# Patient Record
Sex: FEMALE | Race: BLACK OR AFRICAN AMERICAN | NOT HISPANIC OR LATINO | Employment: OTHER | URBAN - METROPOLITAN AREA
[De-identification: names, ages, dates, MRNs, and addresses within clinical notes are randomized per-mention and may not be internally consistent; named-entity substitution may affect disease eponyms.]

---

## 2017-01-03 DIAGNOSIS — C90.00 MULTIPLE MYELOMA, REMISSION STATUS UNSPECIFIED (HCC): ICD-10-CM

## 2017-01-04 ENCOUNTER — LAB (OUTPATIENT)
Dept: LAB | Facility: HOSPITAL | Age: 67
End: 2017-01-04

## 2017-01-04 ENCOUNTER — OFFICE VISIT (OUTPATIENT)
Dept: ONCOLOGY | Facility: CLINIC | Age: 67
End: 2017-01-04

## 2017-01-04 ENCOUNTER — INFUSION (OUTPATIENT)
Dept: ONCOLOGY | Facility: HOSPITAL | Age: 67
End: 2017-01-04

## 2017-01-04 VITALS
DIASTOLIC BLOOD PRESSURE: 70 MMHG | TEMPERATURE: 98.1 F | BODY MASS INDEX: 35.22 KG/M2 | RESPIRATION RATE: 16 BRPM | WEIGHT: 191.4 LBS | HEIGHT: 62 IN | HEART RATE: 82 BPM | SYSTOLIC BLOOD PRESSURE: 112 MMHG

## 2017-01-04 VITALS — DIASTOLIC BLOOD PRESSURE: 75 MMHG | HEART RATE: 94 BPM | SYSTOLIC BLOOD PRESSURE: 122 MMHG

## 2017-01-04 DIAGNOSIS — D64.9 ANEMIA, UNSPECIFIED TYPE: ICD-10-CM

## 2017-01-04 DIAGNOSIS — T45.1X5A ANEMIA ASSOCIATED WITH CHEMOTHERAPY: ICD-10-CM

## 2017-01-04 DIAGNOSIS — T45.1X5A CHEMOTHERAPY-INDUCED THROMBOCYTOPENIA: ICD-10-CM

## 2017-01-04 DIAGNOSIS — C90.00 MULTIPLE MYELOMA, REMISSION STATUS UNSPECIFIED (HCC): ICD-10-CM

## 2017-01-04 DIAGNOSIS — R05.9 COUGH: Primary | ICD-10-CM

## 2017-01-04 DIAGNOSIS — D69.59 CHEMOTHERAPY-INDUCED THROMBOCYTOPENIA: ICD-10-CM

## 2017-01-04 DIAGNOSIS — D64.81 ANEMIA ASSOCIATED WITH CHEMOTHERAPY: ICD-10-CM

## 2017-01-04 DIAGNOSIS — C90.00 MULTIPLE MYELOMA NOT HAVING ACHIEVED REMISSION (HCC): ICD-10-CM

## 2017-01-04 DIAGNOSIS — C90.00 MULTIPLE MYELOMA, REMISSION STATUS UNSPECIFIED (HCC): Primary | ICD-10-CM

## 2017-01-04 DIAGNOSIS — R49.0 HOARSENESS OF VOICE: ICD-10-CM

## 2017-01-04 DIAGNOSIS — C90.00 MULTIPLE MYELOMA NOT HAVING ACHIEVED REMISSION (HCC): Primary | ICD-10-CM

## 2017-01-04 LAB
ALBUMIN SERPL-MCNC: 3 G/DL (ref 3.5–5.2)
ALBUMIN/GLOB SERPL: 0.5 G/DL (ref 1.1–2.4)
ALP SERPL-CCNC: 67 U/L (ref 38–116)
ALT SERPL W P-5'-P-CCNC: 20 U/L (ref 0–33)
ANION GAP SERPL CALCULATED.3IONS-SCNC: 9 MMOL/L
AST SERPL-CCNC: 23 U/L (ref 0–32)
BASOPHILS # BLD AUTO: 0.03 10*3/MM3 (ref 0–0.1)
BASOPHILS NFR BLD AUTO: 0.4 % (ref 0–1.1)
BILIRUB SERPL-MCNC: 0.3 MG/DL (ref 0.1–1.2)
BUN BLD-MCNC: 22 MG/DL (ref 6–20)
BUN/CREAT SERPL: 11.6 (ref 7.3–30)
CALCIUM SPEC-SCNC: 8.6 MG/DL (ref 8.5–10.2)
CHLORIDE SERPL-SCNC: 108 MMOL/L (ref 98–107)
CO2 SERPL-SCNC: 23 MMOL/L (ref 22–29)
CREAT BLD-MCNC: 1.89 MG/DL (ref 0.6–1.1)
DEPRECATED RDW RBC AUTO: 56.4 FL (ref 37–49)
EOSINOPHIL # BLD AUTO: 0.19 10*3/MM3 (ref 0–0.36)
EOSINOPHIL NFR BLD AUTO: 2.8 % (ref 1–5)
ERYTHROCYTE [DISTWIDTH] IN BLOOD BY AUTOMATED COUNT: 14.6 % (ref 11.7–14.5)
GFR SERPL CREATININE-BSD FRML MDRD: 32 ML/MIN/1.73
GLOBULIN UR ELPH-MCNC: 5.5 GM/DL (ref 1.8–3.5)
GLUCOSE BLD-MCNC: 91 MG/DL (ref 74–124)
HCT VFR BLD AUTO: 36 % (ref 34–45)
HGB BLD-MCNC: 11.1 G/DL (ref 11.5–14.9)
IMM GRANULOCYTES # BLD: 0.39 10*3/MM3 (ref 0–0.03)
IMM GRANULOCYTES NFR BLD: 5.7 % (ref 0–0.5)
LYMPHOCYTES # BLD AUTO: 1.71 10*3/MM3 (ref 1–3.5)
LYMPHOCYTES NFR BLD AUTO: 25.1 % (ref 20–49)
MCH RBC QN AUTO: 33 PG (ref 27–33)
MCHC RBC AUTO-ENTMCNC: 30.8 G/DL (ref 32–35)
MCV RBC AUTO: 107.1 FL (ref 83–97)
MONOCYTES # BLD AUTO: 0.83 10*3/MM3 (ref 0.25–0.8)
MONOCYTES NFR BLD AUTO: 12.2 % (ref 4–12)
NEUTROPHILS # BLD AUTO: 3.66 10*3/MM3 (ref 1.5–7)
NEUTROPHILS NFR BLD AUTO: 53.8 % (ref 39–75)
NRBC BLD MANUAL-RTO: 0.4 /100 WBC (ref 0–0)
PLATELET # BLD AUTO: 72 10*3/MM3 (ref 150–375)
PMV BLD AUTO: 12.8 FL (ref 8.9–12.1)
POTASSIUM BLD-SCNC: 3.7 MMOL/L (ref 3.5–4.7)
PROT SERPL-MCNC: 8.5 G/DL (ref 6.3–8)
RBC # BLD AUTO: 3.36 10*6/MM3 (ref 3.9–5)
SODIUM BLD-SCNC: 140 MMOL/L (ref 134–145)
VIT B12 BLD-MCNC: 1289 PG/ML (ref 250–999)
WBC NRBC COR # BLD: 6.81 10*3/MM3 (ref 4–10)

## 2017-01-04 PROCEDURE — 25010000002 DARATUMUMAB 100 MG/5ML SOLUTION 5 ML VIAL: Performed by: INTERNAL MEDICINE

## 2017-01-04 PROCEDURE — 25010000002 METHYLPREDNISOLONE PER 125 MG: Performed by: INTERNAL MEDICINE

## 2017-01-04 PROCEDURE — 36416 COLLJ CAPILLARY BLOOD SPEC: CPT | Performed by: INTERNAL MEDICINE

## 2017-01-04 PROCEDURE — 99215 OFFICE O/P EST HI 40 MIN: CPT | Performed by: INTERNAL MEDICINE

## 2017-01-04 PROCEDURE — 96413 CHEMO IV INFUSION 1 HR: CPT | Performed by: INTERNAL MEDICINE

## 2017-01-04 PROCEDURE — 25010000002 DIPHENHYDRAMINE PER 50 MG: Performed by: INTERNAL MEDICINE

## 2017-01-04 PROCEDURE — 96415 CHEMO IV INFUSION ADDL HR: CPT | Performed by: INTERNAL MEDICINE

## 2017-01-04 PROCEDURE — 36415 COLL VENOUS BLD VENIPUNCTURE: CPT | Performed by: INTERNAL MEDICINE

## 2017-01-04 PROCEDURE — 85025 COMPLETE CBC W/AUTO DIFF WBC: CPT | Performed by: INTERNAL MEDICINE

## 2017-01-04 PROCEDURE — 80053 COMPREHEN METABOLIC PANEL: CPT | Performed by: INTERNAL MEDICINE

## 2017-01-04 PROCEDURE — 96375 TX/PRO/DX INJ NEW DRUG ADDON: CPT | Performed by: INTERNAL MEDICINE

## 2017-01-04 PROCEDURE — 82607 VITAMIN B-12: CPT | Performed by: INTERNAL MEDICINE

## 2017-01-04 PROCEDURE — 25010000002 DARATUMUMAB 100 MG/5ML SOLUTION 20 ML VIAL: Performed by: INTERNAL MEDICINE

## 2017-01-04 PROCEDURE — 82746 ASSAY OF FOLIC ACID SERUM: CPT | Performed by: INTERNAL MEDICINE

## 2017-01-04 RX ORDER — ACETAMINOPHEN 500 MG
1000 TABLET ORAL ONCE
Status: CANCELLED | OUTPATIENT
Start: 2017-01-04

## 2017-01-04 RX ORDER — DIPHENHYDRAMINE HYDROCHLORIDE 50 MG/ML
50 INJECTION INTRAMUSCULAR; INTRAVENOUS AS NEEDED
Status: CANCELLED | OUTPATIENT
Start: 2017-01-04

## 2017-01-04 RX ORDER — SODIUM CHLORIDE 9 MG/ML
250 INJECTION, SOLUTION INTRAVENOUS ONCE
Status: COMPLETED | OUTPATIENT
Start: 2017-01-04 | End: 2017-01-04

## 2017-01-04 RX ORDER — METHYLPREDNISOLONE SODIUM SUCCINATE 125 MG/2ML
60 INJECTION, POWDER, LYOPHILIZED, FOR SOLUTION INTRAMUSCULAR; INTRAVENOUS ONCE
Status: CANCELLED | OUTPATIENT
Start: 2017-01-04

## 2017-01-04 RX ORDER — MEPERIDINE HYDROCHLORIDE 50 MG/ML
25 INJECTION INTRAMUSCULAR; INTRAVENOUS; SUBCUTANEOUS
Status: CANCELLED | OUTPATIENT
Start: 2017-01-04

## 2017-01-04 RX ORDER — FAMOTIDINE 10 MG/ML
20 INJECTION, SOLUTION INTRAVENOUS AS NEEDED
Status: CANCELLED | OUTPATIENT
Start: 2017-01-04

## 2017-01-04 RX ORDER — ACETAMINOPHEN 500 MG
1000 TABLET ORAL ONCE
Status: COMPLETED | OUTPATIENT
Start: 2017-01-04 | End: 2017-01-04

## 2017-01-04 RX ORDER — SODIUM CHLORIDE 9 MG/ML
250 INJECTION, SOLUTION INTRAVENOUS ONCE
Status: CANCELLED | OUTPATIENT
Start: 2017-01-04 | End: 2017-01-04

## 2017-01-04 RX ORDER — METHYLPREDNISOLONE SODIUM SUCCINATE 125 MG/2ML
60 INJECTION, POWDER, LYOPHILIZED, FOR SOLUTION INTRAMUSCULAR; INTRAVENOUS ONCE
Status: COMPLETED | OUTPATIENT
Start: 2017-01-04 | End: 2017-01-04

## 2017-01-04 RX ADMIN — METHYLPREDNISOLONE SODIUM SUCCINATE 60 MG: 125 INJECTION, POWDER, FOR SOLUTION INTRAMUSCULAR; INTRAVENOUS at 09:56

## 2017-01-04 RX ADMIN — DIPHENHYDRAMINE HYDROCHLORIDE 25 MG: 50 INJECTION, SOLUTION INTRAMUSCULAR; INTRAVENOUS at 09:57

## 2017-01-04 RX ADMIN — ACETAMINOPHEN 1000 MG: 500 TABLET ORAL at 09:56

## 2017-01-04 RX ADMIN — SODIUM CHLORIDE 250 ML: 900 INJECTION, SOLUTION INTRAVENOUS at 09:30

## 2017-01-04 RX ADMIN — DARATUMUMAB 1370 MG: 100 INJECTION, SOLUTION, CONCENTRATE INTRAVENOUS at 11:07

## 2017-01-04 NOTE — PROGRESS NOTES
Reasons for follow up:   1. IgG kappa multiple myeloma, currently on Darzalex, started on May 26, 2016. Patient was switched to Darzalex from Pomalyst, as she continued to be neutropenic with recurrent urinary tract infection while on Pomalyst.    2. Zometa is on hold due to renal insufficiency.    3.  After 16 doses of Darzalex, laboratory study showed stable disease in November 2016.  Insurance company denied adding Velcade and dexamethasone. Patient is to be continued on monthly Darzalex from 12/06/16.        History of Present Illness     The patient is a pleasant 66-year-old female here today for reevaluation.  The patient continues to have significant cough, mostly dry with only very small quantity of sputum. Patient was given Levaquin back in late November 2016. She did have some improvement; however her cough has never resolved. On top of that, she also has hoarseness of her voice which has no signs of recovering. She denies fever, sweating or chills. She otherwise feels reasonable at baseline condition. She denies any weight loss. She has been using guaifenesin AC which contains codeine I prescribed 4 weeks ago and she reports this does not help. It causes some sleepiness. She usually tried not to use it during the daytime if she goes out.     Patient is otherwise at a baseline condition with reasonable appetite. Performance status ECOG 1-0. No bleeding. No fainting or syncope.       Past Medical History, Past Surgical History, Social History, Family History have been reviewed and are without significant changes except as mentioned.                 Oncology/Hematology History       1. Mult iple myeloma, IgG lambda, stage III, diagnosed April 2012, previously treated with Velcade/Decadron followed by Velcade/Decadron/Revlimid; patient developed abdominal pain and rectal bleeding on Revlimid, which was then discontinued.    2. Patient evaluated at [facility], but was not felt  to be a candidate for stem cell transplant.    3. Disease progression with therapy switched to melphalan/Velcade/prednisone per the VISTA trial on 10/22/2013; melphalan dose has been persistently decreased because of thrombocytopenia.    4. Anemia secondary to chronic renal insufficiency and myeloma, on periodic Procrit therapy p.r.n.    5. Patient had 8 cycles of VMP chemotherapy, repeat laboratory studies on 10/03/2013 showed stable disease. Her melphalan do se was significantly decreased due to marrow suppression.    6. Patient had disease progression after cycle #10 VMP treatment, evidenced by laboratory studies on 01/06/2014. We increased the melphalan dose for one cycle. Continued disease progress after cycl e #11 VMP treatment, despite increased dose of melphalan, as documented by laboratory study on 02/17/2014.    7. The patient was evaluated on 02/24/2014 and we recommend switching chemotherapy to Kyprolis on 02/27/2014.    8. Echocardiogram on 03/05/2014 reporting L VEF 43%. This is on par with her previous twice echocardiogram study, in June 2012 and November 2012, reported LVEF at 45% to 50% and 40% to 45% respectively.    9. The patient had 2 episodes of chest pain after Kyprolis during cycle 1, leading to omitted dose on days 9 and 16. From cycle 2, we will give Kyprolis only once per week for 3 weeks out of every 4 weeks, as well as dose reduction of Kyprolis that was started on 04/04/2014.    10. After cycle 2 of dose-reduced Kyprolis, the patient's M spike increased fro m 2.6 to 3.2 g/dL, with her IgG level increasing from 2.7 g/dL to 4.7 g/dL.    11. Patient had a stress test and echocardiogram study at [facility] on 01/23/2015. The patient had LVEF 35% to 40%. Myocardial perfusion study reported a large, mild to modera te severity fixed inferior wall defect, consistent with known transmural infarct versus diaphragmatic attenuation artifact. Post stress resting ejection fraction estimated at  31%.    12. Repeated echocardiogram study on 04/03/2015 reported an LVEF 46%. Left v entricle is moderately dilated. There is mild global hypokinesis of the left ventricle. There was a grade 1 diastolic dysfunction.    13. The patient was seen on 04/09/2015 with plan for cycle 14, day 1 of Kyprolis. Treatment will be delayed 1 week secondary to patient' s extreme fatigue, hemoglobin of 8.1. We will proceed with 2 units of packed red blood cells. She was also found to have a urinary tract infection. Patient prescribed Cipro 500 mg twice a day x7.    14. Laboratory tests on 08/24/2015 reported slight disease progress after cycle #18 Kyprolis. We discussed with patient and we will switch chemotherapy to CyBorD regimen with dose reduction of Velcade to 1 mg/m2, cyclophosphamide at 240 mg/m2 (total dose 450 mg), and dexamethasone 20 mg. All therapy to b e repeated on a weekly basis.    15. The patient had significant fatigue after one dose of cyclophosphamide that lasted for 5-6 days. She also had severe anemia, hemoglobin 7.6, required 2 units PRBC transfusion. Cyclophosphamide will be decreased to 350 mg from 2nd week.    16. Patient continues to have significant fatigue after the reduced dose of cyclophosphamide at 350 mg; for 2 days after chemo, she could only lie on the bed with performance status ECOG 3. From 10/13/2015, oral cyclophosphamide will be further decr eased to 200 mg once weekly. We will continue Velcade and dexamethasone at same dose.    17. Laboratory study on 12/01/2015 showed evidence of further disease progress.    18. The patient was re-evaluated on 12/22/2015. Treatment will be started with Velcade, dexamethasone, and panobinostat.    19. Skeletal survey on 01/12/2016 reported stable disease.    20. Brain MRI examination on 02/16/2016 reported recurrent meningioma.    21. Evidence of disease progress, when she was on panobinostat treatment. The patient also has worsening ane ming and  thrombocytopenia secondary to chemotherapy. The patient has symptomatic anemia with hemoglobin 7.8 on 02/23/2016 requiring 2 units of packed RBC transfusion. Chemotherapy with panobinostat will be discontinued.    22. She was admitted 3/20/2016-3/28/2016 with urinary tract infection, cultures growing Pseudomonas aeruginosa. She did undergo right stent replacement 3/25/2016 by Dr. Everett. Due to pancytopenia and infection, Pomalidomide has been on hold since hospitalization. The patient does continue to receive Cefrazidime 1mg every 12 hours from home health.      Patient had a good recovery and that she was evaluated on 04/11/2016. At that time she had improved a neutrophils counts at a 1640 out of total WBC 3620. Platelets was 80,000 which is a baseline. Her hemoglobin was 9.6. Because of intolerance to permanent a mild, those was further decreased at the 2 mg daily for 21 days.      However she required a hospitalization again from May 5 through 05/09/2016 She was found having sepsis, fever, and neutropenia. she presented to the ER for evaluation because of worsening condition. Urine culture was positive for Escherichia coli infection. She was also found having acute renal injury on top of her chronic renal insufficiency. Her creatinine was 2.9 and a peak. She was also found having neutropenia, with the  on May 5. She was admitted to hospital for further management. Patient was given IV antibiotics and the urologist consultation obtained. She did have replacement of the the right ureter stent by Dr. Everett. Patient was given Neupogen during hospitalization. She was also anemic symptomatic with a hemoglobin 7.1 and was given packed RBC transfusion. Her micaela platelet counts was 46,000 on 05/07/2016. No transfusion of platelets.       Patient was reevaluated on 05/17/2016. She recovered to baseline condition. Laboratory study showed mild neutropenia with ANC 1480, down from 2950 on 05/09/2016 when she was  discharged. Platelets was 57,000 and hemoglobin 9.0. Because of intolerance to Pomalidomide, with twice sepsis despite a dose reduction, we recommended to discontinue Pomalidomide. We recommended starting Darzalex treatment. Patient will be given acyclovir for prophylaxis of viral infection. She'll also be given Singulair for prophylaxis of possible allergic reaction from infusion of darzalex.       Darzalex was started on 05/17/16 per standard protocol. Her WBC was 3150, ANC 1480, Hb 9.0 and Platelet 57,000.       On 06/01/2016, and 24 hour urine sample showed free lambda chain 322 MG/L, total lambda chain 435 MG, free kappa chain 30.3 MG/L and a total kappa chain 52. Free kappa/lambda ratio 0.12, total 24 hour urine protein was 318 MG. Monoclonal IgG lambda was 7.5%, and monoclonal lambda free light chain was 42%.       On 06/23/2016, serum ferritin was 763, iron 91, TIBC 132 iron saturation 69%.       Laboratory study on 07/20/2016 reported 24-hour urine sample free lambda chain 153 MG/L and total lambda chain 275 mg, free kappa chain 22.3 MG/L and the total kappa chain 40 MG, free kappa/lambda ratio 0.15 and a positive for monoclonal IgG lambda and Bence-May protein lambda type. UPEP reported monoclonal free lambda chain 29.2%, and monoclonal IgG lambda 9%. Total 24 hour urine protein was 430 MG. Serum protein quantitation reporting IgG 4211, IgM 15 and IgA 8, free serum lambda chain 1442, free kappa chain 8.1, free kappa/lambda ratio of 0.01. SPEP reporting M spike 3.2 g, out of a total globulin 4.9, and a total protein 7.7 g. Albumin was 2.8 g/DL. On the same day CBC showed a hemoglobin 9.8, platelets 78,000, WBC 6130 including neutrophils 3360 in the lymphocytes 1780 monocytes 630.       Laboratory study on 11/08/2016 reported minimal global 10.8, platelets 65,000 and WBC 7200 including neutrophils 3950 lymphocytes 2000. serum IgG 4276 MG/DL, IgA 9 and IgM 28, total serum protein 8.4 g/DL. No serum protein  "electrophoresis, immunofixation and free light chains were done by the laboratory, despite orders. On 11/17/2016, the 24 hour urine sample reported free lambda chain 116 mg/L with total free lambda chain 220 mg. Free kappa chain was 32 mg total. Immunofixation of the urine protein did report both Bence May protein lambda subtype, and monoclonal IgG lambda protein. Monoclonal lambda free light chain was 33%, and monoclonal IgG lambda was 11.2% from the UPEP, out of total urine protein 404.7 MG in 24 hours.      Rosy study on 12/06/2016 reported a ferritin 1015, iron saturation 72%, serum iron 99 TIBC 137.  Hemoglobin was 10.2, platelets 83,000 and WBC 8200 including neutrophils 4300 lymphocytes 2200                Multiple myeloma       4/1/2012  Initial Diagnosis       Multiple myeloma          Chemotherapy                  Radiation                  Adverse Reaction                  Surgery                    Review of Systems    Constitutional: Negative for chills and fever. Does have mild fatigue.    HENT: Negative for mouth sores and sore throat.    Eyes: Negative for visual disturbance.    Respiratory: See HPI.    Gastrointestinal: Negative for abdominal pain, diarrhea, nausea and vomiting.    Genitourinary: Negative for decreased urine volume, dysuria, enuresis, frequency, hematuria, pelvic pain and urgency.    Musculoskeletal: Negative for back pain and joint swelling.    Neurological: Negative for dizziness and weakness.    Hematological: Does not bruise/bleed easily.    Psychiatric/Behavioral: The patient is not nervous/anxious.    All other systems reviewed and are negative.      Medications: The current medication list was reviewed in the EMR.     ALLERGIES: Zosyn       Vitals:    01/04/17 0813   BP: 112/70   Pulse: 82   Resp: 16   Temp: 98.1 °F (36.7 °C)   Weight: 191 lb 6.4 oz (86.8 kg)   Height: 62.2\" (158 cm)   PainSc: 0-No pain   PS: ECOG 1      Physical Exam   Constitutional: oriented to " person, place, and time. well-developed and well-nourished. No distress.    HENT:    Head: Normocephalic.    Eyes: EOMs are normal. Pupils are equal, round, and reactive to light.    Neck: Normal range of motion.    Cardiovascular: Normal rate, regular rhythm, normal heart sounds and normal pulses.    Pulmonary/Chest: Effort normal and breath sounds normal. no wheezes, no rales.  Mediport benign.  Abdominal: Soft. Bowel sounds are normal. no distension and no mass. There is no hepatosplenomegaly. There is no tenderness.   Musculoskeletal: Normal range of motion. no edema or deformity.   Lymphadenopathy: She has no cervical, supraclavicular adenopathy.   Neurological: alert and oriented to person, place, and time. No cranial nerve deficit.    Skin: Skin is warm and dry. No rash noted. No cyanosis. No pallor.   Psychiatric: She has a normal mood and affect. Her behavior is normal.        RECENT LABS:        Lab Results   Component Value Date    WBC 6.81 01/04/2017    HGB 11.1 (L) 01/04/2017    HCT 36.0 01/04/2017    .1 (H) 01/04/2017    PLT 72 (L) 01/04/2017     Lab Results   Component Value Date    NEUTROABS 3.66 01/04/2017       Assessment/Plan   1. Multiple myeloma, IgG lambda, stage III. Failed multiple lines of therapy.  Her treatment was switched to Darzalex on 5/26/2016, and finished 8 weekly doses then 8 Q2week doses with very good tolerance. However protein studies showed stable disease, no apparent improvement. Her insurance company deniey darzalex plus Velcade because it was only approved for patient who had no previous darzalex treatment. So we continued single agent darzalex, and she is tolerating very well, we'll continue monthly treatment. Will check labs after 4 monthly dosing.      2.  Persistent cough and hoarseness of voice.  No signs of improvement.  She started with upper respiratory infection/bronchitis in mid November 2016, was treated using Levaquin, together with Mucinex.  Initially had  a some improvement, however she has no further improvement, with fairly significant persistent cough despite her taking guaifenesin AC which contains codeine, together with plain Mucinex.  This is concerning especially her hoarseness of voice, for the past 6 weeks.  Previously we did obtain chest x-ray examination in late November, with no apparent abnormalities.  However her symptoms are very concerning.  I recommend patient to have a CT scan for chest for further evaluation.  Because of her chronic renal insufficiency, we will obtain scan without IV contrast.       3. Stage III Chronic Kidney disease. Will recheck labs.  Will continue to hold Zometa and monitor kidney function for further improvement.       4.   Anemia secondary to chemotherapy for multiple myeloma and chronic renal insufficiency. Her hemoglobin improved. She responded to low dose Procrit very nicely.  Rechecked her iron panel reported ferritin >1000.  B12 and folic acid levels are pending.     5. Moderate thrombocytopenia secondary to chemotherapy. She is asymptomatic.      6. History of stage II left breast cancer, post mastectomy in 2013, negative for ER/NE and the positive HER-2. No neoadjuvant chemotherapy because of concurrent discovery of multiple myeloma and chemotherapy. She had a normal mammogram study in July 2016.          Plan:  1. Proceed with Darzalex #18, repeated monthly.   2. Lab study for B12 or folic acid.   3.  CT scan for chest without IV contrast, due to chronic renal insufficiency.   4. Hold Zometa.    5. Patient will return in 4weeks for M.D. Reevaluation. Continue Darzalex monthly.  If her CT scan shows suspicion for malignancy, I will see her earlier.           LI OBANDO M.D., Ph.D.         Dragon disclaimer:  Much of this encounter note is an electronic transcription/translation of spoken language to printed text. The electronic translation of spoken language may permit erroneous, or at times, nonsensical words or  phrases to be inadvertently transcribed; Although I have reviewed the note for such errors, some may still exist.

## 2017-01-04 NOTE — PROGRESS NOTES
Pt creat reviewed with Dr. Araujo.  NO new changes other than to continue to hold zometa.  Currently pt does not have orders for Zometa.

## 2017-01-05 LAB
ALBUMIN SERPL-MCNC: 2.9 G/DL (ref 2.9–4.4)
ALBUMIN/GLOB SERPL: 0.6 {RATIO} (ref 0.7–1.7)
ALPHA1 GLOB FLD ELPH-MCNC: 0.2 G/DL (ref 0–0.4)
ALPHA2 GLOB SERPL ELPH-MCNC: 0.6 G/DL (ref 0.4–1)
B-GLOBULIN SERPL ELPH-MCNC: 0.8 G/DL (ref 0.7–1.3)
GAMMA GLOB SERPL ELPH-MCNC: 3.3 G/DL (ref 0.4–1.8)
GLOBULIN SER CALC-MCNC: 5 G/DL (ref 2.2–3.9)
IGA SERPL-MCNC: 9 MG/DL (ref 87–352)
IGG SERPL-MCNC: 4075 MG/DL (ref 700–1600)
IGM SERPL-MCNC: 15 MG/DL (ref 26–217)
INTERPRETATION SERPL IEP-IMP: ABNORMAL
KAPPA LC SERPL-MCNC: 10.34 MG/L (ref 3.3–19.4)
KAPPA LC/LAMBDA SER: 0.01 {RATIO} (ref 0.26–1.65)
LAMBDA LC FREE SERPL-MCNC: 1339.24 MG/L (ref 5.71–26.3)
Lab: ABNORMAL
M-SPIKE: ABNORMAL G/DL
PROT SERPL-MCNC: 7.9 G/DL (ref 6–8.5)

## 2017-01-06 ENCOUNTER — HOSPITAL ENCOUNTER (OUTPATIENT)
Dept: CT IMAGING | Facility: HOSPITAL | Age: 67
Discharge: HOME | End: 2017-01-06
Attending: INTERNAL MEDICINE | Admitting: INTERNAL MEDICINE

## 2017-01-06 DIAGNOSIS — R05.9 COUGH: ICD-10-CM

## 2017-01-06 DIAGNOSIS — R49.0 HOARSENESS OF VOICE: ICD-10-CM

## 2017-01-06 PROCEDURE — 71250 CT THORAX DX C-: CPT

## 2017-01-19 ENCOUNTER — TELEPHONE (OUTPATIENT)
Dept: ONCOLOGY | Facility: HOSPITAL | Age: 67
End: 2017-01-19

## 2017-01-19 ENCOUNTER — INFUSION (OUTPATIENT)
Dept: ONCOLOGY | Facility: HOSPITAL | Age: 67
End: 2017-01-19

## 2017-01-19 ENCOUNTER — HOSPITAL ENCOUNTER (INPATIENT)
Facility: HOSPITAL | Age: 67
LOS: 5 days | Discharge: HOME | End: 2017-01-24
Attending: INTERNAL MEDICINE | Admitting: INTERNAL MEDICINE

## 2017-01-19 ENCOUNTER — OFFICE VISIT (OUTPATIENT)
Dept: ONCOLOGY | Facility: CLINIC | Age: 67
End: 2017-01-19

## 2017-01-19 VITALS
SYSTOLIC BLOOD PRESSURE: 100 MMHG | HEART RATE: 100 BPM | WEIGHT: 186.8 LBS | OXYGEN SATURATION: 97 % | DIASTOLIC BLOOD PRESSURE: 58 MMHG | TEMPERATURE: 99.7 F | RESPIRATION RATE: 16 BRPM | BODY MASS INDEX: 34.37 KG/M2 | HEIGHT: 62 IN

## 2017-01-19 DIAGNOSIS — C90.00 MULTIPLE MYELOMA NOT HAVING ACHIEVED REMISSION (HCC): ICD-10-CM

## 2017-01-19 DIAGNOSIS — C64.2 MALIGNANT NEOPLASM OF KIDNEY, LEFT (HCC): ICD-10-CM

## 2017-01-19 DIAGNOSIS — N13.30 HYDRONEPHROSIS OF RIGHT KIDNEY: ICD-10-CM

## 2017-01-19 DIAGNOSIS — C90.00 MULTIPLE MYELOMA NOT HAVING ACHIEVED REMISSION (HCC): Primary | ICD-10-CM

## 2017-01-19 DIAGNOSIS — N18.30 CHRONIC KIDNEY DISEASE, STAGE III (MODERATE) (HCC): Primary | ICD-10-CM

## 2017-01-19 DIAGNOSIS — Z45.2 FITTING AND ADJUSTMENT OF VASCULAR CATHETER: ICD-10-CM

## 2017-01-19 DIAGNOSIS — R39.9 UTI SYMPTOMS: ICD-10-CM

## 2017-01-19 DIAGNOSIS — R50.9 FEVER, UNSPECIFIED FEVER CAUSE: ICD-10-CM

## 2017-01-19 PROBLEM — N39.0 FEBRILE URINARY TRACT INFECTION: Status: ACTIVE | Noted: 2017-01-19

## 2017-01-19 LAB
ALBUMIN SERPL-MCNC: 3.4 G/DL (ref 3.5–5.2)
ALBUMIN/GLOB SERPL: 0.6 G/DL (ref 1.1–2.4)
ALP SERPL-CCNC: 65 U/L (ref 38–116)
ALT SERPL W P-5'-P-CCNC: 21 U/L (ref 0–33)
ANION GAP SERPL CALCULATED.3IONS-SCNC: 12 MMOL/L
AST SERPL-CCNC: 27 U/L (ref 0–32)
BACTERIA UR QL AUTO: ABNORMAL /HPF
BASOPHILS # BLD AUTO: 0.01 10*3/MM3 (ref 0–0.1)
BASOPHILS NFR BLD AUTO: 0.1 % (ref 0–1.1)
BILIRUB SERPL-MCNC: 0.3 MG/DL (ref 0.1–1.2)
BILIRUB UR QL STRIP: NEGATIVE
BUN BLD-MCNC: 21 MG/DL (ref 6–20)
BUN/CREAT SERPL: 10.1 (ref 7.3–30)
CALCIUM SPEC-SCNC: 8 MG/DL (ref 8.5–10.2)
CHLORIDE SERPL-SCNC: 104 MMOL/L (ref 98–107)
CLARITY UR: ABNORMAL
CO2 SERPL-SCNC: 20 MMOL/L (ref 22–29)
COLOR UR: YELLOW
CREAT BLD-MCNC: 2.07 MG/DL (ref 0.6–1.1)
DEPRECATED RDW RBC AUTO: 55.9 FL (ref 37–49)
EOSINOPHIL # BLD AUTO: 0.02 10*3/MM3 (ref 0–0.36)
EOSINOPHIL NFR BLD AUTO: 0.3 % (ref 1–5)
ERYTHROCYTE [DISTWIDTH] IN BLOOD BY AUTOMATED COUNT: 14.3 % (ref 11.7–14.5)
GFR SERPL CREATININE-BSD FRML MDRD: 29 ML/MIN/1.73
GLOBULIN UR ELPH-MCNC: 5.3 GM/DL (ref 1.8–3.5)
GLUCOSE BLD-MCNC: 100 MG/DL (ref 74–124)
GLUCOSE UR STRIP-MCNC: NEGATIVE MG/DL
HCT VFR BLD AUTO: 34.3 % (ref 34–45)
HGB BLD-MCNC: 10.8 G/DL (ref 11.5–14.9)
HGB UR QL STRIP.AUTO: ABNORMAL
IMM GRANULOCYTES # BLD: 0.09 10*3/MM3 (ref 0–0.03)
IMM GRANULOCYTES NFR BLD: 1.3 % (ref 0–0.5)
KETONES UR QL STRIP: NEGATIVE
LEUKOCYTE ESTERASE UR QL STRIP.AUTO: ABNORMAL
LYMPHOCYTES # BLD AUTO: 1.57 10*3/MM3 (ref 1–3.5)
LYMPHOCYTES NFR BLD AUTO: 22.9 % (ref 20–49)
MCH RBC QN AUTO: 33.8 PG (ref 27–33)
MCHC RBC AUTO-ENTMCNC: 31.5 G/DL (ref 32–35)
MCV RBC AUTO: 107.2 FL (ref 83–97)
MONOCYTES # BLD AUTO: 0.97 10*3/MM3 (ref 0.25–0.8)
MONOCYTES NFR BLD AUTO: 14.1 % (ref 4–12)
NEUTROPHILS # BLD AUTO: 4.21 10*3/MM3 (ref 1.5–7)
NEUTROPHILS NFR BLD AUTO: 61.3 % (ref 39–75)
NITRITE UR QL STRIP: POSITIVE
NRBC BLD MANUAL-RTO: 0.4 /100 WBC (ref 0–0)
PH UR STRIP.AUTO: 7 [PH] (ref 4.5–8)
PLATELET # BLD AUTO: 64 10*3/MM3 (ref 150–375)
PMV BLD AUTO: 12.5 FL (ref 8.9–12.1)
POTASSIUM BLD-SCNC: 3.8 MMOL/L (ref 3.5–4.7)
PROT SERPL-MCNC: 8.7 G/DL (ref 6.3–8)
PROT UR QL STRIP: ABNORMAL
RBC # BLD AUTO: 3.2 10*6/MM3 (ref 3.9–5)
RBC # UR: ABNORMAL /HPF
REF LAB TEST METHOD: ABNORMAL
SODIUM BLD-SCNC: 136 MMOL/L (ref 134–145)
SP GR UR STRIP: 1.01 (ref 1–1.03)
SQUAMOUS #/AREA URNS HPF: ABNORMAL /HPF
UROBILINOGEN UR QL STRIP: ABNORMAL
WBC NRBC COR # BLD: 6.87 10*3/MM3 (ref 4–10)
WBC UR QL AUTO: ABNORMAL /HPF

## 2017-01-19 PROCEDURE — 81001 URINALYSIS AUTO W/SCOPE: CPT | Performed by: NURSE PRACTITIONER

## 2017-01-19 PROCEDURE — 87040 BLOOD CULTURE FOR BACTERIA: CPT | Performed by: NURSE PRACTITIONER

## 2017-01-19 PROCEDURE — 87040 BLOOD CULTURE FOR BACTERIA: CPT | Performed by: INTERNAL MEDICINE

## 2017-01-19 PROCEDURE — 25010000002 HEPARIN FLUSH (PORCINE) 100 UNIT/ML SOLUTION: Performed by: INTERNAL MEDICINE

## 2017-01-19 PROCEDURE — 80053 COMPREHEN METABOLIC PANEL: CPT

## 2017-01-19 PROCEDURE — 99223 1ST HOSP IP/OBS HIGH 75: CPT | Performed by: NURSE PRACTITIONER

## 2017-01-19 PROCEDURE — 96523 IRRIG DRUG DELIVERY DEVICE: CPT

## 2017-01-19 PROCEDURE — 87086 URINE CULTURE/COLONY COUNT: CPT | Performed by: NURSE PRACTITIONER

## 2017-01-19 PROCEDURE — 87186 SC STD MICRODIL/AGAR DIL: CPT | Performed by: NURSE PRACTITIONER

## 2017-01-19 PROCEDURE — 25010000002 LEVOFLOXACIN PER 250 MG: Performed by: INTERNAL MEDICINE

## 2017-01-19 PROCEDURE — 85025 COMPLETE CBC W/AUTO DIFF WBC: CPT

## 2017-01-19 RX ORDER — LEVOFLOXACIN 5 MG/ML
500 INJECTION, SOLUTION INTRAVENOUS EVERY 24 HOURS
Status: DISCONTINUED | OUTPATIENT
Start: 2017-01-19 | End: 2017-01-20

## 2017-01-19 RX ORDER — LOSARTAN POTASSIUM 25 MG/1
25 TABLET ORAL EVERY EVENING
Status: DISCONTINUED | OUTPATIENT
Start: 2017-01-19 | End: 2017-01-24 | Stop reason: HOSPADM

## 2017-01-19 RX ORDER — SODIUM CHLORIDE 0.9 % (FLUSH) 0.9 %
10 SYRINGE (ML) INJECTION AS NEEDED
Status: CANCELLED | OUTPATIENT
Start: 2017-01-19

## 2017-01-19 RX ORDER — SODIUM CHLORIDE 9 MG/ML
100 INJECTION, SOLUTION INTRAVENOUS CONTINUOUS
Status: CANCELLED | OUTPATIENT
Start: 2017-01-19

## 2017-01-19 RX ORDER — HYDROCODONE BITARTRATE AND ACETAMINOPHEN 5; 325 MG/1; MG/1
2 TABLET ORAL EVERY 6 HOURS PRN
Status: DISCONTINUED | OUTPATIENT
Start: 2017-01-19 | End: 2017-01-24 | Stop reason: HOSPADM

## 2017-01-19 RX ORDER — HYDRALAZINE HYDROCHLORIDE 25 MG/1
25 TABLET, FILM COATED ORAL 2 TIMES DAILY
Status: DISCONTINUED | OUTPATIENT
Start: 2017-01-19 | End: 2017-01-24 | Stop reason: HOSPADM

## 2017-01-19 RX ORDER — LEVOFLOXACIN 5 MG/ML
500 INJECTION, SOLUTION INTRAVENOUS EVERY 24 HOURS
Status: CANCELLED | OUTPATIENT
Start: 2017-01-19

## 2017-01-19 RX ORDER — GUAIFENESIN 600 MG/1
600 TABLET, EXTENDED RELEASE ORAL 2 TIMES DAILY
Status: DISCONTINUED | OUTPATIENT
Start: 2017-01-19 | End: 2017-01-24 | Stop reason: HOSPADM

## 2017-01-19 RX ORDER — SODIUM CHLORIDE 9 MG/ML
50 INJECTION, SOLUTION INTRAVENOUS CONTINUOUS
Status: DISCONTINUED | OUTPATIENT
Start: 2017-01-19 | End: 2017-01-24 | Stop reason: HOSPADM

## 2017-01-19 RX ORDER — ACETAMINOPHEN 325 MG/1
650 TABLET ORAL EVERY 6 HOURS PRN
Status: DISCONTINUED | OUTPATIENT
Start: 2017-01-19 | End: 2017-01-24 | Stop reason: HOSPADM

## 2017-01-19 RX ORDER — ONDANSETRON 4 MG/1
4 TABLET, ORALLY DISINTEGRATING ORAL EVERY 6 HOURS PRN
Status: DISCONTINUED | OUTPATIENT
Start: 2017-01-19 | End: 2017-01-24 | Stop reason: HOSPADM

## 2017-01-19 RX ORDER — BISOPROLOL FUMARATE 5 MG/1
10 TABLET, FILM COATED ORAL EVERY MORNING
Status: DISCONTINUED | OUTPATIENT
Start: 2017-01-20 | End: 2017-01-24 | Stop reason: HOSPADM

## 2017-01-19 RX ORDER — SODIUM CHLORIDE 0.9 % (FLUSH) 0.9 %
10 SYRINGE (ML) INJECTION AS NEEDED
Status: DISCONTINUED | OUTPATIENT
Start: 2017-01-19 | End: 2017-01-19 | Stop reason: HOSPADM

## 2017-01-19 RX ORDER — BISOPROLOL FUMARATE 5 MG/1
5 TABLET, FILM COATED ORAL EVERY EVENING
Status: DISCONTINUED | OUTPATIENT
Start: 2017-01-19 | End: 2017-01-24 | Stop reason: HOSPADM

## 2017-01-19 RX ORDER — ONDANSETRON 4 MG/1
4 TABLET, ORALLY DISINTEGRATING ORAL EVERY 6 HOURS PRN
Status: CANCELLED | OUTPATIENT
Start: 2017-01-19

## 2017-01-19 RX ORDER — ASPIRIN 81 MG/1
81 TABLET ORAL DAILY
Status: DISCONTINUED | OUTPATIENT
Start: 2017-01-19 | End: 2017-01-24 | Stop reason: HOSPADM

## 2017-01-19 RX ORDER — ISOSORBIDE MONONITRATE 60 MG/1
60 TABLET, EXTENDED RELEASE ORAL
Status: DISCONTINUED | OUTPATIENT
Start: 2017-01-19 | End: 2017-01-24 | Stop reason: HOSPADM

## 2017-01-19 RX ORDER — CARBAMAZEPINE 100 MG/1
200 TABLET, EXTENDED RELEASE ORAL NIGHTLY
Status: DISCONTINUED | OUTPATIENT
Start: 2017-01-19 | End: 2017-01-24 | Stop reason: HOSPADM

## 2017-01-19 RX ORDER — ACYCLOVIR 400 MG/1
400 TABLET ORAL 2 TIMES DAILY
Status: DISCONTINUED | OUTPATIENT
Start: 2017-01-19 | End: 2017-01-24 | Stop reason: HOSPADM

## 2017-01-19 RX ORDER — PROCHLORPERAZINE MALEATE 10 MG
10 TABLET ORAL EVERY 6 HOURS PRN
Status: DISCONTINUED | OUTPATIENT
Start: 2017-01-19 | End: 2017-01-24 | Stop reason: HOSPADM

## 2017-01-19 RX ORDER — HYDROXYZINE HYDROCHLORIDE 25 MG/1
25 TABLET, FILM COATED ORAL 3 TIMES DAILY PRN
Status: DISCONTINUED | OUTPATIENT
Start: 2017-01-19 | End: 2017-01-24 | Stop reason: HOSPADM

## 2017-01-19 RX ORDER — MONTELUKAST SODIUM 10 MG/1
10 TABLET ORAL NIGHTLY
Status: DISCONTINUED | OUTPATIENT
Start: 2017-01-19 | End: 2017-01-24 | Stop reason: HOSPADM

## 2017-01-19 RX ADMIN — LEVOFLOXACIN 500 MG: 500 INJECTION, SOLUTION INTRAVENOUS at 20:51

## 2017-01-19 RX ADMIN — Medication 10 ML: at 11:29

## 2017-01-19 RX ADMIN — ISOSORBIDE MONONITRATE 60 MG: 60 TABLET, EXTENDED RELEASE ORAL at 20:48

## 2017-01-19 RX ADMIN — GUAIFENESIN 600 MG: 600 TABLET, EXTENDED RELEASE ORAL at 20:49

## 2017-01-19 RX ADMIN — ACETAMINOPHEN 650 MG: 325 TABLET ORAL at 18:36

## 2017-01-19 RX ADMIN — ACYCLOVIR 400 MG: 400 TABLET ORAL at 20:48

## 2017-01-19 RX ADMIN — MONTELUKAST 10 MG: 10 TABLET, FILM COATED ORAL at 20:49

## 2017-01-19 RX ADMIN — BISOPROLOL FUMARATE 5 MG: 5 TABLET, COATED ORAL at 20:50

## 2017-01-19 RX ADMIN — SODIUM CHLORIDE, PRESERVATIVE FREE 500 UNITS: 5 INJECTION INTRAVENOUS at 11:29

## 2017-01-19 RX ADMIN — SODIUM CHLORIDE 100 ML/HR: 9 INJECTION, SOLUTION INTRAVENOUS at 18:47

## 2017-01-19 RX ADMIN — ASPIRIN 81 MG: 81 TABLET ORAL at 20:48

## 2017-01-19 NOTE — H&P
Subjective .  Fever 102.2 and weak    REASON FOR ADMISSION: Febrile urinary tract infection    HISTORY OF PRESENT ILLNESS:  The patient is a 66 y.o. year old female who is here for follow-up with the above-mentioned history who is followed in our office for IgG kappa multiple myeloma, currently on Darzalex, last dose January 4, 2017.    History of Present Illness  she comes in to the office today after a 2 day temperature of 102.  Monday she noted a temperature of 100.  When she awoke on Tuesday she was further febrile as well as noted fatigue and weakness.  She denies nausea vomiting or diarrhea.  She has not been eating though reports drinking about 16 ounces of water daily.  She denies dizzinessshe does seem off balance at times.  She denies alcohol.  She has had a cough for quite some time though it is not changed of recent.  She denies shortness of breath.  She denies pain.      She was due to have ureteral stent changed today however could not undergo the procedure because of fever.  In the past, she is become febrile and weak close the time of ureteral stent change.    Past Medical History   Diagnosis Date   • Anemia 10/14/2008     Secondary to chronic renal insufficiency and myeloma   • Arthropathy of knee 01/07/2014     unspecified   • Breast CA, left 04/25/2012     Stage II   • Bursitis of left hip 10/18/2007   • Bursitis, knee 12/02/2013   • Calcaneal spur 08/12/2009   • Chronic kidney disease, stage III (moderate)    • Congestive heart failure    • Diarrhea of presumed infectious origin 04/01/2014     c diff    • Diverticulitis of colon 10/17/2007   • History of Clostridium difficile    • History of echocardiogram 04/2014     EF decreased to 46% - per cardiology   • History of transfusion    • Hypertension    • LLL pneumonia 04/23/2009   • Malignant neoplasm of kidney 12/2009     and other and unspecified urinary organs; urinary organ, site unspecified; S/P L nephrectomy   • Multiple myeloma 04/2012    • Neoplasm of uncertain behavior 10/12/2015     of other and unspecified sites and tissues; other specified sites   • Personal history of breast cancer 04/2012     S/P mastectomy left breast   • Pyelonephritis 03/2004   • Seizure disorder 10/17/2007   • Thrombocytopenia    • UTI (urinary tract infection) 10/30/2014     pseudomonas, multidrug resistant   • UTI (urinary tract infection) 03/28/2014     site not specified       ONCOLOGIC HISTORY:  (History from previous dates can be found in the separate document.)    Current Outpatient Prescriptions on File Prior to Visit   Medication Sig Dispense Refill   • acyclovir (ZOVIRAX) 400 MG tablet Take 1 tablet by mouth 2 (two) times a day. Take no more than 5 doses a day. 60 tablet 11   • aspirin 81 MG tablet Take 81 mg by mouth every evening. Take 1 tablet by oral route once daily      • bisoprolol (ZEBETA) 10 MG tablet Take 10 mg by mouth every morning. Take 1 tablet by oral route once daily      • bisoprolol (ZEBETA) 5 MG tablet 5 mg every evening.     • carBAMazepine XR (TEGretol  XR) 200 MG 12 hr tablet      • carBAMazepine XR (TEGretol  XR) 200 MG 12 hr tablet TAKE 1 TABLET BY MOUTH 2 (TWO) TIMES A DAY FOR 30 DAYS. 60 tablet 2   • dexamethasone (DECADRON) 4 MG tablet Take 1 tablet in the morning starting the day after chemo for 2 days.  Take with food. 16 tablet 5   • guaiFENesin (MUCINEX) 600 MG 12 hr tablet Take 1 tablet by mouth 2 (Two) Times a Day. 30 tablet 2   • guaifenesin-codeine (GUAIFENESIN AC) 100-10 MG/5ML liquid Take 5 mL by mouth 3 (Three) Times a Day As Needed for cough. 150 mL 1   • hydrALAZINE (APRESOLINE) 25 MG tablet Take 25 mg by mouth 2 (two) times a day.  3   • HYDROcodone-acetaminophen (NORCO) 5-325 MG per tablet TAKE 1 TO 2 TABLETS BY MOUTH EVERY 6 HOURS AS NEEDED  0   • hydrOXYzine (ATARAX) 25 MG tablet Take 1 tablet by mouth 3 (three) times a day as needed for itching. 90 tablet 6   • isosorbide mononitrate (IMDUR) 60 MG 24 hr tablet TAKE 1  TABLET BY MOUTH EVERY MORNING AND ONE-HALF TABLET EVERY EVENING  3   • losartan (COZAAR) 25 MG tablet Take 25 mg by mouth every evening.     • Misc. Devices (VIRAGE CUSTOM BREAST PROSTHES) misc As directed     • Misc. Devices misc Mastectomy bras, use as directed 4 each 0   • montelukast (SINGULAIR) 10 MG tablet Take 1 tablet by mouth every night. 30 tablet 11   • prochlorperazine (COMPAZINE) 10 MG tablet Take 1 tablet by mouth every 6 (six) hours as needed for nausea or vomiting for up to 60 doses. 60 tablet 5     No current facility-administered medications on file prior to visit.        ALLERGIES:     Allergies   Allergen Reactions   • Ace Inhibitors Swelling     FACE   • Zosyn [Piperacillin Sod-Tazobactam So] Swelling       Social History     Social History   • Marital status:      Spouse name: N/A   • Number of children: 3   • Years of education: High school     Occupational History   •  Retired     [employer] [[id number]]     Social History Main Topics   • Smoking status: Never Smoker   • Smokeless tobacco: Never Used   • Alcohol use No   • Drug use: No   • Sexual activity: Not Currently     Other Topics Concern   • Not on file     Social History Narrative    Son lives with her and helps         Cancer-related family history includes Cancer in her sister.     Review of Systems   Constitutional: Positive for activity change, appetite change, chills, fatigue and fever.   HENT: Negative for mouth sores and postnasal drip.    Eyes: Negative for visual disturbance.   Respiratory: Positive for cough. Negative for chest tightness, shortness of breath and wheezing.    Cardiovascular: Negative for chest pain and leg swelling.   Gastrointestinal: Negative for abdominal pain, blood in stool, constipation, diarrhea, nausea and vomiting.   Genitourinary: Negative for dysuria, frequency and pelvic pain.   Musculoskeletal: Positive for myalgias. Negative for gait problem.   Skin: Negative for  rash.   Neurological: Positive for weakness and light-headedness. Negative for dizziness and speech difficulty.   Hematological: Negative for adenopathy. Does not bruise/bleed easily.   Psychiatric/Behavioral: Negative for confusion.         Objective      Vitals:    01/19/17 1050   BP: 100/58   Pulse: 100   Resp: 16   Temp: 99.7 °F (37.6 °C)   SpO2: 97%      Current Status 1/19/2017   ECOG score 0       Physical Exam   Constitutional: She is oriented to person, place, and time. She appears well-developed and well-nourished. She appears ill. No distress.   HENT:   Head: Normocephalic.   Eyes: EOM are normal.   Neck: Normal range of motion.   Cardiovascular: Normal rate and regular rhythm.    Pulmonary/Chest: Effort normal and breath sounds normal. She has no wheezes.   Abdominal: Soft. Bowel sounds are normal. There is tenderness (bilateral lower quadrants).   Musculoskeletal: Normal range of motion.   Lymphadenopathy:     She has no cervical adenopathy.   Neurological: She is alert and oriented to person, place, and time.   Skin: Skin is warm and dry. No rash noted.   Psychiatric: She has a normal mood and affect.   Vitals reviewed.      RECENT LABS:  Hematology WBC   Date Value Ref Range Status   01/19/2017 6.87 4.00 - 10.00 10*3/mm3 Final   04/29/2015 7.47 4.50 - 10.70 K/Cumm Final     Comment:     WBC corrected for presence of NRBC  s       RBC   Date Value Ref Range Status   01/19/2017 3.20 (L) 3.90 - 5.00 10*6/mm3 Final   04/29/2015 3.02 (L) 3.90 - 5.20 Million Final     HEMOGLOBIN   Date Value Ref Range Status   01/19/2017 10.8 (L) 11.5 - 14.9 g/dL Final   04/29/2015 10.1 (L) 11.9 - 15.5 g/dL Final     Comment:      by [id number] @ 04/29/2015 07:13  Post Transfusion Specimen       HEMATOCRIT   Date Value Ref Range Status   01/19/2017 34.3 34.0 - 45.0 % Final   04/29/2015 31.2 (L) 35.6 - 45.5 % Final     PLATELETS   Date Value Ref Range Status   01/19/2017 64 (L) 150 - 375 10*3/mm3 Final   04/29/2015 42 (L) 140  - 500 K/Cumm Final        Assessment/Plan     1.  Multiple myeloma, IgG lambda, stage III.  Failed multiple lines of therapy.  She is currently undergoing Darzalex, last dose January 4, 2016.    2.  Fever.  The patient presented today with fever, fatigue and weakness.  She denied nausea, vomiting, diarrhea, shortness of breath, or dysuria.  She was however due for ureteral stent change today the procedure was canceled due to her fever.  In the past, she has become febrile and weak around the time that her stents were due to be changed.  Urinalysis was performed and the patient will be admitted for IV antibiotics.  We will consult Dr. Everett to evaluate the patient and hopefully change her stents while in the hospital after IV antibiotics.  We will initiate Levaquin 500 mg daily, we will request pharmacy to dose after first dose due to her CK D.    3.  Stage 3 chronic kidney disease.  Creatinine was elevated today and we will initiate IV fluids the patient has not been eating or drinking to her normal standards.    4.  Moderate thrombocytopenia secondary to chemotherapy.  She is asymptomatic.              Cc:  Cristo Madera MD

## 2017-01-19 NOTE — TELEPHONE ENCOUNTER
----- Message from Cassidy Acuna sent at 1/19/2017  8:56 AM EST -----  Contact: pateint   Really sick has been running fever for 3 days wants to know what to do  Or can she be seen

## 2017-01-19 NOTE — IP AVS SNAPSHOT
AFTER VISIT SUMMARY             Marina Barroso           About your hospitalization     You were admitted on:  January 19, 2017 You last received care in the:  [address]       Procedures & Surgeries      Procedure(s) (LRB):  CYSTOSCOPY RIGHT RETROGRADE PYELOGRAM, URETERAL STENT CHANGE (N/A)     1/19/2017 - 1/20/2017     Surgeon(s):  Lakhwinder Russo MD  -------------------      Medications    If you or your caregiver advised us that you are currently taking a medication and that medication is marked below as “Resume”, this simply indicates that we have reviewed those medications to make sure our new therapy recommendations do not interfere.  If you have concerns about medications other than those new ones which we are prescribing today, please consult the physician who prescribed them (or your primary physician).  Our review of your home medications is not meant to indicate that we are directing their use.             Your Medications      START taking these medications     acetaminophen 325 MG tablet   Take 2 tablets by mouth Every 6 (Six) Hours As Needed for mild pain (1-3) or fever.   Last time this was given:  1/22/2017  9:41 PM   Commonly known as:  TYLENOL           cefdinir 300 MG capsule   Take 1 capsule by mouth 2 (Two) Times a Day.   Commonly known as:  OMNICEF             CHANGE how you take these medications     isosorbide mononitrate 60 MG 24 hr tablet   Take 1 tablet by mouth Daily. TAKE 1 TABLET BY MOUTH EVERY MORNING AND ONE-HALF TABLET EVERY EVENING   Last time this was given:  1/24/2017  9:34 AM   Commonly known as:  IMDUR   What changed:    - how much to take  - how to take this  - when to take this             CONTINUE taking these medications     acyclovir 400 MG tablet   Take 1 tablet by mouth 2 (two) times a day. Take no more than 5 doses a day.   Last time this was given:  1/24/2017  9:34 AM   Commonly known as:  ZOVIRAX           bisoprolol 10 MG tablet   Take 10  mg by mouth every morning. Take 1 tablet by oral route once daily   Last time this was given:  1/24/2017  6:23 AM   Commonly known as:  ZEBeta           bisoprolol 5 MG tablet   5 mg every evening.   Last time this was given:  1/24/2017  6:23 AM   Commonly known as:  ZEBeta           carBAMazepine  MG 12 hr tablet   Every Night.   Last time this was given:  1/23/2017 10:26 PM   Commonly known as:  TEGretol  XR           carBAMazepine  MG 12 hr tablet   TAKE 1 TABLET BY MOUTH 2 (TWO) TIMES A DAY FOR 30 DAYS.   Last time this was given:  1/23/2017 10:26 PM   Commonly known as:  TEGretol  XR           dexamethasone 4 MG tablet   Take 1 tablet in the morning starting the day after chemo for 2 days.  Take with food.   Commonly known as:  DECADRON           hydrALAZINE 25 MG tablet   Take 25 mg by mouth 2 (two) times a day.   Last time this was given:  1/24/2017  9:34 AM   Commonly known as:  APRESOLINE           HYDROcodone-acetaminophen 5-325 MG per tablet   TAKE 1 TO 2 TABLETS BY MOUTH EVERY 6 HOURS AS NEEDED   Commonly known as:  NORCO           hydrOXYzine 25 MG tablet   Take 1 tablet by mouth 3 (three) times a day as needed for itching.   Commonly known as:  ATARAX           losartan 25 MG tablet   Take 25 mg by mouth every evening.   Last time this was given:  1/23/2017  5:19 PM   Commonly known as:  COZAAR           montelukast 10 MG tablet   Take 1 tablet by mouth every night.   Last time this was given:  1/23/2017 10:26 PM   Commonly known as:  SINGULAIR           prochlorperazine 10 MG tablet   Take 1 tablet by mouth every 6 (six) hours as needed for nausea or vomiting for up to 60 doses.   Last time this was given:  1/20/2017  8:46 PM   Commonly known as:  COMPAZINE           VIRAGE CUSTOM BREAST PROSTHES misc   As directed           Misc. Devices misc   Mastectomy bras, use as directed             STOP taking these medications     aspirin 81 MG tablet           guaiFENesin 600 MG 12 hr tablet    Commonly known as:  MUCINEX           guaifenesin-codeine 100-10 MG/5ML liquid   Commonly known as:  GUAIFENESIN AC                Where to Get Your Medications      These medications were sent to [facility]/pharmacy #[id number] - [place], [place] - [address] - [phone]  - [phone] FX  [address] [facility] [address]     Phone:  [phone]     cefdinir 300 MG capsule         Information about where to get these medications is not yet available     ! Ask your nurse or doctor about these medications     acetaminophen 325 MG tablet    isosorbide mononitrate 60 MG 24 hr tablet                  Your Medications      Your Medication List           Morning Noon Evening Bedtime As Needed    acetaminophen 325 MG tablet   Take 2 tablets by mouth Every 6 (Six) Hours As Needed for mild pain (1-3) or fever.   Commonly known as:  TYLENOL                                   acyclovir 400 MG tablet   Take 1 tablet by mouth 2 (two) times a day. Take no more than 5 doses a day.   Commonly known as:  ZOVIRAX                                      * bisoprolol 10 MG tablet   Take 10 mg by mouth every morning. Take 1 tablet by oral route once daily   Commonly known as:  ZEBeta                                   * bisoprolol 5 MG tablet   5 mg every evening.   Commonly known as:  ZEBeta                                   * carBAMazepine  MG 12 hr tablet   Every Night.   Commonly known as:  TEGretol  XR                                   * carBAMazepine  MG 12 hr tablet   TAKE 1 TABLET BY MOUTH 2 (TWO) TIMES A DAY FOR 30 DAYS.   Commonly known as:  TEGretol  XR                                      cefdinir 300 MG capsule   Take 1 capsule by mouth 2 (Two) Times a Day.   Commonly known as:  OMNICEF                                      dexamethasone 4 MG tablet   Take 1 tablet in the morning starting the day after chemo for 2 days.  Take with food.   Commonly known as:  DECADRON                                    hydrALAZINE 25 MG tablet   Take 25 mg by mouth 2 (two) times a day.   Commonly known as:  APRESOLINE                                      HYDROcodone-acetaminophen 5-325 MG per tablet   TAKE 1 TO 2 TABLETS BY MOUTH EVERY 6 HOURS AS NEEDED   Commonly known as:  NORCO                                   hydrOXYzine 25 MG tablet   Take 1 tablet by mouth 3 (three) times a day as needed for itching.   Commonly known as:  ATARAX                                   isosorbide mononitrate 60 MG 24 hr tablet   Take 1 tablet by mouth Daily. TAKE 1 TABLET BY MOUTH EVERY MORNING AND ONE-HALF TABLET EVERY EVENING   Commonly known as:  IMDUR                                      losartan 25 MG tablet   Take 25 mg by mouth every evening.   Commonly known as:  COZAAR                                   montelukast 10 MG tablet   Take 1 tablet by mouth every night.   Commonly known as:  SINGULAIR                                   prochlorperazine 10 MG tablet   Take 1 tablet by mouth every 6 (six) hours as needed for nausea or vomiting for up to 60 doses.   Commonly known as:  COMPAZINE                                   * VIRAGE CUSTOM BREAST PROSTHES misc   As directed                                * Misc. Devices misc   Mastectomy bras, use as directed                                * Notice:  This list has 6 medication(s) that are the same as other medications prescribed for you. Read the directions carefully, and ask your doctor or other care provider to review them with you.             Instructions for After Discharge        Discharge References/Attachments     URINARY TRACT INFECTION (ENGLISH)       Follow-ups for After Discharge        Follow-up Information     Follow up with Cristo Madera MD .    Specialty:  Family Medicine    Contact information:    [address] [address] [address] [phone]        Scheduled Appointments     Feb 01, 2017  7:30 AM EST   PORT FLUSH with PATTI MALDONADO CHAIR   Denominational  [facility] ([place])    [address]  [facility] [address]   [phone]            Feb 01, 2017  8:00 AM EST   FOLLOW UP with Zane Araujo MD PhD   [facility] IN BLOOD DISORDERS AND CANCER ([facility] [place])    [address]  [facility] [address]   [phone]            Feb 01, 2017  8:30 AM EST   INFUSION with CHEMO INF 3 [facility] KRE   [facility] ([place])    [address]  [facility] [address]   [phone]              MyChart Signup     Our records indicate that you have declined [facility] MyChart signup. If you would like to sign up for [facility], please email [email or web address] or call [phone] to obtain an activation code.         Summary of Your Hospitalization        Reason for Hospitalization     Your primary diagnosis was:  Not on File    Your diagnoses also included:  Febrile Urinary Tract Infection, Decrease Of All Blood Cells, Chronic Kidney Disease, Stage Iii (Moderate), Multiple Myeloma, Anemia Associated With Chemotherapy, Chemotherapy-Induced Thrombocytopenia      Care Providers     Provider Service Role Specialty    Michael Everett Jr., MD Urology Consulting Physician  Urology    Leisa Wells MD -- Consulting Physician  Hematology and Oncology     Champ Saenz MD Urology Surgeon  Urology      Your Allergies  Date Reviewed: 1/23/2017    Allergen Reactions    Ace Inhibitors Swelling    FACE         Zosyn (Piperacillin Sod-Tazobactam So) Swelling      Patient Belongings Returned     Document Return of Belongings Flowsheet     Were the patient bedside belongings sent home?   Yes   Belongings Retrieved from Security & Sent Home   No (Comment)    Belongings Sent to Safe   --   Medications Retrieved from Pharmacy & Sent Home   No (Comment)              More Information      Urinary Tract Infection  Urinary tract infections (UTIs) can develop  anywhere along your urinary tract. Your urinary tract is your body's drainage system for removing wastes and extra water. Your urinary tract includes two kidneys, two ureters, a bladder, and a urethra. Your kidneys are a pair of bean-shaped organs. Each kidney is about the size of your fist. They are located below your ribs, one on each side of your spine.  CAUSES  Infections are caused by microbes, which are microscopic organisms, including fungi, viruses, and bacteria. These organisms are so small that they can only be seen through a microscope. Bacteria are the microbes that most commonly cause UTIs.  SYMPTOMS   Symptoms of UTIs may vary by age and gender of the patient and by the location of the infection. Symptoms in young women typically include a frequent and intense urge to urinate and a painful, burning feeling in the bladder or urethra during urination. Older women and men are more likely to be tired, shaky, and weak and have muscle aches and abdominal pain. A fever may mean the infection is in your kidneys. Other symptoms of a kidney infection include pain in your back or sides below the ribs, nausea, and vomiting.  DIAGNOSIS  To diagnose a UTI, your caregiver will ask you about your symptoms. Your caregiver will also ask you to provide a urine sample. The urine sample will be tested for bacteria and white blood cells. White blood cells are made by your body to help fight infection.  TREATMENT   Typically, UTIs can be treated with medication. Because most UTIs are caused by a bacterial infection, they usually can be treated with the use of antibiotics. The choice of antibiotic and length of treatment depend on your symptoms and the type of bacteria causing your infection.  HOME CARE INSTRUCTIONS  · If you were prescribed antibiotics, take them exactly as your caregiver instructs you. Finish the medication even if you feel better after you have only taken some of the medication.  · Drink enough water and  fluids to keep your urine clear or pale yellow.  · Avoid caffeine, tea, and carbonated beverages. They tend to irritate your bladder.  · Empty your bladder often. Avoid holding urine for long periods of time.  · Empty your bladder before and after sexual intercourse.  · After a bowel movement, women should cleanse from front to back. Use each tissue only once.  SEEK MEDICAL CARE IF:   · You have back pain.  · You develop a fever.  · Your symptoms do not begin to resolve within 3 days.  SEEK IMMEDIATE MEDICAL CARE IF:   · You have severe back pain or lower abdominal pain.  · You develop chills.  · You have nausea or vomiting.  · You have continued burning or discomfort with urination.  MAKE SURE YOU:   · Understand these instructions.  · Will watch your condition.  · Will get help right away if you are not doing well or get worse.     This information is not intended to replace advice given to you by your health care provider. Make sure you discuss any questions you have with your health care provider.     Document Released: 09/27/2006 Document Revised: 09/07/2016 Document Reviewed: 01/25/2013  [employer] Interactive Patient Education ©2016 [employer] Inc.         PREVENTING SURGICAL SITE INFECTIONS     Surgical Site Infections FAQs  What is a Surgical Site Infection (SSI)?  A surgical site infection is an infection that occurs after surgery in the part of the body where the surgery took place. Most patients who have surgery do not develop an infection. However, infections develop in about 1 to 3 out of every 100 patients who have surgery.  Some of the common symptoms of a surgical site infection are:  · Redness and pain around the area where you had surgery  · Drainage of cloudy fluid from your surgical wound  · Fever  Can SSIs be treated?  Yes. Most surgical site infections can be treated with antibiotics. The antibiotic given to you depends on the bacteria (germs) causing the infection. Sometimes patients with SSIs  also need another surgery to treat the infection.  What are some of the things that hospitals are doing to prevent SSIs?  To prevent SSIs, doctors, nurses, and other healthcare providers:  · Clean their hands and arms up to their elbows with an antiseptic agent just before the surgery.  · Clean their hands with soap and water or an alcohol-based hand rub before and after caring for each patient.  · May remove some of your hair immediately before your surgery using electric clippers if the hair is in the same area where the procedure will occur. They should not shave you with a razor.  · Wear special hair covers, masks, gowns, and gloves during surgery to keep the surgery area clean.  · Give you antibiotics before your surgery starts. In most cases, you should get antibiotics within 60 minutes before the surgery starts and the antibiotics should be stopped within 24 hours after surgery.  · Clean the skin at the site of your surgery with a special soap that kills germs.  What can I do to help prevent SSIs?  Before your surgery:  · Tell your doctor about other medical problems you may have. Health problems such as allergies, diabetes, and obesity could affect your surgery and your treatment.  · Quit smoking. Patients who smoke get more infections. Talk to your doctor about how you can quit before your surgery.  · Do not shave near where you will have surgery. Shaving with a razor can irritate your skin and make it easier to develop an infection.  At the time of your surgery:  · Speak up if someone tries to shave you with a razor before surgery. Ask why you need to be shaved and talk with your surgeon if you have any concerns.  · Ask if you will get antibiotics before surgery.  After your surgery:  · Make sure that your healthcare providers clean their hands before examining you, either with soap and water or an alcohol-based hand rub.    If you do not see your providers clean their hands, please ask them to do  so.  · Family and friends who visit you should not touch the surgical wound or dressings.  · Family and friends should clean their hands with soap and water or an alcohol-based hand rub before and after visiting you. If you do not see them clean their hands, ask them to clean their hands.  What do I need to do when I go home from the hospital?  · Before you go home, your doctor or nurse should explain everything you need to know about taking care of your wound. Make sure you understand how to care for your wound before you leave the hospital.  · Always clean your hands before and after caring for your wound.  · Before you go home, make sure you know who to contact if you have questions or problems after you get home.  · If you have any symptoms of an infection, such as redness and pain at the surgery site, drainage, or fever, call your doctor immediately.  If you have additional questions, please ask your doctor or nurse.  Developed and co-sponsored by The Society for Healthcare Epidemiology of [place] (SHEA); Infectious Diseases Society of [place] (IDSA); American Hospital Association; Association for Professionals in Infection Control and Epidemiology (APIC); Centers for Disease Control and Prevention (CDC); and The Joint Commission.     This information is not intended to replace advice given to you by your health care provider. Make sure you discuss any questions you have with your health care provider.     Document Released: 12/23/2014 Document Revised: 01/08/2016 Document Reviewed: 03/02/2016  [employer] Interactive Patient Education ©2016 [employer]             SYMPTOMS OF A STROKE    Call 911 or have someone take you to the Emergency Department if you have any of the following:    · Sudden numbness or weakness of your face, arm or leg especially on one side of the body  · Sudden confusion, diffiiculty speaking or trouble understanding   · Changes in your vision or loss of sight in one eye  · Sudden severe  headache with no known cause  · sudden dizziness, trouble walking, loss of balance or coordination    It is important to seek emergency care right away if you have further stroke symptoms. If you get emergency help quickly, the powerful clot-dissolving medicines can reduce the disabilities caused by a stroke.     For more information:    American Stroke Association  0-854-2-STROKE  www.strokeassociation.org           IF YOU SMOKE OR USE TOBACCO PLEASE READ THE FOLLOWING:    Why is smoking bad for me?  Smoking increases the risk of heart disease, lung disease, vascular disease, stroke, and cancer.     If you smoke, STOP!    If you would like more information on quitting smoking, please visit the [employer] website: [email or web address]   This link will provide additional resources including the QUIT line and the Beat the Pack support groups.     For more information:    American Cancer Society  (724) 426-2501    American Heart Association  1-495.343.6611               YOU ARE THE MOST IMPORTANT FACTOR IN YOUR RECOVERY.     Follow all instructions carefully.     I have reviewed my discharge instructions with my nurse, including the following information, if applicable:     Information about my illness and diagnosis   Follow up appointments (including lab draws)   Wound Care   Equipment Needs   Medications (new and continuing) along with side effects   Preventative information such as vaccines and smoking cessations   Diet   Pain   I know when to contact my Doctor's office or seek emergency care      I want my nurse to describe the side effects of my medications: YES NO   If the answer is no, I understand the side effects of my medications: YES NO   My nurse described the side effects of my medications in a way that I could understand: YES NO   I have taken my personal belongings and my own medications with me at discharge: YES NO            I have received this  information and my questions have been answered. I have discussed any concerns I see with this plan with the nurse or physician. I understand these instructions.    Signature of Patient or Responsible Person: _____________________________________    Date: _________________  Time: __________________    Signature of Healthcare Provider: _______________________________________  Date: _________________  Time: __________________

## 2017-01-19 NOTE — TELEPHONE ENCOUNTER
Pt calling because she has been running a fever since Monday. It started out around 100 but then went to 102 and has been 102 for a few days. She takes Tylenol which helps for a bit but her fever quickly comes back. She states she is having chills and just does not feel well. She has no appetite and has not eaten hardly anything over the last few days. Stressed the importance of pt calling us as soon as she develops a fever. She v/u. D/W Yenny Alexandre, ROSY. Gerardo Ramon, she will see pt today. Informed pt of apt time and she v/u.

## 2017-01-20 ENCOUNTER — APPOINTMENT (OUTPATIENT)
Dept: GENERAL RADIOLOGY | Facility: HOSPITAL | Age: 67
End: 2017-01-20

## 2017-01-20 ENCOUNTER — ANESTHESIA (OUTPATIENT)
Dept: PERIOP | Facility: HOSPITAL | Age: 67
End: 2017-01-20

## 2017-01-20 ENCOUNTER — ANESTHESIA EVENT (OUTPATIENT)
Dept: PERIOP | Facility: HOSPITAL | Age: 67
End: 2017-01-20

## 2017-01-20 LAB
BASOPHILS # BLD AUTO: 0 10*3/MM3 (ref 0–0.2)
BASOPHILS NFR BLD AUTO: 0 % (ref 0–1.5)
DEPRECATED RDW RBC AUTO: 59.3 FL (ref 37–54)
EOSINOPHIL # BLD AUTO: 0.01 10*3/MM3 (ref 0–0.7)
EOSINOPHIL NFR BLD AUTO: 0.2 % (ref 0.3–6.2)
ERYTHROCYTE [DISTWIDTH] IN BLOOD BY AUTOMATED COUNT: 15 % (ref 11.7–13)
HCT VFR BLD AUTO: 32.1 % (ref 35.6–45.5)
HGB BLD-MCNC: 9.9 G/DL (ref 11.9–15.5)
IMM GRANULOCYTES # BLD: 0.04 10*3/MM3 (ref 0–0.03)
IMM GRANULOCYTES NFR BLD: 0.8 % (ref 0–0.5)
LYMPHOCYTES # BLD AUTO: 1.65 10*3/MM3 (ref 0.9–4.8)
LYMPHOCYTES NFR BLD AUTO: 31.1 % (ref 19.6–45.3)
MCH RBC QN AUTO: 33.6 PG (ref 26.9–32)
MCHC RBC AUTO-ENTMCNC: 30.8 G/DL (ref 32.4–36.3)
MCV RBC AUTO: 108.8 FL (ref 80.5–98.2)
MONOCYTES # BLD AUTO: 0.62 10*3/MM3 (ref 0.2–1.2)
MONOCYTES NFR BLD AUTO: 11.7 % (ref 5–12)
NEUTROPHILS # BLD AUTO: 2.99 10*3/MM3 (ref 1.9–8.1)
NEUTROPHILS NFR BLD AUTO: 56.2 % (ref 42.7–76)
PLATELET # BLD AUTO: 63 10*3/MM3 (ref 140–500)
PMV BLD AUTO: 13.6 FL (ref 6–12)
RBC # BLD AUTO: 2.95 10*6/MM3 (ref 3.9–5.2)
WBC NRBC COR # BLD: 5.31 10*3/MM3 (ref 4.5–10.7)

## 2017-01-20 PROCEDURE — 87186 SC STD MICRODIL/AGAR DIL: CPT | Performed by: UROLOGY

## 2017-01-20 PROCEDURE — BT1D1ZZ FLUOROSCOPY OF RIGHT KIDNEY, URETER AND BLADDER USING LOW OSMOLAR CONTRAST: ICD-10-PCS | Performed by: UROLOGY

## 2017-01-20 PROCEDURE — 25010000002 PHENYLEPHRINE PER 1 ML: Performed by: ANESTHESIOLOGY

## 2017-01-20 PROCEDURE — 85025 COMPLETE CBC W/AUTO DIFF WBC: CPT | Performed by: INTERNAL MEDICINE

## 2017-01-20 PROCEDURE — 25010000002 FENTANYL CITRATE (PF) 100 MCG/2ML SOLUTION: Performed by: ANESTHESIOLOGY

## 2017-01-20 PROCEDURE — 93005 ELECTROCARDIOGRAM TRACING: CPT | Performed by: UROLOGY

## 2017-01-20 PROCEDURE — 99232 SBSQ HOSP IP/OBS MODERATE 35: CPT | Performed by: INTERNAL MEDICINE

## 2017-01-20 PROCEDURE — 93010 ELECTROCARDIOGRAM REPORT: CPT | Performed by: INTERNAL MEDICINE

## 2017-01-20 PROCEDURE — 0 IOTHALAMATE 60 % SOLUTION: Performed by: UROLOGY

## 2017-01-20 PROCEDURE — 25010000002 ONDANSETRON PER 1 MG: Performed by: ANESTHESIOLOGY

## 2017-01-20 PROCEDURE — 25010000002 PROPOFOL 10 MG/ML EMULSION: Performed by: ANESTHESIOLOGY

## 2017-01-20 PROCEDURE — C1769 GUIDE WIRE: HCPCS | Performed by: UROLOGY

## 2017-01-20 PROCEDURE — C2617 STENT, NON-COR, TEM W/O DEL: HCPCS | Performed by: UROLOGY

## 2017-01-20 PROCEDURE — 74420 UROGRAPHY RTRGR +-KUB: CPT

## 2017-01-20 PROCEDURE — 0TP98DZ REMOVAL OF INTRALUMINAL DEVICE FROM URETER, VIA NATURAL OR ARTIFICIAL OPENING ENDOSCOPIC: ICD-10-PCS | Performed by: UROLOGY

## 2017-01-20 PROCEDURE — 25010000002 MIDAZOLAM PER 1 MG: Performed by: ANESTHESIOLOGY

## 2017-01-20 PROCEDURE — C1758 CATHETER, URETERAL: HCPCS | Performed by: UROLOGY

## 2017-01-20 PROCEDURE — 63710000001 PROCHLORPERAZINE MALEATE PER 10 MG: Performed by: INTERNAL MEDICINE

## 2017-01-20 PROCEDURE — 87086 URINE CULTURE/COLONY COUNT: CPT | Performed by: UROLOGY

## 2017-01-20 PROCEDURE — 0T768DZ DILATION OF RIGHT URETER WITH INTRALUMINAL DEVICE, VIA NATURAL OR ARTIFICIAL OPENING ENDOSCOPIC: ICD-10-PCS | Performed by: UROLOGY

## 2017-01-20 DEVICE — URETERAL STENT
Type: IMPLANTABLE DEVICE | Site: URETER | Status: FUNCTIONAL
Brand: CONTOUR™

## 2017-01-20 RX ORDER — FAMOTIDINE 10 MG/ML
20 INJECTION, SOLUTION INTRAVENOUS ONCE
Status: COMPLETED | OUTPATIENT
Start: 2017-01-20 | End: 2017-01-20

## 2017-01-20 RX ORDER — MIDAZOLAM HYDROCHLORIDE 1 MG/ML
1 INJECTION INTRAMUSCULAR; INTRAVENOUS
Status: DISCONTINUED | OUTPATIENT
Start: 2017-01-20 | End: 2017-01-20 | Stop reason: HOSPADM

## 2017-01-20 RX ORDER — HYDROMORPHONE HYDROCHLORIDE 1 MG/ML
0.25 INJECTION, SOLUTION INTRAMUSCULAR; INTRAVENOUS; SUBCUTANEOUS
Status: DISCONTINUED | OUTPATIENT
Start: 2017-01-20 | End: 2017-01-20 | Stop reason: HOSPADM

## 2017-01-20 RX ORDER — SODIUM CHLORIDE 0.9 % (FLUSH) 0.9 %
1-10 SYRINGE (ML) INJECTION AS NEEDED
Status: DISCONTINUED | OUTPATIENT
Start: 2017-01-20 | End: 2017-01-20 | Stop reason: HOSPADM

## 2017-01-20 RX ORDER — PROMETHAZINE HYDROCHLORIDE 25 MG/ML
12.5 INJECTION, SOLUTION INTRAMUSCULAR; INTRAVENOUS ONCE AS NEEDED
Status: DISCONTINUED | OUTPATIENT
Start: 2017-01-20 | End: 2017-01-20 | Stop reason: HOSPADM

## 2017-01-20 RX ORDER — PROMETHAZINE HYDROCHLORIDE 25 MG/1
12.5 TABLET ORAL ONCE AS NEEDED
Status: DISCONTINUED | OUTPATIENT
Start: 2017-01-20 | End: 2017-01-20 | Stop reason: HOSPADM

## 2017-01-20 RX ORDER — PROMETHAZINE HYDROCHLORIDE 25 MG/1
25 TABLET ORAL ONCE AS NEEDED
Status: DISCONTINUED | OUTPATIENT
Start: 2017-01-20 | End: 2017-01-20 | Stop reason: HOSPADM

## 2017-01-20 RX ORDER — DIPHENHYDRAMINE HYDROCHLORIDE 50 MG/ML
12.5 INJECTION INTRAMUSCULAR; INTRAVENOUS
Status: DISCONTINUED | OUTPATIENT
Start: 2017-01-20 | End: 2017-01-20 | Stop reason: HOSPADM

## 2017-01-20 RX ORDER — OXYCODONE AND ACETAMINOPHEN 7.5; 325 MG/1; MG/1
1 TABLET ORAL ONCE AS NEEDED
Status: DISCONTINUED | OUTPATIENT
Start: 2017-01-20 | End: 2017-01-20 | Stop reason: HOSPADM

## 2017-01-20 RX ORDER — LEVOFLOXACIN 5 MG/ML
750 INJECTION, SOLUTION INTRAVENOUS
Status: DISCONTINUED | OUTPATIENT
Start: 2017-01-21 | End: 2017-01-21

## 2017-01-20 RX ORDER — FLUMAZENIL 0.1 MG/ML
0.2 INJECTION INTRAVENOUS AS NEEDED
Status: DISCONTINUED | OUTPATIENT
Start: 2017-01-20 | End: 2017-01-20 | Stop reason: HOSPADM

## 2017-01-20 RX ORDER — LIDOCAINE HYDROCHLORIDE 20 MG/ML
INJECTION, SOLUTION INFILTRATION; PERINEURAL AS NEEDED
Status: DISCONTINUED | OUTPATIENT
Start: 2017-01-20 | End: 2017-01-20 | Stop reason: SURG

## 2017-01-20 RX ORDER — LABETALOL HYDROCHLORIDE 5 MG/ML
5 INJECTION, SOLUTION INTRAVENOUS
Status: DISCONTINUED | OUTPATIENT
Start: 2017-01-20 | End: 2017-01-20 | Stop reason: HOSPADM

## 2017-01-20 RX ORDER — HYDROCODONE BITARTRATE AND ACETAMINOPHEN 7.5; 325 MG/1; MG/1
1 TABLET ORAL ONCE AS NEEDED
Status: DISCONTINUED | OUTPATIENT
Start: 2017-01-20 | End: 2017-01-20 | Stop reason: HOSPADM

## 2017-01-20 RX ORDER — MIDAZOLAM HYDROCHLORIDE 1 MG/ML
2 INJECTION INTRAMUSCULAR; INTRAVENOUS
Status: DISCONTINUED | OUTPATIENT
Start: 2017-01-20 | End: 2017-01-20 | Stop reason: HOSPADM

## 2017-01-20 RX ORDER — FENTANYL CITRATE 50 UG/ML
50 INJECTION, SOLUTION INTRAMUSCULAR; INTRAVENOUS
Status: DISCONTINUED | OUTPATIENT
Start: 2017-01-20 | End: 2017-01-20 | Stop reason: HOSPADM

## 2017-01-20 RX ORDER — MAGNESIUM HYDROXIDE 1200 MG/15ML
LIQUID ORAL AS NEEDED
Status: DISCONTINUED | OUTPATIENT
Start: 2017-01-20 | End: 2017-01-20 | Stop reason: HOSPADM

## 2017-01-20 RX ORDER — ONDANSETRON 2 MG/ML
INJECTION INTRAMUSCULAR; INTRAVENOUS AS NEEDED
Status: DISCONTINUED | OUTPATIENT
Start: 2017-01-20 | End: 2017-01-20 | Stop reason: SURG

## 2017-01-20 RX ORDER — NALOXONE HCL 0.4 MG/ML
0.2 VIAL (ML) INJECTION AS NEEDED
Status: DISCONTINUED | OUTPATIENT
Start: 2017-01-20 | End: 2017-01-20 | Stop reason: HOSPADM

## 2017-01-20 RX ORDER — SODIUM CHLORIDE 9 MG/ML
9 INJECTION, SOLUTION INTRAVENOUS CONTINUOUS PRN
Status: DISCONTINUED | OUTPATIENT
Start: 2017-01-20 | End: 2017-01-20 | Stop reason: HOSPADM

## 2017-01-20 RX ORDER — HYDROMORPHONE HYDROCHLORIDE 1 MG/ML
0.5 INJECTION, SOLUTION INTRAMUSCULAR; INTRAVENOUS; SUBCUTANEOUS
Status: DISCONTINUED | OUTPATIENT
Start: 2017-01-20 | End: 2017-01-20 | Stop reason: HOSPADM

## 2017-01-20 RX ORDER — PROMETHAZINE HYDROCHLORIDE 25 MG/1
25 SUPPOSITORY RECTAL ONCE AS NEEDED
Status: DISCONTINUED | OUTPATIENT
Start: 2017-01-20 | End: 2017-01-20 | Stop reason: HOSPADM

## 2017-01-20 RX ORDER — ONDANSETRON 2 MG/ML
4 INJECTION INTRAMUSCULAR; INTRAVENOUS ONCE AS NEEDED
Status: DISCONTINUED | OUTPATIENT
Start: 2017-01-20 | End: 2017-01-20 | Stop reason: HOSPADM

## 2017-01-20 RX ORDER — FENTANYL CITRATE 50 UG/ML
INJECTION, SOLUTION INTRAMUSCULAR; INTRAVENOUS AS NEEDED
Status: DISCONTINUED | OUTPATIENT
Start: 2017-01-20 | End: 2017-01-20 | Stop reason: SURG

## 2017-01-20 RX ORDER — PROPOFOL 10 MG/ML
VIAL (ML) INTRAVENOUS AS NEEDED
Status: DISCONTINUED | OUTPATIENT
Start: 2017-01-20 | End: 2017-01-20 | Stop reason: SURG

## 2017-01-20 RX ORDER — HYDRALAZINE HYDROCHLORIDE 20 MG/ML
5 INJECTION INTRAMUSCULAR; INTRAVENOUS
Status: DISCONTINUED | OUTPATIENT
Start: 2017-01-20 | End: 2017-01-20 | Stop reason: HOSPADM

## 2017-01-20 RX ADMIN — FENTANYL CITRATE 25 MCG: 50 INJECTION INTRAMUSCULAR; INTRAVENOUS at 17:25

## 2017-01-20 RX ADMIN — PHENYLEPHRINE HYDROCHLORIDE 100 MCG: 10 INJECTION INTRAVENOUS at 17:22

## 2017-01-20 RX ADMIN — GUAIFENESIN 600 MG: 600 TABLET, EXTENDED RELEASE ORAL at 09:28

## 2017-01-20 RX ADMIN — PROCHLORPERAZINE MALEATE 10 MG: 10 TABLET, FILM COATED ORAL at 20:46

## 2017-01-20 RX ADMIN — CARBAMAZEPINE 200 MG: 200 TABLET, EXTENDED RELEASE ORAL at 02:26

## 2017-01-20 RX ADMIN — SODIUM CHLORIDE 100 ML/HR: 9 INJECTION, SOLUTION INTRAVENOUS at 15:42

## 2017-01-20 RX ADMIN — SODIUM CHLORIDE: 9 INJECTION, SOLUTION INTRAVENOUS at 17:07

## 2017-01-20 RX ADMIN — FAMOTIDINE 20 MG: 10 INJECTION, SOLUTION INTRAVENOUS at 16:40

## 2017-01-20 RX ADMIN — CARBAMAZEPINE 200 MG: 200 TABLET, EXTENDED RELEASE ORAL at 21:57

## 2017-01-20 RX ADMIN — ACYCLOVIR 400 MG: 400 TABLET ORAL at 09:29

## 2017-01-20 RX ADMIN — FENTANYL CITRATE 25 MCG: 50 INJECTION INTRAMUSCULAR; INTRAVENOUS at 17:10

## 2017-01-20 RX ADMIN — SODIUM CHLORIDE 9 ML: 9 INJECTION, SOLUTION INTRAVENOUS at 16:15

## 2017-01-20 RX ADMIN — ASPIRIN 81 MG: 81 TABLET ORAL at 09:29

## 2017-01-20 RX ADMIN — FENTANYL CITRATE 50 MCG: 50 INJECTION INTRAMUSCULAR; INTRAVENOUS at 17:36

## 2017-01-20 RX ADMIN — ACETAMINOPHEN 650 MG: 325 TABLET ORAL at 20:45

## 2017-01-20 RX ADMIN — LIDOCAINE HYDROCHLORIDE 60 MG: 20 INJECTION, SOLUTION INFILTRATION; PERINEURAL at 17:10

## 2017-01-20 RX ADMIN — MONTELUKAST 10 MG: 10 TABLET, FILM COATED ORAL at 21:57

## 2017-01-20 RX ADMIN — SODIUM CHLORIDE 100 ML/HR: 9 INJECTION, SOLUTION INTRAVENOUS at 05:58

## 2017-01-20 RX ADMIN — ONDANSETRON 4 MG: 2 INJECTION INTRAMUSCULAR; INTRAVENOUS at 17:27

## 2017-01-20 RX ADMIN — PROPOFOL 160 MG: 10 INJECTION, EMULSION INTRAVENOUS at 17:10

## 2017-01-20 RX ADMIN — MIDAZOLAM HYDROCHLORIDE 1 MG: 1 INJECTION, SOLUTION INTRAMUSCULAR; INTRAVENOUS at 16:40

## 2017-01-20 NOTE — H&P
See H&P by MADAI Torres.  I discussed with nurse practitioner MADAI Torres, for the management of this case, and I agree with the documentation she dictated.    Basically this is a patient who is immunosuppressed, with active multiple myeloma, undergoing active immunotherapy with Darzalex.  She has recurrent urinary tract infection, related to the ureter catheter.  She had multiple similar episodes previously, required hospitalization and replacement of the ureter stent.  We'll consult her urologist Dr. Everett.  In the meantime will start patient on IV Levaquin 500 mg daily.  Her thrombocytopenia is stable and should not be the reason she not undergoing the procedure.       LI OBANDO M.D., Ph.D.

## 2017-01-20 NOTE — PROGRESS NOTES
Subjective .     REASONS FOR FOLLOWUP:  Multiple myeloma now admitted with a fever of 102 and likely urinary tract infection.    HISTORY OF PRESENT ILLNESS:  The patient is a 66 y.o. year old female who is here for follow-up with the above-mentioned history.    History of Present Illness     The patient is a 66 y.o. year old female who is here for follow-up with the above-mentioned history who is followed in our office for IgG kappa multiple myeloma, currently on Darzalex, last dose January 4, 2017    He got admitted yesterday with fatigue, weakness and a temperature of 102.  She has not been eating enough.  She did not have any dizziness.  She has had a slight cough for some time.  She denies shortness of breath.  She was due to have ureteral stent changed but in the meantime came with fever.  It was concerning that she has urinary tract infection.  As a result she was admitted.  She is currently on Levaquin 500 mg IV once daily.    Past Medical History   Diagnosis Date   • Anemia 10/14/2008     Secondary to chronic renal insufficiency and myeloma   • Arthropathy of knee 01/07/2014     unspecified   • Breast CA, left 04/25/2012     Stage II   • Bursitis of left hip 10/18/2007   • Bursitis, knee 12/02/2013   • Calcaneal spur 08/12/2009   • Chronic kidney disease, stage III (moderate)    • Congestive heart failure    • Diarrhea of presumed infectious origin 04/01/2014     c diff    • Diverticulitis of colon 10/17/2007   • History of Clostridium difficile    • History of echocardiogram 04/2014     EF decreased to 46% - per cardiology   • History of transfusion    • Hypertension    • LLL pneumonia 04/23/2009   • Malignant neoplasm of kidney 12/2009     and other and unspecified urinary organs; urinary organ, site unspecified; S/P L nephrectomy   • Multiple myeloma 04/2012   • Neoplasm of uncertain behavior 10/12/2015     of other and unspecified sites and tissues; other specified sites   • Personal history of breast  cancer 04/2012     S/P mastectomy left breast   • Pyelonephritis 03/2004   • Seizure disorder 10/17/2007   • Thrombocytopenia    • UTI (urinary tract infection) 10/30/2014     pseudomonas, multidrug resistant   • UTI (urinary tract infection) 03/28/2014     site not specified       ONCOLOGIC HISTORY:  (History from previous dates can be found in the separate document.)    No current facility-administered medications on file prior to encounter.      Current Outpatient Prescriptions on File Prior to Encounter   Medication Sig Dispense Refill   • aspirin 81 MG tablet Take 81 mg by mouth every evening. Take 1 tablet by oral route once daily      • bisoprolol (ZEBETA) 10 MG tablet Take 10 mg by mouth every morning. Take 1 tablet by oral route once daily      • bisoprolol (ZEBETA) 5 MG tablet 5 mg every evening.     • carBAMazepine XR (TEGretol  XR) 200 MG 12 hr tablet Every Night.     • guaiFENesin (MUCINEX) 600 MG 12 hr tablet Take 1 tablet by mouth 2 (Two) Times a Day. 30 tablet 2   • guaifenesin-codeine (GUAIFENESIN AC) 100-10 MG/5ML liquid Take 5 mL by mouth 3 (Three) Times a Day As Needed for cough. 150 mL 1   • hydrALAZINE (APRESOLINE) 25 MG tablet Take 25 mg by mouth 2 (two) times a day.  3   • isosorbide mononitrate (IMDUR) 60 MG 24 hr tablet TAKE 1 TABLET BY MOUTH EVERY MORNING AND ONE-HALF TABLET EVERY EVENING  3   • losartan (COZAAR) 25 MG tablet Take 25 mg by mouth every evening.     • montelukast (SINGULAIR) 10 MG tablet Take 1 tablet by mouth every night. 30 tablet 11   • acyclovir (ZOVIRAX) 400 MG tablet Take 1 tablet by mouth 2 (two) times a day. Take no more than 5 doses a day. 60 tablet 11   • carBAMazepine XR (TEGretol  XR) 200 MG 12 hr tablet TAKE 1 TABLET BY MOUTH 2 (TWO) TIMES A DAY FOR 30 DAYS. 60 tablet 2   • dexamethasone (DECADRON) 4 MG tablet Take 1 tablet in the morning starting the day after chemo for 2 days.  Take with food. 16 tablet 5   • HYDROcodone-acetaminophen (NORCO) 5-325 MG per  tablet TAKE 1 TO 2 TABLETS BY MOUTH EVERY 6 HOURS AS NEEDED  0   • hydrOXYzine (ATARAX) 25 MG tablet Take 1 tablet by mouth 3 (three) times a day as needed for itching. 90 tablet 6   • Misc. Devices (VIRAGE CUSTOM BREAST PROSTHES) misc As directed     • Misc. Devices misc Mastectomy bras, use as directed 4 each 0   • prochlorperazine (COMPAZINE) 10 MG tablet Take 1 tablet by mouth every 6 (six) hours as needed for nausea or vomiting for up to 60 doses. 60 tablet 5       ALLERGIES:     Allergies   Allergen Reactions   • Ace Inhibitors Swelling     FACE   • Zosyn [Piperacillin Sod-Tazobactam So] Swelling       Social History     Social History   • Marital status:      Spouse name: N/A   • Number of children: 3   • Years of education: High school     Occupational History   •  Retired     [employer] [[id number]]     Social History Main Topics   • Smoking status: Never Smoker   • Smokeless tobacco: Never Used   • Alcohol use No   • Drug use: No   • Sexual activity: Not Currently     Other Topics Concern   • Not on file     Social History Narrative    Son lives with her and helps         Cancer-related family history includes Cancer in her sister.     Review of Systems  A comprehensive 14 point review of systems was performed and was negative except as mentioned.    Objective      Vitals:    01/20/17 0924 01/20/17 1448 01/20/17 1610 01/20/17 1641   BP: 100/60 110/58 96/55    BP Location: Right arm Right arm Right arm    Patient Position: Lying Lying Lying    Pulse:  76 76 78   Resp:   15 15   Temp:  99.8 °F (37.7 °C) 100 °F (37.8 °C)    TempSrc:  Oral Oral    SpO2:   97% 94%   Weight:       Height:         Current Status 1/19/2017   ECOG score 0       Physical Exam    NECK:  Supple with good range of motion; no thyromegaly or masses, no JVD.  LYMPHATICS:  No cervical, supraclavicular, axillary or inguinal adenopathy.  CHEST:  Lungs clear to auscultation. Good airflow.  CARDIAC:  Regular  rate and rhythm without murmurs, rubs or gallops. Normal S1,S2.  ABDOMEN:  Soft, nontender with no hepatosplenomegaly or masses.  EXTREMITIES:  No clubbing, cyanosis or edema.  NEUROLOGICAL:  Cranial Nerves II-XII grossly intact.  No focal neurological deficits.  PSYCHIATRIC:  Normal affect and mood.      RECENT LABS:  Hematology WBC   Date Value Ref Range Status   01/20/2017 5.31 4.50 - 10.70 10*3/mm3 Final     RBC   Date Value Ref Range Status   01/20/2017 2.95 (L) 3.90 - 5.20 10*6/mm3 Final     HEMOGLOBIN   Date Value Ref Range Status   01/20/2017 9.9 (L) 11.9 - 15.5 g/dL Final     HEMATOCRIT   Date Value Ref Range Status   01/20/2017 32.1 (L) 35.6 - 45.5 % Final     PLATELETS   Date Value Ref Range Status   01/20/2017 63 (L) 140 - 500 10*3/mm3 Final        Assessment/Plan   1. Multiple myeloma, IgG lambda, stage III. Failed multiple lines of therapy. She is currently undergoing Darzalex, last dose January 4, 2016.     2. Fever. The patient presented today with fever, fatigue and weakness. She denied nausea, vomiting, diarrhea, shortness of breath, or dysuria. She was however due for ureteral stent change today the procedure was canceled due to her fever. In the past, she has become febrile and weak around the time that her stents were due to be changed. Urinalysis was performed and the patient will be admitted for IV antibiotics.  Patient was started on Levaquin.  Also Dr. Quinn  which was consulted. He thinks her urine is chronically colonized.  His condition is to continue IV antibiotics and IV fluids.  He will do stent change.  3. Stage 3 chronic kidney disease. Creatinine was elevated today and we will initiate IV fluids the patient has not been eating or drinking to her normal standards.  With creatinine being 2.0 we will ask pharmacy to adjust the IV Levaquin.     4. Moderate thrombocytopenia secondary to chemotherapy. She is asymptomatic.                     Cc:  Cristo Madera MD

## 2017-01-20 NOTE — ANESTHESIA PROCEDURE NOTES
Airway  Urgency: elective    Date/Time: 1/20/2017 5:10 PM  End Time:1/20/2017 5:12 PM  Airway not difficult    General Information and Staff    Patient location during procedure: OR  Anesthesiologist: DYLON GALARZA    Indications and Patient Condition  Indications for airway management: airway protection    Preoxygenated: yes  MILS not maintained throughout  Mask difficulty assessment: 0 - not attempted    Final Airway Details  Final airway type: supraglottic airway      Successful airway: classic  Size 4  Airway Seal Pressure (cm H2O): 20    Number of attempts at approach: 1    Additional Comments  EZ LMA placement, no leak < 20cm H20.  ETCO2 > 20x6

## 2017-01-20 NOTE — ANESTHESIA POSTPROCEDURE EVALUATION
Patient: Marina Barroso    Procedure Summary     Date Anesthesia Start Anesthesia Stop Room / Location    01/20/17 [id number]  [facility] / [facility]       Procedure Diagnosis Surgeon Provider    CYSTOSCOPY RIGHT RETROGRADE PYELOGRAM, URETERAL STENT CHANGE (N/A ) No diagnosis on file. MD Dallas Clements MD          Anesthesia Type: general  Last vitals  /55 (01/20/17 1750)    Temp      Pulse 79 (01/20/17 1750)   Resp 16 (01/20/17 1750)    SpO2 100 % (01/20/17 1750)      Post Anesthesia Care and Evaluation    Patient location during evaluation: PACU  Patient participation: complete - patient participated  Level of consciousness: awake and alert  Pain score: 0  Pain management: adequate  Airway patency: patent  Anesthetic complications: No anesthetic complications    Cardiovascular status: acceptable  Respiratory status: acceptable  Hydration status: acceptable

## 2017-01-20 NOTE — PLAN OF CARE
Problem: Perioperative Period (Adult)  Goal: Signs and Symptoms of Listed Potential Problems Will be Absent or Manageable (Perioperative Period)  Outcome: Ongoing (interventions implemented as appropriate)    01/20/17 5846   Perioperative Period   Problems Assessed (Perioperative Period) all

## 2017-01-20 NOTE — CONSULTS
Patient is well known to me  Dependent on a permanent indwelling ureteral stent for chronic ureteral stricture    The urinary tract is c hronically colonized with bacteria.  Recommend IVF and antibiotics  Will work on scheduling stent change

## 2017-01-20 NOTE — PLAN OF CARE
Problem: Patient Care Overview (Adult)  Goal: Plan of Care Review  Outcome: Ongoing (interventions implemented as appropriate)    01/20/17 0459   Coping/Psychosocial Response Interventions   Plan Of Care Reviewed With patient   Patient Care Overview   Progress no change   Outcome Evaluation   Outcome Summary/Follow up Plan pt had a restful night. no complaints of pain. continued iv antibiotics.        Goal: Adult Individualization and Mutuality  Outcome: Ongoing (interventions implemented as appropriate)  Goal: Discharge Needs Assessment  Outcome: Ongoing (interventions implemented as appropriate)    Problem: Oncology Care (Adult)  Goal: Signs and Symptoms of Listed Potential Problems Will be Absent or Manageable (Oncology Care)  Outcome: Ongoing (interventions implemented as appropriate)

## 2017-01-20 NOTE — ANESTHESIA PREPROCEDURE EVALUATION
Anesthesia Evaluation      No history of anesthetic complications   Airway   Mallampati: II  no difficulty expected  Dental - normal exam     Pulmonary - normal exam   (+) pneumonia ,   (-) COPD, asthma, sleep apnea, not a smoker    PE comment: nonlabored  Cardiovascular - normal exam  (+) hypertension well controlled, CHF (EF 46%, well controlled since 2000 with euvolemia),   (-) valvular problems/murmurs, past MI, CAD, dysrhythmias, angina    Rhythm: regular  Rate: normal    Neuro/Psych  (+) seizures (on meds, no seizures since the 1990s) well controlled,    (-) TIA, CVA  GI/Hepatic/Renal/Endo    (+) obesity,  chronic renal disease (stage 3 CKD, Renal CA, Pyelonephritis, Stones) CRI,   (-) GERD, liver disease, diabetes, hypothyroidism, hyperthyroidism    ROS Comment: H/o C.Diff    Musculoskeletal     (+) arthralgias,   Abdominal    Substance History      OB/GYN          Other   (+) blood dyscrasia (anemia, Multiple Myeloma, thrombocytopenia), arthritis      Other Comment: Breast CA                      Anesthesia Plan    ASA 3     general     intravenous induction   Anesthetic plan and risks discussed with patient.

## 2017-01-20 NOTE — PROGRESS NOTES
"Pharmacy to dose Levofloxacin    Marina Barroso is a 66 y.o. female who is on day 1 pharmacy to dose levofloxacin for complicated UTI.  Pharmacy dosing vancomycin per Dr. Araujo's request.     HPI: fever yesterday, with fatigue and weakness. Currently on Darzalex for multiple myeloma. Does have urinary stents in place now. Also has h/o CKD and elevated SCr today.    Relevant clinical data and objective history reviewed:  62\" (157.5 cm)  186 lb (84.4 kg)  Body mass index is 34.02 kg/(m^2).     She  has a past medical history of Anemia (10/14/2008); Arthropathy of knee (01/07/2014); Breast CA, left (04/25/2012); Bursitis of left hip (10/18/2007); Bursitis, knee (12/02/2013); Calcaneal spur (08/12/2009); Chronic kidney disease, stage III (moderate); Congestive heart failure; Diarrhea of presumed infectious origin (04/01/2014); Diverticulitis of colon (10/17/2007); History of Clostridium difficile; History of echocardiogram (04/2014); History of transfusion; Hypertension; LLL pneumonia (04/23/2009); Malignant neoplasm of kidney (12/2009); Multiple myeloma (04/2012); Neoplasm of uncertain behavior (10/12/2015); Personal history of breast cancer (04/2012); Pyelonephritis (03/2004); Seizure disorder (10/17/2007); Thrombocytopenia; UTI (urinary tract infection) (10/30/2014); and UTI (urinary tract infection) (03/28/2014).    Allergies as of 01/19/2017 - Jono as Reviewed 01/19/2017   Allergen Reaction Noted   • Ace inhibitors Swelling 09/27/2016   • Zosyn [piperacillin sod-tazobactam so] Swelling 10/28/2015     Vital Signs (last 24 hours)       01/19 0700  -  01/20 0659 01/20 0700  -  01/20 0901   Most Recent    Temp (°F) 98 -  (!)101.6       98 (36.7)    Heart Rate 72 -  100       77    Resp   16       16    BP 97/56 -  105/67       97/56    SpO2 (%) 95 -  98       98          Estimated Creatinine Clearance: 27.3 mL/min (by C-G formula based on Cr of 2.07).    Results from last 7 days  Lab Units 01/19/17  1116   CREATININE " mg/dL 2.07*     Lab Results   Component Value Date    WBC 5.31 01/20/2017       Baseline culture/source/susceptibility:   1/19 Ucx: GNB  1/19 Bcx: NG <24hrs    Assessment/Plan  66 y.o.,female, 186 lb (84.4 kg), treating complicated UTI with levofloxacin. Pt rec'd 500mg yesterday evening. Pharmacy was ask to dose due to CKD history. Will plan to adjust dose per renal dosing policy to 750mg q48h. CrCl 27ml/min. Will start this dose tomorrow morning in steady of tomorrow evening since first dose was only 500mg.     1. Levofloxacin 750mg q48h starting tomorrow am   Thank you for consult, will continue to follow until discontinued.    Cristo Serna, Pharm.D  1/20/2017 9:01 AM

## 2017-01-21 LAB
ALBUMIN SERPL-MCNC: 2.6 G/DL (ref 3.5–5.2)
ALBUMIN/GLOB SERPL: 0.6 G/DL
ALP SERPL-CCNC: 54 U/L (ref 39–117)
ALT SERPL W P-5'-P-CCNC: 15 U/L (ref 1–33)
ANION GAP SERPL CALCULATED.3IONS-SCNC: 12.5 MMOL/L
ANISOCYTOSIS BLD QL: NORMAL
AST SERPL-CCNC: 23 U/L (ref 1–32)
BACTERIA SPEC AEROBE CULT: ABNORMAL
BASOPHILS # BLD AUTO: 0 10*3/MM3 (ref 0–0.2)
BASOPHILS NFR BLD AUTO: 0 % (ref 0–1.5)
BILIRUB SERPL-MCNC: 0.3 MG/DL (ref 0.1–1.2)
BUN BLD-MCNC: 19 MG/DL (ref 8–23)
BUN/CREAT SERPL: 10.8 (ref 7–25)
CALCIUM SPEC-SCNC: 6.8 MG/DL (ref 8.6–10.5)
CHLORIDE SERPL-SCNC: 116 MMOL/L (ref 98–107)
CO2 SERPL-SCNC: 16.5 MMOL/L (ref 22–29)
CREAT BLD-MCNC: 1.76 MG/DL (ref 0.57–1)
DACRYOCYTES BLD QL SMEAR: NORMAL
DEPRECATED RDW RBC AUTO: 61.3 FL (ref 37–54)
EOSINOPHIL # BLD AUTO: 0.01 10*3/MM3 (ref 0–0.7)
EOSINOPHIL NFR BLD AUTO: 0.2 % (ref 0.3–6.2)
ERYTHROCYTE [DISTWIDTH] IN BLOOD BY AUTOMATED COUNT: 15 % (ref 11.7–13)
GFR SERPL CREATININE-BSD FRML MDRD: 35 ML/MIN/1.73
GLOBULIN UR ELPH-MCNC: 4.7 GM/DL
GLUCOSE BLD-MCNC: 81 MG/DL (ref 65–99)
HCT VFR BLD AUTO: 29.8 % (ref 35.6–45.5)
HGB BLD-MCNC: 8.7 G/DL (ref 11.9–15.5)
HYPOCHROMIA BLD QL: NORMAL
IMM GRANULOCYTES # BLD: 0.04 10*3/MM3 (ref 0–0.03)
IMM GRANULOCYTES NFR BLD: 0.6 % (ref 0–0.5)
LYMPHOCYTES # BLD AUTO: 1.44 10*3/MM3 (ref 0.9–4.8)
LYMPHOCYTES NFR BLD AUTO: 22 % (ref 19.6–45.3)
MACROCYTES BLD QL SMEAR: NORMAL
MCH RBC QN AUTO: 32.6 PG (ref 26.9–32)
MCHC RBC AUTO-ENTMCNC: 29.2 G/DL (ref 32.4–36.3)
MCV RBC AUTO: 111.6 FL (ref 80.5–98.2)
MONOCYTES # BLD AUTO: 0.52 10*3/MM3 (ref 0.2–1.2)
MONOCYTES NFR BLD AUTO: 7.9 % (ref 5–12)
NEUTROPHILS # BLD AUTO: 4.54 10*3/MM3 (ref 1.9–8.1)
NEUTROPHILS NFR BLD AUTO: 69.3 % (ref 42.7–76)
NRBC BLD MANUAL-RTO: 0 /100 WBC (ref 0–0)
PLAT MORPH BLD: NORMAL
PLATELET # BLD AUTO: 41 10*3/MM3 (ref 140–500)
POTASSIUM BLD-SCNC: 3.8 MMOL/L (ref 3.5–5.2)
PROT SERPL-MCNC: 7.3 G/DL (ref 6–8.5)
RBC # BLD AUTO: 2.67 10*6/MM3 (ref 3.9–5.2)
SODIUM BLD-SCNC: 145 MMOL/L (ref 136–145)
WBC MORPH BLD: NORMAL
WBC NRBC COR # BLD: 6.55 10*3/MM3 (ref 4.5–10.7)

## 2017-01-21 PROCEDURE — 85007 BL SMEAR W/DIFF WBC COUNT: CPT | Performed by: INTERNAL MEDICINE

## 2017-01-21 PROCEDURE — 25010000002 LEVOFLOXACIN PER 250 MG: Performed by: INTERNAL MEDICINE

## 2017-01-21 PROCEDURE — 25010000003 CEFTRIAXONE PER 250 MG: Performed by: INTERNAL MEDICINE

## 2017-01-21 PROCEDURE — 85025 COMPLETE CBC W/AUTO DIFF WBC: CPT | Performed by: INTERNAL MEDICINE

## 2017-01-21 PROCEDURE — 99232 SBSQ HOSP IP/OBS MODERATE 35: CPT | Performed by: INTERNAL MEDICINE

## 2017-01-21 PROCEDURE — 99223 1ST HOSP IP/OBS HIGH 75: CPT | Performed by: INTERNAL MEDICINE

## 2017-01-21 PROCEDURE — 80053 COMPREHEN METABOLIC PANEL: CPT | Performed by: INTERNAL MEDICINE

## 2017-01-21 RX ORDER — CEFTRIAXONE SODIUM 2 G/50ML
2 INJECTION, SOLUTION INTRAVENOUS EVERY 24 HOURS
Status: DISCONTINUED | OUTPATIENT
Start: 2017-01-21 | End: 2017-01-24 | Stop reason: HOSPADM

## 2017-01-21 RX ADMIN — SODIUM CHLORIDE 50 ML/HR: 9 INJECTION, SOLUTION INTRAVENOUS at 15:56

## 2017-01-21 RX ADMIN — BISOPROLOL FUMARATE 5 MG: 5 TABLET, COATED ORAL at 18:24

## 2017-01-21 RX ADMIN — LOSARTAN POTASSIUM 25 MG: 25 TABLET, FILM COATED ORAL at 18:22

## 2017-01-21 RX ADMIN — MONTELUKAST 10 MG: 10 TABLET, FILM COATED ORAL at 21:59

## 2017-01-21 RX ADMIN — HYDRALAZINE HYDROCHLORIDE 25 MG: 25 TABLET ORAL at 18:23

## 2017-01-21 RX ADMIN — CEFTRIAXONE SODIUM 2 G: 2 INJECTION, SOLUTION INTRAVENOUS at 15:55

## 2017-01-21 RX ADMIN — GUAIFENESIN 600 MG: 600 TABLET, EXTENDED RELEASE ORAL at 10:45

## 2017-01-21 RX ADMIN — SODIUM CHLORIDE 100 ML/HR: 9 INJECTION, SOLUTION INTRAVENOUS at 04:19

## 2017-01-21 RX ADMIN — ACETAMINOPHEN 650 MG: 325 TABLET ORAL at 05:32

## 2017-01-21 RX ADMIN — ASPIRIN 81 MG: 81 TABLET ORAL at 10:45

## 2017-01-21 RX ADMIN — HYDRALAZINE HYDROCHLORIDE 25 MG: 25 TABLET ORAL at 10:45

## 2017-01-21 RX ADMIN — LEVOFLOXACIN 750 MG: 750 INJECTION, SOLUTION INTRAVENOUS at 10:53

## 2017-01-21 RX ADMIN — GUAIFENESIN 600 MG: 600 TABLET, EXTENDED RELEASE ORAL at 18:24

## 2017-01-21 RX ADMIN — ACYCLOVIR 400 MG: 400 TABLET ORAL at 10:45

## 2017-01-21 RX ADMIN — ISOSORBIDE MONONITRATE 60 MG: 60 TABLET, EXTENDED RELEASE ORAL at 10:46

## 2017-01-21 RX ADMIN — CARBAMAZEPINE 200 MG: 200 TABLET, EXTENDED RELEASE ORAL at 21:59

## 2017-01-21 RX ADMIN — ACYCLOVIR 400 MG: 400 TABLET ORAL at 18:25

## 2017-01-21 NOTE — PLAN OF CARE
Problem: Patient Care Overview (Adult)  Goal: Plan of Care Review  Outcome: Ongoing (interventions implemented as appropriate)    01/21/17 7832   Coping/Psychosocial Response Interventions   Plan Of Care Reviewed With patient   Patient Care Overview   Progress improving   Outcome Evaluation   Outcome Summary/Follow up Plan Pt. has been afebrile today. Urine cx are indicating e.coli, changed antibiotics to rocephin and stopped lvq. Consulted ID. No c/o pain.        Goal: Adult Individualization and Mutuality  Outcome: Ongoing (interventions implemented as appropriate)  Goal: Discharge Needs Assessment  Outcome: Ongoing (interventions implemented as appropriate)    Problem: Oncology Care (Adult)  Goal: Signs and Symptoms of Listed Potential Problems Will be Absent or Manageable (Oncology Care)  Outcome: Ongoing (interventions implemented as appropriate)    Problem: Perioperative Period (Adult)  Goal: Signs and Symptoms of Listed Potential Problems Will be Absent or Manageable (Perioperative Period)  Outcome: Ongoing (interventions implemented as appropriate)

## 2017-01-21 NOTE — CONSULTS
Referring Provider: OSBALDO Wells MD    Reason for Consultation:  UTI    History of present illness:  Marina is a 66 YOF with IgG MM stage 3 on Darzalex who I am asked to evaluate and give opinion for UTI. History from the patient, ENRIQUETA Shaw, and review of the medical records which I summarize/synthesize as follows: She presented to [facility] on 1/19/17 with fever x 1 day with associated fatigue and weakness but no urinary symptoms. However, she was due for urinary stent exchange that day which was cancelled due to the fever. Therefore on 1/20 she underwent stent exchange. Her admission urine culture is growing E coli. She was begun on empiric LVQ but E coli was resistant so ID consulted given patient's PCN allergy. My partner has previously seen her for a Pseudomonal UTI and she tolerated ceftazidime without any problems. Her fevers are resolved. She feels back to baseline.    Past Medical History   Diagnosis Date   • Anemia 10/14/2008     Secondary to chronic renal insufficiency and myeloma   • Arthropathy of knee 01/07/2014     unspecified   • Breast CA, left 04/25/2012     Stage II   • Bursitis of left hip 10/18/2007   • Bursitis, knee 12/02/2013   • Calcaneal spur 08/12/2009   • Chronic kidney disease, stage III (moderate)    • Congestive heart failure    • Diarrhea of presumed infectious origin 04/01/2014     c diff    • Diverticulitis of colon 10/17/2007   • History of Clostridium difficile    • History of echocardiogram 04/2014     EF decreased to 46% - per cardiology   • History of transfusion    • Hypertension    • LLL pneumonia 04/23/2009   • Malignant neoplasm of kidney 12/2009     and other and unspecified urinary organs; urinary organ, site unspecified; S/P L nephrectomy   • Multiple myeloma 04/2012   • Neoplasm of uncertain behavior 10/12/2015     of other and unspecified sites and tissues; other specified sites   • Personal history of breast cancer 04/2012     S/P mastectomy left breast   •  "Pyelonephritis 03/2004   • Seizure disorder 10/17/2007   • Thrombocytopenia    • UTI (urinary tract infection) 10/30/2014     pseudomonas, multidrug resistant   • UTI (urinary tract infection) 03/28/2014     site not specified       Past Surgical History   Procedure Laterality Date   • Kidney surgery  2015 2/25/12; removal and replacement of UPJ stent: Dr Everett   • Mastectomy Left 04/25/2012     breast   • Nephrectomy Left 12/2009     renal carcinoma   • Colonoscopy  unknown   • Cystoscopy N/A 3/25/2016     Procedure: CYSTOSCOPY  WITH RIGHT STENT CHANGE;  Surgeon: Lakhwinder Russo MD;  Location: [facility];  Service:    • Cystoscopy w/ ureteral stent placement Right 5/7/2016     Procedure: CYSTOSCOPY, URETERAL CATHETER INSERTION AND RIGHT STENT EXCHANGE ;  Surgeon: Michael Everett Jr., MD;  Location: [facility] OR;  Service:    • Brain surgery  2005     Meningioma   • Total abdominal hysterectomy  2007       Social History:  Retired   Lives w/ her son in [facility]    Family History:  No family history of recurrent kidney infections    Allergies:  Zosyn (\"swelling\"); tolerates cephalosporins    Medications:    Current Facility-Administered Medications:   •  acetaminophen (TYLENOL) tablet 650 mg, 650 mg, Oral, Q6H PRN, Leisa Wells MD, 650 mg at 01/21/17 0532  •  acyclovir (ZOVIRAX) tablet 400 mg, 400 mg, Oral, BID, Leisa Wells MD, 400 mg at 01/21/17 1045  •  aspirin EC tablet 81 mg, 81 mg, Oral, Daily, Leisa Wells MD, 81 mg at 01/21/17 1045  •  bisoprolol (ZEBeta) tablet 10 mg, 10 mg, Oral, QAM, Leisa Wells MD, Stopped at 01/20/17 0647  •  bisoprolol (ZEBeta) tablet 5 mg, 5 mg, Oral, Q PM, Leisa Wells MD, 5 mg at 01/19/17 2050  •  carBAMazepine XR (TEGretol  XR) 12 hr tablet 200 mg, 200 mg, Oral, Nightly, Leisa Wells MD, 200 mg at 01/20/17 2157  •  cefTRIAXone (ROCEPHIN) IVPB 2 g, 2 g, Intravenous, Q24H, Lenny Diop MD  •  guaiFENesin (MUCINEX) 12 hr " tablet 600 mg, 600 mg, Oral, BID, Leisa Wells MD, 600 mg at 01/21/17 1045  •  hydrALAZINE (APRESOLINE) tablet 25 mg, 25 mg, Oral, BID, Leisa Wells MD, 25 mg at 01/21/17 1045  •  HYDROcodone-acetaminophen (NORCO) 5-325 MG per tablet 2 tablet, 2 tablet, Oral, Q6H PRN, Leisa Wells MD  •  hydrOXYzine (ATARAX) tablet 25 mg, 25 mg, Oral, TID PRN, Leisa Wells MD  •  isosorbide mononitrate (IMDUR) 24 hr tablet 60 mg, 60 mg, Oral, Q24H, Leisa Wells MD, 60 mg at 01/21/17 1046  •  losartan (COZAAR) tablet 25 mg, 25 mg, Oral, Q PM, Leisa Wells MD, 25 mg at 01/19/17 2048  •  montelukast (SINGULAIR) tablet 10 mg, 10 mg, Oral, Nightly, Leisa Wells MD, 10 mg at 01/20/17 2157  •  ondansetron ODT (ZOFRAN-ODT) disintegrating tablet 4 mg, 4 mg, Oral, Q6H PRN, Zane Araujo MD PhD  •  Pharmacy to Dose LevoFLOXacin (LEVAQUIN), , Does not apply, Continuous PRN, Zane Araujo MD PhD  •  prochlorperazine (COMPAZINE) tablet 10 mg, 10 mg, Oral, Q6H PRN, Leisa Wells MD, 10 mg at 01/20/17 2046  •  sodium chloride 0.9 % infusion, 100 mL/hr, Intravenous, Continuous, Zaen Araujo MD PhD, Last Rate: 100 mL/hr at 01/21/17 0419, 100 mL/hr at 01/21/17 0419      Review of Systems  All systems were reviewed and are negative unless otherwise stated above in the HPI    Objective   Vital Signs   Temp:  [97.1 °F (36.2 °C)-100 °F (37.8 °C)] 97.8 °F (36.6 °C)  Heart Rate:  [75-91] 76  Resp:  [15-16] 16  BP: ()/(46-80) 98/54    Physical Exam:   General: awake, alert, NAD   Head: Normocephalic, atraumatic  Eyes: PERRL, EOMI, no scleral icterus, no conjunctival pallor, no conjunctival hemorrhages.   ENT: MMM, OP clear, no thrush. Good dentition.   Neck: Supple  Cardiovascular: NR, RR, no murmurs, rubs, or gallops; no LE edema  Respiratory: Lungs are clear to ascultation bilaterally, no rales or wheezing; normal work of breathing on ambient air   GI: Abdomen is soft, non-tender, non-distended, normal bowel sounds in  all four quadrants; no hepatosplenomegaly, no masses palpated  : no Jones catheter present  Musculoskeletal: no joint abnormalities, normal musculature  Skin: No rashes, lesions, or embolic phenomenon  Neurological: Alert and oriented x 3, motor strength 5/5 in all four extremities  Psychiatric: Normal mood and affect   Lymph: no pre-auricular, post-auricular, submandibular, cervical, supraclavicular  LAD  Vasc: no cyanosis; R chest port w/o erythema    Labs:     Lab Results   Component Value Date    WBC 6.55 01/21/2017    HGB 8.7 (L) 01/21/2017    HCT 29.8 (L) 01/21/2017    .6 (H) 01/21/2017    PLT 41 (L) 01/21/2017       Lab Results   Component Value Date    GLUCOSE 81 01/21/2017    BUN 19 01/21/2017    CREATININE 1.76 (H) 01/21/2017    EGFRIFNONA  08/30/2016      Comment:      <15 Indicative of kidney failure.    EGFRIFAFRI 35 (L) 01/21/2017    BCR 10.8 01/21/2017    CO2 16.5 (L) 01/21/2017    CALCIUM 6.8 (L) 01/21/2017    PROTENTOTREF 7.9 01/04/2017    ALBUMIN 2.60 (L) 01/21/2017    LABIL2 0.6 01/21/2017    AST 23 01/21/2017    ALT 15 01/21/2017     UA TNTC WBCs, mod LE, + nitrite, 7-12 sq    Microbiology:  1/19 BCx: negative  1/19 UCx: >100k E coli sensitive to cephalosporins and ertapenem    Radiology (personally reviewed):  Double-J stent in place with moderate L hydro    Assessment/Plan   1. Right obstructive pyelonephritis and hydronephrosis with E coli + culture  -s/p double-J stent exchange on 1/20/17  -stop LVQ  -start ceftriaxone 2 g IV q24h;; she previously tolerated ceftriaxone and ceftazidime so feel comfortable using this agent  -the stop date for her antibiotics is 2/3/17; if she is ready to discharge from the hospital prior to this date then she can be placed on cefdinir 300 mg PO BID through the same stop date    2. IgG multiple myeloma    Thank you for this consult. Please call me at [phone] if any further ID questions.

## 2017-01-21 NOTE — PLAN OF CARE
Problem: Patient Care Overview (Adult)  Goal: Plan of Care Review  Outcome: Ongoing (interventions implemented as appropriate)    01/21/17 0446   Coping/Psychosocial Response Interventions   Plan Of Care Reviewed With patient   Patient Care Overview   Progress no change   Outcome Evaluation   Outcome Summary/Follow up Plan Pt given Tylenol once for pain. No c/o afterwards. Compazine given once for nausea and no c/o nausea afterwards. Lastly, pt slept through night.          Problem: Oncology Care (Adult)  Goal: Signs and Symptoms of Listed Potential Problems Will be Absent or Manageable (Oncology Care)  Outcome: Ongoing (interventions implemented as appropriate)    Problem: Perioperative Period (Adult)  Goal: Signs and Symptoms of Listed Potential Problems Will be Absent or Manageable (Perioperative Period)  Outcome: Ongoing (interventions implemented as appropriate)

## 2017-01-21 NOTE — OP NOTE
DATE OF PROCEDURE:  01/20/2017     PREOPERATIVE DIAGNOSIS:  Right obstructive pyelonephritis with hydronephrosis.    POSTOPERATIVE DIAGNOSIS:   Right obstructive pyelonephritis with hydronephrosis.    PROCEDURES PERFORMED:  1.  Cystoscopy.  2.  Right stent extraction.   3.  Right retrograde pyelogram.   4.  Right double-J stent placement.     SURGEON: Lakhwinder Russo MD     ANESTHESIA: General.     DRAINS: A 7-Iranian x 24 cm double-J stent.     COMPLICATIONS: None.    INDICATIONS FOR PROCEDURE:  This is a 66-year-old female. She has a history of an indwelling stent for ureteral stricture disease. She has come in with what appears to be obstructing pyelonephritis, and she now needs stent change.     DESCRIPTION OF THE PROCEDURE: The patient was taken to the operative suite and given general anesthesia. She was placed in a comfortable supine position and then lithotomy. She was prepped and draped in sterile fashion. A rigid cystoscope was inserted into the bladder; the 30° lens was utilized. The bladder was visualized. The stone was seen. It had the appearance of an 8-Iranian stent, maybe a 7-Iranian stent. It was extracted and a retrograde pyelogram was performed which showed mild to moderate hydronephrosis, mildly dilated ureter. Midsegment of ureter was a little stenotic. A guidewire was passed up and a 7-Iranian x 24 cm double-J stent was then placed without a tether. She was awakened and taken to recovery in stable condition.       Lakhwinder Russo M.D.  MARIEL:Darlene  D:   01/21/2017 00:00:12  T:   01/21/2017 07:56:39  Job ID:   [id number]  Document ID:   [id number]  cc:

## 2017-01-21 NOTE — PROGRESS NOTES
Subjective .     REASONS FOR FOLLOWUP:  Multiple myeloma now admitted with a fever of 102 and likely urinary tract infection.    HISTORY OF PRESENT ILLNESS:  The patient is a 66 y.o. year old female who is here for follow-up with the above-mentioned history.    History of Present Illness   The patient is a 66 y.o. year old female who is here for follow-up with the above-mentioned history who is followed in our office for IgG kappa multiple myeloma, currently on Darzalex, last dose January 4, 2017     He got admitted yesterday with fatigue, weakness and a temperature of 102. She has not been eating enough. She did not have any dizziness. She has had a slight cough for some time. She denies shortness of breath. She was due to have ureteral stent changed but in the meantime came with fever. It was concerning that she has urinary tract infection. As a result she was admitted. She is currently on Levaquin 500 mg IV once daily.     Patient has been afebrile since admission.  Urine culture is positive for Escherichia coli greater than 100,000 . blood culture ×2 are negative.  Escherichia coli is resistant to Levaquin.  But it is sensitive to cefazolin ,  ceftriaxone.      We will need to switch her antibiotic .  Concern is that she has anaphylaxis with penicillin and it may be cross-reactivity with cephalosporins.  We will consult infectious disease to see patient today.  She has had stent change yesterday.    Past Medical History   Diagnosis Date   • Anemia 10/14/2008     Secondary to chronic renal insufficiency and myeloma   • Arthropathy of knee 01/07/2014     unspecified   • Breast CA, left 04/25/2012     Stage II   • Bursitis of left hip 10/18/2007   • Bursitis, knee 12/02/2013   • Calcaneal spur 08/12/2009   • Chronic kidney disease, stage III (moderate)    • Congestive heart failure    • Diarrhea of presumed infectious origin 04/01/2014     c diff    • Diverticulitis of colon 10/17/2007   • History of Clostridium  difficile    • History of echocardiogram 04/2014     EF decreased to 46% - per cardiology   • History of transfusion    • Hypertension    • LLL pneumonia 04/23/2009   • Malignant neoplasm of kidney 12/2009     and other and unspecified urinary organs; urinary organ, site unspecified; S/P L nephrectomy   • Multiple myeloma 04/2012   • Neoplasm of uncertain behavior 10/12/2015     of other and unspecified sites and tissues; other specified sites   • Personal history of breast cancer 04/2012     S/P mastectomy left breast   • Pyelonephritis 03/2004   • Seizure disorder 10/17/2007   • Thrombocytopenia    • UTI (urinary tract infection) 10/30/2014     pseudomonas, multidrug resistant   • UTI (urinary tract infection) 03/28/2014     site not specified       ONCOLOGIC HISTORY:  (History from previous dates can be found in the separate document.)    No current facility-administered medications on file prior to encounter.      Current Outpatient Prescriptions on File Prior to Encounter   Medication Sig Dispense Refill   • aspirin 81 MG tablet Take 81 mg by mouth every evening. Take 1 tablet by oral route once daily      • bisoprolol (ZEBETA) 10 MG tablet Take 10 mg by mouth every morning. Take 1 tablet by oral route once daily      • bisoprolol (ZEBETA) 5 MG tablet 5 mg every evening.     • carBAMazepine XR (TEGretol  XR) 200 MG 12 hr tablet Every Night.     • guaiFENesin (MUCINEX) 600 MG 12 hr tablet Take 1 tablet by mouth 2 (Two) Times a Day. 30 tablet 2   • guaifenesin-codeine (GUAIFENESIN AC) 100-10 MG/5ML liquid Take 5 mL by mouth 3 (Three) Times a Day As Needed for cough. 150 mL 1   • hydrALAZINE (APRESOLINE) 25 MG tablet Take 25 mg by mouth 2 (two) times a day.  3   • isosorbide mononitrate (IMDUR) 60 MG 24 hr tablet TAKE 1 TABLET BY MOUTH EVERY MORNING AND ONE-HALF TABLET EVERY EVENING  3   • losartan (COZAAR) 25 MG tablet Take 25 mg by mouth every evening.     • montelukast (SINGULAIR) 10 MG tablet Take 1 tablet by  mouth every night. 30 tablet 11   • acyclovir (ZOVIRAX) 400 MG tablet Take 1 tablet by mouth 2 (two) times a day. Take no more than 5 doses a day. 60 tablet 11   • carBAMazepine XR (TEGretol  XR) 200 MG 12 hr tablet TAKE 1 TABLET BY MOUTH 2 (TWO) TIMES A DAY FOR 30 DAYS. 60 tablet 2   • dexamethasone (DECADRON) 4 MG tablet Take 1 tablet in the morning starting the day after chemo for 2 days.  Take with food. 16 tablet 5   • HYDROcodone-acetaminophen (NORCO) 5-325 MG per tablet TAKE 1 TO 2 TABLETS BY MOUTH EVERY 6 HOURS AS NEEDED  0   • hydrOXYzine (ATARAX) 25 MG tablet Take 1 tablet by mouth 3 (three) times a day as needed for itching. 90 tablet 6   • Misc. Devices (VIRAGE CUSTOM BREAST PROSTHES) misc As directed     • Misc. Devices misc Mastectomy bras, use as directed 4 each 0   • prochlorperazine (COMPAZINE) 10 MG tablet Take 1 tablet by mouth every 6 (six) hours as needed for nausea or vomiting for up to 60 doses. 60 tablet 5       ALLERGIES:     Allergies   Allergen Reactions   • Ace Inhibitors Swelling     FACE   • Zosyn [Piperacillin Sod-Tazobactam So] Swelling       Social History     Social History   • Marital status:      Spouse name: N/A   • Number of children: 3   • Years of education: High school     Occupational History   •  Retired     [employer] [[id number]]     Social History Main Topics   • Smoking status: Never Smoker   • Smokeless tobacco: Never Used   • Alcohol use No   • Drug use: No   • Sexual activity: Not Currently     Other Topics Concern   • Not on file     Social History Narrative    Son lives with her and helps         Cancer-related family history includes Cancer in her sister.     Review of Systems  A comprehensive 14 point review of systems was performed and was negative except as mentioned.    Objective      Vitals:    01/20/17 1825 01/20/17 1927 01/20/17 2030 01/21/17 0523   BP: 109/61 100/80  112/56   BP Location: Right arm Right arm  Right arm    Patient Position: Lying Lying  Lying   Pulse: 75 88 91 82   Resp: 16 16 16 16   Temp: 98.5 °F (36.9 °C) 97.1 °F (36.2 °C) 99.3 °F (37.4 °C) 98.4 °F (36.9 °C)   TempSrc: Oral Oral Oral Oral   SpO2: 100% 98% 93% 99%   Weight:       Height:         Current Status 1/19/2017   ECOG score 0       Physical Exam    GENERAL:  Well-developed, well-nourished in no acute distress.   SKIN:  Warm, dry without rashes, purpura or petechiae.  EYES:  Pupils equal, round and reactive to light.  EOMs intact.  Conjunctivae normal.  EARS:  Hearing intact.  NOSE:  Septum midline.  No excoriations or nasal discharge.  MOUTH:  Tongue is well-papillated; no stomatitis or ulcers.  Lips normal.  THROAT:  Oropharynx without lesions or exudates.  NECK:  Supple with good range of motion; no thyromegaly or masses, no JVD.  LYMPHATICS:  No cervical, supraclavicular, axillary or inguinal adenopathy.  CHEST:  Lungs clear to auscultation. Good airflow.  CARDIAC:  Regular rate and rhythm without murmurs, rubs or gallops. Normal S1,S2.  ABDOMEN:  Soft, nontender with no hepatosplenomegaly or masses.  EXTREMITIES:  No clubbing, cyanosis or edema.  NEUROLOGICAL:  Cranial Nerves II-XII grossly intact.  No focal neurological deficits.  PSYCHIATRIC:  Normal affect and mood.      RECENT LABS:  Hematology WBC   Date Value Ref Range Status   01/21/2017 6.55 4.50 - 10.70 10*3/mm3 Final     RBC   Date Value Ref Range Status   01/21/2017 2.67 (L) 3.90 - 5.20 10*6/mm3 Final     HEMOGLOBIN   Date Value Ref Range Status   01/21/2017 8.7 (L) 11.9 - 15.5 g/dL Final     HEMATOCRIT   Date Value Ref Range Status   01/21/2017 29.8 (L) 35.6 - 45.5 % Final     PLATELETS   Date Value Ref Range Status   01/21/2017 41 (L) 140 - 500 10*3/mm3 Final        Assessment/Plan     1. Multiple myeloma, IgG lambda, stage III. Failed multiple lines of therapy. She is currently undergoing Darzalex, last dose January 4, 2016.      2. Fever. The patient presented today with fever, fatigue  and weakness. She denied nausea, vomiting, diarrhea, shortness of breath, or dysuria. She was however due for ureteral stent change yesterday .    3. Stage 3 chronic kidney disease. Creatinine was elevated today and we will initiate IV fluids the patient has not been eating or drinking to her normal standards.  With creatinine being 2.0 we will ask pharmacy to adjust the IV Levaquin.      4. Moderate pancytopenia secondary to chemotherapy. She is asymptomatic    5.  Escherichia coli UTI, it is not sensitive to Levaquin.  She is allergic to penicillin and has almost and anaphylaxis with death.  I'm concerned about cross reaction with cephalosporins.  We will consult infectious disease.  6.  Neutropenic fever likely start her on Neupogen.    Leisa Wells MD                    Cc:  Cristo Maedra MD

## 2017-01-22 LAB
ALBUMIN SERPL-MCNC: 2.5 G/DL (ref 3.5–5.2)
ALBUMIN/GLOB SERPL: 0.5 G/DL
ALP SERPL-CCNC: 52 U/L (ref 39–117)
ALT SERPL W P-5'-P-CCNC: 17 U/L (ref 1–33)
ANION GAP SERPL CALCULATED.3IONS-SCNC: 9.4 MMOL/L
AST SERPL-CCNC: 25 U/L (ref 1–32)
BACTERIA SPEC AEROBE CULT: ABNORMAL
BASOPHILS # BLD AUTO: 0.01 10*3/MM3 (ref 0–0.2)
BASOPHILS NFR BLD AUTO: 0.2 % (ref 0–1.5)
BILIRUB SERPL-MCNC: 0.2 MG/DL (ref 0.1–1.2)
BUN BLD-MCNC: 14 MG/DL (ref 8–23)
BUN/CREAT SERPL: 8.4 (ref 7–25)
CALCIUM SPEC-SCNC: 7 MG/DL (ref 8.6–10.5)
CHLORIDE SERPL-SCNC: 113 MMOL/L (ref 98–107)
CO2 SERPL-SCNC: 17.6 MMOL/L (ref 22–29)
CREAT BLD-MCNC: 1.66 MG/DL (ref 0.57–1)
DEPRECATED RDW RBC AUTO: 59 FL (ref 37–54)
EOSINOPHIL # BLD AUTO: 0.07 10*3/MM3 (ref 0–0.7)
EOSINOPHIL NFR BLD AUTO: 1.5 % (ref 0.3–6.2)
ERYTHROCYTE [DISTWIDTH] IN BLOOD BY AUTOMATED COUNT: 14.9 % (ref 11.7–13)
GFR SERPL CREATININE-BSD FRML MDRD: 38 ML/MIN/1.73
GLOBULIN UR ELPH-MCNC: 4.7 GM/DL
GLUCOSE BLD-MCNC: 79 MG/DL (ref 65–99)
HCT VFR BLD AUTO: 29 % (ref 35.6–45.5)
HGB BLD-MCNC: 8.7 G/DL (ref 11.9–15.5)
IMM GRANULOCYTES # BLD: 0.04 10*3/MM3 (ref 0–0.03)
IMM GRANULOCYTES NFR BLD: 0.9 % (ref 0–0.5)
LYMPHOCYTES # BLD AUTO: 1.72 10*3/MM3 (ref 0.9–4.8)
LYMPHOCYTES NFR BLD AUTO: 37.2 % (ref 19.6–45.3)
MCH RBC QN AUTO: 32.7 PG (ref 26.9–32)
MCHC RBC AUTO-ENTMCNC: 30 G/DL (ref 32.4–36.3)
MCV RBC AUTO: 109 FL (ref 80.5–98.2)
MONOCYTES # BLD AUTO: 0.51 10*3/MM3 (ref 0.2–1.2)
MONOCYTES NFR BLD AUTO: 11 % (ref 5–12)
NEUTROPHILS # BLD AUTO: 2.27 10*3/MM3 (ref 1.9–8.1)
NEUTROPHILS NFR BLD AUTO: 49.2 % (ref 42.7–76)
NRBC BLD MANUAL-RTO: 0 /100 WBC (ref 0–0)
PLATELET # BLD AUTO: 38 10*3/MM3 (ref 140–500)
POTASSIUM BLD-SCNC: 3.7 MMOL/L (ref 3.5–5.2)
PROT SERPL-MCNC: 7.2 G/DL (ref 6–8.5)
RBC # BLD AUTO: 2.66 10*6/MM3 (ref 3.9–5.2)
SODIUM BLD-SCNC: 140 MMOL/L (ref 136–145)
WBC NRBC COR # BLD: 4.62 10*3/MM3 (ref 4.5–10.7)

## 2017-01-22 PROCEDURE — 99231 SBSQ HOSP IP/OBS SF/LOW 25: CPT | Performed by: INTERNAL MEDICINE

## 2017-01-22 PROCEDURE — 25010000003 CEFTRIAXONE PER 250 MG: Performed by: INTERNAL MEDICINE

## 2017-01-22 PROCEDURE — 85025 COMPLETE CBC W/AUTO DIFF WBC: CPT | Performed by: INTERNAL MEDICINE

## 2017-01-22 PROCEDURE — 80053 COMPREHEN METABOLIC PANEL: CPT | Performed by: INTERNAL MEDICINE

## 2017-01-22 RX ADMIN — ASPIRIN 81 MG: 81 TABLET ORAL at 09:00

## 2017-01-22 RX ADMIN — ACYCLOVIR 400 MG: 400 TABLET ORAL at 09:00

## 2017-01-22 RX ADMIN — BISOPROLOL FUMARATE 5 MG: 5 TABLET, COATED ORAL at 18:06

## 2017-01-22 RX ADMIN — ISOSORBIDE MONONITRATE 60 MG: 60 TABLET, EXTENDED RELEASE ORAL at 09:00

## 2017-01-22 RX ADMIN — ACETAMINOPHEN 650 MG: 325 TABLET ORAL at 09:00

## 2017-01-22 RX ADMIN — GUAIFENESIN 600 MG: 600 TABLET, EXTENDED RELEASE ORAL at 18:07

## 2017-01-22 RX ADMIN — CARBAMAZEPINE 200 MG: 200 TABLET, EXTENDED RELEASE ORAL at 21:41

## 2017-01-22 RX ADMIN — LOSARTAN POTASSIUM 25 MG: 25 TABLET, FILM COATED ORAL at 18:07

## 2017-01-22 RX ADMIN — GUAIFENESIN 600 MG: 600 TABLET, EXTENDED RELEASE ORAL at 09:00

## 2017-01-22 RX ADMIN — HYDRALAZINE HYDROCHLORIDE 25 MG: 25 TABLET ORAL at 09:00

## 2017-01-22 RX ADMIN — HYDRALAZINE HYDROCHLORIDE 25 MG: 25 TABLET ORAL at 18:07

## 2017-01-22 RX ADMIN — ACETAMINOPHEN 650 MG: 325 TABLET ORAL at 21:41

## 2017-01-22 RX ADMIN — MONTELUKAST 10 MG: 10 TABLET, FILM COATED ORAL at 21:41

## 2017-01-22 RX ADMIN — SODIUM CHLORIDE 50 ML/HR: 9 INJECTION, SOLUTION INTRAVENOUS at 12:35

## 2017-01-22 RX ADMIN — ACYCLOVIR 400 MG: 400 TABLET ORAL at 18:07

## 2017-01-22 RX ADMIN — CEFTRIAXONE SODIUM 2 G: 2 INJECTION, SOLUTION INTRAVENOUS at 14:12

## 2017-01-22 NOTE — PROGRESS NOTES
Subjective .     REASONS FOR FOLLOWUP:  Multiple myeloma now admitted with a fever of 102 and likely urinary tract infection.    HISTORY OF PRESENT ILLNESS:  The patient is a 66 y.o. year old female who is here for follow-up with the above-mentioned history.    History of Present Illness   The patient is a 66 y.o. year old female who is here for follow-up with the above-mentioned history who is followed in our office for IgG kappa multiple myeloma, currently on Darzalex, last dose January 4, 2017     He got admitted yesterday with fatigue, weakness and a temperature of 102. She has not been eating enough. She did not have any dizziness. She has had a slight cough for some time. She denies shortness of breath. She was due to have ureteral stent changed but in the meantime came with fever. It was concerning that she has urinary tract infection. As a result she was admitted. She is currently on Levaquin 500 mg IV once daily.     Patient has been afebrile since admission.  Urine culture is positive for Escherichia coli greater than 100,000 . blood culture ×2 are negative.  Escherichia coli is resistant to Levaquin.  But it is sensitive to cefazolin ,  ceftriaxone.      We will need to switch her antibiotic .  Concern is that she has anaphylaxis with penicillin and it may be cross-reactivity with cephalosporins.     ID consult.  And patient now on ceftriaxone with plans to continue it IV tilted discharge followed by by mouth antibiotics.  She feels much better.  Past Medical History   Diagnosis Date   • Anemia 10/14/2008     Secondary to chronic renal insufficiency and myeloma   • Arthropathy of knee 01/07/2014     unspecified   • Breast CA, left 04/25/2012     Stage II   • Bursitis of left hip 10/18/2007   • Bursitis, knee 12/02/2013   • Calcaneal spur 08/12/2009   • Chronic kidney disease, stage III (moderate)    • Congestive heart failure    • Diarrhea of presumed infectious origin 04/01/2014     c diff    •  Diverticulitis of colon 10/17/2007   • History of Clostridium difficile    • History of echocardiogram 04/2014     EF decreased to 46% - per cardiology   • History of transfusion    • Hypertension    • LLL pneumonia 04/23/2009   • Malignant neoplasm of kidney 12/2009     and other and unspecified urinary organs; urinary organ, site unspecified; S/P L nephrectomy   • Multiple myeloma 04/2012   • Neoplasm of uncertain behavior 10/12/2015     of other and unspecified sites and tissues; other specified sites   • Personal history of breast cancer 04/2012     S/P mastectomy left breast   • Pyelonephritis 03/2004   • Seizure disorder 10/17/2007   • Thrombocytopenia    • UTI (urinary tract infection) 10/30/2014     pseudomonas, multidrug resistant   • UTI (urinary tract infection) 03/28/2014     site not specified       ONCOLOGIC HISTORY:  (History from previous dates can be found in the separate document.)    No current facility-administered medications on file prior to encounter.      Current Outpatient Prescriptions on File Prior to Encounter   Medication Sig Dispense Refill   • aspirin 81 MG tablet Take 81 mg by mouth every evening. Take 1 tablet by oral route once daily      • bisoprolol (ZEBETA) 10 MG tablet Take 10 mg by mouth every morning. Take 1 tablet by oral route once daily      • bisoprolol (ZEBETA) 5 MG tablet 5 mg every evening.     • carBAMazepine XR (TEGretol  XR) 200 MG 12 hr tablet Every Night.     • guaiFENesin (MUCINEX) 600 MG 12 hr tablet Take 1 tablet by mouth 2 (Two) Times a Day. 30 tablet 2   • guaifenesin-codeine (GUAIFENESIN AC) 100-10 MG/5ML liquid Take 5 mL by mouth 3 (Three) Times a Day As Needed for cough. 150 mL 1   • hydrALAZINE (APRESOLINE) 25 MG tablet Take 25 mg by mouth 2 (two) times a day.  3   • isosorbide mononitrate (IMDUR) 60 MG 24 hr tablet TAKE 1 TABLET BY MOUTH EVERY MORNING AND ONE-HALF TABLET EVERY EVENING  3   • losartan (COZAAR) 25 MG tablet Take 25 mg by mouth every  evening.     • montelukast (SINGULAIR) 10 MG tablet Take 1 tablet by mouth every night. 30 tablet 11   • acyclovir (ZOVIRAX) 400 MG tablet Take 1 tablet by mouth 2 (two) times a day. Take no more than 5 doses a day. 60 tablet 11   • carBAMazepine XR (TEGretol  XR) 200 MG 12 hr tablet TAKE 1 TABLET BY MOUTH 2 (TWO) TIMES A DAY FOR 30 DAYS. 60 tablet 2   • dexamethasone (DECADRON) 4 MG tablet Take 1 tablet in the morning starting the day after chemo for 2 days.  Take with food. 16 tablet 5   • HYDROcodone-acetaminophen (NORCO) 5-325 MG per tablet TAKE 1 TO 2 TABLETS BY MOUTH EVERY 6 HOURS AS NEEDED  0   • hydrOXYzine (ATARAX) 25 MG tablet Take 1 tablet by mouth 3 (three) times a day as needed for itching. 90 tablet 6   • Misc. Devices (VIRAGE CUSTOM BREAST PROSTHES) misc As directed     • Misc. Devices misc Mastectomy bras, use as directed 4 each 0   • prochlorperazine (COMPAZINE) 10 MG tablet Take 1 tablet by mouth every 6 (six) hours as needed for nausea or vomiting for up to 60 doses. 60 tablet 5       ALLERGIES:     Allergies   Allergen Reactions   • Ace Inhibitors Swelling     FACE   • Zosyn [Piperacillin Sod-Tazobactam So] Swelling       Social History     Social History   • Marital status:      Spouse name: N/A   • Number of children: 3   • Years of education: High school     Occupational History   •  Retired     [employer] [[id number]]     Social History Main Topics   • Smoking status: Never Smoker   • Smokeless tobacco: Never Used   • Alcohol use No   • Drug use: No   • Sexual activity: Not Currently     Other Topics Concern   • Not on file     Social History Narrative    Son lives with her and helps         Cancer-related family history includes Cancer in her sister.     Review of Systems  A comprehensive 14 point review of systems was performed and was negative except as mentioned.    Objective      Vitals:    01/21/17 2203 01/22/17 0500 01/22/17 0708 01/22/17 0904   BP:  105/64 119/63 104/60 118/66   BP Location: Right arm Right arm Right arm Right arm   Patient Position: Lying Lying Lying Sitting   Pulse:  77     Resp:  16     Temp:  98.7 °F (37.1 °C)     TempSrc:  Oral     SpO2:  98%     Weight:       Height:         Current Status 1/19/2017   ECOG score 0       Physical Exam    GENERAL:  Well-developed, well-nourished in no acute distress.   SKIN:  Warm, dry without rashes, purpura or petechiae.  EYES:  Pupils equal, round and reactive to light.  EOMs intact.  Conjunctivae normal.  EARS:  Hearing intact.  NOSE:  Septum midline.  No excoriations or nasal discharge.  MOUTH:  Tongue is well-papillated; no stomatitis or ulcers.  Lips normal.  THROAT:  Oropharynx without lesions or exudates.  NECK:  Supple with good range of motion; no thyromegaly or masses, no JVD.  LYMPHATICS:  No cervical, supraclavicular, axillary or inguinal adenopathy.  CHEST:  Lungs clear to auscultation. Good airflow.  CARDIAC:  Regular rate and rhythm without murmurs, rubs or gallops. Normal S1,S2.  ABDOMEN:  Soft, nontender with no hepatosplenomegaly or masses.  EXTREMITIES:  No clubbing, cyanosis or edema.  NEUROLOGICAL:  Cranial Nerves II-XII grossly intact.  No focal neurological deficits.  PSYCHIATRIC:  Normal affect and mood.      RECENT LABS:  Hematology WBC   Date Value Ref Range Status   01/22/2017 4.62 4.50 - 10.70 10*3/mm3 Final     RBC   Date Value Ref Range Status   01/22/2017 2.66 (L) 3.90 - 5.20 10*6/mm3 Final     HEMOGLOBIN   Date Value Ref Range Status   01/22/2017 8.7 (L) 11.9 - 15.5 g/dL Final     HEMATOCRIT   Date Value Ref Range Status   01/22/2017 29.0 (L) 35.6 - 45.5 % Final     PLATELETS   Date Value Ref Range Status   01/22/2017 38 (C) 140 - 500 10*3/mm3 Final        Assessment/Plan     1. Multiple myeloma, IgG lambda, stage III. Failed multiple lines of therapy. She is currently undergoing Darzalex, last dose January 4, 2016.      2. Fever. The patient presented today with fever,  fatigue and weakness. She denied nausea, vomiting, diarrhea, shortness of breath, or dysuria. She was however due for ureteral stent change yesterday .    3. Stage 3 chronic kidney disease. Creatinine was elevated today and we will initiate IV fluids the patient has not been eating or drinking to her normal standards.  With creatinine being 2.0 we will ask pharmacy to adjust the IV Levaquin.      4. Moderate pancytopenia secondary to chemotherapy. She is asymptomatic    5.  Escherichia coli UTI, it is not sensitive to Levaquin.  She is allergic to penicillin and has almost and anaphylaxis with death.  I'm concerned about cross reaction with cephalosporins.  We will consult infectious disease.      6.  Patient is not neutropenic her ANC is 2.24.  We will continue ceftriaxone for another day or 2 and start by mouth antibiotics  Cefdinir 300 mg by mouth twice a day and to stop it on 2/3/2017.      Leisa Wells MD                    Cc:  Cristo Madera MD

## 2017-01-22 NOTE — H&P
UROLOGY CONSULTATION:     DATE: 01/20/2017    REASON FOR CONSULTATION: Right hydronephrosis and urinary tract infection.     ATTENDING PHYSICIAN: Dr. 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 This procedure was curative. She later developed chronic hydronephrosis on the basis of a long ureteral stricture and has required intermittent stenting. She has other medical problems and was admitted at this time with urinary tract infection and diminished overall status.     PAST MEDICAL HISTORY: Noncontributory except as referred above.     FAMILY AND SOCIAL HISTORY: Noncontributory.     PHYSICAL EXAMINATION:  GENERAL: The patient is a pleasant woman who appears in no acute distress.   HEENT: Intact.   CHEST: Clear.   HEART: Regular rate and rhythm.   ABDOMEN: Soft and nontender.     DIAGNOSTIC DATA: Creatinine elevated over baseline to about 2.0. Cultures pending.     IMPRESSION: Right hydronephrosis with indwelling stent due to be changed.     PLAN: I will make arrangements to get her stent changed during the course of the day today. Due to scheduling one of my partners we will probably need to come back over to perform this procedure.     We will follow the patient with you. Thank you for the consultation.     MAC Klein:Dawson  D:   01/22/2017 16:28:11  T:   01/22/2017 16:58:31  Job ID:   [id number]  Document ID:   [id number]  cc:   Zane Araujo M.D.

## 2017-01-22 NOTE — PLAN OF CARE
Problem: Patient Care Overview (Adult)  Goal: Plan of Care Review  Outcome: Ongoing (interventions implemented as appropriate)    01/22/17 4476   Coping/Psychosocial Response Interventions   Plan Of Care Reviewed With patient   Patient Care Overview   Progress improving   Outcome Evaluation   Outcome Summary/Follow up Plan Pt. has been afebrile. No c/o n/v. Had some pain today and requested tylenol and that helped. Still on IV rocephine for UTI.        Goal: Adult Individualization and Mutuality  Outcome: Ongoing (interventions implemented as appropriate)  Goal: Discharge Needs Assessment  Outcome: Ongoing (interventions implemented as appropriate)    Problem: Oncology Care (Adult)  Goal: Signs and Symptoms of Listed Potential Problems Will be Absent or Manageable (Oncology Care)  Outcome: Ongoing (interventions implemented as appropriate)    Problem: Perioperative Period (Adult)  Goal: Signs and Symptoms of Listed Potential Problems Will be Absent or Manageable (Perioperative Period)  Outcome: Ongoing (interventions implemented as appropriate)

## 2017-01-22 NOTE — PLAN OF CARE
Problem: Patient Care Overview (Adult)  Goal: Plan of Care Review  Outcome: Ongoing (interventions implemented as appropriate)    01/22/17 0214   Coping/Psychosocial Response Interventions   Plan Of Care Reviewed With patient   Patient Care Overview   Progress improving   Outcome Evaluation   Outcome Summary/Follow up Plan Pt has been afebrile. No c/o nausea or pain. Lastly, pt slept through night.          Problem: Oncology Care (Adult)  Goal: Signs and Symptoms of Listed Potential Problems Will be Absent or Manageable (Oncology Care)  Outcome: Ongoing (interventions implemented as appropriate)    Problem: Perioperative Period (Adult)  Goal: Signs and Symptoms of Listed Potential Problems Will be Absent or Manageable (Perioperative Period)  Outcome: Ongoing (interventions implemented as appropriate)

## 2017-01-23 ENCOUNTER — APPOINTMENT (OUTPATIENT)
Dept: CARDIOLOGY | Facility: HOSPITAL | Age: 67
End: 2017-01-23
Attending: INTERNAL MEDICINE

## 2017-01-23 LAB
ALBUMIN SERPL-MCNC: 2.4 G/DL (ref 3.5–5.2)
ALBUMIN/GLOB SERPL: 0.5 G/DL
ALP SERPL-CCNC: 52 U/L (ref 39–117)
ALT SERPL W P-5'-P-CCNC: 19 U/L (ref 1–33)
ANION GAP SERPL CALCULATED.3IONS-SCNC: 10 MMOL/L
AST SERPL-CCNC: 23 U/L (ref 1–32)
BASOPHILS # BLD AUTO: 0.01 10*3/MM3 (ref 0–0.2)
BASOPHILS NFR BLD AUTO: 0.2 % (ref 0–1.5)
BH CV LOWER VASCULAR LEFT COMMON FEMORAL AUGMENT: NORMAL
BH CV LOWER VASCULAR LEFT COMMON FEMORAL COMPETENT: NORMAL
BH CV LOWER VASCULAR LEFT COMMON FEMORAL COMPRESS: NORMAL
BH CV LOWER VASCULAR LEFT COMMON FEMORAL PHASIC: NORMAL
BH CV LOWER VASCULAR LEFT COMMON FEMORAL SPONT: NORMAL
BH CV LOWER VASCULAR LEFT DISTAL FEMORAL COMPRESS: NORMAL
BH CV LOWER VASCULAR LEFT GASTRONEMIUS COMPRESS: NORMAL
BH CV LOWER VASCULAR LEFT GREATER SAPH AK COMPRESS: NORMAL
BH CV LOWER VASCULAR LEFT GREATER SAPH BK COMPRESS: NORMAL
BH CV LOWER VASCULAR LEFT LESSER SAPH COMPRESS: NORMAL
BH CV LOWER VASCULAR LEFT MID FEMORAL AUGMENT: NORMAL
BH CV LOWER VASCULAR LEFT MID FEMORAL COMPETENT: NORMAL
BH CV LOWER VASCULAR LEFT MID FEMORAL COMPRESS: NORMAL
BH CV LOWER VASCULAR LEFT MID FEMORAL PHASIC: NORMAL
BH CV LOWER VASCULAR LEFT MID FEMORAL SPONT: NORMAL
BH CV LOWER VASCULAR LEFT PERONEAL COMPRESS: NORMAL
BH CV LOWER VASCULAR LEFT POPLITEAL AUGMENT: NORMAL
BH CV LOWER VASCULAR LEFT POPLITEAL COMPETENT: NORMAL
BH CV LOWER VASCULAR LEFT POPLITEAL COMPRESS: NORMAL
BH CV LOWER VASCULAR LEFT POPLITEAL PHASIC: NORMAL
BH CV LOWER VASCULAR LEFT POPLITEAL SPONT: NORMAL
BH CV LOWER VASCULAR LEFT POSTERIOR TIBIAL COMPRESS: NORMAL
BH CV LOWER VASCULAR LEFT PROXIMAL FEMORAL COMPRESS: NORMAL
BH CV LOWER VASCULAR LEFT SAPHENOFEMORAL JUNCTION AUGMENT: NORMAL
BH CV LOWER VASCULAR LEFT SAPHENOFEMORAL JUNCTION COMPETENT: NORMAL
BH CV LOWER VASCULAR LEFT SAPHENOFEMORAL JUNCTION COMPRESS: NORMAL
BH CV LOWER VASCULAR LEFT SAPHENOFEMORAL JUNCTION PHASIC: NORMAL
BH CV LOWER VASCULAR LEFT SAPHENOFEMORAL JUNCTION SPONT: NORMAL
BH CV LOWER VASCULAR RIGHT COMMON FEMORAL AUGMENT: NORMAL
BH CV LOWER VASCULAR RIGHT COMMON FEMORAL COMPETENT: NORMAL
BH CV LOWER VASCULAR RIGHT COMMON FEMORAL COMPRESS: NORMAL
BH CV LOWER VASCULAR RIGHT COMMON FEMORAL PHASIC: NORMAL
BH CV LOWER VASCULAR RIGHT COMMON FEMORAL SPONT: NORMAL
BILIRUB SERPL-MCNC: 0.2 MG/DL (ref 0.1–1.2)
BUN BLD-MCNC: 13 MG/DL (ref 8–23)
BUN/CREAT SERPL: 8.8 (ref 7–25)
CALCIUM SPEC-SCNC: 7.3 MG/DL (ref 8.6–10.5)
CHLORIDE SERPL-SCNC: 116 MMOL/L (ref 98–107)
CO2 SERPL-SCNC: 18 MMOL/L (ref 22–29)
CREAT BLD-MCNC: 1.47 MG/DL (ref 0.57–1)
DEPRECATED RDW RBC AUTO: 59.2 FL (ref 37–54)
EOSINOPHIL # BLD AUTO: 0.12 10*3/MM3 (ref 0–0.7)
EOSINOPHIL NFR BLD AUTO: 2.7 % (ref 0.3–6.2)
ERYTHROCYTE [DISTWIDTH] IN BLOOD BY AUTOMATED COUNT: 14.9 % (ref 11.7–13)
GFR SERPL CREATININE-BSD FRML MDRD: 43 ML/MIN/1.73
GLOBULIN UR ELPH-MCNC: 5 GM/DL
GLUCOSE BLD-MCNC: 88 MG/DL (ref 65–99)
HCT VFR BLD AUTO: 29.8 % (ref 35.6–45.5)
HGB BLD-MCNC: 9 G/DL (ref 11.9–15.5)
IMM GRANULOCYTES # BLD: 0.09 10*3/MM3 (ref 0–0.03)
IMM GRANULOCYTES NFR BLD: 2 % (ref 0–0.5)
LYMPHOCYTES # BLD AUTO: 1.57 10*3/MM3 (ref 0.9–4.8)
LYMPHOCYTES NFR BLD AUTO: 34.9 % (ref 19.6–45.3)
MCH RBC QN AUTO: 32.8 PG (ref 26.9–32)
MCHC RBC AUTO-ENTMCNC: 30.2 G/DL (ref 32.4–36.3)
MCV RBC AUTO: 108.8 FL (ref 80.5–98.2)
MONOCYTES # BLD AUTO: 0.5 10*3/MM3 (ref 0.2–1.2)
MONOCYTES NFR BLD AUTO: 11.1 % (ref 5–12)
NEUTROPHILS # BLD AUTO: 2.21 10*3/MM3 (ref 1.9–8.1)
NEUTROPHILS NFR BLD AUTO: 49.1 % (ref 42.7–76)
NRBC BLD MANUAL-RTO: 0.7 /100 WBC (ref 0–0)
PLATELET # BLD AUTO: 60 10*3/MM3 (ref 140–500)
PMV BLD AUTO: 12.8 FL (ref 6–12)
POTASSIUM BLD-SCNC: 3.7 MMOL/L (ref 3.5–5.2)
PROT SERPL-MCNC: 7.4 G/DL (ref 6–8.5)
RBC # BLD AUTO: 2.74 10*6/MM3 (ref 3.9–5.2)
SODIUM BLD-SCNC: 144 MMOL/L (ref 136–145)
WBC NRBC COR # BLD: 4.5 10*3/MM3 (ref 4.5–10.7)

## 2017-01-23 PROCEDURE — 25010000003 CEFTRIAXONE PER 250 MG: Performed by: INTERNAL MEDICINE

## 2017-01-23 PROCEDURE — 85025 COMPLETE CBC W/AUTO DIFF WBC: CPT | Performed by: INTERNAL MEDICINE

## 2017-01-23 PROCEDURE — 99233 SBSQ HOSP IP/OBS HIGH 50: CPT | Performed by: INTERNAL MEDICINE

## 2017-01-23 PROCEDURE — 80053 COMPREHEN METABOLIC PANEL: CPT | Performed by: INTERNAL MEDICINE

## 2017-01-23 PROCEDURE — 93971 EXTREMITY STUDY: CPT

## 2017-01-23 RX ADMIN — GUAIFENESIN 600 MG: 600 TABLET, EXTENDED RELEASE ORAL at 08:54

## 2017-01-23 RX ADMIN — CARBAMAZEPINE 200 MG: 200 TABLET, EXTENDED RELEASE ORAL at 22:26

## 2017-01-23 RX ADMIN — ACYCLOVIR 400 MG: 400 TABLET ORAL at 08:54

## 2017-01-23 RX ADMIN — BISOPROLOL FUMARATE 5 MG: 5 TABLET, COATED ORAL at 17:19

## 2017-01-23 RX ADMIN — GUAIFENESIN 600 MG: 600 TABLET, EXTENDED RELEASE ORAL at 17:19

## 2017-01-23 RX ADMIN — MONTELUKAST 10 MG: 10 TABLET, FILM COATED ORAL at 22:26

## 2017-01-23 RX ADMIN — HYDRALAZINE HYDROCHLORIDE 25 MG: 25 TABLET ORAL at 08:54

## 2017-01-23 RX ADMIN — HYDRALAZINE HYDROCHLORIDE 25 MG: 25 TABLET ORAL at 17:19

## 2017-01-23 RX ADMIN — BISOPROLOL FUMARATE 10 MG: 5 TABLET, COATED ORAL at 06:40

## 2017-01-23 RX ADMIN — ACYCLOVIR 400 MG: 400 TABLET ORAL at 17:19

## 2017-01-23 RX ADMIN — SODIUM CHLORIDE 50 ML/HR: 9 INJECTION, SOLUTION INTRAVENOUS at 08:54

## 2017-01-23 RX ADMIN — ISOSORBIDE MONONITRATE 60 MG: 60 TABLET, EXTENDED RELEASE ORAL at 08:54

## 2017-01-23 RX ADMIN — ASPIRIN 81 MG: 81 TABLET ORAL at 08:54

## 2017-01-23 RX ADMIN — SODIUM CHLORIDE 50 ML/HR: 9 INJECTION, SOLUTION INTRAVENOUS at 04:58

## 2017-01-23 RX ADMIN — LOSARTAN POTASSIUM 25 MG: 25 TABLET, FILM COATED ORAL at 17:19

## 2017-01-23 RX ADMIN — CEFTRIAXONE SODIUM 2 G: 2 INJECTION, SOLUTION INTRAVENOUS at 17:27

## 2017-01-23 NOTE — PROGRESS NOTES
Discharge Planning Assessment  [facility]     Patient Name: Marina Barroso  MRN: [id number]  Today's Date: 1/23/2017    Admit Date: 1/19/2017          Discharge Needs Assessment       01/23/17 1827    Living Environment    Lives With child(elma), adult    Living Arrangements apartment    Quality Of Family Relationships supportive;helpful;involved    Able to Return to Prior Living Arrangements yes    Living Arrangement Comments Lives with son in an apartment. Daughter lives 2 doors down.    Discharge Needs Assessment    Concerns To Be Addressed no discharge needs identified;denies needs/concerns at this time    Community Agency Name(S) Has used [facility] in the past. Denies using subacute rehab.    Equipment Currently Used at Home none    Equipment Needed After Discharge none    Transportation Available car;family or friend will provide    Discharge Disposition home or self-care    Discharge Contact Information if Applicable Patricia Grewal/daughter [phone]    Discharge Planning Comments Spoke with patient & daughter at bedside. Confirmed information on facesheet. IMM given 1/19/17.            Discharge Plan       01/23/17 1826    Case Management/Social Work Plan    Plan Plans dc home with assist of family. No needs identified at this time. Continue to follow.    Patient/Family In Agreement With Plan yes        Discharge Placement     No information found        Expected Discharge Date and Time     Expected Discharge Date Expected Discharge Time    Jan 24, 2017               Demographic Summary       01/23/17 1826    Referral Information    Admission Type inpatient    Referral Source admission list    Reason For Consult discharge planning            Functional Status       01/23/17 1827    Functional Status Current    Ambulation 0-->independent    Transferring 0-->independent    Toileting 0-->independent    Bathing 0-->independent    Dressing 0-->independent    Eating 0-->independent    Communication  0-->understands/communicates without difficulty    Swallowing (if score 2 or more for any item, consult Rehab Services) 0-->swallows foods/liquids without difficulty    Change in Functional Status Since Onset of Current Illness/Injury no    Functional Status Prior    Ambulation 0-->independent    Transferring 0-->independent    Toileting 0-->independent    Bathing 0-->independent    Dressing 0-->independent    Eating 0-->independent    Communication 0-->understands/communicates without difficulty    Swallowing 0-->swallows foods/liquids without difficulty    IADL    Medications independent    Meal Preparation independent    Housekeeping independent    Laundry independent    Shopping independent    Oral Care independent    Activity Tolerance    Current Activity Limitations none    Usual Activity Tolerance good    Current Activity Tolerance good            Psychosocial     None            Abuse/Neglect     None            Legal     None            Substance Abuse     None            Patient Forms     None          Cher Boogie RN

## 2017-01-23 NOTE — PROGRESS NOTES
Vascular Lab    LLE Venous doppler completed    Preliminary exam is Negative for DVT    Results to ENRIQUETA Hunt T

## 2017-01-23 NOTE — PROGRESS NOTES
Subjective     CHIEF COMPLAINT:     · Multiple myeloma  · Escherichia coli UTI in immunocompromised host  · Anemia and thrombocytopenia    HISTORY OF PRESENT ILLNESS:    Patient reports feeling better.  She is not longer having dysuria.  She reports increased urinary frequency that she attributes to the IV fluid hydration.    Patient is concerned about the MediPort location.  She states that she is from the lesion site close to the midline and it is now close to the right axillary area.      Past Medical History   Diagnosis Date   • Anemia 10/14/2008     Secondary to chronic renal insufficiency and myeloma   • Arthropathy of knee 01/07/2014     unspecified   • Breast CA, left 04/25/2012     Stage II   • Bursitis of left hip 10/18/2007   • Bursitis, knee 12/02/2013   • Calcaneal spur 08/12/2009   • Chronic kidney disease, stage III (moderate)    • Congestive heart failure    • Diarrhea of presumed infectious origin 04/01/2014     c diff    • Diverticulitis of colon 10/17/2007   • History of Clostridium difficile    • History of echocardiogram 04/2014     EF decreased to 46% - per cardiology   • History of transfusion    • Hypertension    • LLL pneumonia 04/23/2009   • Malignant neoplasm of kidney 12/2009     and other and unspecified urinary organs; urinary organ, site unspecified; S/P L nephrectomy   • Multiple myeloma 04/2012   • Neoplasm of uncertain behavior 10/12/2015     of other and unspecified sites and tissues; other specified sites   • Personal history of breast cancer 04/2012     S/P mastectomy left breast   • Pyelonephritis 03/2004   • Seizure disorder 10/17/2007   • Thrombocytopenia    • UTI (urinary tract infection) 10/30/2014     pseudomonas, multidrug resistant   • UTI (urinary tract infection) 03/28/2014     site not specified       Past Surgical History   Procedure Laterality Date   • Kidney surgery  2015 2/25/12; removal and replacement of UPJ stent: Dr Everett   • Mastectomy Left 04/25/2012      breast   • Nephrectomy Left 12/2009     renal carcinoma   • Colonoscopy  unknown   • Cystoscopy N/A 3/25/2016     Procedure: CYSTOSCOPY  WITH RIGHT STENT CHANGE;  Surgeon: Lakhwinder Russo MD;  Location: [facility] OR;  Service:    • Cystoscopy w/ ureteral stent placement Right 5/7/2016     Procedure: CYSTOSCOPY, URETERAL CATHETER INSERTION AND RIGHT STENT EXCHANGE ;  Surgeon: Michael Everett Jr., MD;  Location: [facility] OR;  Service:    • Brain surgery  2005     Meningioma   • Total abdominal hysterectomy  2007       SCHEDULED MEDS:    acyclovir 400 mg Oral BID   aspirin 81 mg Oral Daily   bisoprolol 10 mg Oral QAM   bisoprolol 5 mg Oral Q PM   carBAMazepine  mg Oral Nightly   ceftriaxone 2 g Intravenous Q24H   guaiFENesin 600 mg Oral BID   hydrALAZINE 25 mg Oral BID   isosorbide mononitrate 60 mg Oral Q24H   losartan 25 mg Oral Q PM   montelukast 10 mg Oral Nightly     INFUSIONS:    sodium chloride 50 mL/hr Last Rate: 50 mL/hr (01/23/17 0854)     PRN MEDS:  •  acetaminophen  •  HYDROcodone-acetaminophen  •  hydrOXYzine  •  ondansetron ODT  •  prochlorperazine    REVIEW OF SYSTEMS:  GENERAL:  No fever or chills. No weight change.    SKIN:  No rashes or lesions.  HEME/LYMPH: Anemia and thrombocytopenia.  RESPIRATORY:  No cough, shortness of breath, hemoptysis or wheezing.  CVS:  No chest pain, palpitations or lower extremity swelling.  Patient is concerned about the location of the port in the right chest.  GI:  No abdominal pain, nausea, vomiting, constipation, diarrhea, melena or hematochezia.  :  See history of present illness.   MUSCULOSKELETAL:  Pain in the left leg.  NEUROLOGICAL:  No dizziness, global weakness, loss of consciousness or seizures.  PSYCHIATRIC:  No anxiety, mood changes or depression.    Objective   VITAL SIGNS:  Temp:  [97 °F (36.1 °C)-97.2 °F (36.2 °C)] 97 °F (36.1 °C)  Heart Rate:  [69-87] 77  Resp:  [18] 18  BP: ()/(60-62) 118/62    Wt Readings from Last 3 Encounters:    01/19/17 186 lb (84.4 kg)   01/19/17 186 lb 12.8 oz (84.7 kg)   01/04/17 191 lb 6.4 oz (86.8 kg)       PHYSICAL EXAMINATION  GENERAL:  The patient appears in good general condition and not in acute distress   SKIN:  No skin rashes or lesions. No ecchymosis or petechiae.  HEAD:  Normocephalic.  EYES:  No jaundice. No pallor.  NECK:  Supple with good ROM. No thyromegaly or masses.  LYMPHATICS:  No lymphadenopathy.  CHEST:  Lungs clear to auscultation. No added sounds. port in the right upper chest appears benign and it is close to the right axillary area .  CARDIAC:  Normal S1 & S2. No murmurs.  ABDOMEN:  Soft. Non-tender. No palpable hepato- or splenomegaly or masses.  EXTREMITIES:  No cyanosis or edema. left calf tenderness with no erythema or warmth.  NEUROLOGICAL:  No focal neurological deficits.    RESULT REVIEW:     Results from last 7 days  Lab Units 01/23/17  0640 01/22/17  0702 01/21/17  0526 01/20/17  0644 01/19/17  1057   WBC 10*3/mm3 4.50 4.62 6.55 5.31 6.87   NEUTROS ABS 10*3/mm3 2.21 2.27 4.54 2.99 4.21   HEMOGLOBIN g/dL 9.0* 8.7* 8.7* 9.9* 10.8*   HEMATOCRIT % 29.8* 29.0* 29.8* 32.1* 34.3   PLATELETS 10*3/mm3 60* 38* 41* 63* 64*       Results from last 7 days  Lab Units 01/23/17  0640 01/22/17  0701 01/21/17  0526   SODIUM mmol/L 144 140 145   POTASSIUM mmol/L 3.7 3.7 3.8   CHLORIDE mmol/L 116* 113* 116*   TOTAL CO2 mmol/L 18.0* 17.6* 16.5*   BUN mg/dL 13 14 19   CREATININE mg/dL 1.47* 1.66* 1.76*   CALCIUM mg/dL 7.3* 7.0* 6.8*   ALBUMIN g/dL 2.40* 2.50* 2.60*   BILIRUBIN mg/dL 0.2 0.2 0.3   ALK PHOS U/L 52 52 54   ALT (SGPT) U/L 19 17 15   AST (SGOT) U/L 23 25 23       Assessment/Plan   1. Multiple myeloma, IgG lambda, stage III. Failed multiple lines of therapy. She is currently undergoing Darzalex, last dose was on January 4, 2016.      2. Fever. The patient presented with fever, fatigue and weakness. She was found to have Escherichia coli UTI, it is not sensitive to Levaquin. She is allergic to  penicillin and has almost an anaphylactic reaction.  We consulted infectious disease.  We will continue ceftriaxone for another day or 2 and start PO Cefdinir 300 mg by mouth twice a day and to stop it on 2/3/2017.  The patient's stent was replaced on 1/20/17.      3. Stage 3 chronic kidney disease. Creatinine was elevated yesterday and she was started on IV fluids.  She was not eating or drinking enough. Creatinine improved on today's labs.  we will continue IV fluids at 50 mL/hour and we will repeat tomorrow.      4. Pancytopenia secondary to chemotherapy. Hemoglobin and platelets have improved.        5.  Left calf tenderness.  This is concerning for deep vein thrombosis.  We will obtain a venous Doppler to further evaluate this.    6.  Patient concerned about the location of the MediPort.  He reports that he is normal on examination.  I'll review patient's x-rays to make sure there is no kinking of the tubing.       Mark Garza MD  01/23/17

## 2017-01-23 NOTE — PLAN OF CARE
Problem: Patient Care Overview (Adult)  Goal: Plan of Care Review  Outcome: Ongoing (interventions implemented as appropriate)    01/23/17 0621   Coping/Psychosocial Response Interventions   Plan Of Care Reviewed With patient   Patient Care Overview   Progress improving   Outcome Evaluation   Outcome Summary/Follow up Plan pt remains afebrile. tylenol for pain with stents with urinating.        Goal: Adult Individualization and Mutuality  Outcome: Ongoing (interventions implemented as appropriate)  Goal: Discharge Needs Assessment  Outcome: Ongoing (interventions implemented as appropriate)    Problem: Oncology Care (Adult)  Goal: Signs and Symptoms of Listed Potential Problems Will be Absent or Manageable (Oncology Care)  Outcome: Ongoing (interventions implemented as appropriate)    Problem: Perioperative Period (Adult)  Goal: Signs and Symptoms of Listed Potential Problems Will be Absent or Manageable (Perioperative Period)  Outcome: Ongoing (interventions implemented as appropriate)

## 2017-01-24 VITALS
TEMPERATURE: 97.4 F | BODY MASS INDEX: 34.23 KG/M2 | HEIGHT: 62 IN | SYSTOLIC BLOOD PRESSURE: 108 MMHG | DIASTOLIC BLOOD PRESSURE: 69 MMHG | WEIGHT: 186 LBS | HEART RATE: 69 BPM | RESPIRATION RATE: 16 BRPM | OXYGEN SATURATION: 100 %

## 2017-01-24 LAB
BACTERIA SPEC AEROBE CULT: NORMAL
BACTERIA SPEC AEROBE CULT: NORMAL
BASOPHILS # BLD AUTO: 0 10*3/MM3 (ref 0–0.2)
BASOPHILS NFR BLD AUTO: 0 % (ref 0–1.5)
DEPRECATED RDW RBC AUTO: 58.2 FL (ref 37–54)
EOSINOPHIL # BLD AUTO: 0.14 10*3/MM3 (ref 0–0.7)
EOSINOPHIL NFR BLD AUTO: 2.9 % (ref 0.3–6.2)
ERYTHROCYTE [DISTWIDTH] IN BLOOD BY AUTOMATED COUNT: 14.9 % (ref 11.7–13)
HCT VFR BLD AUTO: 29.5 % (ref 35.6–45.5)
HGB BLD-MCNC: 8.9 G/DL (ref 11.9–15.5)
IMM GRANULOCYTES # BLD: 0.11 10*3/MM3 (ref 0–0.03)
IMM GRANULOCYTES NFR BLD: 2.3 % (ref 0–0.5)
LYMPHOCYTES # BLD AUTO: 1.83 10*3/MM3 (ref 0.9–4.8)
LYMPHOCYTES NFR BLD AUTO: 37.6 % (ref 19.6–45.3)
MCH RBC QN AUTO: 32.5 PG (ref 26.9–32)
MCHC RBC AUTO-ENTMCNC: 30.2 G/DL (ref 32.4–36.3)
MCV RBC AUTO: 107.7 FL (ref 80.5–98.2)
MONOCYTES # BLD AUTO: 0.44 10*3/MM3 (ref 0.2–1.2)
MONOCYTES NFR BLD AUTO: 9 % (ref 5–12)
NEUTROPHILS # BLD AUTO: 2.35 10*3/MM3 (ref 1.9–8.1)
NEUTROPHILS NFR BLD AUTO: 48.2 % (ref 42.7–76)
NRBC BLD MANUAL-RTO: 0.9 /100 WBC (ref 0–0)
PLATELET # BLD AUTO: 46 10*3/MM3 (ref 140–500)
RBC # BLD AUTO: 2.74 10*6/MM3 (ref 3.9–5.2)
WBC NRBC COR # BLD: 4.87 10*3/MM3 (ref 4.5–10.7)

## 2017-01-24 PROCEDURE — 25010000002 HEPARIN FLUSH (PORCINE) 100 UNIT/ML SOLUTION

## 2017-01-24 PROCEDURE — 85025 COMPLETE CBC W/AUTO DIFF WBC: CPT | Performed by: INTERNAL MEDICINE

## 2017-01-24 PROCEDURE — 99239 HOSP IP/OBS DSCHRG MGMT >30: CPT | Performed by: INTERNAL MEDICINE

## 2017-01-24 PROCEDURE — 25010000003 CEFTRIAXONE PER 250 MG: Performed by: INTERNAL MEDICINE

## 2017-01-24 RX ORDER — CEFDINIR 300 MG/1
300 CAPSULE ORAL 2 TIMES DAILY
Qty: 22 CAPSULE | Refills: 0 | Status: SHIPPED | OUTPATIENT
Start: 2017-01-24 | End: 2017-02-09

## 2017-01-24 RX ORDER — ACETAMINOPHEN 325 MG/1
650 TABLET ORAL EVERY 6 HOURS PRN
Refills: 0
Start: 2017-01-24 | End: 2019-01-01

## 2017-01-24 RX ORDER — ISOSORBIDE MONONITRATE 60 MG/1
60 TABLET, EXTENDED RELEASE ORAL DAILY
Start: 2017-01-24 | End: 2020-01-01

## 2017-01-24 RX ADMIN — GUAIFENESIN 600 MG: 600 TABLET, EXTENDED RELEASE ORAL at 09:34

## 2017-01-24 RX ADMIN — CEFTRIAXONE SODIUM 2 G: 2 INJECTION, SOLUTION INTRAVENOUS at 14:38

## 2017-01-24 RX ADMIN — ACYCLOVIR 400 MG: 400 TABLET ORAL at 09:34

## 2017-01-24 RX ADMIN — ASPIRIN 81 MG: 81 TABLET ORAL at 09:34

## 2017-01-24 RX ADMIN — SODIUM CHLORIDE 50 ML/HR: 9 INJECTION, SOLUTION INTRAVENOUS at 12:23

## 2017-01-24 RX ADMIN — ISOSORBIDE MONONITRATE 60 MG: 60 TABLET, EXTENDED RELEASE ORAL at 09:34

## 2017-01-24 RX ADMIN — BISOPROLOL FUMARATE 10 MG: 5 TABLET, COATED ORAL at 06:23

## 2017-01-24 RX ADMIN — HYDRALAZINE HYDROCHLORIDE 25 MG: 25 TABLET ORAL at 09:34

## 2017-01-24 RX ADMIN — Medication 500 UNITS: at 16:45

## 2017-01-24 NOTE — DISCHARGE SUMMARY
Date of Discharge:  1/24/2017    Discharge Diagnosis:     Right obstructive pyelonephritis and hydronephrosis with E coli    Presenting Problem/History of Present Illness  Febrile urinary tract infection [N39.0]     Hospital Course  Patient is a 66 y.o. female presented with fever, fatigue and weakness.  Her hospital course was as follows:      1. Multiple myeloma, IgG lambda, stage III. Failed multiple lines of therapy. She is currently undergoing Darzalex, last dose was on January 4, 2016. She is scheduled to see Dr. Araujo on 2/1/17 and is scheduled to receive Darzalex that day.       2. Right obstructive pyelonephritis and hydronephrosis with E coli. The patient presented with fever, fatigue and weakness. She was found to have Escherichia coli UTI, it was not sensitive to Levaquin. She is allergic to penicillin and had facial and tongue swelling in the past. We consulted infectious disease and she was treated with ceftriaxone. The patient will be discharged on PO Cefdinir 300 mg by mouth twice a day and to stop it on 2/3/2017. The patient's stent was replaced on 1/20/17.       3. Stage 3 chronic kidney disease. Creatinine was elevated on 1/22/17 and she was started on IV fluids and creatinine improved.       4. Pancytopenia secondary to chemotherapy. Hemoglobin and platelets have somewhat improved.       5. Left calf tenderness. This was concerning for deep vein thrombosis. We obtained a venous Doppler that showed no evidence of DVT.     6. Patient concerned about the location of the MediPort. He reports that he is normal on examination. On my review of the CT scan  image, there was no kinking of the port catheter.     Procedures Performed  Procedure(s):  CYSTOSCOPY RIGHT RETROGRADE PYELOGRAM, URETERAL STENT CHANGE       Consults:   Consults     Date and Time Order Name Status Description    1/21/2017 7252 Inpatient Consult to Infectious Diseases Completed     1/19/2017 5220 Inpatient Consult to Urology  Completed           Pertinent Test Results:      Urine Culture     Specimen Information: Kidney, Right; Urine        Culture   >100,000 CFU/mL Escherichia coli (A)      Resulting Agency: [facility]   Susceptibility    Escherichia coli     AARON     Amikacin 4 ug/ml Susceptible     Ampicillin >=32 ug/ml Resistant     Ampicillin + Sulbactam 16 ug/ml Intermediate     Cefazolin <=4 ug/ml Susceptible     Cefepime <=1 ug/ml Susceptible     Ceftriaxone <=1 ug/ml Susceptible     Ciprofloxacin >=4 ug/ml Resistant     Ertapenem <=0.5 ug/ml Susceptible     Gentamicin >=16 ug/ml Resistant     Levofloxacin >=8 ug/ml Resistant     Nitrofurantoin 128 ug/ml Resistant     Piperacillin + Tazobactam <=4 ug/ml Susceptible     Tetracycline >=16 ug/ml Resistant     Trimethoprim + Sulfamethoxazole >=320 ug/ml Resistant                        Condition on Discharge:    Stable    Vital Signs  Temp:  [97 °F (36.1 °C)-98 °F (36.7 °C)] 97.4 °F (36.3 °C)  Heart Rate:  [63-77] 69  Resp:  [16-18] 16  BP: (108-138)/(57-82) 108/69    Physical Exam:  GENERAL: The patient appears in good general condition and not in acute distress   SKIN: No skin rashes or lesions. No ecchymosis or petechiae.  HEAD: Normocephalic.  EYES: No jaundice. No pallor.  NECK: Supple with good ROM. No thyromegaly or masses.  LYMPHATICS: No lymphadenopathy.  CHEST: Lungs clear to auscultation. No added sounds. Port in the right upper chest appears benign.  CARDIAC: Normal S1 & S2. No murmurs.  ABDOMEN: Soft. Non-tender. No palpable hepato- or splenomegaly or masses.  EXTREMITIES: No cyanosis or edema. Left calf tenderness with no erythema or warmth.   NEUROLOGICAL: No focal neurological deficits.    Discharge Disposition  Home or Self CareHome    Discharge Medications   Marina Barroso   Home Medication Instructions GENA:[id number]    Printed on:01/24/17 2508   Medication Information                      acetaminophen (TYLENOL) 325 MG tablet  Take 2 tablets by mouth Every 6 (Six)  Hours As Needed for mild pain (1-3) or fever.             acyclovir (ZOVIRAX) 400 MG tablet  Take 1 tablet by mouth 2 (two) times a day. Take no more than 5 doses a day.             bisoprolol (ZEBETA) 10 MG tablet  Take 10 mg by mouth every morning. Take 1 tablet by oral route once daily              bisoprolol (ZEBETA) 5 MG tablet  5 mg every evening.             carBAMazepine XR (TEGretol  XR) 200 MG 12 hr tablet  Every Night.             carBAMazepine XR (TEGretol  XR) 200 MG 12 hr tablet  TAKE 1 TABLET BY MOUTH 2 (TWO) TIMES A DAY FOR 30 DAYS.             cefdinir (OMNICEF) 300 MG capsule  Take 1 capsule by mouth 2 (Two) Times a Day.             dexamethasone (DECADRON) 4 MG tablet  Take 1 tablet in the morning starting the day after chemo for 2 days.  Take with food.             hydrALAZINE (APRESOLINE) 25 MG tablet  Take 25 mg by mouth 2 (two) times a day.             HYDROcodone-acetaminophen (NORCO) 5-325 MG per tablet  TAKE 1 TO 2 TABLETS BY MOUTH EVERY 6 HOURS AS NEEDED             hydrOXYzine (ATARAX) 25 MG tablet  Take 1 tablet by mouth 3 (three) times a day as needed for itching.             isosorbide mononitrate (IMDUR) 60 MG 24 hr tablet  Take 1 tablet by mouth Daily. TAKE 1 TABLET BY MOUTH EVERY MORNING AND ONE-HALF TABLET EVERY EVENING             losartan (COZAAR) 25 MG tablet  Take 25 mg by mouth every evening.             Misc. Devices (VIRAGE CUSTOM BREAST PROSTHES) misc  As directed             Misc. Devices misc  Mastectomy bras, use as directed             montelukast (SINGULAIR) 10 MG tablet  Take 1 tablet by mouth every night.             prochlorperazine (COMPAZINE) 10 MG tablet  Take 1 tablet by mouth every 6 (six) hours as needed for nausea or vomiting for up to 60 doses.                 Discharge Diet:   Regular    Activity at Discharge:   As tolerated    Follow-up Appointments  Future Appointments  Date Time Provider Department Center   2/1/2017 7:30 AM INFU [facility]   [facility]   2/1/2017 8:00 AM Zane Araujo MD PhD [facility]   2/1/2017 8:30 AM CHEMO INF 3 [facility]       Test Results Pending at Discharge   Order Current Status    Blood Culture Preliminary result    Blood Culture Preliminary result           Mark Garza MD  01/24/17  4:09 PM    Time: Discharge 45 min

## 2017-01-24 NOTE — PROGRESS NOTES
Subjective     CHIEF COMPLAINT:     · Multiple myeloma  · Escherichia coli UTI in immunocompromised host  · Anemia and thrombocytopenia    HISTORY OF PRESENT ILLNESS:    Patient reports feeling much better.  She is not longer having dysuria.  She denies having abdominal or back pain.      Past Medical History   Diagnosis Date   • Anemia 10/14/2008     Secondary to chronic renal insufficiency and myeloma   • Arthropathy of knee 01/07/2014     unspecified   • Breast CA, left 04/25/2012     Stage II   • Bursitis of left hip 10/18/2007   • Bursitis, knee 12/02/2013   • Calcaneal spur 08/12/2009   • Chronic kidney disease, stage III (moderate)    • Congestive heart failure    • Diarrhea of presumed infectious origin 04/01/2014     c diff    • Diverticulitis of colon 10/17/2007   • History of Clostridium difficile    • History of echocardiogram 04/2014     EF decreased to 46% - per cardiology   • History of transfusion    • Hypertension    • LLL pneumonia 04/23/2009   • Malignant neoplasm of kidney 12/2009     and other and unspecified urinary organs; urinary organ, site unspecified; S/P L nephrectomy   • Multiple myeloma 04/2012   • Neoplasm of uncertain behavior 10/12/2015     of other and unspecified sites and tissues; other specified sites   • Personal history of breast cancer 04/2012     S/P mastectomy left breast   • Pyelonephritis 03/2004   • Seizure disorder 10/17/2007   • Thrombocytopenia    • UTI (urinary tract infection) 10/30/2014     pseudomonas, multidrug resistant   • UTI (urinary tract infection) 03/28/2014     site not specified       Past Surgical History   Procedure Laterality Date   • Kidney surgery  2015 2/25/12; removal and replacement of UPJ stent: Dr Everett   • Mastectomy Left 04/25/2012     breast   • Nephrectomy Left 12/2009     renal carcinoma   • Colonoscopy  unknown   • Cystoscopy N/A 3/25/2016     Procedure: CYSTOSCOPY  WITH RIGHT STENT CHANGE;  Surgeon: Lakhwinder Russo MD;   Location: [facility] MAIN OR;  Service:    • Cystoscopy w/ ureteral stent placement Right 5/7/2016     Procedure: CYSTOSCOPY, URETERAL CATHETER INSERTION AND RIGHT STENT EXCHANGE ;  Surgeon: Michael Everett Jr., MD;  Location: [facility] OR;  Service:    • Brain surgery  2005     Meningioma   • Total abdominal hysterectomy  2007   • Cystoscopy w/ ureteral stent placement N/A 1/20/2017     Procedure: CYSTOSCOPY RIGHT RETROGRADE PYELOGRAM, URETERAL STENT CHANGE;  Surgeon: Lakhwinder Russo MD;  Location: [facility] OR;  Service:        SCHEDULED MEDS:    acyclovir 400 mg Oral BID   aspirin 81 mg Oral Daily   bisoprolol 10 mg Oral QAM   bisoprolol 5 mg Oral Q PM   carBAMazepine  mg Oral Nightly   ceftriaxone 2 g Intravenous Q24H   guaiFENesin 600 mg Oral BID   hydrALAZINE 25 mg Oral BID   isosorbide mononitrate 60 mg Oral Q24H   losartan 25 mg Oral Q PM   montelukast 10 mg Oral Nightly     INFUSIONS:    sodium chloride 50 mL/hr Last Rate: 50 mL/hr (01/24/17 1223)     PRN MEDS:  •  acetaminophen  •  HYDROcodone-acetaminophen  •  hydrOXYzine  •  ondansetron ODT  •  prochlorperazine    REVIEW OF SYSTEMS:  GENERAL:  No fever or chills. No weight change.    SKIN:  No rashes or lesions.  HEME/LYMPH: Anemia and thrombocytopenia.  RESPIRATORY:  No cough, shortness of breath, hemoptysis or wheezing.  CVS:  No chest pain, palpitations or lower extremity swelling.  Patient is concerned about the location of the port in the right chest.  GI:  No abdominal pain, nausea, vomiting, constipation, diarrhea, melena or hematochezia.  :  No abdominal or back pain. No dysuria.   MUSCULOSKELETAL:  Pain in the left leg.  NEUROLOGICAL:  No dizziness, global weakness, loss of consciousness or seizures.  PSYCHIATRIC:  No anxiety, mood changes or depression.    Objective   VITAL SIGNS:  Temp:  [97 °F (36.1 °C)-98 °F (36.7 °C)] 97 °F (36.1 °C)  Heart Rate:  [63-77] 63  Resp:  [16-18] 16  BP: (110-138)/(57-82) 138/82    Wt Readings from  Last 3 Encounters:   01/19/17 186 lb (84.4 kg)   01/19/17 186 lb 12.8 oz (84.7 kg)   01/04/17 191 lb 6.4 oz (86.8 kg)       PHYSICAL EXAMINATION  GENERAL:  The patient appears in good general condition and not in acute distress   SKIN:  No skin rashes or lesions. No ecchymosis or petechiae.  HEAD:  Normocephalic.  EYES:  No jaundice. No pallor.  NECK:  Supple with good ROM. No thyromegaly or masses.  LYMPHATICS:  No lymphadenopathy.  CHEST:  Lungs clear to auscultation. No added sounds. Port in the right upper chest appears benign.  CARDIAC:  Normal S1 & S2. No murmurs.  ABDOMEN:  Soft. Non-tender. No palpable hepato- or splenomegaly or masses.  EXTREMITIES:  No cyanosis or edema. Left calf tenderness with no erythema or warmth.  NEUROLOGICAL:  No focal neurological deficits.    RESULT REVIEW:     Results from last 7 days  Lab Units 01/24/17  0622 01/23/17  0640 01/22/17  0702 01/21/17  0526 01/20/17  0644   WBC 10*3/mm3 4.87 4.50 4.62 6.55 5.31   NEUTROS ABS 10*3/mm3 2.35 2.21 2.27 4.54 2.99   HEMOGLOBIN g/dL 8.9* 9.0* 8.7* 8.7* 9.9*   HEMATOCRIT % 29.5* 29.8* 29.0* 29.8* 32.1*   PLATELETS 10*3/mm3 46* 60* 38* 41* 63*       Results from last 7 days  Lab Units 01/23/17  0640 01/22/17  0701 01/21/17  0526   SODIUM mmol/L 144 140 145   POTASSIUM mmol/L 3.7 3.7 3.8   CHLORIDE mmol/L 116* 113* 116*   TOTAL CO2 mmol/L 18.0* 17.6* 16.5*   BUN mg/dL 13 14 19   CREATININE mg/dL 1.47* 1.66* 1.76*   CALCIUM mg/dL 7.3* 7.0* 6.8*   ALBUMIN g/dL 2.40* 2.50* 2.60*   BILIRUBIN mg/dL 0.2 0.2 0.3   ALK PHOS U/L 52 52 54   ALT (SGPT) U/L 19 17 15   AST (SGOT) U/L 23 25 23       Assessment/Plan   1. Multiple myeloma, IgG lambda, stage III. Failed multiple lines of therapy. She is currently undergoing Darzalex, last dose was on January 4, 2016.  She is scheduled to see Dr. Araujo on 2/1/17 and is scheduled to receive Darzalex that day.       2.  Right obstructive pyelonephritis and hydronephrosis with E coli.  The patient presented with  fever, fatigue and weakness. She was found to have Escherichia coli UTI, it is not sensitive to Levaquin. She is allergic to penicillin and has almost an anaphylactic reaction.  We consulted infectious disease and she was treated with ceftriaxone. The patient will be discharged on PO Cefdinir 300 mg by mouth twice a day and to stop it on 2/3/2017.  The patient's stent was replaced on 1/20/17.      3. Stage 3 chronic kidney disease. Creatinine was elevated on 1/22/17 and she was started on IV fluids and creatinine improved.       4. Pancytopenia secondary to chemotherapy. Hemoglobin and platelets have somewhat improved.        5.  Left calf tenderness.  This was concerning for deep vein thrombosis.  We obtained a venous Doppler that showed no evidence of DVT.    6.  Patient concerned about the location of the MediPort.  He reports that he is normal on examination.  On my review of the CT scan  image, there was no kinking of the port catheter.     The patient will be discharged home today.       Mark Garza MD  01/24/17

## 2017-01-24 NOTE — PLAN OF CARE
Problem: Patient Care Overview (Adult)  Goal: Plan of Care Review  Outcome: Ongoing (interventions implemented as appropriate)    01/23/17 1800   Coping/Psychosocial Response Interventions   Plan Of Care Reviewed With patient   Patient Care Overview   Progress improving   Outcome Evaluation   Outcome Summary/Follow up Plan Vast improvement. Continue gentle hydration and IV abx with hopes of home tomorrow. No pain with urination.

## 2017-01-24 NOTE — PLAN OF CARE
Problem: Patient Care Overview (Adult)  Goal: Plan of Care Review  Outcome: Ongoing (interventions implemented as appropriate)    01/24/17 0546   Coping/Psychosocial Response Interventions   Plan Of Care Reviewed With patient   Patient Care Overview   Progress improving   Outcome Evaluation   Outcome Summary/Follow up Plan pt had a good night; denied pain; slept well; anticipated d/c today         Problem: Perioperative Period (Adult)  Goal: Signs and Symptoms of Listed Potential Problems Will be Absent or Manageable (Perioperative Period)  Outcome: Ongoing (interventions implemented as appropriate)

## 2017-01-24 NOTE — PLAN OF CARE
Problem: Patient Care Overview (Adult)  Goal: Plan of Care Review  Outcome: Outcome(s) achieved Date Met:  01/24/17  Goal: Adult Individualization and Mutuality  Outcome: Outcome(s) achieved Date Met:  01/24/17  Goal: Discharge Needs Assessment  Outcome: Outcome(s) achieved Date Met:  01/24/17    Problem: Oncology Care (Adult)  Goal: Signs and Symptoms of Listed Potential Problems Will be Absent or Manageable (Oncology Care)  Outcome: Outcome(s) achieved Date Met:  01/24/17    Problem: Perioperative Period (Adult)  Goal: Signs and Symptoms of Listed Potential Problems Will be Absent or Manageable (Perioperative Period)  Outcome: Outcome(s) achieved Date Met:  01/24/17

## 2017-01-25 NOTE — PROGRESS NOTES
Continued Stay Note  [facility]     Patient Name: Marina Barroso  MRN: [id number]  Today's Date: 1/25/2017    Admit Date: 1/19/2017          Discharge Plan       01/25/17 0824    Final Note    Final Note Orders received to dc home. No needs noted. Family to transport per private auto.              Discharge Codes       01/25/17 0824    Discharge Codes    Discharge Codes 01  Discharge to home        Expected Discharge Date and Time     Expected Discharge Date Expected Discharge Time    Jan 24, 2017             Cher Boogie RN

## 2017-01-31 DIAGNOSIS — C90.00 MULTIPLE MYELOMA, REMISSION STATUS UNSPECIFIED (HCC): ICD-10-CM

## 2017-02-01 ENCOUNTER — APPOINTMENT (OUTPATIENT)
Dept: ONCOLOGY | Facility: HOSPITAL | Age: 67
End: 2017-02-01

## 2017-02-09 ENCOUNTER — OFFICE VISIT (OUTPATIENT)
Dept: ONCOLOGY | Facility: CLINIC | Age: 67
End: 2017-02-09

## 2017-02-09 ENCOUNTER — INFUSION (OUTPATIENT)
Dept: ONCOLOGY | Facility: HOSPITAL | Age: 67
End: 2017-02-09

## 2017-02-09 ENCOUNTER — LAB (OUTPATIENT)
Dept: ONCOLOGY | Facility: HOSPITAL | Age: 67
End: 2017-02-09

## 2017-02-09 VITALS
HEART RATE: 66 BPM | BODY MASS INDEX: 34.6 KG/M2 | OXYGEN SATURATION: 98 % | DIASTOLIC BLOOD PRESSURE: 80 MMHG | SYSTOLIC BLOOD PRESSURE: 124 MMHG | HEIGHT: 62 IN | WEIGHT: 188 LBS | RESPIRATION RATE: 16 BRPM | TEMPERATURE: 97.9 F

## 2017-02-09 DIAGNOSIS — N39.0 URINARY TRACT INFECTION, SITE UNSPECIFIED: ICD-10-CM

## 2017-02-09 DIAGNOSIS — T45.1X5A ANEMIA ASSOCIATED WITH CHEMOTHERAPY: ICD-10-CM

## 2017-02-09 DIAGNOSIS — R91.1 PULMONARY NODULE, RIGHT: ICD-10-CM

## 2017-02-09 DIAGNOSIS — N18.30 CHRONIC KIDNEY DISEASE, STAGE III (MODERATE) (HCC): ICD-10-CM

## 2017-02-09 DIAGNOSIS — T45.1X5A CHEMOTHERAPY-INDUCED THROMBOCYTOPENIA: ICD-10-CM

## 2017-02-09 DIAGNOSIS — C90.00 MULTIPLE MYELOMA, REMISSION STATUS UNSPECIFIED (HCC): Primary | ICD-10-CM

## 2017-02-09 DIAGNOSIS — D69.59 CHEMOTHERAPY-INDUCED THROMBOCYTOPENIA: ICD-10-CM

## 2017-02-09 DIAGNOSIS — D64.81 ANEMIA ASSOCIATED WITH CHEMOTHERAPY: ICD-10-CM

## 2017-02-09 DIAGNOSIS — C90.00 MULTIPLE MYELOMA, REMISSION STATUS UNSPECIFIED (HCC): ICD-10-CM

## 2017-02-09 DIAGNOSIS — C90.00 MULTIPLE MYELOMA NOT HAVING ACHIEVED REMISSION (HCC): Primary | ICD-10-CM

## 2017-02-09 LAB
ALBUMIN SERPL-MCNC: 3.1 G/DL (ref 3.5–5.2)
ALBUMIN/GLOB SERPL: 0.6 G/DL
ALP SERPL-CCNC: 68 U/L (ref 39–117)
ALT SERPL W P-5'-P-CCNC: 22 U/L (ref 1–33)
ANION GAP SERPL CALCULATED.3IONS-SCNC: 8.2 MMOL/L
AST SERPL-CCNC: 21 U/L (ref 1–32)
BACTERIA UR QL AUTO: ABNORMAL /HPF
BASOPHILS # BLD AUTO: 0.02 10*3/MM3 (ref 0–0.2)
BASOPHILS NFR BLD AUTO: 0.3 % (ref 0–1.5)
BILIRUB SERPL-MCNC: 0.2 MG/DL (ref 0.1–1.2)
BILIRUB UR QL STRIP: NEGATIVE
BUN BLD-MCNC: 21 MG/DL (ref 8–23)
BUN/CREAT SERPL: 13.6 (ref 7–25)
CALCIUM SPEC-SCNC: 8.1 MG/DL (ref 8.6–10.5)
CHLORIDE SERPL-SCNC: 112 MMOL/L (ref 98–107)
CLARITY UR: CLEAR
CO2 SERPL-SCNC: 22.8 MMOL/L (ref 22–29)
COLOR UR: YELLOW
CREAT BLD-MCNC: 1.54 MG/DL (ref 0.57–1)
DACRYOCYTES BLD QL SMEAR: NORMAL
DEPRECATED RDW RBC AUTO: 55.5 FL (ref 37–54)
EOSINOPHIL # BLD AUTO: 0.17 10*3/MM3 (ref 0–0.7)
EOSINOPHIL NFR BLD AUTO: 2.3 % (ref 0.3–6.2)
ERYTHROCYTE [DISTWIDTH] IN BLOOD BY AUTOMATED COUNT: 14.2 % (ref 11.7–13)
GFR SERPL CREATININE-BSD FRML MDRD: 41 ML/MIN/1.73
GLOBULIN UR ELPH-MCNC: 5.4 GM/DL
GLUCOSE BLD-MCNC: 97 MG/DL (ref 65–99)
GLUCOSE UR STRIP-MCNC: NEGATIVE MG/DL
HCT VFR BLD AUTO: 34.4 % (ref 35.6–45.5)
HGB BLD-MCNC: 11.2 G/DL (ref 11.9–15.5)
HGB UR QL STRIP.AUTO: NEGATIVE
HYALINE CASTS UR QL AUTO: ABNORMAL /LPF
IMM GRANULOCYTES # BLD: 0.17 10*3/MM3 (ref 0–0.03)
IMM GRANULOCYTES NFR BLD: 2.3 % (ref 0–0.5)
KETONES UR QL STRIP: NEGATIVE
LEUKOCYTE ESTERASE UR QL STRIP.AUTO: ABNORMAL
LYMPHOCYTES # BLD AUTO: 2.08 10*3/MM3 (ref 0.9–4.8)
LYMPHOCYTES NFR BLD AUTO: 28.2 % (ref 19.6–45.3)
MACROCYTES BLD QL SMEAR: NORMAL
MCH RBC QN AUTO: 34.5 PG (ref 26.9–32)
MCHC RBC AUTO-ENTMCNC: 32.6 G/DL (ref 32.4–36.3)
MCV RBC AUTO: 105.8 FL (ref 80.5–98.2)
MONOCYTES # BLD AUTO: 0.88 10*3/MM3 (ref 0.2–1.2)
MONOCYTES NFR BLD AUTO: 11.9 % (ref 5–12)
NEUTROPHILS # BLD AUTO: 4.06 10*3/MM3 (ref 1.9–8.1)
NEUTROPHILS NFR BLD AUTO: 55 % (ref 42.7–76)
NITRITE UR QL STRIP: NEGATIVE
NRBC BLD MANUAL-RTO: 0.4 /100 WBC (ref 0–0)
PH UR STRIP.AUTO: 6 [PH] (ref 5–8)
PLAT MORPH BLD: NORMAL
PLATELET # BLD AUTO: 79 10*3/MM3 (ref 140–500)
PMV BLD AUTO: 12.9 FL (ref 6–12)
POTASSIUM BLD-SCNC: 3.9 MMOL/L (ref 3.5–5.2)
PROT SERPL-MCNC: 8.5 G/DL (ref 6–8.5)
PROT UR QL STRIP: ABNORMAL
RBC # BLD AUTO: 3.25 10*6/MM3 (ref 3.9–5.2)
RBC # UR: ABNORMAL /HPF
REF LAB TEST METHOD: ABNORMAL
SODIUM BLD-SCNC: 143 MMOL/L (ref 136–145)
SP GR UR STRIP: 1.01 (ref 1–1.03)
SQUAMOUS #/AREA URNS HPF: ABNORMAL /HPF
UROBILINOGEN UR QL STRIP: ABNORMAL
WBC MORPH BLD: NORMAL
WBC NRBC COR # BLD: 7.38 10*3/MM3 (ref 4.5–10.7)
WBC UR QL AUTO: ABNORMAL /HPF

## 2017-02-09 PROCEDURE — 25010000002 DARATUMUMAB 400 MG/20ML SOLUTION 5 ML VIAL: Performed by: INTERNAL MEDICINE

## 2017-02-09 PROCEDURE — 81001 URINALYSIS AUTO W/SCOPE: CPT | Performed by: INTERNAL MEDICINE

## 2017-02-09 PROCEDURE — 96415 CHEMO IV INFUSION ADDL HR: CPT | Performed by: INTERNAL MEDICINE

## 2017-02-09 PROCEDURE — 96413 CHEMO IV INFUSION 1 HR: CPT | Performed by: INTERNAL MEDICINE

## 2017-02-09 PROCEDURE — 99215 OFFICE O/P EST HI 40 MIN: CPT | Performed by: INTERNAL MEDICINE

## 2017-02-09 PROCEDURE — 25010000002 DIPHENHYDRAMINE PER 50 MG: Performed by: INTERNAL MEDICINE

## 2017-02-09 PROCEDURE — 85025 COMPLETE CBC W/AUTO DIFF WBC: CPT | Performed by: INTERNAL MEDICINE

## 2017-02-09 PROCEDURE — 25010000002 METHYLPREDNISOLONE PER 125 MG: Performed by: INTERNAL MEDICINE

## 2017-02-09 PROCEDURE — 36415 COLL VENOUS BLD VENIPUNCTURE: CPT

## 2017-02-09 PROCEDURE — 96375 TX/PRO/DX INJ NEW DRUG ADDON: CPT | Performed by: INTERNAL MEDICINE

## 2017-02-09 PROCEDURE — 85007 BL SMEAR W/DIFF WBC COUNT: CPT | Performed by: INTERNAL MEDICINE

## 2017-02-09 PROCEDURE — 80053 COMPREHEN METABOLIC PANEL: CPT | Performed by: INTERNAL MEDICINE

## 2017-02-09 PROCEDURE — 25010000002 DARATUMUMAB 400 MG/20ML SOLUTION 20 ML VIAL: Performed by: INTERNAL MEDICINE

## 2017-02-09 RX ORDER — ACETAMINOPHEN 500 MG
1000 TABLET ORAL ONCE
Status: COMPLETED | OUTPATIENT
Start: 2017-02-09 | End: 2017-02-09

## 2017-02-09 RX ORDER — DIPHENHYDRAMINE HYDROCHLORIDE 50 MG/ML
50 INJECTION INTRAMUSCULAR; INTRAVENOUS AS NEEDED
Status: CANCELLED | OUTPATIENT
Start: 2017-02-09

## 2017-02-09 RX ORDER — ACETAMINOPHEN 500 MG
1000 TABLET ORAL ONCE
Status: CANCELLED | OUTPATIENT
Start: 2017-02-09

## 2017-02-09 RX ORDER — CEFDINIR 300 MG/1
300 CAPSULE ORAL 2 TIMES DAILY
Qty: 14 CAPSULE | Refills: 0 | Status: SHIPPED | OUTPATIENT
Start: 2017-02-09 | End: 2017-05-12 | Stop reason: HOSPADM

## 2017-02-09 RX ORDER — SODIUM CHLORIDE 9 MG/ML
250 INJECTION, SOLUTION INTRAVENOUS ONCE
Status: COMPLETED | OUTPATIENT
Start: 2017-02-09 | End: 2017-02-09

## 2017-02-09 RX ORDER — FAMOTIDINE 10 MG/ML
20 INJECTION, SOLUTION INTRAVENOUS AS NEEDED
Status: CANCELLED | OUTPATIENT
Start: 2017-02-09

## 2017-02-09 RX ORDER — MEPERIDINE HYDROCHLORIDE 50 MG/ML
25 INJECTION INTRAMUSCULAR; INTRAVENOUS; SUBCUTANEOUS
Status: CANCELLED | OUTPATIENT
Start: 2017-02-09

## 2017-02-09 RX ORDER — METHYLPREDNISOLONE SODIUM SUCCINATE 125 MG/2ML
60 INJECTION, POWDER, LYOPHILIZED, FOR SOLUTION INTRAMUSCULAR; INTRAVENOUS ONCE
Status: CANCELLED | OUTPATIENT
Start: 2017-02-09

## 2017-02-09 RX ORDER — SODIUM CHLORIDE 9 MG/ML
250 INJECTION, SOLUTION INTRAVENOUS ONCE
Status: CANCELLED | OUTPATIENT
Start: 2017-02-09 | End: 2017-02-09

## 2017-02-09 RX ORDER — METHYLPREDNISOLONE SODIUM SUCCINATE 125 MG/2ML
60 INJECTION, POWDER, LYOPHILIZED, FOR SOLUTION INTRAMUSCULAR; INTRAVENOUS ONCE
Status: COMPLETED | OUTPATIENT
Start: 2017-02-09 | End: 2017-02-09

## 2017-02-09 RX ADMIN — DARATUMUMAB 1372.8 MG: 100 INJECTION, SOLUTION, CONCENTRATE INTRAVENOUS at 10:03

## 2017-02-09 RX ADMIN — METHYLPREDNISOLONE SODIUM SUCCINATE 60 MG: 125 INJECTION, POWDER, FOR SOLUTION INTRAMUSCULAR; INTRAVENOUS at 08:59

## 2017-02-09 RX ADMIN — SODIUM CHLORIDE 250 ML: 900 INJECTION, SOLUTION INTRAVENOUS at 08:59

## 2017-02-09 RX ADMIN — DIPHENHYDRAMINE HYDROCHLORIDE 25 MG: 50 INJECTION, SOLUTION INTRAMUSCULAR; INTRAVENOUS at 09:01

## 2017-02-09 RX ADMIN — ACETAMINOPHEN 1000 MG: 500 TABLET ORAL at 08:59

## 2017-02-09 NOTE — PROGRESS NOTES
Reasons for follow up:   1. IgG kappa multiple myeloma, currently on Darzalex, started on May 26, 2016. Patient was switched to Darzalex from Pomalyst, as she continued to be neutropenic with recurrent urinary tract infection while on Pomalyst.    2. Zometa is on hold due to renal insufficiency.    3. After 16 doses of Darzalex, laboratory study showed stable disease in November 2016. Insurance company denied adding Velcade and dexamethasone. Patient is to be continued on monthly Darzalex from 12/06/16.    4.  Obstructive pyelonephritis on the right side, required hospitalization from January 19 through 01/24/2017 with Escherichia coli with iv antibiotics and stent exchange on 01/20/2017.         History of Present Illness     The patient is a pleasant 66-year-old female here today for reevaluation.  Was recently hospitalized for 6 days at the [facility] because of urinary tract infection, with objective pyelonephritis on the right side associated with hydronephrosis, and exchange of J stent on 01/20/2017.  She was given IV Rocephin, based on urine culture positive for multi-drug resistant Escherichia coli.  Subsequently was discharged to home with oral Cefdinir 02/03/2017.   She is here for reevaluation, on her scheduled day Darzalex.      Patient reports she is not feeling well, tied, however denies dysuria, no fever sweating chills area.  Patient reported a fever last time right before hospitalization, she did not have any fever at that time however to not to have recurrent a retracting fashion.  She voiced her concern that whether her scheduled stent exchange should be 2 months instead of 3 months because it is seems several times right before the scheduled exchange of her stent, she had a significant infection and required hospitalization.  I certainly share with her concern and I think this is reasonable.  Patient will see her urologist Dr. Everett to further discuss this issue.      Every 3  study today showed hemoglobin 11.2, more than 2 g better then 2 weeks ago when she had urinary tract infection and the state in the hospital.  She has normal WBC at a 7380, including neutrophils 4000 and the lymphocytes 2000.  Her platelets also improved at a 79,000, up from 46,000 two weeks ago.      Performance status ECOG 1. No bleeding. No fainting or syncope.       Past Medical History, Past Surgical History, Social History, Family History have been reviewed and are without significant changes except as mentioned.                 Oncology/Hematology History       1. Elbat iple myeloma, IgG lambda, stage III, diagnosed April 2012, previously treated with Velcade/Decadron followed by Velcade/Decadron/Revlimid; patient developed abdominal pain and rectal bleeding on Revlimid, which was then discontinued.    2. Patient evaluated at [facility], but was not felt to be a candidate for stem cell transplant.    3. Disease progression with therapy switched to melphalan/Velcade/prednisone per the [facility] trial on 10/22/2013; melphalan dose has been persistently decreased because of thrombocytopenia.    4. Anemia secondary to chronic renal insufficiency and myeloma, on periodic Procrit therapy p.r.n.    5. Patient had 8 cycles of VMP chemotherapy, repeat laboratory studies on 10/03/2013 showed stable disease. Her melphalan do se was significantly decreased due to marrow suppression.    6. Patient had disease progression after cycle #10 VMP treatment, evidenced by laboratory studies on 01/06/2014. We increased the melphalan dose for one cycle. Continued disease progress after cycl e #11 VMP treatment, despite increased dose of melphalan, as documented by laboratory study on 02/17/2014.    7. The patient was evaluated on 02/24/2014 and we recommend switching chemotherapy to Kyprolis on 02/27/2014.    8. Echocardiogram on 03/05/2014 reporting L VEF 43%. This is on par with her previous twice  echocardiogram study, in June 2012 and November 2012, reported LVEF at 45% to 50% and 40% to 45% respectively.    9. The patient had 2 episodes of chest pain after Kyprolis during cycle 1, leading to omitted dose on days 9 and 16. From cycle 2, we will give Kyprolis only once per week for 3 weeks out of every 4 weeks, as well as dose reduction of Kyprolis that was started on 04/04/2014.    10. After cycle 2 of dose-reduced Kyprolis, the patient's M spike increased fro m 2.6 to 3.2 g/dL, with her IgG level increasing from 2.7 g/dL to 4.7 g/dL.    11. Patient had a stress test and echocardiogram study at [facility] on 01/23/2015. The patient had LVEF 35% to 40%. Myocardial perfusion study reported a large, mild to modera te severity fixed inferior wall defect, consistent with known transmural infarct versus diaphragmatic attenuation artifact. Post stress resting ejection fraction estimated at 31%.    12. Repeated echocardiogram study on 04/03/2015 reported an LVEF 46%. Left v entricle is moderately dilated. There is mild global hypokinesis of the left ventricle. There was a grade 1 diastolic dysfunction.    13. The patient was seen on 04/09/2015 with plan for cycle 14, day 1 of Kyprolis. Treatment will be delayed 1 week secondary to patient' s extreme fatigue, hemoglobin of 8.1. We will proceed with 2 units of packed red blood cells. She was also found to have a urinary tract infection. Patient prescribed Cipro 500 mg twice a day x7.    14. Laboratory tests on 08/24/2015 reported slight disease progress after cycle #18 Kyprolis. We discussed with patient and we will switch chemotherapy to CyBorD regimen with dose reduction of Velcade to 1 mg/m2, cyclophosphamide at 240 mg/m2 (total dose 450 mg), and dexamethasone 20 mg. All therapy to b e repeated on a weekly basis.    15. The patient had significant fatigue after one dose of cyclophosphamide that lasted for 5-6 days. She also had severe anemia, hemoglobin 7.6,  required 2 units PRBC transfusion. Cyclophosphamide will be decreased to 350 mg from 2nd week.    16. Patient continues to have significant fatigue after the reduced dose of cyclophosphamide at 350 mg; for 2 days after chemo, she could only lie on the bed with performance status ECOG 3. From 10/13/2015, oral cyclophosphamide will be further decr eased to 200 mg once weekly. We will continue Velcade and dexamethasone at same dose.    17. Laboratory study on 12/01/2015 showed evidence of further disease progress.    18. The patient was re-evaluated on 12/22/2015. Treatment will be started with Velcade, dexamethasone, and panobinostat.    19. Skeletal survey on 01/12/2016 reported stable disease.    20. Brain MRI examination on 02/16/2016 reported recurrent meningioma.    21. Evidence of disease progress, when she was on panobinostat treatment. The patient also has worsening ane ming and thrombocytopenia secondary to chemotherapy. The patient has symptomatic anemia with hemoglobin 7.8 on 02/23/2016 requiring 2 units of packed RBC transfusion. Chemotherapy with panobinostat will be discontinued.    22. She was admitted 3/20/2016-3/28/2016 with urinary tract infection, cultures growing Pseudomonas aeruginosa. She did undergo right stent replacement 3/25/2016 by Dr. Everett. Due to pancytopenia and infection, Pomalidomide has been on hold since hospitalization. The patient does continue to receive Cefrazidime 1mg every 12 hours from home health.      Patient had a good recovery and that she was evaluated on 04/11/2016. At that time she had improved a neutrophils counts at a 1640 out of total WBC 3620. Platelets was 80,000 which is a baseline. Her hemoglobin was 9.6. Because of intolerance to permanent a mild, those was further decreased at the 2 mg daily for 21 days.      However she required a hospitalization again from May 5 through 05/09/2016 She was found having sepsis, fever, and neutropenia. she presented to the ER  for evaluation because of worsening condition. Urine culture was positive for Escherichia coli infection. She was also found having acute renal injury on top of her chronic renal insufficiency. Her creatinine was 2.9 and a peak. She was also found having neutropenia, with the  on May 5. She was admitted to hospital for further management. Patient was given IV antibiotics and the urologist consultation obtained. She did have replacement of the the right ureter stent by Dr. Everett. Patient was given Neupogen during hospitalization. She was also anemic symptomatic with a hemoglobin 7.1 and was given packed RBC transfusion. Her micaela platelet counts was 46,000 on 05/07/2016. No transfusion of platelets.       Patient was reevaluated on 05/17/2016. She recovered to baseline condition. Laboratory study showed mild neutropenia with ANC 1480, down from 2950 on 05/09/2016 when she was discharged. Platelets was 57,000 and hemoglobin 9.0. Because of intolerance to Pomalidomide, with twice sepsis despite a dose reduction, we recommended to discontinue Pomalidomide. We recommended starting Darzalex treatment. Patient will be given acyclovir for prophylaxis of viral infection. She'll also be given Singulair for prophylaxis of possible allergic reaction from infusion of darzalex.       Darzalex was started on 05/17/16 per standard protocol. Her WBC was 3150, ANC 1480, Hb 9.0 and Platelet 57,000.       On 06/01/2016, and 24 hour urine sample showed free lambda chain 322 MG/L, total lambda chain 435 MG, free kappa chain 30.3 MG/L and a total kappa chain 52. Free kappa/lambda ratio 0.12, total 24 hour urine protein was 318 MG. Monoclonal IgG lambda was 7.5%, and monoclonal lambda free light chain was 42%.       On 06/23/2016, serum ferritin was 763, iron 91, TIBC 132 iron saturation 69%.       Laboratory study on 07/20/2016 reported 24-hour urine sample free lambda chain 153 MG/L and total lambda chain 275 mg, free kappa chain  22.3 MG/L and the total kappa chain 40 MG, free kappa/lambda ratio 0.15 and a positive for monoclonal IgG lambda and Bence-May protein lambda type. UPEP reported monoclonal free lambda chain 29.2%, and monoclonal IgG lambda 9%. Total 24 hour urine protein was 430 MG. Serum protein quantitation reporting IgG 4211, IgM 15 and IgA 8, free serum lambda chain 1442, free kappa chain 8.1, free kappa/lambda ratio of 0.01. SPEP reporting M spike 3.2 g, out of a total globulin 4.9, and a total protein 7.7 g. Albumin was 2.8 g/DL. On the same day CBC showed a hemoglobin 9.8, platelets 78,000, WBC 6130 including neutrophils 3360 in the lymphocytes 1780 monocytes 630.       Laboratory study on 11/08/2016 reported minimal global 10.8, platelets 65,000 and WBC 7200 including neutrophils 3950 lymphocytes 2000. serum IgG 4276 MG/DL, IgA 9 and IgM 28, total serum protein 8.4 g/DL. No serum protein electrophoresis, immunofixation and free light chains were done by the laboratory, despite orders. On 11/17/2016, the 24 hour urine sample reported free lambda chain 116 mg/L with total free lambda chain 220 mg. Free kappa chain was 32 mg total. Immunofixation of the urine protein did report both Bence May protein lambda subtype, and monoclonal IgG lambda protein. Monoclonal lambda free light chain was 33%, and monoclonal IgG lambda was 11.2% from the UPEP, out of total urine protein 404.7 MG in 24 hours.      Rosy study on 12/06/2016 reported a ferritin 1015, iron saturation 72%, serum iron 99 TIBC 137. Hemoglobin was 10.2, platelets 83,000 and WBC 8200 including neutrophils 4300 lymphocytes 2200                Multiple myeloma       4/1/2012  Initial Diagnosis       Multiple myeloma          Chemotherapy                  Radiation                  Adverse Reaction                  Surgery                    Review of Systems    Constitutional: Negative for chills and fever.  See history of present illness  HENT: Negative for mouth  "sores and sore throat.    Eyes: Negative for visual disturbance.    Respiratory: No cough or hemoptysis no short of breath.    Gastrointestinal: Negative for abdominal pain, diarrhea, nausea and vomiting.    Genitourinary: Negative for decreased urine volume, dysuria, enuresis, frequency, hematuria, pelvic pain and urgency.    Musculoskeletal: Negative for back pain and joint swelling.    Neurological: Negative for dizziness and weakness.    Hematological: Does not bruise/bleed easily.    Psychiatric/Behavioral: The patient is not nervous/anxious.          Medications: The current medication list was reviewed in the EMR.      ALLERGIES: Zosyn       Vitals:    02/09/17 0759   BP: 124/80   Pulse: 66   Resp: 16   Temp: 97.9 °F (36.6 °C)   SpO2: 98%   Weight: 188 lb (85.3 kg)   Height: 62.2\" (158 cm)   PainSc: 0-No pain   PS: ECOG 1      Physical Exam   Constitutional: oriented to person, place, and time. well-developed and well-nourished. No distress.    HENT:    Head: Normocephalic.    Eyes: EOMs are normal. Pupils are equal, round, and reactive to light.    Neck: Normal range of motion.    Cardiovascular: Normal rate, regular rhythm, normal heart sounds and normal pulses.    Pulmonary/Chest: Effort normal and breath sounds normal. no wheezes, no rales.  Mediport benign.  Abdominal: Soft. Bowel sounds are normal. no distension and no mass. There is no hepatosplenomegaly. There is no tenderness.   Musculoskeletal: Normal range of motion. no edema or deformity.   Lymphadenopathy: She has no cervical, supraclavicular adenopathy.   Neurological: alert and oriented to person, place, and time. No cranial nerve deficit.    Skin: Skin is warm and dry. No rash noted. No cyanosis. No pallor.   Psychiatric: She has a normal mood and affect. Her behavior is normal.        RECENT LABS:        Lab Results   Component Value Date    NEUTROABS 4.06 02/09/2017     Lab Results   Component Value Date    WBC 7.38 02/09/2017    HGB 11.2 " (L) 02/09/2017    HCT 34.4 (L) 02/09/2017    .8 (H) 02/09/2017    PLT 79 (L) 02/09/2017     Lab Results   Component Value Date    GLUCOSE 97 02/09/2017    BUN 21 02/09/2017    CREATININE 1.54 (H) 02/09/2017    EGFRIFNONA  08/30/2016      Comment:      <15 Indicative of kidney failure.    EGFRIFAFRI 41 (L) 02/09/2017    BCR 13.6 02/09/2017    CO2 22.8 02/09/2017    CALCIUM 8.1 (L) 02/09/2017    PROTENTOTREF 7.9 01/04/2017    ALBUMIN 3.10 (L) 02/09/2017    LABIL2 0.6 02/09/2017    AST 21 02/09/2017    ALT 22 02/09/2017     SODIUM   Date Value Ref Range Status   02/09/2017 143 136 - 145 mmol/L Final   04/29/2015 139 136 - 145 mmol/L Final     POTASSIUM   Date Value Ref Range Status   02/09/2017 3.9 3.5 - 5.2 mmol/L Final   04/29/2015 4.7 3.5 - 5.2 mmol/L Final     Comment:     US by IF @ 04/29/2015 18:19  Specimen hemolyzed, results may be affected       TOTAL BILIRUBIN   Date Value Ref Range Status   02/09/2017 0.2 0.1 - 1.2 mg/dL Final   04/26/2015 0.4 0.1 - 1.2 mg/dL Final     ALKALINE PHOSPHATASE   Date Value Ref Range Status   02/09/2017 68 39 - 117 U/L Final   04/26/2015 50 39 - 117 U/L Final   ]    IMAGE STUDY: CT SCAN OF THE CHEST WITHOUT CONTRAST 01/06/2017    COMPARISON: CT scan of the chest dated 10/22/2009.      FINDINGS: The patient is status post left mastectomy. The chest wall is  otherwise unremarkable. There are a few borderline enlarged lymph nodes  in the prevascular space that are unchanged. There is no axillary or  hilar lymphadenopathy. Lung window images demonstrate a couple tiny  noncalcified pulmonary nodules in the superior aspect of the right upper  lobe that are unchanged. There is a 4 mm diameter noncalcified pulmonary  nodule in the anterolateral aspect of the right middle lobe that is new  since the preceding CT. Statistically, this is most likely benign  noncalcified granuloma. No other noncalcified pulmonary nodules are  identified. There are no parenchymal infiltrates or pleural  effusions.  Images through the upper abdomen demonstrate a large mass in the right  lobe of the liver that is unchanged. The mass shows enhancement  consistent with a hemangioma on the previous CT chest performed with IV  contrast.      IMPRESSION:  2 tiny noncalcified pulmonary nodules in the right upper  lobe that are unchanged since a CT dated 10/22/2009. There is a small,  approximately 4 mm diameter new pulmonary nodule in the right middle  lobe. A large hemangioma in the right lobe of the liver is unchanged.  Recommend a follow up CT scan of the chest in approximately 6 months.      Assessment/Plan   1. Multiple myeloma, IgG lambda, stage III. Failed multiple lines of therapy. Her treatment was switched to Darzalex on 5/26/2016, and finished 8 weekly doses then 8 Q2week doses with very good tolerance. However protein studies November 2016 showed stable disease, no apparent improvement. Her insurance company deniey darzalex plus Velcade because it was only approved for patient who had no previous darzalex treatment. So we continued single agent darzalex, and she is tolerating very well, we'll continue monthly treatment. Will check labs after 4 monthly dosing would be after March 2017 treatment.     2.  urinary tract infection with right-sided obstructive pyelonephritis with multi-drug resistant Escherichia coli, but hospitalization in January 2017.  Finished antibiotic therapy on February 3, 2017.  Today she reports feeling sluggish, no fever sweating chills.  I will request urinalysis again.      3.  Right middle lobe pulmonary nodule, intermedius documented on CT scan of chest on 01/06/2017.  This is only 4 mm.  We Plan to repeat CT scan of chest in 5-6 months.           4. Zometa treatment.  She has stage III chronic kidney disease.  Seems having improved lab.  Will continue to hold Zometa and monitor kidney function for further improvement.       5. Anemia secondary to chemotherapy for multiple myeloma and  chronic renal insufficiency.  she had wosening hemoglobin level when she was having active urinary tract infection.  Now she has improved hemoglobin, about at her baseline level.   She did respond to low dose Procrit very nicely. Rechecked her iron panel reported ferritin >1000.  No evidence of B12 and folic acid deficiency.       6. Moderate thrombocytopenia secondary to chemotherapy.  It is better. She is asymptomatic.       6. History of stage II left breast cancer, post mastectomy in 2013, negative for ER/MI and the positive HER-2. No neoadjuvant chemotherapy because of concurrent discovery of multiple myeloma and chemotherapy. She had a normal mammogram study in July 2016.           Plan:  1. Proceed with Darzalex #20, repeated monthly.   2.  Urinalysis today.   3.  Hold Zometa.    4. Patient will return in 4weeks for M.D. Reevaluation. Continue Darzalex monthly.           Addendum:   Urinalysis report WBC 13-20/HPF, bacteria 2+.  RBC 0.  Negative nitrite.  I reviewed her previous urine culture study in January 2017 which reported Escherichia coli resistant to Cipro, Levaquin, nitrofurantoin Bactrim and doxycycline.  It was sensitive to cephalosporins.  I also reviewed consultation note from infectious disease specialist Dr. Stinson.  I contacted the patient, advised her to start Cefdinir 300 mg twice a day.  I e- scribed to her pharmacy.        LI OBANDO M.D., Ph.D.     02/09/2017          CC:  Michael Everett M.D.     Lenny Diop M.D.        Dragon disclaimer:  Much of this encounter note is an electronic transcription/translation of spoken language to printed text. The electronic translation of spoken language may permit erroneous, or at times, nonsensical words or phrases to be inadvertently transcribed; Although I have reviewed the note for such errors, some may still exist.

## 2017-03-01 ENCOUNTER — OFFICE VISIT (OUTPATIENT)
Dept: FAMILY MEDICINE CLINIC | Facility: CLINIC | Age: 67
End: 2017-03-01

## 2017-03-01 VITALS
SYSTOLIC BLOOD PRESSURE: 120 MMHG | TEMPERATURE: 98.4 F | BODY MASS INDEX: 34.78 KG/M2 | HEIGHT: 62 IN | OXYGEN SATURATION: 98 % | WEIGHT: 189 LBS | DIASTOLIC BLOOD PRESSURE: 72 MMHG | RESPIRATION RATE: 16 BRPM | HEART RATE: 63 BPM

## 2017-03-01 DIAGNOSIS — H65.92 MIDDLE EAR EFFUSION, LEFT: Primary | ICD-10-CM

## 2017-03-01 PROCEDURE — 99213 OFFICE O/P EST LOW 20 MIN: CPT | Performed by: NURSE PRACTITIONER

## 2017-03-01 RX ORDER — METHYLPREDNISOLONE 4 MG/1
TABLET ORAL
Qty: 21 TABLET | Refills: 0 | Status: SHIPPED | OUTPATIENT
Start: 2017-03-01 | End: 2017-05-12 | Stop reason: HOSPADM

## 2017-03-01 NOTE — PROGRESS NOTES
Subjective   Marina Barroso is a 66 y.o. female.     History of Present Illness   Marina Barroso 66 y.o. female who presents for evaluation of ear pressure. Symptoms include ear pressure and congestion.  Onset of symptoms was 3 weeks ago, gradually improving since that time. Patient denies shortness of breath, wheezing, fever.   Evaluation to date: was seen in this office. Treatment to date:  cefdinir.  Patient reports URI symptoms have improved but she is still having full sensation and pressure in left ear. Denies ear pain or drainage.      The following portions of the patient's history were reviewed and updated as appropriate: allergies, current medications, past family history, past medical history, past social history, past surgical history and problem list.    Review of Systems   Constitutional: Negative for chills and fever.   HENT: Positive for congestion. Negative for ear discharge, ear pain, postnasal drip, sinus pressure and sore throat.    Respiratory: Positive for cough. Negative for chest tightness, shortness of breath and wheezing.        Objective   Physical Exam   Constitutional: She is oriented to person, place, and time. She appears well-developed and well-nourished.   HENT:   Head: Normocephalic and atraumatic.   Right Ear: Tympanic membrane, external ear and ear canal normal.   Left Ear: Tympanic membrane, external ear and ear canal normal.   Nose: Right sinus exhibits no maxillary sinus tenderness and no frontal sinus tenderness. Left sinus exhibits no maxillary sinus tenderness and no frontal sinus tenderness.   Mouth/Throat: Uvula is midline and oropharynx is clear and moist.   Cardiovascular: Normal rate and regular rhythm.    Pulmonary/Chest: Effort normal and breath sounds normal.   Neurological: She is alert and oriented to person, place, and time.   Skin: Skin is warm.   Psychiatric: She has a normal mood and affect. Judgment normal.   Nursing note and vitals reviewed.      Assessment/Plan    Marina was seen today for otitis media.    Diagnoses and all orders for this visit:    Middle ear effusion, left  -     MethylPREDNISolone (MEDROL, CHALINO,) 4 MG tablet; Take as directed on package instructions.

## 2017-03-08 DIAGNOSIS — C90.00 MULTIPLE MYELOMA NOT HAVING ACHIEVED REMISSION (HCC): Primary | ICD-10-CM

## 2017-03-08 DIAGNOSIS — C90.00 MULTIPLE MYELOMA, REMISSION STATUS UNSPECIFIED (HCC): ICD-10-CM

## 2017-03-08 RX ORDER — ACETAMINOPHEN 500 MG
1000 TABLET ORAL ONCE
Status: CANCELLED | OUTPATIENT
Start: 2017-03-10

## 2017-03-08 RX ORDER — MEPERIDINE HYDROCHLORIDE 50 MG/ML
25 INJECTION INTRAMUSCULAR; INTRAVENOUS; SUBCUTANEOUS
Status: CANCELLED | OUTPATIENT
Start: 2017-03-10

## 2017-03-08 RX ORDER — METHYLPREDNISOLONE SODIUM SUCCINATE 125 MG/2ML
60 INJECTION, POWDER, LYOPHILIZED, FOR SOLUTION INTRAMUSCULAR; INTRAVENOUS ONCE
Status: CANCELLED | OUTPATIENT
Start: 2017-03-10

## 2017-03-08 RX ORDER — SODIUM CHLORIDE 9 MG/ML
250 INJECTION, SOLUTION INTRAVENOUS ONCE
Status: CANCELLED | OUTPATIENT
Start: 2017-03-10 | End: 2017-03-08

## 2017-03-08 RX ORDER — DIPHENHYDRAMINE HYDROCHLORIDE 50 MG/ML
50 INJECTION INTRAMUSCULAR; INTRAVENOUS AS NEEDED
Status: CANCELLED | OUTPATIENT
Start: 2017-03-10

## 2017-03-08 RX ORDER — FAMOTIDINE 10 MG/ML
20 INJECTION, SOLUTION INTRAVENOUS AS NEEDED
Status: CANCELLED | OUTPATIENT
Start: 2017-03-10

## 2017-03-10 ENCOUNTER — OFFICE VISIT (OUTPATIENT)
Dept: ONCOLOGY | Facility: CLINIC | Age: 67
End: 2017-03-10

## 2017-03-10 ENCOUNTER — LAB (OUTPATIENT)
Dept: OTHER | Facility: HOSPITAL | Age: 67
End: 2017-03-10

## 2017-03-10 ENCOUNTER — INFUSION (OUTPATIENT)
Dept: ONCOLOGY | Facility: HOSPITAL | Age: 67
End: 2017-03-10

## 2017-03-10 VITALS
HEART RATE: 74 BPM | DIASTOLIC BLOOD PRESSURE: 69 MMHG | BODY MASS INDEX: 34.69 KG/M2 | WEIGHT: 188.5 LBS | TEMPERATURE: 97.8 F | OXYGEN SATURATION: 100 % | HEIGHT: 62 IN | RESPIRATION RATE: 16 BRPM | SYSTOLIC BLOOD PRESSURE: 105 MMHG

## 2017-03-10 DIAGNOSIS — D69.59 CHEMOTHERAPY-INDUCED THROMBOCYTOPENIA: ICD-10-CM

## 2017-03-10 DIAGNOSIS — D61.818 PANCYTOPENIA (HCC): Primary | ICD-10-CM

## 2017-03-10 DIAGNOSIS — C90.00 MULTIPLE MYELOMA NOT HAVING ACHIEVED REMISSION (HCC): Primary | ICD-10-CM

## 2017-03-10 DIAGNOSIS — N18.30 CHRONIC KIDNEY DISEASE, STAGE III (MODERATE) (HCC): ICD-10-CM

## 2017-03-10 DIAGNOSIS — T45.1X5A ANEMIA ASSOCIATED WITH CHEMOTHERAPY: ICD-10-CM

## 2017-03-10 DIAGNOSIS — C90.00 MULTIPLE MYELOMA, REMISSION STATUS UNSPECIFIED (HCC): ICD-10-CM

## 2017-03-10 DIAGNOSIS — T45.1X5A CHEMOTHERAPY-INDUCED THROMBOCYTOPENIA: ICD-10-CM

## 2017-03-10 DIAGNOSIS — C90.00 MULTIPLE MYELOMA, REMISSION STATUS UNSPECIFIED (HCC): Primary | ICD-10-CM

## 2017-03-10 DIAGNOSIS — D64.81 ANEMIA ASSOCIATED WITH CHEMOTHERAPY: ICD-10-CM

## 2017-03-10 LAB
ALBUMIN SERPL-MCNC: 3.1 G/DL (ref 3.5–5.2)
ALBUMIN/GLOB SERPL: 0.6 G/DL
ALP SERPL-CCNC: 71 U/L (ref 39–117)
ALT SERPL W P-5'-P-CCNC: 17 U/L (ref 1–33)
ANION GAP SERPL CALCULATED.3IONS-SCNC: 9 MMOL/L
AST SERPL-CCNC: 18 U/L (ref 1–32)
BASOPHILS # BLD AUTO: 0.01 10*3/MM3 (ref 0–0.2)
BASOPHILS NFR BLD AUTO: 0.1 % (ref 0–1.5)
BILIRUB SERPL-MCNC: 0.2 MG/DL (ref 0.1–1.2)
BUN BLD-MCNC: 22 MG/DL (ref 8–23)
BUN/CREAT SERPL: 12.9 (ref 7–25)
CALCIUM SPEC-SCNC: 8 MG/DL (ref 8.6–10.5)
CHLORIDE SERPL-SCNC: 111 MMOL/L (ref 98–107)
CO2 SERPL-SCNC: 21 MMOL/L (ref 22–29)
CREAT BLD-MCNC: 1.71 MG/DL (ref 0.57–1)
DACRYOCYTES BLD QL SMEAR: NORMAL
DEPRECATED RDW RBC AUTO: 56.7 FL (ref 37–54)
EOSINOPHIL # BLD AUTO: 0.22 10*3/MM3 (ref 0–0.7)
EOSINOPHIL NFR BLD AUTO: 3.2 % (ref 0.3–6.2)
ERYTHROCYTE [DISTWIDTH] IN BLOOD BY AUTOMATED COUNT: 14.2 % (ref 11.7–13)
GFR SERPL CREATININE-BSD FRML MDRD: 36 ML/MIN/1.73
GLOBULIN UR ELPH-MCNC: 5.2 GM/DL
GLUCOSE BLD-MCNC: 112 MG/DL (ref 65–99)
HCT VFR BLD AUTO: 34 % (ref 35.6–45.5)
HGB BLD-MCNC: 10.7 G/DL (ref 11.9–15.5)
HOLD SPECIMEN: NORMAL
IMM GRANULOCYTES # BLD: 0.15 10*3/MM3 (ref 0–0.03)
IMM GRANULOCYTES NFR BLD: 2.1 % (ref 0–0.5)
LYMPHOCYTES # BLD AUTO: 2.14 10*3/MM3 (ref 0.9–4.8)
LYMPHOCYTES NFR BLD AUTO: 30.7 % (ref 19.6–45.3)
MACROCYTES BLD QL SMEAR: NORMAL
MCH RBC QN AUTO: 34.3 PG (ref 26.9–32)
MCHC RBC AUTO-ENTMCNC: 31.5 G/DL (ref 32.4–36.3)
MCV RBC AUTO: 109 FL (ref 80.5–98.2)
MONOCYTES # BLD AUTO: 0.76 10*3/MM3 (ref 0.2–1.2)
MONOCYTES NFR BLD AUTO: 10.9 % (ref 5–12)
NEUTROPHILS # BLD AUTO: 3.7 10*3/MM3 (ref 1.9–8.1)
NEUTROPHILS NFR BLD AUTO: 53 % (ref 42.7–76)
NRBC BLD MANUAL-RTO: 0.4 /100 WBC (ref 0–0)
OVALOCYTES BLD QL SMEAR: NORMAL
PLAT MORPH BLD: NORMAL
PLATELET # BLD AUTO: 103 10*3/MM3 (ref 140–500)
PMV BLD AUTO: 11.4 FL (ref 6–12)
POTASSIUM BLD-SCNC: 3.8 MMOL/L (ref 3.5–5.2)
PROT SERPL-MCNC: 8.3 G/DL (ref 6–8.5)
RBC # BLD AUTO: 3.12 10*6/MM3 (ref 3.9–5.2)
SODIUM BLD-SCNC: 141 MMOL/L (ref 136–145)
WBC MORPH BLD: NORMAL
WBC NRBC COR # BLD: 6.98 10*3/MM3 (ref 4.5–10.7)

## 2017-03-10 PROCEDURE — 25010000002 DARATUMUMAB 100 MG/5ML SOLUTION 5 ML VIAL: Performed by: INTERNAL MEDICINE

## 2017-03-10 PROCEDURE — 85025 COMPLETE CBC W/AUTO DIFF WBC: CPT | Performed by: INTERNAL MEDICINE

## 2017-03-10 PROCEDURE — 99213 OFFICE O/P EST LOW 20 MIN: CPT | Performed by: INTERNAL MEDICINE

## 2017-03-10 PROCEDURE — 96415 CHEMO IV INFUSION ADDL HR: CPT | Performed by: INTERNAL MEDICINE

## 2017-03-10 PROCEDURE — 80053 COMPREHEN METABOLIC PANEL: CPT | Performed by: INTERNAL MEDICINE

## 2017-03-10 PROCEDURE — 25010000002 DARATUMUMAB 100 MG/5ML SOLUTION 20 ML VIAL: Performed by: INTERNAL MEDICINE

## 2017-03-10 PROCEDURE — 85007 BL SMEAR W/DIFF WBC COUNT: CPT | Performed by: INTERNAL MEDICINE

## 2017-03-10 PROCEDURE — 96413 CHEMO IV INFUSION 1 HR: CPT | Performed by: INTERNAL MEDICINE

## 2017-03-10 PROCEDURE — 96375 TX/PRO/DX INJ NEW DRUG ADDON: CPT | Performed by: INTERNAL MEDICINE

## 2017-03-10 PROCEDURE — 25010000002 DIPHENHYDRAMINE PER 50 MG: Performed by: INTERNAL MEDICINE

## 2017-03-10 PROCEDURE — 25010000002 METHYLPREDNISOLONE PER 125 MG: Performed by: INTERNAL MEDICINE

## 2017-03-10 PROCEDURE — 36415 COLL VENOUS BLD VENIPUNCTURE: CPT

## 2017-03-10 RX ORDER — METHYLPREDNISOLONE SODIUM SUCCINATE 125 MG/2ML
60 INJECTION, POWDER, LYOPHILIZED, FOR SOLUTION INTRAMUSCULAR; INTRAVENOUS ONCE
Status: COMPLETED | OUTPATIENT
Start: 2017-03-10 | End: 2017-03-10

## 2017-03-10 RX ORDER — ACETAMINOPHEN 500 MG
1000 TABLET ORAL ONCE
Status: COMPLETED | OUTPATIENT
Start: 2017-03-10 | End: 2017-03-10

## 2017-03-10 RX ORDER — SODIUM CHLORIDE 9 MG/ML
250 INJECTION, SOLUTION INTRAVENOUS ONCE
Status: COMPLETED | OUTPATIENT
Start: 2017-03-10 | End: 2017-03-10

## 2017-03-10 RX ADMIN — SODIUM CHLORIDE 250 ML: 900 INJECTION, SOLUTION INTRAVENOUS at 09:22

## 2017-03-10 RX ADMIN — DARATUMUMAB 1370 MG: 100 INJECTION, SOLUTION, CONCENTRATE INTRAVENOUS at 10:27

## 2017-03-10 RX ADMIN — METHYLPREDNISOLONE SODIUM SUCCINATE 60 MG: 125 INJECTION, POWDER, FOR SOLUTION INTRAMUSCULAR; INTRAVENOUS at 09:22

## 2017-03-10 RX ADMIN — ACETAMINOPHEN 1000 MG: 500 TABLET ORAL at 09:22

## 2017-03-10 RX ADMIN — DIPHENHYDRAMINE HYDROCHLORIDE 25 MG: 50 INJECTION, SOLUTION INTRAMUSCULAR; INTRAVENOUS at 09:25

## 2017-03-10 NOTE — PROGRESS NOTES
Reasons for follow up:   1. IgG kappa multiple myeloma, currently on Darzalex, started on May 26, 2016. Patient was switched to Darzalex from Pomalyst, as she continued to be neutropenic with recurrent urinary tract infection while on Pomalyst.    2. Zometa is on hold due to renal insufficiency.    3. After 16 doses of Darzalex, laboratory study showed stable disease in November 2016. Insurance company denied adding Velcade and dexamethasone. Patient is to be continued on monthly Darzalex from 12/06/16.   4.  Obstructive pyelonephritis on the right side, required hospitalization from January 19 through 01/24/2017 with Escherichia coli with iv antibiotics and stent exchange on 01/20/2017.          History of Present Illness     The patient is a pleasant 66-year-old female here today for reevaluation.  Was recently hospitalized for 6 days at the [facility] because of urinary tract infection, with objective pyelonephritis on the right side associated with hydronephrosis, and exchange of J stent on 01/20/2017. She was given IV Rocephin, based on urine culture positive for multi-drug resistant Escherichia coli. Subsequently was discharged to home with oral Cefdinir 02/03/2017. She is here for reevaluation, on her scheduled day Darzalex.      Patient reports she is not feeling well, tied, however denies dysuria, no fever sweating chills area. Patient reported a fever last time right before hospitalization, she did not have any fever at that time however to not to have recurrent a retracting fashion. She voiced her concern that whether her scheduled stent exchange should be 2 months instead of 3 months because it is seems several times right before the scheduled exchange of her stent, she had a significant infection and required hospitalization. I certainly share with her concern and I think this is reasonable. Patient will see her urologist Dr. Everett to further discuss this issue.      Every 3 study today  showed hemoglobin 11.2, more than 2 g better then 2 weeks ago when she had urinary tract infection and the state in the hospital. She has normal WBC at a 7380, including neutrophils 4000 and the lymphocytes 2000. Her platelets also improved at a 79,000, up from 46,000 two weeks ago.      Performance status ECOG 1. No bleeding. No fainting or syncope.       Past Medical History, Past Surgical History, Social History, Family History have been reviewed and are without significant changes except as mentioned.                 Oncology/Hematology History       1. Mult iple myeloma, IgG lambda, stage III, diagnosed April 2012, previously treated with Velcade/Decadron followed by Velcade/Decadron/Revlimid; patient developed abdominal pain and rectal bleeding on Revlimid, which was then discontinued.    2. Patient evaluated at [facility], but was not felt to be a candidate for stem cell transplant.    3. Disease progression with therapy switched to melphalan/Velcade/prednisone per the [facility] trial on 10/22/2013; melphalan dose has been persistently decreased because of thrombocytopenia.    4. Anemia secondary to chronic renal insufficiency and myeloma, on periodic Procrit therapy p.r.n.    5. Patient had 8 cycles of VMP chemotherapy, repeat laboratory studies on 10/03/2013 showed stable disease. Her melphalan do se was significantly decreased due to marrow suppression.    6. Patient had disease progression after cycle #10 VMP treatment, evidenced by laboratory studies on 01/06/2014. We increased the melphalan dose for one cycle. Continued disease progress after cycl e #11 VMP treatment, despite increased dose of melphalan, as documented by laboratory study on 02/17/2014.    7. The patient was evaluated on 02/24/2014 and we recommend switching chemotherapy to Kyprolis on 02/27/2014.    8. Echocardiogram on 03/05/2014 reporting L VEF 43%. This is on par with her previous twice echocardiogram  study, in June 2012 and November 2012, reported LVEF at 45% to 50% and 40% to 45% respectively.    9. The patient had 2 episodes of chest pain after Kyprolis during cycle 1, leading to omitted dose on days 9 and 16. From cycle 2, we will give Kyprolis only once per week for 3 weeks out of every 4 weeks, as well as dose reduction of Kyprolis that was started on 04/04/2014.    10. After cycle 2 of dose-reduced Kyprolis, the patient's M spike increased fro m 2.6 to 3.2 g/dL, with her IgG level increasing from 2.7 g/dL to 4.7 g/dL.    11. Patient had a stress test and echocardiogram study at [facility] on 01/23/2015. The patient had LVEF 35% to 40%. Myocardial perfusion study reported a large, mild to modera te severity fixed inferior wall defect, consistent with known transmural infarct versus diaphragmatic attenuation artifact. Post stress resting ejection fraction estimated at 31%.    12. Repeated echocardiogram study on 04/03/2015 reported an LVEF 46%. Left v entricle is moderately dilated. There is mild global hypokinesis of the left ventricle. There was a grade 1 diastolic dysfunction.    13. The patient was seen on 04/09/2015 with plan for cycle 14, day 1 of Kyprolis. Treatment will be delayed 1 week secondary to patient' s extreme fatigue, hemoglobin of 8.1. We will proceed with 2 units of packed red blood cells. She was also found to have a urinary tract infection. Patient prescribed Cipro 500 mg twice a day x7.    14. Laboratory tests on 08/24/2015 reported slight disease progress after cycle #18 Kyprolis. We discussed with patient and we will switch chemotherapy to CyBorD regimen with dose reduction of Velcade to 1 mg/m2, cyclophosphamide at 240 mg/m2 (total dose 450 mg), and dexamethasone 20 mg. All therapy to b e repeated on a weekly basis.    15. The patient had significant fatigue after one dose of cyclophosphamide that lasted for 5-6 days. She also had severe anemia, hemoglobin 7.6, required 2 units  PRBC transfusion. Cyclophosphamide will be decreased to 350 mg from 2nd week.    16. Patient continues to have significant fatigue after the reduced dose of cyclophosphamide at 350 mg; for 2 days after chemo, she could only lie on the bed with performance status ECOG 3. From 10/13/2015, oral cyclophosphamide will be further decr eased to 200 mg once weekly. We will continue Velcade and dexamethasone at same dose.    17. Laboratory study on 12/01/2015 showed evidence of further disease progress.    18. The patient was re-evaluated on 12/22/2015. Treatment will be started with Velcade, dexamethasone, and panobinostat.    19. Skeletal survey on 01/12/2016 reported stable disease.    20. Brain MRI examination on 02/16/2016 reported recurrent meningioma.    21. Evidence of disease progress, when she was on panobinostat treatment. The patient also has worsening ane ming and thrombocytopenia secondary to chemotherapy. The patient has symptomatic anemia with hemoglobin 7.8 on 02/23/2016 requiring 2 units of packed RBC transfusion. Chemotherapy with panobinostat will be discontinued.    22. She was admitted 3/20/2016-3/28/2016 with urinary tract infection, cultures growing Pseudomonas aeruginosa. She did undergo right stent replacement 3/25/2016 by Dr. Everett. Due to pancytopenia and infection, Pomalidomide has been on hold since hospitalization. The patient does continue to receive Cefrazidime 1mg every 12 hours from home health.      Patient had a good recovery and that she was evaluated on 04/11/2016. At that time she had improved a neutrophils counts at a 1640 out of total WBC 3620. Platelets was 80,000 which is a baseline. Her hemoglobin was 9.6. Because of intolerance to permanent a mild, those was further decreased at the 2 mg daily for 21 days.      However she required a hospitalization again from May 5 through 05/09/2016 She was found having sepsis, fever, and neutropenia. she presented to the ER for evaluation  because of worsening condition. Urine culture was positive for Escherichia coli infection. She was also found having acute renal injury on top of her chronic renal insufficiency. Her creatinine was 2.9 and a peak. She was also found having neutropenia, with the  on May 5. She was admitted to hospital for further management. Patient was given IV antibiotics and the urologist consultation obtained. She did have replacement of the the right ureter stent by Dr. Everett. Patient was given Neupogen during hospitalization. She was also anemic symptomatic with a hemoglobin 7.1 and was given packed RBC transfusion. Her micaela platelet counts was 46,000 on 05/07/2016. No transfusion of platelets.       Patient was reevaluated on 05/17/2016. She recovered to baseline condition. Laboratory study showed mild neutropenia with ANC 1480, down from 2950 on 05/09/2016 when she was discharged. Platelets was 57,000 and hemoglobin 9.0. Because of intolerance to Pomalidomide, with twice sepsis despite a dose reduction, we recommended to discontinue Pomalidomide. We recommended starting Darzalex treatment. Patient will be given acyclovir for prophylaxis of viral infection. She'll also be given Singulair for prophylaxis of possible allergic reaction from infusion of darzalex.       Darzalex was started on 05/17/16 per standard protocol. Her WBC was 3150, ANC 1480, Hb 9.0 and Platelet 57,000.       On 06/01/2016, and 24 hour urine sample showed free lambda chain 322 MG/L, total lambda chain 435 MG, free kappa chain 30.3 MG/L and a total kappa chain 52. Free kappa/lambda ratio 0.12, total 24 hour urine protein was 318 MG. Monoclonal IgG lambda was 7.5%, and monoclonal lambda free light chain was 42%.       On 06/23/2016, serum ferritin was 763, iron 91, TIBC 132 iron saturation 69%.       Laboratory study on 07/20/2016 reported 24-hour urine sample free lambda chain 153 MG/L and total lambda chain 275 mg, free kappa chain 22.3 MG/L and  the total kappa chain 40 MG, free kappa/lambda ratio 0.15 and a positive for monoclonal IgG lambda and Bence-May protein lambda type. UPEP reported monoclonal free lambda chain 29.2%, and monoclonal IgG lambda 9%. Total 24 hour urine protein was 430 MG. Serum protein quantitation reporting IgG 4211, IgM 15 and IgA 8, free serum lambda chain 1442, free kappa chain 8.1, free kappa/lambda ratio of 0.01. SPEP reporting M spike 3.2 g, out of a total globulin 4.9, and a total protein 7.7 g. Albumin was 2.8 g/DL. On the same day CBC showed a hemoglobin 9.8, platelets 78,000, WBC 6130 including neutrophils 3360 in the lymphocytes 1780 monocytes 630.       Laboratory study on 11/08/2016 reported minimal global 10.8, platelets 65,000 and WBC 7200 including neutrophils 3950 lymphocytes 2000. serum IgG 4276 MG/DL, IgA 9 and IgM 28, total serum protein 8.4 g/DL. No serum protein electrophoresis, immunofixation and free light chains were done by the laboratory, despite orders. On 11/17/2016, the 24 hour urine sample reported free lambda chain 116 mg/L with total free lambda chain 220 mg. Free kappa chain was 32 mg total. Immunofixation of the urine protein did report both Bence May protein lambda subtype, and monoclonal IgG lambda protein. Monoclonal lambda free light chain was 33%, and monoclonal IgG lambda was 11.2% from the UPEP, out of total urine protein 404.7 MG in 24 hours.      Rosy study on 12/06/2016 reported a ferritin 1015, iron saturation 72%, serum iron 99 TIBC 137. Hemoglobin was 10.2, platelets 83,000 and WBC 8200 including neutrophils 4300 lymphocytes 2200                Multiple myeloma       4/1/2012  Initial Diagnosis       Multiple myeloma          Chemotherapy                  Radiation                  Adverse Reaction                  Surgery                    Review of Systems    Constitutional: Negative for chills and fever.  See history of present illness  HENT: Negative for mouth sores and  "sore throat.    Eyes: Negative for visual disturbance.    Respiratory: No cough or hemoptysis no short of breath.   Gastrointestinal: Negative for abdominal pain, diarrhea, nausea and vomiting.    Genitourinary: Negative for decreased urine volume, dysuria, enuresis, frequency, hematuria, pelvic pain and urgency.    Musculoskeletal: Negative for back pain and joint swelling.    Neurological: Negative for dizziness and weakness.    Hematological: Does not bruise/bleed easily.    Psychiatric/Behavioral: The patient is not nervous/anxious.           Medications: The current medication list was reviewed in the EMR.       ALLERGIES: Zosyn       Vitals:    03/10/17 0843   BP: 105/69   Pulse: 74   Resp: 16   Temp: 97.8 °F (36.6 °C)   TempSrc: Oral   SpO2: 100%   Weight: 188 lb 8 oz (85.5 kg)   Height: 62.2\" (158 cm)   PainSc: 0-No pain   PS: ECOG 1      Physical Exam   Constitutional: oriented to person, place, and time. well-developed and well-nourished. No distress.    HENT:    Head: Normocephalic.    Eyes: EOMs are normal. Pupils are equal, round, and reactive to light.    Neck: Normal range of motion.    Cardiovascular: Normal rate, regular rhythm, normal heart sounds and normal pulses.    Pulmonary/Chest: Effort normal and breath sounds normal. no wheezes, no rales.  Mediport benign.  Abdominal: Soft. Bowel sounds are normal. no distension and no mass. There is no hepatosplenomegaly. There is no tenderness.   Musculoskeletal: Normal range of motion. no edema or deformity.   Lymphadenopathy: She has no cervical, supraclavicular adenopathy.   Neurological: alert and oriented to person, place, and time. No cranial nerve deficit.    Skin: Skin is warm and dry. No rash noted. No cyanosis. No pallor.   Psychiatric: She has a normal mood and affect. Her behavior is normal.        RECENT LABS:         Lab Results   Component Value Date    NEUTROABS 3.70 03/10/2017     Lab Results   Component Value Date    WBC 6.98 03/10/2017 "    HGB 10.7 (L) 03/10/2017    HCT 34.0 (L) 03/10/2017    .0 (H) 03/10/2017     (L) 03/10/2017     Lab Results   Component Value Date    GLUCOSE 112 (H) 03/10/2017    BUN 22 03/10/2017    CREATININE 1.71 (H) 03/10/2017    EGFRIFNONA  08/30/2016      Comment:      <15 Indicative of kidney failure.    EGFRIFAFRI 36 (L) 03/10/2017    BCR 12.9 03/10/2017    K 3.8 03/10/2017    CO2 21.0 (L) 03/10/2017    CALCIUM 8.0 (L) 03/10/2017    PROTENTOTREF 7.9 01/04/2017    ALBUMIN 3.10 (L) 03/10/2017    LABIL2 0.6 03/10/2017    AST 18 03/10/2017    ALT 17 03/10/2017     SODIUM   Date Value Ref Range Status   03/10/2017 141 136 - 145 mmol/L Final   04/29/2015 139 136 - 145 mmol/L Final     POTASSIUM   Date Value Ref Range Status   03/10/2017 3.8 3.5 - 5.2 mmol/L Final   04/29/2015 4.7 3.5 - 5.2 mmol/L Final     Comment:     US by IF @ 04/29/2015 18:19  Specimen hemolyzed, results may be affected       TOTAL BILIRUBIN   Date Value Ref Range Status   03/10/2017 0.2 0.1 - 1.2 mg/dL Final   04/26/2015 0.4 0.1 - 1.2 mg/dL Final     ALKALINE PHOSPHATASE   Date Value Ref Range Status   03/10/2017 71 39 - 117 U/L Final   04/26/2015 50 39 - 117 U/L Final   ]         Assessment/Plan   1. Multiple myeloma, IgG lambda, stage III. Failed multiple lines of therapy. Her treatment was switched to Darzalex on 5/26/2016, and finished 8 weekly doses then 8 Q2week doses with very good tolerance. However protein studies November 2016 showed stable disease, no apparent improvement. Her insurance company deniey darzalex plus Velcade because it was only approved for patient who had no previous darzalex treatment. So we continued single agent Darzalex monthly, and she is tolerating very well.  We'll continue monthly treatment. Will check labs including 24-hour urine sample study and a serum protein test prior to next treatment.      2.  Recurrent urinary tract infection, associated with right ureter stenting.  Patient reported she has no  symptoms currently.  She already has scheduled appointment with her urologist Dr. Everett's office, for replacement of her stent.         3. Right middle lobe pulmonary nodule, intermedius documented on CT scan of chest on 01/06/2017. This is only 4 mm.  We Plan to repeat CT scan of chest in the summer of 2017.        4. Zometa treatment. She has stage III chronic kidney disease. Seems having worsened lab today. Will continue to hold Zometa and monitor kidney function for further improvement.       5. Anemia secondary to chemotherapy for multiple myeloma and chronic renal insufficiency.  she had wosening hemoglobin level when she was having active urinary tract infection. Now she has improved hemoglobin, about at her baseline level. She did respond to low dose Procrit very nicely. Rechecked her iron panel reported ferritin >1000. No evidence of B12 and folic acid deficiency.       6. Moderate thrombocytopenia secondary to chemotherapy. It is further better today. She is asymptomatic.       7. History of stage II left breast cancer, post mastectomy in 2013, negative for ER/IL and the positive HER-2. No neoadjuvant chemotherapy because of concurrent discovery of multiple myeloma and chemotherapy. She had a normal mammogram study in July 2016.           Plan:  1. Proceed with Darzalex #20, repeate monthly.   2.  Hold Zometa.   3.  Repeated 24-hour urine protein test and serum protein test in 2 weeks    4. Patient will return in 4weeks for M.D. Reevaluation. Continue Darzalex monthly if stable disease.          LI OBANDO M.D., Ph.D.     03/10/2017          CC: Michael Everett M.D.   Lenny Diop M.D.         Dragon disclaimer:  Much of this encounter note is an electronic transcription/translation of spoken language to printed text. The electronic translation of spoken language may permit erroneous, or at times, nonsensical words or phrases to be inadvertently transcribed; Although I have reviewed the note  for such errors, some may still exist.

## 2017-03-21 DIAGNOSIS — C90.00 MULTIPLE MYELOMA, REMISSION STATUS UNSPECIFIED (HCC): ICD-10-CM

## 2017-03-24 ENCOUNTER — APPOINTMENT (OUTPATIENT)
Dept: ONCOLOGY | Facility: HOSPITAL | Age: 67
End: 2017-03-24

## 2017-03-27 ENCOUNTER — INFUSION (OUTPATIENT)
Dept: ONCOLOGY | Facility: HOSPITAL | Age: 67
End: 2017-03-27

## 2017-03-27 DIAGNOSIS — Z45.2 FITTING AND ADJUSTMENT OF VASCULAR CATHETER: ICD-10-CM

## 2017-03-27 DIAGNOSIS — D61.818 PANCYTOPENIA (HCC): Primary | ICD-10-CM

## 2017-03-27 DIAGNOSIS — C90.00 MULTIPLE MYELOMA, REMISSION STATUS UNSPECIFIED (HCC): ICD-10-CM

## 2017-03-27 DIAGNOSIS — C90.00 MULTIPLE MYELOMA NOT HAVING ACHIEVED REMISSION (HCC): Primary | ICD-10-CM

## 2017-03-27 DIAGNOSIS — N18.30 CHRONIC KIDNEY DISEASE, STAGE III (MODERATE) (HCC): ICD-10-CM

## 2017-03-27 LAB
ALBUMIN SERPL-MCNC: 3.1 G/DL (ref 3.5–5.2)
ALBUMIN/GLOB SERPL: 0.7 G/DL (ref 1.1–2.4)
ALP SERPL-CCNC: 65 U/L (ref 38–116)
ALT SERPL W P-5'-P-CCNC: 13 U/L (ref 0–33)
ANION GAP SERPL CALCULATED.3IONS-SCNC: 8 MMOL/L
AST SERPL-CCNC: 15 U/L (ref 0–32)
B2 MICROGLOB SERPL-MCNC: 7.3 MG/L (ref 0.8–2.2)
BILIRUB SERPL-MCNC: 0.2 MG/DL (ref 0.1–1.2)
BUN BLD-MCNC: 24 MG/DL (ref 6–20)
BUN/CREAT SERPL: 14.5 (ref 7.3–30)
CALCIUM SPEC-SCNC: 7.7 MG/DL (ref 8.5–10.2)
CHLORIDE SERPL-SCNC: 112 MMOL/L (ref 98–107)
CO2 SERPL-SCNC: 21 MMOL/L (ref 22–29)
CREAT BLD-MCNC: 1.65 MG/DL (ref 0.6–1.1)
GFR SERPL CREATININE-BSD FRML MDRD: 38 ML/MIN/1.73
GLOBULIN UR ELPH-MCNC: 4.5 GM/DL (ref 1.8–3.5)
GLUCOSE BLD-MCNC: 96 MG/DL (ref 74–124)
POTASSIUM BLD-SCNC: 4.2 MMOL/L (ref 3.5–4.7)
PROT SERPL-MCNC: 7.6 G/DL (ref 6.3–8)
SODIUM BLD-SCNC: 141 MMOL/L (ref 134–145)

## 2017-03-27 PROCEDURE — 80053 COMPREHEN METABOLIC PANEL: CPT | Performed by: INTERNAL MEDICINE

## 2017-03-27 PROCEDURE — 96523 IRRIG DRUG DELIVERY DEVICE: CPT | Performed by: INTERNAL MEDICINE

## 2017-03-27 PROCEDURE — 82232 ASSAY OF BETA-2 PROTEIN: CPT | Performed by: INTERNAL MEDICINE

## 2017-03-27 PROCEDURE — 25010000002 HEPARIN FLUSH (PORCINE) 100 UNIT/ML SOLUTION: Performed by: INTERNAL MEDICINE

## 2017-03-27 RX ORDER — SODIUM CHLORIDE 0.9 % (FLUSH) 0.9 %
10 SYRINGE (ML) INJECTION AS NEEDED
Status: DISCONTINUED | OUTPATIENT
Start: 2017-03-27 | End: 2017-03-27 | Stop reason: HOSPADM

## 2017-03-27 RX ORDER — SODIUM CHLORIDE 0.9 % (FLUSH) 0.9 %
10 SYRINGE (ML) INJECTION AS NEEDED
Status: CANCELLED | OUTPATIENT
Start: 2017-03-27

## 2017-03-27 RX ADMIN — Medication 10 ML: at 09:46

## 2017-03-27 RX ADMIN — SODIUM CHLORIDE, PRESERVATIVE FREE 500 UNITS: 5 INJECTION INTRAVENOUS at 09:46

## 2017-03-28 LAB
ALBUMIN 24H MFR UR ELPH: 13.4 %
ALBUMIN SERPL-MCNC: 3 G/DL (ref 2.9–4.4)
ALBUMIN/GLOB SERPL: 0.7 {RATIO} (ref 0.7–1.7)
ALPHA1 GLOB 24H MFR UR ELPH: 3.2 %
ALPHA1 GLOB FLD ELPH-MCNC: 0.2 G/DL (ref 0–0.4)
ALPHA2 GLOB 24H MFR UR ELPH: 16.1 %
ALPHA2 GLOB SERPL ELPH-MCNC: 0.6 G/DL (ref 0.4–1)
B-GLOBULIN MFR UR ELPH: 51.4 %
B-GLOBULIN SERPL ELPH-MCNC: 0.7 G/DL (ref 0.7–1.3)
GAMMA GLOB 24H MFR UR ELPH: 15.8 %
GAMMA GLOB SERPL ELPH-MCNC: 2.8 G/DL (ref 0.4–1.8)
GLOBULIN SER CALC-MCNC: 4.3 G/DL (ref 2.2–3.9)
HIV 1 & 2 AB SER-IMP: ABNORMAL
IGA SERPL-MCNC: 9 MG/DL (ref 87–352)
IGG SERPL-MCNC: 3420 MG/DL (ref 700–1600)
IGM SERPL-MCNC: 11 MG/DL (ref 26–217)
INTERPRETATION SERPL IEP-IMP: ABNORMAL
INTERPRETATION UR IFE-IMP: ABNORMAL
KAPPA LC SERPL-MCNC: 8.02 MG/L (ref 3.3–19.4)
KAPPA LC/LAMBDA SER: 0.01 {RATIO} (ref 0.26–1.65)
LAMBDA LC FREE SERPL-MCNC: 1217.99 MG/L (ref 5.71–26.3)
Lab: ABNORMAL
M PROTEIN 24H MFR UR ELPH: ABNORMAL %
M-SPIKE: ABNORMAL G/DL
PROT 24H UR-MRATE: 497.8 MG/24 HR (ref 30–150)
PROT SERPL-MCNC: 7.3 G/DL (ref 6–8.5)
PROT UR-MCNC: 26.2 MG/DL

## 2017-03-29 LAB
FREE KAPPA LT CHAINS, 24HR: 75 MG/24 HR
FREE LAMBDA LT CHAINS, 24 HR: 142 MG/24 HR
KAPPA LC UR-MCNC: 39.5 MG/L (ref 1.35–24.19)
KAPPA LC/LAMBDA UR: 0.53 {RATIO} (ref 2.04–10.37)
LAMBDA LC UR-MCNC: 74.8 MG/L (ref 0.24–6.66)

## 2017-04-03 RX ORDER — CARBAMAZEPINE 200 MG/1
TABLET, EXTENDED RELEASE ORAL
Qty: 60 TABLET | Refills: 2 | Status: ON HOLD | OUTPATIENT
Start: 2017-04-03 | End: 2017-05-12 | Stop reason: ALTCHOICE

## 2017-04-10 ENCOUNTER — OFFICE VISIT (OUTPATIENT)
Dept: ONCOLOGY | Facility: CLINIC | Age: 67
End: 2017-04-10

## 2017-04-10 ENCOUNTER — INFUSION (OUTPATIENT)
Dept: ONCOLOGY | Facility: HOSPITAL | Age: 67
End: 2017-04-10

## 2017-04-10 VITALS
BODY MASS INDEX: 35.59 KG/M2 | HEIGHT: 62 IN | SYSTOLIC BLOOD PRESSURE: 110 MMHG | HEART RATE: 72 BPM | WEIGHT: 193.4 LBS | TEMPERATURE: 97.9 F | DIASTOLIC BLOOD PRESSURE: 70 MMHG | RESPIRATION RATE: 16 BRPM

## 2017-04-10 DIAGNOSIS — D69.59 CHEMOTHERAPY-INDUCED THROMBOCYTOPENIA: ICD-10-CM

## 2017-04-10 DIAGNOSIS — D61.818 PANCYTOPENIA (HCC): Primary | ICD-10-CM

## 2017-04-10 DIAGNOSIS — T45.1X5A ANEMIA ASSOCIATED WITH CHEMOTHERAPY: Primary | ICD-10-CM

## 2017-04-10 DIAGNOSIS — C90.00 MULTIPLE MYELOMA, REMISSION STATUS UNSPECIFIED (HCC): ICD-10-CM

## 2017-04-10 DIAGNOSIS — C90.00 MULTIPLE MYELOMA, REMISSION STATUS UNSPECIFIED (HCC): Primary | ICD-10-CM

## 2017-04-10 DIAGNOSIS — T45.1X5A CHEMOTHERAPY-INDUCED THROMBOCYTOPENIA: ICD-10-CM

## 2017-04-10 DIAGNOSIS — D64.81 ANEMIA ASSOCIATED WITH CHEMOTHERAPY: Primary | ICD-10-CM

## 2017-04-10 LAB
ALBUMIN SERPL-MCNC: 3.2 G/DL (ref 3.5–5.2)
ALBUMIN/GLOB SERPL: 0.7 G/DL (ref 1.1–2.4)
ALP SERPL-CCNC: 77 U/L (ref 38–116)
ALT SERPL W P-5'-P-CCNC: 15 U/L (ref 0–33)
ANION GAP SERPL CALCULATED.3IONS-SCNC: 10.7 MMOL/L
AST SERPL-CCNC: 14 U/L (ref 0–32)
BASOPHILS # BLD AUTO: 0.02 10*3/MM3 (ref 0–0.1)
BASOPHILS NFR BLD AUTO: 0.2 % (ref 0–1.1)
BILIRUB SERPL-MCNC: 0.2 MG/DL (ref 0.1–1.2)
BUN BLD-MCNC: 27 MG/DL (ref 6–20)
BUN/CREAT SERPL: 16.4 (ref 7.3–30)
CALCIUM SPEC-SCNC: 7.8 MG/DL (ref 8.5–10.2)
CHLORIDE SERPL-SCNC: 111 MMOL/L (ref 98–107)
CO2 SERPL-SCNC: 19.3 MMOL/L (ref 22–29)
CREAT BLD-MCNC: 1.65 MG/DL (ref 0.6–1.1)
DEPRECATED RDW RBC AUTO: 56.9 FL (ref 37–49)
EOSINOPHIL # BLD AUTO: 0.28 10*3/MM3 (ref 0–0.36)
EOSINOPHIL NFR BLD AUTO: 3.4 % (ref 1–5)
ERYTHROCYTE [DISTWIDTH] IN BLOOD BY AUTOMATED COUNT: 14.5 % (ref 11.7–14.5)
GFR SERPL CREATININE-BSD FRML MDRD: 38 ML/MIN/1.73
GLOBULIN UR ELPH-MCNC: 4.5 GM/DL (ref 1.8–3.5)
GLUCOSE BLD-MCNC: 89 MG/DL (ref 74–124)
HCT VFR BLD AUTO: 34.8 % (ref 34–45)
HGB BLD-MCNC: 10.8 G/DL (ref 11.5–14.9)
HOLD SPECIMEN: NORMAL
IMM GRANULOCYTES # BLD: 0.14 10*3/MM3 (ref 0–0.03)
IMM GRANULOCYTES NFR BLD: 1.7 % (ref 0–0.5)
LYMPHOCYTES # BLD AUTO: 2.17 10*3/MM3 (ref 1–3.5)
LYMPHOCYTES NFR BLD AUTO: 26.1 % (ref 20–49)
MCH RBC QN AUTO: 33.5 PG (ref 27–33)
MCHC RBC AUTO-ENTMCNC: 31 G/DL (ref 32–35)
MCV RBC AUTO: 108.1 FL (ref 83–97)
MONOCYTES # BLD AUTO: 0.85 10*3/MM3 (ref 0.25–0.8)
MONOCYTES NFR BLD AUTO: 10.2 % (ref 4–12)
NEUTROPHILS # BLD AUTO: 4.85 10*3/MM3 (ref 1.5–7)
NEUTROPHILS NFR BLD AUTO: 58.4 % (ref 39–75)
NRBC BLD MANUAL-RTO: 0.2 /100 WBC (ref 0–0)
PLATELET # BLD AUTO: 99 10*3/MM3 (ref 150–375)
PMV BLD AUTO: 11.2 FL (ref 8.9–12.1)
POTASSIUM BLD-SCNC: 4 MMOL/L (ref 3.5–4.7)
PROT SERPL-MCNC: 7.7 G/DL (ref 6.3–8)
RBC # BLD AUTO: 3.22 10*6/MM3 (ref 3.9–5)
SODIUM BLD-SCNC: 141 MMOL/L (ref 134–145)
WBC NRBC COR # BLD: 8.31 10*3/MM3 (ref 4–10)

## 2017-04-10 PROCEDURE — 85025 COMPLETE CBC W/AUTO DIFF WBC: CPT

## 2017-04-10 PROCEDURE — 99215 OFFICE O/P EST HI 40 MIN: CPT | Performed by: INTERNAL MEDICINE

## 2017-04-10 PROCEDURE — 25010000002 DARATUMUMAB 100 MG/5ML SOLUTION 5 ML VIAL: Performed by: INTERNAL MEDICINE

## 2017-04-10 PROCEDURE — 25010000002 DIPHENHYDRAMINE PER 50 MG: Performed by: INTERNAL MEDICINE

## 2017-04-10 PROCEDURE — 25010000002 DARATUMUMAB 100 MG/5ML SOLUTION 20 ML VIAL: Performed by: INTERNAL MEDICINE

## 2017-04-10 PROCEDURE — 96415 CHEMO IV INFUSION ADDL HR: CPT | Performed by: INTERNAL MEDICINE

## 2017-04-10 PROCEDURE — 80053 COMPREHEN METABOLIC PANEL: CPT

## 2017-04-10 PROCEDURE — 96375 TX/PRO/DX INJ NEW DRUG ADDON: CPT | Performed by: INTERNAL MEDICINE

## 2017-04-10 PROCEDURE — 96413 CHEMO IV INFUSION 1 HR: CPT | Performed by: INTERNAL MEDICINE

## 2017-04-10 PROCEDURE — 25010000002 METHYLPREDNISOLONE PER 125 MG: Performed by: INTERNAL MEDICINE

## 2017-04-10 RX ORDER — FAMOTIDINE 10 MG/ML
20 INJECTION, SOLUTION INTRAVENOUS AS NEEDED
Status: CANCELLED | OUTPATIENT
Start: 2017-04-10

## 2017-04-10 RX ORDER — SODIUM CHLORIDE 9 MG/ML
250 INJECTION, SOLUTION INTRAVENOUS ONCE
Status: COMPLETED | OUTPATIENT
Start: 2017-04-10 | End: 2017-04-10

## 2017-04-10 RX ORDER — ACETAMINOPHEN 500 MG
1000 TABLET ORAL ONCE
Status: CANCELLED | OUTPATIENT
Start: 2017-04-10

## 2017-04-10 RX ORDER — DIPHENHYDRAMINE HYDROCHLORIDE 50 MG/ML
50 INJECTION INTRAMUSCULAR; INTRAVENOUS AS NEEDED
Status: CANCELLED | OUTPATIENT
Start: 2017-04-10

## 2017-04-10 RX ORDER — MEPERIDINE HYDROCHLORIDE 50 MG/ML
25 INJECTION INTRAMUSCULAR; INTRAVENOUS; SUBCUTANEOUS
Status: CANCELLED | OUTPATIENT
Start: 2017-04-10

## 2017-04-10 RX ORDER — SODIUM CHLORIDE 9 MG/ML
250 INJECTION, SOLUTION INTRAVENOUS ONCE
Status: CANCELLED | OUTPATIENT
Start: 2017-04-10 | End: 2017-04-10

## 2017-04-10 RX ORDER — METHYLPREDNISOLONE SODIUM SUCCINATE 125 MG/2ML
60 INJECTION, POWDER, LYOPHILIZED, FOR SOLUTION INTRAMUSCULAR; INTRAVENOUS ONCE
Status: CANCELLED | OUTPATIENT
Start: 2017-04-10

## 2017-04-10 RX ORDER — ACETAMINOPHEN 500 MG
1000 TABLET ORAL ONCE
Status: COMPLETED | OUTPATIENT
Start: 2017-04-10 | End: 2017-04-10

## 2017-04-10 RX ORDER — METHYLPREDNISOLONE SODIUM SUCCINATE 125 MG/2ML
60 INJECTION, POWDER, LYOPHILIZED, FOR SOLUTION INTRAMUSCULAR; INTRAVENOUS ONCE
Status: COMPLETED | OUTPATIENT
Start: 2017-04-10 | End: 2017-04-10

## 2017-04-10 RX ADMIN — DARATUMUMAB 1370 MG: 100 INJECTION, SOLUTION, CONCENTRATE INTRAVENOUS at 10:57

## 2017-04-10 RX ADMIN — DIPHENHYDRAMINE HYDROCHLORIDE 25 MG: 50 INJECTION, SOLUTION INTRAMUSCULAR; INTRAVENOUS at 10:02

## 2017-04-10 RX ADMIN — SODIUM CHLORIDE 250 ML: 900 INJECTION, SOLUTION INTRAVENOUS at 09:59

## 2017-04-10 RX ADMIN — ACETAMINOPHEN 1000 MG: 500 TABLET ORAL at 09:59

## 2017-04-10 RX ADMIN — METHYLPREDNISOLONE SODIUM SUCCINATE 60 MG: 125 INJECTION, POWDER, FOR SOLUTION INTRAMUSCULAR; INTRAVENOUS at 10:01

## 2017-04-10 NOTE — PROGRESS NOTES
Reasons for follow up:   1. IgG kappa multiple myeloma, currently on Darzalex, started on May 26, 2016. Patient was switched to Darzalex from Pomalyst, as she continued to be neutropenic with recurrent urinary tract infection while on Pomalyst.    2. Zometa is on hold due to renal insufficiency.    3. After 16 doses of Darzalex, laboratory study showed stable disease in November 2016. Insurance company denied adding Velcade and dexamethasone. Patient is to be continued on monthly Darzalex from 12/06/16.   4. Obstructive pyelonephritis on the right side, required hospitalization from January 19 through 01/24/2017 with Escherichia coli with iv antibiotics and stent exchange on 01/20/2017.   5.  Paraprotein studies on March 27, 2017 showed further slight improvement of multiple myeloma.           History of Present Illness     The patient is a pleasant 66-year-old female presented today for reevaluation after recent laboratory study.  She has continued monthly Darzalex treatment with good tolerance.  She has no specific complaints.  She is at baseline condition.  She denies fever sweating chills, she had ureteral  stent replaced about 3 weeks ago.     Her performance status ECOG 1. No bleeding. No fainting or syncope.       Laboratory study March 27, 2017 showed slight improvement of paraprotein, SPEP reporting M spike 2.8 g/DL, out of total globulin 4.3,   and the serum IgG 3420, IgA 9, IgM 11, free lambda chain 1218 MG/L and free kappa chain 8.0 MG/L.  Total 24 hour urine sample reported at free lambda chain 142 mg, at a concentration 74.8 MG/L, free kappa chain 75 mg at a concentration 39.5 MG/L.,  Urine protein immunofixation reporting biclonal IgG lambda and monoclonal free lambda light chain, M spike #1 at 41.5% and monoclonal IgG lambda spike #2 at 10.5%.  Serum beta-2 microglobulin is 7.3 MG/L.     Today her hemoglobin is 10.8,  She has normal WBC at a 8310, including neutrophils 4850 and the lymphocytes  "2170. Her platelets at 99,000.             Past Medical History, Past Surgical History, Social History, Family History have been reviewed and are without significant changes except as mentioned.      Hematology/oncology history:  See separate document.      On December 6, 2016, serum free lambda chain 1090 MG/L, free kappa chain 8.5 to MG/L.  Ferritin 1015, iron 99, TIBC 137 iron saturation 72%.    On January 4, 2017, vitamin B12 level [id number].  SPEP reporting gamma globulin 3.3, out of total globulin 5.0, with immunofixation reporting small quantity of monoclonal free lambda chain, plus monoclonal IgG lambda at 3.2 g/DL.  Serum IgG 4075, IgA 9 and IgM 15.  [place] free lambda chain 1339 MG/L and the free kappa chain 10.3 MG/L.      Review of Systems    Constitutional: Negative for chills and fever. See history of present illness  HENT: Negative for mouth sores and sore throat.    Eyes: Negative for visual disturbance.    Respiratory: No cough or hemoptysis no short of breath.   Gastrointestinal: Negative for abdominal pain, diarrhea, nausea and vomiting.    Genitourinary: Negative for decreased urine volume, dysuria, enuresis, frequency, hematuria, pelvic pain and urgency.    Musculoskeletal: Negative for back pain and joint swelling.    Neurological: Negative for dizziness and weakness.    Hematological: Does not bruise/bleed easily.    Psychiatric/Behavioral: The patient is not nervous/anxious.            Medications: The current medication list was reviewed in the EMR.       ALLERGIES: Zosyn       Vitals:    04/10/17 0810   BP: 110/70   Pulse: 72   Resp: 16   Temp: 97.9 °F (36.6 °C)   Weight: 193 lb 6.4 oz (87.7 kg)   Height: 62.2\" (158 cm)   PainSc: 0-No pain   PS: ECOG 1      Physical Exam   Constitutional:  well-developed and well-nourished. No distress.    HENT:    Head: Normocephalic.    Eyes: EOMs are normal.   Neck: Normal range of motion.    Cardiovascular: Normal rate, regular rhythm, normal heart sounds " and normal pulses.    Pulmonary/Chest: normal breath sounds, no wheezes, no rales.  Mediport benign.  Abdominal: Soft. Bowel sounds are normal. no distension and no mass. There is no hepatosplenomegaly. There is no tenderness.   Musculoskeletal: Normal range of motion. no edema or deformity.   Lymphadenopathy: She has no cervical, supraclavicular adenopathy.   Neurological: alert and oriented to person, place, and time. No cranial nerve deficit.    Skin: Skin is warm and dry. No rash noted. No cyanosis. No pallor.   Psychiatric: She has normal mood and affect. Her behavior is normal.        Recent lab results:   Lab Results   Component Value Date    NEUTROABS 4.85 04/10/2017     Lab Results   Component Value Date    WBC 8.31 04/10/2017    HGB 10.8 (L) 04/10/2017    HCT 34.8 04/10/2017    .1 (H) 04/10/2017    PLT 99 (L) 04/10/2017     Lab Results   Component Value Date    GLUCOSE 89 04/10/2017    BUN 27 (H) 04/10/2017    CREATININE 1.65 (H) 04/10/2017    EGFRIFNONA  08/30/2016      Comment:      <15 Indicative of kidney failure.    EGFRIFAFRI 38 (L) 04/10/2017    BCR 16.4 04/10/2017    K 4.0 04/10/2017    CO2 19.3 (L) 04/10/2017    CALCIUM 7.8 (L) 04/10/2017    PROTENTOTREF 7.3 03/27/2017    ALBUMIN 3.20 (L) 04/10/2017    LABIL2 0.7 (L) 04/10/2017    AST 14 04/10/2017    ALT 15 04/10/2017     Sodium   Date Value Ref Range Status   04/10/2017 141 134 - 145 mmol/L Final     Potassium   Date Value Ref Range Status   04/10/2017 4.0 3.5 - 4.7 mmol/L Final     Total Bilirubin   Date Value Ref Range Status   04/10/2017 0.2 0.1 - 1.2 mg/dL Final     Alkaline Phosphatase   Date Value Ref Range Status   04/10/2017 77 38 - 116 U/L Final   ]    See current history section for paraproteins.      Assessment/Plan   1. Multiple myeloma, IgG lambda, stage III. Failed multiple lines of therapy. Her treatment was switched to Darzalex on 5/26/2016.  She tolerated therapy very well.  Her laboratory study showed slightly  improvement of her proteins in both serum and 24-hour urine sample.  She also has being able to maintain relatively good plate counts around 100,000, and only mild anemia without Procrit injection.  Discussed that with patient, I recommend continue Darzalex treatment once a month for now.  Plan to repeat paraprotein studies in both serum and 24-hour urine sample in 4 months for reassessment.    2. Right middle lobe pulmonary nodule, intermedius documented on CT scan of chest on 01/06/2017. This is only 4 mm. We Plan to repeat CT scan of chest in the summer of 2017.        3. Zometa treatment. She has stage III chronic kidney disease.  Will continue to hold Zometa and monitor kidney function for further improvement.       4. Anemia secondary to chemotherapy for multiple myeloma and chronic renal insufficiency.  She did respond to low dose Procrit very nicely. Rechecked her iron panel reported ferritin >1000. No evidence of B12 and folic acid deficiency.       5. Moderate thrombocytopenia secondary to chemotherapy. It is further better today. She is asymptomatic.       6. History of stage II left breast cancer, post mastectomy in 2013, negative for ER/ND and the positive HER-2. No neoadjuvant chemotherapy because of concurrent discovery of multiple myeloma and chemotherapy. She had a normal mammogram study in July 2016.           Plan:  1. Proceed with Darzalex, repeate monthly.   2. Hold Zometa.   3. Patient will return in 4weeks for M.D. Reevaluation. Continue Darzalex monthly.           LI OBANDO M.D., Ph.D.     04/10/2017           CC: Michael Everett M.D.

## 2017-05-06 ENCOUNTER — APPOINTMENT (OUTPATIENT)
Dept: ULTRASOUND IMAGING | Facility: HOSPITAL | Age: 67
End: 2017-05-06

## 2017-05-06 ENCOUNTER — HOSPITAL ENCOUNTER (INPATIENT)
Facility: HOSPITAL | Age: 67
LOS: 6 days | Discharge: HOME | End: 2017-05-12
Attending: EMERGENCY MEDICINE | Admitting: INTERNAL MEDICINE

## 2017-05-06 ENCOUNTER — APPOINTMENT (OUTPATIENT)
Dept: GENERAL RADIOLOGY | Facility: HOSPITAL | Age: 67
End: 2017-05-06

## 2017-05-06 DIAGNOSIS — E86.0 DEHYDRATION: ICD-10-CM

## 2017-05-06 DIAGNOSIS — N39.0 UTI (URINARY TRACT INFECTION), BACTERIAL: ICD-10-CM

## 2017-05-06 DIAGNOSIS — R53.1 GENERALIZED WEAKNESS: Primary | ICD-10-CM

## 2017-05-06 DIAGNOSIS — A49.9 UTI (URINARY TRACT INFECTION), BACTERIAL: ICD-10-CM

## 2017-05-06 DIAGNOSIS — R11.0 NAUSEA: ICD-10-CM

## 2017-05-06 LAB
ALBUMIN SERPL-MCNC: 3.2 G/DL (ref 3.5–5.2)
ALBUMIN/GLOB SERPL: 0.6 G/DL
ALP SERPL-CCNC: 81 U/L (ref 39–117)
ALT SERPL W P-5'-P-CCNC: 18 U/L (ref 1–33)
ANION GAP SERPL CALCULATED.3IONS-SCNC: 12.5 MMOL/L
ANISOCYTOSIS BLD QL: NORMAL
APTT PPP: 25.3 SECONDS (ref 22.7–35.4)
AST SERPL-CCNC: 25 U/L (ref 1–32)
BACTERIA UR QL AUTO: ABNORMAL /HPF
BASOPHILS # BLD AUTO: 0.01 10*3/MM3 (ref 0–0.2)
BASOPHILS # BLD AUTO: 0.03 10*3/MM3 (ref 0–0.2)
BASOPHILS NFR BLD AUTO: 0.2 % (ref 0–1.5)
BASOPHILS NFR BLD AUTO: 0.5 % (ref 0–1.5)
BILIRUB SERPL-MCNC: 0.3 MG/DL (ref 0.1–1.2)
BILIRUB UR QL STRIP: NEGATIVE
BUN BLD-MCNC: 28 MG/DL (ref 8–23)
BUN/CREAT SERPL: 12.4 (ref 7–25)
BURR CELLS BLD QL SMEAR: NORMAL
CALCIUM SPEC-SCNC: 8 MG/DL (ref 8.6–10.5)
CARBAMAZEPINE SERPL-MCNC: 6.5 MCG/ML (ref 8–12)
CHLORIDE SERPL-SCNC: 104 MMOL/L (ref 98–107)
CLARITY UR: ABNORMAL
CO2 SERPL-SCNC: 18.5 MMOL/L (ref 22–29)
COLOR UR: YELLOW
CREAT BLD-MCNC: 2.25 MG/DL (ref 0.57–1)
D-LACTATE SERPL-SCNC: 0.7 MMOL/L (ref 0.5–2)
DACRYOCYTES BLD QL SMEAR: NORMAL
DEPRECATED RDW RBC AUTO: 57.7 FL (ref 37–54)
DEPRECATED RDW RBC AUTO: 57.9 FL (ref 37–54)
EOSINOPHIL # BLD AUTO: 0.02 10*3/MM3 (ref 0–0.7)
EOSINOPHIL # BLD AUTO: 0.05 10*3/MM3 (ref 0–0.7)
EOSINOPHIL NFR BLD AUTO: 0.5 % (ref 0.3–6.2)
EOSINOPHIL NFR BLD AUTO: 0.8 % (ref 0.3–6.2)
ERYTHROCYTE [DISTWIDTH] IN BLOOD BY AUTOMATED COUNT: 15 % (ref 11.7–13)
ERYTHROCYTE [DISTWIDTH] IN BLOOD BY AUTOMATED COUNT: 15.2 % (ref 11.7–13)
GFR SERPL CREATININE-BSD FRML MDRD: 26 ML/MIN/1.73
GLOBULIN UR ELPH-MCNC: 5.6 GM/DL
GLUCOSE BLD-MCNC: 101 MG/DL (ref 65–99)
GLUCOSE UR STRIP-MCNC: NEGATIVE MG/DL
HCT VFR BLD AUTO: 32.1 % (ref 35.6–45.5)
HCT VFR BLD AUTO: 35.7 % (ref 35.6–45.5)
HGB BLD-MCNC: 10.2 G/DL (ref 11.9–15.5)
HGB BLD-MCNC: 11.2 G/DL (ref 11.9–15.5)
HGB UR QL STRIP.AUTO: NEGATIVE
HYALINE CASTS UR QL AUTO: ABNORMAL /LPF
IMM GRANULOCYTES # BLD: 0.05 10*3/MM3 (ref 0–0.03)
IMM GRANULOCYTES # BLD: 0.14 10*3/MM3 (ref 0–0.03)
IMM GRANULOCYTES NFR BLD: 1.1 % (ref 0–0.5)
IMM GRANULOCYTES NFR BLD: 2.3 % (ref 0–0.5)
INR PPP: 1.11 (ref 0.9–1.1)
KETONES UR QL STRIP: NEGATIVE
LEUKOCYTE ESTERASE UR QL STRIP.AUTO: ABNORMAL
LYMPHOCYTES # BLD AUTO: 1.28 10*3/MM3 (ref 0.9–4.8)
LYMPHOCYTES # BLD AUTO: 1.45 10*3/MM3 (ref 0.9–4.8)
LYMPHOCYTES NFR BLD AUTO: 23.8 % (ref 19.6–45.3)
LYMPHOCYTES NFR BLD AUTO: 29.1 % (ref 19.6–45.3)
MACROCYTES BLD QL SMEAR: NORMAL
MCH RBC QN AUTO: 33.2 PG (ref 26.9–32)
MCH RBC QN AUTO: 33.4 PG (ref 26.9–32)
MCHC RBC AUTO-ENTMCNC: 31.4 G/DL (ref 32.4–36.3)
MCHC RBC AUTO-ENTMCNC: 31.8 G/DL (ref 32.4–36.3)
MCV RBC AUTO: 105.2 FL (ref 80.5–98.2)
MCV RBC AUTO: 105.9 FL (ref 80.5–98.2)
MONOCYTES # BLD AUTO: 0.68 10*3/MM3 (ref 0.2–1.2)
MONOCYTES # BLD AUTO: 0.84 10*3/MM3 (ref 0.2–1.2)
MONOCYTES NFR BLD AUTO: 13.8 % (ref 5–12)
MONOCYTES NFR BLD AUTO: 15.5 % (ref 5–12)
NEUTROPHILS # BLD AUTO: 2.36 10*3/MM3 (ref 1.9–8.1)
NEUTROPHILS # BLD AUTO: 3.57 10*3/MM3 (ref 1.9–8.1)
NEUTROPHILS NFR BLD AUTO: 53.6 % (ref 42.7–76)
NEUTROPHILS NFR BLD AUTO: 58.8 % (ref 42.7–76)
NEUTS VAC BLD QL SMEAR: NORMAL
NITRITE UR QL STRIP: NEGATIVE
NRBC BLD MANUAL-RTO: 0 /100 WBC (ref 0–0)
PH UR STRIP.AUTO: 7 [PH] (ref 5–8)
PLAT MORPH BLD: NORMAL
PLATELET # BLD AUTO: 69 10*3/MM3 (ref 140–500)
PLATELET # BLD AUTO: 76 10*3/MM3 (ref 140–500)
PMV BLD AUTO: 12.3 FL (ref 6–12)
PMV BLD AUTO: ABNORMAL FL (ref 6–12)
POTASSIUM BLD-SCNC: 3.8 MMOL/L (ref 3.5–5.2)
PROCALCITONIN SERPL-MCNC: 0.44 NG/ML (ref 0.1–0.25)
PROT SERPL-MCNC: 8.8 G/DL (ref 6–8.5)
PROT UR QL STRIP: ABNORMAL
PROTHROMBIN TIME: 13.9 SECONDS (ref 11.7–14.2)
RBC # BLD AUTO: 3.05 10*6/MM3 (ref 3.9–5.2)
RBC # BLD AUTO: 3.37 10*6/MM3 (ref 3.9–5.2)
RBC # UR: ABNORMAL /HPF
REF LAB TEST METHOD: ABNORMAL
SCHISTOCYTES BLD QL SMEAR: NORMAL
SODIUM BLD-SCNC: 135 MMOL/L (ref 136–145)
SP GR UR STRIP: 1.01 (ref 1–1.03)
SQUAMOUS #/AREA URNS HPF: ABNORMAL /HPF
TROPONIN T SERPL-MCNC: <0.01 NG/ML (ref 0–0.03)
UROBILINOGEN UR QL STRIP: ABNORMAL
WBC NRBC COR # BLD: 4.4 10*3/MM3 (ref 4.5–10.7)
WBC NRBC COR # BLD: 6.08 10*3/MM3 (ref 4.5–10.7)
WBC UR QL AUTO: ABNORMAL /HPF

## 2017-05-06 PROCEDURE — 84484 ASSAY OF TROPONIN QUANT: CPT | Performed by: EMERGENCY MEDICINE

## 2017-05-06 PROCEDURE — 93005 ELECTROCARDIOGRAM TRACING: CPT | Performed by: EMERGENCY MEDICINE

## 2017-05-06 PROCEDURE — 25010000002 ONDANSETRON PER 1 MG: Performed by: EMERGENCY MEDICINE

## 2017-05-06 PROCEDURE — 85730 THROMBOPLASTIN TIME PARTIAL: CPT | Performed by: EMERGENCY MEDICINE

## 2017-05-06 PROCEDURE — 25010000003 CEFTRIAXONE PER 250 MG: Performed by: EMERGENCY MEDICINE

## 2017-05-06 PROCEDURE — 85025 COMPLETE CBC W/AUTO DIFF WBC: CPT | Performed by: INTERNAL MEDICINE

## 2017-05-06 PROCEDURE — 93010 ELECTROCARDIOGRAM REPORT: CPT | Performed by: INTERNAL MEDICINE

## 2017-05-06 PROCEDURE — 87086 URINE CULTURE/COLONY COUNT: CPT | Performed by: EMERGENCY MEDICINE

## 2017-05-06 PROCEDURE — 99283 EMERGENCY DEPT VISIT LOW MDM: CPT

## 2017-05-06 PROCEDURE — 87186 SC STD MICRODIL/AGAR DIL: CPT | Performed by: EMERGENCY MEDICINE

## 2017-05-06 PROCEDURE — 85610 PROTHROMBIN TIME: CPT | Performed by: EMERGENCY MEDICINE

## 2017-05-06 PROCEDURE — 25010000002 ONDANSETRON PER 1 MG: Performed by: INTERNAL MEDICINE

## 2017-05-06 PROCEDURE — 71020 HC CHEST PA AND LATERAL: CPT

## 2017-05-06 PROCEDURE — 83605 ASSAY OF LACTIC ACID: CPT | Performed by: EMERGENCY MEDICINE

## 2017-05-06 PROCEDURE — 76775 US EXAM ABDO BACK WALL LIM: CPT

## 2017-05-06 PROCEDURE — 87077 CULTURE AEROBIC IDENTIFY: CPT | Performed by: EMERGENCY MEDICINE

## 2017-05-06 PROCEDURE — 25010000002 HEPARIN (PORCINE) PER 1000 UNITS: Performed by: INTERNAL MEDICINE

## 2017-05-06 PROCEDURE — 84145 PROCALCITONIN (PCT): CPT | Performed by: EMERGENCY MEDICINE

## 2017-05-06 PROCEDURE — 99223 1ST HOSP IP/OBS HIGH 75: CPT | Performed by: INTERNAL MEDICINE

## 2017-05-06 PROCEDURE — 80156 ASSAY CARBAMAZEPINE TOTAL: CPT | Performed by: EMERGENCY MEDICINE

## 2017-05-06 PROCEDURE — 80053 COMPREHEN METABOLIC PANEL: CPT | Performed by: EMERGENCY MEDICINE

## 2017-05-06 PROCEDURE — 85025 COMPLETE CBC W/AUTO DIFF WBC: CPT | Performed by: EMERGENCY MEDICINE

## 2017-05-06 PROCEDURE — 85007 BL SMEAR W/DIFF WBC COUNT: CPT | Performed by: EMERGENCY MEDICINE

## 2017-05-06 PROCEDURE — 81001 URINALYSIS AUTO W/SCOPE: CPT | Performed by: EMERGENCY MEDICINE

## 2017-05-06 PROCEDURE — 87040 BLOOD CULTURE FOR BACTERIA: CPT | Performed by: INTERNAL MEDICINE

## 2017-05-06 RX ORDER — ONDANSETRON 4 MG/1
4 TABLET, FILM COATED ORAL EVERY 6 HOURS PRN
Status: DISCONTINUED | OUTPATIENT
Start: 2017-05-06 | End: 2017-05-12 | Stop reason: HOSPADM

## 2017-05-06 RX ORDER — HYDROXYZINE HYDROCHLORIDE 25 MG/1
25 TABLET, FILM COATED ORAL 3 TIMES DAILY PRN
Status: DISCONTINUED | OUTPATIENT
Start: 2017-05-06 | End: 2017-05-12 | Stop reason: HOSPADM

## 2017-05-06 RX ORDER — ACETAMINOPHEN 325 MG/1
650 TABLET ORAL EVERY 6 HOURS PRN
Status: DISCONTINUED | OUTPATIENT
Start: 2017-05-06 | End: 2017-05-12 | Stop reason: HOSPADM

## 2017-05-06 RX ORDER — HEPARIN SODIUM 5000 [USP'U]/ML
5000 INJECTION, SOLUTION INTRAVENOUS; SUBCUTANEOUS EVERY 12 HOURS SCHEDULED
Status: DISCONTINUED | OUTPATIENT
Start: 2017-05-06 | End: 2017-05-10

## 2017-05-06 RX ORDER — ACYCLOVIR 400 MG/1
400 TABLET ORAL 2 TIMES DAILY
Status: DISCONTINUED | OUTPATIENT
Start: 2017-05-06 | End: 2017-05-12 | Stop reason: HOSPADM

## 2017-05-06 RX ORDER — ONDANSETRON 2 MG/ML
4 INJECTION INTRAMUSCULAR; INTRAVENOUS ONCE
Status: COMPLETED | OUTPATIENT
Start: 2017-05-06 | End: 2017-05-06

## 2017-05-06 RX ORDER — SODIUM CHLORIDE 0.9 % (FLUSH) 0.9 %
10 SYRINGE (ML) INJECTION AS NEEDED
Status: DISCONTINUED | OUTPATIENT
Start: 2017-05-06 | End: 2017-05-12 | Stop reason: HOSPADM

## 2017-05-06 RX ORDER — HYDROCODONE BITARTRATE AND ACETAMINOPHEN 5; 325 MG/1; MG/1
1 TABLET ORAL EVERY 4 HOURS PRN
Status: DISCONTINUED | OUTPATIENT
Start: 2017-05-06 | End: 2017-05-11

## 2017-05-06 RX ORDER — SODIUM CHLORIDE 9 MG/ML
75 INJECTION, SOLUTION INTRAVENOUS CONTINUOUS
Status: DISCONTINUED | OUTPATIENT
Start: 2017-05-06 | End: 2017-05-12 | Stop reason: HOSPADM

## 2017-05-06 RX ORDER — OXYCODONE HYDROCHLORIDE AND ACETAMINOPHEN 5; 325 MG/1; MG/1
1 TABLET ORAL EVERY 4 HOURS PRN
Status: DISCONTINUED | OUTPATIENT
Start: 2017-05-06 | End: 2017-05-11

## 2017-05-06 RX ORDER — PROCHLORPERAZINE MALEATE 10 MG
10 TABLET ORAL EVERY 6 HOURS PRN
Status: DISCONTINUED | OUTPATIENT
Start: 2017-05-06 | End: 2017-05-12 | Stop reason: HOSPADM

## 2017-05-06 RX ORDER — CEFTRIAXONE SODIUM 1 G/50ML
1 INJECTION, SOLUTION INTRAVENOUS ONCE
Status: COMPLETED | OUTPATIENT
Start: 2017-05-06 | End: 2017-05-06

## 2017-05-06 RX ORDER — ONDANSETRON 4 MG/1
4 TABLET, ORALLY DISINTEGRATING ORAL EVERY 6 HOURS PRN
Status: DISCONTINUED | OUTPATIENT
Start: 2017-05-06 | End: 2017-05-12 | Stop reason: HOSPADM

## 2017-05-06 RX ORDER — CEFTRIAXONE SODIUM 1 G/50ML
1 INJECTION, SOLUTION INTRAVENOUS EVERY 24 HOURS
Status: DISCONTINUED | OUTPATIENT
Start: 2017-05-06 | End: 2017-05-12

## 2017-05-06 RX ORDER — MONTELUKAST SODIUM 10 MG/1
10 TABLET ORAL NIGHTLY
Status: DISCONTINUED | OUTPATIENT
Start: 2017-05-06 | End: 2017-05-12 | Stop reason: HOSPADM

## 2017-05-06 RX ORDER — CARBAMAZEPINE 200 MG/1
200 TABLET, EXTENDED RELEASE ORAL EVERY 12 HOURS SCHEDULED
Status: DISCONTINUED | OUTPATIENT
Start: 2017-05-06 | End: 2017-05-11

## 2017-05-06 RX ORDER — ONDANSETRON 2 MG/ML
4 INJECTION INTRAMUSCULAR; INTRAVENOUS EVERY 6 HOURS PRN
Status: DISCONTINUED | OUTPATIENT
Start: 2017-05-06 | End: 2017-05-12 | Stop reason: HOSPADM

## 2017-05-06 RX ADMIN — CARBAMAZEPINE 200 MG: 200 TABLET, EXTENDED RELEASE ORAL at 22:28

## 2017-05-06 RX ADMIN — SODIUM CHLORIDE 1000 ML: 9 INJECTION, SOLUTION INTRAVENOUS at 06:08

## 2017-05-06 RX ADMIN — HEPARIN SODIUM 5000 UNITS: 5000 INJECTION, SOLUTION INTRAVENOUS; SUBCUTANEOUS at 10:42

## 2017-05-06 RX ADMIN — HEPARIN SODIUM 5000 UNITS: 5000 INJECTION, SOLUTION INTRAVENOUS; SUBCUTANEOUS at 22:29

## 2017-05-06 RX ADMIN — MONTELUKAST 10 MG: 10 TABLET, FILM COATED ORAL at 22:28

## 2017-05-06 RX ADMIN — SODIUM CHLORIDE 100 ML/HR: 9 INJECTION, SOLUTION INTRAVENOUS at 17:36

## 2017-05-06 RX ADMIN — ONDANSETRON 4 MG: 2 INJECTION INTRAMUSCULAR; INTRAVENOUS at 17:36

## 2017-05-06 RX ADMIN — SODIUM CHLORIDE 1000 ML: 9 INJECTION, SOLUTION INTRAVENOUS at 03:40

## 2017-05-06 RX ADMIN — ONDANSETRON 4 MG: 2 INJECTION INTRAMUSCULAR; INTRAVENOUS at 03:34

## 2017-05-06 RX ADMIN — CEFTRIAXONE SODIUM 1 G: 1 INJECTION, SOLUTION INTRAVENOUS at 06:20

## 2017-05-06 RX ADMIN — ACYCLOVIR 400 MG: 400 TABLET ORAL at 10:41

## 2017-05-06 RX ADMIN — ACYCLOVIR 400 MG: 400 TABLET ORAL at 17:32

## 2017-05-06 RX ADMIN — SODIUM CHLORIDE 100 ML/HR: 9 INJECTION, SOLUTION INTRAVENOUS at 08:48

## 2017-05-07 LAB
ANION GAP SERPL CALCULATED.3IONS-SCNC: 10.8 MMOL/L
BASOPHILS # BLD AUTO: 0 10*3/MM3 (ref 0–0.2)
BASOPHILS NFR BLD AUTO: 0 % (ref 0–1.5)
BUN BLD-MCNC: 21 MG/DL (ref 8–23)
BUN/CREAT SERPL: 11.6 (ref 7–25)
CALCIUM SPEC-SCNC: 7.1 MG/DL (ref 8.6–10.5)
CHLORIDE SERPL-SCNC: 111 MMOL/L (ref 98–107)
CO2 SERPL-SCNC: 17.2 MMOL/L (ref 22–29)
CREAT BLD-MCNC: 1.81 MG/DL (ref 0.57–1)
DEPRECATED RDW RBC AUTO: 56.6 FL (ref 37–54)
EOSINOPHIL # BLD AUTO: 0.06 10*3/MM3 (ref 0–0.7)
EOSINOPHIL NFR BLD AUTO: 1.7 % (ref 0.3–6.2)
ERYTHROCYTE [DISTWIDTH] IN BLOOD BY AUTOMATED COUNT: 14.5 % (ref 11.7–13)
GFR SERPL CREATININE-BSD FRML MDRD: 34 ML/MIN/1.73
GLUCOSE BLD-MCNC: 80 MG/DL (ref 65–99)
HCT VFR BLD AUTO: 33.6 % (ref 35.6–45.5)
HGB BLD-MCNC: 10.6 G/DL (ref 11.9–15.5)
IMM GRANULOCYTES # BLD: 0.02 10*3/MM3 (ref 0–0.03)
IMM GRANULOCYTES NFR BLD: 0.6 % (ref 0–0.5)
LYMPHOCYTES # BLD AUTO: 1.31 10*3/MM3 (ref 0.9–4.8)
LYMPHOCYTES NFR BLD AUTO: 37 % (ref 19.6–45.3)
MCH RBC QN AUTO: 33.8 PG (ref 26.9–32)
MCHC RBC AUTO-ENTMCNC: 31.5 G/DL (ref 32.4–36.3)
MCV RBC AUTO: 107 FL (ref 80.5–98.2)
MONOCYTES # BLD AUTO: 0.36 10*3/MM3 (ref 0.2–1.2)
MONOCYTES NFR BLD AUTO: 10.2 % (ref 5–12)
NEUTROPHILS # BLD AUTO: 1.79 10*3/MM3 (ref 1.9–8.1)
NEUTROPHILS NFR BLD AUTO: 50.5 % (ref 42.7–76)
NRBC BLD MANUAL-RTO: 0.9 /100 WBC (ref 0–0)
PLATELET # BLD AUTO: 57 10*3/MM3 (ref 140–500)
PMV BLD AUTO: 13.5 FL (ref 6–12)
POTASSIUM BLD-SCNC: 3.9 MMOL/L (ref 3.5–5.2)
RBC # BLD AUTO: 3.14 10*6/MM3 (ref 3.9–5.2)
SODIUM BLD-SCNC: 139 MMOL/L (ref 136–145)
WBC NRBC COR # BLD: 3.54 10*3/MM3 (ref 4.5–10.7)

## 2017-05-07 PROCEDURE — 36591 DRAW BLOOD OFF VENOUS DEVICE: CPT

## 2017-05-07 PROCEDURE — 99232 SBSQ HOSP IP/OBS MODERATE 35: CPT | Performed by: INTERNAL MEDICINE

## 2017-05-07 PROCEDURE — 25010000003 CEFTRIAXONE PER 250 MG: Performed by: INTERNAL MEDICINE

## 2017-05-07 PROCEDURE — 80048 BASIC METABOLIC PNL TOTAL CA: CPT | Performed by: INTERNAL MEDICINE

## 2017-05-07 PROCEDURE — 85025 COMPLETE CBC W/AUTO DIFF WBC: CPT | Performed by: INTERNAL MEDICINE

## 2017-05-07 PROCEDURE — 25010000002 HEPARIN (PORCINE) PER 1000 UNITS: Performed by: INTERNAL MEDICINE

## 2017-05-07 RX ADMIN — SODIUM CHLORIDE 100 ML/HR: 9 INJECTION, SOLUTION INTRAVENOUS at 02:29

## 2017-05-07 RX ADMIN — HEPARIN SODIUM 5000 UNITS: 5000 INJECTION, SOLUTION INTRAVENOUS; SUBCUTANEOUS at 20:23

## 2017-05-07 RX ADMIN — CARBAMAZEPINE 200 MG: 200 TABLET, EXTENDED RELEASE ORAL at 10:08

## 2017-05-07 RX ADMIN — HEPARIN SODIUM 5000 UNITS: 5000 INJECTION, SOLUTION INTRAVENOUS; SUBCUTANEOUS at 10:08

## 2017-05-07 RX ADMIN — CARBAMAZEPINE 200 MG: 200 TABLET, EXTENDED RELEASE ORAL at 20:23

## 2017-05-07 RX ADMIN — HYDROCODONE BITARTRATE AND ACETAMINOPHEN 1 TABLET: 5; 325 TABLET ORAL at 05:49

## 2017-05-07 RX ADMIN — ACYCLOVIR 400 MG: 400 TABLET ORAL at 17:59

## 2017-05-07 RX ADMIN — CEFTRIAXONE SODIUM 1 G: 1 INJECTION, SOLUTION INTRAVENOUS at 11:22

## 2017-05-07 RX ADMIN — ACYCLOVIR 400 MG: 400 TABLET ORAL at 10:08

## 2017-05-07 RX ADMIN — MONTELUKAST 10 MG: 10 TABLET, FILM COATED ORAL at 20:23

## 2017-05-07 RX ADMIN — SODIUM CHLORIDE 75 ML/HR: 9 INJECTION, SOLUTION INTRAVENOUS at 14:10

## 2017-05-08 PROBLEM — D70.3 NEUTROPENIA ASSOCIATED WITH INFECTION (HCC): Status: ACTIVE | Noted: 2017-05-08

## 2017-05-08 LAB
ANION GAP SERPL CALCULATED.3IONS-SCNC: 13.1 MMOL/L
BASOPHILS # BLD AUTO: 0 10*3/MM3 (ref 0–0.2)
BASOPHILS NFR BLD AUTO: 0 % (ref 0–1.5)
BUN BLD-MCNC: 14 MG/DL (ref 8–23)
BUN/CREAT SERPL: 9.5 (ref 7–25)
CALCIUM SPEC-SCNC: 7.1 MG/DL (ref 8.6–10.5)
CHLORIDE SERPL-SCNC: 109 MMOL/L (ref 98–107)
CO2 SERPL-SCNC: 16.9 MMOL/L (ref 22–29)
CREAT BLD-MCNC: 1.47 MG/DL (ref 0.57–1)
DEPRECATED RDW RBC AUTO: 55.7 FL (ref 37–54)
EOSINOPHIL # BLD AUTO: 0.11 10*3/MM3 (ref 0–0.7)
EOSINOPHIL NFR BLD AUTO: 3.5 % (ref 0.3–6.2)
ERYTHROCYTE [DISTWIDTH] IN BLOOD BY AUTOMATED COUNT: 14.3 % (ref 11.7–13)
GFR SERPL CREATININE-BSD FRML MDRD: 43 ML/MIN/1.73
GLUCOSE BLD-MCNC: 83 MG/DL (ref 65–99)
HCT VFR BLD AUTO: 32.4 % (ref 35.6–45.5)
HGB BLD-MCNC: 10.3 G/DL (ref 11.9–15.5)
IMM GRANULOCYTES # BLD: 0.02 10*3/MM3 (ref 0–0.03)
IMM GRANULOCYTES NFR BLD: 0.6 % (ref 0–0.5)
LYMPHOCYTES # BLD AUTO: 1.38 10*3/MM3 (ref 0.9–4.8)
LYMPHOCYTES NFR BLD AUTO: 44.5 % (ref 19.6–45.3)
MCH RBC QN AUTO: 34.1 PG (ref 26.9–32)
MCHC RBC AUTO-ENTMCNC: 31.8 G/DL (ref 32.4–36.3)
MCV RBC AUTO: 107.3 FL (ref 80.5–98.2)
MONOCYTES # BLD AUTO: 0.38 10*3/MM3 (ref 0.2–1.2)
MONOCYTES NFR BLD AUTO: 12.3 % (ref 5–12)
NEUTROPHILS # BLD AUTO: 1.21 10*3/MM3 (ref 1.9–8.1)
NEUTROPHILS NFR BLD AUTO: 39.1 % (ref 42.7–76)
PLATELET # BLD AUTO: 67 10*3/MM3 (ref 140–500)
PMV BLD AUTO: 13.2 FL (ref 6–12)
POTASSIUM BLD-SCNC: 3.8 MMOL/L (ref 3.5–5.2)
RBC # BLD AUTO: 3.02 10*6/MM3 (ref 3.9–5.2)
SODIUM BLD-SCNC: 139 MMOL/L (ref 136–145)
WBC NRBC COR # BLD: 3.1 10*3/MM3 (ref 4.5–10.7)

## 2017-05-08 PROCEDURE — 25010000002 HEPARIN (PORCINE) PER 1000 UNITS: Performed by: INTERNAL MEDICINE

## 2017-05-08 PROCEDURE — 99232 SBSQ HOSP IP/OBS MODERATE 35: CPT | Performed by: INTERNAL MEDICINE

## 2017-05-08 PROCEDURE — 25010000003 CEFTRIAXONE PER 250 MG: Performed by: INTERNAL MEDICINE

## 2017-05-08 PROCEDURE — 85025 COMPLETE CBC W/AUTO DIFF WBC: CPT | Performed by: INTERNAL MEDICINE

## 2017-05-08 PROCEDURE — 25010000002 TBO-FILGRASTIM 480 MCG/0.8ML SOLUTION PREFILLED SYRINGE: Performed by: INTERNAL MEDICINE

## 2017-05-08 PROCEDURE — 80048 BASIC METABOLIC PNL TOTAL CA: CPT | Performed by: INTERNAL MEDICINE

## 2017-05-08 RX ORDER — PANTOPRAZOLE SODIUM 40 MG/1
40 TABLET, DELAYED RELEASE ORAL
Status: DISCONTINUED | OUTPATIENT
Start: 2017-05-08 | End: 2017-05-11

## 2017-05-08 RX ADMIN — ACYCLOVIR 400 MG: 400 TABLET ORAL at 09:49

## 2017-05-08 RX ADMIN — TBO-FILGRASTIM 480 MCG: 480 INJECTION, SOLUTION SUBCUTANEOUS at 09:49

## 2017-05-08 RX ADMIN — CEFTRIAXONE SODIUM 1 G: 1 INJECTION, SOLUTION INTRAVENOUS at 09:44

## 2017-05-08 RX ADMIN — HEPARIN SODIUM 5000 UNITS: 5000 INJECTION, SOLUTION INTRAVENOUS; SUBCUTANEOUS at 09:45

## 2017-05-08 RX ADMIN — SODIUM CHLORIDE 75 ML/HR: 9 INJECTION, SOLUTION INTRAVENOUS at 17:59

## 2017-05-08 RX ADMIN — SODIUM CHLORIDE 75 ML/HR: 9 INJECTION, SOLUTION INTRAVENOUS at 03:13

## 2017-05-08 RX ADMIN — ACYCLOVIR 400 MG: 400 TABLET ORAL at 17:59

## 2017-05-08 RX ADMIN — MONTELUKAST 10 MG: 10 TABLET, FILM COATED ORAL at 21:58

## 2017-05-08 RX ADMIN — CARBAMAZEPINE 200 MG: 200 TABLET, EXTENDED RELEASE ORAL at 21:58

## 2017-05-08 RX ADMIN — PANTOPRAZOLE SODIUM 40 MG: 40 TABLET, DELAYED RELEASE ORAL at 17:59

## 2017-05-08 RX ADMIN — HEPARIN SODIUM 5000 UNITS: 5000 INJECTION, SOLUTION INTRAVENOUS; SUBCUTANEOUS at 21:58

## 2017-05-09 LAB
ANION GAP SERPL CALCULATED.3IONS-SCNC: 11 MMOL/L
BASOPHILS # BLD AUTO: 0.01 10*3/MM3 (ref 0–0.2)
BASOPHILS NFR BLD AUTO: 0.1 % (ref 0–1.5)
BUN BLD-MCNC: 9 MG/DL (ref 8–23)
BUN/CREAT SERPL: 6.3 (ref 7–25)
CALCIUM SPEC-SCNC: 7.3 MG/DL (ref 8.6–10.5)
CHLORIDE SERPL-SCNC: 113 MMOL/L (ref 98–107)
CO2 SERPL-SCNC: 18 MMOL/L (ref 22–29)
CREAT BLD-MCNC: 1.44 MG/DL (ref 0.57–1)
DEPRECATED RDW RBC AUTO: 54.8 FL (ref 37–54)
EOSINOPHIL # BLD AUTO: 0.29 10*3/MM3 (ref 0–0.7)
EOSINOPHIL NFR BLD AUTO: 1.9 % (ref 0.3–6.2)
ERYTHROCYTE [DISTWIDTH] IN BLOOD BY AUTOMATED COUNT: 14.2 % (ref 11.7–13)
GFR SERPL CREATININE-BSD FRML MDRD: 44 ML/MIN/1.73
GLUCOSE BLD-MCNC: 84 MG/DL (ref 65–99)
HCT VFR BLD AUTO: 31.7 % (ref 35.6–45.5)
HGB BLD-MCNC: 10.2 G/DL (ref 11.9–15.5)
IMM GRANULOCYTES # BLD: 0.15 10*3/MM3 (ref 0–0.03)
IMM GRANULOCYTES NFR BLD: 1 % (ref 0–0.5)
LYMPHOCYTES # BLD AUTO: 2.03 10*3/MM3 (ref 0.9–4.8)
LYMPHOCYTES NFR BLD AUTO: 13.2 % (ref 19.6–45.3)
MCH RBC QN AUTO: 34.3 PG (ref 26.9–32)
MCHC RBC AUTO-ENTMCNC: 32.2 G/DL (ref 32.4–36.3)
MCV RBC AUTO: 106.7 FL (ref 80.5–98.2)
MONOCYTES # BLD AUTO: 0.84 10*3/MM3 (ref 0.2–1.2)
MONOCYTES NFR BLD AUTO: 5.5 % (ref 5–12)
NEUTROPHILS # BLD AUTO: 12.01 10*3/MM3 (ref 1.9–8.1)
NEUTROPHILS NFR BLD AUTO: 78.3 % (ref 42.7–76)
PLATELET # BLD AUTO: 59 10*3/MM3 (ref 140–500)
PMV BLD AUTO: 13.5 FL (ref 6–12)
POTASSIUM BLD-SCNC: 3.7 MMOL/L (ref 3.5–5.2)
RBC # BLD AUTO: 2.97 10*6/MM3 (ref 3.9–5.2)
SODIUM BLD-SCNC: 142 MMOL/L (ref 136–145)
WBC NRBC COR # BLD: 15.33 10*3/MM3 (ref 4.5–10.7)

## 2017-05-09 PROCEDURE — 25010000003 CEFTRIAXONE PER 250 MG: Performed by: INTERNAL MEDICINE

## 2017-05-09 PROCEDURE — 85025 COMPLETE CBC W/AUTO DIFF WBC: CPT | Performed by: INTERNAL MEDICINE

## 2017-05-09 PROCEDURE — 80048 BASIC METABOLIC PNL TOTAL CA: CPT | Performed by: INTERNAL MEDICINE

## 2017-05-09 PROCEDURE — 99232 SBSQ HOSP IP/OBS MODERATE 35: CPT | Performed by: INTERNAL MEDICINE

## 2017-05-09 RX ADMIN — ACYCLOVIR 400 MG: 400 TABLET ORAL at 09:48

## 2017-05-09 RX ADMIN — CARBAMAZEPINE 200 MG: 200 TABLET, EXTENDED RELEASE ORAL at 20:24

## 2017-05-09 RX ADMIN — CEFTRIAXONE SODIUM 1 G: 1 INJECTION, SOLUTION INTRAVENOUS at 09:48

## 2017-05-09 RX ADMIN — ACETAMINOPHEN 650 MG: 325 TABLET ORAL at 05:12

## 2017-05-09 RX ADMIN — MONTELUKAST 10 MG: 10 TABLET, FILM COATED ORAL at 20:24

## 2017-05-09 RX ADMIN — SODIUM CHLORIDE 75 ML/HR: 9 INJECTION, SOLUTION INTRAVENOUS at 19:53

## 2017-05-09 RX ADMIN — ACYCLOVIR 400 MG: 400 TABLET ORAL at 17:33

## 2017-05-09 RX ADMIN — OXYCODONE HYDROCHLORIDE AND ACETAMINOPHEN 1 TABLET: 5; 325 TABLET ORAL at 02:50

## 2017-05-09 RX ADMIN — SODIUM CHLORIDE 75 ML/HR: 9 INJECTION, SOLUTION INTRAVENOUS at 06:14

## 2017-05-09 RX ADMIN — PANTOPRAZOLE SODIUM 40 MG: 40 TABLET, DELAYED RELEASE ORAL at 05:18

## 2017-05-10 LAB
ANION GAP SERPL CALCULATED.3IONS-SCNC: 9.3 MMOL/L
B PERT DNA SPEC QL NAA+PROBE: NOT DETECTED
BACTERIA UR QL AUTO: ABNORMAL /HPF
BASOPHILS # BLD AUTO: 0.02 10*3/MM3 (ref 0–0.2)
BASOPHILS NFR BLD AUTO: 0.1 % (ref 0–1.5)
BILIRUB UR QL STRIP: NEGATIVE
BUN BLD-MCNC: 7 MG/DL (ref 8–23)
BUN/CREAT SERPL: 5 (ref 7–25)
C DIFF TOX GENS STL QL NAA+PROBE: NEGATIVE
C PNEUM DNA NPH QL NAA+NON-PROBE: NOT DETECTED
CALCIUM SPEC-SCNC: 7.3 MG/DL (ref 8.6–10.5)
CHLORIDE SERPL-SCNC: 113 MMOL/L (ref 98–107)
CLARITY UR: CLEAR
CO2 SERPL-SCNC: 19.7 MMOL/L (ref 22–29)
COLOR UR: YELLOW
CREAT BLD-MCNC: 1.39 MG/DL (ref 0.57–1)
DEPRECATED RDW RBC AUTO: 56.1 FL (ref 37–54)
EOSINOPHIL # BLD AUTO: 0.25 10*3/MM3 (ref 0–0.7)
EOSINOPHIL NFR BLD AUTO: 1.5 % (ref 0.3–6.2)
ERYTHROCYTE [DISTWIDTH] IN BLOOD BY AUTOMATED COUNT: 14.7 % (ref 11.7–13)
FLUAV H1 2009 PAND RNA NPH QL NAA+PROBE: NOT DETECTED
FLUAV H1 HA GENE NPH QL NAA+PROBE: NOT DETECTED
FLUAV H3 RNA NPH QL NAA+PROBE: NOT DETECTED
FLUAV SUBTYP SPEC NAA+PROBE: NOT DETECTED
FLUBV RNA ISLT QL NAA+PROBE: DETECTED
GFR SERPL CREATININE-BSD FRML MDRD: 46 ML/MIN/1.73
GLUCOSE BLD-MCNC: 91 MG/DL (ref 65–99)
GLUCOSE UR STRIP-MCNC: NEGATIVE MG/DL
HADV DNA SPEC NAA+PROBE: NOT DETECTED
HCOV 229E RNA SPEC QL NAA+PROBE: NOT DETECTED
HCOV HKU1 RNA SPEC QL NAA+PROBE: NOT DETECTED
HCOV NL63 RNA SPEC QL NAA+PROBE: NOT DETECTED
HCOV OC43 RNA SPEC QL NAA+PROBE: NOT DETECTED
HCT VFR BLD AUTO: 32 % (ref 35.6–45.5)
HGB BLD-MCNC: 9.9 G/DL (ref 11.9–15.5)
HGB UR QL STRIP.AUTO: ABNORMAL
HMPV RNA NPH QL NAA+NON-PROBE: NOT DETECTED
HPIV1 RNA SPEC QL NAA+PROBE: NOT DETECTED
HPIV2 RNA SPEC QL NAA+PROBE: NOT DETECTED
HPIV3 RNA NPH QL NAA+PROBE: NOT DETECTED
HPIV4 P GENE NPH QL NAA+PROBE: NOT DETECTED
HYALINE CASTS UR QL AUTO: ABNORMAL /LPF
IMM GRANULOCYTES # BLD: 0.21 10*3/MM3 (ref 0–0.03)
IMM GRANULOCYTES NFR BLD: 1.3 % (ref 0–0.5)
KETONES UR QL STRIP: NEGATIVE
LEUKOCYTE ESTERASE UR QL STRIP.AUTO: ABNORMAL
LYMPHOCYTES # BLD AUTO: 2.5 10*3/MM3 (ref 0.9–4.8)
LYMPHOCYTES NFR BLD AUTO: 15 % (ref 19.6–45.3)
M PNEUMO IGG SER IA-ACNC: NOT DETECTED
MCH RBC QN AUTO: 32.5 PG (ref 26.9–32)
MCHC RBC AUTO-ENTMCNC: 30.9 G/DL (ref 32.4–36.3)
MCV RBC AUTO: 104.9 FL (ref 80.5–98.2)
MONOCYTES # BLD AUTO: 0.77 10*3/MM3 (ref 0.2–1.2)
MONOCYTES NFR BLD AUTO: 4.6 % (ref 5–12)
NEUTROPHILS # BLD AUTO: 12.87 10*3/MM3 (ref 1.9–8.1)
NEUTROPHILS NFR BLD AUTO: 77.5 % (ref 42.7–76)
NITRITE UR QL STRIP: NEGATIVE
NRBC BLD MANUAL-RTO: 0.3 /100 WBC (ref 0–0)
PH UR STRIP.AUTO: 7 [PH] (ref 5–8)
PLATELET # BLD AUTO: 60 10*3/MM3 (ref 140–500)
PMV BLD AUTO: 11.6 FL (ref 6–12)
POTASSIUM BLD-SCNC: 3.9 MMOL/L (ref 3.5–5.2)
PROT UR QL STRIP: ABNORMAL
RBC # BLD AUTO: 3.05 10*6/MM3 (ref 3.9–5.2)
RBC # UR: ABNORMAL /HPF
REF LAB TEST METHOD: ABNORMAL
RHINOVIRUS RNA SPEC NAA+PROBE: NOT DETECTED
RSV RNA NPH QL NAA+NON-PROBE: NOT DETECTED
SODIUM BLD-SCNC: 142 MMOL/L (ref 136–145)
SP GR UR STRIP: 1.01 (ref 1–1.03)
SQUAMOUS #/AREA URNS HPF: ABNORMAL /HPF
UROBILINOGEN UR QL STRIP: ABNORMAL
WBC NRBC COR # BLD: 16.62 10*3/MM3 (ref 4.5–10.7)
WBC UR QL AUTO: ABNORMAL /HPF

## 2017-05-10 PROCEDURE — 87493 C DIFF AMPLIFIED PROBE: CPT | Performed by: INTERNAL MEDICINE

## 2017-05-10 PROCEDURE — 81001 URINALYSIS AUTO W/SCOPE: CPT | Performed by: INTERNAL MEDICINE

## 2017-05-10 PROCEDURE — 87581 M.PNEUMON DNA AMP PROBE: CPT | Performed by: INTERNAL MEDICINE

## 2017-05-10 PROCEDURE — 87798 DETECT AGENT NOS DNA AMP: CPT | Performed by: INTERNAL MEDICINE

## 2017-05-10 PROCEDURE — 85025 COMPLETE CBC W/AUTO DIFF WBC: CPT | Performed by: INTERNAL MEDICINE

## 2017-05-10 PROCEDURE — 87086 URINE CULTURE/COLONY COUNT: CPT | Performed by: INTERNAL MEDICINE

## 2017-05-10 PROCEDURE — 80048 BASIC METABOLIC PNL TOTAL CA: CPT | Performed by: INTERNAL MEDICINE

## 2017-05-10 PROCEDURE — 99223 1ST HOSP IP/OBS HIGH 75: CPT | Performed by: INTERNAL MEDICINE

## 2017-05-10 PROCEDURE — 87633 RESP VIRUS 12-25 TARGETS: CPT | Performed by: INTERNAL MEDICINE

## 2017-05-10 PROCEDURE — 87486 CHLMYD PNEUM DNA AMP PROBE: CPT | Performed by: INTERNAL MEDICINE

## 2017-05-10 PROCEDURE — 25010000003 CEFTRIAXONE PER 250 MG: Performed by: INTERNAL MEDICINE

## 2017-05-10 PROCEDURE — 99232 SBSQ HOSP IP/OBS MODERATE 35: CPT | Performed by: INTERNAL MEDICINE

## 2017-05-10 RX ORDER — OSELTAMIVIR PHOSPHATE 30 MG/1
30 CAPSULE ORAL EVERY 12 HOURS SCHEDULED
Status: DISCONTINUED | OUTPATIENT
Start: 2017-05-10 | End: 2017-05-12 | Stop reason: HOSPADM

## 2017-05-10 RX ORDER — HYDROCODONE BITARTRATE AND ACETAMINOPHEN 5; 325 MG/1; MG/1
1 TABLET ORAL EVERY 4 HOURS PRN
Status: DISCONTINUED | OUTPATIENT
Start: 2017-05-10 | End: 2017-05-12 | Stop reason: HOSPADM

## 2017-05-10 RX ORDER — MORPHINE SULFATE 2 MG/ML
2 INJECTION, SOLUTION INTRAMUSCULAR; INTRAVENOUS
Status: DISCONTINUED | OUTPATIENT
Start: 2017-05-10 | End: 2017-05-12 | Stop reason: HOSPADM

## 2017-05-10 RX ORDER — OSELTAMIVIR PHOSPHATE 75 MG/1
75 CAPSULE ORAL EVERY 12 HOURS SCHEDULED
Status: DISCONTINUED | OUTPATIENT
Start: 2017-05-10 | End: 2017-05-10

## 2017-05-10 RX ADMIN — ACYCLOVIR 400 MG: 400 TABLET ORAL at 18:00

## 2017-05-10 RX ADMIN — PANTOPRAZOLE SODIUM 40 MG: 40 TABLET, DELAYED RELEASE ORAL at 06:53

## 2017-05-10 RX ADMIN — CEFTRIAXONE SODIUM 1 G: 1 INJECTION, SOLUTION INTRAVENOUS at 09:15

## 2017-05-10 RX ADMIN — MONTELUKAST 10 MG: 10 TABLET, FILM COATED ORAL at 21:48

## 2017-05-10 RX ADMIN — SODIUM CHLORIDE 75 ML/HR: 9 INJECTION, SOLUTION INTRAVENOUS at 21:58

## 2017-05-10 RX ADMIN — CARBAMAZEPINE 200 MG: 200 TABLET, EXTENDED RELEASE ORAL at 21:48

## 2017-05-10 RX ADMIN — ACETAMINOPHEN 650 MG: 325 TABLET ORAL at 00:49

## 2017-05-10 RX ADMIN — SODIUM CHLORIDE 75 ML/HR: 9 INJECTION, SOLUTION INTRAVENOUS at 09:15

## 2017-05-10 RX ADMIN — ACETAMINOPHEN 650 MG: 325 TABLET ORAL at 11:52

## 2017-05-10 RX ADMIN — ACYCLOVIR 400 MG: 400 TABLET ORAL at 09:03

## 2017-05-10 RX ADMIN — HYDROCODONE BITARTRATE AND ACETAMINOPHEN 1 TABLET: 5; 325 TABLET ORAL at 21:58

## 2017-05-11 ENCOUNTER — TELEPHONE (OUTPATIENT)
Dept: NEUROLOGY | Facility: CLINIC | Age: 67
End: 2017-05-11

## 2017-05-11 LAB
ALBUMIN SERPL-MCNC: 2.6 G/DL (ref 3.5–5.2)
ALP SERPL-CCNC: 63 U/L (ref 39–117)
ALT SERPL W P-5'-P-CCNC: 14 U/L (ref 1–33)
ANION GAP SERPL CALCULATED.3IONS-SCNC: 7.9 MMOL/L
ANISOCYTOSIS BLD QL: NORMAL
AST SERPL-CCNC: 19 U/L (ref 1–32)
BACTERIA SPEC AEROBE CULT: ABNORMAL
BACTERIA SPEC AEROBE CULT: ABNORMAL
BACTERIA SPEC AEROBE CULT: NORMAL
BACTERIA SPEC AEROBE CULT: NORMAL
BASOPHILS # BLD AUTO: 0.02 10*3/MM3 (ref 0–0.2)
BASOPHILS NFR BLD AUTO: 0.2 % (ref 0–1.5)
BILIRUB CONJ SERPL-MCNC: <0.2 MG/DL (ref 0–0.3)
BILIRUB INDIRECT SERPL-MCNC: ABNORMAL MG/DL
BILIRUB SERPL-MCNC: 0.2 MG/DL (ref 0.1–1.2)
BUN BLD-MCNC: 6 MG/DL (ref 8–23)
BUN/CREAT SERPL: 4.4 (ref 7–25)
CALCIUM SPEC-SCNC: 7.3 MG/DL (ref 8.6–10.5)
CHLORIDE SERPL-SCNC: 115 MMOL/L (ref 98–107)
CO2 SERPL-SCNC: 21.1 MMOL/L (ref 22–29)
CREAT BLD-MCNC: 1.36 MG/DL (ref 0.57–1)
DACRYOCYTES BLD QL SMEAR: NORMAL
DEPRECATED RDW RBC AUTO: 55.7 FL (ref 37–54)
EOSINOPHIL # BLD AUTO: 0.31 10*3/MM3 (ref 0–0.7)
EOSINOPHIL NFR BLD AUTO: 2.5 % (ref 0.3–6.2)
ERYTHROCYTE [DISTWIDTH] IN BLOOD BY AUTOMATED COUNT: 14.6 % (ref 11.7–13)
GFR SERPL CREATININE-BSD FRML MDRD: 47 ML/MIN/1.73
GLUCOSE BLD-MCNC: 93 MG/DL (ref 65–99)
HCT VFR BLD AUTO: 31.4 % (ref 35.6–45.5)
HGB BLD-MCNC: 9.7 G/DL (ref 11.9–15.5)
IMM GRANULOCYTES # BLD: 0.21 10*3/MM3 (ref 0–0.03)
IMM GRANULOCYTES NFR BLD: 1.7 % (ref 0–0.5)
LYMPHOCYTES # BLD AUTO: 2.3 10*3/MM3 (ref 0.9–4.8)
LYMPHOCYTES NFR BLD AUTO: 18.4 % (ref 19.6–45.3)
MCH RBC QN AUTO: 32.4 PG (ref 26.9–32)
MCHC RBC AUTO-ENTMCNC: 30.9 G/DL (ref 32.4–36.3)
MCV RBC AUTO: 105 FL (ref 80.5–98.2)
MONOCYTES # BLD AUTO: 0.92 10*3/MM3 (ref 0.2–1.2)
MONOCYTES NFR BLD AUTO: 7.4 % (ref 5–12)
NEUTROPHILS # BLD AUTO: 8.75 10*3/MM3 (ref 1.9–8.1)
NEUTROPHILS NFR BLD AUTO: 69.8 % (ref 42.7–76)
NRBC BLD MANUAL-RTO: 0.8 /100 WBC (ref 0–0)
PLAT MORPH BLD: NORMAL
PLATELET # BLD AUTO: 57 10*3/MM3 (ref 140–500)
PMV BLD AUTO: ABNORMAL FL (ref 6–12)
POTASSIUM BLD-SCNC: 3.6 MMOL/L (ref 3.5–5.2)
PROT SERPL-MCNC: 7.6 G/DL (ref 6–8.5)
RBC # BLD AUTO: 2.99 10*6/MM3 (ref 3.9–5.2)
SODIUM BLD-SCNC: 144 MMOL/L (ref 136–145)
WBC MORPH BLD: NORMAL
WBC NRBC COR # BLD: 12.51 10*3/MM3 (ref 4.5–10.7)

## 2017-05-11 PROCEDURE — 99232 SBSQ HOSP IP/OBS MODERATE 35: CPT | Performed by: INTERNAL MEDICINE

## 2017-05-11 PROCEDURE — 85025 COMPLETE CBC W/AUTO DIFF WBC: CPT | Performed by: INTERNAL MEDICINE

## 2017-05-11 PROCEDURE — 80048 BASIC METABOLIC PNL TOTAL CA: CPT | Performed by: INTERNAL MEDICINE

## 2017-05-11 PROCEDURE — 85007 BL SMEAR W/DIFF WBC COUNT: CPT | Performed by: INTERNAL MEDICINE

## 2017-05-11 PROCEDURE — 25010000003 CEFTRIAXONE PER 250 MG: Performed by: INTERNAL MEDICINE

## 2017-05-11 PROCEDURE — 80076 HEPATIC FUNCTION PANEL: CPT | Performed by: INTERNAL MEDICINE

## 2017-05-11 RX ORDER — FAMOTIDINE 20 MG/1
20 TABLET, FILM COATED ORAL DAILY
Status: DISCONTINUED | OUTPATIENT
Start: 2017-05-11 | End: 2017-05-12 | Stop reason: HOSPADM

## 2017-05-11 RX ORDER — GUAIFENESIN AND DEXTROMETHORPHAN HYDROBROMIDE 600; 30 MG/1; MG/1
1 TABLET, EXTENDED RELEASE ORAL EVERY 12 HOURS SCHEDULED
Status: DISCONTINUED | OUTPATIENT
Start: 2017-05-11 | End: 2017-05-12 | Stop reason: HOSPADM

## 2017-05-11 RX ORDER — CARBAMAZEPINE 200 MG/1
400 TABLET, EXTENDED RELEASE ORAL EVERY 24 HOURS
Status: DISCONTINUED | OUTPATIENT
Start: 2017-05-11 | End: 2017-05-12 | Stop reason: HOSPADM

## 2017-05-11 RX ORDER — LOPERAMIDE HYDROCHLORIDE 2 MG/1
4 CAPSULE ORAL 4 TIMES DAILY PRN
Status: DISCONTINUED | OUTPATIENT
Start: 2017-05-11 | End: 2017-05-12 | Stop reason: HOSPADM

## 2017-05-11 RX ADMIN — PANTOPRAZOLE SODIUM 40 MG: 40 TABLET, DELAYED RELEASE ORAL at 09:13

## 2017-05-11 RX ADMIN — GUAIFENESIN AND DEXTROMETHORPHAN HYDROBROMIDE 1 TABLET: 600; 30 TABLET, EXTENDED RELEASE ORAL at 20:51

## 2017-05-11 RX ADMIN — MONTELUKAST 10 MG: 10 TABLET, FILM COATED ORAL at 20:51

## 2017-05-11 RX ADMIN — GUAIFENESIN AND DEXTROMETHORPHAN HYDROBROMIDE 1 TABLET: 600; 30 TABLET, EXTENDED RELEASE ORAL at 13:01

## 2017-05-11 RX ADMIN — ACYCLOVIR 400 MG: 400 TABLET ORAL at 09:14

## 2017-05-11 RX ADMIN — SODIUM CHLORIDE 75 ML/HR: 9 INJECTION, SOLUTION INTRAVENOUS at 11:37

## 2017-05-11 RX ADMIN — OSELTAMIVIR PHOSPHATE 30 MG: 30 CAPSULE ORAL at 00:24

## 2017-05-11 RX ADMIN — CEFTRIAXONE SODIUM 1 G: 1 INJECTION, SOLUTION INTRAVENOUS at 09:14

## 2017-05-11 RX ADMIN — ACYCLOVIR 400 MG: 400 TABLET ORAL at 18:05

## 2017-05-11 RX ADMIN — OSELTAMIVIR PHOSPHATE 30 MG: 30 CAPSULE ORAL at 09:14

## 2017-05-11 RX ADMIN — CARBAMAZEPINE 200 MG: 200 TABLET, EXTENDED RELEASE ORAL at 09:14

## 2017-05-11 RX ADMIN — CARBAMAZEPINE 400 MG: 200 TABLET, EXTENDED RELEASE ORAL at 20:51

## 2017-05-11 RX ADMIN — OSELTAMIVIR PHOSPHATE 30 MG: 30 CAPSULE ORAL at 20:51

## 2017-05-11 RX ADMIN — FAMOTIDINE 20 MG: 20 TABLET, FILM COATED ORAL at 18:05

## 2017-05-11 RX ADMIN — HYDROCODONE BITARTRATE AND ACETAMINOPHEN 1 TABLET: 5; 325 TABLET ORAL at 10:18

## 2017-05-12 VITALS
TEMPERATURE: 97 F | DIASTOLIC BLOOD PRESSURE: 78 MMHG | SYSTOLIC BLOOD PRESSURE: 148 MMHG | RESPIRATION RATE: 16 BRPM | HEIGHT: 62 IN | OXYGEN SATURATION: 100 % | WEIGHT: 184.56 LBS | BODY MASS INDEX: 33.96 KG/M2 | HEART RATE: 65 BPM

## 2017-05-12 PROBLEM — D69.59: Status: ACTIVE | Noted: 2017-05-12

## 2017-05-12 PROBLEM — J11.1 FLU: Status: ACTIVE | Noted: 2017-05-12

## 2017-05-12 LAB
ANION GAP SERPL CALCULATED.3IONS-SCNC: 8.4 MMOL/L
BACTERIA SPEC AEROBE CULT: NORMAL
BASOPHILS # BLD AUTO: 0.02 10*3/MM3 (ref 0–0.2)
BASOPHILS NFR BLD AUTO: 0.2 % (ref 0–1.5)
BUN BLD-MCNC: 8 MG/DL (ref 8–23)
BUN/CREAT SERPL: 5.7 (ref 7–25)
CALCIUM SPEC-SCNC: 7.3 MG/DL (ref 8.6–10.5)
CHLORIDE SERPL-SCNC: 113 MMOL/L (ref 98–107)
CO2 SERPL-SCNC: 21.6 MMOL/L (ref 22–29)
CREAT BLD-MCNC: 1.4 MG/DL (ref 0.57–1)
DEPRECATED RDW RBC AUTO: 55 FL (ref 37–54)
EOSINOPHIL # BLD AUTO: 0.26 10*3/MM3 (ref 0–0.7)
EOSINOPHIL NFR BLD AUTO: 2.7 % (ref 0.3–6.2)
ERYTHROCYTE [DISTWIDTH] IN BLOOD BY AUTOMATED COUNT: 14.5 % (ref 11.7–13)
GFR SERPL CREATININE-BSD FRML MDRD: 46 ML/MIN/1.73
GLUCOSE BLD-MCNC: 93 MG/DL (ref 65–99)
HCT VFR BLD AUTO: 31.3 % (ref 35.6–45.5)
HGB BLD-MCNC: 10 G/DL (ref 11.9–15.5)
IMM GRANULOCYTES # BLD: 0.67 10*3/MM3 (ref 0–0.03)
IMM GRANULOCYTES NFR BLD: 7 % (ref 0–0.5)
LYMPHOCYTES # BLD AUTO: 2.38 10*3/MM3 (ref 0.9–4.8)
LYMPHOCYTES NFR BLD AUTO: 24.8 % (ref 19.6–45.3)
MCH RBC QN AUTO: 33.9 PG (ref 26.9–32)
MCHC RBC AUTO-ENTMCNC: 31.9 G/DL (ref 32.4–36.3)
MCV RBC AUTO: 106.1 FL (ref 80.5–98.2)
MONOCYTES # BLD AUTO: 0.9 10*3/MM3 (ref 0.2–1.2)
MONOCYTES NFR BLD AUTO: 9.4 % (ref 5–12)
NEUTROPHILS # BLD AUTO: 5.37 10*3/MM3 (ref 1.9–8.1)
NEUTROPHILS NFR BLD AUTO: 55.9 % (ref 42.7–76)
NRBC BLD MANUAL-RTO: 1.1 /100 WBC (ref 0–0)
PLATELET # BLD AUTO: 55 10*3/MM3 (ref 140–500)
POTASSIUM BLD-SCNC: 3.6 MMOL/L (ref 3.5–5.2)
RBC # BLD AUTO: 2.95 10*6/MM3 (ref 3.9–5.2)
SODIUM BLD-SCNC: 143 MMOL/L (ref 136–145)
WBC NRBC COR # BLD: 9.6 10*3/MM3 (ref 4.5–10.7)

## 2017-05-12 PROCEDURE — 99239 HOSP IP/OBS DSCHRG MGMT >30: CPT | Performed by: INTERNAL MEDICINE

## 2017-05-12 PROCEDURE — 80048 BASIC METABOLIC PNL TOTAL CA: CPT | Performed by: INTERNAL MEDICINE

## 2017-05-12 PROCEDURE — 99232 SBSQ HOSP IP/OBS MODERATE 35: CPT | Performed by: INTERNAL MEDICINE

## 2017-05-12 PROCEDURE — 85025 COMPLETE CBC W/AUTO DIFF WBC: CPT | Performed by: INTERNAL MEDICINE

## 2017-05-12 RX ORDER — OSELTAMIVIR PHOSPHATE 30 MG/1
30 CAPSULE ORAL EVERY 12 HOURS SCHEDULED
Qty: 6 CAPSULE | Refills: 0 | Status: SHIPPED | OUTPATIENT
Start: 2017-05-12 | End: 2017-05-16

## 2017-05-12 RX ORDER — CARBAMAZEPINE 200 MG/1
400 TABLET, EXTENDED RELEASE ORAL
COMMUNITY
End: 2017-07-19 | Stop reason: SDUPTHER

## 2017-05-12 RX ADMIN — ACYCLOVIR 400 MG: 400 TABLET ORAL at 09:08

## 2017-05-12 RX ADMIN — LOPERAMIDE HYDROCHLORIDE 4 MG: 2 CAPSULE ORAL at 11:04

## 2017-05-12 RX ADMIN — FAMOTIDINE 20 MG: 20 TABLET, FILM COATED ORAL at 09:08

## 2017-05-12 RX ADMIN — OSELTAMIVIR PHOSPHATE 30 MG: 30 CAPSULE ORAL at 09:08

## 2017-05-12 RX ADMIN — GUAIFENESIN AND DEXTROMETHORPHAN HYDROBROMIDE 1 TABLET: 600; 30 TABLET, EXTENDED RELEASE ORAL at 09:08

## 2017-05-12 RX ADMIN — SODIUM CHLORIDE 75 ML/HR: 9 INJECTION, SOLUTION INTRAVENOUS at 01:30

## 2017-05-16 DIAGNOSIS — C90.00 MULTIPLE MYELOMA, REMISSION STATUS UNSPECIFIED (HCC): ICD-10-CM

## 2017-05-17 ENCOUNTER — APPOINTMENT (OUTPATIENT)
Dept: ONCOLOGY | Facility: HOSPITAL | Age: 67
End: 2017-05-17

## 2017-05-17 ENCOUNTER — APPOINTMENT (OUTPATIENT)
Dept: ONCOLOGY | Facility: CLINIC | Age: 67
End: 2017-05-17

## 2017-05-26 ENCOUNTER — OFFICE VISIT (OUTPATIENT)
Dept: ONCOLOGY | Facility: CLINIC | Age: 67
End: 2017-05-26

## 2017-05-26 ENCOUNTER — INFUSION (OUTPATIENT)
Dept: ONCOLOGY | Facility: HOSPITAL | Age: 67
End: 2017-05-26

## 2017-05-26 VITALS
SYSTOLIC BLOOD PRESSURE: 124 MMHG | BODY MASS INDEX: 34.96 KG/M2 | HEIGHT: 62 IN | DIASTOLIC BLOOD PRESSURE: 75 MMHG | HEART RATE: 67 BPM | OXYGEN SATURATION: 99 % | WEIGHT: 190 LBS | TEMPERATURE: 97.6 F | RESPIRATION RATE: 18 BRPM

## 2017-05-26 VITALS — SYSTOLIC BLOOD PRESSURE: 130 MMHG | DIASTOLIC BLOOD PRESSURE: 84 MMHG | HEART RATE: 69 BPM

## 2017-05-26 DIAGNOSIS — C90.00 MULTIPLE MYELOMA, REMISSION STATUS UNSPECIFIED (HCC): ICD-10-CM

## 2017-05-26 DIAGNOSIS — T45.1X5A ANEMIA ASSOCIATED WITH CHEMOTHERAPY: Primary | ICD-10-CM

## 2017-05-26 DIAGNOSIS — Z85.3 PERSONAL HISTORY OF BREAST CANCER: ICD-10-CM

## 2017-05-26 DIAGNOSIS — R91.1 PULMONARY NODULE, RIGHT: ICD-10-CM

## 2017-05-26 DIAGNOSIS — C90.00 MULTIPLE MYELOMA, REMISSION STATUS UNSPECIFIED (HCC): Primary | ICD-10-CM

## 2017-05-26 DIAGNOSIS — T45.1X5A CHEMOTHERAPY-INDUCED THROMBOCYTOPENIA: ICD-10-CM

## 2017-05-26 DIAGNOSIS — D64.81 ANEMIA ASSOCIATED WITH CHEMOTHERAPY: Primary | ICD-10-CM

## 2017-05-26 DIAGNOSIS — D69.59 CHEMOTHERAPY-INDUCED THROMBOCYTOPENIA: ICD-10-CM

## 2017-05-26 LAB
ALBUMIN SERPL-MCNC: 3.3 G/DL (ref 3.5–5.2)
ALBUMIN/GLOB SERPL: 0.7 G/DL
ALP SERPL-CCNC: 73 U/L (ref 39–117)
ALT SERPL W P-5'-P-CCNC: 23 U/L (ref 1–33)
ANION GAP SERPL CALCULATED.3IONS-SCNC: 13 MMOL/L
AST SERPL-CCNC: 23 U/L (ref 1–32)
BASOPHILS # BLD AUTO: 0.03 10*3/MM3 (ref 0–0.2)
BASOPHILS NFR BLD AUTO: 0.6 % (ref 0–1.5)
BILIRUB SERPL-MCNC: 0.2 MG/DL (ref 0.1–1.2)
BUN BLD-MCNC: 24 MG/DL (ref 8–23)
BUN/CREAT SERPL: 15.3 (ref 7–25)
CALCIUM SPEC-SCNC: 8.1 MG/DL (ref 8.6–10.5)
CHLORIDE SERPL-SCNC: 107 MMOL/L (ref 98–107)
CO2 SERPL-SCNC: 22 MMOL/L (ref 22–29)
CREAT BLD-MCNC: 1.57 MG/DL (ref 0.57–1)
DACRYOCYTES BLD QL SMEAR: NORMAL
DEPRECATED RDW RBC AUTO: 56.5 FL (ref 37–54)
EOSINOPHIL # BLD AUTO: 0.17 10*3/MM3 (ref 0–0.7)
EOSINOPHIL NFR BLD AUTO: 3.6 % (ref 0.3–6.2)
ERYTHROCYTE [DISTWIDTH] IN BLOOD BY AUTOMATED COUNT: 14.3 % (ref 11.7–13)
GFR SERPL CREATININE-BSD FRML MDRD: 40 ML/MIN/1.73
GLOBULIN UR ELPH-MCNC: 5 GM/DL
GLUCOSE BLD-MCNC: 131 MG/DL (ref 65–99)
HCT VFR BLD AUTO: 35.1 % (ref 35.6–45.5)
HGB BLD-MCNC: 11 G/DL (ref 11.9–15.5)
HOLD SPECIMEN: NORMAL
IMM GRANULOCYTES # BLD: 0.04 10*3/MM3 (ref 0–0.03)
IMM GRANULOCYTES NFR BLD: 0.8 % (ref 0–0.5)
LYMPHOCYTES # BLD AUTO: 1.67 10*3/MM3 (ref 0.9–4.8)
LYMPHOCYTES NFR BLD AUTO: 35.4 % (ref 19.6–45.3)
MACROCYTES BLD QL SMEAR: NORMAL
MCH RBC QN AUTO: 33.7 PG (ref 26.9–32)
MCHC RBC AUTO-ENTMCNC: 31.3 G/DL (ref 32.4–36.3)
MCV RBC AUTO: 107.7 FL (ref 80.5–98.2)
MONOCYTES # BLD AUTO: 0.57 10*3/MM3 (ref 0.2–1.2)
MONOCYTES NFR BLD AUTO: 12.1 % (ref 5–12)
NEUTROPHILS # BLD AUTO: 2.24 10*3/MM3 (ref 1.9–8.1)
NEUTROPHILS NFR BLD AUTO: 47.5 % (ref 42.7–76)
NRBC BLD MANUAL-RTO: 0 /100 WBC (ref 0–0)
PLAT MORPH BLD: NORMAL
PLATELET # BLD AUTO: 94 10*3/MM3 (ref 140–500)
PMV BLD AUTO: 11.2 FL (ref 6–12)
POTASSIUM BLD-SCNC: 3.6 MMOL/L (ref 3.5–5.2)
PROT SERPL-MCNC: 8.3 G/DL (ref 6–8.5)
RBC # BLD AUTO: 3.26 10*6/MM3 (ref 3.9–5.2)
SODIUM BLD-SCNC: 142 MMOL/L (ref 136–145)
WBC MORPH BLD: NORMAL
WBC NRBC COR # BLD: 4.72 10*3/MM3 (ref 4.5–10.7)

## 2017-05-26 PROCEDURE — 25010000002 DARATUMUMAB 100 MG/5ML SOLUTION 20 ML VIAL: Performed by: INTERNAL MEDICINE

## 2017-05-26 PROCEDURE — 85025 COMPLETE CBC W/AUTO DIFF WBC: CPT | Performed by: INTERNAL MEDICINE

## 2017-05-26 PROCEDURE — 96375 TX/PRO/DX INJ NEW DRUG ADDON: CPT | Performed by: INTERNAL MEDICINE

## 2017-05-26 PROCEDURE — 96415 CHEMO IV INFUSION ADDL HR: CPT | Performed by: INTERNAL MEDICINE

## 2017-05-26 PROCEDURE — 99215 OFFICE O/P EST HI 40 MIN: CPT | Performed by: INTERNAL MEDICINE

## 2017-05-26 PROCEDURE — 25010000002 DARATUMUMAB 100 MG/5ML SOLUTION 5 ML VIAL: Performed by: INTERNAL MEDICINE

## 2017-05-26 PROCEDURE — 80053 COMPREHEN METABOLIC PANEL: CPT | Performed by: INTERNAL MEDICINE

## 2017-05-26 PROCEDURE — 25010000002 DIPHENHYDRAMINE PER 50 MG: Performed by: INTERNAL MEDICINE

## 2017-05-26 PROCEDURE — 96413 CHEMO IV INFUSION 1 HR: CPT | Performed by: INTERNAL MEDICINE

## 2017-05-26 PROCEDURE — 85007 BL SMEAR W/DIFF WBC COUNT: CPT | Performed by: INTERNAL MEDICINE

## 2017-05-26 PROCEDURE — 25010000002 METHYLPREDNISOLONE PER 125 MG: Performed by: INTERNAL MEDICINE

## 2017-05-26 RX ORDER — MEPERIDINE HYDROCHLORIDE 50 MG/ML
25 INJECTION INTRAMUSCULAR; INTRAVENOUS; SUBCUTANEOUS
Status: CANCELLED | OUTPATIENT
Start: 2017-05-26

## 2017-05-26 RX ORDER — SODIUM CHLORIDE 9 MG/ML
250 INJECTION, SOLUTION INTRAVENOUS ONCE
Status: CANCELLED | OUTPATIENT
Start: 2017-05-26 | End: 2017-05-26

## 2017-05-26 RX ORDER — SODIUM CHLORIDE 9 MG/ML
250 INJECTION, SOLUTION INTRAVENOUS ONCE
Status: COMPLETED | OUTPATIENT
Start: 2017-05-26 | End: 2017-05-26

## 2017-05-26 RX ORDER — METHYLPREDNISOLONE SODIUM SUCCINATE 125 MG/2ML
60 INJECTION, POWDER, LYOPHILIZED, FOR SOLUTION INTRAMUSCULAR; INTRAVENOUS ONCE
Status: CANCELLED | OUTPATIENT
Start: 2017-05-26

## 2017-05-26 RX ORDER — ACETAMINOPHEN 500 MG
1000 TABLET ORAL ONCE
Status: COMPLETED | OUTPATIENT
Start: 2017-05-26 | End: 2017-05-26

## 2017-05-26 RX ORDER — ACETAMINOPHEN 500 MG
1000 TABLET ORAL ONCE
Status: CANCELLED | OUTPATIENT
Start: 2017-05-26

## 2017-05-26 RX ORDER — DIPHENHYDRAMINE HYDROCHLORIDE 50 MG/ML
50 INJECTION INTRAMUSCULAR; INTRAVENOUS AS NEEDED
Status: CANCELLED | OUTPATIENT
Start: 2017-05-26

## 2017-05-26 RX ORDER — METHYLPREDNISOLONE SODIUM SUCCINATE 125 MG/2ML
60 INJECTION, POWDER, LYOPHILIZED, FOR SOLUTION INTRAMUSCULAR; INTRAVENOUS ONCE
Status: COMPLETED | OUTPATIENT
Start: 2017-05-26 | End: 2017-05-26

## 2017-05-26 RX ORDER — FAMOTIDINE 10 MG/ML
20 INJECTION, SOLUTION INTRAVENOUS AS NEEDED
Status: CANCELLED | OUTPATIENT
Start: 2017-05-26

## 2017-05-26 RX ADMIN — SODIUM CHLORIDE 250 ML: 900 INJECTION, SOLUTION INTRAVENOUS at 11:06

## 2017-05-26 RX ADMIN — ACETAMINOPHEN 1000 MG: 500 TABLET ORAL at 11:11

## 2017-05-26 RX ADMIN — DARATUMUMAB 1370 MG: 100 INJECTION, SOLUTION, CONCENTRATE INTRAVENOUS at 12:12

## 2017-05-26 RX ADMIN — METHYLPREDNISOLONE SODIUM SUCCINATE 60 MG: 125 INJECTION, POWDER, FOR SOLUTION INTRAMUSCULAR; INTRAVENOUS at 11:11

## 2017-05-26 RX ADMIN — DIPHENHYDRAMINE HYDROCHLORIDE 25 MG: 50 INJECTION, SOLUTION INTRAMUSCULAR; INTRAVENOUS at 11:12

## 2017-05-30 ENCOUNTER — OFFICE VISIT (OUTPATIENT)
Dept: FAMILY MEDICINE CLINIC | Facility: CLINIC | Age: 67
End: 2017-05-30

## 2017-05-30 VITALS
SYSTOLIC BLOOD PRESSURE: 124 MMHG | RESPIRATION RATE: 16 BRPM | WEIGHT: 194 LBS | TEMPERATURE: 98.6 F | HEART RATE: 72 BPM | BODY MASS INDEX: 35.7 KG/M2 | HEIGHT: 62 IN | DIASTOLIC BLOOD PRESSURE: 68 MMHG | OXYGEN SATURATION: 98 %

## 2017-05-30 DIAGNOSIS — N39.0 ACUTE UTI: Primary | ICD-10-CM

## 2017-05-30 DIAGNOSIS — N39.0 URINARY TRACT INFECTION WITHOUT HEMATURIA, SITE UNSPECIFIED: ICD-10-CM

## 2017-05-30 LAB
BILIRUB BLD-MCNC: NEGATIVE MG/DL
CLARITY, POC: ABNORMAL
COLOR UR: YELLOW
GLUCOSE UR STRIP-MCNC: NEGATIVE MG/DL
KETONES UR QL: NEGATIVE
LEUKOCYTE EST, POC: ABNORMAL
NITRITE UR-MCNC: NEGATIVE MG/ML
PH UR: 6.5 [PH] (ref 5–8)
PROT UR STRIP-MCNC: ABNORMAL MG/DL
RBC # UR STRIP: NEGATIVE /UL
SP GR UR: 1.02 (ref 1–1.03)
UROBILINOGEN UR QL: NORMAL

## 2017-05-30 PROCEDURE — 99214 OFFICE O/P EST MOD 30 MIN: CPT | Performed by: NURSE PRACTITIONER

## 2017-05-30 PROCEDURE — 81003 URINALYSIS AUTO W/O SCOPE: CPT | Performed by: NURSE PRACTITIONER

## 2017-05-30 RX ORDER — MONTELUKAST SODIUM 10 MG/1
TABLET ORAL
Qty: 30 TABLET | Refills: 11 | Status: SHIPPED | OUTPATIENT
Start: 2017-05-30 | End: 2018-09-12

## 2017-05-30 RX ORDER — NITROFURANTOIN 25; 75 MG/1; MG/1
100 CAPSULE ORAL 2 TIMES DAILY
Qty: 14 CAPSULE | Refills: 0 | Status: SHIPPED | OUTPATIENT
Start: 2017-05-30 | End: 2017-06-06

## 2017-06-12 RX ORDER — ACYCLOVIR 400 MG/1
TABLET ORAL
Qty: 60 TABLET | Refills: 8 | Status: SHIPPED | OUTPATIENT
Start: 2017-06-12 | End: 2018-01-07 | Stop reason: SDUPTHER

## 2017-06-21 DIAGNOSIS — C90.00 MULTIPLE MYELOMA, REMISSION STATUS UNSPECIFIED (HCC): ICD-10-CM

## 2017-06-22 ENCOUNTER — TELEPHONE (OUTPATIENT)
Dept: ONCOLOGY | Facility: CLINIC | Age: 67
End: 2017-06-22

## 2017-06-22 DIAGNOSIS — C90.00 MULTIPLE MYELOMA (HCC): ICD-10-CM

## 2017-06-22 RX ORDER — DEXAMETHASONE 4 MG/1
TABLET ORAL
Qty: 16 TABLET | Refills: 1 | Status: SHIPPED | OUTPATIENT
Start: 2017-06-22 | End: 2018-09-12

## 2017-06-23 ENCOUNTER — INFUSION (OUTPATIENT)
Dept: ONCOLOGY | Facility: HOSPITAL | Age: 67
End: 2017-06-23

## 2017-06-23 VITALS
TEMPERATURE: 98.6 F | SYSTOLIC BLOOD PRESSURE: 118 MMHG | DIASTOLIC BLOOD PRESSURE: 75 MMHG | BODY MASS INDEX: 35.18 KG/M2 | WEIGHT: 193.6 LBS | HEART RATE: 63 BPM | OXYGEN SATURATION: 98 %

## 2017-06-23 DIAGNOSIS — C90.00 MULTIPLE MYELOMA, REMISSION STATUS UNSPECIFIED (HCC): ICD-10-CM

## 2017-06-23 DIAGNOSIS — C90.00 MULTIPLE MYELOMA, REMISSION STATUS UNSPECIFIED (HCC): Primary | ICD-10-CM

## 2017-06-23 DIAGNOSIS — C90.00 MULTIPLE MYELOMA NOT HAVING ACHIEVED REMISSION (HCC): Primary | ICD-10-CM

## 2017-06-23 LAB
ALBUMIN SERPL-MCNC: 3.2 G/DL (ref 3.5–5.2)
ALBUMIN/GLOB SERPL: 0.7 G/DL
ALP SERPL-CCNC: 77 U/L (ref 39–117)
ALT SERPL W P-5'-P-CCNC: 16 U/L (ref 1–33)
ANION GAP SERPL CALCULATED.3IONS-SCNC: 11.5 MMOL/L
AST SERPL-CCNC: 19 U/L (ref 1–32)
BASOPHILS # BLD AUTO: 0.03 10*3/MM3 (ref 0–0.2)
BASOPHILS NFR BLD AUTO: 0.4 % (ref 0–1.5)
BILIRUB SERPL-MCNC: 0.3 MG/DL (ref 0.1–1.2)
BUN BLD-MCNC: 23 MG/DL (ref 8–23)
BUN/CREAT SERPL: 13.5 (ref 7–25)
CALCIUM SPEC-SCNC: 8.3 MG/DL (ref 8.6–10.5)
CHLORIDE SERPL-SCNC: 109 MMOL/L (ref 98–107)
CO2 SERPL-SCNC: 21.5 MMOL/L (ref 22–29)
CREAT BLD-MCNC: 1.7 MG/DL (ref 0.57–1)
DACRYOCYTES BLD QL SMEAR: NORMAL
DEPRECATED RDW RBC AUTO: 54.5 FL (ref 37–54)
EOSINOPHIL # BLD AUTO: 0.21 10*3/MM3 (ref 0–0.7)
EOSINOPHIL NFR BLD AUTO: 2.7 % (ref 0.3–6.2)
ERYTHROCYTE [DISTWIDTH] IN BLOOD BY AUTOMATED COUNT: 13.9 % (ref 11.7–13)
GFR SERPL CREATININE-BSD FRML MDRD: 37 ML/MIN/1.73
GLOBULIN UR ELPH-MCNC: 4.8 GM/DL
GLUCOSE BLD-MCNC: 108 MG/DL (ref 65–99)
HCT VFR BLD AUTO: 35.2 % (ref 35.6–45.5)
HGB BLD-MCNC: 11.1 G/DL (ref 11.9–15.5)
IMM GRANULOCYTES # BLD: 0.23 10*3/MM3 (ref 0–0.03)
IMM GRANULOCYTES NFR BLD: 3 % (ref 0–0.5)
LYMPHOCYTES # BLD AUTO: 2.3 10*3/MM3 (ref 0.9–4.8)
LYMPHOCYTES NFR BLD AUTO: 29.7 % (ref 19.6–45.3)
MACROCYTES BLD QL SMEAR: NORMAL
MCH RBC QN AUTO: 33.7 PG (ref 26.9–32)
MCHC RBC AUTO-ENTMCNC: 31.5 G/DL (ref 32.4–36.3)
MCV RBC AUTO: 107 FL (ref 80.5–98.2)
MONOCYTES # BLD AUTO: 0.74 10*3/MM3 (ref 0.2–1.2)
MONOCYTES NFR BLD AUTO: 9.6 % (ref 5–12)
NEUTROPHILS # BLD AUTO: 4.23 10*3/MM3 (ref 1.9–8.1)
NEUTROPHILS NFR BLD AUTO: 54.6 % (ref 42.7–76)
NRBC BLD MANUAL-RTO: 0.3 /100 WBC (ref 0–0)
PLAT MORPH BLD: NORMAL
PLATELET # BLD AUTO: 137 10*3/MM3 (ref 140–500)
PMV BLD AUTO: 11.1 FL (ref 6–12)
POTASSIUM BLD-SCNC: 3.7 MMOL/L (ref 3.5–5.2)
PROT SERPL-MCNC: 8 G/DL (ref 6–8.5)
RBC # BLD AUTO: 3.29 10*6/MM3 (ref 3.9–5.2)
SODIUM BLD-SCNC: 142 MMOL/L (ref 136–145)
WBC MORPH BLD: NORMAL
WBC NRBC COR # BLD: 7.74 10*3/MM3 (ref 4.5–10.7)

## 2017-06-23 PROCEDURE — 25010000002 DARATUMUMAB 100 MG/5ML SOLUTION 5 ML VIAL: Performed by: INTERNAL MEDICINE

## 2017-06-23 PROCEDURE — 25010000002 DIPHENHYDRAMINE PER 50 MG: Performed by: INTERNAL MEDICINE

## 2017-06-23 PROCEDURE — 85025 COMPLETE CBC W/AUTO DIFF WBC: CPT | Performed by: INTERNAL MEDICINE

## 2017-06-23 PROCEDURE — 96415 CHEMO IV INFUSION ADDL HR: CPT

## 2017-06-23 PROCEDURE — 96375 TX/PRO/DX INJ NEW DRUG ADDON: CPT | Performed by: INTERNAL MEDICINE

## 2017-06-23 PROCEDURE — 80053 COMPREHEN METABOLIC PANEL: CPT | Performed by: INTERNAL MEDICINE

## 2017-06-23 PROCEDURE — 25010000002 DARATUMUMAB 100 MG/5ML SOLUTION 20 ML VIAL: Performed by: INTERNAL MEDICINE

## 2017-06-23 PROCEDURE — 85007 BL SMEAR W/DIFF WBC COUNT: CPT | Performed by: INTERNAL MEDICINE

## 2017-06-23 PROCEDURE — 96413 CHEMO IV INFUSION 1 HR: CPT | Performed by: INTERNAL MEDICINE

## 2017-06-23 PROCEDURE — 25010000002 METHYLPREDNISOLONE PER 125 MG: Performed by: INTERNAL MEDICINE

## 2017-06-23 PROCEDURE — 36415 COLL VENOUS BLD VENIPUNCTURE: CPT | Performed by: INTERNAL MEDICINE

## 2017-06-23 RX ORDER — METHYLPREDNISOLONE SODIUM SUCCINATE 125 MG/2ML
60 INJECTION, POWDER, LYOPHILIZED, FOR SOLUTION INTRAMUSCULAR; INTRAVENOUS ONCE
Status: CANCELLED | OUTPATIENT
Start: 2017-06-23

## 2017-06-23 RX ORDER — ACETAMINOPHEN 500 MG
1000 TABLET ORAL ONCE
Status: CANCELLED | OUTPATIENT
Start: 2017-06-23

## 2017-06-23 RX ORDER — ACETAMINOPHEN 500 MG
1000 TABLET ORAL ONCE
Status: COMPLETED | OUTPATIENT
Start: 2017-06-23 | End: 2017-06-23

## 2017-06-23 RX ORDER — DIPHENHYDRAMINE HYDROCHLORIDE 50 MG/ML
50 INJECTION INTRAMUSCULAR; INTRAVENOUS AS NEEDED
Status: CANCELLED | OUTPATIENT
Start: 2017-06-23

## 2017-06-23 RX ORDER — SODIUM CHLORIDE 9 MG/ML
250 INJECTION, SOLUTION INTRAVENOUS ONCE
Status: CANCELLED | OUTPATIENT
Start: 2017-06-23 | End: 2017-06-23

## 2017-06-23 RX ORDER — SODIUM CHLORIDE 9 MG/ML
250 INJECTION, SOLUTION INTRAVENOUS ONCE
Status: COMPLETED | OUTPATIENT
Start: 2017-06-23 | End: 2017-06-23

## 2017-06-23 RX ORDER — FAMOTIDINE 10 MG/ML
20 INJECTION, SOLUTION INTRAVENOUS AS NEEDED
Status: CANCELLED | OUTPATIENT
Start: 2017-06-23

## 2017-06-23 RX ORDER — METHYLPREDNISOLONE SODIUM SUCCINATE 125 MG/2ML
60 INJECTION, POWDER, LYOPHILIZED, FOR SOLUTION INTRAMUSCULAR; INTRAVENOUS ONCE
Status: COMPLETED | OUTPATIENT
Start: 2017-06-23 | End: 2017-06-23

## 2017-06-23 RX ORDER — MEPERIDINE HYDROCHLORIDE 50 MG/ML
25 INJECTION INTRAMUSCULAR; INTRAVENOUS; SUBCUTANEOUS
Status: CANCELLED | OUTPATIENT
Start: 2017-06-23

## 2017-06-23 RX ADMIN — DIPHENHYDRAMINE HYDROCHLORIDE 25 MG: 50 INJECTION, SOLUTION INTRAMUSCULAR; INTRAVENOUS at 11:03

## 2017-06-23 RX ADMIN — METHYLPREDNISOLONE SODIUM SUCCINATE 60 MG: 125 INJECTION, POWDER, FOR SOLUTION INTRAMUSCULAR; INTRAVENOUS at 10:59

## 2017-06-23 RX ADMIN — ACETAMINOPHEN 1000 MG: 500 TABLET ORAL at 10:59

## 2017-06-23 RX ADMIN — SODIUM CHLORIDE 250 ML: 900 INJECTION, SOLUTION INTRAVENOUS at 10:59

## 2017-06-23 RX ADMIN — DARATUMUMAB 1370 MG: 100 INJECTION, SOLUTION, CONCENTRATE INTRAVENOUS at 12:06

## 2017-07-10 RX ORDER — CARBAMAZEPINE 200 MG/1
TABLET, EXTENDED RELEASE ORAL
Qty: 60 TABLET | Refills: 0 | Status: SHIPPED | OUTPATIENT
Start: 2017-07-10 | End: 2017-08-16 | Stop reason: SDUPTHER

## 2017-07-14 RX ORDER — CARBAMAZEPINE 200 MG/1
TABLET, EXTENDED RELEASE ORAL
Qty: 60 TABLET | Refills: 2 | OUTPATIENT
Start: 2017-07-14

## 2017-07-17 ENCOUNTER — TRANSCRIBE ORDERS (OUTPATIENT)
Dept: ADMINISTRATIVE | Facility: HOSPITAL | Age: 67
End: 2017-07-17

## 2017-07-17 DIAGNOSIS — Z12.31 VISIT FOR SCREENING MAMMOGRAM: Primary | ICD-10-CM

## 2017-07-19 ENCOUNTER — TELEPHONE (OUTPATIENT)
Dept: NEUROLOGY | Facility: CLINIC | Age: 67
End: 2017-07-19

## 2017-07-19 DIAGNOSIS — C90.00 MULTIPLE MYELOMA, REMISSION STATUS UNSPECIFIED (HCC): ICD-10-CM

## 2017-07-19 DIAGNOSIS — C90.00 MULTIPLE MYELOMA NOT HAVING ACHIEVED REMISSION (HCC): Primary | ICD-10-CM

## 2017-07-19 RX ORDER — MEPERIDINE HYDROCHLORIDE 50 MG/ML
25 INJECTION INTRAMUSCULAR; INTRAVENOUS; SUBCUTANEOUS
Status: CANCELLED | OUTPATIENT
Start: 2017-07-21

## 2017-07-19 RX ORDER — SODIUM CHLORIDE 9 MG/ML
250 INJECTION, SOLUTION INTRAVENOUS ONCE
Status: CANCELLED | OUTPATIENT
Start: 2017-07-21 | End: 2017-07-21

## 2017-07-19 RX ORDER — ACETAMINOPHEN 500 MG
1000 TABLET ORAL ONCE
Status: CANCELLED | OUTPATIENT
Start: 2017-07-21

## 2017-07-19 RX ORDER — METHYLPREDNISOLONE SODIUM SUCCINATE 125 MG/2ML
60 INJECTION, POWDER, LYOPHILIZED, FOR SOLUTION INTRAMUSCULAR; INTRAVENOUS ONCE
Status: CANCELLED | OUTPATIENT
Start: 2017-07-21

## 2017-07-19 RX ORDER — CARBAMAZEPINE 200 MG/1
200 TABLET, EXTENDED RELEASE ORAL 2 TIMES DAILY
Qty: 60 TABLET | Refills: 0 | Status: SHIPPED | OUTPATIENT
Start: 2017-07-19 | End: 2017-09-06 | Stop reason: SDUPTHER

## 2017-07-19 RX ORDER — FAMOTIDINE 10 MG/ML
20 INJECTION, SOLUTION INTRAVENOUS AS NEEDED
Status: CANCELLED | OUTPATIENT
Start: 2017-07-21

## 2017-07-19 RX ORDER — DIPHENHYDRAMINE HYDROCHLORIDE 50 MG/ML
50 INJECTION INTRAMUSCULAR; INTRAVENOUS AS NEEDED
Status: CANCELLED | OUTPATIENT
Start: 2017-07-21

## 2017-07-19 NOTE — TELEPHONE ENCOUNTER
----- Message from Maryse Chen sent at 7/19/2017  8:46 AM EDT -----  Contact: [phone]  Pt needs a refill on Tegretol  XR. Pt is completely out.

## 2017-07-20 ENCOUNTER — HOSPITAL ENCOUNTER (OUTPATIENT)
Dept: MAMMOGRAPHY | Facility: HOSPITAL | Age: 67
Discharge: HOME | End: 2017-07-20
Attending: FAMILY MEDICINE | Admitting: FAMILY MEDICINE

## 2017-07-20 DIAGNOSIS — Z12.31 VISIT FOR SCREENING MAMMOGRAM: ICD-10-CM

## 2017-07-20 PROCEDURE — G0202 SCR MAMMO BI INCL CAD: HCPCS

## 2017-07-21 ENCOUNTER — INFUSION (OUTPATIENT)
Dept: ONCOLOGY | Facility: HOSPITAL | Age: 67
End: 2017-07-21

## 2017-07-21 VITALS
SYSTOLIC BLOOD PRESSURE: 140 MMHG | OXYGEN SATURATION: 96 % | HEART RATE: 73 BPM | BODY MASS INDEX: 35.51 KG/M2 | TEMPERATURE: 98 F | WEIGHT: 195.4 LBS | DIASTOLIC BLOOD PRESSURE: 83 MMHG

## 2017-07-21 DIAGNOSIS — C90.00 MULTIPLE MYELOMA, REMISSION STATUS UNSPECIFIED (HCC): Primary | ICD-10-CM

## 2017-07-21 DIAGNOSIS — C90.00 MULTIPLE MYELOMA NOT HAVING ACHIEVED REMISSION (HCC): ICD-10-CM

## 2017-07-21 DIAGNOSIS — C64.2 MALIGNANT NEOPLASM OF KIDNEY, LEFT (HCC): ICD-10-CM

## 2017-07-21 DIAGNOSIS — D61.818 PANCYTOPENIA (HCC): Primary | ICD-10-CM

## 2017-07-21 LAB
ALBUMIN SERPL-MCNC: 3.4 G/DL (ref 3.5–5.2)
ALBUMIN/GLOB SERPL: 0.7 G/DL
ALP SERPL-CCNC: 68 U/L (ref 39–117)
ALT SERPL W P-5'-P-CCNC: 21 U/L (ref 1–33)
ANION GAP SERPL CALCULATED.3IONS-SCNC: 12.2 MMOL/L
AST SERPL-CCNC: 20 U/L (ref 1–32)
BASOPHILS # BLD AUTO: 0.03 10*3/MM3 (ref 0–0.2)
BASOPHILS NFR BLD AUTO: 0.4 % (ref 0–1.5)
BILIRUB SERPL-MCNC: 0.3 MG/DL (ref 0.1–1.2)
BUN BLD-MCNC: 28 MG/DL (ref 8–23)
BUN/CREAT SERPL: 16.7 (ref 7–25)
CALCIUM SPEC-SCNC: 8.5 MG/DL (ref 8.6–10.5)
CHLORIDE SERPL-SCNC: 110 MMOL/L (ref 98–107)
CO2 SERPL-SCNC: 20.8 MMOL/L (ref 22–29)
CREAT BLD-MCNC: 1.68 MG/DL (ref 0.57–1)
DEPRECATED RDW RBC AUTO: 53 FL (ref 37–54)
EOSINOPHIL # BLD AUTO: 0.21 10*3/MM3 (ref 0–0.7)
EOSINOPHIL NFR BLD AUTO: 3.1 % (ref 0.3–6.2)
ERYTHROCYTE [DISTWIDTH] IN BLOOD BY AUTOMATED COUNT: 13.9 % (ref 11.7–13)
GFR SERPL CREATININE-BSD FRML MDRD: 37 ML/MIN/1.73
GLOBULIN UR ELPH-MCNC: 4.6 GM/DL
GLUCOSE BLD-MCNC: 82 MG/DL (ref 65–99)
HCT VFR BLD AUTO: 35.3 % (ref 35.6–45.5)
HGB BLD-MCNC: 11.4 G/DL (ref 11.9–15.5)
IMM GRANULOCYTES # BLD: 0.12 10*3/MM3 (ref 0–0.03)
IMM GRANULOCYTES NFR BLD: 1.8 % (ref 0–0.5)
LYMPHOCYTES # BLD AUTO: 2.08 10*3/MM3 (ref 0.9–4.8)
LYMPHOCYTES NFR BLD AUTO: 31.1 % (ref 19.6–45.3)
MCH RBC QN AUTO: 33.7 PG (ref 26.9–32)
MCHC RBC AUTO-ENTMCNC: 32.3 G/DL (ref 32.4–36.3)
MCV RBC AUTO: 104.4 FL (ref 80.5–98.2)
MONOCYTES # BLD AUTO: 0.65 10*3/MM3 (ref 0.2–1.2)
MONOCYTES NFR BLD AUTO: 9.7 % (ref 5–12)
NEUTROPHILS # BLD AUTO: 3.59 10*3/MM3 (ref 1.9–8.1)
NEUTROPHILS NFR BLD AUTO: 53.9 % (ref 42.7–76)
NRBC BLD MANUAL-RTO: 0.3 /100 WBC (ref 0–0)
PLATELET # BLD AUTO: 122 10*3/MM3 (ref 140–500)
PMV BLD AUTO: 10.8 FL (ref 6–12)
POTASSIUM BLD-SCNC: 4.1 MMOL/L (ref 3.5–5.2)
PROT SERPL-MCNC: 8 G/DL (ref 6–8.5)
RBC # BLD AUTO: 3.38 10*6/MM3 (ref 3.9–5.2)
SODIUM BLD-SCNC: 143 MMOL/L (ref 136–145)
WBC NRBC COR # BLD: 6.68 10*3/MM3 (ref 4.5–10.7)

## 2017-07-21 PROCEDURE — 83883 ASSAY NEPHELOMETRY NOT SPEC: CPT | Performed by: INTERNAL MEDICINE

## 2017-07-21 PROCEDURE — 82232 ASSAY OF BETA-2 PROTEIN: CPT | Performed by: INTERNAL MEDICINE

## 2017-07-21 PROCEDURE — 84156 ASSAY OF PROTEIN URINE: CPT | Performed by: INTERNAL MEDICINE

## 2017-07-21 PROCEDURE — 96367 TX/PROPH/DG ADDL SEQ IV INF: CPT | Performed by: INTERNAL MEDICINE

## 2017-07-21 PROCEDURE — 96375 TX/PRO/DX INJ NEW DRUG ADDON: CPT | Performed by: INTERNAL MEDICINE

## 2017-07-21 PROCEDURE — 25010000002 DARATUMUMAB 400 MG/20ML SOLUTION 20 ML VIAL: Performed by: INTERNAL MEDICINE

## 2017-07-21 PROCEDURE — 96415 CHEMO IV INFUSION ADDL HR: CPT | Performed by: INTERNAL MEDICINE

## 2017-07-21 PROCEDURE — 25010000002 DARATUMUMAB 400 MG/20ML SOLUTION 5 ML VIAL: Performed by: INTERNAL MEDICINE

## 2017-07-21 PROCEDURE — 84165 PROTEIN E-PHORESIS SERUM: CPT | Performed by: INTERNAL MEDICINE

## 2017-07-21 PROCEDURE — 81050 URINALYSIS VOLUME MEASURE: CPT | Performed by: INTERNAL MEDICINE

## 2017-07-21 PROCEDURE — 96413 CHEMO IV INFUSION 1 HR: CPT | Performed by: INTERNAL MEDICINE

## 2017-07-21 PROCEDURE — 86334 IMMUNOFIX E-PHORESIS SERUM: CPT | Performed by: INTERNAL MEDICINE

## 2017-07-21 PROCEDURE — 86335 IMMUNFIX E-PHORSIS/URINE/CSF: CPT | Performed by: INTERNAL MEDICINE

## 2017-07-21 PROCEDURE — 84155 ASSAY OF PROTEIN SERUM: CPT | Performed by: INTERNAL MEDICINE

## 2017-07-21 PROCEDURE — 84166 PROTEIN E-PHORESIS/URINE/CSF: CPT | Performed by: INTERNAL MEDICINE

## 2017-07-21 PROCEDURE — 25010000002 METHYLPREDNISOLONE PER 125 MG: Performed by: INTERNAL MEDICINE

## 2017-07-21 PROCEDURE — 85025 COMPLETE CBC W/AUTO DIFF WBC: CPT | Performed by: INTERNAL MEDICINE

## 2017-07-21 PROCEDURE — 80053 COMPREHEN METABOLIC PANEL: CPT | Performed by: INTERNAL MEDICINE

## 2017-07-21 PROCEDURE — 25010000002 DIPHENHYDRAMINE PER 50 MG: Performed by: INTERNAL MEDICINE

## 2017-07-21 RX ORDER — SODIUM CHLORIDE 9 MG/ML
250 INJECTION, SOLUTION INTRAVENOUS ONCE
Status: COMPLETED | OUTPATIENT
Start: 2017-07-21 | End: 2017-07-21

## 2017-07-21 RX ORDER — ACETAMINOPHEN 500 MG
1000 TABLET ORAL ONCE
Status: COMPLETED | OUTPATIENT
Start: 2017-07-21 | End: 2017-07-21

## 2017-07-21 RX ORDER — METHYLPREDNISOLONE SODIUM SUCCINATE 125 MG/2ML
60 INJECTION, POWDER, LYOPHILIZED, FOR SOLUTION INTRAMUSCULAR; INTRAVENOUS ONCE
Status: COMPLETED | OUTPATIENT
Start: 2017-07-21 | End: 2017-07-21

## 2017-07-21 RX ADMIN — ACETAMINOPHEN 1000 MG: 500 TABLET ORAL at 11:17

## 2017-07-21 RX ADMIN — METHYLPREDNISOLONE SODIUM SUCCINATE 60 MG: 125 INJECTION, POWDER, FOR SOLUTION INTRAMUSCULAR; INTRAVENOUS at 11:34

## 2017-07-21 RX ADMIN — SODIUM CHLORIDE 250 ML: 900 INJECTION, SOLUTION INTRAVENOUS at 11:17

## 2017-07-21 RX ADMIN — DIPHENHYDRAMINE HYDROCHLORIDE 25 MG: 50 INJECTION, SOLUTION INTRAMUSCULAR; INTRAVENOUS at 11:17

## 2017-07-21 RX ADMIN — DARATUMUMAB 1372.8 MG: 100 INJECTION, SOLUTION, CONCENTRATE INTRAVENOUS at 12:18

## 2017-07-24 LAB
ALBUMIN 24H MFR UR ELPH: 4.9 %
ALBUMIN SERPL-MCNC: 3.2 G/DL (ref 2.9–4.4)
ALBUMIN/GLOB SERPL: 0.7 {RATIO} (ref 0.7–1.7)
ALPHA1 GLOB 24H MFR UR ELPH: 5.8 %
ALPHA1 GLOB FLD ELPH-MCNC: 0.2 G/DL (ref 0–0.4)
ALPHA2 GLOB 24H MFR UR ELPH: 15.5 %
ALPHA2 GLOB SERPL ELPH-MCNC: 0.6 G/DL (ref 0.4–1)
B-GLOBULIN MFR UR ELPH: 60.7 %
B-GLOBULIN SERPL ELPH-MCNC: 0.8 G/DL (ref 0.7–1.3)
B2 MICROGLOB SERPL-MCNC: 6.2 MG/L (ref 0.6–2.4)
GAMMA GLOB 24H MFR UR ELPH: 13.1 %
GAMMA GLOB SERPL ELPH-MCNC: 2.9 G/DL (ref 0.4–1.8)
GLOBULIN SER CALC-MCNC: 4.6 G/DL (ref 2.2–3.9)
HIV 1 & 2 AB SER-IMP: ABNORMAL
IGA SERPL-MCNC: 5 MG/DL (ref 87–352)
IGG SERPL-MCNC: 3261 MG/DL (ref 700–1600)
IGM SERPL-MCNC: 13 MG/DL (ref 26–217)
INTERPRETATION SERPL IEP-IMP: ABNORMAL
INTERPRETATION UR IFE-IMP: ABNORMAL
KAPPA LC SERPL-MCNC: 6.7 MG/L (ref 3.3–19.4)
KAPPA LC/LAMBDA SER: 0.01 {RATIO} (ref 0.26–1.65)
LAMBDA LC FREE SERPL-MCNC: 701.3 MG/L (ref 5.7–26.3)
Lab: ABNORMAL
M PROTEIN 24H MFR UR ELPH: ABNORMAL %
M-SPIKE: ABNORMAL G/DL
PROT 24H UR-MRATE: 241 MG/24 HR (ref 30–150)
PROT SERPL-MCNC: 7.8 G/DL (ref 6–8.5)
PROT UR-MCNC: 18.5 MG/DL

## 2017-08-03 ENCOUNTER — OFFICE VISIT (OUTPATIENT)
Dept: FAMILY MEDICINE CLINIC | Facility: CLINIC | Age: 67
End: 2017-08-03

## 2017-08-03 VITALS
SYSTOLIC BLOOD PRESSURE: 115 MMHG | DIASTOLIC BLOOD PRESSURE: 68 MMHG | BODY MASS INDEX: 36.62 KG/M2 | HEART RATE: 71 BPM | HEIGHT: 62 IN | WEIGHT: 199 LBS | TEMPERATURE: 97.9 F | RESPIRATION RATE: 16 BRPM

## 2017-08-03 DIAGNOSIS — N63.0 BREAST NODULE: Primary | ICD-10-CM

## 2017-08-03 DIAGNOSIS — Z85.3 PERSONAL HISTORY OF BREAST CANCER: ICD-10-CM

## 2017-08-03 DIAGNOSIS — R19.5 POSITIVE FECAL IMMUNOCHEMICAL TEST: ICD-10-CM

## 2017-08-03 PROBLEM — D70.3 NEUTROPENIA ASSOCIATED WITH INFECTION (HCC): Status: RESOLVED | Noted: 2017-05-08 | Resolved: 2017-08-03

## 2017-08-03 PROBLEM — N39.0 FEBRILE URINARY TRACT INFECTION: Status: RESOLVED | Noted: 2017-01-19 | Resolved: 2017-08-03

## 2017-08-03 PROBLEM — R50.9 FEVER: Status: RESOLVED | Noted: 2017-01-19 | Resolved: 2017-08-03

## 2017-08-03 PROBLEM — R53.1 GENERALIZED WEAKNESS: Status: RESOLVED | Noted: 2017-05-06 | Resolved: 2017-08-03

## 2017-08-03 PROBLEM — D69.59: Status: RESOLVED | Noted: 2017-05-12 | Resolved: 2017-08-03

## 2017-08-03 PROBLEM — J11.1 FLU: Status: RESOLVED | Noted: 2017-05-12 | Resolved: 2017-08-03

## 2017-08-03 PROBLEM — I27.20 PULMONARY HYPERTENSION (HCC): Status: ACTIVE | Noted: 2017-08-03

## 2017-08-03 PROCEDURE — 99214 OFFICE O/P EST MOD 30 MIN: CPT | Performed by: FAMILY MEDICINE

## 2017-08-03 NOTE — PROGRESS NOTES
"Chief Complaint   Patient presents with   • Results     mammogram       Subjective   This patient presents the office to follow-up on recent mammogram findings.  They found a nodule in the right medial breast and suggested spot compression studies for follow-up.  She has personal history of breast cancer diagnosed in about 2011 and is status post left mastectomy.  Her previous surgeon is now retired.    She has an additional issue with a fecal ML chemical test was positive.  Referral to gastroenterology is indicated.  I have reviewed and updated her medications, medical history and problem list during today's office visit.        Social History   Substance Use Topics   • Smoking status: Never Smoker   • Smokeless tobacco: Never Used   • Alcohol use No       Review of Systems   Gastrointestinal: Negative for blood in stool.   Genitourinary:        See hpi       Objective   /68  Pulse 71  Temp 97.9 °F (36.6 °C) (Oral)   Resp 16  Ht 62\" (157.5 cm)  Wt 199 lb (90.3 kg)  LMP  (LMP Unknown)  BMI 36.4 kg/m2  Body mass index is 36.4 kg/(m^2).  Physical Exam   Constitutional: She appears well-developed. No distress.   Pulmonary/Chest: Right breast exhibits mass (small nodular density medial breast at about 2 o'clock, tender) and tenderness. Right breast exhibits no inverted nipple.       Left mastectomy scar        Data Reviewed:  DIGITAL RIGHT BREAST MAMMOGRAM WITH R2 DETECTION 07/20/2017      HISTORY: 66-year-old with history of previous left mastectomy.      Digital right breast mammogram was obtained in the CC and MLO  projections and compared to the previous studies dated 06/25/2015 and  07/05/2016.      A small nodular-appearing density is seen medial to the nipple line on  the right CC view.      Breasts are largely fatty replaced. No other masses,  microcalcifications, skin changes or architectural distortion is seen.      IMPRESSION:  Small nodular density in the medial aspect of the right  breast on CC " view only. We will ask this patient to return for right CC  spot compression view.       BI-RADS Category 0:  Incomplete--needs additional imaging evaluation.        This report was finalized on 7/24/2017 6:48 AM by Dr. Rob Ann MD.  FIT positive, collected from Home access on 7/1/17, report to be scanned into chart    Assessment/Plan     Problem List Items Addressed This Visit        Other    Personal history of breast cancer    Relevant Orders    Ambulatory Referral to General Surgery    Breast nodule, right medial breast - Primary    Relevant Orders    Mammo diagnostic digital tomosynthesis right w CAD    Ambulatory Referral to General Surgery      Other Visit Diagnoses     Positive fecal immunochemical test        Relevant Orders    Ambulatory Referral to Gastroenterology          Outpatient Encounter Prescriptions as of 8/3/2017   Medication Sig Dispense Refill   • acetaminophen (TYLENOL) 325 MG tablet Take 2 tablets by mouth Every 6 (Six) Hours As Needed for mild pain (1-3) or fever.  0   • acyclovir (ZOVIRAX) 400 MG tablet TAKE 1 TABLET BY MOUTH 2 (TWO) TIMES A DAY. TAKE NO MORE THAN 5 DOSES A DAY. 60 tablet 8   • Bisoprolol Fumarate (ZEBETA PO) Take  by mouth. TAKES 1 AND 1/2 IN THE AM.     • carBAMazepine XR (TEGretol  XR) 200 MG 12 hr tablet TAKE 1 TABLET BY MOUTH 2 (TWO) TIMES A DAY FOR 30 DAYS. 60 tablet 0   • carBAMazepine XR (TEGretol  XR) 200 MG 12 hr tablet Take 1 tablet by mouth 2 (Two) Times a Day. 60 tablet 0   • dexamethasone (DECADRON) 4 MG tablet TAKE 1 TABLET IN THE MORNING STARTING THE DAY AFTER CHEMO FOR 2 DAYS. TAKE WITH FOOD. 16 tablet 1   • guaifenesin-codeine (GUAIFENESIN AC) 100-10 MG/5ML liquid 1-2 tsp po  q6 hours prn 180 mL 0   • hydrALAZINE (APRESOLINE) 25 MG tablet Take 25 mg by mouth 2 (two) times a day.  3   • HYDROcodone-acetaminophen (NORCO) 5-325 MG per tablet TAKE 1 TO 2 TABLETS BY MOUTH EVERY 6 HOURS AS NEEDED  0   • hydrOXYzine (ATARAX) 25 MG tablet Take 1 tablet  "by mouth 3 (three) times a day as needed for itching. 90 tablet 6   • isosorbide mononitrate (IMDUR) 60 MG 24 hr tablet Take 1 tablet by mouth Daily. TAKE 1 TABLET BY MOUTH EVERY MORNING AND ONE-HALF TABLET EVERY EVENING     • losartan (COZAAR) 25 MG tablet Take 25 mg by mouth every evening.     • Misc. Devices (VIRAGE CUSTOM BREAST PROSTHES) misc As directed     • Misc. Devices misc Mastectomy bras, use as directed 4 each 0   • montelukast (SINGULAIR) 10 MG tablet TAKE 1 TABLET BY MOUTH EVERY NIGHT. 30 tablet 11   • nitroglycerin (NITROSTAT) 0.4 MG SL tablet PLACE 1 TABLET UNDER TONGUE EVERY 5 MINS AS NEEDED FOR CHEST PAIN     • prochlorperazine (COMPAZINE) 10 MG tablet Take 1 tablet by mouth every 6 (six) hours as needed for nausea or vomiting for up to 60 doses. 60 tablet 5     No facility-administered encounter medications on file as of 8/3/2017.        Orders Placed This Encounter   Procedures   • Mammo diagnostic digital tomosynthesis right w CAD     Suggested spot compression on 7/24/2017 CC view only     Standing Status:   Future     Standing Expiration Date:   8/3/2018     Order Specific Question:   Is an Ultrasound Breast required?  If 'Yes\", Please enter an order for the US Breast as well.     Answer:   Prn     Order Specific Question:   Reason for Exam:     Answer:   followup of abnormal mammogram   • Ambulatory Referral to Gastroenterology     Referral Priority:   Routine     Referral Type:   Consultation     Referral Reason:   Specialty Services Required     Referred to Provider:   Caleb Leigh MD     Requested Specialty:   Gastroenterology     Number of Visits Requested:   1   • Ambulatory Referral to General Surgery     Referral Priority:   Routine     Referral Type:   Consultation     Referral Reason:   Specialty Services Required     Referred to Provider:   Trudy Rivera MD     Requested Specialty:   General Surgery     Number of Visits Requested:   1       Continue with current treatment plan.     "     F/U in one month to discuss seizure medications

## 2017-08-07 ENCOUNTER — APPOINTMENT (OUTPATIENT)
Dept: MAMMOGRAPHY | Facility: HOSPITAL | Age: 67
End: 2017-08-07
Attending: FAMILY MEDICINE

## 2017-08-09 ENCOUNTER — HOSPITAL ENCOUNTER (OUTPATIENT)
Dept: MAMMOGRAPHY | Facility: HOSPITAL | Age: 67
Discharge: HOME | End: 2017-08-09
Attending: FAMILY MEDICINE | Admitting: FAMILY MEDICINE

## 2017-08-09 DIAGNOSIS — N63.0 BREAST NODULE: ICD-10-CM

## 2017-08-09 PROCEDURE — G0206 DX MAMMO INCL CAD UNI: HCPCS

## 2017-08-09 PROCEDURE — G0279 TOMOSYNTHESIS, MAMMO: HCPCS

## 2017-08-10 NOTE — PROGRESS NOTES
I have reviewed your recent test results and have found them to be normal. Please keep your appointment with the general surgeon as set up.                                                                                                                     Dr. Madera

## 2017-08-16 ENCOUNTER — OFFICE VISIT (OUTPATIENT)
Dept: SURGERY | Facility: CLINIC | Age: 67
End: 2017-08-16

## 2017-08-16 VITALS — BODY MASS INDEX: 36.07 KG/M2 | OXYGEN SATURATION: 98 % | HEART RATE: 76 BPM | WEIGHT: 196 LBS | HEIGHT: 62 IN

## 2017-08-16 DIAGNOSIS — D64.81 ANEMIA ASSOCIATED WITH CHEMOTHERAPY: ICD-10-CM

## 2017-08-16 DIAGNOSIS — T45.1X5A ANEMIA ASSOCIATED WITH CHEMOTHERAPY: ICD-10-CM

## 2017-08-16 DIAGNOSIS — N63.0 BREAST NODULE: Primary | ICD-10-CM

## 2017-08-16 DIAGNOSIS — D70.2 NEUTROPENIA, DRUG-INDUCED (HCC): ICD-10-CM

## 2017-08-16 DIAGNOSIS — N18.30 CHRONIC KIDNEY DISEASE, STAGE III (MODERATE) (HCC): ICD-10-CM

## 2017-08-16 DIAGNOSIS — D61.818 PANCYTOPENIA (HCC): ICD-10-CM

## 2017-08-16 DIAGNOSIS — Z85.3 PERSONAL HISTORY OF BREAST CANCER: ICD-10-CM

## 2017-08-16 DIAGNOSIS — C64.2 MALIGNANT NEOPLASM OF KIDNEY, LEFT (HCC): ICD-10-CM

## 2017-08-16 DIAGNOSIS — R19.5 POSITIVE COLORECTAL CANCER SCREENING USING DNA-BASED STOOL TEST: ICD-10-CM

## 2017-08-16 DIAGNOSIS — G40.909 SEIZURE DISORDER (HCC): ICD-10-CM

## 2017-08-16 DIAGNOSIS — T45.1X5A CHEMOTHERAPY-INDUCED THROMBOCYTOPENIA: ICD-10-CM

## 2017-08-16 DIAGNOSIS — D69.59 CHEMOTHERAPY-INDUCED THROMBOCYTOPENIA: ICD-10-CM

## 2017-08-16 PROCEDURE — 99202 OFFICE O/P NEW SF 15 MIN: CPT | Performed by: SURGERY

## 2017-08-16 RX ORDER — SODIUM CHLORIDE 0.9 % (FLUSH) 0.9 %
1-10 SYRINGE (ML) INJECTION AS NEEDED
Status: CANCELLED | OUTPATIENT
Start: 2017-08-30

## 2017-08-16 RX ORDER — FOLIC ACID 1 MG/1
1 TABLET ORAL EVERY MORNING
COMMUNITY
End: 2019-01-01

## 2017-08-16 NOTE — PROGRESS NOTES
SURGERY  Marina CHINO Barroso   1950 08/16/17    Chief Complaint:  Tenderness right breast    HPI    Patient is a 67 y.o. female who has a history of left breast cancer, cared for by Dr. Gregg May with a left mastectomy previously, 2011, and now comes in with what turns out to be a density seen on craniocaudal view only on her screening mammogram on the right, with diagnostic films showing resolution of the entity, with a benign diagnosis.  I reviewed the films with her in the office today, showing her the actual finding and the fact that it's only on one view, and then the diagnostic mammogram, not showing an abnormality, and described to her how this can happen, and how the compression which resulted in resolution indicates a benign finding.    She does give a history of that area having been tender for short time, maybe 6 weeks, that is managed without the need for any intervention.  She would not desire surgery just for the management of the tenderness alone.    She has a strong history of cancer, having had the breast cancer, a kidney cancer on the left with nephrectomy [chronic kidney disease stage III now], with multiple myeloma, but has not had a malignant brain cancer, that being in the record.    She does have a history of colon polyps, and has not had a colonoscopy for 5 years.  She has been referred to the gastroenterologist [place] [facility], but hasn't filled out the paperwork and upon discussion, asked that she could come here.  Upon further questioning, she tells me that she's had a positive colo-guard.  She denies any upper GI symptoms whatsoever    Past Medical History:   Diagnosis Date   • Anemia 10/14/2008    Secondary to chronic renal insufficiency and myeloma   • Arthropathy of knee 01/07/2014    unspecified   •      brain tumor   • Breast cancer 04/2012    Left breast invasive ductal carcinoma, stage II   • Bursitis of left hip 10/18/2007   • Bursitis, knee 12/02/2013   • Calcaneal spur  08/12/2009   • Chronic kidney disease, stage III (moderate)    • Congestive heart failure    • Diarrhea of presumed infectious origin 04/01/2014    c diff    • Diverticulitis of colon 10/17/2007   • DVT (deep venous thrombosis)     right lower extremity    • History of Clostridium difficile    • History of echocardiogram 04/2014    EF decreased to 46% - per cardiology   • History of MRSA infection    • History of transfusion    • Hydronephrosis     chronic, right   • Hypertension    • LLL pneumonia 04/23/2009   • Malignant neoplasm of kidney 12/2009    and other and unspecified urinary organs; urinary organ, site unspecified; s/p left nephrectomy   • Multiple myeloma 04/2012   • Neoplasm of uncertain behavior 10/12/2015    of other and unspecified sites and tissues; other specified sites   • Pyelonephritis 03/2004   • Seizures 10/17/2007   • Thrombocytopenia    • UTI (urinary tract infection) 10/30/2014    pseudomonas, multidrug resistant   • UTI (urinary tract infection) 03/28/2014    site not specified     Past Surgical History:   Procedure Laterality Date   • BONE MARROW BIOPSY N/A 04/11/2012   • BRAIN SURGERY  2005    Meningioma   • BREAST BIOPSY Left 04/09/2012    Dr. Gregg May   • CARDIAC CATHETERIZATION Left 06/11/2012    Dr. Sudeep Haddad   • CARDIAC CATHETERIZATION N/A 08/15/2006    Dr. Brian Bhatt   • COLONOSCOPY N/A unknown   • CYSTOSCOPY N/A 3/25/2016    Procedure: CYSTOSCOPY  WITH RIGHT STENT CHANGE;  Surgeon: Lakhwinder Russo MD;  Location: [facility];  Service:    • CYSTOSCOPY N/A 03/10/2012    Cystoscopy, right retrograde, right double-J stent-Dr. Sloan May   • CYSTOSCOPY N/A 02/25/2012    Cystoscopy, stent removal, right retrograde pyelogram, replacement of right double-J ureteral stent-Dr. Michael Everett   • CYSTOSCOPY W/ URETERAL STENT PLACEMENT Right 5/7/2016    Procedure: CYSTOSCOPY, URETERAL CATHETER INSERTION AND RIGHT STENT EXCHANGE ;  Surgeon: Michael Everett Jr., MD;   Location: [facility] OR;  Service:    • CYSTOSCOPY W/ URETERAL STENT PLACEMENT N/A 1/20/2017    Procedure: CYSTOSCOPY RIGHT RETROGRADE PYELOGRAM, URETERAL STENT CHANGE;  Surgeon: Lakhwinder Russo MD;  Location: [facility] OR;  Service:    • CYSTOSCOPY W/ URETERAL STENT PLACEMENT N/A 04/02/2015    Cystoscopy, removal of right ureteral stent, right retrograde pyelogram, placement of right double-J ureteral stent-Dr. Michael Everett   • CYSTOSCOPY W/ URETERAL STENT PLACEMENT N/A 04/26/2015    Cystoscopy, removal of right ureteral stent, right retrograde pyelogram, replacement of right ureteral stent 24 cm x 7-Zimbabwean without retrieval line-Dr. Jose Gomez   • CYSTOSCOPY W/ URETERAL STENT PLACEMENT N/A 12/22/2014    Cystoscopy, removal of right double-J ureteral stent, right retrograde pyelogram, placement of right double-J ureteral stent-Dr. Michael Everett   • CYSTOSCOPY W/ URETERAL STENT PLACEMENT N/A 09/22/2014    Cystoscopy, removal of right ureteral stent, removal of right retrograde pyelogram, replacement of right double-J ureteral stent-Dr. Michael Everett   • CYSTOSCOPY W/ URETERAL STENT PLACEMENT N/A 06/18/2014    Cystoscopy, removal of right double-J ureteral stent, right retrograde pyelogram, placement of right double-J ureteral stent-Dr. Michael Everett   • CYSTOSCOPY W/ URETERAL STENT PLACEMENT N/A 01/23/2014    Cystoscopy, removal of right double-J ureteral stent, right retrograde pyelogram, placement of right double-J ureteral stent-Dr. Michael Everett   • CYSTOSCOPY W/ URETERAL STENT PLACEMENT N/A 03/27/2012    Cystoscopy, removal of ureteral stent, right retrograde pyelogram, replacement of indewlling right ureteral stent-Dr. Michael Everett   • CYSTOSCOPY W/ URETERAL STENT PLACEMENT N/A 03/27/2013    Cystoscopy, right retrograde pyelogram, removal and replacement of right double-J ureteral stent-Dr. Michael Everett   • CYSTOSCOPY W/ URETERAL STENT PLACEMENT N/A 11/12/2012    Cystoscopy, stent removal, right retrograde  pyelogram, replacement of right double-J ureteral stent-Dr. Michael Everett   • CYSTOSCOPY W/ URETERAL STENT PLACEMENT N/A 09/24/2011    Cystoscopy, removal of ureteral stent, right retrograde pyelogram and placement of right double-J ureteral stent-Dr. Michael Everett   • CYSTOSCOPY W/ URETERAL STENT PLACEMENT N/A 05/14/2011    Cystoscopy, removal of right ureteral stent, right retrograde pyelogram and placement of new right double-J ureteral stent-Dr. Michael Everett   • CYSTOSCOPY W/ URETERAL STENT PLACEMENT N/A 12/31/2010    Cystoscopy, stent removal, right retrograde pyelogram, replacement of 7 Chinese x 26 cm double-J stent-Dr. Michael Everett   • CYSTOSCOPY W/ URETERAL STENT PLACEMENT N/A 08/28/2010    Cystoscopy, stent removal, right retrograde pyelogram with interpretation, placement of right double-J ureteral stent-Dr. Michael Everett   • CYSTOSCOPY W/ URETERAL STENT PLACEMENT N/A 05/24/2010    Cystoscopy, right retrograde pyelogram, replacement of right double-J ureteral stent-Dr. Michael Everett   • CYSTOSCOPY W/ URETERAL STENT PLACEMENT N/A 02/12/2010    Cystoscopy, right retrograde pyelogram and placement of right double-J ureteral stent-Dr. Michael Everett   • CYSTOSCOPY W/ URETERAL STENT PLACEMENT N/A 02/23/2009    Cystoscopy, right retrograde pyelogram, placement of right double-J ureteral stent-Dr. Michael Everett   • CYSTOSCOPY W/ URETERAL STENT PLACEMENT N/A 09/17/2007    Dr. Michael Everett   • DIAGNOSTIC LAPAROSCOPY EXPLORATORY LAPAROTOMY N/A 05/02/2006    Diagnostic laparoscopy, exploratory laparotomy with bilateral salpingo oopherectomy, primary reanastomosis of right ureter-Dr. Cristo Pittman   • MASTECTOMY Left 04/25/2012    Left total mastectomy with sentinel lymph node biopsies and left axillary lymphatic mapping-Dr. Gregg May   • NEPHRECTOMY Left 12/30/2009    Dr. 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 Cristo Pittman   • TOTAL ABDOMINAL  HYSTERECTOMY Bilateral 2007    Dr. Todd   • URETEROURETEROSTOMY Right 05/01/2006    Dr. Michael Everett   • VENA CAVA FILTER INSERTION N/A 05/08/2006    Dr. Jordon Barber   • VENOUS ACCESS DEVICE (PORT) INSERTION Right 02/25/2013    Right subclavian vein PowerPort insertion-Dr. Gregg May   • VENOUS ACCESS DEVICE (PORT) INSERTION AND REMOVAL N/A 10/06/2014    Revision of right internal jugular PowerPort with fluoroscopy, removal of peripherally inserted central catheter line-Dr. Aurora Tomlinson   • VENOUS ACCESS DEVICE (PORT) INSERTION AND REMOVAL N/A 08/14/2014    Ultrasound-guided access right internal jugular vein, PowerPort placement, removal of right subclavian port-Dr. Jordon Barber     Family History   Problem Relation Age of Onset   • Hypertension Other    • Miscarriages / Stillbirths Father    • Heart disease Father    • Hypertension Father    • Heart attack Father    • Diabetes Sister    • Skin cancer Sister    • Throat cancer Brother      Social History     Social History   • Marital status:      Spouse name: N/A   • Number of children: 3   • Years of education: High school     Occupational History   •  Retired     [employer] [[id number]]     Social History Main Topics   • Smoking status: Never Smoker   • Smokeless tobacco: Never Used   • Alcohol use No   • Drug use: No   • Sexual activity: Not Currently     Other Topics Concern   • Not on file     Social History Narrative    Son lives with her and helps     Occupation/Additional Social Hx: Accompanied by her daughter today      Current Outpatient Prescriptions:   •  acetaminophen (TYLENOL) 325 MG tablet, Take 2 tablets by mouth Every 6 (Six) Hours As Needed for mild pain (1-3) or fever., Disp: , Rfl: 0  •  acyclovir (ZOVIRAX) 400 MG tablet, TAKE 1 TABLET BY MOUTH 2 (TWO) TIMES A DAY. TAKE NO MORE THAN 5 DOSES A DAY., Disp: 60 tablet, Rfl: 8  •  aspirin 81 MG tablet, Take 81 mg by mouth Every Night., Disp: , Rfl:   •   "Bisoprolol Fumarate (ZEBETA PO), Take 15 mg by mouth Daily., Disp: , Rfl:   •  carBAMazepine XR (TEGretol  XR) 200 MG 12 hr tablet, Take 1 tablet by mouth 2 (Two) Times a Day. (Patient taking differently: Take 200 mg by mouth Every Night.), Disp: 60 tablet, Rfl: 0  •  dexamethasone (DECADRON) 4 MG tablet, TAKE 1 TABLET IN THE MORNING STARTING THE DAY AFTER CHEMO FOR 2 DAYS. TAKE WITH FOOD. (Patient taking differently: TAKE 1 TABLET IN THE MORNING STARTING THE DAY AFTER CHEMO FOR 2 DAYS. TAKE WITH FOOD BID), Disp: 16 tablet, Rfl: 1  •  folic acid (FOLVITE) 1 MG tablet, Take 1 mg by mouth Every Morning., Disp: , Rfl:   •  hydrALAZINE (APRESOLINE) 25 MG tablet, Take 25 mg by mouth 2 (two) times a day., Disp: , Rfl: 3  •  isosorbide mononitrate (IMDUR) 60 MG 24 hr tablet, Take 1 tablet by mouth Daily. TAKE 1 TABLET BY MOUTH EVERY MORNING AND ONE-HALF TABLET EVERY EVENING (Patient taking differently: Take 60 mg by mouth Daily. TAKE 1.5 TABLET BY MOUTH EVERY MORNING BEFORE EXERSIZE), Disp: , Rfl:   •  losartan (COZAAR) 25 MG tablet, Take 25 mg by mouth every evening., Disp: , Rfl:   •  Misc. Devices (VIRAGE CUSTOM BREAST PROSTHES) misc, As directed, Disp: , Rfl:   •  Misc. Devices misc, Mastectomy bras, use as directed, Disp: 4 each, Rfl: 0  •  montelukast (SINGULAIR) 10 MG tablet, TAKE 1 TABLET BY MOUTH EVERY NIGHT., Disp: 30 tablet, Rfl: 11    Allergies   Allergen Reactions   • Zosyn [Piperacillin Sod-Tazobactam So] Swelling     Preventative Medicine  Colonoscopy: over 5 years with history of polyps both prior c scopes    Review of Systems    Vitals:    08/16/17 1559   Pulse: 76   SpO2: 98%   Weight: 196 lb (88.9 kg)   Height: 62\" (157.5 cm)       PHYSICAL EXAM:    Pulse 76  Ht 62\" (157.5 cm)  Wt 196 lb (88.9 kg)  LMP  (LMP Unknown)  SpO2 98%  BMI 35.85 kg/m2  Body mass index is 35.85 kg/(m^2).    Constitutional: well developed, well nourished, Appears stated age  Eyes: sclera nonicteric, conjunctiva not injected " or edematous  ENMT: Hearing intact, trachea midline, thyroid without masses  CVS: RRR, no murmur, peripheral edema not present  Respiratory: CTA, normal respiratory effort   Gastrointestinal: no hepatosplenomegaly, abdomen soft, nontender, no rebound or guarding  Genitourinary: inguinal hernia not detected  Musculoskeletal: gait normal, muscle mass normal  Skin: warm and dry, no rashes visible  Neurological: awake and alert, seems to have reasonable capacity for understanding for medical decision making  Psychiatric: appears to have reasonable judgement, pleasant  Lymphatics: no cervical adenopathy or axillary adenopathy, with no left arm lymphedema  Breast: Right breast without any palpable masses although she is diffusely tender.  There is no erythema or anything to suggest infection.  Left breast surgically absent with no masses along the incisional scar, with tenderness there as well    Radiographic Findings: Reviewed as above    Lab Findings: Hemoglobin 11.4, with white count of 6.68, creatinine of 1.68    Pamphlet reviewed: None    IMPRESSION:  · Mammographic abnormality, with further diagnostic imaging showing a benign finding  · History of left breast cancer, stage II, no evidence of recurrence  · Right breast tenderness, manageable  · Positive colo-guard  · History of polyps  · Anemia  · Multiple myeloma  · History of kidney cancer, status post left nephrectomy, with chronic kidney disease stage III, creatinine 1.68    PLAN:  · She can go back to routine screening for her right breast.  She'll not need any follow-up for that  · Case request entered for colonoscopy, based on her positive colo-guard.  We discussed the potential addition of an EGD in the context of her anemia, but again with her multiple myeloma I don't think that's imperative, and she has declined.  · Suprep with her chronic kidney disease stage III, hoping to avoid electrolyte shifts.    Trudy Rivera MD

## 2017-08-18 ENCOUNTER — HOSPITAL ENCOUNTER (OUTPATIENT)
Dept: PET IMAGING | Facility: HOSPITAL | Age: 67
End: 2017-08-18
Attending: INTERNAL MEDICINE

## 2017-08-21 ENCOUNTER — HOSPITAL ENCOUNTER (OUTPATIENT)
Dept: CT IMAGING | Facility: HOSPITAL | Age: 67
Discharge: HOME | End: 2017-08-21
Attending: INTERNAL MEDICINE | Admitting: INTERNAL MEDICINE

## 2017-08-21 DIAGNOSIS — R91.1 PULMONARY NODULE, RIGHT: ICD-10-CM

## 2017-08-21 PROCEDURE — 71250 CT THORAX DX C-: CPT

## 2017-08-22 DIAGNOSIS — C90.00 MULTIPLE MYELOMA NOT HAVING ACHIEVED REMISSION (HCC): Primary | ICD-10-CM

## 2017-08-25 ENCOUNTER — INFUSION (OUTPATIENT)
Dept: ONCOLOGY | Facility: HOSPITAL | Age: 67
End: 2017-08-25

## 2017-08-25 ENCOUNTER — APPOINTMENT (OUTPATIENT)
Dept: ONCOLOGY | Facility: HOSPITAL | Age: 67
End: 2017-08-25

## 2017-08-25 ENCOUNTER — OFFICE VISIT (OUTPATIENT)
Dept: ONCOLOGY | Facility: CLINIC | Age: 67
End: 2017-08-25

## 2017-08-25 VITALS
DIASTOLIC BLOOD PRESSURE: 70 MMHG | OXYGEN SATURATION: 97 % | TEMPERATURE: 98.4 F | BODY MASS INDEX: 35.51 KG/M2 | HEIGHT: 62 IN | RESPIRATION RATE: 16 BRPM | WEIGHT: 193 LBS | SYSTOLIC BLOOD PRESSURE: 114 MMHG | HEART RATE: 66 BPM

## 2017-08-25 VITALS — HEART RATE: 82 BPM | SYSTOLIC BLOOD PRESSURE: 135 MMHG | DIASTOLIC BLOOD PRESSURE: 71 MMHG

## 2017-08-25 DIAGNOSIS — T45.1X5A ANEMIA ASSOCIATED WITH CHEMOTHERAPY: ICD-10-CM

## 2017-08-25 DIAGNOSIS — D64.81 ANEMIA ASSOCIATED WITH CHEMOTHERAPY: ICD-10-CM

## 2017-08-25 DIAGNOSIS — C90.00 MULTIPLE MYELOMA NOT HAVING ACHIEVED REMISSION (HCC): Primary | ICD-10-CM

## 2017-08-25 DIAGNOSIS — C90.00 MULTIPLE MYELOMA, REMISSION STATUS UNSPECIFIED (HCC): ICD-10-CM

## 2017-08-25 DIAGNOSIS — D69.59 CHEMOTHERAPY-INDUCED THROMBOCYTOPENIA: Primary | ICD-10-CM

## 2017-08-25 DIAGNOSIS — T45.1X5A CHEMOTHERAPY-INDUCED THROMBOCYTOPENIA: Primary | ICD-10-CM

## 2017-08-25 DIAGNOSIS — Z85.3 PERSONAL HISTORY OF BREAST CANCER: ICD-10-CM

## 2017-08-25 LAB
ALBUMIN SERPL-MCNC: 3.3 G/DL (ref 3.5–5.2)
ALBUMIN/GLOB SERPL: 0.7 G/DL
ALP SERPL-CCNC: 66 U/L (ref 39–117)
ALT SERPL W P-5'-P-CCNC: 26 U/L (ref 1–33)
ANION GAP SERPL CALCULATED.3IONS-SCNC: 10.9 MMOL/L
AST SERPL-CCNC: 24 U/L (ref 1–32)
BASOPHILS # BLD MANUAL: 0.07 10*3/MM3 (ref 0–0.2)
BASOPHILS NFR BLD AUTO: 1 % (ref 0–2)
BILIRUB SERPL-MCNC: 0.3 MG/DL (ref 0.1–1.2)
BUN BLD-MCNC: 25 MG/DL (ref 8–23)
BUN/CREAT SERPL: 14.5 (ref 7–25)
CALCIUM SPEC-SCNC: 8.4 MG/DL (ref 8.6–10.5)
CHLORIDE SERPL-SCNC: 110 MMOL/L (ref 98–107)
CO2 SERPL-SCNC: 20.1 MMOL/L (ref 22–29)
CREAT BLD-MCNC: 1.73 MG/DL (ref 0.57–1)
DEPRECATED RDW RBC AUTO: 54.2 FL (ref 37–54)
EOSINOPHIL # BLD MANUAL: 0.67 10*3/MM3 (ref 0–0.7)
EOSINOPHIL NFR BLD MANUAL: 10 % (ref 0–7)
ERYTHROCYTE [DISTWIDTH] IN BLOOD BY AUTOMATED COUNT: 14 % (ref 11.7–13)
GFR SERPL CREATININE-BSD FRML MDRD: 36 ML/MIN/1.73
GLOBULIN UR ELPH-MCNC: 5 GM/DL
GLUCOSE BLD-MCNC: 109 MG/DL (ref 65–99)
HCT VFR BLD AUTO: 36.7 % (ref 35.6–45.5)
HGB BLD-MCNC: 11.7 G/DL (ref 11.9–15.5)
LYMPHOCYTES # BLD MANUAL: 1.86 10*3/MM3 (ref 0.8–7)
LYMPHOCYTES NFR BLD MANUAL: 12 % (ref 0–12)
LYMPHOCYTES NFR BLD MANUAL: 28 % (ref 24–44)
MCH RBC QN AUTO: 33.8 PG (ref 26.9–32)
MCHC RBC AUTO-ENTMCNC: 31.9 G/DL (ref 32.4–36.3)
MCV RBC AUTO: 106.1 FL (ref 80.5–98.2)
MONOCYTES # BLD AUTO: 0.8 10*3/MM3 (ref 0–1)
NEUTROPHILS # BLD AUTO: 3.13 10*3/MM3 (ref 1.9–8.1)
NEUTROPHILS NFR BLD MANUAL: 47 % (ref 42.7–76)
NRBC SPEC MANUAL: 1 /100 WBC (ref 0–0)
PLAT MORPH BLD: NORMAL
PLATELET # BLD AUTO: 144 10*3/MM3 (ref 140–500)
PMV BLD AUTO: 11.9 FL (ref 6–12)
POTASSIUM BLD-SCNC: 3.8 MMOL/L (ref 3.5–5.2)
PROT SERPL-MCNC: 8.3 G/DL (ref 6–8.5)
RBC # BLD AUTO: 3.46 10*6/MM3 (ref 3.9–5.2)
RBC MORPH BLD: NORMAL
SCAN SLIDE: NORMAL
SODIUM BLD-SCNC: 141 MMOL/L (ref 136–145)
VARIANT LYMPHS NFR BLD MANUAL: 2 % (ref 0–5)
WBC MORPH BLD: NORMAL
WBC NRBC COR # BLD: 6.66 10*3/MM3 (ref 4.5–10.7)

## 2017-08-25 PROCEDURE — 25010000002 DIPHENHYDRAMINE PER 50 MG: Performed by: INTERNAL MEDICINE

## 2017-08-25 PROCEDURE — 25010000002 DARATUMUMAB 400 MG/20ML SOLUTION 5 ML VIAL: Performed by: INTERNAL MEDICINE

## 2017-08-25 PROCEDURE — 85007 BL SMEAR W/DIFF WBC COUNT: CPT | Performed by: INTERNAL MEDICINE

## 2017-08-25 PROCEDURE — 96367 TX/PROPH/DG ADDL SEQ IV INF: CPT | Performed by: INTERNAL MEDICINE

## 2017-08-25 PROCEDURE — 99215 OFFICE O/P EST HI 40 MIN: CPT | Performed by: INTERNAL MEDICINE

## 2017-08-25 PROCEDURE — 96375 TX/PRO/DX INJ NEW DRUG ADDON: CPT | Performed by: INTERNAL MEDICINE

## 2017-08-25 PROCEDURE — 80053 COMPREHEN METABOLIC PANEL: CPT | Performed by: INTERNAL MEDICINE

## 2017-08-25 PROCEDURE — 25010000002 DARATUMUMAB 400 MG/20ML SOLUTION 20 ML VIAL: Performed by: INTERNAL MEDICINE

## 2017-08-25 PROCEDURE — 25010000002 ZOLEDRONIC ACID PER 1 MG: Performed by: INTERNAL MEDICINE

## 2017-08-25 PROCEDURE — 25010000002 METHYLPREDNISOLONE PER 125 MG: Performed by: INTERNAL MEDICINE

## 2017-08-25 PROCEDURE — 85025 COMPLETE CBC W/AUTO DIFF WBC: CPT | Performed by: INTERNAL MEDICINE

## 2017-08-25 PROCEDURE — 96413 CHEMO IV INFUSION 1 HR: CPT | Performed by: INTERNAL MEDICINE

## 2017-08-25 PROCEDURE — 96415 CHEMO IV INFUSION ADDL HR: CPT | Performed by: INTERNAL MEDICINE

## 2017-08-25 RX ORDER — SODIUM CHLORIDE 9 MG/ML
250 INJECTION, SOLUTION INTRAVENOUS ONCE
Status: COMPLETED | OUTPATIENT
Start: 2017-08-25 | End: 2017-08-25

## 2017-08-25 RX ORDER — METHYLPREDNISOLONE SODIUM SUCCINATE 125 MG/2ML
60 INJECTION, POWDER, LYOPHILIZED, FOR SOLUTION INTRAMUSCULAR; INTRAVENOUS ONCE
Status: COMPLETED | OUTPATIENT
Start: 2017-08-25 | End: 2017-08-25

## 2017-08-25 RX ORDER — FAMOTIDINE 10 MG/ML
20 INJECTION, SOLUTION INTRAVENOUS AS NEEDED
Status: CANCELLED | OUTPATIENT
Start: 2017-08-25

## 2017-08-25 RX ORDER — ACETAMINOPHEN 500 MG
1000 TABLET ORAL ONCE
Status: COMPLETED | OUTPATIENT
Start: 2017-08-25 | End: 2017-08-25

## 2017-08-25 RX ORDER — DIPHENHYDRAMINE HYDROCHLORIDE 50 MG/ML
50 INJECTION INTRAMUSCULAR; INTRAVENOUS AS NEEDED
Status: CANCELLED | OUTPATIENT
Start: 2017-08-25

## 2017-08-25 RX ORDER — MEPERIDINE HYDROCHLORIDE 50 MG/ML
25 INJECTION INTRAMUSCULAR; INTRAVENOUS; SUBCUTANEOUS
Status: CANCELLED | OUTPATIENT
Start: 2017-08-25

## 2017-08-25 RX ADMIN — DARATUMUMAB 1370 MG: 100 INJECTION, SOLUTION, CONCENTRATE INTRAVENOUS at 12:54

## 2017-08-25 RX ADMIN — METHYLPREDNISOLONE SODIUM SUCCINATE 60 MG: 125 INJECTION, POWDER, FOR SOLUTION INTRAMUSCULAR; INTRAVENOUS at 12:27

## 2017-08-25 RX ADMIN — ACETAMINOPHEN 1000 MG: 500 TABLET ORAL at 12:04

## 2017-08-25 RX ADMIN — ZOLEDRONIC ACID 3 MG: 0.8 INJECTION, SOLUTION, CONCENTRATE INTRAVENOUS at 12:23

## 2017-08-25 RX ADMIN — SODIUM CHLORIDE 250 ML: 900 INJECTION, SOLUTION INTRAVENOUS at 12:04

## 2017-08-25 RX ADMIN — DIPHENHYDRAMINE HYDROCHLORIDE 25 MG: 50 INJECTION, SOLUTION INTRAMUSCULAR; INTRAVENOUS at 12:05

## 2017-08-25 NOTE — PROGRESS NOTES
Reasons for follow up:   1. IgG kappa multiple myeloma, currently on Darzalex, started on May 26, 2016. Patient was switched to Darzalex from Pomalyst, as she continued to be neutropenic with recurrent urinary tract infection while on Pomalyst.    2. Zometa is on hold due to renal insufficiency.    3. After 16 doses of Darzalex, laboratory study showed stable disease in November 2016. Insurance company denied adding Velcade and dexamethasone. Patient is to be continued on monthly Darzalex from 12/06/16.   4. Obstructive right pyelonephritis with positive urine culture for E. coli, required hospitalization from 1/19/2017 through 01/24/2017 with iv antibiotics and stent exchange on 01/20/2017.   5.  Paraprotein studies on March 27, 2017 showed further slight improvement of multiple myeloma.   6.  Febrile illness, with urinary tract infection and influenza B infection in early May 2017, required hospitalization for 6 days.   7.   Paraprotein studies for both serum and 24-hour urine sample on 7/21/2017 showed further improvement of paraproteins.          History of Present Illness     The patient is a pleasant 67-year-old female presented today for 3-month reevaluation.  Patient reports she is doing well, more energetic, denies urinary tract infection no fever sweating chills.  She had ureter stent replaced by Dr. Everett on 7/14/2017.  She has reasonably good performance status ECOG 1.   She drove herself today to clinic, from [facility].     Laboratory study showed improved the platelets at 144,000, normal WBC 6660 and slightly improved hemoglobin at 11.7.     Her laboratory study on 7/21/2017 reported further improvement of paraprotein is both serum and a 24-hour urine sample.  SPEP reporting monoclonal IgG lambda 2.9 g/dL and free lambda chain 0.1 g/dL.  Serum IgG was 3261 mg/dL and IgA 5, IgM 13, free kappa chain 6.7, free lambda chain 701.3 with kappa/lambda ratio 0.01.  24-hour urine sample reported     positive for Bence May protein lambda type, immunofixation positive for IgG lambda.  Total urine protein 241 mg, gamma globulin 13.1%.  Hemoglobin was 11.4 .4, platelets 122,000, WBC 6680 including neutrophils 3600, lymphocytes 2100 and monocytes 650.  Her creatinine was 1.68, at a baseline level.  Liver function panel unremarkable.     Past Medical History, Past Surgical History, Social History, Family History have been reviewed and are without significant changes except as mentioned.      Hematology/oncology history: See separate document.       On December 6, 2016, serum free lambda chain 1090 MG/L, free kappa chain 8.5 to MG/L.  Ferritin 1015, iron 99, TIBC 137 iron saturation 72%.     On January 4, 2017, vitamin B12 level [id number]. SPEP reporting gamma globulin 3.3, out of total globulin 5.0, with immunofixation reporting small quantity of monoclonal free lambda chain, plus monoclonal IgG lambda at 3.2 g/DL.  Serum IgG 4075, IgA 9 and IgM 15.  [place] free lambda chain 1339 MG/L and the free kappa chain 10.3 MG/L.      Laboratory study March 27, 2017 showed slight improvement of paraprotein, SPEP reporting M spike 2.8 g/DL, out of total globulin 4.3, and the serum IgG 3420, IgA 9, IgM 11, free lambda chain 1218 MG/L and free kappa chain 8.0 MG/L.  Total 24 hour urine sample reported at free lambda chain 142 mg, at a concentration 74.8 MG/L, free kappa chain 75 mg at a concentration 39.5 MG/L., Urine protein immunofixation reporting biclonal IgG lambda and monoclonal free lambda light chain, M spike #1 at 41.5% and monoclonal IgG lambda spike #2 at 10.5%. Serum beta-2 microglobulin is 7.3 MG/L.       Review of Systems    Constitutional: Negative for chills and fever. See history of present illness  HENT: Negative for mouth sores and sore throat.    Eyes: Negative for visual disturbance.    Respiratory: No cough or hemoptysis no short of breath.   Gastrointestinal: Negative for abdominal pain, diarrhea,  "nausea and vomiting.    Genitourinary: Negative for decreased urine volume, dysuria, enuresis, frequency, hematuria, pelvic pain and urgency.    Musculoskeletal: Negative for back pain and joint swelling.    Neurological: Negative for dizziness and weakness.    Hematological: Does not bruise/bleed easily.    Psychiatric/Behavioral: The patient is not nervous/anxious.            Medications: The current medication list was reviewed in the EMR.       ALLERGIES: Zosyn       Vitals:    08/25/17 1044   BP: 114/70   Pulse: 66   Resp: 16   Temp: 98.4 °F (36.9 °C)   TempSrc: Oral   SpO2: 97%   Weight: 193 lb (87.5 kg)   Height: 62.2\" (158 cm)   PainSc: 0-No pain   PS: ECOG 1      Physical Exam   Constitutional: well-developed and well-nourished. No distress.    HENT:    Head: Normocephalic.    Eyes: EOMs are normal.   Neck: Normal range of motion.    Cardiovascular: Normal rate, regular rhythm, normal heart sounds and normal pulses.    Pulmonary/Chest: normal breath sounds, no wheezes, no rales.  Mediport benign.  Abdominal: Soft. Bowel sounds are normal. no distension and no mass. There is no hepatosplenomegaly. There is no tenderness.   Musculoskeletal: Normal range of motion. no edema or deformity.   Lymphadenopathy: She has no cervical, supraclavicular adenopathy.   Neurological: alert and oriented to person, place, and time. No cranial nerve deficit.    Skin: Skin is warm and dry. No rash noted. No cyanosis. No pallor.   Psychiatric: She has normal mood and affect. Her behavior is normal.          Recent lab results:     Lab Results   Component Value Date    WBC 6.66 08/25/2017    HGB 11.7 (L) 08/25/2017    HCT 36.7 08/25/2017    .1 (H) 08/25/2017     08/25/2017     Lab Results   Component Value Date    NEUTROABS 3.13 08/25/2017     Lab Results   Component Value Date    GLUCOSE 109 (H) 08/25/2017    BUN 25 (H) 08/25/2017    CREATININE 1.73 (H) 08/25/2017    EGFRIFNONA  08/30/2016      Comment:      <15 " Indicative of kidney failure.    EGFRIFAFRI 36 (L) 08/25/2017    BCR 14.5 08/25/2017    K 3.8 08/25/2017    CO2 20.1 (L) 08/25/2017    CALCIUM 8.4 (L) 08/25/2017    PROTENTOTREF 7.8 07/21/2017    ALBUMIN 3.30 (L) 08/25/2017    LABIL2 0.7 08/25/2017    AST 24 08/25/2017    ALT 26 08/25/2017     Sodium   Date Value Ref Range Status   08/25/2017 141 136 - 145 mmol/L Final     Potassium   Date Value Ref Range Status   08/25/2017 3.8 3.5 - 5.2 mmol/L Final     Total Bilirubin   Date Value Ref Range Status   08/25/2017 0.3 0.1 - 1.2 mg/dL Final     Alkaline Phosphatase   Date Value Ref Range Status   08/25/2017 66 39 - 117 U/L Final   ]    IMAGE STUDIES:   CT CHEST WITHOUT CONTRAST 8/21/2017      HISTORY: 67-year-old female with history of left nephrectomy for renal  cell carcinoma. Also history of breast cancer and multiple myeloma.  Follow-up pulmonary nodules.      TECHNIQUE: 3 mm images were obtained through the chest without the  administration of IV contrast. Compared with previous chest CT from  01/06/2017 and 10/22/2009.      FINDINGS: There is no interval change in the 4-5 mm pulmonary nodule at  the right middle lobe. The 2 tiny right upper lobe nodular opacities  which have been stable since 2009 remain stable. No new pulmonary  opacities are seen and there are no pleural or pericardial effusions.  There are stable shotty mediastinal nodes. Left mastectomy noted. Right  MediPort catheter tip is within the SVC. Large right hepatic lobe liver  lesion previously characterized as a hemangioma is not seen in its  entirety, but the visualized portion appears largely unchanged.      IMPRESSION:  Stable 4-5 mm right middle lobe pulmonary nodule. The  stability since 01/06/2017 is reassuring, but additional follow-up is  recommended with a chest CT in 6 months.         DIAGNOSTIC RIGHT-SIDED MAMMOGRAM WITH DIGITAL TOMOSYNTHESIS  8/9/2017      HISTORY: Focal asymmetric density in inner right breast noted on  recent  screening mammogram.      The patient returned for additional diagnostic imaging with coned  compression and digital Tomosynthesis and the area of concern completely  resolves. No underlying abnormality is seen.      CONCLUSION: Negative right-sided mammogram.       BI-RADS CATEGORY 1:  Negative.      Assessment/Plan   1. Multiple myeloma, IgG lambda, stage III. Failed multiple lines of therapy. Her treatment was switched to Darzalex on 5/26/2016. She tolerated therapy very well. Her laboratory study showed slightly improvement of her proteins in both serum and 24-hour urine sample. She also has being able to maintain relatively good platelet counts, now improved to 144,000, and only mild anemia without Procrit injection. She has been on monthly Darzalex treatment, with a much improved energy level and not feeling ill..      Laboratory study showed a much improved serum free lambda chain, down from above 1230 about 3 months ago down to 700 now.    .  2. Right middle lobe pulmonary nodule, documented on CT scan of chest on 01/06/2017. Repeated CT scan of chest on 8/21/2017 reported a small and stable primary nodule.      3. Zometa treatment. She has stage III chronic kidney disease, and I do not see any signs of improvement.  She is already being off Zometa for about 18 months.  I discussed with the patient for the benefits of Zometa, and recommended to resume Zometa today and repeat every 3 months.        4. Anemia secondary to chemotherapy for multiple myeloma and chronic renal insufficiency. Rechecked her iron panel reported ferritin >1000. No evidence of B12 and folic acid deficiency.       5. Moderate thrombocytopenia secondary to her multiple myeloma and chemotherapy.  Nevertheless her platelet count has been gradually improving since we started Darzalex treatment.        6. History of stage II left breast cancer, post mastectomy in 2013, negative for ER/DE and the positive HER-2. No neoadjuvant  chemotherapy because of concurrent discovery of multiple myeloma and chemotherapy. She had a normal mammogram study in July 2017.           Plan:  1. Continue Darzalex today and repeat monthly.   2. Resume Zometa at decreased dose of 3 mg today, repeat every 3 months.   3.  Repeat labs in 2 months, cbc, CMP, SPEP, ADOLFO, Igs, zhmc2QI, free light chains.  In 5 months repeat 24 hour urine test for UPEP, ADOLFO and free light chains.   4.  Patient will return in 3 months for M.D. Reevaluation.             LI OBANDO M.D., Ph.D.     08/25/2017           CC: Michael Everett M.D.

## 2017-08-30 ENCOUNTER — ANESTHESIA EVENT (OUTPATIENT)
Dept: GASTROENTEROLOGY | Facility: HOSPITAL | Age: 67
End: 2017-08-30

## 2017-08-30 ENCOUNTER — ANESTHESIA (OUTPATIENT)
Dept: GASTROENTEROLOGY | Facility: HOSPITAL | Age: 67
End: 2017-08-30

## 2017-08-30 ENCOUNTER — HOSPITAL ENCOUNTER (OUTPATIENT)
Facility: HOSPITAL | Age: 67
Setting detail: HOSPITAL OUTPATIENT SURGERY
Discharge: HOME | End: 2017-08-30
Attending: SURGERY | Admitting: SURGERY

## 2017-08-30 VITALS
OXYGEN SATURATION: 99 % | WEIGHT: 191.44 LBS | SYSTOLIC BLOOD PRESSURE: 90 MMHG | HEIGHT: 62 IN | TEMPERATURE: 98 F | HEART RATE: 67 BPM | BODY MASS INDEX: 35.23 KG/M2 | RESPIRATION RATE: 18 BRPM | DIASTOLIC BLOOD PRESSURE: 56 MMHG

## 2017-08-30 DIAGNOSIS — D64.9 ANEMIA, UNSPECIFIED TYPE: Primary | ICD-10-CM

## 2017-08-30 DIAGNOSIS — R19.5 POSITIVE COLORECTAL CANCER SCREENING USING DNA-BASED STOOL TEST: ICD-10-CM

## 2017-08-30 PROCEDURE — 45380 COLONOSCOPY AND BIOPSY: CPT | Performed by: SURGERY

## 2017-08-30 PROCEDURE — 25010000002 GLUCAGON (RDNA) PER 1 MG: Performed by: SURGERY

## 2017-08-30 PROCEDURE — 88305 TISSUE EXAM BY PATHOLOGIST: CPT | Performed by: SURGERY

## 2017-08-30 PROCEDURE — 25010000002 PROPOFOL 10 MG/ML EMULSION: Performed by: ANESTHESIOLOGY

## 2017-08-30 RX ORDER — SODIUM CHLORIDE 0.9 % (FLUSH) 0.9 %
3 SYRINGE (ML) INJECTION AS NEEDED
Status: DISCONTINUED | OUTPATIENT
Start: 2017-08-30 | End: 2017-08-30 | Stop reason: HOSPADM

## 2017-08-30 RX ORDER — LIDOCAINE HYDROCHLORIDE 20 MG/ML
INJECTION, SOLUTION INFILTRATION; PERINEURAL AS NEEDED
Status: DISCONTINUED | OUTPATIENT
Start: 2017-08-30 | End: 2017-08-30 | Stop reason: SURG

## 2017-08-30 RX ORDER — LIDOCAINE HYDROCHLORIDE 10 MG/ML
0.5 INJECTION, SOLUTION INFILTRATION; PERINEURAL ONCE AS NEEDED
Status: DISCONTINUED | OUTPATIENT
Start: 2017-08-30 | End: 2017-08-30 | Stop reason: HOSPADM

## 2017-08-30 RX ORDER — SODIUM CHLORIDE 9 MG/ML
1000 INJECTION, SOLUTION INTRAVENOUS CONTINUOUS
Status: DISCONTINUED | OUTPATIENT
Start: 2017-08-30 | End: 2017-08-30 | Stop reason: HOSPADM

## 2017-08-30 RX ORDER — SODIUM CHLORIDE 0.9 % (FLUSH) 0.9 %
1-10 SYRINGE (ML) INJECTION AS NEEDED
Status: DISCONTINUED | OUTPATIENT
Start: 2017-08-30 | End: 2017-08-30 | Stop reason: HOSPADM

## 2017-08-30 RX ORDER — SODIUM CHLORIDE, SODIUM LACTATE, POTASSIUM CHLORIDE, CALCIUM CHLORIDE 600; 310; 30; 20 MG/100ML; MG/100ML; MG/100ML; MG/100ML
INJECTION, SOLUTION INTRAVENOUS CONTINUOUS PRN
Status: DISCONTINUED | OUTPATIENT
Start: 2017-08-30 | End: 2017-08-30 | Stop reason: SURG

## 2017-08-30 RX ORDER — PROPOFOL 10 MG/ML
VIAL (ML) INTRAVENOUS AS NEEDED
Status: DISCONTINUED | OUTPATIENT
Start: 2017-08-30 | End: 2017-08-30 | Stop reason: SURG

## 2017-08-30 RX ADMIN — LIDOCAINE HYDROCHLORIDE 100 MG: 20 INJECTION, SOLUTION INFILTRATION; PERINEURAL at 08:00

## 2017-08-30 RX ADMIN — SODIUM CHLORIDE, POTASSIUM CHLORIDE, SODIUM LACTATE AND CALCIUM CHLORIDE: 600; 310; 30; 20 INJECTION, SOLUTION INTRAVENOUS at 08:00

## 2017-08-30 RX ADMIN — PROPOFOL 270 MG: 10 INJECTION, EMULSION INTRAVENOUS at 08:00

## 2017-08-30 RX ADMIN — SODIUM CHLORIDE 1000 ML: 9 INJECTION, SOLUTION INTRAVENOUS at 07:57

## 2017-08-30 NOTE — ANESTHESIA PREPROCEDURE EVALUATION
Anesthesia Evaluation     Patient summary reviewed and Nursing notes reviewed   no history of anesthetic complications:  NPO Solid Status: > 8 hours  NPO Liquid Status: > 8 hours     Airway   Mallampati: II  TM distance: >3 FB  Neck ROM: full  no difficulty expected  Dental - normal exam     Pulmonary - normal exam   (+) pneumonia ,   Cardiovascular - normal exam    (+) hypertension, CHF, DVT,       Neuro/Psych  (+) seizures,    GI/Hepatic/Renal/Endo    (+) obesity,      Musculoskeletal     Abdominal  - normal exam   Substance History      OB/GYN          Other   (+) arthritis   history of cancer                                    Anesthesia Plan    ASA 3     MAC     Anesthetic plan and risks discussed with patient.

## 2017-08-30 NOTE — ANESTHESIA POSTPROCEDURE EVALUATION
"Patient: Marina MAGANA Barroso    Procedure Summary     Date Anesthesia Start Anesthesia Stop Room / Location    08/30/17 0803 0839  [facility] /  [facility]       Procedure Diagnosis Surgeon Provider    COLONOSCOPY into cecum and TI with cold polypectomies (N/A ) Positive colorectal cancer screening using DNA-based stool test  (Positive colorectal cancer screening using DNA-based stool test [R19.5]) MD Michael Quesada MD          Anesthesia Type: MAC  Last vitals  BP   90/56 (08/30/17 0856)    Temp   36.7 °C (98 °F) (08/30/17 0836)    Pulse   67 (08/30/17 0856)   Resp   18 (08/30/17 0856)    SpO2   99 % (08/30/17 0856)      Post Anesthesia Care and Evaluation    Patient location during evaluation: PACU  Patient participation: complete - patient participated  Level of consciousness: awake and alert  Pain management: adequate  Airway patency: patent  Anesthetic complications: No anesthetic complications    Cardiovascular status: acceptable  Respiratory status: acceptable  Hydration status: acceptable    Comments: BP 90/56 (BP Location: Right arm, Patient Position: Sitting)  Pulse 67  Temp 36.7 °C (98 °F) (Oral)   Resp 18  Ht 62\" (157.5 cm)  Wt 191 lb 7 oz (86.8 kg)  LMP  (LMP Unknown)  SpO2 99%  BMI 35.01 kg/m2        "

## 2017-08-31 LAB
CYTO UR: NORMAL
LAB AP CASE REPORT: NORMAL
Lab: NORMAL
PATH REPORT.FINAL DX SPEC: NORMAL
PATH REPORT.GROSS SPEC: NORMAL

## 2017-09-01 ENCOUNTER — TELEPHONE (OUTPATIENT)
Dept: SURGERY | Facility: CLINIC | Age: 67
End: 2017-09-01

## 2017-09-06 ENCOUNTER — OFFICE VISIT (OUTPATIENT)
Dept: FAMILY MEDICINE CLINIC | Facility: CLINIC | Age: 67
End: 2017-09-06

## 2017-09-06 VITALS
RESPIRATION RATE: 16 BRPM | DIASTOLIC BLOOD PRESSURE: 66 MMHG | BODY MASS INDEX: 35.7 KG/M2 | SYSTOLIC BLOOD PRESSURE: 109 MMHG | WEIGHT: 194 LBS | TEMPERATURE: 96.9 F | HEART RATE: 67 BPM | HEIGHT: 62 IN

## 2017-09-06 DIAGNOSIS — Z23 IMMUNIZATION DUE: ICD-10-CM

## 2017-09-06 DIAGNOSIS — N63.0 BREAST NODULE: ICD-10-CM

## 2017-09-06 DIAGNOSIS — G40.909 SEIZURE DISORDER (HCC): Primary | ICD-10-CM

## 2017-09-06 PROBLEM — R19.5 POSITIVE COLORECTAL CANCER SCREENING USING DNA-BASED STOOL TEST: Status: RESOLVED | Noted: 2017-08-16 | Resolved: 2017-09-06

## 2017-09-06 PROCEDURE — G0009 ADMIN PNEUMOCOCCAL VACCINE: HCPCS | Performed by: FAMILY MEDICINE

## 2017-09-06 PROCEDURE — 90670 PCV13 VACCINE IM: CPT | Performed by: FAMILY MEDICINE

## 2017-09-06 PROCEDURE — 99213 OFFICE O/P EST LOW 20 MIN: CPT | Performed by: FAMILY MEDICINE

## 2017-09-06 RX ORDER — CARBAMAZEPINE 200 MG/1
400 TABLET, EXTENDED RELEASE ORAL NIGHTLY
Qty: 180 TABLET | Refills: 1 | Status: SHIPPED | OUTPATIENT
Start: 2017-09-06 | End: 2018-01-03 | Stop reason: SDUPTHER

## 2017-09-06 NOTE — PROGRESS NOTES
"Chief Complaint   Patient presents with   • Follow-up       Subjective   This patient presents to the office to get prescription for seizure disorder. That condition is stable. No seizure since 1999. No medication side effects noted. She is also here to  follow-up on recent mammogram diagnostic testing.  Good news, her breast nodule resolved with additional tomosynthesis of the right with computer-assisted detection.  Since last visit she has also had colonoscopy which was normal except for diverticulosis and 2 small polyps.  She has no need for further evaluation of this until 2022 for this condition with Dr. Rivera.    Patient is due for her second pneumonia vaccine.    I have reviewed and updated her medications, medical history and problem list during today's office visit.        Social History   Substance Use Topics   • Smoking status: Never Smoker   • Smokeless tobacco: Never Used   • Alcohol use No       Review of Systems   Constitutional: Negative for fatigue.   Cardiovascular: Negative for chest pain.       Objective   /66  Pulse 67  Temp 96.9 °F (36.1 °C) (Oral)   Resp 16  Ht 62\" (157.5 cm)  Wt 194 lb (88 kg)  LMP  (LMP Unknown)  BMI 35.48 kg/m2  Body mass index is 35.48 kg/(m^2).  Physical Exam   Constitutional: She is cooperative. No distress.   Eyes: Conjunctivae and lids are normal.   Neck: Carotid bruit is not present. No tracheal deviation present.   Cardiovascular: Normal rate, regular rhythm and normal heart sounds.    No murmur heard.  Pulmonary/Chest: Effort normal and breath sounds normal.   Neurological: She is alert. She is not disoriented.   Skin: Skin is warm and dry.   Psychiatric: She has a normal mood and affect. Her speech is normal and behavior is normal.   Vitals reviewed.      Data Reviewed:  Mammo diagnostic digital tomosynthesis right w CAD [[id number]] Collected: 08/09/17 1515   Order Status: Completed Updated: 08/10/17 1527   Narrative:     DIAGNOSTIC RIGHT-SIDED " MAMMOGRAM WITH DIGITAL TOMOSYNTHESIS     HISTORY: Focal asymmetric density in inner right breast noted on recent  screening mammogram.     The patient returned for additional diagnostic imaging with coned  compression and digital Tomosynthesis and the area of concern completely  resolves. No underlying abnormality is seen.     CONCLUSION: Negative right-sided mammogram.      BI-RADS CATEGORY 1:  Negative.     This report was finalized on 8/10/2017 11:12 AM by Dr. Contreras Peter,           Assessment/Plan     Problem List Items Addressed This Visit        Nervous and Auditory    Seizure disorder - Primary    Relevant Medications    carBAMazepine XR (TEGretol  XR) 200 MG 12 hr tablet       Other    Breast nodule, right medial breast          Outpatient Encounter Prescriptions as of 9/6/2017   Medication Sig Dispense Refill   • acetaminophen (TYLENOL) 325 MG tablet Take 2 tablets by mouth Every 6 (Six) Hours As Needed for mild pain (1-3) or fever.  0   • acyclovir (ZOVIRAX) 400 MG tablet TAKE 1 TABLET BY MOUTH 2 (TWO) TIMES A DAY. TAKE NO MORE THAN 5 DOSES A DAY. 60 tablet 8   • aspirin 81 MG tablet Take 81 mg by mouth Every Night.     • Bisoprolol Fumarate (ZEBETA PO) Take 15 mg by mouth Daily.     • carBAMazepine XR (TEGretol  XR) 200 MG 12 hr tablet Take 2 tablets by mouth Every Night for 180 days. 180 tablet 1   • dexamethasone (DECADRON) 4 MG tablet TAKE 1 TABLET IN THE MORNING STARTING THE DAY AFTER CHEMO FOR 2 DAYS. TAKE WITH FOOD. (Patient taking differently: TAKE 1 TABLET IN THE MORNING STARTING THE DAY AFTER CHEMO FOR 2 DAYS. TAKE WITH FOOD BID) 16 tablet 1   • folic acid (FOLVITE) 1 MG tablet Take 1 mg by mouth Every Morning.     • hydrALAZINE (APRESOLINE) 25 MG tablet Take 25 mg by mouth 2 (two) times a day.  3   • isosorbide mononitrate (IMDUR) 60 MG 24 hr tablet Take 1 tablet by mouth Daily. TAKE 1 TABLET BY MOUTH EVERY MORNING AND ONE-HALF TABLET EVERY EVENING (Patient taking differently: Take 60 mg by  mouth Daily. TAKE 1.5 TABLET BY MOUTH EVERY MORNING BEFORE EXERSIZE)     • losartan (COZAAR) 25 MG tablet Take 25 mg by mouth every evening.     • Misc. Devices (VIRAGE CUSTOM BREAST PROSTHES) misc As directed     • montelukast (SINGULAIR) 10 MG tablet TAKE 1 TABLET BY MOUTH EVERY NIGHT. 30 tablet 11   • [DISCONTINUED] carBAMazepine XR (TEGretol  XR) 200 MG 12 hr tablet Take 1 tablet by mouth 2 (Two) Times a Day. (Patient taking differently: Take 200 mg by mouth Every Night.) 60 tablet 0   • [DISCONTINUED] Misc. Devices misc Mastectomy bras, use as directed 4 each 0     No facility-administered encounter medications on file as of 9/6/2017.        No orders of the defined types were placed in this encounter.      Continue with current treatment plan.         F/U in 6 months

## 2017-09-12 ENCOUNTER — HOSPITAL ENCOUNTER (OUTPATIENT)
Dept: GENERAL RADIOLOGY | Facility: HOSPITAL | Age: 67
Discharge: HOME | End: 2017-09-12
Attending: SURGERY | Admitting: SURGERY

## 2017-09-12 DIAGNOSIS — D64.9 ANEMIA, UNSPECIFIED TYPE: ICD-10-CM

## 2017-09-12 PROCEDURE — 74249: CPT

## 2017-09-19 DIAGNOSIS — C90.00 MULTIPLE MYELOMA, REMISSION STATUS UNSPECIFIED (HCC): ICD-10-CM

## 2017-09-22 ENCOUNTER — INFUSION (OUTPATIENT)
Dept: ONCOLOGY | Facility: HOSPITAL | Age: 67
End: 2017-09-22

## 2017-09-22 VITALS
DIASTOLIC BLOOD PRESSURE: 70 MMHG | OXYGEN SATURATION: 100 % | HEART RATE: 71 BPM | TEMPERATURE: 97.4 F | WEIGHT: 196.6 LBS | BODY MASS INDEX: 35.96 KG/M2 | SYSTOLIC BLOOD PRESSURE: 111 MMHG

## 2017-09-22 DIAGNOSIS — C90.00 MULTIPLE MYELOMA, REMISSION STATUS UNSPECIFIED (HCC): Primary | ICD-10-CM

## 2017-09-22 LAB
ALBUMIN SERPL-MCNC: 3.2 G/DL (ref 3.5–5.2)
ALBUMIN/GLOB SERPL: 0.7 G/DL
ALP SERPL-CCNC: 69 U/L (ref 39–117)
ALT SERPL W P-5'-P-CCNC: 21 U/L (ref 1–33)
ANION GAP SERPL CALCULATED.3IONS-SCNC: 11.9 MMOL/L
AST SERPL-CCNC: 20 U/L (ref 1–32)
BASOPHILS # BLD AUTO: 0.02 10*3/MM3 (ref 0–0.2)
BASOPHILS NFR BLD AUTO: 0.3 % (ref 0–1.5)
BILIRUB SERPL-MCNC: 0.2 MG/DL (ref 0.1–1.2)
BUN BLD-MCNC: 34 MG/DL (ref 8–23)
BUN/CREAT SERPL: 17.8 (ref 7–25)
CALCIUM SPEC-SCNC: 8.3 MG/DL (ref 8.6–10.5)
CHLORIDE SERPL-SCNC: 108 MMOL/L (ref 98–107)
CO2 SERPL-SCNC: 21.1 MMOL/L (ref 22–29)
CREAT BLD-MCNC: 1.91 MG/DL (ref 0.57–1)
DACRYOCYTES BLD QL SMEAR: NORMAL
DEPRECATED RDW RBC AUTO: 52.8 FL (ref 37–54)
EOSINOPHIL # BLD AUTO: 0.26 10*3/MM3 (ref 0–0.7)
EOSINOPHIL NFR BLD AUTO: 3.9 % (ref 0.3–6.2)
ERYTHROCYTE [DISTWIDTH] IN BLOOD BY AUTOMATED COUNT: 13.6 % (ref 11.7–13)
GFR SERPL CREATININE-BSD FRML MDRD: 32 ML/MIN/1.73
GLOBULIN UR ELPH-MCNC: 4.9 GM/DL
GLUCOSE BLD-MCNC: 94 MG/DL (ref 65–99)
HCT VFR BLD AUTO: 37 % (ref 35.6–45.5)
HGB BLD-MCNC: 11.8 G/DL (ref 11.9–15.5)
IMM GRANULOCYTES # BLD: 0.12 10*3/MM3 (ref 0–0.03)
IMM GRANULOCYTES NFR BLD: 1.8 % (ref 0–0.5)
LYMPHOCYTES # BLD AUTO: 2.13 10*3/MM3 (ref 0.9–4.8)
LYMPHOCYTES NFR BLD AUTO: 31.7 % (ref 19.6–45.3)
MCH RBC QN AUTO: 33.8 PG (ref 26.9–32)
MCHC RBC AUTO-ENTMCNC: 31.9 G/DL (ref 32.4–36.3)
MCV RBC AUTO: 106 FL (ref 80.5–98.2)
MONOCYTES # BLD AUTO: 0.64 10*3/MM3 (ref 0.2–1.2)
MONOCYTES NFR BLD AUTO: 9.5 % (ref 5–12)
NEUTROPHILS # BLD AUTO: 3.55 10*3/MM3 (ref 1.9–8.1)
NEUTROPHILS NFR BLD AUTO: 52.8 % (ref 42.7–76)
NRBC BLD MANUAL-RTO: 0 /100 WBC (ref 0–0)
PLAT MORPH BLD: NORMAL
PLATELET # BLD AUTO: 137 10*3/MM3 (ref 140–500)
PMV BLD AUTO: 10.9 FL (ref 6–12)
POTASSIUM BLD-SCNC: 3.7 MMOL/L (ref 3.5–5.2)
PROT SERPL-MCNC: 8.1 G/DL (ref 6–8.5)
RBC # BLD AUTO: 3.49 10*6/MM3 (ref 3.9–5.2)
SODIUM BLD-SCNC: 141 MMOL/L (ref 136–145)
SPHEROCYTES BLD QL SMEAR: NORMAL
WBC MORPH BLD: NORMAL
WBC NRBC COR # BLD: 6.72 10*3/MM3 (ref 4.5–10.7)

## 2017-09-22 PROCEDURE — 96375 TX/PRO/DX INJ NEW DRUG ADDON: CPT | Performed by: NURSE PRACTITIONER

## 2017-09-22 PROCEDURE — 25010000002 DARATUMUMAB 400 MG/20ML SOLUTION 20 ML VIAL: Performed by: NURSE PRACTITIONER

## 2017-09-22 PROCEDURE — 85025 COMPLETE CBC W/AUTO DIFF WBC: CPT | Performed by: INTERNAL MEDICINE

## 2017-09-22 PROCEDURE — 85007 BL SMEAR W/DIFF WBC COUNT: CPT | Performed by: INTERNAL MEDICINE

## 2017-09-22 PROCEDURE — 25010000002 DIPHENHYDRAMINE PER 50 MG: Performed by: NURSE PRACTITIONER

## 2017-09-22 PROCEDURE — 25010000002 DARATUMUMAB 400 MG/20ML SOLUTION 5 ML VIAL: Performed by: NURSE PRACTITIONER

## 2017-09-22 PROCEDURE — 25010000002 METHYLPREDNISOLONE PER 125 MG: Performed by: NURSE PRACTITIONER

## 2017-09-22 PROCEDURE — 96415 CHEMO IV INFUSION ADDL HR: CPT | Performed by: NURSE PRACTITIONER

## 2017-09-22 PROCEDURE — 80053 COMPREHEN METABOLIC PANEL: CPT | Performed by: INTERNAL MEDICINE

## 2017-09-22 PROCEDURE — 96413 CHEMO IV INFUSION 1 HR: CPT | Performed by: NURSE PRACTITIONER

## 2017-09-22 RX ORDER — ACETAMINOPHEN 500 MG
1000 TABLET ORAL ONCE
Status: COMPLETED | OUTPATIENT
Start: 2017-09-22 | End: 2017-09-22

## 2017-09-22 RX ORDER — METHYLPREDNISOLONE SODIUM SUCCINATE 125 MG/2ML
60 INJECTION, POWDER, LYOPHILIZED, FOR SOLUTION INTRAMUSCULAR; INTRAVENOUS ONCE
Status: COMPLETED | OUTPATIENT
Start: 2017-09-22 | End: 2017-09-22

## 2017-09-22 RX ORDER — SODIUM CHLORIDE 9 MG/ML
250 INJECTION, SOLUTION INTRAVENOUS ONCE
Status: COMPLETED | OUTPATIENT
Start: 2017-09-22 | End: 2017-09-22

## 2017-09-22 RX ORDER — FAMOTIDINE 10 MG/ML
20 INJECTION, SOLUTION INTRAVENOUS AS NEEDED
Status: CANCELLED | OUTPATIENT
Start: 2017-09-22

## 2017-09-22 RX ORDER — DIPHENHYDRAMINE HYDROCHLORIDE 50 MG/ML
50 INJECTION INTRAMUSCULAR; INTRAVENOUS AS NEEDED
Status: CANCELLED | OUTPATIENT
Start: 2017-09-22

## 2017-09-22 RX ADMIN — DARATUMUMAB 1370 MG: 100 INJECTION, SOLUTION, CONCENTRATE INTRAVENOUS at 11:36

## 2017-09-22 RX ADMIN — ACETAMINOPHEN 1000 MG: 500 TABLET ORAL at 10:20

## 2017-09-22 RX ADMIN — METHYLPREDNISOLONE SODIUM SUCCINATE 60 MG: 125 INJECTION, POWDER, FOR SOLUTION INTRAMUSCULAR; INTRAVENOUS at 10:22

## 2017-09-22 RX ADMIN — SODIUM CHLORIDE 250 ML: 900 INJECTION, SOLUTION INTRAVENOUS at 10:00

## 2017-09-22 RX ADMIN — DIPHENHYDRAMINE HYDROCHLORIDE 25 MG: 50 INJECTION, SOLUTION INTRAMUSCULAR; INTRAVENOUS at 10:24

## 2017-09-27 ENCOUNTER — OFFICE VISIT (OUTPATIENT)
Dept: FAMILY MEDICINE CLINIC | Facility: CLINIC | Age: 67
End: 2017-09-27

## 2017-09-27 VITALS
HEART RATE: 81 BPM | BODY MASS INDEX: 36.25 KG/M2 | WEIGHT: 197 LBS | HEIGHT: 62 IN | TEMPERATURE: 97 F | RESPIRATION RATE: 16 BRPM | DIASTOLIC BLOOD PRESSURE: 59 MMHG | SYSTOLIC BLOOD PRESSURE: 94 MMHG

## 2017-09-27 DIAGNOSIS — L81.9 PIGMENTED SKIN LESION: ICD-10-CM

## 2017-09-27 DIAGNOSIS — M17.10 ARTHROPATHY OF KNEE: Primary | ICD-10-CM

## 2017-09-27 PROCEDURE — 99213 OFFICE O/P EST LOW 20 MIN: CPT | Performed by: FAMILY MEDICINE

## 2017-10-18 DIAGNOSIS — C90.00 MULTIPLE MYELOMA, REMISSION STATUS UNSPECIFIED (HCC): ICD-10-CM

## 2017-10-20 ENCOUNTER — INFUSION (OUTPATIENT)
Dept: ONCOLOGY | Facility: HOSPITAL | Age: 67
End: 2017-10-20

## 2017-10-20 ENCOUNTER — DOCUMENTATION (OUTPATIENT)
Dept: ONCOLOGY | Facility: CLINIC | Age: 67
End: 2017-10-20

## 2017-10-20 VITALS
WEIGHT: 200 LBS | HEART RATE: 67 BPM | SYSTOLIC BLOOD PRESSURE: 110 MMHG | DIASTOLIC BLOOD PRESSURE: 73 MMHG | TEMPERATURE: 97.8 F | BODY MASS INDEX: 36.58 KG/M2 | OXYGEN SATURATION: 99 %

## 2017-10-20 DIAGNOSIS — C90.00 MULTIPLE MYELOMA, REMISSION STATUS UNSPECIFIED (HCC): Primary | ICD-10-CM

## 2017-10-20 DIAGNOSIS — C90.00 MULTIPLE MYELOMA NOT HAVING ACHIEVED REMISSION (HCC): Primary | ICD-10-CM

## 2017-10-20 LAB
ALBUMIN SERPL-MCNC: 3.2 G/DL (ref 3.5–5.2)
ALBUMIN/GLOB SERPL: 0.7 G/DL
ALP SERPL-CCNC: 66 U/L (ref 39–117)
ALT SERPL W P-5'-P-CCNC: 17 U/L (ref 1–33)
ANION GAP SERPL CALCULATED.3IONS-SCNC: 10.4 MMOL/L
AST SERPL-CCNC: 20 U/L (ref 1–32)
B2 MICROGLOB SERPL-MCNC: 6.5 MG/L (ref 0.8–2.2)
BASOPHILS # BLD AUTO: 0.02 10*3/MM3 (ref 0–0.2)
BASOPHILS NFR BLD AUTO: 0.3 % (ref 0–1.5)
BILIRUB SERPL-MCNC: 0.2 MG/DL (ref 0.1–1.2)
BUN BLD-MCNC: 21 MG/DL (ref 8–23)
BUN/CREAT SERPL: 13.2 (ref 7–25)
CALCIUM SPEC-SCNC: 8.1 MG/DL (ref 8.6–10.5)
CHLORIDE SERPL-SCNC: 111 MMOL/L (ref 98–107)
CO2 SERPL-SCNC: 21.6 MMOL/L (ref 22–29)
CREAT BLD-MCNC: 1.59 MG/DL (ref 0.57–1)
DACRYOCYTES BLD QL SMEAR: NORMAL
DEPRECATED RDW RBC AUTO: 54.4 FL (ref 37–54)
EOSINOPHIL # BLD AUTO: 0.19 10*3/MM3 (ref 0–0.7)
EOSINOPHIL NFR BLD AUTO: 2.7 % (ref 0.3–6.2)
ERYTHROCYTE [DISTWIDTH] IN BLOOD BY AUTOMATED COUNT: 13.9 % (ref 11.7–13)
GFR SERPL CREATININE-BSD FRML MDRD: 39 ML/MIN/1.73
GLOBULIN UR ELPH-MCNC: 4.7 GM/DL
GLUCOSE BLD-MCNC: 113 MG/DL (ref 65–99)
HCT VFR BLD AUTO: 35.5 % (ref 35.6–45.5)
HGB BLD-MCNC: 11.3 G/DL (ref 11.9–15.5)
IMM GRANULOCYTES # BLD: 0.09 10*3/MM3 (ref 0–0.03)
IMM GRANULOCYTES NFR BLD: 1.3 % (ref 0–0.5)
LYMPHOCYTES # BLD AUTO: 2.01 10*3/MM3 (ref 0.9–4.8)
LYMPHOCYTES NFR BLD AUTO: 28.4 % (ref 19.6–45.3)
MCH RBC QN AUTO: 33.8 PG (ref 26.9–32)
MCHC RBC AUTO-ENTMCNC: 31.8 G/DL (ref 32.4–36.3)
MCV RBC AUTO: 106.3 FL (ref 80.5–98.2)
MONOCYTES # BLD AUTO: 0.65 10*3/MM3 (ref 0.2–1.2)
MONOCYTES NFR BLD AUTO: 9.2 % (ref 5–12)
NEUTROPHILS # BLD AUTO: 4.11 10*3/MM3 (ref 1.9–8.1)
NEUTROPHILS NFR BLD AUTO: 58.1 % (ref 42.7–76)
NRBC BLD MANUAL-RTO: 0.3 /100 WBC (ref 0–0)
PLAT MORPH BLD: NORMAL
PLATELET # BLD AUTO: 114 10*3/MM3 (ref 140–500)
PMV BLD AUTO: 10.3 FL (ref 6–12)
POTASSIUM BLD-SCNC: 3.9 MMOL/L (ref 3.5–5.2)
PROT SERPL-MCNC: 7.9 G/DL (ref 6–8.5)
RBC # BLD AUTO: 3.34 10*6/MM3 (ref 3.9–5.2)
SODIUM BLD-SCNC: 143 MMOL/L (ref 136–145)
SPHEROCYTES BLD QL SMEAR: NORMAL
WBC MORPH BLD: NORMAL
WBC NRBC COR # BLD: 7.07 10*3/MM3 (ref 4.5–10.7)

## 2017-10-20 PROCEDURE — 82232 ASSAY OF BETA-2 PROTEIN: CPT | Performed by: INTERNAL MEDICINE

## 2017-10-20 PROCEDURE — 85025 COMPLETE CBC W/AUTO DIFF WBC: CPT | Performed by: INTERNAL MEDICINE

## 2017-10-20 PROCEDURE — 84155 ASSAY OF PROTEIN SERUM: CPT | Performed by: INTERNAL MEDICINE

## 2017-10-20 PROCEDURE — 36415 COLL VENOUS BLD VENIPUNCTURE: CPT | Performed by: INTERNAL MEDICINE

## 2017-10-20 PROCEDURE — 25010000002 DARATUMUMAB 400 MG/20ML SOLUTION 20 ML VIAL: Performed by: INTERNAL MEDICINE

## 2017-10-20 PROCEDURE — 85007 BL SMEAR W/DIFF WBC COUNT: CPT | Performed by: INTERNAL MEDICINE

## 2017-10-20 PROCEDURE — 80053 COMPREHEN METABOLIC PANEL: CPT | Performed by: INTERNAL MEDICINE

## 2017-10-20 PROCEDURE — 25010000002 METHYLPREDNISOLONE PER 125 MG: Performed by: INTERNAL MEDICINE

## 2017-10-20 PROCEDURE — 96413 CHEMO IV INFUSION 1 HR: CPT | Performed by: INTERNAL MEDICINE

## 2017-10-20 PROCEDURE — 25010000002 DIPHENHYDRAMINE PER 50 MG: Performed by: INTERNAL MEDICINE

## 2017-10-20 PROCEDURE — 25010000002 DARATUMUMAB 400 MG/20ML SOLUTION 5 ML VIAL: Performed by: INTERNAL MEDICINE

## 2017-10-20 PROCEDURE — 86334 IMMUNOFIX E-PHORESIS SERUM: CPT | Performed by: INTERNAL MEDICINE

## 2017-10-20 PROCEDURE — 96415 CHEMO IV INFUSION ADDL HR: CPT | Performed by: INTERNAL MEDICINE

## 2017-10-20 PROCEDURE — 83883 ASSAY NEPHELOMETRY NOT SPEC: CPT | Performed by: INTERNAL MEDICINE

## 2017-10-20 PROCEDURE — 84165 PROTEIN E-PHORESIS SERUM: CPT | Performed by: INTERNAL MEDICINE

## 2017-10-20 PROCEDURE — 96375 TX/PRO/DX INJ NEW DRUG ADDON: CPT | Performed by: INTERNAL MEDICINE

## 2017-10-20 RX ORDER — MEPERIDINE HYDROCHLORIDE 50 MG/ML
25 INJECTION INTRAMUSCULAR; INTRAVENOUS; SUBCUTANEOUS
Status: CANCELLED | OUTPATIENT
Start: 2017-10-20

## 2017-10-20 RX ORDER — SODIUM CHLORIDE 9 MG/ML
250 INJECTION, SOLUTION INTRAVENOUS ONCE
Status: COMPLETED | OUTPATIENT
Start: 2017-10-20 | End: 2017-10-20

## 2017-10-20 RX ORDER — ACETAMINOPHEN 500 MG
1000 TABLET ORAL ONCE
Status: COMPLETED | OUTPATIENT
Start: 2017-10-20 | End: 2017-10-20

## 2017-10-20 RX ORDER — METHYLPREDNISOLONE SODIUM SUCCINATE 125 MG/2ML
60 INJECTION, POWDER, LYOPHILIZED, FOR SOLUTION INTRAMUSCULAR; INTRAVENOUS ONCE
Status: COMPLETED | OUTPATIENT
Start: 2017-10-20 | End: 2017-10-20

## 2017-10-20 RX ORDER — METHYLPREDNISOLONE SODIUM SUCCINATE 125 MG/2ML
60 INJECTION, POWDER, LYOPHILIZED, FOR SOLUTION INTRAMUSCULAR; INTRAVENOUS ONCE
Status: CANCELLED | OUTPATIENT
Start: 2017-10-20

## 2017-10-20 RX ORDER — SODIUM CHLORIDE 9 MG/ML
250 INJECTION, SOLUTION INTRAVENOUS ONCE
Status: CANCELLED | OUTPATIENT
Start: 2017-10-20 | End: 2017-10-20

## 2017-10-20 RX ORDER — DIPHENHYDRAMINE HYDROCHLORIDE 50 MG/ML
50 INJECTION INTRAMUSCULAR; INTRAVENOUS AS NEEDED
Status: CANCELLED | OUTPATIENT
Start: 2017-10-20

## 2017-10-20 RX ORDER — FAMOTIDINE 10 MG/ML
20 INJECTION, SOLUTION INTRAVENOUS AS NEEDED
Status: CANCELLED | OUTPATIENT
Start: 2017-10-20

## 2017-10-20 RX ORDER — ACETAMINOPHEN 500 MG
1000 TABLET ORAL ONCE
Status: CANCELLED | OUTPATIENT
Start: 2017-10-20

## 2017-10-20 RX ADMIN — DARATUMUMAB 1370 MG: 100 INJECTION, SOLUTION, CONCENTRATE INTRAVENOUS at 11:34

## 2017-10-20 RX ADMIN — ACETAMINOPHEN 1000 MG: 500 TABLET ORAL at 10:26

## 2017-10-20 RX ADMIN — SODIUM CHLORIDE 250 ML: 900 INJECTION, SOLUTION INTRAVENOUS at 10:20

## 2017-10-20 RX ADMIN — METHYLPREDNISOLONE SODIUM SUCCINATE 60 MG: 125 INJECTION, POWDER, FOR SOLUTION INTRAMUSCULAR; INTRAVENOUS at 10:28

## 2017-10-20 RX ADMIN — DIPHENHYDRAMINE HYDROCHLORIDE 25 MG: 50 INJECTION, SOLUTION INTRAMUSCULAR; INTRAVENOUS at 10:29

## 2017-10-23 LAB
ALBUMIN SERPL-MCNC: 3.1 G/DL (ref 2.9–4.4)
ALBUMIN/GLOB SERPL: 0.7 {RATIO} (ref 0.7–1.7)
ALPHA1 GLOB FLD ELPH-MCNC: 0.3 G/DL (ref 0–0.4)
ALPHA2 GLOB SERPL ELPH-MCNC: 0.6 G/DL (ref 0.4–1)
B-GLOBULIN SERPL ELPH-MCNC: 0.9 G/DL (ref 0.7–1.3)
GAMMA GLOB SERPL ELPH-MCNC: 2.8 G/DL (ref 0.4–1.8)
GLOBULIN SER CALC-MCNC: 4.6 G/DL (ref 2.2–3.9)
IGA SERPL-MCNC: <5 MG/DL (ref 87–352)
IGG SERPL-MCNC: 3536 MG/DL (ref 700–1600)
IGM SERPL-MCNC: 11 MG/DL (ref 26–217)
INTERPRETATION SERPL IEP-IMP: ABNORMAL
KAPPA LC SERPL-MCNC: 5.3 MG/L (ref 3.3–19.4)
KAPPA LC/LAMBDA SER: 0.01 {RATIO} (ref 0.26–1.65)
LAMBDA LC FREE SERPL-MCNC: 720.7 MG/L (ref 5.7–26.3)
Lab: ABNORMAL
M-SPIKE: 2.7 G/DL
PROT SERPL-MCNC: 7.7 G/DL (ref 6–8.5)

## 2017-11-15 DIAGNOSIS — C90.00 MULTIPLE MYELOMA NOT HAVING ACHIEVED REMISSION (HCC): ICD-10-CM

## 2017-11-15 DIAGNOSIS — C90.00 MULTIPLE MYELOMA, REMISSION STATUS UNSPECIFIED (HCC): ICD-10-CM

## 2017-11-17 ENCOUNTER — INFUSION (OUTPATIENT)
Dept: ONCOLOGY | Facility: HOSPITAL | Age: 67
End: 2017-11-17

## 2017-11-17 ENCOUNTER — OFFICE VISIT (OUTPATIENT)
Dept: ONCOLOGY | Facility: CLINIC | Age: 67
End: 2017-11-17

## 2017-11-17 VITALS
DIASTOLIC BLOOD PRESSURE: 80 MMHG | OXYGEN SATURATION: 98 % | HEIGHT: 62 IN | WEIGHT: 199 LBS | TEMPERATURE: 97.6 F | BODY MASS INDEX: 36.62 KG/M2 | HEART RATE: 70 BPM | SYSTOLIC BLOOD PRESSURE: 130 MMHG | RESPIRATION RATE: 16 BRPM

## 2017-11-17 VITALS — DIASTOLIC BLOOD PRESSURE: 81 MMHG | HEART RATE: 64 BPM | SYSTOLIC BLOOD PRESSURE: 134 MMHG

## 2017-11-17 DIAGNOSIS — C90.00 MULTIPLE MYELOMA, REMISSION STATUS UNSPECIFIED (HCC): ICD-10-CM

## 2017-11-17 DIAGNOSIS — C90.00 MULTIPLE MYELOMA, REMISSION STATUS UNSPECIFIED (HCC): Primary | ICD-10-CM

## 2017-11-17 DIAGNOSIS — T45.1X5A ANEMIA ASSOCIATED WITH CHEMOTHERAPY: ICD-10-CM

## 2017-11-17 DIAGNOSIS — D64.81 ANEMIA ASSOCIATED WITH CHEMOTHERAPY: ICD-10-CM

## 2017-11-17 DIAGNOSIS — Z85.3 PERSONAL HISTORY OF BREAST CANCER: ICD-10-CM

## 2017-11-17 DIAGNOSIS — R91.1 PULMONARY NODULE, RIGHT: ICD-10-CM

## 2017-11-17 DIAGNOSIS — D69.59 CHEMOTHERAPY-INDUCED THROMBOCYTOPENIA: ICD-10-CM

## 2017-11-17 DIAGNOSIS — T45.1X5A CHEMOTHERAPY-INDUCED THROMBOCYTOPENIA: ICD-10-CM

## 2017-11-17 DIAGNOSIS — C90.00 MULTIPLE MYELOMA NOT HAVING ACHIEVED REMISSION (HCC): ICD-10-CM

## 2017-11-17 DIAGNOSIS — C90.00 MULTIPLE MYELOMA NOT HAVING ACHIEVED REMISSION (HCC): Primary | ICD-10-CM

## 2017-11-17 LAB
ALBUMIN SERPL-MCNC: 3 G/DL (ref 3.5–5.2)
ALBUMIN/GLOB SERPL: 0.6 G/DL (ref 1.1–2.4)
ALP SERPL-CCNC: 57 U/L (ref 38–116)
ALT SERPL W P-5'-P-CCNC: 12 U/L (ref 0–33)
ANION GAP SERPL CALCULATED.3IONS-SCNC: 8.2 MMOL/L
AST SERPL-CCNC: 14 U/L (ref 0–32)
BASOPHILS # BLD AUTO: 0.03 10*3/MM3 (ref 0–0.1)
BASOPHILS NFR BLD AUTO: 0.4 % (ref 0–1.1)
BILIRUB SERPL-MCNC: 0.2 MG/DL (ref 0.1–1.2)
BUN BLD-MCNC: 29 MG/DL (ref 6–20)
BUN/CREAT SERPL: 17.7 (ref 7.3–30)
CALCIUM SPEC-SCNC: 8.1 MG/DL (ref 8.5–10.2)
CHLORIDE SERPL-SCNC: 112 MMOL/L (ref 98–107)
CO2 SERPL-SCNC: 21.8 MMOL/L (ref 22–29)
CREAT BLD-MCNC: 1.64 MG/DL (ref 0.6–1.1)
DEPRECATED RDW RBC AUTO: 54 FL (ref 37–49)
EOSINOPHIL # BLD AUTO: 0.2 10*3/MM3 (ref 0–0.36)
EOSINOPHIL NFR BLD AUTO: 2.8 % (ref 1–5)
ERYTHROCYTE [DISTWIDTH] IN BLOOD BY AUTOMATED COUNT: 13.6 % (ref 11.7–14.5)
GFR SERPL CREATININE-BSD FRML MDRD: 38 ML/MIN/1.73
GLOBULIN UR ELPH-MCNC: 5 GM/DL (ref 1.8–3.5)
GLUCOSE BLD-MCNC: 86 MG/DL (ref 74–124)
HCT VFR BLD AUTO: 35.3 % (ref 34–45)
HGB BLD-MCNC: 11.1 G/DL (ref 11.5–14.9)
IMM GRANULOCYTES # BLD: 0.08 10*3/MM3 (ref 0–0.03)
IMM GRANULOCYTES NFR BLD: 1.1 % (ref 0–0.5)
LYMPHOCYTES # BLD AUTO: 2.21 10*3/MM3 (ref 1–3.5)
LYMPHOCYTES NFR BLD AUTO: 30.8 % (ref 20–49)
MAGNESIUM SERPL-MCNC: 2 MG/DL (ref 1.8–2.5)
MCH RBC QN AUTO: 34.2 PG (ref 27–33)
MCHC RBC AUTO-ENTMCNC: 31.4 G/DL (ref 32–35)
MCV RBC AUTO: 108.6 FL (ref 83–97)
MONOCYTES # BLD AUTO: 0.72 10*3/MM3 (ref 0.25–0.8)
MONOCYTES NFR BLD AUTO: 10 % (ref 4–12)
NEUTROPHILS # BLD AUTO: 3.93 10*3/MM3 (ref 1.5–7)
NEUTROPHILS NFR BLD AUTO: 54.9 % (ref 39–75)
NRBC BLD MANUAL-RTO: 0.3 /100 WBC (ref 0–0)
PHOSPHATE SERPL-MCNC: 3.6 MG/DL (ref 2.5–4.5)
PLATELET # BLD AUTO: 125 10*3/MM3 (ref 150–375)
PMV BLD AUTO: 11 FL (ref 8.9–12.1)
POTASSIUM BLD-SCNC: 4.2 MMOL/L (ref 3.5–4.7)
PROT SERPL-MCNC: 8 G/DL (ref 6.3–8)
RBC # BLD AUTO: 3.25 10*6/MM3 (ref 3.9–5)
SODIUM BLD-SCNC: 142 MMOL/L (ref 134–145)
WBC NRBC COR # BLD: 7.17 10*3/MM3 (ref 4–10)

## 2017-11-17 PROCEDURE — 25010000002 ZOLEDRONIC ACID PER 1 MG: Performed by: INTERNAL MEDICINE

## 2017-11-17 PROCEDURE — 25010000002 DARATUMUMAB 100 MG/5ML SOLUTION 20 ML VIAL: Performed by: INTERNAL MEDICINE

## 2017-11-17 PROCEDURE — 96415 CHEMO IV INFUSION ADDL HR: CPT | Performed by: INTERNAL MEDICINE

## 2017-11-17 PROCEDURE — 85025 COMPLETE CBC W/AUTO DIFF WBC: CPT

## 2017-11-17 PROCEDURE — 25010000002 METHYLPREDNISOLONE PER 125 MG: Performed by: INTERNAL MEDICINE

## 2017-11-17 PROCEDURE — 96375 TX/PRO/DX INJ NEW DRUG ADDON: CPT | Performed by: INTERNAL MEDICINE

## 2017-11-17 PROCEDURE — 80053 COMPREHEN METABOLIC PANEL: CPT

## 2017-11-17 PROCEDURE — 25010000002 DIPHENHYDRAMINE PER 50 MG: Performed by: INTERNAL MEDICINE

## 2017-11-17 PROCEDURE — 96413 CHEMO IV INFUSION 1 HR: CPT | Performed by: INTERNAL MEDICINE

## 2017-11-17 PROCEDURE — 99215 OFFICE O/P EST HI 40 MIN: CPT | Performed by: INTERNAL MEDICINE

## 2017-11-17 PROCEDURE — 83735 ASSAY OF MAGNESIUM: CPT

## 2017-11-17 PROCEDURE — 25010000002 DARATUMUMAB 100 MG/5ML SOLUTION 5 ML VIAL: Performed by: INTERNAL MEDICINE

## 2017-11-17 PROCEDURE — 96367 TX/PROPH/DG ADDL SEQ IV INF: CPT | Performed by: INTERNAL MEDICINE

## 2017-11-17 PROCEDURE — 84100 ASSAY OF PHOSPHORUS: CPT

## 2017-11-17 RX ORDER — ACETAMINOPHEN 500 MG
1000 TABLET ORAL ONCE
Status: CANCELLED | OUTPATIENT
Start: 2017-11-17

## 2017-11-17 RX ORDER — SODIUM CHLORIDE 9 MG/ML
250 INJECTION, SOLUTION INTRAVENOUS ONCE
Status: CANCELLED | OUTPATIENT
Start: 2017-11-17

## 2017-11-17 RX ORDER — FAMOTIDINE 10 MG/ML
20 INJECTION, SOLUTION INTRAVENOUS AS NEEDED
Status: CANCELLED | OUTPATIENT
Start: 2017-11-17

## 2017-11-17 RX ORDER — MEPERIDINE HYDROCHLORIDE 50 MG/ML
25 INJECTION INTRAMUSCULAR; INTRAVENOUS; SUBCUTANEOUS
Status: CANCELLED | OUTPATIENT
Start: 2017-11-17

## 2017-11-17 RX ORDER — SODIUM CHLORIDE 9 MG/ML
250 INJECTION, SOLUTION INTRAVENOUS ONCE
Status: COMPLETED | OUTPATIENT
Start: 2017-11-17 | End: 2017-11-17

## 2017-11-17 RX ORDER — DIPHENHYDRAMINE HYDROCHLORIDE 50 MG/ML
50 INJECTION INTRAMUSCULAR; INTRAVENOUS AS NEEDED
Status: CANCELLED | OUTPATIENT
Start: 2017-11-17

## 2017-11-17 RX ORDER — ACETAMINOPHEN 500 MG
1000 TABLET ORAL ONCE
Status: COMPLETED | OUTPATIENT
Start: 2017-11-17 | End: 2017-11-17

## 2017-11-17 RX ORDER — METHYLPREDNISOLONE SODIUM SUCCINATE 125 MG/2ML
60 INJECTION, POWDER, LYOPHILIZED, FOR SOLUTION INTRAMUSCULAR; INTRAVENOUS ONCE
Status: CANCELLED | OUTPATIENT
Start: 2017-11-17

## 2017-11-17 RX ORDER — SODIUM CHLORIDE 9 MG/ML
250 INJECTION, SOLUTION INTRAVENOUS ONCE
Status: CANCELLED | OUTPATIENT
Start: 2017-11-17 | End: 2017-11-17

## 2017-11-17 RX ORDER — METHYLPREDNISOLONE SODIUM SUCCINATE 125 MG/2ML
60 INJECTION, POWDER, LYOPHILIZED, FOR SOLUTION INTRAMUSCULAR; INTRAVENOUS ONCE
Status: COMPLETED | OUTPATIENT
Start: 2017-11-17 | End: 2017-11-17

## 2017-11-17 RX ADMIN — DIPHENHYDRAMINE HYDROCHLORIDE 25 MG: 50 INJECTION, SOLUTION INTRAMUSCULAR; INTRAVENOUS at 10:35

## 2017-11-17 RX ADMIN — DARATUMUMAB 1370 MG: 100 INJECTION, SOLUTION, CONCENTRATE INTRAVENOUS at 11:34

## 2017-11-17 RX ADMIN — METHYLPREDNISOLONE SODIUM SUCCINATE 60 MG: 125 INJECTION, POWDER, FOR SOLUTION INTRAMUSCULAR; INTRAVENOUS at 10:34

## 2017-11-17 RX ADMIN — ACETAMINOPHEN 1000 MG: 500 TABLET ORAL at 10:34

## 2017-11-17 RX ADMIN — SODIUM CHLORIDE 250 ML: 900 INJECTION, SOLUTION INTRAVENOUS at 10:34

## 2017-11-17 RX ADMIN — ZOLEDRONIC ACID 3 MG: 0.8 INJECTION, SOLUTION, CONCENTRATE INTRAVENOUS at 10:57

## 2017-11-17 NOTE — PROGRESS NOTES
Reasons for follow up:   1. IgG kappa multiple myeloma, currently on Darzalex, started on May 26, 2016. Patient was switched to Darzalex from Pomalyst, as she continued to be neutropenic with recurrent urinary tract infection while on Pomalyst.    2. Zometa is on hold due to renal insufficiency.    3. After 16 doses of Darzalex, laboratory study showed stable disease in November 2016. Insurance company denied adding Velcade and dexamethasone. Patient is to be continued on monthly Darzalex from 12/06/16.   4. Obstructive right pyelonephritis with positive urine culture for E. coli, required hospitalization from 1/19/2017 through 01/24/2017 with iv antibiotics and stent exchange on 01/20/2017.   5.  Paraprotein studies on March 27, 2017 showed further slight improvement of multiple myeloma.   6.  Febrile illness, with urinary tract infection and influenza B infection in early May 2017, required hospitalization for 6 days.   7.   Paraprotein studies for both serum and 24-hour urine sample on 7/21/2017 showed further improvement of paraproteins.   Darzalex was continued.   8.  Serum protein study reported stable disease on 10/20/2017.  Darzalex will be continued.           History of Present Illness:     The patient is a pleasant 67-year-old female presented today for 3-month reevaluation.  Patient reports she is doing well, denies urinary tract infection or back pain, no fever no sweating and no chills.  She has reasonably good performance status ECOG 1.   She drove herself today to clinic, from [facility].     I noted the patient had a colonoscopy examination on 8/30/2017 by Dr. Rivera.  It reported diverticulosis in multiple areas more severe in the sigmoid colon.  There was 2 polyps 4 mm pneumonia from transverse colon and the sigmoid colon.  Pathology evaluation reported tubular adenoma from the sigmoid colon polyp, and benign hyperplastic polyp from transverse colon.    Laboratory study on 10/20/2017  reported slightly improved monoclonal spike 2.7 g/dL by SPEP, immunofixation reported positive monoclonal IgG lambda, serum IgG 3536 mg/dL, IgA less than 5 and IgM 11, free kappa chain 5.3 mg/L, and free lambda chain 720 mg/L with kappa/lambda ratio 0.01.  Serum beta-2 microglobulin was 6.5 mg/L.   Her CBC showed a stable mild anemia with hemoglobin 11.3, macrocytosis .3, and the platelets 114,000, normal WBC 7070 including neutrophils 4100 lymphocytes 2000 monocytes 650, normal liver function panel, total protein 7.9 and albumin 3.2,  and normal electrolytes including calcium 8.1, and improved creatinine 1.59.     Past Medical History, Past Surgical History, Social History, Family History have been reviewed and are without significant changes except as mentioned.      Hematology/oncology history: See separate document.       On December 6, 2016, serum free lambda chain 1090 MG/L, free kappa chain 8.5 to MG/L.  Ferritin 1015, iron 99, TIBC 137 iron saturation 72%.     On January 4, 2017, vitamin B12 level 1289, folate 7.7 ng/mL. SPEP reporting gamma globulin 3.3, out of total globulin 5.0, with immunofixation reporting small quantity of monoclonal free lambda chain, plus monoclonal IgG lambda at 3.2 g/DL.  Serum IgG 4075, IgA 9 and IgM 15.  free lambda chain 1339 MG/L and free kappa chain 10.3 MG/L.      Laboratory study March 27, 2017 showed slight improvement of paraprotein, SPEP reporting M spike 2.8 g/DL, out of total globulin 4.3, and the serum IgG 3420, IgA 9, IgM 11, free lambda chain 1218 MG/L and free kappa chain 8.0 MG/L.  Total 24 hour urine sample reported at free lambda chain 142 mg, at a concentration 74.8 MG/L, free kappa chain 75 mg at a concentration 39.5 MG/L., Urine protein immunofixation reporting biclonal IgG lambda and monoclonal free lambda light chain, M spike #1 at 41.5% and monoclonal IgG lambda spike #2 at 10.5%. Serum beta-2 microglobulin is 7.3 MG/L.     Her laboratory study on  "7/21/2017 reported further improvement of paraprotein is both serum and a 24-hour urine sample.  SPEP reporting monoclonal IgG lambda 2.9 g/dL and free lambda chain 0.1 g/dL.  Serum IgG was 3261 mg/dL and IgA 5, IgM 13, free kappa chain 6.7, free lambda chain 701.3 with kappa/lambda ratio 0.01.  24-hour urine sample reported positive for Bence May protein lambda type, immunofixation positive for IgG lambda.  Total urine protein 241 mg, gamma globulin 13.1%.  Hemoglobin was 11.4 .4, platelets 122,000, WBC 6680 including neutrophils 3600, lymphocytes 2100 and monocytes 650.  Her creatinine was 1.68, at baseline level.  Liver function panel unremarkable.  Darzalex was continued.    Laboratory study on 8/25/2017 showed improved the platelets at 144,000, normal WBC 6660 and slightly improved hemoglobin at 11.7.       Review of Systems    Constitutional: Negative for chills and fever. See history of present illness  HENT: Negative for mouth sores and sore throat.    Eyes: Negative for visual disturbance.    Respiratory: No cough or hemoptysis no short of breath.   Gastrointestinal: Negative for abdominal pain, diarrhea, nausea and vomiting.    Genitourinary: Negative for decreased urine volume, dysuria, enuresis, frequency, hematuria, pelvic pain and urgency.    Musculoskeletal: Negative for back pain and joint swelling.    Neurological: Negative for dizziness and weakness.    Hematological: Does not bruise/bleed easily.    Psychiatric/Behavioral: The patient is not nervous/anxious.        Medications: The current medication list was reviewed in the EMR.       ALLERGIES: Zosyn       Vitals:    11/17/17 0949   BP: 130/80   Pulse: 70   Resp: 16   Temp: 97.6 °F (36.4 °C)   SpO2: 98%   Weight: 199 lb (90.3 kg)   Height: 62\" (157.5 cm)   PainSc: 0-No pain   PS: ECOG 1      Physical Exam   Constitutional: well-developed and well-nourished female. No distress.    HENT:    Head: Normocephalic.    Eyes: EOMs are normal. "   Neck: Normal range of motion.    Cardiovascular: Normal rate, regular rhythm, normal heart sounds and normal pulses.    Pulmonary/Chest: normal breath sounds, no wheezes, no rales.  Mediport benign.  Abdominal: Soft. Bowel sounds are normal. no distension and no mass. There is no hepatosplenomegaly. There is no tenderness.   Musculoskeletal: Normal range of motion. no edema or deformity.   Lymphadenopathy: She has no cervical, supraclavicular adenopathy.   Neurological: alert and oriented to person, place, and time. No cranial nerve deficit.    Skin: Skin is warm and dry. No rash noted. No cyanosis. No pallor.   Psychiatric: She has normal mood and affect.         Recent lab results:     Lab Results   Component Value Date    WBC 7.17 11/17/2017    HGB 11.1 (L) 11/17/2017    HCT 35.3 11/17/2017    .6 (H) 11/17/2017     (L) 11/17/2017     Lab Results   Component Value Date    NEUTROABS 3.93 11/17/2017     Lab Results   Component Value Date    GLUCOSE 86 11/17/2017    BUN 29 (H) 11/17/2017    CREATININE 1.64 (H) 11/17/2017    EGFRIFNONA  08/30/2016      Comment:      <15 Indicative of kidney failure.    EGFRIFAFRI 38 (L) 11/17/2017    BCR 17.7 11/17/2017    K 4.2 11/17/2017    CO2 21.8 (L) 11/17/2017    CALCIUM 8.1 (L) 11/17/2017    PROTENTOTREF 7.7 10/20/2017    ALBUMIN 3.00 (L) 11/17/2017    LABIL2 0.6 (L) 11/17/2017    AST 14 11/17/2017    ALT 12 11/17/2017     Sodium   Date Value Ref Range Status   11/17/2017 142 134 - 145 mmol/L Final     Potassium   Date Value Ref Range Status   11/17/2017 4.2 3.5 - 4.7 mmol/L Final     Total Bilirubin   Date Value Ref Range Status   11/17/2017 0.2 0.1 - 1.2 mg/dL Final     Alkaline Phosphatase   Date Value Ref Range Status   11/17/2017 57 38 - 116 U/L Final       Assessment/Plan   1. Multiple myeloma, IgG lambda, stage III. Failed multiple lines of therapy. Her treatment was switched to Darzalex on 5/26/2016. She tolerated therapy very well. Her laboratory study  showed stable serum paraprotein if not slightly better with decreased M spike but similar quantity of serum IgG level and free lambda chain level.  She tolerated therapy very well, we'll continue monthly Darzalex treatment.  Plan to repeat both 24 hour urine and also serum paraprotein studies in 3 months for reassessment.  Her last 24-hour urine study was 3 months ago in July 2017.      2. Zometa treatment. She has stage III chronic kidney disease, and her creatinine has slight improvement.  She was off Zometa for about 18 months and then resumed at a decreased dose of 3 mg in August 2017.  We'll continue every 3 months Zometa treatment.         3.  Macrocytic anemia secondary to chemotherapy for multiple myeloma and chronic renal insufficiency.  She has relatively stable hemoglobin, however with worsening macrocytosis.  Will recheck iron, ferritin, B12 and folic acid in 2 months for reassessment.  Previous study in December 2016 showed no evidence of deficiency.      4. Mild to moderate thrombocytopenia secondary to her multiple myeloma and chemotherapy.  No easy bleeding or bruising.  Nevertheless her platelet count has been gradually improving since we started Darzalex treatment.        5. History of stage II left breast cancer, post mastectomy in 2013, negative for ER/DC and the positive HER-2. No neoadjuvant chemotherapy because of concurrent discovery of multiple myeloma and chemotherapy. She had a normal mammogram study in July 2017.       6.  Right middle lobe pulmonary nodule, documented on CT scan of chest on 01/06/2017. Repeated CT scan of chest on 8/21/2017 reported a small 5 mm and stable primary nodule.      7.  Vaccination.  Discussed with the patient today for flu vaccination.  Patient reports she is afraid because she knows other people got very sick after receiving flu vaccination.  However she received pneumonia vaccine last year.  I discussed with patient, strongly encouraged patient to obtain  from vaccination as soon as possible, since she is immune suppressed with her leukemia and chemotherapy.  Patient says she will consider about this.     Plan:  1. Continue Darzalex today and repeat monthly.  Monitor CBC and CMP monthly with each Darzalex treatment.   2. Proceed Zometa today 3 mg, and repeat every 3 months.   3.  Receiving flu vaccination as soon as possible.  4.  Repeat labs in 2 months, cbc, CMP, SPEP, ADOLFO, Igs, [id number], free light chains and 24 hour urine test for UPEP, ADOLFO and free light chains.  We'll also check iron ferritin B12 and folic acid level in 2 months.  5.  Patient will return in 3 months for M.D. Reevaluation.             LI OBANDO M.D., Ph.D.   11/17/2017.            CC: Michael Everett M.D.

## 2017-12-08 ENCOUNTER — OFFICE VISIT (OUTPATIENT)
Dept: FAMILY MEDICINE CLINIC | Facility: CLINIC | Age: 67
End: 2017-12-08

## 2017-12-08 VITALS
SYSTOLIC BLOOD PRESSURE: 106 MMHG | DIASTOLIC BLOOD PRESSURE: 68 MMHG | BODY MASS INDEX: 36.25 KG/M2 | HEART RATE: 75 BPM | OXYGEN SATURATION: 97 % | TEMPERATURE: 97.9 F | HEIGHT: 62 IN | RESPIRATION RATE: 18 BRPM | WEIGHT: 197 LBS

## 2017-12-08 DIAGNOSIS — R14.0 ABDOMINAL DISTENSION: ICD-10-CM

## 2017-12-08 DIAGNOSIS — Z86.79 HISTORY OF CHF (CONGESTIVE HEART FAILURE): ICD-10-CM

## 2017-12-08 DIAGNOSIS — R10.84 GENERALIZED ABDOMINAL PAIN: Primary | ICD-10-CM

## 2017-12-08 PROCEDURE — 71020 XR CHEST PA AND LATERAL: CPT | Performed by: NURSE PRACTITIONER

## 2017-12-08 PROCEDURE — 99214 OFFICE O/P EST MOD 30 MIN: CPT | Performed by: NURSE PRACTITIONER

## 2017-12-08 PROCEDURE — 74000 XR ABDOMEN KUB: CPT | Performed by: NURSE PRACTITIONER

## 2017-12-08 NOTE — PROGRESS NOTES
Subjective   Marina Barroso is a 67 y.o. female.     History of Present Illness   Patient presents to day with CC of bloating and swelling to her abdomen.  Denies pain but reports it feels uncomfortable.   She was instructed by nurse from insurance company to come in to be checked. Patient does have history of CHF.  She also has one kidney and is CKD stage 3.  She does not see nephrologist until February. She denies swelling to lower extremities.  She also denies dyspnea or orthopnea.  She reports she has bowel movement each day but ranges in amount.  She denies stool changes.    The following portions of the patient's history were reviewed and updated as appropriate: allergies, current medications, past family history, past medical history, past social history, past surgical history and problem list.    Review of Systems   Constitutional: Negative for chills, fatigue and fever.   Respiratory: Negative for cough and shortness of breath.    Cardiovascular: Negative for chest pain, palpitations and leg swelling.   Gastrointestinal: Positive for abdominal distention and abdominal pain. Negative for anal bleeding, blood in stool, diarrhea, nausea, rectal pain and vomiting.   Genitourinary: Negative for dysuria.       Objective   Physical Exam   Constitutional: She is oriented to person, place, and time. She appears well-developed and well-nourished.   Cardiovascular: Normal rate and regular rhythm.    Pulmonary/Chest: Effort normal and breath sounds normal.   Abdominal: Soft. Bowel sounds are normal. She exhibits distension. She exhibits no fluid wave, no ascites, no pulsatile midline mass and no mass. There is tenderness in the right lower quadrant, left upper quadrant and left lower quadrant. There is no rigidity and no guarding.   Neurological: She is alert and oriented to person, place, and time.   Psychiatric: She has a normal mood and affect. Judgment normal.   Nursing note and vitals reviewed.      Assessment/Plan    Marina was seen today for weight gain.    Diagnoses and all orders for this visit:    Generalized abdominal pain  -     XR Abdomen KUB (In Office)    Abdominal distension  -     XR Abdomen KUB (In Office)  -     XR Chest PA & Lateral (In Office)    History of CHF (congestive heart failure)  -     XR Chest PA & Lateral (In Office)          No infiltrate or opacity on chest xray.  Normal stool and gas pattern noted on xray of abdomen.      Discussed with patient that her symptoms did not appear to be CHF.  No crackles heard in lungs, chest xray clear, no lower extremity edema, also no ascites.     Patient will try taking daily miralax.  She will f/u if no improvement by Monday.

## 2017-12-13 DIAGNOSIS — C90.00 MULTIPLE MYELOMA, REMISSION STATUS UNSPECIFIED (HCC): ICD-10-CM

## 2017-12-15 ENCOUNTER — INFUSION (OUTPATIENT)
Dept: ONCOLOGY | Facility: HOSPITAL | Age: 67
End: 2017-12-15

## 2017-12-15 VITALS
OXYGEN SATURATION: 97 % | DIASTOLIC BLOOD PRESSURE: 57 MMHG | TEMPERATURE: 97.6 F | WEIGHT: 197 LBS | SYSTOLIC BLOOD PRESSURE: 92 MMHG | BODY MASS INDEX: 36.03 KG/M2 | HEART RATE: 72 BPM

## 2017-12-15 DIAGNOSIS — C90.00 MULTIPLE MYELOMA, REMISSION STATUS UNSPECIFIED (HCC): Primary | ICD-10-CM

## 2017-12-15 LAB
ALBUMIN SERPL-MCNC: 3.1 G/DL (ref 3.5–5.2)
ALBUMIN/GLOB SERPL: 0.6 G/DL
ALP SERPL-CCNC: 53 U/L (ref 39–117)
ALT SERPL W P-5'-P-CCNC: 14 U/L (ref 1–33)
ANION GAP SERPL CALCULATED.3IONS-SCNC: 8.7 MMOL/L
AST SERPL-CCNC: 17 U/L (ref 1–32)
BASOPHILS # BLD AUTO: 0.01 10*3/MM3 (ref 0–0.2)
BASOPHILS NFR BLD AUTO: 0.2 % (ref 0–1.5)
BILIRUB SERPL-MCNC: 0.3 MG/DL (ref 0.1–1.2)
BUN BLD-MCNC: 22 MG/DL (ref 8–23)
BUN/CREAT SERPL: 13.3 (ref 7–25)
CALCIUM SPEC-SCNC: 8.1 MG/DL (ref 8.6–10.5)
CHLORIDE SERPL-SCNC: 111 MMOL/L (ref 98–107)
CO2 SERPL-SCNC: 21.3 MMOL/L (ref 22–29)
CREAT BLD-MCNC: 1.66 MG/DL (ref 0.57–1)
DACRYOCYTES BLD QL SMEAR: NORMAL
DEPRECATED RDW RBC AUTO: 53.9 FL (ref 37–54)
EOSINOPHIL # BLD AUTO: 0.16 10*3/MM3 (ref 0–0.7)
EOSINOPHIL NFR BLD AUTO: 2.4 % (ref 0.3–6.2)
ERYTHROCYTE [DISTWIDTH] IN BLOOD BY AUTOMATED COUNT: 13.8 % (ref 11.7–13)
GFR SERPL CREATININE-BSD FRML MDRD: 37 ML/MIN/1.73
GLOBULIN UR ELPH-MCNC: 4.8 GM/DL
GLUCOSE BLD-MCNC: 100 MG/DL (ref 65–99)
HCT VFR BLD AUTO: 35.1 % (ref 35.6–45.5)
HGB BLD-MCNC: 11.1 G/DL (ref 11.9–15.5)
IMM GRANULOCYTES # BLD: 0.07 10*3/MM3 (ref 0–0.03)
IMM GRANULOCYTES NFR BLD: 1.1 % (ref 0–0.5)
LYMPHOCYTES # BLD AUTO: 1.81 10*3/MM3 (ref 0.9–4.8)
LYMPHOCYTES NFR BLD AUTO: 27.7 % (ref 19.6–45.3)
MCH RBC QN AUTO: 33.7 PG (ref 26.9–32)
MCHC RBC AUTO-ENTMCNC: 31.6 G/DL (ref 32.4–36.3)
MCV RBC AUTO: 106.7 FL (ref 80.5–98.2)
MONOCYTES # BLD AUTO: 0.57 10*3/MM3 (ref 0.2–1.2)
MONOCYTES NFR BLD AUTO: 8.7 % (ref 5–12)
NEUTROPHILS # BLD AUTO: 3.92 10*3/MM3 (ref 1.9–8.1)
NEUTROPHILS NFR BLD AUTO: 59.9 % (ref 42.7–76)
NRBC BLD MANUAL-RTO: 0 /100 WBC (ref 0–0)
PLAT MORPH BLD: NORMAL
PLATELET # BLD AUTO: 115 10*3/MM3 (ref 140–500)
PMV BLD AUTO: 11.3 FL (ref 6–12)
POLYCHROMASIA BLD QL SMEAR: NORMAL
POTASSIUM BLD-SCNC: 4.1 MMOL/L (ref 3.5–5.2)
PROT SERPL-MCNC: 7.9 G/DL (ref 6–8.5)
RBC # BLD AUTO: 3.29 10*6/MM3 (ref 3.9–5.2)
SODIUM BLD-SCNC: 141 MMOL/L (ref 136–145)
SPHEROCYTES BLD QL SMEAR: NORMAL
WBC MORPH BLD: NORMAL
WBC NRBC COR # BLD: 6.54 10*3/MM3 (ref 4.5–10.7)

## 2017-12-15 PROCEDURE — 85025 COMPLETE CBC W/AUTO DIFF WBC: CPT | Performed by: INTERNAL MEDICINE

## 2017-12-15 PROCEDURE — 25010000002 DIPHENHYDRAMINE PER 50 MG: Performed by: INTERNAL MEDICINE

## 2017-12-15 PROCEDURE — 25010000002 METHYLPREDNISOLONE PER 125 MG: Performed by: INTERNAL MEDICINE

## 2017-12-15 PROCEDURE — 25010000002 DARATUMUMAB 400 MG/20ML SOLUTION 20 ML VIAL: Performed by: INTERNAL MEDICINE

## 2017-12-15 PROCEDURE — 85007 BL SMEAR W/DIFF WBC COUNT: CPT | Performed by: INTERNAL MEDICINE

## 2017-12-15 PROCEDURE — 80053 COMPREHEN METABOLIC PANEL: CPT | Performed by: INTERNAL MEDICINE

## 2017-12-15 PROCEDURE — 25010000002 DARATUMUMAB 400 MG/20ML SOLUTION 5 ML VIAL: Performed by: INTERNAL MEDICINE

## 2017-12-15 PROCEDURE — 36415 COLL VENOUS BLD VENIPUNCTURE: CPT | Performed by: INTERNAL MEDICINE

## 2017-12-15 PROCEDURE — 96415 CHEMO IV INFUSION ADDL HR: CPT | Performed by: INTERNAL MEDICINE

## 2017-12-15 PROCEDURE — 96375 TX/PRO/DX INJ NEW DRUG ADDON: CPT | Performed by: INTERNAL MEDICINE

## 2017-12-15 PROCEDURE — 96413 CHEMO IV INFUSION 1 HR: CPT | Performed by: INTERNAL MEDICINE

## 2017-12-15 RX ORDER — DIPHENHYDRAMINE HYDROCHLORIDE 50 MG/ML
50 INJECTION INTRAMUSCULAR; INTRAVENOUS AS NEEDED
Status: CANCELLED | OUTPATIENT
Start: 2017-12-15

## 2017-12-15 RX ORDER — ACETAMINOPHEN 500 MG
1000 TABLET ORAL ONCE
Status: COMPLETED | OUTPATIENT
Start: 2017-12-15 | End: 2017-12-15

## 2017-12-15 RX ORDER — SODIUM CHLORIDE 9 MG/ML
250 INJECTION, SOLUTION INTRAVENOUS ONCE
Status: COMPLETED | OUTPATIENT
Start: 2017-12-15 | End: 2017-12-15

## 2017-12-15 RX ORDER — MEPERIDINE HYDROCHLORIDE 50 MG/ML
25 INJECTION INTRAMUSCULAR; INTRAVENOUS; SUBCUTANEOUS
Status: CANCELLED | OUTPATIENT
Start: 2017-12-15

## 2017-12-15 RX ORDER — FAMOTIDINE 10 MG/ML
20 INJECTION, SOLUTION INTRAVENOUS AS NEEDED
Status: CANCELLED | OUTPATIENT
Start: 2017-12-15

## 2017-12-15 RX ORDER — METHYLPREDNISOLONE SODIUM SUCCINATE 125 MG/2ML
60 INJECTION, POWDER, LYOPHILIZED, FOR SOLUTION INTRAMUSCULAR; INTRAVENOUS ONCE
Status: COMPLETED | OUTPATIENT
Start: 2017-12-15 | End: 2017-12-15

## 2017-12-15 RX ADMIN — DARATUMUMAB 1370 MG: 100 INJECTION, SOLUTION, CONCENTRATE INTRAVENOUS at 11:17

## 2017-12-15 RX ADMIN — METHYLPREDNISOLONE SODIUM SUCCINATE 60 MG: 125 INJECTION, POWDER, FOR SOLUTION INTRAMUSCULAR; INTRAVENOUS at 10:11

## 2017-12-15 RX ADMIN — ACETAMINOPHEN 1000 MG: 500 TABLET ORAL at 10:11

## 2017-12-15 RX ADMIN — SODIUM CHLORIDE 250 ML: 900 INJECTION, SOLUTION INTRAVENOUS at 10:00

## 2017-12-15 RX ADMIN — DIPHENHYDRAMINE HYDROCHLORIDE 25 MG: 50 INJECTION, SOLUTION INTRAMUSCULAR; INTRAVENOUS at 10:12

## 2018-01-03 ENCOUNTER — OFFICE VISIT (OUTPATIENT)
Dept: FAMILY MEDICINE CLINIC | Facility: CLINIC | Age: 68
End: 2018-01-03

## 2018-01-03 VITALS
WEIGHT: 197 LBS | HEART RATE: 72 BPM | DIASTOLIC BLOOD PRESSURE: 69 MMHG | TEMPERATURE: 97.2 F | BODY MASS INDEX: 36.25 KG/M2 | RESPIRATION RATE: 16 BRPM | SYSTOLIC BLOOD PRESSURE: 113 MMHG | HEIGHT: 62 IN

## 2018-01-03 DIAGNOSIS — G40.909 SEIZURE DISORDER (HCC): ICD-10-CM

## 2018-01-03 DIAGNOSIS — B35.4 TINEA CORPORIS: Primary | ICD-10-CM

## 2018-01-03 PROCEDURE — 99214 OFFICE O/P EST MOD 30 MIN: CPT | Performed by: FAMILY MEDICINE

## 2018-01-03 RX ORDER — CARBAMAZEPINE 200 MG/1
400 TABLET, EXTENDED RELEASE ORAL NIGHTLY
Qty: 180 TABLET | Refills: 1 | Status: SHIPPED | OUTPATIENT
Start: 2018-01-03 | End: 2018-09-22 | Stop reason: SDUPTHER

## 2018-01-03 RX ORDER — CLOTRIMAZOLE AND BETAMETHASONE DIPROPIONATE 10; .64 MG/G; MG/G
CREAM TOPICAL EVERY 12 HOURS SCHEDULED
Qty: 45 G | Refills: 0 | Status: SHIPPED | OUTPATIENT
Start: 2018-01-03 | End: 2018-01-17

## 2018-01-03 NOTE — PROGRESS NOTES
"Chief Complaint   Patient presents with   • Rash       Subjective   This patient presents the office with a chief complaint of itchy rash to the left abdomen.  It is in the skin folds under the stomach on the left side.  She has been battling this condition with over-the-counter Vaseline over the past 3 months.  She also needs refill of her antiseizure medication. No seizure activity since 1999. No medication side effects reported.   I have reviewed and updated her medications, medical history and problem list during today's office visit.        Social History   Substance Use Topics   • Smoking status: Never Smoker   • Smokeless tobacco: Never Used   • Alcohol use No       Review of Systems   Skin:        See hpi   Neurological: Negative for seizures.       Objective   /69  Pulse 72  Temp 97.2 °F (36.2 °C) (Oral)   Resp 16  Ht 157.5 cm (62\")  Wt 89.4 kg (197 lb)  LMP  (LMP Unknown)  BMI 36.03 kg/m2  Body mass index is 36.03 kg/(m^2).  Physical Exam   Constitutional: She appears well-developed. No distress.   Abdominal:   Tinea under panniculus left side   Psychiatric: She has a normal mood and affect. Her speech is normal. She is attentive.       Data Reviewed:        Assessment/Plan     Problem List Items Addressed This Visit        Nervous and Auditory    Seizure disorder    Relevant Medications    carBAMazepine XR (TEGretol  XR) 200 MG 12 hr tablet       Musculoskeletal and Integument    Tinea corporis - Primary    Relevant Medications    clotrimazole-betamethasone (LOTRISONE) 1-0.05 % cream          Outpatient Encounter Prescriptions as of 1/3/2018   Medication Sig Dispense Refill   • acetaminophen (TYLENOL) 325 MG tablet Take 2 tablets by mouth Every 6 (Six) Hours As Needed for mild pain (1-3) or fever.  0   • acyclovir (ZOVIRAX) 400 MG tablet TAKE 1 TABLET BY MOUTH 2 (TWO) TIMES A DAY. TAKE NO MORE THAN 5 DOSES A DAY. 60 tablet 8   • aspirin 81 MG tablet Take 81 mg by mouth Every Night.     • " Bisoprolol Fumarate (ZEBETA PO) Take 15 mg by mouth Daily.     • carBAMazepine XR (TEGretol  XR) 200 MG 12 hr tablet Take 2 tablets by mouth Every Night for 180 days. 180 tablet 1   • dexamethasone (DECADRON) 4 MG tablet TAKE 1 TABLET IN THE MORNING STARTING THE DAY AFTER CHEMO FOR 2 DAYS. TAKE WITH FOOD. (Patient taking differently: TAKE 1 TABLET IN THE MORNING STARTING THE DAY AFTER CHEMO FOR 2 DAYS. TAKE WITH FOOD BID) 16 tablet 1   • folic acid (FOLVITE) 1 MG tablet Take 1 mg by mouth Every Morning.     • hydrALAZINE (APRESOLINE) 25 MG tablet Take 25 mg by mouth 2 (two) times a day.  3   • isosorbide mononitrate (IMDUR) 60 MG 24 hr tablet Take 1 tablet by mouth Daily. TAKE 1 TABLET BY MOUTH EVERY MORNING AND ONE-HALF TABLET EVERY EVENING (Patient taking differently: Take 60 mg by mouth Daily. TAKE 1.5 TABLET BY MOUTH EVERY MORNING BEFORE EXERSIZE)     • losartan (COZAAR) 25 MG tablet Take 25 mg by mouth every evening.     • Misc. Devices (VIRAGE CUSTOM BREAST PROSTHES) misc As directed     • montelukast (SINGULAIR) 10 MG tablet TAKE 1 TABLET BY MOUTH EVERY NIGHT. 30 tablet 11   • [DISCONTINUED] carBAMazepine XR (TEGretol  XR) 200 MG 12 hr tablet Take 2 tablets by mouth Every Night for 180 days. 180 tablet 1   • clotrimazole-betamethasone (LOTRISONE) 1-0.05 % cream Apply  topically Every 12 (Twelve) Hours for 14 days. 45 g 0     No facility-administered encounter medications on file as of 1/3/2018.        No orders of the defined types were placed in this encounter.             F/U in 6 months for regular visit and Medicare Wellness Visit

## 2018-01-08 RX ORDER — ACYCLOVIR 400 MG/1
TABLET ORAL
Qty: 60 TABLET | Refills: 4 | Status: SHIPPED | OUTPATIENT
Start: 2018-01-08 | End: 2018-02-19 | Stop reason: SDUPTHER

## 2018-01-09 DIAGNOSIS — C90.00 MULTIPLE MYELOMA, REMISSION STATUS UNSPECIFIED (HCC): ICD-10-CM

## 2018-01-12 ENCOUNTER — INFUSION (OUTPATIENT)
Dept: ONCOLOGY | Facility: HOSPITAL | Age: 68
End: 2018-01-12

## 2018-01-12 VITALS
HEART RATE: 78 BPM | BODY MASS INDEX: 36.03 KG/M2 | WEIGHT: 197 LBS | DIASTOLIC BLOOD PRESSURE: 74 MMHG | SYSTOLIC BLOOD PRESSURE: 123 MMHG | TEMPERATURE: 97.7 F | OXYGEN SATURATION: 98 %

## 2018-01-12 DIAGNOSIS — T45.1X5A ANEMIA ASSOCIATED WITH CHEMOTHERAPY: ICD-10-CM

## 2018-01-12 DIAGNOSIS — C90.00 MULTIPLE MYELOMA, REMISSION STATUS UNSPECIFIED (HCC): Primary | ICD-10-CM

## 2018-01-12 DIAGNOSIS — D64.81 ANEMIA ASSOCIATED WITH CHEMOTHERAPY: ICD-10-CM

## 2018-01-12 DIAGNOSIS — C90.00 MULTIPLE MYELOMA NOT HAVING ACHIEVED REMISSION (HCC): ICD-10-CM

## 2018-01-12 LAB
ALBUMIN SERPL-MCNC: 3.3 G/DL (ref 3.5–5.2)
ALBUMIN/GLOB SERPL: 0.6 G/DL
ALP SERPL-CCNC: 58 U/L (ref 39–117)
ALT SERPL W P-5'-P-CCNC: 14 U/L (ref 1–33)
ANION GAP SERPL CALCULATED.3IONS-SCNC: 10.6 MMOL/L
AST SERPL-CCNC: 16 U/L (ref 1–32)
B2 MICROGLOB SERPL-MCNC: 7.4 MG/L (ref 0.8–2.2)
BASOPHILS # BLD AUTO: 0.02 10*3/MM3 (ref 0–0.2)
BASOPHILS NFR BLD AUTO: 0.3 % (ref 0–1.5)
BILIRUB SERPL-MCNC: 0.3 MG/DL (ref 0.1–1.2)
BUN BLD-MCNC: 24 MG/DL (ref 8–23)
BUN/CREAT SERPL: 13.4 (ref 7–25)
CALCIUM SPEC-SCNC: 8.2 MG/DL (ref 8.6–10.5)
CHLORIDE SERPL-SCNC: 108 MMOL/L (ref 98–107)
CO2 SERPL-SCNC: 22.4 MMOL/L (ref 22–29)
CREAT BLD-MCNC: 1.79 MG/DL (ref 0.57–1)
DEPRECATED RDW RBC AUTO: 53.4 FL (ref 37–54)
EOSINOPHIL # BLD AUTO: 0.2 10*3/MM3 (ref 0–0.7)
EOSINOPHIL NFR BLD AUTO: 2.9 % (ref 0.3–6.2)
ERYTHROCYTE [DISTWIDTH] IN BLOOD BY AUTOMATED COUNT: 13.6 % (ref 11.7–13)
FERRITIN SERPL-MCNC: 653.7 NG/ML (ref 13–150)
FOLATE SERPL-MCNC: 12.77 NG/ML (ref 4.78–24.2)
GFR SERPL CREATININE-BSD FRML MDRD: 34 ML/MIN/1.73
GLOBULIN UR ELPH-MCNC: 5.1 GM/DL
GLUCOSE BLD-MCNC: 99 MG/DL (ref 65–99)
HCT VFR BLD AUTO: 35.8 % (ref 35.6–45.5)
HGB BLD-MCNC: 11.3 G/DL (ref 11.9–15.5)
IMM GRANULOCYTES # BLD: 0.09 10*3/MM3 (ref 0–0.03)
IMM GRANULOCYTES NFR BLD: 1.3 % (ref 0–0.5)
IRON 24H UR-MRATE: 123 MCG/DL (ref 37–145)
IRON SATN MFR SERPL: 71 % (ref 20–50)
LYMPHOCYTES # BLD AUTO: 2.29 10*3/MM3 (ref 0.9–4.8)
LYMPHOCYTES NFR BLD AUTO: 33.8 % (ref 19.6–45.3)
MCH RBC QN AUTO: 33.9 PG (ref 26.9–32)
MCHC RBC AUTO-ENTMCNC: 31.6 G/DL (ref 32.4–36.3)
MCV RBC AUTO: 107.5 FL (ref 80.5–98.2)
MONOCYTES # BLD AUTO: 0.67 10*3/MM3 (ref 0.2–1.2)
MONOCYTES NFR BLD AUTO: 9.9 % (ref 5–12)
NEUTROPHILS # BLD AUTO: 3.51 10*3/MM3 (ref 1.9–8.1)
NEUTROPHILS NFR BLD AUTO: 51.8 % (ref 42.7–76)
NRBC BLD MANUAL-RTO: 0 /100 WBC (ref 0–0)
PLATELET # BLD AUTO: 129 10*3/MM3 (ref 140–500)
PMV BLD AUTO: 10.7 FL (ref 6–12)
POTASSIUM BLD-SCNC: 3.9 MMOL/L (ref 3.5–5.2)
PROT SERPL-MCNC: 8.4 G/DL (ref 6–8.5)
RBC # BLD AUTO: 3.33 10*6/MM3 (ref 3.9–5.2)
SODIUM BLD-SCNC: 141 MMOL/L (ref 136–145)
TIBC SERPL-MCNC: 174 MCG/DL (ref 298–536)
TRANSFERRIN SERPL-MCNC: 117 MG/DL (ref 200–360)
VIT B12 BLD-MCNC: 873 PG/ML (ref 211–946)
WBC NRBC COR # BLD: 6.78 10*3/MM3 (ref 4.5–10.7)

## 2018-01-12 PROCEDURE — 25010000002 DIPHENHYDRAMINE PER 50 MG: Performed by: NURSE PRACTITIONER

## 2018-01-12 PROCEDURE — 96413 CHEMO IV INFUSION 1 HR: CPT | Performed by: NURSE PRACTITIONER

## 2018-01-12 PROCEDURE — 82746 ASSAY OF FOLIC ACID SERUM: CPT | Performed by: INTERNAL MEDICINE

## 2018-01-12 PROCEDURE — 83883 ASSAY NEPHELOMETRY NOT SPEC: CPT | Performed by: INTERNAL MEDICINE

## 2018-01-12 PROCEDURE — 25010000002 METHYLPREDNISOLONE PER 125 MG: Performed by: NURSE PRACTITIONER

## 2018-01-12 PROCEDURE — 84466 ASSAY OF TRANSFERRIN: CPT | Performed by: INTERNAL MEDICINE

## 2018-01-12 PROCEDURE — 96415 CHEMO IV INFUSION ADDL HR: CPT | Performed by: NURSE PRACTITIONER

## 2018-01-12 PROCEDURE — 82232 ASSAY OF BETA-2 PROTEIN: CPT | Performed by: INTERNAL MEDICINE

## 2018-01-12 PROCEDURE — 96375 TX/PRO/DX INJ NEW DRUG ADDON: CPT | Performed by: NURSE PRACTITIONER

## 2018-01-12 PROCEDURE — 82728 ASSAY OF FERRITIN: CPT | Performed by: INTERNAL MEDICINE

## 2018-01-12 PROCEDURE — 80053 COMPREHEN METABOLIC PANEL: CPT | Performed by: INTERNAL MEDICINE

## 2018-01-12 PROCEDURE — 82607 VITAMIN B-12: CPT | Performed by: INTERNAL MEDICINE

## 2018-01-12 PROCEDURE — 86334 IMMUNOFIX E-PHORESIS SERUM: CPT | Performed by: INTERNAL MEDICINE

## 2018-01-12 PROCEDURE — 96361 HYDRATE IV INFUSION ADD-ON: CPT | Performed by: NURSE PRACTITIONER

## 2018-01-12 PROCEDURE — 82784 ASSAY IGA/IGD/IGG/IGM EACH: CPT | Performed by: INTERNAL MEDICINE

## 2018-01-12 PROCEDURE — 83540 ASSAY OF IRON: CPT | Performed by: INTERNAL MEDICINE

## 2018-01-12 PROCEDURE — 84165 PROTEIN E-PHORESIS SERUM: CPT | Performed by: INTERNAL MEDICINE

## 2018-01-12 PROCEDURE — 85025 COMPLETE CBC W/AUTO DIFF WBC: CPT | Performed by: INTERNAL MEDICINE

## 2018-01-12 PROCEDURE — 25010000002 DARATUMUMAB 400 MG/20ML SOLUTION 5 ML VIAL: Performed by: NURSE PRACTITIONER

## 2018-01-12 PROCEDURE — 25010000002 DARATUMUMAB 400 MG/20ML SOLUTION 20 ML VIAL: Performed by: NURSE PRACTITIONER

## 2018-01-12 RX ORDER — DIPHENHYDRAMINE HYDROCHLORIDE 50 MG/ML
50 INJECTION INTRAMUSCULAR; INTRAVENOUS AS NEEDED
Status: CANCELLED | OUTPATIENT
Start: 2018-01-12

## 2018-01-12 RX ORDER — ACETAMINOPHEN 500 MG
1000 TABLET ORAL ONCE
Status: COMPLETED | OUTPATIENT
Start: 2018-01-12 | End: 2018-01-12

## 2018-01-12 RX ORDER — METHYLPREDNISOLONE SODIUM SUCCINATE 125 MG/2ML
60 INJECTION, POWDER, LYOPHILIZED, FOR SOLUTION INTRAMUSCULAR; INTRAVENOUS ONCE
Status: COMPLETED | OUTPATIENT
Start: 2018-01-12 | End: 2018-01-12

## 2018-01-12 RX ORDER — SODIUM CHLORIDE 9 MG/ML
250 INJECTION, SOLUTION INTRAVENOUS ONCE
Status: COMPLETED | OUTPATIENT
Start: 2018-01-12 | End: 2018-01-12

## 2018-01-12 RX ORDER — FAMOTIDINE 10 MG/ML
20 INJECTION, SOLUTION INTRAVENOUS AS NEEDED
Status: CANCELLED | OUTPATIENT
Start: 2018-01-12

## 2018-01-12 RX ADMIN — SODIUM CHLORIDE 250 ML: 900 INJECTION, SOLUTION INTRAVENOUS at 08:36

## 2018-01-12 RX ADMIN — DARATUMUMAB 1370 MG: 100 INJECTION, SOLUTION, CONCENTRATE INTRAVENOUS at 09:28

## 2018-01-12 RX ADMIN — ACETAMINOPHEN 1000 MG: 500 TABLET ORAL at 08:40

## 2018-01-12 RX ADMIN — DIPHENHYDRAMINE HYDROCHLORIDE 25 MG: 50 INJECTION, SOLUTION INTRAMUSCULAR; INTRAVENOUS at 08:41

## 2018-01-12 RX ADMIN — METHYLPREDNISOLONE SODIUM SUCCINATE 60 MG: 125 INJECTION, POWDER, FOR SOLUTION INTRAMUSCULAR; INTRAVENOUS at 08:40

## 2018-01-15 LAB
ALBUMIN SERPL-MCNC: 3.3 G/DL (ref 2.9–4.4)
ALBUMIN/GLOB SERPL: 0.8 {RATIO} (ref 0.7–1.7)
ALPHA1 GLOB FLD ELPH-MCNC: 0.2 G/DL (ref 0–0.4)
ALPHA2 GLOB SERPL ELPH-MCNC: 0.6 G/DL (ref 0.4–1)
B-GLOBULIN SERPL ELPH-MCNC: 0.8 G/DL (ref 0.7–1.3)
GAMMA GLOB SERPL ELPH-MCNC: 3.1 G/DL (ref 0.4–1.8)
GLOBULIN SER CALC-MCNC: 4.7 G/DL (ref 2.2–3.9)
IGA SERPL-MCNC: 10 MG/DL (ref 87–352)
IGG SERPL-MCNC: 3083 MG/DL (ref 700–1600)
IGM SERPL-MCNC: 10 MG/DL (ref 26–217)
INTERPRETATION SERPL IEP-IMP: ABNORMAL
KAPPA LC SERPL-MCNC: 8.5 MG/L (ref 3.3–19.4)
KAPPA LC/LAMBDA SER: 0.01 {RATIO} (ref 0.26–1.65)
LAMBDA LC FREE SERPL-MCNC: 589.1 MG/L (ref 5.7–26.3)
Lab: ABNORMAL
M-SPIKE: 3.1 G/DL
PROT SERPL-MCNC: 8 G/DL (ref 6–8.5)

## 2018-01-18 PROCEDURE — 86335 IMMUNFIX E-PHORSIS/URINE/CSF: CPT | Performed by: INTERNAL MEDICINE

## 2018-01-18 PROCEDURE — 83883 ASSAY NEPHELOMETRY NOT SPEC: CPT | Performed by: INTERNAL MEDICINE

## 2018-01-18 PROCEDURE — 81050 URINALYSIS VOLUME MEASURE: CPT | Performed by: INTERNAL MEDICINE

## 2018-01-18 PROCEDURE — 84166 PROTEIN E-PHORESIS/URINE/CSF: CPT | Performed by: INTERNAL MEDICINE

## 2018-01-18 PROCEDURE — 84156 ASSAY OF PROTEIN URINE: CPT | Performed by: INTERNAL MEDICINE

## 2018-01-22 LAB
ALBUMIN 24H MFR UR ELPH: 10.6 %
ALPHA1 GLOB 24H MFR UR ELPH: 6.5 %
ALPHA2 GLOB 24H MFR UR ELPH: 19.9 %
B-GLOBULIN MFR UR ELPH: 49.6 %
FREE KAPPA LT CHAINS, 24HR: 51 MG/24 HR
FREE LAMBDA LT CHAINS, 24 HR: 61 MG/24 HR
GAMMA GLOB 24H MFR UR ELPH: 13.3 %
HIV 1 & 2 AB SER-IMP: ABNORMAL
INTERPRETATION UR IFE-IMP: ABNORMAL
KAPPA LC UR-MCNC: 27.4 MG/L (ref 1.35–24.19)
KAPPA LC/LAMBDA UR: 0.84 {RATIO} (ref 2.04–10.37)
LAMBDA LC UR-MCNC: 32.8 MG/L (ref 0.24–6.66)
M PROTEIN 24H MFR UR ELPH: ABNORMAL %
PROT 24H UR-MRATE: 283 MG/24 HR (ref 30–150)
PROT UR-MCNC: 15.3 MG/DL

## 2018-02-07 DIAGNOSIS — C90.00 MULTIPLE MYELOMA NOT HAVING ACHIEVED REMISSION (HCC): ICD-10-CM

## 2018-02-07 RX ORDER — SODIUM CHLORIDE 0.9 % (FLUSH) 0.9 %
10 SYRINGE (ML) INJECTION AS NEEDED
Status: CANCELLED | OUTPATIENT
Start: 2018-02-09

## 2018-02-09 ENCOUNTER — OFFICE VISIT (OUTPATIENT)
Dept: ONCOLOGY | Facility: CLINIC | Age: 68
End: 2018-02-09

## 2018-02-09 ENCOUNTER — INFUSION (OUTPATIENT)
Dept: ONCOLOGY | Facility: HOSPITAL | Age: 68
End: 2018-02-09

## 2018-02-09 VITALS
DIASTOLIC BLOOD PRESSURE: 68 MMHG | RESPIRATION RATE: 16 BRPM | SYSTOLIC BLOOD PRESSURE: 102 MMHG | TEMPERATURE: 97.9 F | HEART RATE: 68 BPM | BODY MASS INDEX: 36.66 KG/M2 | OXYGEN SATURATION: 98 % | HEIGHT: 62 IN | WEIGHT: 199.2 LBS

## 2018-02-09 DIAGNOSIS — C90.00 MULTIPLE MYELOMA NOT HAVING ACHIEVED REMISSION (HCC): Primary | ICD-10-CM

## 2018-02-09 DIAGNOSIS — D69.59 CHEMOTHERAPY-INDUCED THROMBOCYTOPENIA: ICD-10-CM

## 2018-02-09 DIAGNOSIS — N10 ACUTE PYELONEPHRITIS: ICD-10-CM

## 2018-02-09 DIAGNOSIS — N18.30 CHRONIC KIDNEY DISEASE, STAGE III (MODERATE) (HCC): ICD-10-CM

## 2018-02-09 DIAGNOSIS — T45.1X5A CHEMOTHERAPY-INDUCED THROMBOCYTOPENIA: ICD-10-CM

## 2018-02-09 DIAGNOSIS — C90.00 MULTIPLE MYELOMA, REMISSION STATUS UNSPECIFIED (HCC): ICD-10-CM

## 2018-02-09 DIAGNOSIS — R30.0 DYSURIA: ICD-10-CM

## 2018-02-09 DIAGNOSIS — D64.81 ANEMIA ASSOCIATED WITH CHEMOTHERAPY: ICD-10-CM

## 2018-02-09 DIAGNOSIS — T45.1X5A ANEMIA ASSOCIATED WITH CHEMOTHERAPY: ICD-10-CM

## 2018-02-09 LAB
ALBUMIN SERPL-MCNC: 3.2 G/DL (ref 3.5–5.2)
ALBUMIN/GLOB SERPL: 0.7 G/DL
ALP SERPL-CCNC: 55 U/L (ref 39–117)
ALT SERPL W P-5'-P-CCNC: 13 U/L (ref 1–33)
ANION GAP SERPL CALCULATED.3IONS-SCNC: 10.2 MMOL/L
AST SERPL-CCNC: 15 U/L (ref 1–32)
BASOPHILS # BLD AUTO: 0.02 10*3/MM3 (ref 0–0.2)
BASOPHILS NFR BLD AUTO: 0.3 % (ref 0–1.5)
BILIRUB SERPL-MCNC: 0.3 MG/DL (ref 0.1–1.2)
BUN BLD-MCNC: 22 MG/DL (ref 8–23)
BUN/CREAT SERPL: 13.1 (ref 7–25)
CALCIUM SPEC-SCNC: 8.3 MG/DL (ref 8.6–10.5)
CHLORIDE SERPL-SCNC: 108 MMOL/L (ref 98–107)
CO2 SERPL-SCNC: 21.8 MMOL/L (ref 22–29)
CREAT BLD-MCNC: 1.68 MG/DL (ref 0.57–1)
DEPRECATED RDW RBC AUTO: 52 FL (ref 37–54)
EOSINOPHIL # BLD AUTO: 0.28 10*3/MM3 (ref 0–0.7)
EOSINOPHIL NFR BLD AUTO: 4.3 % (ref 0.3–6.2)
ERYTHROCYTE [DISTWIDTH] IN BLOOD BY AUTOMATED COUNT: 13.3 % (ref 11.7–13)
GFR SERPL CREATININE-BSD FRML MDRD: 37 ML/MIN/1.73
GLOBULIN UR ELPH-MCNC: 4.9 GM/DL
GLUCOSE BLD-MCNC: 120 MG/DL (ref 65–99)
HCT VFR BLD AUTO: 34.9 % (ref 35.6–45.5)
HGB BLD-MCNC: 11.1 G/DL (ref 11.9–15.5)
IMM GRANULOCYTES # BLD: 0.06 10*3/MM3 (ref 0–0.03)
IMM GRANULOCYTES NFR BLD: 0.9 % (ref 0–0.5)
LYMPHOCYTES # BLD AUTO: 2.19 10*3/MM3 (ref 0.9–4.8)
LYMPHOCYTES NFR BLD AUTO: 33.7 % (ref 19.6–45.3)
MAGNESIUM SERPL-MCNC: 1.9 MG/DL (ref 1.6–2.4)
MCH RBC QN AUTO: 34 PG (ref 26.9–32)
MCHC RBC AUTO-ENTMCNC: 31.8 G/DL (ref 32.4–36.3)
MCV RBC AUTO: 107.1 FL (ref 80.5–98.2)
MONOCYTES # BLD AUTO: 0.58 10*3/MM3 (ref 0.2–1.2)
MONOCYTES NFR BLD AUTO: 8.9 % (ref 5–12)
NEUTROPHILS # BLD AUTO: 3.36 10*3/MM3 (ref 1.9–8.1)
NEUTROPHILS NFR BLD AUTO: 51.9 % (ref 42.7–76)
NRBC BLD MANUAL-RTO: 0 /100 WBC (ref 0–0)
PHOSPHATE SERPL-MCNC: 2.9 MG/DL (ref 2.5–4.5)
PLATELET # BLD AUTO: 114 10*3/MM3 (ref 140–500)
PMV BLD AUTO: 11 FL (ref 6–12)
POTASSIUM BLD-SCNC: 3.7 MMOL/L (ref 3.5–5.2)
PROT SERPL-MCNC: 8.1 G/DL (ref 6–8.5)
RBC # BLD AUTO: 3.26 10*6/MM3 (ref 3.9–5.2)
SODIUM BLD-SCNC: 140 MMOL/L (ref 136–145)
WBC NRBC COR # BLD: 6.49 10*3/MM3 (ref 4.5–10.7)

## 2018-02-09 PROCEDURE — 25010000002 CEFTRIAXONE PER 250 MG: Performed by: INTERNAL MEDICINE

## 2018-02-09 PROCEDURE — 36415 COLL VENOUS BLD VENIPUNCTURE: CPT | Performed by: INTERNAL MEDICINE

## 2018-02-09 PROCEDURE — 96415 CHEMO IV INFUSION ADDL HR: CPT | Performed by: INTERNAL MEDICINE

## 2018-02-09 PROCEDURE — 25010000002 DIPHENHYDRAMINE PER 50 MG: Performed by: INTERNAL MEDICINE

## 2018-02-09 PROCEDURE — 25010000002 ZOLEDRONIC ACID PER 1 MG: Performed by: INTERNAL MEDICINE

## 2018-02-09 PROCEDURE — 25010000002 METHYLPREDNISOLONE PER 125 MG: Performed by: INTERNAL MEDICINE

## 2018-02-09 PROCEDURE — 85025 COMPLETE CBC W/AUTO DIFF WBC: CPT | Performed by: INTERNAL MEDICINE

## 2018-02-09 PROCEDURE — 99215 OFFICE O/P EST HI 40 MIN: CPT | Performed by: INTERNAL MEDICINE

## 2018-02-09 PROCEDURE — 80053 COMPREHEN METABOLIC PANEL: CPT | Performed by: INTERNAL MEDICINE

## 2018-02-09 PROCEDURE — 96367 TX/PROPH/DG ADDL SEQ IV INF: CPT | Performed by: INTERNAL MEDICINE

## 2018-02-09 PROCEDURE — 25010000002 DARATUMUMAB 400 MG/20ML SOLUTION 5 ML VIAL: Performed by: INTERNAL MEDICINE

## 2018-02-09 PROCEDURE — 84100 ASSAY OF PHOSPHORUS: CPT | Performed by: INTERNAL MEDICINE

## 2018-02-09 PROCEDURE — 87086 URINE CULTURE/COLONY COUNT: CPT | Performed by: INTERNAL MEDICINE

## 2018-02-09 PROCEDURE — 83735 ASSAY OF MAGNESIUM: CPT | Performed by: INTERNAL MEDICINE

## 2018-02-09 PROCEDURE — 96375 TX/PRO/DX INJ NEW DRUG ADDON: CPT | Performed by: INTERNAL MEDICINE

## 2018-02-09 PROCEDURE — 96413 CHEMO IV INFUSION 1 HR: CPT | Performed by: INTERNAL MEDICINE

## 2018-02-09 PROCEDURE — 87186 SC STD MICRODIL/AGAR DIL: CPT | Performed by: INTERNAL MEDICINE

## 2018-02-09 PROCEDURE — 25010000002 DARATUMUMAB 400 MG/20ML SOLUTION 20 ML VIAL: Performed by: INTERNAL MEDICINE

## 2018-02-09 RX ORDER — DIPHENHYDRAMINE HYDROCHLORIDE 50 MG/ML
50 INJECTION INTRAMUSCULAR; INTRAVENOUS AS NEEDED
Status: CANCELLED | OUTPATIENT
Start: 2018-02-09

## 2018-02-09 RX ORDER — FAMOTIDINE 10 MG/ML
20 INJECTION, SOLUTION INTRAVENOUS AS NEEDED
Status: CANCELLED | OUTPATIENT
Start: 2018-02-09

## 2018-02-09 RX ORDER — BISOPROLOL FUMARATE 5 MG/1
TABLET, FILM COATED ORAL
Refills: 1 | COMMUNITY
Start: 2018-01-23 | End: 2018-09-12

## 2018-02-09 RX ORDER — METHYLPREDNISOLONE SODIUM SUCCINATE 125 MG/2ML
60 INJECTION, POWDER, LYOPHILIZED, FOR SOLUTION INTRAMUSCULAR; INTRAVENOUS ONCE
Status: COMPLETED | OUTPATIENT
Start: 2018-02-09 | End: 2018-02-09

## 2018-02-09 RX ORDER — ACETAMINOPHEN 500 MG
1000 TABLET ORAL ONCE
Status: COMPLETED | OUTPATIENT
Start: 2018-02-09 | End: 2018-02-09

## 2018-02-09 RX ORDER — ACETAMINOPHEN 500 MG
1000 TABLET ORAL ONCE
Status: CANCELLED | OUTPATIENT
Start: 2018-02-09

## 2018-02-09 RX ORDER — SODIUM CHLORIDE 9 MG/ML
250 INJECTION, SOLUTION INTRAVENOUS ONCE
Status: COMPLETED | OUTPATIENT
Start: 2018-02-09 | End: 2018-02-09

## 2018-02-09 RX ORDER — METHYLPREDNISOLONE SODIUM SUCCINATE 125 MG/2ML
60 INJECTION, POWDER, LYOPHILIZED, FOR SOLUTION INTRAMUSCULAR; INTRAVENOUS ONCE
Status: CANCELLED | OUTPATIENT
Start: 2018-02-09

## 2018-02-09 RX ORDER — SODIUM CHLORIDE 9 MG/ML
250 INJECTION, SOLUTION INTRAVENOUS ONCE
Status: CANCELLED | OUTPATIENT
Start: 2018-02-09

## 2018-02-09 RX ORDER — MEPERIDINE HYDROCHLORIDE 50 MG/ML
25 INJECTION INTRAMUSCULAR; INTRAVENOUS; SUBCUTANEOUS
Status: CANCELLED | OUTPATIENT
Start: 2018-02-09

## 2018-02-09 RX ORDER — CEFDINIR 300 MG/1
300 CAPSULE ORAL EVERY 12 HOURS SCHEDULED
Qty: 20 CAPSULE | Refills: 0 | Status: SHIPPED | OUTPATIENT
Start: 2018-02-09 | End: 2018-03-07

## 2018-02-09 RX ORDER — LEVOFLOXACIN 500 MG/1
TABLET, FILM COATED ORAL
Refills: 0 | COMMUNITY
Start: 2018-01-30 | End: 2018-02-27 | Stop reason: SDUPTHER

## 2018-02-09 RX ORDER — SODIUM CHLORIDE 9 MG/ML
250 INJECTION, SOLUTION INTRAVENOUS ONCE
Status: CANCELLED | OUTPATIENT
Start: 2018-02-09 | End: 2018-02-09

## 2018-02-09 RX ADMIN — METHYLPREDNISOLONE SODIUM SUCCINATE 60 MG: 125 INJECTION, POWDER, FOR SOLUTION INTRAMUSCULAR; INTRAVENOUS at 08:58

## 2018-02-09 RX ADMIN — ZOLEDRONIC ACID 3 MG: 0.8 INJECTION, SOLUTION, CONCENTRATE INTRAVENOUS at 13:28

## 2018-02-09 RX ADMIN — DIPHENHYDRAMINE HYDROCHLORIDE 25 MG: 50 INJECTION, SOLUTION INTRAMUSCULAR; INTRAVENOUS at 08:59

## 2018-02-09 RX ADMIN — DARATUMUMAB 1370 MG: 100 INJECTION, SOLUTION, CONCENTRATE INTRAVENOUS at 09:58

## 2018-02-09 RX ADMIN — SODIUM CHLORIDE 2 G: 900 INJECTION, SOLUTION INTRAVENOUS at 09:15

## 2018-02-09 RX ADMIN — SODIUM CHLORIDE 250 ML: 900 INJECTION, SOLUTION INTRAVENOUS at 08:48

## 2018-02-09 RX ADMIN — ACETAMINOPHEN 1000 MG: 500 TABLET ORAL at 08:57

## 2018-02-09 NOTE — PROGRESS NOTES
Reasons for follow up:   1. IgG kappa multiple myeloma, currently on Darzalex, started on May 26, 2016. Patient was switched to Darzalex from Pomalyst, as she continued to be neutropenic with recurrent urinary tract infection while on Pomalyst.    2. Zometa is on hold due to renal insufficiency.    3. After 16 doses of Darzalex, laboratory study showed stable disease in November 2016. Insurance company denied adding Velcade and dexamethasone. Patient is to be continued on monthly Darzalex from 12/06/16.   4. Obstructive right pyelonephritis with positive urine culture for E. coli, required hospitalization from 1/19/2017 through 01/24/2017 with iv antibiotics and stent exchange on 01/20/2017.   5.  Paraprotein studies on March 27, 2017 showed further slight improvement of multiple myeloma.   6.  Febrile illness, with urinary tract infection and influenza B infection in early May 2017, required hospitalization for 6 days.   7.   Paraprotein studies for both serum and 24-hour urine sample on 7/21/2017 showed further improvement of paraproteins.   Darzalex was continued.   8.  Serum protein study reported stable disease on 10/20/2017.  Darzalex will be continued.   9.  Laboratory study on 1/18/2018 showed a further improvement of paraproteins.  Monthly Darzalex will be continued.           History of Present Illness:     The patient is a pleasant 67-year-old female presented today for 3-month reevaluation, to discuss recent paraprotein studies for both serum and 24-hour urine sample.  Patient reports she had right ureter stent replacement last week by Dr. Everett, and was given oral Levaquin.  However for past couple days she does not feel well, complains dysuria however no fever or sweating.  She is symptomatic today.  She denies nausea vomiting, however reports not feeling well.      She has reasonably good performance status ECOG 1.   She drove herself today to clinic, from [facility].     Laboratory study  on 1/12/2018 reported a serum IgG 3083 mg/dL, IgA 10, IgM 10, free kappa chain 8.5 and free lambda chain 589.1 mg/L, kappa/lambda ratio 0.01, serum protein admitted fixation reported IgG lambda monoclonal protein and SPEP reporting M spike 3.1 g/dL, beta-2 microgram and 7.4 mg/L. serum creatinine 1.79, calcium 8.2, other electrolytes normal, hemoglobin 11.3, .5 and MCHC 31.6, platelets 129,000, WBC 6780 including neutrophils 3500 lymphocytes 2300.  Serum ferritin 653.7 ng/mL, iron 123 TIBC 174 iron saturation 71%, folic acid 12.8 ng/mL and a vitamin B12 at 873 pg/mL.  Her 24-hour urine study on 1/18/2018 reported lambda chain 32.8 mg/L, total 61 mg in 24 hours, and the free kappa chain 27.4 mg/L and 51 mg in 24 hours, and the total 24-hour urine protein 283 mg, and urine protein immunofixation positive for 5.3% monoclonal IgG lambda and positive for Bence May protein at 36.2% monoclonal lambda chain.    Past Medical History, Past Surgical History, Social History, Family History have been reviewed and are without significant changes except as mentioned.      Hematology/oncology history: See separate document.       On December 6, 2016, serum free lambda chain 1090 MG/L, free kappa chain 8.5 to MG/L.  Ferritin 1015, iron 99, TIBC 137 iron saturation 72%.     On January 4, 2017, vitamin B12 level 1289, folate 7.7 ng/mL. SPEP reporting gamma globulin 3.3, out of total globulin 5.0, with immunofixation reporting small quantity of monoclonal free lambda chain, plus monoclonal IgG lambda at 3.2 g/DL.  Serum IgG 4075, IgA 9 and IgM 15.  free lambda chain 1339 MG/L and free kappa chain 10.3 MG/L.      Laboratory study March 27, 2017 showed slight improvement of paraprotein, SPEP reporting M spike 2.8 g/DL, out of total globulin 4.3, and the serum IgG 3420, IgA 9, IgM 11, free lambda chain 1218 MG/L and free kappa chain 8.0 MG/L.  Total 24 hour urine sample reported at free lambda chain 142 mg, at a concentration  74.8 MG/L, free kappa chain 75 mg at a concentration 39.5 MG/L., Urine protein immunofixation reporting biclonal IgG lambda and monoclonal free lambda light chain, M spike #1 at 41.5% and monoclonal IgG lambda spike #2 at 10.5%. Serum beta-2 microglobulin is 7.3 MG/L.     Her laboratory study on 7/21/2017 reported further improvement of paraprotein is both serum and a 24-hour urine sample.  SPEP reporting monoclonal IgG lambda 2.9 g/dL and free lambda chain 0.1 g/dL.  Serum IgG was 3261 mg/dL and IgA 5, IgM 13, free kappa chain 6.7, free lambda chain 701.3 with kappa/lambda ratio 0.01.  24-hour urine sample reported positive for Bence May protein lambda type, immunofixation positive for IgG lambda.  Total urine protein 241 mg, gamma globulin 13.1%.  Hemoglobin was 11.4 .4, platelets 122,000, WBC 6680 including neutrophils 3600, lymphocytes 2100 and monocytes 650.  Her creatinine was 1.68, at baseline level.  Liver function panel unremarkable.  Darzalex was continued.    Laboratory study on 8/25/2017 showed improved the platelets at 144,000, normal WBC 6660 and slightly improved hemoglobin at 11.7.     Patient had a colonoscopy examination on 8/30/2017 by Dr. Rivera.  It reported diverticulosis in multiple areas more severe in the sigmoid colon.  There was 2 polyps 4 mm pneumonia from transverse colon and the sigmoid colon.  Pathology evaluation reported tubular adenoma from the sigmoid colon polyp, and benign hyperplastic polyp from transverse colon.    Laboratory study on 10/20/2017 reported slightly improved monoclonal spike 2.7 g/dL by SPEP, immunofixation reported positive monoclonal IgG lambda, serum IgG 3536 mg/dL, IgA less than 5 and IgM 11, free kappa chain 5.3 mg/L, and free lambda chain 720 mg/L with kappa/lambda ratio 0.01.  Serum beta-2 microglobulin was 6.5 mg/L.   Her CBC showed a stable mild anemia with hemoglobin 11.3, macrocytosis .3, and the platelets 114,000, normal WBC 7070  "including neutrophils 4100 lymphocytes 2000 monocytes 650, normal liver function panel, total protein 7.9 and albumin 3.2,  and normal electrolytes including calcium 8.1, and improved creatinine 1.59.       Review of Systems    Constitutional: Negative for chills and fever. See history of present illness  HENT: Negative for mouth sores and sore throat.    Eyes: Negative for visual disturbance.    Respiratory: No cough or hemoptysis no short of breath.   Gastrointestinal: Negative for abdominal pain, diarrhea, nausea and vomiting.    Genitourinary: See history of present illness.     Musculoskeletal: Negative for back pain and joint swelling.    Neurological: Negative for dizziness and weakness.    Hematological: Does not bruise/bleed easily.    Psychiatric/Behavioral: The patient is not nervous/anxious.        Medications: The current medication list was reviewed in the EMR.       ALLERGIES: Zosyn       Vitals:    02/09/18 0806   BP: 102/68   Pulse: 68   Resp: 16   Temp: 97.9 °F (36.6 °C)   TempSrc: Oral   SpO2: 98%   Weight: 90.4 kg (199 lb 3.2 oz)   Height: 157.5 cm (62.01\")   PainSc:   2   PS: ECOG 1      Physical Exam   Constitutional: well-developed and well-nourished female. No distress.    HENT:    Head: Normocephalic.    Eyes: EOMs are normal. No jaundice.    Neck: Normal range of motion.    Cardiovascular: Normal rate, regular rhythm, normal heart sounds and normal pulses.    Pulmonary/Chest: normal breath sounds, no wheezes, no rales.  Mediport benign.  Abdominal: Soft. Bowel sounds are normal. no distension and no mass. There is no hepatosplenomegaly. There is no tenderness.   Musculoskeletal: Normal range of motion. no edema or deformity.   Lymphadenopathy: She has no cervical, supraclavicular adenopathy.   Neurological: alert and oriented to person, place, and time. No cranial nerve deficit.    Skin: Skin is warm and dry. No rash noted. No cyanosis. No pallor.   Psychiatric: She has normal mood and " affect.         Recent lab results:   Lab Results   Component Value Date    WBC 6.49 02/09/2018    HGB 11.1 (L) 02/09/2018    HCT 34.9 (L) 02/09/2018    .1 (H) 02/09/2018     (L) 02/09/2018     Lab Results   Component Value Date    NEUTROABS 3.36 02/09/2018     Lab Results   Component Value Date    GLUCOSE 120 (H) 02/09/2018    BUN 22 02/09/2018    CREATININE 1.68 (H) 02/09/2018    EGFRIFNONA  08/30/2016      Comment:      <15 Indicative of kidney failure.    EGFRIFAFRI 37 (L) 02/09/2018    BCR 13.1 02/09/2018    K 3.7 02/09/2018    CO2 21.8 (L) 02/09/2018    CALCIUM 8.3 (L) 02/09/2018    PROTENTOTREF 8.0 01/12/2018    ALBUMIN 3.20 (L) 02/09/2018    LABIL2 0.7 02/09/2018    AST 15 02/09/2018    ALT 13 02/09/2018     Sodium   Date Value Ref Range Status   02/09/2018 140 136 - 145 mmol/L Final     Potassium   Date Value Ref Range Status   02/09/2018 3.7 3.5 - 5.2 mmol/L Final     Total Bilirubin   Date Value Ref Range Status   02/09/2018 0.3 0.1 - 1.2 mg/dL Final     Alkaline Phosphatase   Date Value Ref Range Status   02/09/2018 55 39 - 117 U/L Final   ]    Assessment/Plan   1. Multiple myeloma, IgG lambda, stage III. Failed multiple lines of therapy. Her treatment was switched to Darzalex on 5/26/2016. She tolerated therapy very well. Her laboratory serum study on 1/12/2018 and 24-hour urine study on 1/18/2018 showed further improvement of serum IgG and the free lambda chain, together with 24-hour urine free light chain quantitation as I carefully reviewed her multiple laboratory studies in the past 18 months.  I recommend continuing Darzalex treatment monthly.  Plan to repeat both 24 hour urine and also serum paraprotein studies in 3 months for reassessment.      2. Zometa treatment. She has stage III chronic kidney disease, and her creatinine has slight improvement.  She was off Zometa for about 18 months and then resumed at a decreased dose of 3 mg in August 2017.  We'll continue every 3 months  Zometa treatment.         3.  Suspected urinary tract infection/pyelonephritis.  This patient has dysuria for the past several days, with history of replacement right ureteral stent on 1/29/2018 per medical records which I reviewed.  I recommend obtaining urinalysis and culture/sensitivity study today, at the meantime I will start her on intravenous Rocephin 2 g today and followed by 10 days of Cefdinir 300 mg oral twice a day.  This patient is immunosuppressed and she previously had multiple urinary tract infection/pyelonephritis which is likely in the developing phase.  I asked her to call us or her urologist if she develops chills sweating, fever or worsening symptomology. she voiced understanding.      4.  Macrocytic anemia secondary to chemotherapy for multiple myeloma and chronic renal insufficiency.  She has relatively stable hemoglobin, however with worsening macrocytosis.  Laboratory study on 1/12/2018 showed in no evidence of deficiency of folic acid or vitamin B12 level.  She also had iron panel fits with inflammatory condition.        5. Mild to moderate thrombocytopenia secondary to her multiple myeloma and chemotherapy.  No easy bleeding or bruising.  Nevertheless her platelet count has been gradually improving since we started Darzalex treatment.        6. History of stage II left breast cancer, post mastectomy in 2013, negative for ER/FL and positive HER-2. No neoadjuvant chemotherapy because of concurrent discovery of multiple myeloma and ongoing chemotherapy. She had a normal mammogram study in July 2017.       7.  Right middle lobe pulmonary nodule, documented on CT scan of chest on 01/06/2017. Repeated CT scan of chest on 8/21/2017 reported a small 5 mm and stable primary nodule.      8.  Chronic renal insufficiency stage III.  Her creatinine today is slightly better than that of 4 weeks ago.  Overall stable.  Continue to monitor.    Plan:  1. Continue Darzalex today and repeat monthly.  Monitor  CBC and CMP monthly with each Darzalex treatment.   2.  Urinalysis and a urine culture today.   3.  Intravenous Rocephin 2 g today.   4.  Cefdinir 300 mg oral every 12 hours for 10 days starting on /10/2018  5.  Proceed Zometa today 3 mg, and repeat every 3 months.   6.  Patient will return in 1 month for M.D. Reevaluation, labs per protocol and Darzalex treatment.             More than 45 min were used for patient care, over half of that time were for counseling.      LI OBANDO M.D., Ph.D.     2/09/2017          Addendum:    on 2/10/2018, urine culture comes out gram-negative bacilli more than 100,000 CFU/mL.  Waiting for sensitivity results.      LI OBANDO M.D., Ph.D.     2/10/2017        CC: Michael Everett M.D.

## 2018-02-11 LAB
BACTERIA SPEC AEROBE CULT: ABNORMAL
BACTERIA SPEC AEROBE CULT: ABNORMAL

## 2018-02-12 ENCOUNTER — TELEPHONE (OUTPATIENT)
Dept: ONCOLOGY | Facility: HOSPITAL | Age: 68
End: 2018-02-12

## 2018-02-12 NOTE — TELEPHONE ENCOUNTER
----- Message from Zane Araujo MD PhD sent at 2/12/2018 10:44 AM EST -----  Sow the results on weekend, she should be sensitive to the med I prescribed.       ----- Message -----     From: Asmita Limon RN     Sent: 2/12/2018   9:35 AM       To: Zane Araujo MD PhD    Urine culture shows E. Coli in urine. Are you ok to keep her on current antibiotic therapy, or do you want to adjust?  Thanks Asmita

## 2018-02-19 RX ORDER — ACYCLOVIR 400 MG/1
TABLET ORAL
Qty: 60 TABLET | Refills: 0
Start: 2018-02-19 | End: 2018-09-12

## 2018-02-21 ENCOUNTER — DOCUMENTATION (OUTPATIENT)
Dept: ONCOLOGY | Facility: HOSPITAL | Age: 68
End: 2018-02-21

## 2018-02-21 RX ORDER — LEVOFLOXACIN 500 MG/1
500 TABLET, FILM COATED ORAL DAILY
Qty: 7 TABLET | Refills: 0 | Status: SHIPPED | OUTPATIENT
Start: 2018-02-21 | End: 2018-02-28

## 2018-02-21 NOTE — PROGRESS NOTES
Patient called reporting temp of 101.2-101.7.  C/o of nonproductive cough.  Denies any symptoms r/t previous UTI.  Discussed with Dr. Araujo and order received for po levaquin 500 mg daily x 7 days.  Order sent to pt pharmacy.  Discussed same with patient.  Recommended OTC cough syrup such as Robitussin as per Dr. Araujo.  Pt instructed to call if symptoms not improving, SOA, or persistent temp over 100.5.  Pt v/u.

## 2018-02-27 ENCOUNTER — LAB (OUTPATIENT)
Dept: ONCOLOGY | Facility: HOSPITAL | Age: 68
End: 2018-02-27

## 2018-02-27 ENCOUNTER — OFFICE VISIT (OUTPATIENT)
Dept: ONCOLOGY | Facility: CLINIC | Age: 68
End: 2018-02-27

## 2018-02-27 VITALS
HEART RATE: 76 BPM | OXYGEN SATURATION: 99 % | HEIGHT: 62 IN | RESPIRATION RATE: 12 BRPM | TEMPERATURE: 97.6 F | WEIGHT: 193.4 LBS | DIASTOLIC BLOOD PRESSURE: 82 MMHG | SYSTOLIC BLOOD PRESSURE: 112 MMHG | BODY MASS INDEX: 35.59 KG/M2

## 2018-02-27 DIAGNOSIS — N18.30 CHRONIC KIDNEY DISEASE, STAGE III (MODERATE) (HCC): Primary | ICD-10-CM

## 2018-02-27 DIAGNOSIS — R53.83 FATIGUE, UNSPECIFIED TYPE: ICD-10-CM

## 2018-02-27 DIAGNOSIS — Z45.2 FITTING AND ADJUSTMENT OF VASCULAR CATHETER: ICD-10-CM

## 2018-02-27 DIAGNOSIS — C90.00 MULTIPLE MYELOMA NOT HAVING ACHIEVED REMISSION (HCC): ICD-10-CM

## 2018-02-27 DIAGNOSIS — R50.9 FEVER AND CHILLS: Primary | ICD-10-CM

## 2018-02-27 LAB
AMORPH URATE CRY URNS QL MICRO: ABNORMAL /HPF
ANION GAP SERPL CALCULATED.3IONS-SCNC: 8.1 MMOL/L
BACTERIA UR QL AUTO: ABNORMAL /HPF
BASOPHILS # BLD AUTO: 0.01 10*3/MM3 (ref 0–0.1)
BASOPHILS NFR BLD AUTO: 0.2 % (ref 0–1.1)
BILIRUB UR QL STRIP: NEGATIVE
BUN BLD-MCNC: 21 MG/DL (ref 6–20)
BUN/CREAT SERPL: 13.2 (ref 7.3–30)
CALCIUM SPEC-SCNC: 7.9 MG/DL (ref 8.5–10.2)
CHLORIDE SERPL-SCNC: 110 MMOL/L (ref 98–107)
CLARITY UR: ABNORMAL
CO2 SERPL-SCNC: 21.9 MMOL/L (ref 22–29)
COLOR UR: YELLOW
CREAT BLD-MCNC: 1.59 MG/DL (ref 0.6–1.1)
DEPRECATED RDW RBC AUTO: 54.6 FL (ref 37–49)
EOSINOPHIL # BLD AUTO: 0.07 10*3/MM3 (ref 0–0.36)
EOSINOPHIL NFR BLD AUTO: 1.5 % (ref 1–5)
ERYTHROCYTE [DISTWIDTH] IN BLOOD BY AUTOMATED COUNT: 14 % (ref 11.7–14.5)
FLUAV AG NPH QL: NEGATIVE
FLUBV AG NPH QL IA: NEGATIVE
GFR SERPL CREATININE-BSD FRML MDRD: 39 ML/MIN/1.73
GLUCOSE BLD-MCNC: 98 MG/DL (ref 74–124)
GLUCOSE UR STRIP-MCNC: NEGATIVE MG/DL
HCT VFR BLD AUTO: 37.2 % (ref 34–45)
HGB BLD-MCNC: 11.7 G/DL (ref 11.5–14.9)
HGB UR QL STRIP.AUTO: ABNORMAL
IMM GRANULOCYTES # BLD: 0.06 10*3/MM3 (ref 0–0.03)
IMM GRANULOCYTES NFR BLD: 1.3 % (ref 0–0.5)
KETONES UR QL STRIP: ABNORMAL
LEUKOCYTE ESTERASE UR QL STRIP.AUTO: ABNORMAL
LYMPHOCYTES # BLD AUTO: 2.04 10*3/MM3 (ref 1–3.5)
LYMPHOCYTES NFR BLD AUTO: 45.1 % (ref 20–49)
MCH RBC QN AUTO: 33.7 PG (ref 27–33)
MCHC RBC AUTO-ENTMCNC: 31.5 G/DL (ref 32–35)
MCV RBC AUTO: 107.2 FL (ref 83–97)
MONOCYTES # BLD AUTO: 0.37 10*3/MM3 (ref 0.25–0.8)
MONOCYTES NFR BLD AUTO: 8.2 % (ref 4–12)
NEUTROPHILS # BLD AUTO: 1.97 10*3/MM3 (ref 1.5–7)
NEUTROPHILS NFR BLD AUTO: 43.7 % (ref 39–75)
NITRITE UR QL STRIP: NEGATIVE
NRBC BLD MANUAL-RTO: 0 /100 WBC (ref 0–0)
PH UR STRIP.AUTO: 7 [PH] (ref 4.5–8)
PLATELET # BLD AUTO: 101 10*3/MM3 (ref 150–375)
PMV BLD AUTO: 11.7 FL (ref 8.9–12.1)
POTASSIUM BLD-SCNC: 4.6 MMOL/L (ref 3.5–4.7)
PROT UR QL STRIP: ABNORMAL
RBC # BLD AUTO: 3.47 10*6/MM3 (ref 3.9–5)
RBC # UR: ABNORMAL /HPF
REF LAB TEST METHOD: ABNORMAL
SODIUM BLD-SCNC: 140 MMOL/L (ref 134–145)
SP GR UR STRIP: 1.02 (ref 1–1.03)
SQUAMOUS #/AREA URNS HPF: ABNORMAL /HPF
UROBILINOGEN UR QL STRIP: ABNORMAL
WBC NRBC COR # BLD: 4.52 10*3/MM3 (ref 4–10)
WBC UR QL AUTO: ABNORMAL /HPF

## 2018-02-27 PROCEDURE — 99214 OFFICE O/P EST MOD 30 MIN: CPT | Performed by: NURSE PRACTITIONER

## 2018-02-27 PROCEDURE — 87086 URINE CULTURE/COLONY COUNT: CPT | Performed by: NURSE PRACTITIONER

## 2018-02-27 PROCEDURE — 36415 COLL VENOUS BLD VENIPUNCTURE: CPT | Performed by: INTERNAL MEDICINE

## 2018-02-27 PROCEDURE — 25010000002 HEPARIN FLUSH (PORCINE) 100 UNIT/ML SOLUTION: Performed by: INTERNAL MEDICINE

## 2018-02-27 PROCEDURE — 85025 COMPLETE CBC W/AUTO DIFF WBC: CPT | Performed by: INTERNAL MEDICINE

## 2018-02-27 PROCEDURE — 96523 IRRIG DRUG DELIVERY DEVICE: CPT | Performed by: NURSE PRACTITIONER

## 2018-02-27 PROCEDURE — 81001 URINALYSIS AUTO W/SCOPE: CPT | Performed by: NURSE PRACTITIONER

## 2018-02-27 PROCEDURE — 87804 INFLUENZA ASSAY W/OPTIC: CPT | Performed by: NURSE PRACTITIONER

## 2018-02-27 PROCEDURE — 80048 BASIC METABOLIC PNL TOTAL CA: CPT | Performed by: INTERNAL MEDICINE

## 2018-02-27 RX ORDER — SODIUM CHLORIDE 0.9 % (FLUSH) 0.9 %
10 SYRINGE (ML) INJECTION AS NEEDED
Status: CANCELLED | OUTPATIENT
Start: 2018-02-27

## 2018-02-27 RX ORDER — SODIUM CHLORIDE 0.9 % (FLUSH) 0.9 %
10 SYRINGE (ML) INJECTION AS NEEDED
Status: DISCONTINUED | OUTPATIENT
Start: 2018-02-27 | End: 2018-02-27 | Stop reason: HOSPADM

## 2018-02-27 RX ADMIN — Medication 10 ML: at 15:44

## 2018-02-27 RX ADMIN — SODIUM CHLORIDE, PRESERVATIVE FREE 500 UNITS: 5 INJECTION INTRAVENOUS at 15:44

## 2018-02-28 ENCOUNTER — INFUSION (OUTPATIENT)
Dept: ONCOLOGY | Facility: HOSPITAL | Age: 68
End: 2018-02-28

## 2018-02-28 VITALS
BODY MASS INDEX: 35.29 KG/M2 | WEIGHT: 193 LBS | TEMPERATURE: 97.6 F | HEART RATE: 80 BPM | SYSTOLIC BLOOD PRESSURE: 103 MMHG | DIASTOLIC BLOOD PRESSURE: 66 MMHG

## 2018-02-28 DIAGNOSIS — N63.0 BREAST NODULE: ICD-10-CM

## 2018-02-28 PROCEDURE — 96360 HYDRATION IV INFUSION INIT: CPT | Performed by: INTERNAL MEDICINE

## 2018-02-28 RX ORDER — SODIUM CHLORIDE 9 MG/ML
500 INJECTION, SOLUTION INTRAVENOUS ONCE
Status: COMPLETED | OUTPATIENT
Start: 2018-02-28 | End: 2018-02-28

## 2018-02-28 RX ADMIN — SODIUM CHLORIDE 500 ML: 900 INJECTION, SOLUTION INTRAVENOUS at 14:03

## 2018-02-28 NOTE — PROGRESS NOTES
Patient inquiring about urine specimen done yesterday.Yenny Alexandre stated in noted will review today. Urine culture preliminary results reviewed per  Sherry Tian and she stated will follow up with  patient when culture finalized . Patient aware of these plans.

## 2018-03-01 LAB
BACTERIA SPEC AEROBE CULT: NORMAL
BACTERIA SPEC AEROBE CULT: NORMAL

## 2018-03-06 DIAGNOSIS — C90.00 MULTIPLE MYELOMA, REMISSION STATUS UNSPECIFIED (HCC): ICD-10-CM

## 2018-03-07 ENCOUNTER — OFFICE VISIT (OUTPATIENT)
Dept: FAMILY MEDICINE CLINIC | Facility: CLINIC | Age: 68
End: 2018-03-07

## 2018-03-07 VITALS
WEIGHT: 195 LBS | TEMPERATURE: 97.5 F | HEART RATE: 67 BPM | SYSTOLIC BLOOD PRESSURE: 110 MMHG | BODY MASS INDEX: 35.88 KG/M2 | HEIGHT: 62 IN | DIASTOLIC BLOOD PRESSURE: 66 MMHG | RESPIRATION RATE: 16 BRPM

## 2018-03-07 DIAGNOSIS — C64.2 MALIGNANT NEOPLASM OF LEFT KIDNEY (HCC): ICD-10-CM

## 2018-03-07 DIAGNOSIS — N10 ACUTE PYELONEPHRITIS: Primary | ICD-10-CM

## 2018-03-07 DIAGNOSIS — C90.00 MULTIPLE MYELOMA, REMISSION STATUS UNSPECIFIED (HCC): ICD-10-CM

## 2018-03-07 PROCEDURE — 99213 OFFICE O/P EST LOW 20 MIN: CPT | Performed by: FAMILY MEDICINE

## 2018-03-07 NOTE — PROGRESS NOTES
"Chief Complaint   Patient presents with   • Hypertension       Subjective   This patient returns the office to follow-up from urology visit.  She had a kidney stent placed on January 29, 2018.  The operative report has been reviewed and is seen below.  She has also had visit with her hematologist since our last visit.  That report has been reviewed as well.  She continues to get chemotherapy treatments.  Overall, she feels well.  She does not have any urinary symptoms today.  Most recent urinary culture has been reviewed and showed mixed colony growth.  Our next appointment will be in July for medication refill.  She has not had any seizures since our last visit.(none since 1990's)  I have reviewed and updated her medications, medical history and problem list during today's office visit.       Assessment/Plan   Problem List Items Addressed This Visit        Genitourinary    Malignant neoplasm of kidney    RESOLVED: UTI (urinary tract infection) - Primary       Hematopoietic and Hemostatic    Multiple myeloma        F/U in 4 months       Review of Systems   Constitutional: Negative for fatigue and fever.   Cardiovascular: Negative for chest pain.   Genitourinary: Negative for difficulty urinating and dysuria.     Objective   /66  Pulse 67  Temp 97.5 °F (36.4 °C) (Oral)   Resp 16  Ht 157.5 cm (62.01\")  Wt 88.5 kg (195 lb)  LMP  (LMP Unknown)  BMI 35.65 kg/m2  Body mass index is 35.65 kg/(m^2).  Physical Exam   Constitutional: No distress.   Cardiovascular: Normal rate, regular rhythm and normal heart sounds.    Pulmonary/Chest: Effort normal and breath sounds normal.   Neurological: She is not disoriented.   Psychiatric: She is attentive.   Vitals reviewed.       Social History   Substance Use Topics   • Smoking status: Never Smoker   • Smokeless tobacco: Never Used   • Alcohol use No       Data Reviewed:  Operative Report - Michael Everett MD - 01/29/2018 11:39 AM EST    OPERATIVE/PROCEDURE " REPORT    FACILITY: [facility]  UNIT/AGE/GENDER: AMANDA SD 67Y F  PATIENT NAME/: GIANNI CATALAN 1950  UNIT NUMBER: [id number]  ACCOUNT NUMBER: [id number]    DATE OF OPERATION(S)/PROCEDURE(S): 2018     PREOPERATIVE DIAGNOSES: Chronic right ureteral stricture with hydronephrosis, solitary kidney.     POSTOPERATIVE DIAGNOSES: Same.     PROCEDURE(S) PERFORMED: Cystoscopy, removal of right ureteral stent, replacement of right ureteral stent.     SURGEON(S): Melissa Everett Jr., M.D.     ANESTHESIA: General.     INDICATION(S) FOR PROCEDURE(S): This very nice woman has a long history of right ureteral stricture due to previous surgeries. 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     DESCRIPTION OF PROCEDURE(S): The patient was brought to the cysto room. General anesthetic was administered. She was then carefully placed in the dorsal lithotomy position. Perineum prepped and draped in the usual sterile fashion. A 21 Afghan panendoscope sheath with obturator was introduced into the bladder. Existing stent grasped with foreign body forceps. Moderate encrustation noted. Guidewire passed into the right collecting system without difficulty and then a brand new 8 Afghan x 24 centimeter double-J stent was passed and was well-positioned. Wire removed, stent left indwelling. Topical Xylocaine applied for postoperative analgesia. She tolerated the procedure well and was transferred to the Recovery Room in stable condition.     MELISSA EVERETT JR., M.D.    D: 2018 07:16:42    JWAMBULATORYLABS: UA:    Lab Results   Component Value Date    SQUAMEPIUA 4-6 (A) 2018    SPECGRAVUR 1.025 2018    KETONESU Trace (A) 2018    BLOODU Moderate (2+) (A) 2018    LEUKOCYTESUR Moderate (2+) (A) 2018    NITRITEU Negative 2018    RBCUA 6-12 (A) 2018    WBCUA 13-20 (A) 2018    BACTERIA Trace (A) 2018      Urine Culture:   Lab Results   Component Value Date    URINECX >100,000 CFU/mL Mixed Keila Isolated 02/27/2018       No orders of the defined types were placed in this encounter.      Outpatient Encounter Prescriptions as of 3/7/2018   Medication Sig Dispense Refill   • acetaminophen (TYLENOL) 325 MG tablet Take 2 tablets by mouth Every 6 (Six) Hours As Needed for mild pain (1-3) or fever.  0   • acyclovir (ZOVIRAX) 400 MG tablet TAKE 1 TABLET BY MOUTH 2 (TWO) TIMES A DAY. TAKE NO MORE THAN 5 DOSES A DAY. 60 tablet 0   • aspirin 81 MG tablet Take 81 mg by mouth Every Night.     • bisoprolol (ZEBeta) 5 MG tablet TAKE 1 TABLET BY MOUTH 2 (TWO) TIMES DAILY  1   • carBAMazepine XR (TEGretol  XR) 200 MG 12 hr tablet Take 2 tablets by mouth Every Night for 180 days. 180 tablet 1   • dexamethasone (DECADRON) 4 MG tablet TAKE 1 TABLET IN THE MORNING STARTING THE DAY AFTER CHEMO FOR 2 DAYS. TAKE WITH FOOD. (Patient taking differently: TAKE 1 TABLET IN THE MORNING STARTING THE DAY AFTER CHEMO FOR 2 DAYS. TAKE WITH FOOD BID) 16 tablet 1   • folic acid (FOLVITE) 1 MG tablet Take 1 mg by mouth Every Morning.     • hydrALAZINE (APRESOLINE) 25 MG tablet Take 25 mg by mouth 2 (two) times a day.  3   • isosorbide mononitrate (IMDUR) 60 MG 24 hr tablet Take 1 tablet by mouth Daily. TAKE 1 TABLET BY MOUTH EVERY MORNING AND ONE-HALF TABLET EVERY EVENING (Patient taking differently: Take 60 mg by mouth Daily. TAKE 1.5 TABLET BY MOUTH EVERY MORNING BEFORE EXERSIZE)     • losartan (COZAAR) 25 MG tablet Take 25 mg by mouth every evening.     • Misc. Devices (VIRAGE CUSTOM BREAST PROSTHES) misc As directed     • montelukast (SINGULAIR) 10 MG tablet TAKE 1 TABLET BY MOUTH EVERY NIGHT. 30 tablet 11   • [DISCONTINUED] Bisoprolol Fumarate (ZEBETA PO) Take 15 mg by mouth Daily.     • [DISCONTINUED] cefdinir (OMNICEF) 300 MG capsule Take 1 capsule by mouth Every 12 (Twelve) Hours. 20 capsule 0     No facility-administered encounter  medications on file as of 3/7/2018.

## 2018-03-09 ENCOUNTER — INFUSION (OUTPATIENT)
Dept: ONCOLOGY | Facility: HOSPITAL | Age: 68
End: 2018-03-09

## 2018-03-09 ENCOUNTER — OFFICE VISIT (OUTPATIENT)
Dept: ONCOLOGY | Facility: CLINIC | Age: 68
End: 2018-03-09

## 2018-03-09 VITALS
HEIGHT: 62 IN | RESPIRATION RATE: 16 BRPM | BODY MASS INDEX: 35.88 KG/M2 | TEMPERATURE: 97.6 F | DIASTOLIC BLOOD PRESSURE: 58 MMHG | SYSTOLIC BLOOD PRESSURE: 91 MMHG | HEART RATE: 77 BPM | OXYGEN SATURATION: 96 % | WEIGHT: 195 LBS

## 2018-03-09 VITALS — SYSTOLIC BLOOD PRESSURE: 97 MMHG | DIASTOLIC BLOOD PRESSURE: 62 MMHG | HEART RATE: 59 BPM

## 2018-03-09 DIAGNOSIS — C90.00 MULTIPLE MYELOMA NOT HAVING ACHIEVED REMISSION (HCC): Primary | ICD-10-CM

## 2018-03-09 DIAGNOSIS — T45.1X5A ANEMIA ASSOCIATED WITH CHEMOTHERAPY: ICD-10-CM

## 2018-03-09 DIAGNOSIS — C90.00 MULTIPLE MYELOMA NOT HAVING ACHIEVED REMISSION (HCC): ICD-10-CM

## 2018-03-09 DIAGNOSIS — T45.1X5A CHEMOTHERAPY-INDUCED THROMBOCYTOPENIA: ICD-10-CM

## 2018-03-09 DIAGNOSIS — D64.81 ANEMIA ASSOCIATED WITH CHEMOTHERAPY: ICD-10-CM

## 2018-03-09 DIAGNOSIS — D75.89 MACROCYTOSIS: ICD-10-CM

## 2018-03-09 DIAGNOSIS — D69.59 CHEMOTHERAPY-INDUCED THROMBOCYTOPENIA: ICD-10-CM

## 2018-03-09 DIAGNOSIS — C90.00 MULTIPLE MYELOMA, REMISSION STATUS UNSPECIFIED (HCC): Primary | ICD-10-CM

## 2018-03-09 LAB
ALBUMIN SERPL-MCNC: 3 G/DL (ref 3.5–5.2)
ALBUMIN/GLOB SERPL: 0.6 G/DL
ALP SERPL-CCNC: 51 U/L (ref 39–117)
ALT SERPL W P-5'-P-CCNC: 20 U/L (ref 1–33)
ANION GAP SERPL CALCULATED.3IONS-SCNC: 10.3 MMOL/L
AST SERPL-CCNC: 18 U/L (ref 1–32)
BASOPHILS # BLD AUTO: 0.02 10*3/MM3 (ref 0–0.2)
BASOPHILS NFR BLD AUTO: 0.3 % (ref 0–1.5)
BILIRUB SERPL-MCNC: 0.3 MG/DL (ref 0.1–1.2)
BUN BLD-MCNC: 25 MG/DL (ref 8–23)
BUN/CREAT SERPL: 14.5 (ref 7–25)
CALCIUM SPEC-SCNC: 8.3 MG/DL (ref 8.6–10.5)
CHLORIDE SERPL-SCNC: 113 MMOL/L (ref 98–107)
CO2 SERPL-SCNC: 20.7 MMOL/L (ref 22–29)
CREAT BLD-MCNC: 1.72 MG/DL (ref 0.57–1)
DEPRECATED RDW RBC AUTO: 55.2 FL (ref 37–54)
EOSINOPHIL # BLD AUTO: 0.13 10*3/MM3 (ref 0–0.7)
EOSINOPHIL NFR BLD AUTO: 2 % (ref 0.3–6.2)
ERYTHROCYTE [DISTWIDTH] IN BLOOD BY AUTOMATED COUNT: 14.1 % (ref 11.7–13)
GFR SERPL CREATININE-BSD FRML MDRD: 36 ML/MIN/1.73
GLOBULIN UR ELPH-MCNC: 5.3 GM/DL
GLUCOSE BLD-MCNC: 129 MG/DL (ref 65–99)
HCT VFR BLD AUTO: 35.6 % (ref 35.6–45.5)
HGB BLD-MCNC: 11.1 G/DL (ref 11.9–15.5)
IMM GRANULOCYTES # BLD: 0.08 10*3/MM3 (ref 0–0.03)
IMM GRANULOCYTES NFR BLD: 1.2 % (ref 0–0.5)
LYMPHOCYTES # BLD AUTO: 2.07 10*3/MM3 (ref 0.9–4.8)
LYMPHOCYTES NFR BLD AUTO: 31.8 % (ref 19.6–45.3)
MCH RBC QN AUTO: 33.4 PG (ref 26.9–32)
MCHC RBC AUTO-ENTMCNC: 31.2 G/DL (ref 32.4–36.3)
MCV RBC AUTO: 107.2 FL (ref 80.5–98.2)
MONOCYTES # BLD AUTO: 0.62 10*3/MM3 (ref 0.2–1.2)
MONOCYTES NFR BLD AUTO: 9.5 % (ref 5–12)
NEUTROPHILS # BLD AUTO: 3.58 10*3/MM3 (ref 1.9–8.1)
NEUTROPHILS NFR BLD AUTO: 55.2 % (ref 42.7–76)
NRBC BLD MANUAL-RTO: 0 /100 WBC (ref 0–0)
PLATELET # BLD AUTO: 123 10*3/MM3 (ref 140–500)
PMV BLD AUTO: 11.2 FL (ref 6–12)
POTASSIUM BLD-SCNC: 3.6 MMOL/L (ref 3.5–5.2)
PROT SERPL-MCNC: 8.3 G/DL (ref 6–8.5)
RBC # BLD AUTO: 3.32 10*6/MM3 (ref 3.9–5.2)
SODIUM BLD-SCNC: 144 MMOL/L (ref 136–145)
WBC NRBC COR # BLD: 6.5 10*3/MM3 (ref 4.5–10.7)

## 2018-03-09 PROCEDURE — 85025 COMPLETE CBC W/AUTO DIFF WBC: CPT | Performed by: INTERNAL MEDICINE

## 2018-03-09 PROCEDURE — 96375 TX/PRO/DX INJ NEW DRUG ADDON: CPT | Performed by: INTERNAL MEDICINE

## 2018-03-09 PROCEDURE — 99214 OFFICE O/P EST MOD 30 MIN: CPT | Performed by: INTERNAL MEDICINE

## 2018-03-09 PROCEDURE — 25010000002 METHYLPREDNISOLONE PER 125 MG: Performed by: INTERNAL MEDICINE

## 2018-03-09 PROCEDURE — 25010000002 DARATUMUMAB 400 MG/20ML SOLUTION 5 ML VIAL: Performed by: INTERNAL MEDICINE

## 2018-03-09 PROCEDURE — 25010000002 DARATUMUMAB 400 MG/20ML SOLUTION 20 ML VIAL: Performed by: INTERNAL MEDICINE

## 2018-03-09 PROCEDURE — 96415 CHEMO IV INFUSION ADDL HR: CPT | Performed by: INTERNAL MEDICINE

## 2018-03-09 PROCEDURE — 80053 COMPREHEN METABOLIC PANEL: CPT | Performed by: INTERNAL MEDICINE

## 2018-03-09 PROCEDURE — 96413 CHEMO IV INFUSION 1 HR: CPT | Performed by: INTERNAL MEDICINE

## 2018-03-09 PROCEDURE — 25010000002 DIPHENHYDRAMINE PER 50 MG: Performed by: INTERNAL MEDICINE

## 2018-03-09 RX ORDER — SODIUM CHLORIDE 9 MG/ML
250 INJECTION, SOLUTION INTRAVENOUS ONCE
Status: COMPLETED | OUTPATIENT
Start: 2018-03-09 | End: 2018-03-09

## 2018-03-09 RX ORDER — FAMOTIDINE 10 MG/ML
20 INJECTION, SOLUTION INTRAVENOUS AS NEEDED
Status: CANCELLED | OUTPATIENT
Start: 2018-03-09

## 2018-03-09 RX ORDER — METHYLPREDNISOLONE SODIUM SUCCINATE 125 MG/2ML
60 INJECTION, POWDER, LYOPHILIZED, FOR SOLUTION INTRAMUSCULAR; INTRAVENOUS ONCE
Status: COMPLETED | OUTPATIENT
Start: 2018-03-09 | End: 2018-03-09

## 2018-03-09 RX ORDER — SODIUM CHLORIDE 9 MG/ML
250 INJECTION, SOLUTION INTRAVENOUS ONCE
Status: CANCELLED | OUTPATIENT
Start: 2018-03-09 | End: 2018-03-09

## 2018-03-09 RX ORDER — ACETAMINOPHEN 500 MG
1000 TABLET ORAL ONCE
Status: CANCELLED | OUTPATIENT
Start: 2018-03-09

## 2018-03-09 RX ORDER — DIPHENHYDRAMINE HYDROCHLORIDE 50 MG/ML
50 INJECTION INTRAMUSCULAR; INTRAVENOUS AS NEEDED
Status: CANCELLED | OUTPATIENT
Start: 2018-03-09

## 2018-03-09 RX ORDER — ACETAMINOPHEN 500 MG
1000 TABLET ORAL ONCE
Status: COMPLETED | OUTPATIENT
Start: 2018-03-09 | End: 2018-03-09

## 2018-03-09 RX ORDER — METHYLPREDNISOLONE SODIUM SUCCINATE 125 MG/2ML
60 INJECTION, POWDER, LYOPHILIZED, FOR SOLUTION INTRAMUSCULAR; INTRAVENOUS ONCE
Status: CANCELLED | OUTPATIENT
Start: 2018-03-09

## 2018-03-09 RX ORDER — MEPERIDINE HYDROCHLORIDE 50 MG/ML
25 INJECTION INTRAMUSCULAR; INTRAVENOUS; SUBCUTANEOUS
Status: CANCELLED | OUTPATIENT
Start: 2018-03-09

## 2018-03-09 RX ADMIN — SODIUM CHLORIDE 250 ML: 9 INJECTION, SOLUTION INTRAVENOUS at 08:49

## 2018-03-09 RX ADMIN — DARATUMUMAB 1370 MG: 100 INJECTION, SOLUTION, CONCENTRATE INTRAVENOUS at 10:10

## 2018-03-09 RX ADMIN — DIPHENHYDRAMINE HYDROCHLORIDE 25 MG: 50 INJECTION, SOLUTION INTRAMUSCULAR; INTRAVENOUS at 09:05

## 2018-03-09 RX ADMIN — METHYLPREDNISOLONE SODIUM SUCCINATE 60 MG: 125 INJECTION, POWDER, FOR SOLUTION INTRAMUSCULAR; INTRAVENOUS at 08:50

## 2018-03-09 RX ADMIN — ACETAMINOPHEN 1000 MG: 500 TABLET ORAL at 08:50

## 2018-03-09 NOTE — PROGRESS NOTES
Reasons for follow up:   1. IgG kappa multiple myeloma, currently on Darzalex, started on May 26, 2016. Patient was switched to Darzalex from Pomalyst, as she continued to be neutropenic with recurrent urinary tract infection while on Pomalyst.    2. Zometa is on hold due to renal insufficiency.    3. After 16 doses of Darzalex, laboratory study showed stable disease in November 2016. Insurance company denied adding Velcade and dexamethasone. Patient is to be continued on monthly Darzalex from 12/06/16.   4. Obstructive right pyelonephritis with positive urine culture for E. coli, required hospitalization from 1/19/2017 through 01/24/2017 with iv antibiotics and stent exchange on 01/20/2017.   5.  Paraprotein studies on March 27, 2017 showed further slight improvement of multiple myeloma.   6.  Febrile illness, with urinary tract infection and influenza B infection in early May 2017, required hospitalization for 6 days.   7.   Paraprotein studies for both serum and 24-hour urine sample on 7/21/2017 showed further improvement of paraproteins.   Darzalex was continued.   8.  Serum protein study reported stable disease on 10/20/2017.  Darzalex will be continued.   9.  Laboratory studies of both serum paraprotein and a 24-hour urine protein in January 2018 showed a further improvement of paraproteins.  Monthly Darzalex will be continued.           History of Present Illness:     Patient is a 67 he has a female presented today for reevaluation after her recent illness.  When she was seen on 2/9/2018, she had a dysuria and urine culture was positive for Escherichia coli.  Patient was given Ceftin anemia for 10 days.  And then on 2/21/2018 she called and complaining fever 101.2 Fahrenheit at home.  She had a nonproductive cough at that time.  Because patient was immune suppressed, we prescribed a Levaquin for 1 week.  And then she presented to the clinic on 2/27/2018 for triage evaluation, and she was found having  hypotension and dehydration.  Patient was given IV fluids in the clinic.  She was tested negative for influenza A or influenza B infection. Her urinalysis showed increased WBC and the urine culture done out to be mixed silas.  It is seems she had better episodes of common cold.      Today patient reports she is better, she is getting over with this episode of cold.  Still has been a bit cough but no sputum production.  Denies fever sweating or chills.  She denies burning with urination, blood in her urine, or trouble with her bowels.  Her performance status is ECOG 1.  She drove herself to the clinic from [place].        Past Medical History, Past Surgical History, Social History, Family History have been reviewed and are without significant changes except as mentioned.      Hematology/oncology history: See separate document for extra information.       On12/6/2016, serum free lambda chain 1090 MG/L, free kappa chain 8.5 to MG/L.  Ferritin 1015, iron 99, TIBC 137 iron saturation 72%.     On January 4, 2017, vitamin B12 level 1289, folate 7.7 ng/mL. SPEP reporting gamma globulin 3.3, out of total globulin 5.0, with immunofixation reporting small quantity of monoclonal free lambda chain, plus monoclonal IgG lambda at 3.2 g/DL.  Serum IgG 4075, IgA 9 and IgM 15.  free lambda chain 1339 MG/L and free kappa chain 10.3 MG/L.      Laboratory study March 27, 2017 showed slight improvement of paraprotein, SPEP reporting M spike 2.8 g/DL, out of total globulin 4.3, and the serum IgG 3420, IgA 9, IgM 11, free lambda chain 1218 MG/L and free kappa chain 8.0 MG/L.  Total 24 hour urine sample reported at free lambda chain 142 mg, at a concentration 74.8 MG/L, free kappa chain 75 mg at a concentration 39.5 MG/L., Urine protein immunofixation reporting biclonal IgG lambda and monoclonal free lambda light chain, M spike #1 at 41.5% and monoclonal IgG lambda spike #2 at 10.5%. Serum beta-2 microglobulin is 7.3 MG/L.     Her  laboratory study on 7/21/2017 reported further improvement of paraprotein is both serum and a 24-hour urine sample.  SPEP reporting monoclonal IgG lambda 2.9 g/dL and free lambda chain 0.1 g/dL.  Serum IgG was 3261 mg/dL and IgA 5, IgM 13, free kappa chain 6.7, free lambda chain 701.3 with kappa/lambda ratio 0.01.  24-hour urine sample reported positive for Bence May protein lambda type, immunofixation positive for IgG lambda.  Total urine protein 241 mg, gamma globulin 13.1%.  Hemoglobin was 11.4 .4, platelets 122,000, WBC 6680 including neutrophils 3600, lymphocytes 2100 and monocytes 650.  Her creatinine was 1.68, at baseline level.  Liver function panel unremarkable.  Darzalex was continued.    Laboratory study on 8/25/2017 showed improved the platelets at 144,000, normal WBC 6660 and slightly improved hemoglobin at 11.7.     Patient had a colonoscopy examination on 8/30/2017 by Dr. Rivera.  It reported diverticulosis in multiple areas more severe in the sigmoid colon.  There was 2 polyps 4 mm pneumonia from transverse colon and the sigmoid colon.  Pathology evaluation reported tubular adenoma from the sigmoid colon polyp, and benign hyperplastic polyp from transverse colon.    Laboratory study on 10/20/2017 reported slightly improved monoclonal spike 2.7 g/dL by SPEP, immunofixation reported positive monoclonal IgG lambda, serum IgG 3536 mg/dL, IgA less than 5 and IgM 11, free kappa chain 5.3 mg/L, and free lambda chain 720 mg/L with kappa/lambda ratio 0.01.  Serum beta-2 microglobulin was 6.5 mg/L.   Her CBC showed a stable mild anemia with hemoglobin 11.3, macrocytosis .3, and the platelets 114,000, normal WBC 7070 including neutrophils 4100 lymphocytes 2000 monocytes 650, normal liver function panel, total protein 7.9 and albumin 3.2,  and normal electrolytes including calcium 8.1, and improved creatinine 1.59.     Laboratory study on 1/12/2018 reported a serum IgG 3083 mg/dL, IgA 10, IgM  "10, free kappa chain 8.5 and free lambda chain 589.1 mg/L, kappa/lambda ratio 0.01, serum protein admitted fixation reported IgG lambda monoclonal protein and SPEP reporting M spike 3.1 g/dL, beta-2 microgram and 7.4 mg/L. serum creatinine 1.79, calcium 8.2, other electrolytes normal, hemoglobin 11.3, .5 and MCHC 31.6, platelets 129,000, WBC 6780 including neutrophils 3500 lymphocytes 2300.  Serum ferritin 653.7 ng/mL, iron 123 TIBC 174 iron saturation 71%, folic acid 12.8 ng/mL and a vitamin B12 at 873 pg/mL.  Her 24-hour urine study on 1/18/2018 reported lambda chain 32.8 mg/L, total 61 mg in 24 hours, and the free kappa chain 27.4 mg/L and 51 mg in 24 hours, and the total 24-hour urine protein 283 mg, and urine protein immunofixation positive for 5.3% monoclonal IgG lambda and positive for Bence May protein at 36.2% monoclonal lambda chain.  Monthly Darzalex was continued.         Review of Systems    Constitutional: Negative for chills and fever currently. See history of present illness.  Denies weight loss.    HENT: Negative for mouth sores and sore throat.    Eyes: Negative for visual disturbance.    Cardiac: Denies chest pain no palpitation.  No lower extremity edema.    Respiratory: No hemoptysis no short of breath.  Resolving cough.  Gastrointestinal: Negative for abdominal pain, diarrhea, nausea and vomiting.    Genitourinary: No dysuria or hematuria     Musculoskeletal: Negative for back pain and joint swelling.    Neurological: Negative for dizziness.   Hematological: Does not bruise/bleed easily.    Psychiatric/Behavioral: The patient is not nervous/anxious.        Medications: The current medication list was reviewed in the EMR.       ALLERGIES: Zosyn       Vitals:    03/09/18 0756   BP: 91/58   Pulse: 77   Resp: 16   Temp: 97.6 °F (36.4 °C)   TempSrc: Oral   SpO2: 96%   Weight: 88.5 kg (195 lb)   Height: 157.5 cm (62.01\")   PainSc: 0-No pain   PS: ECOG 1      Physical Exam   Constitutional: " well-developed and well-nourished -American female. No distress.    HENT:    Head: Normocephalic.    Eyes: No jaundice.    Neck: Normal range of motion.   no masses.    Cardiovascular: Normal rate, regular rhythm, normal heart sounds and normal pulses.    Pulmonary/Chest: Lungs clear to auscultation bilaterally, no wheezes, no rales.  Mediport benign right upper chest.  Abdominal: Soft. Bowel sounds are normal. no distension and no mass. No hepatosplenomegaly. There is no tenderness.   Musculoskeletal: Normal range of motion. no edema or deformity.   Lymphadenopathy: She has no cervical, supraclavicular adenopathy.   Neurological: alert and oriented to person, place, and time. No cranial nerve deficit.    Skin: Skin is warm and dry. No rash noted. No cyanosis. No pallor.   Psychiatric: She has normal mood and affect.         Recent lab results:   Lab Results   Component Value Date    WBC 6.50 03/09/2018    HGB 11.1 (L) 03/09/2018    HCT 35.6 03/09/2018    .2 (H) 03/09/2018     (L) 03/09/2018     Lab Results   Component Value Date    NEUTROABS 3.58 03/09/2018     Lab Results   Component Value Date    GLUCOSE 129 (H) 03/09/2018    BUN 25 (H) 03/09/2018    CREATININE 1.72 (H) 03/09/2018    EGFRIFNONA  08/30/2016      Comment:      <15 Indicative of kidney failure.    EGFRIFAFRI 36 (L) 03/09/2018    BCR 14.5 03/09/2018    K 3.6 03/09/2018    CO2 20.7 (L) 03/09/2018    CALCIUM 8.3 (L) 03/09/2018    PROTENTOTREF 8.0 01/12/2018    ALBUMIN 3.00 (L) 03/09/2018    LABIL2 0.6 03/09/2018    AST 18 03/09/2018    ALT 20 03/09/2018     Sodium   Date Value Ref Range Status   03/09/2018 144 136 - 145 mmol/L Final     Potassium   Date Value Ref Range Status   03/09/2018 3.6 3.5 - 5.2 mmol/L Final     Total Bilirubin   Date Value Ref Range Status   03/09/2018 0.3 0.1 - 1.2 mg/dL Final     Alkaline Phosphatase   Date Value Ref Range Status   03/09/2018 51 39 - 117 U/L Final   ]    Assessment/Plan   1. Multiple  myeloma, IgG lambda, stage III. Failed multiple lines of therapy. Her treatment was switched to Darzalex on 5/26/2016. She tolerated therapy very well. Her laboratory serum study on 1/12/2018 and 24-hour urine study on 1/18/2018 both showed further improvement of serum IgG and the free lambda chain, together with 24-hour urine free light chain quantitation.   We reommended continuing Darzalex treatment monthly.        2. Zometa treatment.  lost oh Zometa was on 2/9/2018.  Because of her fluctuating creatinine level associate with stage III chronic renal insufficiency, we only giving her Zometa periodically depending on her renal function.  Her creatinine is over 1.7.  She was off Zometa for about 18 months and then resumed at a decreased dose of 3 mg in August 2017.  We'll continue every 3 months Zometa treatment.         Recentnary tract infection/pyelonephritis with a positive Escherichia coli on 2/9/2018.  Patient was treated with antibiotics.  She is asymptomatic.    4.  Macrocytic anemia secondary to chemotherapy for multiple myeloma and chronic renal insufficiency.  She has relatively stable hemoglobin, however with worsening macrocytosis.  Laboratory study on 1/12/2018 showed in no evidence of deficiency of folic acid or vitamin B12 level.  She also had iron panel, fits with inflammatory condition/and up from previous chemotherapy.        5. Mild to moderate thrombocytopenia secondary to her multiple myeloma and chemotherapy. overall her platelet counts has been better in the past 6 months compared to previous levels.  She is asymptomatic, no easy bleeding or bruising.      6. History of stage II left breast cancer, post mastectomy in 2013, negative for ER/NM and positive HER-2. No neoadjuvant chemotherapy because of concurrent discovery of multiple myeloma and ongoing chemotherapy. She had a normal mammogram study in July 2017.       7.  Right middle lobe pulmonary nodule, documented on CT scan of chest on  01/06/2017. Repeated CT scan of chest on 8/21/2017 reported a small 5 mm and stable primary nodule.      8.  Chronic renal insufficiency stage III.  Her creatinine is slightly worse at a 1.72 today.  Continue to observe..      Plan:  1.  Darzalex treatment today, no Zometa.    2.  See NP in 4 weeks for Darzalex and, labs per protocol.   3.  In 6-7 weeks, obtain 24-hour urine for paraprotein study, together with blood for paraprotein studies see orders.  4.  4.  Come back to see me 8 weeks on May 4, 2018 and also book for Darzalex  and Zometa treatment.          More than 25 min, over half of that time were for counseling.    LI OBANDO M.D., Ph.D.    3/9/2018      Dictated using Dragon Dictation.

## 2018-03-19 ENCOUNTER — OFFICE VISIT (OUTPATIENT)
Dept: FAMILY MEDICINE CLINIC | Facility: CLINIC | Age: 68
End: 2018-03-19

## 2018-03-19 VITALS
DIASTOLIC BLOOD PRESSURE: 59 MMHG | HEIGHT: 62 IN | BODY MASS INDEX: 36.44 KG/M2 | RESPIRATION RATE: 16 BRPM | HEART RATE: 71 BPM | WEIGHT: 198 LBS | SYSTOLIC BLOOD PRESSURE: 96 MMHG | TEMPERATURE: 97.3 F

## 2018-03-19 DIAGNOSIS — S20.211A CONTUSION OF RIGHT CHEST WALL, INITIAL ENCOUNTER: Primary | ICD-10-CM

## 2018-03-19 PROCEDURE — 99213 OFFICE O/P EST LOW 20 MIN: CPT | Performed by: FAMILY MEDICINE

## 2018-03-19 PROCEDURE — 71100 X-RAY EXAM RIBS UNI 2 VIEWS: CPT | Performed by: FAMILY MEDICINE

## 2018-03-19 NOTE — PROGRESS NOTES
"Chief Complaint   Patient presents with   • Fall     right breast/cheat area       Subjective   She was going into the family members house and tripped and fell this happened on March 11, 2018.  Since then she has had continued pain.  Pain is worse with certain movements.  Over-the-counter analgesics are not that effective.  She is here to rule out rib fractures.  I have reviewed and updated her medications, medical history and problem list during today's office visit.       Assessment/Plan     Problem List Items Addressed This Visit     None      Visit Diagnoses     Contusion of right chest wall, initial encounter    -  Primary    Relevant Orders    XR Ribs 2 View Right  Continue acetaminophen prn pain, rtc in one week if not improving.           Orders Placed This Encounter   Procedures   • XR Ribs 2 View Right     Order Specific Question:   Reason for Exam:     Answer:   fell 3/11/2018       RTC as needed or sooner if symptoms worsen or change         Review of Systems   Respiratory: Negative for shortness of breath.    Cardiovascular:        See hpi     Objective   BP 96/59   Pulse 71   Temp 97.3 °F (36.3 °C) (Oral)   Resp 16   Ht 157.5 cm (62.01\")   Wt 89.8 kg (198 lb)   LMP  (LMP Unknown)   BMI 36.20 kg/m²   Body mass index is 36.2 kg/m².  Physical Exam   Constitutional: She appears well-developed. She appears distressed (with chest movement).   Pulmonary/Chest: Effort normal. No respiratory distress. She exhibits bony tenderness.        Social History   Substance Use Topics   • Smoking status: Never Smoker   • Smokeless tobacco: Never Used   • Alcohol use No       Data Reviewed:  Views: lateral and posterior-anterior-oblique  Relevant Clinical Issues/Diagnoses/Indications for XRay: see HPI  Clinical Findings: Ribs: normal    Compared with previous XRay? no    Date of Previous Xray:No previous xray available for comparison    Changes on current Xray? not applicable                  "

## 2018-04-04 DIAGNOSIS — C90.00 MULTIPLE MYELOMA NOT HAVING ACHIEVED REMISSION (HCC): ICD-10-CM

## 2018-04-06 ENCOUNTER — INFUSION (OUTPATIENT)
Dept: ONCOLOGY | Facility: HOSPITAL | Age: 68
End: 2018-04-06

## 2018-04-06 ENCOUNTER — OFFICE VISIT (OUTPATIENT)
Dept: ONCOLOGY | Facility: CLINIC | Age: 68
End: 2018-04-06

## 2018-04-06 VITALS
BODY MASS INDEX: 36.44 KG/M2 | WEIGHT: 198 LBS | DIASTOLIC BLOOD PRESSURE: 70 MMHG | OXYGEN SATURATION: 98 % | RESPIRATION RATE: 16 BRPM | HEART RATE: 68 BPM | TEMPERATURE: 97.8 F | SYSTOLIC BLOOD PRESSURE: 111 MMHG | HEIGHT: 62 IN

## 2018-04-06 DIAGNOSIS — C90.00 MULTIPLE MYELOMA NOT HAVING ACHIEVED REMISSION (HCC): Primary | ICD-10-CM

## 2018-04-06 DIAGNOSIS — T45.1X5A ANEMIA ASSOCIATED WITH CHEMOTHERAPY: ICD-10-CM

## 2018-04-06 DIAGNOSIS — T45.1X5A CHEMOTHERAPY-INDUCED THROMBOCYTOPENIA: ICD-10-CM

## 2018-04-06 DIAGNOSIS — D64.81 ANEMIA ASSOCIATED WITH CHEMOTHERAPY: ICD-10-CM

## 2018-04-06 DIAGNOSIS — N18.30 CHRONIC KIDNEY DISEASE, STAGE III (MODERATE) (HCC): ICD-10-CM

## 2018-04-06 DIAGNOSIS — D69.59 CHEMOTHERAPY-INDUCED THROMBOCYTOPENIA: ICD-10-CM

## 2018-04-06 LAB
ALBUMIN SERPL-MCNC: 3.2 G/DL (ref 3.5–5.2)
ALBUMIN/GLOB SERPL: 0.6 G/DL
ALP SERPL-CCNC: 56 U/L (ref 39–117)
ALT SERPL W P-5'-P-CCNC: 14 U/L (ref 1–33)
ANION GAP SERPL CALCULATED.3IONS-SCNC: 7.7 MMOL/L
AST SERPL-CCNC: 21 U/L (ref 1–32)
B2 MICROGLOB SERPL-MCNC: 7.3 MG/L (ref 0.8–2.2)
BASOPHILS # BLD AUTO: 0.04 10*3/MM3 (ref 0–0.2)
BASOPHILS NFR BLD AUTO: 0.6 % (ref 0–1.5)
BILIRUB SERPL-MCNC: 0.2 MG/DL (ref 0.1–1.2)
BUN BLD-MCNC: 26 MG/DL (ref 8–23)
BUN/CREAT SERPL: 14.8 (ref 7–25)
CALCIUM SPEC-SCNC: 8.2 MG/DL (ref 8.6–10.5)
CHLORIDE SERPL-SCNC: 110 MMOL/L (ref 98–107)
CO2 SERPL-SCNC: 23.3 MMOL/L (ref 22–29)
CREAT BLD-MCNC: 1.76 MG/DL (ref 0.57–1)
DACRYOCYTES BLD QL SMEAR: NORMAL
DEPRECATED RDW RBC AUTO: 54.8 FL (ref 37–54)
EOSINOPHIL # BLD AUTO: 0.2 10*3/MM3 (ref 0–0.7)
EOSINOPHIL NFR BLD AUTO: 3.2 % (ref 0.3–6.2)
ERYTHROCYTE [DISTWIDTH] IN BLOOD BY AUTOMATED COUNT: 14 % (ref 11.7–13)
GFR SERPL CREATININE-BSD FRML MDRD: 35 ML/MIN/1.73
GLOBULIN UR ELPH-MCNC: 5.1 GM/DL
GLUCOSE BLD-MCNC: 90 MG/DL (ref 65–99)
HCT VFR BLD AUTO: 35.7 % (ref 35.6–45.5)
HGB BLD-MCNC: 11.2 G/DL (ref 11.9–15.5)
IMM GRANULOCYTES # BLD: 0.18 10*3/MM3 (ref 0–0.03)
IMM GRANULOCYTES NFR BLD: 2.8 % (ref 0–0.5)
LYMPHOCYTES # BLD AUTO: 2.07 10*3/MM3 (ref 0.9–4.8)
LYMPHOCYTES NFR BLD AUTO: 32.8 % (ref 19.6–45.3)
MCH RBC QN AUTO: 33.4 PG (ref 26.9–32)
MCHC RBC AUTO-ENTMCNC: 31.4 G/DL (ref 32.4–36.3)
MCV RBC AUTO: 106.6 FL (ref 80.5–98.2)
MONOCYTES # BLD AUTO: 0.6 10*3/MM3 (ref 0.2–1.2)
MONOCYTES NFR BLD AUTO: 9.5 % (ref 5–12)
NEUTROPHILS # BLD AUTO: 3.23 10*3/MM3 (ref 1.9–8.1)
NEUTROPHILS NFR BLD AUTO: 51.1 % (ref 42.7–76)
NRBC BLD MANUAL-RTO: 0 /100 WBC (ref 0–0)
PLAT MORPH BLD: NORMAL
PLATELET # BLD AUTO: 134 10*3/MM3 (ref 140–500)
PMV BLD AUTO: 10.9 FL (ref 6–12)
POTASSIUM BLD-SCNC: 4.1 MMOL/L (ref 3.5–5.2)
PROT SERPL-MCNC: 8.3 G/DL (ref 6–8.5)
RBC # BLD AUTO: 3.35 10*6/MM3 (ref 3.9–5.2)
SODIUM BLD-SCNC: 141 MMOL/L (ref 136–145)
SPHEROCYTES BLD QL SMEAR: NORMAL
WBC MORPH BLD: NORMAL
WBC NRBC COR # BLD: 6.32 10*3/MM3 (ref 4.5–10.7)

## 2018-04-06 PROCEDURE — 80053 COMPREHEN METABOLIC PANEL: CPT | Performed by: INTERNAL MEDICINE

## 2018-04-06 PROCEDURE — 25010000002 DARATUMUMAB 400 MG/20ML SOLUTION 5 ML VIAL: Performed by: NURSE PRACTITIONER

## 2018-04-06 PROCEDURE — 96415 CHEMO IV INFUSION ADDL HR: CPT | Performed by: NURSE PRACTITIONER

## 2018-04-06 PROCEDURE — 83883 ASSAY NEPHELOMETRY NOT SPEC: CPT | Performed by: INTERNAL MEDICINE

## 2018-04-06 PROCEDURE — 96375 TX/PRO/DX INJ NEW DRUG ADDON: CPT | Performed by: NURSE PRACTITIONER

## 2018-04-06 PROCEDURE — 82784 ASSAY IGA/IGD/IGG/IGM EACH: CPT | Performed by: INTERNAL MEDICINE

## 2018-04-06 PROCEDURE — 25010000002 DARATUMUMAB 400 MG/20ML SOLUTION 20 ML VIAL: Performed by: NURSE PRACTITIONER

## 2018-04-06 PROCEDURE — 85025 COMPLETE CBC W/AUTO DIFF WBC: CPT | Performed by: INTERNAL MEDICINE

## 2018-04-06 PROCEDURE — 84165 PROTEIN E-PHORESIS SERUM: CPT | Performed by: INTERNAL MEDICINE

## 2018-04-06 PROCEDURE — 86334 IMMUNOFIX E-PHORESIS SERUM: CPT | Performed by: INTERNAL MEDICINE

## 2018-04-06 PROCEDURE — 85007 BL SMEAR W/DIFF WBC COUNT: CPT | Performed by: INTERNAL MEDICINE

## 2018-04-06 PROCEDURE — 25010000002 METHYLPREDNISOLONE PER 125 MG: Performed by: NURSE PRACTITIONER

## 2018-04-06 PROCEDURE — 96413 CHEMO IV INFUSION 1 HR: CPT | Performed by: NURSE PRACTITIONER

## 2018-04-06 PROCEDURE — 99212-NC PR NO CHARGE CBC OFFICE OUTPATIENT VISIT 10 MINUTES: Performed by: NURSE PRACTITIONER

## 2018-04-06 PROCEDURE — 82232 ASSAY OF BETA-2 PROTEIN: CPT | Performed by: INTERNAL MEDICINE

## 2018-04-06 PROCEDURE — 25010000002 DIPHENHYDRAMINE PER 50 MG: Performed by: NURSE PRACTITIONER

## 2018-04-06 RX ORDER — METHYLPREDNISOLONE SODIUM SUCCINATE 125 MG/2ML
60 INJECTION, POWDER, LYOPHILIZED, FOR SOLUTION INTRAMUSCULAR; INTRAVENOUS ONCE
Status: COMPLETED | OUTPATIENT
Start: 2018-04-06 | End: 2018-04-06

## 2018-04-06 RX ORDER — ACETAMINOPHEN 500 MG
1000 TABLET ORAL ONCE
Status: CANCELLED | OUTPATIENT
Start: 2018-04-06

## 2018-04-06 RX ORDER — SODIUM CHLORIDE 9 MG/ML
250 INJECTION, SOLUTION INTRAVENOUS ONCE
Status: COMPLETED | OUTPATIENT
Start: 2018-04-06 | End: 2018-04-06

## 2018-04-06 RX ORDER — DIPHENHYDRAMINE HYDROCHLORIDE 50 MG/ML
50 INJECTION INTRAMUSCULAR; INTRAVENOUS AS NEEDED
Status: CANCELLED | OUTPATIENT
Start: 2018-04-06

## 2018-04-06 RX ORDER — ACETAMINOPHEN 500 MG
1000 TABLET ORAL ONCE
Status: COMPLETED | OUTPATIENT
Start: 2018-04-06 | End: 2018-04-06

## 2018-04-06 RX ORDER — SODIUM CHLORIDE 9 MG/ML
250 INJECTION, SOLUTION INTRAVENOUS ONCE
Status: CANCELLED | OUTPATIENT
Start: 2018-04-06 | End: 2018-04-06

## 2018-04-06 RX ORDER — FAMOTIDINE 10 MG/ML
20 INJECTION, SOLUTION INTRAVENOUS AS NEEDED
Status: CANCELLED | OUTPATIENT
Start: 2018-04-06

## 2018-04-06 RX ORDER — ACYCLOVIR 400 MG/1
400 TABLET ORAL
COMMUNITY
Start: 2018-01-08 | End: 2018-04-06

## 2018-04-06 RX ORDER — METHYLPREDNISOLONE SODIUM SUCCINATE 125 MG/2ML
60 INJECTION, POWDER, LYOPHILIZED, FOR SOLUTION INTRAMUSCULAR; INTRAVENOUS ONCE
Status: CANCELLED | OUTPATIENT
Start: 2018-04-06

## 2018-04-06 RX ADMIN — DARATUMUMAB 1370 MG: 100 INJECTION, SOLUTION, CONCENTRATE INTRAVENOUS at 09:44

## 2018-04-06 RX ADMIN — SODIUM CHLORIDE 250 ML: 9 INJECTION, SOLUTION INTRAVENOUS at 08:35

## 2018-04-06 RX ADMIN — DIPHENHYDRAMINE HYDROCHLORIDE 25 MG: 50 INJECTION, SOLUTION INTRAMUSCULAR; INTRAVENOUS at 08:36

## 2018-04-06 RX ADMIN — ACETAMINOPHEN 1000 MG: 500 TABLET ORAL at 08:35

## 2018-04-06 RX ADMIN — METHYLPREDNISOLONE SODIUM SUCCINATE 60 MG: 125 INJECTION, POWDER, FOR SOLUTION INTRAMUSCULAR; INTRAVENOUS at 08:35

## 2018-04-06 NOTE — PROGRESS NOTES
Reasons for follow up:   1. IgG kappa multiple myeloma, currently on Darzalex, started on May 26, 2016. Patient was switched to Darzalex from Pomalyst, as she continued to be neutropenic with recurrent urinary tract infection while on Pomalyst.    2. Zometa is on hold due to renal insufficiency.    3. After 16 doses of Darzalex, laboratory study showed stable disease in November 2016. Insurance company denied adding Velcade and dexamethasone. Patient is to be continued on monthly Darzalex from 12/06/16.   4. Obstructive right pyelonephritis with positive urine culture for E. coli, required hospitalization from 1/19/2017 through 01/24/2017 with iv antibiotics and stent exchange on 01/20/2017.   5.  Paraprotein studies on March 27, 2017 showed further slight improvement of multiple myeloma.   6.  Febrile illness, with urinary tract infection and influenza B infection in early May 2017, required hospitalization for 6 days.   7.   Paraprotein studies for both serum and 24-hour urine sample on 7/21/2017 showed further improvement of paraproteins.   Darzalex was continued.   8.  Serum protein study reported stable disease on 10/20/2017.  Darzalex will be continued.   9.  Laboratory studies of both serum paraprotein and a 24-hour urine protein in January 2018 showed a further improvement of paraproteins.  Monthly Darzalex will be continued.           History of Present Illness:      The patient is a 67 y.o. female with the above-mentioned history, who returns today for her monthly Darzalex infusion.  She continues to tolerate treatment remarkably well.  She denies nausea vomiting.  She denies any changes in her bowel or bladder habits.  She denies any new pain.  She denies any signs or symptoms of bleeding.  She continues to struggle with fatigue which has been a prolonged issue for the patient.  She states it is not worsening.  She does hope to become more active as the weather changes.  She denies any pain or burning  with urination, fevers or chills or signs of urinary tract infection. She denies cough or chest pain.    Past Medical History, Past Surgical History, Social History, Family History have been reviewed and are without significant changes except as mentioned.      Hematology/oncology history: See separate document for extra information.       On12/6/2016, serum free lambda chain 1090 MG/L, free kappa chain 8.5 to MG/L.  Ferritin 1015, iron 99, TIBC 137 iron saturation 72%.     On January 4, 2017, vitamin B12 level 1289, folate 7.7 ng/mL. SPEP reporting gamma globulin 3.3, out of total globulin 5.0, with immunofixation reporting small quantity of monoclonal free lambda chain, plus monoclonal IgG lambda at 3.2 g/DL.  Serum IgG 4075, IgA 9 and IgM 15.  free lambda chain 1339 MG/L and free kappa chain 10.3 MG/L.      Laboratory study March 27, 2017 showed slight improvement of paraprotein, SPEP reporting M spike 2.8 g/DL, out of total globulin 4.3, and the serum IgG 3420, IgA 9, IgM 11, free lambda chain 1218 MG/L and free kappa chain 8.0 MG/L.  Total 24 hour urine sample reported at free lambda chain 142 mg, at a concentration 74.8 MG/L, free kappa chain 75 mg at a concentration 39.5 MG/L., Urine protein immunofixation reporting biclonal IgG lambda and monoclonal free lambda light chain, M spike #1 at 41.5% and monoclonal IgG lambda spike #2 at 10.5%. Serum beta-2 microglobulin is 7.3 MG/L.     Her laboratory study on 7/21/2017 reported further improvement of paraprotein is both serum and a 24-hour urine sample.  SPEP reporting monoclonal IgG lambda 2.9 g/dL and free lambda chain 0.1 g/dL.  Serum IgG was 3261 mg/dL and IgA 5, IgM 13, free kappa chain 6.7, free lambda chain 701.3 with kappa/lambda ratio 0.01.  24-hour urine sample reported positive for Bence May protein lambda type, immunofixation positive for IgG lambda.  Total urine protein 241 mg, gamma globulin 13.1%.  Hemoglobin was 11.4 .4, platelets  122,000, WBC 6680 including neutrophils 3600, lymphocytes 2100 and monocytes 650.  Her creatinine was 1.68, at baseline level.  Liver function panel unremarkable.  Darzalex was continued.    Laboratory study on 8/25/2017 showed improved the platelets at 144,000, normal WBC 6660 and slightly improved hemoglobin at 11.7.     Patient had a colonoscopy examination on 8/30/2017 by Dr. Rivera.  It reported diverticulosis in multiple areas more severe in the sigmoid colon.  There was 2 polyps 4 mm pneumonia from transverse colon and the sigmoid colon.  Pathology evaluation reported tubular adenoma from the sigmoid colon polyp, and benign hyperplastic polyp from transverse colon.    Laboratory study on 10/20/2017 reported slightly improved monoclonal spike 2.7 g/dL by SPEP, immunofixation reported positive monoclonal IgG lambda, serum IgG 3536 mg/dL, IgA less than 5 and IgM 11, free kappa chain 5.3 mg/L, and free lambda chain 720 mg/L with kappa/lambda ratio 0.01.  Serum beta-2 microglobulin was 6.5 mg/L.   Her CBC showed a stable mild anemia with hemoglobin 11.3, macrocytosis .3, and the platelets 114,000, normal WBC 7070 including neutrophils 4100 lymphocytes 2000 monocytes 650, normal liver function panel, total protein 7.9 and albumin 3.2,  and normal electrolytes including calcium 8.1, and improved creatinine 1.59.     Laboratory study on 1/12/2018 reported a serum IgG 3083 mg/dL, IgA 10, IgM 10, free kappa chain 8.5 and free lambda chain 589.1 mg/L, kappa/lambda ratio 0.01, serum protein admitted fixation reported IgG lambda monoclonal protein and SPEP reporting M spike 3.1 g/dL, beta-2 microgram and 7.4 mg/L. serum creatinine 1.79, calcium 8.2, other electrolytes normal, hemoglobin 11.3, .5 and MCHC 31.6, platelets 129,000, WBC 6780 including neutrophils 3500 lymphocytes 2300.  Serum ferritin 653.7 ng/mL, iron 123 TIBC 174 iron saturation 71%, folic acid 12.8 ng/mL and a vitamin B12 at 873 pg/mL.  Her  "24-hour urine study on 1/18/2018 reported lambda chain 32.8 mg/L, total 61 mg in 24 hours, and the free kappa chain 27.4 mg/L and 51 mg in 24 hours, and the total 24-hour urine protein 283 mg, and urine protein immunofixation positive for 5.3% monoclonal IgG lambda and positive for Bence May protein at 36.2% monoclonal lambda chain.  Monthly Darzalex was continued.       I have reviewed the patient's medical history in detail and updated the computerized patient record.    Review of Systems   Constitutional: Positive for fatigue. Negative for appetite change, chills and fever.   HENT:   Negative for trouble swallowing.    Respiratory: Negative for cough and shortness of breath.    Cardiovascular: Negative for chest pain and leg swelling.   Gastrointestinal: Negative for abdominal distention, blood in stool, constipation, diarrhea and nausea.   Musculoskeletal: Negative for arthralgias and myalgias.   Skin: Negative for rash.   Neurological: Negative for dizziness and extremity weakness.   Hematological: Does not bruise/bleed easily.       Medications: The current medication list was reviewed in the EMR.       ALLERGIES: Zosyn       Vitals:    04/06/18 0804   BP: 111/70   Pulse: 68   Resp: 16   Temp: 97.8 °F (36.6 °C)   TempSrc: Oral   SpO2: 98%   Weight: 89.8 kg (198 lb)   Height: 157.5 cm (62.01\")   PainSc: 0-No pain   PS: ECOG 1      Physical Exam   Constitutional: well-developed and well-nourished -American female. No distress.    HENT:    Head: Normocephalic.    Eyes: No jaundice.    Neck: Normal range of motion.   no masses.    Cardiovascular: Normal rate, regular rhythm, normal heart sounds and normal pulses.    Pulmonary/Chest: Lungs clear to auscultation bilaterally, no wheezes, no rales.  Mediport benign right upper chest.  Abdominal: Soft. Bowel sounds are normal. Non-tender.  Musculoskeletal: Normal range of motion. no edema or deformity.   Lymphadenopathy: She has no cervical adenopathy. "   Neurological: alert and oriented to person, place, and time. No cranial nerve deficit.    Skin: Skin is warm and dry. No rash noted. No cyanosis. No pallor.   Psychiatric: She has normal mood and affect.   Physical exam unchanged from previous visit.    Recent lab results:     Results from last 7 days  Lab Units 04/06/18  0748   WBC 10*3/mm3 6.32   NEUTROS ABS 10*3/mm3 3.23   HEMOGLOBIN g/dL 11.2*   HEMATOCRIT % 35.7   PLATELETS 10*3/mm3 134*       Results from last 7 days  Lab Units 04/06/18  0747   SODIUM mmol/L 141   POTASSIUM mmol/L 4.1   CHLORIDE mmol/L 110*   CO2 mmol/L 23.3   BUN mg/dL 26*   CREATININE mg/dL 1.76*   CALCIUM mg/dL 8.2*   ALBUMIN g/dL 3.20*   BILIRUBIN mg/dL 0.2   ALK PHOS U/L 56   ALT (SGPT) U/L 14   AST (SGOT) U/L 21   GLUCOSE mg/dL 90         Assessment/Plan   1. Multiple myeloma, IgG lambda, stage III. Failed multiple lines of therapy. Her treatment was switched to Darzalex on 5/26/2016. She tolerated therapy very well. Her laboratory serum study on 1/12/2018 and 24-hour urine study on 1/18/2018 both showed further improvement of serum IgG and the free lambda chain, together with 24-hour urine free light chain quantitation.   We reommended continuing Darzalex treatment monthly.      2. Zometa treatment. Last Zometa was on 2/9/2018.  Because of her fluctuating creatinine level associate with stage III chronic renal insufficiency, we only giving her Zometa periodically depending on her renal function.  Her creatinine is over 1.7.  She was off Zometa for about 18 months and then resumed at a decreased dose of 3 mg in August 2017.  We'll continue every 3 months Zometa treatment.         3. Recentnary tract infection/pyelonephritis with a positive Escherichia coli on 2/9/2018.  Patient was treated with antibiotics.  She is asymptomatic.    4.  Macrocytic anemia secondary to chemotherapy for multiple myeloma and chronic renal insufficiency.  She has relatively stable hemoglobin, however with  worsening macrocytosis.  Laboratory study on 1/12/2018 showed in no evidence of deficiency of folic acid or vitamin B12 level.  She also had iron panel, fits with inflammatory condition/and up from previous chemotherapy.        5. Mild to moderate thrombocytopenia secondary to her multiple myeloma and chemotherapy. Overall her platelet counts has been better in the past 6 months compared to previous levels.  She is asymptomatic, no easy bleeding or bruising.      6. History of stage II left breast cancer, post mastectomy in 2013, negative for ER/VA and positive HER-2. No neoadjuvant chemotherapy because of concurrent discovery of multiple myeloma and ongoing chemotherapy. She had a normal mammogram study in July 2017.       7.  Right middle lobe pulmonary nodule, documented on CT scan of chest on 01/06/2017. Repeated CT scan of chest on 8/21/2017 reported a small 5 mm and stable primary nodule.      8.  Chronic renal insufficiency stage III.  Her creatinine is slightly worse at a 1.76 today.  Continue to observe.      Plan:  1. Proceed with monthly Darzalex today.  2. Return in 2 weeks for 24-hour urine paraprotein studies, as well as serum paraprotein.  3. M.D. follow-up in 4 weeks for next monthly Darzalex  4. Continue Zometa every 3 months, she will be due again in May.    Lavern Arriola, APRN  04/06/2018

## 2018-04-09 LAB
ALBUMIN SERPL-MCNC: 2.9 G/DL (ref 2.9–4.4)
ALBUMIN/GLOB SERPL: 0.6 {RATIO} (ref 0.7–1.7)
ALPHA1 GLOB FLD ELPH-MCNC: 0.2 G/DL (ref 0–0.4)
ALPHA2 GLOB SERPL ELPH-MCNC: 0.6 G/DL (ref 0.4–1)
B-GLOBULIN SERPL ELPH-MCNC: 0.8 G/DL (ref 0.7–1.3)
GAMMA GLOB SERPL ELPH-MCNC: 3.2 G/DL (ref 0.4–1.8)
GLOBULIN SER CALC-MCNC: 4.9 G/DL (ref 2.2–3.9)
IGA SERPL-MCNC: 9 MG/DL (ref 87–352)
IGG SERPL-MCNC: 3650 MG/DL (ref 700–1600)
IGM SERPL-MCNC: 11 MG/DL (ref 26–217)
INTERPRETATION SERPL IEP-IMP: ABNORMAL
KAPPA LC SERPL-MCNC: 7 MG/L (ref 3.3–19.4)
KAPPA LC/LAMBDA SER: 0.01 {RATIO} (ref 0.26–1.65)
LAMBDA LC FREE SERPL-MCNC: 703.8 MG/L (ref 5.7–26.3)
Lab: ABNORMAL
M-SPIKE: ABNORMAL G/DL
PROT SERPL-MCNC: 7.8 G/DL (ref 6–8.5)

## 2018-04-20 ENCOUNTER — LAB (OUTPATIENT)
Dept: OTHER | Facility: HOSPITAL | Age: 68
End: 2018-04-20

## 2018-04-20 DIAGNOSIS — C90.00 MULTIPLE MYELOMA NOT HAVING ACHIEVED REMISSION (HCC): ICD-10-CM

## 2018-04-20 PROCEDURE — 86335 IMMUNFIX E-PHORSIS/URINE/CSF: CPT | Performed by: INTERNAL MEDICINE

## 2018-04-20 PROCEDURE — 84166 PROTEIN E-PHORESIS/URINE/CSF: CPT | Performed by: INTERNAL MEDICINE

## 2018-04-20 PROCEDURE — 83883 ASSAY NEPHELOMETRY NOT SPEC: CPT | Performed by: INTERNAL MEDICINE

## 2018-04-20 PROCEDURE — 81050 URINALYSIS VOLUME MEASURE: CPT | Performed by: INTERNAL MEDICINE

## 2018-04-20 PROCEDURE — 84156 ASSAY OF PROTEIN URINE: CPT | Performed by: INTERNAL MEDICINE

## 2018-04-24 LAB
ALBUMIN 24H MFR UR ELPH: 9.9 %
ALPHA1 GLOB 24H MFR UR ELPH: 6 %
ALPHA2 GLOB 24H MFR UR ELPH: 16 %
B-GLOBULIN MFR UR ELPH: 52.9 %
FREE KAPPA LT CHAINS, 24HR: 45 MG/24 HR
FREE LAMBDA LT CHAINS, 24 HR: 60 MG/24 HR
GAMMA GLOB 24H MFR UR ELPH: 15.1 %
HIV 1 & 2 AB SER-IMP: ABNORMAL
INTERPRETATION UR IFE-IMP: ABNORMAL
KAPPA LC UR-MCNC: 24.8 MG/L (ref 1.35–24.19)
KAPPA LC/LAMBDA UR: 0.75 {RATIO} (ref 2.04–10.37)
LAMBDA LC UR-MCNC: 33.1 MG/L (ref 0.24–6.66)
M PROTEIN 24H MFR UR ELPH: ABNORMAL %
PROT 24H UR-MRATE: 279 MG/24 HR (ref 30–150)
PROT UR-MCNC: 15.5 MG/DL

## 2018-04-25 ENCOUNTER — OFFICE VISIT (OUTPATIENT)
Dept: FAMILY MEDICINE CLINIC | Facility: CLINIC | Age: 68
End: 2018-04-25

## 2018-04-25 VITALS
RESPIRATION RATE: 16 BRPM | BODY MASS INDEX: 36.44 KG/M2 | WEIGHT: 198 LBS | HEART RATE: 71 BPM | DIASTOLIC BLOOD PRESSURE: 72 MMHG | HEIGHT: 62 IN | SYSTOLIC BLOOD PRESSURE: 125 MMHG | TEMPERATURE: 97.1 F

## 2018-04-25 DIAGNOSIS — IMO0001 CLASS 2 OBESITY DUE TO EXCESS CALORIES WITH SERIOUS COMORBIDITY AND BODY MASS INDEX (BMI) OF 36.0 TO 36.9 IN ADULT: Primary | ICD-10-CM

## 2018-04-25 DIAGNOSIS — Z85.3 PERSONAL HISTORY OF BREAST CANCER: ICD-10-CM

## 2018-04-25 PROCEDURE — 99213 OFFICE O/P EST LOW 20 MIN: CPT | Performed by: FAMILY MEDICINE

## 2018-04-25 RX ORDER — HYDROCODONE BITARTRATE AND ACETAMINOPHEN 5; 325 MG/1; MG/1
5 TABLET ORAL EVERY 6 HOURS PRN
Refills: 0 | COMMUNITY
Start: 2018-04-15 | End: 2018-04-25

## 2018-04-25 NOTE — PATIENT INSTRUCTIONS
Obesity, Adult  Obesity is the condition of having too much total body fat. Being overweight or obese means that your weight is greater than what is considered healthy for your body size. Obesity is determined by a measurement called BMI. BMI is an estimate of body fat and is calculated from height and weight. For adults, a BMI of 30 or higher is considered obese.  Obesity can eventually lead to other health concerns and major illnesses, including:  · Stroke.  · Coronary artery disease (CAD).  · Type 2 diabetes.  · Some types of cancer, including cancers of the colon, breast, uterus, and gallbladder.  · Osteoarthritis.  · High blood pressure (hypertension).  · High cholesterol.  · Sleep apnea.  · Gallbladder stones.  · Infertility problems.  What are the causes?  The main cause of obesity is taking in (consuming) more calories than your body uses for energy. Other factors that contribute to this condition may include:  · Being born with genes that make you more likely to become obese.  · Having a medical condition that causes obesity. These conditions include:  ¨ Hypothyroidism.  ¨ Polycystic ovarian syndrome (PCOS).  ¨ Binge-eating disorder.  ¨ Cushing syndrome.  · Taking certain medicines, such as steroids, antidepressants, and seizure medicines.  · Not being physically active (sedentary lifestyle).  · Living where there are limited places to exercise safely or buy healthy foods.  · Not getting enough sleep.  What increases the risk?  The following factors may increase your risk of this condition:  · Having a family history of obesity.  · Being a woman of -American descent.  · Being a man of  descent.  What are the signs or symptoms?  Having excessive body fat is the main symptom of this condition.  How is this diagnosed?  This condition may be diagnosed based on:  · Your symptoms.  · Your medical history.  · A physical exam. Your health care provider may measure:  ¨ Your BMI. If you are an  adult with a BMI between 25 and less than 30, you are considered overweight. If you are an adult with a BMI of 30 or higher, you are considered obese.  ¨ The distances around your hips and your waist (circumferences). These may be compared to each other to help diagnose your condition.  ¨ Your skinfold thickness. Your health care provider may gently pinch a fold of your skin and measure it.  How is this treated?  Treatment for this condition often includes changing your lifestyle. Treatment may include some or all of the following:  · Dietary changes. Work with your health care provider and a dietitian to set a weight-loss goal that is healthy and reasonable for you. Dietary changes may include eating:  ¨ Smaller portions. A portion size is the amount of a particular food that is healthy for you to eat at one time. This varies from person to person.  ¨ Low-calorie or low-fat options.  ¨ More whole grains, fruits, and vegetables.  · Regular physical activity. This may include aerobic activity (cardio) and strength training.  · Medicine to help you lose weight. Your health care provider may prescribe medicine if you are unable to lose 1 pound a week after 6 weeks of eating more healthily and doing more physical activity.  · Surgery. Surgical options may include gastric banding and gastric bypass. Surgery may be done if:  ¨ Other treatments have not helped to improve your condition.  ¨ You have a BMI of 40 or higher.  ¨ You have life-threatening health problems related to obesity.  Follow these instructions at home:     Eating and drinking     · Follow recommendations from your health care provider about what you eat and drink. Your health care provider may advise you to:  ¨ Limit fast foods, sweets, and processed snack foods.  ¨ Choose low-fat options, such as low-fat milk instead of whole milk.  ¨ Eat 5 or more servings of fruits or vegetables every day.  ¨ Eat at home more often. This gives you more control over  what you eat.  ¨ Choose healthy foods when you eat out.  ¨ Learn what a healthy portion size is.  ¨ Keep low-fat snacks on hand.  ¨ Avoid sugary drinks, such as soda, fruit juice, iced tea sweetened with sugar, and flavored milk.  ¨ Eat a healthy breakfast.  · Drink enough water to keep your urine clear or pale yellow.  · Do not go without eating for long periods of time (do not fast) or follow a fad diet. Fasting and fad diets can be unhealthy and even dangerous.  Physical Activity   · Exercise regularly, as told by your health care provider. Ask your health care provider what types of exercise are safe for you and how often you should exercise.  · Warm up and stretch before being active.  · Cool down and stretch after being active.  · Rest between periods of activity.  Lifestyle   · Limit the time that you spend in front of your TV, computer, or video game system.  · Find ways to reward yourself that do not involve food.  · Limit alcohol intake to no more than 1 drink a day for nonpregnant women and 2 drinks a day for men. One drink equals 12 oz of beer, 5 oz of wine, or 1½ oz of hard liquor.  General instructions   · Keep a weight loss journal to keep track of the food you eat and how much you exercise you get.  · Take over-the-counter and prescription medicines only as told by your health care provider.  · Take vitamins and supplements only as told by your health care provider.  · Consider joining a support group. Your health care provider may be able to recommend a support group.  · Keep all follow-up visits as told by your health care provider. This is important.  Contact a health care provider if:  · You are unable to meet your weight loss goal after 6 weeks of dietary and lifestyle changes.  This information is not intended to replace advice given to you by your health care provider. Make sure you discuss any questions you have with your health care provider.  Document Released: 01/25/2006 Document Revised:  05/22/2017 Document Reviewed: 10/05/2016  [employer] Interactive Patient Education © 2017 [employer]      Exercising to Lose Weight  Exercising can help you to lose weight. In order to lose weight through exercise, you need to do vigorous-intensity exercise. You can tell that you are exercising with vigorous intensity if you are breathing very hard and fast and cannot hold a conversation while exercising.  Moderate-intensity exercise helps to maintain your current weight. You can tell that you are exercising at a moderate level if you have a higher heart rate and faster breathing, but you are still able to hold a conversation.  How often should I exercise?  Choose an activity that you enjoy and set realistic goals. Your health care provider can help you to make an activity plan that works for you. Exercise regularly as directed by your health care provider. This may include:  · Doing resistance training twice each week, such as:  ¨ Push-ups.  ¨ Sit-ups.  ¨ Lifting weights.  ¨ Using resistance bands.  · Doing a given intensity of exercise for a given amount of time. Choose from these options:  ¨ 150 minutes of moderate-intensity exercise every week.  ¨ 75 minutes of vigorous-intensity exercise every week.  ¨ A mix of moderate-intensity and vigorous-intensity exercise every week.  Children, pregnant women, people who are out of shape, people who are overweight, and older adults may need to consult a health care provider for individual recommendations. If you have any sort of medical condition, be sure to consult your health care provider before starting a new exercise program.  What are some activities that can help me to lose weight?  · Walking at a rate of at least 4.5 miles an hour.  · Jogging or running at a rate of 5 miles per hour.  · Biking at a rate of at least 10 miles per hour.  · Lap swimming.  · Roller-skating or in-line skating.  · Cross-country skiing.  · Vigorous competitive sports, such as football,  basketball, and soccer.  · Jumping rope.  · Aerobic dancing.  How can I be more active in my day-to-day activities?  · Use the stairs instead of the elevator.  · Take a walk during your lunch break.  · If you drive, park your car farther away from work or school.  · If you take public transportation, get off one stop early and walk the rest of the way.  · Make all of your phone calls while standing up and walking around.  · Get up, stretch, and walk around every 30 minutes throughout the day.  What guidelines should I follow while exercising?  · Do not exercise so much that you hurt yourself, feel dizzy, or get very short of breath.  · Consult your health care provider prior to starting a new exercise program.  · Wear comfortable clothes and shoes with good support.  · Drink plenty of water while you exercise to prevent dehydration or heat stroke. Body water is lost during exercise and must be replaced.  · Work out until you breathe faster and your heart beats faster.  This information is not intended to replace advice given to you by your health care provider. Make sure you discuss any questions you have with your health care provider.  Document Released: 01/20/2012 Document Revised: 05/25/2017 Document Reviewed: 05/21/2015  [email or web address] Interactive Patient Education © 2017 [email or web address]      Calorie Counting for Weight Loss  Calories are units of energy. Your body needs a certain amount of calories from food to keep you going throughout the day. When you eat more calories than your body needs, your body stores the extra calories as fat. When you eat fewer calories than your body needs, your body burns fat to get the energy it needs.  Calorie counting means keeping track of how many calories you eat and drink each day. Calorie counting can be helpful if you need to lose weight. If you make sure to eat fewer calories than your body needs, you should lose weight. Ask your health care provider what a healthy weight is for  you.  For calorie counting to work, you will need to eat the right number of calories in a day in order to lose a healthy amount of weight per week. A dietitian can help you determine how many calories you need in a day and will give you suggestions on how to reach your calorie goal.  · A healthy amount of weight to lose per week is usually 1-2 lb (0.5-0.9 kg). This usually means that your daily calorie intake should be reduced by 500-750 calories.  · Eating 1,200 - 1,500 calories per day can help most women lose weight.  · Eating 1,500 - 1,800 calories per day can help most men lose weight.  What is my plan?  My goal is to have __________ calories per day.  If I have this many calories per day, I should lose around __________ pounds per week.  What do I need to know about calorie counting?  In order to meet your daily calorie goal, you will need to:  · Find out how many calories are in each food you would like to eat. Try to do this before you eat.  · Decide how much of the food you plan to eat.  · Write down what you ate and how many calories it had. Doing this is called keeping a food log.  To successfully lose weight, it is important to balance calorie counting with a healthy lifestyle that includes regular activity. Aim for 150 minutes of moderate exercise (such as walking) or 75 minutes of vigorous exercise (such as running) each week.  Where do I find calorie information?     The number of calories in a food can be found on a Nutrition Facts label. If a food does not have a Nutrition Facts label, try to look up the calories online or ask your dietitian for help.  Remember that calories are listed per serving. If you choose to have more than one serving of a food, you will have to multiply the calories per serving by the amount of servings you plan to eat. For example, the label on a package of bread might say that a serving size is 1 slice and that there are 90 calories in a serving. If you eat 1 slice, you  "will have eaten 90 calories. If you eat 2 slices, you will have eaten 180 calories.  How do I keep a food log?  Immediately after each meal, record the following information in your food log:  · What you ate. Don't forget to include toppings, sauces, and other extras on the food.  · How much you ate. This can be measured in cups, ounces, or number of items.  · How many calories each food and drink had.  · The total number of calories in the meal.  Keep your food log near you, such as in a small notebook in your pocket, or use a mobile ari or website. Some programs will calculate calories for you and show you how many calories you have left for the day to meet your goal.  What are some calorie counting tips?  · Use your calories on foods and drinks that will fill you up and not leave you hungry:  ¨ Some examples of foods that fill you up are nuts and nut butters, vegetables, lean proteins, and high-fiber foods like whole grains. High-fiber foods are foods with more than 5 g fiber per serving.  ¨ Drinks such as sodas, specialty coffee drinks, alcohol, and juices have a lot of calories, yet do not fill you up.  · Eat nutritious foods and avoid empty calories. Empty calories are calories you get from foods or beverages that do not have many vitamins or protein, such as candy, sweets, and soda. It is better to have a nutritious high-calorie food (such as an avocado) than a food with few nutrients (such as a bag of chips).  · Know how many calories are in the foods you eat most often. This will help you calculate calorie counts faster.  · Pay attention to calories in drinks. Low-calorie drinks include water and unsweetened drinks.  · Pay attention to nutrition labels for \"low fat\" or \"fat free\" foods. These foods sometimes have the same amount of calories or more calories than the full fat versions. They also often have added sugar, starch, or salt, to make up for flavor that was removed with the fat.  · Find a way of " tracking calories that works for you. Get creative. Try different apps or programs if writing down calories does not work for you.  What are some portion control tips?  · Know how many calories are in a serving. This will help you know how many servings of a certain food you can have.  · Use a measuring cup to measure serving sizes. You could also try weighing out portions on a kitchen scale. With time, you will be able to estimate serving sizes for some foods.  · Take some time to put servings of different foods on your favorite plates, bowls, and cups so you know what a serving looks like.  · Try not to eat straight from a bag or box. Doing this can lead to overeating. Put the amount you would like to eat in a cup or on a plate to make sure you are eating the right portion.  · Use smaller plates, glasses, and bowls to prevent overeating.  · Try not to multitask (for example, watch TV or use your computer) while eating. If it is time to eat, sit down at a table and enjoy your food. This will help you to know when you are full. It will also help you to be aware of what you are eating and how much you are eating.  What are tips for following this plan?  Reading food labels   · Check the calorie count compared to the serving size. The serving size may be smaller than what you are used to eating.  · Check the source of the calories. Make sure the food you are eating is high in vitamins and protein and low in saturated and trans fats.  Shopping   · Read nutrition labels while you shop. This will help you make healthy decisions before you decide to purchase your food.  · Make a grocery list and stick to it.  Cooking   · Try to cook your favorite foods in a healthier way. For example, try baking instead of frying.  · Use low-fat dairy products.  Meal planning   · Use more fruits and vegetables. Half of your plate should be fruits and vegetables.  · Include lean proteins like poultry and fish.  How do I count calories when  eating out?  · Ask for smaller portion sizes.  · Consider sharing an entree and sides instead of getting your own entree.  · If you get your own entree, eat only half. Ask for a box at the beginning of your meal and put the rest of your entree in it so you are not tempted to eat it.  · If calories are listed on the menu, choose the lower calorie options.  · Choose dishes that include vegetables, fruits, whole grains, low-fat dairy products, and lean protein.  · Choose items that are boiled, broiled, grilled, or steamed. Stay away from items that are buttered, battered, fried, or served with cream sauce. Items labeled “crispy” are usually fried, unless stated otherwise.  · Choose water, low-fat milk, unsweetened iced tea, or other drinks without added sugar. If you want an alcoholic beverage, choose a lower calorie option such as a glass of wine or light beer.  · Ask for dressings, sauces, and syrups on the side. These are usually high in calories, so you should limit the amount you eat.  · If you want a salad, choose a garden salad and ask for grilled meats. Avoid extra toppings like romo, cheese, or fried items. Ask for the dressing on the side, or ask for olive oil and vinegar or lemon to use as dressing.  · Estimate how many servings of a food you are given. For example, a serving of cooked rice is ½ cup or about the size of half a baseball. Knowing serving sizes will help you be aware of how much food you are eating at restaurants. The list below tells you how big or small some common portion sizes are based on everyday objects:  ¨ 1 oz--4 stacked dice.  ¨ 3 oz--1 deck of cards.  ¨ 1 tsp--1 die.  ¨ 1 Tbsp--½ a ping-pong ball.  ¨ 2 Tbsp--1 ping-pong ball.  ¨ ½ cup--½ baseball.  ¨ 1 cup--1 baseball.  Summary  · Calorie counting means keeping track of how many calories you eat and drink each day. If you eat fewer calories than your body needs, you should lose weight.  · A healthy amount of weight to lose per week  is usually 1-2 lb (0.5-0.9 kg). This usually means reducing your daily calorie intake by 500-750 calories.  · The number of calories in a food can be found on a Nutrition Facts label. If a food does not have a Nutrition Facts label, try to look up the calories online or ask your dietitian for help.  · Use your calories on foods and drinks that will fill you up, and not on foods and drinks that will leave you hungry.  · Use smaller plates, glasses, and bowls to prevent overeating.  This information is not intended to replace advice given to you by your health care provider. Make sure you discuss any questions you have with your health care provider.  Document Released: 12/18/2006 Document Revised: 11/17/2017 Document Reviewed: 11/17/2017  [employer] Interactive Patient Education © 2017 [employer]

## 2018-04-25 NOTE — PROGRESS NOTES
"Chief Complaint   Patient presents with   • Other     discuss weight loss       Subjective     I have reviewed and updated her medications, medical history and problem list during today's office visit.     Patient Care Team:  Cristo Madera MD as PCP - General (Family Medicine)  Franko Hernandes MD as Surgeon (Neurosurgery)  Zane Araujo MD PhD as Consulting Physician (Hematology and Oncology)  Michael Everett Jr., MD as Consulting Physician (Urology)  Cristo Madera MD as Referring Physician (Family Medicine)    Social History   Substance Use Topics   • Smoking status: Never Smoker   • Smokeless tobacco: Never Used   • Alcohol use No       Review of Systems   Constitutional: Positive for unexpected weight gain.   Respiratory: Positive for shortness of breath.        Objective     /72   Pulse 71   Temp 97.1 °F (36.2 °C) (Oral)   Resp 16   Ht 157.5 cm (62.01\")   Wt 89.8 kg (198 lb)   LMP  (LMP Unknown)   BMI 36.20 kg/m²     Body mass index is 36.2 kg/m².    Physical Exam   Constitutional: She is oriented to person, place, and time. No distress.   Cardiovascular: Normal rate and normal heart sounds.    Pulmonary/Chest: Effort normal and breath sounds normal.   Neurological: She is alert and oriented to person, place, and time.   Skin: She is not diaphoretic.   Psychiatric: She has a normal mood and affect. Her speech is normal. She is attentive.   Vitals reviewed.       Data Reviewed:             Assessment/Plan     Problem List Items Addressed This Visit        Other    Personal history of breast cancer    Relevant Medications    Misc. Devices (VIRAGE CUSTOM BREAST PROSTHES) misc      Other Visit Diagnoses     Class 2 obesity due to excess calories with serious comorbidity and body mass index (BMI) of 36.0 to 36.9 in adult    -  Primary    Relevant Medications    Liraglutide -Weight Management (SAXENDA) 18 MG/3ML solution pen-injector    Other Relevant Orders    Ambulatory referral to Nutrition Services "          Orders Placed This Encounter   Procedures   • Ambulatory referral to Nutrition Services     Referral Priority:   Routine     Referral Type:   Health Education     Referral Reason:   Specialty Services Required     Requested Specialty:   Nutrition     Number of Visits Requested:   1         Current Outpatient Prescriptions:   •  acetaminophen (TYLENOL) 325 MG tablet, Take 2 tablets by mouth Every 6 (Six) Hours As Needed for mild pain (1-3) or fever., Disp: , Rfl: 0  •  acyclovir (ZOVIRAX) 400 MG tablet, TAKE 1 TABLET BY MOUTH 2 (TWO) TIMES A DAY. TAKE NO MORE THAN 5 DOSES A DAY., Disp: 60 tablet, Rfl: 0  •  aspirin 81 MG tablet, Take 81 mg by mouth Every Night., Disp: , Rfl:   •  bisoprolol (ZEBeta) 5 MG tablet, TAKE 1 TABLET BY MOUTH 2 (TWO) TIMES DAILY, Disp: , Rfl: 1  •  carBAMazepine XR (TEGretol  XR) 200 MG 12 hr tablet, Take 2 tablets by mouth Every Night for 180 days., Disp: 180 tablet, Rfl: 1  •  dexamethasone (DECADRON) 4 MG tablet, TAKE 1 TABLET IN THE MORNING STARTING THE DAY AFTER CHEMO FOR 2 DAYS. TAKE WITH FOOD. (Patient taking differently: TAKE 1 TABLET IN THE MORNING STARTING THE DAY AFTER CHEMO FOR 2 DAYS. TAKE WITH FOOD BID), Disp: 16 tablet, Rfl: 1  •  folic acid (FOLVITE) 1 MG tablet, Take 1 mg by mouth Every Morning., Disp: , Rfl:   •  hydrALAZINE (APRESOLINE) 25 MG tablet, Take 25 mg by mouth 2 (two) times a day., Disp: , Rfl: 3  •  isosorbide mononitrate (IMDUR) 60 MG 24 hr tablet, Take 1 tablet by mouth Daily. TAKE 1 TABLET BY MOUTH EVERY MORNING AND ONE-HALF TABLET EVERY EVENING (Patient taking differently: Take 60 mg by mouth Daily. TAKE 1.5 TABLET BY MOUTH EVERY MORNING BEFORE EXERSIZE), Disp: , Rfl:   •  losartan (COZAAR) 25 MG tablet, Take 25 mg by mouth every evening., Disp: , Rfl:   •  Misc. Devices (VIRAGE CUSTOM BREAST PROSTHES) misc, 2 each As Needed (na)., Disp: 2 each, Rfl: 0  •  montelukast (SINGULAIR) 10 MG tablet, TAKE 1 TABLET BY MOUTH EVERY NIGHT., Disp: 30 tablet,  Rfl: 11  •  Liraglutide -Weight Management (SAXENDA) 18 MG/3ML solution pen-injector, Inject 0.6 mg under the skin Daily for 90 days., Disp: 1 pen, Rfl: 2    Return in about 6 weeks (around 6/6/2018) for Recheck.

## 2018-04-30 ENCOUNTER — TELEPHONE (OUTPATIENT)
Dept: FAMILY MEDICINE CLINIC | Facility: CLINIC | Age: 68
End: 2018-04-30

## 2018-04-30 DIAGNOSIS — IMO0001 CLASS 2 OBESITY DUE TO EXCESS CALORIES WITH SERIOUS COMORBIDITY AND BODY MASS INDEX (BMI) OF 36.0 TO 36.9 IN ADULT: ICD-10-CM

## 2018-05-02 DIAGNOSIS — C90.00 MULTIPLE MYELOMA NOT HAVING ACHIEVED REMISSION (HCC): ICD-10-CM

## 2018-05-04 ENCOUNTER — OFFICE VISIT (OUTPATIENT)
Dept: ONCOLOGY | Facility: CLINIC | Age: 68
End: 2018-05-04

## 2018-05-04 ENCOUNTER — INFUSION (OUTPATIENT)
Dept: ONCOLOGY | Facility: HOSPITAL | Age: 68
End: 2018-05-04

## 2018-05-04 VITALS
BODY MASS INDEX: 36.66 KG/M2 | DIASTOLIC BLOOD PRESSURE: 58 MMHG | RESPIRATION RATE: 16 BRPM | HEIGHT: 62 IN | OXYGEN SATURATION: 96 % | SYSTOLIC BLOOD PRESSURE: 89 MMHG | HEART RATE: 66 BPM | WEIGHT: 199.2 LBS | TEMPERATURE: 98 F

## 2018-05-04 VITALS — DIASTOLIC BLOOD PRESSURE: 69 MMHG | HEART RATE: 78 BPM | SYSTOLIC BLOOD PRESSURE: 105 MMHG

## 2018-05-04 DIAGNOSIS — T45.1X5A ANEMIA ASSOCIATED WITH CHEMOTHERAPY: ICD-10-CM

## 2018-05-04 DIAGNOSIS — C90.00 MULTIPLE MYELOMA NOT HAVING ACHIEVED REMISSION (HCC): Primary | ICD-10-CM

## 2018-05-04 DIAGNOSIS — N18.30 CHRONIC KIDNEY DISEASE, STAGE III (MODERATE) (HCC): ICD-10-CM

## 2018-05-04 DIAGNOSIS — D75.89 MACROCYTOSIS: ICD-10-CM

## 2018-05-04 DIAGNOSIS — D69.59 CHEMOTHERAPY-INDUCED THROMBOCYTOPENIA: ICD-10-CM

## 2018-05-04 DIAGNOSIS — T45.1X5A CHEMOTHERAPY-INDUCED THROMBOCYTOPENIA: ICD-10-CM

## 2018-05-04 DIAGNOSIS — D64.81 ANEMIA ASSOCIATED WITH CHEMOTHERAPY: ICD-10-CM

## 2018-05-04 LAB
ALBUMIN SERPL-MCNC: 3.2 G/DL (ref 3.5–5.2)
ALBUMIN/GLOB SERPL: 0.6 G/DL
ALP SERPL-CCNC: 58 U/L (ref 39–117)
ALT SERPL W P-5'-P-CCNC: 14 U/L (ref 1–33)
ANION GAP SERPL CALCULATED.3IONS-SCNC: 9.7 MMOL/L
AST SERPL-CCNC: 16 U/L (ref 1–32)
BASOPHILS # BLD AUTO: 0.02 10*3/MM3 (ref 0–0.2)
BASOPHILS NFR BLD AUTO: 0.3 % (ref 0–1.5)
BILIRUB SERPL-MCNC: 0.3 MG/DL (ref 0.1–1.2)
BUN BLD-MCNC: 33 MG/DL (ref 8–23)
BUN/CREAT SERPL: 18.1 (ref 7–25)
CALCIUM SPEC-SCNC: 8.8 MG/DL (ref 8.6–10.5)
CHLORIDE SERPL-SCNC: 110 MMOL/L (ref 98–107)
CO2 SERPL-SCNC: 21.3 MMOL/L (ref 22–29)
CREAT BLD-MCNC: 1.82 MG/DL (ref 0.57–1)
DEPRECATED RDW RBC AUTO: 53.1 FL (ref 37–54)
EOSINOPHIL # BLD AUTO: 0.21 10*3/MM3 (ref 0–0.7)
EOSINOPHIL NFR BLD AUTO: 2.8 % (ref 0.3–6.2)
ERYTHROCYTE [DISTWIDTH] IN BLOOD BY AUTOMATED COUNT: 13.8 % (ref 11.7–13)
GFR SERPL CREATININE-BSD FRML MDRD: 34 ML/MIN/1.73
GLOBULIN UR ELPH-MCNC: 5.6 GM/DL
GLUCOSE BLD-MCNC: 96 MG/DL (ref 65–99)
HCT VFR BLD AUTO: 34.5 % (ref 35.6–45.5)
HGB BLD-MCNC: 11.1 G/DL (ref 11.9–15.5)
IMM GRANULOCYTES # BLD: 0.11 10*3/MM3 (ref 0–0.03)
IMM GRANULOCYTES NFR BLD: 1.5 % (ref 0–0.5)
LYMPHOCYTES # BLD AUTO: 2.35 10*3/MM3 (ref 0.9–4.8)
LYMPHOCYTES NFR BLD AUTO: 31.6 % (ref 19.6–45.3)
MAGNESIUM SERPL-MCNC: 1.8 MG/DL (ref 1.6–2.4)
MCH RBC QN AUTO: 33.6 PG (ref 26.9–32)
MCHC RBC AUTO-ENTMCNC: 32.2 G/DL (ref 32.4–36.3)
MCV RBC AUTO: 104.5 FL (ref 80.5–98.2)
MONOCYTES # BLD AUTO: 0.65 10*3/MM3 (ref 0.2–1.2)
MONOCYTES NFR BLD AUTO: 8.7 % (ref 5–12)
NEUTROPHILS # BLD AUTO: 4.1 10*3/MM3 (ref 1.9–8.1)
NEUTROPHILS NFR BLD AUTO: 55.1 % (ref 42.7–76)
NRBC BLD MANUAL-RTO: 0.3 /100 WBC (ref 0–0)
PHOSPHATE SERPL-MCNC: 4.7 MG/DL (ref 2.5–4.5)
PLATELET # BLD AUTO: 130 10*3/MM3 (ref 140–500)
PMV BLD AUTO: 11.3 FL (ref 6–12)
POTASSIUM BLD-SCNC: 3.8 MMOL/L (ref 3.5–5.2)
PROT SERPL-MCNC: 8.8 G/DL (ref 6–8.5)
RBC # BLD AUTO: 3.3 10*6/MM3 (ref 3.9–5.2)
SODIUM BLD-SCNC: 141 MMOL/L (ref 136–145)
WBC NRBC COR # BLD: 7.44 10*3/MM3 (ref 4.5–10.7)

## 2018-05-04 PROCEDURE — 85025 COMPLETE CBC W/AUTO DIFF WBC: CPT | Performed by: INTERNAL MEDICINE

## 2018-05-04 PROCEDURE — 99215 OFFICE O/P EST HI 40 MIN: CPT | Performed by: INTERNAL MEDICINE

## 2018-05-04 PROCEDURE — 96375 TX/PRO/DX INJ NEW DRUG ADDON: CPT | Performed by: INTERNAL MEDICINE

## 2018-05-04 PROCEDURE — 25010000002 DARATUMUMAB 400 MG/20ML SOLUTION 20 ML VIAL: Performed by: INTERNAL MEDICINE

## 2018-05-04 PROCEDURE — 96415 CHEMO IV INFUSION ADDL HR: CPT | Performed by: INTERNAL MEDICINE

## 2018-05-04 PROCEDURE — 25010000002 DARATUMUMAB 400 MG/20ML SOLUTION 5 ML VIAL: Performed by: INTERNAL MEDICINE

## 2018-05-04 PROCEDURE — 84100 ASSAY OF PHOSPHORUS: CPT | Performed by: INTERNAL MEDICINE

## 2018-05-04 PROCEDURE — 25010000002 METHYLPREDNISOLONE PER 125 MG: Performed by: INTERNAL MEDICINE

## 2018-05-04 PROCEDURE — 83735 ASSAY OF MAGNESIUM: CPT | Performed by: INTERNAL MEDICINE

## 2018-05-04 PROCEDURE — 80053 COMPREHEN METABOLIC PANEL: CPT | Performed by: INTERNAL MEDICINE

## 2018-05-04 PROCEDURE — 25010000002 DIPHENHYDRAMINE PER 50 MG: Performed by: INTERNAL MEDICINE

## 2018-05-04 PROCEDURE — 96413 CHEMO IV INFUSION 1 HR: CPT | Performed by: INTERNAL MEDICINE

## 2018-05-04 RX ORDER — ACETAMINOPHEN 500 MG
1000 TABLET ORAL ONCE
Status: COMPLETED | OUTPATIENT
Start: 2018-05-04 | End: 2018-05-04

## 2018-05-04 RX ORDER — METHYLPREDNISOLONE SODIUM SUCCINATE 125 MG/2ML
60 INJECTION, POWDER, LYOPHILIZED, FOR SOLUTION INTRAMUSCULAR; INTRAVENOUS ONCE
Status: CANCELLED | OUTPATIENT
Start: 2018-05-04

## 2018-05-04 RX ORDER — DIPHENHYDRAMINE HYDROCHLORIDE 50 MG/ML
50 INJECTION INTRAMUSCULAR; INTRAVENOUS AS NEEDED
Status: CANCELLED | OUTPATIENT
Start: 2018-05-04

## 2018-05-04 RX ORDER — FAMOTIDINE 10 MG/ML
20 INJECTION, SOLUTION INTRAVENOUS AS NEEDED
Status: CANCELLED | OUTPATIENT
Start: 2018-05-04

## 2018-05-04 RX ORDER — METHYLPREDNISOLONE SODIUM SUCCINATE 125 MG/2ML
60 INJECTION, POWDER, LYOPHILIZED, FOR SOLUTION INTRAMUSCULAR; INTRAVENOUS ONCE
Status: COMPLETED | OUTPATIENT
Start: 2018-05-04 | End: 2018-05-04

## 2018-05-04 RX ORDER — SODIUM CHLORIDE 9 MG/ML
250 INJECTION, SOLUTION INTRAVENOUS ONCE
Status: CANCELLED | OUTPATIENT
Start: 2018-05-04 | End: 2018-05-04

## 2018-05-04 RX ORDER — SODIUM CHLORIDE 9 MG/ML
250 INJECTION, SOLUTION INTRAVENOUS ONCE
Status: COMPLETED | OUTPATIENT
Start: 2018-05-04 | End: 2018-05-04

## 2018-05-04 RX ORDER — ACETAMINOPHEN 500 MG
1000 TABLET ORAL ONCE
Status: CANCELLED | OUTPATIENT
Start: 2018-05-04

## 2018-05-04 RX ORDER — MEPERIDINE HYDROCHLORIDE 50 MG/ML
25 INJECTION INTRAMUSCULAR; INTRAVENOUS; SUBCUTANEOUS
Status: CANCELLED | OUTPATIENT
Start: 2018-05-04

## 2018-05-04 RX ADMIN — DARATUMUMAB 1370 MG: 100 INJECTION, SOLUTION, CONCENTRATE INTRAVENOUS at 09:52

## 2018-05-04 RX ADMIN — METHYLPREDNISOLONE SODIUM SUCCINATE 60 MG: 125 INJECTION, POWDER, FOR SOLUTION INTRAMUSCULAR; INTRAVENOUS at 08:48

## 2018-05-04 RX ADMIN — ACETAMINOPHEN 1000 MG: 500 TABLET ORAL at 08:49

## 2018-05-04 RX ADMIN — DIPHENHYDRAMINE HYDROCHLORIDE 25 MG: 50 INJECTION, SOLUTION INTRAMUSCULAR; INTRAVENOUS at 08:51

## 2018-05-04 RX ADMIN — SODIUM CHLORIDE 250 ML: 900 INJECTION, SOLUTION INTRAVENOUS at 08:48

## 2018-05-04 NOTE — PROGRESS NOTES
Reasons for follow up:   1. IgG kappa multiple myeloma, currently on Darzalex, started on May 26, 2016. Patient was switched to Darzalex from Pomalyst, as she continued to be neutropenic with recurrent urinary tract infection while on Pomalyst.    2. Zometa is on hold due to renal insufficiency.    3. After 16 doses of Darzalex, laboratory study showed stable disease in November 2016. Insurance company denied adding Velcade and dexamethasone. Patient is to be continued on monthly Darzalex from 12/06/16.   4. Obstructive right pyelonephritis with positive urine culture for E. coli, required hospitalization from 1/19/2017 through 01/24/2017 with iv antibiotics and stent exchange on 01/20/2017.   5.  Paraprotein studies on March 27, 2017 showed further slight improvement of multiple myeloma.   6.  Febrile illness, with urinary tract infection and influenza B infection in early May 2017, required hospitalization for 6 days.   7.   Paraprotein studies for both serum and 24-hour urine sample on 7/21/2017 showed further improvement of paraproteins.   Darzalex was continued.   8.  Serum protein study reported stable disease on 10/20/2017.  Darzalex will be continued.   9.  Laboratory studies of both serum paraprotein and a 24-hour urine protein in January 2018 showed a further improvement of paraproteins.  Monthly Darzalex will be continued.           History of Present Illness:     Patient is a 67 he has a female presented today for 4-week re-evaluation after her recent laboratory studies including both serum and 24-hour urine studies.     Patient reports she is at baseline condition. She denies fever, sweating or chills. No dysuria or hematuria. No low back pain. Her performance status is ECOG 1. She does have some fatigue but no worsening.     This patient had serum protein study on 04/06/2018 which reported free light chain at concentration 703.8 mg/L and free kappa chain 7.0, free kappa/lambda ratio 0.01, serum IgG  3650 mg/dL, IgM 11 and IgA 9 with serum protein electrophoresis reporting monoclonal IgG lambda 3.2 g/dL and also monoclonal free lambda light chain.  But it was unable to quantify monoclonal lambda light chain because of the small quantity of it.     A 24-hour urine study on 04/20/2018 reported total free lambda chain 60 mg in 24 hours at concentration 33.1 mg/L, free kappa chain 45 mg in 24 hours at concentration 24.8 mg/L. Total 24-hour urine protein was 279 mg. Urine protein immunofixation and UPEP reported lambda-type Bence-May protein + #1 monoclonal IgG lambda band at 34.8% and #2 monoclonal IgG lambda band at 6.9%.     Her CBC results today reported a stable hemoglobin at 11.1, platelets 130,000 and WBC 7440 including neutrophils 4100 and lymphocytes 2350, monocytes 650. Her CMP reported slightly worsened creatinine at 1.82 with BUN 33. Total protein at 8.8. Liver function panel was normal. Total serum protein 8.8 and albumin 3.2, globulin 5.6.    She does complain of weight gains. Per EMR records, she gained about 10 pounds in the previous 12 months.  She reports she has been watching her diet, but seems not helpful.     Her performance status is ECOG 1.  She drove herself to the clinic from [place].        Past Medical History, Past Surgical History, Social History, Family History have been reviewed and are without significant changes except as mentioned.      Hematology/oncology history: See separate document for extra information.       On12/6/2016, serum free lambda chain 1090 MG/L, free kappa chain 8.5 to MG/L.  Ferritin 1015, iron 99, TIBC 137 iron saturation 72%.     On January 4, 2017, vitamin B12 level 1289, folate 7.7 ng/mL. SPEP reporting gamma globulin 3.3, out of total globulin 5.0, with immunofixation reporting small quantity of monoclonal free lambda chain, plus monoclonal IgG lambda at 3.2 g/DL.  Serum IgG 4075, IgA 9 and IgM 15.  free lambda chain 1339 MG/L and free kappa chain 10.3  MG/L.      Laboratory study March 27, 2017 showed slight improvement of paraprotein, SPEP reporting M spike 2.8 g/DL, out of total globulin 4.3, and the serum IgG 3420, IgA 9, IgM 11, free lambda chain 1218 MG/L and free kappa chain 8.0 MG/L.  Total 24 hour urine sample reported at free lambda chain 142 mg, at a concentration 74.8 MG/L, free kappa chain 75 mg at a concentration 39.5 MG/L., Urine protein immunofixation reporting biclonal IgG lambda and monoclonal free lambda light chain, M spike #1 at 41.5% and monoclonal IgG lambda spike #2 at 10.5%. Serum beta-2 microglobulin is 7.3 MG/L.     Her laboratory study on 7/21/2017 reported further improvement of paraprotein is both serum and a 24-hour urine sample.  SPEP reporting monoclonal IgG lambda 2.9 g/dL and free lambda chain 0.1 g/dL.  Serum IgG was 3261 mg/dL and IgA 5, IgM 13, free kappa chain 6.7, free lambda chain 701.3 with kappa/lambda ratio 0.01.  24-hour urine sample reported positive for Bence May protein lambda type, immunofixation positive for IgG lambda.  Total urine protein 241 mg, gamma globulin 13.1%.  Hemoglobin was 11.4 .4, platelets 122,000, WBC 6680 including neutrophils 3600, lymphocytes 2100 and monocytes 650.  Her creatinine was 1.68, at baseline level.  Liver function panel unremarkable.  Darzalex was continued.    Laboratory study on 8/25/2017 showed improved the platelets at 144,000, normal WBC 6660 and slightly improved hemoglobin at 11.7.     Patient had a colonoscopy examination on 8/30/2017 by Dr. Rivera.  It reported diverticulosis in multiple areas more severe in the sigmoid colon.  There was 2 polyps 4 mm pneumonia from transverse colon and the sigmoid colon.  Pathology evaluation reported tubular adenoma from the sigmoid colon polyp, and benign hyperplastic polyp from transverse colon.    Laboratory study on 10/20/2017 reported slightly improved monoclonal spike 2.7 g/dL by SPEP, immunofixation reported positive  monoclonal IgG lambda, serum IgG 3536 mg/dL, IgA less than 5 and IgM 11, free kappa chain 5.3 mg/L, and free lambda chain 720 mg/L with kappa/lambda ratio 0.01.  Serum beta-2 microglobulin was 6.5 mg/L.   Her CBC showed a stable mild anemia with hemoglobin 11.3, macrocytosis .3, and the platelets 114,000, normal WBC 7070 including neutrophils 4100 lymphocytes 2000 monocytes 650, normal liver function panel, total protein 7.9 and albumin 3.2,  and normal electrolytes including calcium 8.1, and improved creatinine 1.59.     Laboratory study on 1/12/2018 reported serum IgG 3083 mg/dL, IgA 10, IgM 10, free kappa chain 8.5 and free lambda chain 589.1 mg/L, kappa/lambda ratio 0.01, serum protein admitted fixation reported IgG lambda monoclonal protein and SPEP reporting M spike 3.1 g/dL, beta-2 microgram and 7.4 mg/L. serum creatinine 1.79, calcium 8.2, other electrolytes normal, hemoglobin 11.3, .5 and MCHC 31.6, platelets 129,000, WBC 6780 including neutrophils 3500 lymphocytes 2300.  Serum ferritin 653.7 ng/mL, iron 123 TIBC 174 iron saturation 71%, folic acid 12.8 ng/mL and a vitamin B12 at 873 pg/mL.  Her 24-hour urine study on 1/18/2018 reported lambda chain 32.8 mg/L, total 61 mg in 24 hours, and the free kappa chain 27.4 mg/L and 51 mg in 24 hours, and the total 24-hour urine protein 283 mg, and urine protein immunofixation positive for 5.3% monoclonal IgG lambda and positive for Bence May protein at 36.2% monoclonal lambda chain.  Monthly Darzalex was continued.         Review of Systems    Constitutional: Negative for chills and fever currently.  Complains of weight gains in the past 12 months despite watching her diet.    HENT: Negative for mouth sores and sore throat.    Eyes: Negative for visual disturbance.    Cardiac: Denies chest pain no palpitation.  No lower extremity edema.    Respiratory: No cough, no hemoptysis no short of breath.    Gastrointestinal: Negative for abdominal pain,  "diarrhea, nausea and vomiting.    Genitourinary: No dysuria or hematuria.  No pain at the flank area/back pain.   Musculoskeletal: Negative for back pain and joint swelling.    Neurological: Negative for dizziness.   Hematological: Does not bruise/bleed easily.    Psychiatric/Behavioral: The patient is not nervous/anxious.        Medications: The current medication list was reviewed in the EMR.       ALLERGIES: Zosyn       Vitals:    05/04/18 0759   BP: (!) 89/58   Pulse: 66   Resp: 16   Temp: 98 °F (36.7 °C)   TempSrc: Oral   SpO2: 96%   Weight: 90.4 kg (199 lb 3.2 oz)   Height: 157.5 cm (62.01\")   PainSc: 0-No pain   PS: ECOG 1      Physical Exam   Constitutional: well-developed and well-nourished -American female. No distress.    HENT:     Head: Normocephalic.     Eyes: No jaundice.     Neck: Normal range of motion.   no masses.    Cardiovascular: Normal rate, regular rhythm, normal heart sounds and normal pulses.    Pulmonary/Chest: Lungs clear to auscultation bilaterally, no wheezes, no rales.  Mediport benign right upper chest.  Abdominal: Soft, no tenderness guarding or rebound.  Bowel sounds are normal. no distension and no mass. No hepatosplenomegaly.   Musculoskeletal: Normal range of motion. no edema or deformity.   Lymphadenopathy: She has no cervical, supraclavicular adenopathy.   Neurological: alert and oriented to person, place, and time. No cranial nerve deficit.    Skin: Skin is warm and dry. No rash noted. No cyanosis. No pallor.   Psychiatric: She has normal mood and affect.         Recent lab results:   Lab Results   Component Value Date    WBC 7.44 05/04/2018    HGB 11.1 (L) 05/04/2018    HCT 34.5 (L) 05/04/2018    .5 (H) 05/04/2018     (L) 05/04/2018     Lab Results   Component Value Date    NEUTROABS 4.10 05/04/2018     Lab Results   Component Value Date    GLUCOSE 96 05/04/2018    BUN 33 (H) 05/04/2018    CREATININE 1.82 (H) 05/04/2018    EGFRIFNONA  08/30/2016      " Comment:      <15 Indicative of kidney failure.    EGFRIFAFRI 34 (L) 05/04/2018    BCR 18.1 05/04/2018    K 3.8 05/04/2018    CO2 21.3 (L) 05/04/2018    CALCIUM 8.8 05/04/2018    PROTENTOTREF 7.8 04/06/2018    ALBUMIN 3.20 (L) 05/04/2018    LABIL2 0.6 05/04/2018    AST 16 05/04/2018    ALT 14 05/04/2018     Sodium   Date Value Ref Range Status   05/04/2018 141 136 - 145 mmol/L Final     Potassium   Date Value Ref Range Status   05/04/2018 3.8 3.5 - 5.2 mmol/L Final     Total Bilirubin   Date Value Ref Range Status   05/04/2018 0.3 0.1 - 1.2 mg/dL Final     Alkaline Phosphatase   Date Value Ref Range Status   05/04/2018 58 39 - 117 U/L Final   ]    Assessment/Plan   1. Multiple myeloma, IgG lambda, stage III. Failed multiple lines of therapy. Her treatment was switched to Darzalex on 5/26/2016. She has been on this treatment for 2 years now.  She has tolerated therapy very well.     Her 24-hour urine study on 4/20/2018 showed almost identical total urine protein, free lambda chain, however her serum protein study on 4/6/2018 showed worsening level of serum IgG and free lambda chain together with monoclonal spike.  I'm concerned the patient may have disease progression.  I discussed with the patient, we will continue Darzalex for now, we will repeat serum protein studies in 2 months for reassessment.  Since she has almost identical 24-hour urine study, and with minimal quantity of total protein and free lambda chain, I would not repeated urine study in 2 months.  I explained to the patient and she voiced understanding.      2. Zometa treatment.  Her last dose Zometa was on 2/9/2018.  Since she has worsening creatinine at 1.82 today, we will hold Zometa today.  Because of her fluctuating creatinine level associate with stage III chronic renal insufficiency, we only gave her Zometa periodically depending on her renal function.  She was off Zometa for about 18 months and then resumed at decreased dose of 3 mg in August  2017.  We'll try to continue every 3 months Zometa treatment.         3.  Macrocytic anemia secondary to chemotherapy for multiple myeloma and chronic renal insufficiency.  She has relatively stable hemoglobin, however with worsening macrocytosis.  Laboratory study on 1/12/2018 showed no evidence of deficiency of folic acid or vitamin B12 level.  She also had iron panel, fits with inflammatory condition.        4. Mild to moderate thrombocytopenia secondary to her multiple myeloma and chemotherapy.  Overall her platelet counts has been much better compared to those a year ago.  She is asymptomatic, no easy bleeding or bruising.      5. History of stage II left breast cancer, post mastectomy in 2013, negative for ER/[place] and positive HER-2. No neoadjuvant chemotherapy because of concurrent discovery of multiple myeloma and ongoing chemotherapy. She had a normal mammogram study in July 2017.       6.  Right middle lobe pulmonary nodule, documented on CT scan of chest on 01/06/2017. Repeated CT scan of chest on 8/21/2017 reported a small 5 mm and stable primary nodule.      7.  Chronic renal insufficiency stage III.  Her creatinine is slightly worse at 1.82 today.  I think she probably has some element of dehydration because BUN is a higher than previous level.  I encourage her to drink more water, she voiced understanding.  Continue to observe.  Patient follows with nephrologist.        Plan:  1.  Continue Darzalex treatment today.   2.  Hold Zometa today.  We'll check her renal function in one month, if improved, will give Zometa at that time.  Zometa will be repeated every 3 months.    3.  See NP in 4 weeks for Darzalex and Zometa, labs per protocol.   4.  In 7 weeks, obtain blood for paraprotein studies.     5.  Come back to see me 8 weeks for reevaluation to discuss the study results and further treatment plan.  If continues to have evidence of disease progression, we'll switch to a different regimen.        More  than 40 min, over half of that time were for counseling, and analyze data to her.    LI OBANDO M.D., Ph.D.    5/4/2018       CC:   Kyle George M.D.    Dictated using Dragon Dictation.

## 2018-05-13 ENCOUNTER — HOSPITAL ENCOUNTER (INPATIENT)
Facility: HOSPITAL | Age: 68
LOS: 4 days | Discharge: HOME | End: 2018-05-17
Attending: EMERGENCY MEDICINE | Admitting: INTERNAL MEDICINE

## 2018-05-13 ENCOUNTER — APPOINTMENT (OUTPATIENT)
Dept: CT IMAGING | Facility: HOSPITAL | Age: 68
End: 2018-05-13

## 2018-05-13 DIAGNOSIS — K57.32 SIGMOID DIVERTICULITIS: Primary | ICD-10-CM

## 2018-05-13 LAB
ALBUMIN SERPL-MCNC: 3 G/DL (ref 3.5–5.2)
ALBUMIN/GLOB SERPL: 0.6 G/DL
ALP SERPL-CCNC: 59 U/L (ref 39–117)
ALT SERPL W P-5'-P-CCNC: 15 U/L (ref 1–33)
ANION GAP SERPL CALCULATED.3IONS-SCNC: 8.3 MMOL/L
ANISOCYTOSIS BLD QL: NORMAL
AST SERPL-CCNC: 12 U/L (ref 1–32)
BACTERIA UR QL AUTO: ABNORMAL /HPF
BASOPHILS # BLD AUTO: 0.01 10*3/MM3 (ref 0–0.2)
BASOPHILS NFR BLD AUTO: 0.1 % (ref 0–1.5)
BILIRUB SERPL-MCNC: 0.3 MG/DL (ref 0.1–1.2)
BILIRUB UR QL STRIP: NEGATIVE
BUN BLD-MCNC: 18 MG/DL (ref 8–23)
BUN/CREAT SERPL: 11 (ref 7–25)
C3 FRG RBC-MCNC: NORMAL
CALCIUM SPEC-SCNC: 8 MG/DL (ref 8.6–10.5)
CHLORIDE SERPL-SCNC: 110 MMOL/L (ref 98–107)
CLARITY UR: ABNORMAL
CO2 SERPL-SCNC: 23.7 MMOL/L (ref 22–29)
COLOR UR: YELLOW
CREAT BLD-MCNC: 1.64 MG/DL (ref 0.57–1)
D-LACTATE SERPL-SCNC: 1 MMOL/L (ref 0.5–2)
DACRYOCYTES BLD QL SMEAR: NORMAL
DEPRECATED RDW RBC AUTO: 56.3 FL (ref 37–54)
EOSINOPHIL # BLD AUTO: 0.11 10*3/MM3 (ref 0–0.7)
EOSINOPHIL NFR BLD AUTO: 1.5 % (ref 0.3–6.2)
ERYTHROCYTE [DISTWIDTH] IN BLOOD BY AUTOMATED COUNT: 14.3 % (ref 11.7–13)
GFR SERPL CREATININE-BSD FRML MDRD: 38 ML/MIN/1.73
GLOBULIN UR ELPH-MCNC: 5 GM/DL
GLUCOSE BLD-MCNC: 115 MG/DL (ref 65–99)
GLUCOSE UR STRIP-MCNC: NEGATIVE MG/DL
GRAN CASTS URNS QL MICRO: ABNORMAL /LPF
HCT VFR BLD AUTO: 34.4 % (ref 35.6–45.5)
HGB BLD-MCNC: 10.6 G/DL (ref 11.9–15.5)
HGB UR QL STRIP.AUTO: NEGATIVE
HOLD SPECIMEN: NORMAL
HYALINE CASTS UR QL AUTO: ABNORMAL /LPF
IMM GRANULOCYTES # BLD: 0.04 10*3/MM3 (ref 0–0.03)
IMM GRANULOCYTES NFR BLD: 0.5 % (ref 0–0.5)
KETONES UR QL STRIP: NEGATIVE
LEUKOCYTE ESTERASE UR QL STRIP.AUTO: ABNORMAL
LYMPHOCYTES # BLD AUTO: 1.7 10*3/MM3 (ref 0.9–4.8)
LYMPHOCYTES NFR BLD AUTO: 23.1 % (ref 19.6–45.3)
MACROCYTES BLD QL SMEAR: NORMAL
MCH RBC QN AUTO: 33.3 PG (ref 26.9–32)
MCHC RBC AUTO-ENTMCNC: 30.8 G/DL (ref 32.4–36.3)
MCV RBC AUTO: 108.2 FL (ref 80.5–98.2)
MONOCYTES # BLD AUTO: 0.84 10*3/MM3 (ref 0.2–1.2)
MONOCYTES NFR BLD AUTO: 11.4 % (ref 5–12)
NEUTROPHILS # BLD AUTO: 4.67 10*3/MM3 (ref 1.9–8.1)
NEUTROPHILS NFR BLD AUTO: 63.4 % (ref 42.7–76)
NEUTS HYPERSEG # BLD: NORMAL 10*3/UL
NITRITE UR QL STRIP: POSITIVE
NRBC BLD MANUAL-RTO: 0.2 /100 WBC (ref 0–0)
PH UR STRIP.AUTO: 7.5 [PH] (ref 5–8)
PLAT MORPH BLD: NORMAL
PLATELET # BLD AUTO: 106 10*3/MM3 (ref 140–500)
PMV BLD AUTO: 11.6 FL (ref 6–12)
POTASSIUM BLD-SCNC: 3.7 MMOL/L (ref 3.5–5.2)
PROT SERPL-MCNC: 8 G/DL (ref 6–8.5)
PROT UR QL STRIP: ABNORMAL
RBC # BLD AUTO: 3.18 10*6/MM3 (ref 3.9–5.2)
RBC # UR: ABNORMAL /HPF
REF LAB TEST METHOD: ABNORMAL
SODIUM BLD-SCNC: 142 MMOL/L (ref 136–145)
SP GR UR STRIP: 1.01 (ref 1–1.03)
SQUAMOUS #/AREA URNS HPF: ABNORMAL /HPF
TRANS CELLS #/AREA URNS HPF: ABNORMAL /HPF
TRI-PHOS CRY URNS QL MICRO: ABNORMAL /HPF
UROBILINOGEN UR QL STRIP: ABNORMAL
WBC NRBC COR # BLD: 7.37 10*3/MM3 (ref 4.5–10.7)
WBC UR QL AUTO: ABNORMAL /HPF
WHOLE BLOOD HOLD SPECIMEN: NORMAL

## 2018-05-13 PROCEDURE — 87186 SC STD MICRODIL/AGAR DIL: CPT | Performed by: EMERGENCY MEDICINE

## 2018-05-13 PROCEDURE — 80053 COMPREHEN METABOLIC PANEL: CPT | Performed by: EMERGENCY MEDICINE

## 2018-05-13 PROCEDURE — 74176 CT ABD & PELVIS W/O CONTRAST: CPT

## 2018-05-13 PROCEDURE — 81001 URINALYSIS AUTO W/SCOPE: CPT | Performed by: EMERGENCY MEDICINE

## 2018-05-13 PROCEDURE — 25010000002 MORPHINE PER 10 MG: Performed by: EMERGENCY MEDICINE

## 2018-05-13 PROCEDURE — 87040 BLOOD CULTURE FOR BACTERIA: CPT | Performed by: EMERGENCY MEDICINE

## 2018-05-13 PROCEDURE — 25010000002 ONDANSETRON PER 1 MG: Performed by: EMERGENCY MEDICINE

## 2018-05-13 PROCEDURE — 25010000002 LEVOFLOXACIN PER 250 MG: Performed by: EMERGENCY MEDICINE

## 2018-05-13 PROCEDURE — 99284 EMERGENCY DEPT VISIT MOD MDM: CPT

## 2018-05-13 PROCEDURE — 85025 COMPLETE CBC W/AUTO DIFF WBC: CPT | Performed by: EMERGENCY MEDICINE

## 2018-05-13 PROCEDURE — 87086 URINE CULTURE/COLONY COUNT: CPT | Performed by: EMERGENCY MEDICINE

## 2018-05-13 PROCEDURE — 85007 BL SMEAR W/DIFF WBC COUNT: CPT | Performed by: EMERGENCY MEDICINE

## 2018-05-13 PROCEDURE — 83605 ASSAY OF LACTIC ACID: CPT | Performed by: EMERGENCY MEDICINE

## 2018-05-13 RX ORDER — ACETAMINOPHEN 500 MG
1000 TABLET ORAL ONCE
Status: COMPLETED | OUTPATIENT
Start: 2018-05-13 | End: 2018-05-13

## 2018-05-13 RX ORDER — LEVOFLOXACIN 5 MG/ML
750 INJECTION, SOLUTION INTRAVENOUS ONCE
Status: COMPLETED | OUTPATIENT
Start: 2018-05-13 | End: 2018-05-13

## 2018-05-13 RX ORDER — ONDANSETRON 2 MG/ML
4 INJECTION INTRAMUSCULAR; INTRAVENOUS ONCE
Status: COMPLETED | OUTPATIENT
Start: 2018-05-13 | End: 2018-05-13

## 2018-05-13 RX ADMIN — MORPHINE SULFATE 4 MG: 4 INJECTION, SOLUTION INTRAMUSCULAR; INTRAVENOUS at 19:29

## 2018-05-13 RX ADMIN — ONDANSETRON 4 MG: 2 INJECTION INTRAMUSCULAR; INTRAVENOUS at 19:29

## 2018-05-13 RX ADMIN — ACETAMINOPHEN 1000 MG: 500 TABLET, FILM COATED ORAL at 19:32

## 2018-05-13 RX ADMIN — LEVOFLOXACIN 750 MG: 5 INJECTION, SOLUTION INTRAVENOUS at 20:56

## 2018-05-13 RX ADMIN — SODIUM CHLORIDE 500 ML: 9 INJECTION, SOLUTION INTRAVENOUS at 20:26

## 2018-05-13 NOTE — ED PROVIDER NOTES
EMERGENCY DEPARTMENT ENCOUNTER    CHIEF COMPLAINT  Chief Complaint: abdominal pain  History given by: patient  History limited by: none  Room Number: 37/37  PMD: Cristo Madera MD      HPI:  Pt is a 67 y.o. female who presents complaining of intermittent LLQ abdominal pain X 2 days. She developed an associated fever w/ unknown onset. She denies n/v/d,  complaints, and constipation. The pain does not radiate, but is worsened with movement. Food consumption does not worsen the pain. She has hx of diverticulitis, diverticulosis, hysterectomy, and left kidney removal. Her last colonoscopy was a year ago. She take ASA daily, but denies any other anticoagulants.     Duration:  Two days  Onset: gradual  Timing: intermittent  Location: LLQ  Radiation: none  Quality: 'pain'  Intensity/Severity: moderate  Progression: unchanged  Associated Symptoms: fever  Aggravating Factors: movement  Alleviating Factors: none  Previous Episodes: pt has hx of   Treatment before arrival: none    PAST MEDICAL HISTORY  Active Ambulatory Problems     Diagnosis Date Noted   • Pancytopenia 10/14/2008   • Arthropathy of knee 01/07/2014   • Chronic kidney disease, stage III (moderate)    • Congestive heart failure    • Malignant neoplasm of kidney 12/01/2009   • Multiple myeloma not having achieved remission 04/01/2012   • Personal history of breast cancer 04/01/2012   • Seizure disorder 10/17/2007   • Complex partial epilepsy 10/28/2015   • Meningioma 03/11/2016   • Neutropenia, drug-induced 05/05/2016   • Anemia associated with chemotherapy 05/17/2016   • Chemotherapy-induced thrombocytopenia 09/27/2016   • Fitting and adjustment of vascular catheter 01/19/2017   • Pulmonary nodule, right 02/09/2017   • Pulmonary hypertension 08/03/2017   • Breast nodule, right medial breast 08/03/2017   • Tinea corporis 01/03/2018   • Dysuria 02/09/2018   • Macrocytosis 03/09/2018     Resolved Ambulatory Problems     Diagnosis Date Noted   • Bursitis, knee  12/02/2013   • Calcaneal spur 08/12/2009   • Diarrhea of presumed infectious origin 04/01/2014   • Diverticulitis of colon 10/17/2007   • Bursitis of left hip 10/18/2007   • LLL pneumonia 04/23/2009   • Neoplasm of uncertain behavior 10/12/2015   • Thrombocytopenia    • UTI (urinary tract infection) 10/30/2014   • UTI (urinary tract infection) 03/28/2014   • Asymptomatic bacteriuria 10/28/2015   • Acute cystitis with hematuria 03/21/2016   • Urinary tract infection without hematuria 05/06/2016   • History of Clostridium difficile    • Bursitis of right hip 08/31/2016   • Cough 11/22/2016   • Bronchitis, acute 11/22/2016   • Fever 01/19/2017   • Febrile urinary tract infection 01/19/2017   • Generalized weakness 05/06/2017   • Neutropenia associated with infection 05/08/2017   • Flu 05/12/2017   • Post-infectious thrombocytopenia 05/12/2017   • Positive colorectal cancer screening using DNA-based stool test 08/16/2017     Past Medical History:   Diagnosis Date   • Anemia 10/14/2008   • Arthropathy of knee 01/07/2014   • Brain cancer    • Breast cancer 04/2012   • Bursitis of left hip 10/18/2007   • Bursitis, knee 12/02/2013   • Calcaneal spur 08/12/2009   • Chronic kidney disease, stage III (moderate)    • Congestive heart failure    • Diarrhea of presumed infectious origin 04/01/2014   • Diverticulitis of colon 10/17/2007   • DVT (deep venous thrombosis)    • History of Clostridium difficile    • History of echocardiogram 04/2014   • History of MRSA infection    • History of transfusion    • Hydronephrosis    • Hypertension    • LLL pneumonia 04/23/2009   • Malignant neoplasm of kidney 12/2009   • Multiple myeloma 04/2012   • Neoplasm of uncertain behavior 10/12/2015   • Pyelonephritis 03/2004   • Seizures 10/17/2007   • Thrombocytopenia    • UTI (urinary tract infection) 10/30/2014   • UTI (urinary tract infection) 03/28/2014       PAST SURGICAL HISTORY  Past Surgical History:   Procedure Laterality Date   • BONE  MARROW BIOPSY N/A 04/11/2012   • BRAIN SURGERY  2005    Meningioma   • BREAST BIOPSY Left 04/09/2012    Dr. Gregg May   • CARDIAC CATHETERIZATION Left 06/11/2012    Dr. Sudeep Haddad   • CARDIAC CATHETERIZATION N/A 08/15/2006    Dr. Brian Bhatt   • COLONOSCOPY N/A unknown   • COLONOSCOPY N/A 8/30/2017    Procedure: COLONOSCOPY into cecum and TI with cold polypectomies;  Surgeon: Trudy Rivera MD;  Location: [facility] ENDOSCOPY;  Service:    • CYSTOSCOPY N/A 3/25/2016    Procedure: CYSTOSCOPY  WITH RIGHT STENT CHANGE;  Surgeon: Lakhwinder Russo MD;  Location: [facility] OR;  Service:    • CYSTOSCOPY N/A 03/10/2012    Cystoscopy, right retrograde, right double-J stent-Dr. Sloan May   • CYSTOSCOPY N/A 02/25/2012    Cystoscopy, stent removal, right retrograde pyelogram, replacement of right double-J ureteral stent-Dr. Michael Everett   • CYSTOSCOPY W/ URETERAL STENT PLACEMENT Right 5/7/2016    Procedure: CYSTOSCOPY, URETERAL CATHETER INSERTION AND RIGHT STENT EXCHANGE ;  Surgeon: Michael Everett Jr., MD;  Location: [facility] OR;  Service:    • CYSTOSCOPY W/ URETERAL STENT PLACEMENT N/A 1/20/2017    Procedure: CYSTOSCOPY RIGHT RETROGRADE PYELOGRAM, URETERAL STENT CHANGE;  Surgeon: Lakhwinder Russo MD;  Location: [facility] OR;  Service:    • CYSTOSCOPY W/ URETERAL STENT PLACEMENT N/A 04/02/2015    Cystoscopy, removal of right ureteral stent, right retrograde pyelogram, placement of right double-J ureteral stent-Dr. Michael Everett   • CYSTOSCOPY W/ URETERAL STENT PLACEMENT N/A 04/26/2015    Cystoscopy, removal of right ureteral stent, right retrograde pyelogram, replacement of right ureteral stent 24 cm x 7-Micronesian without retrieval line-Dr. Jose Gomez   • CYSTOSCOPY W/ URETERAL STENT PLACEMENT N/A 12/22/2014    Cystoscopy, removal of right double-J ureteral stent, right retrograde pyelogram, placement of right double-J ureteral stent-Dr. Michael Everett   • CYSTOSCOPY W/ URETERAL STENT PLACEMENT N/A  09/22/2014    Cystoscopy, removal of right ureteral stent, removal of right retrograde pyelogram, replacement of right double-J ureteral stent-Dr. Michael Everett   • CYSTOSCOPY W/ URETERAL STENT PLACEMENT N/A 06/18/2014    Cystoscopy, removal of right double-J ureteral stent, right retrograde pyelogram, placement of right double-J ureteral stent-Dr. Michael Everett   • CYSTOSCOPY W/ URETERAL STENT PLACEMENT N/A 01/23/2014    Cystoscopy, removal of right double-J ureteral stent, right retrograde pyelogram, placement of right double-J ureteral stent-Dr. Michael Everett   • CYSTOSCOPY W/ URETERAL STENT PLACEMENT N/A 03/27/2012    Cystoscopy, removal of ureteral stent, right retrograde pyelogram, replacement of indewlling right ureteral stent-Dr. Michael Everett   • CYSTOSCOPY W/ URETERAL STENT PLACEMENT N/A 03/27/2013    Cystoscopy, right retrograde pyelogram, removal and replacement of right double-J ureteral stent-Dr. Michael Everett   • CYSTOSCOPY W/ URETERAL STENT PLACEMENT N/A 11/12/2012    Cystoscopy, stent removal, right retrograde pyelogram, replacement of right double-J ureteral stent-Dr. Michael Everett   • CYSTOSCOPY W/ URETERAL STENT PLACEMENT N/A 09/24/2011    Cystoscopy, removal of ureteral stent, right retrograde pyelogram and placement of right double-J ureteral stent-Dr. Michael Everett   • CYSTOSCOPY W/ URETERAL STENT PLACEMENT N/A 05/14/2011    Cystoscopy, removal of right ureteral stent, right retrograde pyelogram and placement of new right double-J ureteral stent-Dr. Michael Everett   • CYSTOSCOPY W/ URETERAL STENT PLACEMENT N/A 12/31/2010    Cystoscopy, stent removal, right retrograde pyelogram, replacement of 7 Chinese x 26 cm double-J stent-Dr. Michael Everett   • CYSTOSCOPY W/ URETERAL STENT PLACEMENT N/A 08/28/2010    Cystoscopy, stent removal, right retrograde pyelogram with interpretation, placement of right double-J ureteral stent-Dr. Michael Everett   • CYSTOSCOPY W/ URETERAL STENT PLACEMENT N/A 05/24/2010    Cystoscopy, right  retrograde pyelogram, replacement of right double-J ureteral stent-Dr. Michael Everett   • CYSTOSCOPY W/ URETERAL STENT PLACEMENT N/A 02/12/2010    Cystoscopy, right retrograde pyelogram and placement of right double-J ureteral stent-Dr. Michael Everett   • CYSTOSCOPY W/ URETERAL STENT PLACEMENT N/A 02/23/2009    Cystoscopy, right retrograde pyelogram, placement of right double-J ureteral stent-Dr. Michael Everett   • CYSTOSCOPY W/ URETERAL STENT PLACEMENT N/A 09/17/2007    Dr. Michael Everett   • CYSTOSCOPY W/ URETERAL STENT PLACEMENT Right 01/29/2018    Dr. Everett   • DIAGNOSTIC LAPAROSCOPY EXPLORATORY LAPAROTOMY N/A 05/02/2006    Diagnostic laparoscopy, exploratory laparotomy with bilateral salpingo oopherectomy, primary reanastomosis of right ureter-Dr. Cristo Pittman   • MASTECTOMY Left 04/25/2012    Left total mastectomy with sentinel lymph node biopsies and left axillary lymphatic mapping-Dr. Gregg May   • NEPHRECTOMY Left 12/30/2009    Dr. 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 Cristo Pittman   • TOTAL ABDOMINAL HYSTERECTOMY Bilateral 2007    Dr. Todd   • URETEROURETEROSTOMY Right 05/01/2006    Dr. Michael Everett   • VENA CAVA FILTER INSERTION N/A 05/08/2006    Dr. Jordon Barber   • VENOUS ACCESS DEVICE (PORT) INSERTION Right 02/25/2013    Right subclavian vein PowerPort insertion-Dr. Gregg May   • VENOUS ACCESS DEVICE (PORT) INSERTION AND REMOVAL N/A 10/06/2014    Revision of right internal jugular PowerPort with fluoroscopy, removal of peripherally inserted central catheter line-Dr. Aurora Tomlinson   • VENOUS ACCESS DEVICE (PORT) INSERTION AND REMOVAL N/A 08/14/2014    Ultrasound-guided access right internal jugular vein, PowerPort placement, removal of right subclavian port-Dr. Jordon Barber       FAMILY HISTORY  Family History   Problem Relation Age of Onset   • Hypertension Other    • Miscarriages / Stillbirths Father    • Heart disease  Father    • Hypertension Father    • Heart attack Father    • Diabetes Sister    • Skin cancer Sister    • Throat cancer Brother        SOCIAL HISTORY  Social History     Social History   • Marital status:      Spouse name: N/A   • Number of children: 3   • Years of education: High school     Occupational History   •  Retired     [employer] [[id number]]     Social History Main Topics   • Smoking status: Never Smoker   • Smokeless tobacco: Never Used   • Alcohol use No   • Drug use: No   • Sexual activity: Defer     Other Topics Concern   • Not on file     Social History Narrative    Son lives with her and helps       ALLERGIES  Zosyn [piperacillin sod-tazobactam so]    REVIEW OF SYSTEMS  Review of Systems   Constitutional: Positive for fever.   HENT: Negative for sore throat.    Eyes: Negative.    Respiratory: Negative for cough and shortness of breath.    Cardiovascular: Negative for chest pain.   Gastrointestinal: Positive for abdominal pain. Negative for diarrhea and vomiting.   Genitourinary: Negative for dysuria.   Musculoskeletal: Negative for neck pain.   Skin: Negative for rash.   Allergic/Immunologic: Negative.    Neurological: Negative for weakness, numbness and headaches.   Hematological: Negative.    Psychiatric/Behavioral: Negative.    All other systems reviewed and are negative.      PHYSICAL EXAM  ED Triage Vitals   Temp Heart Rate Resp BP SpO2   05/13/18 1822 05/13/18 1822 05/13/18 1822 05/13/18 1839 05/13/18 1822   (!) 102.1 °F (38.9 °C) 93 18 140/70 97 %      Temp src Heart Rate Source Patient Position BP Location FiO2 (%)   05/13/18 1822 -- 05/13/18 1839 05/13/18 1839 --   Tympanic  Sitting Right arm        Physical Exam   Constitutional: She is oriented to person, place, and time. She appears distressed.   HENT:   Head: Normocephalic and atraumatic.   Eyes: EOM are normal. Pupils are equal, round, and reactive to light.   Neck: Normal range of motion. Neck supple.    Cardiovascular: Normal rate, regular rhythm and normal heart sounds.    Pulmonary/Chest: Effort normal and breath sounds normal. No respiratory distress.   Abdominal: Soft. There is tenderness (moderate) in the left lower quadrant. There is no rebound and no guarding.   Musculoskeletal: Normal range of motion. She exhibits no edema.   Neurological: She is alert and oriented to person, place, and time. She has normal sensation and normal strength.   Skin: Skin is warm and dry. No rash noted.   Psychiatric: Mood and affect normal.   Nursing note and vitals reviewed.      LAB RESULTS  Lab Results (last 24 hours)     Procedure Component Value Units Date/Time    CBC & Differential [[id number]] Collected:  05/13/18 1927    Specimen:  Blood Updated:  05/13/18 2027    Narrative:       The following orders were created for panel order CBC & Differential.  Procedure                               Abnormality         Status                     ---------                               -----------         ------                     Scan Slide[[id number]]                                       Final result               CBC Auto Differential[[id number]]        Abnormal            Final result                 Please view results for these tests on the individual orders.    Comprehensive Metabolic Panel [[id number]]  (Abnormal) Collected:  05/13/18 1927    Specimen:  Blood Updated:  05/13/18 2004     Glucose 115 (H) mg/dL      BUN 18 mg/dL      Creatinine 1.64 (H) mg/dL      Sodium 142 mmol/L      Potassium 3.7 mmol/L      Chloride 110 (H) mmol/L      CO2 23.7 mmol/L      Calcium 8.0 (L) mg/dL      Total Protein 8.0 g/dL      Albumin 3.00 (L) g/dL      ALT (SGPT) 15 U/L      AST (SGOT) 12 U/L      Alkaline Phosphatase 59 U/L      Total Bilirubin 0.3 mg/dL      eGFR   Amer 38 (L) mL/min/1.73      Globulin 5.0 gm/dL      A/G Ratio 0.6 g/dL      BUN/Creatinine Ratio 11.0     Anion Gap 8.3 mmol/L     Lactic Acid, Plasma [[id number]]   (Normal) Collected:  05/13/18 1927    Specimen:  Blood Updated:  05/13/18 1954     Lactate 1.0 mmol/L     CBC Auto Differential [[id number]]  (Abnormal) Collected:  05/13/18 1927    Specimen:  Blood Updated:  05/13/18 2027     WBC 7.37 10*3/mm3      RBC 3.18 (L) 10*6/mm3      Hemoglobin 10.6 (L) g/dL      Hematocrit 34.4 (L) %      .2 (H) fL      MCH 33.3 (H) pg      MCHC 30.8 (L) g/dL      RDW 14.3 (H) %      RDW-SD 56.3 (H) fl      MPV 11.6 fL      Platelets 106 (L) 10*3/mm3      Neutrophil % 63.4 %      Lymphocyte % 23.1 %      Monocyte % 11.4 %      Eosinophil % 1.5 %      Basophil % 0.1 %      Immature Grans % 0.5 %      Neutrophils, Absolute 4.67 10*3/mm3      Lymphocytes, Absolute 1.70 10*3/mm3      Monocytes, Absolute 0.84 10*3/mm3      Eosinophils, Absolute 0.11 10*3/mm3      Basophils, Absolute 0.01 10*3/mm3      Immature Grans, Absolute 0.04 (H) 10*3/mm3      nRBC 0.2 (H) /100 WBC     Scan Slide [[id number]] Collected:  05/13/18 1927    Specimen:  Blood Updated:  05/13/18 2027     Anisocytosis Mod/2+     Dacrocytes Mod/2+     Macrocytes Mod/2+     RBC Fragments Slight/1+     Hypersegmented Neutrophils Slight/1+     Platelet Morphology Normal    Urinalysis With / Culture If Indicated - Urine, Catheter [[id number]]  (Abnormal) Collected:  05/13/18 1928    Specimen:  Urine from Urine, Catheter Updated:  05/13/18 2039     Color, UA Yellow     Appearance, UA Cloudy (A)     pH, UA 7.5     Specific Gravity, UA 1.015     Glucose, UA Negative     Ketones, UA Negative     Bilirubin, UA Negative     Blood, UA Negative     Protein,  mg/dL (2+) (A)     Leuk Esterase, UA Large (3+) (A)     Nitrite, UA Positive (A)     Urobilinogen, UA 0.2 E.U./dL    Urinalysis, Microscopic Only - Urine, Clean Catch [[id number]]  (Abnormal) Collected:  05/13/18 1928    Specimen:  Urine from Urine, Catheter Updated:  05/13/18 2045     RBC, UA 3-5 (A) /HPF      WBC, UA Too Numerous to Count (A) /HPF      Bacteria, UA 4+ (A) /HPF       Squamous Epithelial Cells, UA 3-6 (A) /HPF      Transitional Epithelial Cells, UA 3-6 (A) /HPF      Hyaline Casts, UA 21-30 /LPF      Granular Casts, UA 0-2 /LPF      Triple Phosphate Crystals, UA Moderate/2+ /HPF      Methodology Manual Light Microscopy    Urine Culture - Urine, Urine, Clean Catch [[id number]] Collected:  05/13/18 1928    Specimen:  Urine from Urine, Catheter Updated:  05/13/18 1948          I ordered the above labs and reviewed the results    RADIOLOGY  CT Abdomen Pelvis Without Contrast   Preliminary Result   1.  Mild diverticulitis of the proximal sigmoid colon, no obstruction,   perforation or abscess.   2.  Large masslike lesion of the right hepatic lobe is favored to be a   hemangioma, not significantly changed.   3.  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   4.  Double-J stent in the right renal collecting system. Left   nephrectomy.   5.  Umbilical hernia and left lumbar hernia demonstrated no significant   change.                         I ordered the above noted radiological studies. Interpreted by radiologist. Reviewed by me in PACS.       PROCEDURES  Procedures      PROGRESS AND CONSULTS  ED Course   2034  BP- 133/70 HR- 94 Temp- (!) 100.9 °F (38.3 °C) (Oral) O2 sat- 96%  Rechecked the patient who is in NAD and is resting comfortably. Pt feels improved. Discussed imaging showing diverticulitis. Questioned pt about chemotherapy. She advised that she receives chemo (last 05/06/18) w/ decadron use. Her oncologist is Dr. Araujo, [facility]. Pt told the plan to admit for treatment due to concurrent treatment of multiple myeloma. Pt understands and agrees with the plan, all questions answered.     2106  Discussed the pt w/ Dr. Ferrell [facility]. He agreed to admit.    MEDICAL DECISION MAKING  Results were reviewed/discussed with the patient and they were also made aware of online access. Pt also made aware that some labs, such as  cultures, will not be resulted during ER visit and follow up with PMD is necessary.     MDM  Number of Diagnoses or Management Options  Sigmoid diverticulitis:      Amount and/or Complexity of Data Reviewed  Clinical lab tests: reviewed (WBC 7.37)  Tests in the radiology section of CPT®: reviewed (CT abd-mild diverticulitis of the proximal sigmoid colon w/o obstruction.)  Discuss the patient with other providers: yes (Dr. Ferrell, [facility])           DIAGNOSIS  Final diagnoses:   Sigmoid diverticulitis       DISPOSITION  ADMISSION    Discussed treatment plan and reason for admission with pt/family and admitting physician.  Pt/family voiced understanding of the plan for admission for further testing/treatment as needed.     Latest Documented Vital Signs:  As of 9:08 PM  BP- 133/70 HR- 94 Temp- (!) 100.9 °F (38.3 °C) (Oral) O2 sat- 96%    --  Documentation assistance provided by amando Michaels for Dr. Mccormack.  Information recorded by the scribe was done at my direction and has been verified and validated by me.     Mary Michaels  05/13/18 3684       Anthony Mccormack MD  05/14/18 3461

## 2018-05-14 PROBLEM — I10 HTN (HYPERTENSION): Status: ACTIVE | Noted: 2018-05-14

## 2018-05-14 PROBLEM — N18.9 CKD (CHRONIC KIDNEY DISEASE): Status: ACTIVE | Noted: 2018-05-14

## 2018-05-14 LAB
ANION GAP SERPL CALCULATED.3IONS-SCNC: 8.2 MMOL/L
BUN BLD-MCNC: 16 MG/DL (ref 8–23)
BUN/CREAT SERPL: 10.5 (ref 7–25)
CALCIUM SPEC-SCNC: 7.9 MG/DL (ref 8.6–10.5)
CHLORIDE SERPL-SCNC: 112 MMOL/L (ref 98–107)
CO2 SERPL-SCNC: 22.8 MMOL/L (ref 22–29)
CREAT BLD-MCNC: 1.52 MG/DL (ref 0.57–1)
DEPRECATED RDW RBC AUTO: 56.9 FL (ref 37–54)
ERYTHROCYTE [DISTWIDTH] IN BLOOD BY AUTOMATED COUNT: 14.3 % (ref 11.7–13)
GFR SERPL CREATININE-BSD FRML MDRD: 41 ML/MIN/1.73
GLUCOSE BLD-MCNC: 94 MG/DL (ref 65–99)
HCT VFR BLD AUTO: 33.1 % (ref 35.6–45.5)
HGB BLD-MCNC: 10 G/DL (ref 11.9–15.5)
MCH RBC QN AUTO: 33 PG (ref 26.9–32)
MCHC RBC AUTO-ENTMCNC: 30.2 G/DL (ref 32.4–36.3)
MCV RBC AUTO: 109.2 FL (ref 80.5–98.2)
PLATELET # BLD AUTO: 88 10*3/MM3 (ref 140–500)
PMV BLD AUTO: 11.8 FL (ref 6–12)
POTASSIUM BLD-SCNC: 3.7 MMOL/L (ref 3.5–5.2)
RBC # BLD AUTO: 3.03 10*6/MM3 (ref 3.9–5.2)
SODIUM BLD-SCNC: 143 MMOL/L (ref 136–145)
WBC NRBC COR # BLD: 6.61 10*3/MM3 (ref 4.5–10.7)

## 2018-05-14 PROCEDURE — 99222 1ST HOSP IP/OBS MODERATE 55: CPT | Performed by: SURGERY

## 2018-05-14 PROCEDURE — 25010000002 LEVOFLOXACIN PER 250 MG: Performed by: HOSPITALIST

## 2018-05-14 PROCEDURE — 25010000002 HEPARIN (PORCINE) PER 1000 UNITS: Performed by: HOSPITALIST

## 2018-05-14 PROCEDURE — 80048 BASIC METABOLIC PNL TOTAL CA: CPT | Performed by: HOSPITALIST

## 2018-05-14 PROCEDURE — 85027 COMPLETE CBC AUTOMATED: CPT | Performed by: HOSPITALIST

## 2018-05-14 PROCEDURE — 99223 1ST HOSP IP/OBS HIGH 75: CPT | Performed by: INTERNAL MEDICINE

## 2018-05-14 RX ORDER — ISOSORBIDE MONONITRATE 30 MG/1
30 TABLET, EXTENDED RELEASE ORAL EVERY 24 HOURS
Status: DISCONTINUED | OUTPATIENT
Start: 2018-05-14 | End: 2018-05-17 | Stop reason: HOSPADM

## 2018-05-14 RX ORDER — MORPHINE SULFATE 2 MG/ML
1 INJECTION, SOLUTION INTRAMUSCULAR; INTRAVENOUS
Status: DISCONTINUED | OUTPATIENT
Start: 2018-05-14 | End: 2018-05-17 | Stop reason: HOSPADM

## 2018-05-14 RX ORDER — BISOPROLOL FUMARATE 5 MG/1
5 TABLET, FILM COATED ORAL
Status: DISCONTINUED | OUTPATIENT
Start: 2018-05-14 | End: 2018-05-17 | Stop reason: HOSPADM

## 2018-05-14 RX ORDER — LEVOFLOXACIN 5 MG/ML
500 INJECTION, SOLUTION INTRAVENOUS EVERY 24 HOURS
Status: DISCONTINUED | OUTPATIENT
Start: 2018-05-14 | End: 2018-05-15

## 2018-05-14 RX ORDER — SODIUM CHLORIDE 0.9 % (FLUSH) 0.9 %
1-10 SYRINGE (ML) INJECTION AS NEEDED
Status: DISCONTINUED | OUTPATIENT
Start: 2018-05-14 | End: 2018-05-17 | Stop reason: HOSPADM

## 2018-05-14 RX ORDER — ACYCLOVIR 400 MG/1
400 TABLET ORAL 2 TIMES DAILY
Status: DISCONTINUED | OUTPATIENT
Start: 2018-05-14 | End: 2018-05-17 | Stop reason: HOSPADM

## 2018-05-14 RX ORDER — MONTELUKAST SODIUM 10 MG/1
10 TABLET ORAL NIGHTLY
Status: DISCONTINUED | OUTPATIENT
Start: 2018-05-14 | End: 2018-05-17 | Stop reason: HOSPADM

## 2018-05-14 RX ORDER — CARBAMAZEPINE 200 MG/1
400 TABLET, EXTENDED RELEASE ORAL NIGHTLY
Status: DISCONTINUED | OUTPATIENT
Start: 2018-05-14 | End: 2018-05-17 | Stop reason: HOSPADM

## 2018-05-14 RX ORDER — ISOSORBIDE MONONITRATE 60 MG/1
60 TABLET, EXTENDED RELEASE ORAL DAILY
Status: DISCONTINUED | OUTPATIENT
Start: 2018-05-14 | End: 2018-05-17 | Stop reason: HOSPADM

## 2018-05-14 RX ORDER — LOSARTAN POTASSIUM 25 MG/1
25 TABLET ORAL EVERY EVENING
Status: DISCONTINUED | OUTPATIENT
Start: 2018-05-14 | End: 2018-05-17 | Stop reason: HOSPADM

## 2018-05-14 RX ORDER — FOLIC ACID 1 MG/1
1 TABLET ORAL EVERY MORNING
Status: DISCONTINUED | OUTPATIENT
Start: 2018-05-14 | End: 2018-05-17 | Stop reason: HOSPADM

## 2018-05-14 RX ORDER — HYDROCODONE BITARTRATE AND ACETAMINOPHEN 5; 325 MG/1; MG/1
1 TABLET ORAL EVERY 6 HOURS PRN
Status: DISCONTINUED | OUTPATIENT
Start: 2018-05-14 | End: 2018-05-17 | Stop reason: HOSPADM

## 2018-05-14 RX ORDER — HEPARIN SODIUM 5000 [USP'U]/ML
5000 INJECTION, SOLUTION INTRAVENOUS; SUBCUTANEOUS EVERY 8 HOURS SCHEDULED
Status: DISCONTINUED | OUTPATIENT
Start: 2018-05-14 | End: 2018-05-17 | Stop reason: HOSPADM

## 2018-05-14 RX ORDER — ACETAMINOPHEN 500 MG
500 TABLET ORAL EVERY 4 HOURS PRN
Status: DISCONTINUED | OUTPATIENT
Start: 2018-05-14 | End: 2018-05-17 | Stop reason: HOSPADM

## 2018-05-14 RX ORDER — ONDANSETRON 2 MG/ML
4 INJECTION INTRAMUSCULAR; INTRAVENOUS EVERY 6 HOURS PRN
Status: DISCONTINUED | OUTPATIENT
Start: 2018-05-14 | End: 2018-05-17 | Stop reason: HOSPADM

## 2018-05-14 RX ORDER — ASPIRIN 81 MG/1
81 TABLET ORAL DAILY
Status: DISCONTINUED | OUTPATIENT
Start: 2018-05-14 | End: 2018-05-17 | Stop reason: HOSPADM

## 2018-05-14 RX ORDER — HYDRALAZINE HYDROCHLORIDE 25 MG/1
25 TABLET, FILM COATED ORAL EVERY 12 HOURS SCHEDULED
Status: DISCONTINUED | OUTPATIENT
Start: 2018-05-14 | End: 2018-05-17 | Stop reason: HOSPADM

## 2018-05-14 RX ADMIN — ACYCLOVIR 400 MG: 400 TABLET ORAL at 21:55

## 2018-05-14 RX ADMIN — ISOSORBIDE MONONITRATE 30 MG: 30 TABLET ORAL at 21:55

## 2018-05-14 RX ADMIN — HEPARIN SODIUM 5000 UNITS: 5000 INJECTION, SOLUTION INTRAVENOUS; SUBCUTANEOUS at 05:27

## 2018-05-14 RX ADMIN — HYDROCODONE BITARTRATE AND ACETAMINOPHEN 1 TABLET: 5; 325 TABLET ORAL at 14:33

## 2018-05-14 RX ADMIN — BISOPROLOL FUMARATE 5 MG: 5 TABLET ORAL at 09:31

## 2018-05-14 RX ADMIN — HEPARIN SODIUM 5000 UNITS: 5000 INJECTION, SOLUTION INTRAVENOUS; SUBCUTANEOUS at 21:54

## 2018-05-14 RX ADMIN — METRONIDAZOLE 500 MG: 500 INJECTION, SOLUTION INTRAVENOUS at 01:58

## 2018-05-14 RX ADMIN — ASPIRIN 81 MG: 81 TABLET ORAL at 09:31

## 2018-05-14 RX ADMIN — LEVOFLOXACIN 500 MG: 5 INJECTION, SOLUTION INTRAVENOUS at 21:54

## 2018-05-14 RX ADMIN — METRONIDAZOLE 500 MG: 500 INJECTION, SOLUTION INTRAVENOUS at 09:31

## 2018-05-14 RX ADMIN — METRONIDAZOLE 500 MG: 500 INJECTION, SOLUTION INTRAVENOUS at 18:16

## 2018-05-14 RX ADMIN — MONTELUKAST 10 MG: 10 TABLET, FILM COATED ORAL at 01:58

## 2018-05-14 RX ADMIN — HEPARIN SODIUM 5000 UNITS: 5000 INJECTION, SOLUTION INTRAVENOUS; SUBCUTANEOUS at 14:33

## 2018-05-14 RX ADMIN — ACYCLOVIR 400 MG: 400 TABLET ORAL at 09:31

## 2018-05-14 RX ADMIN — HYDRALAZINE HYDROCHLORIDE 25 MG: 25 TABLET, FILM COATED ORAL at 01:59

## 2018-05-14 RX ADMIN — HYDROCODONE BITARTRATE AND ACETAMINOPHEN 1 TABLET: 5; 325 TABLET ORAL at 05:27

## 2018-05-14 RX ADMIN — MONTELUKAST 10 MG: 10 TABLET, FILM COATED ORAL at 21:55

## 2018-05-14 RX ADMIN — HYDRALAZINE HYDROCHLORIDE 25 MG: 25 TABLET, FILM COATED ORAL at 21:55

## 2018-05-14 RX ADMIN — FOLIC ACID 1 MG: 1 TABLET ORAL at 14:33

## 2018-05-14 RX ADMIN — HYDRALAZINE HYDROCHLORIDE 25 MG: 25 TABLET, FILM COATED ORAL at 09:31

## 2018-05-14 RX ADMIN — LOSARTAN POTASSIUM 25 MG: 25 TABLET, FILM COATED ORAL at 18:16

## 2018-05-14 RX ADMIN — ISOSORBIDE MONONITRATE 60 MG: 30 TABLET ORAL at 09:31

## 2018-05-14 RX ADMIN — ACYCLOVIR 400 MG: 400 TABLET ORAL at 01:59

## 2018-05-14 RX ADMIN — CARBAMAZEPINE 400 MG: 200 TABLET, EXTENDED RELEASE ORAL at 01:59

## 2018-05-14 NOTE — PROGRESS NOTES
Discharge Planning Assessment   [facility]     Patient Name: Marina Barroso  MRN: [id number]  Today's Date: 5/14/2018    Admit Date: 5/13/2018          Discharge Needs Assessment     Row Name 05/14/18 1612       Living Environment    Lives With alone    Current Living Arrangements home/apartment/condo    Primary Care Provided by self    Provides Primary Care For no one    Family Caregiver if Needed child(elma), adult    Family Caregiver Names Daughter Patricia Grewal [phone]    Quality of Family Relationships supportive    Able to Return to Prior Arrangements yes       Resource/Environmental Concerns    Resource/Environmental Concerns none    Transportation Concerns car, none       Transition Planning    Patient/Family Anticipates Transition to home    Patient/Family Anticipated Services at Transition none    Transportation Anticipated family or friend will provide       Discharge Needs Assessment    Readmission Within the Last 30 Days no previous admission in last 30 days    Concerns to be Addressed no discharge needs identified    Equipment Currently Used at Home none    Anticipated Changes Related to Illness none    Equipment Needed After Discharge none            Discharge Plan     Row Name 05/14/18 1614       Plan    Plan Home    Patient/Family in Agreement with Plan yes    Plan Comments IMM 5/13/2018.  Met with patient at bedside, face sheet verified. Prior to admission patient was able to perform her own ADL's, including driving.  Patient does not use any adaptive equipment at home. Patient uses the [facility] pharmacy on [address], denies issues obtaining or affording medications. Patient does not have a POA or living will, patient states her daughter Patricia Grewal knows her wishes.  Discussed discharge plans, her plan is to return home, denies additional needs at home.  Will continue to follow for discharge needs.         Destination     Service Request Status Selected Specialties Address Phone Number Fax  Number    [facility] Pending - Request Sent N/A [address], [facility] [id number] [phone]        Edilia Waller, RN 5/14/2018 1056    Added in Error. Edilia Waller, RN                   Durable Medical Equipment     No service coordination in this encounter.      Dialysis/Infusion     No service coordination in this encounter.      Home Medical Care     No service coordination in this encounter.      Social Care     No service coordination in this encounter.                Demographic Summary     Row Name 05/14/18 1609       General Information    Admission Type inpatient    Arrived From emergency department    Required Notices Provided Important Message from Medicare    Referral Source admission list    Reason for Consult discharge planning    Preferred Language English     Used During This Interaction no       Contact Information    Permission Granted to Share Info With family/designee   Daughter Patricia Grewal [phone]            Functional Status     Row Name 05/14/18 1612       Functional Status    Usual Activity Tolerance excellent    Current Activity Tolerance good       Functional Status, IADL    Medications independent    Meal Preparation independent    Housekeeping independent    Laundry independent    Shopping independent       Mental Status    General Appearance WDL WDL       Mental Status Summary    Recent Changes in Mental Status/Cognitive Functioning no changes       Employment/    Employment Status retired            Psychosocial    No documentation.           Abuse/Neglect    No documentation.           Legal    No documentation.           Substance Abuse    No documentation.           Patient Forms    No documentation.         Alaina Vargas RN

## 2018-05-14 NOTE — CONSULTS
"SURGERY  Date of Visit: 05/14/18  Date of Admission:5/13/2018  6:24 PM     Reason for Consult/Admission:  Diverticulitis    Patient's Chief Complaint: Left lower quadrant pain    HPI    Patient is a very nice 67 y.o. female who presents with abdominal pain, left lower quadrant pain, 3 days ago, dull, hard pain, constant, worse with movement, better with recumbency.  It is associated with a fever, 102, but without nausea or vomiting.  No blood in her stool.    She was admitted yesterday with IV abx, flagyl and levaquin, with mild improvement, still bad when not getting IV pain meds.  This is not her worst episode, being \"kind of easy\" compared to prior episodes, her most recent being 5 years ago.    She is unaware of her lumbar hernia, but says she asked her doctor about her asymmetry, and was told that it was no problem.  I suspect this is from her left nephrectomy in 2005, done for leiomyosarcoma.        Review of the CT shows a lumbar hernia, best seen on cross sectional 50-65/154, umbilical hernia /154, and diverticulitis 96/154.  There is a portion of the descending colon contained in the lumbar hernia.      Evaluating the diverticulitis on other views, it is best seen at 86/182 coronal, and  117/196 sagittal fairly deep in the pelvis.  The lumbar hernia is dramatic on coronal, beginning at 109 with no clear completion at the more inferior edge.     Her last c scope was in 2017 with diverticulosis noted, moderate to marked in the sigmoid with mild disease elsewhere.  Because of a + cologuard, i ordered a SBFT which showed diverticuli in the small bowel (also seen on terminal ileoscopy) with top normla to mildly enlarged proximal jejunum that tapers distally, a nonspecific finding.     She has multiple myeloma and is undergoing chemotherapy, Darzelex, with no anticipated end date of treatment, but perhaps with medicine change.  She has repeat stent replacement to the right kidney every 2 months, for " ureteral stricture, with ureteral anastomosis 2006 during [facility].  She's had breast cancer    Past Medical History:   Diagnosis Date   • Anemia 10/14/2008    Secondary to chronic renal insufficiency and myeloma   • Arthropathy of knee 01/07/2014    unspecified   • Brain cancer     malignant brain tumor   • Breast cancer 04/2012    Left breast invasive ductal carcinoma, stage II   • Bursitis of left hip 10/18/2007   • Bursitis, knee 12/02/2013   • Calcaneal spur 08/12/2009   • Chronic kidney disease, stage III (moderate)    • Congestive heart failure    • Diarrhea of presumed infectious origin 04/01/2014    c diff    • Diverticulitis of colon 10/17/2007   • DVT (deep venous thrombosis)     right lower extremity    • History of Clostridium difficile    • History of echocardiogram 04/2014    EF decreased to 46% - per cardiology   • History of MRSA infection    • History of transfusion    • Hydronephrosis     chronic, right   • Hypertension    • LLL pneumonia 04/23/2009   • Malignant neoplasm of kidney 12/2009    and other and unspecified urinary organs; urinary organ, site unspecified; s/p left nephrectomy   • Multiple myeloma 04/2012   • Neoplasm of uncertain behavior 10/12/2015    of other and unspecified sites and tissues; other specified sites   • Pyelonephritis 03/2004   • Seizures 10/17/2007   • Thrombocytopenia    • UTI (urinary tract infection) 10/30/2014    pseudomonas, multidrug resistant   • UTI (urinary tract infection) 03/28/2014    site not specified     Past Surgical History:   Procedure Laterality Date   • BONE MARROW BIOPSY N/A 04/11/2012   • BRAIN SURGERY  2005    Meningioma   • BREAST BIOPSY Left 04/09/2012    Dr. Gregg May   • CARDIAC CATHETERIZATION Left 06/11/2012    Dr. Sudeep Haddad   • CARDIAC CATHETERIZATION N/A 08/15/2006    Dr. Brian Bhatt   • COLONOSCOPY N/A unknown   • COLONOSCOPY N/A 8/30/2017    Procedure: COLONOSCOPY into cecum and TI with cold polypectomies;  Surgeon: Trudy  MD Nicole;  Location: [facility] ENDOSCOPY;  Service:    • CYSTOSCOPY N/A 3/25/2016    Procedure: CYSTOSCOPY  WITH RIGHT STENT CHANGE;  Surgeon: Lakhwinder Russo MD;  Location: [facility] OR;  Service:    • CYSTOSCOPY N/A 03/10/2012    Cystoscopy, right retrograde, right double-J stent-Dr. Sloan May   • CYSTOSCOPY N/A 02/25/2012    Cystoscopy, stent removal, right retrograde pyelogram, replacement of right double-J ureteral stent-Dr. Michael Everett   • CYSTOSCOPY W/ URETERAL STENT PLACEMENT Right 5/7/2016    Procedure: CYSTOSCOPY, URETERAL CATHETER INSERTION AND RIGHT STENT EXCHANGE ;  Surgeon: Michael Everett Jr., MD;  Location: [facility] OR;  Service:    • CYSTOSCOPY W/ URETERAL STENT PLACEMENT N/A 1/20/2017    Procedure: CYSTOSCOPY RIGHT RETROGRADE PYELOGRAM, URETERAL STENT CHANGE;  Surgeon: Lakhwinder Russo MD;  Location: [facility] OR;  Service:    • CYSTOSCOPY W/ URETERAL STENT PLACEMENT N/A 04/02/2015    Cystoscopy, removal of right ureteral stent, right retrograde pyelogram, placement of right double-J ureteral stent-Dr. Michael Everett   • CYSTOSCOPY W/ URETERAL STENT PLACEMENT N/A 04/26/2015    Cystoscopy, removal of right ureteral stent, right retrograde pyelogram, replacement of right ureteral stent 24 cm x 7-Zambian without retrieval line-Dr. Jose Gomez   • CYSTOSCOPY W/ URETERAL STENT PLACEMENT N/A 12/22/2014    Cystoscopy, removal of right double-J ureteral stent, right retrograde pyelogram, placement of right double-J ureteral stent-Dr. Michael Everett   • CYSTOSCOPY W/ URETERAL STENT PLACEMENT N/A 09/22/2014    Cystoscopy, removal of right ureteral stent, removal of right retrograde pyelogram, replacement of right double-J ureteral stent-Dr. Michael Everett   • CYSTOSCOPY W/ URETERAL STENT PLACEMENT N/A 06/18/2014    Cystoscopy, removal of right double-J ureteral stent, right retrograde pyelogram, placement of right double-J ureteral stent-Dr. Michael Everett   • CYSTOSCOPY W/ URETERAL STENT  PLACEMENT N/A 01/23/2014    Cystoscopy, removal of right double-J ureteral stent, right retrograde pyelogram, placement of right double-J ureteral stent-Dr. Michael Everett   • CYSTOSCOPY W/ URETERAL STENT PLACEMENT N/A 03/27/2012    Cystoscopy, removal of ureteral stent, right retrograde pyelogram, replacement of indewlling right ureteral stent-Dr. Michael Everett   • CYSTOSCOPY W/ URETERAL STENT PLACEMENT N/A 03/27/2013    Cystoscopy, right retrograde pyelogram, removal and replacement of right double-J ureteral stent-Dr. Michael Everett   • CYSTOSCOPY W/ URETERAL STENT PLACEMENT N/A 11/12/2012    Cystoscopy, stent removal, right retrograde pyelogram, replacement of right double-J ureteral stent-Dr. Michael Everett   • CYSTOSCOPY W/ URETERAL STENT PLACEMENT N/A 09/24/2011    Cystoscopy, removal of ureteral stent, right retrograde pyelogram and placement of right double-J ureteral stent-Dr. Michael Everett   • CYSTOSCOPY W/ URETERAL STENT PLACEMENT N/A 05/14/2011    Cystoscopy, removal of right ureteral stent, right retrograde pyelogram and placement of new right double-J ureteral stent-Dr. Michael Everett   • CYSTOSCOPY W/ URETERAL STENT PLACEMENT N/A 12/31/2010    Cystoscopy, stent removal, right retrograde pyelogram, replacement of 7 Chinese x 26 cm double-J stent-Dr. Michael Everett   • CYSTOSCOPY W/ URETERAL STENT PLACEMENT N/A 08/28/2010    Cystoscopy, stent removal, right retrograde pyelogram with interpretation, placement of right double-J ureteral stent-Dr. Michael Everett   • CYSTOSCOPY W/ URETERAL STENT PLACEMENT N/A 05/24/2010    Cystoscopy, right retrograde pyelogram, replacement of right double-J ureteral stent-Dr. Michael Everett   • CYSTOSCOPY W/ URETERAL STENT PLACEMENT N/A 02/12/2010    Cystoscopy, right retrograde pyelogram and placement of right double-J ureteral stent-Dr. Michael Everett   • CYSTOSCOPY W/ URETERAL STENT PLACEMENT N/A 02/23/2009    Cystoscopy, right retrograde pyelogram, placement of right double-J ureteral stent-  Michael Everett   • CYSTOSCOPY W/ URETERAL STENT PLACEMENT N/A 09/17/2007    Dr. Michael Everett   • CYSTOSCOPY W/ URETERAL STENT PLACEMENT Right 01/29/2018    Dr. Everett   • DIAGNOSTIC LAPAROSCOPY EXPLORATORY LAPAROTOMY N/A 05/02/2006    Diagnostic laparoscopy, exploratory laparotomy with bilateral salpingo oopherectomy, primary reanastomosis of right ureter-Dr. Critso Pittman   • MASTECTOMY Left 04/25/2012    Left total mastectomy with sentinel lymph node biopsies and left axillary lymphatic mapping-Dr. Gregg May   • NEPHRECTOMY Left 12/30/2009    Dr. 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 Cristo Pittman   • TOTAL ABDOMINAL HYSTERECTOMY Bilateral 2007    Dr. Todd   • URETEROURETEROSTOMY Right 05/01/2006    Dr. Michael Everett   • VENA CAVA FILTER INSERTION N/A 05/08/2006    Dr. Jordon Barber   • VENOUS ACCESS DEVICE (PORT) INSERTION Right 02/25/2013    Right subclavian vein PowerPort insertion-Dr. Gregg May   • VENOUS ACCESS DEVICE (PORT) INSERTION AND REMOVAL N/A 10/06/2014    Revision of right internal jugular PowerPort with fluoroscopy, removal of peripherally inserted central catheter line-Dr. Aurora Tomlinson   • VENOUS ACCESS DEVICE (PORT) INSERTION AND REMOVAL N/A 08/14/2014    Ultrasound-guided access right internal jugular vein, PowerPort placement, removal of right subclavian port-Dr. Jordon Barber     Family History   Problem Relation Age of Onset   • Hypertension Other    • Miscarriages / Stillbirths Father    • Heart disease Father    • Hypertension Father    • Heart attack Father    • Diabetes Sister    • Skin cancer Sister    • Throat cancer Brother      Social History     Social History   • Marital status:      Spouse name: N/A   • Number of children: 3   • Years of education: High school     Occupational History   •  Retired     [employer] [[id number]]     Social History Main Topics   • Smoking  status: Never Smoker   • Smokeless tobacco: Never Used   • Alcohol use No   • Drug use: No   • Sexual activity: Defer     Other Topics Concern   • Not on file     Social History Narrative    Son lives with her and helps     Occupation/Additional Social Hx: worked as a  [employer], having quit greater than 5 years ago.      Current Facility-Administered Medications:   •  acetaminophen (TYLENOL) tablet 500 mg, 500 mg, Oral, Q4H PRN, Tristian Ferrell MD  •  acyclovir (ZOVIRAX) tablet 400 mg, 400 mg, Oral, BID, Tristian Ferrell MD, 400 mg at 05/14/18 0931  •  aspirin EC tablet 81 mg, 81 mg, Oral, Daily, Tristian Ferrell MD, 81 mg at 05/14/18 0931  •  bisoprolol (ZEBeta) tablet 5 mg, 5 mg, Oral, Q24H, Tristian Ferrell MD, 5 mg at 05/14/18 0931  •  carBAMazepine XR (TEGretol  XR) 12 hr tablet 400 mg, 400 mg, Oral, Nightly, Tristian Ferrell MD, 400 mg at 05/14/18 0159  •  folic acid (FOLVITE) tablet 1 mg, 1 mg, Oral, QAM, Tristian Ferrell MD  •  heparin (porcine) 5000 UNIT/ML injection 5,000 Units, 5,000 Units, Subcutaneous, Q8H, Tristian Ferrell MD, 5,000 Units at 05/14/18 0527  •  hydrALAZINE (APRESOLINE) tablet 25 mg, 25 mg, Oral, Q12H, Tristian Ferrell MD, 25 mg at 05/14/18 0931  •  HYDROcodone-acetaminophen (NORCO) 5-325 MG per tablet 1 tablet, 1 tablet, Oral, Q6H PRN, Tristian Ferrell MD, 1 tablet at 05/14/18 0527  •  isosorbide mononitrate (IMDUR) 24 hr tablet 30 mg, 30 mg, Oral, Q24H, Tristain Ferrell MD  •  isosorbide mononitrate (IMDUR) 24 hr tablet 60 mg, 60 mg, Oral, Daily, Tristian Ferrell MD, 60 mg at 05/14/18 0931  •  levoFLOXacin (LEVAQUIN) 500 mg/100 mL D5W (premix) (LEVAQUIN) 500 mg, 500 mg, Intravenous, Q24H, Tristian Ferrell MD  •  losartan (COZAAR) tablet 25 mg, 25 mg, Oral, Q PM, Tristian Ferrell MD  •  metroNIDAZOLE (FLAGYL) IVPB 500 mg, 500 mg, Intravenous, Q8H, Tristian Ferrell MD, 500 mg at 05/14/18 0931  •  montelukast (SINGULAIR) tablet 10 mg, 10 mg, Oral, Nightly, Tristian Ferrell MD, 10 mg  "at 05/14/18 0158  •  morphine injection 1 mg, 1 mg, Intravenous, Q2H PRN, Tristian Ferrell MD  •  ondansetron (ZOFRAN) injection 4 mg, 4 mg, Intravenous, Q6H PRN, Tristian Ferrell MD  •  Pharmacy to Dose LevoFLOXacin (LEVAQUIN), , Does not apply, Continuous PRN, Tristian Ferrell MD  •  sodium chloride 0.9 % flush 1-10 mL, 1-10 mL, Intravenous, PRN, Tristian Ferrell MD    Allergies   Allergen Reactions   • Zosyn [Piperacillin Sod-Tazobactam So] Swelling     Preventative Medicine  Colonoscopy: 2017    Review of Systems   Constitutional: Positive for appetite change, fatigue and fever.   Respiratory: Negative for shortness of breath.    Cardiovascular: Negative for chest pain.   Gastrointestinal: Positive for abdominal distention and abdominal pain. Negative for blood in stool, constipation, nausea and vomiting.   Genitourinary: Negative for dysuria.   All other systems reviewed and are negative.      Vitals:    05/13/18 2027 05/13/18 2258 05/14/18 0513 05/14/18 0800   BP:  111/70 109/63 102/62   BP Location:  Right arm Right arm Right arm   Patient Position:  Sitting Lying Lying   Pulse:  81 80 78   Resp:  18 18 16   Temp: (!) 100.9 °F (38.3 °C) 98.6 °F (37 °C) 97.4 °F (36.3 °C) 98.1 °F (36.7 °C)   TempSrc: Oral Oral Oral Oral   SpO2:  96% 97% 97%   Weight:       Height:  154.9 cm (61\")         PHYSICAL EXAM:    /62 (BP Location: Right arm, Patient Position: Lying)   Pulse 78   Temp 98.1 °F (36.7 °C) (Oral)   Resp 16   Ht 154.9 cm (61\")   Wt 90.3 kg (199 lb)   LMP  (LMP Unknown)   SpO2 97%   BMI 37.60 kg/m²   Body mass index is 37.6 kg/m².    Constitutional: well developed, well nourished, located on park 6, laying in bed  Eyes: sclera nonicteric, conjunctiva not injected or edematous  ENMT: Hearing intact, trachea midline  CVS: RRR, no murmur, peripheral edema not present  Respiratory: CTA, normal respiratory effort   Gastrointestinal: no hepatosplenomegaly, abdomen distended, abdominal hernia palpable " at umbilicus, small, very tender left suprapubic, with guarding.  Genitourinary: inguinal hernia not detected, but with flank hernia far left posterior, with guarding and rebound  Musculoskeletal: gait not witnessed, muscle mass noraml  Skin: warm and dry, no rashes visible  Neurological: awake and alert, seems to have reasonable capacity for understanding for medical decision making  Psychiatric: appears to have reasonable judgement, pleasant, very upbeat, considering the number of diagnoses she has  Lymphatics: no cervical adenopathy,     Radiographic Findings:   CT with findings as noted.    Lab Findings:   Urine TNTC WBC, 4+ bacteria.    IMPRESSION:  · Diverticulitis, proximal sigmoid, though fairly low in the pelvis based on her CT scan, and her physical exam.  While this looks mild on her CT scan, her exam is really remarkable, and I'm concerned about whether she'll be able to manage through this without requiring surgery.  · Lumbar hernia, with contained ascending colon, very tender as well.  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  · History of right ureter re-anastomosis concomitant with bilateral salpingo-oophorectomy, with ureteral stents every 2 months per patient now  · History of VRE and MRSA  · Intolerance to Zosyn with renal complication  · Multiple myeloma  · Congestive heart failure  · History of left breast cancer, status post mastectomy  · History of seizures secondary to meningioma  · Chronic kidney disease status post nephrectomy and right ureteral stricture disease  · Right-sided port    PLAN:  · Discussed with the patient that I really like to try to avoid surgery due to the complexity of her history, but I am very concerned about her symptoms.  Levaquin is not the ideal treatment option for her, with the anti biogram at [facility] showing only about a 59% effectiveness for Escherichia coli.  On the other hand, her chronic kidney disease,  and allergies make it difficult to switch to another option.  · Discussed with the patient that out also like to avoid having to intervene for the lumbar hernia and the umbilical hernia as these probably are long-standing and have not given her dramatic symptoms in the past  · Discussed with Dr. Araujo about her multiple myeloma, and he indicates that this diagnosis should not affect her perioperative risk dramatically.  · We'll follow along, with hopeful improvement over the next several days.    Trudy Rivera MD  05/14/18  7:18 PM

## 2018-05-14 NOTE — PLAN OF CARE
Problem: Patient Care Overview  Goal: Plan of Care Review   05/14/18 6703   Coping/Psychosocial   Plan of Care Reviewed With patient   Plan of Care Review   Progress improving   OTHER   Outcome Summary vss, c/o llq pain med x 1 , no nausea,      Goal: Individualization and Mutuality  Outcome: Ongoing (interventions implemented as appropriate)    Goal: Discharge Needs Assessment  Outcome: Ongoing (interventions implemented as appropriate)    Goal: Interprofessional Rounds/Family Conf  Outcome: Ongoing (interventions implemented as appropriate)      Problem: Bowel Disease, Inflammatory (Adult)  Goal: Signs and Symptoms of Listed Potential Problems Will be Absent, Minimized or Managed (Bowel Disease, Inflammatory)  Outcome: Ongoing (interventions implemented as appropriate)      Problem: Pain, Acute (Adult)  Goal: Acceptable Pain Control/Comfort Level  Outcome: Ongoing (interventions implemented as appropriate)

## 2018-05-14 NOTE — PLAN OF CARE
Problem: Patient Care Overview  Goal: Plan of Care Review  Outcome: Ongoing (interventions implemented as appropriate)   05/14/18 0353   Coping/Psychosocial   Plan of Care Reviewed With patient   Plan of Care Review   Progress improving   OTHER   Outcome Summary vss, antibiotics given, no c/o pain or discomfort, pt voids without difficulty,      Goal: Individualization and Mutuality  Outcome: Ongoing (interventions implemented as appropriate)    Goal: Discharge Needs Assessment  Outcome: Ongoing (interventions implemented as appropriate)    Goal: Interprofessional Rounds/Family Conf  Outcome: Ongoing (interventions implemented as appropriate)      Problem: Bowel Disease, Inflammatory (Adult)  Goal: Signs and Symptoms of Listed Potential Problems Will be Absent, Minimized or Managed (Bowel Disease, Inflammatory)  Outcome: Ongoing (interventions implemented as appropriate)      Problem: Pain, Acute (Adult)  Goal: Identify Related Risk Factors and Signs and Symptoms  Outcome: Outcome(s) achieved Date Met: 05/14/18    Goal: Acceptable Pain Control/Comfort Level  Outcome: Ongoing (interventions implemented as appropriate)

## 2018-05-14 NOTE — DISCHARGE PLACEMENT REQUEST
"Marina Catalan (67 y.o. Female)     Date of Birth Social Security Number Address Home Phone MRN    1950  [address] [id number] [phone] [id number]    Buddhism Marital Status          Unknown        Admission Date Admission Type Admitting Provider Attending Provider Department, Room/Bed    5/13/18 Emergency Tristian Ferrell MD Lykins, Matthew D, MD [address], P692/1    Discharge Date Discharge Disposition Discharge Destination                       Attending Provider:  Jeremias Coates MD    Allergies:  Zosyn [Piperacillin Sod-tazobactam So]    Isolation:  Contact   Infection:  MRSA/History Only (01/20/17), VRE (09/22/14)   Code Status:  FULL    Ht:  154.9 cm (61\")   Wt:  90.3 kg (199 lb)    Admission Cmt:  None   Principal Problem:  Sigmoid diverticulitis [K57.32]                 Active Insurance as of 5/13/2018     Primary Coverage     Payor Plan Insurance Group Employer/Plan Group    HUMANA MEDICARE REPLACEMENT HUMANA MEDICARE REPL [id number]     Payor Plan Address Payor Plan Phone Number Effective From Effective To    [address] [address] [phone] 1/1/2018     [place] [address]       Subscriber Name Subscriber Birth Date Member ID       MARINA CATALAN 1950 [id number]                 Emergency Contacts      (Rel.) Home Phone Work Phone Mobile Phone    Patricia Grewal (Daughter) [phone] -- --              "

## 2018-05-14 NOTE — H&P
[facility]    Patient Identification:  Name: Marina Barroso  Age: 67 y.o.  Sex: female  :  1950  MRN: [id number]         Primary Care Physician: Cristo Madera MD    Chief complaint:  LLQ abdominal pain    Subjective   Patient is a 67 y.o. female with history of breast cancer, CHF, diverticulitis and HTN that presents to [facility] ER today for intermittent, aching LLQ abdominal pain that started Friday after she ate a piece of strawberry pie. She denies nausea, vomiting, or diarrhea, but admits to fever and chills at home as well as abdominal tenderness and bloating. She denies chest pain or shortness of breath.    Past Medical History:   Diagnosis Date   • Anemia 10/14/2008    Secondary to chronic renal insufficiency and myeloma   • Arthropathy of knee 2014    unspecified   • Brain cancer     malignant brain tumor   • Breast cancer 2012    Left breast invasive ductal carcinoma, stage II   • Bursitis of left hip 10/18/2007   • Bursitis, knee 2013   • Calcaneal spur 2009   • Chronic kidney disease, stage III (moderate)    • Congestive heart failure    • Diarrhea of presumed infectious origin 2014    c diff    • Diverticulitis of colon 10/17/2007   • DVT (deep venous thrombosis)     right lower extremity    • History of Clostridium difficile    • History of echocardiogram 2014    EF decreased to 46% - per cardiology   • History of MRSA infection    • History of transfusion    • Hydronephrosis     chronic, right   • Hypertension    • LLL pneumonia 2009   • Malignant neoplasm of kidney 2009    and other and unspecified urinary organs; urinary organ, site unspecified; s/p left nephrectomy   • Multiple myeloma 2012   • Neoplasm of uncertain behavior 10/12/2015    of other and unspecified sites and tissues; other specified sites   • Pyelonephritis 2004   • Seizures 10/17/2007   • Thrombocytopenia    • UTI (urinary tract  infection) 10/30/2014    pseudomonas, multidrug resistant   • UTI (urinary tract infection) 03/28/2014    site not specified     Past Surgical History:   Procedure Laterality Date   • BONE MARROW BIOPSY N/A 04/11/2012   • BRAIN SURGERY  2005    Meningioma   • BREAST BIOPSY Left 04/09/2012    Dr. Gregg May   • CARDIAC CATHETERIZATION Left 06/11/2012    Dr. Sudeep Haddad   • CARDIAC CATHETERIZATION N/A 08/15/2006    Dr. Brian Bhatt   • COLONOSCOPY N/A unknown   • COLONOSCOPY N/A 8/30/2017    Procedure: COLONOSCOPY into cecum and TI with cold polypectomies;  Surgeon: Trudy Rivera MD;  Location: [facility] ENDOSCOPY;  Service:    • CYSTOSCOPY N/A 3/25/2016    Procedure: CYSTOSCOPY  WITH RIGHT STENT CHANGE;  Surgeon: Lakhwinder Russo MD;  Location: [facility] OR;  Service:    • CYSTOSCOPY N/A 03/10/2012    Cystoscopy, right retrograde, right double-J stent-Dr. Sloan May   • CYSTOSCOPY N/A 02/25/2012    Cystoscopy, stent removal, right retrograde pyelogram, replacement of right double-J ureteral stent-Dr. Michael Everett   • CYSTOSCOPY W/ URETERAL STENT PLACEMENT Right 5/7/2016    Procedure: CYSTOSCOPY, URETERAL CATHETER INSERTION AND RIGHT STENT EXCHANGE ;  Surgeon: Michael Everett Jr., MD;  Location: [facility] OR;  Service:    • CYSTOSCOPY W/ URETERAL STENT PLACEMENT N/A 1/20/2017    Procedure: CYSTOSCOPY RIGHT RETROGRADE PYELOGRAM, URETERAL STENT CHANGE;  Surgeon: Lakhwinder Russo MD;  Location: [facility] OR;  Service:    • CYSTOSCOPY W/ URETERAL STENT PLACEMENT N/A 04/02/2015    Cystoscopy, removal of right ureteral stent, right retrograde pyelogram, placement of right double-J ureteral stent-Dr. Michael Everett   • CYSTOSCOPY W/ URETERAL STENT PLACEMENT N/A 04/26/2015    Cystoscopy, removal of right ureteral stent, right retrograde pyelogram, replacement of right ureteral stent 24 cm x 7-Tajik without retrieval line-Dr. Jose Gomez   • CYSTOSCOPY W/ URETERAL STENT PLACEMENT N/A 12/22/2014     Cystoscopy, removal of right double-J ureteral stent, right retrograde pyelogram, placement of right double-J ureteral stent-Dr. Michael Everett   • CYSTOSCOPY W/ URETERAL STENT PLACEMENT N/A 09/22/2014    Cystoscopy, removal of right ureteral stent, removal of right retrograde pyelogram, replacement of right double-J ureteral stent-Dr. Michael Everett   • CYSTOSCOPY W/ URETERAL STENT PLACEMENT N/A 06/18/2014    Cystoscopy, removal of right double-J ureteral stent, right retrograde pyelogram, placement of right double-J ureteral stent-Dr. Michael Everett   • CYSTOSCOPY W/ URETERAL STENT PLACEMENT N/A 01/23/2014    Cystoscopy, removal of right double-J ureteral stent, right retrograde pyelogram, placement of right double-J ureteral stent-Dr. Michael Everett   • CYSTOSCOPY W/ URETERAL STENT PLACEMENT N/A 03/27/2012    Cystoscopy, removal of ureteral stent, right retrograde pyelogram, replacement of indewlling right ureteral stent-Dr. Michael Everett   • CYSTOSCOPY W/ URETERAL STENT PLACEMENT N/A 03/27/2013    Cystoscopy, right retrograde pyelogram, removal and replacement of right double-J ureteral stent-Dr. Michael Everett   • CYSTOSCOPY W/ URETERAL STENT PLACEMENT N/A 11/12/2012    Cystoscopy, stent removal, right retrograde pyelogram, replacement of right double-J ureteral stent-Dr. Michael Everett   • CYSTOSCOPY W/ URETERAL STENT PLACEMENT N/A 09/24/2011    Cystoscopy, removal of ureteral stent, right retrograde pyelogram and placement of right double-J ureteral stent-Dr. Michael Everett   • CYSTOSCOPY W/ URETERAL STENT PLACEMENT N/A 05/14/2011    Cystoscopy, removal of right ureteral stent, right retrograde pyelogram and placement of new right double-J ureteral stent-Dr. Michael Everett   • CYSTOSCOPY W/ URETERAL STENT PLACEMENT N/A 12/31/2010    Cystoscopy, stent removal, right retrograde pyelogram, replacement of 7 Japanese x 26 cm double-J stent-Dr. Michael Everett   • CYSTOSCOPY W/ URETERAL STENT PLACEMENT N/A 08/28/2010    Cystoscopy, stent removal,  right retrograde pyelogram with interpretation, placement of right double-J ureteral stent-Dr. Michael Everett   • CYSTOSCOPY W/ URETERAL STENT PLACEMENT N/A 05/24/2010    Cystoscopy, right retrograde pyelogram, replacement of right double-J ureteral stent-Dr. Michael Everett   • CYSTOSCOPY W/ URETERAL STENT PLACEMENT N/A 02/12/2010    Cystoscopy, right retrograde pyelogram and placement of right double-J ureteral stent-Dr. Michael Everett   • CYSTOSCOPY W/ URETERAL STENT PLACEMENT N/A 02/23/2009    Cystoscopy, right retrograde pyelogram, placement of right double-J ureteral stent-Dr. Michael Everett   • CYSTOSCOPY W/ URETERAL STENT PLACEMENT N/A 09/17/2007    Dr. Michael Everett   • CYSTOSCOPY W/ URETERAL STENT PLACEMENT Right 01/29/2018    Dr. Everett   • DIAGNOSTIC LAPAROSCOPY EXPLORATORY LAPAROTOMY N/A 05/02/2006    Diagnostic laparoscopy, exploratory laparotomy with bilateral salpingo oopherectomy, primary reanastomosis of right ureter-Dr. Cristo Pittman   • MASTECTOMY Left 04/25/2012    Left total mastectomy with sentinel lymph node biopsies and left axillary lymphatic mapping-Dr. Gregg May   • NEPHRECTOMY Left 12/30/2009    Dr. 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 Cristo Pittman   • TOTAL ABDOMINAL HYSTERECTOMY Bilateral 2007    Dr. Todd   • URETEROURETEROSTOMY Right 05/01/2006    Dr. Michael Everett   • VENA CAVA FILTER INSERTION N/A 05/08/2006    Dr. Jordon Barber   • VENOUS ACCESS DEVICE (PORT) INSERTION Right 02/25/2013    Right subclavian vein PowerPort insertion-Dr. Gregg May   • VENOUS ACCESS DEVICE (PORT) INSERTION AND REMOVAL N/A 10/06/2014    Revision of right internal jugular PowerPort with fluoroscopy, removal of peripherally inserted central catheter line-Dr. Aurora Tomlinson   • VENOUS ACCESS DEVICE (PORT) INSERTION AND REMOVAL N/A 08/14/2014    Ultrasound-guided access right internal jugular vein, PowerPort placement, removal of right  subclavian port-Dr. Jordon Barber     Current medications reviewed.    Family History   Problem Relation Age of Onset   • Hypertension Other    • Miscarriages / Stillbirths Father    • Heart disease Father    • Hypertension Father    • Heart attack Father    • Diabetes Sister    • Skin cancer Sister    • Throat cancer Brother      Social History   Substance Use Topics   • Smoking status: Never Smoker   • Smokeless tobacco: Never Used   • Alcohol use No     Review of Systems   Constitutional: Positive for appetite change, chills and fever. Negative for activity change, fatigue and unexpected weight change.   HENT: Negative.  Negative for congestion.    Eyes: Negative.  Negative for visual disturbance.   Respiratory: Negative.  Negative for cough, chest tightness and shortness of breath.    Cardiovascular: Negative.  Negative for chest pain, palpitations and leg swelling.   Gastrointestinal: Positive for abdominal distention and abdominal pain. Negative for blood in stool, constipation, diarrhea, nausea and vomiting.   Endocrine: Negative.  Negative for cold intolerance and heat intolerance.   Genitourinary: Negative.  Negative for difficulty urinating and dysuria.   Musculoskeletal: Negative.  Negative for arthralgias and myalgias.   Skin: Negative.  Negative for color change, pallor and rash.   Allergic/Immunologic: Negative.    Neurological: Negative.  Negative for dizziness, weakness and headaches.   Hematological: Negative.  Negative for adenopathy.   Psychiatric/Behavioral: Negative.  Negative for agitation, behavioral problems and confusion.      Objective      Vital Signs  Temp:  [98.6 °F (37 °C)-102.1 °F (38.9 °C)] 98.6 °F (37 °C)  Heart Rate:  [81-94] 81  Resp:  [18] 18  BP: (111-140)/(70) 111/70  Body mass index is 37.6 kg/m².    Physical Exam   Constitutional: She is oriented to person, place, and time. She appears well-developed and well-nourished. No distress.   HENT:   Head: Normocephalic and  atraumatic.   Eyes: EOM are normal. Pupils are equal, round, and reactive to light.   Neck: Normal range of motion. Neck supple.   Cardiovascular: Normal rate, regular rhythm, normal heart sounds and intact distal pulses.    Pulmonary/Chest: Effort normal.   Abdominal: Soft. Bowel sounds are normal. There is tenderness. There is no rebound and no guarding.   Musculoskeletal: Normal range of motion. She exhibits no edema.   Neurological: She is alert and oriented to person, place, and time.   Skin: Skin is warm.   Psychiatric: She has a normal mood and affect. Her behavior is normal. Thought content normal.   Nursing note and vitals reviewed.    Lab Results   Component Value Date    WBC 7.37 05/13/2018    HGB 10.6 (L) 05/13/2018    HCT 34.4 (L) 05/13/2018     (L) 05/13/2018     Lab Results   Component Value Date     05/13/2018    K 3.7 05/13/2018     (H) 05/13/2018    CO2 23.7 05/13/2018    BUN 18 05/13/2018    CREATININE 1.64 (H) 05/13/2018    GLUCOSE 115 (H) 05/13/2018     Lab Results   Component Value Date    CALCIUM 8.0 (L) 05/13/2018     Lab Results   Component Value Date    AST 12 05/13/2018    ALT 15 05/13/2018    ALKPHOS 59 05/13/2018     Lab Results   Component Value Date    COLORU Yellow 05/13/2018    CLARITYU Cloudy (A) 05/13/2018    PHUR 7.5 05/13/2018    GLUCOSEU Negative 05/13/2018    KETONESU Negative 05/13/2018    BLOODU Negative 05/13/2018    LEUKOCYTESUR Large (3+) (A) 05/13/2018    BILIRUBINUR Negative 05/13/2018    UROBILINOGEN 0.2 E.U./dL 05/13/2018    RBCUA 3-5 (A) 05/13/2018    WBCUA Too Numerous to Count (A) 05/13/2018    BACTERIA 4+ (A) 05/13/2018     Lab Results   Component Value Date    ANIONGAP 8.3 05/13/2018     Estimated Creatinine Clearance: 34.5 mL/min (by C-G formula based on SCr of 1.64 mg/dL (H)).    Assessment/Plan   Active Hospital Problems (** Indicates Principal Problem)    Diagnosis Date Noted   • **Sigmoid diverticulitis [K57.32] 05/13/2018   • HTN  (hypertension) [I10] 05/14/2018   • CKD (chronic kidney disease) [N18.9] 05/14/2018   • Congestive heart failure [I50.9]    • Multiple myeloma [C90.00] 04/01/2012      Resolved Hospital Problems    Diagnosis Date Noted Date Resolved   No resolved problems to display.     · 67 y.o. female with sigmoid diverticulitis on CT scan  · clears, levaquin and flagyl.  · analgesia PRN  · pyuria: antibiotics for diverticulitis would cover await culture  · consult Dr. Rivera, has seen in the past  · CKD stable  · consult her oncologist  · I discussed the patient's findings and my recommendations with patient and consulting provider (Dr. Mccormack).    Tristian Ferrell MD  05/14/18  1:25 AM

## 2018-05-14 NOTE — PROGRESS NOTES
"Pharmacy Consult - Levaquin    Marina Barroso is a 67 y.o. on whom pharmacy has been consulted dose Levaquin for Intra-Abdominal Infection  Pharmacy dosing per Dr Ferrell's request.         Relevant clinical data and objective history reviewed:  67 y.o. female 154.9 cm (61\")   90.3 kg (199 lb)   Ideal body weight: 47.8 kg (105 lb 6.1 oz)  Adjusted ideal body weight: 64.8 kg (142 lb 13.2 oz)    Past medical history: has a past medical history of Anemia (10/14/2008); Arthropathy of knee (01/07/2014); Brain cancer; Breast cancer (04/2012); Bursitis of left hip (10/18/2007); Bursitis, knee (12/02/2013); Calcaneal spur (08/12/2009); Chronic kidney disease, stage III (moderate); Congestive heart failure; Diarrhea of presumed infectious origin (04/01/2014); Diverticulitis of colon (10/17/2007); DVT (deep venous thrombosis); History of Clostridium difficile; History of echocardiogram (04/2014); History of MRSA infection; History of transfusion; Hydronephrosis; Hypertension; LLL pneumonia (04/23/2009); Malignant neoplasm of kidney (12/2009); Multiple myeloma (04/2012); Neoplasm of uncertain behavior (10/12/2015); Pyelonephritis (03/2004); Seizures (10/17/2007); Thrombocytopenia; UTI (urinary tract infection) (10/30/2014); and UTI (urinary tract infection) (03/28/2014).  Allergies   Allergen Reactions   • Zosyn [Piperacillin Sod-Tazobactam So] Swelling       Other Antibiotics/Anti-infectives on profile: Flagyl        Lab Results   Component Value Date    GLUCOSE 115 (H) 05/13/2018    CALCIUM 8.0 (L) 05/13/2018     05/13/2018    K 3.7 05/13/2018    CO2 23.7 05/13/2018     (H) 05/13/2018    BUN 18 05/13/2018    CREATININE 1.64 (H) 05/13/2018    EGFRIFAFRI 38 (L) 05/13/2018    EGFRIFNONA  08/30/2016      Comment:      <15 Indicative of kidney failure.    BCR 11.0 05/13/2018    ANIONGAP 8.3 05/13/2018     Lab Results   Component Value Date    WBC 7.37 05/13/2018    HGB 10.6 (L) 05/13/2018    HCT 34.4 (L) 05/13/2018    " ".2 (H) 05/13/2018     (L) 05/13/2018       Estimated Creatinine Clearance: 34.5 mL/min (by C-G formula based on SCr of 1.64 mg/dL (H)).  No intake/output data recorded.  Blood pressure 111/70, pulse 81, temperature 98.6 °F (37 °C), temperature source Oral, resp. rate 18, height 154.9 cm (61\"), weight 90.3 kg (199 lb), SpO2 96 %, not currently breastfeeding.        Assessment/Plan    Will start patient on 500 mg Q24H ( mg). Pharmacy will continue to dose and monitor.       Barron Pickett [facility]    "

## 2018-05-15 LAB
ANION GAP SERPL CALCULATED.3IONS-SCNC: 9.1 MMOL/L
BASOPHILS # BLD AUTO: 0.01 10*3/MM3 (ref 0–0.2)
BASOPHILS NFR BLD AUTO: 0.2 % (ref 0–1.5)
BUN BLD-MCNC: 11 MG/DL (ref 8–23)
BUN/CREAT SERPL: 7.4 (ref 7–25)
CALCIUM SPEC-SCNC: 8.1 MG/DL (ref 8.6–10.5)
CHLORIDE SERPL-SCNC: 111 MMOL/L (ref 98–107)
CO2 SERPL-SCNC: 21.9 MMOL/L (ref 22–29)
CREAT BLD-MCNC: 1.48 MG/DL (ref 0.57–1)
DEPRECATED RDW RBC AUTO: 53.8 FL (ref 37–54)
EOSINOPHIL # BLD AUTO: 0.2 10*3/MM3 (ref 0–0.7)
EOSINOPHIL NFR BLD AUTO: 4 % (ref 0.3–6.2)
ERYTHROCYTE [DISTWIDTH] IN BLOOD BY AUTOMATED COUNT: 13.8 % (ref 11.7–13)
GFR SERPL CREATININE-BSD FRML MDRD: 43 ML/MIN/1.73
GLUCOSE BLD-MCNC: 89 MG/DL (ref 65–99)
HCT VFR BLD AUTO: 32.7 % (ref 35.6–45.5)
HGB BLD-MCNC: 10.1 G/DL (ref 11.9–15.5)
IMM GRANULOCYTES # BLD: 0.02 10*3/MM3 (ref 0–0.03)
IMM GRANULOCYTES NFR BLD: 0.4 % (ref 0–0.5)
LYMPHOCYTES # BLD AUTO: 1.59 10*3/MM3 (ref 0.9–4.8)
LYMPHOCYTES NFR BLD AUTO: 31.9 % (ref 19.6–45.3)
MCH RBC QN AUTO: 33.2 PG (ref 26.9–32)
MCHC RBC AUTO-ENTMCNC: 30.9 G/DL (ref 32.4–36.3)
MCV RBC AUTO: 107.6 FL (ref 80.5–98.2)
MONOCYTES # BLD AUTO: 0.63 10*3/MM3 (ref 0.2–1.2)
MONOCYTES NFR BLD AUTO: 12.7 % (ref 5–12)
NEUTROPHILS # BLD AUTO: 2.53 10*3/MM3 (ref 1.9–8.1)
NEUTROPHILS NFR BLD AUTO: 50.8 % (ref 42.7–76)
PLATELET # BLD AUTO: 95 10*3/MM3 (ref 140–500)
PMV BLD AUTO: 11.8 FL (ref 6–12)
POTASSIUM BLD-SCNC: 3.9 MMOL/L (ref 3.5–5.2)
RBC # BLD AUTO: 3.04 10*6/MM3 (ref 3.9–5.2)
SODIUM BLD-SCNC: 142 MMOL/L (ref 136–145)
WBC NRBC COR # BLD: 4.98 10*3/MM3 (ref 4.5–10.7)

## 2018-05-15 PROCEDURE — 99231 SBSQ HOSP IP/OBS SF/LOW 25: CPT | Performed by: SURGERY

## 2018-05-15 PROCEDURE — 80048 BASIC METABOLIC PNL TOTAL CA: CPT | Performed by: INTERNAL MEDICINE

## 2018-05-15 PROCEDURE — 85025 COMPLETE CBC W/AUTO DIFF WBC: CPT | Performed by: INTERNAL MEDICINE

## 2018-05-15 PROCEDURE — 25010000003 CEFTRIAXONE PER 250 MG: Performed by: INTERNAL MEDICINE

## 2018-05-15 PROCEDURE — 25010000002 HEPARIN (PORCINE) PER 1000 UNITS: Performed by: HOSPITALIST

## 2018-05-15 PROCEDURE — 99231 SBSQ HOSP IP/OBS SF/LOW 25: CPT | Performed by: INTERNAL MEDICINE

## 2018-05-15 PROCEDURE — 85007 BL SMEAR W/DIFF WBC COUNT: CPT | Performed by: INTERNAL MEDICINE

## 2018-05-15 RX ORDER — CEFTRIAXONE SODIUM 2 G/50ML
2 INJECTION, SOLUTION INTRAVENOUS EVERY 24 HOURS
Status: DISCONTINUED | OUTPATIENT
Start: 2018-05-15 | End: 2018-05-17 | Stop reason: HOSPADM

## 2018-05-15 RX ADMIN — HYDRALAZINE HYDROCHLORIDE 25 MG: 25 TABLET, FILM COATED ORAL at 21:13

## 2018-05-15 RX ADMIN — HEPARIN SODIUM 5000 UNITS: 5000 INJECTION, SOLUTION INTRAVENOUS; SUBCUTANEOUS at 14:20

## 2018-05-15 RX ADMIN — ISOSORBIDE MONONITRATE 60 MG: 30 TABLET ORAL at 08:42

## 2018-05-15 RX ADMIN — METRONIDAZOLE 500 MG: 500 INJECTION, SOLUTION INTRAVENOUS at 00:45

## 2018-05-15 RX ADMIN — ASPIRIN 81 MG: 81 TABLET ORAL at 08:41

## 2018-05-15 RX ADMIN — HEPARIN SODIUM 5000 UNITS: 5000 INJECTION, SOLUTION INTRAVENOUS; SUBCUTANEOUS at 06:07

## 2018-05-15 RX ADMIN — CARBAMAZEPINE 400 MG: 200 TABLET, EXTENDED RELEASE ORAL at 00:45

## 2018-05-15 RX ADMIN — HYDRALAZINE HYDROCHLORIDE 25 MG: 25 TABLET, FILM COATED ORAL at 08:41

## 2018-05-15 RX ADMIN — HEPARIN SODIUM 5000 UNITS: 5000 INJECTION, SOLUTION INTRAVENOUS; SUBCUTANEOUS at 21:13

## 2018-05-15 RX ADMIN — MONTELUKAST 10 MG: 10 TABLET, FILM COATED ORAL at 21:13

## 2018-05-15 RX ADMIN — CEFTRIAXONE SODIUM 2 G: 2 INJECTION, SOLUTION INTRAVENOUS at 17:09

## 2018-05-15 RX ADMIN — FOLIC ACID 1 MG: 1 TABLET ORAL at 08:42

## 2018-05-15 RX ADMIN — METRONIDAZOLE 500 MG: 500 INJECTION, SOLUTION INTRAVENOUS at 11:28

## 2018-05-15 RX ADMIN — ISOSORBIDE MONONITRATE 30 MG: 30 TABLET ORAL at 21:13

## 2018-05-15 RX ADMIN — BISOPROLOL FUMARATE 5 MG: 5 TABLET ORAL at 08:41

## 2018-05-15 RX ADMIN — LOSARTAN POTASSIUM 25 MG: 25 TABLET, FILM COATED ORAL at 17:09

## 2018-05-15 RX ADMIN — HYDROCODONE BITARTRATE AND ACETAMINOPHEN 1 TABLET: 5; 325 TABLET ORAL at 00:49

## 2018-05-15 RX ADMIN — ACYCLOVIR 400 MG: 400 TABLET ORAL at 08:42

## 2018-05-15 RX ADMIN — ACYCLOVIR 400 MG: 400 TABLET ORAL at 21:13

## 2018-05-15 RX ADMIN — CARBAMAZEPINE 400 MG: 200 TABLET, EXTENDED RELEASE ORAL at 21:13

## 2018-05-15 NOTE — PROGRESS NOTES
" General Surgery  Marina Barroso  1950    CC: \"I'm better\"    HPI: no fever overnight, tolerating clear liquids, improved pain, but still with significant symptoms when she begins to pass gas.  Albeit, she feels she is getting better and still hopes to avoid surgery, as do i.  No blood in her stool    Temp:  [97.7 °F (36.5 °C)-98.9 °F (37.2 °C)] 97.7 °F (36.5 °C)  Heart Rate:  [64-74] 64  Resp:  [16] 16  BP: (112-125)/(67-72) 125/68    Intake & Output (last day)       05/14 0701 - 05/15 0700 05/15 0701 - 05/16 0700    P.O. 1250 200    IV Piggyback      Total Intake(mL/kg) 1250 (13.8) 200 (2.2)    Urine (mL/kg/hr) 3500 (1.6)     Total Output 3500      Net -2250 +200              Physical Exam   Constitutional: She is oriented to person, place, and time. She appears well-developed and well-nourished.   HENT:   Head: Normocephalic.   Cardiovascular: Normal rate.    Pulmonary/Chest: Effort normal.   Abdominal: Soft. She exhibits distension. Tenderness: left lateral and left suprapubic. There is guarding. There is no rebound. A hernia is present.   Neurological: She is alert and oriented to person, place, and time.   Skin: Skin is warm.   Psychiatric: She has a normal mood and affect. Her behavior is normal. Thought content normal.   Vitals reviewed.      Results from last 7 days  Lab Units 05/15/18  0613 05/14/18 0515 05/13/18 1927   WBC 10*3/mm3 4.98 6.61 7.37   HEMOGLOBIN g/dL 10.1* 10.0* 10.6*   HEMATOCRIT % 32.7* 33.1* 34.4*   PLATELETS 10*3/mm3 95* 88* 106*       Results from last 7 days  Lab Units 05/15/18  0613 05/14/18 0515 05/13/18 1927   SODIUM mmol/L 142 143 142   POTASSIUM mmol/L 3.9 3.7 3.7   CHLORIDE mmol/L 111* 112* 110*   CO2 mmol/L 21.9* 22.8 23.7   BUN mg/dL 11 16 18   CREATININE mg/dL 1.48* 1.52* 1.64*   CALCIUM mg/dL 8.1* 7.9* 8.0*   BILIRUBIN mg/dL  --   --  0.3   ALK PHOS U/L  --   --  59   ALT (SGPT) U/L  --   --  15   AST (SGOT) U/L  --   --  12   GLUCOSE mg/dL 89 94 115* "       Assessment:  · Diverticulitis, proximal sigmoid, though fairly low in the pelvis based on her CT scan, and her physical exam.  While this looks mild on her CT scan, her exam is really remarkable.  Albeit, she reports general improvement.    · Lumbar hernia, with contained ascending colon, very tender as well.  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  · History of right ureter re-anastomosis concomitant with bilateral salpingo-oophorectomy, with ureteral stents every 2 months per patient now  · History of VRE and MRSA  · Intolerance to Zosyn with renal complication  · Multiple myeloma  · Congestive heart failure  · History of left breast cancer, status post mastectomy  · History of seizures secondary to meningioma  · Chronic kidney disease status post nephrectomy and right ureteral stricture disease  · Right-sided port  · Lactose intolerance    Plan  · Discussed the option of advancement to full liquids but with her lactose intolerance, decided against.  · Asked that she determine overnight if laying on her right side improves her pain, which i think would point to her hernia as being a large component of her discomfort, and the involvement of her descending colon.  · Follow along.    Trudy Rivera MD'.  05/15/18

## 2018-05-15 NOTE — CONSULTS
Subjective .     REASONS FOR CURRENT CONSULTATION:  Patient admitted with diverticulitis, asked to evaluate him regards to underlying multiple myeloma and ongoing treatment with Darzalex.    REASONS FOR HEMATOLOGY/ONCOLOGY CARE:  1. IgG kappa multiple myeloma, currently on Darzalex, started on May 26, 2016. Patient was switched to Darzalex from Pomalyst, as she continued to be neutropenic with recurrent urinary tract infection while on Pomalyst.    2. Zometa is on hold due to renal insufficiency.    3. After 16 doses of Darzalex, laboratory study showed stable disease in November 2016. Insurance company denied adding Velcade and dexamethasone. Patient is to be continued on monthly Darzalex from 12/06/16.   4. Obstructive right pyelonephritis with positive urine culture for E. coli, required hospitalization from 1/19/2017 through 01/24/2017 with iv antibiotics and stent exchange on 01/20/2017.   5.  Paraprotein studies on March 27, 2017 showed further slight improvement of multiple myeloma.   6.  Febrile illness, with urinary tract infection and influenza B infection in early May 2017, required hospitalization for 6 days.   7.   Paraprotein studies for both serum and 24-hour urine sample on 7/21/2017 showed further improvement of paraproteins.   Darzalex was continued.   8.  Serum protein study reported stable disease on 10/20/2017.  Darzalex will be continued.   9.  Laboratory studies of both serum paraprotein and a 24-hour urine protein in January 2018 showed a further improvement of paraproteins.   10.  Most recent evaluation 4/6/18 with slight increase in IgG 3650, slight increase in free lambda light chain 2/7/03, stable M spike 3.2 g. Darzalex continued, last treatment 5/4/18. Also continuing on Zometa 3 mg IV every 3 months, held when due on 5/4/18 because of slight increase in creatinine at 1.82.    HISTORY OF PRESENT ILLNESS:  The patient is a 67 y.o. year old female who we are asked to see following  hospital admission with the above-mentioned history.    History of Present Illness   the patient was recently in to see Dr. Araujo in 5/4/18 with review of serum and urine paraprotein studies that showed relative stability.  It was decided to continue on monthly single agent Darzalex was last administered that day 5/4/18.  She has tolerated this well with no significant side effects.  The patient notes development of left-sided abdominal pain approximately 3 days ago.  Yesterday she developed fever at home up to 100.4 and presented to the emergency department with temp to 102.1.  She had significant left lower quadrant tenderness and CT scan was performed 5/13/18 showing mild diverticulitis of the proximal sigmoid colon with no evidence of obstruction, perforation, or abscess.  There were no other acute findings.  The patient has been receiving Levaquin and Flagyl.  She has been seen by Dr. Rivera in Gen. surgery with decision to manage conservatively for now with no intervention.  She has defervesced.  Pain is somewhat improved.  WBC has been normal, was 7.37 on admission with ANC 4.67.  Blood cultures negative to date.    Past Medical History:   Diagnosis Date   • Anemia 10/14/2008    Secondary to chronic renal insufficiency and myeloma   • Arthropathy of knee 01/07/2014    unspecified   • Brain cancer     malignant brain tumor   • Breast cancer 04/2012    Left breast invasive ductal carcinoma, stage II   • Bursitis of left hip 10/18/2007   • Bursitis, knee 12/02/2013   • Calcaneal spur 08/12/2009   • Chronic kidney disease, stage III (moderate)    • Congestive heart failure    • Diarrhea of presumed infectious origin 04/01/2014    c diff    • Diverticulitis of colon 10/17/2007   • DVT (deep venous thrombosis)     right lower extremity    • History of Clostridium difficile    • History of echocardiogram 04/2014    EF decreased to 46% - per cardiology   • History of MRSA infection    • History of transfusion    •  Hydronephrosis     chronic, right   • Hypertension    • LLL pneumonia 04/23/2009   • Malignant neoplasm of kidney 12/2009    and other and unspecified urinary organs; urinary organ, site unspecified; s/p left nephrectomy   • Multiple myeloma 04/2012   • Neoplasm of uncertain behavior 10/12/2015    of other and unspecified sites and tissues; other specified sites   • Pyelonephritis 03/2004   • Seizures 10/17/2007   • Thrombocytopenia    • UTI (urinary tract infection) 10/30/2014    pseudomonas, multidrug resistant   • UTI (urinary tract infection) 03/28/2014    site not specified     Past Surgical History:   Procedure Laterality Date   • BONE MARROW BIOPSY N/A 04/11/2012   • BRAIN SURGERY  2005    Meningioma   • BREAST BIOPSY Left 04/09/2012    Dr. Gregg Mya   • CARDIAC CATHETERIZATION Left 06/11/2012    Dr. Sudeep Haddad   • CARDIAC CATHETERIZATION N/A 08/15/2006    Dr. Brian Bhatt   • COLONOSCOPY N/A unknown   • COLONOSCOPY N/A 8/30/2017    Procedure: COLONOSCOPY into cecum and TI with cold polypectomies;  Surgeon: Trudy Rivera MD;  Location: [facility] ENDOSCOPY;  Service:    • CYSTOSCOPY N/A 3/25/2016    Procedure: CYSTOSCOPY  WITH RIGHT STENT CHANGE;  Surgeon: Lakhwinder Russo MD;  Location: [facility] OR;  Service:    • CYSTOSCOPY N/A 03/10/2012    Cystoscopy, right retrograde, right double-J stent-Dr. Sloan May   • CYSTOSCOPY N/A 02/25/2012    Cystoscopy, stent removal, right retrograde pyelogram, replacement of right double-J ureteral stent-Dr. Michael Everett   • CYSTOSCOPY W/ URETERAL STENT PLACEMENT Right 5/7/2016    Procedure: CYSTOSCOPY, URETERAL CATHETER INSERTION AND RIGHT STENT EXCHANGE ;  Surgeon: Michael Everett Jr., MD;  Location: [facility] OR;  Service:    • CYSTOSCOPY W/ URETERAL STENT PLACEMENT N/A 1/20/2017    Procedure: CYSTOSCOPY RIGHT RETROGRADE PYELOGRAM, URETERAL STENT CHANGE;  Surgeon: Lakhwinder Russo MD;  Location: [facility] OR;  Service:    • CYSTOSCOPY W/ URETERAL  STENT PLACEMENT N/A 04/02/2015    Cystoscopy, removal of right ureteral stent, right retrograde pyelogram, placement of right double-J ureteral stent-Dr. Michael Everett   • CYSTOSCOPY W/ URETERAL STENT PLACEMENT N/A 04/26/2015    Cystoscopy, removal of right ureteral stent, right retrograde pyelogram, replacement of right ureteral stent 24 cm x 7-Nigerian without retrieval line-Dr. Jose Gomez   • CYSTOSCOPY W/ URETERAL STENT PLACEMENT N/A 12/22/2014    Cystoscopy, removal of right double-J ureteral stent, right retrograde pyelogram, placement of right double-J ureteral stent-Dr. Michael Everett   • CYSTOSCOPY W/ URETERAL STENT PLACEMENT N/A 09/22/2014    Cystoscopy, removal of right ureteral stent, removal of right retrograde pyelogram, replacement of right double-J ureteral stent-Dr. Michael Everett   • CYSTOSCOPY W/ URETERAL STENT PLACEMENT N/A 06/18/2014    Cystoscopy, removal of right double-J ureteral stent, right retrograde pyelogram, placement of right double-J ureteral stent-Dr. Michael Everett   • CYSTOSCOPY W/ URETERAL STENT PLACEMENT N/A 01/23/2014    Cystoscopy, removal of right double-J ureteral stent, right retrograde pyelogram, placement of right double-J ureteral stent-Dr. Michael Everett   • CYSTOSCOPY W/ URETERAL STENT PLACEMENT N/A 03/27/2012    Cystoscopy, removal of ureteral stent, right retrograde pyelogram, replacement of indewlling right ureteral stent-Dr. Michael Everett   • CYSTOSCOPY W/ URETERAL STENT PLACEMENT N/A 03/27/2013    Cystoscopy, right retrograde pyelogram, removal and replacement of right double-J ureteral stent-Dr. Michael Everett   • CYSTOSCOPY W/ URETERAL STENT PLACEMENT N/A 11/12/2012    Cystoscopy, stent removal, right retrograde pyelogram, replacement of right double-J ureteral stent-Dr. Michael Everett   • CYSTOSCOPY W/ URETERAL STENT PLACEMENT N/A 09/24/2011    Cystoscopy, removal of ureteral stent, right retrograde pyelogram and placement of right double-J ureteral stent-Dr. Michael Everett   •  CYSTOSCOPY W/ URETERAL STENT PLACEMENT N/A 05/14/2011    Cystoscopy, removal of right ureteral stent, right retrograde pyelogram and placement of new right double-J ureteral stent-Dr. Michael Everett   • CYSTOSCOPY W/ URETERAL STENT PLACEMENT N/A 12/31/2010    Cystoscopy, stent removal, right retrograde pyelogram, replacement of 7 Yoruba x 26 cm double-J stent-Dr. Michael Everett   • CYSTOSCOPY W/ URETERAL STENT PLACEMENT N/A 08/28/2010    Cystoscopy, stent removal, right retrograde pyelogram with interpretation, placement of right double-J ureteral stent-Dr. Michael Everett   • CYSTOSCOPY W/ URETERAL STENT PLACEMENT N/A 05/24/2010    Cystoscopy, right retrograde pyelogram, replacement of right double-J ureteral stent-Dr. Michael Everett   • CYSTOSCOPY W/ URETERAL STENT PLACEMENT N/A 02/12/2010    Cystoscopy, right retrograde pyelogram and placement of right double-J ureteral stent-Dr. Michael Everett   • CYSTOSCOPY W/ URETERAL STENT PLACEMENT N/A 02/23/2009    Cystoscopy, right retrograde pyelogram, placement of right double-J ureteral stent-Dr. Michael Everett   • CYSTOSCOPY W/ URETERAL STENT PLACEMENT N/A 09/17/2007    Dr. Michael Everett   • CYSTOSCOPY W/ URETERAL STENT PLACEMENT Right 01/29/2018    Dr. Everett   • DIAGNOSTIC LAPAROSCOPY EXPLORATORY LAPAROTOMY N/A 05/02/2006    Diagnostic laparoscopy, exploratory laparotomy with bilateral salpingo oopherectomy, primary reanastomosis of right ureter-Dr. Cristo Pittman   • MASTECTOMY Left 04/25/2012    Left total mastectomy with sentinel lymph node biopsies and left axillary lymphatic mapping-Dr. Gregg May   • NEPHRECTOMY Left 12/30/2009    Dr. 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 Cristo Pittman   • TOTAL ABDOMINAL HYSTERECTOMY Bilateral 2007    Dr. Todd   • URETEROURETEROSTOMY Right 05/01/2006    Dr. Michael Everett   • VENA CAVA FILTER INSERTION N/A 05/08/2006    Dr. Jordon Barber   • VENOUS ACCESS DEVICE (PORT)  INSERTION Right 02/25/2013    Right subclavian vein PowerPort insertion-Dr. Gregg May   • VENOUS ACCESS DEVICE (PORT) INSERTION AND REMOVAL N/A 10/06/2014    Revision of right internal jugular PowerPort with fluoroscopy, removal of peripherally inserted central catheter line-Dr. Aurora Tomlinson   • VENOUS ACCESS DEVICE (PORT) INSERTION AND REMOVAL N/A 08/14/2014    Ultrasound-guided access right internal jugular vein, PowerPort placement, removal of right subclavian port-Dr. Jordon Barber         ONCOLOGIC HISTORY:  (History from previous dates can be found in the separate document.)    No current facility-administered medications on file prior to encounter.      Current Outpatient Prescriptions on File Prior to Encounter   Medication Sig Dispense Refill   • acetaminophen (TYLENOL) 325 MG tablet Take 2 tablets by mouth Every 6 (Six) Hours As Needed for mild pain (1-3) or fever.  0   • acyclovir (ZOVIRAX) 400 MG tablet TAKE 1 TABLET BY MOUTH 2 (TWO) TIMES A DAY. TAKE NO MORE THAN 5 DOSES A DAY. 60 tablet 0   • aspirin 81 MG tablet Take 81 mg by mouth Every Night.     • bisoprolol (ZEBeta) 5 MG tablet TAKE 1 TABLET BY MOUTH 2 (TWO) TIMES DAILY  1   • carBAMazepine XR (TEGretol  XR) 200 MG 12 hr tablet Take 2 tablets by mouth Every Night for 180 days. 180 tablet 1   • dexamethasone (DECADRON) 4 MG tablet TAKE 1 TABLET IN THE MORNING STARTING THE DAY AFTER CHEMO FOR 2 DAYS. TAKE WITH FOOD. (Patient taking differently: TAKE 1 TABLET IN THE MORNING STARTING THE DAY AFTER CHEMO FOR 2 DAYS. TAKE WITH FOOD BID) 16 tablet 1   • folic acid (FOLVITE) 1 MG tablet Take 1 mg by mouth Every Morning.     • hydrALAZINE (APRESOLINE) 25 MG tablet Take 25 mg by mouth 2 (two) times a day.  3   • isosorbide mononitrate (IMDUR) 60 MG 24 hr tablet Take 1 tablet by mouth Daily. TAKE 1 TABLET BY MOUTH EVERY MORNING AND ONE-HALF TABLET EVERY EVENING (Patient taking differently: Take 60 mg by mouth Daily. TAKE 1.5 TABLET BY MOUTH EVERY  MORNING BEFORE EXERSIZE)     • losartan (COZAAR) 25 MG tablet Take 25 mg by mouth every evening.     • montelukast (SINGULAIR) 10 MG tablet TAKE 1 TABLET BY MOUTH EVERY NIGHT. 30 tablet 11   • Misc. Devices (VIRAGE CUSTOM BREAST PROSTHES) misc 2 each As Needed (na). 2 each 0       ALLERGIES:     Allergies   Allergen Reactions   • Zosyn [Piperacillin Sod-Tazobactam So] Swelling       Social History     Social History   • Marital status:      Spouse name: N/A   • Number of children: 3   • Years of education: High school     Occupational History   •  Retired     [employer] [[id number]]     Social History Main Topics   • Smoking status: Never Smoker   • Smokeless tobacco: Never Used   • Alcohol use No   • Drug use: No   • Sexual activity: Defer     Other Topics Concern   • Not on file     Social History Narrative    Son lives with her and helps     Family History   Problem Relation Age of Onset   • Hypertension Other    • Miscarriages / Stillbirths Father    • Heart disease Father    • Hypertension Father    • Heart attack Father    • Diabetes Sister    • Skin cancer Sister    • Throat cancer Brother            A full review of systems was obtained with pertinent positive findings as noted in the history of present illness above.  All other systems negative.      Objective      Vitals:    05/14/18 2053   BP: 120/70   Pulse: 69   Resp: 16   Temp: 98.9 °F (37.2 °C)   SpO2: 98%      Current Status 5/4/2018   ECOG score 0       Physical Exam   Constitutional: She is oriented to person, place, and time. She appears well-developed and well-nourished.   HENT:   Mouth/Throat: Oropharynx is clear and moist.   Eyes: Conjunctivae are normal.   Neck: No thyromegaly present.   Cardiovascular: Normal rate and regular rhythm.  Exam reveals no gallop and no friction rub.    No murmur heard.  Pulmonary/Chest: Breath sounds normal. No respiratory distress.   Abdominal: Soft. Bowel sounds are normal. She  exhibits no distension. There is tenderness.   Mild tenderness in the left lower quadrant, no rebound or guarding   Musculoskeletal: She exhibits no edema.   Lymphadenopathy:     She has no cervical adenopathy.        Right: No supraclavicular adenopathy present.        Left: No supraclavicular adenopathy present.   Neurological: She is alert and oriented to person, place, and time. She displays normal reflexes. No cranial nerve deficit. She exhibits normal muscle tone.   Skin: Skin is warm and dry. No rash noted.   Psychiatric: She has a normal mood and affect. Her behavior is normal.       RECENT LABS:  Hematology WBC   Date Value Ref Range Status   05/14/2018 6.61 4.50 - 10.70 10*3/mm3 Final     RBC   Date Value Ref Range Status   05/14/2018 3.03 (L) 3.90 - 5.20 10*6/mm3 Final     Hemoglobin   Date Value Ref Range Status   05/14/2018 10.0 (L) 11.9 - 15.5 g/dL Final     Hematocrit   Date Value Ref Range Status   05/14/2018 33.1 (L) 35.6 - 45.5 % Final     Platelets   Date Value Ref Range Status   05/14/2018 88 (L) 140 - 500 10*3/mm3 Final        Assessment/Plan     1. IgG lambda multiple myeloma, stage III:  · Patient has received multiple previous lines of therapy which have failed  · Changed to current treatment of single agent Darzalex 5/26/16 with evidence of response over time.  · Most recent evaluation of serum 4/6/18 with slight increase in IgG and free lambda light chain however M spike remained fairly stable.  24 hour urine showed stable total urinary protein and free lambda light chain on 4/20/18.  It was elected to continue the current therapy which was last administered 5/4/18.  Also receiving Zometa 3 mg IV every 3 months, held when due on 5/4/18 because of slight increase in creatinine at 1.82.  · Darzalex should pose no significant issue in regards to her current diverticulitis or the potential need for surgical intervention.  2. Chronic thrombocytopenia:  · Secondary to underlying multiple myeloma  and prior chemotherapy with baseline platelet count in the low 100,000 range.  · Platelet count today slightly lower at 88,000, not surprising given current infectious issues with mild consumption.  We will follow platelet count daily.  No current bleeding issues.  3. Chronic anemia:  · The patient has a chronic hemoglobin in the 11-12 range, related to underlying multiple myeloma as well as chronic kidney disease stage III/IV.  · Last anemia evaluation 1/12/18 with iron 123, ferritin 653, iron saturation 71%, TIBC 174, folate 12.77, B12 873.  · Hemoglobin currently 10.0, it declines further consider repeat anemia evaluation.  4. Sigmoid diverticulitis:  · Patient presented with left lower quadrant pain and fever, CT with evidence of mild sigmoid diverticulitis, no evidence of obstruction, perforation, abscess.  · Being managed conservatively with Levaquin/Flagyl.  Surgery following with no plans for intervention currently.  5. Stage III/IV CKD:  · Creatinine was slightly worse at 1.82 on 5/4/18 and 3 month dose of Zometa was held.  · Creatinine currently improved at 1.52    Plan:  1. Management of diverticulitis per [facility] and surgery  2. Daily CBC/differential and BMP  3. Will follow

## 2018-05-15 NOTE — PLAN OF CARE
Problem: Patient Care Overview  Goal: Plan of Care Review  Outcome: Ongoing (interventions implemented as appropriate)   05/15/18 0556   Coping/Psychosocial   Plan of Care Reviewed With patient   Plan of Care Review   Progress improving   OTHER   Outcome Summary VSS, C/O PAIN MEDICATED, TOLERATING CLEAR LIQUID DIET, NO BP/STICK LT ARM, VOIDING W/O DIFFICULTY.     Goal: Individualization and Mutuality  Outcome: Ongoing (interventions implemented as appropriate)    Goal: Discharge Needs Assessment  Outcome: Ongoing (interventions implemented as appropriate)    Goal: Interprofessional Rounds/Family Conf  Outcome: Ongoing (interventions implemented as appropriate)      Problem: Bowel Disease, Inflammatory (Adult)  Goal: Signs and Symptoms of Listed Potential Problems Will be Absent, Minimized or Managed (Bowel Disease, Inflammatory)  Outcome: Ongoing (interventions implemented as appropriate)      Problem: Pain, Acute (Adult)  Goal: Acceptable Pain Control/Comfort Level  Outcome: Ongoing (interventions implemented as appropriate)

## 2018-05-15 NOTE — PLAN OF CARE
Problem: Patient Care Overview  Goal: Plan of Care Review  Outcome: Ongoing (interventions implemented as appropriate)   05/15/18 1039   Coping/Psychosocial   Plan of Care Reviewed With patient   OTHER   Outcome Summary vss, no c/o pain, up in chair most of the day,, tolerating clear liquid diet, voiding w/o difficulty, no n;/v, side rails padded, limited extremity armband on sign posted in room, icontact isolation maintained     Goal: Individualization and Mutuality  Outcome: Ongoing (interventions implemented as appropriate)    Goal: Discharge Needs Assessment  Outcome: Ongoing (interventions implemented as appropriate)    Goal: Interprofessional Rounds/Family Conf  Outcome: Ongoing (interventions implemented as appropriate)      Problem: Bowel Disease, Inflammatory (Adult)  Goal: Signs and Symptoms of Listed Potential Problems Will be Absent, Minimized or Managed (Bowel Disease, Inflammatory)  Outcome: Ongoing (interventions implemented as appropriate)      Problem: Pain, Acute (Adult)  Goal: Acceptable Pain Control/Comfort Level  Outcome: Ongoing (interventions implemented as appropriate)

## 2018-05-15 NOTE — PROGRESS NOTES
LOS: 2 days       Chief Complaint:  Sigmoid diverticulitis, IgG lambda multiple myeloma, chronic thrombocytopenia, chronic anemia    Interval History: The patient reports today that her abdominal pain is improved on the left side.  Her bowels have not moved.  She denies any nausea nor vomiting.  She is tolerating a clear liquid diet.        Review of Systems:    Review of systems was obtained with pertinent positive findings as noted in the interval history.  All other systems negative.    Objective     Vital Signs  Temp:  [97.7 °F (36.5 °C)-98.9 °F (37.2 °C)] 97.7 °F (36.5 °C)  Heart Rate:  [64-70] 64  Resp:  [16] 16  BP: (120-125)/(68-72) 125/68        Physical Exam:     GENERAL:  Well-developed, well-nourished in no acute distress.   SKIN:  Warm, dry without rashes, purpura or petechiae.  MOUTH:  Tongue is well-papillated; no stomatitis or ulcers.  Lips normal.  THROAT:  Oropharynx without lesions or exudates.  CHEST:  Lungs clear to percussion and auscultation. Good airflow.  CARDIAC:  Regular rate and rhythm without murmurs, rubs or gallops. Normal S1,S2.  ABDOMEN:  Soft, nondistended, bowel sounds present.  Minimal tenderness in the left lower quadrant, improved from exam yesterday.  EXTREMITIES:  No clubbing, cyanosis or edema.  NEUROLOGICAL:  Cranial Nerves II-XII grossly intact.  No focal neurological deficits.  PSYCHIATRIC:  Normal affect and mood.           Results Review:     I reviewed the patient's new clinical results.      Results from last 7 days  Lab Units 05/15/18  0613   WBC 10*3/mm3 4.98   HEMOGLOBIN g/dL 10.1*   HEMATOCRIT % 32.7*   PLATELETS 10*3/mm3 95*     Lab Results   Component Value Date    NEUTROABS 2.53 05/15/2018       Results from last 7 days  Lab Units 05/15/18  0613   SODIUM mmol/L 142   POTASSIUM mmol/L 3.9   CHLORIDE mmol/L 111*   CO2 mmol/L 21.9*   BUN mg/dL 11   CREATININE mg/dL 1.48*   GLUCOSE mg/dL 89   CALCIUM mg/dL 8.1*                 Medication Review: Yes      Assessment/Plan      1. IgG lambda multiple myeloma, stage III:  · Patient has received multiple previous lines of therapy which have failed  · Changed to current treatment of single agent Darzalex 5/26/16 with evidence of response over time.  · Most recent evaluation of serum 4/6/18 with slight increase in IgG and free lambda light chain however M spike remained fairly stable.  24 hour urine showed stable total urinary protein and free lambda light chain on 4/20/18.  It was elected to continue the current therapy which was last administered 5/4/18.  Also receiving Zometa 3 mg IV every 3 months, held when due on 5/4/18 because of slight increase in creatinine at 1.82.  · Darzalex should pose no significant issue in regards to her current diverticulitis or the potential need for surgical intervention.  2. Chronic thrombocytopenia:  · Secondary to underlying multiple myeloma and prior chemotherapy with baseline platelet count in the low 100,000 range.  · Platelet count 5/14/18 had declined from baseline to 88,000.  Today slightly improved at 95,000.  Related to consumption from infection.  Will monitor daily.  3. Chronic anemia:  · The patient has a chronic hemoglobin in the 11-12 range, related to underlying multiple myeloma as well as chronic kidney disease stage III/IV.  · Last anemia evaluation 1/12/18 with iron 123, ferritin 653, iron saturation 71%, TIBC 174, folate 12.77, B12 873.  · Hemoglobin table today at 10.1.  4. Sigmoid diverticulitis:  · Patient presented with left lower quadrant pain and fever, CT with evidence of mild sigmoid diverticulitis, no evidence of obstruction, perforation, abscess.  · Being managed conservatively with antibiotics, currently receiving Rocephin and Flagyl.  Surgery following with no plans for intervention.  5. Stage III/IV CKD:  · Creatinine was slightly worse at 1.82 on 5/4/18 and 3 month dose of Zometa was held.  · Creatinine currently improved at  1.48     Plan:  1. Management of diverticulitis per surgery and [facility]  2. We will continue to monitor CBC daily          Michael Garrison MD  05/15/18  3:34 PM

## 2018-05-15 NOTE — PROGRESS NOTES
Name: Marina Barroso ADMIT: 2018   : 1950  PCP: Cristo Madera MD    MRN: [id number] LOS: 2 days   AGE/SEX: 67 y.o. female  ROOM: [facility]   Subjective   CC: abdominal pain  No acute events.  Patient is feeling a bit better but still having pain.  No BM. Tolerating clears, no n/v.      Objective   Vital Signs  Temp:  [97.7 °F (36.5 °C)-98.9 °F (37.2 °C)] 97.7 °F (36.5 °C)  Heart Rate:  [64-70] 64  Resp:  [16] 16  BP: (120-125)/(68-72) 125/68  SpO2:  [97 %-98 %] 98 %  on   ;   Device (Oxygen Therapy): room air  Body mass index is 37.6 kg/m².    Physical Exam   Constitutional: She is oriented to person, place, and time. No distress.   HENT:   Head: Normocephalic and atraumatic.   Mouth/Throat: Oropharynx is clear and moist.   Eyes: Conjunctivae and EOM are normal. Pupils are equal, round, and reactive to light.   Neck: Normal range of motion. Neck supple.   Cardiovascular: Normal rate, regular rhythm and intact distal pulses.    Pulmonary/Chest: Effort normal and breath sounds normal.   Abdominal: Soft. Bowel sounds are normal. There is tenderness (LLQ). There is no rebound and no guarding. A hernia (umbilical, reducible) is present.   Musculoskeletal: She exhibits no edema or tenderness.   Neurological: She is alert and oriented to person, place, and time.   Skin: Skin is warm and dry. She is not diaphoretic.   Psychiatric: She has a normal mood and affect. Her behavior is normal.       Results Review:       I reviewed the patient's new clinical results.    Results from last 7 days  Lab Units 05/15/18  0613 18  0515 05/13/18  1927   WBC 10*3/mm3 4.98 6.61 7.37   HEMOGLOBIN g/dL 10.1* 10.0* 10.6*   PLATELETS 10*3/mm3 95* 88* 106*       Results from last 7 days  Lab Units 05/15/18  0613 18  0515 05/13/18  1927   SODIUM mmol/L 142 143 142   POTASSIUM mmol/L 3.9 3.7 3.7   CHLORIDE mmol/L 111* 112* 110*   CO2 mmol/L 21.9* 22.8 23.7   BUN mg/dL 11 16 18   CREATININE mg/dL 1.48* 1.52* 1.64*   GLUCOSE  mg/dL 89 94 115*   Estimated Creatinine Clearance: 38.3 mL/min (by C-G formula based on SCr of 1.48 mg/dL (H)).    Results from last 7 days  Lab Units 05/15/18  0613 05/14/18  0515 05/13/18  1927   CALCIUM mg/dL 8.1* 7.9* 8.0*   ALBUMIN g/dL  --   --  3.00*         acyclovir 400 mg Oral BID   aspirin 81 mg Oral Daily   bisoprolol 5 mg Oral Q24H   carBAMazepine  mg Oral Nightly   ceftriaxone 2 g Intravenous Q24H   folic acid 1 mg Oral QAM   heparin (porcine) 5,000 Units Subcutaneous Q8H   hydrALAZINE 25 mg Oral Q12H   isosorbide mononitrate 30 mg Oral Q24H   isosorbide mononitrate 60 mg Oral Daily   losartan 25 mg Oral Q PM   metroNIDAZOLE 500 mg Intravenous Q8H   montelukast 10 mg Oral Nightly      Diet Clear Liquid      Assessment/Plan      Active Hospital Problems (** Indicates Principal Problem)    Diagnosis Date Noted   • **Sigmoid diverticulitis [K57.32] 05/13/2018   • HTN (hypertension) [I10] 05/14/2018   • CKD (chronic kidney disease) [N18.9] 05/14/2018   • Chronic systolic congestive heart failure [I50.22]    • Thrombocytopenia [D69.6]    • Multiple myeloma [C90.00] 04/01/2012      Resolved Hospital Problems    Diagnosis Date Noted Date Resolved   No resolved problems to display.   Sigmoid Diverticulitis  - continue clears, pain control  - on abx, will continue flagyl and switch levofloxacin to ceftriaxone- patient denies knowledge of having had cephalosporins before and cross-reaction to PCNs is quite low  - Dr. Rivera following, appreciate recs    CKD stage 3-4  - appears close to baseline  - will monitor     CHF/NICM  - does not appear to be in exacerbation, most recent EF ~50%  - will follow volume status  - continue on home meds  - follows with Dr. Bazzi    Multiple Myeloma  - on Darzalex, last admin 5/4/18, and zometa every 3 months  - has chronic associated TCP  - heme/onc following, appreciate recs    DVT Prophylaxis  - heparin subQ    Jeremias Coates MD  [facility]  05/15/18  3:24 PM

## 2018-05-16 LAB
ANION GAP SERPL CALCULATED.3IONS-SCNC: 7.1 MMOL/L
BACTERIA SPEC AEROBE CULT: ABNORMAL
BACTERIA SPEC AEROBE CULT: ABNORMAL
BASOPHILS # BLD AUTO: 0.01 10*3/MM3 (ref 0–0.2)
BASOPHILS NFR BLD AUTO: 0.2 % (ref 0–1.5)
BUN BLD-MCNC: 11 MG/DL (ref 8–23)
BUN/CREAT SERPL: 7.6 (ref 7–25)
CALCIUM SPEC-SCNC: 8.2 MG/DL (ref 8.6–10.5)
CHLORIDE SERPL-SCNC: 112 MMOL/L (ref 98–107)
CO2 SERPL-SCNC: 24.9 MMOL/L (ref 22–29)
CREAT BLD-MCNC: 1.45 MG/DL (ref 0.57–1)
DEPRECATED RDW RBC AUTO: 53.6 FL (ref 37–54)
EOSINOPHIL # BLD AUTO: 0.15 10*3/MM3 (ref 0–0.7)
EOSINOPHIL NFR BLD AUTO: 3.1 % (ref 0.3–6.2)
ERYTHROCYTE [DISTWIDTH] IN BLOOD BY AUTOMATED COUNT: 13.8 % (ref 11.7–13)
GFR SERPL CREATININE-BSD FRML MDRD: 44 ML/MIN/1.73
GLUCOSE BLD-MCNC: 88 MG/DL (ref 65–99)
HCT VFR BLD AUTO: 32.9 % (ref 35.6–45.5)
HGB BLD-MCNC: 10.2 G/DL (ref 11.9–15.5)
IMM GRANULOCYTES # BLD: 0.03 10*3/MM3 (ref 0–0.03)
IMM GRANULOCYTES NFR BLD: 0.6 % (ref 0–0.5)
LYMPHOCYTES # BLD AUTO: 1.64 10*3/MM3 (ref 0.9–4.8)
LYMPHOCYTES NFR BLD AUTO: 33.4 % (ref 19.6–45.3)
MCH RBC QN AUTO: 33.4 PG (ref 26.9–32)
MCHC RBC AUTO-ENTMCNC: 31 G/DL (ref 32.4–36.3)
MCV RBC AUTO: 107.9 FL (ref 80.5–98.2)
MONOCYTES # BLD AUTO: 0.49 10*3/MM3 (ref 0.2–1.2)
MONOCYTES NFR BLD AUTO: 10 % (ref 5–12)
NEUTROPHILS # BLD AUTO: 2.59 10*3/MM3 (ref 1.9–8.1)
NEUTROPHILS NFR BLD AUTO: 52.7 % (ref 42.7–76)
PLATELET # BLD AUTO: 94 10*3/MM3 (ref 140–500)
PMV BLD AUTO: 11.8 FL (ref 6–12)
POTASSIUM BLD-SCNC: 4.1 MMOL/L (ref 3.5–5.2)
RBC # BLD AUTO: 3.05 10*6/MM3 (ref 3.9–5.2)
SODIUM BLD-SCNC: 144 MMOL/L (ref 136–145)
WBC NRBC COR # BLD: 4.91 10*3/MM3 (ref 4.5–10.7)

## 2018-05-16 PROCEDURE — 99231 SBSQ HOSP IP/OBS SF/LOW 25: CPT | Performed by: INTERNAL MEDICINE

## 2018-05-16 PROCEDURE — 85025 COMPLETE CBC W/AUTO DIFF WBC: CPT | Performed by: INTERNAL MEDICINE

## 2018-05-16 PROCEDURE — 99232 SBSQ HOSP IP/OBS MODERATE 35: CPT | Performed by: SURGERY

## 2018-05-16 PROCEDURE — 80048 BASIC METABOLIC PNL TOTAL CA: CPT | Performed by: INTERNAL MEDICINE

## 2018-05-16 PROCEDURE — 25010000003 CEFTRIAXONE PER 250 MG: Performed by: INTERNAL MEDICINE

## 2018-05-16 PROCEDURE — 25010000002 HEPARIN (PORCINE) PER 1000 UNITS: Performed by: HOSPITALIST

## 2018-05-16 RX ADMIN — ISOSORBIDE MONONITRATE 30 MG: 30 TABLET ORAL at 21:02

## 2018-05-16 RX ADMIN — ASPIRIN 81 MG: 81 TABLET ORAL at 08:40

## 2018-05-16 RX ADMIN — CEFTRIAXONE SODIUM 2 G: 2 INJECTION, SOLUTION INTRAVENOUS at 18:19

## 2018-05-16 RX ADMIN — METRONIDAZOLE 500 MG: 500 INJECTION, SOLUTION INTRAVENOUS at 01:30

## 2018-05-16 RX ADMIN — FOLIC ACID 1 MG: 1 TABLET ORAL at 08:40

## 2018-05-16 RX ADMIN — HYDROCODONE BITARTRATE AND ACETAMINOPHEN 1 TABLET: 5; 325 TABLET ORAL at 00:34

## 2018-05-16 RX ADMIN — HYDRALAZINE HYDROCHLORIDE 25 MG: 25 TABLET, FILM COATED ORAL at 08:40

## 2018-05-16 RX ADMIN — HEPARIN SODIUM 5000 UNITS: 5000 INJECTION, SOLUTION INTRAVENOUS; SUBCUTANEOUS at 21:03

## 2018-05-16 RX ADMIN — MONTELUKAST 10 MG: 10 TABLET, FILM COATED ORAL at 21:02

## 2018-05-16 RX ADMIN — BISOPROLOL FUMARATE 5 MG: 5 TABLET ORAL at 08:40

## 2018-05-16 RX ADMIN — ISOSORBIDE MONONITRATE 60 MG: 30 TABLET ORAL at 08:43

## 2018-05-16 RX ADMIN — METRONIDAZOLE 500 MG: 500 INJECTION, SOLUTION INTRAVENOUS at 08:41

## 2018-05-16 RX ADMIN — LOSARTAN POTASSIUM 25 MG: 25 TABLET, FILM COATED ORAL at 18:24

## 2018-05-16 RX ADMIN — HEPARIN SODIUM 5000 UNITS: 5000 INJECTION, SOLUTION INTRAVENOUS; SUBCUTANEOUS at 14:10

## 2018-05-16 RX ADMIN — HYDRALAZINE HYDROCHLORIDE 25 MG: 25 TABLET, FILM COATED ORAL at 21:05

## 2018-05-16 RX ADMIN — ACYCLOVIR 400 MG: 400 TABLET ORAL at 21:02

## 2018-05-16 RX ADMIN — CARBAMAZEPINE 400 MG: 200 TABLET, EXTENDED RELEASE ORAL at 23:20

## 2018-05-16 RX ADMIN — METRONIDAZOLE 500 MG: 500 INJECTION, SOLUTION INTRAVENOUS at 17:07

## 2018-05-16 RX ADMIN — HEPARIN SODIUM 5000 UNITS: 5000 INJECTION, SOLUTION INTRAVENOUS; SUBCUTANEOUS at 06:40

## 2018-05-16 RX ADMIN — ACYCLOVIR 400 MG: 400 TABLET ORAL at 08:40

## 2018-05-16 NOTE — PROGRESS NOTES
" General Surgery  Marina Barroso  1950    CC: \"I'm better, better, better\"    HPI: no fever overnight, tolerating clear liquids, improved pain, now only a 2-3/10 vs previous 20/10.  Normal bm yesterday and no gas.  Interested in going home.  Wants solid food. Walking better.  Temp:  [97.7 °F (36.5 °C)-98.7 °F (37.1 °C)] 97.7 °F (36.5 °C)  Heart Rate:  [64-72] 70  Resp:  [16] 16  BP: (110-125)/(60-70) 119/68    Intake & Output (last day)       05/15 0701 - 05/16 0700 05/16 0701 - 05/17 0700    P.O. 820     IV Piggyback 100     Total Intake(mL/kg) 920 (10.2)     Urine (mL/kg/hr) 1950 (0.9)     Total Output 1950      Net -1030                Physical Exam   Constitutional: She is oriented to person, place, and time. She appears well-developed and well-nourished.   HENT:   Head: Normocephalic.   Cardiovascular: Normal rate.    Pulmonary/Chest: Effort normal.   Abdominal: Soft. She exhibits distension. Tenderness: left lateral and left suprapubic. There is guarding. There is no rebound. A hernia is present.   Tenderness improved.   Neurological: She is alert and oriented to person, place, and time.   Skin: Skin is warm.   Psychiatric: She has a normal mood and affect. Her behavior is normal. Thought content normal.   Vitals reviewed.      Results from last 7 days  Lab Units 05/16/18  0652 05/15/18  0613 05/14/18  0515 05/13/18  1927   WBC 10*3/mm3 4.91 4.98 6.61 7.37   HEMOGLOBIN g/dL 10.2* 10.1* 10.0* 10.6*   HEMATOCRIT % 32.9* 32.7* 33.1* 34.4*   PLATELETS 10*3/mm3 94* 95* 88* 106*       Results from last 7 days  Lab Units 05/16/18  0652 05/15/18  0613 05/14/18 0515 05/13/18 1927   SODIUM mmol/L 144 142 143 142   POTASSIUM mmol/L 4.1 3.9 3.7 3.7   CHLORIDE mmol/L 112* 111* 112* 110*   CO2 mmol/L 24.9 21.9* 22.8 23.7   BUN mg/dL 11 11 16 18   CREATININE mg/dL 1.45* 1.48* 1.52* 1.64*   CALCIUM mg/dL 8.2* 8.1* 7.9* 8.0*   BILIRUBIN mg/dL  --   --   --  0.3   ALK PHOS U/L  --   --   --  59   ALT (SGPT) U/L  --   --   " --  15   AST (SGOT) U/L  --   --   --  12   GLUCOSE mg/dL 88 89 94 115*       Assessment:  · Diverticulitis, proximal sigmoid, though fairly low in the pelvis based on her CT scan, and her physical exam.  While this looks mild on her CT scan, her exam has been fairly remarkable, but happily improved.    · Lumbar hernia, with contained descending colon, less tender  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  · History of right ureter re-anastomosis concomitant with bilateral salpingo-oophorectomy, with ureteral stents every 2 months per patient now  · History of VRE and MRSA  · Intolerance to Zosyn with renal complication  · Multiple myeloma  · Congestive heart failure  · History of left breast cancer, status post mastectomy  · History of seizures secondary to meningioma  · Chronic kidney disease status post nephrectomy and right ureteral stricture disease  · Right-sided port  · Lactose intolerance    Plan  · For the first time, i think she is very likely not to require surgery for this.  Fairly dramatic improvement, possibly related to abx change, or just continued treatment.  · Discussed the option and will advance to low residue diet, which she should continue after discharge, then regular one week, then life long high fiber to reduce risk of recurrence.  Nurses to get patient info on low residue and high fiber diet. .  · Anticipate discharge next day or two, or oral abx (?augmentin)  · i'll see in office 2-3 weeks.    Trudy Rivera MD'.  05/16/18

## 2018-05-16 NOTE — PLAN OF CARE
Problem: Patient Care Overview  Goal: Plan of Care Review  Outcome: Ongoing (interventions implemented as appropriate)   05/16/18 3757   Coping/Psychosocial   Plan of Care Reviewed With spouse   OTHER   Outcome Summary vss, no acute distress noted, up to chair most of the day, diet increased and scottie, nutrition consult ordered with diet instructions ,      Goal: Individualization and Mutuality  Outcome: Ongoing (interventions implemented as appropriate)    Goal: Discharge Needs Assessment  Outcome: Ongoing (interventions implemented as appropriate)    Goal: Interprofessional Rounds/Family Conf  Outcome: Ongoing (interventions implemented as appropriate)      Problem: Pain, Acute (Adult)  Goal: Acceptable Pain Control/Comfort Level  Outcome: Ongoing (interventions implemented as appropriate)

## 2018-05-16 NOTE — PLAN OF CARE
Problem: Patient Care Overview  Goal: Plan of Care Review  Outcome: Ongoing (interventions implemented as appropriate)   05/16/18 0606   Coping/Psychosocial   Plan of Care Reviewed With patient   Plan of Care Review   Progress improving   OTHER   Outcome Summary VSS, VOIDING W/O DIFFICULTY, TPLERATING CLEAR LIQUID DIET, IV ANTIBIOTIC GIVEN.     Goal: Individualization and Mutuality  Outcome: Ongoing (interventions implemented as appropriate)    Goal: Discharge Needs Assessment  Outcome: Ongoing (interventions implemented as appropriate)    Goal: Interprofessional Rounds/Family Conf  Outcome: Ongoing (interventions implemented as appropriate)      Problem: Bowel Disease, Inflammatory (Adult)  Goal: Signs and Symptoms of Listed Potential Problems Will be Absent, Minimized or Managed (Bowel Disease, Inflammatory)  Outcome: Ongoing (interventions implemented as appropriate)      Problem: Pain, Acute (Adult)  Goal: Acceptable Pain Control/Comfort Level  Outcome: Ongoing (interventions implemented as appropriate)

## 2018-05-16 NOTE — CONSULTS
Adult Nutrition  Assessment/PES    Patient Name:  Marina Barroso  YOB: 1950  MRN: [id number]  Admit Date:  5/13/2018    Assessment Date:  5/16/2018    Comments:  Educated patient on a low fiber/low residue diet for after discharge x 1 week and thena high fiber diet life long to reduce risk of recurrence. Patient says she cannot tolerate fiber-rich foods because they cause diarrhea. Asked her to discuss with Dr. Rivera and also to review food lists to get other ideas of fiber-rich foods.          Adult Nutrition Assessment     Row Name 05/16/18 1104       Reason for Assessment    Reason For Assessment nurse/nurse practitioner consult    Diagnosis gastrointestinal disease    Identified At Risk by Screening Criteria need for education          Problem/Interventions:        Problem 1     Row Name 05/16/18 1104       Nutrition Diagnoses Problem 1    Problem 1 Knowledge Deficit    Etiology (related to) Medical Diagnosis    Gastrointestinal Diverticulitis    Signs/Symptoms (evidenced by) Reported  Information Deficit                    Intervention Goal     Row Name 05/16/18 1105       Intervention Goal    General Provide information regarding MNT for treatment/condition            Nutrition Intervention     Row Name 05/16/18 1105       Nutrition Intervention    RD/Tech Action Care plan reviewd              Education/Evaluation     Row Name 05/16/18 1105       Education    Education Provided education regarding;Education topics    Provided education regarding Key food habit change    Education Topics Low residue;Fiber       Monitor/Evaluation    Education Follow-up Reinforce PRN        Electronically signed by:  Mona Karimi RD  05/16/18 11:06 AM

## 2018-05-16 NOTE — PROGRESS NOTES
Name: Marina Barroso ADMIT: 2018   : 1950  PCP: Cristo Madera MD    MRN: [id number] LOS: 3 days   AGE/SEX: 67 y.o. female  [facility]OM: [facility]   Subjective   CC: abdominal pain  No acute events.  Overall patient is feeling much better.  + BM x2. Tolerating clears, no n/v. Diet advanced to soft today by surgery but she has not eaten yet.     Objective   Vital Signs  Temp:  [97.6 °F (36.4 °C)-98.7 °F (37.1 °C)] 97.6 °F (36.4 °C)  Heart Rate:  [65-84] 84  Resp:  [16] 16  BP: (110-120)/(59-70) 115/59  SpO2:  [97 %-98 %] 97 %  on   ;   Device (Oxygen Therapy): room air  Body mass index is 37.6 kg/m².    Physical Exam   Constitutional: She is oriented to person, place, and time. No distress.   HENT:   Head: Normocephalic and atraumatic.   Mouth/Throat: Oropharynx is clear and moist.   Eyes: Conjunctivae and EOM are normal. Pupils are equal, round, and reactive to light.   Neck: Normal range of motion. Neck supple.   Cardiovascular: Normal rate, regular rhythm and intact distal pulses.    Pulmonary/Chest: Effort normal and breath sounds normal.   Abdominal: Soft. Bowel sounds are normal. There is tenderness (LLQ, minimal). There is no rebound and no guarding. A hernia (umbilical, reducible) is present.   Musculoskeletal: She exhibits no edema or tenderness.   Neurological: She is alert and oriented to person, place, and time.   Skin: Skin is warm and dry. She is not diaphoretic.   Psychiatric: She has a normal mood and affect. Her behavior is normal.       Results Review:       I reviewed the patient's new clinical results.    Results from last 7 days  Lab Units 18  0652 05/15/18  0613 18  0515 18   WBC 10*3/mm3 4.91 4.98 6.61 7.37   HEMOGLOBIN g/dL 10.2* 10.1* 10.0* 10.6*   PLATELETS 10*3/mm3 94* 95* 88* 106*       Results from last 7 days  Lab Units 18  0652 05/15/18  0613 18  0515 18  1927   SODIUM mmol/L 144 142 143 142   POTASSIUM mmol/L 4.1 3.9 3.7 3.7   CHLORIDE  mmol/L 112* 111* 112* 110*   CO2 mmol/L 24.9 21.9* 22.8 23.7   BUN mg/dL 11 11 16 18   CREATININE mg/dL 1.45* 1.48* 1.52* 1.64*   GLUCOSE mg/dL 88 89 94 115*   Estimated Creatinine Clearance: 39 mL/min (A) (by C-G formula based on SCr of 1.45 mg/dL (H)).    Results from last 7 days  Lab Units 05/16/18  0652 05/15/18  0613 05/14/18  0515 05/13/18  1927   CALCIUM mg/dL 8.2* 8.1* 7.9* 8.0*   ALBUMIN g/dL  --   --   --  3.00*         acyclovir 400 mg Oral BID   aspirin 81 mg Oral Daily   bisoprolol 5 mg Oral Q24H   carBAMazepine  mg Oral Nightly   ceftriaxone 2 g Intravenous Q24H   folic acid 1 mg Oral QAM   heparin (porcine) 5,000 Units Subcutaneous Q8H   hydrALAZINE 25 mg Oral Q12H   isosorbide mononitrate 30 mg Oral Q24H   isosorbide mononitrate 60 mg Oral Daily   losartan 25 mg Oral Q PM   metroNIDAZOLE 500 mg Intravenous Q8H   montelukast 10 mg Oral Nightly      Diet Regular; GI Soft/[place], Low Fiber / Low Residue      Assessment/Plan      Active Hospital Problems (** Indicates Principal Problem)    Diagnosis Date Noted   • **Sigmoid diverticulitis [K57.32] 05/13/2018   • HTN (hypertension) [I10] 05/14/2018   • CKD (chronic kidney disease) [N18.9] 05/14/2018   • Chronic systolic congestive heart failure [I50.22]    • Thrombocytopenia [D69.6]    • Multiple myeloma [C90.00] 04/01/2012      Resolved Hospital Problems    Diagnosis Date Noted Date Resolved   No resolved problems to display.   Sigmoid Diverticulitis  - continue pain control as needed  - continue on ceftriaxone and metronidazole, tolerating these well- will switch to PO abx at discharge  - Dr. Rivera following, appreciate recs    CKD stage 3-4  - appears close to baseline  - will monitor     CHF/NICM  - does not appear to be in exacerbation, most recent EF ~50%  - will follow volume status  - continue on home meds  - follows with Dr. Bazzi    Multiple Myeloma  - on Darzalex, last admin 5/4/18, and zometa every 3 months  - has chronic associated TCP  which is stable  - heme/onc following, appreciate recs    DVT Prophylaxis  - heparin subQ    Disposition  - home tomorrow if she continues to improve and okay with all    Jeremias Coates MD  [facility]  05/16/18  1:04 PM

## 2018-05-16 NOTE — PROGRESS NOTES
LOS: 3 days       Chief Complaint:  Sigmoid diverticulitis, IgG lambda multiple myeloma, chronic thrombocytopenia, chronic anemia    Interval History: The patient today reports continued improvement in her left lower quadrant pain.  She denies any nausea nor vomiting.  Bowels did move.  She is tolerating a clear liquid diet, being advanced today.  Discussion by Dr. Rivera regarding change to oral antibiotics and possible discharge tomorrow.        Review of Systems:    Review of systems was obtained with pertinent positive findings as noted in the interval history.  All other systems negative.    Objective     Vital Signs  Temp:  [97.7 °F (36.5 °C)-98.7 °F (37.1 °C)] 97.7 °F (36.5 °C)  Heart Rate:  [64-72] 70  Resp:  [16] 16  BP: (110-125)/(60-70) 119/68        Physical Exam:     GENERAL:  Well-developed, well-nourished in no acute distress.   SKIN:  Warm, dry without rashes, purpura or petechiae.  MOUTH:  Tongue is well-papillated; no stomatitis or ulcers.  Lips normal.  THROAT:  Oropharynx without lesions or exudates.  CHEST:  Lungs clear to percussion and auscultation. Good airflow.  CARDIAC:  Regular rate and rhythm without murmurs, rubs or gallops. Normal S1,S2.  ABDOMEN:  Soft, nondistended, bowel sounds present.  Minimal tenderness in the left lower quadrant, improved from exam yesterday.  EXTREMITIES:  No clubbing, cyanosis or edema.  NEUROLOGICAL:  Cranial Nerves II-XII grossly intact.  No focal neurological deficits.  PSYCHIATRIC:  Normal affect and mood.    I did examine the patient today, exam unchanged as above.       Results Review:     I reviewed the patient's new clinical results.      Results from last 7 days  Lab Units 05/16/18  0652   WBC 10*3/mm3 4.91   HEMOGLOBIN g/dL 10.2*   HEMATOCRIT % 32.9*   PLATELETS 10*3/mm3 94*     Lab Results   Component Value Date    NEUTROABS 2.59 05/16/2018       Results from last 7 days  Lab Units 05/16/18  0652   SODIUM mmol/L 144   POTASSIUM mmol/L 4.1    CHLORIDE mmol/L 112*   CO2 mmol/L 24.9   BUN mg/dL 11   CREATININE mg/dL 1.45*   GLUCOSE mg/dL 88   CALCIUM mg/dL 8.2*                 Medication Review: Yes     Assessment/Plan      1. IgG lambda multiple myeloma, stage III:  · Patient has received multiple previous lines of therapy which have failed  · Changed to current treatment of single agent Darzalex 5/26/16 with evidence of response over time.  · Most recent evaluation of serum 4/6/18 with slight increase in IgG and free lambda light chain however M spike remained fairly stable.  24 hour urine showed stable total urinary protein and free lambda light chain on 4/20/18.  It was elected to continue the current therapy which was last administered 5/4/18.  Also receiving Zometa 3 mg IV every 3 months, held when due on 5/4/18 because of slight increase in creatinine at 1.82.  · Darzalex should pose no significant issue in regards to her current diverticulitis or the potential need for surgical intervention.  2. Chronic thrombocytopenia:  · Secondary to underlying multiple myeloma and prior chemotherapy with baseline platelet count in the low 100,000 range.  · Platelet count 5/14/18 had declined from baseline to 88,000.  Subsequently improved and stable at 94,000 today.  Related to consumption from infection.    3. Chronic anemia:  · The patient has a chronic hemoglobin in the 11-12 range, related to underlying multiple myeloma as well as chronic kidney disease stage III/IV.  · Last anemia evaluation 1/12/18 with iron 123, ferritin 653, iron saturation 71%, TIBC 174, folate 12.77, B12 873.  · Hemoglobin table today at 10.2.  4. Sigmoid diverticulitis:  · Patient presented with left lower quadrant pain and fever, CT with evidence of mild sigmoid diverticulitis, no evidence of obstruction, perforation, abscess.  · Being managed conservatively with antibiotics, currently receiving Rocephin and Flagyl.  Surgery following with no plans for intervention.  5. Stage  III/IV CKD:  · Creatinine was slightly worse at 1.82 on 5/4/18 and 3 month dose of Zometa was held.  · Creatinine currently improved at 1.45     Plan:  1. Management of diverticulitis per surgery and LHA  2. Note plans for probable change to oral antibiotics and discharge in next 1-2 days.  With stable counts, we will sign off.  Patient will follow-up in CBC office as previously scheduled on 6/1/18 with labs and NP visit with darzalex treatment due.  These call with any further questions.          Michael Garrison MD  05/16/18  9:42 AM

## 2018-05-17 VITALS
HEIGHT: 61 IN | SYSTOLIC BLOOD PRESSURE: 118 MMHG | OXYGEN SATURATION: 97 % | WEIGHT: 199 LBS | RESPIRATION RATE: 16 BRPM | DIASTOLIC BLOOD PRESSURE: 66 MMHG | BODY MASS INDEX: 37.57 KG/M2 | HEART RATE: 70 BPM | TEMPERATURE: 97.4 F

## 2018-05-17 PROBLEM — K42.9 UMBILICAL HERNIA: Status: ACTIVE | Noted: 2018-05-17

## 2018-05-17 PROBLEM — K45.8 LUMBAR HERNIA: Status: ACTIVE | Noted: 2018-05-17

## 2018-05-17 LAB
ANION GAP SERPL CALCULATED.3IONS-SCNC: 9.2 MMOL/L
BASOPHILS # BLD AUTO: 0.01 10*3/MM3 (ref 0–0.2)
BASOPHILS NFR BLD AUTO: 0.2 % (ref 0–1.5)
BUN BLD-MCNC: 15 MG/DL (ref 8–23)
BUN/CREAT SERPL: 9.3 (ref 7–25)
CALCIUM SPEC-SCNC: 8.2 MG/DL (ref 8.6–10.5)
CHLORIDE SERPL-SCNC: 111 MMOL/L (ref 98–107)
CO2 SERPL-SCNC: 22.8 MMOL/L (ref 22–29)
CREAT BLD-MCNC: 1.62 MG/DL (ref 0.57–1)
DEPRECATED RDW RBC AUTO: 53.8 FL (ref 37–54)
EOSINOPHIL # BLD AUTO: 0.15 10*3/MM3 (ref 0–0.7)
EOSINOPHIL NFR BLD AUTO: 3.3 % (ref 0.3–6.2)
ERYTHROCYTE [DISTWIDTH] IN BLOOD BY AUTOMATED COUNT: 13.8 % (ref 11.7–13)
GFR SERPL CREATININE-BSD FRML MDRD: 39 ML/MIN/1.73
GLUCOSE BLD-MCNC: 96 MG/DL (ref 65–99)
HCT VFR BLD AUTO: 32.7 % (ref 35.6–45.5)
HGB BLD-MCNC: 10.1 G/DL (ref 11.9–15.5)
IMM GRANULOCYTES # BLD: 0.04 10*3/MM3 (ref 0–0.03)
IMM GRANULOCYTES NFR BLD: 0.9 % (ref 0–0.5)
LYMPHOCYTES # BLD AUTO: 1.65 10*3/MM3 (ref 0.9–4.8)
LYMPHOCYTES NFR BLD AUTO: 36.7 % (ref 19.6–45.3)
MCH RBC QN AUTO: 33 PG (ref 26.9–32)
MCHC RBC AUTO-ENTMCNC: 30.9 G/DL (ref 32.4–36.3)
MCV RBC AUTO: 106.9 FL (ref 80.5–98.2)
MONOCYTES # BLD AUTO: 0.46 10*3/MM3 (ref 0.2–1.2)
MONOCYTES NFR BLD AUTO: 10.2 % (ref 5–12)
NEUTROPHILS # BLD AUTO: 2.19 10*3/MM3 (ref 1.9–8.1)
NEUTROPHILS NFR BLD AUTO: 48.7 % (ref 42.7–76)
PLATELET # BLD AUTO: 97 10*3/MM3 (ref 140–500)
PMV BLD AUTO: 11.9 FL (ref 6–12)
POTASSIUM BLD-SCNC: 3.9 MMOL/L (ref 3.5–5.2)
RBC # BLD AUTO: 3.06 10*6/MM3 (ref 3.9–5.2)
SODIUM BLD-SCNC: 143 MMOL/L (ref 136–145)
WBC NRBC COR # BLD: 4.5 10*3/MM3 (ref 4.5–10.7)

## 2018-05-17 PROCEDURE — 80048 BASIC METABOLIC PNL TOTAL CA: CPT | Performed by: INTERNAL MEDICINE

## 2018-05-17 PROCEDURE — 25010000002 HEPARIN FLUSH (PORCINE) 100 UNIT/ML SOLUTION

## 2018-05-17 PROCEDURE — 99231 SBSQ HOSP IP/OBS SF/LOW 25: CPT | Performed by: SURGERY

## 2018-05-17 PROCEDURE — 85025 COMPLETE CBC W/AUTO DIFF WBC: CPT | Performed by: INTERNAL MEDICINE

## 2018-05-17 PROCEDURE — 25010000002 HEPARIN (PORCINE) PER 1000 UNITS: Performed by: HOSPITALIST

## 2018-05-17 RX ORDER — CEFDINIR 300 MG/1
300 CAPSULE ORAL 2 TIMES DAILY
Qty: 14 CAPSULE | Refills: 0 | Status: SHIPPED | OUTPATIENT
Start: 2018-05-17 | End: 2018-05-24

## 2018-05-17 RX ORDER — POLYETHYLENE GLYCOL 3350 17 G/17G
17 POWDER, FOR SOLUTION ORAL DAILY
Qty: 30 EACH | Refills: 0 | Status: SHIPPED | OUTPATIENT
Start: 2018-05-17 | End: 2018-05-31

## 2018-05-17 RX ORDER — METRONIDAZOLE 500 MG/1
500 TABLET ORAL 3 TIMES DAILY
Qty: 21 TABLET | Refills: 0 | Status: SHIPPED | OUTPATIENT
Start: 2018-05-17 | End: 2018-05-24

## 2018-05-17 RX ADMIN — BISOPROLOL FUMARATE 5 MG: 5 TABLET ORAL at 08:39

## 2018-05-17 RX ADMIN — Medication 500 UNITS: at 15:18

## 2018-05-17 RX ADMIN — FOLIC ACID 1 MG: 1 TABLET ORAL at 08:39

## 2018-05-17 RX ADMIN — ACYCLOVIR 400 MG: 400 TABLET ORAL at 08:39

## 2018-05-17 RX ADMIN — METRONIDAZOLE 500 MG: 500 INJECTION, SOLUTION INTRAVENOUS at 08:40

## 2018-05-17 RX ADMIN — HYDRALAZINE HYDROCHLORIDE 25 MG: 25 TABLET, FILM COATED ORAL at 08:39

## 2018-05-17 RX ADMIN — ISOSORBIDE MONONITRATE 60 MG: 30 TABLET ORAL at 08:40

## 2018-05-17 RX ADMIN — ASPIRIN 81 MG: 81 TABLET ORAL at 08:39

## 2018-05-17 RX ADMIN — HEPARIN SODIUM 5000 UNITS: 5000 INJECTION, SOLUTION INTRAVENOUS; SUBCUTANEOUS at 06:03

## 2018-05-17 RX ADMIN — METRONIDAZOLE 500 MG: 500 INJECTION, SOLUTION INTRAVENOUS at 01:13

## 2018-05-17 NOTE — DISCHARGE SUMMARY
"Date of Admission: 5/13/2018  Date of Discharge:  5/17/2018  Primary Care Physician: Cristo Madera MD     Discharge Diagnosis:  Active Hospital Problems (** Indicates Principal Problem)    Diagnosis Date Noted   • **Sigmoid diverticulitis [K57.32] 05/13/2018   • HTN (hypertension) [I10] 05/14/2018   • CKD (chronic kidney disease) [N18.9] 05/14/2018   • Chronic systolic congestive heart failure [I50.22]    • Thrombocytopenia [D69.6]    • Multiple myeloma [C90.00] 04/01/2012      Resolved Hospital Problems    Diagnosis Date Noted Date Resolved   No resolved problems to display.       Presenting Problem/History of Present Illness:  Sigmoid diverticulitis [K57.32]     Hospital Course:  The patient is a 67 y.o. female with a complex past medical history including CKD stage 3-4, CHF, multiple myeloma on Darzalex, and recurrent diverticulitis who presented with lower abdominal pain and was found to have sigmoid diverticulitis.  She was started on antibiotics and clear liquids.  Dr. Rivear with general surgery followed the patient throughout her hospital stay.  Dr. Garrison with oncology also evaluated the patient, and confirmed that her current issues will not affect her Darzalex therapy.  The patient improved fairly rapidly and her diet was advanced to a low-residue, high-fiber diet which she is to continue at home.  She will finish her 10d course of antibiotics with cefdinir and metronidazole as she has had adverse reactions to PNCs in the past but does tolerate cephalosoprins.  She will additionally be started on mirilax. She will need to follow up with Dr. Rivera in about 2-3 weeks.  Per Dr. Garrison she can keep her scheduled appointment on June 1, 2018 with [facility] for her next Darzalex treatment.    Exam Today:  Blood pressure 118/66, pulse 70, temperature 97.4 °F (36.3 °C), temperature source Oral, resp. rate 16, height 154.9 cm (61\"), weight 90.3 kg (199 lb), SpO2 97 %, not currently " breastfeeding.  Constitutional: She is oriented to person, place, and time. No distress.   Head: Normocephalic and atraumatic.   Mouth/Throat: Oropharynx is clear and moist.   Eyes: Conjunctivae and EOM are normal. Pupils are equal, round, and reactive to light.   Neck: Normal range of motion. Neck supple.   Cardiovascular: Normal rate, regular rhythm and intact distal pulses.    Pulmonary/Chest: Effort normal and breath sounds normal.   Abdominal: Soft. Bowel sounds are normal. There is tenderness (LLQ, minimal). There is no rebound and no guarding. A hernia (umbilical, reducible) is present.   Musculoskeletal: She exhibits no edema or tenderness.   Neurological: She is alert and oriented to person, place, and time.   Skin: Skin is warm and dry. She is not diaphoretic.   Psychiatric: She has a normal mood and affect. Her behavior is normal.     Procedures Performed:    CT ABDOMEN AND PELVIS WITHOUT CONTRAST-5/13/18  - Mild diverticulitis of the proximal sigmoid colon, no obstruction, perforation or abscess.  - Large masslike lesion of the right hepatic lobe is favored to be a hemangioma, not significantly changed.  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  - Double-J stent in the right renal collecting system. Left nephrectomy.  - Umbilical hernia and left lumbar hernia demonstrated no significant change.    Consults:   Consults     Date and Time Order Name Status Description    5/14/2018 0127 Hematology & Oncology Inpatient Consult Completed     5/14/2018 0124 Inpatient General Surgery Consult Completed     5/13/2018 2037 [facility] (on-call MD unless specified) Completed            Discharge Disposition:  Home or Self Care    Discharge Medications:   Marina Barroso   Home Medication Instructions GENA:[id number]    Printed on:05/17/18 1225   Medication Information                      acetaminophen (TYLENOL) 325 MG tablet  Take 2 tablets by mouth  Every 6 (Six) Hours As Needed for mild pain (1-3) or fever.             acyclovir (ZOVIRAX) 400 MG tablet  TAKE 1 TABLET BY MOUTH 2 (TWO) TIMES A DAY. TAKE NO MORE THAN 5 DOSES A DAY.             aspirin 81 MG tablet  Take 81 mg by mouth Every Night.             bisoprolol (ZEBeta) 5 MG tablet  TAKE 1 TABLET BY MOUTH 2 (TWO) TIMES DAILY             carBAMazepine XR (TEGretol  XR) 200 MG 12 hr tablet  Take 2 tablets by mouth Every Night for 180 days.             cefdinir (OMNICEF) 300 MG capsule  Take 1 capsule by mouth 2 (Two) Times a Day for 7 days.             dexamethasone (DECADRON) 4 MG tablet  TAKE 1 TABLET IN THE MORNING STARTING THE DAY AFTER CHEMO FOR 2 DAYS. TAKE WITH FOOD.             folic acid (FOLVITE) 1 MG tablet  Take 1 mg by mouth Every Morning.             hydrALAZINE (APRESOLINE) 25 MG tablet  Take 25 mg by mouth 2 (two) times a day.             isosorbide mononitrate (IMDUR) 60 MG 24 hr tablet  Take 1 tablet by mouth Daily. TAKE 1 TABLET BY MOUTH EVERY MORNING AND ONE-HALF TABLET EVERY EVENING             losartan (COZAAR) 25 MG tablet  Take 25 mg by mouth every evening.             metroNIDAZOLE (FLAGYL) 500 MG tablet  Take 1 tablet by mouth 3 (Three) Times a Day for 7 days.             Misc. Devices (VIRAGE CUSTOM BREAST PROSTHES) misc  2 each As Needed (na).             montelukast (SINGULAIR) 10 MG tablet  TAKE 1 TABLET BY MOUTH EVERY NIGHT.             polyethylene glycol (MIRALAX) packet  Take 17 g by mouth Daily. Hold if having loose stools.                 Discharge Diet:   Diet Instructions     Diet: Specialty Diet; Thin Liquids, No Restrictions; Low Residue, High Fiber       Discharge Diet:  Specialty Diet    Fluid Consistency:  Thin Liquids, No Restrictions    Specialty Diets:   Low Residue  High Fiber             Activity at Discharge:   Activity Instructions     Activity as Tolerated             Follow-up Appointments:  Future Appointments  Date Time Provider Department Center    5/31/2018 11:30 AM Cristo Madera MD [id number] None   6/1/2018 8:00 AM CHAIR 03 [facility]   6/1/2018 8:40 AM MADAI Calderon [facility]   6/22/2018 9:00 AM LAB CHAIR [facility] None   6/28/2018 8:15 AM CHAIR 10 [facility]   6/28/2018 9:00 AM Zane Araujo MD PhD [facility]     Additional Instructions for the Follow-ups that You Need to Schedule     Discharge Follow-up with PCP    As directed      Follow Up Details:  2 weeks or as scheduled         Discharge Follow-up with Specialty: Oncology ([facility])    As directed      Specialty:  Oncology ([facility])    Follow Up Details:  6/1/18 as scheduled for Darzalex         Discharge Follow-up with Specified Provider: Dr. Rivera (General Surgery)    As directed      To:  Dr. Rivera (General Surgery)    Follow Up Details:  2-3 weeks               Test Results Pending at Discharge:   Order Current Status    Blood Culture - Blood, Blood, Venous Line Preliminary result    Blood Culture - Blood, Blood, Venous Line Preliminary result           Jeremias Coates MD  05/17/18  12:25 PM    Time Spent on Discharge Activities: Greater than 30 minutes.

## 2018-05-17 NOTE — PLAN OF CARE
Problem: Patient Care Overview  Goal: Plan of Care Review  Outcome: Ongoing (interventions implemented as appropriate)   05/17/18 0536   Coping/Psychosocial   Plan of Care Reviewed With patient   Plan of Care Review   Progress improving   OTHER   Outcome Summary VSS. No c/o pain. Up ad jorge. Voided freely. BM 2X last night. Slept well.      Goal: Individualization and Mutuality  Outcome: Ongoing (interventions implemented as appropriate)    Goal: Discharge Needs Assessment  Outcome: Ongoing (interventions implemented as appropriate)    Goal: Interprofessional Rounds/Family Conf  Outcome: Ongoing (interventions implemented as appropriate)      Problem: Bowel Disease, Inflammatory (Adult)  Goal: Signs and Symptoms of Listed Potential Problems Will be Absent, Minimized or Managed (Bowel Disease, Inflammatory)  Outcome: Ongoing (interventions implemented as appropriate)      Problem: Pain, Acute (Adult)  Goal: Acceptable Pain Control/Comfort Level  Outcome: Ongoing (interventions implemented as appropriate)

## 2018-05-17 NOTE — PROGRESS NOTES
Continued Stay Note   [facility]     Patient Name: Marina Barroso  MRN: [id number]  Today's Date: 5/17/2018    Admit Date: 5/13/2018          Discharge Plan     Row Name 05/17/18 0933       Plan    Plan Home w/o needs    Plan Comments I updated pt that discharge is anticipated today, she plans home w/o needs, she said she is agreeable to hospital follow-up visit with PCPDorene at Dr Fernandez office said dr only works 3 days / week and first available is May 31 @ 11:30 AM, pt agreeable and said she will cancel the June 6 appointment (Dorene advised and canceled 6/6 at 0945).  Antonia Nuñez, ENRIQUETA                Discharge Codes    No documentation.           Antonia Nuñez, RN

## 2018-05-17 NOTE — PROGRESS NOTES
"General Surgery  Marina Barroso  1950    CC: \"I'm doing fine\"    HPI:  Tolerated diet OK.  Managing with pain pills and none yesterday.  Bowel movement and appeared normal.  No fever.    Temp:  [97.4 °F (36.3 °C)-98.2 °F (36.8 °C)] 97.4 °F (36.3 °C)  Heart Rate:  [65-77] 70  Resp:  [16] 16  BP: (108-128)/(59-68) 118/66    Intake & Output (last day)       05/16 0701 - 05/17 0700 05/17 0701 - 05/18 0700    P.O. 1070     IV Piggyback 200     Total Intake(mL/kg) 1270 (14.1)     Urine (mL/kg/hr) 1920 (0.9)     Stool 0 (0)     Total Output 1920      Net -650            Unmeasured Urine Occurrence 1 x     Unmeasured Stool Occurrence 2 x         Physical Exam   Constitutional: She is oriented to person, place, and time. She appears well-developed and well-nourished.   HENT:   Head: Normocephalic.   Cardiovascular: Normal rate.    Pulmonary/Chest: Effort normal.   Abdominal: Soft. She exhibits no distension. Tenderness: left lateral and left suprapubic. There is no rebound. A hernia (much less tender) is present.   Tenderness improved.   Neurological: She is alert and oriented to person, place, and time.   Skin: Skin is warm.   Psychiatric: She has a normal mood and affect. Her behavior is normal. Thought content normal.   Vitals reviewed.      Results from last 7 days  Lab Units 05/17/18  0603 05/16/18  0652 05/15/18  0613 05/14/18 0515 05/13/18 1927   WBC 10*3/mm3 4.50 4.91 4.98 6.61 7.37   HEMOGLOBIN g/dL 10.1* 10.2* 10.1* 10.0* 10.6*   HEMATOCRIT % 32.7* 32.9* 32.7* 33.1* 34.4*   PLATELETS 10*3/mm3 97* 94* 95* 88* 106*       Results from last 7 days  Lab Units 05/17/18  0603 05/16/18  0652 05/15/18  0613 05/13/18 1927   SODIUM mmol/L 143 144 142  < > 142   POTASSIUM mmol/L 3.9 4.1 3.9  < > 3.7   CHLORIDE mmol/L 111* 112* 111*  < > 110*   CO2 mmol/L 22.8 24.9 21.9*  < > 23.7   BUN mg/dL 15 11 11  < > 18   CREATININE mg/dL 1.62* 1.45* 1.48*  < > 1.64*   CALCIUM mg/dL 8.2* 8.2* 8.1*  < > 8.0*   BILIRUBIN mg/dL  --   " --   --   --  0.3   ALK PHOS U/L  --   --   --   --  59   ALT (SGPT) U/L  --   --   --   --  15   AST (SGOT) U/L  --   --   --   --  12   GLUCOSE mg/dL 96 88 89  < > 115*   < > = values in this interval not displayed.    Assessment:  · Diverticulitis, proximal sigmoid, though fairly low in the pelvis based on her CT scan, and her physical exam.  Improved, tho dramatic at presentation    · Lumbar hernia, with contained descending colon, less tender  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  · History of right ureter re-anastomosis concomitant with bilateral salpingo-oophorectomy, with ureteral stents every 2 months per patient now  · History of VRE and MRSA  · Intolerance to Zosyn with renal complication  · Multiple myeloma, with thrombocytopenia  · Congestive heart failure  · History of left breast cancer, status post mastectomy  · History of seizures secondary to meningioma  · Chronic kidney disease status post nephrectomy and right ureteral stricture disease  · Right-sided port  · Lactose intolerance    Plan  · Discharge today.    · Discussed low residue diet, which she should continue after discharge, then regular one week, then life long high fiber to reduce risk of recurrence.  Nurses to get patient info on low residue and high fiber diet. .  · Anticipate discharge today, on omnicef, per [facility].  Discussed with patient.  · i'll see in office 2-3 weeks.    Trudy Rivera MD'.  05/17/18

## 2018-05-18 LAB
BACTERIA SPEC AEROBE CULT: NORMAL
BACTERIA SPEC AEROBE CULT: NORMAL

## 2018-05-31 ENCOUNTER — OFFICE VISIT (OUTPATIENT)
Dept: FAMILY MEDICINE CLINIC | Facility: CLINIC | Age: 68
End: 2018-05-31

## 2018-05-31 VITALS
WEIGHT: 198.5 LBS | TEMPERATURE: 97.3 F | SYSTOLIC BLOOD PRESSURE: 109 MMHG | HEIGHT: 61 IN | DIASTOLIC BLOOD PRESSURE: 67 MMHG | BODY MASS INDEX: 37.48 KG/M2 | HEART RATE: 65 BPM | RESPIRATION RATE: 16 BRPM

## 2018-05-31 DIAGNOSIS — Z09 HOSPITAL DISCHARGE FOLLOW-UP: Primary | ICD-10-CM

## 2018-05-31 DIAGNOSIS — K42.9 UMBILICAL HERNIA WITHOUT OBSTRUCTION AND WITHOUT GANGRENE: ICD-10-CM

## 2018-05-31 DIAGNOSIS — K57.32 SIGMOID DIVERTICULITIS: ICD-10-CM

## 2018-05-31 DIAGNOSIS — K45.8 LUMBAR HERNIA: ICD-10-CM

## 2018-05-31 PROCEDURE — 99214 OFFICE O/P EST MOD 30 MIN: CPT | Performed by: FAMILY MEDICINE

## 2018-05-31 NOTE — PROGRESS NOTES
Chief Complaint   Patient presents with   • Diverticulitis   • Knee Pain       Subjective   This patient presents the office for hospital recheck.  She was admitted on the 13th and out on the 17th for sigmoid diverticulitis.  She has finished her antibiotics.  She is to see surgeon in follow-up in the next 2 months or so.  She has not felt quite herself since discharge.  CT scan continues to show umbilical hernia and left lumbar hernia that may need attention or not.  The CT scan also showed stable lesion on the right liver and also on the adrenal glands.  Please see those details below.  Recent labs at been reviewed.  She continues under the care of oncology for her ongoing multiple myeloma.  Her other diagnoses have been updated.  I have reviewed and updated her medications, medical history and problem list during today's office visit. Current outpatient and discharge medications have been reconciled for the patient.  Reviewed by: Cristo Madera MD  Discharge summary note reviewed, see details below:  Date of Admission: 5/13/2018  Date of Discharge:  5/17/2018  Primary Care Physician: Cristo Madera MD      Discharge Diagnosis:        Active Hospital Problems (** Indicates Principal Problem)     Diagnosis Date Noted   • **Sigmoid diverticulitis [K57.32] 05/13/2018   • HTN (hypertension) [I10] 05/14/2018   • CKD (chronic kidney disease) [N18.9] 05/14/2018   • Chronic systolic congestive heart failure [I50.22]     • Thrombocytopenia [D69.6]     • Multiple myeloma [C90.00] 04/01/2012       Resolved Hospital Problems     Diagnosis Date Noted Date Resolved   No resolved problems to display.         Presenting Problem/History of Present Illness:  Sigmoid diverticulitis [K57.32]      Hospital Course:  The patient is a 67 y.o. female with a complex past medical history including CKD stage 3-4, CHF, multiple myeloma on Darzalex, and recurrent diverticulitis who presented with lower abdominal pain and was found to have  "sigmoid diverticulitis.  She was started on antibiotics and clear liquids.  Dr. Rivera with general surgery followed the patient throughout her hospital stay.  Dr. Garrison with oncology also evaluated the patient, and confirmed that her current issues will not affect her Darzalex therapy.  The patient improved fairly rapidly and her diet was advanced to a low-residue, high-fiber diet which she is to continue at home.  She will finish her 10d course of antibiotics with cefdinir and metronidazole as she has had adverse reactions to PNCs in the past but does tolerate cephalosoprins.  She will additionally be started on mirilax. She will need to follow up with Dr. Rivera in about 2-3 weeks.  Per Dr. Garrison she can keep her scheduled appointment on June 1, 2018 with the [facility] group for her next Darzalex treatment.    Patient Care Team:  Cristo Madera MD as PCP - General (Family Medicine)  Franko Hernandes MD as Surgeon (Neurosurgery)  Zane Araujo MD PhD as Consulting Physician (Hematology and Oncology)  Michael Everett Jr., MD as Consulting Physician (Urology)  Cristo Madera MD as Referring Physician (Family Medicine)    Social History   Substance Use Topics   • Smoking status: Never Smoker   • Smokeless tobacco: Never Used   • Alcohol use No       Review of Systems   Constitutional: Positive for fatigue. Negative for fever and unexpected weight loss.   Gastrointestinal: Negative for abdominal pain, blood in stool, constipation, diarrhea, nausea and vomiting.       Objective     /67   Pulse 65   Temp 97.3 °F (36.3 °C) (Oral)   Resp 16   Ht 154.9 cm (61\")   Wt 90 kg (198 lb 8 oz)   LMP  (LMP Unknown)   BMI 37.51 kg/m²     Body mass index is 37.51 kg/m².    Physical Exam   Constitutional: She is oriented to person, place, and time. She appears well-developed. No distress.   Eyes: Conjunctivae and lids are normal.   Neck: Carotid bruit is not present.   Cardiovascular: Normal rate, regular rhythm and normal " heart sounds.    Pulmonary/Chest: Effort normal and breath sounds normal.   Abdominal: Soft. Normal appearance and bowel sounds are normal. There is no rigidity, no rebound and no guarding. A hernia is present.       Neurological: She is alert and oriented to person, place, and time.   Skin: Skin is warm and dry.   Psychiatric: She has a normal mood and affect. Her behavior is normal.   Vitals reviewed.       Data Reviewed:    Ct Abdomen Pelvis Without Contrast    Result Date: 5/14/2018  Impression: 1.  Mild diverticulitis of the proximal sigmoid colon, no obstruction, perforation or abscess. 2.  Large masslike lesion of the right hepatic lobe is favored to be a hemangioma, not significantly changed. 3.  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 4.  Double-J stent in the right renal collecting system. Left nephrectomy. 5.  Umbilical hernia and left lumbar hernia demonstrated no significant change.     This report was finalized on 5/14/2018 9:29 PM by Dr. Miguel Rao M.D.        Lab Results   Component Value Date    BUN 15 05/17/2018    CREATININE 1.62 (H) 05/17/2018    EGFRIFNONA  08/30/2016      Comment:      <15 Indicative of kidney failure.    EGFRIFAFRI 39 (L) 05/17/2018     05/17/2018    K 3.9 05/17/2018     (H) 05/17/2018    CALCIUM 8.2 (L) 05/17/2018    PROTENTOTREF 7.8 04/06/2018    ALBUMIN 3.00 (L) 05/13/2018    LABGLOBREF 4.9 (H) 04/06/2018    BILITOT 0.3 05/13/2018    ALKPHOS 59 05/13/2018    AST 12 05/13/2018    ALT 15 05/13/2018     CBC w/ diff:   Lab Results   Component Value Date    WBC 4.50 05/17/2018    RBC 3.06 (L) 05/17/2018    HGB 10.1 (L) 05/17/2018    HCT 32.7 (L) 05/17/2018    .9 (H) 05/17/2018    MCH 33.0 (H) 05/17/2018    MCHC 30.9 (L) 05/17/2018    RDW 13.8 (H) 05/17/2018    PLT 97 (L) 05/17/2018    NEUTRORELPCT 48.7 05/17/2018    AUTOIGPER 0.9 (H) 05/17/2018    LYMPHORELPCT 36.7 05/17/2018     MONORELPCT 10.2 05/17/2018    EOSRELPCT 3.3 05/17/2018    BASORELPCT 0.2 05/17/2018     LIPID PANEL:No results found for: CHLPL, TRIG, HDL, VLDL, LDL, LDLHDL  TSH:No results found for: TSH    Assessment/Plan     Problem List Items Addressed This Visit     Umbilical hernia    Left Lumbar hernia    RESOLVED: Sigmoid diverticulitis   Keep follow up with general surgeon   Other Visit Diagnoses     Hospital discharge follow-up    -  Primary          No orders of the defined types were placed in this encounter.        Current Outpatient Prescriptions:   •  acetaminophen (TYLENOL) 325 MG tablet, Take 2 tablets by mouth Every 6 (Six) Hours As Needed for mild pain (1-3) or fever., Disp: , Rfl: 0  •  acyclovir (ZOVIRAX) 400 MG tablet, TAKE 1 TABLET BY MOUTH 2 (TWO) TIMES A DAY. TAKE NO MORE THAN 5 DOSES A DAY., Disp: 60 tablet, Rfl: 0  •  aspirin 81 MG tablet, Take 81 mg by mouth Every Night., Disp: , Rfl:   •  bisoprolol (ZEBeta) 5 MG tablet, TAKE 1 TABLET BY MOUTH 2 (TWO) TIMES DAILY, Disp: , Rfl: 1  •  carBAMazepine XR (TEGretol  XR) 200 MG 12 hr tablet, Take 2 tablets by mouth Every Night for 180 days., Disp: 180 tablet, Rfl: 1  •  dexamethasone (DECADRON) 4 MG tablet, TAKE 1 TABLET IN THE MORNING STARTING THE DAY AFTER CHEMO FOR 2 DAYS. TAKE WITH FOOD. (Patient taking differently: TAKE 1 TABLET IN THE MORNING STARTING THE DAY AFTER CHEMO FOR 2 DAYS. TAKE WITH FOOD BID), Disp: 16 tablet, Rfl: 1  •  folic acid (FOLVITE) 1 MG tablet, Take 1 mg by mouth Every Morning., Disp: , Rfl:   •  hydrALAZINE (APRESOLINE) 25 MG tablet, Take 25 mg by mouth 2 (two) times a day., Disp: , Rfl: 3  •  isosorbide mononitrate (IMDUR) 60 MG 24 hr tablet, Take 1 tablet by mouth Daily. TAKE 1 TABLET BY MOUTH EVERY MORNING AND ONE-HALF TABLET EVERY EVENING (Patient taking differently: Take 60 mg by mouth Daily. TAKE 1.5 TABLET BY MOUTH EVERY MORNING BEFORE EXERSIZE), Disp: , Rfl:   •  losartan (COZAAR) 25 MG tablet, Take 25 mg by mouth every  evening., Disp: , Rfl:   •  Misc. Devices (VIRAGE CUSTOM BREAST PROSTHES) misc, 2 each As Needed (na)., Disp: 2 each, Rfl: 0  •  montelukast (SINGULAIR) 10 MG tablet, TAKE 1 TABLET BY MOUTH EVERY NIGHT., Disp: 30 tablet, Rfl: 11    Return for Next scheduled follow up.

## 2018-06-01 ENCOUNTER — APPOINTMENT (OUTPATIENT)
Dept: ONCOLOGY | Facility: HOSPITAL | Age: 68
End: 2018-06-01

## 2018-06-04 PROBLEM — N18.9 CKD (CHRONIC KIDNEY DISEASE): Status: RESOLVED | Noted: 2018-05-14 | Resolved: 2018-06-04

## 2018-06-06 ENCOUNTER — OFFICE VISIT (OUTPATIENT)
Dept: FAMILY MEDICINE CLINIC | Facility: CLINIC | Age: 68
End: 2018-06-06

## 2018-06-06 VITALS
HEIGHT: 61 IN | DIASTOLIC BLOOD PRESSURE: 66 MMHG | TEMPERATURE: 97.3 F | SYSTOLIC BLOOD PRESSURE: 105 MMHG | BODY MASS INDEX: 37.19 KG/M2 | HEART RATE: 69 BPM | RESPIRATION RATE: 16 BRPM | WEIGHT: 197 LBS

## 2018-06-06 DIAGNOSIS — M17.12 ARTHRITIS OF LEFT KNEE: Primary | ICD-10-CM

## 2018-06-06 DIAGNOSIS — C90.00 MULTIPLE MYELOMA NOT HAVING ACHIEVED REMISSION (HCC): ICD-10-CM

## 2018-06-06 DIAGNOSIS — IMO0001 CLASS 2 OBESITY DUE TO EXCESS CALORIES WITH SERIOUS COMORBIDITY AND BODY MASS INDEX (BMI) OF 37.0 TO 37.9 IN ADULT: ICD-10-CM

## 2018-06-06 PROCEDURE — 73560 X-RAY EXAM OF KNEE 1 OR 2: CPT | Performed by: FAMILY MEDICINE

## 2018-06-06 PROCEDURE — 99213 OFFICE O/P EST LOW 20 MIN: CPT | Performed by: FAMILY MEDICINE

## 2018-06-06 NOTE — PROGRESS NOTES
"Chief Complaint   Patient presents with   • Knee Pain   • Discuss weight       Subjective   This patient presents the office with left knee pain.  This is been a problem for several years and seems to be getting worse.  She would like an x-ray today.  I have reviewed and updated her medications, medical history and problem list during today's office visit.     Patient Care Team:  Cristo Madera MD as PCP - General (Family Medicine)  Franko Hernandes MD as Surgeon (Neurosurgery)  Zane Araujo MD PhD as Consulting Physician (Hematology and Oncology)  Michael Everett Jr., MD as Consulting Physician (Urology)  Cristo Madera MD as Referring Physician (Family Medicine)    Social History   Substance Use Topics   • Smoking status: Never Smoker   • Smokeless tobacco: Never Used   • Alcohol use No       Review of Systems   Musculoskeletal: Positive for arthralgias.       Objective     /66   Pulse 69   Temp 97.3 °F (36.3 °C) (Oral)   Resp 16   Ht 154.9 cm (61\")   Wt 89.4 kg (197 lb)   LMP  (LMP Unknown)   BMI 37.22 kg/m²     Body mass index is 37.22 kg/m².    Physical Exam   Musculoskeletal:        Left knee: Tenderness found. Medial joint line tenderness noted.   Crepitus left knee, good ROM        Data Reviewed:  Views: lateral and posterior-anterior    Relevant Clinical Issues/Diagnoses/Indications for XRay: see HPI  Clinical Findings: Extremities: Knee: arthritis, medial femoral condyle and patella    Compared with previous XRay? no    Date of Previous Xray:No previous xray available for comparison    Changes on current Xray? not applicable        Ct Abdomen Pelvis Without Contrast    Result Date: 5/14/2018  Impression: 1.  Mild diverticulitis of the proximal sigmoid colon, no obstruction, perforation or abscess. 2.  Large masslike lesion of the right hepatic lobe is favored to be a hemangioma, not significantly changed. 3.  1.6 cm nodule of the right adrenal gland and 1.3 cm angiomyolipoma of the left " adrenal gland remain unchanged. 4.  Double-J stent in the right renal collecting system. Left nephrectomy. 5.  Umbilical hernia and left lumbar hernia demonstrated no significant change.     This report was finalized on 5/14/2018 9:29 PM by Dr. Miguel Rao M.D.            Assessment/Plan     Problem List Items Addressed This Visit     Arthritis of left knee - Primary    Relevant Orders    Ambulatory Referral to Orthopedic Surgery    Class 2 obesity due to excess calories with serious comorbidity and body mass index (BMI) of 37.0 to 37.9 in adult          Orders Placed This Encounter   Procedures   • Ambulatory Referral to Orthopedic Surgery     Referral Priority:   Routine     Referral Type:   Consultation     Referral Reason:   Specialty Services Required     Referred to Provider:   Pam Medina MD     Requested Specialty:   Orthopedic Surgery     Number of Visits Requested:   1         Current Outpatient Prescriptions:   •  acetaminophen (TYLENOL) 325 MG tablet, Take 2 tablets by mouth Every 6 (Six) Hours As Needed for mild pain (1-3) or fever., Disp: , Rfl: 0  •  acyclovir (ZOVIRAX) 400 MG tablet, TAKE 1 TABLET BY MOUTH 2 (TWO) TIMES A DAY. TAKE NO MORE THAN 5 DOSES A DAY., Disp: 60 tablet, Rfl: 0  •  aspirin 81 MG tablet, Take 81 mg by mouth Every Night., Disp: , Rfl:   •  bisoprolol (ZEBeta) 5 MG tablet, TAKE 1 TABLET BY MOUTH 2 (TWO) TIMES DAILY, Disp: , Rfl: 1  •  carBAMazepine XR (TEGretol  XR) 200 MG 12 hr tablet, Take 2 tablets by mouth Every Night for 180 days., Disp: 180 tablet, Rfl: 1  •  dexamethasone (DECADRON) 4 MG tablet, TAKE 1 TABLET IN THE MORNING STARTING THE DAY AFTER CHEMO FOR 2 DAYS. TAKE WITH FOOD. (Patient taking differently: TAKE 1 TABLET IN THE MORNING STARTING THE DAY AFTER CHEMO FOR 2 DAYS. TAKE WITH FOOD BID), Disp: 16 tablet, Rfl: 1  •  folic acid (FOLVITE) 1 MG tablet, Take 1 mg by mouth Every Morning., Disp: , Rfl:   •  hydrALAZINE (APRESOLINE) 25 MG tablet, Take 25 mg by  mouth 2 (two) times a day., Disp: , Rfl: 3  •  isosorbide mononitrate (IMDUR) 60 MG 24 hr tablet, Take 1 tablet by mouth Daily. TAKE 1 TABLET BY MOUTH EVERY MORNING AND ONE-HALF TABLET EVERY EVENING (Patient taking differently: Take 60 mg by mouth Daily. TAKE 1.5 TABLET BY MOUTH EVERY MORNING BEFORE EXERSIZE), Disp: , Rfl:   •  losartan (COZAAR) 25 MG tablet, Take 25 mg by mouth every evening., Disp: , Rfl:   •  Misc. Devices (VIRAGE CUSTOM BREAST PROSTHES) misc, 2 each As Needed (na)., Disp: 2 each, Rfl: 0  •  montelukast (SINGULAIR) 10 MG tablet, TAKE 1 TABLET BY MOUTH EVERY NIGHT., Disp: 30 tablet, Rfl: 11    Return if symptoms worsen or fail to improve.

## 2018-06-08 ENCOUNTER — OFFICE VISIT (OUTPATIENT)
Dept: ONCOLOGY | Facility: CLINIC | Age: 68
End: 2018-06-08

## 2018-06-08 ENCOUNTER — INFUSION (OUTPATIENT)
Dept: ONCOLOGY | Facility: HOSPITAL | Age: 68
End: 2018-06-08

## 2018-06-08 VITALS
RESPIRATION RATE: 16 BRPM | BODY MASS INDEX: 36.23 KG/M2 | HEART RATE: 74 BPM | WEIGHT: 196.9 LBS | HEIGHT: 62 IN | SYSTOLIC BLOOD PRESSURE: 101 MMHG | TEMPERATURE: 97.7 F | DIASTOLIC BLOOD PRESSURE: 65 MMHG | OXYGEN SATURATION: 100 %

## 2018-06-08 VITALS — SYSTOLIC BLOOD PRESSURE: 113 MMHG | DIASTOLIC BLOOD PRESSURE: 68 MMHG | HEART RATE: 76 BPM

## 2018-06-08 DIAGNOSIS — C90.00 MULTIPLE MYELOMA NOT HAVING ACHIEVED REMISSION (HCC): Primary | ICD-10-CM

## 2018-06-08 DIAGNOSIS — N18.30 CHRONIC KIDNEY DISEASE, STAGE III (MODERATE) (HCC): ICD-10-CM

## 2018-06-08 DIAGNOSIS — D69.59 CHEMOTHERAPY-INDUCED THROMBOCYTOPENIA: ICD-10-CM

## 2018-06-08 DIAGNOSIS — D64.81 ANEMIA ASSOCIATED WITH CHEMOTHERAPY: ICD-10-CM

## 2018-06-08 DIAGNOSIS — T45.1X5A CHEMOTHERAPY-INDUCED THROMBOCYTOPENIA: ICD-10-CM

## 2018-06-08 DIAGNOSIS — T45.1X5A ANEMIA ASSOCIATED WITH CHEMOTHERAPY: ICD-10-CM

## 2018-06-08 LAB
ALBUMIN SERPL-MCNC: 3.1 G/DL (ref 3.5–5.2)
ALBUMIN/GLOB SERPL: 0.6 G/DL
ALP SERPL-CCNC: 54 U/L (ref 39–117)
ALT SERPL W P-5'-P-CCNC: 13 U/L (ref 1–33)
ANION GAP SERPL CALCULATED.3IONS-SCNC: 8.9 MMOL/L
AST SERPL-CCNC: 15 U/L (ref 1–32)
BASOPHILS # BLD AUTO: 0.03 10*3/MM3 (ref 0–0.2)
BASOPHILS NFR BLD AUTO: 0.4 % (ref 0–1.5)
BILIRUB SERPL-MCNC: 0.2 MG/DL (ref 0.1–1.2)
BUN BLD-MCNC: 26 MG/DL (ref 8–23)
BUN/CREAT SERPL: 15.4 (ref 7–25)
CALCIUM SPEC-SCNC: 8.1 MG/DL (ref 8.6–10.5)
CHLORIDE SERPL-SCNC: 112 MMOL/L (ref 98–107)
CO2 SERPL-SCNC: 20.1 MMOL/L (ref 22–29)
CREAT BLD-MCNC: 1.69 MG/DL (ref 0.57–1)
DACRYOCYTES BLD QL SMEAR: NORMAL
DEPRECATED RDW RBC AUTO: 54.1 FL (ref 37–54)
EOSINOPHIL # BLD AUTO: 0.22 10*3/MM3 (ref 0–0.7)
EOSINOPHIL NFR BLD AUTO: 3.1 % (ref 0.3–6.2)
ERYTHROCYTE [DISTWIDTH] IN BLOOD BY AUTOMATED COUNT: 14.1 % (ref 11.7–13)
GFR SERPL CREATININE-BSD FRML MDRD: 37 ML/MIN/1.73
GLOBULIN UR ELPH-MCNC: 5.5 GM/DL
GLUCOSE BLD-MCNC: 91 MG/DL (ref 65–99)
HCT VFR BLD AUTO: 32.6 % (ref 35.6–45.5)
HGB BLD-MCNC: 10.4 G/DL (ref 11.9–15.5)
IMM GRANULOCYTES # BLD: 0.15 10*3/MM3 (ref 0–0.03)
IMM GRANULOCYTES NFR BLD: 2.1 % (ref 0–0.5)
LYMPHOCYTES # BLD AUTO: 2.15 10*3/MM3 (ref 0.9–4.8)
LYMPHOCYTES NFR BLD AUTO: 30.1 % (ref 19.6–45.3)
MAGNESIUM SERPL-MCNC: 1.9 MG/DL (ref 1.6–2.4)
MCH RBC QN AUTO: 33.9 PG (ref 26.9–32)
MCHC RBC AUTO-ENTMCNC: 31.9 G/DL (ref 32.4–36.3)
MCV RBC AUTO: 106.2 FL (ref 80.5–98.2)
MONOCYTES # BLD AUTO: 0.73 10*3/MM3 (ref 0.2–1.2)
MONOCYTES NFR BLD AUTO: 10.2 % (ref 5–12)
NEUTROPHILS # BLD AUTO: 3.86 10*3/MM3 (ref 1.9–8.1)
NEUTROPHILS NFR BLD AUTO: 54.1 % (ref 42.7–76)
NRBC BLD MANUAL-RTO: 0.4 /100 WBC (ref 0–0)
PHOSPHATE SERPL-MCNC: 3.9 MG/DL (ref 2.5–4.5)
PLAT MORPH BLD: NORMAL
PLATELET # BLD AUTO: 135 10*3/MM3 (ref 140–500)
PMV BLD AUTO: 11.4 FL (ref 6–12)
POTASSIUM BLD-SCNC: 3.7 MMOL/L (ref 3.5–5.2)
PROT SERPL-MCNC: 8.6 G/DL (ref 6–8.5)
RBC # BLD AUTO: 3.07 10*6/MM3 (ref 3.9–5.2)
SODIUM BLD-SCNC: 141 MMOL/L (ref 136–145)
SPHEROCYTES BLD QL SMEAR: NORMAL
WBC MORPH BLD: NORMAL
WBC NRBC COR # BLD: 7.14 10*3/MM3 (ref 4.5–10.7)

## 2018-06-08 PROCEDURE — 96415 CHEMO IV INFUSION ADDL HR: CPT | Performed by: NURSE PRACTITIONER

## 2018-06-08 PROCEDURE — 25010000002 DARATUMUMAB 400 MG/20ML SOLUTION 20 ML VIAL: Performed by: NURSE PRACTITIONER

## 2018-06-08 PROCEDURE — 99214 OFFICE O/P EST MOD 30 MIN: CPT | Performed by: NURSE PRACTITIONER

## 2018-06-08 PROCEDURE — 96413 CHEMO IV INFUSION 1 HR: CPT | Performed by: NURSE PRACTITIONER

## 2018-06-08 PROCEDURE — 25010000002 METHYLPREDNISOLONE PER 125 MG: Performed by: NURSE PRACTITIONER

## 2018-06-08 PROCEDURE — 85025 COMPLETE CBC W/AUTO DIFF WBC: CPT | Performed by: INTERNAL MEDICINE

## 2018-06-08 PROCEDURE — 84100 ASSAY OF PHOSPHORUS: CPT | Performed by: INTERNAL MEDICINE

## 2018-06-08 PROCEDURE — 85007 BL SMEAR W/DIFF WBC COUNT: CPT | Performed by: INTERNAL MEDICINE

## 2018-06-08 PROCEDURE — 25010000002 DARATUMUMAB 400 MG/20ML SOLUTION 5 ML VIAL: Performed by: NURSE PRACTITIONER

## 2018-06-08 PROCEDURE — 83735 ASSAY OF MAGNESIUM: CPT | Performed by: INTERNAL MEDICINE

## 2018-06-08 PROCEDURE — 36415 COLL VENOUS BLD VENIPUNCTURE: CPT | Performed by: NURSE PRACTITIONER

## 2018-06-08 PROCEDURE — 80053 COMPREHEN METABOLIC PANEL: CPT | Performed by: INTERNAL MEDICINE

## 2018-06-08 PROCEDURE — 25010000002 DIPHENHYDRAMINE PER 50 MG: Performed by: NURSE PRACTITIONER

## 2018-06-08 PROCEDURE — 96375 TX/PRO/DX INJ NEW DRUG ADDON: CPT | Performed by: NURSE PRACTITIONER

## 2018-06-08 RX ORDER — SODIUM CHLORIDE 9 MG/ML
250 INJECTION, SOLUTION INTRAVENOUS ONCE
Status: COMPLETED | OUTPATIENT
Start: 2018-06-08 | End: 2018-06-08

## 2018-06-08 RX ORDER — FAMOTIDINE 10 MG/ML
20 INJECTION, SOLUTION INTRAVENOUS AS NEEDED
Status: CANCELLED | OUTPATIENT
Start: 2018-06-08

## 2018-06-08 RX ORDER — DIPHENHYDRAMINE HYDROCHLORIDE 50 MG/ML
50 INJECTION INTRAMUSCULAR; INTRAVENOUS AS NEEDED
Status: CANCELLED | OUTPATIENT
Start: 2018-06-08

## 2018-06-08 RX ORDER — ACETAMINOPHEN 500 MG
1000 TABLET ORAL ONCE
Status: CANCELLED | OUTPATIENT
Start: 2018-06-08

## 2018-06-08 RX ORDER — SODIUM CHLORIDE 9 MG/ML
250 INJECTION, SOLUTION INTRAVENOUS ONCE
Status: CANCELLED | OUTPATIENT
Start: 2018-06-08 | End: 2018-06-08

## 2018-06-08 RX ORDER — ACETAMINOPHEN 500 MG
1000 TABLET ORAL ONCE
Status: COMPLETED | OUTPATIENT
Start: 2018-06-08 | End: 2018-06-08

## 2018-06-08 RX ORDER — METHYLPREDNISOLONE SODIUM SUCCINATE 125 MG/2ML
60 INJECTION, POWDER, LYOPHILIZED, FOR SOLUTION INTRAMUSCULAR; INTRAVENOUS ONCE
Status: COMPLETED | OUTPATIENT
Start: 2018-06-08 | End: 2018-06-08

## 2018-06-08 RX ORDER — METHYLPREDNISOLONE SODIUM SUCCINATE 125 MG/2ML
60 INJECTION, POWDER, LYOPHILIZED, FOR SOLUTION INTRAMUSCULAR; INTRAVENOUS ONCE
Status: CANCELLED | OUTPATIENT
Start: 2018-06-08

## 2018-06-08 RX ADMIN — SODIUM CHLORIDE 250 ML: 900 INJECTION, SOLUTION INTRAVENOUS at 09:21

## 2018-06-08 RX ADMIN — ACETAMINOPHEN 1000 MG: 500 TABLET, FILM COATED ORAL at 09:21

## 2018-06-08 RX ADMIN — DIPHENHYDRAMINE HYDROCHLORIDE 25 MG: 50 INJECTION, SOLUTION INTRAMUSCULAR; INTRAVENOUS at 09:23

## 2018-06-08 RX ADMIN — METHYLPREDNISOLONE SODIUM SUCCINATE 60 MG: 125 INJECTION, POWDER, FOR SOLUTION INTRAMUSCULAR; INTRAVENOUS at 09:22

## 2018-06-08 RX ADMIN — DARATUMUMAB 1370 MG: 100 INJECTION, SOLUTION, CONCENTRATE INTRAVENOUS at 10:24

## 2018-06-08 NOTE — PROGRESS NOTES
Reasons for follow up:   1. IgG kappa multiple myeloma, currently on Darzalex, started on May 26, 2016. Patient was switched to Darzalex from Pomalyst, as she continued to be neutropenic with recurrent urinary tract infection while on Pomalyst.    2. Zometa is on hold due to renal insufficiency.    3. After 16 doses of Darzalex, laboratory study showed stable disease in November 2016. Insurance company denied adding Velcade and dexamethasone. Patient is to be continued on monthly Darzalex from 12/06/16.   4. Obstructive right pyelonephritis with positive urine culture for E. coli, required hospitalization from 1/19/2017 through 01/24/2017 with iv antibiotics and stent exchange on 01/20/2017.   5.  Paraprotein studies on March 27, 2017 showed further slight improvement of multiple myeloma.   6.  Febrile illness, with urinary tract infection and influenza B infection in early May 2017, required hospitalization for 6 days.   7.   Paraprotein studies for both serum and 24-hour urine sample on 7/21/2017 showed further improvement of paraproteins.   Darzalex was continued.   8.  Serum protein study reported stable disease on 10/20/2017.  Darzalex will be continued.   9.  Laboratory studies of both serum paraprotein and a 24-hour urine protein in January 2018 showed a further improvement of paraproteins.  Monthly Darzalex will be continued.           History of Present Illness:      The patient is a 67 y.o. female with the above-mentioned history, who returns today in anticipation of her 25th cycle of Darzalex which she continues to receive monthly.  She continues to tolerate treatment remarkably well.  Since her most recent infusion, she was hospitalized 5/13/2018 through 5/17/2018, diagnosed with recurrent diverticulitis, treated with antibiotics and clear liquids.  The patient reports prior to her hospitalization, she was feeling quite poorly with increased back pain, and poor energy.  She did, at that time, question if  this was related to progression of her myeloma.  Thankfully, since discharge, she reports she is feeling much better.  She reports she is felt well this past week with overall improvement in her energy.  She has returned to her baseline.  She does continue to have intermittent back pain which is resolved with rest.  She denies nausea or vomiting, fevers or chills.   Her counts today are acceptable for treatment, with hemoglobin and 11.4, platelets 135,000, white blood cells normal at 7.14.    Past Medical History, Past Surgical History, Social History, Family History have been reviewed and are without significant changes except as mentioned.      Hematology/oncology history: See separate document for extra information.       On12/6/2016, serum free lambda chain 1090 MG/L, free kappa chain 8.5 to MG/L.  Ferritin 1015, iron 99, TIBC 137 iron saturation 72%.     On January 4, 2017, vitamin B12 level 1289, folate 7.7 ng/mL. SPEP reporting gamma globulin 3.3, out of total globulin 5.0, with immunofixation reporting small quantity of monoclonal free lambda chain, plus monoclonal IgG lambda at 3.2 g/DL.  Serum IgG 4075, IgA 9 and IgM 15.  free lambda chain 1339 MG/L and free kappa chain 10.3 MG/L.      Laboratory study March 27, 2017 showed slight improvement of paraprotein, SPEP reporting M spike 2.8 g/DL, out of total globulin 4.3, and the serum IgG 3420, IgA 9, IgM 11, free lambda chain 1218 MG/L and free kappa chain 8.0 MG/L.  Total 24 hour urine sample reported at free lambda chain 142 mg, at a concentration 74.8 MG/L, free kappa chain 75 mg at a concentration 39.5 MG/L., Urine protein immunofixation reporting biclonal IgG lambda and monoclonal free lambda light chain, M spike #1 at 41.5% and monoclonal IgG lambda spike #2 at 10.5%. Serum beta-2 microglobulin is 7.3 MG/L.     Her laboratory study on 7/21/2017 reported further improvement of paraprotein is both serum and a 24-hour urine sample.  SPEP reporting  monoclonal IgG lambda 2.9 g/dL and free lambda chain 0.1 g/dL.  Serum IgG was 3261 mg/dL and IgA 5, IgM 13, free kappa chain 6.7, free lambda chain 701.3 with kappa/lambda ratio 0.01.  24-hour urine sample reported positive for Bence May protein lambda type, immunofixation positive for IgG lambda.  Total urine protein 241 mg, gamma globulin 13.1%.  Hemoglobin was 11.4 .4, platelets 122,000, WBC 6680 including neutrophils 3600, lymphocytes 2100 and monocytes 650.  Her creatinine was 1.68, at baseline level.  Liver function panel unremarkable.  Darzalex was continued.    Laboratory study on 8/25/2017 showed improved the platelets at 144,000, normal WBC 6660 and slightly improved hemoglobin at 11.7.     Patient had a colonoscopy examination on 8/30/2017 by Dr. Rivera.  It reported diverticulosis in multiple areas more severe in the sigmoid colon.  There was 2 polyps 4 mm pneumonia from transverse colon and the sigmoid colon.  Pathology evaluation reported tubular adenoma from the sigmoid colon polyp, and benign hyperplastic polyp from transverse colon.    Laboratory study on 10/20/2017 reported slightly improved monoclonal spike 2.7 g/dL by SPEP, immunofixation reported positive monoclonal IgG lambda, serum IgG 3536 mg/dL, IgA less than 5 and IgM 11, free kappa chain 5.3 mg/L, and free lambda chain 720 mg/L with kappa/lambda ratio 0.01.  Serum beta-2 microglobulin was 6.5 mg/L.   Her CBC showed a stable mild anemia with hemoglobin 11.3, macrocytosis .3, and the platelets 114,000, normal WBC 7070 including neutrophils 4100 lymphocytes 2000 monocytes 650, normal liver function panel, total protein 7.9 and albumin 3.2,  and normal electrolytes including calcium 8.1, and improved creatinine 1.59.     Laboratory study on 1/12/2018 reported serum IgG 3083 mg/dL, IgA 10, IgM 10, free kappa chain 8.5 and free lambda chain 589.1 mg/L, kappa/lambda ratio 0.01, serum protein admitted fixation reported IgG lambda  "monoclonal protein and SPEP reporting M spike 3.1 g/dL, beta-2 microgram and 7.4 mg/L. serum creatinine 1.79, calcium 8.2, other electrolytes normal, hemoglobin 11.3, .5 and MCHC 31.6, platelets 129,000, WBC 6780 including neutrophils 3500 lymphocytes 2300.  Serum ferritin 653.7 ng/mL, iron 123 TIBC 174 iron saturation 71%, folic acid 12.8 ng/mL and a vitamin B12 at 873 pg/mL.  Her 24-hour urine study on 1/18/2018 reported lambda chain 32.8 mg/L, total 61 mg in 24 hours, and the free kappa chain 27.4 mg/L and 51 mg in 24 hours, and the total 24-hour urine protein 283 mg, and urine protein immunofixation positive for 5.3% monoclonal IgG lambda and positive for Bence May protein at 36.2% monoclonal lambda chain.  Monthly Darzalex was continued.       Review of Systems   Constitutional: Positive for fatigue. Negative for appetite change, chills and fever.   HENT:   Negative for trouble swallowing.    Respiratory: Negative for cough and shortness of breath.    Cardiovascular: Negative for chest pain and leg swelling.   Gastrointestinal: Negative for abdominal distention, blood in stool, constipation, diarrhea and nausea.   Musculoskeletal: Positive for back pain. Negative for arthralgias and myalgias.   Skin: Negative for rash.   Neurological: Negative for dizziness and extremity weakness.   Hematological: Does not bruise/bleed easily.     Medications: The current medication list was reviewed in the EMR.       Allergies   Allergen Reactions   • Zosyn [Piperacillin Sod-Tazobactam So] Swelling         Vitals:    06/08/18 0813   BP: 101/65   Pulse: 74   Resp: 16   Temp: 97.7 °F (36.5 °C)   TempSrc: Oral   SpO2: 100%   Weight: 89.3 kg (196 lb 14.4 oz)   Height: 157.5 cm (62.01\")   PainSc: 0-No pain   PS: ECOG 1      Physical Exam   GENERAL:  Well-developed, well-nourished in no acute distress.   SKIN:  Warm, dry without rashes, purpura or petechiae.  HEAD:  Normocephalic.  EYES:  Pupils equal, round.  EOMs intact.  " Conjunctivae normal.  EARS:  Hearing intact.  NOSE:  Septum midline.  No excoriations or nasal discharge.  LYMPHATICS:  No cervical  adenopathy.  CHEST:  Lungs clear to auscultation. Good airflow. Benign port in the right chest wall  CARDIAC:  Regular rate and rhythm without murmurs. Normal S1,S2.  ABDOMEN:  Soft, nontender. Bowel sounds present  EXTREMITIES:  No clubbing, cyanosis or edema.  NEUROLOGICAL: No focal neurological deficits.  PSYCHIATRIC:  Normal affect and mood.       Recent lab results:     Results from last 7 days  Lab Units 06/08/18  0758   WBC 10*3/mm3 7.14   NEUTROS ABS 10*3/mm3 3.86   HEMOGLOBIN g/dL 10.4*   HEMATOCRIT % 32.6*   PLATELETS 10*3/mm3 135*       Results from last 7 days  Lab Units 06/08/18  0758   SODIUM mmol/L 141   POTASSIUM mmol/L 3.7   CHLORIDE mmol/L 112*   CO2 mmol/L 20.1*   BUN mg/dL 26*   CREATININE mg/dL 1.69*   CALCIUM mg/dL 8.1*   ALBUMIN g/dL 3.10*   BILIRUBIN mg/dL 0.2   ALK PHOS U/L 54   ALT (SGPT) U/L 13   AST (SGOT) U/L 15   GLUCOSE mg/dL 91   MAGNESIUM mg/dL 1.9           Assessment/Plan   1. Multiple myeloma, IgG lambda, stage III. Failed multiple lines of therapy. Her treatment was switched to Darzalex on 5/26/2016. She has been on this treatment for 2 years now.  She has tolerated therapy very well.     Her 24-hour urine study on 4/20/2018 showed almost identical total urine protein, free lambda chain, however her serum protein study on 4/6/2018 showed worsening level of serum IgG and free lambda chain together with monoclonal spike. There is concern she may have disease progression.  We opted to continue Darzalex for 2 additional months, with repeat paraprotein studies.  The patient will proceed today, and undergo repeat lab work in 3 weeks with M.D. follow-up in 4 weeks.      2. Zometa treatment.  Her last dose Zometa was on 2/9/2018.  Plans to administer every 3 months.  This has been on hold because of her fluctuating creatinine level associate with stage III  chronic renal insufficiency.   Creatinine remains elevated today a 1.69, therefore we will continue to hold Zometa.      3.  Macrocytic anemia secondary to chemotherapy for multiple myeloma and chronic renal insufficiency.  She has relatively stable hemoglobin, however with worsening macrocytosis.  Laboratory study on 1/12/2018 showed no evidence of deficiency of folic acid or vitamin B12 level.  She also had iron panel, fits with inflammatory condition.        4. Mild to moderate thrombocytopenia secondary to her multiple myeloma and chemotherapy.  Overall her platelet counts has been much better compared to those a year ago.  She is asymptomatic, no easy bleeding or bruising.      5. History of stage II left breast cancer, post mastectomy in 2013, negative for ER/OR and positive HER-2. No neoadjuvant chemotherapy because of concurrent discovery of multiple myeloma and ongoing chemotherapy. She had a normal mammogram study in July 2017.       6.  Right middle lobe pulmonary nodule, documented on CT scan of chest on 01/06/2017. Repeated CT scan of chest on 8/21/2017 reported a small 5 mm and stable primary nodule.      7.  Chronic renal insufficiency stage III.  The patient does regularly follow up with a nephrologist.  Her creatinine remains elevated today at 1.69 with a slightly elevated BUN indicating some degree of dehydration.  We will hold Zometa.      Plan:  1. Proceed with Darzalex today  2. Hold Zometa today.  3. Return in 2 weeks for labs including serum and urine paraprotein studies.  4. M.D. follow-up in 4 weeks with CBC, CMP to discuss the results of paraprotein studies, and determine further treatment plan.     Lavern Arriola, APRN  06/08/2018      CC:   Kyle George M.D.

## 2018-06-22 ENCOUNTER — LAB (OUTPATIENT)
Dept: ONCOLOGY | Facility: HOSPITAL | Age: 68
End: 2018-06-22

## 2018-06-22 ENCOUNTER — APPOINTMENT (OUTPATIENT)
Dept: ONCOLOGY | Facility: HOSPITAL | Age: 68
End: 2018-06-22

## 2018-06-22 DIAGNOSIS — Z45.2 FITTING AND ADJUSTMENT OF VASCULAR CATHETER: ICD-10-CM

## 2018-06-22 DIAGNOSIS — C90.00 MULTIPLE MYELOMA NOT HAVING ACHIEVED REMISSION (HCC): Primary | ICD-10-CM

## 2018-06-22 LAB
ALBUMIN SERPL-MCNC: 3.3 G/DL (ref 3.5–5.2)
ALBUMIN/GLOB SERPL: 0.6 G/DL
ALP SERPL-CCNC: 59 U/L (ref 39–117)
ALT SERPL W P-5'-P-CCNC: 13 U/L (ref 1–33)
ANION GAP SERPL CALCULATED.3IONS-SCNC: 8.2 MMOL/L
AST SERPL-CCNC: 15 U/L (ref 1–32)
B2 MICROGLOB SERPL-MCNC: 7.6 MG/L (ref 0.8–2.2)
BASOPHILS # BLD AUTO: 0.01 10*3/MM3 (ref 0–0.2)
BASOPHILS NFR BLD AUTO: 0.2 % (ref 0–1.5)
BILIRUB SERPL-MCNC: 0.2 MG/DL (ref 0.1–1.2)
BUN BLD-MCNC: 23 MG/DL (ref 8–23)
BUN/CREAT SERPL: 13.4 (ref 7–25)
CALCIUM SPEC-SCNC: 8.4 MG/DL (ref 8.6–10.5)
CHLORIDE SERPL-SCNC: 112 MMOL/L (ref 98–107)
CO2 SERPL-SCNC: 21.8 MMOL/L (ref 22–29)
CREAT BLD-MCNC: 1.72 MG/DL (ref 0.57–1)
DEPRECATED RDW RBC AUTO: 55.7 FL (ref 37–54)
EOSINOPHIL # BLD AUTO: 0.18 10*3/MM3 (ref 0–0.7)
EOSINOPHIL NFR BLD AUTO: 2.8 % (ref 0.3–6.2)
ERYTHROCYTE [DISTWIDTH] IN BLOOD BY AUTOMATED COUNT: 14.3 % (ref 11.7–13)
GFR SERPL CREATININE-BSD FRML MDRD: 36 ML/MIN/1.73
GLOBULIN UR ELPH-MCNC: 5.3 GM/DL
GLUCOSE BLD-MCNC: 103 MG/DL (ref 65–99)
HCT VFR BLD AUTO: 35.2 % (ref 35.6–45.5)
HGB BLD-MCNC: 11.2 G/DL (ref 11.9–15.5)
IMM GRANULOCYTES # BLD: 0.1 10*3/MM3 (ref 0–0.03)
IMM GRANULOCYTES NFR BLD: 1.6 % (ref 0–0.5)
LYMPHOCYTES # BLD AUTO: 1.92 10*3/MM3 (ref 0.9–4.8)
LYMPHOCYTES NFR BLD AUTO: 29.8 % (ref 19.6–45.3)
MCH RBC QN AUTO: 33.7 PG (ref 26.9–32)
MCHC RBC AUTO-ENTMCNC: 31.8 G/DL (ref 32.4–36.3)
MCV RBC AUTO: 106 FL (ref 80.5–98.2)
MONOCYTES # BLD AUTO: 0.58 10*3/MM3 (ref 0.2–1.2)
MONOCYTES NFR BLD AUTO: 9 % (ref 5–12)
NEUTROPHILS # BLD AUTO: 3.66 10*3/MM3 (ref 1.9–8.1)
NEUTROPHILS NFR BLD AUTO: 56.6 % (ref 42.7–76)
NRBC BLD MANUAL-RTO: 0 /100 WBC (ref 0–0)
PLATELET # BLD AUTO: 123 10*3/MM3 (ref 140–500)
PMV BLD AUTO: 10.9 FL (ref 6–12)
POTASSIUM BLD-SCNC: 3.9 MMOL/L (ref 3.5–5.2)
PROT SERPL-MCNC: 8.6 G/DL (ref 6–8.5)
RBC # BLD AUTO: 3.32 10*6/MM3 (ref 3.9–5.2)
SODIUM BLD-SCNC: 142 MMOL/L (ref 136–145)
WBC NRBC COR # BLD: 6.45 10*3/MM3 (ref 4.5–10.7)

## 2018-06-22 PROCEDURE — 82232 ASSAY OF BETA-2 PROTEIN: CPT | Performed by: INTERNAL MEDICINE

## 2018-06-22 PROCEDURE — 84156 ASSAY OF PROTEIN URINE: CPT | Performed by: INTERNAL MEDICINE

## 2018-06-22 PROCEDURE — 80053 COMPREHEN METABOLIC PANEL: CPT | Performed by: INTERNAL MEDICINE

## 2018-06-22 PROCEDURE — 84165 PROTEIN E-PHORESIS SERUM: CPT | Performed by: INTERNAL MEDICINE

## 2018-06-22 PROCEDURE — 86335 IMMUNFIX E-PHORSIS/URINE/CSF: CPT | Performed by: INTERNAL MEDICINE

## 2018-06-22 PROCEDURE — 83883 ASSAY NEPHELOMETRY NOT SPEC: CPT | Performed by: INTERNAL MEDICINE

## 2018-06-22 PROCEDURE — 82784 ASSAY IGA/IGD/IGG/IGM EACH: CPT | Performed by: INTERNAL MEDICINE

## 2018-06-22 PROCEDURE — 81050 URINALYSIS VOLUME MEASURE: CPT | Performed by: INTERNAL MEDICINE

## 2018-06-22 PROCEDURE — 25010000002 HEPARIN FLUSH (PORCINE) 100 UNIT/ML SOLUTION: Performed by: INTERNAL MEDICINE

## 2018-06-22 PROCEDURE — 85025 COMPLETE CBC W/AUTO DIFF WBC: CPT | Performed by: INTERNAL MEDICINE

## 2018-06-22 PROCEDURE — 86334 IMMUNOFIX E-PHORESIS SERUM: CPT | Performed by: INTERNAL MEDICINE

## 2018-06-22 PROCEDURE — 84166 PROTEIN E-PHORESIS/URINE/CSF: CPT | Performed by: INTERNAL MEDICINE

## 2018-06-22 PROCEDURE — 96523 IRRIG DRUG DELIVERY DEVICE: CPT | Performed by: NURSE PRACTITIONER

## 2018-06-22 RX ORDER — SODIUM CHLORIDE 0.9 % (FLUSH) 0.9 %
10 SYRINGE (ML) INJECTION AS NEEDED
Status: DISCONTINUED | OUTPATIENT
Start: 2018-06-22 | End: 2018-06-22 | Stop reason: HOSPADM

## 2018-06-22 RX ORDER — SODIUM CHLORIDE 0.9 % (FLUSH) 0.9 %
10 SYRINGE (ML) INJECTION AS NEEDED
Status: CANCELLED | OUTPATIENT
Start: 2018-06-22

## 2018-06-22 RX ADMIN — Medication 10 ML: at 08:50

## 2018-06-22 RX ADMIN — SODIUM CHLORIDE, PRESERVATIVE FREE 500 UNITS: 5 INJECTION INTRAVENOUS at 08:50

## 2018-06-25 LAB
ALBUMIN SERPL-MCNC: 3.1 G/DL (ref 2.9–4.4)
ALBUMIN/GLOB SERPL: 0.7 {RATIO} (ref 0.7–1.7)
ALPHA1 GLOB FLD ELPH-MCNC: 0.2 G/DL (ref 0–0.4)
ALPHA2 GLOB SERPL ELPH-MCNC: 0.7 G/DL (ref 0.4–1)
B-GLOBULIN SERPL ELPH-MCNC: 0.9 G/DL (ref 0.7–1.3)
GAMMA GLOB SERPL ELPH-MCNC: 3.2 G/DL (ref 0.4–1.8)
GLOBULIN SER CALC-MCNC: 5 G/DL (ref 2.2–3.9)
IGA SERPL-MCNC: 5 MG/DL (ref 87–352)
IGG SERPL-MCNC: 3803 MG/DL (ref 700–1600)
IGM SERPL-MCNC: 10 MG/DL (ref 26–217)
INTERPRETATION SERPL IEP-IMP: ABNORMAL
KAPPA LC SERPL-MCNC: 3.8 MG/L (ref 3.3–19.4)
KAPPA LC/LAMBDA SER: 0.01 {RATIO} (ref 0.26–1.65)
LAMBDA LC FREE SERPL-MCNC: 590 MG/L (ref 5.7–26.3)
Lab: ABNORMAL
M-SPIKE: 3.2 G/DL
PROT SERPL-MCNC: 8.1 G/DL (ref 6–8.5)

## 2018-06-27 LAB
ALBUMIN 24H MFR UR ELPH: 11.2 %
ALPHA1 GLOB 24H MFR UR ELPH: 7 %
ALPHA2 GLOB 24H MFR UR ELPH: 19.8 %
B-GLOBULIN MFR UR ELPH: 47.6 %
GAMMA GLOB 24H MFR UR ELPH: 14.4 %
HIV 1 & 2 AB SER-IMP: ABNORMAL
INTERPRETATION UR IFE-IMP: ABNORMAL
M PROTEIN 24H MFR UR ELPH: ABNORMAL %
PROT 24H UR-MRATE: 278 MG/24 HR (ref 30–150)
PROT UR-MCNC: 16.1 MG/DL

## 2018-06-28 DIAGNOSIS — C90.00 MULTIPLE MYELOMA NOT HAVING ACHIEVED REMISSION (HCC): ICD-10-CM

## 2018-07-05 ENCOUNTER — OFFICE VISIT (OUTPATIENT)
Dept: ONCOLOGY | Facility: CLINIC | Age: 68
End: 2018-07-05

## 2018-07-05 ENCOUNTER — INFUSION (OUTPATIENT)
Dept: ONCOLOGY | Facility: HOSPITAL | Age: 68
End: 2018-07-05

## 2018-07-05 VITALS
BODY MASS INDEX: 36.12 KG/M2 | TEMPERATURE: 98.1 F | HEART RATE: 69 BPM | OXYGEN SATURATION: 96 % | HEIGHT: 62 IN | RESPIRATION RATE: 16 BRPM | DIASTOLIC BLOOD PRESSURE: 67 MMHG | WEIGHT: 196.3 LBS | SYSTOLIC BLOOD PRESSURE: 102 MMHG

## 2018-07-05 DIAGNOSIS — D64.81 ANEMIA ASSOCIATED WITH CHEMOTHERAPY: Primary | ICD-10-CM

## 2018-07-05 DIAGNOSIS — C90.00 MULTIPLE MYELOMA NOT HAVING ACHIEVED REMISSION (HCC): Primary | ICD-10-CM

## 2018-07-05 DIAGNOSIS — D63.1 ANEMIA, CHRONIC RENAL FAILURE, STAGE 3 (MODERATE) (HCC): ICD-10-CM

## 2018-07-05 DIAGNOSIS — N18.30 CHRONIC KIDNEY DISEASE, STAGE III (MODERATE) (HCC): ICD-10-CM

## 2018-07-05 DIAGNOSIS — T45.1X5A ANEMIA ASSOCIATED WITH CHEMOTHERAPY: ICD-10-CM

## 2018-07-05 DIAGNOSIS — C90.00 MULTIPLE MYELOMA NOT HAVING ACHIEVED REMISSION (HCC): ICD-10-CM

## 2018-07-05 DIAGNOSIS — T45.1X5A ANEMIA ASSOCIATED WITH CHEMOTHERAPY: Primary | ICD-10-CM

## 2018-07-05 DIAGNOSIS — D69.6 THROMBOCYTOPENIA (HCC): ICD-10-CM

## 2018-07-05 DIAGNOSIS — Z45.2 FITTING AND ADJUSTMENT OF VASCULAR CATHETER: ICD-10-CM

## 2018-07-05 DIAGNOSIS — N18.30 ANEMIA, CHRONIC RENAL FAILURE, STAGE 3 (MODERATE) (HCC): ICD-10-CM

## 2018-07-05 DIAGNOSIS — D64.81 ANEMIA ASSOCIATED WITH CHEMOTHERAPY: ICD-10-CM

## 2018-07-05 LAB
ALBUMIN SERPL-MCNC: 3.1 G/DL (ref 3.5–5.2)
ALBUMIN/GLOB SERPL: 0.6 G/DL
ALP SERPL-CCNC: 60 U/L (ref 39–117)
ALT SERPL W P-5'-P-CCNC: 13 U/L (ref 1–33)
ANION GAP SERPL CALCULATED.3IONS-SCNC: 9.4 MMOL/L
AST SERPL-CCNC: 15 U/L (ref 1–32)
B2 MICROGLOB SERPL-MCNC: 7.1 MG/L (ref 0.8–2.2)
BASOPHILS # BLD AUTO: 0.03 10*3/MM3 (ref 0–0.2)
BASOPHILS NFR BLD AUTO: 0.4 % (ref 0–1.5)
BILIRUB SERPL-MCNC: 0.3 MG/DL (ref 0.1–1.2)
BUN BLD-MCNC: 23 MG/DL (ref 8–23)
BUN/CREAT SERPL: 13.4 (ref 7–25)
CALCIUM SPEC-SCNC: 7.9 MG/DL (ref 8.6–10.5)
CHLORIDE SERPL-SCNC: 112 MMOL/L (ref 98–107)
CO2 SERPL-SCNC: 18.6 MMOL/L (ref 22–29)
CREAT BLD-MCNC: 1.72 MG/DL (ref 0.57–1)
DACRYOCYTES BLD QL SMEAR: NORMAL
DEPRECATED RDW RBC AUTO: 57.1 FL (ref 37–54)
EOSINOPHIL # BLD AUTO: 0.17 10*3/MM3 (ref 0–0.7)
EOSINOPHIL NFR BLD AUTO: 2.2 % (ref 0.3–6.2)
ERYTHROCYTE [DISTWIDTH] IN BLOOD BY AUTOMATED COUNT: 14.4 % (ref 11.7–13)
FERRITIN SERPL-MCNC: 539.1 NG/ML (ref 13–150)
GFR SERPL CREATININE-BSD FRML MDRD: 36 ML/MIN/1.73
GLOBULIN UR ELPH-MCNC: 5 GM/DL
GLUCOSE BLD-MCNC: 107 MG/DL (ref 65–99)
HCT VFR BLD AUTO: 33.9 % (ref 35.6–45.5)
HGB BLD-MCNC: 10.8 G/DL (ref 11.9–15.5)
IMM GRANULOCYTES # BLD: 0.13 10*3/MM3 (ref 0–0.03)
IMM GRANULOCYTES NFR BLD: 1.7 % (ref 0–0.5)
IRON 24H UR-MRATE: 73 MCG/DL (ref 37–145)
IRON SATN MFR SERPL: 42 % (ref 20–50)
LYMPHOCYTES # BLD AUTO: 2.38 10*3/MM3 (ref 0.9–4.8)
LYMPHOCYTES NFR BLD AUTO: 31.1 % (ref 19.6–45.3)
MCH RBC QN AUTO: 34.1 PG (ref 26.9–32)
MCHC RBC AUTO-ENTMCNC: 31.9 G/DL (ref 32.4–36.3)
MCV RBC AUTO: 106.9 FL (ref 80.5–98.2)
MONOCYTES # BLD AUTO: 0.65 10*3/MM3 (ref 0.2–1.2)
MONOCYTES NFR BLD AUTO: 8.5 % (ref 5–12)
NEUTROPHILS # BLD AUTO: 4.29 10*3/MM3 (ref 1.9–8.1)
NEUTROPHILS NFR BLD AUTO: 56.1 % (ref 42.7–76)
NRBC BLD MANUAL-RTO: 0 /100 WBC (ref 0–0)
PLAT MORPH BLD: NORMAL
PLATELET # BLD AUTO: 125 10*3/MM3 (ref 140–500)
PMV BLD AUTO: 10.7 FL (ref 6–12)
POLYCHROMASIA BLD QL SMEAR: NORMAL
POTASSIUM BLD-SCNC: 3.9 MMOL/L (ref 3.5–5.2)
PROT SERPL-MCNC: 8.1 G/DL (ref 6–8.5)
RBC # BLD AUTO: 3.17 10*6/MM3 (ref 3.9–5.2)
SODIUM BLD-SCNC: 140 MMOL/L (ref 136–145)
SPHEROCYTES BLD QL SMEAR: NORMAL
TIBC SERPL-MCNC: 176 MCG/DL (ref 298–536)
TRANSFERRIN SERPL-MCNC: 118 MG/DL (ref 200–360)
WBC MORPH BLD: NORMAL
WBC NRBC COR # BLD: 7.65 10*3/MM3 (ref 4.5–10.7)

## 2018-07-05 PROCEDURE — 83883 ASSAY NEPHELOMETRY NOT SPEC: CPT | Performed by: INTERNAL MEDICINE

## 2018-07-05 PROCEDURE — 99214 OFFICE O/P EST MOD 30 MIN: CPT | Performed by: INTERNAL MEDICINE

## 2018-07-05 PROCEDURE — 82728 ASSAY OF FERRITIN: CPT | Performed by: INTERNAL MEDICINE

## 2018-07-05 PROCEDURE — 25010000002 HEPARIN FLUSH (PORCINE) 100 UNIT/ML SOLUTION: Performed by: INTERNAL MEDICINE

## 2018-07-05 PROCEDURE — 84165 PROTEIN E-PHORESIS SERUM: CPT | Performed by: INTERNAL MEDICINE

## 2018-07-05 PROCEDURE — 82232 ASSAY OF BETA-2 PROTEIN: CPT | Performed by: INTERNAL MEDICINE

## 2018-07-05 PROCEDURE — 80053 COMPREHEN METABOLIC PANEL: CPT | Performed by: INTERNAL MEDICINE

## 2018-07-05 PROCEDURE — 85025 COMPLETE CBC W/AUTO DIFF WBC: CPT | Performed by: INTERNAL MEDICINE

## 2018-07-05 PROCEDURE — 96523 IRRIG DRUG DELIVERY DEVICE: CPT | Performed by: INTERNAL MEDICINE

## 2018-07-05 PROCEDURE — 83540 ASSAY OF IRON: CPT | Performed by: INTERNAL MEDICINE

## 2018-07-05 PROCEDURE — 82784 ASSAY IGA/IGD/IGG/IGM EACH: CPT | Performed by: INTERNAL MEDICINE

## 2018-07-05 PROCEDURE — 84466 ASSAY OF TRANSFERRIN: CPT | Performed by: INTERNAL MEDICINE

## 2018-07-05 PROCEDURE — 86334 IMMUNOFIX E-PHORESIS SERUM: CPT | Performed by: INTERNAL MEDICINE

## 2018-07-05 PROCEDURE — 85007 BL SMEAR W/DIFF WBC COUNT: CPT | Performed by: INTERNAL MEDICINE

## 2018-07-05 RX ORDER — SODIUM CHLORIDE 0.9 % (FLUSH) 0.9 %
10 SYRINGE (ML) INJECTION AS NEEDED
Status: CANCELLED | OUTPATIENT
Start: 2018-07-05

## 2018-07-05 RX ORDER — SODIUM CHLORIDE 0.9 % (FLUSH) 0.9 %
10 SYRINGE (ML) INJECTION AS NEEDED
Status: DISCONTINUED | OUTPATIENT
Start: 2018-07-05 | End: 2018-07-05 | Stop reason: HOSPADM

## 2018-07-05 RX ADMIN — Medication 10 ML: at 11:06

## 2018-07-05 RX ADMIN — SODIUM CHLORIDE, PRESERVATIVE FREE 500 UNITS: 5 INJECTION INTRAVENOUS at 11:07

## 2018-07-05 NOTE — PROGRESS NOTES
Reasons for follow up:   1. IgG kappa multiple myeloma, currently on Darzalex, started on May 26, 2016. Patient was switched to Darzalex from Pomalyst, as she continued to be neutropenic with recurrent urinary tract infection while on Pomalyst.    2. Zometa is on hold due to renal insufficiency.    3. After 16 doses of Darzalex, laboratory study showed stable disease in November 2016. Insurance company denied adding Velcade and dexamethasone. Patient is to be continued on monthly Darzalex from 12/06/16.   4. Obstructive right pyelonephritis with positive urine culture for E. coli, required hospitalization from 1/19/2017 through 01/24/2017 with iv antibiotics and stent exchange on 01/20/2017.   5.  Paraprotein studies on March 27, 2017 showed further slight improvement of multiple myeloma.   6.  Febrile illness, with urinary tract infection and influenza B infection in early May 2017, required hospitalization for 6 days.   7.   Paraprotein studies for both serum and 24-hour urine sample on 7/21/2017 showed further improvement of paraproteins.   Darzalex was continued.   8.  Serum protein study reported stable disease on 10/20/2017.  Darzalex will be continued.   9.  Laboratory studies of both serum paraprotein and a 24-hour urine protein in January 2018 showed a further improvement of paraproteins.  Monthly Darzalex will be continued.           History of Present Illness:     Patient is a 67 female presented today for 4-week re-evaluation after her recent laboratory studies on 6/22/2018 including both serum and 24-hour urine studies.     Patient reports she is at baseline condition.  She denies pain at right flank area.  She has ureteral stent in place.  She denies fever, sweating or chills. No dysuria or hematuria. No low back pain. Her performance status is ECOG 1.  She reports feeling fine.  She drove herself to the clinic from [place].    Paraprotein studies on 6/22/2018 reported serum IgG 3803 milligrams per  deciliter, IgA 5, IgM 10, free kappa chain 3.8 and a free lambda chain 590, kappa/lambda ratio 0.01, beta-2 microglobulin 7.6 mg/L, M spike 3.2 g/dL, and positive for IgM lambda monoclonal protein.  Her 24-hour urine study reported at total protein 270 mg in 24 hours, urine protein immunofixation was positive for Bence May protein lambda type, and a biclonal IgG lambda.  IgG lambda #131.7%, IgG lambda #27.7% and a free lambda chain 1.3%.   Hemoglobin was 11.2, platelets 123,000 WBC 6400.  Creatinine 1.72, calcium 8.4, total serum protein 8.6 and albumin 3.3.      Past Medical History, Past Surgical History, Social History, Family History have been reviewed and are without significant changes except as mentioned.      Hematology/oncology history: See separate document for extra information.       On12/6/2016, serum free lambda chain 1090 MG/L, free kappa chain 8.5 to MG/L.  Ferritin 1015, iron 99, TIBC 137 iron saturation 72%.     On January 4, 2017, vitamin B12 level 1289, folate 7.7 ng/mL. SPEP reporting gamma globulin 3.3, out of total globulin 5.0, with immunofixation reporting small quantity of monoclonal free lambda chain, plus monoclonal IgG lambda at 3.2 g/DL.  Serum IgG 4075, IgA 9 and IgM 15.  free lambda chain 1339 MG/L and free kappa chain 10.3 MG/L.      Laboratory study March 27, 2017 showed slight improvement of paraprotein, SPEP reporting M spike 2.8 g/DL, out of total globulin 4.3, and the serum IgG 3420, IgA 9, IgM 11, free lambda chain 1218 MG/L and free kappa chain 8.0 MG/L.  Total 24 hour urine sample reported at free lambda chain 142 mg, at a concentration 74.8 MG/L, free kappa chain 75 mg at a concentration 39.5 MG/L., Urine protein immunofixation reporting biclonal IgG lambda and monoclonal free lambda light chain, M spike #1 at 41.5% and monoclonal IgG lambda spike #2 at 10.5%. Serum beta-2 microglobulin is 7.3 MG/L.     Her laboratory study on 7/21/2017 reported further improvement of  paraprotein is both serum and a 24-hour urine sample.  SPEP reporting monoclonal IgG lambda 2.9 g/dL and free lambda chain 0.1 g/dL.  Serum IgG was 3261 mg/dL and IgA 5, IgM 13, free kappa chain 6.7, free lambda chain 701.3 with kappa/lambda ratio 0.01.  24-hour urine sample reported positive for Bence May protein lambda type, immunofixation positive for IgG lambda.  Total urine protein 241 mg, gamma globulin 13.1%.  Hemoglobin was 11.4 .4, platelets 122,000, WBC 6680 including neutrophils 3600, lymphocytes 2100 and monocytes 650.  Her creatinine was 1.68, at baseline level.  Liver function panel unremarkable.  Darzalex was continued.    Laboratory study on 8/25/2017 showed improved the platelets at 144,000, normal WBC 6660 and slightly improved hemoglobin at 11.7.     Patient had a colonoscopy examination on 8/30/2017 by Dr. Rivera.  It reported diverticulosis in multiple areas more severe in the sigmoid colon.  There was 2 polyps 4 mm pneumonia from transverse colon and the sigmoid colon.  Pathology evaluation reported tubular adenoma from the sigmoid colon polyp, and benign hyperplastic polyp from transverse colon.    Laboratory study on 10/20/2017 reported slightly improved monoclonal spike 2.7 g/dL by SPEP, immunofixation reported positive monoclonal IgG lambda, serum IgG 3536 mg/dL, IgA less than 5 and IgM 11, free kappa chain 5.3 mg/L, and free lambda chain 720 mg/L with kappa/lambda ratio 0.01.  Serum beta-2 microglobulin was 6.5 mg/L.   Her CBC showed a stable mild anemia with hemoglobin 11.3, macrocytosis .3, and the platelets 114,000, normal WBC 7070 including neutrophils 4100 lymphocytes 2000 monocytes 650, normal liver function panel, total protein 7.9 and albumin 3.2,  and normal electrolytes including calcium 8.1, and improved creatinine 1.59.     Laboratory study on 1/12/2018 reported serum IgG 3083 mg/dL, IgA 10, IgM 10, free kappa chain 8.5 and free lambda chain 589.1 mg/L,  kappa/lambda ratio 0.01, serum protein admitted fixation reported IgG lambda monoclonal protein and SPEP reporting M spike 3.1 g/dL, beta-2 microgram and 7.4 mg/L. serum creatinine 1.79, calcium 8.2, other electrolytes normal, hemoglobin 11.3, .5 and MCHC 31.6, platelets 129,000, WBC 6780 including neutrophils 3500 lymphocytes 2300.  Serum ferritin 653.7 ng/mL, iron 123 TIBC 174 iron saturation 71%, folic acid 12.8 ng/mL and a vitamin B12 at 873 pg/mL.  Her 24-hour urine study on 1/18/2018 reported lambda chain 32.8 mg/L, total 61 mg in 24 hours, and the free kappa chain 27.4 mg/L and 51 mg in 24 hours, and the total 24-hour urine protein 283 mg, and urine protein immunofixation positive for 5.3% monoclonal IgG lambda and positive for Bence May protein at 36.2% monoclonal lambda chain.  Monthly Darzalex was continued.       This patient had serum protein study on 04/06/2018 which reported free light chain at concentration 703.8 mg/L and free kappa chain 7.0, free kappa/lambda ratio 0.01, serum IgG 3650 mg/dL, IgM 11 and IgA 9 with serum protein electrophoresis reporting monoclonal IgG lambda 3.2 g/dL and also monoclonal free lambda light chain.  But it was unable to quantify monoclonal lambda light chain because of the small quantity of it.     A 24-hour urine study on 04/20/2018 reported total free lambda chain 60 mg in 24 hours at concentration 33.1 mg/L, free kappa chain 45 mg in 24 hours at concentration 24.8 mg/L. Total 24-hour urine protein was 279 mg. Urine protein immunofixation and UPEP reported lambda-type Bence-May protein + #1 monoclonal IgG lambda band at 34.8% and #2 monoclonal IgG lambda band at 6.9%.     Her CBC results today reported a stable hemoglobin at 11.1, platelets 130,000 and WBC 7440 including neutrophils 4100 and lymphocytes 2350, monocytes 650. Her CMP reported slightly worsened creatinine at 1.82 with BUN 33. Total protein at 8.8. Liver function panel was normal. Total serum  "protein 8.8 and albumin 3.2, globulin 5.6.    Review of Systems    Constitutional: Negative for chills and fever currently.  Complains of weight gains in the past 12 months despite watching her diet.    HENT: Negative for mouth sores and sore throat.    Eyes: Negative for visual disturbance.    Cardiac: Denies chest pain no palpitation.  No lower extremity edema.    Respiratory: No cough, no hemoptysis no short of breath.    Gastrointestinal: Negative for abdominal pain, diarrhea, nausea and vomiting.    Genitourinary: No dysuria or hematuria.  No pain at the flank area/back pain.   Musculoskeletal: Negative for back pain and joint swelling.    Neurological: Negative for dizziness.   Hematological: Does not bruise/bleed easily.    Psychiatric/Behavioral: The patient is not nervous/anxious.        Medications: The current medication list was reviewed in the EMR.       ALLERGIES: Zosyn       Vitals:    07/05/18 1023   BP: 102/67   Pulse: 69   Resp: 16   Temp: 98.1 °F (36.7 °C)   TempSrc: Oral   SpO2: 96%   Weight: 89 kg (196 lb 4.8 oz)   Height: 157.5 cm (62.01\")   PainSc: 0-No pain   PS: ECOG 1      Physical Exam   Constitutional: well-developed and well-nourished -American female. No distress.    HENT:     Head: Normocephalic.     Eyes: No jaundice.     Neck: Normal range of motion.   no masses.    Cardiovascular: Normal rate, regular rhythm, normal heart sounds and normal pulses.    Pulmonary/Chest: Lungs clear to auscultation bilaterally, no wheezes, no rales.  Mediport benign right upper chest.  Abdominal: Soft, no tenderness guarding or rebound.  Bowel sounds are normal. no distension and no mass. No hepatosplenomegaly.   Musculoskeletal: Normal range of motion. no edema or deformity.   Lymphadenopathy: She has no cervical, supraclavicular adenopathy.   Neurological: alert and oriented to person, place, and time. No cranial nerve deficit.    Skin: Skin is warm and dry. No rash noted. No cyanosis. No " pallor.   Psychiatric: She has normal mood and affect.         Recent lab results:   Lab Results   Component Value Date    WBC 7.65 07/05/2018    HGB 10.8 (L) 07/05/2018    HCT 33.9 (L) 07/05/2018    .9 (H) 07/05/2018     (L) 07/05/2018     Lab Results   Component Value Date    NEUTROABS 4.29 07/05/2018     Lab Results   Component Value Date    GLUCOSE 107 (H) 07/05/2018    BUN 23 07/05/2018    CREATININE 1.72 (H) 07/05/2018    EGFRIFNONA  08/30/2016      Comment:      <15 Indicative of kidney failure.    EGFRIFAFRI 36 (L) 07/05/2018    BCR 13.4 07/05/2018    K 3.9 07/05/2018    CO2 18.6 (L) 07/05/2018    CALCIUM 7.9 (L) 07/05/2018    PROTENTOTREF 8.1 06/22/2018    ALBUMIN 3.10 (L) 07/05/2018    LABIL2 0.6 07/05/2018    AST 15 07/05/2018    ALT 13 07/05/2018     Sodium   Date Value Ref Range Status   07/05/2018 140 136 - 145 mmol/L Final     Potassium   Date Value Ref Range Status   07/05/2018 3.9 3.5 - 5.2 mmol/L Final     Total Bilirubin   Date Value Ref Range Status   07/05/2018 0.3 0.1 - 1.2 mg/dL Final     Alkaline Phosphatase   Date Value Ref Range Status   07/05/2018 60 39 - 117 U/L Final     Lab Results   Component Value Date    IRON 73 07/05/2018    TIBC 176 (L) 07/05/2018    FERRITIN 539.10 (H) 07/05/2018   Iron saturation 42%.    IMAGE STUDY:   CT ABDOMEN AND PELVIS WITHOUT CONTRAST.  5/13/2018     TECHNIQUE: Radiation dose reduction techniques were utilized, including  automated exposure control and exposure modulation based on body size. A  routine CT scan of the abdomen and pelvis was performed with coronal and  sagittal reconstructed images.     HISTORY: Left lower quadrant pain, suspect diverticulitis.     COMPARISON: 5/6/2016 and 8/20/2014.     FINDINGS:  Lung bases demonstrate no consolidation or effusion.          Right hepatic lobe demonstrate low attenuation in masslike area which  was diagnosed as a large hemangioma on the previous examinations.  Unremarkable gallbladder. No intra  or extrahepatic biliary ductal  dilatation.      Spleen is unremarkable. Pancreas is unremarkable, no pancreatic ductal  dilatation. Right adrenal gland 1.6 cm nodule is unchanged since  8/20/2014. 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 Left  nephrectomy. A 4.6 cm left lumbar hernia contains a loop of the  descending colon, no obstruction.     Urinary bladder is unremarkable.          Diverticulitis of the proximal sigmoid colon, no surrounding abscess.  Diverticulosis of the colon. Appendix is normal. Small bowel loops are  within normal limits.         No significant retroperitoneal lymphadenopathy. IVC filter in position.  Fatty umbilical hernia remains unchanged.        IMPRESSION:  1.  Mild diverticulitis of the proximal sigmoid colon, no obstruction,  perforation or abscess.  2.  Large masslike lesion of the right hepatic lobe is favored to be a  hemangioma, not significantly changed.  3.  1.6 cm nodule of the right adrenal gland and 1.3 cm angiomyolipoma  of the left adrenal gland remain unchanged.  4.  Double-J stent in the right renal collecting system. Left  nephrectomy.  5.  Umbilical hernia and left lumbar hernia demonstrated no significant  change.           Assessment/Plan   1. Multiple myeloma, IgG lambda, stage III. Failed multiple lines of therapy. Her treatment was switched to Darzalex on 5/26/2016. She has been on this treatment for 2 years now.  She has tolerated therapy very well.     Patient had a stable paraprotein studies including both serum protein and a 24-hour urine study on 6/22/2018.  We'll continue the same treatment for now with monthly Darzalex.  Plan to repeat paraprotein studies every 3 months.     2. Zometa treatment.  Her last dose Zometa was on 2/9/2018.  Since she has creatinine at 1.72 today, we will hold Zometa today.  Because of her fluctuating creatinine level associate with  stage III chronic renal insufficiency, we only gave her Zometa periodically depending on her renal function.  She was off Zometa for about 18 months and then resumed at decreased dose of 3 mg in August 2017.  We'll try to continue every 3 months Zometa treatment.         3.  Macrocytic anemia secondary to chemotherapy for multiple myeloma and chronic renal insufficiency stage 3.  She has relatively stable hemoglobin, however with worsening macrocytosis.  Laboratory study on 1/12/2018 showed no evidence of deficiency of folic acid or vitamin B12 level.  She also had iron panel, fits with inflammatory condition.        4. Mild to moderate thrombocytopenia secondary to her multiple myeloma and chemotherapy.  Overall her platelet counts has been much better compared to those a year ago.  She is asymptomatic, no easy bleeding or bruising.      5. History of stage II left breast cancer, post mastectomy in 2013, negative for ER/MD and positive HER-2. No neoadjuvant chemotherapy because of concurrent discovery of multiple myeloma and ongoing chemotherapy. She had a normal mammogram study 8/10/2017.       6.  Right middle lobe pulmonary nodule, documented on CT scan of chest on 01/06/2017. Repeated CT scan of chest on 8/21/2017 reported a small 5 mm and stable primary nodule.      7.  Chronic renal insufficiency stage III.  Her creatinine is no improvement at 1.72 today.  Continue to observe.  Patient follows with nephrologist.        Plan:  1.  Continue Darzalex treatment tomorrow.   2.  Hold Zometa today.  We'll check her renal function in one month, if improved, will give Zometa at that time.  Zometa will be repeated every 3 months, however her lost dose was February 19, 2018, because of her renal insufficiency.    3.  See NP in 4 weeks for Darzalex and possible Zometa, labs per protocol.   4.  Come back to see me 8 weeks for reevaluation and treatment.        More than 25 min, over half of that time were for counseling,  and analyze data to her.    LI OBANDO M.D., Ph.D.    7/5/2018       CC:   Kyle George M.D.    Cristo Madera M.D.    Dictated using Dragon Dictation.

## 2018-07-06 ENCOUNTER — INFUSION (OUTPATIENT)
Dept: ONCOLOGY | Facility: HOSPITAL | Age: 68
End: 2018-07-06

## 2018-07-06 VITALS
HEART RATE: 71 BPM | TEMPERATURE: 97.7 F | WEIGHT: 195.6 LBS | OXYGEN SATURATION: 96 % | SYSTOLIC BLOOD PRESSURE: 120 MMHG | BODY MASS INDEX: 35.77 KG/M2 | DIASTOLIC BLOOD PRESSURE: 76 MMHG

## 2018-07-06 DIAGNOSIS — C90.00 MULTIPLE MYELOMA NOT HAVING ACHIEVED REMISSION (HCC): Primary | ICD-10-CM

## 2018-07-06 PROCEDURE — 96413 CHEMO IV INFUSION 1 HR: CPT | Performed by: NURSE PRACTITIONER

## 2018-07-06 PROCEDURE — 25010000002 METHYLPREDNISOLONE PER 125 MG: Performed by: INTERNAL MEDICINE

## 2018-07-06 PROCEDURE — 25010000002 DARATUMUMAB 400 MG/20ML SOLUTION 5 ML VIAL: Performed by: NURSE PRACTITIONER

## 2018-07-06 PROCEDURE — 96375 TX/PRO/DX INJ NEW DRUG ADDON: CPT | Performed by: NURSE PRACTITIONER

## 2018-07-06 PROCEDURE — 25010000002 DIPHENHYDRAMINE PER 50 MG: Performed by: INTERNAL MEDICINE

## 2018-07-06 PROCEDURE — 25010000002 DARATUMUMAB 400 MG/20ML SOLUTION 20 ML VIAL: Performed by: NURSE PRACTITIONER

## 2018-07-06 PROCEDURE — 96415 CHEMO IV INFUSION ADDL HR: CPT | Performed by: NURSE PRACTITIONER

## 2018-07-06 RX ORDER — METHYLPREDNISOLONE SODIUM SUCCINATE 125 MG/2ML
60 INJECTION, POWDER, LYOPHILIZED, FOR SOLUTION INTRAMUSCULAR; INTRAVENOUS ONCE
Status: COMPLETED | OUTPATIENT
Start: 2018-07-06 | End: 2018-07-06

## 2018-07-06 RX ORDER — ACETAMINOPHEN 500 MG
1000 TABLET ORAL ONCE
Status: COMPLETED | OUTPATIENT
Start: 2018-07-06 | End: 2018-07-06

## 2018-07-06 RX ORDER — DIPHENHYDRAMINE HYDROCHLORIDE 50 MG/ML
50 INJECTION INTRAMUSCULAR; INTRAVENOUS AS NEEDED
Status: CANCELLED | OUTPATIENT
Start: 2018-07-06

## 2018-07-06 RX ORDER — FAMOTIDINE 10 MG/ML
20 INJECTION, SOLUTION INTRAVENOUS AS NEEDED
Status: CANCELLED | OUTPATIENT
Start: 2018-07-06

## 2018-07-06 RX ORDER — SODIUM CHLORIDE 9 MG/ML
250 INJECTION, SOLUTION INTRAVENOUS ONCE
Status: COMPLETED | OUTPATIENT
Start: 2018-07-06 | End: 2018-07-06

## 2018-07-06 RX ADMIN — SODIUM CHLORIDE 250 ML: 900 INJECTION, SOLUTION INTRAVENOUS at 08:45

## 2018-07-06 RX ADMIN — DARATUMUMAB 1370 MG: 100 INJECTION, SOLUTION, CONCENTRATE INTRAVENOUS at 09:41

## 2018-07-06 RX ADMIN — METHYLPREDNISOLONE SODIUM SUCCINATE 60 MG: 125 INJECTION, POWDER, FOR SOLUTION INTRAMUSCULAR; INTRAVENOUS at 08:45

## 2018-07-06 RX ADMIN — DIPHENHYDRAMINE HYDROCHLORIDE 25 MG: 50 INJECTION, SOLUTION INTRAMUSCULAR; INTRAVENOUS at 08:51

## 2018-07-06 RX ADMIN — ACETAMINOPHEN 1000 MG: 500 TABLET, FILM COATED ORAL at 08:45

## 2018-07-09 PROBLEM — N18.30 ANEMIA, CHRONIC RENAL FAILURE, STAGE 3 (MODERATE) (HCC): Status: ACTIVE | Noted: 2018-07-09

## 2018-07-09 PROBLEM — D63.1 ANEMIA, CHRONIC RENAL FAILURE, STAGE 3 (MODERATE) (HCC): Status: ACTIVE | Noted: 2018-07-09

## 2018-07-09 LAB
ALBUMIN SERPL-MCNC: 3.1 G/DL (ref 2.9–4.4)
ALBUMIN/GLOB SERPL: 0.8 {RATIO} (ref 0.7–1.7)
ALPHA1 GLOB FLD ELPH-MCNC: 0.2 G/DL (ref 0–0.4)
ALPHA2 GLOB SERPL ELPH-MCNC: 0.6 G/DL (ref 0.4–1)
B-GLOBULIN SERPL ELPH-MCNC: 0.8 G/DL (ref 0.7–1.3)
GAMMA GLOB SERPL ELPH-MCNC: 2.8 G/DL (ref 0.4–1.8)
GLOBULIN SER CALC-MCNC: 4.4 G/DL (ref 2.2–3.9)
IGA SERPL-MCNC: 6 MG/DL (ref 87–352)
IGG SERPL-MCNC: 3015 MG/DL (ref 700–1600)
IGM SERPL-MCNC: 9 MG/DL (ref 26–217)
INTERPRETATION SERPL IEP-IMP: ABNORMAL
KAPPA LC SERPL-MCNC: 3.7 MG/L (ref 3.3–19.4)
KAPPA LC/LAMBDA SER: 0.01 {RATIO} (ref 0.26–1.65)
LAMBDA LC FREE SERPL-MCNC: 458.7 MG/L (ref 5.7–26.3)
Lab: ABNORMAL
M-SPIKE: ABNORMAL G/DL
PROT SERPL-MCNC: 7.5 G/DL (ref 6–8.5)

## 2018-07-12 NOTE — PROGRESS NOTES
SURGERY  Marina Barroso   1950 07/13/18    Chief Complaint:  Tenderness right breast    HPI    Patient is a 67 y.o. female who returns after having been seen last June for a breast tissue that was not felt to be worrisome, but also with a history of colon polyps without a colonoscopy for 5 years.  She thus underwent a colonoscopy 8/30/17.  She was found to have diverticulosis, moderate to marked in the sigmoid, with mild disease elsewhere, including the terminal ileum.  In addition, she had a 4 mm transverse colon polyp, proximal, hyperplastic, and a 4 mm sigmoid colon polyp, proximal, adenomatous.  She is to have another in 5 years.    She has a strong history of cancer, having had the left breast cancer, a kidney cancer on the left with nephrectomy [chronic kidney disease stage III now], with multiple myeloma, but has not had a malignant brain cancer, that being in the record.    We sent her for an UGI/SBFT for completeness and she had a small hiatal hernia with mild gastroesophageal reflux.  There were no other significant findings, there being top normal to mildly enlarged caliber proximal jejunum that tapers distally.      Since then, she came in to the hospital in May, where she stayed for 4 days.  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  She describes that she's had no problem whatsoever.  Again from her diverticulitis, which she knows to be in fact that, having this before.    On the other hand, the incisional hernia in the left flank has been very problematic.  She said it flares up pretty bad at times, is worse when she is driving forcing her to constantly rearrange and with escalating symptoms over the last month.  It's always there is an aching.    From the standpoint of her multiple myeloma, she tells me she is consistently improving.  She  can wash her car now, can walk better than she did maybe a year ago, her new treatment being by Dr. Araujo.  She really wishes to have this treated, saying she doesn't want to live with this anymore.  She also has an umbilical hernia, which she says doesn't give her much trouble.      Past Medical History:   Diagnosis Date   • Anemia 10/14/2008    Secondary to chronic renal insufficiency and myeloma   • Arthropathy of knee 01/07/2014    unspecified   •      brain tumor   • Breast cancer 04/2012    Left breast invasive ductal carcinoma, stage II   • Bursitis of left hip 10/18/2007   • Bursitis, knee 12/02/2013   • Calcaneal spur 08/12/2009   • Chronic kidney disease, stage III (moderate)    • Congestive heart failure    • Diarrhea of presumed infectious origin 04/01/2014    c diff    • Diverticulitis of colon 10/17/2007   • DVT (deep venous thrombosis)     right lower extremity    • History of Clostridium difficile    • History of echocardiogram 04/2014    EF decreased to 46% - per cardiology   • History of MRSA infection    • History of transfusion    • Hydronephrosis     chronic, right   • Hypertension    • LLL pneumonia 04/23/2009   • Malignant neoplasm of kidney 12/2009    and other and unspecified urinary organs; urinary organ, site unspecified; s/p left nephrectomy   • Multiple myeloma 04/2012   • Neoplasm of uncertain behavior 10/12/2015    of other and unspecified sites and tissues; other specified sites   • Pyelonephritis 03/2004   • Seizures 10/17/2007   • Thrombocytopenia    • UTI (urinary tract infection) 10/30/2014    pseudomonas, multidrug resistant   • UTI (urinary tract infection) 03/28/2014    site not specified     Past Surgical History:   Procedure Laterality Date   • BONE MARROW BIOPSY N/A 04/11/2012   • BRAIN SURGERY  2005    Meningioma   • BRAIN SURGERY  1990    Tumor benign per patient   • BREAST BIOPSY Left 04/09/2012    Dr. Gregg May   • CARDIAC CATHETERIZATION Left 06/11/2012    Dr. Sheets  [place]   • CARDIAC CATHETERIZATION N/A 08/15/2006    Dr. Brian Bhatt   • COLONOSCOPY N/A unknown    2 polyps, benign   • COLONOSCOPY N/A 8/30/2017    Procedure: COLONOSCOPY into cecum and TI with cold polypectomies;  Surgeon: Trudy Rivera MD;  Location: [facility];  Service:    • CYSTOSCOPY N/A 3/25/2016    Procedure: CYSTOSCOPY  WITH RIGHT STENT CHANGE;  Surgeon: Lakhwinder Russo MD;  Location: [facility] OR;  Service:    • CYSTOSCOPY N/A 03/10/2012    Cystoscopy, right retrograde, right double-J stent-Dr. Sloan May   • CYSTOSCOPY N/A 02/25/2012    Cystoscopy, stent removal, right retrograde pyelogram, replacement of right double-J ureteral stent-Dr. Michael Everett   • CYSTOSCOPY W/ URETERAL STENT PLACEMENT Right 5/7/2016    Procedure: CYSTOSCOPY, URETERAL CATHETER INSERTION AND RIGHT STENT EXCHANGE ;  Surgeon: Michael Everett Jr., MD;  Location: [facility] OR;  Service:    • CYSTOSCOPY W/ URETERAL STENT PLACEMENT N/A 1/20/2017    Procedure: CYSTOSCOPY RIGHT RETROGRADE PYELOGRAM, URETERAL STENT CHANGE;  Surgeon: Lakhwinder Russo MD;  Location: [facility] OR;  Service:    • CYSTOSCOPY W/ URETERAL STENT PLACEMENT N/A 04/02/2015    Cystoscopy, removal of right ureteral stent, right retrograde pyelogram, placement of right double-J ureteral stent-Dr. Michael Everett   • CYSTOSCOPY W/ URETERAL STENT PLACEMENT N/A 04/26/2015    Cystoscopy, removal of right ureteral stent, right retrograde pyelogram, replacement of right ureteral stent 24 cm x 7-Pashto without retrieval line-Dr. Jose Gomez   • CYSTOSCOPY W/ URETERAL STENT PLACEMENT N/A 12/22/2014    Cystoscopy, removal of right double-J ureteral stent, right retrograde pyelogram, placement of right double-J ureteral stent-Dr. Michael Everett   • CYSTOSCOPY W/ URETERAL STENT PLACEMENT N/A 09/22/2014    Cystoscopy, removal of right ureteral stent, removal of right retrograde pyelogram, replacement of right double-J ureteral stent-Dr. Michael Everett   •  CYSTOSCOPY W/ URETERAL STENT PLACEMENT N/A 06/18/2014    Cystoscopy, removal of right double-J ureteral stent, right retrograde pyelogram, placement of right double-J ureteral stent-Dr. Michael Everett   • CYSTOSCOPY W/ URETERAL STENT PLACEMENT N/A 01/23/2014    Cystoscopy, removal of right double-J ureteral stent, right retrograde pyelogram, placement of right double-J ureteral stent-Dr. Michael Everett   • CYSTOSCOPY W/ URETERAL STENT PLACEMENT N/A 03/27/2012    Cystoscopy, removal of ureteral stent, right retrograde pyelogram, replacement of indewlling right ureteral stent-Dr. Michael Everett   • CYSTOSCOPY W/ URETERAL STENT PLACEMENT N/A 03/27/2013    Cystoscopy, right retrograde pyelogram, removal and replacement of right double-J ureteral stent-Dr. Michael Everett   • CYSTOSCOPY W/ URETERAL STENT PLACEMENT N/A 11/12/2012    Cystoscopy, stent removal, right retrograde pyelogram, replacement of right double-J ureteral stent-Dr. Michael Everett   • CYSTOSCOPY W/ URETERAL STENT PLACEMENT N/A 09/24/2011    Cystoscopy, removal of ureteral stent, right retrograde pyelogram and placement of right double-J ureteral stent-Dr. Michael Everett   • CYSTOSCOPY W/ URETERAL STENT PLACEMENT N/A 05/14/2011    Cystoscopy, removal of right ureteral stent, right retrograde pyelogram and placement of new right double-J ureteral stent-Dr. Michael Everett   • CYSTOSCOPY W/ URETERAL STENT PLACEMENT N/A 12/31/2010    Cystoscopy, stent removal, right retrograde pyelogram, replacement of 7 Khmer x 26 cm double-J stent-Dr. Michael Everett   • CYSTOSCOPY W/ URETERAL STENT PLACEMENT N/A 08/28/2010    Cystoscopy, stent removal, right retrograde pyelogram with interpretation, placement of right double-J ureteral stent-Dr. Michael Everett   • CYSTOSCOPY W/ URETERAL STENT PLACEMENT N/A 05/24/2010    Cystoscopy, right retrograde pyelogram, replacement of right double-J ureteral stent-Dr. Michael Everett   • CYSTOSCOPY W/ URETERAL STENT PLACEMENT N/A 02/12/2010    Cystoscopy, right  retrograde pyelogram and placement of right double-J ureteral stent-Dr. Michael Everett   • CYSTOSCOPY W/ URETERAL STENT PLACEMENT N/A 02/23/2009    Cystoscopy, right retrograde pyelogram, placement of right double-J ureteral stent-Dr. Michael Everett   • CYSTOSCOPY W/ URETERAL STENT PLACEMENT N/A 09/17/2007    Dr. Michael Everett   • CYSTOSCOPY W/ URETERAL STENT PLACEMENT Right 01/29/2018    Dr. Everett   • CYSTOSCOPY W/ URETERAL STENT PLACEMENT N/A 06/04/2018    Cystoscopy, removal of right double-J ureteral stent and placement of right double-J ureteral stent. Dr. Michael Everett.   • CYSTOSCOPY W/ URETERAL STENT PLACEMENT N/A 03/06/2018    Cystoscopy, removal of right ureteral stent, replacement of right double-J ureteral stent. Dr. Michael Everett   • DIAGNOSTIC LAPAROSCOPY EXPLORATORY LAPAROTOMY N/A 05/02/2006    Diagnostic laparoscopy, exploratory laparotomy with bilateral salpingo oopherectomy, primary reanastomosis of right ureter-Dr. Cristo Pittman   • ELBOW PROCEDURE Bilateral 1990s   • EYE SURGERY Bilateral     Lasik   • MASTECTOMY Left 04/25/2012    Left total mastectomy with sentinel lymph node biopsies and left axillary lymphatic mapping-Dr. Gregg May   • NEPHRECTOMY Left 12/30/2009    Dr. 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 Cristo Pittman   • TOTAL ABDOMINAL HYSTERECTOMY Bilateral 2007    Dr. Todd   • URETEROURETEROSTOMY Right 05/01/2006    Dr. Michael Everett   • VENA CAVA FILTER INSERTION N/A 05/08/2006    Dr. Jordon Barber   • VENOUS ACCESS DEVICE (PORT) INSERTION Right 02/25/2013    Right subclavian vein PowerPort insertion-Dr. Gregg May   • VENOUS ACCESS DEVICE (PORT) INSERTION AND REMOVAL N/A 10/06/2014    Revision of right internal jugular PowerPort with fluoroscopy, removal of peripherally inserted central catheter line-Dr. Aurora Tomlinson   • VENOUS ACCESS DEVICE (PORT) INSERTION AND REMOVAL N/A 08/14/2014    Ultrasound-guided access  right internal jugular vein, PowerPort placement, removal of right subclavian port-Dr. Jordon Barber     Family History   Problem Relation Age of Onset   • Hypertension Other    • Miscarriages / Stillbirths Father    • Heart disease Father    • Hypertension Father    • Heart attack Father    • Diabetes Sister    • Skin cancer Sister    • Throat cancer Brother      Social History     Social History   • Marital status:      Spouse name: N/A   • Number of children: 3   • Years of education: High School     Occupational History   •  Retired     [employer] [[id number]]     Social History Main Topics   • Smoking status: Never Smoker   • Smokeless tobacco: Never Used   • Alcohol use No   • Drug use: No   • Sexual activity: Defer     Other Topics Concern   • Not on file     Social History Narrative    Son lives with her and helps     Occupation/Additional Social Hx: Not accompanied by anyone today      Current Outpatient Prescriptions:   •  acetaminophen (TYLENOL) 325 MG tablet, Take 2 tablets by mouth Every 6 (Six) Hours As Needed for mild pain (1-3) or fever., Disp: , Rfl: 0  •  acyclovir (ZOVIRAX) 400 MG tablet, TAKE 1 TABLET BY MOUTH 2 (TWO) TIMES A DAY. TAKE NO MORE THAN 5 DOSES A DAY., Disp: 60 tablet, Rfl: 0  •  aspirin 81 MG tablet, Take 81 mg by mouth Every Night., Disp: , Rfl:   •  bisoprolol (ZEBeta) 5 MG tablet, TAKE 1 TABLET BY MOUTH 2 (TWO) TIMES DAILY, Disp: , Rfl: 1  •  dexamethasone (DECADRON) 4 MG tablet, TAKE 1 TABLET IN THE MORNING STARTING THE DAY AFTER CHEMO FOR 2 DAYS. TAKE WITH FOOD. (Patient taking differently: TAKE 1 TABLET IN THE MORNING STARTING THE DAY AFTER CHEMO FOR 2 DAYS. TAKE WITH FOOD BID), Disp: 16 tablet, Rfl: 1  •  folic acid (FOLVITE) 1 MG tablet, Take 1 mg by mouth Every Morning., Disp: , Rfl:   •  hydrALAZINE (APRESOLINE) 25 MG tablet, Take 25 mg by mouth 2 (two) times a day., Disp: , Rfl: 3  •  isosorbide mononitrate (IMDUR) 60 MG 24 hr tablet,  "Take 1 tablet by mouth Daily. TAKE 1 TABLET BY MOUTH EVERY MORNING AND ONE-HALF TABLET EVERY EVENING (Patient taking differently: Take 60 mg by mouth Daily. TAKE 1.5 TABLET BY MOUTH EVERY MORNING BEFORE EXERSIZE), Disp: , Rfl:   •  losartan (COZAAR) 25 MG tablet, Take 25 mg by mouth every evening., Disp: , Rfl:   •  Misc. Devices (VIRAGE CUSTOM BREAST PROSTHES) misc, 2 each As Needed (na)., Disp: 2 each, Rfl: 0  •  montelukast (SINGULAIR) 10 MG tablet, TAKE 1 TABLET BY MOUTH EVERY NIGHT., Disp: 30 tablet, Rfl: 11  •  carBAMazepine XR (TEGretol  XR) 200 MG 12 hr tablet, Take 2 tablets by mouth Every Night for 180 days., Disp: 180 tablet, Rfl: 1    Allergies   Allergen Reactions   • Zosyn [Piperacillin Sod-Tazobactam So] Swelling     Preventative Medicine  Colonoscopy: 2018    Review of Systems  Specifically, negative for chest pain or shortness of breath, with all other systems reviewed and negative  Vitals:    07/13/18 0928   Pulse: 70   SpO2: 98%   Weight: 87.5 kg (193 lb)   Height: 157.5 cm (62\")       PHYSICAL EXAM:    Pulse 70   Ht 157.5 cm (62\")   Wt 87.5 kg (193 lb)   LMP  (LMP Unknown)   SpO2 98%   BMI 35.30 kg/m²   Body mass index is 35.3 kg/m².    Constitutional: well developed, well nourished, Appears stated age  Eyes: sclera nonicteric, conjunctiva not injected or edematous  ENMT: Hearing intact, trachea midline, thyroid without masses  CVS: RRR, no murmur, peripheral edema not present  Respiratory: CTA, normal respiratory effort   Gastrointestinal: no hepatosplenomegaly, abdomen soft, nontender, no rebound or guarding.  Left flank with hernia, that's reducible, but clearly tender  Genitourinary: inguinal hernia not detected  Musculoskeletal: gait normal, muscle mass normal  Skin: warm and dry, no rashes visible, with vague  Neurological: awake and alert, seems to have reasonable capacity for understanding for medical decision making  Psychiatric: appears to have reasonable judgement, " pleasant  Lymphatics: no cervical adenopathy or axillary adenopathy, with no left arm lymphedema    Radiographic Findings: Reviewed with the patient, and all aspects, including the cross-sectional, sagittal, coronal, showing her the area of the hernia and the clear finding of the bowel outside the contents of the torso, not consistent with a muscular weakening.  There is both small bowel and colon in the tissue    Lab Findings: Hemoglobin 11.4, creatinine of 1.72    Pamphlet reviewed: None    IMPRESSION:  · Left flank hernia, incisional, after nephrectomy for leiomyosarcoma, reducible, but with escalating symptoms.  · Umbilical hernia, less symptomatic  · Diverticulitis requiring hospitalization, but with fairly prompt resolution.  She's had several attacks.  · History of left breast cancer, stage II, no evidence of recurrence  · History of polyps, with next colonoscopy needed in 5 years  · Anemia  · Multiple myeloma, with history of thrombocytopenia  · status post left nephrectomy, with chronic kidney disease stage III, creatinine 1.72  · History of right ureter.  Reanastomose his concomitant with BSO, with ureteral stent changes every 2 months.  · History of VRE and MRSA  · Intolerance to Zosyn with renal complication.  · History of seizures secondary to meningioma.  · Right sided port    PLAN:  · Consideration of incisional hernia repair.  I had really tried to encourage her not to consider surgery, as I think she's really high risk.  On the other hand she seems to be getting better from the standpoint of her multiple myeloma, consistently stronger, more active.  In the context of her generalized improvement, the fairly significant tenderness, and what I think is real risk for incarceration and strangulation, I think we'll need to consider proceeding with surgery.  · Options are laparoscopic repair, which would, in close contact to the spleen, and be difficult to manage with positioning.  On the other hand, doing  so, open will also require some dramatic right lateral decubitus positioning, and will require that there be dissection in the area of the spleen without excellent visualization.  Albeit, I think I would choose the latter.  · Follow-up in 1 month for confirmation.  Will not likely address the umbilical hernia at this time.    Trudy Rivera MD  7/13/2018.  1:33 PM

## 2018-07-13 ENCOUNTER — OFFICE VISIT (OUTPATIENT)
Dept: SURGERY | Facility: CLINIC | Age: 68
End: 2018-07-13

## 2018-07-13 VITALS — HEIGHT: 62 IN | BODY MASS INDEX: 35.51 KG/M2 | HEART RATE: 70 BPM | OXYGEN SATURATION: 98 % | WEIGHT: 193 LBS

## 2018-07-13 DIAGNOSIS — D63.1 ANEMIA, CHRONIC RENAL FAILURE, STAGE 3 (MODERATE) (HCC): ICD-10-CM

## 2018-07-13 DIAGNOSIS — C90.00 MULTIPLE MYELOMA NOT HAVING ACHIEVED REMISSION (HCC): ICD-10-CM

## 2018-07-13 DIAGNOSIS — K45.8 LUMBAR HERNIA: ICD-10-CM

## 2018-07-13 DIAGNOSIS — Z85.3 PERSONAL HISTORY OF BREAST CANCER: ICD-10-CM

## 2018-07-13 DIAGNOSIS — K42.9 UMBILICAL HERNIA WITHOUT OBSTRUCTION AND WITHOUT GANGRENE: Primary | ICD-10-CM

## 2018-07-13 DIAGNOSIS — N18.30 ANEMIA, CHRONIC RENAL FAILURE, STAGE 3 (MODERATE) (HCC): ICD-10-CM

## 2018-07-13 PROCEDURE — 99214 OFFICE O/P EST MOD 30 MIN: CPT | Performed by: SURGERY

## 2018-07-13 RX ORDER — BISOPROLOL FUMARATE 5 MG/1
1 TABLET, FILM COATED ORAL 2 TIMES DAILY
COMMUNITY
Start: 2018-07-11 | End: 2018-07-13 | Stop reason: SDUPTHER

## 2018-07-20 ENCOUNTER — TRANSCRIBE ORDERS (OUTPATIENT)
Dept: ADMINISTRATIVE | Facility: HOSPITAL | Age: 68
End: 2018-07-20

## 2018-07-20 DIAGNOSIS — Z12.31 SCREENING MAMMOGRAM, ENCOUNTER FOR: Primary | ICD-10-CM

## 2018-07-24 ENCOUNTER — HOSPITAL ENCOUNTER (OUTPATIENT)
Dept: MAMMOGRAPHY | Facility: HOSPITAL | Age: 68
Discharge: HOME | End: 2018-07-24
Attending: FAMILY MEDICINE | Admitting: FAMILY MEDICINE

## 2018-07-24 DIAGNOSIS — Z12.31 SCREENING MAMMOGRAM, ENCOUNTER FOR: ICD-10-CM

## 2018-07-24 PROCEDURE — 77067 SCR MAMMO BI INCL CAD: CPT

## 2018-07-24 PROCEDURE — 77063 BREAST TOMOSYNTHESIS BI: CPT

## 2018-08-02 ENCOUNTER — LAB (OUTPATIENT)
Dept: ONCOLOGY | Facility: HOSPITAL | Age: 68
End: 2018-08-02

## 2018-08-02 ENCOUNTER — OFFICE VISIT (OUTPATIENT)
Dept: ONCOLOGY | Facility: CLINIC | Age: 68
End: 2018-08-02

## 2018-08-02 VITALS — SYSTOLIC BLOOD PRESSURE: 123 MMHG | DIASTOLIC BLOOD PRESSURE: 74 MMHG | HEART RATE: 80 BPM

## 2018-08-02 VITALS
SYSTOLIC BLOOD PRESSURE: 103 MMHG | HEIGHT: 62 IN | DIASTOLIC BLOOD PRESSURE: 67 MMHG | BODY MASS INDEX: 35.88 KG/M2 | OXYGEN SATURATION: 100 % | WEIGHT: 195 LBS | HEART RATE: 60 BPM | TEMPERATURE: 98 F | RESPIRATION RATE: 16 BRPM

## 2018-08-02 DIAGNOSIS — D61.818 PANCYTOPENIA (HCC): Primary | ICD-10-CM

## 2018-08-02 DIAGNOSIS — C90.00 MULTIPLE MYELOMA NOT HAVING ACHIEVED REMISSION (HCC): ICD-10-CM

## 2018-08-02 LAB
ALBUMIN SERPL-MCNC: 2.9 G/DL (ref 3.5–5.2)
ALBUMIN/GLOB SERPL: 0.5 G/DL
ALP SERPL-CCNC: 58 U/L (ref 39–117)
ALT SERPL W P-5'-P-CCNC: 11 U/L (ref 1–33)
ANION GAP SERPL CALCULATED.3IONS-SCNC: 8.1 MMOL/L
AST SERPL-CCNC: 13 U/L (ref 1–32)
BASOPHILS # BLD AUTO: 0.01 10*3/MM3 (ref 0–0.2)
BASOPHILS NFR BLD AUTO: 0.1 % (ref 0–1.5)
BILIRUB SERPL-MCNC: 0.3 MG/DL (ref 0.1–1.2)
BUN BLD-MCNC: 20 MG/DL (ref 8–23)
BUN/CREAT SERPL: 12 (ref 7–25)
CALCIUM SPEC-SCNC: 8 MG/DL (ref 8.6–10.5)
CHLORIDE SERPL-SCNC: 113 MMOL/L (ref 98–107)
CO2 SERPL-SCNC: 20.9 MMOL/L (ref 22–29)
CREAT BLD-MCNC: 1.66 MG/DL (ref 0.57–1)
DACRYOCYTES BLD QL SMEAR: NORMAL
DEPRECATED RDW RBC AUTO: 55.9 FL (ref 37–54)
EOSINOPHIL # BLD AUTO: 0.18 10*3/MM3 (ref 0–0.7)
EOSINOPHIL NFR BLD AUTO: 2.6 % (ref 0.3–6.2)
ERYTHROCYTE [DISTWIDTH] IN BLOOD BY AUTOMATED COUNT: 14.4 % (ref 11.7–13)
GFR SERPL CREATININE-BSD FRML MDRD: 37 ML/MIN/1.73
GLOBULIN UR ELPH-MCNC: 5.3 GM/DL
GLUCOSE BLD-MCNC: 92 MG/DL (ref 65–99)
HCT VFR BLD AUTO: 34.5 % (ref 35.6–45.5)
HGB BLD-MCNC: 10.6 G/DL (ref 11.9–15.5)
IMM GRANULOCYTES # BLD: 0.13 10*3/MM3 (ref 0–0.03)
IMM GRANULOCYTES NFR BLD: 1.9 % (ref 0–0.5)
LYMPHOCYTES # BLD AUTO: 2.12 10*3/MM3 (ref 0.9–4.8)
LYMPHOCYTES NFR BLD AUTO: 30.3 % (ref 19.6–45.3)
MCH RBC QN AUTO: 33.1 PG (ref 26.9–32)
MCHC RBC AUTO-ENTMCNC: 30.7 G/DL (ref 32.4–36.3)
MCV RBC AUTO: 107.8 FL (ref 80.5–98.2)
MONOCYTES # BLD AUTO: 0.59 10*3/MM3 (ref 0.2–1.2)
MONOCYTES NFR BLD AUTO: 8.4 % (ref 5–12)
NEUTROPHILS # BLD AUTO: 3.97 10*3/MM3 (ref 1.9–8.1)
NEUTROPHILS NFR BLD AUTO: 56.7 % (ref 42.7–76)
NRBC BLD MANUAL-RTO: 0.4 /100 WBC (ref 0–0)
PLAT MORPH BLD: NORMAL
PLATELET # BLD AUTO: 130 10*3/MM3 (ref 140–500)
PMV BLD AUTO: 11.5 FL (ref 6–12)
POTASSIUM BLD-SCNC: 3.7 MMOL/L (ref 3.5–5.2)
PROT SERPL-MCNC: 8.2 G/DL (ref 6–8.5)
RBC # BLD AUTO: 3.2 10*6/MM3 (ref 3.9–5.2)
SODIUM BLD-SCNC: 142 MMOL/L (ref 136–145)
SPHEROCYTES BLD QL SMEAR: NORMAL
WBC MORPH BLD: NORMAL
WBC NRBC COR # BLD: 7 10*3/MM3 (ref 4.5–10.7)

## 2018-08-02 PROCEDURE — 25010000002 DARATUMUMAB 400 MG/20ML SOLUTION 20 ML VIAL: Performed by: NURSE PRACTITIONER

## 2018-08-02 PROCEDURE — 25010000002 METHYLPREDNISOLONE PER 125 MG: Performed by: NURSE PRACTITIONER

## 2018-08-02 PROCEDURE — 85025 COMPLETE CBC W/AUTO DIFF WBC: CPT | Performed by: NURSE PRACTITIONER

## 2018-08-02 PROCEDURE — 36415 COLL VENOUS BLD VENIPUNCTURE: CPT

## 2018-08-02 PROCEDURE — 96375 TX/PRO/DX INJ NEW DRUG ADDON: CPT | Performed by: NURSE PRACTITIONER

## 2018-08-02 PROCEDURE — 25010000002 ZOLEDRONIC ACID PER 1 MG: Performed by: NURSE PRACTITIONER

## 2018-08-02 PROCEDURE — 96413 CHEMO IV INFUSION 1 HR: CPT | Performed by: NURSE PRACTITIONER

## 2018-08-02 PROCEDURE — 25010000002 DIPHENHYDRAMINE PER 50 MG: Performed by: NURSE PRACTITIONER

## 2018-08-02 PROCEDURE — 25010000002 DARATUMUMAB 400 MG/20ML SOLUTION 5 ML VIAL: Performed by: NURSE PRACTITIONER

## 2018-08-02 PROCEDURE — 96415 CHEMO IV INFUSION ADDL HR: CPT | Performed by: NURSE PRACTITIONER

## 2018-08-02 PROCEDURE — 85007 BL SMEAR W/DIFF WBC COUNT: CPT | Performed by: NURSE PRACTITIONER

## 2018-08-02 PROCEDURE — 80053 COMPREHEN METABOLIC PANEL: CPT | Performed by: NURSE PRACTITIONER

## 2018-08-02 PROCEDURE — 99214 OFFICE O/P EST MOD 30 MIN: CPT | Performed by: NURSE PRACTITIONER

## 2018-08-02 PROCEDURE — 96367 TX/PROPH/DG ADDL SEQ IV INF: CPT | Performed by: NURSE PRACTITIONER

## 2018-08-02 RX ORDER — DIPHENHYDRAMINE HYDROCHLORIDE 50 MG/ML
50 INJECTION INTRAMUSCULAR; INTRAVENOUS AS NEEDED
Status: CANCELLED | OUTPATIENT
Start: 2018-08-02

## 2018-08-02 RX ORDER — FAMOTIDINE 10 MG/ML
20 INJECTION, SOLUTION INTRAVENOUS AS NEEDED
Status: CANCELLED | OUTPATIENT
Start: 2018-08-02

## 2018-08-02 RX ORDER — SODIUM CHLORIDE 9 MG/ML
250 INJECTION, SOLUTION INTRAVENOUS ONCE
Status: COMPLETED | OUTPATIENT
Start: 2018-08-02 | End: 2018-08-02

## 2018-08-02 RX ORDER — METHYLPREDNISOLONE SODIUM SUCCINATE 125 MG/2ML
60 INJECTION, POWDER, LYOPHILIZED, FOR SOLUTION INTRAMUSCULAR; INTRAVENOUS ONCE
Status: COMPLETED | OUTPATIENT
Start: 2018-08-02 | End: 2018-08-02

## 2018-08-02 RX ORDER — ACETAMINOPHEN 500 MG
1000 TABLET ORAL ONCE
Status: CANCELLED | OUTPATIENT
Start: 2018-08-02

## 2018-08-02 RX ORDER — SODIUM CHLORIDE 9 MG/ML
250 INJECTION, SOLUTION INTRAVENOUS ONCE
Status: CANCELLED | OUTPATIENT
Start: 2018-08-02

## 2018-08-02 RX ORDER — SODIUM CHLORIDE 9 MG/ML
250 INJECTION, SOLUTION INTRAVENOUS ONCE
Status: CANCELLED | OUTPATIENT
Start: 2018-08-02 | End: 2018-08-02

## 2018-08-02 RX ORDER — METHYLPREDNISOLONE SODIUM SUCCINATE 125 MG/2ML
60 INJECTION, POWDER, LYOPHILIZED, FOR SOLUTION INTRAMUSCULAR; INTRAVENOUS ONCE
Status: CANCELLED | OUTPATIENT
Start: 2018-08-02

## 2018-08-02 RX ORDER — ACETAMINOPHEN 500 MG
1000 TABLET ORAL ONCE
Status: COMPLETED | OUTPATIENT
Start: 2018-08-02 | End: 2018-08-02

## 2018-08-02 RX ADMIN — DIPHENHYDRAMINE HYDROCHLORIDE 25 MG: 50 INJECTION, SOLUTION INTRAMUSCULAR; INTRAVENOUS at 10:41

## 2018-08-02 RX ADMIN — SODIUM CHLORIDE 250 ML: 900 INJECTION, SOLUTION INTRAVENOUS at 10:25

## 2018-08-02 RX ADMIN — ACETAMINOPHEN 1000 MG: 500 TABLET, FILM COATED ORAL at 10:40

## 2018-08-02 RX ADMIN — SODIUM CHLORIDE 250 ML: 900 INJECTION, SOLUTION INTRAVENOUS at 15:17

## 2018-08-02 RX ADMIN — DARATUMUMAB 1370 MG: 100 INJECTION, SOLUTION, CONCENTRATE INTRAVENOUS at 11:52

## 2018-08-02 RX ADMIN — ZOLEDRONIC ACID 3 MG: 0.8 INJECTION, SOLUTION, CONCENTRATE INTRAVENOUS at 15:17

## 2018-08-02 RX ADMIN — METHYLPREDNISOLONE SODIUM SUCCINATE 60 MG: 125 INJECTION, POWDER, FOR SOLUTION INTRAMUSCULAR; INTRAVENOUS at 10:41

## 2018-08-02 NOTE — PROGRESS NOTES
Reasons for follow up:   1. IgG kappa multiple myeloma, currently on Darzalex, started on May 26, 2016. Patient was switched to Darzalex from Pomalyst, as she continued to be neutropenic with recurrent urinary tract infection while on Pomalyst.    2. Zometa is on hold due to renal insufficiency.    3. After 16 doses of Darzalex, laboratory study showed stable disease in November 2016. Insurance company denied adding Velcade and dexamethasone. Patient is to be continued on monthly Darzalex from 12/06/16.   4. Obstructive right pyelonephritis with positive urine culture for E. coli, required hospitalization from 1/19/2017 through 01/24/2017 with iv antibiotics and stent exchange on 01/20/2017.   5.  Paraprotein studies on March 27, 2017 showed further slight improvement of multiple myeloma.   6.  Febrile illness, with urinary tract infection and influenza B infection in early May 2017, required hospitalization for 6 days.   7.   Paraprotein studies for both serum and 24-hour urine sample on 7/21/2017 showed further improvement of paraproteins.   Darzalex was continued.   8.  Serum protein study reported stable disease on 10/20/2017.  Darzalex will be continued.   9.  Laboratory studies of both serum paraprotein and a 24-hour urine protein in January 2018 showed a further improvement of paraproteins.  Monthly Darzalex will be continued.           History of Present Illness:     Patient is a 67 female presented today for scheduled Darzelex and Zometa.  She reports feeling quite well today.  She has no new symptoms or concerns.  She denies nausea, vomiting, diarrhea.  She denies neuropathy.  She denies fever, chills, shortness of breath.  Her bowels are moving well.  She is urinating without issue.  She denies swelling.    Past Medical History, Past Surgical History, Social History, Family History have been reviewed and are without significant changes except as mentioned.      Hematology/oncology history: See separate  document for extra information.       On12/6/2016, serum free lambda chain 1090 MG/L, free kappa chain 8.5 to MG/L.  Ferritin 1015, iron 99, TIBC 137 iron saturation 72%.     On January 4, 2017, vitamin B12 level 1289, folate 7.7 ng/mL. SPEP reporting gamma globulin 3.3, out of total globulin 5.0, with immunofixation reporting small quantity of monoclonal free lambda chain, plus monoclonal IgG lambda at 3.2 g/DL.  Serum IgG 4075, IgA 9 and IgM 15.  free lambda chain 1339 MG/L and free kappa chain 10.3 MG/L.      Laboratory study March 27, 2017 showed slight improvement of paraprotein, SPEP reporting M spike 2.8 g/DL, out of total globulin 4.3, and the serum IgG 3420, IgA 9, IgM 11, free lambda chain 1218 MG/L and free kappa chain 8.0 MG/L.  Total 24 hour urine sample reported at free lambda chain 142 mg, at a concentration 74.8 MG/L, free kappa chain 75 mg at a concentration 39.5 MG/L., Urine protein immunofixation reporting biclonal IgG lambda and monoclonal free lambda light chain, M spike #1 at 41.5% and monoclonal IgG lambda spike #2 at 10.5%. Serum beta-2 microglobulin is 7.3 MG/L.     Her laboratory study on 7/21/2017 reported further improvement of paraprotein is both serum and a 24-hour urine sample.  SPEP reporting monoclonal IgG lambda 2.9 g/dL and free lambda chain 0.1 g/dL.  Serum IgG was 3261 mg/dL and IgA 5, IgM 13, free kappa chain 6.7, free lambda chain 701.3 with kappa/lambda ratio 0.01.  24-hour urine sample reported positive for Bence May protein lambda type, immunofixation positive for IgG lambda.  Total urine protein 241 mg, gamma globulin 13.1%.  Hemoglobin was 11.4 .4, platelets 122,000, WBC 6680 including neutrophils 3600, lymphocytes 2100 and monocytes 650.  Her creatinine was 1.68, at baseline level.  Liver function panel unremarkable.  Darzalex was continued.    Laboratory study on 8/25/2017 showed improved the platelets at 144,000, normal WBC 6660 and slightly improved hemoglobin  at 11.7.     Patient had a colonoscopy examination on 8/30/2017 by Dr. Rivera.  It reported diverticulosis in multiple areas more severe in the sigmoid colon.  There was 2 polyps 4 mm pneumonia from transverse colon and the sigmoid colon.  Pathology evaluation reported tubular adenoma from the sigmoid colon polyp, and benign hyperplastic polyp from transverse colon.    Laboratory study on 10/20/2017 reported slightly improved monoclonal spike 2.7 g/dL by SPEP, immunofixation reported positive monoclonal IgG lambda, serum IgG 3536 mg/dL, IgA less than 5 and IgM 11, free kappa chain 5.3 mg/L, and free lambda chain 720 mg/L with kappa/lambda ratio 0.01.  Serum beta-2 microglobulin was 6.5 mg/L.   Her CBC showed a stable mild anemia with hemoglobin 11.3, macrocytosis .3, and the platelets 114,000, normal WBC 7070 including neutrophils 4100 lymphocytes 2000 monocytes 650, normal liver function panel, total protein 7.9 and albumin 3.2,  and normal electrolytes including calcium 8.1, and improved creatinine 1.59.     Laboratory study on 1/12/2018 reported serum IgG 3083 mg/dL, IgA 10, IgM 10, free kappa chain 8.5 and free lambda chain 589.1 mg/L, kappa/lambda ratio 0.01, serum protein admitted fixation reported IgG lambda monoclonal protein and SPEP reporting M spike 3.1 g/dL, beta-2 microgram and 7.4 mg/L. serum creatinine 1.79, calcium 8.2, other electrolytes normal, hemoglobin 11.3, .5 and MCHC 31.6, platelets 129,000, WBC 6780 including neutrophils 3500 lymphocytes 2300.  Serum ferritin 653.7 ng/mL, iron 123 TIBC 174 iron saturation 71%, folic acid 12.8 ng/mL and a vitamin B12 at 873 pg/mL.  Her 24-hour urine study on 1/18/2018 reported lambda chain 32.8 mg/L, total 61 mg in 24 hours, and the free kappa chain 27.4 mg/L and 51 mg in 24 hours, and the total 24-hour urine protein 283 mg, and urine protein immunofixation positive for 5.3% monoclonal IgG lambda and positive for Bence May protein at 36.2%  monoclonal lambda chain.  Monthly Darzalex was continued.       This patient had serum protein study on 04/06/2018 which reported free light chain at concentration 703.8 mg/L and free kappa chain 7.0, free kappa/lambda ratio 0.01, serum IgG 3650 mg/dL, IgM 11 and IgA 9 with serum protein electrophoresis reporting monoclonal IgG lambda 3.2 g/dL and also monoclonal free lambda light chain.  But it was unable to quantify monoclonal lambda light chain because of the small quantity of it.     A 24-hour urine study on 04/20/2018 reported total free lambda chain 60 mg in 24 hours at concentration 33.1 mg/L, free kappa chain 45 mg in 24 hours at concentration 24.8 mg/L. Total 24-hour urine protein was 279 mg. Urine protein immunofixation and UPEP reported lambda-type Bence-May protein + #1 monoclonal IgG lambda band at 34.8% and #2 monoclonal IgG lambda band at 6.9%.     Her CBC results today reported a stable hemoglobin at 11.1, platelets 130,000 and WBC 7440 including neutrophils 4100 and lymphocytes 2350, monocytes 650. Her CMP reported slightly worsened creatinine at 1.82 with BUN 33. Total protein at 8.8. Liver function panel was normal. Total serum protein 8.8 and albumin 3.2, globulin 5.6.    Review of Systems    Constitutional: Negative for chills and fever currently.  Complains of weight gains in the past 12 months despite watching her diet.    HENT: Negative for mouth sores and sore throat.    Eyes: Negative for visual disturbance.    Cardiac: Denies chest pain no palpitation.  No lower extremity edema.    Respiratory: No cough, no hemoptysis no short of breath.    Gastrointestinal: Negative for abdominal pain, diarrhea, nausea and vomiting.    Genitourinary: No dysuria or hematuria.  No pain at the flank area/back pain.   Musculoskeletal: Negative for back pain and joint swelling.    Neurological: Negative for dizziness.   Hematological: Does not bruise/bleed easily.    Psychiatric/Behavioral: The patient is  "not nervous/anxious.        Medications: The current medication list was reviewed in the EMR.       ALLERGIES: Zosyn       Vitals:    08/02/18 0955   BP: 103/67   Pulse: 60   Resp: 16   Temp: 98 °F (36.7 °C)   TempSrc: Oral   SpO2: 100%   Weight: 88.5 kg (195 lb)   Height: 157.5 cm (62.01\")   PainSc: 0-No pain   PS: ECOG 1      Physical Exam   Constitutional: well-developed and well-nourished -American female. No distress.    HENT:     Head: Normocephalic.     Eyes: No jaundice.     Neck: Normal range of motion.   no masses.    Cardiovascular: Normal rate, regular rhythm, normal heart sounds and normal pulses.    Pulmonary/Chest: Lungs clear to auscultation bilaterally, no wheezes, no rales.  Mediport benign right upper chest.  Abdominal: Soft, no tenderness guarding or rebound.  Bowel sounds are normal. no distension and no mass. No hepatosplenomegaly.   Musculoskeletal: Normal range of motion. no edema or deformity.   Lymphadenopathy: She has no cervical, supraclavicular adenopathy.   Neurological: alert and oriented to person, place, and time. No cranial nerve deficit.    Skin: Skin is warm and dry. No rash noted. No cyanosis. No pallor.   Psychiatric: She has normal mood and affect.         Recent lab results:   Lab Results   Component Value Date    WBC 7.00 08/02/2018    HGB 10.6 (L) 08/02/2018    HCT 34.5 (L) 08/02/2018    .8 (H) 08/02/2018     (L) 08/02/2018     Lab Results   Component Value Date    NEUTROABS 3.97 08/02/2018     Lab Results   Component Value Date    GLUCOSE 92 08/02/2018    BUN 20 08/02/2018    CREATININE 1.66 (H) 08/02/2018    EGFRIFNONA  08/30/2016      Comment:      <15 Indicative of kidney failure.    EGFRIFAFRI 37 (L) 08/02/2018    BCR 12.0 08/02/2018    K 3.7 08/02/2018    CO2 20.9 (L) 08/02/2018    CALCIUM 8.0 (L) 08/02/2018    PROTENTOTREF 7.5 07/05/2018    ALBUMIN 2.90 (L) 08/02/2018    LABIL2 0.8 07/05/2018    AST 13 08/02/2018    ALT 11 08/02/2018     Sodium "   Date Value Ref Range Status   08/02/2018 142 136 - 145 mmol/L Final     Potassium   Date Value Ref Range Status   08/02/2018 3.7 3.5 - 5.2 mmol/L Final     Total Bilirubin   Date Value Ref Range Status   08/02/2018 0.3 0.1 - 1.2 mg/dL Final     Alkaline Phosphatase   Date Value Ref Range Status   08/02/2018 58 39 - 117 U/L Final     Lab Results   Component Value Date    IRON 73 07/05/2018    TIBC 176 (L) 07/05/2018    FERRITIN 539.10 (H) 07/05/2018   Iron saturation 42%.    IMAGE STUDY:   CT ABDOMEN AND PELVIS WITHOUT CONTRAST.  5/13/2018     TECHNIQUE: Radiation dose reduction techniques were utilized, including  automated exposure control and exposure modulation based on body size. A  routine CT scan of the abdomen and pelvis was performed with coronal and  sagittal reconstructed images.     HISTORY: Left lower quadrant pain, suspect diverticulitis.     COMPARISON: 5/6/2016 and 8/20/2014.     FINDINGS:  Lung bases demonstrate no consolidation or effusion.          Right hepatic lobe demonstrate low attenuation in masslike area which  was diagnosed as a large hemangioma on the previous examinations.  Unremarkable gallbladder. No intra or extrahepatic biliary ductal  dilatation.      Spleen is unremarkable. Pancreas is unremarkable, no pancreatic ductal  dilatation. Right adrenal gland 1.6 cm nodule is unchanged since  8/20/2014. 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 Left  nephrectomy. A 4.6 cm left lumbar hernia contains a loop of the  descending colon, no obstruction.     Urinary bladder is unremarkable.          Diverticulitis of the proximal sigmoid colon, no surrounding abscess.  Diverticulosis of the colon. Appendix is normal. Small bowel loops are  within normal limits.         No significant retroperitoneal lymphadenopathy. IVC filter in position.  Fatty umbilical hernia remains unchanged.         IMPRESSION:  1.  Mild diverticulitis of the proximal sigmoid colon, no obstruction,  perforation or abscess.  2.  Large masslike lesion of the right hepatic lobe is favored to be a  hemangioma, not significantly changed.  3.  1.6 cm nodule of the right adrenal gland and 1.3 cm angiomyolipoma  of the left adrenal gland remain unchanged.  4.  Double-J stent in the right renal collecting system. Left  nephrectomy.  5.  Umbilical hernia and left lumbar hernia demonstrated no significant  change.           Assessment/Plan   1. Multiple myeloma, IgG lambda, stage III. Failed multiple lines of therapy. Her treatment was switched to Darzalex on 5/26/2016. She has been on this treatment for 2 years now.  She has tolerated therapy very well.     Patient had a stable paraprotein studies including both serum protein and a 24-hour urine study on 6/22/2018.  We'll continue the same treatment for now with monthly Darzalex.  Plan to repeat paraprotein studies every 3 months.     We will proceed with treatment as scheduled today.    2. Zometa treatment.  Her last dose Zometa was on 2/9/2018.  We have held her Zometa in the past due to renal insufficiency.  She receives Zometa every 3 months.       3.  Macrocytic anemia secondary to chemotherapy for multiple myeloma and chronic renal insufficiency stage 3.  She has relatively stable hemoglobin, however with worsening macrocytosis.  Laboratory study on 1/12/2018 showed no evidence of deficiency of folic acid or vitamin B12 level.  She also had iron panel, fits with inflammatory condition.  Hgb is 10.6.      4. Mild to moderate thrombocytopenia secondary to her multiple myeloma and chemotherapy.  Overall her platelet counts has been much better compared to those a year ago.  She is asymptomatic, no easy bleeding or bruising.  Platelets today are [id number].    5. History of stage II left breast cancer, post mastectomy in 2013, negative for ER/AL and positive HER-2. No neoadjuvant  chemotherapy because of concurrent discovery of multiple myeloma and ongoing chemotherapy. She had a normal mammogram study 8/10/2017.       6.  Right middle lobe pulmonary nodule, documented on CT scan of chest on 01/06/2017. Repeated CT scan of chest on 8/21/2017 reported a small 5 mm and stable primary nodule.      7.  Chronic renal insufficiency stage III.  Her creatinine is 1.66 today.       Plan:  1.  Continue Darzalex treatment  As scheduled. .   2.   We will proceed with Zometa today I have confirmed with Drs and pharmacy that her creatinine clearance  Sufficient for a 3mg dose.   3.  Return in 4 weeks to see Dr Araujo with labs per protocol.   dewayne        .

## 2018-08-13 ENCOUNTER — OFFICE VISIT (OUTPATIENT)
Dept: SURGERY | Facility: CLINIC | Age: 68
End: 2018-08-13

## 2018-08-13 ENCOUNTER — PREP FOR SURGERY (OUTPATIENT)
Dept: OTHER | Facility: HOSPITAL | Age: 68
End: 2018-08-13

## 2018-08-13 VITALS — WEIGHT: 194 LBS | OXYGEN SATURATION: 97 % | BODY MASS INDEX: 35.7 KG/M2 | HEART RATE: 81 BPM | HEIGHT: 62 IN

## 2018-08-13 DIAGNOSIS — C64.9 MALIGNANT NEOPLASM OF KIDNEY, UNSPECIFIED LATERALITY (HCC): ICD-10-CM

## 2018-08-13 DIAGNOSIS — N18.30 CHRONIC KIDNEY DISEASE, STAGE III (MODERATE) (HCC): Primary | ICD-10-CM

## 2018-08-13 DIAGNOSIS — K45.8 LUMBAR HERNIA: ICD-10-CM

## 2018-08-13 DIAGNOSIS — C90.00 MULTIPLE MYELOMA NOT HAVING ACHIEVED REMISSION (HCC): ICD-10-CM

## 2018-08-13 DIAGNOSIS — K46.9 HERNIA OF ABDOMINAL CAVITY: Primary | ICD-10-CM

## 2018-08-13 PROCEDURE — 99214 OFFICE O/P EST MOD 30 MIN: CPT | Performed by: SURGERY

## 2018-08-13 RX ORDER — SODIUM CHLORIDE 0.9 % (FLUSH) 0.9 %
1-10 SYRINGE (ML) INJECTION AS NEEDED
Status: CANCELLED | OUTPATIENT
Start: 2018-08-13

## 2018-08-13 RX ORDER — CLINDAMYCIN PHOSPHATE 900 MG/50ML
900 INJECTION INTRAVENOUS
Status: CANCELLED | OUTPATIENT
Start: 2018-09-19

## 2018-08-13 RX ORDER — OXYCODONE HCL 10 MG/1
10 TABLET, FILM COATED, EXTENDED RELEASE ORAL ONCE
Status: CANCELLED | OUTPATIENT
Start: 2018-08-13 | End: 2018-08-13

## 2018-08-13 RX ORDER — ACETAMINOPHEN 325 MG/1
650 TABLET ORAL ONCE
Status: CANCELLED | OUTPATIENT
Start: 2018-08-13 | End: 2018-08-13

## 2018-08-13 NOTE — PROGRESS NOTES
SURGERY  Marina Barroso   1950 08/13/18    Chief Complaint:  Tenderness right breast    HPI    Patient is a 68 y.o. female who returns after having been seen 7/13/18, for follow-up after having been seen in the hospital for a fairly significant sigmoid diverticulitis that was low in the pelvis, and a flank hernia on the left from her prior nephrectomy for leiomyosarcoma..  I did her colonoscopy at 3017 where she was found to have moderate to markedly sigmoid diverticulosis with mild disease elsewhere with some adenomatous polyps.  She should have follow-up for that in 5 years.  She did seem to recuperate from the diverticulitis fairly quickly, and is not wishing to consider surgery for such.      On the other hand, the incisional hernia in the left flank has been very problematic.  She said it flares up pretty bad at times, is worse when she is driving forcing her to constantly rearrange and with escalating symptoms over the last month.  It's always there is an aching.  She says she would rather take care of it now while it selective rather than wait until something really bad happens.    She has a strong history of cancer, having had the left breast cancer, a kidney cancer on the left with nephrectomy [chronic kidney disease stage III now], with multiple myeloma.  From the standpoint of her multiple myeloma, she tells me she is consistently improving.  She can wash her car now, can walk better than she did maybe a year ago, her new treatment being by Dr. Araujo.  She really wishes to have this treated, saying she doesn't want to live with this anymore.  She also has an umbilical hernia, which she says doesn't give her much trouble.    Her daughter is here with her today to help her make the decisions.  We've gone over her medications again, and it seems she take her Zovirax, Singulair, and steroids all as a prophylactic treatment surrounding the time of her therapy for multiple myeloma.  I have discussed her  care with Dr. Conteh's exit is signed for us to go forward despite multiple myeloma.    She does have a cardiologist, Dr. Bazzi,, and has not seen him recently, and would need to have clearance from him to proceed.      Past Medical History:   Diagnosis Date   • Anemia 10/14/2008    Secondary to chronic renal insufficiency and myeloma   • Arthropathy of knee 01/07/2014    unspecified   •      brain tumor   • Breast cancer 04/2012    Left breast invasive ductal carcinoma, stage II   • Bursitis of left hip 10/18/2007   • Bursitis, knee 12/02/2013   • Calcaneal spur 08/12/2009   • Chronic kidney disease, stage III (moderate)    • Congestive heart failure    • Diarrhea of presumed infectious origin 04/01/2014    c diff    • Diverticulitis of colon 10/17/2007   • DVT (deep venous thrombosis)     right lower extremity    • History of Clostridium difficile    • History of echocardiogram 04/2014    EF decreased to 46% - per cardiology   • History of MRSA infection    • History of transfusion    • Hydronephrosis     chronic, right   • Hypertension    • LLL pneumonia 04/23/2009   • Malignant neoplasm of kidney 12/2009    and other and unspecified urinary organs; urinary organ, site unspecified; s/p left nephrectomy   • Multiple myeloma 04/2012   • Neoplasm of uncertain behavior 10/12/2015    of other and unspecified sites and tissues; other specified sites   • Pyelonephritis 03/2004   • Seizures 10/17/2007   • Thrombocytopenia    • UTI (urinary tract infection) 10/30/2014    pseudomonas, multidrug resistant   • UTI (urinary tract infection) 03/28/2014    site not specified     Past Surgical History:   Procedure Laterality Date   • BONE MARROW BIOPSY N/A 04/11/2012   • BRAIN SURGERY  2005    Meningioma   • BRAIN SURGERY  1990    Tumor benign per patient   • BREAST BIOPSY Left 04/09/2012    Dr. Gregg May   • CARDIAC CATHETERIZATION Left 06/11/2012    Dr. Sudeep Haddad   • CARDIAC CATHETERIZATION N/A 08/15/2006    Dr. Torres  Miller   • COLONOSCOPY N/A 8/30/2017    Procedure: COLONOSCOPY into cecum and TI with cold polypectomies;  Surgeon: Trudy Rivera MD;  Location: [facility] ENDOSCOPY;  Service:    • COLONOSCOPY W/ POLYPECTOMY N/A unknown    2 polyps, benign   • CYSTOSCOPY N/A 3/25/2016    Procedure: CYSTOSCOPY  WITH RIGHT STENT CHANGE;  Surgeon: Lakhwinder Russo MD;  Location: [facility] OR;  Service:    • CYSTOSCOPY N/A 03/10/2012    Cystoscopy, right retrograde, right double-J stent-Dr. Sloan May   • CYSTOSCOPY N/A 02/25/2012    Cystoscopy, stent removal, right retrograde pyelogram, replacement of right double-J ureteral stent-Dr. Michael Everett   • CYSTOSCOPY W/ URETERAL STENT PLACEMENT Right 5/7/2016    Procedure: CYSTOSCOPY, URETERAL CATHETER INSERTION AND RIGHT STENT EXCHANGE ;  Surgeon: Michael Everett Jr., MD;  Location: [facility] OR;  Service:    • CYSTOSCOPY W/ URETERAL STENT PLACEMENT N/A 1/20/2017    Procedure: CYSTOSCOPY RIGHT RETROGRADE PYELOGRAM, URETERAL STENT CHANGE;  Surgeon: Lakhwinder Russo MD;  Location: [facility] OR;  Service:    • CYSTOSCOPY W/ URETERAL STENT PLACEMENT N/A 04/02/2015    Cystoscopy, removal of right ureteral stent, right retrograde pyelogram, placement of right double-J ureteral stent-Dr. Michael Everett   • CYSTOSCOPY W/ URETERAL STENT PLACEMENT N/A 04/26/2015    Cystoscopy, removal of right ureteral stent, right retrograde pyelogram, replacement of right ureteral stent 24 cm x 7-Chadian without retrieval line-Dr. Jose Gomez   • CYSTOSCOPY W/ URETERAL STENT PLACEMENT N/A 12/22/2014    Cystoscopy, removal of right double-J ureteral stent, right retrograde pyelogram, placement of right double-J ureteral stent-Dr. Michael Everett   • CYSTOSCOPY W/ URETERAL STENT PLACEMENT N/A 09/22/2014    Cystoscopy, removal of right ureteral stent, removal of right retrograde pyelogram, replacement of right double-J ureteral stent-Dr. Michael Everett   • CYSTOSCOPY W/ URETERAL STENT PLACEMENT N/A  06/18/2014    Cystoscopy, removal of right double-J ureteral stent, right retrograde pyelogram, placement of right double-J ureteral stent-Dr. Michael Everett   • CYSTOSCOPY W/ URETERAL STENT PLACEMENT N/A 01/23/2014    Cystoscopy, removal of right double-J ureteral stent, right retrograde pyelogram, placement of right double-J ureteral stent-Dr. Michael Everett   • CYSTOSCOPY W/ URETERAL STENT PLACEMENT N/A 03/27/2012    Cystoscopy, removal of ureteral stent, right retrograde pyelogram, replacement of indewlling right ureteral stent-Dr. Michael Everett   • CYSTOSCOPY W/ URETERAL STENT PLACEMENT N/A 03/27/2013    Cystoscopy, right retrograde pyelogram, removal and replacement of right double-J ureteral stent-Dr. Michael Everett   • CYSTOSCOPY W/ URETERAL STENT PLACEMENT N/A 11/12/2012    Cystoscopy, stent removal, right retrograde pyelogram, replacement of right double-J ureteral stent-Dr. Michael Everett   • CYSTOSCOPY W/ URETERAL STENT PLACEMENT N/A 09/24/2011    Cystoscopy, removal of ureteral stent, right retrograde pyelogram and placement of right double-J ureteral stent-Dr. Michael Everett   • CYSTOSCOPY W/ URETERAL STENT PLACEMENT N/A 05/14/2011    Cystoscopy, removal of right ureteral stent, right retrograde pyelogram and placement of new right double-J ureteral stent-Dr. Michael Everett   • CYSTOSCOPY W/ URETERAL STENT PLACEMENT N/A 12/31/2010    Cystoscopy, stent removal, right retrograde pyelogram, replacement of 7 [place] x 26 cm double-J stent-Dr. Michael Everett   • CYSTOSCOPY W/ URETERAL STENT PLACEMENT N/A 08/28/2010    Cystoscopy, stent removal, right retrograde pyelogram with interpretation, placement of right double-J ureteral stent-Dr. Michael Everett   • CYSTOSCOPY W/ URETERAL STENT PLACEMENT N/A 05/24/2010    Cystoscopy, right retrograde pyelogram, replacement of right double-J ureteral stent-Dr. Michael Everett   • CYSTOSCOPY W/ URETERAL STENT PLACEMENT N/A 02/12/2010    Cystoscopy, right retrograde pyelogram and placement of right  double-J ureteral stent-Dr. Michael Everett   • CYSTOSCOPY W/ URETERAL STENT PLACEMENT N/A 02/23/2009    Cystoscopy, right retrograde pyelogram, placement of right double-J ureteral stent-Dr. Michael Everett   • CYSTOSCOPY W/ URETERAL STENT PLACEMENT N/A 09/17/2007    Dr. Michael Everett   • CYSTOSCOPY W/ URETERAL STENT PLACEMENT Right 01/29/2018    Dr. Michael Everett   • CYSTOSCOPY W/ URETERAL STENT PLACEMENT N/A 06/04/2018    Cystoscopy, removal of right double-J ureteral stent and placement of right double-J ureteral stent. Dr. Michael Everett.   • CYSTOSCOPY W/ URETERAL STENT PLACEMENT N/A 03/06/2018    Cystoscopy, removal of right ureteral stent, replacement of right double-J ureteral stent. Dr. Michael Everett   • ELBOW PROCEDURE Bilateral 1990s   • LASIK Bilateral    • MASTECTOMY Left 04/25/2012    Left total mastectomy with sentinel lymph node biopsies and left axillary lymphatic mapping-Dr. Gregg May   • NEPHRECTOMY Left 12/30/2009    Dr. 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 Cristo Pittman   • TOTAL ABDOMINAL HYSTERECTOMY Bilateral 2007    Dr. Todd   • URETEROURETEROSTOMY Right 05/01/2006    Dr. Michael Everett   • VENA CAVA FILTER INSERTION N/A 05/08/2006    Dr. Jordon Barber   • VENOUS ACCESS DEVICE (PORT) INSERTION Right 02/25/2013    Right subclavian vein PowerPort insertion-Dr. Gregg May   • VENOUS ACCESS DEVICE (PORT) INSERTION AND REMOVAL N/A 10/06/2014    Revision of right internal jugular PowerPort with fluoroscopy, removal of peripherally inserted central catheter line-Dr. Aurora Tomlinson   • VENOUS ACCESS DEVICE (PORT) INSERTION AND REMOVAL N/A 08/14/2014    Ultrasound-guided access right internal jugular vein, PowerPort placement, removal of right subclavian port-Dr. Jordon Barber     Family History   Problem  Relation Age of Onset   • Hypertension Other    • Miscarriages / Stillbirths Father    • Heart disease Father    • Hypertension Father    • Heart attack Father    • Diabetes Sister    • Skin cancer Sister    • Throat cancer Brother      Social History     Social History   • Marital status:      Spouse name: N/A   • Number of children: 3   • Years of education: High School     Occupational History   •  Retired     [employer] [[id number]]     Social History Main Topics   • Smoking status: Never Smoker   • Smokeless tobacco: Never Used   • Alcohol use No   • Drug use: No   • Sexual activity: Defer     Other Topics Concern   • Not on file     Social History Narrative    Son lives with her and helps     Occupation/Additional Social Hx: Accompanied by daughter today      Current Outpatient Prescriptions:   •  acetaminophen (TYLENOL) 325 MG tablet, Take 2 tablets by mouth Every 6 (Six) Hours As Needed for mild pain (1-3) or fever., Disp: , Rfl: 0  •  acyclovir (ZOVIRAX) 400 MG tablet, TAKE 1 TABLET BY MOUTH 2 (TWO) TIMES A DAY. TAKE NO MORE THAN 5 DOSES A DAY., Disp: 60 tablet, Rfl: 0  •  aspirin 81 MG tablet, Take 81 mg by mouth Every Night., Disp: , Rfl:   •  bisoprolol (ZEBeta) 5 MG tablet, TAKE 1 TABLET BY MOUTH 2 (TWO) TIMES DAILY, Disp: , Rfl: 1  •  dexamethasone (DECADRON) 4 MG tablet, TAKE 1 TABLET IN THE MORNING STARTING THE DAY AFTER CHEMO FOR 2 DAYS. TAKE WITH FOOD. (Patient taking differently: TAKE 1 TABLET IN THE MORNING STARTING THE DAY AFTER CHEMO FOR 2 DAYS. TAKE WITH FOOD BID), Disp: 16 tablet, Rfl: 1  •  folic acid (FOLVITE) 1 MG tablet, Take 1 mg by mouth Every Morning., Disp: , Rfl:   •  hydrALAZINE (APRESOLINE) 25 MG tablet, Take 25 mg by mouth 2 (two) times a day., Disp: , Rfl: 3  •  isosorbide mononitrate (IMDUR) 60 MG 24 hr tablet, Take 1 tablet by mouth Daily. TAKE 1 TABLET BY MOUTH EVERY MORNING AND ONE-HALF TABLET EVERY EVENING (Patient taking differently: Take 60  "mg by mouth Daily. TAKE 1.5 TABLET BY MOUTH EVERY MORNING BEFORE EXERSIZE), Disp: , Rfl:   •  losartan (COZAAR) 25 MG tablet, Take 25 mg by mouth every evening., Disp: , Rfl:   •  Misc. Devices (VIRAGE CUSTOM BREAST PROSTHES) misc, 2 each As Needed (na)., Disp: 2 each, Rfl: 0  •  montelukast (SINGULAIR) 10 MG tablet, TAKE 1 TABLET BY MOUTH EVERY NIGHT., Disp: 30 tablet, Rfl: 11  •  carBAMazepine XR (TEGretol  XR) 200 MG 12 hr tablet, Take 2 tablets by mouth Every Night for 180 days., Disp: 180 tablet, Rfl: 1    Allergies   Allergen Reactions   • Zosyn [Piperacillin Sod-Tazobactam So] Swelling     Preventative Medicine  Colonoscopy: 2018    Review of Systems  Specifically, negative for chest pain or shortness of breath, with all other systems reviewed and negative  Vitals:    08/13/18 1411   Pulse: 81   SpO2: 97%   Weight: 88 kg (194 lb)   Height: 157.5 cm (62\")       PHYSICAL EXAM:    Pulse 81   Ht 157.5 cm (62\")   Wt 88 kg (194 lb)   LMP  (LMP Unknown)   SpO2 97%   BMI 35.48 kg/m²   Body mass index is 35.48 kg/m².    Constitutional: well developed, well nourished, Appears stated age, but appears much more vigorous today than she did at her last visit and certainly more so than in the hospital  Eyes: sclera nonicteric, conjunctiva not injected or edematous  ENMT: Hearing intact, trachea midline, thyroid without masses  CVS: RRR, no murmur, peripheral edema not present  Respiratory: CTA, normal respiratory effort   Gastrointestinal: no hepatosplenomegaly, abdomen soft, nontender, no rebound or guarding.  Left flank with hernia, that's reducible, but clearly tender  Genitourinary: inguinal hernia not detected  Musculoskeletal: gait normal, muscle mass normal  Skin: warm and dry, no rashes visible, with vague  Neurological: awake and alert, seems to have reasonable capacity for understanding for medical decision making  Psychiatric: appears to have reasonable judgement, pleasant  Lymphatics: no cervical adenopathy " or axillary adenopathy, with no left arm lymphedema    Radiographic Findings: Reviewed with the patient, and all aspects, including the cross-sectional, sagittal, coronal, showing her the area of the hernia and the clear finding of the bowel outside the contents of the torso, not consistent with a muscular weakening.  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  · Umbilical hernia, less symptomatic  · Diverticulitis requiring hospitalization, but with fairly prompt resolution.  She's had several attacks.  · History of left breast cancer, stage II, no evidence of recurrence  · History of polyps, with next colonoscopy needed in 5 years  · Anemia  · Multiple myeloma, with history of thrombocytopenia  · status post left nephrectomy, with chronic kidney disease stage III, creatinine 1.72  · History of right ureter reanastomosis concomitant with BSO, with ureteral stent changes every 2 months.  (Dr. Michael Everett)  · History of VRE and MRSA  · Intolerance to Zosyn with renal complication.  · History of seizures secondary to meningioma, on Tegretol, with no seizures since 1999  · History of congestive heart failure/nonischemic cardiomyopathy on Imdur.  I could not find any record of an echo recently in Epic  · Right sided port.  She requests that we use this for her surgery, saying is very difficult for her any IV to be obtained on her.  This may be reasonable, as she will be in the right lateral decubitus position and anything in her arm will probably be at risk.  · Hypertension on Apresoline, Cozaar, Z Betta    PLAN:  · Incisional flank hernia repair, with probable muscular release, and definite mesh case request entered   I am doing this due to the fairly significant tenderness, and what I think is  real risk for incarceration and strangulation, I think we'll need to consider proceeding with surgery.  · Options are laparoscopic repair versus open were discussed again today and I have chosen the open.  All need to be attentive to the nearby spleen.  · We'll need right lateral decubitus position, with the patient rolled forward, and a arm table to study her left arm.  · Access right port for the procedure  · Get clearance from Dr. Bazzi prior to the procedure, thus needing to hold off for 1 month for scheduling, allowing that clearance to occur  · Hold aspirin for 1 week prior  · Discussed with patient increased perioperative risks associated with obesity including increased risks of DVT, infection, seromas, poor wound healing and hernias (with abdominal surgery).      Trudy Rivera MD  8/13/18  5:30 PM

## 2018-08-22 PROBLEM — K46.9 HERNIA OF ABDOMINAL CAVITY: Status: ACTIVE | Noted: 2018-08-22

## 2018-08-28 DIAGNOSIS — C90.00 MULTIPLE MYELOMA NOT HAVING ACHIEVED REMISSION (HCC): ICD-10-CM

## 2018-08-28 RX ORDER — ACYCLOVIR 400 MG/1
TABLET ORAL
Qty: 60 TABLET | Refills: 4 | Status: SHIPPED | OUTPATIENT
Start: 2018-08-28 | End: 2018-09-12

## 2018-08-28 NOTE — TELEPHONE ENCOUNTER
Zane Araujo MD PhD  Candida Platt,     She is on 400 mg oral every 12 hours for prophylaxis of viral infection.  Thanks for taking care of it!       Thanks!     Dr. Araujo      I asked Dr Araujo if pt was on acyclovir for prophylaxis, so I could give refills.

## 2018-08-30 ENCOUNTER — TELEPHONE (OUTPATIENT)
Dept: ONCOLOGY | Facility: CLINIC | Age: 68
End: 2018-08-30

## 2018-08-30 ENCOUNTER — INFUSION (OUTPATIENT)
Dept: ONCOLOGY | Facility: HOSPITAL | Age: 68
End: 2018-08-30

## 2018-08-30 ENCOUNTER — OFFICE VISIT (OUTPATIENT)
Dept: ONCOLOGY | Facility: CLINIC | Age: 68
End: 2018-08-30

## 2018-08-30 VITALS
WEIGHT: 195.6 LBS | DIASTOLIC BLOOD PRESSURE: 62 MMHG | BODY MASS INDEX: 35.99 KG/M2 | HEART RATE: 72 BPM | HEIGHT: 62 IN | OXYGEN SATURATION: 96 % | TEMPERATURE: 97.7 F | RESPIRATION RATE: 16 BRPM | SYSTOLIC BLOOD PRESSURE: 95 MMHG

## 2018-08-30 DIAGNOSIS — C90.00 MULTIPLE MYELOMA NOT HAVING ACHIEVED REMISSION (HCC): Primary | ICD-10-CM

## 2018-08-30 DIAGNOSIS — Z45.2 FITTING AND ADJUSTMENT OF VASCULAR CATHETER: ICD-10-CM

## 2018-08-30 LAB
ALBUMIN SERPL-MCNC: 2.9 G/DL (ref 3.5–5.2)
ALBUMIN/GLOB SERPL: 0.4 G/DL
ALP SERPL-CCNC: 63 U/L (ref 39–117)
ALT SERPL W P-5'-P-CCNC: 13 U/L (ref 1–33)
ANION GAP SERPL CALCULATED.3IONS-SCNC: 7.6 MMOL/L
AST SERPL-CCNC: 16 U/L (ref 1–32)
BASOPHILS # BLD AUTO: 0.02 10*3/MM3 (ref 0–0.2)
BASOPHILS NFR BLD AUTO: 0.3 % (ref 0–1.5)
BILIRUB SERPL-MCNC: 0.2 MG/DL (ref 0.1–1.2)
BUN BLD-MCNC: 28 MG/DL (ref 8–23)
BUN/CREAT SERPL: 15.6 (ref 7–25)
CALCIUM SPEC-SCNC: 8.1 MG/DL (ref 8.6–10.5)
CHLORIDE SERPL-SCNC: 113 MMOL/L (ref 98–107)
CO2 SERPL-SCNC: 20.4 MMOL/L (ref 22–29)
CREAT BLD-MCNC: 1.8 MG/DL (ref 0.57–1)
DACRYOCYTES BLD QL SMEAR: NORMAL
DEPRECATED RDW RBC AUTO: 56.8 FL (ref 37–54)
EOSINOPHIL # BLD AUTO: 0.28 10*3/MM3 (ref 0–0.7)
EOSINOPHIL NFR BLD AUTO: 4.1 % (ref 0.3–6.2)
ERYTHROCYTE [DISTWIDTH] IN BLOOD BY AUTOMATED COUNT: 14.6 % (ref 11.7–13)
GFR SERPL CREATININE-BSD FRML MDRD: 34 ML/MIN/1.73
GLOBULIN UR ELPH-MCNC: 6.5 GM/DL
GLUCOSE BLD-MCNC: 99 MG/DL (ref 65–99)
HCT VFR BLD AUTO: 32.6 % (ref 35.6–45.5)
HGB BLD-MCNC: 10.3 G/DL (ref 11.9–15.5)
IMM GRANULOCYTES # BLD: 0.2 10*3/MM3 (ref 0–0.03)
IMM GRANULOCYTES NFR BLD: 2.9 % (ref 0–0.5)
LYMPHOCYTES # BLD AUTO: 2.01 10*3/MM3 (ref 0.9–4.8)
LYMPHOCYTES NFR BLD AUTO: 29.5 % (ref 19.6–45.3)
MCH RBC QN AUTO: 33.4 PG (ref 26.9–32)
MCHC RBC AUTO-ENTMCNC: 31.6 G/DL (ref 32.4–36.3)
MCV RBC AUTO: 105.8 FL (ref 80.5–98.2)
MONOCYTES # BLD AUTO: 0.66 10*3/MM3 (ref 0.2–1.2)
MONOCYTES NFR BLD AUTO: 9.7 % (ref 5–12)
NEUTROPHILS # BLD AUTO: 3.65 10*3/MM3 (ref 1.9–8.1)
NEUTROPHILS NFR BLD AUTO: 53.5 % (ref 42.7–76)
NRBC BLD MANUAL-RTO: 0.4 /100 WBC (ref 0–0)
PLAT MORPH BLD: NORMAL
PLATELET # BLD AUTO: 128 10*3/MM3 (ref 140–500)
PMV BLD AUTO: 11.5 FL (ref 6–12)
POTASSIUM BLD-SCNC: 4.3 MMOL/L (ref 3.5–5.2)
PROT SERPL-MCNC: 9.4 G/DL (ref 6–8.5)
RBC # BLD AUTO: 3.08 10*6/MM3 (ref 3.9–5.2)
SODIUM BLD-SCNC: 141 MMOL/L (ref 136–145)
SPHEROCYTES BLD QL SMEAR: NORMAL
WBC MORPH BLD: NORMAL
WBC NRBC COR # BLD: 6.82 10*3/MM3 (ref 4.5–10.7)

## 2018-08-30 PROCEDURE — 25010000002 HEPARIN FLUSH (PORCINE) 100 UNIT/ML SOLUTION: Performed by: INTERNAL MEDICINE

## 2018-08-30 PROCEDURE — 25010000002 DIPHENHYDRAMINE PER 50 MG: Performed by: NURSE PRACTITIONER

## 2018-08-30 PROCEDURE — 85025 COMPLETE CBC W/AUTO DIFF WBC: CPT | Performed by: INTERNAL MEDICINE

## 2018-08-30 PROCEDURE — 25010000002 METHYLPREDNISOLONE PER 125 MG: Performed by: NURSE PRACTITIONER

## 2018-08-30 PROCEDURE — 80053 COMPREHEN METABOLIC PANEL: CPT | Performed by: INTERNAL MEDICINE

## 2018-08-30 PROCEDURE — 96413 CHEMO IV INFUSION 1 HR: CPT | Performed by: NURSE PRACTITIONER

## 2018-08-30 PROCEDURE — 84165 PROTEIN E-PHORESIS SERUM: CPT | Performed by: NURSE PRACTITIONER

## 2018-08-30 PROCEDURE — 82784 ASSAY IGA/IGD/IGG/IGM EACH: CPT | Performed by: NURSE PRACTITIONER

## 2018-08-30 PROCEDURE — 96415 CHEMO IV INFUSION ADDL HR: CPT | Performed by: NURSE PRACTITIONER

## 2018-08-30 PROCEDURE — 86334 IMMUNOFIX E-PHORESIS SERUM: CPT | Performed by: NURSE PRACTITIONER

## 2018-08-30 PROCEDURE — 96375 TX/PRO/DX INJ NEW DRUG ADDON: CPT | Performed by: NURSE PRACTITIONER

## 2018-08-30 PROCEDURE — 25010000002 DARATUMUMAB 400 MG/20ML SOLUTION 5 ML VIAL: Performed by: NURSE PRACTITIONER

## 2018-08-30 PROCEDURE — 25010000002 DARATUMUMAB 400 MG/20ML SOLUTION 20 ML VIAL: Performed by: NURSE PRACTITIONER

## 2018-08-30 PROCEDURE — 99214 OFFICE O/P EST MOD 30 MIN: CPT | Performed by: NURSE PRACTITIONER

## 2018-08-30 PROCEDURE — 85007 BL SMEAR W/DIFF WBC COUNT: CPT | Performed by: INTERNAL MEDICINE

## 2018-08-30 PROCEDURE — 83883 ASSAY NEPHELOMETRY NOT SPEC: CPT | Performed by: NURSE PRACTITIONER

## 2018-08-30 RX ORDER — SODIUM CHLORIDE 0.9 % (FLUSH) 0.9 %
10 SYRINGE (ML) INJECTION AS NEEDED
Status: DISCONTINUED | OUTPATIENT
Start: 2018-08-30 | End: 2018-08-30 | Stop reason: HOSPADM

## 2018-08-30 RX ORDER — ACETAMINOPHEN 500 MG
1000 TABLET ORAL ONCE
Status: CANCELLED | OUTPATIENT
Start: 2018-08-30

## 2018-08-30 RX ORDER — DIPHENHYDRAMINE HYDROCHLORIDE 50 MG/ML
50 INJECTION INTRAMUSCULAR; INTRAVENOUS AS NEEDED
Status: CANCELLED | OUTPATIENT
Start: 2018-08-30

## 2018-08-30 RX ORDER — SODIUM CHLORIDE 0.9 % (FLUSH) 0.9 %
10 SYRINGE (ML) INJECTION AS NEEDED
Status: CANCELLED | OUTPATIENT
Start: 2018-08-30

## 2018-08-30 RX ORDER — ACETAMINOPHEN 500 MG
1000 TABLET ORAL ONCE
Status: COMPLETED | OUTPATIENT
Start: 2018-08-30 | End: 2018-08-30

## 2018-08-30 RX ORDER — GABAPENTIN 100 MG/1
CAPSULE ORAL
Qty: 60 CAPSULE | Refills: 2 | Status: SHIPPED | OUTPATIENT
Start: 2018-08-30 | End: 2018-10-10

## 2018-08-30 RX ORDER — METHYLPREDNISOLONE SODIUM SUCCINATE 125 MG/2ML
60 INJECTION, POWDER, LYOPHILIZED, FOR SOLUTION INTRAMUSCULAR; INTRAVENOUS ONCE
Status: CANCELLED | OUTPATIENT
Start: 2018-08-30

## 2018-08-30 RX ORDER — SODIUM CHLORIDE 9 MG/ML
250 INJECTION, SOLUTION INTRAVENOUS ONCE
Status: COMPLETED | OUTPATIENT
Start: 2018-08-30 | End: 2018-08-30

## 2018-08-30 RX ORDER — METHYLPREDNISOLONE SODIUM SUCCINATE 125 MG/2ML
60 INJECTION, POWDER, LYOPHILIZED, FOR SOLUTION INTRAMUSCULAR; INTRAVENOUS ONCE
Status: COMPLETED | OUTPATIENT
Start: 2018-08-30 | End: 2018-08-30

## 2018-08-30 RX ORDER — FAMOTIDINE 10 MG/ML
20 INJECTION, SOLUTION INTRAVENOUS AS NEEDED
Status: CANCELLED | OUTPATIENT
Start: 2018-08-30

## 2018-08-30 RX ORDER — SODIUM CHLORIDE 9 MG/ML
250 INJECTION, SOLUTION INTRAVENOUS ONCE
Status: CANCELLED | OUTPATIENT
Start: 2018-08-30 | End: 2018-08-30

## 2018-08-30 RX ORDER — GABAPENTIN 100 MG/1
CAPSULE ORAL
Qty: 60 CAPSULE | Refills: 2 | Status: SHIPPED | OUTPATIENT
Start: 2018-08-30 | End: 2018-08-30 | Stop reason: SDUPTHER

## 2018-08-30 RX ADMIN — Medication 10 ML: at 15:59

## 2018-08-30 RX ADMIN — SODIUM CHLORIDE 250 ML: 900 INJECTION, SOLUTION INTRAVENOUS at 10:10

## 2018-08-30 RX ADMIN — DARATUMUMAB 1370 MG: 100 INJECTION, SOLUTION, CONCENTRATE INTRAVENOUS at 11:53

## 2018-08-30 RX ADMIN — ACETAMINOPHEN 1000 MG: 500 TABLET, FILM COATED ORAL at 10:10

## 2018-08-30 RX ADMIN — METHYLPREDNISOLONE SODIUM SUCCINATE 60 MG: 125 INJECTION, POWDER, FOR SOLUTION INTRAMUSCULAR; INTRAVENOUS at 10:10

## 2018-08-30 RX ADMIN — SODIUM CHLORIDE, PRESERVATIVE FREE 500 UNITS: 5 INJECTION INTRAVENOUS at 15:59

## 2018-08-30 RX ADMIN — DIPHENHYDRAMINE HYDROCHLORIDE 25 MG: 50 INJECTION, SOLUTION INTRAMUSCULAR; INTRAVENOUS at 10:10

## 2018-08-30 NOTE — PROGRESS NOTES
"Reasons for follow up:   1. IgG kappa multiple myeloma, currently on Darzalex, started on May 26, 2016. Patient was switched to Darzalex from Pomalyst, as she continued to be neutropenic with recurrent urinary tract infection while on Pomalyst.    2. Zometa is on hold due to renal insufficiency.    3. After 16 doses of Darzalex, laboratory study showed stable disease in November 2016. Insurance company denied adding Velcade and dexamethasone. Patient is to be continued on monthly Darzalex from 12/06/16.   4. Obstructive right pyelonephritis with positive urine culture for E. coli, required hospitalization from 1/19/2017 through 01/24/2017 with iv antibiotics and stent exchange on 01/20/2017.   5.  Paraprotein studies on March 27, 2017 showed further slight improvement of multiple myeloma.   6.  Febrile illness, with urinary tract infection and influenza B infection in early May 2017, required hospitalization for 6 days.   7.   Paraprotein studies for both serum and 24-hour urine sample on 7/21/2017 showed further improvement of paraproteins.   Darzalex was continued.   8.  Serum protein study reported stable disease on 10/20/2017.  Darzalex will be continued.   9.  Laboratory studies of both serum paraprotein and a 24-hour urine protein in January 2018 showed a further improvement of paraproteins.  Monthly Darzalex will be continued.           History of Present Illness:     Patient is a 67 female is here today for scheduled Darzelex. She continues to tolerate treatment well in terms of side effects. Her energy level is good, she is eating and drinking well. He bowels and bladder are working normally.    She denies nausea, vomiting, diarrhea.  She denies neuropathy.  She denies fever, chills, shortness of breath. She denies swelling.    She does report pruritis of the lower extremities. She denies skin changes or rash. She denies denies new products of foods. She feels the itching is more \"internal.\" She has taken " Benadryl  Which provides mild relief. She also taken Vistaril which provides minimal relief. She reports some mild pruritis of the abdomen but this is occasional. Again, she denies skin changes or visible rash of the abdomen.    Her CBC today is stable.     Past Medical History, Past Surgical History, Social History, Family History have been reviewed and are without significant changes except as mentioned.      Hematology/oncology history: See separate document for extra information.       On12/6/2016, serum free lambda chain 1090 MG/L, free kappa chain 8.5 to MG/L.  Ferritin 1015, iron 99, TIBC 137 iron saturation 72%.     On January 4, 2017, vitamin B12 level 1289, folate 7.7 ng/mL. SPEP reporting gamma globulin 3.3, out of total globulin 5.0, with immunofixation reporting small quantity of monoclonal free lambda chain, plus monoclonal IgG lambda at 3.2 g/DL.  Serum IgG 4075, IgA 9 and IgM 15.  free lambda chain 1339 MG/L and free kappa chain 10.3 MG/L.      Laboratory study March 27, 2017 showed slight improvement of paraprotein, SPEP reporting M spike 2.8 g/DL, out of total globulin 4.3, and the serum IgG 3420, IgA 9, IgM 11, free lambda chain 1218 MG/L and free kappa chain 8.0 MG/L.  Total 24 hour urine sample reported at free lambda chain 142 mg, at a concentration 74.8 MG/L, free kappa chain 75 mg at a concentration 39.5 MG/L., Urine protein immunofixation reporting biclonal IgG lambda and monoclonal free lambda light chain, M spike #1 at 41.5% and monoclonal IgG lambda spike #2 at 10.5%. Serum beta-2 microglobulin is 7.3 MG/L.     Her laboratory study on 7/21/2017 reported further improvement of paraprotein is both serum and a 24-hour urine sample.  SPEP reporting monoclonal IgG lambda 2.9 g/dL and free lambda chain 0.1 g/dL.  Serum IgG was 3261 mg/dL and IgA 5, IgM 13, free kappa chain 6.7, free lambda chain 701.3 with kappa/lambda ratio 0.01.  24-hour urine sample reported positive for Bence May protein  lambda type, immunofixation positive for IgG lambda.  Total urine protein 241 mg, gamma globulin 13.1%.  Hemoglobin was 11.4 .4, platelets 122,000, WBC 6680 including neutrophils 3600, lymphocytes 2100 and monocytes 650.  Her creatinine was 1.68, at baseline level.  Liver function panel unremarkable.  Darzalex was continued.    Laboratory study on 8/25/2017 showed improved the platelets at 144,000, normal WBC 6660 and slightly improved hemoglobin at 11.7.     Patient had a colonoscopy examination on 8/30/2017 by Dr. Rivera.  It reported diverticulosis in multiple areas more severe in the sigmoid colon.  There was 2 polyps 4 mm pneumonia from transverse colon and the sigmoid colon.  Pathology evaluation reported tubular adenoma from the sigmoid colon polyp, and benign hyperplastic polyp from transverse colon.    Laboratory study on 10/20/2017 reported slightly improved monoclonal spike 2.7 g/dL by SPEP, immunofixation reported positive monoclonal IgG lambda, serum IgG 3536 mg/dL, IgA less than 5 and IgM 11, free kappa chain 5.3 mg/L, and free lambda chain 720 mg/L with kappa/lambda ratio 0.01.  Serum beta-2 microglobulin was 6.5 mg/L.   Her CBC showed a stable mild anemia with hemoglobin 11.3, macrocytosis .3, and the platelets 114,000, normal WBC 7070 including neutrophils 4100 lymphocytes 2000 monocytes 650, normal liver function panel, total protein 7.9 and albumin 3.2,  and normal electrolytes including calcium 8.1, and improved creatinine 1.59.     Laboratory study on 1/12/2018 reported serum IgG 3083 mg/dL, IgA 10, IgM 10, free kappa chain 8.5 and free lambda chain 589.1 mg/L, kappa/lambda ratio 0.01, serum protein admitted fixation reported IgG lambda monoclonal protein and SPEP reporting M spike 3.1 g/dL, beta-2 microgram and 7.4 mg/L. serum creatinine 1.79, calcium 8.2, other electrolytes normal, hemoglobin 11.3, .5 and MCHC 31.6, platelets 129,000, WBC 6780 including neutrophils 3500  lymphocytes 2300.  Serum ferritin 653.7 ng/mL, iron 123 TIBC 174 iron saturation 71%, folic acid 12.8 ng/mL and a vitamin B12 at 873 pg/mL.  Her 24-hour urine study on 1/18/2018 reported lambda chain 32.8 mg/L, total 61 mg in 24 hours, and the free kappa chain 27.4 mg/L and 51 mg in 24 hours, and the total 24-hour urine protein 283 mg, and urine protein immunofixation positive for 5.3% monoclonal IgG lambda and positive for Bence May protein at 36.2% monoclonal lambda chain.  Monthly Darzalex was continued.       This patient had serum protein study on 04/06/2018 which reported free light chain at concentration 703.8 mg/L and free kappa chain 7.0, free kappa/lambda ratio 0.01, serum IgG 3650 mg/dL, IgM 11 and IgA 9 with serum protein electrophoresis reporting monoclonal IgG lambda 3.2 g/dL and also monoclonal free lambda light chain.  But it was unable to quantify monoclonal lambda light chain because of the small quantity of it.     A 24-hour urine study on 04/20/2018 reported total free lambda chain 60 mg in 24 hours at concentration 33.1 mg/L, free kappa chain 45 mg in 24 hours at concentration 24.8 mg/L. Total 24-hour urine protein was 279 mg. Urine protein immunofixation and UPEP reported lambda-type Bence-May protein + #1 monoclonal IgG lambda band at 34.8% and #2 monoclonal IgG lambda band at 6.9%.     Her CBC results today reported a stable hemoglobin at 11.1, platelets 130,000 and WBC 7440 including neutrophils 4100 and lymphocytes 2350, monocytes 650. Her CMP reported slightly worsened creatinine at 1.82 with BUN 33. Total protein at 8.8. Liver function panel was normal. Total serum protein 8.8 and albumin 3.2, globulin 5.6.    Review of Systems    Constitutional: See HPI  HENT: Negative for mouth sores and sore throat.    Eyes: Negative for visual disturbance.    Cardiac: Denies chest pain no palpitation.  No lower extremity edema.    Respiratory: No cough, no hemoptysis no short of breath.   "  Gastrointestinal: Negative for abdominal pain, diarrhea, nausea and vomiting.    Musculoskeletal: Negative for back pain and joint swelling.    Neurological: Negative for dizziness.   Hematological: Does not bruise/bleed easily.    SKIN: See HPI  Psychiatric/Behavioral: The patient is not nervous/anxious.        Medications: The current medication list was reviewed in the EMR.       ALLERGIES: Zosyn       Vitals:    08/30/18 0844   BP: 95/62   Pulse: 72   Resp: 16   Temp: 97.7 °F (36.5 °C)   TempSrc: Oral   SpO2: 96%   Weight: 88.7 kg (195 lb 9.6 oz)   Height: 157.5 cm (62.01\")   PainSc: 0-No pain   PS: ECOG 1      Physical Exam   Constitutional: well-developed and well-nourished -American female. No distress.    HENT:     Head: Normocephalic.     Eyes: No jaundice.     Neck: Normal range of motion.   no masses.    Cardiovascular: Normal rate, regular rhythm, normal heart sounds and normal pulses.    Pulmonary/Chest: Lungs clear to auscultation bilaterally, no wheezes, no rales.  Mediport benign right upper chest.  Abdominal: Soft, no tenderness guarding or rebound.  Bowel sounds are normal. no distension and no mass. No hepatosplenomegaly.   Musculoskeletal: Normal range of motion. no edema or deformity.   Lymphadenopathy: She has no cervical, supraclavicular adenopathy.   Neurological: alert and oriented to person, place, and time. No cranial nerve deficit.    Skin: No skin rash notes not extremities or abdomnen  Psychiatric: She has normal mood and affect.         Recent lab results:   Lab Results   Component Value Date    WBC 6.82 08/30/2018    HGB 10.3 (L) 08/30/2018    HCT 32.6 (L) 08/30/2018    .8 (H) 08/30/2018     (L) 08/30/2018     Lab Results   Component Value Date    NEUTROABS 3.65 08/30/2018     Lab Results   Component Value Date    GLUCOSE 99 08/30/2018    BUN 28 (H) 08/30/2018    CREATININE 1.80 (H) 08/30/2018    EGFRIFNONA  08/30/2016      Comment:      <15 Indicative of kidney " failure.    EGFRIFAFRI 34 (L) 08/30/2018    BCR 15.6 08/30/2018    K 4.3 08/30/2018    CO2 20.4 (L) 08/30/2018    CALCIUM 8.1 (L) 08/30/2018    PROTENTOTREF 7.5 07/05/2018    ALBUMIN 2.90 (L) 08/30/2018    LABIL2 0.8 07/05/2018    AST 16 08/30/2018    ALT 13 08/30/2018     Sodium   Date Value Ref Range Status   08/30/2018 141 136 - 145 mmol/L Final     Potassium   Date Value Ref Range Status   08/30/2018 4.3 3.5 - 5.2 mmol/L Final     Total Bilirubin   Date Value Ref Range Status   08/30/2018 0.2 0.1 - 1.2 mg/dL Final     Alkaline Phosphatase   Date Value Ref Range Status   08/30/2018 63 39 - 117 U/L Final     Lab Results   Component Value Date    IRON 73 07/05/2018    TIBC 176 (L) 07/05/2018    FERRITIN 539.10 (H) 07/05/2018   Iron saturation 42%.    IMAGE STUDY:   CT ABDOMEN AND PELVIS WITHOUT CONTRAST.  5/13/2018     TECHNIQUE: Radiation dose reduction techniques were utilized, including  automated exposure control and exposure modulation based on body size. A  routine CT scan of the abdomen and pelvis was performed with coronal and  sagittal reconstructed images.     HISTORY: Left lower quadrant pain, suspect diverticulitis.     COMPARISON: 5/6/2016 and 8/20/2014.     FINDINGS:  Lung bases demonstrate no consolidation or effusion.          Right hepatic lobe demonstrate low attenuation in masslike area which  was diagnosed as a large hemangioma on the previous examinations.  Unremarkable gallbladder. No intra or extrahepatic biliary ductal  dilatation.      Spleen is unremarkable. Pancreas is unremarkable, no pancreatic ductal  dilatation. Right adrenal gland 1.6 cm nodule is unchanged since  8/20/2014. 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 Left  nephrectomy. A 4.6 cm left lumbar hernia contains a loop of the  descending colon, no obstruction.     Urinary bladder is unremarkable.          Diverticulitis of the  proximal sigmoid colon, no surrounding abscess.  Diverticulosis of the colon. Appendix is normal. Small bowel loops are  within normal limits.         No significant retroperitoneal lymphadenopathy. IVC filter in position.  Fatty umbilical hernia remains unchanged.        IMPRESSION:  1.  Mild diverticulitis of the proximal sigmoid colon, no obstruction,  perforation or abscess.  2.  Large masslike lesion of the right hepatic lobe is favored to be a  hemangioma, not significantly changed.  3.  1.6 cm nodule of the right adrenal gland and 1.3 cm angiomyolipoma  of the left adrenal gland remain unchanged.  4.  Double-J stent in the right renal collecting system. Left  nephrectomy.  5.  Umbilical hernia and left lumbar hernia demonstrated no significant  change.           Assessment/Plan   1. Multiple myeloma, IgG lambda, stage III. Failed multiple lines of therapy. Her treatment was switched to Darzalex on 5/26/2016. She has been on this treatment for 2 years now.  She has tolerated therapy very well.     We will proceed with treatment as scheduled today. We have obtained paraprotein studies today that are pending at time of dictation.     2. Zometa treatment.  Her last dose Zometa was on 8/2/2018 . She receives Zometa every 3 months.       3.  Macrocytic anemia secondary to chemotherapy for multiple myeloma and chronic renal insufficiency stage 3.  She has relatively stable hemoglobin, however with worsening macrocytosis.  Laboratory study on 1/12/2018 showed no evidence of deficiency of folic acid or vitamin B12 level.  She also had iron panel, fits with inflammatory condition.  Hgb is 10.3.      4. Mild to moderate thrombocytopenia secondary to her multiple myeloma and chemotherapy.  Overall her platelet counts has been much better compared to those a year ago.  She is asymptomatic, no easy bleeding or bruising.  Platelets today are 620483.    5. History of stage II left breast cancer, post mastectomy in 2013,  negative for ER/CO and positive HER-2. No neoadjuvant chemotherapy because of concurrent discovery of multiple myeloma and ongoing chemotherapy. She had a normal mammogram study 8/10/2017.       6.  Right middle lobe pulmonary nodule, documented on CT scan of chest on 01/06/2017. Repeated CT scan of chest on 8/21/2017 reported a small 5 mm and stable primary nodule.      7.  Chronic renal insufficiency stage III.  Her creatinine is 1.9 today.     8. Pruritis of the lower extremities? This is possibly a neurological side effect of her disease.       Plan:  1.  Continue Darzalex treatment  As scheduled. She will return in 4 weeks to see Dr Araujo in anticipation of her next cycle.   2.   I have e-scribed gabapentin today. She will take 100mg at night for one week and increase to three times daily pending tolerance and efficacy. She will call the office should this not provide relief.   .

## 2018-09-04 ENCOUNTER — TELEPHONE (OUTPATIENT)
Dept: ONCOLOGY | Facility: HOSPITAL | Age: 68
End: 2018-09-04

## 2018-09-04 DIAGNOSIS — C90.00 MULTIPLE MYELOMA NOT HAVING ACHIEVED REMISSION (HCC): Primary | ICD-10-CM

## 2018-09-04 NOTE — TELEPHONE ENCOUNTER
Message forwarded to appt desk. Patient knows she will be doing the 24 hour urine and Dr. Araujo already placed orders.     ----- Message from Zane Araujo MD PhD sent at 9/4/2018  4:05 PM EDT -----  Regarding: change plan  Call patient to have 24hrs urine study this week, and see MD in 2 wks. 2 units.     Thanks!    Dr. Araujo.

## 2018-09-07 ENCOUNTER — LAB (OUTPATIENT)
Dept: OTHER | Facility: HOSPITAL | Age: 68
End: 2018-09-07

## 2018-09-07 DIAGNOSIS — C90.00 MULTIPLE MYELOMA NOT HAVING ACHIEVED REMISSION (HCC): ICD-10-CM

## 2018-09-07 PROCEDURE — 83883 ASSAY NEPHELOMETRY NOT SPEC: CPT | Performed by: INTERNAL MEDICINE

## 2018-09-07 PROCEDURE — 81050 URINALYSIS VOLUME MEASURE: CPT | Performed by: INTERNAL MEDICINE

## 2018-09-07 PROCEDURE — 84166 PROTEIN E-PHORESIS/URINE/CSF: CPT | Performed by: INTERNAL MEDICINE

## 2018-09-07 PROCEDURE — 86335 IMMUNFIX E-PHORSIS/URINE/CSF: CPT | Performed by: INTERNAL MEDICINE

## 2018-09-07 PROCEDURE — 84156 ASSAY OF PROTEIN URINE: CPT | Performed by: INTERNAL MEDICINE

## 2018-09-10 LAB
FREE KAPPA LT CHAINS, 24HR: 26 MG/24 HR
FREE LAMBDA LT CHAINS, 24 HR: 14 MG/24 HR
KAPPA LC UR-MCNC: 14 MG/L (ref 1.35–24.19)
KAPPA LC/LAMBDA UR: 1.84 {RATIO} (ref 2.04–10.37)
LAMBDA LC UR-MCNC: 7.61 MG/L (ref 0.24–6.66)

## 2018-09-11 LAB
ALBUMIN 24H MFR UR ELPH: 12.3 %
ALPHA1 GLOB 24H MFR UR ELPH: 16.1 %
ALPHA2 GLOB 24H MFR UR ELPH: 12.8 %
B-GLOBULIN MFR UR ELPH: 46.6 %
GAMMA GLOB 24H MFR UR ELPH: 12.2 %
HIV 1 & 2 AB SER-IMP: ABNORMAL
INTERPRETATION UR IFE-IMP: ABNORMAL
M PROTEIN 24H MFR UR ELPH: ABNORMAL %
PROT 24H UR-MRATE: 366 MG/24 HR (ref 30–150)
PROT UR-MCNC: 19.8 MG/DL

## 2018-09-12 ENCOUNTER — APPOINTMENT (OUTPATIENT)
Dept: PREADMISSION TESTING | Facility: HOSPITAL | Age: 68
End: 2018-09-12

## 2018-09-12 VITALS
WEIGHT: 198 LBS | OXYGEN SATURATION: 97 % | HEART RATE: 76 BPM | RESPIRATION RATE: 16 BRPM | BODY MASS INDEX: 36.44 KG/M2 | TEMPERATURE: 98.2 F | SYSTOLIC BLOOD PRESSURE: 101 MMHG | HEIGHT: 62 IN | DIASTOLIC BLOOD PRESSURE: 66 MMHG

## 2018-09-12 DIAGNOSIS — K46.9 HERNIA OF ABDOMINAL CAVITY: ICD-10-CM

## 2018-09-12 LAB
ABO GROUP BLD: NORMAL
ANION GAP SERPL CALCULATED.3IONS-SCNC: 9 MMOL/L
BASOPHILS # BLD AUTO: 0.01 10*3/MM3 (ref 0–0.2)
BASOPHILS NFR BLD AUTO: 0.1 % (ref 0–1.5)
BLD GP AB SCN SERPL QL: POSITIVE
BUN BLD-MCNC: 21 MG/DL (ref 8–23)
BUN/CREAT SERPL: 12.7 (ref 7–25)
C AG RBC QL: NEGATIVE
CALCIUM SPEC-SCNC: 8.1 MG/DL (ref 8.6–10.5)
CHLORIDE SERPL-SCNC: 112 MMOL/L (ref 98–107)
CO2 SERPL-SCNC: 21 MMOL/L (ref 22–29)
CREAT BLD-MCNC: 1.66 MG/DL (ref 0.57–1)
DAT POLY-SP REAG RBC QL: NEGATIVE
DEPRECATED RDW RBC AUTO: 58.7 FL (ref 37–54)
E AG RBC QL: NEGATIVE
EOSINOPHIL # BLD AUTO: 0.22 10*3/MM3 (ref 0–0.7)
EOSINOPHIL NFR BLD AUTO: 3.2 % (ref 0.3–6.2)
ERYTHROCYTE [DISTWIDTH] IN BLOOD BY AUTOMATED COUNT: 15.2 % (ref 11.7–13)
GFR SERPL CREATININE-BSD FRML MDRD: 37 ML/MIN/1.73
GLUCOSE BLD-MCNC: 91 MG/DL (ref 65–99)
HCT VFR BLD AUTO: 33.5 % (ref 35.6–45.5)
HGB BLD-MCNC: 10.2 G/DL (ref 11.9–15.5)
IMM GRANULOCYTES # BLD: 0.1 10*3/MM3 (ref 0–0.03)
IMM GRANULOCYTES NFR BLD: 1.4 % (ref 0–0.5)
LITTLE C: POSITIVE
LYMPHOCYTES # BLD AUTO: 2.15 10*3/MM3 (ref 0.9–4.8)
LYMPHOCYTES NFR BLD AUTO: 31 % (ref 19.6–45.3)
MCH RBC QN AUTO: 32.7 PG (ref 26.9–32)
MCHC RBC AUTO-ENTMCNC: 30.4 G/DL (ref 32.4–36.3)
MCV RBC AUTO: 107.4 FL (ref 80.5–98.2)
MONOCYTES # BLD AUTO: 0.66 10*3/MM3 (ref 0.2–1.2)
MONOCYTES NFR BLD AUTO: 9.5 % (ref 5–12)
NEUTROPHILS # BLD AUTO: 3.9 10*3/MM3 (ref 1.9–8.1)
NEUTROPHILS NFR BLD AUTO: 56.2 % (ref 42.7–76)
PLATELET # BLD AUTO: 129 10*3/MM3 (ref 140–500)
PMV BLD AUTO: 12 FL (ref 6–12)
POTASSIUM BLD-SCNC: 4.1 MMOL/L (ref 3.5–5.2)
RBC # BLD AUTO: 3.12 10*6/MM3 (ref 3.9–5.2)
RH BLD: POSITIVE
SODIUM BLD-SCNC: 142 MMOL/L (ref 136–145)
T&S EXPIRATION DATE: NORMAL
WBC NRBC COR # BLD: 6.94 10*3/MM3 (ref 4.5–10.7)

## 2018-09-12 PROCEDURE — 93005 ELECTROCARDIOGRAM TRACING: CPT

## 2018-09-12 PROCEDURE — 80048 BASIC METABOLIC PNL TOTAL CA: CPT | Performed by: SURGERY

## 2018-09-12 PROCEDURE — 86905 BLOOD TYPING RBC ANTIGENS: CPT | Performed by: SURGERY

## 2018-09-12 PROCEDURE — 86921 COMPATIBILITY TEST INCUBATE: CPT

## 2018-09-12 PROCEDURE — 93010 ELECTROCARDIOGRAM REPORT: CPT | Performed by: INTERNAL MEDICINE

## 2018-09-12 PROCEDURE — 86902 BLOOD TYPE ANTIGEN DONOR EA: CPT

## 2018-09-12 PROCEDURE — 86870 RBC ANTIBODY IDENTIFICATION: CPT | Performed by: SURGERY

## 2018-09-12 PROCEDURE — 86922 COMPATIBILITY TEST ANTIGLOB: CPT

## 2018-09-12 PROCEDURE — 86880 COOMBS TEST DIRECT: CPT | Performed by: SURGERY

## 2018-09-12 PROCEDURE — 86850 RBC ANTIBODY SCREEN: CPT | Performed by: SURGERY

## 2018-09-12 PROCEDURE — 86900 BLOOD TYPING SEROLOGIC ABO: CPT | Performed by: SURGERY

## 2018-09-12 PROCEDURE — 36415 COLL VENOUS BLD VENIPUNCTURE: CPT

## 2018-09-12 PROCEDURE — 86901 BLOOD TYPING SEROLOGIC RH(D): CPT | Performed by: SURGERY

## 2018-09-12 PROCEDURE — 86920 COMPATIBILITY TEST SPIN: CPT

## 2018-09-12 PROCEDURE — 86870 RBC ANTIBODY IDENTIFICATION: CPT

## 2018-09-12 PROCEDURE — 85025 COMPLETE CBC W/AUTO DIFF WBC: CPT | Performed by: SURGERY

## 2018-09-12 RX ORDER — BISOPROLOL FUMARATE 5 MG/1
5 TABLET, FILM COATED ORAL 2 TIMES DAILY
COMMUNITY
End: 2020-01-01 | Stop reason: HOSPADM

## 2018-09-12 RX ORDER — DEXAMETHASONE 4 MG/1
4 TABLET ORAL AS NEEDED
COMMUNITY
End: 2018-11-01 | Stop reason: SDUPTHER

## 2018-09-12 NOTE — DISCHARGE INSTRUCTIONS
Take the following medications the morning of surgery with a small sip of water:    BISOPROLOL  HYDRALAZINE    General Instructions:  • Do not eat solid food after midnight the night before surgery.  • You may drink clear liquids day of surgery but must stop at least one hour before your hospital arrival time @ 0500 AM STOP DRINKING!!!  • It is beneficial for you to have a clear drink that contains carbohydrates the day of surgery.  We suggest a 12 to 20 ounce bottle of Gatorade or Powerade for non-diabetic patients or a 12 to 20 ounce bottle of G2 or Powerade Zero for diabetic patients. (Pediatric patients, are not advised to drink a 12 to 20 ounce carbohydrate drink)    Clear liquids are liquids you can see through.  Nothing red in color.     Plain water                               Sports drinks  Sodas                                   Gelatin (Jell-O)  Fruit juices without pulp such as white grape juice and apple juice  Popsicles that contain no fruit or yogurt  Tea or coffee (no cream or milk added)  Gatorade / Powerade  G2 / Powerade Zero    • Patients who avoid smoking, chewing tobacco and alcohol for 4 weeks prior to surgery have a reduced risk of post-operative complications.  Quit smoking as many days before surgery as you can.  • Do not smoke, use chewing tobacco or drink alcohol the day of surgery.   • If applicable bring your C-PAP/ BI-PAP machine.  • Bring any papers given to you in the doctor’s office.  • Wear clean comfortable clothes and socks.  • Do not wear contact lenses or make-up.  Bring a case for your glasses.   • Bring crutches or walker if applicable.  • Remove all piercings.  Leave jewelry and any other valuables at home.  • Hair extensions with metal clips must be removed prior to surgery.  • The Pre-Admission Testing nurse will instruct you to bring medications if unable to obtain an accurate list in Pre-Admission Testing.        Preventing a Surgical Site Infection:  • For 2 to 3 days  before surgery, avoid shaving with a razor because the razor can irritate skin and make it easier to develop an infection.    • Any areas of open skin can increase the risk of a post-operative wound infection by allowing bacteria to enter and travel throughout the body.  Notify your surgeon if you have any skin wounds / rashes even if it is not near the expected surgical site.  The area will need assessed to determine if surgery should be delayed until it is healed.  • The night prior to surgery sleep in a clean bed with clean clothing.  Do not allow pets to sleep with you.  • Shower on the morning of surgery using a fresh bar of anti-bacterial soap (such as Dial) and clean washcloth.  Dry with a clean towel and dress in clean clothing.  • Ask your surgeon if you will be receiving antibiotics prior to surgery.  • Make sure you, your family, and all healthcare providers clean their hands with soap and water or an alcohol based hand  before caring for you or your wound.    Day of surgery: 09- arrive @ 0600 am REPORT TO THE MAIN OR   Upon arrival, a Pre-op nurse and Anesthesiologist will review your health history, obtain vital signs, and answer questions you may have.  The only belongings needed at this time will be your home medications and if applicable your C-PAP/BI-PAP machine.  If you are staying overnight your family can leave the rest of your belongings in the car and bring them to your room later.  A Pre-op nurse will start an IV and you may receive medication in preparation for surgery, including something to help you relax.  Your family will be able to see you in the Pre-op area.  While you are in surgery your family should notify the waiting room  if they leave the waiting room area and provide a contact phone number.    Please be aware that surgery does come with discomfort.  We want to make every effort to control your discomfort so please discuss any uncontrolled symptoms with  your nurse.   Your doctor will most likely have prescribed pain medications.      If you are going home after surgery you will receive individualized written care instructions before being discharged.  A responsible adult must drive you to and from the hospital on the day of your surgery and stay with you for 24 hours.    If you are staying overnight following surgery, you will be transported to your hospital room following the recovery period.  [facility] has all private rooms.    You have received a list of surgical assistants for your reference.  If you have any questions please call Pre-Admission Testing at [phone].  Deductibles and co-payments are collected on the day of service. Please be prepared to pay the required co-pay, deductible or deposit on the day of service as defined by your plan.

## 2018-09-13 LAB
ANTI-K: NORMAL
PASSIVE ANTI-CD-38: NORMAL
WARM AUTOANTIBODY: NORMAL

## 2018-09-19 ENCOUNTER — HOSPITAL ENCOUNTER (INPATIENT)
Facility: HOSPITAL | Age: 68
LOS: 4 days | Discharge: HOME | End: 2018-09-23
Attending: SURGERY | Admitting: SURGERY

## 2018-09-19 ENCOUNTER — ANESTHESIA EVENT (OUTPATIENT)
Dept: PERIOP | Facility: HOSPITAL | Age: 68
End: 2018-09-19

## 2018-09-19 ENCOUNTER — ANESTHESIA (OUTPATIENT)
Dept: PERIOP | Facility: HOSPITAL | Age: 68
End: 2018-09-19

## 2018-09-19 DIAGNOSIS — K46.9 HERNIA OF ABDOMINAL CAVITY: ICD-10-CM

## 2018-09-19 PROBLEM — K45.8 FLANK HERNIA: Status: ACTIVE | Noted: 2018-09-19

## 2018-09-19 PROCEDURE — 25010000002 DEXAMETHASONE PER 1 MG: Performed by: NURSE ANESTHETIST, CERTIFIED REGISTERED

## 2018-09-19 PROCEDURE — 49568 PR IMPLANT MESH HERNIA REPAIR/DEBRIDEMENT CLOSURE: CPT | Performed by: SURGERY

## 2018-09-19 PROCEDURE — 25010000002 HYDROMORPHONE PER 4 MG: Performed by: SURGERY

## 2018-09-19 PROCEDURE — 25010000002 FENTANYL CITRATE (PF) 100 MCG/2ML SOLUTION: Performed by: ANESTHESIOLOGY

## 2018-09-19 PROCEDURE — 49560 PR REPAIR INCISIONAL HERNIA,REDUCIBLE: CPT | Performed by: SURGERY

## 2018-09-19 PROCEDURE — 15734 MUSCLE-SKIN GRAFT TRUNK: CPT | Performed by: PHYSICIAN ASSISTANT

## 2018-09-19 PROCEDURE — 25010000002 FENTANYL CITRATE (PF) 100 MCG/2ML SOLUTION: Performed by: NURSE ANESTHETIST, CERTIFIED REGISTERED

## 2018-09-19 PROCEDURE — 0KXK0ZZ TRANSFER RIGHT ABDOMEN MUSCLE, OPEN APPROACH: ICD-10-PCS | Performed by: SURGERY

## 2018-09-19 PROCEDURE — 15734 MUSCLE-SKIN GRAFT TRUNK: CPT | Performed by: SURGERY

## 2018-09-19 PROCEDURE — 25010000002 PHENYLEPHRINE PER 1 ML: Performed by: NURSE ANESTHETIST, CERTIFIED REGISTERED

## 2018-09-19 PROCEDURE — 0WUF0JZ SUPPLEMENT ABDOMINAL WALL WITH SYNTHETIC SUBSTITUTE, OPEN APPROACH: ICD-10-PCS | Performed by: SURGERY

## 2018-09-19 PROCEDURE — 49560 PR REPAIR INCISIONAL HERNIA,REDUCIBLE: CPT | Performed by: PHYSICIAN ASSISTANT

## 2018-09-19 PROCEDURE — 94799 UNLISTED PULMONARY SVC/PX: CPT

## 2018-09-19 PROCEDURE — C1781 MESH (IMPLANTABLE): HCPCS | Performed by: SURGERY

## 2018-09-19 PROCEDURE — 25010000002 PROPOFOL 10 MG/ML EMULSION: Performed by: NURSE ANESTHETIST, CERTIFIED REGISTERED

## 2018-09-19 PROCEDURE — 25010000002 MIDAZOLAM PER 1 MG: Performed by: ANESTHESIOLOGY

## 2018-09-19 PROCEDURE — 49568 PR IMPLANT MESH HERNIA REPAIR/DEBRIDEMENT CLOSURE: CPT | Performed by: PHYSICIAN ASSISTANT

## 2018-09-19 PROCEDURE — 0KXL0ZZ TRANSFER LEFT ABDOMEN MUSCLE, OPEN APPROACH: ICD-10-PCS | Performed by: SURGERY

## 2018-09-19 DEVICE — MESH TISS REINFORCEMENT BIO/A 9X15CM: Type: IMPLANTABLE DEVICE | Status: FUNCTIONAL

## 2018-09-19 RX ORDER — SODIUM CHLORIDE 0.9 % (FLUSH) 0.9 %
1-10 SYRINGE (ML) INJECTION AS NEEDED
Status: DISCONTINUED | OUTPATIENT
Start: 2018-09-19 | End: 2018-09-19 | Stop reason: HOSPADM

## 2018-09-19 RX ORDER — ONDANSETRON 2 MG/ML
4 INJECTION INTRAMUSCULAR; INTRAVENOUS EVERY 6 HOURS PRN
Status: DISCONTINUED | OUTPATIENT
Start: 2018-09-19 | End: 2018-09-23 | Stop reason: HOSPADM

## 2018-09-19 RX ORDER — NALOXONE HCL 0.4 MG/ML
0.1 VIAL (ML) INJECTION
Status: DISCONTINUED | OUTPATIENT
Start: 2018-09-19 | End: 2018-09-23 | Stop reason: HOSPADM

## 2018-09-19 RX ORDER — DIPHENHYDRAMINE HYDROCHLORIDE 50 MG/ML
12.5 INJECTION INTRAMUSCULAR; INTRAVENOUS
Status: DISCONTINUED | OUTPATIENT
Start: 2018-09-19 | End: 2018-09-19 | Stop reason: HOSPADM

## 2018-09-19 RX ORDER — OXYCODONE AND ACETAMINOPHEN 10; 325 MG/1; MG/1
1 TABLET ORAL EVERY 4 HOURS PRN
Status: DISCONTINUED | OUTPATIENT
Start: 2018-09-19 | End: 2018-09-23 | Stop reason: HOSPADM

## 2018-09-19 RX ORDER — ACETAMINOPHEN 325 MG/1
650 TABLET ORAL EVERY 4 HOURS PRN
Status: DISCONTINUED | OUTPATIENT
Start: 2018-09-19 | End: 2018-09-23 | Stop reason: HOSPADM

## 2018-09-19 RX ORDER — METOCLOPRAMIDE HYDROCHLORIDE 5 MG/ML
10 INJECTION INTRAMUSCULAR; INTRAVENOUS EVERY 6 HOURS PRN
Status: DISCONTINUED | OUTPATIENT
Start: 2018-09-19 | End: 2018-09-23 | Stop reason: HOSPADM

## 2018-09-19 RX ORDER — LOSARTAN POTASSIUM 25 MG/1
25 TABLET ORAL EVERY EVENING
Status: DISCONTINUED | OUTPATIENT
Start: 2018-09-19 | End: 2018-09-23 | Stop reason: HOSPADM

## 2018-09-19 RX ORDER — ONDANSETRON 4 MG/1
4 TABLET, FILM COATED ORAL EVERY 6 HOURS PRN
Status: DISCONTINUED | OUTPATIENT
Start: 2018-09-19 | End: 2018-09-23 | Stop reason: HOSPADM

## 2018-09-19 RX ORDER — ONDANSETRON 2 MG/ML
4 INJECTION INTRAMUSCULAR; INTRAVENOUS ONCE AS NEEDED
Status: DISCONTINUED | OUTPATIENT
Start: 2018-09-19 | End: 2018-09-19 | Stop reason: HOSPADM

## 2018-09-19 RX ORDER — HYDRALAZINE HYDROCHLORIDE 25 MG/1
25 TABLET, FILM COATED ORAL ONCE
Status: DISCONTINUED | OUTPATIENT
Start: 2018-09-19 | End: 2018-09-23 | Stop reason: HOSPADM

## 2018-09-19 RX ORDER — FLUMAZENIL 0.1 MG/ML
0.2 INJECTION INTRAVENOUS AS NEEDED
Status: DISCONTINUED | OUTPATIENT
Start: 2018-09-19 | End: 2018-09-19 | Stop reason: HOSPADM

## 2018-09-19 RX ORDER — MAGNESIUM HYDROXIDE 1200 MG/15ML
LIQUID ORAL AS NEEDED
Status: DISCONTINUED | OUTPATIENT
Start: 2018-09-19 | End: 2018-09-19 | Stop reason: HOSPADM

## 2018-09-19 RX ORDER — BUPIVACAINE HYDROCHLORIDE AND EPINEPHRINE 5; 5 MG/ML; UG/ML
INJECTION, SOLUTION PERINEURAL AS NEEDED
Status: DISCONTINUED | OUTPATIENT
Start: 2018-09-19 | End: 2018-09-19 | Stop reason: HOSPADM

## 2018-09-19 RX ORDER — CARBAMAZEPINE 200 MG/1
400 TABLET, EXTENDED RELEASE ORAL NIGHTLY
Status: DISCONTINUED | OUTPATIENT
Start: 2018-09-19 | End: 2018-09-23 | Stop reason: HOSPADM

## 2018-09-19 RX ORDER — ACETAMINOPHEN 160 MG/5ML
650 SOLUTION ORAL EVERY 4 HOURS PRN
Status: DISCONTINUED | OUTPATIENT
Start: 2018-09-19 | End: 2018-09-23 | Stop reason: HOSPADM

## 2018-09-19 RX ORDER — GLYCOPYRROLATE 0.2 MG/ML
INJECTION INTRAMUSCULAR; INTRAVENOUS AS NEEDED
Status: DISCONTINUED | OUTPATIENT
Start: 2018-09-19 | End: 2018-09-19 | Stop reason: SURG

## 2018-09-19 RX ORDER — HEPARIN SODIUM 5000 [USP'U]/ML
5000 INJECTION, SOLUTION INTRAVENOUS; SUBCUTANEOUS EVERY 12 HOURS SCHEDULED
Status: DISCONTINUED | OUTPATIENT
Start: 2018-09-20 | End: 2018-09-23 | Stop reason: HOSPADM

## 2018-09-19 RX ORDER — NALOXONE HCL 0.4 MG/ML
0.2 VIAL (ML) INJECTION AS NEEDED
Status: DISCONTINUED | OUTPATIENT
Start: 2018-09-19 | End: 2018-09-19 | Stop reason: HOSPADM

## 2018-09-19 RX ORDER — PROMETHAZINE HYDROCHLORIDE 25 MG/ML
12.5 INJECTION, SOLUTION INTRAMUSCULAR; INTRAVENOUS ONCE AS NEEDED
Status: DISCONTINUED | OUTPATIENT
Start: 2018-09-19 | End: 2018-09-19 | Stop reason: HOSPADM

## 2018-09-19 RX ORDER — ACETAMINOPHEN 325 MG/1
650 TABLET ORAL ONCE
Status: COMPLETED | OUTPATIENT
Start: 2018-09-19 | End: 2018-09-19

## 2018-09-19 RX ORDER — DEXAMETHASONE SODIUM PHOSPHATE 10 MG/ML
INJECTION INTRAMUSCULAR; INTRAVENOUS AS NEEDED
Status: DISCONTINUED | OUTPATIENT
Start: 2018-09-19 | End: 2018-09-19 | Stop reason: SURG

## 2018-09-19 RX ORDER — FENTANYL CITRATE 50 UG/ML
50 INJECTION, SOLUTION INTRAMUSCULAR; INTRAVENOUS
Status: DISCONTINUED | OUTPATIENT
Start: 2018-09-19 | End: 2018-09-19 | Stop reason: HOSPADM

## 2018-09-19 RX ORDER — ROCURONIUM BROMIDE 10 MG/ML
INJECTION, SOLUTION INTRAVENOUS AS NEEDED
Status: DISCONTINUED | OUTPATIENT
Start: 2018-09-19 | End: 2018-09-19 | Stop reason: SURG

## 2018-09-19 RX ORDER — ISOSORBIDE MONONITRATE 60 MG/1
60 TABLET, EXTENDED RELEASE ORAL
Status: DISCONTINUED | OUTPATIENT
Start: 2018-09-20 | End: 2018-09-23 | Stop reason: HOSPADM

## 2018-09-19 RX ORDER — MIDAZOLAM HYDROCHLORIDE 1 MG/ML
1 INJECTION INTRAMUSCULAR; INTRAVENOUS
Status: DISCONTINUED | OUTPATIENT
Start: 2018-09-19 | End: 2018-09-19 | Stop reason: HOSPADM

## 2018-09-19 RX ORDER — LABETALOL HYDROCHLORIDE 5 MG/ML
5 INJECTION, SOLUTION INTRAVENOUS
Status: DISCONTINUED | OUTPATIENT
Start: 2018-09-19 | End: 2018-09-19 | Stop reason: HOSPADM

## 2018-09-19 RX ORDER — OXYCODONE HCL 10 MG/1
10 TABLET, FILM COATED, EXTENDED RELEASE ORAL ONCE
Status: COMPLETED | OUTPATIENT
Start: 2018-09-19 | End: 2018-09-19

## 2018-09-19 RX ORDER — PROPOFOL 10 MG/ML
VIAL (ML) INTRAVENOUS AS NEEDED
Status: DISCONTINUED | OUTPATIENT
Start: 2018-09-19 | End: 2018-09-19 | Stop reason: SURG

## 2018-09-19 RX ORDER — ACETAMINOPHEN 650 MG/1
650 SUPPOSITORY RECTAL EVERY 4 HOURS PRN
Status: DISCONTINUED | OUTPATIENT
Start: 2018-09-19 | End: 2018-09-23 | Stop reason: HOSPADM

## 2018-09-19 RX ORDER — SODIUM CHLORIDE, SODIUM LACTATE, POTASSIUM CHLORIDE, CALCIUM CHLORIDE 600; 310; 30; 20 MG/100ML; MG/100ML; MG/100ML; MG/100ML
INJECTION, SOLUTION INTRAVENOUS CONTINUOUS PRN
Status: DISCONTINUED | OUTPATIENT
Start: 2018-09-19 | End: 2018-09-19 | Stop reason: SURG

## 2018-09-19 RX ORDER — LIDOCAINE HYDROCHLORIDE 20 MG/ML
INJECTION, SOLUTION INFILTRATION; PERINEURAL AS NEEDED
Status: DISCONTINUED | OUTPATIENT
Start: 2018-09-19 | End: 2018-09-19 | Stop reason: SURG

## 2018-09-19 RX ORDER — BISOPROLOL FUMARATE 5 MG/1
5 TABLET, FILM COATED ORAL 2 TIMES DAILY
Status: DISCONTINUED | OUTPATIENT
Start: 2018-09-19 | End: 2018-09-23 | Stop reason: HOSPADM

## 2018-09-19 RX ORDER — ALBUTEROL SULFATE 90 UG/1
AEROSOL, METERED RESPIRATORY (INHALATION) AS NEEDED
Status: DISCONTINUED | OUTPATIENT
Start: 2018-09-19 | End: 2018-09-19 | Stop reason: SURG

## 2018-09-19 RX ORDER — ONDANSETRON 4 MG/1
4 TABLET, ORALLY DISINTEGRATING ORAL EVERY 6 HOURS PRN
Status: DISCONTINUED | OUTPATIENT
Start: 2018-09-19 | End: 2018-09-23 | Stop reason: HOSPADM

## 2018-09-19 RX ORDER — FAMOTIDINE 10 MG/ML
20 INJECTION, SOLUTION INTRAVENOUS ONCE
Status: COMPLETED | OUTPATIENT
Start: 2018-09-19 | End: 2018-09-19

## 2018-09-19 RX ORDER — PROMETHAZINE HYDROCHLORIDE 25 MG/1
25 TABLET ORAL ONCE AS NEEDED
Status: DISCONTINUED | OUTPATIENT
Start: 2018-09-19 | End: 2018-09-19 | Stop reason: HOSPADM

## 2018-09-19 RX ORDER — CLINDAMYCIN PHOSPHATE 900 MG/50ML
900 INJECTION INTRAVENOUS
Status: COMPLETED | OUTPATIENT
Start: 2018-09-19 | End: 2018-09-19

## 2018-09-19 RX ORDER — PROMETHAZINE HYDROCHLORIDE 25 MG/1
12.5 TABLET ORAL ONCE AS NEEDED
Status: DISCONTINUED | OUTPATIENT
Start: 2018-09-19 | End: 2018-09-19 | Stop reason: HOSPADM

## 2018-09-19 RX ORDER — DEXTROSE, SODIUM CHLORIDE, AND POTASSIUM CHLORIDE 5; .45; .15 G/100ML; G/100ML; G/100ML
100 INJECTION INTRAVENOUS CONTINUOUS
Status: DISCONTINUED | OUTPATIENT
Start: 2018-09-19 | End: 2018-09-21

## 2018-09-19 RX ORDER — PROMETHAZINE HYDROCHLORIDE 25 MG/1
25 SUPPOSITORY RECTAL ONCE AS NEEDED
Status: DISCONTINUED | OUTPATIENT
Start: 2018-09-19 | End: 2018-09-19 | Stop reason: HOSPADM

## 2018-09-19 RX ORDER — LIDOCAINE HYDROCHLORIDE 10 MG/ML
0.5 INJECTION, SOLUTION EPIDURAL; INFILTRATION; INTRACAUDAL; PERINEURAL ONCE AS NEEDED
Status: DISCONTINUED | OUTPATIENT
Start: 2018-09-19 | End: 2018-09-19 | Stop reason: HOSPADM

## 2018-09-19 RX ORDER — SODIUM CHLORIDE, SODIUM LACTATE, POTASSIUM CHLORIDE, CALCIUM CHLORIDE 600; 310; 30; 20 MG/100ML; MG/100ML; MG/100ML; MG/100ML
9 INJECTION, SOLUTION INTRAVENOUS CONTINUOUS
Status: DISCONTINUED | OUTPATIENT
Start: 2018-09-19 | End: 2018-09-19

## 2018-09-19 RX ORDER — OXYCODONE AND ACETAMINOPHEN 7.5; 325 MG/1; MG/1
1 TABLET ORAL ONCE AS NEEDED
Status: DISCONTINUED | OUTPATIENT
Start: 2018-09-19 | End: 2018-09-19 | Stop reason: HOSPADM

## 2018-09-19 RX ORDER — HYDROCODONE BITARTRATE AND ACETAMINOPHEN 7.5; 325 MG/1; MG/1
1 TABLET ORAL ONCE AS NEEDED
Status: DISCONTINUED | OUTPATIENT
Start: 2018-09-19 | End: 2018-09-19 | Stop reason: HOSPADM

## 2018-09-19 RX ORDER — CLINDAMYCIN PHOSPHATE 600 MG/50ML
600 INJECTION INTRAVENOUS EVERY 8 HOURS
Status: COMPLETED | OUTPATIENT
Start: 2018-09-19 | End: 2018-09-20

## 2018-09-19 RX ORDER — ISOSORBIDE MONONITRATE 30 MG/1
30 TABLET, EXTENDED RELEASE ORAL NIGHTLY
Status: DISCONTINUED | OUTPATIENT
Start: 2018-09-19 | End: 2018-09-23 | Stop reason: HOSPADM

## 2018-09-19 RX ORDER — EPHEDRINE SULFATE 50 MG/ML
INJECTION, SOLUTION INTRAVENOUS AS NEEDED
Status: DISCONTINUED | OUTPATIENT
Start: 2018-09-19 | End: 2018-09-19 | Stop reason: SURG

## 2018-09-19 RX ORDER — EPHEDRINE SULFATE 50 MG/ML
5 INJECTION, SOLUTION INTRAVENOUS ONCE AS NEEDED
Status: DISCONTINUED | OUTPATIENT
Start: 2018-09-19 | End: 2018-09-19 | Stop reason: HOSPADM

## 2018-09-19 RX ORDER — FOLIC ACID 1 MG/1
1 TABLET ORAL EVERY MORNING
Status: DISCONTINUED | OUTPATIENT
Start: 2018-09-20 | End: 2018-09-23 | Stop reason: HOSPADM

## 2018-09-19 RX ORDER — GABAPENTIN 100 MG/1
100 CAPSULE ORAL 3 TIMES DAILY PRN
Status: DISCONTINUED | OUTPATIENT
Start: 2018-09-19 | End: 2018-09-23 | Stop reason: HOSPADM

## 2018-09-19 RX ORDER — MIDAZOLAM HYDROCHLORIDE 1 MG/ML
2 INJECTION INTRAMUSCULAR; INTRAVENOUS
Status: DISCONTINUED | OUTPATIENT
Start: 2018-09-19 | End: 2018-09-19 | Stop reason: HOSPADM

## 2018-09-19 RX ADMIN — PROPOFOL 150 MG: 10 INJECTION, EMULSION INTRAVENOUS at 08:17

## 2018-09-19 RX ADMIN — PHENYLEPHRINE HYDROCHLORIDE 100 MCG: 10 INJECTION INTRAVENOUS at 08:47

## 2018-09-19 RX ADMIN — FENTANYL CITRATE 50 MCG: 50 INJECTION INTRAMUSCULAR; INTRAVENOUS at 09:19

## 2018-09-19 RX ADMIN — SUGAMMADEX 350 MG: 100 INJECTION, SOLUTION INTRAVENOUS at 10:25

## 2018-09-19 RX ADMIN — SODIUM CHLORIDE, POTASSIUM CHLORIDE, SODIUM LACTATE AND CALCIUM CHLORIDE 9 ML/HR: 600; 310; 30; 20 INJECTION, SOLUTION INTRAVENOUS at 07:10

## 2018-09-19 RX ADMIN — SODIUM CHLORIDE, POTASSIUM CHLORIDE, SODIUM LACTATE AND CALCIUM CHLORIDE: 600; 310; 30; 20 INJECTION, SOLUTION INTRAVENOUS at 09:20

## 2018-09-19 RX ADMIN — CLINDAMYCIN PHOSPHATE 900 MG: 900 INJECTION INTRAVENOUS at 08:51

## 2018-09-19 RX ADMIN — EPHEDRINE SULFATE 10 MG: 50 INJECTION INTRAMUSCULAR; INTRAVENOUS; SUBCUTANEOUS at 08:37

## 2018-09-19 RX ADMIN — ROCURONIUM BROMIDE 20 MG: 10 INJECTION INTRAVENOUS at 09:35

## 2018-09-19 RX ADMIN — ALBUTEROL SULFATE 4 PUFF: 90 AEROSOL, METERED RESPIRATORY (INHALATION) at 10:41

## 2018-09-19 RX ADMIN — POTASSIUM CHLORIDE, DEXTROSE MONOHYDRATE AND SODIUM CHLORIDE 100 ML/HR: 150; 5; 450 INJECTION, SOLUTION INTRAVENOUS at 22:12

## 2018-09-19 RX ADMIN — FENTANYL CITRATE 50 MCG: 50 INJECTION INTRAMUSCULAR; INTRAVENOUS at 11:28

## 2018-09-19 RX ADMIN — GLYCOPYRROLATE 0.2 MG: 0.2 INJECTION INTRAMUSCULAR; INTRAVENOUS at 08:41

## 2018-09-19 RX ADMIN — ISOSORBIDE MONONITRATE 30 MG: 60 TABLET ORAL at 20:30

## 2018-09-19 RX ADMIN — ACETAMINOPHEN 650 MG: 325 TABLET, FILM COATED ORAL at 07:32

## 2018-09-19 RX ADMIN — OXYCODONE HYDROCHLORIDE 10 MG: 10 TABLET, FILM COATED, EXTENDED RELEASE ORAL at 07:33

## 2018-09-19 RX ADMIN — SODIUM CHLORIDE, POTASSIUM CHLORIDE, SODIUM LACTATE AND CALCIUM CHLORIDE: 600; 310; 30; 20 INJECTION, SOLUTION INTRAVENOUS at 09:31

## 2018-09-19 RX ADMIN — BISOPROLOL FUMARATE 5 MG: 5 TABLET ORAL at 20:30

## 2018-09-19 RX ADMIN — HYDROMORPHONE HYDROCHLORIDE 0.5 MG: 1 INJECTION, SOLUTION INTRAMUSCULAR; INTRAVENOUS; SUBCUTANEOUS at 19:45

## 2018-09-19 RX ADMIN — LOSARTAN POTASSIUM 25 MG: 25 TABLET, FILM COATED ORAL at 17:40

## 2018-09-19 RX ADMIN — MIDAZOLAM HYDROCHLORIDE 2 MG: 2 INJECTION, SOLUTION INTRAMUSCULAR; INTRAVENOUS at 07:35

## 2018-09-19 RX ADMIN — POTASSIUM CHLORIDE, DEXTROSE MONOHYDRATE AND SODIUM CHLORIDE 100 ML/HR: 150; 5; 450 INJECTION, SOLUTION INTRAVENOUS at 12:58

## 2018-09-19 RX ADMIN — SODIUM CHLORIDE 2 G: 9 INJECTION, SOLUTION INTRAVENOUS at 08:24

## 2018-09-19 RX ADMIN — FENTANYL CITRATE 50 MCG: 50 INJECTION INTRAMUSCULAR; INTRAVENOUS at 09:30

## 2018-09-19 RX ADMIN — FAMOTIDINE 20 MG: 10 INJECTION, SOLUTION INTRAVENOUS at 07:35

## 2018-09-19 RX ADMIN — LIDOCAINE HYDROCHLORIDE 40 MG: 20 INJECTION, SOLUTION INFILTRATION; PERINEURAL at 08:17

## 2018-09-19 RX ADMIN — CARBAMAZEPINE 400 MG: 200 TABLET, EXTENDED RELEASE ORAL at 20:30

## 2018-09-19 RX ADMIN — PHENYLEPHRINE HYDROCHLORIDE 100 MCG: 10 INJECTION INTRAVENOUS at 09:10

## 2018-09-19 RX ADMIN — CLINDAMYCIN PHOSPHATE 600 MG: 600 INJECTION, SOLUTION INTRAVENOUS at 17:40

## 2018-09-19 RX ADMIN — DEXAMETHASONE SODIUM PHOSPHATE 8 MG: 10 INJECTION INTRAMUSCULAR; INTRAVENOUS at 08:57

## 2018-09-19 RX ADMIN — FENTANYL CITRATE 50 MCG: 50 INJECTION INTRAMUSCULAR; INTRAVENOUS at 11:55

## 2018-09-19 RX ADMIN — FENTANYL CITRATE 50 MCG: 50 INJECTION INTRAMUSCULAR; INTRAVENOUS at 08:54

## 2018-09-19 RX ADMIN — ROCURONIUM BROMIDE 10 MG: 10 INJECTION INTRAVENOUS at 08:54

## 2018-09-19 RX ADMIN — ROCURONIUM BROMIDE 40 MG: 10 INJECTION INTRAVENOUS at 08:17

## 2018-09-19 NOTE — ANESTHESIA PREPROCEDURE EVALUATION
Anesthesia Evaluation     Patient summary reviewed and Nursing notes reviewed   no history of anesthetic complications:  NPO Solid Status: > 8 hours  NPO Liquid Status: > 8 hours           Airway   Mallampati: II  Dental - normal exam     Pulmonary - normal exam   (+) pneumonia resolved ,   Cardiovascular - normal exam    (+) hypertension 2 medications or greater, valvular problems/murmurs MR, past MI  >12 months, CHF, DVT resolved,       Neuro/Psych  (+) seizures well controlled,     GI/Hepatic/Renal/Endo    (+) obesity, morbid obesity,      Musculoskeletal     Abdominal    Substance History      OB/GYN          Other      history of cancer                    Anesthesia Plan    ASA 3     general     intravenous induction   Anesthetic plan, all risks, benefits, and alternatives have been provided, discussed and informed consent has been obtained with: patient.

## 2018-09-19 NOTE — ANESTHESIA POSTPROCEDURE EVALUATION
"Patient: Marina MAGANA Barroso    Procedure Summary     Date:  09/19/18 Room / Location:  [facility] OR 09 / [facility] MAIN OR    Anesthesia Start:  0807 Anesthesia Stop:  1058    Procedure:  ventral hernia repair with mesh and rectus muscle advancement flap (Left Abdomen) Diagnosis:       Hernia of abdominal cavity      (Hernia of abdominal cavity [K46.9])    Surgeon:  Trudy Rivera MD Provider:  Rodolfo Lovett MD    Anesthesia Type:  general ASA Status:  3          Anesthesia Type: general  Last vitals  BP   127/66 (09/19/18 1234)   Temp   36.5 °C (97.7 °F) (09/19/18 1234)   Pulse   73 (09/19/18 1234)   Resp   16 (09/19/18 1234)     SpO2   96 % (09/19/18 1402)     Post Anesthesia Care and Evaluation    Patient location during evaluation: PACU  Patient participation: complete - patient participated  Level of consciousness: awake and alert  Pain score: 0  Pain management: adequate  Airway patency: patent  Anesthetic complications: No anesthetic complications    Cardiovascular status: acceptable  Respiratory status: acceptable  Hydration status: acceptable    Comments: /66 (BP Location: Right arm, Patient Position: Lying)   Pulse 73   Temp 36.5 °C (97.7 °F) (Oral)   Resp 16   Ht 157.5 cm (62\")   Wt 90.5 kg (199 lb 8.3 oz)   LMP  (LMP Unknown)   SpO2 96%   BMI 36.49 kg/m²       "

## 2018-09-19 NOTE — ANESTHESIA PROCEDURE NOTES
Peripheral IV    Patient location during procedure: OR  Start time: 9/19/2018 9:20 AM  Line placed for Fluids/Medication Admin.  Performed By   Anesthesiologist: TIA CAMPOS  CRNA: ALEJANDRO TORRES  Preanesthetic Checklist  Completed: patient identified, site marked, surgical consent, pre-op evaluation, IV checked, risks and benefits discussed and monitors and equipment checked  Peripheral IV Prep   Patient position: supine   Prep: ChloraPrep  Patient monitoring: heart rate, cardiac monitor and continuous pulse ox  Peripheral IV Procedure   Laterality:right  Location:  Hand  Catheter size: 18 G         Post Assessment   Dressing Type: gauze, tape and transparent.    IV Dressing/Site: clean, dry and intact

## 2018-09-19 NOTE — ANESTHESIA PROCEDURE NOTES
Airway  Urgency: elective    Airway not difficult    General Information and Staff    Patient location during procedure: OR  Anesthesiologist: TIA CAMPOS  CRNA: ALEJANDRO TORRES    Indications and Patient Condition  Indications for airway management: airway protection    Preoxygenated: yes  Mask difficulty assessment: 1 - vent by mask    Final Airway Details  Final airway type: endotracheal airway      Successful airway: ETT  Cuffed: yes   Successful intubation technique: direct laryngoscopy  Facilitating devices/methods: intubating stylet and anterior pressure/BURP  Endotracheal tube insertion site: oral  Blade: Moncada  Blade size: 2  ETT size: 7.0 mm  Cormack-Lehane Classification: grade IIa - partial view of glottis  Placement verified by: chest auscultation and capnometry   Cuff volume (mL): 6  Measured from: teeth  ETT to teeth (cm): 19  Number of attempts at approach: 1

## 2018-09-19 NOTE — PLAN OF CARE
Problem: Patient Care Overview  Goal: Plan of Care Review  Outcome: Ongoing (interventions implemented as appropriate)   09/19/18 3341   Coping/Psychosocial   Plan of Care Reviewed With patient   Plan of Care Review   Progress no change   OTHER   Outcome Summary Pt admitted to [facility] from PACU. Pain controlled with ice pack, rest, repositioning. IVF infusing via accessed port. Jones in place. WIll continue to monitor.        Problem: Pain, Acute (Adult)  Goal: Identify Related Risk Factors and Signs and Symptoms  Outcome: Outcome(s) achieved Date Met: 09/19/18    Goal: Acceptable Pain Control/Comfort Level  Outcome: Ongoing (interventions implemented as appropriate)

## 2018-09-19 NOTE — H&P
Chief Complaint:  Tenderness right breast     HPI    Here for repair of flank hernia.       Patient is a 68 y.o. female who returns after having been seen 7/13/18, for follow-up after having been seen in the hospital for a fairly significant sigmoid diverticulitis that was low in the pelvis, and a flank hernia on the left from her prior nephrectomy for leiomyosarcoma..  I did her colonoscopy at 3017 where she was found to have moderate to markedly sigmoid diverticulosis with mild disease elsewhere with some adenomatous polyps.  She should have follow-up for that in 5 years.  She did seem to recuperate from the diverticulitis fairly quickly, and is not wishing to consider surgery for such.       On the other hand, the incisional hernia in the left flank has been very problematic.  She said it flares up pretty bad at times, is worse when she is driving forcing her to constantly rearrange and with escalating symptoms over the last month.  It's always there is an aching.  She says she would rather take care of it now while it selective rather than wait until something really bad happens.     She has a strong history of cancer, having had the left breast cancer, a kidney cancer on the left with nephrectomy [chronic kidney disease stage III now], with multiple myeloma.  From the standpoint of her multiple myeloma, she tells me she is consistently improving.  She can wash her car now, can walk better than she did maybe a year ago, her new treatment being by Dr. Araujo.  She really wishes to have this treated, saying she doesn't want to live with this anymore.  She also has an umbilical hernia, which she says doesn't give her much trouble.     Her daughter is here with her today to help her make the decisions.  We've gone over her medications again, and it seems she take her Zovirax, Singulair, and steroids all as a prophylactic treatment surrounding the time of her therapy for multiple myeloma.  I have discussed her care  with Dr. Conteh's exit is signed for us to go forward despite multiple myeloma.     She does have a cardiologist, Dr. Bazzi,, and has not seen him recently, and would need to have clearance from him to proceed.             Past Medical History:   Diagnosis Date   • Anemia 10/14/2008     Secondary to chronic renal insufficiency and myeloma   • Arthropathy of knee 01/07/2014     unspecified   •         brain tumor   • Breast cancer 04/2012     Left breast invasive ductal carcinoma, stage II   • Bursitis of left hip 10/18/2007   • Bursitis, knee 12/02/2013   • Calcaneal spur 08/12/2009   • Chronic kidney disease, stage III (moderate)     • Congestive heart failure     • Diarrhea of presumed infectious origin 04/01/2014     c diff    • Diverticulitis of colon 10/17/2007   • DVT (deep venous thrombosis)       right lower extremity    • History of Clostridium difficile     • History of echocardiogram 04/2014     EF decreased to 46% - per cardiology   • History of MRSA infection     • History of transfusion     • Hydronephrosis       chronic, right   • Hypertension     • LLL pneumonia 04/23/2009   • Malignant neoplasm of kidney 12/2009     and other and unspecified urinary organs; urinary organ, site unspecified; s/p left nephrectomy   • Multiple myeloma 04/2012   • Neoplasm of uncertain behavior 10/12/2015     of other and unspecified sites and tissues; other specified sites   • Pyelonephritis 03/2004   • Seizures 10/17/2007   • Thrombocytopenia     • UTI (urinary tract infection) 10/30/2014     pseudomonas, multidrug resistant   • UTI (urinary tract infection) 03/28/2014     site not specified      Surgical History         Past Surgical History:   Procedure Laterality Date   • BONE MARROW BIOPSY N/A 04/11/2012   • BRAIN SURGERY   2005     Meningioma   • BRAIN SURGERY   1990     Tumor benign per patient   • BREAST BIOPSY Left 04/09/2012     Dr. Gregg May   • CARDIAC CATHETERIZATION Left 06/11/2012     Dr. Sudeep Haddad    • CARDIAC CATHETERIZATION N/A 08/15/2006     Dr. Brian Bhatt   • COLONOSCOPY N/A 8/30/2017     Procedure: COLONOSCOPY into cecum and TI with cold polypectomies;  Surgeon: Trudy Rivera MD;  Location: [facility] ENDOSCOPY;  Service:    • COLONOSCOPY W/ POLYPECTOMY N/A unknown     2 polyps, benign   • CYSTOSCOPY N/A 3/25/2016     Procedure: CYSTOSCOPY  WITH RIGHT STENT CHANGE;  Surgeon: Lakhwinder Russo MD;  Location: [facility] OR;  Service:    • CYSTOSCOPY N/A 03/10/2012     Cystoscopy, right retrograde, right double-J stent-Dr. Sloan May   • CYSTOSCOPY N/A 02/25/2012     Cystoscopy, stent removal, right retrograde pyelogram, replacement of right double-J ureteral stent-Dr. Michael Everett   • CYSTOSCOPY W/ URETERAL STENT PLACEMENT Right 5/7/2016     Procedure: CYSTOSCOPY, URETERAL CATHETER INSERTION AND RIGHT STENT EXCHANGE ;  Surgeon: Michael Everett Jr., MD;  Location: [facility] OR;  Service:    • CYSTOSCOPY W/ URETERAL STENT PLACEMENT N/A 1/20/2017     Procedure: CYSTOSCOPY RIGHT RETROGRADE PYELOGRAM, URETERAL STENT CHANGE;  Surgeon: Lakhwinder Russo MD;  Location: [facility] OR;  Service:    • CYSTOSCOPY W/ URETERAL STENT PLACEMENT N/A 04/02/2015     Cystoscopy, removal of right ureteral stent, right retrograde pyelogram, placement of right double-J ureteral stent-Dr. Michael Everett   • CYSTOSCOPY W/ URETERAL STENT PLACEMENT N/A 04/26/2015     Cystoscopy, removal of right ureteral stent, right retrograde pyelogram, replacement of right ureteral stent 24 cm x 7-Syriac without retrieval line-Dr. Jose Gomez   • CYSTOSCOPY W/ URETERAL STENT PLACEMENT N/A 12/22/2014     Cystoscopy, removal of right double-J ureteral stent, right retrograde pyelogram, placement of right double-J ureteral stent-Dr. Michael Everett   • CYSTOSCOPY W/ URETERAL STENT PLACEMENT N/A 09/22/2014     Cystoscopy, removal of right ureteral stent, removal of right retrograde pyelogram, replacement of right double-J ureteral  stent-Dr. Michael Everett   • CYSTOSCOPY W/ URETERAL STENT PLACEMENT N/A 06/18/2014     Cystoscopy, removal of right double-J ureteral stent, right retrograde pyelogram, placement of right double-J ureteral stent-Dr. Michael Everett   • CYSTOSCOPY W/ URETERAL STENT PLACEMENT N/A 01/23/2014     Cystoscopy, removal of right double-J ureteral stent, right retrograde pyelogram, placement of right double-J ureteral stent-Dr. Michael Everett   • CYSTOSCOPY W/ URETERAL STENT PLACEMENT N/A 03/27/2012     Cystoscopy, removal of ureteral stent, right retrograde pyelogram, replacement of indewlling right ureteral stent-Dr. Michael Everett   • CYSTOSCOPY W/ URETERAL STENT PLACEMENT N/A 03/27/2013     Cystoscopy, right retrograde pyelogram, removal and replacement of right double-J ureteral stent-Dr. Michael Everett   • CYSTOSCOPY W/ URETERAL STENT PLACEMENT N/A 11/12/2012     Cystoscopy, stent removal, right retrograde pyelogram, replacement of right double-J ureteral stent-Dr. Michael Everett   • CYSTOSCOPY W/ URETERAL STENT PLACEMENT N/A 09/24/2011     Cystoscopy, removal of ureteral stent, right retrograde pyelogram and placement of right double-J ureteral stent-Dr. Michael Everett   • CYSTOSCOPY W/ URETERAL STENT PLACEMENT N/A 05/14/2011     Cystoscopy, removal of right ureteral stent, right retrograde pyelogram and placement of new right double-J ureteral stent-Dr. Michael Everett   • CYSTOSCOPY W/ URETERAL STENT PLACEMENT N/A 12/31/2010     Cystoscopy, stent removal, right retrograde pyelogram, replacement of 7 Slovenian x 26 cm double-J stent-Dr. Michale Everett   • CYSTOSCOPY W/ URETERAL STENT PLACEMENT N/A 08/28/2010     Cystoscopy, stent removal, right retrograde pyelogram with interpretation, placement of right double-J ureteral stent-Dr. Michael Everett   • CYSTOSCOPY W/ URETERAL STENT PLACEMENT N/A 05/24/2010     Cystoscopy, right retrograde pyelogram, replacement of right double-J ureteral stent-Dr. Michael Everett   • CYSTOSCOPY W/ URETERAL STENT PLACEMENT  N/A 02/12/2010     Cystoscopy, right retrograde pyelogram and placement of right double-J ureteral stent-Dr. Michael Everett   • CYSTOSCOPY W/ URETERAL STENT PLACEMENT N/A 02/23/2009     Cystoscopy, right retrograde pyelogram, placement of right double-J ureteral stent-Dr. Michael Everett   • CYSTOSCOPY W/ URETERAL STENT PLACEMENT N/A 09/17/2007     Dr. Michael Everett   • CYSTOSCOPY W/ URETERAL STENT PLACEMENT Right 01/29/2018     Dr. Michael Everett   • CYSTOSCOPY W/ URETERAL STENT PLACEMENT N/A 06/04/2018     Cystoscopy, removal of right double-J ureteral stent and placement of right double-J ureteral stent. Dr. Michael Everett.   • CYSTOSCOPY W/ URETERAL STENT PLACEMENT N/A 03/06/2018     Cystoscopy, removal of right ureteral stent, replacement of right double-J ureteral stent. Dr. Michael Everett   • ELBOW PROCEDURE Bilateral 1990s   • LASIK Bilateral     • MASTECTOMY Left 04/25/2012     Left total mastectomy with sentinel lymph node biopsies and left axillary lymphatic mapping-Dr. Gregg May   • NEPHRECTOMY Left 12/30/2009     Dr. Michael Everett   • RENAL BIOPSY Left 10/22/2009     leiomayocarcoma   • SALPINGO OOPHORECTOMY Bilateral 05/02/2006     Diagnostic laparoscopy, exploratory laparotomy with bilateral salpingo oopherectomy, primary reanastomosis of right ureter-Dr. Cristo Pittman   • TOTAL ABDOMINAL HYSTERECTOMY Bilateral 2007     Dr. Todd   • URETEROURETEROSTOMY Right 05/01/2006     Dr. Michael Everett   • VENA CAVA FILTER INSERTION N/A 05/08/2006     Dr. Jordon Barber   • VENOUS ACCESS DEVICE (PORT) INSERTION Right 02/25/2013     Right subclavian vein PowerPort insertion-Dr. Gregg May   • VENOUS ACCESS DEVICE (PORT) INSERTION AND REMOVAL N/A 10/06/2014     Revision of right internal jugular PowerPort with fluoroscopy, removal of peripherally inserted central catheter line-Dr. Aurora Tomlinson   • VENOUS ACCESS DEVICE (PORT) INSERTION AND REMOVAL N/A 08/14/2014     Ultrasound-guided access right internal jugular vein, PowerPort  placement, removal of right subclavian port-Dr. Jordon Barber               Family History   Problem Relation Age of Onset   • Hypertension Other     • Miscarriages / Stillbirths Father     • Heart disease Father     • Hypertension Father     • Heart attack Father     • Diabetes Sister     • Skin cancer Sister     • Throat cancer Brother        Social History   Social History            Social History   • Marital status:        Spouse name: N/A   • Number of children: 3   • Years of education: High School            Occupational History   •  Retired       [employer] [[id number]]           Social History Main Topics   • Smoking status: Never Smoker   • Smokeless tobacco: Never Used   • Alcohol use No   • Drug use: No   • Sexual activity: Defer           Other Topics Concern   • Not on file          Social History Narrative     Son lives with her and helps         Occupation/Additional Social Hx: Accompanied by daughter today        Current Outpatient Prescriptions:   •  acetaminophen (TYLENOL) 325 MG tablet, Take 2 tablets by mouth Every 6 (Six) Hours As Needed for mild pain (1-3) or fever., Disp: , Rfl: 0  •  acyclovir (ZOVIRAX) 400 MG tablet, TAKE 1 TABLET BY MOUTH 2 (TWO) TIMES A DAY. TAKE NO MORE THAN 5 DOSES A DAY., Disp: 60 tablet, Rfl: 0  •  aspirin 81 MG tablet, Take 81 mg by mouth Every Night., Disp: , Rfl:   •  bisoprolol (ZEBeta) 5 MG tablet, TAKE 1 TABLET BY MOUTH 2 (TWO) TIMES DAILY, Disp: , Rfl: 1  •  dexamethasone (DECADRON) 4 MG tablet, TAKE 1 TABLET IN THE MORNING STARTING THE DAY AFTER CHEMO FOR 2 DAYS. TAKE WITH FOOD. (Patient taking differently: TAKE 1 TABLET IN THE MORNING STARTING THE DAY AFTER CHEMO FOR 2 DAYS. TAKE WITH FOOD BID), Disp: 16 tablet, Rfl: 1  •  folic acid (FOLVITE) 1 MG tablet, Take 1 mg by mouth Every Morning., Disp: , Rfl:   •  hydrALAZINE (APRESOLINE) 25 MG tablet, Take 25 mg by mouth 2 (two) times a day., Disp: , Rfl: 3  •  isosorbide  "mononitrate (IMDUR) 60 MG 24 hr tablet, Take 1 tablet by mouth Daily. TAKE 1 TABLET BY MOUTH EVERY MORNING AND ONE-HALF TABLET EVERY EVENING (Patient taking differently: Take 60 mg by mouth Daily. TAKE 1.5 TABLET BY MOUTH EVERY MORNING BEFORE EXERSIZE), Disp: , Rfl:   •  losartan (COZAAR) 25 MG tablet, Take 25 mg by mouth every evening., Disp: , Rfl:   •  Misc. Devices (VIRAGE CUSTOM BREAST PROSTHES) misc, 2 each As Needed (na)., Disp: 2 each, Rfl: 0  •  montelukast (SINGULAIR) 10 MG tablet, TAKE 1 TABLET BY MOUTH EVERY NIGHT., Disp: 30 tablet, Rfl: 11  •  carBAMazepine XR (TEGretol  XR) 200 MG 12 hr tablet, Take 2 tablets by mouth Every Night for 180 days., Disp: 180 tablet, Rfl: 1          Allergies   Allergen Reactions   • Zosyn [Piperacillin Sod-Tazobactam So] Swelling      Preventative Medicine  Colonoscopy: 2018     Review of Systems  Specifically, negative for chest pain or shortness of breath, with all other systems reviewed and negative  Vitals       Vitals:     08/13/18 1411   Pulse: 81   SpO2: 97%   Weight: 88 kg (194 lb)   Height: 157.5 cm (62\")            PHYSICAL EXAM:     Pulse 81   Ht 157.5 cm (62\")   Wt 88 kg (194 lb)   LMP  (LMP Unknown)   SpO2 97%   BMI 35.48 kg/m²   Body mass index is 35.48 kg/m².     Constitutional: well developed, well nourished, Appears stated age, but appears much more vigorous today than she did at her last visit and certainly more so than in the hospital  Eyes: sclera nonicteric, conjunctiva not injected or edematous  ENMT: Hearing intact, trachea midline, thyroid without masses  CVS: RRR, no murmur, peripheral edema not present  Respiratory: CTA, normal respiratory effort   Gastrointestinal: no hepatosplenomegaly, abdomen soft, nontender, no rebound or guarding.  Left flank with hernia, that's reducible, but clearly tender  Genitourinary: inguinal hernia not detected  Musculoskeletal: gait normal, muscle mass normal  Skin: warm and dry, no rashes visible, with " vague  Neurological: awake and alert, seems to have reasonable capacity for understanding for medical decision making  Psychiatric: appears to have reasonable judgement, pleasant  Lymphatics: no cervical adenopathy or axillary adenopathy, with no left arm lymphedema     Radiographic Findings: Reviewed with the patient, and all aspects, including the cross-sectional, sagittal, coronal, showing her the area of the hernia and the clear finding of the bowel outside the contents of the torso, not consistent with a muscular weakening.  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  · Umbilical hernia, less symptomatic  · Diverticulitis requiring hospitalization, but with fairly prompt resolution.  She's had several attacks.  · History of left breast cancer, stage II, no evidence of recurrence  · History of polyps, with next colonoscopy needed in 5 years  · Anemia  · Multiple myeloma, with history of thrombocytopenia  · status post left nephrectomy, with chronic kidney disease stage III, creatinine 1.72  · History of right ureter reanastomosis concomitant with BSO, with ureteral stent changes every 2 months.  (Dr. Michael Everett)  · History of VRE and MRSA  · Intolerance to Zosyn with renal complication.  · History of seizures secondary to meningioma, on Tegretol, with no seizures since 1999  · History of congestive heart failure/nonischemic cardiomyopathy on Imdur.  I could not find any record of an echo recently in Epic  · Right sided port.  She requests that we use this for her surgery, saying is very difficult for her any IV to be obtained on her.  This may be reasonable, as she will be in the right lateral decubitus position and anything in her arm will probably be at risk.  · Hypertension on  Apresoline, Cozaar, Z Betta     PLAN:  · Incisional flank hernia repair, with probable muscular release, and definite mesh case request entered   I am doing this due to the fairly significant tenderness, and what I think is real risk for incarceration and strangulation, I think we'll need to consider proceeding with surgery.  · Options are laparoscopic repair versus open were discussed again today and I have chosen the open.  All need to be attentive to the nearby spleen.  · We'll need right lateral decubitus position, with the patient rolled forward, and a arm table to study her left arm.  · Access right port for the procedure  · Get clearance from Dr. Bazzi prior to the procedure, thus needing to hold off for 1 month for scheduling, allowing that clearance to occur  · Hold aspirin for 1 week prior  · Discussed with patient increased perioperative risks associated with obesity including increased risks of DVT, infection, seromas, poor wound healing and hernias (with abdominal surgery).        Trudy Rivera MD

## 2018-09-19 NOTE — OP NOTE
SURGERY  Operative Note :  NICOLE    Marina Barroso  1950    Procedure Date: 09/19/18    Pre-op Diagnosis:  · Incisional hernia, left flank    Post-op Diagnosis:  · same    Procedure:   · Incisional hernia repair with musculofascial advancement flap and component separation with bio A mesh.      Surgeon: Nicole    Assistant: Champ    Indications:  · Nice 68 year old with multiple medical problems who presented with a notable diverticulitis that was difficult for her to recover from, but ultimately did.  She also was found at that time to have an incisional hernia, very far posteriorly in the left flank, with her colon intermittently being caught in.  She has continued to have pain with this and desires repair.  Dr Jim has confirmed that this will not hamper her progress with her multiple myeloma.    Findings:   · Defect immediately under the 11th rib edge and posterior, insinuating between the external oblique, with the internal oblique and transversus disrupted.  · Repair done extraperitoneally, with the bio A mesh under the rib tip, external oblique and under the rectus with the lateral rectus fascia opened and rotated laterally to allow internal oblique and transverses to be attached laterally to the extraperitoneal tissue behind the colon.  · Bio A mesh with an oblong shape superiorly with two shapes inferiorly, one being a  tail extending in the lateral space behind the transversus and internal oblique and then transitioning in the mid axillary line to the space anterior to the internal oblique and transversus medially.  · Exparel and PRP used    Recommendations:   · Patient with expected difficulty dealing with pain, based on her response to placement of an IV, so will likely be here 2-3 days.  · Sponge bath only while drains are in place.  Remove when less than 25 cc/24 hours.    Specimens:   · none    EBL: <100 cc    Technique:     General anesthetic induced, placed on the beanbag in the right  lateral decubitus position, with kidney rest elevated.  The area had been marked in preoperative holding, being very difficult to feel.  It is extremely posterior.  Once she was position, we were unable to feel it as I had anticipated.  I thus selected the most posterior aspect of her flank incision, and extended that posteriorly further along the line of the incision.  I dissected down to the fascia, and still did not see a clear defect.  However the more that I cleared the fascia I could see an area where it appeared that the external oblique had split that was quite thin.  Once it began to lift up on the external oblique and dissected free, I could see that this was very thickened peritoneum and it tore and clearly and it was obvious that this was the peritoneum.    I dissected these layers out very tediously and carefully, doing so for over 1-1/2 hours.  Essentially lifted the peritoneum from the posterior aspect of the external oblique which had split, and thus the peritoneum was insinuating between the layers.  The peritoneum also a sickly had healed in to the disrupted internal oblique and transversus which were not attached to anything.  The paraspinous muscles were so far back that I was not able to attach these back to the paraspinous muscles, and there were no intact fascial layers remaining to attach to.    Thus I dissected out such that was under the tip of the rib, doing so extremely carefully, and palpating for the spleen was to avoid any injury to it.  I also dissected out the space again between the external oblique in the internal oblique medially, opened the lateral aspect of the rectus, and dissected into the posterior rectus space.  I wanted to have as much area as I could that I could place mesh into the anchor it.  I extended this all the way up and came across the brim of the most lateral aspect of the rectus up at the rib space.  I also  the transversus from the internal oblique,  these being more intact at the more inferior aspect as one would expect.  Ultimately I felt like I had it dissected all the way around at least 3 cm back from the obvious defect under which I could place the mesh.    I then closed the peritoneum with a running 3-0 Vicryl and then placed a 3-0 Vicryl between the transversus and the posterior aspect of the extraperitoneal tissue behind the colon inferiorly, and the oblique superiorly.  This gave a single mass of contents that could be enclosed by the mesh.    I then selected the mesh being a 9 x 15 bio a mesh which I intend to oriented vertically.  Superiorly it had an ovoid rounded shape, and this fit nicely under the rib and extended up superiorly and laterally to that and then began to slow down wound.  On the medial aspect and extended into the rectus space.  It was essentially covering all muscle layers as the muscle had been relaxed so that it could be attached posteriorly.  At the inferior aspect however because I had peritoneum to go behind laterally, the muscle to go over medially, I fashioned a tail to go at the inferior aspect with a rounded aspect to go medially.    Following completion of this there was a good repair with bio a mesh.  I secured it at the anterior aspect right at the conversion from the rectus to the internal oblique with 0 PDS.  I placed a drain and irrigated the mesh with rich PRP and brought the drain out medially.  I then closed the external oblique with a running 0 PDS.  I placed a drain in the subcutaneous space and brought it out medially.  I irrigated with poor PRP, closed in layers with interrupted 3-0 Vicryl, and the skin with david.      Trudy Rivera MD  09/19/18  11:08 AM

## 2018-09-20 LAB
ANION GAP SERPL CALCULATED.3IONS-SCNC: 6.5 MMOL/L
BASOPHILS # BLD AUTO: 0.01 10*3/MM3 (ref 0–0.2)
BASOPHILS NFR BLD AUTO: 0.1 % (ref 0–1.5)
BUN BLD-MCNC: 18 MG/DL (ref 8–23)
BUN/CREAT SERPL: 10.3 (ref 7–25)
CALCIUM SPEC-SCNC: 7.5 MG/DL (ref 8.6–10.5)
CHLORIDE SERPL-SCNC: 112 MMOL/L (ref 98–107)
CO2 SERPL-SCNC: 20.5 MMOL/L (ref 22–29)
CREAT BLD-MCNC: 1.74 MG/DL (ref 0.57–1)
DEPRECATED RDW RBC AUTO: 57.5 FL (ref 37–54)
EOSINOPHIL # BLD AUTO: 0.25 10*3/MM3 (ref 0–0.7)
EOSINOPHIL NFR BLD AUTO: 2.9 % (ref 0.3–6.2)
ERYTHROCYTE [DISTWIDTH] IN BLOOD BY AUTOMATED COUNT: 15.1 % (ref 11.7–13)
GFR SERPL CREATININE-BSD FRML MDRD: 35 ML/MIN/1.73
GLUCOSE BLD-MCNC: 106 MG/DL (ref 65–99)
HCT VFR BLD AUTO: 31.8 % (ref 35.6–45.5)
HGB BLD-MCNC: 9.6 G/DL (ref 11.9–15.5)
IMM GRANULOCYTES # BLD: 0.08 10*3/MM3 (ref 0–0.03)
IMM GRANULOCYTES NFR BLD: 0.9 % (ref 0–0.5)
LYMPHOCYTES # BLD AUTO: 1.76 10*3/MM3 (ref 0.9–4.8)
LYMPHOCYTES NFR BLD AUTO: 20.4 % (ref 19.6–45.3)
MCH RBC QN AUTO: 32.2 PG (ref 26.9–32)
MCHC RBC AUTO-ENTMCNC: 30.2 G/DL (ref 32.4–36.3)
MCV RBC AUTO: 106.7 FL (ref 80.5–98.2)
MONOCYTES # BLD AUTO: 0.76 10*3/MM3 (ref 0.2–1.2)
MONOCYTES NFR BLD AUTO: 8.8 % (ref 5–12)
NEUTROPHILS # BLD AUTO: 5.78 10*3/MM3 (ref 1.9–8.1)
NEUTROPHILS NFR BLD AUTO: 66.9 % (ref 42.7–76)
PLATELET # BLD AUTO: 111 10*3/MM3 (ref 140–500)
PMV BLD AUTO: 12.7 FL (ref 6–12)
POTASSIUM BLD-SCNC: 4.2 MMOL/L (ref 3.5–5.2)
RBC # BLD AUTO: 2.98 10*6/MM3 (ref 3.9–5.2)
SODIUM BLD-SCNC: 139 MMOL/L (ref 136–145)
WBC NRBC COR # BLD: 8.64 10*3/MM3 (ref 4.5–10.7)

## 2018-09-20 PROCEDURE — 25010000002 HEPARIN (PORCINE) PER 1000 UNITS: Performed by: SURGERY

## 2018-09-20 PROCEDURE — 80048 BASIC METABOLIC PNL TOTAL CA: CPT | Performed by: SURGERY

## 2018-09-20 PROCEDURE — 99024 POSTOP FOLLOW-UP VISIT: CPT | Performed by: SURGERY

## 2018-09-20 PROCEDURE — 25010000002 HYDROMORPHONE PER 4 MG: Performed by: SURGERY

## 2018-09-20 PROCEDURE — 85025 COMPLETE CBC W/AUTO DIFF WBC: CPT | Performed by: SURGERY

## 2018-09-20 RX ADMIN — HEPARIN SODIUM 5000 UNITS: 5000 INJECTION INTRAVENOUS; SUBCUTANEOUS at 21:01

## 2018-09-20 RX ADMIN — OXYCODONE HYDROCHLORIDE AND ACETAMINOPHEN 1 TABLET: 10; 325 TABLET ORAL at 22:16

## 2018-09-20 RX ADMIN — CARBAMAZEPINE 400 MG: 200 TABLET, EXTENDED RELEASE ORAL at 21:00

## 2018-09-20 RX ADMIN — OXYCODONE HYDROCHLORIDE AND ACETAMINOPHEN 1 TABLET: 10; 325 TABLET ORAL at 17:43

## 2018-09-20 RX ADMIN — ISOSORBIDE MONONITRATE 30 MG: 60 TABLET ORAL at 21:00

## 2018-09-20 RX ADMIN — POTASSIUM CHLORIDE, DEXTROSE MONOHYDRATE AND SODIUM CHLORIDE 100 ML/HR: 150; 5; 450 INJECTION, SOLUTION INTRAVENOUS at 06:28

## 2018-09-20 RX ADMIN — OXYCODONE HYDROCHLORIDE AND ACETAMINOPHEN 1 TABLET: 10; 325 TABLET ORAL at 12:12

## 2018-09-20 RX ADMIN — HEPARIN SODIUM 5000 UNITS: 5000 INJECTION INTRAVENOUS; SUBCUTANEOUS at 09:21

## 2018-09-20 RX ADMIN — HYDROMORPHONE HYDROCHLORIDE 0.5 MG: 1 INJECTION, SOLUTION INTRAMUSCULAR; INTRAVENOUS; SUBCUTANEOUS at 00:41

## 2018-09-20 RX ADMIN — BISOPROLOL FUMARATE 5 MG: 5 TABLET ORAL at 21:01

## 2018-09-20 RX ADMIN — HYDROMORPHONE HYDROCHLORIDE 0.5 MG: 1 INJECTION, SOLUTION INTRAMUSCULAR; INTRAVENOUS; SUBCUTANEOUS at 09:19

## 2018-09-20 RX ADMIN — SODIUM HYPOCHLORITE 946 ML: 1.25 SOLUTION TOPICAL at 09:21

## 2018-09-20 RX ADMIN — SODIUM HYPOCHLORITE 20 ML: 1.25 SOLUTION TOPICAL at 00:08

## 2018-09-20 RX ADMIN — POTASSIUM CHLORIDE, DEXTROSE MONOHYDRATE AND SODIUM CHLORIDE 100 ML/HR: 150; 5; 450 INJECTION, SOLUTION INTRAVENOUS at 21:05

## 2018-09-20 RX ADMIN — SODIUM HYPOCHLORITE 20 ML: 1.25 SOLUTION TOPICAL at 21:03

## 2018-09-20 RX ADMIN — CLINDAMYCIN PHOSPHATE 600 MG: 600 INJECTION, SOLUTION INTRAVENOUS at 00:37

## 2018-09-20 RX ADMIN — ISOSORBIDE MONONITRATE 60 MG: 60 TABLET ORAL at 06:09

## 2018-09-20 RX ADMIN — FOLIC ACID 1 MG: 1 TABLET ORAL at 06:09

## 2018-09-20 RX ADMIN — POTASSIUM CHLORIDE, DEXTROSE MONOHYDRATE AND SODIUM CHLORIDE 100 ML/HR: 150; 5; 450 INJECTION, SOLUTION INTRAVENOUS at 16:37

## 2018-09-20 RX ADMIN — LOSARTAN POTASSIUM 25 MG: 25 TABLET, FILM COATED ORAL at 17:43

## 2018-09-20 NOTE — PROGRESS NOTES
"GENERAL SURGERY  Marina Barroso  1950  1 Day Post-Op    CC:  \"i have been coughing\"    HPI:  Reasonable night.  Complained of pain at peripheral IV and both were taken out and port accessed.  She has been up to sit on the side of the bed, after considerable coaching from her nurse, Radha.  Robert is coming out now.  Wound OK.  Not eating much.      Drains 20 and 110 for 24 hours.  serosanginous    Diet:  Advance as tolerated     Vitals:    09/19/18 1732 09/19/18 2030 09/20/18 0512 09/20/18 0919   BP: 110/70 120/65 124/68 103/63   BP Location: Right arm Right arm Right arm    Patient Position: Lying Lying Lying    Pulse: 80 80 90 85   Resp: 16 16 16    Temp: 97.9 °F (36.6 °C) 98.7 °F (37.1 °C) (!) 100.8 °F (38.2 °C)    TempSrc: Oral Oral Oral    SpO2: 96% 97% 98%    Weight:       Height:         Intake & Output (last day)       09/19 0701 - 09/20 0700 09/20 0701 - 09/21 0700    P.O. 335     I.V. (mL/kg) 3637.7 (40.2)     IV Piggyback 50     Total Intake(mL/kg) 4022.7 (44.4)     Urine (mL/kg/hr) 3300 (1.5)     Drains 130     Blood 25     Total Output 3455      Net +567.7            Unmeasured Stool Occurrence 0 x           Physical Exam   Constitutional: She appears well-developed and well-nourished.   Cardiovascular: Normal rate.    Pulmonary/Chest: She has no wheezes.   Loose cough   Abdominal: She exhibits distension (very).   Neurological: She is alert.   Skin: Skin is warm.   Psychiatric: She has a normal mood and affect.       Pertinent labs/imaging:    Results from last 7 days  Lab Units 09/20/18  0501   WBC 10*3/mm3 8.64   HEMOGLOBIN g/dL 9.6*   HEMATOCRIT % 31.8*   PLATELETS 10*3/mm3 111*       Results from last 7 days  Lab Units 09/20/18  0501   SODIUM mmol/L 139   POTASSIUM mmol/L 4.2   CHLORIDE mmol/L 112*   CO2 mmol/L 20.5*   BUN mg/dL 18   CREATININE mg/dL 1.74*   CALCIUM mg/dL 7.5*   GLUCOSE mg/dL 106*       Assessment:   · 1 Day Post-Op S/P left flank incisional hernia repair with mesh.  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  She's had several attacks.  · History of left breast cancer, stage II, no evidence of recurrence  · History of polyps, with next colonoscopy needed in 5 years  · Anemia  · Multiple myeloma, with history of thrombocytopenia  · status post left nephrectomy, with chronic kidney disease stage III, creatinine 1.72  · History of right ureter reanastomosis concomitant with BSO, with ureteral stent changes every 2 months.  (Dr. Michael Everett)  · History of VRE and MRSA  · Intolerance to Zosyn with renal complication.  · History of seizures secondary to meningioma, on Tegretol, with no seizures since 1999  · History of congestive heart failure/nonischemic cardiomyopathy on Imdur.  I could not find any record of an echo recently in Epic  · Right sided port.  She requests that we use this for her surgery, saying is very difficult for her any IV to be obtained on her.  This may be reasonable, as she will be in the right lateral decubitus position and anything in her arm will probably be at risk.  · Hypertension on Apresoline, Cozaar, Z Betta    Plan  · DC ni  · Leave drains  · Mobilize  · Irrigate drain BULBS, not the line.  · Follow platetlets  · Declines laxative.  · Heparin for DVT prophylaxis    Trudy Rivera MD  09/20/18  1:39 PM

## 2018-09-20 NOTE — PROGRESS NOTES
Discharge Planning Assessment  [facility]     Patient Name: Marina Barroso  MRN: [id number]  Today's Date: 9/20/2018    Admit Date: 9/19/2018          Discharge Needs Assessment     Row Name 09/20/18 1617       Living Environment    Lives With alone    Current Living Arrangements home/apartment/condo    Primary Care Provided by self    Family Caregiver if Needed child(elma), adult    Family Caregiver Names Daughter Patricia Grewal [phone]    Quality of Family Relationships involved;supportive    Able to Return to Prior Arrangements yes       Resource/Environmental Concerns    Transportation Concerns car, none       Transition Planning    Patient/Family Anticipates Transition to home       Discharge Needs Assessment    Readmission Within the Last 30 Days no previous admission in last 30 days    Concerns to be Addressed no discharge needs identified    Equipment Currently Used at Home none    Equipment Needed After Discharge none            Discharge Plan     Row Name 09/20/18 1634       Plan    Plan Comments Spoke with daughter at bedside, face sheet verified. Patient is independent with ADL's. Patient plans to return home denies any discharge needs. Daughter Patricia Grewal [phone] lives 2 doors down and can help patient if needed. Edilia Waller RN    Row Name 09/20/18 1712       Plan    Plan Home denies needs         Destination     No service coordination in this encounter.      Durable Medical Equipment     No service coordination in this encounter.      Dialysis/Infusion     No service coordination in this encounter.      Home Medical Care     No service coordination in this encounter.      Social Care     No service coordination in this encounter.                Demographic Summary     Row Name 09/20/18 1618       General Information    Admission Type inpatient    Arrived From emergency department    Required Notices Provided Important Message from Medicare    Referral Source admission list     Reason for Consult discharge planning    Preferred Language English            Functional Status     Row Name 09/20/18 1617       Functional Status    Usual Activity Tolerance good    Current Activity Tolerance good       Functional Status, IADL    Medications independent    Housekeeping independent    Laundry independent    Shopping independent            Psychosocial    No documentation.           Abuse/Neglect    No documentation.           Legal    No documentation.           Substance Abuse    No documentation.           Patient Forms    No documentation.         Edilia Waller RN

## 2018-09-20 NOTE — PLAN OF CARE
Problem: Patient Care Overview  Goal: Plan of Care Review  Outcome: Ongoing (interventions implemented as appropriate)   09/20/18 0351   Coping/Psychosocial   Plan of Care Reviewed With patient   Plan of Care Review   Progress improving   OTHER   Outcome Summary Pt s/p hernia repair, 2 ARSENIO drains to left flank with minimal drainage. Dressing intact with no drainage. Pain adequately controlled , fluids continue , antibiotic given. F/C to be d/c in am. VSS will continue to monitor.     Goal: Individualization and Mutuality  Outcome: Ongoing (interventions implemented as appropriate)    Goal: Discharge Needs Assessment  Outcome: Ongoing (interventions implemented as appropriate)    Goal: Interprofessional Rounds/Family Conf  Outcome: Ongoing (interventions implemented as appropriate)      Problem: Pain, Acute (Adult)  Goal: Acceptable Pain Control/Comfort Level  Outcome: Ongoing (interventions implemented as appropriate)

## 2018-09-21 LAB
BACTERIA UR QL AUTO: ABNORMAL /HPF
BASOPHILS # BLD AUTO: 0.01 10*3/MM3 (ref 0–0.2)
BASOPHILS NFR BLD AUTO: 0.1 % (ref 0–1.5)
BILIRUB UR QL STRIP: NEGATIVE
CLARITY UR: CLEAR
COLOR UR: YELLOW
DEPRECATED RDW RBC AUTO: 57.8 FL (ref 37–54)
EOSINOPHIL # BLD AUTO: 0.38 10*3/MM3 (ref 0–0.7)
EOSINOPHIL NFR BLD AUTO: 3.7 % (ref 0.3–6.2)
ERYTHROCYTE [DISTWIDTH] IN BLOOD BY AUTOMATED COUNT: 15.2 % (ref 11.7–13)
GLUCOSE UR STRIP-MCNC: NEGATIVE MG/DL
HCT VFR BLD AUTO: 31.6 % (ref 35.6–45.5)
HGB BLD-MCNC: 9.9 G/DL (ref 11.9–15.5)
HGB UR QL STRIP.AUTO: ABNORMAL
HYALINE CASTS UR QL AUTO: ABNORMAL /LPF
IMM GRANULOCYTES # BLD: 0.12 10*3/MM3 (ref 0–0.03)
IMM GRANULOCYTES NFR BLD: 1.2 % (ref 0–0.5)
KETONES UR QL STRIP: NEGATIVE
LEUKOCYTE ESTERASE UR QL STRIP.AUTO: ABNORMAL
LYMPHOCYTES # BLD AUTO: 2.08 10*3/MM3 (ref 0.9–4.8)
LYMPHOCYTES NFR BLD AUTO: 20.3 % (ref 19.6–45.3)
MCH RBC QN AUTO: 33 PG (ref 26.9–32)
MCHC RBC AUTO-ENTMCNC: 31.3 G/DL (ref 32.4–36.3)
MCV RBC AUTO: 105.3 FL (ref 80.5–98.2)
MONOCYTES # BLD AUTO: 1.09 10*3/MM3 (ref 0.2–1.2)
MONOCYTES NFR BLD AUTO: 10.6 % (ref 5–12)
NEUTROPHILS # BLD AUTO: 6.71 10*3/MM3 (ref 1.9–8.1)
NEUTROPHILS NFR BLD AUTO: 65.3 % (ref 42.7–76)
NITRITE UR QL STRIP: POSITIVE
PH UR STRIP.AUTO: 5.5 [PH] (ref 5–8)
PLATELET # BLD AUTO: 107 10*3/MM3 (ref 140–500)
PMV BLD AUTO: 11.6 FL (ref 6–12)
PROT UR QL STRIP: ABNORMAL
RBC # BLD AUTO: 3 10*6/MM3 (ref 3.9–5.2)
RBC # UR: ABNORMAL /HPF
REF LAB TEST METHOD: ABNORMAL
SP GR UR STRIP: 1.01 (ref 1–1.03)
SQUAMOUS #/AREA URNS HPF: ABNORMAL /HPF
UROBILINOGEN UR QL STRIP: ABNORMAL
WBC NRBC COR # BLD: 10.27 10*3/MM3 (ref 4.5–10.7)
WBC UR QL AUTO: ABNORMAL /HPF

## 2018-09-21 PROCEDURE — 87086 URINE CULTURE/COLONY COUNT: CPT | Performed by: SURGERY

## 2018-09-21 PROCEDURE — 81001 URINALYSIS AUTO W/SCOPE: CPT | Performed by: SURGERY

## 2018-09-21 PROCEDURE — 99024 POSTOP FOLLOW-UP VISIT: CPT | Performed by: SURGERY

## 2018-09-21 PROCEDURE — 85025 COMPLETE CBC W/AUTO DIFF WBC: CPT | Performed by: SURGERY

## 2018-09-21 PROCEDURE — 25010000002 HEPARIN (PORCINE) PER 1000 UNITS: Performed by: SURGERY

## 2018-09-21 PROCEDURE — 87147 CULTURE TYPE IMMUNOLOGIC: CPT | Performed by: SURGERY

## 2018-09-21 RX ORDER — OXYCODONE AND ACETAMINOPHEN 10; 325 MG/1; MG/1
1 TABLET ORAL EVERY 4 HOURS PRN
Qty: 20 TABLET | Refills: 0 | Status: SHIPPED | OUTPATIENT
Start: 2018-09-21 | End: 2018-09-25 | Stop reason: SDUPTHER

## 2018-09-21 RX ORDER — ONDANSETRON 4 MG/1
4 TABLET, FILM COATED ORAL EVERY 6 HOURS PRN
Qty: 20 TABLET | Refills: 0 | Status: SHIPPED | OUTPATIENT
Start: 2018-09-21 | End: 2018-10-10

## 2018-09-21 RX ADMIN — LOSARTAN POTASSIUM 25 MG: 25 TABLET, FILM COATED ORAL at 17:51

## 2018-09-21 RX ADMIN — OXYCODONE HYDROCHLORIDE AND ACETAMINOPHEN 1 TABLET: 10; 325 TABLET ORAL at 06:30

## 2018-09-21 RX ADMIN — BISOPROLOL FUMARATE 5 MG: 5 TABLET ORAL at 08:38

## 2018-09-21 RX ADMIN — SODIUM HYPOCHLORITE 946 ML: 1.25 SOLUTION TOPICAL at 20:04

## 2018-09-21 RX ADMIN — OXYCODONE HYDROCHLORIDE AND ACETAMINOPHEN 1 TABLET: 10; 325 TABLET ORAL at 12:58

## 2018-09-21 RX ADMIN — FOLIC ACID 1 MG: 1 TABLET ORAL at 06:26

## 2018-09-21 RX ADMIN — HEPARIN SODIUM 5000 UNITS: 5000 INJECTION INTRAVENOUS; SUBCUTANEOUS at 20:03

## 2018-09-21 RX ADMIN — OXYCODONE HYDROCHLORIDE AND ACETAMINOPHEN 1 TABLET: 10; 325 TABLET ORAL at 20:10

## 2018-09-21 RX ADMIN — ISOSORBIDE MONONITRATE 30 MG: 60 TABLET ORAL at 20:03

## 2018-09-21 RX ADMIN — BISOPROLOL FUMARATE 5 MG: 5 TABLET ORAL at 20:03

## 2018-09-21 RX ADMIN — CARBAMAZEPINE 400 MG: 200 TABLET, EXTENDED RELEASE ORAL at 20:03

## 2018-09-21 RX ADMIN — HEPARIN SODIUM 5000 UNITS: 5000 INJECTION INTRAVENOUS; SUBCUTANEOUS at 11:38

## 2018-09-21 RX ADMIN — ISOSORBIDE MONONITRATE 60 MG: 60 TABLET ORAL at 06:26

## 2018-09-21 RX ADMIN — SODIUM HYPOCHLORITE 946 ML: 1.25 SOLUTION TOPICAL at 11:38

## 2018-09-21 RX ADMIN — POTASSIUM CHLORIDE, DEXTROSE MONOHYDRATE AND SODIUM CHLORIDE 100 ML/HR: 150; 5; 450 INJECTION, SOLUTION INTRAVENOUS at 06:34

## 2018-09-21 NOTE — PLAN OF CARE
Problem: Patient Care Overview  Goal: Plan of Care Review  Outcome: Ongoing (interventions implemented as appropriate)   09/21/18 5260   Coping/Psychosocial   Plan of Care Reviewed With patient   Plan of Care Review   Progress improving   OTHER   Outcome Summary Pt resting comfortably. Medicated x 1 for pain. Straight cath done for UA. Educated pt on harjit bulb irrigation. Plan is to be dc'd tomorrow. Will continue to monitor.     Goal: Individualization and Mutuality  Outcome: Ongoing (interventions implemented as appropriate)      Problem: Pain, Acute (Adult)  Goal: Identify Related Risk Factors and Signs and Symptoms  Outcome: Outcome(s) achieved Date Met: 09/21/18    Goal: Acceptable Pain Control/Comfort Level  Outcome: Ongoing (interventions implemented as appropriate)

## 2018-09-21 NOTE — PLAN OF CARE
Problem: Patient Care Overview  Goal: Plan of Care Review  Outcome: Ongoing (interventions implemented as appropriate)   09/20/18 2029   Coping/Psychosocial   Plan of Care Reviewed With patient   Plan of Care Review   Progress no change   OTHER   Outcome Summary patient medicated with Percocet x2 for pain and Dilaudid x1. Jones out per orders and voids without issue. dressing c,d,i with x2 ARSENIO still present. ARSENIO flushed with Dakins per orders. patient ambulated in hallway and up to chair most of the afternoon. not taking much in for fluids. Encouraged IS use due to productive cough. will continue to monitor.       Problem: Pain, Acute (Adult)  Goal: Acceptable Pain Control/Comfort Level  Outcome: Ongoing (interventions implemented as appropriate)

## 2018-09-21 NOTE — PLAN OF CARE
Problem: Patient Care Overview  Goal: Plan of Care Review  Outcome: Ongoing (interventions implemented as appropriate)   09/21/18 6340   Coping/Psychosocial   Plan of Care Reviewed With patient   Plan of Care Review   Progress improving   OTHER   Outcome Summary Pt medicated with percocet X 1, ARSENIO flushed with Dakins per order, drainage in both drains minimal. Pt up to bathroom with standby assist. VSS will continue to monitor.     Goal: Individualization and Mutuality  Outcome: Ongoing (interventions implemented as appropriate)    Goal: Discharge Needs Assessment  Outcome: Ongoing (interventions implemented as appropriate)    Goal: Interprofessional Rounds/Family Conf  Outcome: Ongoing (interventions implemented as appropriate)      Problem: Pain, Acute (Adult)  Goal: Acceptable Pain Control/Comfort Level  Outcome: Ongoing (interventions implemented as appropriate)

## 2018-09-21 NOTE — PROGRESS NOTES
"GENERAL SURGERY  Marina Barroso  1950  2 Day Post-Op     CC:  \"i got up by myself\"     HPI:  Better night.  Got up by herself.  Not eating much.  Low grade fever worrisome with her chronic stents.  No urinary symptoms:  Last stent change almost 2 months ago.        Drains 40 and 65 for 24 hours.  serosanginous     Diet:  Advance as tolerated      Vitals          Vitals:     09/19/18 1732 09/19/18 2030 09/20/18 0512 09/20/18 0919   BP: 110/70 120/65 124/68 103/63   BP Location: Right arm Right arm Right arm     Patient Position: Lying Lying Lying     Pulse: 80 80 90 85   Resp: 16 16 16     Temp: 97.9 °F (36.6 °C) 98.7 °F (37.1 °C) (!) 100.8 °F (38.2 °C)     TempSrc: Oral Oral Oral     SpO2: 96% 97% 98%     Weight:           Height:                       Intake & Output (last day)        09/19 0701 - 09/20 0700 09/20 0701 - 09/21 0700     P.O. 335       I.V. (mL/kg) 3637.7 (40.2)       IV Piggyback 50       Total Intake(mL/kg) 4022.7 (44.4)       Urine (mL/kg/hr) 3300 (1.5)       Drains 130       Blood 25       Total Output 3455       Net +567.7                 Unmeasured Stool Occurrence 0 x               Physical Exam   Constitutional: She appears well-developed and well-nourished.   Cardiovascular: Normal rate.    Pulmonary/Chest: She has no wheezes.   Loose cough   Abdominal: She exhibits distension (very).   Neurological: She is alert.   Skin: Skin is warm.   Psychiatric: She has a normal mood and affect.         Pertinent labs/imaging:     Results from last 7 days  Platelets 107, WBC up to 10.27     Results from last 7 days  Lab Units 09/20/18  0501   SODIUM mmol/L 139   POTASSIUM mmol/L 4.2   CHLORIDE mmol/L 112*   CO2 mmol/L 20.5*   BUN mg/dL 18   CREATININE mg/dL 1.74*   CALCIUM mg/dL 7.5*   GLUCOSE mg/dL 106*         Assessment:   · 2 Day Post-Op S/P left flank incisional hernia repair with mesh.  · S/P left nephrectomy for leiomyosarcoma  · Umbilical hernia, less symptomatic  · Diverticulitis requiring " hospitalization, but with fairly prompt resolution.  She's had several attacks.  · History of left breast cancer, stage II, no evidence of recurrence  · History of polyps, with next colonoscopy needed in 5 years  · Anemia  · Multiple myeloma, with history of thrombocytopenia  · chronic kidney disease stage III, creatinine 1.72  · History of right ureter reanastomosis concomitant with BSO, with ureteral stent changes every 2 months.  (Dr. Michael Everett).  Last stent change was 8/6/18 at [facility].  · History of VRE and MRSA  · Intolerance to Zosyn with renal complication.  · History of seizures secondary to meningioma, on Tegretol, with no seizures since 1999  · History of congestive heart failure/nonischemic cardiomyopathy on Imdur.  I could not find any record of an echo recently in Epic  · Right sided port.    · Hypertension on Apresoline, Cozaar, Z Betta     Plan  · Leave drains  · Mobilize and work with IS  · Irrigate drain BULBS, not the line, while here.  Instruct patient to do so at discharge.  · Follow platelets  · Heparin for DVT prophylaxis.  · Check urinalysis.  If positive, start abx and consult dr everett  · Improve po  · rx written and signed for percocet at discharge and DC med rec done        Trudy Rivera MD

## 2018-09-22 DIAGNOSIS — G40.909 SEIZURE DISORDER (HCC): ICD-10-CM

## 2018-09-22 LAB
BASOPHILS # BLD AUTO: 0.01 10*3/MM3 (ref 0–0.2)
BASOPHILS NFR BLD AUTO: 0.1 % (ref 0–1.5)
DEPRECATED RDW RBC AUTO: 56.9 FL (ref 37–54)
EOSINOPHIL # BLD AUTO: 0.4 10*3/MM3 (ref 0–0.7)
EOSINOPHIL NFR BLD AUTO: 4.3 % (ref 0.3–6.2)
ERYTHROCYTE [DISTWIDTH] IN BLOOD BY AUTOMATED COUNT: 14.7 % (ref 11.7–13)
HCT VFR BLD AUTO: 32 % (ref 35.6–45.5)
HGB BLD-MCNC: 9.7 G/DL (ref 11.9–15.5)
IMM GRANULOCYTES # BLD: 0.09 10*3/MM3 (ref 0–0.03)
IMM GRANULOCYTES NFR BLD: 1 % (ref 0–0.5)
LYMPHOCYTES # BLD AUTO: 2.49 10*3/MM3 (ref 0.9–4.8)
LYMPHOCYTES NFR BLD AUTO: 26.6 % (ref 19.6–45.3)
MCH RBC QN AUTO: 32 PG (ref 26.9–32)
MCHC RBC AUTO-ENTMCNC: 30.3 G/DL (ref 32.4–36.3)
MCV RBC AUTO: 105.6 FL (ref 80.5–98.2)
MONOCYTES # BLD AUTO: 0.96 10*3/MM3 (ref 0.2–1.2)
MONOCYTES NFR BLD AUTO: 10.3 % (ref 5–12)
NEUTROPHILS # BLD AUTO: 5.4 10*3/MM3 (ref 1.9–8.1)
NEUTROPHILS NFR BLD AUTO: 57.7 % (ref 42.7–76)
PLATELET # BLD AUTO: 92 10*3/MM3 (ref 140–500)
PMV BLD AUTO: 10.7 FL (ref 6–12)
RBC # BLD AUTO: 3.03 10*6/MM3 (ref 3.9–5.2)
WBC NRBC COR # BLD: 9.35 10*3/MM3 (ref 4.5–10.7)

## 2018-09-22 PROCEDURE — 99024 POSTOP FOLLOW-UP VISIT: CPT | Performed by: SURGERY

## 2018-09-22 PROCEDURE — 85025 COMPLETE CBC W/AUTO DIFF WBC: CPT | Performed by: SURGERY

## 2018-09-22 PROCEDURE — 25010000002 HEPARIN (PORCINE) PER 1000 UNITS: Performed by: SURGERY

## 2018-09-22 RX ADMIN — SODIUM HYPOCHLORITE 946 ML: 1.25 SOLUTION TOPICAL at 20:26

## 2018-09-22 RX ADMIN — BISOPROLOL FUMARATE 5 MG: 5 TABLET ORAL at 08:48

## 2018-09-22 RX ADMIN — SODIUM HYPOCHLORITE 946 ML: 1.25 SOLUTION TOPICAL at 08:48

## 2018-09-22 RX ADMIN — BISOPROLOL FUMARATE 5 MG: 5 TABLET ORAL at 20:25

## 2018-09-22 RX ADMIN — CARBAMAZEPINE 400 MG: 200 TABLET, EXTENDED RELEASE ORAL at 20:25

## 2018-09-22 RX ADMIN — FOLIC ACID 1 MG: 1 TABLET ORAL at 06:49

## 2018-09-22 RX ADMIN — HEPARIN SODIUM 5000 UNITS: 5000 INJECTION INTRAVENOUS; SUBCUTANEOUS at 08:48

## 2018-09-22 RX ADMIN — ISOSORBIDE MONONITRATE 30 MG: 60 TABLET ORAL at 20:25

## 2018-09-22 RX ADMIN — HEPARIN SODIUM 5000 UNITS: 5000 INJECTION INTRAVENOUS; SUBCUTANEOUS at 20:26

## 2018-09-22 RX ADMIN — OXYCODONE HYDROCHLORIDE AND ACETAMINOPHEN 1 TABLET: 10; 325 TABLET ORAL at 20:33

## 2018-09-22 RX ADMIN — ISOSORBIDE MONONITRATE 60 MG: 60 TABLET ORAL at 06:49

## 2018-09-22 NOTE — PROGRESS NOTES
Postoperative day #3 ventral hernia repair    Subjective:  Overall feeling better.  Appetite is improved.    Objective:  Patient afebrile  Gen.: Awake alert and oriented  Gastrointestinal: Abdomen is soft and benign, incisions look good    Urine culture still pending.  Urinalysis did demonstrate nitrites and bacteria.    Assessment and plan:  Postoperative day 3 ventral hernia repair  Overall clinically looks much improved.  Follow up on urine culture, should be back by tomorrow.  Consider discharge tomorrow if she is improved.  May need urology input otherwise.    Zain Baez MD  General and Endoscopic Surgery  [facility]    [address], [address]  [place], [place], [address]  P: [phone]  F: [phone]

## 2018-09-22 NOTE — PLAN OF CARE
Problem: Patient Care Overview  Goal: Plan of Care Review  Outcome: Ongoing (interventions implemented as appropriate)   09/22/18 2622   Coping/Psychosocial   Plan of Care Reviewed With patient   Plan of Care Review   Progress improving   OTHER   Outcome Summary NO C/O PAIN, PT A&O X4, VOIDING PER BRP, UP AD TEE, CLEANED ARSENIO DRAINS C DAKINS PER MD ORDER, WAITING ON URINE CX, DSG CDI     Goal: Individualization and Mutuality  Outcome: Ongoing (interventions implemented as appropriate)    Goal: Discharge Needs Assessment  Outcome: Ongoing (interventions implemented as appropriate)    Goal: Interprofessional Rounds/Family Conf  Outcome: Ongoing (interventions implemented as appropriate)      Problem: Pain, Acute (Adult)  Goal: Acceptable Pain Control/Comfort Level  Outcome: Ongoing (interventions implemented as appropriate)

## 2018-09-22 NOTE — PLAN OF CARE
Problem: Patient Care Overview  Goal: Plan of Care Review  Continue symptom management and comfort care.

## 2018-09-23 VITALS
DIASTOLIC BLOOD PRESSURE: 62 MMHG | RESPIRATION RATE: 16 BRPM | TEMPERATURE: 97.8 F | HEART RATE: 69 BPM | WEIGHT: 199.52 LBS | BODY MASS INDEX: 36.72 KG/M2 | SYSTOLIC BLOOD PRESSURE: 112 MMHG | OXYGEN SATURATION: 98 % | HEIGHT: 62 IN

## 2018-09-23 LAB
BACTERIA SPEC AEROBE CULT: ABNORMAL
STREP GROUPING: ABNORMAL

## 2018-09-23 PROCEDURE — 25010000002 HEPARIN (PORCINE) PER 1000 UNITS: Performed by: SURGERY

## 2018-09-23 RX ADMIN — BISOPROLOL FUMARATE 5 MG: 5 TABLET ORAL at 09:31

## 2018-09-23 RX ADMIN — SODIUM HYPOCHLORITE 946 ML: 1.25 SOLUTION TOPICAL at 09:32

## 2018-09-23 RX ADMIN — ISOSORBIDE MONONITRATE 60 MG: 60 TABLET ORAL at 06:35

## 2018-09-23 RX ADMIN — HEPARIN SODIUM 5000 UNITS: 5000 INJECTION INTRAVENOUS; SUBCUTANEOUS at 09:31

## 2018-09-23 RX ADMIN — FOLIC ACID 1 MG: 1 TABLET ORAL at 06:35

## 2018-09-23 NOTE — PLAN OF CARE
Problem: Patient Care Overview  Goal: Plan of Care Review   09/23/18 1120   Coping/Psychosocial   Plan of Care Reviewed With patient   Plan of Care Review   Progress improving   OTHER   Outcome Summary Staples to left flank CDI. 2 ARSENIO drains to bulb suction, irrigated per order, 15cc serosanguinous drainage out this am. Patient verbalized understanding of irrigating bulbs and staple care. Verbalized understanding of having staples removed and of s/s of infection. Will CTM

## 2018-09-24 ENCOUNTER — TELEPHONE (OUTPATIENT)
Dept: GENERAL RADIOLOGY | Facility: HOSPITAL | Age: 68
End: 2018-09-24

## 2018-09-24 ENCOUNTER — READMISSION MANAGEMENT (OUTPATIENT)
Dept: CALL CENTER | Facility: HOSPITAL | Age: 68
End: 2018-09-24

## 2018-09-24 ENCOUNTER — TRANSITIONAL CARE MANAGEMENT TELEPHONE ENCOUNTER (OUTPATIENT)
Dept: FAMILY MEDICINE CLINIC | Facility: CLINIC | Age: 68
End: 2018-09-24

## 2018-09-24 ENCOUNTER — TELEPHONE (OUTPATIENT)
Dept: ONCOLOGY | Facility: HOSPITAL | Age: 68
End: 2018-09-24

## 2018-09-24 ENCOUNTER — TELEPHONE (OUTPATIENT)
Dept: SURGERY | Facility: CLINIC | Age: 68
End: 2018-09-24

## 2018-09-24 LAB
ABO + RH BLD: NORMAL
ABO + RH BLD: NORMAL
BH BB BLOOD EXPIRATION DATE: NORMAL
BH BB BLOOD EXPIRATION DATE: NORMAL
BH BB BLOOD TYPE BARCODE: 9500
BH BB BLOOD TYPE BARCODE: 9500
BH BB DISPENSE STATUS: NORMAL
BH BB DISPENSE STATUS: NORMAL
BH BB PRODUCT CODE: NORMAL
BH BB PRODUCT CODE: NORMAL
BH BB UNIT NUMBER: NORMAL
BH BB UNIT NUMBER: NORMAL
CROSSMATCH INTERPRETATION: NORMAL
CROSSMATCH INTERPRETATION: NORMAL
UNIT  ABO: NORMAL
UNIT  ABO: NORMAL
UNIT  RH: NORMAL
UNIT  RH: NORMAL

## 2018-09-24 NOTE — TELEPHONE ENCOUNTER
----- Message from Shakira Posadas sent at 9/24/2018 11:33 AM EDT -----  [phone]    Is scheduled for treatment this week. Got out of hosp Sunday and has drainage tubes in should she still come in for treatment.    Pt called stating she was discharged from the hospital on Sunday after having a hernia repair and still has drains in. She is scheduled for darzalex and zometa on Thursday. Reviewed with Dr. Araujo, delay treatment by one week. Informed pt. Sent scheduling a message

## 2018-09-24 NOTE — OUTREACH NOTE
Prep Survey      Responses   Facility patient discharged from?  [facility]   Is patient eligible?  Yes   Discharge diagnosis  hernia of abdominal cavity   Does the patient have one of the following disease processes/diagnoses(primary or secondary)?  General Surgery   Does the patient have Home health ordered?  No   Is there a DME ordered?  No   Comments regarding appointments  call for [facility]   Prep survey completed?  Yes          Cristina Russo RN

## 2018-09-24 NOTE — PROGRESS NOTES
Case Management Discharge Note    Final Note: pt dc'd to home 9/23/18    Destination     No service has been selected for the patient.      Durable Medical Equipment     No service has been selected for the patient.      Dialysis/Infusion     No service has been selected for the patient.      Home Medical Care     No service has been selected for the patient.      Social Care     No service has been selected for the patient.        Other: Other (via private auto)    Final Discharge Disposition Code: 01 - home or self-care

## 2018-09-24 NOTE — TELEPHONE ENCOUNTER
ADDEND  She can come and have them both removed.  staples can't come out until POD 10  Trudy Rivera MD  Patient calling w/ drain totals. Stated it was emptied yesterday morning when she left the hospital. Also did not receive her RX for pain medication. Her Zofran was e-scribed but it shows the Percocet as begin printed.     Drain #1 Drain #2  9/24 10 cc   2 1/2 cc

## 2018-09-24 NOTE — TELEPHONE ENCOUNTER
----- Message from Asmita Limon RN sent at 9/24/2018 11:52 AM EDT -----  Per Dr. Araujo, delay treatment by 1 week. She recently had surgery. Thank you

## 2018-09-25 ENCOUNTER — DOCUMENTATION (OUTPATIENT)
Dept: SURGERY | Facility: CLINIC | Age: 68
End: 2018-09-25

## 2018-09-25 RX ORDER — CARBAMAZEPINE 200 MG/1
400 TABLET, EXTENDED RELEASE ORAL NIGHTLY
Qty: 60 TABLET | Refills: 0 | Status: SHIPPED | OUTPATIENT
Start: 2018-09-25 | End: 2018-10-10 | Stop reason: SDUPTHER

## 2018-09-25 RX ORDER — OXYCODONE AND ACETAMINOPHEN 10; 325 MG/1; MG/1
1 TABLET ORAL EVERY 4 HOURS PRN
Qty: 20 TABLET | Refills: 0 | Status: SHIPPED | OUTPATIENT
Start: 2018-09-25 | End: 2018-10-03

## 2018-09-25 NOTE — PROGRESS NOTES
Patient came into the office for drain removal per Dr. Rivera's instructions. Both drains were removed, bacitracin was applied and covered with 4x4 gauze and tegaderm. Post drain instructions were given to the patient. Post-op appt made 10/1.

## 2018-09-26 ENCOUNTER — READMISSION MANAGEMENT (OUTPATIENT)
Dept: CALL CENTER | Facility: HOSPITAL | Age: 68
End: 2018-09-26

## 2018-09-26 NOTE — OUTREACH NOTE
General Surgery Week 1 Survey      Responses   Facility patient discharged from?  [facility]   Does the patient have one of the following disease processes/diagnoses(primary or secondary)?  General Surgery   Is there a successful TCM telephone encounter documented?  Yes [TCM call completed 9/24/18. ]          Shakira Schneider RN

## 2018-09-27 ENCOUNTER — APPOINTMENT (OUTPATIENT)
Dept: ONCOLOGY | Facility: HOSPITAL | Age: 68
End: 2018-09-27

## 2018-09-27 ENCOUNTER — APPOINTMENT (OUTPATIENT)
Dept: ONCOLOGY | Facility: CLINIC | Age: 68
End: 2018-09-27

## 2018-09-30 NOTE — DISCHARGE SUMMARY
Discharge Summary    Patient name: Marina Barroso    Medical record number: [id number]    Admission date: 9/19/2018  Discharge date:  9/23/2018    Attending physician: Dr. Trudy Rivera    Primary care physician: Cristo Madera MD    Referring physician: Trudy Rivera MD  [address] [address]    Consulting physician(s):  none    Primary Diagnoses:    · Incisional hernia, left flank, after nephrectomy    Secondary Diagnoses:     · CKD stage III  · History of left  Sarcoma and nephrectomy  · History of breast cancer  · Right port in place  · Pulmonary HTN  · Multiple myeloma  · History of diverticulitis  · Body mass index is 36.49 kg/m².      Procedure/Proc Date:      · Open incisional hernia repair with single advancement flap with mesh: 9/19/2018    Hospital Course:   The patient is a very pleasant 68 y.o. female that was admitted to the hospital on 9/19/2018 for the above surgery, which went uneventfully.  She did well and was discharged with drains in place.    Pathology:   · There was no tissue sent for pathology    Discharge medications:      Your medication list      START taking these medications      Instructions Last Dose Given Next Dose Due   ondansetron 4 MG tablet  Commonly known as:  ZOFRAN      Take 1 tablet by mouth Every 6 (Six) Hours As Needed for Nausea or Vomiting.       oxyCODONE-acetaminophen  MG per tablet  Commonly known as:  PERCOCET      Take 1 tablet by mouth Every 4 (Four) Hours As Needed for Moderate Pain  for up to 8 days.          CHANGE how you take these medications      Instructions Last Dose Given Next Dose Due   gabapentin 100 MG capsule  Commonly known as:  NEURONTIN  What changed:  · how much to take  · how to take this  · when to take this  · reasons to take this  · additional instructions      Take one tablet before sleep for one week. May increase to 3 times a day as tolerated.       isosorbide mononitrate 60 MG 24 hr tablet  Commonly known as:   IMDUR  What changed:  · how much to take  · additional instructions      Take 1 tablet by mouth Daily. TAKE 1 TABLET BY MOUTH EVERY MORNING AND ONE-HALF TABLET EVERY EVENING          CONTINUE taking these medications      Instructions Last Dose Given Next Dose Due   acetaminophen 325 MG tablet  Commonly known as:  TYLENOL      Take 2 tablets by mouth Every 6 (Six) Hours As Needed for mild pain (1-3) or fever.       aspirin 81 MG tablet      Take 81 mg by mouth Every Night. HOLD PRIOR TO SURGERY       bisoprolol 5 MG tablet  Commonly known as:  ZEBeta      Take 5 mg by mouth 2 (Two) Times a Day.       carBAMazepine  MG 12 hr tablet  Commonly known as:  TEGretol  XR      Take 2 tablets by mouth Every Night for 180 days.       dexamethasone 4 MG tablet  Commonly known as:  DECADRON      Take 4 mg by mouth As Needed (TAKES DAY AFTER CHEMO).       folic acid 1 MG tablet  Commonly known as:  FOLVITE      Take 1 mg by mouth Every Morning.       hydrALAZINE 25 MG tablet  Commonly known as:  APRESOLINE      Take 25 mg by mouth 2 (two) times a day.       losartan 25 MG tablet  Commonly known as:  COZAAR      Take 25 mg by mouth every evening.       VIRAGE CUSTOM BREAST PROSTHES misc      2 each As Needed (na).             Where to Get Your Medications      These medications were sent to [facility]/pharmacy #[id number] - [place], [place] - [facility] [phone] [phone] [address] [address]    Phone:  [phone]   · ondansetron 4 MG tablet     You can get these medications from any pharmacy    Bring a paper prescription for each of these medications  · oxyCODONE-acetaminophen  MG per tablet         Discharge diet:  · regular    Recommendations:    · Call with drain outputs.        Trudy Rivera MD   Office Number: [phone].

## 2018-10-01 ENCOUNTER — OFFICE VISIT (OUTPATIENT)
Dept: SURGERY | Facility: CLINIC | Age: 68
End: 2018-10-01

## 2018-10-01 DIAGNOSIS — K45.8 FLANK HERNIA: Primary | ICD-10-CM

## 2018-10-01 PROCEDURE — 99024 POSTOP FOLLOW-UP VISIT: CPT | Performed by: SURGERY

## 2018-10-03 ENCOUNTER — READMISSION MANAGEMENT (OUTPATIENT)
Dept: CALL CENTER | Facility: HOSPITAL | Age: 68
End: 2018-10-03

## 2018-10-03 NOTE — OUTREACH NOTE
General Surgery Week 2 Survey      Responses   Facility patient discharged from?  [facility]   Does the patient have one of the following disease processes/diagnoses(primary or secondary)?  General Surgery   Week 2 attempt successful?  Yes   Call start time  1230   Call end time  1232   Discharge diagnosis  hernia of abdominal cavity   Meds reviewed with patient/caregiver?  Yes   Is the patient having any side effects they believe may be caused by any medication additions or changes?  No   Does the patient have all medications related to this admission filled (includes all antibiotics, pain medications, etc.)  Yes   Is the patient taking all medications as directed (includes completed medication regime)?  Yes   Does the patient have a follow up appointment scheduled with their surgeon?  Yes   Has the patient kept scheduled appointments due by today?  Yes   Psychosocial issues?  No   Did the patient receive a copy of their discharge instructions?  Yes   Nursing interventions  Reviewed instructions with patient   What is the patient's perception of their health status since discharge?  Improving   Nursing interventions  Nurse provided patient education   Is the patient /caregiver able to teach back basic post-op care?  Continue use of incentive spirometry at least 1 week post discharge, Practice 'cough and deep breath', Keep incision areas clean,dry and protected, Lifting as instructed by MD in discharge instructions   Is the patient/caregiver able to teach back signs and symptoms of incisional infection?  Increased redness, swelling or pain at the incisonal site, Increased drainage or bleeding, Incisional warmth, Fever, Pus or odor from incision   Is the patient/caregiver able to teach back steps to recovery at home?  Set small, achievable goals for return to baseline health, Make a list of questions for surgeon's appointment   Is the patient/caregiver able to teach back the hierarchy of who to call/visit for  symptoms/problems? PCP, Specialist, Home health nurse, Urgent Care, ED, 911  Yes   Week 2 call completed?  Yes          Alaina Mendez RN

## 2018-10-04 ENCOUNTER — INFUSION (OUTPATIENT)
Dept: ONCOLOGY | Facility: HOSPITAL | Age: 68
End: 2018-10-04

## 2018-10-04 ENCOUNTER — OFFICE VISIT (OUTPATIENT)
Dept: ONCOLOGY | Facility: CLINIC | Age: 68
End: 2018-10-04

## 2018-10-04 VITALS
WEIGHT: 192 LBS | DIASTOLIC BLOOD PRESSURE: 68 MMHG | HEART RATE: 74 BPM | BODY MASS INDEX: 35.33 KG/M2 | TEMPERATURE: 97.7 F | RESPIRATION RATE: 12 BRPM | HEIGHT: 62 IN | SYSTOLIC BLOOD PRESSURE: 104 MMHG | OXYGEN SATURATION: 96 %

## 2018-10-04 VITALS — HEART RATE: 84 BPM | DIASTOLIC BLOOD PRESSURE: 72 MMHG | SYSTOLIC BLOOD PRESSURE: 124 MMHG

## 2018-10-04 DIAGNOSIS — C90.00 MULTIPLE MYELOMA NOT HAVING ACHIEVED REMISSION (HCC): ICD-10-CM

## 2018-10-04 DIAGNOSIS — Z45.2 FITTING AND ADJUSTMENT OF VASCULAR CATHETER: ICD-10-CM

## 2018-10-04 DIAGNOSIS — D64.81 ANEMIA ASSOCIATED WITH CHEMOTHERAPY: ICD-10-CM

## 2018-10-04 DIAGNOSIS — Z85.3 PERSONAL HISTORY OF BREAST CANCER: ICD-10-CM

## 2018-10-04 DIAGNOSIS — T45.1X5A ANEMIA ASSOCIATED WITH CHEMOTHERAPY: ICD-10-CM

## 2018-10-04 DIAGNOSIS — C90.00 MULTIPLE MYELOMA NOT HAVING ACHIEVED REMISSION (HCC): Primary | ICD-10-CM

## 2018-10-04 DIAGNOSIS — D69.6 THROMBOCYTOPENIA (HCC): ICD-10-CM

## 2018-10-04 DIAGNOSIS — R21 RASH IN ADULT: ICD-10-CM

## 2018-10-04 LAB
ALBUMIN SERPL-MCNC: 2.8 G/DL (ref 3.5–5.2)
ALBUMIN/GLOB SERPL: 0.4 G/DL
ALP SERPL-CCNC: 58 U/L (ref 39–117)
ALT SERPL W P-5'-P-CCNC: 11 U/L (ref 1–33)
ANION GAP SERPL CALCULATED.3IONS-SCNC: 10.1 MMOL/L
AST SERPL-CCNC: 14 U/L (ref 1–32)
BILIRUB SERPL-MCNC: 0.4 MG/DL (ref 0.1–1.2)
BUN BLD-MCNC: 26 MG/DL (ref 8–23)
BUN/CREAT SERPL: 14.1 (ref 7–25)
CALCIUM SPEC-SCNC: 8.2 MG/DL (ref 8.6–10.5)
CHLORIDE SERPL-SCNC: 110 MMOL/L (ref 98–107)
CO2 SERPL-SCNC: 18.9 MMOL/L (ref 22–29)
CREAT BLD-MCNC: 1.84 MG/DL (ref 0.57–1)
DEPRECATED RDW RBC AUTO: 56.4 FL (ref 37–54)
EOSINOPHIL # BLD MANUAL: 0.58 10*3/MM3 (ref 0–0.7)
EOSINOPHIL NFR BLD MANUAL: 6 % (ref 0–7)
ERYTHROCYTE [DISTWIDTH] IN BLOOD BY AUTOMATED COUNT: 14.6 % (ref 11.7–13)
GFR SERPL CREATININE-BSD FRML MDRD: 33 ML/MIN/1.73
GLOBULIN UR ELPH-MCNC: 6.8 GM/DL
GLUCOSE BLD-MCNC: 125 MG/DL (ref 65–99)
HCT VFR BLD AUTO: 29.7 % (ref 35.6–45.5)
HGB BLD-MCNC: 9.1 G/DL (ref 11.9–15.5)
LYMPHOCYTES # BLD MANUAL: 3.09 10*3/MM3 (ref 0.8–7)
LYMPHOCYTES NFR BLD MANUAL: 32 % (ref 24–44)
LYMPHOCYTES NFR BLD MANUAL: 7 % (ref 0–12)
MCH RBC QN AUTO: 32.7 PG (ref 26.9–32)
MCHC RBC AUTO-ENTMCNC: 30.6 G/DL (ref 32.4–36.3)
MCV RBC AUTO: 106.8 FL (ref 80.5–98.2)
MONOCYTES # BLD AUTO: 0.68 10*3/MM3 (ref 0–1)
NEUTROPHILS # BLD AUTO: 5.31 10*3/MM3 (ref 1.9–8.1)
NEUTROPHILS NFR BLD MANUAL: 55 % (ref 42.7–76)
PLAT MORPH BLD: NORMAL
PLATELET # BLD AUTO: 131 10*3/MM3 (ref 140–500)
PMV BLD AUTO: 11.6 FL (ref 6–12)
POTASSIUM BLD-SCNC: 3.6 MMOL/L (ref 3.5–5.2)
PROT SERPL-MCNC: 9.6 G/DL (ref 6–8.5)
RBC # BLD AUTO: 2.78 10*6/MM3 (ref 3.9–5.2)
ROULEAUX BLD QL SMEAR: NORMAL
SCAN SLIDE: NORMAL
SODIUM BLD-SCNC: 139 MMOL/L (ref 136–145)
WBC MORPH BLD: NORMAL
WBC NRBC COR # BLD: 9.65 10*3/MM3 (ref 4.5–10.7)

## 2018-10-04 PROCEDURE — 96375 TX/PRO/DX INJ NEW DRUG ADDON: CPT | Performed by: INTERNAL MEDICINE

## 2018-10-04 PROCEDURE — 25010000002 METHYLPREDNISOLONE PER 125 MG: Performed by: INTERNAL MEDICINE

## 2018-10-04 PROCEDURE — 25010000002 DARATUMUMAB 400 MG/20ML SOLUTION 20 ML VIAL: Performed by: INTERNAL MEDICINE

## 2018-10-04 PROCEDURE — 96413 CHEMO IV INFUSION 1 HR: CPT | Performed by: INTERNAL MEDICINE

## 2018-10-04 PROCEDURE — 25010000002 DIPHENHYDRAMINE PER 50 MG: Performed by: INTERNAL MEDICINE

## 2018-10-04 PROCEDURE — 85025 COMPLETE CBC W/AUTO DIFF WBC: CPT | Performed by: INTERNAL MEDICINE

## 2018-10-04 PROCEDURE — 80053 COMPREHEN METABOLIC PANEL: CPT | Performed by: INTERNAL MEDICINE

## 2018-10-04 PROCEDURE — 96415 CHEMO IV INFUSION ADDL HR: CPT | Performed by: INTERNAL MEDICINE

## 2018-10-04 PROCEDURE — 85007 BL SMEAR W/DIFF WBC COUNT: CPT | Performed by: INTERNAL MEDICINE

## 2018-10-04 PROCEDURE — 25010000002 DARATUMUMAB 400 MG/20ML SOLUTION 5 ML VIAL: Performed by: INTERNAL MEDICINE

## 2018-10-04 PROCEDURE — 99215 OFFICE O/P EST HI 40 MIN: CPT | Performed by: INTERNAL MEDICINE

## 2018-10-04 RX ORDER — SODIUM CHLORIDE 9 MG/ML
250 INJECTION, SOLUTION INTRAVENOUS ONCE
Status: COMPLETED | OUTPATIENT
Start: 2018-10-04 | End: 2018-10-04

## 2018-10-04 RX ORDER — SODIUM CHLORIDE 0.9 % (FLUSH) 0.9 %
10 SYRINGE (ML) INJECTION AS NEEDED
Status: DISCONTINUED | OUTPATIENT
Start: 2018-10-04 | End: 2018-10-04 | Stop reason: HOSPADM

## 2018-10-04 RX ORDER — DIPHENHYDRAMINE HYDROCHLORIDE 50 MG/ML
50 INJECTION INTRAMUSCULAR; INTRAVENOUS AS NEEDED
Status: CANCELLED | OUTPATIENT
Start: 2018-10-04

## 2018-10-04 RX ORDER — ACETAMINOPHEN 500 MG
1000 TABLET ORAL ONCE
Status: CANCELLED | OUTPATIENT
Start: 2018-10-04

## 2018-10-04 RX ORDER — METHYLPREDNISOLONE SODIUM SUCCINATE 125 MG/2ML
60 INJECTION, POWDER, LYOPHILIZED, FOR SOLUTION INTRAMUSCULAR; INTRAVENOUS ONCE
Status: CANCELLED | OUTPATIENT
Start: 2018-10-04

## 2018-10-04 RX ORDER — SODIUM CHLORIDE 9 MG/ML
250 INJECTION, SOLUTION INTRAVENOUS ONCE
Status: CANCELLED | OUTPATIENT
Start: 2018-10-04 | End: 2018-10-04

## 2018-10-04 RX ORDER — FAMOTIDINE 10 MG/ML
20 INJECTION, SOLUTION INTRAVENOUS AS NEEDED
Status: CANCELLED | OUTPATIENT
Start: 2018-10-04

## 2018-10-04 RX ORDER — MEPERIDINE HYDROCHLORIDE 50 MG/ML
25 INJECTION INTRAMUSCULAR; INTRAVENOUS; SUBCUTANEOUS
Status: CANCELLED | OUTPATIENT
Start: 2018-10-04

## 2018-10-04 RX ORDER — METHYLPREDNISOLONE SODIUM SUCCINATE 125 MG/2ML
60 INJECTION, POWDER, LYOPHILIZED, FOR SOLUTION INTRAMUSCULAR; INTRAVENOUS ONCE
Status: COMPLETED | OUTPATIENT
Start: 2018-10-04 | End: 2018-10-04

## 2018-10-04 RX ORDER — ACETAMINOPHEN 500 MG
1000 TABLET ORAL ONCE
Status: COMPLETED | OUTPATIENT
Start: 2018-10-04 | End: 2018-10-04

## 2018-10-04 RX ORDER — SODIUM CHLORIDE 0.9 % (FLUSH) 0.9 %
10 SYRINGE (ML) INJECTION AS NEEDED
Status: CANCELLED | OUTPATIENT
Start: 2018-10-04

## 2018-10-04 RX ADMIN — METHYLPREDNISOLONE SODIUM SUCCINATE 60 MG: 125 INJECTION, POWDER, FOR SOLUTION INTRAMUSCULAR; INTRAVENOUS at 09:25

## 2018-10-04 RX ADMIN — SODIUM CHLORIDE, PRESERVATIVE FREE 500 UNITS: 5 INJECTION INTRAVENOUS at 13:56

## 2018-10-04 RX ADMIN — SODIUM CHLORIDE 250 ML: 900 INJECTION, SOLUTION INTRAVENOUS at 09:24

## 2018-10-04 RX ADMIN — DIPHENHYDRAMINE HYDROCHLORIDE 25 MG: 50 INJECTION, SOLUTION INTRAMUSCULAR; INTRAVENOUS at 09:27

## 2018-10-04 RX ADMIN — DARATUMUMAB 1370 MG: 100 INJECTION, SOLUTION, CONCENTRATE INTRAVENOUS at 10:30

## 2018-10-04 RX ADMIN — ACETAMINOPHEN 1000 MG: 500 TABLET, FILM COATED ORAL at 09:24

## 2018-10-04 RX ADMIN — Medication 10 ML: at 13:57

## 2018-10-04 NOTE — PROGRESS NOTES
Reasons for follow up:   1. IgG kappa multiple myeloma, currently on Darzalex, started on May 26, 2016. Patient was switched to Darzalex from Pomalyst, as she continued to be neutropenic with recurrent urinary tract infection while on Pomalyst.    2. Zometa is on hold due to renal insufficiency.    3. After 16 doses of Darzalex, laboratory study showed stable disease in November 2016. Insurance company denied adding Velcade and dexamethasone. Patient is to be continued on monthly Darzalex from 12/06/16.   4. Obstructive right pyelonephritis with positive urine culture for E. coli, required hospitalization from 1/19/2017 through 01/24/2017 with iv antibiotics and stent exchange on 01/20/2017.   5.  Paraprotein studies on March 27, 2017 showed further slight improvement of multiple myeloma.   6.  Febrile illness, with urinary tract infection and influenza B infection in early May 2017, required hospitalization for 6 days.   7.   Paraprotein studies for both serum and 24-hour urine sample on 7/21/2017 showed further improvement of paraproteins.   Darzalex was continued.   8.  Serum protein study reported stable disease on 10/20/2017.  Darzalex will be continued.   9.  Laboratory studies of both serum paraprotein and a 24-hour urine protein in January 2018 showed a further improvement of paraproteins.  Monthly Darzalex will be continued.   10.  Repair of left flank incisional hernia in middle September 2018.          History of Present Illness:     Patient is a 68 female is here today for reevaluation prior to resume Darzelex, which is one week delayed because of recent surgery.  She had a left flank incisional hernia repair in middle September 2018 and was discharged on 9/23/2018.  Patient reports her staples was just removed.  Wound healed well.  Denies fevers sweating chills.  Her functionality has not recovered to baseline yet.  Nevertheless she drove herself today to clinic.  She is eating and drinking well. He  "bowels and bladder are working normally.    She denies nausea, vomiting, diarrhea.    She denies fever, chills, shortness of breath. She denies swelling.      Patient reports recently for the past few weeks, she had small pruritic rashes sporadic on her extremities and the neck area.  This started after she takes Neurontin for pruritic sensations in her feet.  However that it has resolved and that she stopped the Neurontin.  There is no other new medications.  Patient described the rashes started with small pinkish flat spots, then gradually there was a white discoloration ring forming around the pinkish spot, and then with resolution of the pinkish spot, with a remnant circular discoloration.      When she was seen on 8/30/2018 for treatment of Darzalex, she reported pruritis of the lower extremities. She denies skin changes or rash at that time. She denied new products of foods. She felt the itching was more \"internal.\" She took Benadryl with mild relief. She also took Vistaril which provided minimal relief.  Data pruritic sensation resolved quickly after brief treatment with low-dose Neurontin, which was stopped.    Who most recent paraprotein studies on 8/30/2018 showed evidence of disease progression.  Protein electrophoresis reporting M spike of 3.9 g/dL, quantitation of immunoglobin reported serum IgG 4939, IgA 8 IgM 10, free kappa chain 6.0, free lambda chain 865.7 mg/L, kappa/lambda ratio 0.01.  Her hemoglobin was 10.3, .8 MCHC 31.6.  Platelets 128,000 WBC 6800 including neutrophils 3650 lymphocytes 2000.  Her creatinine was 1.8, calcium 8.1, total protein 8.7 and albumin 2.9, glucose 99 and a normal liver function.  Her 24-hour urine study on 9/7/2018 reported free lambda chain 14 mg in 24 hours at a concentration 7.6 mg/L and a free kappa chain 26 mg at a concentration 14 mg/L with kappa/lambda ratio 1.84.  24-hour urine protein was 366 mg including 4% monoclonal IgG lambda, however unable to " quantify the monoclonal lambda chain because small quantity.       Past Medical History, Past Surgical History, Social History, Family History have been reviewed and are without significant changes except as mentioned.      Hematology/oncology history: See separate document for extra information.       On12/6/2016, serum free lambda chain 1090 MG/L, free kappa chain 8.5 to MG/L.  Ferritin 1015, iron 99, TIBC 137 iron saturation 72%.     On January 4, 2017, vitamin B12 level 1289, folate 7.7 ng/mL. SPEP reporting gamma globulin 3.3, out of total globulin 5.0, with immunofixation reporting small quantity of monoclonal free lambda chain, plus monoclonal IgG lambda at 3.2 g/DL.  Serum IgG 4075, IgA 9 and IgM 15.  free lambda chain 1339 MG/L and free kappa chain 10.3 MG/L.      Laboratory study March 27, 2017 showed slight improvement of paraprotein, SPEP reporting M spike 2.8 g/DL, out of total globulin 4.3, and the serum IgG 3420, IgA 9, IgM 11, free lambda chain 1218 MG/L and free kappa chain 8.0 MG/L.  Total 24 hour urine sample reported at free lambda chain 142 mg, at a concentration 74.8 MG/L, free kappa chain 75 mg at a concentration 39.5 MG/L., Urine protein immunofixation reporting biclonal IgG lambda and monoclonal free lambda light chain, M spike #1 at 41.5% and monoclonal IgG lambda spike #2 at 10.5%. Serum beta-2 microglobulin is 7.3 MG/L.     Her laboratory study on 7/21/2017 reported further improvement of paraprotein is both serum and a 24-hour urine sample.  SPEP reporting monoclonal IgG lambda 2.9 g/dL and free lambda chain 0.1 g/dL.  Serum IgG was 3261 mg/dL and IgA 5, IgM 13, free kappa chain 6.7, free lambda chain 701.3 with kappa/lambda ratio 0.01.  24-hour urine sample reported positive for Bence May protein lambda type, immunofixation positive for IgG lambda.  Total urine protein 241 mg, gamma globulin 13.1%.  Hemoglobin was 11.4 .4, platelets 122,000, WBC 6680 including neutrophils 3600,  lymphocytes 2100 and monocytes 650.  Her creatinine was 1.68, at baseline level.  Liver function panel unremarkable.  Darzalex was continued.    Laboratory study on 8/25/2017 showed improved the platelets at 144,000, normal WBC 6660 and slightly improved hemoglobin at 11.7.     Patient had a colonoscopy examination on 8/30/2017 by Dr. Rivera.  It reported diverticulosis in multiple areas more severe in the sigmoid colon.  There was 2 polyps 4 mm pneumonia from transverse colon and the sigmoid colon.  Pathology evaluation reported tubular adenoma from the sigmoid colon polyp, and benign hyperplastic polyp from transverse colon.    Laboratory study on 10/20/2017 reported slightly improved monoclonal spike 2.7 g/dL by SPEP, immunofixation reported positive monoclonal IgG lambda, serum IgG 3536 mg/dL, IgA less than 5 and IgM 11, free kappa chain 5.3 mg/L, and free lambda chain 720 mg/L with kappa/lambda ratio 0.01.  Serum beta-2 microglobulin was 6.5 mg/L.   Her CBC showed a stable mild anemia with hemoglobin 11.3, macrocytosis .3, and the platelets 114,000, normal WBC 7070 including neutrophils 4100 lymphocytes 2000 monocytes 650, normal liver function panel, total protein 7.9 and albumin 3.2,  and normal electrolytes including calcium 8.1, and improved creatinine 1.59.     Laboratory study on 1/12/2018 reported serum IgG 3083 mg/dL, IgA 10, IgM 10, free kappa chain 8.5 and free lambda chain 589.1 mg/L, kappa/lambda ratio 0.01, serum protein admitted fixation reported IgG lambda monoclonal protein and SPEP reporting M spike 3.1 g/dL, beta-2 microgram and 7.4 mg/L. serum creatinine 1.79, calcium 8.2, other electrolytes normal, hemoglobin 11.3, .5 and MCHC 31.6, platelets 129,000, WBC 6780 including neutrophils 3500 lymphocytes 2300.  Serum ferritin 653.7 ng/mL, iron 123 TIBC 174 iron saturation 71%, folic acid 12.8 ng/mL and a vitamin B12 at 873 pg/mL.  Her 24-hour urine study on 1/18/2018 reported lambda  chain 32.8 mg/L, total 61 mg in 24 hours, and the free kappa chain 27.4 mg/L and 51 mg in 24 hours, and the total 24-hour urine protein 283 mg, and urine protein immunofixation positive for 5.3% monoclonal IgG lambda and positive for Bence May protein at 36.2% monoclonal lambda chain.  Monthly Darzalex was continued.       This patient had serum protein study on 04/06/2018 which reported free light chain at concentration 703.8 mg/L and free kappa chain 7.0, free kappa/lambda ratio 0.01, serum IgG 3650 mg/dL, IgM 11 and IgA 9 with serum protein electrophoresis reporting monoclonal IgG lambda 3.2 g/dL and also monoclonal free lambda light chain.  But it was unable to quantify monoclonal lambda light chain because of the small quantity of it.     A 24-hour urine study on 04/20/2018 reported total free lambda chain 60 mg in 24 hours at concentration 33.1 mg/L, free kappa chain 45 mg in 24 hours at concentration 24.8 mg/L. Total 24-hour urine protein was 279 mg. Urine protein immunofixation and UPEP reported lambda-type Bence-May protein + #1 monoclonal IgG lambda band at 34.8% and #2 monoclonal IgG lambda band at 6.9%.     Her CBC results today reported a stable hemoglobin at 11.1, platelets 130,000 and WBC 7440 including neutrophils 4100 and lymphocytes 2350, monocytes 650. Her CMP reported slightly worsened creatinine at 1.82 with BUN 33. Total protein at 8.8. Liver function panel was normal. Total serum protein 8.8 and albumin 3.2, globulin 5.6.    Review of Systems    Constitutional: No fever no sweating no chills.  No weight loss.  HENT: Negative for mouth sores and sore throat.    Eyes: Negative for visual disturbance.    Cardiac: Denies chest pain no palpitation.  No lower extremity edema.    Respiratory: No cough, no hemoptysis no short of breath.    Gastrointestinal: Negative for abdominal pain, diarrhea, nausea and vomiting.    Musculoskeletal: Negative for back pain and joint swelling.    Neurological:  "Negative for dizziness.   Hematological: Does not bruise/bleed easily.    SKIN: See HPI  Psychiatric/Behavioral: The patient is not nervous/anxious.        Medications: The current medication list was reviewed in the EMR.       ALLERGIES: Zosyn       Vitals:    10/04/18 0834   BP: 104/68   Pulse: 74   Resp: 12   Temp: 97.7 °F (36.5 °C)   TempSrc: Oral   SpO2: 96%   Weight: 87.1 kg (192 lb)   Height: 157.5 cm (62.01\")   PainSc: 0-No pain   PS: ECOG 1      Physical Exam   Constitutional: well-developed and well-nourished -American female. No distress.    HENT:     Head: Normocephalic.     Eyes: No jaundice.     Neck: Normal range of motion.   no masses.    Cardiovascular: Normal rate, regular rhythm, normal heart sounds and normal pulses.    Pulmonary/Chest: Lungs clear to auscultation bilaterally, no wheezes, no rales.  Mediport benign right upper chest.  Abdominal: Soft, no tenderness guarding or rebound.  Bowel sounds are normal. no distension and no mass. No hepatosplenomegaly.  Left flank has fresh scar from recent hernia repair.   Musculoskeletal: no edema or deformity.   Lymphadenopathy: She has no cervical, supraclavicular adenopathy.   Neurological: oriented to person, place, and time. No cranial nerve deficit.    Skin: Left flank area fresh scar.  No signs of infection.  There are multiple circular discoloration spots on extremities, as big as 1.5 cm in diameter, some of those with very faint pinkish plaques in the center   Psychiatric: She has normal mood and affect.         Recent lab results:   Lab Results   Component Value Date    WBC 9.65 10/04/2018    HGB 9.1 (L) 10/04/2018    HCT 29.7 (L) 10/04/2018    .8 (H) 10/04/2018     (L) 10/04/2018     Lab Results   Component Value Date    NEUTROABS 5.31 10/04/2018     Lab Results   Component Value Date    GLUCOSE 125 (H) 10/04/2018    BUN 26 (H) 10/04/2018    CREATININE 1.84 (H) 10/04/2018    EGFRIFNONA  08/30/2016      Comment:      <15 " Indicative of kidney failure.    EGFRIFAFRI 33 (L) 10/04/2018    BCR 14.1 10/04/2018    K 3.6 10/04/2018    CO2 18.9 (L) 10/04/2018    CALCIUM 8.2 (L) 10/04/2018    PROTENTOTREF 8.7 (H) 08/30/2018    ALBUMIN 2.80 (L) 10/04/2018    LABIL2 0.6 (L) 08/30/2018    AST 14 10/04/2018    ALT 11 10/04/2018     Sodium   Date Value Ref Range Status   10/04/2018 139 136 - 145 mmol/L Final     Potassium   Date Value Ref Range Status   10/04/2018 3.6 3.5 - 5.2 mmol/L Final     Total Bilirubin   Date Value Ref Range Status   10/04/2018 0.4 0.1 - 1.2 mg/dL Final     Alkaline Phosphatase   Date Value Ref Range Status   10/04/2018 58 39 - 117 U/L Final         Assessment/Plan   1. Multiple myeloma, IgG lambda, stage III. Failed multiple lines of therapy. Her treatment was switched to Darzalex on 5/26/2016. She has been on this treatment for more than 2 years now.  She has tolerated therapy very well.     Her most recent paraprotein studies on 8/30/2018 reported worsening M spike and increase the serum IgG and free lambda chain, however her urine study on 9/7/2018 reported very good results with only small quantity of monoclonal IgG lambda and no measurable monoclonal free lambda chain.  I discussed with the patient today, recommended to repeat her serum paraprotein studies in 4 weeks for reassessment.  I do not think we need to repeat her urine study at that time.  If she continues have evidence of disease progression, we likely will need to modify treatment regimen for her multiple myeloma.    2. Zometa treatment.  Her last dose Zometa was on 8/2/2018 . She receives Zometa every 3 months.       3.  Macrocytic anemia secondary to chemotherapy for multiple myeloma and chronic renal insufficiency stage 3.  She has trending down hemoglobin in the past month.  She had recently surgery for hernia repair in the left flank area.  Laboratory study on 1/12/2018 showed no evidence of deficiency of folic acid or vitamin B12 level.  She also had  iron panel on 7/5/2018, fit with inflammatory condition.      Her hemoglobin decreases a little bit after her recent hernia repair surgery in middle September 2008.  Hgb is 9.1.      4. Mild to moderate thrombocytopenia secondary to her multiple myeloma and chemotherapy.  Overall her platelet counts has been much better compared to those a year ago.  She is asymptomatic, no easy bleeding or bruising.  Platelets today are 248049.  Continue to monitor.     5. History of stage II left breast cancer, post mastectomy in 2013, negative for ER/DC and positive HER-2. No neoadjuvant chemotherapy because of concurrent discovery of multiple myeloma and ongoing chemotherapy. She had a normal mammogram study on 7/24/2018.       6.  Right middle lobe pulmonary nodule, documented on CT scan of chest on 01/06/2017. Repeated CT scan of chest on 8/21/2017 reported a small 5 mm and stable primary nodule.      7.  Chronic renal insufficiency stage III.  Her creatinine is 1.84 today.     8. Pruritis of the lower extremities.  She was on Neurontin low-dose 100 mg daily at bedtime briefly.  Pruritus has resolved and she stopped Neurontin.     9.  Skin rashes on her extremities started during the past few weeks, mildly pruritic.  This happened after her Neurontin, however Neurontin has been discontinued couple weeks ago and she still has ongoing skin rashes.  We do not know the etiology of the skin rashes.  There is no new other new medications.  Could this be psoriasis ?  Continue to observe for now.    Plan:  1.  Continue Darzalex treatment today.    2.  She will return in 4 weeks for NP visit and labs, including paraprotein's, and then proceed with Darzalex and Zometa treatment.   3.   Patient will return in 6 weeks for M.D. evaluation, to discuss the laboratory results and further treatment options.      More than 40 min were used for patient care, over half of that time were for counseling.    LI OBANDO M.D.,  Ph.D.  10/4/2018    Dictated using Dragon Dictation.     .

## 2018-10-05 ENCOUNTER — TELEPHONE (OUTPATIENT)
Dept: ONCOLOGY | Facility: CLINIC | Age: 68
End: 2018-10-05

## 2018-10-05 NOTE — TELEPHONE ENCOUNTER
----- Message from Zane Araujo MD PhD sent at 10/4/2018 10:50 PM EDT -----  Regarding: add zometa in 4 wks  She is already scheduled for Darzalex treatment.  Please add Zometa at the same time in 4 weeks.    Thank you very much !    Dr. Araujo

## 2018-10-08 RX ORDER — MONTELUKAST SODIUM 10 MG/1
TABLET ORAL
Qty: 90 TABLET | Refills: 1 | Status: SHIPPED | OUTPATIENT
Start: 2018-10-08 | End: 2018-11-15

## 2018-10-10 ENCOUNTER — OFFICE VISIT (OUTPATIENT)
Dept: FAMILY MEDICINE CLINIC | Facility: CLINIC | Age: 68
End: 2018-10-10

## 2018-10-10 VITALS
WEIGHT: 193 LBS | HEIGHT: 62 IN | SYSTOLIC BLOOD PRESSURE: 99 MMHG | RESPIRATION RATE: 16 BRPM | DIASTOLIC BLOOD PRESSURE: 62 MMHG | TEMPERATURE: 97.5 F | HEART RATE: 67 BPM | BODY MASS INDEX: 35.51 KG/M2

## 2018-10-10 DIAGNOSIS — N18.30 CHRONIC KIDNEY DISEASE, STAGE III (MODERATE) (HCC): ICD-10-CM

## 2018-10-10 DIAGNOSIS — Z09 HOSPITAL DISCHARGE FOLLOW-UP: Primary | ICD-10-CM

## 2018-10-10 DIAGNOSIS — G40.909 SEIZURE DISORDER (HCC): ICD-10-CM

## 2018-10-10 PROCEDURE — 99495 TRANSJ CARE MGMT MOD F2F 14D: CPT | Performed by: FAMILY MEDICINE

## 2018-10-10 RX ORDER — CARBAMAZEPINE 200 MG/1
400 TABLET, EXTENDED RELEASE ORAL NIGHTLY
Qty: 180 TABLET | Refills: 1 | Status: SHIPPED | OUTPATIENT
Start: 2018-10-10 | End: 2019-01-01 | Stop reason: SDUPTHER

## 2018-10-10 NOTE — PROGRESS NOTES
Transitional Care Follow Up Visit    Chief Complaint   Patient presents with   • Transitional Care Management       Subjective     History of Present Illness     Marina Barroso is a 68 y.o. female who presents for a transitional care management visit from recent stay at [facility].  Within 48 business hours after discharge our office contacted her via telephone to coordinate her care and needs.  I reviewed and discussed the details of that call along with the discharge summary, hospital problems, inpatient lab results, inpatient diagnostic studies, and consultation reports with Marina.his follow-up with surgeon and has been released from surgical care.  Surgical Steri-Strips remain in place along the left flank area.  Her gastrointestinal tract is unaffected and she is able to eat and has normal bowel movements.  Appetite is intact.  Her activity level is back to normal and is unrestricted by surgery.  She does need refills of her anti seizure medicine.  She remains under the care of nephrology and actually saw her kidney doctor earlier this week.  He is watching her yearly.     Date of TCM Phone Call 9/24/2018   Hospital [facility]   Date of Admission 9/19/2018   Date of Discharge 9/24/2018   Discharge Disposition Home or Self Care       Risk for Readmission (LACE) No Data Recorded not calculated    Course During Hospital Stay(Reviewed from Discharge Summary Note)      Admission Diagnosis/es: incisional hernia      Discharge Diagnosis/es: incisional hernia    Hospital Course:  The patient is a very pleasant 68 y.o. female that was admitted to the hospital on 9/19/2018 for the above surgery, which went uneventfully.  She did well and was discharged with drains in place.    Hospital Consultants: Dr. Rivera    Scheduled  Or Suggested Follow ups:prn    Pending Test Results:none       Discharge Medications          Accurate as of 10/10/18  1:30 PM. If you have any questions, ask your nurse or doctor.     "           Changes to Medications      Instructions Start Date   isosorbide mononitrate 60 MG 24 hr tablet  Commonly known as:  IMDUR  What changed:  · how much to take  · additional instructions   60 mg, Oral, Daily, TAKE 1 TABLET BY MOUTH EVERY MORNING AND ONE-HALF TABLET EVERY EVENING         Continue These Medications      Instructions Start Date   acetaminophen 325 MG tablet  Commonly known as:  TYLENOL   650 mg, Oral, Every 6 Hours PRN      aspirin 81 MG tablet   81 mg, Oral, Nightly      bisoprolol 5 MG tablet  Commonly known as:  ZEBeta   5 mg, Oral, 2 Times Daily      carBAMazepine  MG 12 hr tablet  Commonly known as:  TEGretol  XR   400 mg, Oral, Nightly      dexamethasone 4 MG tablet  Commonly known as:  DECADRON   4 mg, Oral, As Needed      folic acid 1 MG tablet  Commonly known as:  FOLVITE   1 mg, Oral, Every Morning      hydrALAZINE 25 MG tablet  Commonly known as:  APRESOLINE   25 mg, Oral, 2 Times Daily      losartan 25 MG tablet  Commonly known as:  COZAAR   25 mg, Oral, Every Evening      montelukast 10 MG tablet  Commonly known as:  SINGULAIR   TAKE 1 TABLET BY MOUTH EVERY NIGHT.      VIRAGE CUSTOM BREAST PROSTHES misc   2 each, Does not apply, As Needed         Stop These Medications    gabapentin 100 MG capsule  Commonly known as:  NEURONTIN  Stopped by:  Cristo Madera MD     ondansetron 4 MG tablet  Commonly known as:  ZOFRAN  Stopped by:  Cristo Madera MD          Current outpatient and discharge medications have been reconciled for the patient.    Review of Systems   Constitutional: Negative for fatigue.   Cardiovascular: Negative for chest pain.   Gastrointestinal: Negative.  Negative for abdominal pain, nausea and vomiting.       Objective     BP 99/62   Pulse 67   Temp 97.5 °F (36.4 °C) (Oral)   Resp 16   Ht 157.5 cm (62.01\")   Wt 87.5 kg (193 lb)   LMP  (LMP Unknown)   BMI 35.29 kg/m²     Physical Exam   Constitutional: She is cooperative. No distress.   Eyes: Conjunctivae " and lids are normal.   Neck: Carotid bruit is not present. No tracheal deviation present.   Cardiovascular: Normal rate, regular rhythm and normal heart sounds.    No murmur heard.  Pulmonary/Chest: Effort normal and breath sounds normal.   Abdominal:   Steri-Strips dry along the left flank above the pelvic brim.  No seepage.  No evidence of infection.   Neurological: She is alert. She is not disoriented.   Skin: Skin is warm and dry.   Psychiatric: She has a normal mood and affect. Her speech is normal and behavior is normal.   Vitals reviewed.      Hospital Data Reviewed:    Lab Results   Component Value Date     10/04/2018    K 3.6 10/04/2018    AST 14 10/04/2018    ALT 11 10/04/2018    BUN 26 (H) 10/04/2018    CREATININE 1.84 (H) 10/04/2018    CREATININE 1.74 (H) 09/20/2018    CREATININE 1.66 (H) 09/12/2018    EGFRIFNONA  08/30/2016      Comment:      <15 Indicative of kidney failure.    EGFRIFAFRI 33 (L) 10/04/2018       No results found for: GLU, HGBA1C    No results found for: LDL, HDL, TRIG, CHOLHDLRATIO    No results found for: TSH, FREET4    Lab Results   Component Value Date    WBC 9.65 10/04/2018    HGB 9.1 (L) 10/04/2018     (L) 10/04/2018            Assessment/Plan     Problem List Items Addressed This Visit     Seizure disorder (CMS/[facility])    Relevant Medications    carBAMazepine XR (TEGretol  XR) 200 MG 12 hr tablet    Chronic kidney disease, stage III (moderate) (CMS/HCC)      Other Visit Diagnoses     Hospital discharge follow-up    -  Primary          No orders of the defined types were placed in this encounter.        Current Outpatient Prescriptions:   •  acetaminophen (TYLENOL) 325 MG tablet, Take 2 tablets by mouth Every 6 (Six) Hours As Needed for mild pain (1-3) or fever., Disp: , Rfl: 0  •  aspirin 81 MG tablet, Take 81 mg by mouth Every Night., Disp: , Rfl:   •  bisoprolol (ZEBeta) 5 MG tablet, Take 5 mg by mouth 2 (Two) Times a Day., Disp: , Rfl:   •  carBAMazepine XR (TEGretol   XR) 200 MG 12 hr tablet, Take 2 tablets by mouth Every Night for 180 days., Disp: 180 tablet, Rfl: 1  •  dexamethasone (DECADRON) 4 MG tablet, Take 4 mg by mouth As Needed (TAKES DAY AFTER CHEMO)., Disp: , Rfl:   •  folic acid (FOLVITE) 1 MG tablet, Take 1 mg by mouth Every Morning., Disp: , Rfl:   •  hydrALAZINE (APRESOLINE) 25 MG tablet, Take 25 mg by mouth 2 (two) times a day., Disp: , Rfl: 3  •  isosorbide mononitrate (IMDUR) 60 MG 24 hr tablet, Take 1 tablet by mouth Daily. TAKE 1 TABLET BY MOUTH EVERY MORNING AND ONE-HALF TABLET EVERY EVENING (Patient taking differently: Take 90 mg by mouth Daily.), Disp: , Rfl:   •  losartan (COZAAR) 25 MG tablet, Take 25 mg by mouth every evening., Disp: , Rfl:   •  Misc. Devices (VIRAGE CUSTOM BREAST PROSTHES) misc, 2 each As Needed (na)., Disp: 2 each, Rfl: 0  •  montelukast (SINGULAIR) 10 MG tablet, TAKE 1 TABLET BY MOUTH EVERY NIGHT., Disp: 90 tablet, Rfl: 1    Return in about 6 months (around 4/10/2019) for Recheck.

## 2018-10-31 DIAGNOSIS — C90.00 MULTIPLE MYELOMA NOT HAVING ACHIEVED REMISSION (HCC): ICD-10-CM

## 2018-11-01 ENCOUNTER — OFFICE VISIT (OUTPATIENT)
Dept: ONCOLOGY | Facility: CLINIC | Age: 68
End: 2018-11-01

## 2018-11-01 ENCOUNTER — INFUSION (OUTPATIENT)
Dept: ONCOLOGY | Facility: HOSPITAL | Age: 68
End: 2018-11-01

## 2018-11-01 VITALS
BODY MASS INDEX: 34.98 KG/M2 | OXYGEN SATURATION: 97 % | TEMPERATURE: 97.6 F | SYSTOLIC BLOOD PRESSURE: 102 MMHG | DIASTOLIC BLOOD PRESSURE: 66 MMHG | RESPIRATION RATE: 16 BRPM | HEART RATE: 85 BPM | HEIGHT: 62 IN | WEIGHT: 190.1 LBS

## 2018-11-01 VITALS — SYSTOLIC BLOOD PRESSURE: 118 MMHG | DIASTOLIC BLOOD PRESSURE: 70 MMHG | HEART RATE: 73 BPM

## 2018-11-01 DIAGNOSIS — C90.00 MULTIPLE MYELOMA NOT HAVING ACHIEVED REMISSION (HCC): ICD-10-CM

## 2018-11-01 DIAGNOSIS — D69.59 CHEMOTHERAPY-INDUCED THROMBOCYTOPENIA: ICD-10-CM

## 2018-11-01 DIAGNOSIS — T45.1X5A ANEMIA ASSOCIATED WITH CHEMOTHERAPY: ICD-10-CM

## 2018-11-01 DIAGNOSIS — T45.1X5A CHEMOTHERAPY-INDUCED THROMBOCYTOPENIA: ICD-10-CM

## 2018-11-01 DIAGNOSIS — M54.9 ACUTE BILATERAL BACK PAIN, UNSPECIFIED BACK LOCATION: ICD-10-CM

## 2018-11-01 DIAGNOSIS — C90.00 MULTIPLE MYELOMA NOT HAVING ACHIEVED REMISSION (HCC): Primary | ICD-10-CM

## 2018-11-01 DIAGNOSIS — D64.81 ANEMIA ASSOCIATED WITH CHEMOTHERAPY: ICD-10-CM

## 2018-11-01 DIAGNOSIS — R53.83 OTHER FATIGUE: ICD-10-CM

## 2018-11-01 DIAGNOSIS — Z45.2 FITTING AND ADJUSTMENT OF VASCULAR CATHETER: ICD-10-CM

## 2018-11-01 LAB
ALBUMIN SERPL-MCNC: 2.8 G/DL (ref 3.5–5.2)
ALBUMIN/GLOB SERPL: 0.4 G/DL
ALP SERPL-CCNC: 68 U/L (ref 39–117)
ALT SERPL W P-5'-P-CCNC: 15 U/L (ref 1–33)
ANION GAP SERPL CALCULATED.3IONS-SCNC: 10 MMOL/L
AST SERPL-CCNC: 18 U/L (ref 1–32)
B2 MICROGLOB SERPL-MCNC: 13.6 MG/L (ref 0.8–2.2)
BASOPHILS # BLD MANUAL: 0.1 10*3/MM3 (ref 0–0.2)
BASOPHILS NFR BLD AUTO: 1 % (ref 0–2)
BILIRUB SERPL-MCNC: 0.4 MG/DL (ref 0.1–1.2)
BUN BLD-MCNC: 20 MG/DL (ref 8–23)
BUN/CREAT SERPL: 10.9 (ref 7–25)
CALCIUM SPEC-SCNC: 8.2 MG/DL (ref 8.6–10.5)
CHLORIDE SERPL-SCNC: 112 MMOL/L (ref 98–107)
CO2 SERPL-SCNC: 18 MMOL/L (ref 22–29)
CREAT BLD-MCNC: 1.83 MG/DL (ref 0.57–1)
DEPRECATED RDW RBC AUTO: 58.4 FL (ref 37–54)
EOSINOPHIL # BLD MANUAL: 0.5 10*3/MM3 (ref 0–0.7)
EOSINOPHIL NFR BLD MANUAL: 5 % (ref 0–7)
ERYTHROCYTE [DISTWIDTH] IN BLOOD BY AUTOMATED COUNT: 15 % (ref 11.7–13)
FERRITIN SERPL-MCNC: 812.9 NG/ML (ref 13–150)
GFR SERPL CREATININE-BSD FRML MDRD: 33 ML/MIN/1.73
GLOBULIN UR ELPH-MCNC: 7.1 GM/DL
GLUCOSE BLD-MCNC: 101 MG/DL (ref 65–99)
HCT VFR BLD AUTO: 29.9 % (ref 35.6–45.5)
HGB BLD-MCNC: 9 G/DL (ref 11.9–15.5)
IRON 24H UR-MRATE: 69 MCG/DL (ref 37–145)
IRON SATN MFR SERPL: 48 % (ref 20–50)
LYMPHOCYTES # BLD MANUAL: 1.3 10*3/MM3 (ref 0.8–7)
LYMPHOCYTES NFR BLD MANUAL: 13 % (ref 24–44)
LYMPHOCYTES NFR BLD MANUAL: 6 % (ref 0–12)
MACROCYTES BLD QL SMEAR: ABNORMAL
MAGNESIUM SERPL-MCNC: 1.8 MG/DL (ref 1.6–2.4)
MCH RBC QN AUTO: 32.4 PG (ref 26.9–32)
MCHC RBC AUTO-ENTMCNC: 30.1 G/DL (ref 32.4–36.3)
MCV RBC AUTO: 107.6 FL (ref 80.5–98.2)
METAMYELOCYTES NFR BLD MANUAL: 1 % (ref 0–0)
MONOCYTES # BLD AUTO: 0.6 10*3/MM3 (ref 0–1)
NEUTROPHILS # BLD AUTO: 7.41 10*3/MM3 (ref 1.9–8.1)
NEUTROPHILS NFR BLD MANUAL: 73 % (ref 42.7–76)
NEUTS BAND NFR BLD MANUAL: 1 % (ref 0–5)
NRBC SPEC MANUAL: 2 /100 WBC (ref 0–0)
PHOSPHATE SERPL-MCNC: 3.4 MG/DL (ref 2.5–4.5)
PLAT MORPH BLD: NORMAL
PLATELET # BLD AUTO: 124 10*3/MM3 (ref 140–500)
PMV BLD AUTO: 11.3 FL (ref 6–12)
POTASSIUM BLD-SCNC: 3.6 MMOL/L (ref 3.5–5.2)
PROT SERPL-MCNC: 9.9 G/DL (ref 6–8.5)
RBC # BLD AUTO: 2.78 10*6/MM3 (ref 3.9–5.2)
ROULEAUX BLD QL SMEAR: ABNORMAL
SCAN SLIDE: NORMAL
SODIUM BLD-SCNC: 140 MMOL/L (ref 136–145)
TIBC SERPL-MCNC: 145 MCG/DL (ref 298–536)
TRANSFERRIN SERPL-MCNC: 97 MG/DL (ref 200–360)
VIT B12 BLD-MCNC: >2000 PG/ML (ref 211–946)
WBC MORPH BLD: NORMAL
WBC NRBC COR # BLD: 10.01 10*3/MM3 (ref 4.5–10.7)

## 2018-11-01 PROCEDURE — 83883 ASSAY NEPHELOMETRY NOT SPEC: CPT | Performed by: INTERNAL MEDICINE

## 2018-11-01 PROCEDURE — 99214 OFFICE O/P EST MOD 30 MIN: CPT | Performed by: NURSE PRACTITIONER

## 2018-11-01 PROCEDURE — 80053 COMPREHEN METABOLIC PANEL: CPT | Performed by: INTERNAL MEDICINE

## 2018-11-01 PROCEDURE — 82607 VITAMIN B-12: CPT | Performed by: INTERNAL MEDICINE

## 2018-11-01 PROCEDURE — 25010000002 DARATUMUMAB 100 MG/5ML SOLUTION 5 ML VIAL: Performed by: NURSE PRACTITIONER

## 2018-11-01 PROCEDURE — 86334 IMMUNOFIX E-PHORESIS SERUM: CPT | Performed by: INTERNAL MEDICINE

## 2018-11-01 PROCEDURE — 82728 ASSAY OF FERRITIN: CPT | Performed by: INTERNAL MEDICINE

## 2018-11-01 PROCEDURE — 85007 BL SMEAR W/DIFF WBC COUNT: CPT | Performed by: INTERNAL MEDICINE

## 2018-11-01 PROCEDURE — 82232 ASSAY OF BETA-2 PROTEIN: CPT | Performed by: INTERNAL MEDICINE

## 2018-11-01 PROCEDURE — 96367 TX/PROPH/DG ADDL SEQ IV INF: CPT | Performed by: NURSE PRACTITIONER

## 2018-11-01 PROCEDURE — 84165 PROTEIN E-PHORESIS SERUM: CPT | Performed by: INTERNAL MEDICINE

## 2018-11-01 PROCEDURE — 83540 ASSAY OF IRON: CPT | Performed by: INTERNAL MEDICINE

## 2018-11-01 PROCEDURE — 96375 TX/PRO/DX INJ NEW DRUG ADDON: CPT | Performed by: NURSE PRACTITIONER

## 2018-11-01 PROCEDURE — 25010000002 DARATUMUMAB 100 MG/5ML SOLUTION 20 ML VIAL: Performed by: NURSE PRACTITIONER

## 2018-11-01 PROCEDURE — 83735 ASSAY OF MAGNESIUM: CPT | Performed by: INTERNAL MEDICINE

## 2018-11-01 PROCEDURE — 25010000002 METHYLPREDNISOLONE PER 125 MG: Performed by: NURSE PRACTITIONER

## 2018-11-01 PROCEDURE — 84466 ASSAY OF TRANSFERRIN: CPT | Performed by: INTERNAL MEDICINE

## 2018-11-01 PROCEDURE — 82784 ASSAY IGA/IGD/IGG/IGM EACH: CPT | Performed by: INTERNAL MEDICINE

## 2018-11-01 PROCEDURE — 25010000002 DIPHENHYDRAMINE 1 EACH BAG: Performed by: NURSE PRACTITIONER

## 2018-11-01 PROCEDURE — 96413 CHEMO IV INFUSION 1 HR: CPT | Performed by: NURSE PRACTITIONER

## 2018-11-01 PROCEDURE — 84100 ASSAY OF PHOSPHORUS: CPT | Performed by: INTERNAL MEDICINE

## 2018-11-01 PROCEDURE — 85025 COMPLETE CBC W/AUTO DIFF WBC: CPT | Performed by: INTERNAL MEDICINE

## 2018-11-01 PROCEDURE — 96415 CHEMO IV INFUSION ADDL HR: CPT | Performed by: NURSE PRACTITIONER

## 2018-11-01 PROCEDURE — 25010000002 ZOLEDRONIC ACID PER 1 MG: Performed by: NURSE PRACTITIONER

## 2018-11-01 RX ORDER — DIPHENHYDRAMINE HYDROCHLORIDE 50 MG/ML
50 INJECTION INTRAMUSCULAR; INTRAVENOUS AS NEEDED
Status: CANCELLED | OUTPATIENT
Start: 2018-11-01

## 2018-11-01 RX ORDER — ACETAMINOPHEN 500 MG
1000 TABLET ORAL ONCE
Status: CANCELLED | OUTPATIENT
Start: 2018-11-01

## 2018-11-01 RX ORDER — FAMOTIDINE 10 MG/ML
20 INJECTION, SOLUTION INTRAVENOUS AS NEEDED
Status: CANCELLED | OUTPATIENT
Start: 2018-11-01

## 2018-11-01 RX ORDER — DEXAMETHASONE 4 MG/1
4 TABLET ORAL AS NEEDED
Qty: 16 TABLET | Refills: 0 | Status: SHIPPED | OUTPATIENT
Start: 2018-11-01 | End: 2019-01-24

## 2018-11-01 RX ORDER — SODIUM CHLORIDE 0.9 % (FLUSH) 0.9 %
10 SYRINGE (ML) INJECTION AS NEEDED
Status: CANCELLED | OUTPATIENT
Start: 2018-11-01

## 2018-11-01 RX ORDER — ACETAMINOPHEN 500 MG
1000 TABLET ORAL ONCE
Status: COMPLETED | OUTPATIENT
Start: 2018-11-01 | End: 2018-11-01

## 2018-11-01 RX ORDER — SODIUM CHLORIDE 9 MG/ML
250 INJECTION, SOLUTION INTRAVENOUS ONCE
Status: DISCONTINUED | OUTPATIENT
Start: 2018-11-01 | End: 2018-11-01 | Stop reason: HOSPADM

## 2018-11-01 RX ORDER — SODIUM CHLORIDE 9 MG/ML
250 INJECTION, SOLUTION INTRAVENOUS ONCE
Status: CANCELLED | OUTPATIENT
Start: 2018-11-01

## 2018-11-01 RX ORDER — METHYLPREDNISOLONE SODIUM SUCCINATE 125 MG/2ML
60 INJECTION, POWDER, LYOPHILIZED, FOR SOLUTION INTRAMUSCULAR; INTRAVENOUS ONCE
Status: COMPLETED | OUTPATIENT
Start: 2018-11-01 | End: 2018-11-01

## 2018-11-01 RX ORDER — METHYLPREDNISOLONE SODIUM SUCCINATE 125 MG/2ML
60 INJECTION, POWDER, LYOPHILIZED, FOR SOLUTION INTRAMUSCULAR; INTRAVENOUS ONCE
Status: CANCELLED | OUTPATIENT
Start: 2018-11-01

## 2018-11-01 RX ORDER — SODIUM CHLORIDE 9 MG/ML
250 INJECTION, SOLUTION INTRAVENOUS ONCE
Status: CANCELLED | OUTPATIENT
Start: 2018-11-01 | End: 2018-11-01

## 2018-11-01 RX ORDER — SODIUM CHLORIDE 0.9 % (FLUSH) 0.9 %
10 SYRINGE (ML) INJECTION AS NEEDED
Status: DISCONTINUED | OUTPATIENT
Start: 2018-11-01 | End: 2018-11-01 | Stop reason: HOSPADM

## 2018-11-01 RX ORDER — SODIUM CHLORIDE 9 MG/ML
250 INJECTION, SOLUTION INTRAVENOUS ONCE
Status: COMPLETED | OUTPATIENT
Start: 2018-11-01 | End: 2018-11-01

## 2018-11-01 RX ADMIN — SODIUM CHLORIDE, PRESERVATIVE FREE 500 UNITS: 5 INJECTION INTRAVENOUS at 14:03

## 2018-11-01 RX ADMIN — Medication 10 ML: at 14:03

## 2018-11-01 RX ADMIN — ACETAMINOPHEN 1000 MG: 500 TABLET, FILM COATED ORAL at 09:11

## 2018-11-01 RX ADMIN — METHYLPREDNISOLONE SODIUM SUCCINATE 60 MG: 125 INJECTION, POWDER, FOR SOLUTION INTRAMUSCULAR; INTRAVENOUS at 09:11

## 2018-11-01 RX ADMIN — DIPHENHYDRAMINE HYDROCHLORIDE 25 MG: 50 INJECTION INTRAMUSCULAR; INTRAVENOUS at 09:13

## 2018-11-01 RX ADMIN — SODIUM CHLORIDE 250 ML: 900 INJECTION, SOLUTION INTRAVENOUS at 09:11

## 2018-11-01 RX ADMIN — ZOLEDRONIC ACID 3 MG: 0.8 INJECTION, SOLUTION, CONCENTRATE INTRAVENOUS at 09:45

## 2018-11-01 RX ADMIN — DARATUMUMAB 1370 MG: 100 INJECTION, SOLUTION, CONCENTRATE INTRAVENOUS at 10:30

## 2018-11-01 NOTE — PROGRESS NOTES
Reasons for follow up:   1. IgG kappa multiple myeloma, currently on Darzalex, started on May 26, 2016. Patient was switched to Darzalex from Pomalyst, as she continued to be neutropenic with recurrent urinary tract infection while on Pomalyst.    2. Zometa is on hold due to renal insufficiency.    3. After 16 doses of Darzalex, laboratory study showed stable disease in November 2016. Insurance company denied adding Velcade and dexamethasone. Patient is to be continued on monthly Darzalex from 12/06/16.   4. Obstructive right pyelonephritis with positive urine culture for E. coli, required hospitalization from 1/19/2017 through 01/24/2017 with iv antibiotics and stent exchange on 01/20/2017.   5.  Paraprotein studies on March 27, 2017 showed further slight improvement of multiple myeloma.   6.  Febrile illness, with urinary tract infection and influenza B infection in early May 2017, required hospitalization for 6 days.   7.   Paraprotein studies for both serum and 24-hour urine sample on 7/21/2017 showed further improvement of paraproteins.   Darzalex was continued.   8.  Serum protein study reported stable disease on 10/20/2017.  Darzalex will be continued.   9.  Laboratory studies of both serum paraprotein and a 24-hour urine protein in January 2018 showed a further improvement of paraproteins.  Monthly Darzalex will be continued.   10.  Repair of left flank incisional hernia in middle September 2018.          History of Present Illness:     Patient is a 68 female is here today for reevaluation prior to her next dose of Darzalex. She is currently struggling with a cold over the last several weeks, though thankfully denies any fevers or chills.  She does feel that symptoms are slowly improving and she is now left with residual rhinorrhea and occasional cough.    Her greatest complaint today is profound fatigue and worsening back pain.  She states that over the last several weeks she has had decreased stamina and  "energy.  She is requiring frequent rest breaks with any activity.  She denies any associated shortness of breath or dyspnea.  She reports that she is just solely fatigued and \"run down\".  She describes as feeling as consistent with when she was initially diagnosed.  In regards to her back pain, she is not currently taking anything for relief and is just resting more often.    She denies any associated chest pain or excess bleeding or bruising.  Her bowels are normal.    In addition to extreme fatigue, patient is still experiencing sporadic precipitation of a rash on her extremities and neck.  The rash remains consistent with her previous description of a \"pinkish spot\" that eventually resolves into a remnant circular discoloration.  These areas are rarely pruritic.  Of note, patient did previously complain of pruritus of her bilateral lower extremities.  This resolved with a brief trial of low-dose Neurontin, which patient is now discontinued.        Her most recent paraprotein studies from August 30, 2018 and did show evidence of disease progression with a further worsening M spike.  Repeat studies were obtained today and should there be again evidence of worsening disease, we may consider a change in her current treatment regimen.       We will obtain iron studies today for further evaluation of her weakness.  Of note, patient's hemoglobin is stable at 9.0 today.      She has other concerns today and wishes to move forward with his next dose of Darzalex.        Past Medical History, Past Surgical History, Social History, Family History have been reviewed and are without significant changes except as mentioned.      Hematology/oncology history: See separate document for extra information.       On12/6/2016, serum free lambda chain 1090 MG/L, free kappa chain 8.5 to MG/L.  Ferritin 1015, iron 99, TIBC 137 iron saturation 72%.     On January 4, 2017, vitamin B12 level 1289, folate 7.7 ng/mL. SPEP reporting gamma " globulin 3.3, out of total globulin 5.0, with immunofixation reporting small quantity of monoclonal free lambda chain, plus monoclonal IgG lambda at 3.2 g/DL.  Serum IgG 4075, IgA 9 and IgM 15.  free lambda chain 1339 MG/L and free kappa chain 10.3 MG/L.      Laboratory study March 27, 2017 showed slight improvement of paraprotein, SPEP reporting M spike 2.8 g/DL, out of total globulin 4.3, and the serum IgG 3420, IgA 9, IgM 11, free lambda chain 1218 MG/L and free kappa chain 8.0 MG/L.  Total 24 hour urine sample reported at free lambda chain 142 mg, at a concentration 74.8 MG/L, free kappa chain 75 mg at a concentration 39.5 MG/L., Urine protein immunofixation reporting biclonal IgG lambda and monoclonal free lambda light chain, M spike #1 at 41.5% and monoclonal IgG lambda spike #2 at 10.5%. Serum beta-2 microglobulin is 7.3 MG/L.     Her laboratory study on 7/21/2017 reported further improvement of paraprotein is both serum and a 24-hour urine sample.  SPEP reporting monoclonal IgG lambda 2.9 g/dL and free lambda chain 0.1 g/dL.  Serum IgG was 3261 mg/dL and IgA 5, IgM 13, free kappa chain 6.7, free lambda chain 701.3 with kappa/lambda ratio 0.01.  24-hour urine sample reported positive for Bence May protein lambda type, immunofixation positive for IgG lambda.  Total urine protein 241 mg, gamma globulin 13.1%.  Hemoglobin was 11.4 .4, platelets 122,000, WBC 6680 including neutrophils 3600, lymphocytes 2100 and monocytes 650.  Her creatinine was 1.68, at baseline level.  Liver function panel unremarkable.  Darzalex was continued.    Laboratory study on 8/25/2017 showed improved the platelets at 144,000, normal WBC 6660 and slightly improved hemoglobin at 11.7.     Patient had a colonoscopy examination on 8/30/2017 by Dr. Rivera.  It reported diverticulosis in multiple areas more severe in the sigmoid colon.  There was 2 polyps 4 mm pneumonia from transverse colon and the sigmoid colon.  Pathology  evaluation reported tubular adenoma from the sigmoid colon polyp, and benign hyperplastic polyp from transverse colon.    Laboratory study on 10/20/2017 reported slightly improved monoclonal spike 2.7 g/dL by SPEP, immunofixation reported positive monoclonal IgG lambda, serum IgG 3536 mg/dL, IgA less than 5 and IgM 11, free kappa chain 5.3 mg/L, and free lambda chain 720 mg/L with kappa/lambda ratio 0.01.  Serum beta-2 microglobulin was 6.5 mg/L.   Her CBC showed a stable mild anemia with hemoglobin 11.3, macrocytosis .3, and the platelets 114,000, normal WBC 7070 including neutrophils 4100 lymphocytes 2000 monocytes 650, normal liver function panel, total protein 7.9 and albumin 3.2,  and normal electrolytes including calcium 8.1, and improved creatinine 1.59.     Laboratory study on 1/12/2018 reported serum IgG 3083 mg/dL, IgA 10, IgM 10, free kappa chain 8.5 and free lambda chain 589.1 mg/L, kappa/lambda ratio 0.01, serum protein admitted fixation reported IgG lambda monoclonal protein and SPEP reporting M spike 3.1 g/dL, beta-2 microgram and 7.4 mg/L. serum creatinine 1.79, calcium 8.2, other electrolytes normal, hemoglobin 11.3, .5 and MCHC 31.6, platelets 129,000, WBC 6780 including neutrophils 3500 lymphocytes 2300.  Serum ferritin 653.7 ng/mL, iron 123 TIBC 174 iron saturation 71%, folic acid 12.8 ng/mL and a vitamin B12 at 873 pg/mL.  Her 24-hour urine study on 1/18/2018 reported lambda chain 32.8 mg/L, total 61 mg in 24 hours, and the free kappa chain 27.4 mg/L and 51 mg in 24 hours, and the total 24-hour urine protein 283 mg, and urine protein immunofixation positive for 5.3% monoclonal IgG lambda and positive for Bence May protein at 36.2% monoclonal lambda chain.  Monthly Darzalex was continued.       This patient had serum protein study on 04/06/2018 which reported free light chain at concentration 703.8 mg/L and free kappa chain 7.0, free kappa/lambda ratio 0.01, serum IgG 3650 mg/dL,  "IgM 11 and IgA 9 with serum protein electrophoresis reporting monoclonal IgG lambda 3.2 g/dL and also monoclonal free lambda light chain.  But it was unable to quantify monoclonal lambda light chain because of the small quantity of it.     A 24-hour urine study on 04/20/2018 reported total free lambda chain 60 mg in 24 hours at concentration 33.1 mg/L, free kappa chain 45 mg in 24 hours at concentration 24.8 mg/L. Total 24-hour urine protein was 279 mg. Urine protein immunofixation and UPEP reported lambda-type Bence-May protein + #1 monoclonal IgG lambda band at 34.8% and #2 monoclonal IgG lambda band at 6.9%.     Her CBC results today reported a stable hemoglobin at 11.1, platelets 130,000 and WBC 7440 including neutrophils 4100 and lymphocytes 2350, monocytes 650. Her CMP reported slightly worsened creatinine at 1.82 with BUN 33. Total protein at 8.8. Liver function panel was normal. Total serum protein 8.8 and albumin 3.2, globulin 5.6.    Review of Systems    Constitutional: No fever no sweating no chills.  No weight loss.Profound fatigue   HENT: Negative for mouth sores and sore throat.    Eyes: Negative for visual disturbance.    Cardiac: Denies chest pain no palpitation.  No lower extremity edema.    Respiratory: + cough and rhinorrhea,  no hemoptysis no short of breath.    Gastrointestinal: Negative for abdominal pain, diarrhea, nausea and vomiting.    Musculoskeletal: Negative for back pain and joint swelling.    Neurological: Negative for dizziness.   Hematological: Does not bruise/bleed easily.    SKIN: See HPI  Psychiatric/Behavioral: The patient is not nervous/anxious.        Medications: The current medication list was reviewed in the EMR.       ALLERGIES: Zosyn       Vitals:    11/01/18 0819   BP: 102/66   Pulse: 85   Resp: 16   Temp: 97.6 °F (36.4 °C)   TempSrc: Oral   SpO2: 97%   Weight: 86.2 kg (190 lb 1.6 oz)   Height: 157.5 cm (62.01\")   PainSc: 0-No pain   PS: ECOG 1      Physical Exam "   Constitutional: well-developed and well-nourished -American female. No distress. Appears weak today   HENT:     Head: Normocephalic.     Eyes: No jaundice.     Neck: Normal range of motion.   no masses.    Cardiovascular: Normal rate, regular rhythm, normal heart sounds and normal pulses.    Pulmonary/Chest: Lungs clear to auscultation bilaterally, no wheezes, no rales.  Mediport benign right upper chest.  Abdominal: Soft, no tenderness guarding or rebound.  Bowel sounds are normal. no distension and no mass. No hepatosplenomegaly.  Left flank has fresh scar from recent hernia repair, healing well   Musculoskeletal: no edema or deformity.   Lymphadenopathy: She has no cervical, supraclavicular adenopathy.   Neurological: oriented to person, place, and time. No cranial nerve deficit.    Skin: Left flank area fresh scar.  No signs of infection.  There are multiple circular discoloration spots on extremities, as big as 1.5 cm in diameter, some of those with very faint pinkish plaques in the center, this remains unchanged today, she does have a new area on her right thigh that is consistent with the other discolored spots    Psychiatric: She has normal mood and affect.         Recent lab results:   Lab Results   Component Value Date    WBC 10.01 11/01/2018    HGB 9.0 (L) 11/01/2018    HCT 29.9 (L) 11/01/2018    .6 (H) 11/01/2018     (L) 11/01/2018     Lab Results   Component Value Date    NEUTROABS 7.41 11/01/2018     Lab Results   Component Value Date    GLUCOSE 101 (H) 11/01/2018    BUN 20 11/01/2018    CREATININE 1.83 (H) 11/01/2018    EGFRIFNONA  08/30/2016      Comment:      <15 Indicative of kidney failure.    EGFRIFAFRI 33 (L) 11/01/2018    BCR 10.9 11/01/2018    K 3.6 11/01/2018    CO2 18.0 (L) 11/01/2018    CALCIUM 8.2 (L) 11/01/2018    PROTENTOTREF 8.7 (H) 08/30/2018    ALBUMIN 2.80 (L) 11/01/2018    LABIL2 0.6 (L) 08/30/2018    AST 18 11/01/2018    ALT 15 11/01/2018     Sodium   Date  Value Ref Range Status   11/01/2018 140 136 - 145 mmol/L Final     Potassium   Date Value Ref Range Status   11/01/2018 3.6 3.5 - 5.2 mmol/L Final     Total Bilirubin   Date Value Ref Range Status   11/01/2018 0.4 0.1 - 1.2 mg/dL Final     Alkaline Phosphatase   Date Value Ref Range Status   11/01/2018 68 39 - 117 U/L Final         Assessment/Plan   1. Multiple myeloma, IgG lambda, stage III. Failed multiple lines of therapy. Her treatment was switched to Darzalex on 5/26/2016. She has been on this treatment for more than 2 years now.  She has tolerated therapy very well.     Her most recent paraprotein studies on 8/30/2018 reported worsening M spike and increase the serum IgG and free lambda chain, however her urine study on 9/7/2018 reported very good results with only small quantity of monoclonal IgG lambda and no measurable monoclonal free lambda chain.     Patient is here today for her next dose of Darzalex with progressive weakness, fatigue, and recurrent back pain. She is also recovering from a upper respiratory infection.  Additionally, there is a new precipitation of her skin discolorations, which will be further detailed below.     We will proceed with Darzalex and Zometa treatments today.  Repeat paraprotein and iron studies will be obtained.       I am concerned that given patient's exacerbation of fatigue and recurrence of lower back pain that this may represent symptomology consistent with disease progression.    She will return in 2 weeks for review of these labs with Dr. Araujo.     2. Zometa treatment.  Her last dose Zometa was on 8/2/2018 . She receives Zometa every 3 months. She is due for her next dose today.       3.  Macrocytic anemia secondary to chemotherapy for multiple myeloma and chronic renal insufficiency stage 3.  She has trending down hemoglobin in the past month.  She recently underwent hernia repair in the left flank area.  Laboratory studies today demonstrated no deficiency of folic  acid or vitamin B12 level.  She also had iron panel that was consistent with inflammation. Ferritin was elevated at 812 with a normal iron saturation.     Hgb is stable at 9.0 today.       4. Mild to moderate thrombocytopenia secondary to her multiple myeloma and chemotherapy.  Overall her platelet counts has been much better compared to those a year ago.  She is asymptomatic, no easy bleeding or bruising.  Platelets today are 124,000. Continue to monitor.     5. History of stage II left breast cancer, post mastectomy in 2013, negative for ER/SC and positive HER-2. No neoadjuvant chemotherapy because of concurrent discovery of multiple myeloma and ongoing chemotherapy. She had a normal mammogram study on 7/24/2018.       6.  Right middle lobe pulmonary nodule, documented on CT scan of chest on 01/06/2017. Repeated CT scan of chest on 8/21/2017 reported a small 5 mm and stable primary nodule.      7.  Chronic renal insufficiency stage III.  Her creatinine is 1.83 today.     8. Pruritis of the lower extremities.  She was on Neurontin low-dose 100 mg daily at bedtime briefly.  Pruritus has resolved and she stopped Neurontin.     9.  Skin rashes on her extremities: This began approximately a month ago. She report occasional, mild pruritis.   This occurred after starting Neurontin, however Neurontin has been discontinued couple weeks ago and she still has ongoing skin rashes.  Dr. Araujo felt that this could be psoriasis? I have offered to send patient to dermatology, though for now, patient would like to wait and see if rash resolves on it's own.     10. Profound fatigue: Fatigue has worsened over the last month.  She has also developed associated back pain for which she requires several, frequent rest periods.  She states that this degree of fatigue is consistent with her symptoms at initial diagnosis of her myeloma.  We will obtain paraprotein studies today to gauge where her myeloma stands. Should there be worsening  of disease; we may consider a change in therapy.     11. Upper respiratory infection: patient is using over the counter medications for symptom management. No associated fevers or chills.     Plan:  1.  Continue Darzalex treatment today.    2. Proceed with next dose of every 3 month Zometa today   3. Offered a dermatology referral. Patient to monitor rash and call should it worsen  4. OTC meds for URI  5. Additional iron studies, B12, and folate levels requested today   6. Return in 2 weeks for MD visit and review of paraprotein studies      .

## 2018-11-02 LAB
ALBUMIN SERPL-MCNC: 2.8 G/DL (ref 2.9–4.4)
ALBUMIN/GLOB SERPL: 0.5 {RATIO} (ref 0.7–1.7)
ALPHA1 GLOB FLD ELPH-MCNC: 0.3 G/DL (ref 0–0.4)
ALPHA2 GLOB SERPL ELPH-MCNC: 0.7 G/DL (ref 0.4–1)
B-GLOBULIN SERPL ELPH-MCNC: 0.9 G/DL (ref 0.7–1.3)
GAMMA GLOB SERPL ELPH-MCNC: 4.2 G/DL (ref 0.4–1.8)
GLOBULIN SER CALC-MCNC: 6.1 G/DL (ref 2.2–3.9)
IGA SERPL-MCNC: 8 MG/DL (ref 87–352)
IGG SERPL-MCNC: 5472 MG/DL (ref 700–1600)
IGM SERPL-MCNC: 10 MG/DL (ref 26–217)
INTERPRETATION SERPL IEP-IMP: ABNORMAL
KAPPA LC SERPL-MCNC: 7.9 MG/L (ref 3.3–19.4)
KAPPA LC/LAMBDA SER: 0.01 {RATIO} (ref 0.26–1.65)
LAMBDA LC FREE SERPL-MCNC: 961.3 MG/L (ref 5.7–26.3)
Lab: ABNORMAL
M-SPIKE: ABNORMAL G/DL
PROT SERPL-MCNC: 8.9 G/DL (ref 6–8.5)

## 2018-11-14 DIAGNOSIS — C90.00 MULTIPLE MYELOMA NOT HAVING ACHIEVED REMISSION (HCC): Primary | ICD-10-CM

## 2018-11-15 ENCOUNTER — OFFICE VISIT (OUTPATIENT)
Dept: ONCOLOGY | Facility: CLINIC | Age: 68
End: 2018-11-15

## 2018-11-15 ENCOUNTER — INFUSION (OUTPATIENT)
Dept: ONCOLOGY | Facility: HOSPITAL | Age: 68
End: 2018-11-15

## 2018-11-15 VITALS
BODY MASS INDEX: 35.19 KG/M2 | HEART RATE: 74 BPM | SYSTOLIC BLOOD PRESSURE: 110 MMHG | RESPIRATION RATE: 16 BRPM | OXYGEN SATURATION: 96 % | WEIGHT: 191.2 LBS | HEIGHT: 62 IN | DIASTOLIC BLOOD PRESSURE: 70 MMHG | TEMPERATURE: 97.3 F

## 2018-11-15 DIAGNOSIS — D64.81 ANEMIA ASSOCIATED WITH CHEMOTHERAPY: Primary | ICD-10-CM

## 2018-11-15 DIAGNOSIS — N18.30 CHRONIC KIDNEY DISEASE, STAGE III (MODERATE) (HCC): ICD-10-CM

## 2018-11-15 DIAGNOSIS — T45.1X5A ANEMIA ASSOCIATED WITH CHEMOTHERAPY: Primary | ICD-10-CM

## 2018-11-15 DIAGNOSIS — C90.00 MULTIPLE MYELOMA NOT HAVING ACHIEVED REMISSION (HCC): ICD-10-CM

## 2018-11-15 DIAGNOSIS — T45.1X5A CHEMOTHERAPY-INDUCED THROMBOCYTOPENIA: ICD-10-CM

## 2018-11-15 DIAGNOSIS — R53.82 CHRONIC FATIGUE: ICD-10-CM

## 2018-11-15 DIAGNOSIS — D75.89 MACROCYTOSIS: ICD-10-CM

## 2018-11-15 DIAGNOSIS — R21 RASH IN ADULT: ICD-10-CM

## 2018-11-15 DIAGNOSIS — D64.81 ANEMIA ASSOCIATED WITH CHEMOTHERAPY: ICD-10-CM

## 2018-11-15 DIAGNOSIS — Z45.2 FITTING AND ADJUSTMENT OF VASCULAR CATHETER: ICD-10-CM

## 2018-11-15 DIAGNOSIS — D69.59 CHEMOTHERAPY-INDUCED THROMBOCYTOPENIA: ICD-10-CM

## 2018-11-15 DIAGNOSIS — Z85.3 PERSONAL HISTORY OF BREAST CANCER: ICD-10-CM

## 2018-11-15 DIAGNOSIS — T45.1X5A ANEMIA ASSOCIATED WITH CHEMOTHERAPY: ICD-10-CM

## 2018-11-15 DIAGNOSIS — C90.00 MULTIPLE MYELOMA NOT HAVING ACHIEVED REMISSION (HCC): Primary | ICD-10-CM

## 2018-11-15 LAB
BASOPHILS # BLD AUTO: 0.03 10*3/MM3 (ref 0–0.2)
BASOPHILS NFR BLD AUTO: 0.4 % (ref 0–1.5)
DEPRECATED RDW RBC AUTO: 61.8 FL (ref 37–54)
EOSINOPHIL # BLD AUTO: 0.26 10*3/MM3 (ref 0–0.7)
EOSINOPHIL NFR BLD AUTO: 3.5 % (ref 0.3–6.2)
ERYTHROCYTE [DISTWIDTH] IN BLOOD BY AUTOMATED COUNT: 15.9 % (ref 11.7–13)
FOLATE SERPL-MCNC: 6.75 NG/ML (ref 4.78–24.2)
HCT VFR BLD AUTO: 29.6 % (ref 35.6–45.5)
HGB BLD-MCNC: 9 G/DL (ref 11.9–15.5)
IMM GRANULOCYTES # BLD: 0.24 10*3/MM3 (ref 0–0.03)
IMM GRANULOCYTES NFR BLD: 3.3 % (ref 0–0.5)
LYMPHOCYTES # BLD AUTO: 2.65 10*3/MM3 (ref 0.9–4.8)
LYMPHOCYTES NFR BLD AUTO: 36.2 % (ref 19.6–45.3)
MACROCYTES BLD QL SMEAR: NORMAL
MCH RBC QN AUTO: 32.6 PG (ref 26.9–32)
MCHC RBC AUTO-ENTMCNC: 30.4 G/DL (ref 32.4–36.3)
MCV RBC AUTO: 107.2 FL (ref 80.5–98.2)
MONOCYTES # BLD AUTO: 0.63 10*3/MM3 (ref 0.2–1.2)
MONOCYTES NFR BLD AUTO: 8.6 % (ref 5–12)
NEUTROPHILS # BLD AUTO: 3.52 10*3/MM3 (ref 1.9–8.1)
NEUTROPHILS NFR BLD AUTO: 48 % (ref 42.7–76)
NRBC BLD MANUAL-RTO: 0.4 /100 WBC (ref 0–0)
PLAT MORPH BLD: NORMAL
PLATELET # BLD AUTO: 94 10*3/MM3 (ref 140–500)
PMV BLD AUTO: 11.8 FL (ref 6–12)
RBC # BLD AUTO: 2.76 10*6/MM3 (ref 3.9–5.2)
ROULEAUX BLD QL SMEAR: NORMAL
WBC MORPH BLD: NORMAL
WBC NRBC COR # BLD: 7.33 10*3/MM3 (ref 4.5–10.7)

## 2018-11-15 PROCEDURE — 82746 ASSAY OF FOLIC ACID SERUM: CPT | Performed by: INTERNAL MEDICINE

## 2018-11-15 PROCEDURE — 96372 THER/PROPH/DIAG INJ SC/IM: CPT

## 2018-11-15 PROCEDURE — 36415 COLL VENOUS BLD VENIPUNCTURE: CPT

## 2018-11-15 PROCEDURE — 63510000001 EPOETIN ALFA PER 1000 UNITS: Performed by: INTERNAL MEDICINE

## 2018-11-15 PROCEDURE — 85007 BL SMEAR W/DIFF WBC COUNT: CPT | Performed by: INTERNAL MEDICINE

## 2018-11-15 PROCEDURE — 85025 COMPLETE CBC W/AUTO DIFF WBC: CPT | Performed by: INTERNAL MEDICINE

## 2018-11-15 PROCEDURE — 99215 OFFICE O/P EST HI 40 MIN: CPT | Performed by: INTERNAL MEDICINE

## 2018-11-15 RX ORDER — PALONOSETRON 0.05 MG/ML
0.25 INJECTION, SOLUTION INTRAVENOUS ONCE
Status: CANCELLED | OUTPATIENT
Start: 2018-11-29

## 2018-11-15 RX ORDER — BORTEZOMIB 3.5 MG/1
1.3 INJECTION, POWDER, LYOPHILIZED, FOR SOLUTION INTRAVENOUS; SUBCUTANEOUS ONCE
Status: CANCELLED | OUTPATIENT
Start: 2018-11-29

## 2018-11-15 RX ORDER — BORTEZOMIB 3.5 MG/1
1.3 INJECTION, POWDER, LYOPHILIZED, FOR SOLUTION INTRAVENOUS; SUBCUTANEOUS ONCE
Status: CANCELLED | OUTPATIENT
Start: 2018-12-06

## 2018-11-15 RX ORDER — SODIUM CHLORIDE 9 MG/ML
250 INJECTION, SOLUTION INTRAVENOUS ONCE
Status: CANCELLED | OUTPATIENT
Start: 2018-12-06

## 2018-11-15 RX ORDER — SODIUM CHLORIDE 9 MG/ML
250 INJECTION, SOLUTION INTRAVENOUS ONCE
Status: CANCELLED | OUTPATIENT
Start: 2018-11-29

## 2018-11-15 RX ORDER — BORTEZOMIB 3.5 MG/1
1.3 INJECTION, POWDER, LYOPHILIZED, FOR SOLUTION INTRAVENOUS; SUBCUTANEOUS ONCE
Status: CANCELLED | OUTPATIENT
Start: 2018-12-13

## 2018-11-15 RX ORDER — BORTEZOMIB 3.5 MG/1
1.3 INJECTION, POWDER, LYOPHILIZED, FOR SOLUTION INTRAVENOUS; SUBCUTANEOUS ONCE
Status: CANCELLED | OUTPATIENT
Start: 2018-12-09

## 2018-11-15 RX ORDER — PALONOSETRON 0.05 MG/ML
0.25 INJECTION, SOLUTION INTRAVENOUS ONCE
Status: CANCELLED | OUTPATIENT
Start: 2018-12-06

## 2018-11-15 RX ORDER — SODIUM CHLORIDE 0.9 % (FLUSH) 0.9 %
10 SYRINGE (ML) INJECTION AS NEEDED
Status: CANCELLED | OUTPATIENT
Start: 2018-11-15

## 2018-11-15 RX ORDER — SODIUM CHLORIDE 0.9 % (FLUSH) 0.9 %
10 SYRINGE (ML) INJECTION AS NEEDED
Status: DISCONTINUED | OUTPATIENT
Start: 2018-11-15 | End: 2018-11-15 | Stop reason: HOSPADM

## 2018-11-15 RX ADMIN — SODIUM CHLORIDE, PRESERVATIVE FREE 500 UNITS: 5 INJECTION INTRAVENOUS at 08:54

## 2018-11-15 RX ADMIN — ERYTHROPOIETIN 20000 UNITS: 20000 INJECTION, SOLUTION INTRAVENOUS; SUBCUTANEOUS at 10:52

## 2018-11-15 RX ADMIN — Medication 10 ML: at 08:54

## 2018-11-15 NOTE — PROGRESS NOTES
Reasons for follow up:   1. IgG kappa multiple myeloma, currently on Darzalex, started on May 26, 2016. Patient was switched to Darzalex from Pomalyst, as she continued to be neutropenic with recurrent urinary tract infection while on Pomalyst.    2. Zometa is on hold due to renal insufficiency.    3. After 16 doses of Darzalex, laboratory study showed stable disease in November 2016. Insurance company denied adding Velcade and dexamethasone. Patient is to be continued on monthly Darzalex from 12/06/16.   4. Obstructive right pyelonephritis with positive urine culture for E. coli, required hospitalization from 1/19/2017 through 01/24/2017 with iv antibiotics and stent exchange on 01/20/2017.   5.  Paraprotein studies on March 27, 2017 showed further slight improvement of multiple myeloma.   6.  Febrile illness, with urinary tract infection and influenza B infection in early May 2017, required hospitalization for 6 days.   7.   Paraprotein studies for both serum and 24-hour urine sample on 7/21/2017 showed further improvement of paraproteins.   Darzalex was continued.   8.  Serum protein study reported stable disease on 10/20/2017.  Darzalex will be continued.   9.  Laboratory studies of both serum paraprotein and a 24-hour urine protein in January 2018 showed a further improvement of paraproteins.  Monthly Darzalex will be continued.   10.  Repair of left flank incisional hernia in middle September 2018.   11.  Serum paraprotein study reached micaela on 7/5/2018.  Since that time she has evidence of disease progression.           History of Present Illness:     Patient is a 68 female is here today for reevaluation,  to discuss the results of laboratory studies we obtained 2 weeks ago to assess her disease status of multiple myeloma. The patient reports significant fatigue but no headaches, no nausea, no vomiting. No back pain, no dysuria, hematuria. No fever, no sweating, no chills. Denies bone pain. The patient also  complains her rashes on extremities have not resolved. They remain pinkish spot and eventually had light brown pigmentation. It was not pruritic.     Laboratory study 2 weeks ago obtained on 11/01/2018 showed further disease progression. Her serum IgG was 5472 mg/dL, IgA 8 and IgM 10, serum kappa chain 7.9 mg/L, free lambda chain 961.3 mg/L and kappa/lambda ratio 0.01. Serum protein electrophoresis reported monoclonal IgG lambda 4.1 g/dL, and monoclonal lambda free light chain 0.1 g/dL. Her hemoglobin was 9.0, .6, MCHC 30.1, platelets 124,000 and WBC 10,000 including neutrophils 7400, lymphocytes 1200, monocytes 600.       Past Medical History, Past Surgical History, Social History, Family History have been reviewed and are without significant changes except as mentioned.      Hematology/oncology history: See separate document for extra information.       On12/6/2016, serum free lambda chain 1090 MG/L, free kappa chain 8.5 to MG/L.  Ferritin 1015, iron 99, TIBC 137 iron saturation 72%.     On January 4, 2017, vitamin B12 level 1289, folate 7.7 ng/mL. SPEP reporting gamma globulin 3.3, out of total globulin 5.0, with immunofixation reporting small quantity of monoclonal free lambda chain, plus monoclonal IgG lambda at 3.2 g/DL.  Serum IgG 4075, IgA 9 and IgM 15.  free lambda chain 1339 MG/L and free kappa chain 10.3 MG/L.      Laboratory study March 27, 2017 showed slight improvement of paraprotein, SPEP reporting M spike 2.8 g/DL, out of total globulin 4.3, and the serum IgG 3420, IgA 9, IgM 11, free lambda chain 1218 MG/L and free kappa chain 8.0 MG/L.  Total 24 hour urine sample reported at free lambda chain 142 mg, at a concentration 74.8 MG/L, free kappa chain 75 mg at a concentration 39.5 MG/L., Urine protein immunofixation reporting biclonal IgG lambda and monoclonal free lambda light chain, M spike #1 at 41.5% and monoclonal IgG lambda spike #2 at 10.5%. Serum beta-2 microglobulin is 7.3 MG/L.     Her  laboratory study on 7/21/2017 reported further improvement of paraprotein is both serum and a 24-hour urine sample.  SPEP reporting monoclonal IgG lambda 2.9 g/dL and free lambda chain 0.1 g/dL.  Serum IgG was 3261 mg/dL and IgA 5, IgM 13, free kappa chain 6.7, free lambda chain 701.3 with kappa/lambda ratio 0.01.  24-hour urine sample reported positive for Bence May protein lambda type, immunofixation positive for IgG lambda.  Total urine protein 241 mg, gamma globulin 13.1%.  Hemoglobin was 11.4 .4, platelets 122,000, WBC 6680 including neutrophils 3600, lymphocytes 2100 and monocytes 650.  Her creatinine was 1.68, at baseline level.  Liver function panel unremarkable.  Darzalex was continued.    Laboratory study on 8/25/2017 showed improved the platelets at 144,000, normal WBC 6660 and slightly improved hemoglobin at 11.7.     Patient had a colonoscopy examination on 8/30/2017 by Dr. Rivera.  It reported diverticulosis in multiple areas more severe in the sigmoid colon.  There was 2 polyps 4 mm pneumonia from transverse colon and the sigmoid colon.  Pathology evaluation reported tubular adenoma from the sigmoid colon polyp, and benign hyperplastic polyp from transverse colon.    Laboratory study on 10/20/2017 reported slightly improved monoclonal spike 2.7 g/dL by SPEP, immunofixation reported positive monoclonal IgG lambda, serum IgG 3536 mg/dL, IgA less than 5 and IgM 11, free kappa chain 5.3 mg/L, and free lambda chain 720 mg/L with kappa/lambda ratio 0.01.  Serum beta-2 microglobulin was 6.5 mg/L.   Her CBC showed a stable mild anemia with hemoglobin 11.3, macrocytosis .3, and the platelets 114,000, normal WBC 7070 including neutrophils 4100 lymphocytes 2000 monocytes 650, normal liver function panel, total protein 7.9 and albumin 3.2,  and normal electrolytes including calcium 8.1, and improved creatinine 1.59.     Laboratory study on 1/12/2018 reported serum IgG 3083 mg/dL, IgA 10, IgM 10,  free kappa chain 8.5 and free lambda chain 589.1 mg/L, kappa/lambda ratio 0.01, serum protein admitted fixation reported IgG lambda monoclonal protein and SPEP reporting M spike 3.1 g/dL, beta-2 microgram and 7.4 mg/L. serum creatinine 1.79, calcium 8.2, other electrolytes normal, hemoglobin 11.3, .5 and MCHC 31.6, platelets 129,000, WBC 6780 including neutrophils 3500 lymphocytes 2300.  Serum ferritin 653.7 ng/mL, iron 123 TIBC 174 iron saturation 71%, folic acid 12.8 ng/mL and a vitamin B12 at 873 pg/mL.  Her 24-hour urine study on 1/18/2018 reported lambda chain 32.8 mg/L, total 61 mg in 24 hours, and the free kappa chain 27.4 mg/L and 51 mg in 24 hours, and the total 24-hour urine protein 283 mg, and urine protein immunofixation positive for 5.3% monoclonal IgG lambda and positive for Bence May protein at 36.2% monoclonal lambda chain.  Monthly Darzalex was continued.       This patient had serum protein study on 04/06/2018 which reported free light chain at concentration 703.8 mg/L and free kappa chain 7.0, free kappa/lambda ratio 0.01, serum IgG 3650 mg/dL, IgM 11 and IgA 9 with serum protein electrophoresis reporting monoclonal IgG lambda 3.2 g/dL and also monoclonal free lambda light chain.  But it was unable to quantify monoclonal lambda light chain because of the small quantity of it.     A 24-hour urine study on 04/20/2018 reported total free lambda chain 60 mg in 24 hours at concentration 33.1 mg/L, free kappa chain 45 mg in 24 hours at concentration 24.8 mg/L. Total 24-hour urine protein was 279 mg. Urine protein immunofixation and UPEP reported lambda-type Bence-May protein + #1 monoclonal IgG lambda band at 34.8% and #2 monoclonal IgG lambda band at 6.9%.     Her CBC results today reported a stable hemoglobin at 11.1, platelets 130,000 and WBC 7440 including neutrophils 4100 and lymphocytes 2350, monocytes 650. Her CMP reported slightly worsened creatinine at 1.82 with BUN 33. Total protein  "at 8.8. Liver function panel was normal. Total serum protein 8.8 and albumin 3.2, globulin 5.6.    Review of Systems    Constitutional: No fever no sweating no chills.  No weight loss.Profound fatigue   HENT: Negative for mouth sores and sore throat.    Eyes: Negative for visual disturbance.    Cardiac: Denies chest pain no palpitation.  No lower extremity edema.    Respiratory: + cough and rhinorrhea,  no hemoptysis no short of breath.    Gastrointestinal: Negative for abdominal pain, diarrhea, nausea and vomiting.    Musculoskeletal: Negative for back pain and joint swelling.    Neurological: Negative for dizziness.   Hematological: Does not bruise/bleed easily.    SKIN: See HPI  Psychiatric/Behavioral: The patient is not nervous/anxious.        Medications: The current medication list was reviewed in the EMR.       ALLERGIES: Zosyn       Vitals:    11/15/18 0904   BP: 110/70   Pulse: 74   Resp: 16   Temp: 97.3 °F (36.3 °C)   SpO2: 96%  Comment: at rest   Weight: 86.7 kg (191 lb 3.2 oz)   Height: 157.5 cm (62.01\")   PainSc: 0-No pain   PS: ECOG 1      Physical Exam   Constitutional: well-developed and well-nourished -American female. No distress. Appears weak today   HENT:     Head: Normocephalic.     Eyes: No jaundice.     Neck: Normal range of motion.   no masses.    Cardiovascular: Normal rate, regular rhythm, normal heart sounds and normal pulses.    Pulmonary/Chest: Lungs clear to auscultation bilaterally, no wheezes, no rales.  Mediport benign right upper chest.  Abdominal: Soft, no tenderness guarding or rebound.  Bowel sounds are normal. no distension and no mass. No hepatosplenomegaly.  Left flank has fresh scar from recent hernia repair, healing well   Musculoskeletal: no edema or deformity.   Lymphadenopathy: She has no cervical, supraclavicular adenopathy.   Neurological: oriented to person, place, and time. No cranial nerve deficit.    Skin: Left flank area fresh scar.  No signs of infection.  " There are multiple circular discoloration spots on extremities, as big as 1.5 cm in diameter, some of those with very faint pinkish plaques in the center, this remains unchanged today, she does have a new area on her right thigh that is consistent with the other discolored spots    Psychiatric: She has normal mood and affect.         Recent lab results:   Lab Results   Component Value Date    WBC 7.33 11/15/2018    HGB 9.0 (L) 11/15/2018    HCT 29.6 (L) 11/15/2018    .2 (H) 11/15/2018    PLT 94 (L) 11/15/2018     Lab Results   Component Value Date    NEUTROABS 3.52 11/15/2018     Lab Results   Component Value Date    GLUCOSE 101 (H) 11/01/2018    BUN 20 11/01/2018    CREATININE 1.83 (H) 11/01/2018    EGFRIFNONA  08/30/2016      Comment:      <15 Indicative of kidney failure.    EGFRIFAFRI 33 (L) 11/01/2018    BCR 10.9 11/01/2018    K 3.6 11/01/2018    CO2 18.0 (L) 11/01/2018    CALCIUM 8.2 (L) 11/01/2018    PROTENTOTREF 8.9 (H) 11/01/2018    ALBUMIN 2.8 (L) 11/01/2018    ALBUMIN 2.80 (L) 11/01/2018    LABIL2 0.5 (L) 11/01/2018    AST 18 11/01/2018    ALT 15 11/01/2018     Sodium   Date Value Ref Range Status   11/01/2018 140 136 - 145 mmol/L Final     Potassium   Date Value Ref Range Status   11/01/2018 3.6 3.5 - 5.2 mmol/L Final     Total Bilirubin   Date Value Ref Range Status   11/01/2018 0.4 0.1 - 1.2 mg/dL Final     Alkaline Phosphatase   Date Value Ref Range Status   11/01/2018 68 39 - 117 U/L Final   ]  Lab Results   Component Value Date    NVWNOWKD32 >2,000 (H) 11/01/2018     Lab Results   Component Value Date    IRON 69 11/01/2018    TIBC 145 (L) 11/01/2018    FERRITIN 812.90 (H) 11/01/2018     Iron saturation 48% on 11/1/2018     Lab Results   Component Value Date    FOLATE 6.75 11/15/2018     Paraprotein is on 11/1/2018 see history of present illness.    Assessment/Plan   1. Multiple myeloma, IgG lambda, stage III. Failed multiple lines of therapy. Her treatment was switched to Darzalex on  5/26/2016. She has been on this treatment for more than 2 years now.  She has tolerated therapy very well.     Reviewing her previous lab studies especially serum paraprotein, she reached a micaela of response on 07/05/2018 when she had serum IgG 3015 mg/dL, free lambda chain 458.7 mg/L and M spike IgG lambda 2.6 g/dL and lambda free light chain 0.1 g/dL, total 2.7 g/dL. since that time her laboratory study showed trending up for serum IgG and free lambda chain.     I discussed with the patient for her disease progression and recommended switching treatment regimen. This patient had multiple lines of treatment and also comorbidities. In summary she previously had VRd regimen but had rectal bleeding shortly after starting Revlimid. She later was started on melphalan, Velcade and dexamethasone and also had Pomalyst plus dexamethasone. Also had CyBorD regimen, also had treatment with panobinostat plus Velcade and dexamethasone. She also progressed on Kyprolis which she had an issue with cardiac problem. Overall I will try to avoid medications with potential cardiac toxicity due to her history of congestive heart failure. At one point her LVEF was in the 30's when she was tested in [facility] to be exact 31% but later improved about 40%. So I recommended switching her therapy to bendamustine plus Velcade plus dexamethasone. She has no peripheral neuropathy so we can reuse Velcade.     2. Zometa treatment.  Her last dose Zometa was on 11/1/2018 . She receives Zometa every 3 months.       3.  Macrocytic anemia secondary to chemotherapy for multiple myeloma and chronic renal insufficiency stage 3.  She has trending down hemoglobin in the past 6-8 weeks.  This patient is symptomatic, with cold sensitivity and the worsening fatigue and low energy.      This patient also has anemia secondary to chemotherapy and I think this is probably related to her worsening fatigue. Her hemoglobin is 9.0 and we recently  obtained laboratory study on 11/01/2018 which showed elevated ferritin 812.9, iron 69, TIBC 145 and iron saturation 48%. Her vitamin B12 was more than 2000 pg/mL. Her folic acid today is 6.75 ng/mL. I recommend starting this patient on Procrit weekly at 20,000 units each with CBC monitoring. In the meantime she will continue oral vitamin B12 daily, and also add folic acid 800 mcg daily.     I think this patient will benefit from Procrit injection therapy.      4. Mild to moderate thrombocytopenia secondary to her multiple myeloma and chemotherapy.  Overall her platelet counts has been fluctuating widely anywhere between 90,000 in the 140,000 in the past 16 months.  She is asymptomatic, no easy bleeding or bruising.  Platelets today are 94,000. Continue to monitor.     5. History of stage II left breast cancer, post mastectomy in 2013, negative for ER/HI and positive HER-2. No neoadjuvant chemotherapy because of concurrent discovery of multiple myeloma and ongoing chemotherapy. She had a normal mammogram study on 7/24/2018.       6.  Right middle lobe pulmonary nodule, documented on CT scan of chest on 01/06/2017. Repeated CT scan of chest on 8/21/2017 reported a small 5 mm and stable primary nodule.      7.  Chronic renal insufficiency stage III.  Her creatinine was 1.83 on 11/1/2018.     8.  Skin rashes on her extremities: This began approximately 6 weeks ago. She report occasional, mild pruritis.   This occurred after starting Neurontin, however Neurontin has been discontinued 3-4 weeks ago, with resolution of pruritus, however she still has ongoing skin rashes.     Discussed with the patient today for dermatology consult and she is agreeable.    10. Profound fatigue: This is likely related to her disease progress in and her anemia associated with chemotherapy.  Hopefully changing chemotherapy regimen and also starting on Procrit would improve this situation.        Plan:    1. Start Procrit 20,000 units today and  repeat weekly with CBC monitoring except next week Thanksgiving holiday.  2. Chemotherapy teaching lesson for bendamustine. She already had Velcade and dexamethasone previously.  3. Start prophylactic acyclovir 400 mg twice a day I E-scribed to her pharmacy.   4. Continue vitamin B12 at 1000 mcg daily. Folic acid 800 mcg daily.  5. I E-scribed dexamethasone to her pharmacy, to be taken 20 mg on day of her Velcade injection per protocol.  6. The patient will return on 12/29/2018 to start 1st cycle of chemotherapy with bendamustine plus Velcade plus dexamethasone per protocol. Treatment will be bendamustine on day 1 and day 4 repeat every 28 days, Velcade will be on day 1 day 4 and day 8 and day 15 repeat every 28 days. Dexamethasone will be the same day as Velcade.   7.  Referral to dermatologist Dr. Ely Hedrick for evaluation of skin rash.      More than 55 min were used for patient care, 40 minutes of that time were for counseling.       LI OBANDO M.D., Ph.D.    11/15/2018        CC:   Ely Hedrick M.D. Dermatologist       Dictated using Dragon Dictation.

## 2018-11-20 ENCOUNTER — APPOINTMENT (OUTPATIENT)
Dept: ONCOLOGY | Facility: CLINIC | Age: 68
End: 2018-11-20

## 2018-11-20 ENCOUNTER — APPOINTMENT (OUTPATIENT)
Dept: OTHER | Facility: HOSPITAL | Age: 68
End: 2018-11-20

## 2018-11-20 DIAGNOSIS — C64.9 MALIGNANT NEOPLASM OF KIDNEY, UNSPECIFIED LATERALITY (HCC): Primary | ICD-10-CM

## 2018-11-20 DIAGNOSIS — T45.1X5A ANEMIA ASSOCIATED WITH CHEMOTHERAPY: ICD-10-CM

## 2018-11-20 DIAGNOSIS — D64.81 ANEMIA ASSOCIATED WITH CHEMOTHERAPY: ICD-10-CM

## 2018-11-20 DIAGNOSIS — C90.00 MULTIPLE MYELOMA NOT HAVING ACHIEVED REMISSION (HCC): ICD-10-CM

## 2018-11-21 ENCOUNTER — APPOINTMENT (OUTPATIENT)
Dept: ONCOLOGY | Facility: CLINIC | Age: 68
End: 2018-11-21

## 2018-11-21 ENCOUNTER — APPOINTMENT (OUTPATIENT)
Dept: ONCOLOGY | Facility: HOSPITAL | Age: 68
End: 2018-11-21

## 2018-11-21 ENCOUNTER — APPOINTMENT (OUTPATIENT)
Dept: OTHER | Facility: HOSPITAL | Age: 68
End: 2018-11-21

## 2018-11-29 ENCOUNTER — OFFICE VISIT (OUTPATIENT)
Dept: ONCOLOGY | Facility: CLINIC | Age: 68
End: 2018-11-29

## 2018-11-29 ENCOUNTER — INFUSION (OUTPATIENT)
Dept: ONCOLOGY | Facility: HOSPITAL | Age: 68
End: 2018-11-29

## 2018-11-29 ENCOUNTER — APPOINTMENT (OUTPATIENT)
Dept: ONCOLOGY | Facility: HOSPITAL | Age: 68
End: 2018-11-29

## 2018-11-29 VITALS
HEIGHT: 62 IN | OXYGEN SATURATION: 100 % | SYSTOLIC BLOOD PRESSURE: 110 MMHG | RESPIRATION RATE: 16 BRPM | BODY MASS INDEX: 34.8 KG/M2 | WEIGHT: 189.1 LBS | HEART RATE: 71 BPM | TEMPERATURE: 97.7 F | DIASTOLIC BLOOD PRESSURE: 70 MMHG

## 2018-11-29 DIAGNOSIS — D75.89 MACROCYTOSIS: ICD-10-CM

## 2018-11-29 DIAGNOSIS — C90.00 MULTIPLE MYELOMA NOT HAVING ACHIEVED REMISSION (HCC): Primary | ICD-10-CM

## 2018-11-29 DIAGNOSIS — Z45.2 FITTING AND ADJUSTMENT OF VASCULAR CATHETER: ICD-10-CM

## 2018-11-29 DIAGNOSIS — N18.30 CHRONIC KIDNEY DISEASE, STAGE III (MODERATE) (HCC): ICD-10-CM

## 2018-11-29 DIAGNOSIS — T45.1X5A ANEMIA ASSOCIATED WITH CHEMOTHERAPY: ICD-10-CM

## 2018-11-29 DIAGNOSIS — R21 RASH IN ADULT: ICD-10-CM

## 2018-11-29 DIAGNOSIS — C90.00 MULTIPLE MYELOMA NOT HAVING ACHIEVED REMISSION (HCC): ICD-10-CM

## 2018-11-29 DIAGNOSIS — D64.81 ANEMIA ASSOCIATED WITH CHEMOTHERAPY: ICD-10-CM

## 2018-11-29 DIAGNOSIS — D69.59 CHEMOTHERAPY-INDUCED THROMBOCYTOPENIA: ICD-10-CM

## 2018-11-29 DIAGNOSIS — T45.1X5A CHEMOTHERAPY-INDUCED THROMBOCYTOPENIA: ICD-10-CM

## 2018-11-29 DIAGNOSIS — D64.81 ANEMIA ASSOCIATED WITH CHEMOTHERAPY: Primary | ICD-10-CM

## 2018-11-29 DIAGNOSIS — T45.1X5A ANEMIA ASSOCIATED WITH CHEMOTHERAPY: Primary | ICD-10-CM

## 2018-11-29 LAB
ALBUMIN SERPL-MCNC: 2.8 G/DL (ref 3.5–5.2)
ALBUMIN/GLOB SERPL: 0.4 G/DL
ALP SERPL-CCNC: 62 U/L (ref 39–117)
ALT SERPL W P-5'-P-CCNC: 11 U/L (ref 1–33)
ANION GAP SERPL CALCULATED.3IONS-SCNC: 7.1 MMOL/L
AST SERPL-CCNC: 15 U/L (ref 1–32)
BASOPHILS # BLD AUTO: 0.02 10*3/MM3 (ref 0–0.2)
BASOPHILS NFR BLD AUTO: 0.2 % (ref 0–1.5)
BILIRUB SERPL-MCNC: 0.4 MG/DL (ref 0.1–1.2)
BUN BLD-MCNC: 25 MG/DL (ref 8–23)
BUN/CREAT SERPL: 13.2 (ref 7–25)
CALCIUM SPEC-SCNC: 7.8 MG/DL (ref 8.6–10.5)
CHLORIDE SERPL-SCNC: 115 MMOL/L (ref 98–107)
CO2 SERPL-SCNC: 17.9 MMOL/L (ref 22–29)
CREAT BLD-MCNC: 1.89 MG/DL (ref 0.57–1)
DACRYOCYTES BLD QL SMEAR: NORMAL
DEPRECATED RDW RBC AUTO: 64.7 FL (ref 37–54)
EOSINOPHIL # BLD AUTO: 0.27 10*3/MM3 (ref 0–0.7)
EOSINOPHIL NFR BLD AUTO: 3.3 % (ref 0.3–6.2)
ERYTHROCYTE [DISTWIDTH] IN BLOOD BY AUTOMATED COUNT: 16.6 % (ref 11.7–13)
GFR SERPL CREATININE-BSD FRML MDRD: 32 ML/MIN/1.73
GLOBULIN UR ELPH-MCNC: 7.3 GM/DL
GLUCOSE BLD-MCNC: 92 MG/DL (ref 65–99)
HCT VFR BLD AUTO: 30.7 % (ref 35.6–45.5)
HGB BLD-MCNC: 9.3 G/DL (ref 11.9–15.5)
IMM GRANULOCYTES # BLD: 0.21 10*3/MM3 (ref 0–0.03)
IMM GRANULOCYTES NFR BLD: 2.6 % (ref 0–0.5)
LYMPHOCYTES # BLD AUTO: 2.66 10*3/MM3 (ref 0.9–4.8)
LYMPHOCYTES NFR BLD AUTO: 32.4 % (ref 19.6–45.3)
MACROCYTES BLD QL SMEAR: NORMAL
MCH RBC QN AUTO: 32.4 PG (ref 26.9–32)
MCHC RBC AUTO-ENTMCNC: 30.3 G/DL (ref 32.4–36.3)
MCV RBC AUTO: 107 FL (ref 80.5–98.2)
MONOCYTES # BLD AUTO: 0.78 10*3/MM3 (ref 0.2–1.2)
MONOCYTES NFR BLD AUTO: 9.5 % (ref 5–12)
NEUTROPHILS # BLD AUTO: 4.28 10*3/MM3 (ref 1.9–8.1)
NEUTROPHILS NFR BLD AUTO: 52 % (ref 42.7–76)
NRBC BLD MANUAL-RTO: 0.5 /100 WBC (ref 0–0)
PLAT MORPH BLD: NORMAL
PLATELET # BLD AUTO: 118 10*3/MM3 (ref 140–500)
PMV BLD AUTO: 12.1 FL (ref 6–12)
POTASSIUM BLD-SCNC: 4.1 MMOL/L (ref 3.5–5.2)
PROT SERPL-MCNC: 10.1 G/DL (ref 6–8.5)
RBC # BLD AUTO: 2.87 10*6/MM3 (ref 3.9–5.2)
SODIUM BLD-SCNC: 140 MMOL/L (ref 136–145)
WBC MORPH BLD: NORMAL
WBC NRBC COR # BLD: 8.22 10*3/MM3 (ref 4.5–10.7)

## 2018-11-29 PROCEDURE — 80053 COMPREHEN METABOLIC PANEL: CPT | Performed by: INTERNAL MEDICINE

## 2018-11-29 PROCEDURE — 63510000001 EPOETIN ALFA PER 1000 UNITS: Performed by: INTERNAL MEDICINE

## 2018-11-29 PROCEDURE — 96409 CHEMO IV PUSH SNGL DRUG: CPT | Performed by: INTERNAL MEDICINE

## 2018-11-29 PROCEDURE — 99214 OFFICE O/P EST MOD 30 MIN: CPT | Performed by: INTERNAL MEDICINE

## 2018-11-29 PROCEDURE — 96401 CHEMO ANTI-NEOPL SQ/IM: CPT | Performed by: INTERNAL MEDICINE

## 2018-11-29 PROCEDURE — 96372 THER/PROPH/DIAG INJ SC/IM: CPT | Performed by: INTERNAL MEDICINE

## 2018-11-29 PROCEDURE — 85025 COMPLETE CBC W/AUTO DIFF WBC: CPT | Performed by: INTERNAL MEDICINE

## 2018-11-29 PROCEDURE — 99212-NC PR NO CHARGE CBC OFFICE OUTPATIENT VISIT 10 MINUTES: Performed by: NURSE PRACTITIONER

## 2018-11-29 PROCEDURE — 25010000002 BENDAMUSTINE HCL 100 MG/4ML SOLUTION 4 ML VIAL: Performed by: INTERNAL MEDICINE

## 2018-11-29 PROCEDURE — 25010000002 BORTEZOMIB PER 0.1 MG: Performed by: INTERNAL MEDICINE

## 2018-11-29 PROCEDURE — 25010000002 PALONOSETRON PER 25 MCG: Performed by: INTERNAL MEDICINE

## 2018-11-29 PROCEDURE — 85007 BL SMEAR W/DIFF WBC COUNT: CPT | Performed by: INTERNAL MEDICINE

## 2018-11-29 PROCEDURE — 25010000003 DEXAMETHASONE SODIUM PHOSPHATE 100 MG/10ML SOLUTION: Performed by: INTERNAL MEDICINE

## 2018-11-29 PROCEDURE — 96375 TX/PRO/DX INJ NEW DRUG ADDON: CPT | Performed by: INTERNAL MEDICINE

## 2018-11-29 RX ORDER — SODIUM CHLORIDE 0.9 % (FLUSH) 0.9 %
10 SYRINGE (ML) INJECTION AS NEEDED
Status: DISCONTINUED | OUTPATIENT
Start: 2018-11-29 | End: 2018-11-29 | Stop reason: HOSPADM

## 2018-11-29 RX ORDER — SODIUM CHLORIDE 9 MG/ML
250 INJECTION, SOLUTION INTRAVENOUS ONCE
Status: COMPLETED | OUTPATIENT
Start: 2018-11-29 | End: 2018-11-29

## 2018-11-29 RX ORDER — PALONOSETRON 0.05 MG/ML
0.25 INJECTION, SOLUTION INTRAVENOUS ONCE
Status: COMPLETED | OUTPATIENT
Start: 2018-11-29 | End: 2018-11-29

## 2018-11-29 RX ORDER — ONDANSETRON HYDROCHLORIDE 8 MG/1
8 TABLET, FILM COATED ORAL 3 TIMES DAILY PRN
Qty: 60 TABLET | Refills: 5 | Status: SHIPPED | OUTPATIENT
Start: 2018-11-29

## 2018-11-29 RX ORDER — DEXAMETHASONE 4 MG/1
20 TABLET ORAL
Qty: 20 TABLET | Refills: 7 | Status: SHIPPED | OUTPATIENT
Start: 2018-11-29 | End: 2019-01-24

## 2018-11-29 RX ORDER — ACYCLOVIR 400 MG/1
400 TABLET ORAL 2 TIMES DAILY
Qty: 60 TABLET | Refills: 5 | Status: SHIPPED | OUTPATIENT
Start: 2018-11-29 | End: 2019-01-01 | Stop reason: SDUPTHER

## 2018-11-29 RX ORDER — SODIUM CHLORIDE 0.9 % (FLUSH) 0.9 %
10 SYRINGE (ML) INJECTION AS NEEDED
Status: CANCELLED | OUTPATIENT
Start: 2018-11-29

## 2018-11-29 RX ORDER — BORTEZOMIB 3.5 MG/1
1.3 INJECTION, POWDER, LYOPHILIZED, FOR SOLUTION INTRAVENOUS; SUBCUTANEOUS ONCE
Status: COMPLETED | OUTPATIENT
Start: 2018-11-29 | End: 2018-11-29

## 2018-11-29 RX ADMIN — DEXAMETHASONE SODIUM PHOSPHATE 12 MG: 10 INJECTION, SOLUTION INTRAMUSCULAR; INTRAVENOUS at 12:49

## 2018-11-29 RX ADMIN — SODIUM CHLORIDE, PRESERVATIVE FREE 500 UNITS: 5 INJECTION INTRAVENOUS at 13:59

## 2018-11-29 RX ADMIN — Medication 10 ML: at 13:59

## 2018-11-29 RX ADMIN — BORTEZOMIB 2.4 MG: 3.5 INJECTION, POWDER, LYOPHILIZED, FOR SOLUTION INTRAVENOUS; SUBCUTANEOUS at 13:52

## 2018-11-29 RX ADMIN — BENDAMUSTINE HYDROCHLORIDE 130 MG: 25 INJECTION, SOLUTION INTRAVENOUS at 13:35

## 2018-11-29 RX ADMIN — ERYTHROPOIETIN 20000 UNITS: 20000 INJECTION, SOLUTION INTRAVENOUS; SUBCUTANEOUS at 12:15

## 2018-11-29 RX ADMIN — SODIUM CHLORIDE 250 ML: 900 INJECTION, SOLUTION INTRAVENOUS at 12:47

## 2018-11-29 RX ADMIN — PALONOSETRON 0.25 MG: 0.05 INJECTION, SOLUTION INTRAVENOUS at 12:47

## 2018-11-29 NOTE — PROGRESS NOTES
Reasons for follow up:   1. IgG kappa multiple myeloma, currently on Darzalex, started on May 26, 2016. Patient was switched to Darzalex from Pomalyst, as she continued to be neutropenic with recurrent urinary tract infection while on Pomalyst.    2. Zometa is on hold due to renal insufficiency.    3. After 16 doses of Darzalex, laboratory study showed stable disease in November 2016. Insurance company denied adding Velcade and dexamethasone. Patient is to be continued on monthly Darzalex from 12/06/16.   4. Obstructive right pyelonephritis with positive urine culture for E. coli, required hospitalization from 1/19/2017 through 01/24/2017 with iv antibiotics and stent exchange on 01/20/2017.   5.  Paraprotein studies on March 27, 2017 showed further slight improvement of multiple myeloma.   6.  Febrile illness, with urinary tract infection and influenza B infection in early May 2017, required hospitalization for 6 days.   7.   Paraprotein studies for both serum and 24-hour urine sample on 7/21/2017 showed further improvement of paraproteins.   Darzalex was continued.   8.  Serum protein study reported stable disease on 10/20/2017.  Darzalex will be continued.   9.  Laboratory studies of both serum paraprotein and a 24-hour urine protein in January 2018 showed a further improvement of paraproteins.  Monthly Darzalex will be continued.   10.  Repair of left flank incisional hernia in middle September 2018.   11.  Serum paraprotein study reached micaela on 7/5/2018.  Since that time she has evidence of disease progression.           History of Present Illness:     Patient is a 68 female is here today for reevaluation,  to discuss the results of laboratory studies we obtained 2 weeks ago to assess her disease status of multiple myeloma. The patient reports significant fatigue but no headaches, no nausea, no vomiting. No back pain, no dysuria, hematuria. No fever, no sweating, no chills. Denies bone pain. The patient also  complains her rashes on extremities have not resolved. They remain pinkish spot and eventually had light brown pigmentation. It was not pruritic.     Laboratory study 2 weeks ago obtained on 11/01/2018 showed further disease progression. Her serum IgG was 5472 mg/dL, IgA 8 and IgM 10, serum kappa chain 7.9 mg/L, free lambda chain 961.3 mg/L and kappa/lambda ratio 0.01. Serum protein electrophoresis reported monoclonal IgG lambda 4.1 g/dL, and monoclonal lambda free light chain 0.1 g/dL. Her hemoglobin was 9.0, .6, MCHC 30.1, platelets 124,000 and WBC 10,000 including neutrophils 7400, lymphocytes 1200, monocytes 600.       Past Medical History, Past Surgical History, Social History, Family History have been reviewed and are without significant changes except as mentioned.      Hematology/oncology history: See separate document for extra information.       On12/6/2016, serum free lambda chain 1090 MG/L, free kappa chain 8.5 to MG/L.  Ferritin 1015, iron 99, TIBC 137 iron saturation 72%.     On January 4, 2017, vitamin B12 level 1289, folate 7.7 ng/mL. SPEP reporting gamma globulin 3.3, out of total globulin 5.0, with immunofixation reporting small quantity of monoclonal free lambda chain, plus monoclonal IgG lambda at 3.2 g/DL.  Serum IgG 4075, IgA 9 and IgM 15.  free lambda chain 1339 MG/L and free kappa chain 10.3 MG/L.      Laboratory study March 27, 2017 showed slight improvement of paraprotein, SPEP reporting M spike 2.8 g/DL, out of total globulin 4.3, and the serum IgG 3420, IgA 9, IgM 11, free lambda chain 1218 MG/L and free kappa chain 8.0 MG/L.  Total 24 hour urine sample reported at free lambda chain 142 mg, at a concentration 74.8 MG/L, free kappa chain 75 mg at a concentration 39.5 MG/L., Urine protein immunofixation reporting biclonal IgG lambda and monoclonal free lambda light chain, M spike #1 at 41.5% and monoclonal IgG lambda spike #2 at 10.5%. Serum beta-2 microglobulin is 7.3 MG/L.     Her  laboratory study on 7/21/2017 reported further improvement of paraprotein is both serum and a 24-hour urine sample.  SPEP reporting monoclonal IgG lambda 2.9 g/dL and free lambda chain 0.1 g/dL.  Serum IgG was 3261 mg/dL and IgA 5, IgM 13, free kappa chain 6.7, free lambda chain 701.3 with kappa/lambda ratio 0.01.  24-hour urine sample reported positive for Bence May protein lambda type, immunofixation positive for IgG lambda.  Total urine protein 241 mg, gamma globulin 13.1%.  Hemoglobin was 11.4 .4, platelets 122,000, WBC 6680 including neutrophils 3600, lymphocytes 2100 and monocytes 650.  Her creatinine was 1.68, at baseline level.  Liver function panel unremarkable.  Darzalex was continued.    Laboratory study on 8/25/2017 showed improved the platelets at 144,000, normal WBC 6660 and slightly improved hemoglobin at 11.7.     Patient had a colonoscopy examination on 8/30/2017 by Dr. Rivera.  It reported diverticulosis in multiple areas more severe in the sigmoid colon.  There was 2 polyps 4 mm pneumonia from transverse colon and the sigmoid colon.  Pathology evaluation reported tubular adenoma from the sigmoid colon polyp, and benign hyperplastic polyp from transverse colon.    Laboratory study on 10/20/2017 reported slightly improved monoclonal spike 2.7 g/dL by SPEP, immunofixation reported positive monoclonal IgG lambda, serum IgG 3536 mg/dL, IgA less than 5 and IgM 11, free kappa chain 5.3 mg/L, and free lambda chain 720 mg/L with kappa/lambda ratio 0.01.  Serum beta-2 microglobulin was 6.5 mg/L.   Her CBC showed a stable mild anemia with hemoglobin 11.3, macrocytosis .3, and the platelets 114,000, normal WBC 7070 including neutrophils 4100 lymphocytes 2000 monocytes 650, normal liver function panel, total protein 7.9 and albumin 3.2,  and normal electrolytes including calcium 8.1, and improved creatinine 1.59.     Laboratory study on 1/12/2018 reported serum IgG 3083 mg/dL, IgA 10, IgM 10,  free kappa chain 8.5 and free lambda chain 589.1 mg/L, kappa/lambda ratio 0.01, serum protein admitted fixation reported IgG lambda monoclonal protein and SPEP reporting M spike 3.1 g/dL, beta-2 microgram and 7.4 mg/L. serum creatinine 1.79, calcium 8.2, other electrolytes normal, hemoglobin 11.3, .5 and MCHC 31.6, platelets 129,000, WBC 6780 including neutrophils 3500 lymphocytes 2300.  Serum ferritin 653.7 ng/mL, iron 123 TIBC 174 iron saturation 71%, folic acid 12.8 ng/mL and a vitamin B12 at 873 pg/mL.  Her 24-hour urine study on 1/18/2018 reported lambda chain 32.8 mg/L, total 61 mg in 24 hours, and the free kappa chain 27.4 mg/L and 51 mg in 24 hours, and the total 24-hour urine protein 283 mg, and urine protein immunofixation positive for 5.3% monoclonal IgG lambda and positive for Bence May protein at 36.2% monoclonal lambda chain.  Monthly Darzalex was continued.       This patient had serum protein study on 04/06/2018 which reported free light chain at concentration 703.8 mg/L and free kappa chain 7.0, free kappa/lambda ratio 0.01, serum IgG 3650 mg/dL, IgM 11 and IgA 9 with serum protein electrophoresis reporting monoclonal IgG lambda 3.2 g/dL and also monoclonal free lambda light chain.  But it was unable to quantify monoclonal lambda light chain because of the small quantity of it.     A 24-hour urine study on 04/20/2018 reported total free lambda chain 60 mg in 24 hours at concentration 33.1 mg/L, free kappa chain 45 mg in 24 hours at concentration 24.8 mg/L. Total 24-hour urine protein was 279 mg. Urine protein immunofixation and UPEP reported lambda-type Bence-May protein + #1 monoclonal IgG lambda band at 34.8% and #2 monoclonal IgG lambda band at 6.9%.     Her CBC results today reported a stable hemoglobin at 11.1, platelets 130,000 and WBC 7440 including neutrophils 4100 and lymphocytes 2350, monocytes 650. Her CMP reported slightly worsened creatinine at 1.82 with BUN 33. Total protein  "at 8.8. Liver function panel was normal. Total serum protein 8.8 and albumin 3.2, globulin 5.6.    Review of Systems    Constitutional: No fever no sweating no chills.  No weight loss.Profound fatigue   HENT: Negative for mouth sores and sore throat.    Eyes: Negative for visual disturbance.    Cardiac: Denies chest pain no palpitation.  No lower extremity edema.    Respiratory: + cough and rhinorrhea,  no hemoptysis no short of breath.    Gastrointestinal: Negative for abdominal pain, diarrhea, nausea and vomiting.    Musculoskeletal: Negative for back pain and joint swelling.    Neurological: Negative for dizziness.   Hematological: Does not bruise/bleed easily.    SKIN: See HPI  Psychiatric/Behavioral: The patient is not nervous/anxious.        Medications: The current medication list was reviewed in the EMR.       ALLERGIES: Zosyn       Vitals:    11/29/18 1129   BP: 110/70   Pulse: 71   Resp: 16   Temp: 97.7 °F (36.5 °C)   TempSrc: Oral   SpO2: 100%   Weight: 85.8 kg (189 lb 1.6 oz)   Height: 157.5 cm (62.01\")   PainSc: 0-No pain   PS: ECOG 1      Physical Exam   Constitutional: well-developed and well-nourished -American female. No distress. Appears weak today   HENT:     Head: Normocephalic.     Eyes: No jaundice.     Neck: Normal range of motion.   no masses.    Cardiovascular: Normal rate, regular rhythm, normal heart sounds and normal pulses.    Pulmonary/Chest: Lungs clear to auscultation bilaterally, no wheezes, no rales.  Mediport benign right upper chest.  Abdominal: Soft, no tenderness guarding or rebound.  Bowel sounds are normal. no distension and no mass. No hepatosplenomegaly.  Left flank has fresh scar from recent hernia repair, healing well   Musculoskeletal: no edema or deformity.   Lymphadenopathy: She has no cervical, supraclavicular adenopathy.   Neurological: oriented to person, place, and time. No cranial nerve deficit.    Skin: Left flank area fresh scar.  No signs of infection.  " There are multiple circular discoloration spots on extremities, as big as 1.5 cm in diameter, some of those with very faint pinkish plaques in the center, this remains unchanged today, she does have a new area on her right thigh that is consistent with the other discolored spots    Psychiatric: She has normal mood and affect.         Recent lab results:   Lab Results   Component Value Date    WBC 8.22 11/29/2018    HGB 9.3 (L) 11/29/2018    HCT 30.7 (L) 11/29/2018    .0 (H) 11/29/2018     (L) 11/29/2018     Lab Results   Component Value Date    NEUTROABS 4.28 11/29/2018     Lab Results   Component Value Date    GLUCOSE 101 (H) 11/01/2018    BUN 20 11/01/2018    CREATININE 1.83 (H) 11/01/2018    EGFRIFNONA  08/30/2016      Comment:      <15 Indicative of kidney failure.    EGFRIFAFRI 33 (L) 11/01/2018    BCR 10.9 11/01/2018    K 3.6 11/01/2018    CO2 18.0 (L) 11/01/2018    CALCIUM 8.2 (L) 11/01/2018    PROTENTOTREF 8.9 (H) 11/01/2018    ALBUMIN 2.8 (L) 11/01/2018    ALBUMIN 2.80 (L) 11/01/2018    LABIL2 0.5 (L) 11/01/2018    AST 18 11/01/2018    ALT 15 11/01/2018     Sodium   Date Value Ref Range Status   11/01/2018 140 136 - 145 mmol/L Final     Potassium   Date Value Ref Range Status   11/01/2018 3.6 3.5 - 5.2 mmol/L Final     Total Bilirubin   Date Value Ref Range Status   11/01/2018 0.4 0.1 - 1.2 mg/dL Final     Alkaline Phosphatase   Date Value Ref Range Status   11/01/2018 68 39 - 117 U/L Final   ]  Lab Results   Component Value Date    VYAZIRAF45 >2,000 (H) 11/01/2018     Lab Results   Component Value Date    IRON 69 11/01/2018    TIBC 145 (L) 11/01/2018    FERRITIN 812.90 (H) 11/01/2018     Iron saturation 48% on 11/1/2018     Lab Results   Component Value Date    FOLATE 6.75 11/15/2018     Paraprotein is on 11/1/2018 see history of present illness.    Assessment/Plan   1. Multiple myeloma, IgG lambda, stage III. Failed multiple lines of therapy. Her treatment was switched to Darzalex on  5/26/2016. She has been on this treatment for more than 2 years now.  She has tolerated therapy very well.     Reviewing her previous lab studies especially serum paraprotein, she reached a micaela of response on 07/05/2018 when she had serum IgG 3015 mg/dL, free lambda chain 458.7 mg/L and M spike IgG lambda 2.6 g/dL and lambda free light chain 0.1 g/dL, total 2.7 g/dL. since that time her laboratory study showed trending up for serum IgG and free lambda chain.     I discussed with the patient for her disease progression and recommended switching treatment regimen. This patient had multiple lines of treatment and also comorbidities. In summary she previously had VRd regimen but had rectal bleeding shortly after starting Revlimid. She later was started on melphalan, Velcade and dexamethasone and also had Pomalyst plus dexamethasone. Also had CyBorD regimen, also had treatment with panobinostat plus Velcade and dexamethasone. She also progressed on Kyprolis which she had an issue with cardiac problem. Overall I will try to avoid medications with potential cardiac toxicity due to her history of congestive heart failure. At one point her LVEF was in the 30's when she was tested in [facility] to be exact 31% but later improved about 40%. So I recommended switching her therapy to bendamustine plus Velcade plus dexamethasone. She has no peripheral neuropathy so we can reuse Velcade.     2. Zometa treatment.  Her last dose Zometa was on 11/1/2018 . She receives Zometa every 3 months.       3.  Macrocytic anemia secondary to chemotherapy for multiple myeloma and chronic renal insufficiency stage 3.  She has trending down hemoglobin in the past 6-8 weeks.  This patient is symptomatic, with cold sensitivity and the worsening fatigue and low energy.      This patient also has anemia secondary to chemotherapy and I think this is probably related to her worsening fatigue. Her hemoglobin is 9.0 and we recently  obtained laboratory study on 11/01/2018 which showed elevated ferritin 812.9, iron 69, TIBC 145 and iron saturation 48%. Her vitamin B12 was more than 2000 pg/mL. Her folic acid today is 6.75 ng/mL. I recommend starting this patient on Procrit weekly at 20,000 units each with CBC monitoring. In the meantime she will continue oral vitamin B12 daily, and also add folic acid 800 mcg daily.     I think this patient will benefit from Procrit injection therapy.      4. Mild to moderate thrombocytopenia secondary to her multiple myeloma and chemotherapy.  Overall her platelet counts has been fluctuating widely anywhere between 90,000 in the 140,000 in the past 16 months.  She is asymptomatic, no easy bleeding or bruising.  Platelets today are 94,000. Continue to monitor.     5. History of stage II left breast cancer, post mastectomy in 2013, negative for ER/MI and positive HER-2. No neoadjuvant chemotherapy because of concurrent discovery of multiple myeloma and ongoing chemotherapy. She had a normal mammogram study on 7/24/2018.       6.  Right middle lobe pulmonary nodule, documented on CT scan of chest on 01/06/2017. Repeated CT scan of chest on 8/21/2017 reported a small 5 mm and stable primary nodule.      7.  Chronic renal insufficiency stage III.  Her creatinine was 1.83 on 11/1/2018.     8.  Skin rashes on her extremities: This began approximately 6 weeks ago. She report occasional, mild pruritis.   This occurred after starting Neurontin, however Neurontin has been discontinued 3-4 weeks ago, with resolution of pruritus, however she still has ongoing skin rashes.     Discussed with the patient today for dermatology consult and she is agreeable.    10. Profound fatigue: This is likely related to her disease progress in and her anemia associated with chemotherapy.  Hopefully changing chemotherapy regimen and also starting on Procrit would improve this situation.        Plan:    1. Start Procrit 20,000 units today and  repeat weekly with CBC monitoring except next week Thanksgiving holiday.  2. Chemotherapy teaching lesson for bendamustine. She already had Velcade and dexamethasone previously.  3. Start prophylactic acyclovir 400 mg twice a day I E-scribed to her pharmacy.   4. Continue vitamin B12 at 1000 mcg daily. Folic acid 800 mcg daily.  5. I E-scribed dexamethasone to her pharmacy, to be taken 20 mg on day of her Velcade injection per protocol.  6. The patient will return on 12/29/2018 to start 1st cycle of chemotherapy with bendamustine plus Velcade plus dexamethasone per protocol. Treatment will be bendamustine on day 1 and day 4 repeat every 28 days, Velcade will be on day 1 day 4 and day 8 and day 15 repeat every 28 days. Dexamethasone will be the same day as Velcade.   7.  Referral to dermatologist Dr. Ely Hedrick for evaluation of skin rash.      More than 55 min were used for patient care, 40 minutes of that time were for counseling.       LI OBANDO M.D., Ph.D.    11/15/2018        CC:   Ely Hedrick M.D. Dermatologist       Dictated using Dragon Dictation.

## 2018-11-29 NOTE — PROGRESS NOTES
____________________PATIENT EDUCATION____________________    PATIENT EDUCATION:  Today I met with the patient to discuss the chemotherapy regimen recommended for treatment of his disease.  The patient was given explanation of treatment premed side effects including office policy that prohibits patients to drive if sedating medications are administered, MD explanation given regarding benefits, side effects, toxicities and goals of treatment.  The patient received a Chemotherapy/Biotherapy Plan Summary including diagnosis and specific treatment plan.    SIDE EFFECTS:  Common side effects were discussed with the patient and/or significant other.  Discussion included hair loss/discoloration, anemia/fatigue, infection/chills/fever, appetite, bleeding risk/precautions, constipation, diarrhea, mouth sores, taste alteration, loss of appetite,nausea/vomiting, peripheral neuropathy, skin/nail changes, rash, muscle aches/weakness, photosensitivity, weight gain/loss, hearing loss, dizziness, menopausal symptoms, menstrrual irregularity, sterility, high blood pressure, heart damage, liver damage, lung damage, kidney damage, DVT/PE risk, fluid retention, pleural/pericardial effusion, somnolence, electrolyte/LFT imbalance, vein exercises and/or the possible need for vascular access/port placement.  The patient was advice that although uncommon, leakage of an infused medication from the vein or venous access device (port) may lead to skin breakdown and/or other tissue damage.  The patient was advised that he/she may have pain, bleeding, and/or bruising from the insertion of a needle in their vein or venous access device (port).  The patient was further advised that, in spite of proper technique, infection with redness and irritation may rarely occur at the site where the needle was inserted.  The patient was advised that if complications occur, additional medical treatment is available.    Discussion also included side effects  specific to drugs in the treatment plan, specifically Bendamustine.     Reproductive risks were discussed, including appropriate use of birth control and protection during sexual relations.        A total of 15 minutes were spent with the patient, with 100% of time spent in education and counseling.

## 2018-12-03 ENCOUNTER — INFUSION (OUTPATIENT)
Dept: ONCOLOGY | Facility: HOSPITAL | Age: 68
End: 2018-12-03

## 2018-12-03 ENCOUNTER — OFFICE VISIT (OUTPATIENT)
Dept: ONCOLOGY | Facility: CLINIC | Age: 68
End: 2018-12-03

## 2018-12-03 VITALS
TEMPERATURE: 97.7 F | OXYGEN SATURATION: 100 % | DIASTOLIC BLOOD PRESSURE: 64 MMHG | WEIGHT: 190.8 LBS | SYSTOLIC BLOOD PRESSURE: 101 MMHG | RESPIRATION RATE: 14 BRPM | HEART RATE: 84 BPM | BODY MASS INDEX: 34.89 KG/M2

## 2018-12-03 DIAGNOSIS — C90.00 MULTIPLE MYELOMA NOT HAVING ACHIEVED REMISSION (HCC): ICD-10-CM

## 2018-12-03 DIAGNOSIS — T45.1X5A CHEMOTHERAPY-INDUCED THROMBOCYTOPENIA: ICD-10-CM

## 2018-12-03 DIAGNOSIS — D64.81 ANEMIA ASSOCIATED WITH CHEMOTHERAPY: ICD-10-CM

## 2018-12-03 DIAGNOSIS — D63.1 ANEMIA, CHRONIC RENAL FAILURE, STAGE 3 (MODERATE) (HCC): ICD-10-CM

## 2018-12-03 DIAGNOSIS — C90.00 MULTIPLE MYELOMA NOT HAVING ACHIEVED REMISSION (HCC): Primary | ICD-10-CM

## 2018-12-03 DIAGNOSIS — Z45.2 FITTING AND ADJUSTMENT OF VASCULAR CATHETER: ICD-10-CM

## 2018-12-03 DIAGNOSIS — N18.30 ANEMIA, CHRONIC RENAL FAILURE, STAGE 3 (MODERATE) (HCC): ICD-10-CM

## 2018-12-03 DIAGNOSIS — D69.59 CHEMOTHERAPY-INDUCED THROMBOCYTOPENIA: ICD-10-CM

## 2018-12-03 DIAGNOSIS — C64.9 MALIGNANT NEOPLASM OF KIDNEY, UNSPECIFIED LATERALITY (HCC): ICD-10-CM

## 2018-12-03 DIAGNOSIS — T45.1X5A ANEMIA ASSOCIATED WITH CHEMOTHERAPY: ICD-10-CM

## 2018-12-03 DIAGNOSIS — R53.82 CHRONIC FATIGUE: ICD-10-CM

## 2018-12-03 LAB
ALBUMIN SERPL-MCNC: 2.8 G/DL (ref 3.5–5.2)
ALBUMIN/GLOB SERPL: 0.4 G/DL
ALP SERPL-CCNC: 55 U/L (ref 39–117)
ALT SERPL W P-5'-P-CCNC: 9 U/L (ref 1–33)
ANION GAP SERPL CALCULATED.3IONS-SCNC: 9.1 MMOL/L
ANISOCYTOSIS BLD QL: NORMAL
AST SERPL-CCNC: 13 U/L (ref 1–32)
BILIRUB SERPL-MCNC: 0.4 MG/DL (ref 0.1–1.2)
BUN BLD-MCNC: 27 MG/DL (ref 8–23)
BUN/CREAT SERPL: 12.6 (ref 7–25)
CALCIUM SPEC-SCNC: 8 MG/DL (ref 8.6–10.5)
CHLORIDE SERPL-SCNC: 113 MMOL/L (ref 98–107)
CO2 SERPL-SCNC: 18.9 MMOL/L (ref 22–29)
CREAT BLD-MCNC: 2.14 MG/DL (ref 0.57–1)
DACRYOCYTES BLD QL SMEAR: NORMAL
DEPRECATED RDW RBC AUTO: 63.7 FL (ref 37–54)
EOSINOPHIL # BLD MANUAL: 0.07 10*3/MM3 (ref 0–0.7)
EOSINOPHIL NFR BLD MANUAL: 1 % (ref 0–7)
ERYTHROCYTE [DISTWIDTH] IN BLOOD BY AUTOMATED COUNT: 16.4 % (ref 11.7–13)
GFR SERPL CREATININE-BSD FRML MDRD: 28 ML/MIN/1.73
GLOBULIN UR ELPH-MCNC: 6.8 GM/DL
GLUCOSE BLD-MCNC: 154 MG/DL (ref 65–99)
HCT VFR BLD AUTO: 28.5 % (ref 35.6–45.5)
HGB BLD-MCNC: 8.7 G/DL (ref 11.9–15.5)
LYMPHOCYTES # BLD MANUAL: 1.58 10*3/MM3 (ref 0.8–7)
LYMPHOCYTES NFR BLD MANUAL: 24 % (ref 24–44)
LYMPHOCYTES NFR BLD MANUAL: 6 % (ref 0–12)
MCH RBC QN AUTO: 32.5 PG (ref 26.9–32)
MCHC RBC AUTO-ENTMCNC: 30.5 G/DL (ref 32.4–36.3)
MCV RBC AUTO: 106.3 FL (ref 80.5–98.2)
MICROCYTES BLD QL: NORMAL
MONOCYTES # BLD AUTO: 0.4 10*3/MM3 (ref 0–1)
NEUTROPHILS # BLD AUTO: 4.55 10*3/MM3 (ref 1.9–8.1)
NEUTROPHILS NFR BLD MANUAL: 69 % (ref 42.7–76)
PLAT MORPH BLD: NORMAL
PLATELET # BLD AUTO: 99 10*3/MM3 (ref 140–500)
PMV BLD AUTO: 11.9 FL (ref 6–12)
POTASSIUM BLD-SCNC: 3.5 MMOL/L (ref 3.5–5.2)
PROT SERPL-MCNC: 9.6 G/DL (ref 6–8.5)
RBC # BLD AUTO: 2.68 10*6/MM3 (ref 3.9–5.2)
SCAN SLIDE: NORMAL
SODIUM BLD-SCNC: 141 MMOL/L (ref 136–145)
SPHEROCYTES BLD QL SMEAR: NORMAL
TARGETS BLD QL SMEAR: NORMAL
WBC MORPH BLD: NORMAL
WBC NRBC COR # BLD: 6.6 10*3/MM3 (ref 4.5–10.7)

## 2018-12-03 PROCEDURE — 99215 OFFICE O/P EST HI 40 MIN: CPT | Performed by: NURSE PRACTITIONER

## 2018-12-03 PROCEDURE — 96360 HYDRATION IV INFUSION INIT: CPT | Performed by: NURSE PRACTITIONER

## 2018-12-03 PROCEDURE — 85025 COMPLETE CBC W/AUTO DIFF WBC: CPT | Performed by: INTERNAL MEDICINE

## 2018-12-03 PROCEDURE — 85007 BL SMEAR W/DIFF WBC COUNT: CPT | Performed by: INTERNAL MEDICINE

## 2018-12-03 PROCEDURE — 80053 COMPREHEN METABOLIC PANEL: CPT | Performed by: INTERNAL MEDICINE

## 2018-12-03 RX ORDER — SODIUM CHLORIDE 0.9 % (FLUSH) 0.9 %
10 SYRINGE (ML) INJECTION AS NEEDED
Status: DISCONTINUED | OUTPATIENT
Start: 2018-12-03 | End: 2018-12-03 | Stop reason: HOSPADM

## 2018-12-03 RX ORDER — SODIUM CHLORIDE 9 MG/ML
1000 INJECTION, SOLUTION INTRAVENOUS CONTINUOUS
Status: DISCONTINUED | OUTPATIENT
Start: 2018-12-03 | End: 2018-12-03 | Stop reason: HOSPADM

## 2018-12-03 RX ORDER — SODIUM CHLORIDE 0.9 % (FLUSH) 0.9 %
10 SYRINGE (ML) INJECTION AS NEEDED
Status: CANCELLED | OUTPATIENT
Start: 2018-12-03

## 2018-12-03 RX ADMIN — SODIUM CHLORIDE, PRESERVATIVE FREE 500 UNITS: 5 INJECTION INTRAVENOUS at 15:11

## 2018-12-03 RX ADMIN — SODIUM CHLORIDE 1000 ML: 0.9 INJECTION, SOLUTION INTRAVENOUS at 14:11

## 2018-12-03 RX ADMIN — SODIUM CHLORIDE, PRESERVATIVE FREE 10 ML: 5 INJECTION INTRAVENOUS at 15:11

## 2018-12-03 NOTE — PROGRESS NOTES
Reasons for follow up:   1. IgG kappa multiple myeloma, currently on Darzalex, started on May 26, 2016. Patient was switched to Darzalex from Pomalyst, as she continued to be neutropenic with recurrent urinary tract infection while on Pomalyst.    2. Zometa is on hold due to renal insufficiency.    3. After 16 doses of Darzalex, laboratory study showed stable disease in November 2016. Insurance company denied adding Velcade and dexamethasone. Patient is to be continued on monthly Darzalex from 12/06/16.   4. Obstructive right pyelonephritis with positive urine culture for E. coli, required hospitalization from 1/19/2017 through 01/24/2017 with iv antibiotics and stent exchange on 01/20/2017.   5.  Paraprotein studies on March 27, 2017 showed further slight improvement of multiple myeloma.   6.  Febrile illness, with urinary tract infection and influenza B infection in early May 2017, required hospitalization for 6 days.   7.   Paraprotein studies for both serum and 24-hour urine sample on 7/21/2017 showed further improvement of paraproteins.   Darzalex was continued.   8.  Serum protein study reported stable disease on 10/20/2017.  Darzalex will be continued.   9.  Laboratory studies of both serum paraprotein and a 24-hour urine protein in January 2018 showed a further improvement of paraproteins.  Monthly Darzalex will be continued.   10.  Repair of left flank incisional hernia in middle September 2018.   11.  Serum paraprotein study reached micaela on 7/5/2018.  Since that time she has evidence of disease progression.           History of Present Illness:     Patient is a 68 female is here today for Velcade and Rituxan. Treanda was just added to her current regimen following evidence of progressive disease. Most recent paraprotein studies demonstrated an upward trending serum IgG and free lambda chain.     I was asked to see her in the treatment area as patient has been experiencing profound fatigue and labs today  reveal a modest worsening in renal function with a creatinine up to 2.14.     Since the addition of Treanda on November 29th, patient has had a significant decline in overall performance status. She has not been able to perform daily activities without frequent rest periods. Thankfully, she denies any associated shortness of breath or chest pain. Of note, her hemoglobin has decreased from 9.3 down to 8.7. She denies any excess bleeding or bruising. Despite progressive renal insufficiency, she is attempting to maintain adequate hydration and is urinating regularly.     She denies any new pain. Skin rash persists and she was seen by dermatology today and was provided with a topical steroid cream. She will follow up in one month for review.     She has no other concerns today.     Past Medical History, Past Surgical History, Social History, Family History have been reviewed and are without significant changes except as mentioned.      Hematology/oncology history: See separate document for extra information.       On12/6/2016, serum free lambda chain 1090 MG/L, free kappa chain 8.5 to MG/L.  Ferritin 1015, iron 99, TIBC 137 iron saturation 72%.     On January 4, 2017, vitamin B12 level 1289, folate 7.7 ng/mL. SPEP reporting gamma globulin 3.3, out of total globulin 5.0, with immunofixation reporting small quantity of monoclonal free lambda chain, plus monoclonal IgG lambda at 3.2 g/DL.  Serum IgG 4075, IgA 9 and IgM 15.  free lambda chain 1339 MG/L and free kappa chain 10.3 MG/L.      Laboratory study March 27, 2017 showed slight improvement of paraprotein, SPEP reporting M spike 2.8 g/DL, out of total globulin 4.3, and the serum IgG 3420, IgA 9, IgM 11, free lambda chain 1218 MG/L and free kappa chain 8.0 MG/L.  Total 24 hour urine sample reported at free lambda chain 142 mg, at a concentration 74.8 MG/L, free kappa chain 75 mg at a concentration 39.5 MG/L., Urine protein immunofixation reporting biclonal IgG lambda  and monoclonal free lambda light chain, M spike #1 at 41.5% and monoclonal IgG lambda spike #2 at 10.5%. Serum beta-2 microglobulin is 7.3 MG/L.     Her laboratory study on 7/21/2017 reported further improvement of paraprotein is both serum and a 24-hour urine sample.  SPEP reporting monoclonal IgG lambda 2.9 g/dL and free lambda chain 0.1 g/dL.  Serum IgG was 3261 mg/dL and IgA 5, IgM 13, free kappa chain 6.7, free lambda chain 701.3 with kappa/lambda ratio 0.01.  24-hour urine sample reported positive for Bence May protein lambda type, immunofixation positive for IgG lambda.  Total urine protein 241 mg, gamma globulin 13.1%.  Hemoglobin was 11.4 .4, platelets 122,000, WBC 6680 including neutrophils 3600, lymphocytes 2100 and monocytes 650.  Her creatinine was 1.68, at baseline level.  Liver function panel unremarkable.  Darzalex was continued.    Laboratory study on 8/25/2017 showed improved the platelets at 144,000, normal WBC 6660 and slightly improved hemoglobin at 11.7.     Patient had a colonoscopy examination on 8/30/2017 by Dr. Rivera.  It reported diverticulosis in multiple areas more severe in the sigmoid colon.  There was 2 polyps 4 mm pneumonia from transverse colon and the sigmoid colon.  Pathology evaluation reported tubular adenoma from the sigmoid colon polyp, and benign hyperplastic polyp from transverse colon.    Laboratory study on 10/20/2017 reported slightly improved monoclonal spike 2.7 g/dL by SPEP, immunofixation reported positive monoclonal IgG lambda, serum IgG 3536 mg/dL, IgA less than 5 and IgM 11, free kappa chain 5.3 mg/L, and free lambda chain 720 mg/L with kappa/lambda ratio 0.01.  Serum beta-2 microglobulin was 6.5 mg/L.   Her CBC showed a stable mild anemia with hemoglobin 11.3, macrocytosis .3, and the platelets 114,000, normal WBC 7070 including neutrophils 4100 lymphocytes 2000 monocytes 650, normal liver function panel, total protein 7.9 and albumin 3.2,  and  normal electrolytes including calcium 8.1, and improved creatinine 1.59.     Laboratory study on 1/12/2018 reported serum IgG 3083 mg/dL, IgA 10, IgM 10, free kappa chain 8.5 and free lambda chain 589.1 mg/L, kappa/lambda ratio 0.01, serum protein admitted fixation reported IgG lambda monoclonal protein and SPEP reporting M spike 3.1 g/dL, beta-2 microgram and 7.4 mg/L. serum creatinine 1.79, calcium 8.2, other electrolytes normal, hemoglobin 11.3, .5 and MCHC 31.6, platelets 129,000, WBC 6780 including neutrophils 3500 lymphocytes 2300.  Serum ferritin 653.7 ng/mL, iron 123 TIBC 174 iron saturation 71%, folic acid 12.8 ng/mL and a vitamin B12 at 873 pg/mL.  Her 24-hour urine study on 1/18/2018 reported lambda chain 32.8 mg/L, total 61 mg in 24 hours, and the free kappa chain 27.4 mg/L and 51 mg in 24 hours, and the total 24-hour urine protein 283 mg, and urine protein immunofixation positive for 5.3% monoclonal IgG lambda and positive for Bence May protein at 36.2% monoclonal lambda chain.  Monthly Darzalex was continued.       This patient had serum protein study on 04/06/2018 which reported free light chain at concentration 703.8 mg/L and free kappa chain 7.0, free kappa/lambda ratio 0.01, serum IgG 3650 mg/dL, IgM 11 and IgA 9 with serum protein electrophoresis reporting monoclonal IgG lambda 3.2 g/dL and also monoclonal free lambda light chain.  But it was unable to quantify monoclonal lambda light chain because of the small quantity of it.     A 24-hour urine study on 04/20/2018 reported total free lambda chain 60 mg in 24 hours at concentration 33.1 mg/L, free kappa chain 45 mg in 24 hours at concentration 24.8 mg/L. Total 24-hour urine protein was 279 mg. Urine protein immunofixation and UPEP reported lambda-type Bence-May protein + #1 monoclonal IgG lambda band at 34.8% and #2 monoclonal IgG lambda band at 6.9%.     Her CBC results today reported a stable hemoglobin at 11.1, platelets 130,000 and  WBC 7440 including neutrophils 4100 and lymphocytes 2350, monocytes 650. Her CMP reported slightly worsened creatinine at 1.82 with BUN 33. Total protein at 8.8. Liver function panel was normal. Total serum protein 8.8 and albumin 3.2, globulin 5.6.    Review of Systems    Constitutional: No fever no sweating no chills.  No weight loss.Profound fatigue   HENT: Negative for mouth sores and sore throat.    Eyes: Negative for visual disturbance.    Cardiac: Denies chest pain no palpitation.  No lower extremity edema.    Respiratory: + cough and rhinorrhea,  no hemoptysis no short of breath.    Gastrointestinal: Negative for abdominal pain, diarrhea, nausea and vomiting.    Musculoskeletal: Negative for back pain and joint swelling.    Neurological: Negative for dizziness.   Hematological: Does not bruise/bleed easily.    SKIN: See HPI  Psychiatric/Behavioral: The patient is not nervous/anxious.        Medications: The current medication list was reviewed in the EMR.       ALLERGIES: Zosyn       There were no vitals filed for this visit.PS: ECOG 1      Physical Exam   Constitutional: well-developed and well-nourished -American female. No distress. Seated in a chair in the infusion area. Appears weak and pale today.   HENT:     Head: Normocephalic.     Eyes: No jaundice.     Neck: Normal range of motion.   no masses.    Cardiovascular: Normal rate, regular rhythm, normal heart sounds and normal pulses.    Pulmonary/Chest: Lungs clear to auscultation bilaterally, no wheezes, no rales.  Mediport benign right upper chest.  Abdominal: Soft, no tenderness guarding or rebound.  Bowel sounds are normal. no distension and no mass. No hepatosplenomegaly.  Left flank has fresh scar from recent hernia repair, healing well   Musculoskeletal: no edema or deformity.   Lymphadenopathy: She has no cervical, supraclavicular adenopathy.   Neurological: oriented to person, place, and time. No cranial nerve deficit.    Skin: Left  flank area fresh scar.  No signs of infection.  There are multiple circular discoloration spots on extremities, as big as 1.5 cm in diameter, some of those with very faint pinkish plaques in the center, this remains unchanged today, she does have a new area on her right thigh that is consistent with the other discolored spots . This remains unchanged today   Psychiatric: She has normal mood and affect.         Recent lab results:   Lab Results   Component Value Date    WBC 6.60 12/03/2018    HGB 8.7 (L) 12/03/2018    HCT 28.5 (L) 12/03/2018    .3 (H) 12/03/2018    PLT 99 (L) 12/03/2018     Lab Results   Component Value Date    NEUTROABS 4.55 12/03/2018     Lab Results   Component Value Date    GLUCOSE 154 (H) 12/03/2018    BUN 27 (H) 12/03/2018    CREATININE 2.14 (H) 12/03/2018    EGFRIFNONA  08/30/2016      Comment:      <15 Indicative of kidney failure.    EGFRIFAFRI 28 (L) 12/03/2018    BCR 12.6 12/03/2018    K 3.5 12/03/2018    CO2 18.9 (L) 12/03/2018    CALCIUM 8.0 (L) 12/03/2018    PROTENTOTREF 8.9 (H) 11/01/2018    ALBUMIN 2.80 (L) 12/03/2018    LABIL2 0.5 (L) 11/01/2018    AST 13 12/03/2018    ALT 9 12/03/2018     Sodium   Date Value Ref Range Status   12/03/2018 141 136 - 145 mmol/L Final     Potassium   Date Value Ref Range Status   12/03/2018 3.5 3.5 - 5.2 mmol/L Final     Total Bilirubin   Date Value Ref Range Status   12/03/2018 0.4 0.1 - 1.2 mg/dL Final     Alkaline Phosphatase   Date Value Ref Range Status   12/03/2018 55 39 - 117 U/L Final   ]  Lab Results   Component Value Date    LCVNLDHO07 >2,000 (H) 11/01/2018     Lab Results   Component Value Date    IRON 69 11/01/2018    TIBC 145 (L) 11/01/2018    FERRITIN 812.90 (H) 11/01/2018     Iron saturation 48% on 11/1/2018     Lab Results   Component Value Date    FOLATE 6.75 11/15/2018         Assessment/Plan   1. Multiple myeloma, IgG lambda, stage III. Failed multiple lines of therapy. Her treatment was switched to Darzalex on 5/26/2016. She  has been on this treatment for more than 2 years now.  She has tolerated therapy very well.     Reviewing her previous lab studies especially serum paraprotein, she reached a micaela of response on 07/05/2018 when she had serum IgG 3015 mg/dL, free lambda chain 458.7 mg/L and M spike IgG lambda 2.6 g/dL and lambda free light chain 0.1 g/dL, total 2.7 g/dL. since that time her laboratory study showed trending up for serum IgG and free lambda chain.     Dr. Araujo did review worsening paraprotein studies with the patient. This patient had multiple lines of treatment and also comorbidities. In summary she previously had VRd regimen but had rectal bleeding shortly after starting Revlimid. She later was started on melphalan, Velcade and dexamethasone and also had Pomalyst plus dexamethasone. Also had CyBorD regimen, also had treatment with panobinostat plus Velcade and dexamethasone. She also progressed on Kyprolis which she had an issue with cardiac problem.     Given patient's history of CHF, Dr. Araujo has tried to avoid cardiotoxic therapies, therefore patient was switched to bendamustine plus Velcade plus dexamethasone. This was initiated on 11/29/2018.      Two days following this transition, patient developed severe and progressive  fatigue. She has essentially been bed-ridden over the last 2 days. This has not accompanied any exertional dyspnea or chest pain. Her HGB has declined modestly to 8.7. She was due for her next dose of Treanda, Dex, and Velcade today. Her kidney function has also worsened slightly to 2.14. Given her generalized weakness and renal insufficiency, we will plan to hold treatment today and provide IV hydration support in our office. She will be due for her next dose of Velcade on Thursday, 12/6/2018. Pending patient's status, we will proceed as scheduled. A STAT BMP will be requested that day to reevaluate kidney function.     2. Zometa treatment.  Her last dose Zometa was on 11/1/2018 . She  receives Zometa every 3 months.       3.  Macrocytic anemia secondary to chemotherapy for multiple myeloma and chronic renal insufficiency stage 3.  She has trending down hemoglobin in the past 6-8 weeks.  This patient is symptomatic, with cold sensitivity and the worsening fatigue and low energy.       This patient also has anemia secondary to chemotherapy and I think this is probably related to her worsening fatigue. Her hemoglobin is 9.0 and we recently obtained laboratory study on 11/01/2018 which showed elevated ferritin 812.9, iron 69, TIBC 145 and iron saturation 48%. Her vitamin B12 was more than 2000 pg/mL. Her folic acid today is 6.75 ng/mL. Dr. Araujo recommended patient initiate Procrit weekly at 20,000 units each with CBC monitoring. In the meantime she will continue oral vitamin B12 daily, and also add folic acid 800 mcg daily.     HGB today 8.7. She will be due again for weekly Procrit on 12/6/2018. For now, we will hold off on transfusion support.           4. Mild to moderate thrombocytopenia secondary to her multiple myeloma and chemotherapy.  Overall her platelet counts has been fluctuating widely anywhere between 90,000 in the 140,000 in the past 16 months.    Platelets today are 99,000 in the absence of excess bleeding and brusing. Continue to monitor.     5. History of stage II left breast cancer, post mastectomy in 2013, negative for ER/TX and positive HER-2. No neoadjuvant chemotherapy because of concurrent discovery of multiple myeloma and ongoing chemotherapy. She had a normal mammogram study on 7/24/2018.       6.  Right middle lobe pulmonary nodule, documented on CT scan of chest on 01/06/2017. Repeated CT scan of chest on 8/21/2017 reported a small 5 mm and stable primary nodule.      7.  Chronic renal insufficiency stage III.  Worsening creatinine at 2.14 today. She is attempting to push fluids. We will provide 1 liter of NS today in the office and may consider additional hydration  support on Thurday, 12/6 with patient's next TX.      8.  Skin rashes on her extremities: This began approximately 6 weeks ago. She report occasional, mild pruritis.   This occurred after starting Neurontin, however Neurontin has been discontinued 3-4 weeks ago, with resolution of pruritus, however she still has ongoing skin rashes. She was referred to dermatology and saw them this morning. A topical steroid cream was provided and patient will be seen again in one month. Should symptoms persist, a skin biopsy will be pursued.     9. Profound fatigue: This is likely related to her disease progress in and her anemia associated with chemotherapy. This has worsened with initiation of Treanda on 11/29/2018. We will hold treatment today to allow her to recover. We have also recently begun Procrit therapy. For now, we will defer transfusion support, but will continue to monitor her closely.       Plan:    1. Hold Bendamustine, Velcade, and Decadron today.   (Treatment is bendamustine on day 1 and day 4 repeat every 28 days, Velcade will be on day 1 day 4 and day 8 and day 15 repeat every 28 days. Dexamethasone  same day as Velcade.)   2. IV hydration support in the office today    3. Continue vitamin B12 at 1000 mcg daily. Folic acid 800 mcg daily.  4. Patient encouraged to continue pushing fluids at home orally   5. Patient to return on 12/6/2018 for next dose of chemotherapy with possible Procrit at that time. I have requested a STAT BMP, additionally to evaluate renal function. She may require additional hydration   6. Patient instructed to call with any worsening symptoms or new concerns prior to this next office visit. She has v/u.    -All of the above reviewed with Dr. Araujo and he is in agreement

## 2018-12-04 ENCOUNTER — TELEPHONE (OUTPATIENT)
Dept: ONCOLOGY | Facility: CLINIC | Age: 68
End: 2018-12-04

## 2018-12-04 DIAGNOSIS — T45.1X5A ANEMIA ASSOCIATED WITH CHEMOTHERAPY: ICD-10-CM

## 2018-12-04 DIAGNOSIS — D64.81 ANEMIA ASSOCIATED WITH CHEMOTHERAPY: ICD-10-CM

## 2018-12-04 NOTE — TELEPHONE ENCOUNTER
----- Message from Saima Contreras sent at 12/4/2018 11:00 AM EST -----  Regarding: pt is there now   Contact: [phone]  Yenny with Dr. Sue Vasquez (dentist) wants to know if she can have her teeth cleaned.  She is there now

## 2018-12-04 NOTE — PROGRESS NOTES
Reasons for follow up:   1. IgG kappa multiple myeloma, currently on Darzalex, started on May 26, 2016. Patient was switched to Darzalex from Pomalyst, as she continued to be neutropenic with recurrent urinary tract infection while on Pomalyst.    2. Zometa is on hold due to renal insufficiency.    3. After 16 doses of Darzalex, laboratory study showed stable disease in November 2016. Insurance company denied adding Velcade and dexamethasone. Patient is to be continued on monthly Darzalex from 12/06/16.   4. Obstructive right pyelonephritis with positive urine culture for E. coli, required hospitalization from 1/19/2017 through 01/24/2017 with iv antibiotics and stent exchange on 01/20/2017.   5.  Paraprotein studies on March 27, 2017 showed further slight improvement of multiple myeloma.   6.  Febrile illness, with urinary tract infection and influenza B infection in early May 2017, required hospitalization for 6 days.   7.   Paraprotein studies for both serum and 24-hour urine sample on 7/21/2017 showed further improvement of paraproteins.   Darzalex was continued.   8.  Serum protein study reported stable disease on 10/20/2017.  Darzalex will be continued.   9.  Laboratory studies of both serum paraprotein and a 24-hour urine protein in January 2018 showed a further improvement of paraproteins.  Monthly Darzalex will be continued.   10.  Repair of left flank incisional hernia in middle September 2018.   11.  Serum paraprotein study reached micaela on 7/5/2018.  Since that time she has evidence of disease progression.   12.  Disease progression, she will be started on chemotherapy with bendamustine plus Velcade plus dexamethasone per protocol. Treatment will be bendamustine on day 1 and day 4 repeat every 28 days, Velcade and dexamethasone will be on day 1 day 4 and day 8 and day 15 repeat every 28 days.           History of Present Illness:     Patient is a 68 female is here today for reevaluation, prior to start  her first cycle chemotherapy with bendamustine plus Velcade and dexamethasone.     Patient reports that she is more fatigue.  She denies fever or sweating or chills.  No apparent signs of infection, no right low back pain which she also has associated with infection.  Her hemoglobin was 9.3.  We have not seen apparent effect from Procrit injection.      The patient reports significant fatigue but no headaches, no nausea, no vomiting. No back pain, no dysuria, hematuria. No fever, no sweating, no chills. Denies bone pain. The patient also complains her rashes on extremities have not resolved. They remain pinkish spot and eventually had light brown pigmentation. It was not pruritic.       Past Medical History, Past Surgical History, Social History, Family History have been reviewed and are without significant changes except as mentioned.      Hematology/oncology history: See separate document for extra information.       On12/6/2016, serum free lambda chain 1090 MG/L, free kappa chain 8.5 to MG/L.  Ferritin 1015, iron 99, TIBC 137 iron saturation 72%.     On January 4, 2017, vitamin B12 level 1289, folate 7.7 ng/mL. SPEP reporting gamma globulin 3.3, out of total globulin 5.0, with immunofixation reporting small quantity of monoclonal free lambda chain, plus monoclonal IgG lambda at 3.2 g/DL.  Serum IgG 4075, IgA 9 and IgM 15.  free lambda chain 1339 MG/L and free kappa chain 10.3 MG/L.      Laboratory study March 27, 2017 showed slight improvement of paraprotein, SPEP reporting M spike 2.8 g/DL, out of total globulin 4.3, and the serum IgG 3420, IgA 9, IgM 11, free lambda chain 1218 MG/L and free kappa chain 8.0 MG/L.  Total 24 hour urine sample reported at free lambda chain 142 mg, at a concentration 74.8 MG/L, free kappa chain 75 mg at a concentration 39.5 MG/L., Urine protein immunofixation reporting biclonal IgG lambda and monoclonal free lambda light chain, M spike #1 at 41.5% and monoclonal IgG lambda spike #2  at 10.5%. Serum beta-2 microglobulin is 7.3 MG/L.     Her laboratory study on 7/21/2017 reported further improvement of paraprotein is both serum and a 24-hour urine sample.  SPEP reporting monoclonal IgG lambda 2.9 g/dL and free lambda chain 0.1 g/dL.  Serum IgG was 3261 mg/dL and IgA 5, IgM 13, free kappa chain 6.7, free lambda chain 701.3 with kappa/lambda ratio 0.01.  24-hour urine sample reported positive for Bence May protein lambda type, immunofixation positive for IgG lambda.  Total urine protein 241 mg, gamma globulin 13.1%.  Hemoglobin was 11.4 .4, platelets 122,000, WBC 6680 including neutrophils 3600, lymphocytes 2100 and monocytes 650.  Her creatinine was 1.68, at baseline level.  Liver function panel unremarkable.  Darzalex was continued.    Laboratory study on 8/25/2017 showed improved the platelets at 144,000, normal WBC 6660 and slightly improved hemoglobin at 11.7.     Patient had a colonoscopy examination on 8/30/2017 by Dr. Rivera.  It reported diverticulosis in multiple areas more severe in the sigmoid colon.  There was 2 polyps 4 mm pneumonia from transverse colon and the sigmoid colon.  Pathology evaluation reported tubular adenoma from the sigmoid colon polyp, and benign hyperplastic polyp from transverse colon.    Laboratory study on 10/20/2017 reported slightly improved monoclonal spike 2.7 g/dL by SPEP, immunofixation reported positive monoclonal IgG lambda, serum IgG 3536 mg/dL, IgA less than 5 and IgM 11, free kappa chain 5.3 mg/L, and free lambda chain 720 mg/L with kappa/lambda ratio 0.01.  Serum beta-2 microglobulin was 6.5 mg/L.   Her CBC showed a stable mild anemia with hemoglobin 11.3, macrocytosis .3, and the platelets 114,000, normal WBC 7070 including neutrophils 4100 lymphocytes 2000 monocytes 650, normal liver function panel, total protein 7.9 and albumin 3.2,  and normal electrolytes including calcium 8.1, and improved creatinine 1.59.     Laboratory study on  1/12/2018 reported serum IgG 3083 mg/dL, IgA 10, IgM 10, free kappa chain 8.5 and free lambda chain 589.1 mg/L, kappa/lambda ratio 0.01, serum protein admitted fixation reported IgG lambda monoclonal protein and SPEP reporting M spike 3.1 g/dL, beta-2 microgram and 7.4 mg/L. serum creatinine 1.79, calcium 8.2, other electrolytes normal, hemoglobin 11.3, .5 and MCHC 31.6, platelets 129,000, WBC 6780 including neutrophils 3500 lymphocytes 2300.  Serum ferritin 653.7 ng/mL, iron 123 TIBC 174 iron saturation 71%, folic acid 12.8 ng/mL and a vitamin B12 at 873 pg/mL.  Her 24-hour urine study on 1/18/2018 reported lambda chain 32.8 mg/L, total 61 mg in 24 hours, and the free kappa chain 27.4 mg/L and 51 mg in 24 hours, and the total 24-hour urine protein 283 mg, and urine protein immunofixation positive for 5.3% monoclonal IgG lambda and positive for Bence May protein at 36.2% monoclonal lambda chain.  Monthly Darzalex was continued.       This patient had serum protein study on 04/06/2018 which reported free light chain at concentration 703.8 mg/L and free kappa chain 7.0, free kappa/lambda ratio 0.01, serum IgG 3650 mg/dL, IgM 11 and IgA 9 with serum protein electrophoresis reporting monoclonal IgG lambda 3.2 g/dL and also monoclonal free lambda light chain.  But it was unable to quantify monoclonal lambda light chain because of the small quantity of it.     A 24-hour urine study on 04/20/2018 reported total free lambda chain 60 mg in 24 hours at concentration 33.1 mg/L, free kappa chain 45 mg in 24 hours at concentration 24.8 mg/L. Total 24-hour urine protein was 279 mg. Urine protein immunofixation and UPEP reported lambda-type Bence-May protein + #1 monoclonal IgG lambda band at 34.8% and #2 monoclonal IgG lambda band at 6.9%.     Her CBC results today reported a stable hemoglobin at 11.1, platelets 130,000 and WBC 7440 including neutrophils 4100 and lymphocytes 2350, monocytes 650. Her CMP reported  slightly worsened creatinine at 1.82 with BUN 33. Total protein at 8.8. Liver function panel was normal. Total serum protein 8.8 and albumin 3.2, globulin 5.6.    Laboratory study obtained on 11/01/2018 showed further disease progression. Her serum IgG was 5472 mg/dL, IgA 8 and IgM 10, serum kappa chain 7.9 mg/L, free lambda chain 961.3 mg/L and kappa/lambda ratio 0.01. Serum protein electrophoresis reported monoclonal IgG lambda 4.1 g/dL, and monoclonal lambda free light chain 0.1 g/dL. Her hemoglobin was 9.0, .6, MCHC 30.1, platelets 124,000 and WBC 10,000 including neutrophils 7400, lymphocytes 1200, monocytes 600.     Review of Systems    Constitutional: No fever no sweating no chills.  No weight loss. Profound fatigue   HENT: Negative for mouth sores and sore throat.    Eyes: Negative for visual disturbance.    Cardiac: Denies chest pain no palpitation.  No lower extremity edema.    Respiratory: no cough or rhinorrhea,  no hemoptysis no short of breath.    Gastrointestinal: Negative for abdominal pain, diarrhea, nausea and vomiting.    Musculoskeletal: Negative for back pain and joint swelling.   Neurological: Negative for dizziness.   Hematological: Does not bruise/bleed easily.    SKIN: Skin rashes on extremities.  No pruritus.  Psychiatric/Behavioral: The patient is not nervous/anxious.        Medications: The current medication list was reviewed in the EMR.       ALLERGIES: Zosyn       VITALS:   /70, IA 71, RR 16, Tem 97.7 F.  Weight 189 pounds.    PS: ECOG 1      Physical Exam   Constitutional: well-developed and well-nourished -American female. No distress. Appears weak today, not in acute distress.    HENT:     Head: Normocephalic.     Eyes: No jaundice.     Neck: Normal range of motion.   no masses.    Cardiovascular: Normal rate, regular rhythm, normal heart sounds and normal pulses.    Pulmonary/Chest: Lungs clear to auscultation bilaterally, no wheezes, no rales.  Mediport benign  right upper chest.  Abdominal: Soft, no tenderness guarding or rebound.  Bowel sounds are normal. no distension and no mass. No hepatosplenomegaly.  Left flank has fresh scar from recent hernia repair.   Musculoskeletal: no edema or deformity.   Lymphadenopathy: She has no cervical, supraclavicular adenopathy.   Neurological: oriented to person, place, and time. No cranial nerve deficit.    Skin: Left flank area fresh scar.  No signs of infection.  There are multiple circular discoloration spots on extremities, as big as 1.5 cm in diameter, some of those with very faint pinkish plaques in the center, this remains unchanged today, she does have a new area on her right thigh that is consistent with the other discolored spots    Psychiatric: She has normal mood and affect.         Recent lab results:   Lab Results   Component Value Date    WBC 6.60 12/03/2018    HGB 8.7 (L) 12/03/2018    HCT 28.5 (L) 12/03/2018    .3 (H) 12/03/2018    PLT 99 (L) 12/03/2018     Lab Results   Component Value Date    NEUTROABS 4.55 12/03/2018     Lab Results   Component Value Date    GLUCOSE 154 (H) 12/03/2018    BUN 27 (H) 12/03/2018    CREATININE 2.14 (H) 12/03/2018    EGFRIFNONA  08/30/2016      Comment:      <15 Indicative of kidney failure.    EGFRIFAFRI 28 (L) 12/03/2018    BCR 12.6 12/03/2018    K 3.5 12/03/2018    CO2 18.9 (L) 12/03/2018    CALCIUM 8.0 (L) 12/03/2018    PROTENTOTREF 8.9 (H) 11/01/2018    ALBUMIN 2.80 (L) 12/03/2018    LABIL2 0.5 (L) 11/01/2018    AST 13 12/03/2018    ALT 9 12/03/2018     Sodium   Date Value Ref Range Status   12/03/2018 141 136 - 145 mmol/L Final     Potassium   Date Value Ref Range Status   12/03/2018 3.5 3.5 - 5.2 mmol/L Final     Total Bilirubin   Date Value Ref Range Status   12/03/2018 0.4 0.1 - 1.2 mg/dL Final     Alkaline Phosphatase   Date Value Ref Range Status   12/03/2018 55 39 - 117 U/L Final   ]  Lab Results   Component Value Date    CNYJWAHG03 >2,000 (H) 11/01/2018     Lab  Results   Component Value Date    IRON 69 11/01/2018    TIBC 145 (L) 11/01/2018    FERRITIN 812.90 (H) 11/01/2018     Iron saturation 48% on 11/1/2018     Lab Results   Component Value Date    FOLATE 6.75 11/15/2018       Assessment/Plan   1. Multiple myeloma, IgG lambda, stage III. Failed multiple lines of therapy. Her treatment was switched to Darzalex on 5/26/2016. She has been on this treatment for more than 2 years now.  She has tolerated therapy very well.     Reviewing her previous lab studies especially serum paraprotein, she reached a micaela of response on 07/05/2018 when she had serum IgG 3015 mg/dL, free lambda chain 458.7 mg/L and M spike IgG lambda 2.6 g/dL and lambda free light chain 0.1 g/dL, total 2.7 g/dL. since that time her laboratory study showed trending up for serum IgG and free lambda chain.     I discussed with the patient for her disease progression and recommended switching treatment regimen. This patient had multiple lines of treatment and also comorbidities. In summary she previously had VRd regimen but had rectal bleeding shortly after starting Revlimid. She later was started on melphalan, Velcade and dexamethasone and also had Pomalyst plus dexamethasone. Also had CyBorD regimen, also had treatment with panobinostat plus Velcade and dexamethasone. She also progressed on Kyprolis which she had an issue with cardiac problem. Overall I will try to avoid medications with potential cardiac toxicity due to her history of congestive heart failure. At one point her LVEF was in the 30's when she was tested in [facility] to be exact 31% but later improved about 40%. So I recommended switching her therapy to bendamustine plus Velcade plus dexamethasone. She has no peripheral neuropathy so we can reuse Velcade.     Cycle #1 day #1 Bendamustine will be started on 11/29/2018.     2. Zometa treatment.  Her last dose Zometa was on 11/1/2018 . She receives Zometa every 3 months.       3.   Macrocytic anemia secondary to chemotherapy for multiple myeloma and chronic renal insufficiency stage 3.  She has trending down hemoglobin in the past 6-8 weeks.  This patient is symptomatic, with cold sensitivity and the worsening fatigue and low energy.      This patient also has anemia secondary to chemotherapy and I think this is probably related to her worsening fatigue. Her hemoglobin is 9.0 and we recently obtained laboratory study on 11/01/2018 which showed elevated ferritin 812.9, iron 69, TIBC 145 and iron saturation 48%. Her vitamin B12 was more than 2000 pg/mL. Her folic acid today is 6.75 ng/mL. I recommend starting this patient on Procrit weekly at 20,000 units each with CBC monitoring. In the meantime she will continue oral vitamin B12 daily, and also add folic acid 800 mcg daily.     Patient was started on Procrit injection, 11/15/2018.      4. Mild to moderate thrombocytopenia secondary to her multiple myeloma and chemotherapy.  Overall her platelet counts has been fluctuating widely anywhere between 90,000 in the 140,000 in the past 16 months.  She is asymptomatic, no easy bleeding or bruising.  Platelets today are 118,000. Continue to monitor.     5. History of stage II left breast cancer, post mastectomy in 2013, negative for ER/ND and positive HER-2. No neoadjuvant chemotherapy because of concurrent discovery of multiple myeloma and ongoing chemotherapy. She had a normal mammogram study on 7/24/2018.       6.  Right middle lobe pulmonary nodule, documented on CT scan of chest on 01/06/2017. Repeated CT scan of chest on 8/21/2017 reported a small 5 mm and stable primary nodule.      7.  Chronic renal insufficiency stage III.  Her creatinine is 1.89 today on 11/29/2018.     8.  Skin rashes on her extremities: This began approximately 6 weeks ago. She report occasional, mild pruritis.   This occurred after starting Neurontin, however Neurontin has been discontinued 3-4 weeks ago, with resolution  of pruritus, however she still has ongoing skin rashes.     Patient is waiting to see dermatologist Dr. Hedrick.      9. Profound fatigue: This is likely related to her disease progression and her anemia associated with chemotherapy.  Hopefully changing chemotherapy regimen and also starting on Procrit would improve this situation.        Plan:    1.  Proceed ahead with 1st cycle of chemotherapy with bendamustine plus Velcade plus dexamethasone per protocol. Treatment will be bendamustine on day 1 and day 4 repeat every 28 days, Velcade will be on day 1 day 4 and day 8 and day 15 repeat every 28 days. Dexamethasone will be the same day as Velcade.     2.  Continue Procrit 20,000 units today and repeat weekly with CBC monitoring.     3.  Continue prophylactic acyclovir 400 mg twice a day.     4. Continue vitamin B12 at 1000 mcg daily. Folic acid 800 mcg daily.     5.  She will keep appointment for treatment plan is scheduled.     6.  She will return for M.D. follow-up on 12/13/2018 and a continue Velcade and dexamethasone.      7.  Keep appointment with dermatologist Dr. Ely Hedrick for evaluation of skin rash.      More than 25 min were used for patient care, 15 minutes of that time were for counseling.       LI OBANDO M.D., Ph.D.    11/29/2018        CC:   Ely Hedirck M.D. Dermatologist       Dictated using Dragon Dictation.

## 2018-12-04 NOTE — TELEPHONE ENCOUNTER
Pt. Is currently at her dentist office for a cleaning.  Pt. Was seen here yesterday by walt cha np  Didn't receive treatment.  Did get a bag of flds.  hgb was 8.7 and plt ct of 99,000.  S/w goldy at dr. Vasquez's office (dentist)  Inquiring if she can get her teeth cleaned.  All d/w shawn pruitt np  A general cleaning would be ok.  Goldy states she will let the dentist know and let her make the final decision.

## 2018-12-06 ENCOUNTER — INFUSION (OUTPATIENT)
Dept: ONCOLOGY | Facility: HOSPITAL | Age: 68
End: 2018-12-06

## 2018-12-06 ENCOUNTER — APPOINTMENT (OUTPATIENT)
Dept: ONCOLOGY | Facility: HOSPITAL | Age: 68
End: 2018-12-06

## 2018-12-06 VITALS
TEMPERATURE: 97.6 F | SYSTOLIC BLOOD PRESSURE: 91 MMHG | DIASTOLIC BLOOD PRESSURE: 57 MMHG | RESPIRATION RATE: 18 BRPM | WEIGHT: 189.4 LBS | OXYGEN SATURATION: 97 % | HEART RATE: 76 BPM | BODY MASS INDEX: 34.63 KG/M2

## 2018-12-06 DIAGNOSIS — C64.9 MALIGNANT NEOPLASM OF KIDNEY, UNSPECIFIED LATERALITY (HCC): Primary | ICD-10-CM

## 2018-12-06 DIAGNOSIS — T45.1X5A ANEMIA ASSOCIATED WITH CHEMOTHERAPY: ICD-10-CM

## 2018-12-06 DIAGNOSIS — C90.00 MULTIPLE MYELOMA NOT HAVING ACHIEVED REMISSION (HCC): ICD-10-CM

## 2018-12-06 DIAGNOSIS — D64.81 ANEMIA ASSOCIATED WITH CHEMOTHERAPY: ICD-10-CM

## 2018-12-06 LAB
ANION GAP SERPL CALCULATED.3IONS-SCNC: 7.4 MMOL/L
BASOPHILS # BLD AUTO: 0.03 10*3/MM3 (ref 0–0.2)
BASOPHILS NFR BLD AUTO: 0.4 % (ref 0–1.5)
BUN BLD-MCNC: 22 MG/DL (ref 8–23)
BUN/CREAT SERPL: 11.1 (ref 7–25)
CALCIUM SPEC-SCNC: 7.6 MG/DL (ref 8.6–10.5)
CHLORIDE SERPL-SCNC: 110 MMOL/L (ref 98–107)
CO2 SERPL-SCNC: 21.6 MMOL/L (ref 22–29)
CREAT BLD-MCNC: 1.98 MG/DL (ref 0.57–1)
DEPRECATED RDW RBC AUTO: 63.4 FL (ref 37–54)
EOSINOPHIL # BLD AUTO: 0.13 10*3/MM3 (ref 0–0.7)
EOSINOPHIL NFR BLD AUTO: 1.8 % (ref 0.3–6.2)
ERYTHROCYTE [DISTWIDTH] IN BLOOD BY AUTOMATED COUNT: 16.2 % (ref 11.7–13)
GFR SERPL CREATININE-BSD FRML MDRD: 30 ML/MIN/1.73
GLUCOSE BLD-MCNC: 98 MG/DL (ref 65–99)
HCT VFR BLD AUTO: 27.8 % (ref 35.6–45.5)
HGB BLD-MCNC: 8.7 G/DL (ref 11.9–15.5)
IMM GRANULOCYTES # BLD: 0.37 10*3/MM3 (ref 0–0.03)
IMM GRANULOCYTES NFR BLD: 5 % (ref 0–0.5)
LYMPHOCYTES # BLD AUTO: 1.31 10*3/MM3 (ref 0.9–4.8)
LYMPHOCYTES NFR BLD AUTO: 17.7 % (ref 19.6–45.3)
MCH RBC QN AUTO: 33.2 PG (ref 26.9–32)
MCHC RBC AUTO-ENTMCNC: 31.3 G/DL (ref 32.4–36.3)
MCV RBC AUTO: 106.1 FL (ref 80.5–98.2)
MONOCYTES # BLD AUTO: 0.92 10*3/MM3 (ref 0.2–1.2)
MONOCYTES NFR BLD AUTO: 12.4 % (ref 5–12)
NEUTROPHILS # BLD AUTO: 4.66 10*3/MM3 (ref 1.9–8.1)
NEUTROPHILS NFR BLD AUTO: 62.7 % (ref 42.7–76)
NRBC BLD MANUAL-RTO: 0.7 /100 WBC (ref 0–0)
PLATELET # BLD AUTO: 104 10*3/MM3 (ref 140–500)
PMV BLD AUTO: 12.7 FL (ref 6–12)
POTASSIUM BLD-SCNC: 3.7 MMOL/L (ref 3.5–5.2)
RBC # BLD AUTO: 2.62 10*6/MM3 (ref 3.9–5.2)
SODIUM BLD-SCNC: 139 MMOL/L (ref 136–145)
WBC NRBC COR # BLD: 7.42 10*3/MM3 (ref 4.5–10.7)

## 2018-12-06 PROCEDURE — 85025 COMPLETE CBC W/AUTO DIFF WBC: CPT | Performed by: INTERNAL MEDICINE

## 2018-12-06 PROCEDURE — 25010000002 BORTEZOMIB PER 0.1 MG: Performed by: INTERNAL MEDICINE

## 2018-12-06 PROCEDURE — 63510000001 EPOETIN ALFA PER 1000 UNITS: Performed by: NURSE PRACTITIONER

## 2018-12-06 PROCEDURE — 80048 BASIC METABOLIC PNL TOTAL CA: CPT | Performed by: NURSE PRACTITIONER

## 2018-12-06 PROCEDURE — 96372 THER/PROPH/DIAG INJ SC/IM: CPT

## 2018-12-06 PROCEDURE — 96361 HYDRATE IV INFUSION ADD-ON: CPT

## 2018-12-06 PROCEDURE — 96360 HYDRATION IV INFUSION INIT: CPT

## 2018-12-06 PROCEDURE — 96401 CHEMO ANTI-NEOPL SQ/IM: CPT

## 2018-12-06 RX ORDER — BORTEZOMIB 3.5 MG/1
1.3 INJECTION, POWDER, LYOPHILIZED, FOR SOLUTION INTRAVENOUS; SUBCUTANEOUS ONCE
Status: COMPLETED | OUTPATIENT
Start: 2018-12-06 | End: 2018-12-06

## 2018-12-06 RX ORDER — SODIUM CHLORIDE 9 MG/ML
250 INJECTION, SOLUTION INTRAVENOUS ONCE
Status: COMPLETED | OUTPATIENT
Start: 2018-12-06 | End: 2018-12-06

## 2018-12-06 RX ORDER — TOCOPHERSOLAN (VITAMIN E TPGS) 400/15ML
1 LIQUID (ML) ORAL 2 TIMES DAILY
Qty: 60 EACH | Refills: 3 | Status: SHIPPED | OUTPATIENT
Start: 2018-12-06 | End: 2019-01-01 | Stop reason: DRUGHIGH

## 2018-12-06 RX ORDER — SODIUM CHLORIDE 9 MG/ML
1000 INJECTION, SOLUTION INTRAVENOUS ONCE
Status: COMPLETED | OUTPATIENT
Start: 2018-12-06 | End: 2018-12-06

## 2018-12-06 RX ADMIN — SODIUM CHLORIDE 250 ML: 900 INJECTION, SOLUTION INTRAVENOUS at 12:45

## 2018-12-06 RX ADMIN — ERYTHROPOIETIN 20000 UNITS: 20000 INJECTION, SOLUTION INTRAVENOUS; SUBCUTANEOUS at 13:57

## 2018-12-06 RX ADMIN — SODIUM CHLORIDE 1000 ML: 900 INJECTION, SOLUTION INTRAVENOUS at 12:55

## 2018-12-06 RX ADMIN — BORTEZOMIB 2.4 MG: 3.5 INJECTION, POWDER, LYOPHILIZED, FOR SOLUTION INTRAVENOUS; SUBCUTANEOUS at 13:38

## 2018-12-06 NOTE — PROGRESS NOTES
Pt here for Day 8 velcade and procrit. Day 4 of this cycle was held due to pt c/o weakness and decreased platelets. Reviewed labs with MADAI Calloway. Per Dr. Araujo, ok to proceed with Velcade today. Also, given 1L NS for hypotension. Pt denies any dizziness or increased weakness, stating she feels better than she did last week. States she has not had much of an appetite, but has been drinking plenty of fluids. Calcium level has dropped and per Dr. Araujo, e-scribed Calcium 500mg BID. Pt v/u.

## 2018-12-11 DIAGNOSIS — T45.1X5A ANEMIA ASSOCIATED WITH CHEMOTHERAPY: ICD-10-CM

## 2018-12-11 DIAGNOSIS — D64.81 ANEMIA ASSOCIATED WITH CHEMOTHERAPY: ICD-10-CM

## 2018-12-13 ENCOUNTER — INFUSION (OUTPATIENT)
Dept: ONCOLOGY | Facility: HOSPITAL | Age: 68
End: 2018-12-13

## 2018-12-13 ENCOUNTER — OFFICE VISIT (OUTPATIENT)
Dept: ONCOLOGY | Facility: CLINIC | Age: 68
End: 2018-12-13

## 2018-12-13 VITALS
TEMPERATURE: 97.9 F | HEART RATE: 64 BPM | OXYGEN SATURATION: 97 % | HEIGHT: 62 IN | DIASTOLIC BLOOD PRESSURE: 78 MMHG | WEIGHT: 189.2 LBS | RESPIRATION RATE: 16 BRPM | SYSTOLIC BLOOD PRESSURE: 118 MMHG | BODY MASS INDEX: 34.82 KG/M2

## 2018-12-13 DIAGNOSIS — Z45.2 FITTING AND ADJUSTMENT OF VASCULAR CATHETER: ICD-10-CM

## 2018-12-13 DIAGNOSIS — C90.00 MULTIPLE MYELOMA NOT HAVING ACHIEVED REMISSION (HCC): Primary | ICD-10-CM

## 2018-12-13 DIAGNOSIS — D64.81 ANEMIA ASSOCIATED WITH CHEMOTHERAPY: ICD-10-CM

## 2018-12-13 DIAGNOSIS — D69.59 CHEMOTHERAPY-INDUCED THROMBOCYTOPENIA: Primary | ICD-10-CM

## 2018-12-13 DIAGNOSIS — T45.1X5A ANEMIA ASSOCIATED WITH CHEMOTHERAPY: ICD-10-CM

## 2018-12-13 DIAGNOSIS — C90.00 MULTIPLE MYELOMA NOT HAVING ACHIEVED REMISSION (HCC): ICD-10-CM

## 2018-12-13 DIAGNOSIS — T45.1X5A CHEMOTHERAPY-INDUCED THROMBOCYTOPENIA: Primary | ICD-10-CM

## 2018-12-13 DIAGNOSIS — R53.82 CHRONIC FATIGUE: ICD-10-CM

## 2018-12-13 LAB
DEPRECATED RDW RBC AUTO: 63.4 FL (ref 37–54)
EOSINOPHIL # BLD MANUAL: 0.23 10*3/MM3 (ref 0–0.7)
EOSINOPHIL NFR BLD MANUAL: 3 % (ref 0–7)
ERYTHROCYTE [DISTWIDTH] IN BLOOD BY AUTOMATED COUNT: 16.4 % (ref 11.7–13)
HCT VFR BLD AUTO: 30.2 % (ref 35.6–45.5)
HGB BLD-MCNC: 9.2 G/DL (ref 11.9–15.5)
LYMPHOCYTES # BLD MANUAL: 1.48 10*3/MM3 (ref 0.8–7)
LYMPHOCYTES NFR BLD MANUAL: 11 % (ref 0–12)
LYMPHOCYTES NFR BLD MANUAL: 19 % (ref 24–44)
MACROCYTES BLD QL SMEAR: ABNORMAL
MCH RBC QN AUTO: 32.4 PG (ref 26.9–32)
MCHC RBC AUTO-ENTMCNC: 30.5 G/DL (ref 32.4–36.3)
MCV RBC AUTO: 106.3 FL (ref 80.5–98.2)
METAMYELOCYTES NFR BLD MANUAL: 2 % (ref 0–0)
MONOCYTES # BLD AUTO: 0.86 10*3/MM3 (ref 0–1)
NEUTROPHILS # BLD AUTO: 5.06 10*3/MM3 (ref 1.9–8.1)
NEUTROPHILS NFR BLD MANUAL: 65 % (ref 42.7–76)
NRBC SPEC MANUAL: 1 /100 WBC (ref 0–0)
PLAT MORPH BLD: NORMAL
PLATELET # BLD AUTO: 96 10*3/MM3 (ref 140–500)
PMV BLD AUTO: 12.3 FL (ref 6–12)
RBC # BLD AUTO: 2.84 10*6/MM3 (ref 3.9–5.2)
ROULEAUX BLD QL SMEAR: ABNORMAL
SCAN SLIDE: NORMAL
WBC MORPH BLD: NORMAL
WBC NRBC COR # BLD: 7.79 10*3/MM3 (ref 4.5–10.7)

## 2018-12-13 PROCEDURE — 96401 CHEMO ANTI-NEOPL SQ/IM: CPT

## 2018-12-13 PROCEDURE — 85007 BL SMEAR W/DIFF WBC COUNT: CPT | Performed by: INTERNAL MEDICINE

## 2018-12-13 PROCEDURE — 96372 THER/PROPH/DIAG INJ SC/IM: CPT

## 2018-12-13 PROCEDURE — 99215 OFFICE O/P EST HI 40 MIN: CPT | Performed by: INTERNAL MEDICINE

## 2018-12-13 PROCEDURE — 63510000001 EPOETIN ALFA PER 1000 UNITS: Performed by: INTERNAL MEDICINE

## 2018-12-13 PROCEDURE — 25010000002 BORTEZOMIB PER 0.1 MG: Performed by: INTERNAL MEDICINE

## 2018-12-13 PROCEDURE — 85025 COMPLETE CBC W/AUTO DIFF WBC: CPT | Performed by: INTERNAL MEDICINE

## 2018-12-13 RX ORDER — BORTEZOMIB 3.5 MG/1
1.3 INJECTION, POWDER, LYOPHILIZED, FOR SOLUTION INTRAVENOUS; SUBCUTANEOUS ONCE
Status: CANCELLED | OUTPATIENT
Start: 2018-12-20

## 2018-12-13 RX ORDER — SODIUM CHLORIDE 0.9 % (FLUSH) 0.9 %
10 SYRINGE (ML) INJECTION AS NEEDED
Status: DISCONTINUED | OUTPATIENT
Start: 2018-12-13 | End: 2018-12-13 | Stop reason: HOSPADM

## 2018-12-13 RX ORDER — SODIUM CHLORIDE 0.9 % (FLUSH) 0.9 %
10 SYRINGE (ML) INJECTION AS NEEDED
Status: CANCELLED | OUTPATIENT
Start: 2018-12-13

## 2018-12-13 RX ORDER — BORTEZOMIB 3.5 MG/1
1.3 INJECTION, POWDER, LYOPHILIZED, FOR SOLUTION INTRAVENOUS; SUBCUTANEOUS ONCE
Status: COMPLETED | OUTPATIENT
Start: 2018-12-13 | End: 2018-12-13

## 2018-12-13 RX ADMIN — BORTEZOMIB 2.4 MG: 3.5 INJECTION, POWDER, LYOPHILIZED, FOR SOLUTION INTRAVENOUS; SUBCUTANEOUS at 12:04

## 2018-12-13 RX ADMIN — SODIUM CHLORIDE, PRESERVATIVE FREE 500 UNITS: 5 INJECTION INTRAVENOUS at 12:07

## 2018-12-13 RX ADMIN — SODIUM CHLORIDE, PRESERVATIVE FREE 10 ML: 5 INJECTION INTRAVENOUS at 12:07

## 2018-12-13 RX ADMIN — ERYTHROPOIETIN 20000 UNITS: 20000 INJECTION, SOLUTION INTRAVENOUS; SUBCUTANEOUS at 11:31

## 2018-12-13 NOTE — PROGRESS NOTES
Reasons for follow up:   1. IgG kappa multiple myeloma, currently on Darzalex, started on May 26, 2016. Patient was switched to Darzalex from Pomalyst, as she continued to be neutropenic with recurrent urinary tract infection while on Pomalyst.    2. Zometa is on hold due to renal insufficiency.    3. After 16 doses of Darzalex, laboratory study showed stable disease in November 2016. Insurance company denied adding Velcade and dexamethasone. Patient is to be continued on monthly Darzalex from 12/06/16.   4. Obstructive right pyelonephritis with positive urine culture for E. coli, required hospitalization from 1/19/2017 through 01/24/2017 with iv antibiotics and stent exchange on 01/20/2017.   5.  Paraprotein studies on March 27, 2017 showed further slight improvement of multiple myeloma.   6.  Febrile illness, with urinary tract infection and influenza B infection in early May 2017, required hospitalization for 6 days.   7.   Paraprotein studies for both serum and 24-hour urine sample on 7/21/2017 showed further improvement of paraproteins.   Darzalex was continued.   8.  Serum protein study reported stable disease on 10/20/2017.  Darzalex will be continued.   9.  Laboratory studies of both serum paraprotein and a 24-hour urine protein in January 2018 showed a further improvement of paraproteins.  Monthly Darzalex will be continued.   10.  Repair of left flank incisional hernia in middle September 2018.   11.  Serum paraprotein study reached micaela on 7/5/2018.  Since that time she has evidence of disease progression.   12.  Disease progression, she will be started on chemotherapy with bendamustine plus Velcade plus dexamethasone per protocol. Treatment will be bendamustine on day 1 and day 4 repeat every 28 days, Velcade and dexamethasone will be on day 1 day 4 and day 8 and day 15 repeat every 28 days.   13.  From cycle #2, we plan to only give Treanda 1 dose per cycle and continue Velcade and dexamethasone  weekly.           History of Present Illness:     Patient is a 68 female is here today for reevaluation.  Patient was started on Treanda plus Velcade opposed dexamethasone on 11/29/2018.  When she came back for the #4 Treanda treatment, she complained profound fatigue at that time.  She had worsening anemia however no syncope.  So we canceled her day #4 treatment, including Velcade.  Patient continues Procrit injection weekly.    Since that time, patient reports now she feels slightly better, is more energetic.  She denies fever sweating chills.  No back pain no dysuria or hematuria.  Her performance status is ECOG 1.     Laboratory study today reported a WBC 7800, ANC 5000 and a 60, Hb 9.2, PLT 96,000.       Past Medical History, Past Surgical History, Social History, Family History have been reviewed and are without significant changes except as mentioned.      Hematology/oncology history: See separate document for extra information.       On12/6/2016, serum free lambda chain 1090 MG/L, free kappa chain 8.5 to MG/L.  Ferritin 1015, iron 99, TIBC 137 iron saturation 72%.     On January 4, 2017, vitamin B12 level 1289, folate 7.7 ng/mL. SPEP reporting gamma globulin 3.3, out of total globulin 5.0, with immunofixation reporting small quantity of monoclonal free lambda chain, plus monoclonal IgG lambda at 3.2 g/DL.  Serum IgG 4075, IgA 9 and IgM 15.  free lambda chain 1339 MG/L and free kappa chain 10.3 MG/L.      Laboratory study March 27, 2017 showed slight improvement of paraprotein, SPEP reporting M spike 2.8 g/DL, out of total globulin 4.3, and the serum IgG 3420, IgA 9, IgM 11, free lambda chain 1218 MG/L and free kappa chain 8.0 MG/L.  Total 24 hour urine sample reported at free lambda chain 142 mg, at a concentration 74.8 MG/L, free kappa chain 75 mg at a concentration 39.5 MG/L., Urine protein immunofixation reporting biclonal IgG lambda and monoclonal free lambda light chain, M spike #1 at 41.5% and  monoclonal IgG lambda spike #2 at 10.5%. Serum beta-2 microglobulin is 7.3 MG/L.     Her laboratory study on 7/21/2017 reported further improvement of paraprotein is both serum and a 24-hour urine sample.  SPEP reporting monoclonal IgG lambda 2.9 g/dL and free lambda chain 0.1 g/dL.  Serum IgG was 3261 mg/dL and IgA 5, IgM 13, free kappa chain 6.7, free lambda chain 701.3 with kappa/lambda ratio 0.01.  24-hour urine sample reported positive for Bence May protein lambda type, immunofixation positive for IgG lambda.  Total urine protein 241 mg, gamma globulin 13.1%.  Hemoglobin was 11.4 .4, platelets 122,000, WBC 6680 including neutrophils 3600, lymphocytes 2100 and monocytes 650.  Her creatinine was 1.68, at baseline level.  Liver function panel unremarkable.  Darzalex was continued.    Laboratory study on 8/25/2017 showed improved the platelets at 144,000, normal WBC 6660 and slightly improved hemoglobin at 11.7.     Patient had a colonoscopy examination on 8/30/2017 by Dr. Rivera.  It reported diverticulosis in multiple areas more severe in the sigmoid colon.  There was 2 polyps 4 mm pneumonia from transverse colon and the sigmoid colon.  Pathology evaluation reported tubular adenoma from the sigmoid colon polyp, and benign hyperplastic polyp from transverse colon.    Laboratory study on 10/20/2017 reported slightly improved monoclonal spike 2.7 g/dL by SPEP, immunofixation reported positive monoclonal IgG lambda, serum IgG 3536 mg/dL, IgA less than 5 and IgM 11, free kappa chain 5.3 mg/L, and free lambda chain 720 mg/L with kappa/lambda ratio 0.01.  Serum beta-2 microglobulin was 6.5 mg/L.   Her CBC showed a stable mild anemia with hemoglobin 11.3, macrocytosis .3, and the platelets 114,000, normal WBC 7070 including neutrophils 4100 lymphocytes 2000 monocytes 650, normal liver function panel, total protein 7.9 and albumin 3.2,  and normal electrolytes including calcium 8.1, and improved  creatinine 1.59.     Laboratory study on 1/12/2018 reported serum IgG 3083 mg/dL, IgA 10, IgM 10, free kappa chain 8.5 and free lambda chain 589.1 mg/L, kappa/lambda ratio 0.01, serum protein admitted fixation reported IgG lambda monoclonal protein and SPEP reporting M spike 3.1 g/dL, beta-2 microgram and 7.4 mg/L. serum creatinine 1.79, calcium 8.2, other electrolytes normal, hemoglobin 11.3, .5 and MCHC 31.6, platelets 129,000, WBC 6780 including neutrophils 3500 lymphocytes 2300.  Serum ferritin 653.7 ng/mL, iron 123 TIBC 174 iron saturation 71%, folic acid 12.8 ng/mL and a vitamin B12 at 873 pg/mL.  Her 24-hour urine study on 1/18/2018 reported lambda chain 32.8 mg/L, total 61 mg in 24 hours, and the free kappa chain 27.4 mg/L and 51 mg in 24 hours, and the total 24-hour urine protein 283 mg, and urine protein immunofixation positive for 5.3% monoclonal IgG lambda and positive for Bence May protein at 36.2% monoclonal lambda chain.  Monthly Darzalex was continued.       This patient had serum protein study on 04/06/2018 which reported free light chain at concentration 703.8 mg/L and free kappa chain 7.0, free kappa/lambda ratio 0.01, serum IgG 3650 mg/dL, IgM 11 and IgA 9 with serum protein electrophoresis reporting monoclonal IgG lambda 3.2 g/dL and also monoclonal free lambda light chain.  But it was unable to quantify monoclonal lambda light chain because of the small quantity of it.     A 24-hour urine study on 04/20/2018 reported total free lambda chain 60 mg in 24 hours at concentration 33.1 mg/L, free kappa chain 45 mg in 24 hours at concentration 24.8 mg/L. Total 24-hour urine protein was 279 mg. Urine protein immunofixation and UPEP reported lambda-type Bence-May protein + #1 monoclonal IgG lambda band at 34.8% and #2 monoclonal IgG lambda band at 6.9%.     Her CBC results today reported a stable hemoglobin at 11.1, platelets 130,000 and WBC 7440 including neutrophils 4100 and lymphocytes  "2350, monocytes 650. Her CMP reported slightly worsened creatinine at 1.82 with BUN 33. Total protein at 8.8. Liver function panel was normal. Total serum protein 8.8 and albumin 3.2, globulin 5.6.    Laboratory study obtained on 11/01/2018 showed further disease progression. Her serum IgG was 5472 mg/dL, IgA 8 and IgM 10, serum kappa chain 7.9 mg/L, free lambda chain 961.3 mg/L and kappa/lambda ratio 0.01. Serum protein electrophoresis reported monoclonal IgG lambda 4.1 g/dL, and monoclonal lambda free light chain 0.1 g/dL. Her hemoglobin was 9.0, .6, MCHC 30.1, platelets 124,000 and WBC 10,000 including neutrophils 7400, lymphocytes 1200, monocytes 600.     Review of Systems    Constitutional: No fever no sweating no chills.  No weight loss. Profound fatigue   HENT: Negative for mouth sores and sore throat.    Eyes: Negative for visual disturbance.    Cardiac: Denies chest pain no palpitation.  No lower extremity edema.    Respiratory: no cough or rhinorrhea,  no hemoptysis no short of breath.    Gastrointestinal: Negative for abdominal pain, diarrhea, nausea and vomiting.    Musculoskeletal: Negative for back pain and joint swelling.   Neurological: Negative for dizziness.   Hematological: Does not bruise/bleed easily.    SKIN: Skin rashes on extremities.  No pruritus.  Psychiatric/Behavioral: The patient is not nervous/anxious.        Medications: The current medication list was reviewed in the EMR.       ALLERGIES: Zosyn       VITALS:     Vitals:    12/13/18 1027   BP: 118/78   Pulse: 64   Resp: 16   Temp: 97.9 °F (36.6 °C)   SpO2: 97%  Comment: at rest   Weight: 85.8 kg (189 lb 3.2 oz)   Height: 157.5 cm (62.01\")   PainSc: 0-No pain   PS: ECOG 1      Physical Exam   Constitutional: well-developed and well-nourished -American female. No distress. not in acute distress.    HENT:     Head: Normocephalic.     Eyes: No jaundice.     Neck: Normal range of motion.   no masses.    Cardiovascular: Normal " rate, regular rhythm, normal heart sounds and normal pulses.  No murmurs.  Pulmonary/Chest: Lungs clear to auscultation bilaterally, no wheezes, no rales.  Normal respiratory effort.  Mediport benign right upper chest.  Abdominal: Soft, no tenderness guarding or rebound.  Bowel sounds are normal. no distension and no mass. No hepatosplenomegaly.   Musculoskeletal: no edema or deformity.   Lymphadenopathy: She has no cervical, supraclavicular adenopathy.   Neurological: oriented to person, place, and time. No cranial nerve deficit.    Skin: No petechiae no bruises.  There are multiple circular discoloration spots on extremities, as big as 1.5 cm in diameter, some of those with very faint pinkish plaques in the center, this remains unchanged today, she does have a new area on her right thigh that is consistent with the other discolored spots    Psychiatric: She has normal mood and affect.         Recent lab results:   Lab Results   Component Value Date    WBC 7.79 12/13/2018    HGB 9.2 (L) 12/13/2018    HCT 30.2 (L) 12/13/2018    .3 (H) 12/13/2018    PLT 96 (L) 12/13/2018     Lab Results   Component Value Date    NEUTROABS 4.66 12/06/2018     Lab Results   Component Value Date    GLUCOSE 98 12/06/2018    BUN 22 12/06/2018    CREATININE 1.98 (H) 12/06/2018    EGFRIFNONA  08/30/2016      Comment:      <15 Indicative of kidney failure.    EGFRIFAFRI 30 (L) 12/06/2018    BCR 11.1 12/06/2018    K 3.7 12/06/2018    CO2 21.6 (L) 12/06/2018    CALCIUM 7.6 (L) 12/06/2018    PROTENTOTREF 8.9 (H) 11/01/2018    ALBUMIN 2.80 (L) 12/03/2018    LABIL2 0.5 (L) 11/01/2018    AST 13 12/03/2018    ALT 9 12/03/2018     Sodium   Date Value Ref Range Status   12/06/2018 139 136 - 145 mmol/L Final     Potassium   Date Value Ref Range Status   12/06/2018 3.7 3.5 - 5.2 mmol/L Final     Total Bilirubin   Date Value Ref Range Status   12/03/2018 0.4 0.1 - 1.2 mg/dL Final     Alkaline Phosphatase   Date Value Ref Range Status   12/03/2018  55 39 - 117 U/L Final   ]  Lab Results   Component Value Date    FLGMLEVQ31 >2,000 (H) 11/01/2018     Lab Results   Component Value Date    IRON 69 11/01/2018    TIBC 145 (L) 11/01/2018    FERRITIN 812.90 (H) 11/01/2018     Iron saturation 48% on 11/1/2018     Lab Results   Component Value Date    FOLATE 6.75 11/15/2018       Assessment/Plan   1. Multiple myeloma, IgG lambda, stage III. Failed multiple lines of therapy. Her treatment was switched to Darzalex on 5/26/2016. She has been on this treatment for more than 2 years now.  She has tolerated therapy very well.     Reviewing her previous lab studies especially serum paraprotein, she reached a micaela of response on 07/05/2018 when she had serum IgG 3015 mg/dL, free lambda chain 458.7 mg/L and M spike IgG lambda 2.6 g/dL and lambda free light chain 0.1 g/dL, total 2.7 g/dL. since that time her laboratory study showed trending up for serum IgG and free lambda chain.     I discussed with the patient for her disease progression and recommended switching treatment regimen. This patient had multiple lines of treatment and also comorbidities. In summary she previously had VRd regimen but had rectal bleeding shortly after starting Revlimid. She later was started on melphalan, Velcade and dexamethasone and also had Pomalyst plus dexamethasone. Also had CyBorD regimen, also had treatment with panobinostat plus Velcade and dexamethasone. She also progressed on Kyprolis which she had an issue with cardiac problem. Overall I will try to avoid medications with potential cardiac toxicity due to her history of congestive heart failure. At one point her LVEF was in the 30's when she was tested in [facility] to be exact 31% but later improved about 40%. So I recommended switching her therapy to bendamustine plus Velcade plus dexamethasone. She has no peripheral neuropathy so we can reuse Velcade.     Cycle #1 day #1 Bendamustine will be started on 11/29/2018.      Patient had a significant fatigue after cycle 1 day 1 Treanda plus Velcade.  The #4 treatment was canceled.  She did have worsening anemia secondary to chemotherapy.     I discussed with the patient today, from cycle #2, I would only give her one dose Treanda for each cycle, and I will change of Velcade to weekly instead of extra dose on day #4.  I recommended to repeat her paraprotein studies the third week of cycle #2 to assess her response.          2. Zometa treatment.  Her last dose Zometa was on 11/1/2018 . She receives Zometa every 3 months.       3.  Macrocytic anemia secondary to chemotherapy for multiple myeloma and chronic renal insufficiency stage 3.  She has trending down hemoglobin in the past 6-8 weeks.  This patient is symptomatic, with cold sensitivity and the worsening fatigue and low energy.      This patient also has anemia secondary to chemotherapy and I think this is probably related to her worsening fatigue. Her hemoglobin is 9.0 and we recently obtained laboratory study on 11/01/2018 which showed elevated ferritin 812.9, iron 69, TIBC 145 and iron saturation 48%. Her vitamin B12 was more than 2000 pg/mL. Her folic acid today is 6.75 ng/mL. I recommend starting this patient on Procrit weekly at 20,000 units each with CBC monitoring. In the meantime she will continue oral vitamin B12 daily, and also add folic acid 800 mcg daily.     Patient was started on Procrit injection, 11/15/2018.  Will continue Procrit weekly.  Her hemoglobin is fluctuates between 8.7 and 9.3 in the past 4 weeks.      4. Mild to moderate thrombocytopenia secondary to her multiple myeloma and chemotherapy.  Overall her platelet counts has been fluctuating widely anywhere between 90,000 in the 140,000 in the past 17 months.  She is asymptomatic, no easy bleeding or bruising.  Platelets today are 96,000 today. Continue to monitor.     5. History of stage II left breast cancer, post mastectomy in 2013, negative for ER/LA  and positive HER-2. No neoadjuvant chemotherapy because of concurrent discovery of multiple myeloma and ongoing chemotherapy. She had a normal mammogram study on 7/24/2018.       6.  Right middle lobe pulmonary nodule, documented on CT scan of chest on 01/06/2017. Repeated CT scan of chest on 8/21/2017 reported a small 5 mm and stable primary nodule.      7.  Chronic renal insufficiency stage III.  Her creatinine is 1.89 today on 11/29/2018.     8.  Skin rashes on her extremities: This began approximately 6 weeks ago. She report occasional, mild pruritis.   This occurred after starting Neurontin, however Neurontin has been discontinued 3-4 weeks ago, with resolution of pruritus, however she still has ongoing skin rashes.     Patient was seen dermatologist Dr. Barroso, who prescribed  steroids cream and also over-the-counter moisturizing cream.  Patient will follow-up with Dr. Barroso again.    9. Profound fatigue: This is likely related to her disease progression and her anemia associated with chemotherapy.  Hopefully changing chemotherapy regimen and also starting on Procrit would improve this situation.  Her fatigue has slightly improved today.      Plan:    1.  Proceed ahead with cycle #1 day #15 Velcade and dexamethasone today.      2.  She will return in 1 week for cycle #1 day 22 Velcade and dexamethasone.      3.  Continue Procrit injection today and repeat weekly with CBC monitoring.      4.  Patient will return on 12/27/2018 with NP/M.D. visit, and to start her cycle #2 1 chemotherapy with bendamustine plus Velcade plus dexamethasone.  Bendamustine will be only 1 dose every 4 weeks.  Velcade and dexamethasone will be changed to weekly.  This is modification of original protocol, basically chemotherapy.     5.  Continue prophylactic acyclovir 400 mg twice a day.     6.  Continue vitamin B12 at 1000 mcg daily. Folic acid 800 mcg daily.     7.  Peter paraprotein studies on 1/17/2018 for both serum and 24-hour  urine proteins study.     8.  She will return to see me for follow-up on 1/24/2019 discuss laboratory results and further treatment plan.      9.  Keep appointment with dermatologist Dr. Barroso for evaluation of skin rash.      More than 40 min were used for patient care, 30 minutes of that time were for counseling and coordinating her care.       LI OBANDO M.D., Ph.D.    12/13/2018      CC:    Anali Barroso M.D. Dermatologist    Michael Everett M.D.    Dictated using Dragon Dictation.

## 2018-12-14 ENCOUNTER — TELEPHONE (OUTPATIENT)
Dept: ONCOLOGY | Facility: HOSPITAL | Age: 68
End: 2018-12-14

## 2018-12-14 NOTE — TELEPHONE ENCOUNTER
Called pt's pharmacy and gave verbal order to change the script. They v/u.    ----- Message from Zane Araujo MD PhD sent at 12/14/2018  3:41 PM EST -----  OK, do what we can do.    Thanks!      ----- Message -----  From: Stacie Kessler RN  Sent: 12/14/2018   3:11 PM  To: Zane Araujo MD PhD    Dr. Araujo,   Pt's pharmacy states they can only get her Calcium/Magnesium/Vit D in a 400/167/133mg instead of the [phone] dosing that you originally prescribed. Are you OK with this? Please advise.

## 2018-12-18 ENCOUNTER — TELEPHONE (OUTPATIENT)
Dept: ONCOLOGY | Facility: CLINIC | Age: 68
End: 2018-12-18

## 2018-12-19 NOTE — TELEPHONE ENCOUNTER
On call note:    Patient called [facility] stating that throughout the day today she's been very fatigued and tired and this afternoon she started having bad chills.  She did not measure her temperature.  She has increased urinary frequency but denies any other urinary symptoms.  She took two acetaminophen tablets prior to calling.    She has relapsed/refractory multiple myeloma and received bendamustine on 11/29 and her most recent Velcade injection was on 12/13.  Her WBC has been normal without neutropenia.    Nonetheless, I recommended due to the severity of her symptoms that she go to the emergency department [facility] for further evaluation.  She voiced understanding and stated that if she didn't improve by 9:30 she would go.

## 2018-12-20 ENCOUNTER — INFUSION (OUTPATIENT)
Dept: ONCOLOGY | Facility: HOSPITAL | Age: 68
End: 2018-12-20

## 2018-12-20 ENCOUNTER — DOCUMENTATION (OUTPATIENT)
Dept: ONCOLOGY | Facility: CLINIC | Age: 68
End: 2018-12-20

## 2018-12-20 ENCOUNTER — APPOINTMENT (OUTPATIENT)
Dept: ONCOLOGY | Facility: HOSPITAL | Age: 68
End: 2018-12-20

## 2018-12-20 ENCOUNTER — LAB (OUTPATIENT)
Dept: OTHER | Facility: HOSPITAL | Age: 68
End: 2018-12-20

## 2018-12-20 VITALS
WEIGHT: 186.6 LBS | HEART RATE: 82 BPM | TEMPERATURE: 98.8 F | OXYGEN SATURATION: 96 % | BODY MASS INDEX: 34.12 KG/M2 | SYSTOLIC BLOOD PRESSURE: 110 MMHG | DIASTOLIC BLOOD PRESSURE: 68 MMHG

## 2018-12-20 DIAGNOSIS — D64.81 ANEMIA ASSOCIATED WITH CHEMOTHERAPY: ICD-10-CM

## 2018-12-20 DIAGNOSIS — C90.00 MULTIPLE MYELOMA NOT HAVING ACHIEVED REMISSION (HCC): ICD-10-CM

## 2018-12-20 DIAGNOSIS — R50.9 FEVER, UNSPECIFIED FEVER CAUSE: Primary | ICD-10-CM

## 2018-12-20 DIAGNOSIS — T45.1X5A ANEMIA ASSOCIATED WITH CHEMOTHERAPY: ICD-10-CM

## 2018-12-20 LAB
BACTERIA UR QL AUTO: ABNORMAL /HPF
BASOPHILS # BLD AUTO: 0.03 10*3/MM3 (ref 0–0.2)
BASOPHILS NFR BLD AUTO: 0.3 % (ref 0–1.5)
BILIRUB UR QL STRIP: NEGATIVE
CLARITY UR: ABNORMAL
COLOR UR: YELLOW
DEPRECATED RDW RBC AUTO: 62.1 FL (ref 37–54)
EOSINOPHIL # BLD AUTO: 0.09 10*3/MM3 (ref 0–0.7)
EOSINOPHIL NFR BLD AUTO: 0.8 % (ref 0.3–6.2)
ERYTHROCYTE [DISTWIDTH] IN BLOOD BY AUTOMATED COUNT: 16.6 % (ref 11.7–13)
GLUCOSE UR STRIP-MCNC: NEGATIVE MG/DL
HCT VFR BLD AUTO: 31.1 % (ref 35.6–45.5)
HGB BLD-MCNC: 9.6 G/DL (ref 11.9–15.5)
HGB UR QL STRIP.AUTO: ABNORMAL
HYALINE CASTS UR QL AUTO: ABNORMAL /LPF
IMM GRANULOCYTES # BLD: 0.24 10*3/MM3 (ref 0–0.03)
IMM GRANULOCYTES NFR BLD: 2.1 % (ref 0–0.5)
KETONES UR QL STRIP: NEGATIVE
LEUKOCYTE ESTERASE UR QL STRIP.AUTO: ABNORMAL
LYMPHOCYTES # BLD AUTO: 1.22 10*3/MM3 (ref 0.9–4.8)
LYMPHOCYTES NFR BLD AUTO: 10.7 % (ref 19.6–45.3)
MCH RBC QN AUTO: 31.9 PG (ref 26.9–32)
MCHC RBC AUTO-ENTMCNC: 30.9 G/DL (ref 32.4–36.3)
MCV RBC AUTO: 103.3 FL (ref 80.5–98.2)
MONOCYTES # BLD AUTO: 1.51 10*3/MM3 (ref 0.2–1.2)
MONOCYTES NFR BLD AUTO: 13.3 % (ref 5–12)
NEUTROPHILS # BLD AUTO: 8.27 10*3/MM3 (ref 1.9–8.1)
NEUTROPHILS NFR BLD AUTO: 72.8 % (ref 42.7–76)
NITRITE UR QL STRIP: NEGATIVE
NRBC BLD MANUAL-RTO: 0.5 /100 WBC (ref 0–0)
PH UR STRIP.AUTO: 6 [PH] (ref 5–8)
PLATELET # BLD AUTO: 45 10*3/MM3 (ref 140–500)
PROT UR QL STRIP: ABNORMAL
RBC # BLD AUTO: 3.01 10*6/MM3 (ref 3.9–5.2)
RBC # UR: ABNORMAL /HPF
REF LAB TEST METHOD: ABNORMAL
SP GR UR STRIP: 1.01 (ref 1–1.03)
SQUAMOUS #/AREA URNS HPF: ABNORMAL /HPF
UROBILINOGEN UR QL STRIP: ABNORMAL
WBC NRBC COR # BLD: 11.36 10*3/MM3 (ref 4.5–10.7)
WBC UR QL AUTO: ABNORMAL /HPF

## 2018-12-20 PROCEDURE — 96372 THER/PROPH/DIAG INJ SC/IM: CPT | Performed by: INTERNAL MEDICINE

## 2018-12-20 PROCEDURE — 87186 SC STD MICRODIL/AGAR DIL: CPT | Performed by: INTERNAL MEDICINE

## 2018-12-20 PROCEDURE — 85025 COMPLETE CBC W/AUTO DIFF WBC: CPT | Performed by: INTERNAL MEDICINE

## 2018-12-20 PROCEDURE — 87086 URINE CULTURE/COLONY COUNT: CPT | Performed by: INTERNAL MEDICINE

## 2018-12-20 PROCEDURE — 81001 URINALYSIS AUTO W/SCOPE: CPT | Performed by: INTERNAL MEDICINE

## 2018-12-20 PROCEDURE — 87088 URINE BACTERIA CULTURE: CPT | Performed by: INTERNAL MEDICINE

## 2018-12-20 PROCEDURE — 63510000001 EPOETIN ALFA PER 1000 UNITS: Performed by: INTERNAL MEDICINE

## 2018-12-20 PROCEDURE — 36415 COLL VENOUS BLD VENIPUNCTURE: CPT

## 2018-12-20 RX ORDER — CEFDINIR 300 MG/1
300 CAPSULE ORAL 2 TIMES DAILY
Qty: 20 CAPSULE | Refills: 0 | Status: SHIPPED | OUTPATIENT
Start: 2018-12-20 | End: 2019-01-24 | Stop reason: SDUPTHER

## 2018-12-20 RX ADMIN — ERYTHROPOIETIN 20000 UNITS: 20000 INJECTION, SOLUTION INTRAVENOUS; SUBCUTANEOUS at 12:03

## 2018-12-20 NOTE — PROGRESS NOTES
Dr. Araujo discussed Urine results discussed with patient. Order to only give procrit today. Holding velcade today.

## 2018-12-20 NOTE — PROGRESS NOTES
Discussed labs with Dr. Araujo. Patient states have not been feeling well. Experienced fever and chills couple days ago. Dr. Araujo seen patient. Order for urine culture.

## 2018-12-21 DIAGNOSIS — C90.00 MULTIPLE MYELOMA NOT HAVING ACHIEVED REMISSION (HCC): ICD-10-CM

## 2018-12-21 NOTE — PROGRESS NOTES
Patient presented today to the clinic for Velcade injection and Procrit injection.  She reports had fever 101.3F and chills yesterday.  She feels ill.  Her BP is stable and afebrile in clinic.  She denies dysuria or hematuria.  Because this patient had history of recurrent urinary tract infection, associated with her stent in the right ureter, we obtained urinalysis and urine culture.  The urinalysis does show increased WBC and also bacteria.  We requested urine culture which is in process.  I e- scribed Cefdinir 300 mg twice a day for 14 days to her pharmacy.  I instructed patient to call or go to the ER if she has worsening fever or worsening condition.  Patient voiced understanding.    We gave her Procrit injection, however hold Velcade today.      LI OBANDO M.D., Ph.D.    11/20/2018

## 2018-12-22 ENCOUNTER — TELEPHONE (OUTPATIENT)
Dept: ONCOLOGY | Facility: CLINIC | Age: 68
End: 2018-12-22

## 2018-12-22 LAB — BACTERIA SPEC AEROBE CULT: ABNORMAL

## 2018-12-22 RX ORDER — CIPROFLOXACIN 500 MG/1
500 TABLET, FILM COATED ORAL 2 TIMES DAILY
Qty: 20 TABLET | Refills: 0 | Status: SHIPPED | OUTPATIENT
Start: 2018-12-22 | End: 2019-01-01

## 2018-12-22 NOTE — TELEPHONE ENCOUNTER
Urine culture sensitivity results available this morning.  I called and spoke to patient, switch her to Cipro according to the sensitivity results.  Will have Cipro 500 mg twice a day for 10 days.  I e- scribed to her pharmacy.  Patient voiced understanding.      LI OBANDO M.D., Ph.D.    12/22/2018

## 2018-12-27 ENCOUNTER — INFUSION (OUTPATIENT)
Dept: ONCOLOGY | Facility: HOSPITAL | Age: 68
End: 2018-12-27

## 2018-12-27 ENCOUNTER — OFFICE VISIT (OUTPATIENT)
Dept: ONCOLOGY | Facility: CLINIC | Age: 68
End: 2018-12-27

## 2018-12-27 VITALS
HEIGHT: 62 IN | SYSTOLIC BLOOD PRESSURE: 102 MMHG | HEART RATE: 78 BPM | OXYGEN SATURATION: 99 % | BODY MASS INDEX: 34.47 KG/M2 | TEMPERATURE: 97.7 F | DIASTOLIC BLOOD PRESSURE: 63 MMHG | RESPIRATION RATE: 16 BRPM | WEIGHT: 187.3 LBS

## 2018-12-27 DIAGNOSIS — T45.1X5A CHEMOTHERAPY-INDUCED THROMBOCYTOPENIA: ICD-10-CM

## 2018-12-27 DIAGNOSIS — D64.81 ANEMIA ASSOCIATED WITH CHEMOTHERAPY: ICD-10-CM

## 2018-12-27 DIAGNOSIS — Z45.2 FITTING AND ADJUSTMENT OF VASCULAR CATHETER: Primary | ICD-10-CM

## 2018-12-27 DIAGNOSIS — T45.1X5A ANEMIA ASSOCIATED WITH CHEMOTHERAPY: ICD-10-CM

## 2018-12-27 DIAGNOSIS — C90.00 MULTIPLE MYELOMA NOT HAVING ACHIEVED REMISSION (HCC): Primary | ICD-10-CM

## 2018-12-27 DIAGNOSIS — R21 RASH IN ADULT: ICD-10-CM

## 2018-12-27 DIAGNOSIS — C90.00 MULTIPLE MYELOMA NOT HAVING ACHIEVED REMISSION (HCC): ICD-10-CM

## 2018-12-27 DIAGNOSIS — R53.82 CHRONIC FATIGUE: ICD-10-CM

## 2018-12-27 DIAGNOSIS — D69.59 CHEMOTHERAPY-INDUCED THROMBOCYTOPENIA: ICD-10-CM

## 2018-12-27 LAB
ALBUMIN SERPL-MCNC: 2.8 G/DL (ref 3.5–5.2)
ALBUMIN/GLOB SERPL: 0.4 G/DL
ALP SERPL-CCNC: 55 U/L (ref 39–117)
ALT SERPL W P-5'-P-CCNC: 19 U/L (ref 1–33)
ANION GAP SERPL CALCULATED.3IONS-SCNC: 7.2 MMOL/L
ANISOCYTOSIS BLD QL: ABNORMAL
AST SERPL-CCNC: 25 U/L (ref 1–32)
BILIRUB SERPL-MCNC: 0.6 MG/DL (ref 0.1–1.2)
BUN BLD-MCNC: 27 MG/DL (ref 8–23)
BUN/CREAT SERPL: 12.6 (ref 7–25)
C3 FRG RBC-MCNC: ABNORMAL
CALCIUM SPEC-SCNC: 7.6 MG/DL (ref 8.6–10.5)
CHLORIDE SERPL-SCNC: 114 MMOL/L (ref 98–107)
CO2 SERPL-SCNC: 18.8 MMOL/L (ref 22–29)
CREAT BLD-MCNC: 2.14 MG/DL (ref 0.57–1)
DACRYOCYTES BLD QL SMEAR: ABNORMAL
DEPRECATED RDW RBC AUTO: 65.1 FL (ref 37–54)
EOSINOPHIL # BLD MANUAL: 0.09 10*3/MM3 (ref 0–0.7)
EOSINOPHIL NFR BLD MANUAL: 1 % (ref 0–7)
ERYTHROCYTE [DISTWIDTH] IN BLOOD BY AUTOMATED COUNT: 16.7 % (ref 11.7–13)
GFR SERPL CREATININE-BSD FRML MDRD: 28 ML/MIN/1.73
GLOBULIN UR ELPH-MCNC: 6.7 GM/DL
GLUCOSE BLD-MCNC: 106 MG/DL (ref 65–99)
HCT VFR BLD AUTO: 30.3 % (ref 35.6–45.5)
HGB BLD-MCNC: 9 G/DL (ref 11.9–15.5)
LYMPHOCYTES # BLD MANUAL: 1.3 10*3/MM3 (ref 0.8–7)
LYMPHOCYTES NFR BLD MANUAL: 10 % (ref 0–12)
LYMPHOCYTES NFR BLD MANUAL: 14 % (ref 24–44)
MCH RBC QN AUTO: 31.7 PG (ref 26.9–32)
MCHC RBC AUTO-ENTMCNC: 29.7 G/DL (ref 32.4–36.3)
MCV RBC AUTO: 106.7 FL (ref 80.5–98.2)
MONOCYTES # BLD AUTO: 0.93 10*3/MM3 (ref 0–1)
NEUTROPHILS # BLD AUTO: 6.95 10*3/MM3 (ref 1.9–8.1)
NEUTROPHILS NFR BLD MANUAL: 75 % (ref 42.7–76)
NRBC SPEC MANUAL: 2 /100 WBC (ref 0–0)
PLAT MORPH BLD: NORMAL
PLATELET # BLD AUTO: 71 10*3/MM3 (ref 140–500)
PMV BLD AUTO: 10.6 FL (ref 6–12)
POTASSIUM BLD-SCNC: 4.1 MMOL/L (ref 3.5–5.2)
PROT SERPL-MCNC: 9.5 G/DL (ref 6–8.5)
RBC # BLD AUTO: 2.84 10*6/MM3 (ref 3.9–5.2)
SCAN SLIDE: NORMAL
SODIUM BLD-SCNC: 140 MMOL/L (ref 136–145)
SPHEROCYTES BLD QL SMEAR: ABNORMAL
WBC MORPH BLD: NORMAL
WBC NRBC COR # BLD: 9.27 10*3/MM3 (ref 4.5–10.7)

## 2018-12-27 PROCEDURE — 96375 TX/PRO/DX INJ NEW DRUG ADDON: CPT

## 2018-12-27 PROCEDURE — 63510000001 EPOETIN ALFA PER 1000 UNITS: Performed by: INTERNAL MEDICINE

## 2018-12-27 PROCEDURE — 96372 THER/PROPH/DIAG INJ SC/IM: CPT

## 2018-12-27 PROCEDURE — 96409 CHEMO IV PUSH SNGL DRUG: CPT

## 2018-12-27 PROCEDURE — 99214 OFFICE O/P EST MOD 30 MIN: CPT | Performed by: NURSE PRACTITIONER

## 2018-12-27 PROCEDURE — 25010000002 BORTEZOMIB PER 0.1 MG: Performed by: NURSE PRACTITIONER

## 2018-12-27 PROCEDURE — 85007 BL SMEAR W/DIFF WBC COUNT: CPT | Performed by: INTERNAL MEDICINE

## 2018-12-27 PROCEDURE — 96401 CHEMO ANTI-NEOPL SQ/IM: CPT

## 2018-12-27 PROCEDURE — 25010000003 DEXAMETHASONE SODIUM PHOSPHATE 100 MG/10ML SOLUTION: Performed by: NURSE PRACTITIONER

## 2018-12-27 PROCEDURE — 25010000002 PALONOSETRON PER 25 MCG: Performed by: NURSE PRACTITIONER

## 2018-12-27 PROCEDURE — 85025 COMPLETE CBC W/AUTO DIFF WBC: CPT | Performed by: INTERNAL MEDICINE

## 2018-12-27 PROCEDURE — 96361 HYDRATE IV INFUSION ADD-ON: CPT

## 2018-12-27 PROCEDURE — 25010000002 BENDAMUSTINE HCL 100 MG/4ML SOLUTION 4 ML VIAL: Performed by: NURSE PRACTITIONER

## 2018-12-27 PROCEDURE — 80053 COMPREHEN METABOLIC PANEL: CPT | Performed by: INTERNAL MEDICINE

## 2018-12-27 RX ORDER — SODIUM CHLORIDE 0.9 % (FLUSH) 0.9 %
10 SYRINGE (ML) INJECTION AS NEEDED
Status: CANCELLED | OUTPATIENT
Start: 2018-12-27

## 2018-12-27 RX ORDER — SODIUM CHLORIDE 9 MG/ML
250 INJECTION, SOLUTION INTRAVENOUS ONCE
Status: COMPLETED | OUTPATIENT
Start: 2018-12-27 | End: 2018-12-27

## 2018-12-27 RX ORDER — SODIUM CHLORIDE 0.9 % (FLUSH) 0.9 %
10 SYRINGE (ML) INJECTION AS NEEDED
Status: DISCONTINUED | OUTPATIENT
Start: 2018-12-27 | End: 2018-12-27 | Stop reason: HOSPADM

## 2018-12-27 RX ORDER — SODIUM CHLORIDE 9 MG/ML
250 INJECTION, SOLUTION INTRAVENOUS ONCE
Status: CANCELLED | OUTPATIENT
Start: 2018-12-27

## 2018-12-27 RX ORDER — PALONOSETRON 0.05 MG/ML
0.25 INJECTION, SOLUTION INTRAVENOUS ONCE
Status: COMPLETED | OUTPATIENT
Start: 2018-12-27 | End: 2018-12-27

## 2018-12-27 RX ORDER — SODIUM CHLORIDE 9 MG/ML
500 INJECTION, SOLUTION INTRAVENOUS ONCE
Status: COMPLETED | OUTPATIENT
Start: 2018-12-27 | End: 2018-12-27

## 2018-12-27 RX ORDER — BORTEZOMIB 3.5 MG/1
1.3 INJECTION, POWDER, LYOPHILIZED, FOR SOLUTION INTRAVENOUS; SUBCUTANEOUS ONCE
Status: COMPLETED | OUTPATIENT
Start: 2018-12-27 | End: 2018-12-27

## 2018-12-27 RX ORDER — PALONOSETRON 0.05 MG/ML
0.25 INJECTION, SOLUTION INTRAVENOUS ONCE
Status: CANCELLED | OUTPATIENT
Start: 2018-12-27

## 2018-12-27 RX ORDER — BORTEZOMIB 3.5 MG/1
1.3 INJECTION, POWDER, LYOPHILIZED, FOR SOLUTION INTRAVENOUS; SUBCUTANEOUS ONCE
Status: CANCELLED | OUTPATIENT
Start: 2018-12-27

## 2018-12-27 RX ADMIN — BENDAMUSTINE HYDROCHLORIDE 130 MG: 25 INJECTION, SOLUTION INTRAVENOUS at 11:37

## 2018-12-27 RX ADMIN — ERYTHROPOIETIN 20000 UNITS: 20000 INJECTION, SOLUTION INTRAVENOUS; SUBCUTANEOUS at 11:06

## 2018-12-27 RX ADMIN — SODIUM CHLORIDE, PRESERVATIVE FREE 10 ML: 5 INJECTION INTRAVENOUS at 13:59

## 2018-12-27 RX ADMIN — BORTEZOMIB 2.4 MG: 3.5 INJECTION, POWDER, LYOPHILIZED, FOR SOLUTION INTRAVENOUS; SUBCUTANEOUS at 11:32

## 2018-12-27 RX ADMIN — SODIUM CHLORIDE 250 ML: 9 INJECTION, SOLUTION INTRAVENOUS at 10:40

## 2018-12-27 RX ADMIN — DEXAMETHASONE SODIUM PHOSPHATE 12 MG: 10 INJECTION, SOLUTION INTRAMUSCULAR; INTRAVENOUS at 10:55

## 2018-12-27 RX ADMIN — SODIUM CHLORIDE, PRESERVATIVE FREE 500 UNITS: 5 INJECTION INTRAVENOUS at 13:59

## 2018-12-27 RX ADMIN — SODIUM CHLORIDE 250 ML/HR: 900 INJECTION, SOLUTION INTRAVENOUS at 11:53

## 2018-12-27 RX ADMIN — PALONOSETRON HYDROCHLORIDE 0.25 MG: 0.05 INJECTION, SOLUTION INTRAVENOUS at 10:55

## 2018-12-27 NOTE — PROGRESS NOTES
Reasons for follow up:   1. IgG kappa multiple myeloma, currently on Darzalex, started on May 26, 2016. Patient was switched to Darzalex from Pomalyst, as she continued to be neutropenic with recurrent urinary tract infection while on Pomalyst.    2. Zometa is on hold due to renal insufficiency.    3. After 16 doses of Darzalex, laboratory study showed stable disease in November 2016. Insurance company denied adding Velcade and dexamethasone. Patient is to be continued on monthly Darzalex from 12/06/16.   4. Obstructive right pyelonephritis with positive urine culture for E. coli, required hospitalization from 1/19/2017 through 01/24/2017 with iv antibiotics and stent exchange on 01/20/2017.   5.  Paraprotein studies on March 27, 2017 showed further slight improvement of multiple myeloma.   6.  Febrile illness, with urinary tract infection and influenza B infection in early May 2017, required hospitalization for 6 days.   7.   Paraprotein studies for both serum and 24-hour urine sample on 7/21/2017 showed further improvement of paraproteins.   Darzalex was continued.   8.  Serum protein study reported stable disease on 10/20/2017.  Darzalex will be continued.   9.  Laboratory studies of both serum paraprotein and a 24-hour urine protein in January 2018 showed a further improvement of paraproteins.  Monthly Darzalex will be continued.   10.  Repair of left flank incisional hernia in middle September 2018.   11.  Serum paraprotein study reached micaela on 7/5/2018.  Since that time she has evidence of disease progression.   12.  Disease progression, she will be started on chemotherapy with bendamustine plus Velcade plus dexamethasone per protocol. Treatment will be bendamustine on day 1 and day 4 repeat every 28 days, Velcade and dexamethasone will be on day 1 day 4 and day 8 and day 15 repeat every 28 days.   13.  From cycle #2, we plan to only give Treanda 1 dose per cycle and continue Velcade and dexamethasone  weekly.           History of Present Illness:     Patient is a 68 female is here today for reevaluation.  Patient was started on Treanda plus Velcade and dexamethasone on 11/29/2018.  The patient was due for cycle 1, day 4 of her Treanda she was complaining of profound fatigue and therefore day 4 of treatment was held, altogether.    Given her progressive fatigue and weakness, decision was made to proceed with Treanda on day 1 of each cycle only.  Her Velcade dosing was also modified to weekly only with omission of day 4 of therapy.     She returns today for consideration of cycle 2 of therapy.  It should be noted that patient called our office on December 18, 2018 with recurrent urinary tract infection symptoms including dysuria, fever, and chills.  She gets these quite often secondary to right ureteral stent placement.  She was advised to go to the emergency room should symptoms progress.  ‚ She was subsequently seen in our office on December 20, 2018 as she was due for cycle 1, day 22 of her Velcade.  A urinalysis was obtained with increased white blood cells and bacteria.  Patient was placed on Ceftin near 300 mg twice daily for a 14 day course. Velcade was held that day.  Once cultures were resulted, patient was transitioned to ciprofloxacin for 10 days.  She now has 5 days remaining and has had complete resolution of her urinary frequency and fever.    Today, she reports continued profound fatigue.  She denies any associated exertional dyspnea or chest pain.  Her appetite has been decreased but she is attempting to maintain adequate hydration.  She denies any numbness or tingling in her hands and feet.  Bowels and urination are regular.    She does have a persistent, generalized rash of her upper extremities and trunk.  She will follow-up with her dermatologist on January 4.  She has been placed on a topical steroid regimen which is only helped modestly. Should rash persist, they will consider biopsy in the  "future.     She also briefly mentioned \"neck stiffness\" over the last several weeks with mobility. This is quickly resolved with heat and Bengay cream.     Her total white blood cell count today is normal at 9.27, ANC is 6.95.  Her hemoglobin is down slightly from 9.6 one week ago, to 9.0 today.  Her platelets have improved slightly from 45,000 up to 71,000 today.  Patient denies any excessive bleeding or bruising.  Her creatinine today has increased slightly, yet again up to 2.14. (1.98 previously).                   Past Medical History, Past Surgical History, Social History, Family History have been reviewed and are without significant changes except as mentioned.      Hematology/oncology history: See separate document for extra information.       On12/6/2016, serum free lambda chain 1090 MG/L, free kappa chain 8.5 to MG/L.  Ferritin 1015, iron 99, TIBC 137 iron saturation 72%.     On January 4, 2017, vitamin B12 level 1289, folate 7.7 ng/mL. SPEP reporting gamma globulin 3.3, out of total globulin 5.0, with immunofixation reporting small quantity of monoclonal free lambda chain, plus monoclonal IgG lambda at 3.2 g/DL.  Serum IgG 4075, IgA 9 and IgM 15.  free lambda chain 1339 MG/L and free kappa chain 10.3 MG/L.      Laboratory study March 27, 2017 showed slight improvement of paraprotein, SPEP reporting M spike 2.8 g/DL, out of total globulin 4.3, and the serum IgG 3420, IgA 9, IgM 11, free lambda chain 1218 MG/L and free kappa chain 8.0 MG/L.  Total 24 hour urine sample reported at free lambda chain 142 mg, at a concentration 74.8 MG/L, free kappa chain 75 mg at a concentration 39.5 MG/L., Urine protein immunofixation reporting biclonal IgG lambda and monoclonal free lambda light chain, M spike #1 at 41.5% and monoclonal IgG lambda spike #2 at 10.5%. Serum beta-2 microglobulin is 7.3 MG/L.     Her laboratory study on 7/21/2017 reported further improvement of paraprotein is both serum and a 24-hour urine " sample.  SPEP reporting monoclonal IgG lambda 2.9 g/dL and free lambda chain 0.1 g/dL.  Serum IgG was 3261 mg/dL and IgA 5, IgM 13, free kappa chain 6.7, free lambda chain 701.3 with kappa/lambda ratio 0.01.  24-hour urine sample reported positive for Bence May protein lambda type, immunofixation positive for IgG lambda.  Total urine protein 241 mg, gamma globulin 13.1%.  Hemoglobin was 11.4 .4, platelets 122,000, WBC 6680 including neutrophils 3600, lymphocytes 2100 and monocytes 650.  Her creatinine was 1.68, at baseline level.  Liver function panel unremarkable.  Darzalex was continued.    Laboratory study on 8/25/2017 showed improved the platelets at 144,000, normal WBC 6660 and slightly improved hemoglobin at 11.7.     Patient had a colonoscopy examination on 8/30/2017 by Dr. Rivera.  It reported diverticulosis in multiple areas more severe in the sigmoid colon.  There was 2 polyps 4 mm pneumonia from transverse colon and the sigmoid colon.  Pathology evaluation reported tubular adenoma from the sigmoid colon polyp, and benign hyperplastic polyp from transverse colon.    Laboratory study on 10/20/2017 reported slightly improved monoclonal spike 2.7 g/dL by SPEP, immunofixation reported positive monoclonal IgG lambda, serum IgG 3536 mg/dL, IgA less than 5 and IgM 11, free kappa chain 5.3 mg/L, and free lambda chain 720 mg/L with kappa/lambda ratio 0.01.  Serum beta-2 microglobulin was 6.5 mg/L.   Her CBC showed a stable mild anemia with hemoglobin 11.3, macrocytosis .3, and the platelets 114,000, normal WBC 7070 including neutrophils 4100 lymphocytes 2000 monocytes 650, normal liver function panel, total protein 7.9 and albumin 3.2,  and normal electrolytes including calcium 8.1, and improved creatinine 1.59.     Laboratory study on 1/12/2018 reported serum IgG 3083 mg/dL, IgA 10, IgM 10, free kappa chain 8.5 and free lambda chain 589.1 mg/L, kappa/lambda ratio 0.01, serum protein admitted  fixation reported IgG lambda monoclonal protein and SPEP reporting M spike 3.1 g/dL, beta-2 microgram and 7.4 mg/L. serum creatinine 1.79, calcium 8.2, other electrolytes normal, hemoglobin 11.3, .5 and MCHC 31.6, platelets 129,000, WBC 6780 including neutrophils 3500 lymphocytes 2300.  Serum ferritin 653.7 ng/mL, iron 123 TIBC 174 iron saturation 71%, folic acid 12.8 ng/mL and a vitamin B12 at 873 pg/mL.  Her 24-hour urine study on 1/18/2018 reported lambda chain 32.8 mg/L, total 61 mg in 24 hours, and the free kappa chain 27.4 mg/L and 51 mg in 24 hours, and the total 24-hour urine protein 283 mg, and urine protein immunofixation positive for 5.3% monoclonal IgG lambda and positive for Bence May protein at 36.2% monoclonal lambda chain.  Monthly Darzalex was continued.       This patient had serum protein study on 04/06/2018 which reported free light chain at concentration 703.8 mg/L and free kappa chain 7.0, free kappa/lambda ratio 0.01, serum IgG 3650 mg/dL, IgM 11 and IgA 9 with serum protein electrophoresis reporting monoclonal IgG lambda 3.2 g/dL and also monoclonal free lambda light chain.  But it was unable to quantify monoclonal lambda light chain because of the small quantity of it.     A 24-hour urine study on 04/20/2018 reported total free lambda chain 60 mg in 24 hours at concentration 33.1 mg/L, free kappa chain 45 mg in 24 hours at concentration 24.8 mg/L. Total 24-hour urine protein was 279 mg. Urine protein immunofixation and UPEP reported lambda-type Bence-May protein + #1 monoclonal IgG lambda band at 34.8% and #2 monoclonal IgG lambda band at 6.9%.     Her CBC results today reported a stable hemoglobin at 11.1, platelets 130,000 and WBC 7440 including neutrophils 4100 and lymphocytes 2350, monocytes 650. Her CMP reported slightly worsened creatinine at 1.82 with BUN 33. Total protein at 8.8. Liver function panel was normal. Total serum protein 8.8 and albumin 3.2, globulin  "5.6.    Laboratory study obtained on 11/01/2018 showed further disease progression. Her serum IgG was 5472 mg/dL, IgA 8 and IgM 10, serum kappa chain 7.9 mg/L, free lambda chain 961.3 mg/L and kappa/lambda ratio 0.01. Serum protein electrophoresis reported monoclonal IgG lambda 4.1 g/dL, and monoclonal lambda free light chain 0.1 g/dL. Her hemoglobin was 9.0, .6, MCHC 30.1, platelets 124,000 and WBC 10,000 including neutrophils 7400, lymphocytes 1200, monocytes 600.     Review of Systems    Constitutional: No fever no sweating no chills.  No weight loss. Profound fatigue, See HPI   HENT: Negative for mouth sores and sore throat.    Eyes: Negative for visual disturbance.    Cardiac: Denies chest pain no palpitation.  No lower extremity edema.    Respiratory: no cough or rhinorrhea,  no hemoptysis no short of breath.    Gastrointestinal: Negative for abdominal pain, diarrhea, nausea and vomiting.    Musculoskeletal: Negative for back pain and joint swelling.  See HPI   Neurological: Negative for dizziness.   Hematological: Does not bruise/bleed easily.    SKIN: Skin rashes on extremities.  No pruritus.See HPI   Psychiatric/Behavioral: The patient is not nervous/anxious.        Medications: The current medication list was reviewed in the EMR.       ALLERGIES: Zosyn       VITALS:     Vitals:    12/27/18 0938   BP: 102/63   Pulse: 78   Resp: 16   Temp: 97.7 °F (36.5 °C)   SpO2: 99%   Weight: 85 kg (187 lb 4.8 oz)   Height: 157.5 cm (62.01\")   PainSc: 0-No pain   PS: ECOG 1      Physical Exam   Constitutional: well-developed and well-nourished -American female. No distress. not in acute distress.    HENT:     Head: Normocephalic.     Eyes: No jaundice.     Neck: Normal range of motion.   no masses.    Cardiovascular: Normal rate, regular rhythm, normal heart sounds and normal pulses.  No murmurs.  Pulmonary/Chest: Lungs clear to auscultation bilaterally, no wheezes, no rales.  Normal respiratory effort.  " [facility] benign right upper chest.  Abdominal: Soft, no tenderness guarding or rebound.  Bowel sounds are normal. no distension and no mass. No hepatosplenomegaly.   Musculoskeletal: no edema or deformity.   Lymphadenopathy: She has no cervical, supraclavicular adenopathy.   Neurological: oriented to person, place, and time. No cranial nerve deficit.    Skin: No petechiae no bruises.  There are multiple circular discoloration spots on extremities, as big as 1.5 cm in diameter, some of those with very faint pinkish plaques in the center- This remains unchanged today     Psychiatric: She has normal mood and affect.         Recent lab results:   Lab Results   Component Value Date    WBC 9.27 12/27/2018    HGB 9.0 (L) 12/27/2018    HCT 30.3 (L) 12/27/2018    .7 (H) 12/27/2018    PLT 71 (L) 12/27/2018     Lab Results   Component Value Date    NEUTROABS 6.95 12/27/2018     Lab Results   Component Value Date    GLUCOSE 106 (H) 12/27/2018    BUN 27 (H) 12/27/2018    CREATININE 2.14 (H) 12/27/2018    EGFRIFNONA  08/30/2016      Comment:      <15 Indicative of kidney failure.    EGFRIFAFRI 28 (L) 12/27/2018    BCR 12.6 12/27/2018    K 4.1 12/27/2018    CO2 18.8 (L) 12/27/2018    CALCIUM 7.6 (L) 12/27/2018    PROTENTOTREF 8.9 (H) 11/01/2018    ALBUMIN 2.80 (L) 12/27/2018    LABIL2 0.5 (L) 11/01/2018    AST 25 12/27/2018    ALT 19 12/27/2018     Sodium   Date Value Ref Range Status   12/27/2018 140 136 - 145 mmol/L Final     Potassium   Date Value Ref Range Status   12/27/2018 4.1 3.5 - 5.2 mmol/L Final     Total Bilirubin   Date Value Ref Range Status   12/27/2018 0.6 0.1 - 1.2 mg/dL Final     Alkaline Phosphatase   Date Value Ref Range Status   12/27/2018 55 39 - 117 U/L Final   ]  Lab Results   Component Value Date    CHZTGVAO17 >2,000 (H) 11/01/2018     Lab Results   Component Value Date    IRON 69 11/01/2018    TIBC 145 (L) 11/01/2018    FERRITIN 812.90 (H) 11/01/2018     Iron saturation 48% on 11/1/2018     Lab  Results   Component Value Date    FOLATE 6.75 11/15/2018       Assessment/Plan   1. Multiple myeloma, IgG lambda, stage III. Failed multiple lines of therapy. Her treatment was switched to Darzalex on 5/26/2016. She has been on this treatment for more than 2 years now.  She has tolerated therapy very well.     Reviewing her previous lab studies especially serum paraprotein, she reached a micaela of response on 07/05/2018 when she had serum IgG 3015 mg/dL, free lambda chain 458.7 mg/L and M spike IgG lambda 2.6 g/dL and lambda free light chain 0.1 g/dL, total 2.7 g/dL. since that time her laboratory study showed trending up for serum IgG and free lambda chain.     I discussed with the patient for her disease progression and recommended switching treatment regimen. This patient had multiple lines of treatment and also comorbidities. In summary she previously had VRd regimen but had rectal bleeding shortly after starting Revlimid. She later was started on melphalan, Velcade and dexamethasone and also had Pomalyst plus dexamethasone. Also had CyBorD regimen, also had treatment with panobinostat plus Velcade and dexamethasone. She also progressed on Kyprolis which she had an issue with cardiac problem. Overall I will try to avoid medications with potential cardiac toxicity due to her history of congestive heart failure. At one point her LVEF was in the 30's when she was tested in [facility] to be exact 31% but later improved about 40%. So I recommended switching her therapy to bendamustine plus Velcade plus dexamethasone. She has no peripheral neuropathy so we can reuse Velcade.     Cycle #1 day #1 Bendamustine will be started on 11/29/2018.     Patient did require omission of cycle 1, day 4 of treatment secondary to profound fatigue.  This has been an ongoing issue and decision was made to administer all subsequent cycles with Treanda and Velcade on day 1 only.  Velcade was also transitioned to weekly;  without day 4 of therapy.  She returns today with persistent fatigue, though she denies any exertional dyspnea or associated symptoms.  She is thrombocytopenic with a platelet count of 71,000 in the absence of excess bleeding or bruising. Her also demonstrates slight progression of renal insufficiency with a creatinine of 2.14.  After review with Dr. Huerta (#2), we will proceed with treatment today and will administer an additional 500 ml of IV hydration. She will return weekly for Velcade x the next 3 weeks with paraprotein studies obtained on 1/17/2019. She will see Dr. Araujo for review on 1/24/2019 and consideration of cycle #3 of Velcade, Treanda, and Decadron.       2. Zometa treatment.  Her last dose Zometa was on 11/1/2018 . She receives Zometa every 3 months. She will be due next on 1/24/2019 in conjunction with cycle #3 of chemotherapy.       3.  Macrocytic anemia secondary to chemotherapy for multiple myeloma and chronic renal insufficiency stage 3.  She has trending down hemoglobin in the past 6-8 weeks.  This patient is symptomatic, with cold sensitivity and the worsening fatigue and low energy.      This patient also has anemia secondary to chemotherapy and I think this is probably related to her worsening fatigue. Her hemoglobin is 9.0 and we recently obtained laboratory study on 11/01/2018 which showed elevated ferritin 812.9, iron 69, TIBC 145 and iron saturation 48%. Her vitamin B12 was more than 2000 pg/mL. Her folic acid today is 6.75 ng/mL. I recommend starting this patient on Procrit weekly at 20,000 units each with CBC monitoring. In the meantime she will continue oral vitamin B12 daily, and also add folic acid 800 mcg daily.     Patient was started on Procrit injection, 11/15/2018.  Will continue Procrit weekly.  Her hemoglobin today is slightly down at 9.0      4. Mild to moderate thrombocytopenia secondary to her multiple myeloma and chemotherapy.  Overall her platelet counts has been  fluctuating widely anywhere between 90,000 in the 140,000 in the past 17 months.  She is asymptomatic, no easy bleeding or bruising.  Platelets today are 71,000 today. Continue to monitor.     5. History of stage II left breast cancer, post mastectomy in 2013, negative for ER/OR and positive HER-2. No neoadjuvant chemotherapy because of concurrent discovery of multiple myeloma and ongoing chemotherapy. She had a normal mammogram study on 7/24/2018.       6.  Right middle lobe pulmonary nodule, documented on CT scan of chest on 01/06/2017. Repeated CT scan of chest on 8/21/2017 reported a small 5 mm and stable primary nodule.      7.  Chronic renal insufficiency stage III.  Her creatinine is 2.14 today. We will provide additional IV hydration today and will repeat CMP next week.     8.  Skin rashes on her extremities: This began approximately 6 weeks ago. She report occasional, mild pruritis.   This occurred after starting Neurontin, however Neurontin has been discontinued 3-4 weeks ago, with resolution of pruritus, however she still has ongoing skin rashes.     Patient was seen dermatologist Dr. Barroso, who prescribed  steroids cream and also over-the-counter moisturizing cream.  Patient will follow-up with Dr. Barroso again.    She will follow up with dermatology on 1/4/2019    9. Profound fatigue: This is multifactorial secondary to her disease progression and her anemia associated with chemotherapy.  Hopefully changing chemotherapy regimen and also starting on Procrit would improve this situation.  Fatigue, unfortunately remains persistent today.  We will continue to monitor her labs closely.  I have explained that fatigue is a paramount side effect of chemotherapy and may progress as she proceeds with additional cycles.      Plan:    1.  Proceed with cycle 2, day 1 of Velcade, Decadron, and Treanda at the modified  Schedule. Bendamustine will be only 1 dose every 4 weeks.  Velcade and dexamethasone will be  changed to weekly.      2. Additional IV hydration administered today. We will recheck a BMP next week     2.  She will return weekly ×3 for Velcade only.    3.  Continue Procrit injection today and repeat weekly with CBC monitoring.       4.  Continue prophylactic acyclovir 400 mg twice a day.     6.  Continue vitamin B12 at 1000 mcg daily. Folic acid 800 mcg daily.     7.  Repeat paraprotein studies on 1/17/2018 for both serum and 24-hour urine proteins study.     8.  She will return to see Dr. Araujo for follow-up on 1/24/2019 discuss laboratory results and further treatment plan.      9.  Keep appointment with dermatologist Dr. Barroso for evaluation of skin rash.Next appt on 1/4/2019      10.  Patient asked  to call with any worsening symptoms including progressive shortness of breath, decreased urine output or excess bleeding or bruising.  She has verbalized understanding.

## 2018-12-31 DIAGNOSIS — C90.00 MULTIPLE MYELOMA NOT HAVING ACHIEVED REMISSION (HCC): ICD-10-CM

## 2018-12-31 RX ORDER — BORTEZOMIB 3.5 MG/1
1.3 INJECTION, POWDER, LYOPHILIZED, FOR SOLUTION INTRAVENOUS; SUBCUTANEOUS ONCE
Status: CANCELLED | OUTPATIENT
Start: 2019-01-03

## 2018-12-31 NOTE — PROGRESS NOTES
Day 4 removed from treatment plan per conversation with CRISTIN HERNANDEZ to clarify notes KALE vazquez Dr.

## 2019-01-01 ENCOUNTER — TELEPHONE (OUTPATIENT)
Dept: SURGERY | Facility: CLINIC | Age: 69
End: 2019-01-01

## 2019-01-01 ENCOUNTER — INFUSION (OUTPATIENT)
Dept: ONCOLOGY | Facility: HOSPITAL | Age: 69
End: 2019-01-01

## 2019-01-01 ENCOUNTER — APPOINTMENT (OUTPATIENT)
Dept: PHARMACY | Facility: HOSPITAL | Age: 69
End: 2019-01-01

## 2019-01-01 ENCOUNTER — OFFICE VISIT (OUTPATIENT)
Dept: ONCOLOGY | Facility: CLINIC | Age: 69
End: 2019-01-01

## 2019-01-01 ENCOUNTER — OFFICE VISIT (OUTPATIENT)
Dept: FAMILY MEDICINE CLINIC | Facility: CLINIC | Age: 69
End: 2019-01-01

## 2019-01-01 ENCOUNTER — DOCUMENTATION (OUTPATIENT)
Dept: ONCOLOGY | Facility: CLINIC | Age: 69
End: 2019-01-01

## 2019-01-01 ENCOUNTER — HOSPITAL ENCOUNTER (OUTPATIENT)
Dept: CT IMAGING | Facility: HOSPITAL | Age: 69
Discharge: HOME | End: 2019-08-12
Admitting: RADIOLOGY

## 2019-01-01 ENCOUNTER — HOSPITAL ENCOUNTER (OUTPATIENT)
Dept: INFUSION THERAPY | Facility: HOSPITAL | Age: 69
Setting detail: INFUSION SERIES
Discharge: HOME | End: 2019-11-21

## 2019-01-01 ENCOUNTER — OFFICE VISIT (OUTPATIENT)
Dept: SURGERY | Facility: CLINIC | Age: 69
End: 2019-01-01

## 2019-01-01 ENCOUNTER — APPOINTMENT (OUTPATIENT)
Dept: OTHER | Facility: HOSPITAL | Age: 69
End: 2019-01-01

## 2019-01-01 ENCOUNTER — APPOINTMENT (OUTPATIENT)
Dept: ONCOLOGY | Facility: HOSPITAL | Age: 69
End: 2019-01-01

## 2019-01-01 ENCOUNTER — LAB (OUTPATIENT)
Dept: OTHER | Facility: HOSPITAL | Age: 69
End: 2019-01-01

## 2019-01-01 ENCOUNTER — TELEPHONE (OUTPATIENT)
Dept: ONCOLOGY | Facility: HOSPITAL | Age: 69
End: 2019-01-01

## 2019-01-01 ENCOUNTER — TELEPHONE (OUTPATIENT)
Dept: INTERVENTIONAL RADIOLOGY/VASCULAR | Facility: HOSPITAL | Age: 69
End: 2019-01-01

## 2019-01-01 ENCOUNTER — ANESTHESIA (OUTPATIENT)
Dept: GASTROENTEROLOGY | Facility: HOSPITAL | Age: 69
End: 2019-01-01

## 2019-01-01 ENCOUNTER — TELEPHONE (OUTPATIENT)
Dept: ONCOLOGY | Facility: CLINIC | Age: 69
End: 2019-01-01

## 2019-01-01 ENCOUNTER — HOSPITAL ENCOUNTER (OUTPATIENT)
Dept: INFUSION THERAPY | Facility: HOSPITAL | Age: 69
Discharge: HOME | End: 2019-05-13

## 2019-01-01 ENCOUNTER — APPOINTMENT (OUTPATIENT)
Dept: ONCOLOGY | Facility: CLINIC | Age: 69
End: 2019-01-01

## 2019-01-01 ENCOUNTER — ANESTHESIA EVENT (OUTPATIENT)
Dept: GASTROENTEROLOGY | Facility: HOSPITAL | Age: 69
End: 2019-01-01

## 2019-01-01 ENCOUNTER — CLINICAL SUPPORT (OUTPATIENT)
Dept: ONCOLOGY | Facility: HOSPITAL | Age: 69
End: 2019-01-01

## 2019-01-01 ENCOUNTER — HOSPITAL ENCOUNTER (OUTPATIENT)
Facility: HOSPITAL | Age: 69
Setting detail: HOSPITAL OUTPATIENT SURGERY
Discharge: HOME | End: 2019-05-13
Attending: SURGERY | Admitting: SURGERY

## 2019-01-01 ENCOUNTER — HOSPITAL ENCOUNTER (OUTPATIENT)
Dept: GENERAL RADIOLOGY | Facility: HOSPITAL | Age: 69
Discharge: HOME | End: 2019-08-15
Admitting: INTERNAL MEDICINE

## 2019-01-01 ENCOUNTER — SPECIALTY PHARMACY (OUTPATIENT)
Dept: PHARMACY | Facility: HOSPITAL | Age: 69
End: 2019-01-01

## 2019-01-01 ENCOUNTER — LAB (OUTPATIENT)
Dept: LAB | Facility: HOSPITAL | Age: 69
End: 2019-01-01

## 2019-01-01 ENCOUNTER — TRANSCRIBE ORDERS (OUTPATIENT)
Dept: ADMINISTRATIVE | Facility: HOSPITAL | Age: 69
End: 2019-01-01

## 2019-01-01 ENCOUNTER — TELEPHONE (OUTPATIENT)
Dept: GENERAL RADIOLOGY | Facility: HOSPITAL | Age: 69
End: 2019-01-01

## 2019-01-01 ENCOUNTER — HOSPITAL ENCOUNTER (OUTPATIENT)
Dept: INFUSION THERAPY | Facility: HOSPITAL | Age: 69
Discharge: HOME | End: 2019-10-18
Admitting: INTERNAL MEDICINE

## 2019-01-01 ENCOUNTER — HOSPITAL ENCOUNTER (OUTPATIENT)
Dept: INFUSION THERAPY | Facility: HOSPITAL | Age: 69
Discharge: HOME | End: 2019-12-13

## 2019-01-01 ENCOUNTER — PREP FOR SURGERY (OUTPATIENT)
Dept: OTHER | Facility: HOSPITAL | Age: 69
End: 2019-01-01

## 2019-01-01 ENCOUNTER — HOSPITAL ENCOUNTER (OUTPATIENT)
Dept: MAMMOGRAPHY | Facility: HOSPITAL | Age: 69
Discharge: HOME | End: 2019-07-26
Admitting: FAMILY MEDICINE

## 2019-01-01 VITALS
RESPIRATION RATE: 16 BRPM | OXYGEN SATURATION: 98 % | RESPIRATION RATE: 12 BRPM | WEIGHT: 182.6 LBS | TEMPERATURE: 98 F | DIASTOLIC BLOOD PRESSURE: 75 MMHG | SYSTOLIC BLOOD PRESSURE: 112 MMHG | TEMPERATURE: 97.5 F | HEART RATE: 73 BPM | DIASTOLIC BLOOD PRESSURE: 72 MMHG | BODY MASS INDEX: 33.6 KG/M2 | OXYGEN SATURATION: 99 % | HEART RATE: 83 BPM | WEIGHT: 180 LBS | SYSTOLIC BLOOD PRESSURE: 111 MMHG | HEIGHT: 62 IN | BODY MASS INDEX: 32.91 KG/M2

## 2019-01-01 VITALS
WEIGHT: 185 LBS | TEMPERATURE: 97 F | HEART RATE: 77 BPM | DIASTOLIC BLOOD PRESSURE: 77 MMHG | SYSTOLIC BLOOD PRESSURE: 126 MMHG | BODY MASS INDEX: 34.04 KG/M2 | RESPIRATION RATE: 16 BRPM | HEIGHT: 62 IN

## 2019-01-01 VITALS
HEART RATE: 86 BPM | OXYGEN SATURATION: 99 % | SYSTOLIC BLOOD PRESSURE: 138 MMHG | DIASTOLIC BLOOD PRESSURE: 82 MMHG | TEMPERATURE: 98.2 F | WEIGHT: 176 LBS | BODY MASS INDEX: 32.18 KG/M2

## 2019-01-01 VITALS
OXYGEN SATURATION: 98 % | DIASTOLIC BLOOD PRESSURE: 67 MMHG | TEMPERATURE: 98 F | WEIGHT: 180.6 LBS | SYSTOLIC BLOOD PRESSURE: 115 MMHG | BODY MASS INDEX: 33.02 KG/M2 | HEART RATE: 80 BPM

## 2019-01-01 VITALS
DIASTOLIC BLOOD PRESSURE: 91 MMHG | BODY MASS INDEX: 33.14 KG/M2 | RESPIRATION RATE: 16 BRPM | SYSTOLIC BLOOD PRESSURE: 136 MMHG | OXYGEN SATURATION: 96 % | WEIGHT: 180.1 LBS | HEART RATE: 80 BPM | HEIGHT: 62 IN | TEMPERATURE: 97.7 F

## 2019-01-01 VITALS
OXYGEN SATURATION: 98 % | RESPIRATION RATE: 18 BRPM | SYSTOLIC BLOOD PRESSURE: 124 MMHG | WEIGHT: 176.4 LBS | BODY MASS INDEX: 32.46 KG/M2 | HEIGHT: 62 IN | HEART RATE: 75 BPM | DIASTOLIC BLOOD PRESSURE: 76 MMHG | TEMPERATURE: 97.3 F

## 2019-01-01 VITALS
DIASTOLIC BLOOD PRESSURE: 61 MMHG | WEIGHT: 178.8 LBS | OXYGEN SATURATION: 95 % | BODY MASS INDEX: 32.69 KG/M2 | OXYGEN SATURATION: 96 % | DIASTOLIC BLOOD PRESSURE: 65 MMHG | TEMPERATURE: 97.6 F | HEART RATE: 82 BPM | BODY MASS INDEX: 33.28 KG/M2 | WEIGHT: 182 LBS | SYSTOLIC BLOOD PRESSURE: 101 MMHG | SYSTOLIC BLOOD PRESSURE: 120 MMHG | TEMPERATURE: 97.4 F | HEART RATE: 85 BPM

## 2019-01-01 VITALS
HEART RATE: 81 BPM | BODY MASS INDEX: 33.31 KG/M2 | DIASTOLIC BLOOD PRESSURE: 75 MMHG | OXYGEN SATURATION: 99 % | SYSTOLIC BLOOD PRESSURE: 111 MMHG | SYSTOLIC BLOOD PRESSURE: 121 MMHG | RESPIRATION RATE: 16 BRPM | DIASTOLIC BLOOD PRESSURE: 68 MMHG | WEIGHT: 181 LBS | TEMPERATURE: 97.3 F | HEIGHT: 62 IN | HEART RATE: 68 BPM

## 2019-01-01 VITALS
HEIGHT: 62 IN | BODY MASS INDEX: 33.27 KG/M2 | WEIGHT: 178.2 LBS | DIASTOLIC BLOOD PRESSURE: 67 MMHG | HEIGHT: 62 IN | RESPIRATION RATE: 16 BRPM | TEMPERATURE: 97.8 F | SYSTOLIC BLOOD PRESSURE: 106 MMHG | TEMPERATURE: 97.6 F | OXYGEN SATURATION: 97 % | HEART RATE: 84 BPM | HEART RATE: 70 BPM | DIASTOLIC BLOOD PRESSURE: 69 MMHG | RESPIRATION RATE: 16 BRPM | OXYGEN SATURATION: 99 % | BODY MASS INDEX: 32.79 KG/M2 | WEIGHT: 180.8 LBS | SYSTOLIC BLOOD PRESSURE: 106 MMHG

## 2019-01-01 VITALS
TEMPERATURE: 97.5 F | OXYGEN SATURATION: 97 % | HEIGHT: 62 IN | BODY MASS INDEX: 33.31 KG/M2 | SYSTOLIC BLOOD PRESSURE: 110 MMHG | HEART RATE: 73 BPM | RESPIRATION RATE: 16 BRPM | DIASTOLIC BLOOD PRESSURE: 66 MMHG | WEIGHT: 181 LBS

## 2019-01-01 VITALS
BODY MASS INDEX: 34.16 KG/M2 | SYSTOLIC BLOOD PRESSURE: 113 MMHG | HEART RATE: 78 BPM | WEIGHT: 186.8 LBS | OXYGEN SATURATION: 96 % | TEMPERATURE: 97.3 F | DIASTOLIC BLOOD PRESSURE: 73 MMHG

## 2019-01-01 VITALS
SYSTOLIC BLOOD PRESSURE: 113 MMHG | HEART RATE: 77 BPM | OXYGEN SATURATION: 96 % | RESPIRATION RATE: 16 BRPM | WEIGHT: 183 LBS | TEMPERATURE: 97.9 F | DIASTOLIC BLOOD PRESSURE: 74 MMHG | BODY MASS INDEX: 33.46 KG/M2

## 2019-01-01 VITALS
BODY MASS INDEX: 32.73 KG/M2 | SYSTOLIC BLOOD PRESSURE: 116 MMHG | TEMPERATURE: 97 F | HEART RATE: 77 BPM | WEIGHT: 179 LBS | OXYGEN SATURATION: 97 % | DIASTOLIC BLOOD PRESSURE: 74 MMHG

## 2019-01-01 VITALS
SYSTOLIC BLOOD PRESSURE: 120 MMHG | RESPIRATION RATE: 16 BRPM | TEMPERATURE: 97 F | DIASTOLIC BLOOD PRESSURE: 71 MMHG | SYSTOLIC BLOOD PRESSURE: 138 MMHG | HEART RATE: 79 BPM | HEART RATE: 78 BPM | DIASTOLIC BLOOD PRESSURE: 79 MMHG | OXYGEN SATURATION: 98 %

## 2019-01-01 VITALS
HEART RATE: 83 BPM | BODY MASS INDEX: 32.62 KG/M2 | OXYGEN SATURATION: 99 % | DIASTOLIC BLOOD PRESSURE: 67 MMHG | SYSTOLIC BLOOD PRESSURE: 109 MMHG | TEMPERATURE: 97.7 F | WEIGHT: 178.4 LBS

## 2019-01-01 VITALS
HEART RATE: 84 BPM | SYSTOLIC BLOOD PRESSURE: 117 MMHG | TEMPERATURE: 97.4 F | BODY MASS INDEX: 33.76 KG/M2 | WEIGHT: 184.6 LBS | OXYGEN SATURATION: 93 % | DIASTOLIC BLOOD PRESSURE: 72 MMHG

## 2019-01-01 VITALS
TEMPERATURE: 98 F | DIASTOLIC BLOOD PRESSURE: 56 MMHG | SYSTOLIC BLOOD PRESSURE: 110 MMHG | HEART RATE: 78 BPM | OXYGEN SATURATION: 100 % | RESPIRATION RATE: 16 BRPM

## 2019-01-01 VITALS
DIASTOLIC BLOOD PRESSURE: 64 MMHG | SYSTOLIC BLOOD PRESSURE: 103 MMHG | OXYGEN SATURATION: 97 % | TEMPERATURE: 97.7 F | BODY MASS INDEX: 33 KG/M2 | HEART RATE: 81 BPM | WEIGHT: 180.4 LBS

## 2019-01-01 VITALS
WEIGHT: 182 LBS | OXYGEN SATURATION: 98 % | BODY MASS INDEX: 33.49 KG/M2 | HEIGHT: 62 IN | RESPIRATION RATE: 16 BRPM | TEMPERATURE: 97.7 F | DIASTOLIC BLOOD PRESSURE: 78 MMHG | HEART RATE: 75 BPM | SYSTOLIC BLOOD PRESSURE: 128 MMHG

## 2019-01-01 VITALS
BODY MASS INDEX: 32.88 KG/M2 | TEMPERATURE: 97.6 F | OXYGEN SATURATION: 98 % | DIASTOLIC BLOOD PRESSURE: 66 MMHG | WEIGHT: 179.8 LBS | SYSTOLIC BLOOD PRESSURE: 102 MMHG | HEART RATE: 67 BPM

## 2019-01-01 VITALS
HEIGHT: 62 IN | TEMPERATURE: 97.7 F | HEART RATE: 84 BPM | RESPIRATION RATE: 16 BRPM | WEIGHT: 181.8 LBS | DIASTOLIC BLOOD PRESSURE: 63 MMHG | BODY MASS INDEX: 33.45 KG/M2 | OXYGEN SATURATION: 95 % | SYSTOLIC BLOOD PRESSURE: 100 MMHG

## 2019-01-01 VITALS
RESPIRATION RATE: 20 BRPM | SYSTOLIC BLOOD PRESSURE: 112 MMHG | OXYGEN SATURATION: 96 % | WEIGHT: 181 LBS | HEART RATE: 72 BPM | BODY MASS INDEX: 33.1 KG/M2 | DIASTOLIC BLOOD PRESSURE: 78 MMHG | TEMPERATURE: 97 F

## 2019-01-01 VITALS
WEIGHT: 183.4 LBS | BODY MASS INDEX: 33.54 KG/M2 | DIASTOLIC BLOOD PRESSURE: 71 MMHG | OXYGEN SATURATION: 98 % | SYSTOLIC BLOOD PRESSURE: 117 MMHG | TEMPERATURE: 97.9 F | HEART RATE: 79 BPM

## 2019-01-01 VITALS — DIASTOLIC BLOOD PRESSURE: 75 MMHG | HEART RATE: 82 BPM | SYSTOLIC BLOOD PRESSURE: 122 MMHG

## 2019-01-01 VITALS
HEART RATE: 85 BPM | BODY MASS INDEX: 32.62 KG/M2 | DIASTOLIC BLOOD PRESSURE: 64 MMHG | OXYGEN SATURATION: 96 % | SYSTOLIC BLOOD PRESSURE: 103 MMHG | WEIGHT: 178.4 LBS | TEMPERATURE: 97.9 F

## 2019-01-01 VITALS
HEART RATE: 80 BPM | SYSTOLIC BLOOD PRESSURE: 107 MMHG | TEMPERATURE: 97.4 F | OXYGEN SATURATION: 97 % | RESPIRATION RATE: 18 BRPM | WEIGHT: 178.1 LBS | HEIGHT: 62 IN | BODY MASS INDEX: 32.77 KG/M2 | DIASTOLIC BLOOD PRESSURE: 67 MMHG

## 2019-01-01 VITALS
DIASTOLIC BLOOD PRESSURE: 45 MMHG | OXYGEN SATURATION: 98 % | SYSTOLIC BLOOD PRESSURE: 94 MMHG | TEMPERATURE: 98 F | HEART RATE: 63 BPM | RESPIRATION RATE: 18 BRPM

## 2019-01-01 VITALS
SYSTOLIC BLOOD PRESSURE: 106 MMHG | BODY MASS INDEX: 32.15 KG/M2 | HEIGHT: 62 IN | DIASTOLIC BLOOD PRESSURE: 67 MMHG | TEMPERATURE: 97.6 F | WEIGHT: 174.7 LBS | RESPIRATION RATE: 16 BRPM | HEART RATE: 79 BPM | OXYGEN SATURATION: 98 %

## 2019-01-01 VITALS — OXYGEN SATURATION: 98 % | WEIGHT: 180.2 LBS | HEIGHT: 62 IN | HEART RATE: 88 BPM | BODY MASS INDEX: 33.16 KG/M2

## 2019-01-01 VITALS
HEART RATE: 73 BPM | RESPIRATION RATE: 16 BRPM | HEIGHT: 62 IN | BODY MASS INDEX: 32.57 KG/M2 | SYSTOLIC BLOOD PRESSURE: 109 MMHG | TEMPERATURE: 97.8 F | DIASTOLIC BLOOD PRESSURE: 58 MMHG | OXYGEN SATURATION: 99 % | WEIGHT: 177 LBS

## 2019-01-01 VITALS
OXYGEN SATURATION: 97 % | RESPIRATION RATE: 16 BRPM | BODY MASS INDEX: 31.96 KG/M2 | HEART RATE: 78 BPM | DIASTOLIC BLOOD PRESSURE: 67 MMHG | WEIGHT: 173.7 LBS | HEIGHT: 62 IN | TEMPERATURE: 97.5 F | SYSTOLIC BLOOD PRESSURE: 106 MMHG

## 2019-01-01 VITALS
SYSTOLIC BLOOD PRESSURE: 104 MMHG | HEIGHT: 62 IN | BODY MASS INDEX: 33.33 KG/M2 | DIASTOLIC BLOOD PRESSURE: 71 MMHG | RESPIRATION RATE: 16 BRPM | OXYGEN SATURATION: 98 % | TEMPERATURE: 98.1 F | HEART RATE: 87 BPM | WEIGHT: 181.1 LBS

## 2019-01-01 VITALS
BODY MASS INDEX: 33.57 KG/M2 | OXYGEN SATURATION: 100 % | DIASTOLIC BLOOD PRESSURE: 64 MMHG | RESPIRATION RATE: 16 BRPM | HEART RATE: 69 BPM | TEMPERATURE: 97.8 F | HEIGHT: 62 IN | SYSTOLIC BLOOD PRESSURE: 103 MMHG | WEIGHT: 182.44 LBS

## 2019-01-01 VITALS
DIASTOLIC BLOOD PRESSURE: 59 MMHG | RESPIRATION RATE: 16 BRPM | OXYGEN SATURATION: 97 % | SYSTOLIC BLOOD PRESSURE: 108 MMHG | BODY MASS INDEX: 32.3 KG/M2 | SYSTOLIC BLOOD PRESSURE: 113 MMHG | DIASTOLIC BLOOD PRESSURE: 68 MMHG | HEIGHT: 62 IN | TEMPERATURE: 98 F | RESPIRATION RATE: 16 BRPM | WEIGHT: 175.5 LBS | HEART RATE: 88 BPM | HEART RATE: 76 BPM | TEMPERATURE: 98 F | OXYGEN SATURATION: 95 %

## 2019-01-01 VITALS
BODY MASS INDEX: 33.1 KG/M2 | RESPIRATION RATE: 18 BRPM | HEART RATE: 76 BPM | WEIGHT: 181 LBS | DIASTOLIC BLOOD PRESSURE: 64 MMHG | SYSTOLIC BLOOD PRESSURE: 99 MMHG | TEMPERATURE: 98 F | OXYGEN SATURATION: 98 %

## 2019-01-01 VITALS
BODY MASS INDEX: 32.92 KG/M2 | DIASTOLIC BLOOD PRESSURE: 63 MMHG | WEIGHT: 178.9 LBS | TEMPERATURE: 97.8 F | SYSTOLIC BLOOD PRESSURE: 102 MMHG | RESPIRATION RATE: 16 BRPM | HEART RATE: 77 BPM | HEIGHT: 62 IN | OXYGEN SATURATION: 98 %

## 2019-01-01 VITALS
HEIGHT: 62 IN | DIASTOLIC BLOOD PRESSURE: 68 MMHG | WEIGHT: 179.6 LBS | RESPIRATION RATE: 16 BRPM | HEART RATE: 68 BPM | OXYGEN SATURATION: 98 % | TEMPERATURE: 97.8 F | SYSTOLIC BLOOD PRESSURE: 106 MMHG | BODY MASS INDEX: 33.05 KG/M2

## 2019-01-01 VITALS
HEART RATE: 73 BPM | DIASTOLIC BLOOD PRESSURE: 71 MMHG | WEIGHT: 176.7 LBS | BODY MASS INDEX: 32.52 KG/M2 | SYSTOLIC BLOOD PRESSURE: 108 MMHG | RESPIRATION RATE: 16 BRPM | HEIGHT: 62 IN | OXYGEN SATURATION: 96 % | TEMPERATURE: 98.1 F

## 2019-01-01 VITALS
OXYGEN SATURATION: 96 % | TEMPERATURE: 97.6 F | WEIGHT: 187.8 LBS | DIASTOLIC BLOOD PRESSURE: 56 MMHG | BODY MASS INDEX: 34.34 KG/M2 | SYSTOLIC BLOOD PRESSURE: 92 MMHG | HEART RATE: 80 BPM

## 2019-01-01 VITALS
WEIGHT: 180 LBS | TEMPERATURE: 97.6 F | SYSTOLIC BLOOD PRESSURE: 116 MMHG | DIASTOLIC BLOOD PRESSURE: 74 MMHG | HEART RATE: 77 BPM | BODY MASS INDEX: 32.91 KG/M2

## 2019-01-01 VITALS
HEART RATE: 74 BPM | SYSTOLIC BLOOD PRESSURE: 111 MMHG | OXYGEN SATURATION: 95 % | BODY MASS INDEX: 33.42 KG/M2 | WEIGHT: 182.8 LBS | TEMPERATURE: 98.1 F | DIASTOLIC BLOOD PRESSURE: 67 MMHG

## 2019-01-01 VITALS
HEIGHT: 62 IN | DIASTOLIC BLOOD PRESSURE: 73 MMHG | HEART RATE: 79 BPM | WEIGHT: 180.4 LBS | OXYGEN SATURATION: 95 % | RESPIRATION RATE: 16 BRPM | TEMPERATURE: 97.7 F | SYSTOLIC BLOOD PRESSURE: 117 MMHG | BODY MASS INDEX: 33.2 KG/M2

## 2019-01-01 VITALS
TEMPERATURE: 97.3 F | DIASTOLIC BLOOD PRESSURE: 64 MMHG | BODY MASS INDEX: 32.26 KG/M2 | HEART RATE: 86 BPM | OXYGEN SATURATION: 95 % | SYSTOLIC BLOOD PRESSURE: 107 MMHG | WEIGHT: 176.4 LBS

## 2019-01-01 VITALS
WEIGHT: 182.2 LBS | DIASTOLIC BLOOD PRESSURE: 71 MMHG | OXYGEN SATURATION: 98 % | HEIGHT: 62 IN | BODY MASS INDEX: 33.53 KG/M2 | HEART RATE: 80 BPM | RESPIRATION RATE: 16 BRPM | TEMPERATURE: 98 F | SYSTOLIC BLOOD PRESSURE: 120 MMHG

## 2019-01-01 VITALS
HEART RATE: 79 BPM | TEMPERATURE: 97.4 F | BODY MASS INDEX: 32.84 KG/M2 | OXYGEN SATURATION: 95 % | WEIGHT: 179.6 LBS | DIASTOLIC BLOOD PRESSURE: 58 MMHG | SYSTOLIC BLOOD PRESSURE: 106 MMHG

## 2019-01-01 VITALS
HEART RATE: 76 BPM | BODY MASS INDEX: 32.22 KG/M2 | RESPIRATION RATE: 16 BRPM | TEMPERATURE: 97.8 F | WEIGHT: 176.2 LBS | DIASTOLIC BLOOD PRESSURE: 78 MMHG | SYSTOLIC BLOOD PRESSURE: 139 MMHG | OXYGEN SATURATION: 99 %

## 2019-01-01 VITALS — SYSTOLIC BLOOD PRESSURE: 106 MMHG | HEART RATE: 73 BPM | DIASTOLIC BLOOD PRESSURE: 62 MMHG

## 2019-01-01 VITALS
SYSTOLIC BLOOD PRESSURE: 99 MMHG | BODY MASS INDEX: 32 KG/M2 | DIASTOLIC BLOOD PRESSURE: 62 MMHG | HEIGHT: 62 IN | TEMPERATURE: 98 F | HEART RATE: 77 BPM | WEIGHT: 173.9 LBS | OXYGEN SATURATION: 98 % | RESPIRATION RATE: 16 BRPM

## 2019-01-01 VITALS
HEIGHT: 62 IN | SYSTOLIC BLOOD PRESSURE: 121 MMHG | OXYGEN SATURATION: 99 % | DIASTOLIC BLOOD PRESSURE: 68 MMHG | RESPIRATION RATE: 16 BRPM | WEIGHT: 178 LBS | BODY MASS INDEX: 32.76 KG/M2 | HEART RATE: 74 BPM

## 2019-01-01 VITALS
TEMPERATURE: 97.5 F | WEIGHT: 173.4 LBS | DIASTOLIC BLOOD PRESSURE: 55 MMHG | OXYGEN SATURATION: 94 % | HEART RATE: 69 BPM | SYSTOLIC BLOOD PRESSURE: 111 MMHG | BODY MASS INDEX: 31.71 KG/M2

## 2019-01-01 VITALS
HEART RATE: 72 BPM | RESPIRATION RATE: 16 BRPM | OXYGEN SATURATION: 99 % | TEMPERATURE: 97.1 F | DIASTOLIC BLOOD PRESSURE: 66 MMHG | SYSTOLIC BLOOD PRESSURE: 112 MMHG

## 2019-01-01 VITALS
HEART RATE: 90 BPM | SYSTOLIC BLOOD PRESSURE: 101 MMHG | RESPIRATION RATE: 18 BRPM | TEMPERATURE: 97.8 F | WEIGHT: 179.2 LBS | DIASTOLIC BLOOD PRESSURE: 66 MMHG | OXYGEN SATURATION: 98 % | BODY MASS INDEX: 32.77 KG/M2

## 2019-01-01 DIAGNOSIS — C90.00 MULTIPLE MYELOMA NOT HAVING ACHIEVED REMISSION (HCC): ICD-10-CM

## 2019-01-01 DIAGNOSIS — T45.1X5A ANEMIA ASSOCIATED WITH CHEMOTHERAPY: ICD-10-CM

## 2019-01-01 DIAGNOSIS — Z79.899 HIGH RISK MEDICATION USE: ICD-10-CM

## 2019-01-01 DIAGNOSIS — D63.1 ANEMIA, CHRONIC RENAL FAILURE, STAGE 3 (MODERATE) (HCC): ICD-10-CM

## 2019-01-01 DIAGNOSIS — Z45.2 FITTING AND ADJUSTMENT OF VASCULAR CATHETER: ICD-10-CM

## 2019-01-01 DIAGNOSIS — C90.00 MULTIPLE MYELOMA NOT HAVING ACHIEVED REMISSION (HCC): Primary | ICD-10-CM

## 2019-01-01 DIAGNOSIS — N18.30 ANEMIA, CHRONIC RENAL FAILURE, STAGE 3 (MODERATE) (HCC): ICD-10-CM

## 2019-01-01 DIAGNOSIS — D64.81 ANEMIA ASSOCIATED WITH CHEMOTHERAPY: ICD-10-CM

## 2019-01-01 DIAGNOSIS — D69.6 THROMBOCYTOPENIA (HCC): ICD-10-CM

## 2019-01-01 DIAGNOSIS — D64.81 ANEMIA ASSOCIATED WITH CHEMOTHERAPY: Primary | ICD-10-CM

## 2019-01-01 DIAGNOSIS — T45.1X5A ANEMIA ASSOCIATED WITH CHEMOTHERAPY: Primary | ICD-10-CM

## 2019-01-01 DIAGNOSIS — G40.909 SEIZURE DISORDER (HCC): Primary | ICD-10-CM

## 2019-01-01 DIAGNOSIS — D69.59 CHEMOTHERAPY-INDUCED THROMBOCYTOPENIA: ICD-10-CM

## 2019-01-01 DIAGNOSIS — E03.9 HYPOTHYROIDISM, UNSPECIFIED TYPE: ICD-10-CM

## 2019-01-01 DIAGNOSIS — E83.51 HYPOCALCEMIA: ICD-10-CM

## 2019-01-01 DIAGNOSIS — D69.6 THROMBOCYTOPENIA (HCC): Primary | ICD-10-CM

## 2019-01-01 DIAGNOSIS — Z79.899 HIGH RISK MEDICATION USE: Primary | ICD-10-CM

## 2019-01-01 DIAGNOSIS — D75.89 MACROCYTOSIS: ICD-10-CM

## 2019-01-01 DIAGNOSIS — M17.12 ARTHRITIS OF LEFT KNEE: ICD-10-CM

## 2019-01-01 DIAGNOSIS — D63.1 ANEMIA, CHRONIC RENAL FAILURE, STAGE 3 (MODERATE) (HCC): Primary | ICD-10-CM

## 2019-01-01 DIAGNOSIS — Z12.31 VISIT FOR SCREENING MAMMOGRAM: Primary | ICD-10-CM

## 2019-01-01 DIAGNOSIS — T45.1X5A CHEMOTHERAPY-INDUCED THROMBOCYTOPENIA: ICD-10-CM

## 2019-01-01 DIAGNOSIS — C49.9 LEIOMYOSARCOMA (HCC): ICD-10-CM

## 2019-01-01 DIAGNOSIS — R10.13 EPIGASTRIC ABDOMINAL PAIN: ICD-10-CM

## 2019-01-01 DIAGNOSIS — N18.30 ANEMIA, CHRONIC RENAL FAILURE, STAGE 3 (MODERATE) (HCC): Primary | ICD-10-CM

## 2019-01-01 DIAGNOSIS — R82.90 ABNORMAL FINDING IN URINE: ICD-10-CM

## 2019-01-01 DIAGNOSIS — D72.823 LEUKEMOID REACTION: Primary | ICD-10-CM

## 2019-01-01 DIAGNOSIS — C90.00 MULTIPLE MYELOMA, REMISSION STATUS UNSPECIFIED (HCC): ICD-10-CM

## 2019-01-01 DIAGNOSIS — G40.909 SEIZURE DISORDER (HCC): ICD-10-CM

## 2019-01-01 DIAGNOSIS — K25.7 CHRONIC GASTRIC ULCER WITHOUT HEMORRHAGE AND WITHOUT PERFORATION: ICD-10-CM

## 2019-01-01 DIAGNOSIS — K21.9 GASTROESOPHAGEAL REFLUX DISEASE, ESOPHAGITIS PRESENCE NOT SPECIFIED: ICD-10-CM

## 2019-01-01 DIAGNOSIS — R63.4 WEIGHT LOSS: ICD-10-CM

## 2019-01-01 DIAGNOSIS — N18.30 CHRONIC RENAL IMPAIRMENT, STAGE 3 (MODERATE) (HCC): ICD-10-CM

## 2019-01-01 DIAGNOSIS — R30.9 URINATION PAIN: Primary | ICD-10-CM

## 2019-01-01 DIAGNOSIS — E66.09 CLASS 1 OBESITY DUE TO EXCESS CALORIES WITH SERIOUS COMORBIDITY AND BODY MASS INDEX (BMI) OF 32.0 TO 32.9 IN ADULT: ICD-10-CM

## 2019-01-01 DIAGNOSIS — N18.30 CHRONIC KIDNEY DISEASE, STAGE III (MODERATE) (HCC): Primary | ICD-10-CM

## 2019-01-01 DIAGNOSIS — K42.9 UMBILICAL HERNIA WITHOUT OBSTRUCTION AND WITHOUT GANGRENE: ICD-10-CM

## 2019-01-01 DIAGNOSIS — N18.30 CHRONIC KIDNEY DISEASE, STAGE III (MODERATE) (HCC): ICD-10-CM

## 2019-01-01 DIAGNOSIS — R53.82 CHRONIC FATIGUE: ICD-10-CM

## 2019-01-01 DIAGNOSIS — Z85.3 PERSONAL HISTORY OF BREAST CANCER: ICD-10-CM

## 2019-01-01 DIAGNOSIS — E83.39 HYPERPHOSPHATEMIA: ICD-10-CM

## 2019-01-01 DIAGNOSIS — R91.1 PULMONARY NODULE, RIGHT: ICD-10-CM

## 2019-01-01 DIAGNOSIS — Z00.00 MEDICARE ANNUAL WELLNESS VISIT, INITIAL: Primary | ICD-10-CM

## 2019-01-01 DIAGNOSIS — R63.0 DECREASED APPETITE: ICD-10-CM

## 2019-01-01 DIAGNOSIS — D70.2 NEUTROPENIA, DRUG-INDUCED (HCC): ICD-10-CM

## 2019-01-01 DIAGNOSIS — C64.9 MALIGNANT NEOPLASM OF KIDNEY, UNSPECIFIED LATERALITY (HCC): ICD-10-CM

## 2019-01-01 DIAGNOSIS — D72.823 LEUKEMOID REACTION: ICD-10-CM

## 2019-01-01 DIAGNOSIS — Z12.31 VISIT FOR SCREENING MAMMOGRAM: ICD-10-CM

## 2019-01-01 DIAGNOSIS — R10.13 EPIGASTRIC ABDOMINAL PAIN: Primary | ICD-10-CM

## 2019-01-01 DIAGNOSIS — R30.0 DYSURIA: Primary | ICD-10-CM

## 2019-01-01 DIAGNOSIS — B35.4 TINEA CORPORIS: ICD-10-CM

## 2019-01-01 DIAGNOSIS — R91.8 PULMONARY NODULES: Primary | ICD-10-CM

## 2019-01-01 DIAGNOSIS — Z45.2 FITTING AND ADJUSTMENT OF VASCULAR CATHETER: Primary | ICD-10-CM

## 2019-01-01 DIAGNOSIS — R30.9 URINATION PAIN: ICD-10-CM

## 2019-01-01 DIAGNOSIS — N30.00 ACUTE CYSTITIS WITHOUT HEMATURIA: ICD-10-CM

## 2019-01-01 DIAGNOSIS — N39.0 URINARY TRACT INFECTION WITHOUT HEMATURIA, SITE UNSPECIFIED: ICD-10-CM

## 2019-01-01 LAB
ABO + RH BLD: NORMAL
ABO GROUP BLD: NORMAL
ALBUMIN 24H MFR UR ELPH: 14.2 %
ALBUMIN 24H MFR UR ELPH: 16.3 %
ALBUMIN 24H MFR UR ELPH: 7 %
ALBUMIN 24H MFR UR ELPH: 9.3 %
ALBUMIN 24H MFR UR ELPH: 9.5 %
ALBUMIN SERPL-MCNC: 2.1 G/DL (ref 3.5–5.2)
ALBUMIN SERPL-MCNC: 2.5 G/DL (ref 2.9–4.4)
ALBUMIN SERPL-MCNC: 2.6 G/DL (ref 3.5–5.2)
ALBUMIN SERPL-MCNC: 2.7 G/DL (ref 3.5–5.2)
ALBUMIN SERPL-MCNC: 2.8 G/DL (ref 2.9–4.4)
ALBUMIN SERPL-MCNC: 2.8 G/DL (ref 3.5–5.2)
ALBUMIN SERPL-MCNC: 2.9 G/DL (ref 2.9–4.4)
ALBUMIN SERPL-MCNC: 2.9 G/DL (ref 2.9–4.4)
ALBUMIN SERPL-MCNC: 2.9 G/DL (ref 3.5–5.2)
ALBUMIN SERPL-MCNC: 3 G/DL (ref 2.9–4.4)
ALBUMIN SERPL-MCNC: 3.2 G/DL (ref 3.5–5.2)
ALBUMIN/GLOB SERPL: 0.3 G/DL
ALBUMIN/GLOB SERPL: 0.3 G/DL (ref 1.1–2.4)
ALBUMIN/GLOB SERPL: 0.4 G/DL
ALBUMIN/GLOB SERPL: 0.5 G/DL
ALBUMIN/GLOB SERPL: 0.5 {RATIO} (ref 0.7–1.7)
ALBUMIN/GLOB SERPL: 0.5 {RATIO} (ref 0.7–1.7)
ALBUMIN/GLOB SERPL: 0.6 {RATIO} (ref 0.7–1.7)
ALP SERPL-CCNC: 34 U/L (ref 39–117)
ALP SERPL-CCNC: 42 U/L (ref 39–117)
ALP SERPL-CCNC: 43 U/L (ref 39–117)
ALP SERPL-CCNC: 44 U/L (ref 39–117)
ALP SERPL-CCNC: 46 U/L (ref 39–117)
ALP SERPL-CCNC: 47 U/L (ref 39–117)
ALP SERPL-CCNC: 48 U/L (ref 39–117)
ALP SERPL-CCNC: 49 U/L (ref 38–116)
ALP SERPL-CCNC: 49 U/L (ref 39–117)
ALP SERPL-CCNC: 50 U/L (ref 39–117)
ALP SERPL-CCNC: 51 U/L (ref 39–117)
ALP SERPL-CCNC: 51 U/L (ref 39–117)
ALP SERPL-CCNC: 53 U/L (ref 39–117)
ALP SERPL-CCNC: 54 U/L (ref 39–117)
ALP SERPL-CCNC: 55 U/L (ref 39–117)
ALPHA1 GLOB 24H MFR UR ELPH: 2.6 %
ALPHA1 GLOB 24H MFR UR ELPH: 3.4 %
ALPHA1 GLOB 24H MFR UR ELPH: 5.3 %
ALPHA1 GLOB 24H MFR UR ELPH: 9.2 %
ALPHA1 GLOB 24H MFR UR ELPH: 9.8 %
ALPHA1 GLOB FLD ELPH-MCNC: 0.2 G/DL (ref 0–0.4)
ALPHA1 GLOB FLD ELPH-MCNC: 0.3 G/DL (ref 0–0.4)
ALPHA1 GLOB FLD ELPH-MCNC: 0.3 G/DL (ref 0–0.4)
ALPHA2 GLOB 24H MFR UR ELPH: 10.8 %
ALPHA2 GLOB 24H MFR UR ELPH: 12.3 %
ALPHA2 GLOB 24H MFR UR ELPH: 14.4 %
ALPHA2 GLOB 24H MFR UR ELPH: 16.1 %
ALPHA2 GLOB 24H MFR UR ELPH: 20 %
ALPHA2 GLOB SERPL ELPH-MCNC: 0.4 G/DL (ref 0.4–1)
ALPHA2 GLOB SERPL ELPH-MCNC: 0.5 G/DL (ref 0.4–1)
ALPHA2 GLOB SERPL ELPH-MCNC: 0.6 G/DL (ref 0.4–1)
ALPHA2 GLOB SERPL ELPH-MCNC: 0.6 G/DL (ref 0.4–1)
ALPHA2 GLOB SERPL ELPH-MCNC: 0.7 G/DL (ref 0.4–1)
ALT SERPL W P-5'-P-CCNC: 10 U/L (ref 0–33)
ALT SERPL W P-5'-P-CCNC: 10 U/L (ref 1–33)
ALT SERPL W P-5'-P-CCNC: 11 U/L (ref 1–33)
ALT SERPL W P-5'-P-CCNC: 12 U/L (ref 1–33)
ALT SERPL W P-5'-P-CCNC: 12 U/L (ref 1–33)
ALT SERPL W P-5'-P-CCNC: 16 U/L (ref 1–33)
ALT SERPL W P-5'-P-CCNC: 7 U/L (ref 1–33)
ALT SERPL W P-5'-P-CCNC: 8 U/L (ref 1–33)
ALT SERPL W P-5'-P-CCNC: 8 U/L (ref 1–33)
ALT SERPL W P-5'-P-CCNC: 9 U/L (ref 1–33)
ANION GAP SERPL CALCULATED.3IONS-SCNC: 10 MMOL/L
ANION GAP SERPL CALCULATED.3IONS-SCNC: 10.6 MMOL/L (ref 5–15)
ANION GAP SERPL CALCULATED.3IONS-SCNC: 5.4 MMOL/L (ref 5–15)
ANION GAP SERPL CALCULATED.3IONS-SCNC: 5.5 MMOL/L
ANION GAP SERPL CALCULATED.3IONS-SCNC: 5.6 MMOL/L (ref 5–15)
ANION GAP SERPL CALCULATED.3IONS-SCNC: 6.1 MMOL/L
ANION GAP SERPL CALCULATED.3IONS-SCNC: 6.3 MMOL/L (ref 5–15)
ANION GAP SERPL CALCULATED.3IONS-SCNC: 6.4 MMOL/L (ref 5–15)
ANION GAP SERPL CALCULATED.3IONS-SCNC: 6.7 MMOL/L
ANION GAP SERPL CALCULATED.3IONS-SCNC: 6.7 MMOL/L (ref 5–15)
ANION GAP SERPL CALCULATED.3IONS-SCNC: 7 MMOL/L (ref 5–15)
ANION GAP SERPL CALCULATED.3IONS-SCNC: 7.1 MMOL/L
ANION GAP SERPL CALCULATED.3IONS-SCNC: 7.3 MMOL/L (ref 5–15)
ANION GAP SERPL CALCULATED.3IONS-SCNC: 7.5 MMOL/L
ANION GAP SERPL CALCULATED.3IONS-SCNC: 7.6 MMOL/L (ref 5–15)
ANION GAP SERPL CALCULATED.3IONS-SCNC: 7.7 MMOL/L
ANION GAP SERPL CALCULATED.3IONS-SCNC: 7.7 MMOL/L
ANION GAP SERPL CALCULATED.3IONS-SCNC: 7.7 MMOL/L (ref 5–15)
ANION GAP SERPL CALCULATED.3IONS-SCNC: 7.9 MMOL/L
ANION GAP SERPL CALCULATED.3IONS-SCNC: 8.5 MMOL/L
ANION GAP SERPL CALCULATED.3IONS-SCNC: 8.5 MMOL/L (ref 5–15)
ANION GAP SERPL CALCULATED.3IONS-SCNC: 8.7 MMOL/L
ANION GAP SERPL CALCULATED.3IONS-SCNC: 8.7 MMOL/L (ref 5–15)
ANION GAP SERPL CALCULATED.3IONS-SCNC: 9.7 MMOL/L (ref 5–15)
ANISOCYTOSIS BLD QL: ABNORMAL
ANISOCYTOSIS BLD QL: NORMAL
ANTI-K: NORMAL
AST SERPL-CCNC: 12 U/L (ref 1–32)
AST SERPL-CCNC: 12 U/L (ref 1–32)
AST SERPL-CCNC: 13 U/L (ref 1–32)
AST SERPL-CCNC: 14 U/L (ref 1–32)
AST SERPL-CCNC: 15 U/L (ref 1–32)
AST SERPL-CCNC: 16 U/L (ref 0–32)
AST SERPL-CCNC: 16 U/L (ref 1–32)
AST SERPL-CCNC: 17 U/L (ref 1–32)
AST SERPL-CCNC: 21 U/L (ref 1–32)
B-GLOBULIN MFR UR ELPH: 44.1 %
B-GLOBULIN MFR UR ELPH: 44.7 %
B-GLOBULIN MFR UR ELPH: 48.1 %
B-GLOBULIN MFR UR ELPH: 49 %
B-GLOBULIN MFR UR ELPH: 55.9 %
B-GLOBULIN SERPL ELPH-MCNC: 0.6 G/DL (ref 0.7–1.3)
B-GLOBULIN SERPL ELPH-MCNC: 0.8 G/DL (ref 0.7–1.3)
B-GLOBULIN SERPL ELPH-MCNC: 0.9 G/DL (ref 0.7–1.3)
B2 MICROGLOB SERPL-MCNC: 14.6 MG/L (ref 0.8–2.2)
B2 MICROGLOB SERPL-MCNC: 14.7 MG/L (ref 0.8–2.2)
B2 MICROGLOB SERPL-MCNC: 16.2 MG/L (ref 0.8–2.2)
B2 MICROGLOB SERPL-MCNC: 16.3 MG/L (ref 0.8–2.2)
BACTERIA SPEC AEROBE CULT: ABNORMAL
BACTERIA SPEC AEROBE CULT: ABNORMAL
BACTERIA SPEC AEROBE CULT: NORMAL
BACTERIA UR QL AUTO: ABNORMAL /HPF
BACTERIA UR QL AUTO: ABNORMAL /HPF
BASOPHILS # BLD AUTO: 0.01 10*3/MM3 (ref 0–0.2)
BASOPHILS # BLD AUTO: 0.02 10*3/MM3 (ref 0–0.2)
BASOPHILS # BLD AUTO: 0.04 10*3/MM3 (ref 0–0.2)
BASOPHILS # BLD AUTO: 0.07 10*3/MM3 (ref 0–0.2)
BASOPHILS # BLD AUTO: 0.1 10*3/MM3 (ref 0–0.2)
BASOPHILS # BLD AUTO: 0.12 10*3/MM3 (ref 0–0.2)
BASOPHILS # BLD AUTO: 0.19 10*3/MM3 (ref 0–0.2)
BASOPHILS # BLD MANUAL: 0.06 10*3/MM3 (ref 0–0.2)
BASOPHILS # BLD MANUAL: 0.08 10*3/MM3 (ref 0–0.2)
BASOPHILS # BLD MANUAL: 0.08 10*3/MM3 (ref 0–0.2)
BASOPHILS # BLD MANUAL: 0.09 10*3/MM3 (ref 0–0.2)
BASOPHILS # BLD MANUAL: 0.14 10*3/MM3 (ref 0–0.2)
BASOPHILS # BLD MANUAL: 0.16 10*3/MM3 (ref 0–0.2)
BASOPHILS # BLD MANUAL: 0.19 10*3/MM3 (ref 0–0.2)
BASOPHILS NFR BLD AUTO: 0.2 % (ref 0–1.5)
BASOPHILS NFR BLD AUTO: 0.3 % (ref 0–1.5)
BASOPHILS NFR BLD AUTO: 0.6 % (ref 0–1.5)
BASOPHILS NFR BLD AUTO: 0.6 % (ref 0–1.5)
BASOPHILS NFR BLD AUTO: 0.7 % (ref 0–1.5)
BASOPHILS NFR BLD AUTO: 0.9 % (ref 0–1.5)
BASOPHILS NFR BLD AUTO: 1 % (ref 0–1.5)
BASOPHILS NFR BLD AUTO: 1.2 % (ref 0–1.5)
BH BB BLOOD EXPIRATION DATE: NORMAL
BH BB BLOOD TYPE BARCODE: 5100
BH BB BLOOD TYPE BARCODE: 6200
BH BB BLOOD TYPE BARCODE: 6200
BH BB DISPENSE STATUS: NORMAL
BH BB PRODUCT CODE: NORMAL
BH BB UNIT NUMBER: NORMAL
BILIRUB SERPL-MCNC: 0.2 MG/DL (ref 0.1–1.2)
BILIRUB SERPL-MCNC: 0.2 MG/DL (ref 0.1–1.2)
BILIRUB SERPL-MCNC: 0.3 MG/DL (ref 0.1–1.2)
BILIRUB SERPL-MCNC: 0.3 MG/DL (ref 0.2–1.2)
BILIRUB SERPL-MCNC: 0.4 MG/DL (ref 0.1–1.2)
BILIRUB SERPL-MCNC: 0.5 MG/DL (ref 0.1–1.2)
BILIRUB SERPL-MCNC: 0.5 MG/DL (ref 0.1–1.2)
BILIRUB UR QL STRIP: NEGATIVE
BLD GP AB SCN SERPL QL: NEGATIVE
BLD GP AB SCN SERPL QL: NEGATIVE
BLD GP AB SCN SERPL QL: POSITIVE
BUN BLD-MCNC: 18 MG/DL (ref 8–23)
BUN BLD-MCNC: 19 MG/DL (ref 8–23)
BUN BLD-MCNC: 19 MG/DL (ref 8–23)
BUN BLD-MCNC: 20 MG/DL (ref 6–20)
BUN BLD-MCNC: 21 MG/DL (ref 8–23)
BUN BLD-MCNC: 22 MG/DL (ref 8–23)
BUN BLD-MCNC: 23 MG/DL (ref 8–23)
BUN BLD-MCNC: 26 MG/DL (ref 8–23)
BUN BLD-MCNC: 26 MG/DL (ref 8–23)
BUN BLD-MCNC: 27 MG/DL (ref 8–23)
BUN BLD-MCNC: 27 MG/DL (ref 8–23)
BUN BLD-MCNC: 29 MG/DL (ref 8–23)
BUN BLD-MCNC: 30 MG/DL (ref 8–23)
BUN BLD-MCNC: 30 MG/DL (ref 8–23)
BUN/CREAT SERPL: 10.5 (ref 7.3–30)
BUN/CREAT SERPL: 10.6 (ref 7–25)
BUN/CREAT SERPL: 10.6 (ref 7–25)
BUN/CREAT SERPL: 10.9 (ref 7–25)
BUN/CREAT SERPL: 11.2 (ref 7–25)
BUN/CREAT SERPL: 11.5 (ref 7–25)
BUN/CREAT SERPL: 11.7 (ref 7–25)
BUN/CREAT SERPL: 11.8 (ref 7–25)
BUN/CREAT SERPL: 12.3 (ref 7–25)
BUN/CREAT SERPL: 12.4 (ref 7–25)
BUN/CREAT SERPL: 12.5 (ref 7–25)
BUN/CREAT SERPL: 12.6 (ref 7–25)
BUN/CREAT SERPL: 12.6 (ref 7–25)
BUN/CREAT SERPL: 12.9 (ref 7–25)
BUN/CREAT SERPL: 13.2 (ref 7–25)
BUN/CREAT SERPL: 13.2 (ref 7–25)
BUN/CREAT SERPL: 13.7 (ref 7–25)
BUN/CREAT SERPL: 14 (ref 7–25)
BUN/CREAT SERPL: 14.4 (ref 7–25)
BUN/CREAT SERPL: 14.9 (ref 7–25)
BUN/CREAT SERPL: 15.8 (ref 7–25)
BUN/CREAT SERPL: 9.9 (ref 7–25)
C3 FRG RBC-MCNC: ABNORMAL
CALCIUM SPEC-SCNC: 7.1 MG/DL (ref 8.6–10.5)
CALCIUM SPEC-SCNC: 7.3 MG/DL (ref 8.6–10.5)
CALCIUM SPEC-SCNC: 7.6 MG/DL (ref 8.6–10.5)
CALCIUM SPEC-SCNC: 7.6 MG/DL (ref 8.6–10.5)
CALCIUM SPEC-SCNC: 7.7 MG/DL (ref 8.6–10.5)
CALCIUM SPEC-SCNC: 8 MG/DL (ref 8.6–10.5)
CALCIUM SPEC-SCNC: 8 MG/DL (ref 8.6–10.5)
CALCIUM SPEC-SCNC: 8.1 MG/DL (ref 8.6–10.5)
CALCIUM SPEC-SCNC: 8.2 MG/DL (ref 8.6–10.5)
CALCIUM SPEC-SCNC: 8.2 MG/DL (ref 8.6–10.5)
CALCIUM SPEC-SCNC: 8.4 MG/DL (ref 8.6–10.5)
CALCIUM SPEC-SCNC: 8.4 MG/DL (ref 8.6–10.5)
CALCIUM SPEC-SCNC: 8.5 MG/DL (ref 8.6–10.5)
CALCIUM SPEC-SCNC: 8.6 MG/DL (ref 8.5–10.2)
CALCIUM SPEC-SCNC: 8.7 MG/DL (ref 8.6–10.5)
CALCIUM SPEC-SCNC: 8.8 MG/DL (ref 8.6–10.5)
CALCIUM SPEC-SCNC: 8.9 MG/DL (ref 8.6–10.5)
CALCIUM SPEC-SCNC: 9.2 MG/DL (ref 8.6–10.5)
CHLORIDE SERPL-SCNC: 108 MMOL/L (ref 98–107)
CHLORIDE SERPL-SCNC: 109 MMOL/L (ref 98–107)
CHLORIDE SERPL-SCNC: 109 MMOL/L (ref 98–107)
CHLORIDE SERPL-SCNC: 110 MMOL/L (ref 98–107)
CHLORIDE SERPL-SCNC: 111 MMOL/L (ref 98–107)
CHLORIDE SERPL-SCNC: 111 MMOL/L (ref 98–107)
CHLORIDE SERPL-SCNC: 112 MMOL/L (ref 98–107)
CHLORIDE SERPL-SCNC: 113 MMOL/L (ref 98–107)
CHLORIDE SERPL-SCNC: 113 MMOL/L (ref 98–107)
CHLORIDE SERPL-SCNC: 114 MMOL/L (ref 98–107)
CHLORIDE SERPL-SCNC: 115 MMOL/L (ref 98–107)
CHLORIDE SERPL-SCNC: 116 MMOL/L (ref 98–107)
CHLORIDE SERPL-SCNC: 117 MMOL/L (ref 98–107)
CLARITY UR: ABNORMAL
CLARITY UR: CLEAR
CLARITY UR: CLEAR
CO2 SERPL-SCNC: 17.3 MMOL/L (ref 22–29)
CO2 SERPL-SCNC: 17.4 MMOL/L (ref 22–29)
CO2 SERPL-SCNC: 17.6 MMOL/L (ref 22–29)
CO2 SERPL-SCNC: 17.7 MMOL/L (ref 22–29)
CO2 SERPL-SCNC: 17.9 MMOL/L (ref 22–29)
CO2 SERPL-SCNC: 18.3 MMOL/L (ref 22–29)
CO2 SERPL-SCNC: 18.4 MMOL/L (ref 22–29)
CO2 SERPL-SCNC: 19 MMOL/L (ref 22–29)
CO2 SERPL-SCNC: 19.3 MMOL/L (ref 22–29)
CO2 SERPL-SCNC: 19.5 MMOL/L (ref 22–29)
CO2 SERPL-SCNC: 20.1 MMOL/L (ref 22–29)
CO2 SERPL-SCNC: 20.4 MMOL/L (ref 22–29)
CO2 SERPL-SCNC: 20.5 MMOL/L (ref 22–29)
CO2 SERPL-SCNC: 20.6 MMOL/L (ref 22–29)
CO2 SERPL-SCNC: 20.7 MMOL/L (ref 22–29)
CO2 SERPL-SCNC: 21.3 MMOL/L (ref 22–29)
CO2 SERPL-SCNC: 22 MMOL/L (ref 22–29)
CO2 SERPL-SCNC: 22.9 MMOL/L (ref 22–29)
COLOR UR: YELLOW
CREAT BLD-MCNC: 1.54 MG/DL (ref 0.57–1)
CREAT BLD-MCNC: 1.71 MG/DL (ref 0.57–1)
CREAT BLD-MCNC: 1.74 MG/DL (ref 0.57–1)
CREAT BLD-MCNC: 1.75 MG/DL (ref 0.57–1)
CREAT BLD-MCNC: 1.78 MG/DL (ref 0.57–1)
CREAT BLD-MCNC: 1.8 MG/DL (ref 0.57–1)
CREAT BLD-MCNC: 1.81 MG/DL (ref 0.57–1)
CREAT BLD-MCNC: 1.82 MG/DL (ref 0.57–1)
CREAT BLD-MCNC: 1.83 MG/DL (ref 0.57–1)
CREAT BLD-MCNC: 1.9 MG/DL (ref 0.57–1)
CREAT BLD-MCNC: 1.91 MG/DL (ref 0.6–1.1)
CREAT BLD-MCNC: 1.92 MG/DL (ref 0.57–1)
CREAT BLD-MCNC: 1.93 MG/DL (ref 0.57–1)
CREAT BLD-MCNC: 1.99 MG/DL (ref 0.57–1)
CREAT BLD-MCNC: 2 MG/DL (ref 0.57–1)
CREAT BLD-MCNC: 2.02 MG/DL (ref 0.57–1)
CREAT BLD-MCNC: 2.02 MG/DL (ref 0.57–1)
CREAT BLD-MCNC: 2.05 MG/DL (ref 0.57–1)
CREAT BLD-MCNC: 2.08 MG/DL (ref 0.57–1)
CREAT BLD-MCNC: 2.09 MG/DL (ref 0.57–1)
CREAT BLD-MCNC: 2.28 MG/DL (ref 0.57–1)
CREAT BLD-MCNC: 2.48 MG/DL (ref 0.57–1)
CROSSMATCH INTERPRETATION: NORMAL
CYTO UR: NORMAL
CYTO UR: NORMAL
DACRYOCYTES BLD QL SMEAR: ABNORMAL
DACRYOCYTES BLD QL SMEAR: NORMAL
DEPRECATED RDW RBC AUTO: 65.8 FL (ref 37–54)
DEPRECATED RDW RBC AUTO: 67.1 FL (ref 37–54)
DEPRECATED RDW RBC AUTO: 67.3 FL (ref 37–54)
DEPRECATED RDW RBC AUTO: 68.1 FL (ref 37–54)
DEPRECATED RDW RBC AUTO: 68.3 FL (ref 37–54)
DEPRECATED RDW RBC AUTO: 68.5 FL (ref 37–54)
DEPRECATED RDW RBC AUTO: 68.8 FL (ref 37–54)
DEPRECATED RDW RBC AUTO: 69.4 FL (ref 37–54)
DEPRECATED RDW RBC AUTO: 69.4 FL (ref 37–54)
DEPRECATED RDW RBC AUTO: 69.5 FL (ref 37–54)
DEPRECATED RDW RBC AUTO: 70.2 FL (ref 37–54)
DEPRECATED RDW RBC AUTO: 70.3 FL (ref 37–54)
DEPRECATED RDW RBC AUTO: 70.4 FL (ref 37–54)
DEPRECATED RDW RBC AUTO: 71 FL (ref 37–54)
DEPRECATED RDW RBC AUTO: 71.2 FL (ref 37–54)
DEPRECATED RDW RBC AUTO: 71.2 FL (ref 37–54)
DEPRECATED RDW RBC AUTO: 71.4 FL (ref 37–54)
DEPRECATED RDW RBC AUTO: 71.9 FL (ref 37–54)
DEPRECATED RDW RBC AUTO: 72 FL (ref 37–54)
DEPRECATED RDW RBC AUTO: 72.2 FL (ref 37–54)
DEPRECATED RDW RBC AUTO: 72.3 FL (ref 37–54)
DEPRECATED RDW RBC AUTO: 72.4 FL (ref 37–54)
DEPRECATED RDW RBC AUTO: 72.7 FL (ref 37–54)
DEPRECATED RDW RBC AUTO: 73.1 FL (ref 37–54)
DEPRECATED RDW RBC AUTO: 73.4 FL (ref 37–54)
DEPRECATED RDW RBC AUTO: 74.1 FL (ref 37–54)
DEPRECATED RDW RBC AUTO: 74.2 FL (ref 37–54)
DEPRECATED RDW RBC AUTO: 74.5 FL (ref 37–54)
DEPRECATED RDW RBC AUTO: 74.6 FL (ref 37–54)
DEPRECATED RDW RBC AUTO: 75.1 FL (ref 37–54)
DEPRECATED RDW RBC AUTO: 75.6 FL (ref 37–54)
DEPRECATED RDW RBC AUTO: 75.8 FL (ref 37–54)
DEPRECATED RDW RBC AUTO: 76.5 FL (ref 37–54)
DEPRECATED RDW RBC AUTO: 76.9 FL (ref 37–54)
DEPRECATED RDW RBC AUTO: 77 FL (ref 37–54)
DEPRECATED RDW RBC AUTO: 77.1 FL (ref 37–54)
DEPRECATED RDW RBC AUTO: 77.5 FL (ref 37–54)
DEPRECATED RDW RBC AUTO: 77.9 FL (ref 37–54)
DEPRECATED RDW RBC AUTO: 78.5 FL (ref 37–54)
DEPRECATED RDW RBC AUTO: 78.6 FL (ref 37–54)
DEPRECATED RDW RBC AUTO: 79.4 FL (ref 37–54)
DEPRECATED RDW RBC AUTO: 80.4 FL (ref 37–54)
DX PRELIMINARY: NORMAL
EOSINOPHIL # BLD AUTO: 0.09 10*3/MM3 (ref 0–0.4)
EOSINOPHIL # BLD AUTO: 0.14 10*3/MM3 (ref 0–0.4)
EOSINOPHIL # BLD AUTO: 0.23 10*3/MM3 (ref 0–0.4)
EOSINOPHIL # BLD AUTO: 0.23 10*3/MM3 (ref 0–0.4)
EOSINOPHIL # BLD AUTO: 0.29 10*3/MM3 (ref 0–0.4)
EOSINOPHIL # BLD AUTO: 0.32 10*3/MM3 (ref 0–0.4)
EOSINOPHIL # BLD AUTO: 0.4 10*3/MM3 (ref 0–0.4)
EOSINOPHIL # BLD AUTO: 0.4 10*3/MM3 (ref 0–0.4)
EOSINOPHIL # BLD AUTO: 0.6 10*3/MM3 (ref 0–0.4)
EOSINOPHIL # BLD MANUAL: 0.05 10*3/MM3 (ref 0–0.4)
EOSINOPHIL # BLD MANUAL: 0.07 10*3/MM3 (ref 0–0.4)
EOSINOPHIL # BLD MANUAL: 0.12 10*3/MM3 (ref 0–0.4)
EOSINOPHIL # BLD MANUAL: 0.16 10*3/MM3 (ref 0–0.4)
EOSINOPHIL # BLD MANUAL: 0.16 10*3/MM3 (ref 0–0.4)
EOSINOPHIL # BLD MANUAL: 0.21 10*3/MM3 (ref 0–0.4)
EOSINOPHIL # BLD MANUAL: 0.22 10*3/MM3 (ref 0–0.4)
EOSINOPHIL # BLD MANUAL: 0.25 10*3/MM3 (ref 0–0.4)
EOSINOPHIL # BLD MANUAL: 0.26 10*3/MM3 (ref 0–0.4)
EOSINOPHIL # BLD MANUAL: 0.29 10*3/MM3 (ref 0–0.4)
EOSINOPHIL # BLD MANUAL: 0.32 10*3/MM3 (ref 0–0.4)
EOSINOPHIL # BLD MANUAL: 0.32 10*3/MM3 (ref 0–0.4)
EOSINOPHIL # BLD MANUAL: 0.33 10*3/MM3 (ref 0–0.4)
EOSINOPHIL # BLD MANUAL: 0.33 10*3/MM3 (ref 0–0.4)
EOSINOPHIL # BLD MANUAL: 0.35 10*3/MM3 (ref 0–0.4)
EOSINOPHIL # BLD MANUAL: 0.35 10*3/MM3 (ref 0–0.4)
EOSINOPHIL # BLD MANUAL: 0.37 10*3/MM3 (ref 0–0.4)
EOSINOPHIL # BLD MANUAL: 0.38 10*3/MM3 (ref 0–0.4)
EOSINOPHIL # BLD MANUAL: 0.38 10*3/MM3 (ref 0–0.4)
EOSINOPHIL # BLD MANUAL: 0.39 10*3/MM3 (ref 0–0.4)
EOSINOPHIL # BLD MANUAL: 0.4 10*3/MM3 (ref 0–0.4)
EOSINOPHIL # BLD MANUAL: 0.43 10*3/MM3 (ref 0–0.4)
EOSINOPHIL # BLD MANUAL: 0.43 10*3/MM3 (ref 0–0.4)
EOSINOPHIL # BLD MANUAL: 0.47 10*3/MM3 (ref 0–0.4)
EOSINOPHIL # BLD MANUAL: 0.47 10*3/MM3 (ref 0–0.4)
EOSINOPHIL # BLD MANUAL: 0.48 10*3/MM3 (ref 0–0.4)
EOSINOPHIL # BLD MANUAL: 0.52 10*3/MM3 (ref 0–0.4)
EOSINOPHIL # BLD MANUAL: 0.57 10*3/MM3 (ref 0–0.4)
EOSINOPHIL # BLD MANUAL: 0.61 10*3/MM3 (ref 0–0.4)
EOSINOPHIL # BLD MANUAL: 0.77 10*3/MM3 (ref 0–0.4)
EOSINOPHIL # BLD MANUAL: 0.8 10*3/MM3 (ref 0–0.4)
EOSINOPHIL NFR BLD AUTO: 1.5 % (ref 0.3–6.2)
EOSINOPHIL NFR BLD AUTO: 2 % (ref 0.3–6.2)
EOSINOPHIL NFR BLD AUTO: 2.5 % (ref 0.3–6.2)
EOSINOPHIL NFR BLD AUTO: 2.9 % (ref 0.3–6.2)
EOSINOPHIL NFR BLD AUTO: 3 % (ref 0.3–6.2)
EOSINOPHIL NFR BLD AUTO: 3.7 % (ref 0.3–6.2)
EOSINOPHIL NFR BLD AUTO: 3.8 % (ref 0.3–6.2)
EOSINOPHIL NFR BLD AUTO: 4.1 % (ref 0.3–6.2)
EOSINOPHIL NFR BLD AUTO: 4.9 % (ref 0.3–6.2)
EOSINOPHIL NFR BLD MANUAL: 1 % (ref 0.3–6.2)
EOSINOPHIL NFR BLD MANUAL: 2 % (ref 0.3–6.2)
EOSINOPHIL NFR BLD MANUAL: 3 % (ref 0.3–6.2)
EOSINOPHIL NFR BLD MANUAL: 4 % (ref 0.3–6.2)
EOSINOPHIL NFR BLD MANUAL: 5 % (ref 0.3–6.2)
EOSINOPHIL NFR BLD MANUAL: 6 % (ref 0.3–6.2)
EOSINOPHIL NFR BLD MANUAL: 7 % (ref 0.3–6.2)
ERYTHROCYTE [DISTWIDTH] IN BLOOD BY AUTOMATED COUNT: 17.4 % (ref 12.3–15.4)
ERYTHROCYTE [DISTWIDTH] IN BLOOD BY AUTOMATED COUNT: 17.4 % (ref 12.3–15.4)
ERYTHROCYTE [DISTWIDTH] IN BLOOD BY AUTOMATED COUNT: 17.6 % (ref 12.3–15.4)
ERYTHROCYTE [DISTWIDTH] IN BLOOD BY AUTOMATED COUNT: 17.7 % (ref 12.3–15.4)
ERYTHROCYTE [DISTWIDTH] IN BLOOD BY AUTOMATED COUNT: 17.8 % (ref 12.3–15.4)
ERYTHROCYTE [DISTWIDTH] IN BLOOD BY AUTOMATED COUNT: 17.9 % (ref 12.3–15.4)
ERYTHROCYTE [DISTWIDTH] IN BLOOD BY AUTOMATED COUNT: 18 % (ref 12.3–15.4)
ERYTHROCYTE [DISTWIDTH] IN BLOOD BY AUTOMATED COUNT: 18 % (ref 12.3–15.4)
ERYTHROCYTE [DISTWIDTH] IN BLOOD BY AUTOMATED COUNT: 18.1 % (ref 12.3–15.4)
ERYTHROCYTE [DISTWIDTH] IN BLOOD BY AUTOMATED COUNT: 18.2 % (ref 12.3–15.4)
ERYTHROCYTE [DISTWIDTH] IN BLOOD BY AUTOMATED COUNT: 18.3 % (ref 12.3–15.4)
ERYTHROCYTE [DISTWIDTH] IN BLOOD BY AUTOMATED COUNT: 18.4 % (ref 12.3–15.4)
ERYTHROCYTE [DISTWIDTH] IN BLOOD BY AUTOMATED COUNT: 18.4 % (ref 12.3–15.4)
ERYTHROCYTE [DISTWIDTH] IN BLOOD BY AUTOMATED COUNT: 18.5 % (ref 12.3–15.4)
ERYTHROCYTE [DISTWIDTH] IN BLOOD BY AUTOMATED COUNT: 18.6 % (ref 12.3–15.4)
ERYTHROCYTE [DISTWIDTH] IN BLOOD BY AUTOMATED COUNT: 18.6 % (ref 12.3–15.4)
ERYTHROCYTE [DISTWIDTH] IN BLOOD BY AUTOMATED COUNT: 18.7 % (ref 12.3–15.4)
ERYTHROCYTE [DISTWIDTH] IN BLOOD BY AUTOMATED COUNT: 18.8 % (ref 12.3–15.4)
ERYTHROCYTE [DISTWIDTH] IN BLOOD BY AUTOMATED COUNT: 18.9 % (ref 12.3–15.4)
ERYTHROCYTE [DISTWIDTH] IN BLOOD BY AUTOMATED COUNT: 18.9 % (ref 12.3–15.4)
ERYTHROCYTE [DISTWIDTH] IN BLOOD BY AUTOMATED COUNT: 19.3 % (ref 12.3–15.4)
ERYTHROCYTE [DISTWIDTH] IN BLOOD BY AUTOMATED COUNT: 19.4 % (ref 12.3–15.4)
ERYTHROCYTE [DISTWIDTH] IN BLOOD BY AUTOMATED COUNT: 19.5 % (ref 12.3–15.4)
ERYTHROCYTE [DISTWIDTH] IN BLOOD BY AUTOMATED COUNT: 19.7 % (ref 12.3–15.4)
ERYTHROCYTE [DISTWIDTH] IN BLOOD BY AUTOMATED COUNT: 19.8 % (ref 12.3–15.4)
ERYTHROCYTE [DISTWIDTH] IN BLOOD BY AUTOMATED COUNT: 20.1 % (ref 12.3–15.4)
ERYTHROCYTE [DISTWIDTH] IN BLOOD BY AUTOMATED COUNT: 20.1 % (ref 12.3–15.4)
ERYTHROCYTE [DISTWIDTH] IN BLOOD BY AUTOMATED COUNT: 20.2 % (ref 12.3–15.4)
ERYTHROCYTE [DISTWIDTH] IN BLOOD BY AUTOMATED COUNT: 20.6 % (ref 12.3–15.4)
ERYTHROCYTE [DISTWIDTH] IN BLOOD BY AUTOMATED COUNT: 20.7 % (ref 12.3–15.4)
ERYTHROCYTE [DISTWIDTH] IN BLOOD BY AUTOMATED COUNT: 20.8 % (ref 12.3–15.4)
ERYTHROCYTE [DISTWIDTH] IN BLOOD BY AUTOMATED COUNT: 20.8 % (ref 12.3–15.4)
ERYTHROCYTE [DISTWIDTH] IN BLOOD BY AUTOMATED COUNT: 21 % (ref 12.3–15.4)
ERYTHROCYTE [DISTWIDTH] IN BLOOD BY AUTOMATED COUNT: 21 % (ref 12.3–15.4)
ERYTHROCYTE [DISTWIDTH] IN BLOOD BY AUTOMATED COUNT: 21.1 % (ref 12.3–15.4)
ERYTHROCYTE [DISTWIDTH] IN BLOOD BY AUTOMATED COUNT: 21.2 % (ref 12.3–15.4)
ERYTHROCYTE [DISTWIDTH] IN BLOOD BY AUTOMATED COUNT: 21.3 % (ref 12.3–15.4)
FERRITIN SERPL-MCNC: 455.4 NG/ML (ref 13–150)
FERRITIN SERPL-MCNC: 524.3 NG/ML (ref 13–150)
FERRITIN SERPL-MCNC: 650.1 NG/ML (ref 13–150)
FOLATE SERPL-MCNC: 5.84 NG/ML (ref 4.78–24.2)
FOLATE SERPL-MCNC: 7.34 NG/ML (ref 4.78–24.2)
FREE KAPPA LT CHAINS, 24HR: 102 MG/24 HR
FREE KAPPA LT CHAINS, 24HR: 142 MG/24 HR
FREE KAPPA LT CHAINS, 24HR: 59 MG/24 HR
FREE KAPPA LT CHAINS, 24HR: 97 MG/24 HR
FREE LAMBDA LT CHAINS, 24 HR: 122 MG/24 HR
FREE LAMBDA LT CHAINS, 24 HR: 179 MG/24 HR
FREE LAMBDA LT CHAINS, 24 HR: 213 MG/24 HR
FREE LAMBDA LT CHAINS, 24 HR: 87 MG/24 HR
GAMMA GLOB 24H MFR UR ELPH: 12.8 %
GAMMA GLOB 24H MFR UR ELPH: 19.1 %
GAMMA GLOB 24H MFR UR ELPH: 21.6 %
GAMMA GLOB 24H MFR UR ELPH: 21.9 %
GAMMA GLOB 24H MFR UR ELPH: 22.4 %
GAMMA GLOB SERPL ELPH-MCNC: 3.3 G/DL (ref 0.4–1.8)
GAMMA GLOB SERPL ELPH-MCNC: 3.9 G/DL (ref 0.4–1.8)
GAMMA GLOB SERPL ELPH-MCNC: 4.4 G/DL (ref 0.4–1.8)
GAMMA GLOB SERPL ELPH-MCNC: 4.4 G/DL (ref 0.4–1.8)
GAMMA GLOB SERPL ELPH-MCNC: 5.1 G/DL (ref 0.4–1.8)
GFR SERPL CREATININE-BSD FRML MDRD: 23 ML/MIN/1.73
GFR SERPL CREATININE-BSD FRML MDRD: 26 ML/MIN/1.73
GFR SERPL CREATININE-BSD FRML MDRD: 29 ML/MIN/1.73
GFR SERPL CREATININE-BSD FRML MDRD: 30 ML/MIN/1.73
GFR SERPL CREATININE-BSD FRML MDRD: 31 ML/MIN/1.73
GFR SERPL CREATININE-BSD FRML MDRD: 32 ML/MIN/1.73
GFR SERPL CREATININE-BSD FRML MDRD: 33 ML/MIN/1.73
GFR SERPL CREATININE-BSD FRML MDRD: 34 ML/MIN/1.73
GFR SERPL CREATININE-BSD FRML MDRD: 35 ML/MIN/1.73
GFR SERPL CREATININE-BSD FRML MDRD: 35 ML/MIN/1.73
GFR SERPL CREATININE-BSD FRML MDRD: 36 ML/MIN/1.73
GFR SERPL CREATININE-BSD FRML MDRD: 41 ML/MIN/1.73
GLOBULIN SER CALC-MCNC: 4.5 G/DL (ref 2.2–3.9)
GLOBULIN SER CALC-MCNC: 5.5 G/DL (ref 2.2–3.9)
GLOBULIN SER CALC-MCNC: 5.9 G/DL (ref 2.2–3.9)
GLOBULIN SER CALC-MCNC: 6 G/DL (ref 2.2–3.9)
GLOBULIN SER CALC-MCNC: 7 G/DL (ref 2.2–3.9)
GLOBULIN UR ELPH-MCNC: 6 GM/DL
GLOBULIN UR ELPH-MCNC: 6.1 GM/DL
GLOBULIN UR ELPH-MCNC: 6.3 GM/DL
GLOBULIN UR ELPH-MCNC: 6.3 GM/DL
GLOBULIN UR ELPH-MCNC: 6.4 GM/DL
GLOBULIN UR ELPH-MCNC: 6.4 GM/DL
GLOBULIN UR ELPH-MCNC: 6.6 GM/DL
GLOBULIN UR ELPH-MCNC: 6.7 GM/DL
GLOBULIN UR ELPH-MCNC: 6.8 GM/DL
GLOBULIN UR ELPH-MCNC: 6.8 GM/DL
GLOBULIN UR ELPH-MCNC: 7.3 GM/DL
GLOBULIN UR ELPH-MCNC: 7.3 GM/DL
GLOBULIN UR ELPH-MCNC: 7.4 GM/DL
GLOBULIN UR ELPH-MCNC: 8 GM/DL
GLOBULIN UR ELPH-MCNC: 8.1 GM/DL
GLOBULIN UR ELPH-MCNC: 8.3 GM/DL
GLOBULIN UR ELPH-MCNC: 8.4 GM/DL
GLOBULIN UR ELPH-MCNC: 8.7 GM/DL
GLOBULIN UR ELPH-MCNC: 8.8 GM/DL
GLOBULIN UR ELPH-MCNC: 8.8 GM/DL (ref 1.8–3.5)
GLOBULIN UR ELPH-MCNC: 9.7 GM/DL
GLUCOSE BLD-MCNC: 100 MG/DL (ref 65–99)
GLUCOSE BLD-MCNC: 100 MG/DL (ref 74–124)
GLUCOSE BLD-MCNC: 104 MG/DL (ref 65–99)
GLUCOSE BLD-MCNC: 104 MG/DL (ref 65–99)
GLUCOSE BLD-MCNC: 105 MG/DL (ref 65–99)
GLUCOSE BLD-MCNC: 106 MG/DL (ref 65–99)
GLUCOSE BLD-MCNC: 108 MG/DL (ref 65–99)
GLUCOSE BLD-MCNC: 115 MG/DL (ref 65–99)
GLUCOSE BLD-MCNC: 116 MG/DL (ref 65–99)
GLUCOSE BLD-MCNC: 122 MG/DL (ref 65–99)
GLUCOSE BLD-MCNC: 124 MG/DL (ref 65–99)
GLUCOSE BLD-MCNC: 81 MG/DL (ref 65–99)
GLUCOSE BLD-MCNC: 83 MG/DL (ref 65–99)
GLUCOSE BLD-MCNC: 88 MG/DL (ref 65–99)
GLUCOSE BLD-MCNC: 88 MG/DL (ref 65–99)
GLUCOSE BLD-MCNC: 90 MG/DL (ref 65–99)
GLUCOSE BLD-MCNC: 90 MG/DL (ref 65–99)
GLUCOSE BLD-MCNC: 94 MG/DL (ref 65–99)
GLUCOSE BLD-MCNC: 95 MG/DL (ref 65–99)
GLUCOSE BLD-MCNC: 97 MG/DL (ref 65–99)
GLUCOSE BLD-MCNC: 99 MG/DL (ref 65–99)
GLUCOSE UR STRIP-MCNC: NEGATIVE MG/DL
HCT VFR BLD AUTO: 26.5 % (ref 34–46.6)
HCT VFR BLD AUTO: 27.8 % (ref 34–46.6)
HCT VFR BLD AUTO: 28.4 % (ref 34–46.6)
HCT VFR BLD AUTO: 28.6 % (ref 34–46.6)
HCT VFR BLD AUTO: 29.1 % (ref 34–46.6)
HCT VFR BLD AUTO: 29.3 % (ref 34–46.6)
HCT VFR BLD AUTO: 29.5 % (ref 34–46.6)
HCT VFR BLD AUTO: 29.6 % (ref 34–46.6)
HCT VFR BLD AUTO: 29.6 % (ref 34–46.6)
HCT VFR BLD AUTO: 29.8 % (ref 34–46.6)
HCT VFR BLD AUTO: 29.9 % (ref 34–46.6)
HCT VFR BLD AUTO: 30 % (ref 34–46.6)
HCT VFR BLD AUTO: 30 % (ref 34–46.6)
HCT VFR BLD AUTO: 30.1 % (ref 34–46.6)
HCT VFR BLD AUTO: 30.1 % (ref 34–46.6)
HCT VFR BLD AUTO: 30.2 % (ref 34–46.6)
HCT VFR BLD AUTO: 30.3 % (ref 34–46.6)
HCT VFR BLD AUTO: 30.7 % (ref 34–46.6)
HCT VFR BLD AUTO: 30.7 % (ref 34–46.6)
HCT VFR BLD AUTO: 30.9 % (ref 34–46.6)
HCT VFR BLD AUTO: 31.1 % (ref 34–46.6)
HCT VFR BLD AUTO: 31.1 % (ref 34–46.6)
HCT VFR BLD AUTO: 31.2 % (ref 34–46.6)
HCT VFR BLD AUTO: 31.3 % (ref 34–46.6)
HCT VFR BLD AUTO: 31.5 % (ref 34–46.6)
HCT VFR BLD AUTO: 31.8 % (ref 34–46.6)
HCT VFR BLD AUTO: 31.9 % (ref 34–46.6)
HCT VFR BLD AUTO: 32 % (ref 34–46.6)
HCT VFR BLD AUTO: 32.2 % (ref 34–46.6)
HCT VFR BLD AUTO: 32.2 % (ref 34–46.6)
HCT VFR BLD AUTO: 32.3 % (ref 34–46.6)
HCT VFR BLD AUTO: 32.7 % (ref 34–46.6)
HCT VFR BLD AUTO: 32.7 % (ref 34–46.6)
HCT VFR BLD AUTO: 32.8 % (ref 34–46.6)
HCT VFR BLD AUTO: 32.8 % (ref 34–46.6)
HCT VFR BLD AUTO: 33.3 % (ref 34–46.6)
HCT VFR BLD AUTO: 33.4 % (ref 34–46.6)
HCT VFR BLD AUTO: 33.4 % (ref 34–46.6)
HCT VFR BLD AUTO: 33.9 % (ref 34–46.6)
HCT VFR BLD AUTO: 34.2 % (ref 34–46.6)
HGB BLD-MCNC: 10 G/DL (ref 12–15.9)
HGB BLD-MCNC: 10.1 G/DL (ref 12–15.9)
HGB BLD-MCNC: 10.1 G/DL (ref 12–15.9)
HGB BLD-MCNC: 10.2 G/DL (ref 12–15.9)
HGB BLD-MCNC: 10.2 G/DL (ref 12–15.9)
HGB BLD-MCNC: 10.3 G/DL (ref 12–15.9)
HGB BLD-MCNC: 10.4 G/DL (ref 12–15.9)
HGB BLD-MCNC: 7.7 G/DL (ref 12–15.9)
HGB BLD-MCNC: 8.4 G/DL (ref 12–15.9)
HGB BLD-MCNC: 8.5 G/DL (ref 12–15.9)
HGB BLD-MCNC: 8.6 G/DL (ref 12–15.9)
HGB BLD-MCNC: 8.7 G/DL (ref 12–15.9)
HGB BLD-MCNC: 8.8 G/DL (ref 12–15.9)
HGB BLD-MCNC: 8.8 G/DL (ref 12–15.9)
HGB BLD-MCNC: 8.9 G/DL (ref 12–15.9)
HGB BLD-MCNC: 9 G/DL (ref 12–15.9)
HGB BLD-MCNC: 9.1 G/DL (ref 12–15.9)
HGB BLD-MCNC: 9.2 G/DL (ref 12–15.9)
HGB BLD-MCNC: 9.3 G/DL (ref 12–15.9)
HGB BLD-MCNC: 9.3 G/DL (ref 12–15.9)
HGB BLD-MCNC: 9.4 G/DL (ref 12–15.9)
HGB BLD-MCNC: 9.4 G/DL (ref 12–15.9)
HGB BLD-MCNC: 9.5 G/DL (ref 12–15.9)
HGB BLD-MCNC: 9.5 G/DL (ref 12–15.9)
HGB BLD-MCNC: 9.6 G/DL (ref 12–15.9)
HGB BLD-MCNC: 9.7 G/DL (ref 12–15.9)
HGB BLD-MCNC: 9.8 G/DL (ref 12–15.9)
HGB BLD-MCNC: 9.8 G/DL (ref 12–15.9)
HGB UR QL STRIP.AUTO: ABNORMAL
HGB UR QL STRIP.AUTO: ABNORMAL
HGB UR QL STRIP.AUTO: NEGATIVE
HIV 1 & 2 AB SER-IMP: ABNORMAL
HYALINE CASTS UR QL AUTO: ABNORMAL /LPF
HYALINE CASTS UR QL AUTO: ABNORMAL /LPF
HYPOCHROMIA BLD QL: ABNORMAL
HYPOCHROMIA BLD QL: ABNORMAL
IGA SERPL-MCNC: 13 MG/DL (ref 87–352)
IGA SERPL-MCNC: 14 MG/DL (ref 87–352)
IGA SERPL-MCNC: 16 MG/DL (ref 87–352)
IGG SERPL-MCNC: 3601 MG/DL (ref 700–1600)
IGG SERPL-MCNC: 4107 MG/DL (ref 700–1600)
IGG SERPL-MCNC: 4407 MG/DL (ref 700–1600)
IGG SERPL-MCNC: 4873 MG/DL (ref 700–1600)
IGG SERPL-MCNC: 7280 MG/DL (ref 700–1600)
IGM SERPL-MCNC: 12 MG/DL (ref 26–217)
IGM SERPL-MCNC: 13 MG/DL (ref 26–217)
IGM SERPL-MCNC: 14 MG/DL (ref 26–217)
IGM SERPL-MCNC: 17 MG/DL (ref 26–217)
IGM SERPL-MCNC: 19 MG/DL (ref 26–217)
IMM GRANULOCYTES # BLD AUTO: 0.13 10*3/MM3 (ref 0–0.05)
IMM GRANULOCYTES # BLD AUTO: 0.22 10*3/MM3 (ref 0–0.05)
IMM GRANULOCYTES # BLD AUTO: 0.27 10*3/MM3 (ref 0–0.05)
IMM GRANULOCYTES # BLD AUTO: 0.37 10*3/MM3 (ref 0–0.05)
IMM GRANULOCYTES # BLD AUTO: 0.44 10*3/MM3 (ref 0–0.05)
IMM GRANULOCYTES # BLD AUTO: 1.57 10*3/MM3 (ref 0–0.05)
IMM GRANULOCYTES # BLD AUTO: 2.32 10*3/MM3 (ref 0–0.05)
IMM GRANULOCYTES # BLD AUTO: 2.59 10*3/MM3 (ref 0–0.05)
IMM GRANULOCYTES # BLD AUTO: 2.76 10*3/MM3 (ref 0–0.05)
IMM GRANULOCYTES NFR BLD AUTO: 14.9 % (ref 0–0.5)
IMM GRANULOCYTES NFR BLD AUTO: 16 % (ref 0–0.5)
IMM GRANULOCYTES NFR BLD AUTO: 16.8 % (ref 0–0.5)
IMM GRANULOCYTES NFR BLD AUTO: 17.3 % (ref 0–0.5)
IMM GRANULOCYTES NFR BLD AUTO: 2.2 % (ref 0–0.5)
IMM GRANULOCYTES NFR BLD AUTO: 4.6 % (ref 0–0.5)
IMM GRANULOCYTES NFR BLD AUTO: 4.7 % (ref 0–0.5)
IMM GRANULOCYTES NFR BLD AUTO: 5.3 % (ref 0–0.5)
IMM GRANULOCYTES NFR BLD AUTO: 6.2 % (ref 0–0.5)
INTERPRETATION SERPL IEP-IMP: ABNORMAL
INTERPRETATION UR IFE-IMP: ABNORMAL
IRON 24H UR-MRATE: 59 MCG/DL (ref 37–145)
IRON 24H UR-MRATE: 61 MCG/DL (ref 37–145)
IRON 24H UR-MRATE: 64 MCG/DL (ref 37–145)
IRON SATN MFR SERPL: 39 % (ref 20–50)
IRON SATN MFR SERPL: 42 % (ref 20–50)
IRON SATN MFR SERPL: 47 % (ref 20–50)
KAPPA LC SERPL-MCNC: 10.2 MG/L (ref 3.3–19.4)
KAPPA LC SERPL-MCNC: 11.5 MG/L (ref 3.3–19.4)
KAPPA LC SERPL-MCNC: 11.7 MG/L (ref 3.3–19.4)
KAPPA LC SERPL-MCNC: 12.8 MG/L (ref 3.3–19.4)
KAPPA LC SERPL-MCNC: 9.2 MG/L (ref 3.3–19.4)
KAPPA LC UR-MCNC: 42 MG/L (ref 1.35–24.19)
KAPPA LC UR-MCNC: 68.3 MG/L (ref 1.35–24.19)
KAPPA LC UR-MCNC: 69 MG/L (ref 1.35–24.19)
KAPPA LC UR-MCNC: 74.6 MG/L (ref 1.35–24.19)
KAPPA LC/LAMBDA SER: 0.01 {RATIO} (ref 0.26–1.65)
KAPPA LC/LAMBDA SER: 0.02 {RATIO} (ref 0.26–1.65)
KAPPA LC/LAMBDA SER: 0.02 {RATIO} (ref 0.26–1.65)
KAPPA LC/LAMBDA UR: 0.48 {RATIO} (ref 2.04–10.37)
KAPPA LC/LAMBDA UR: 0.68 {RATIO} (ref 2.04–10.37)
KAPPA LC/LAMBDA UR: 0.79 {RATIO} (ref 2.04–10.37)
KAPPA LC/LAMBDA UR: 0.79 {RATIO} (ref 2.04–10.37)
KETONES UR QL STRIP: NEGATIVE
LAB AP CASE REPORT: NORMAL
LAB AP CASE REPORT: NORMAL
LAB AP CLINICAL INFORMATION: NORMAL
LAB AP DIAGNOSIS COMMENT: NORMAL
LAB AP SPECIAL STAINS: NORMAL
LAMBDA LC FREE SERPL-MCNC: 1045.4 MG/L (ref 5.7–26.3)
LAMBDA LC FREE SERPL-MCNC: 1309.5 MG/L (ref 5.7–26.3)
LAMBDA LC FREE SERPL-MCNC: 656 MG/L (ref 5.7–26.3)
LAMBDA LC FREE SERPL-MCNC: 747.7 MG/L (ref 5.7–26.3)
LAMBDA LC FREE SERPL-MCNC: 998.4 MG/L (ref 5.7–26.3)
LAMBDA LC UR-MCNC: 142 MG/L (ref 0.24–6.66)
LAMBDA LC UR-MCNC: 62.1 MG/L (ref 0.24–6.66)
LAMBDA LC UR-MCNC: 86.9 MG/L (ref 0.24–6.66)
LAMBDA LC UR-MCNC: 94.1 MG/L (ref 0.24–6.66)
LDH SERPL-CCNC: 280 U/L (ref 135–214)
LDH SERPL-CCNC: 307 U/L (ref 135–214)
LEUKOCYTE ESTERASE UR QL STRIP.AUTO: ABNORMAL
LYMPHOCYTES # BLD AUTO: 1.38 10*3/MM3 (ref 0.7–3.1)
LYMPHOCYTES # BLD AUTO: 1.45 10*3/MM3 (ref 0.7–3.1)
LYMPHOCYTES # BLD AUTO: 1.6 10*3/MM3 (ref 0.7–3.1)
LYMPHOCYTES # BLD AUTO: 1.61 10*3/MM3 (ref 0.7–3.1)
LYMPHOCYTES # BLD AUTO: 1.69 10*3/MM3 (ref 0.7–3.1)
LYMPHOCYTES # BLD AUTO: 1.74 10*3/MM3 (ref 0.7–3.1)
LYMPHOCYTES # BLD AUTO: 1.98 10*3/MM3 (ref 0.7–3.1)
LYMPHOCYTES # BLD AUTO: 2.16 10*3/MM3 (ref 0.7–3.1)
LYMPHOCYTES # BLD AUTO: 2.42 10*3/MM3 (ref 0.7–3.1)
LYMPHOCYTES # BLD MANUAL: 0.45 10*3/MM3 (ref 0.7–3.1)
LYMPHOCYTES # BLD MANUAL: 0.73 10*3/MM3 (ref 0.7–3.1)
LYMPHOCYTES # BLD MANUAL: 0.74 10*3/MM3 (ref 0.7–3.1)
LYMPHOCYTES # BLD MANUAL: 0.76 10*3/MM3 (ref 0.7–3.1)
LYMPHOCYTES # BLD MANUAL: 0.78 10*3/MM3 (ref 0.7–3.1)
LYMPHOCYTES # BLD MANUAL: 0.97 10*3/MM3 (ref 0.7–3.1)
LYMPHOCYTES # BLD MANUAL: 1.04 10*3/MM3 (ref 0.7–3.1)
LYMPHOCYTES # BLD MANUAL: 1.11 10*3/MM3 (ref 0.7–3.1)
LYMPHOCYTES # BLD MANUAL: 1.18 10*3/MM3 (ref 0.7–3.1)
LYMPHOCYTES # BLD MANUAL: 1.23 10*3/MM3 (ref 0.7–3.1)
LYMPHOCYTES # BLD MANUAL: 1.29 10*3/MM3 (ref 0.7–3.1)
LYMPHOCYTES # BLD MANUAL: 1.31 10*3/MM3 (ref 0.7–3.1)
LYMPHOCYTES # BLD MANUAL: 1.36 10*3/MM3 (ref 0.7–3.1)
LYMPHOCYTES # BLD MANUAL: 1.42 10*3/MM3 (ref 0.7–3.1)
LYMPHOCYTES # BLD MANUAL: 1.48 10*3/MM3 (ref 0.7–3.1)
LYMPHOCYTES # BLD MANUAL: 1.53 10*3/MM3 (ref 0.7–3.1)
LYMPHOCYTES # BLD MANUAL: 1.55 10*3/MM3 (ref 0.7–3.1)
LYMPHOCYTES # BLD MANUAL: 1.55 10*3/MM3 (ref 0.7–3.1)
LYMPHOCYTES # BLD MANUAL: 1.6 10*3/MM3 (ref 0.7–3.1)
LYMPHOCYTES # BLD MANUAL: 1.64 10*3/MM3 (ref 0.7–3.1)
LYMPHOCYTES # BLD MANUAL: 1.66 10*3/MM3 (ref 0.7–3.1)
LYMPHOCYTES # BLD MANUAL: 1.74 10*3/MM3 (ref 0.7–3.1)
LYMPHOCYTES # BLD MANUAL: 1.76 10*3/MM3 (ref 0.7–3.1)
LYMPHOCYTES # BLD MANUAL: 1.78 10*3/MM3 (ref 0.7–3.1)
LYMPHOCYTES # BLD MANUAL: 1.87 10*3/MM3 (ref 0.7–3.1)
LYMPHOCYTES # BLD MANUAL: 1.88 10*3/MM3 (ref 0.7–3.1)
LYMPHOCYTES # BLD MANUAL: 1.9 10*3/MM3 (ref 0.7–3.1)
LYMPHOCYTES # BLD MANUAL: 2.01 10*3/MM3 (ref 0.7–3.1)
LYMPHOCYTES # BLD MANUAL: 2.25 10*3/MM3 (ref 0.7–3.1)
LYMPHOCYTES # BLD MANUAL: 2.3 10*3/MM3 (ref 0.7–3.1)
LYMPHOCYTES # BLD MANUAL: 2.7 10*3/MM3 (ref 0.7–3.1)
LYMPHOCYTES # BLD MANUAL: 2.72 10*3/MM3 (ref 0.7–3.1)
LYMPHOCYTES # BLD MANUAL: 2.97 10*3/MM3 (ref 0.7–3.1)
LYMPHOCYTES # BLD MANUAL: 3.07 10*3/MM3 (ref 0.7–3.1)
LYMPHOCYTES # BLD MANUAL: 3.43 10*3/MM3 (ref 0.7–3.1)
LYMPHOCYTES NFR BLD AUTO: 10.9 % (ref 19.6–45.3)
LYMPHOCYTES NFR BLD AUTO: 13.3 % (ref 19.6–45.3)
LYMPHOCYTES NFR BLD AUTO: 13.8 % (ref 19.6–45.3)
LYMPHOCYTES NFR BLD AUTO: 14.3 % (ref 19.6–45.3)
LYMPHOCYTES NFR BLD AUTO: 22.7 % (ref 19.6–45.3)
LYMPHOCYTES NFR BLD AUTO: 26.7 % (ref 19.6–45.3)
LYMPHOCYTES NFR BLD AUTO: 28.7 % (ref 19.6–45.3)
LYMPHOCYTES NFR BLD AUTO: 29.5 % (ref 19.6–45.3)
LYMPHOCYTES NFR BLD AUTO: 34.8 % (ref 19.6–45.3)
LYMPHOCYTES NFR BLD MANUAL: 1 % (ref 5–12)
LYMPHOCYTES NFR BLD MANUAL: 10 % (ref 19.6–45.3)
LYMPHOCYTES NFR BLD MANUAL: 10 % (ref 5–12)
LYMPHOCYTES NFR BLD MANUAL: 11 % (ref 19.6–45.3)
LYMPHOCYTES NFR BLD MANUAL: 12 % (ref 19.6–45.3)
LYMPHOCYTES NFR BLD MANUAL: 12 % (ref 19.6–45.3)
LYMPHOCYTES NFR BLD MANUAL: 12 % (ref 5–12)
LYMPHOCYTES NFR BLD MANUAL: 13 % (ref 19.6–45.3)
LYMPHOCYTES NFR BLD MANUAL: 13 % (ref 19.6–45.3)
LYMPHOCYTES NFR BLD MANUAL: 13 % (ref 5–12)
LYMPHOCYTES NFR BLD MANUAL: 14 % (ref 19.6–45.3)
LYMPHOCYTES NFR BLD MANUAL: 15 % (ref 19.6–45.3)
LYMPHOCYTES NFR BLD MANUAL: 16 % (ref 19.6–45.3)
LYMPHOCYTES NFR BLD MANUAL: 16 % (ref 19.6–45.3)
LYMPHOCYTES NFR BLD MANUAL: 16 % (ref 5–12)
LYMPHOCYTES NFR BLD MANUAL: 17 % (ref 19.6–45.3)
LYMPHOCYTES NFR BLD MANUAL: 17 % (ref 19.6–45.3)
LYMPHOCYTES NFR BLD MANUAL: 18 % (ref 19.6–45.3)
LYMPHOCYTES NFR BLD MANUAL: 19 % (ref 19.6–45.3)
LYMPHOCYTES NFR BLD MANUAL: 19 % (ref 19.6–45.3)
LYMPHOCYTES NFR BLD MANUAL: 2 % (ref 5–12)
LYMPHOCYTES NFR BLD MANUAL: 21 % (ref 19.6–45.3)
LYMPHOCYTES NFR BLD MANUAL: 22 % (ref 19.6–45.3)
LYMPHOCYTES NFR BLD MANUAL: 22 % (ref 19.6–45.3)
LYMPHOCYTES NFR BLD MANUAL: 3 % (ref 5–12)
LYMPHOCYTES NFR BLD MANUAL: 4 % (ref 5–12)
LYMPHOCYTES NFR BLD MANUAL: 4 % (ref 5–12)
LYMPHOCYTES NFR BLD MANUAL: 43 % (ref 19.6–45.3)
LYMPHOCYTES NFR BLD MANUAL: 5 % (ref 5–12)
LYMPHOCYTES NFR BLD MANUAL: 6 % (ref 5–12)
LYMPHOCYTES NFR BLD MANUAL: 7 % (ref 19.6–45.3)
LYMPHOCYTES NFR BLD MANUAL: 7 % (ref 5–12)
LYMPHOCYTES NFR BLD MANUAL: 8 % (ref 5–12)
LYMPHOCYTES NFR BLD MANUAL: 8 % (ref 5–12)
LYMPHOCYTES NFR BLD MANUAL: 9 % (ref 19.6–45.3)
LYMPHOCYTES NFR BLD MANUAL: 9 % (ref 5–12)
Lab: ABNORMAL
Lab: NORMAL
M PROTEIN 24H MFR UR ELPH: ABNORMAL %
M-SPIKE: 4.3 G/DL
M-SPIKE: ABNORMAL G/DL
MACROCYTES BLD QL SMEAR: ABNORMAL
MAGNESIUM SERPL-MCNC: 1.7 MG/DL (ref 1.6–2.4)
MAGNESIUM SERPL-MCNC: 1.8 MG/DL (ref 1.6–2.4)
MAGNESIUM SERPL-MCNC: 1.8 MG/DL (ref 1.6–2.4)
MAGNESIUM SERPL-MCNC: 1.9 MG/DL (ref 1.6–2.4)
MAGNESIUM SERPL-MCNC: 2 MG/DL (ref 1.6–2.4)
MCH RBC QN AUTO: 30.3 PG (ref 26.6–33)
MCH RBC QN AUTO: 30.5 PG (ref 26.6–33)
MCH RBC QN AUTO: 30.5 PG (ref 26.6–33)
MCH RBC QN AUTO: 30.7 PG (ref 26.6–33)
MCH RBC QN AUTO: 30.8 PG (ref 26.6–33)
MCH RBC QN AUTO: 30.8 PG (ref 26.6–33)
MCH RBC QN AUTO: 30.9 PG (ref 26.6–33)
MCH RBC QN AUTO: 30.9 PG (ref 26.6–33)
MCH RBC QN AUTO: 31 PG (ref 26.6–33)
MCH RBC QN AUTO: 31.1 PG (ref 26.6–33)
MCH RBC QN AUTO: 31.2 PG (ref 26.6–33)
MCH RBC QN AUTO: 31.3 PG (ref 26.6–33)
MCH RBC QN AUTO: 31.5 PG (ref 26.6–33)
MCH RBC QN AUTO: 31.5 PG (ref 26.6–33)
MCH RBC QN AUTO: 31.6 PG (ref 26.6–33)
MCH RBC QN AUTO: 31.7 PG (ref 26.6–33)
MCH RBC QN AUTO: 31.8 PG (ref 26.6–33)
MCH RBC QN AUTO: 31.9 PG (ref 26.6–33)
MCH RBC QN AUTO: 32 PG (ref 26.6–33)
MCH RBC QN AUTO: 32 PG (ref 26.6–33)
MCH RBC QN AUTO: 32.1 PG (ref 26.6–33)
MCH RBC QN AUTO: 32.2 PG (ref 26.6–33)
MCH RBC QN AUTO: 32.2 PG (ref 26.6–33)
MCH RBC QN AUTO: 32.3 PG (ref 26.6–33)
MCH RBC QN AUTO: 32.4 PG (ref 26.6–33)
MCH RBC QN AUTO: 32.5 PG (ref 26.6–33)
MCH RBC QN AUTO: 32.6 PG (ref 26.6–33)
MCH RBC QN AUTO: 32.7 PG (ref 26.6–33)
MCH RBC QN AUTO: 32.9 PG (ref 26.6–33)
MCHC RBC AUTO-ENTMCNC: 29.1 G/DL (ref 31.5–35.7)
MCHC RBC AUTO-ENTMCNC: 29.4 G/DL (ref 31.5–35.7)
MCHC RBC AUTO-ENTMCNC: 29.6 G/DL (ref 31.5–35.7)
MCHC RBC AUTO-ENTMCNC: 29.6 G/DL (ref 31.5–35.7)
MCHC RBC AUTO-ENTMCNC: 29.7 G/DL (ref 31.5–35.7)
MCHC RBC AUTO-ENTMCNC: 29.8 G/DL (ref 31.5–35.7)
MCHC RBC AUTO-ENTMCNC: 29.9 G/DL (ref 31.5–35.7)
MCHC RBC AUTO-ENTMCNC: 30 G/DL (ref 31.5–35.7)
MCHC RBC AUTO-ENTMCNC: 30 G/DL (ref 31.5–35.7)
MCHC RBC AUTO-ENTMCNC: 30.1 G/DL (ref 31.5–35.7)
MCHC RBC AUTO-ENTMCNC: 30.2 G/DL (ref 31.5–35.7)
MCHC RBC AUTO-ENTMCNC: 30.2 G/DL (ref 31.5–35.7)
MCHC RBC AUTO-ENTMCNC: 30.3 G/DL (ref 31.5–35.7)
MCHC RBC AUTO-ENTMCNC: 30.4 G/DL (ref 31.5–35.7)
MCHC RBC AUTO-ENTMCNC: 30.5 G/DL (ref 31.5–35.7)
MCHC RBC AUTO-ENTMCNC: 30.6 G/DL (ref 31.5–35.7)
MCHC RBC AUTO-ENTMCNC: 30.7 G/DL (ref 31.5–35.7)
MCHC RBC AUTO-ENTMCNC: 30.8 G/DL (ref 31.5–35.7)
MCHC RBC AUTO-ENTMCNC: 30.9 G/DL (ref 31.5–35.7)
MCHC RBC AUTO-ENTMCNC: 31.4 G/DL (ref 31.5–35.7)
MCV RBC AUTO: 100 FL (ref 79–97)
MCV RBC AUTO: 100.6 FL (ref 79–97)
MCV RBC AUTO: 101 FL (ref 79–97)
MCV RBC AUTO: 101.2 FL (ref 79–97)
MCV RBC AUTO: 101.3 FL (ref 79–97)
MCV RBC AUTO: 101.6 FL (ref 79–97)
MCV RBC AUTO: 101.8 FL (ref 79–97)
MCV RBC AUTO: 101.8 FL (ref 79–97)
MCV RBC AUTO: 101.9 FL (ref 79–97)
MCV RBC AUTO: 102 FL (ref 79–97)
MCV RBC AUTO: 102.2 FL (ref 79–97)
MCV RBC AUTO: 103 FL (ref 79–97)
MCV RBC AUTO: 103.1 FL (ref 79–97)
MCV RBC AUTO: 103.7 FL (ref 79–97)
MCV RBC AUTO: 104.6 FL (ref 79–97)
MCV RBC AUTO: 104.6 FL (ref 79–97)
MCV RBC AUTO: 104.9 FL (ref 79–97)
MCV RBC AUTO: 105.1 FL (ref 79–97)
MCV RBC AUTO: 105.2 FL (ref 79–97)
MCV RBC AUTO: 105.3 FL (ref 79–97)
MCV RBC AUTO: 105.4 FL (ref 79–97)
MCV RBC AUTO: 105.6 FL (ref 79–97)
MCV RBC AUTO: 105.7 FL (ref 79–97)
MCV RBC AUTO: 105.7 FL (ref 79–97)
MCV RBC AUTO: 105.8 FL (ref 79–97)
MCV RBC AUTO: 105.9 FL (ref 79–97)
MCV RBC AUTO: 106 FL (ref 79–97)
MCV RBC AUTO: 106.2 FL (ref 79–97)
MCV RBC AUTO: 106.5 FL (ref 79–97)
MCV RBC AUTO: 106.5 FL (ref 79–97)
MCV RBC AUTO: 106.7 FL (ref 79–97)
MCV RBC AUTO: 107 FL (ref 79–97)
MCV RBC AUTO: 107.1 FL (ref 79–97)
MCV RBC AUTO: 107.5 FL (ref 79–97)
MCV RBC AUTO: 107.6 FL (ref 79–97)
MCV RBC AUTO: 107.6 FL (ref 79–97)
MCV RBC AUTO: 107.7 FL (ref 79–97)
MCV RBC AUTO: 107.9 FL (ref 79–97)
MCV RBC AUTO: 108.3 FL (ref 79–97)
MCV RBC AUTO: 99.7 FL (ref 79–97)
METAMYELOCYTES NFR BLD MANUAL: 1 % (ref 0–0)
METAMYELOCYTES NFR BLD MANUAL: 2 % (ref 0–0)
METAMYELOCYTES NFR BLD MANUAL: 3 % (ref 0–0)
METAMYELOCYTES NFR BLD MANUAL: 5 % (ref 0–0)
MICROCYTES BLD QL: ABNORMAL
MICROCYTES BLD QL: ABNORMAL
MONOCYTES # BLD AUTO: 0.09 10*3/MM3 (ref 0.1–0.9)
MONOCYTES # BLD AUTO: 0.15 10*3/MM3 (ref 0.1–0.9)
MONOCYTES # BLD AUTO: 0.23 10*3/MM3 (ref 0.1–0.9)
MONOCYTES # BLD AUTO: 0.26 10*3/MM3 (ref 0.1–0.9)
MONOCYTES # BLD AUTO: 0.28 10*3/MM3 (ref 0.1–0.9)
MONOCYTES # BLD AUTO: 0.31 10*3/MM3 (ref 0.1–0.9)
MONOCYTES # BLD AUTO: 0.32 10*3/MM3 (ref 0.1–0.9)
MONOCYTES # BLD AUTO: 0.32 10*3/MM3 (ref 0.1–0.9)
MONOCYTES # BLD AUTO: 0.36 10*3/MM3 (ref 0.1–0.9)
MONOCYTES # BLD AUTO: 0.37 10*3/MM3 (ref 0.1–0.9)
MONOCYTES # BLD AUTO: 0.38 10*3/MM3 (ref 0.1–0.9)
MONOCYTES # BLD AUTO: 0.43 10*3/MM3 (ref 0.1–0.9)
MONOCYTES # BLD AUTO: 0.44 10*3/MM3 (ref 0.1–0.9)
MONOCYTES # BLD AUTO: 0.45 10*3/MM3 (ref 0.1–0.9)
MONOCYTES # BLD AUTO: 0.46 10*3/MM3 (ref 0.1–0.9)
MONOCYTES # BLD AUTO: 0.47 10*3/MM3 (ref 0.1–0.9)
MONOCYTES # BLD AUTO: 0.47 10*3/MM3 (ref 0.1–0.9)
MONOCYTES # BLD AUTO: 0.53 10*3/MM3 (ref 0.1–0.9)
MONOCYTES # BLD AUTO: 0.56 10*3/MM3 (ref 0.1–0.9)
MONOCYTES # BLD AUTO: 0.59 10*3/MM3 (ref 0.1–0.9)
MONOCYTES # BLD AUTO: 0.6 10*3/MM3 (ref 0.1–0.9)
MONOCYTES # BLD AUTO: 0.61 10*3/MM3 (ref 0.1–0.9)
MONOCYTES # BLD AUTO: 0.63 10*3/MM3 (ref 0.1–0.9)
MONOCYTES # BLD AUTO: 0.64 10*3/MM3 (ref 0.1–0.9)
MONOCYTES # BLD AUTO: 0.65 10*3/MM3 (ref 0.1–0.9)
MONOCYTES # BLD AUTO: 0.66 10*3/MM3 (ref 0.1–0.9)
MONOCYTES # BLD AUTO: 0.74 10*3/MM3 (ref 0.1–0.9)
MONOCYTES # BLD AUTO: 0.78 10*3/MM3 (ref 0.1–0.9)
MONOCYTES # BLD AUTO: 0.8 10*3/MM3 (ref 0.1–0.9)
MONOCYTES # BLD AUTO: 0.83 10*3/MM3 (ref 0.1–0.9)
MONOCYTES # BLD AUTO: 0.88 10*3/MM3 (ref 0.1–0.9)
MONOCYTES # BLD AUTO: 1 10*3/MM3 (ref 0.1–0.9)
MONOCYTES # BLD AUTO: 1.07 10*3/MM3 (ref 0.1–0.9)
MONOCYTES # BLD AUTO: 1.07 10*3/MM3 (ref 0.1–0.9)
MONOCYTES # BLD AUTO: 1.16 10*3/MM3 (ref 0.1–0.9)
MONOCYTES # BLD AUTO: 1.44 10*3/MM3 (ref 0.1–0.9)
MONOCYTES # BLD AUTO: 1.44 10*3/MM3 (ref 0.1–0.9)
MONOCYTES # BLD AUTO: 1.53 10*3/MM3 (ref 0.1–0.9)
MONOCYTES # BLD AUTO: 1.78 10*3/MM3 (ref 0.1–0.9)
MONOCYTES # BLD AUTO: 1.81 10*3/MM3 (ref 0.1–0.9)
MONOCYTES # BLD AUTO: 1.87 10*3/MM3 (ref 0.1–0.9)
MONOCYTES # BLD AUTO: 1.92 10*3/MM3 (ref 0.1–0.9)
MONOCYTES # BLD AUTO: 2.25 10*3/MM3 (ref 0.1–0.9)
MONOCYTES # BLD AUTO: 2.99 10*3/MM3 (ref 0.1–0.9)
MONOCYTES NFR BLD AUTO: 11.3 % (ref 5–12)
MONOCYTES NFR BLD AUTO: 6 % (ref 5–12)
MONOCYTES NFR BLD AUTO: 6.1 % (ref 5–12)
MONOCYTES NFR BLD AUTO: 6.3 % (ref 5–12)
MONOCYTES NFR BLD AUTO: 6.8 % (ref 5–12)
MONOCYTES NFR BLD AUTO: 7.2 % (ref 5–12)
MONOCYTES NFR BLD AUTO: 8.6 % (ref 5–12)
MONOCYTES NFR BLD AUTO: 8.9 % (ref 5–12)
MONOCYTES NFR BLD AUTO: 9.2 % (ref 5–12)
MYELOCYTES NFR BLD MANUAL: 1 % (ref 0–0)
MYELOCYTES NFR BLD MANUAL: 2 % (ref 0–0)
MYELOCYTES NFR BLD MANUAL: 3 % (ref 0–0)
MYELOCYTES NFR BLD MANUAL: 4 % (ref 0–0)
MYELOCYTES NFR BLD MANUAL: 4 % (ref 0–0)
NEUTROPHILS # BLD AUTO: 10.22 10*3/MM3 (ref 1.7–7)
NEUTROPHILS # BLD AUTO: 10.87 10*3/MM3 (ref 1.7–7)
NEUTROPHILS # BLD AUTO: 11.03 10*3/MM3 (ref 1.7–7)
NEUTROPHILS # BLD AUTO: 11.99 10*3/MM3 (ref 1.7–7)
NEUTROPHILS # BLD AUTO: 12.42 10*3/MM3 (ref 1.7–7)
NEUTROPHILS # BLD AUTO: 12.9 10*3/MM3 (ref 1.7–7)
NEUTROPHILS # BLD AUTO: 13.16 10*3/MM3 (ref 1.7–7)
NEUTROPHILS # BLD AUTO: 13.23 10*3/MM3 (ref 1.7–7)
NEUTROPHILS # BLD AUTO: 13.88 10*3/MM3 (ref 1.7–7)
NEUTROPHILS # BLD AUTO: 14.26 10*3/MM3 (ref 1.7–7)
NEUTROPHILS # BLD AUTO: 16 10*3/MM3 (ref 1.7–7)
NEUTROPHILS # BLD AUTO: 2.52 10*3/MM3 (ref 1.7–7)
NEUTROPHILS # BLD AUTO: 2.67 10*3/MM3 (ref 1.7–7)
NEUTROPHILS # BLD AUTO: 3.4 10*3/MM3 (ref 1.7–7)
NEUTROPHILS # BLD AUTO: 3.44 10*3/MM3 (ref 1.7–7)
NEUTROPHILS # BLD AUTO: 3.65 10*3/MM3 (ref 1.7–7)
NEUTROPHILS # BLD AUTO: 4.06 10*3/MM3 (ref 1.7–7)
NEUTROPHILS # BLD AUTO: 4.07 10*3/MM3 (ref 1.7–7)
NEUTROPHILS # BLD AUTO: 4.51 10*3/MM3 (ref 1.4–7)
NEUTROPHILS # BLD AUTO: 4.56 10*3/MM3 (ref 1.4–7)
NEUTROPHILS # BLD AUTO: 4.83 10*3/MM3 (ref 1.7–7)
NEUTROPHILS # BLD AUTO: 5.1 10*3/MM3 (ref 1.4–7)
NEUTROPHILS # BLD AUTO: 5.13 10*3/MM3 (ref 1.4–7)
NEUTROPHILS # BLD AUTO: 5.23 10*3/MM3 (ref 1.4–7)
NEUTROPHILS # BLD AUTO: 5.39 10*3/MM3 (ref 1.7–7)
NEUTROPHILS # BLD AUTO: 5.48 10*3/MM3 (ref 1.4–7)
NEUTROPHILS # BLD AUTO: 5.61 10*3/MM3 (ref 1.7–7)
NEUTROPHILS # BLD AUTO: 5.64 10*3/MM3 (ref 1.7–7)
NEUTROPHILS # BLD AUTO: 6.02 10*3/MM3 (ref 1.7–7)
NEUTROPHILS # BLD AUTO: 6.2 10*3/MM3 (ref 1.4–7)
NEUTROPHILS # BLD AUTO: 6.28 10*3/MM3 (ref 1.7–7)
NEUTROPHILS # BLD AUTO: 6.32 10*3/MM3 (ref 1.7–7)
NEUTROPHILS # BLD AUTO: 6.49 10*3/MM3 (ref 1.7–7)
NEUTROPHILS # BLD AUTO: 6.51 10*3/MM3 (ref 1.7–7)
NEUTROPHILS # BLD AUTO: 7.59 10*3/MM3 (ref 1.7–7)
NEUTROPHILS # BLD AUTO: 7.86 10*3/MM3 (ref 1.7–7)
NEUTROPHILS # BLD AUTO: 7.93 10*3/MM3 (ref 1.7–7)
NEUTROPHILS # BLD AUTO: 7.98 10*3/MM3 (ref 1.7–7)
NEUTROPHILS # BLD AUTO: 8.58 10*3/MM3 (ref 1.7–7)
NEUTROPHILS # BLD AUTO: 8.93 10*3/MM3 (ref 1.7–7)
NEUTROPHILS # BLD AUTO: 9.08 10*3/MM3 (ref 1.7–7)
NEUTROPHILS # BLD AUTO: 9.3 10*3/MM3 (ref 1.7–7)
NEUTROPHILS # BLD AUTO: 9.32 10*3/MM3 (ref 1.7–7)
NEUTROPHILS # BLD AUTO: 9.35 10*3/MM3 (ref 1.7–7)
NEUTROPHILS NFR BLD AUTO: 49 % (ref 42.7–76)
NEUTROPHILS NFR BLD AUTO: 53.9 % (ref 42.7–76)
NEUTROPHILS NFR BLD AUTO: 56.8 % (ref 42.7–76)
NEUTROPHILS NFR BLD AUTO: 57.2 % (ref 42.7–76)
NEUTROPHILS NFR BLD AUTO: 57.5 % (ref 42.7–76)
NEUTROPHILS NFR BLD AUTO: 57.9 % (ref 42.7–76)
NEUTROPHILS NFR BLD AUTO: 58.6 % (ref 42.7–76)
NEUTROPHILS NFR BLD AUTO: 61 % (ref 42.7–76)
NEUTROPHILS NFR BLD AUTO: 61.5 % (ref 42.7–76)
NEUTROPHILS NFR BLD MANUAL: 51 % (ref 42.7–76)
NEUTROPHILS NFR BLD MANUAL: 65 % (ref 42.7–76)
NEUTROPHILS NFR BLD MANUAL: 66 % (ref 42.7–76)
NEUTROPHILS NFR BLD MANUAL: 68 % (ref 42.7–76)
NEUTROPHILS NFR BLD MANUAL: 69 % (ref 42.7–76)
NEUTROPHILS NFR BLD MANUAL: 70 % (ref 42.7–76)
NEUTROPHILS NFR BLD MANUAL: 70 % (ref 42.7–76)
NEUTROPHILS NFR BLD MANUAL: 71 % (ref 42.7–76)
NEUTROPHILS NFR BLD MANUAL: 72 % (ref 42.7–76)
NEUTROPHILS NFR BLD MANUAL: 73 % (ref 42.7–76)
NEUTROPHILS NFR BLD MANUAL: 73 % (ref 42.7–76)
NEUTROPHILS NFR BLD MANUAL: 74 % (ref 42.7–76)
NEUTROPHILS NFR BLD MANUAL: 74 % (ref 42.7–76)
NEUTROPHILS NFR BLD MANUAL: 75 % (ref 42.7–76)
NEUTROPHILS NFR BLD MANUAL: 77 % (ref 42.7–76)
NEUTROPHILS NFR BLD MANUAL: 77 % (ref 42.7–76)
NEUTROPHILS NFR BLD MANUAL: 79 % (ref 42.7–76)
NEUTROPHILS NFR BLD MANUAL: 79 % (ref 42.7–76)
NEUTROPHILS NFR BLD MANUAL: 80 % (ref 42.7–76)
NEUTROPHILS NFR BLD MANUAL: 80 % (ref 42.7–76)
NEUTROPHILS NFR BLD MANUAL: 81 % (ref 42.7–76)
NEUTROPHILS NFR BLD MANUAL: 83 % (ref 42.7–76)
NEUTS BAND NFR BLD MANUAL: 1 % (ref 0–5)
NITRITE UR QL STRIP: NEGATIVE
NITRITE UR QL STRIP: POSITIVE
NITRITE UR QL STRIP: POSITIVE
NRBC BLD AUTO-RTO: 0.9 /100 WBC (ref 0–0.2)
NRBC BLD AUTO-RTO: 1 /100 WBC (ref 0–0.2)
NRBC BLD AUTO-RTO: 1 /100 WBC (ref 0–0.2)
NRBC BLD AUTO-RTO: 1.4 /100 WBC (ref 0–0.2)
NRBC BLD AUTO-RTO: 1.9 /100 WBC (ref 0–0.2)
NRBC BLD AUTO-RTO: 2.3 /100 WBC (ref 0–0.2)
NRBC BLD AUTO-RTO: 2.6 /100 WBC (ref 0–0.2)
NRBC BLD AUTO-RTO: 2.6 /100 WBC (ref 0–0.2)
NRBC BLD AUTO-RTO: 3 /100 WBC (ref 0–0.2)
NRBC SPEC MANUAL: 1 /100 WBC (ref 0–0)
NRBC SPEC MANUAL: 1 /100 WBC (ref 0–0)
NRBC SPEC MANUAL: 1 /100 WBC (ref 0–0.2)
NRBC SPEC MANUAL: 2 /100 WBC (ref 0–0.2)
NRBC SPEC MANUAL: 3 /100 WBC (ref 0–0)
NRBC SPEC MANUAL: 3 /100 WBC (ref 0–0)
NRBC SPEC MANUAL: 3 /100 WBC (ref 0–0.2)
NRBC SPEC MANUAL: 4 /100 WBC (ref 0–0.2)
NRBC SPEC MANUAL: 5 /100 WBC (ref 0–0.2)
NRBC SPEC MANUAL: 8 /100 WBC (ref 0–0.2)
OVALOCYTES BLD QL SMEAR: ABNORMAL
OVALOCYTES BLD QL SMEAR: NORMAL
PASSIVE ANTI-CD-38: NORMAL
PATH REPORT.ADDENDUM SPEC: NORMAL
PATH REPORT.ADDENDUM SPEC: NORMAL
PATH REPORT.FINAL DX SPEC: NORMAL
PATH REPORT.FINAL DX SPEC: NORMAL
PATH REPORT.GROSS SPEC: NORMAL
PATH REPORT.GROSS SPEC: NORMAL
PH UR STRIP.AUTO: 6 [PH] (ref 5–8)
PH UR STRIP.AUTO: 6 [PH] (ref 5–8)
PH UR STRIP.AUTO: 7.5 [PH] (ref 5–8)
PHOSPHATE SERPL-MCNC: 3 MG/DL (ref 2.5–4.5)
PHOSPHATE SERPL-MCNC: 3 MG/DL (ref 2.5–4.5)
PHOSPHATE SERPL-MCNC: 3.1 MG/DL (ref 2.5–4.5)
PHOSPHATE SERPL-MCNC: 3.3 MG/DL (ref 2.5–4.5)
PHOSPHATE SERPL-MCNC: 3.3 MG/DL (ref 2.5–4.5)
PHOSPHATE SERPL-MCNC: 3.5 MG/DL (ref 2.5–4.5)
PHOSPHATE SERPL-MCNC: 3.6 MG/DL (ref 2.5–4.5)
PHOSPHATE SERPL-MCNC: 3.6 MG/DL (ref 2.5–4.5)
PHOSPHATE SERPL-MCNC: 4.1 MG/DL (ref 2.5–4.5)
PHOSPHATE SERPL-MCNC: 4.2 MG/DL (ref 2.5–4.5)
PHOSPHATE SERPL-MCNC: 5.6 MG/DL (ref 2.5–4.5)
PLAT MORPH BLD: NORMAL
PLATELET # BLD AUTO: 18 10*3/MM3 (ref 140–450)
PLATELET # BLD AUTO: 20 10*3/MM3 (ref 140–450)
PLATELET # BLD AUTO: 20 10*3/MM3 (ref 140–450)
PLATELET # BLD AUTO: 21 10*3/MM3 (ref 140–450)
PLATELET # BLD AUTO: 22 10*3/MM3 (ref 140–450)
PLATELET # BLD AUTO: 24 10*3/MM3 (ref 140–450)
PLATELET # BLD AUTO: 25 10*3/MM3 (ref 140–450)
PLATELET # BLD AUTO: 26 10*3/MM3 (ref 140–450)
PLATELET # BLD AUTO: 27 10*3/MM3 (ref 140–450)
PLATELET # BLD AUTO: 28 10*3/MM3 (ref 140–450)
PLATELET # BLD AUTO: 28 10*3/MM3 (ref 140–450)
PLATELET # BLD AUTO: 29 10*3/MM3 (ref 140–450)
PLATELET # BLD AUTO: 29 10*3/MM3 (ref 140–450)
PLATELET # BLD AUTO: 31 10*3/MM3 (ref 140–450)
PLATELET # BLD AUTO: 33 10*3/MM3 (ref 140–450)
PLATELET # BLD AUTO: 34 10*3/MM3 (ref 140–450)
PLATELET # BLD AUTO: 35 10*3/MM3 (ref 140–450)
PLATELET # BLD AUTO: 37 10*3/MM3 (ref 140–450)
PLATELET # BLD AUTO: 37 10*3/MM3 (ref 140–450)
PLATELET # BLD AUTO: 38 10*3/MM3 (ref 140–450)
PLATELET # BLD AUTO: 39 10*3/MM3 (ref 140–450)
PLATELET # BLD AUTO: 39 10*3/MM3 (ref 140–450)
PLATELET # BLD AUTO: 40 10*3/MM3 (ref 140–450)
PLATELET # BLD AUTO: 41 10*3/MM3 (ref 140–450)
PLATELET # BLD AUTO: 41 10*3/MM3 (ref 140–450)
PLATELET # BLD AUTO: 43 10*3/MM3 (ref 140–450)
PLATELET # BLD AUTO: 45 10*3/MM3 (ref 140–450)
PLATELET # BLD AUTO: 50 10*3/MM3 (ref 140–450)
PLATELETS.RETICULATED NFR BLD AUTO: 10.8 % (ref 0.9–6.5)
PMV BLD AUTO: 10.9 FL (ref 6–12)
POLYCHROMASIA BLD QL SMEAR: ABNORMAL
POLYCHROMASIA BLD QL SMEAR: NORMAL
POTASSIUM BLD-SCNC: 3.5 MMOL/L (ref 3.5–5.2)
POTASSIUM BLD-SCNC: 3.5 MMOL/L (ref 3.5–5.2)
POTASSIUM BLD-SCNC: 3.6 MMOL/L (ref 3.5–5.2)
POTASSIUM BLD-SCNC: 3.7 MMOL/L (ref 3.5–4.7)
POTASSIUM BLD-SCNC: 3.7 MMOL/L (ref 3.5–5.2)
POTASSIUM BLD-SCNC: 3.7 MMOL/L (ref 3.5–5.2)
POTASSIUM BLD-SCNC: 3.8 MMOL/L (ref 3.5–5.2)
POTASSIUM BLD-SCNC: 3.9 MMOL/L (ref 3.5–5.2)
POTASSIUM BLD-SCNC: 4 MMOL/L (ref 3.5–5.2)
POTASSIUM BLD-SCNC: 4.2 MMOL/L (ref 3.5–5.2)
POTASSIUM BLD-SCNC: 4.2 MMOL/L (ref 3.5–5.2)
POTASSIUM BLD-SCNC: 4.3 MMOL/L (ref 3.5–5.2)
PROT 24H UR-MRATE: 410 MG/24 HR (ref 30–150)
PROT 24H UR-MRATE: 452 MG/24 HR (ref 30–150)
PROT 24H UR-MRATE: 455 MG/24 HR (ref 30–150)
PROT 24H UR-MRATE: 461 MG/24 HR (ref 30–150)
PROT 24H UR-MRATE: 462 MG/24 HR (ref 30–150)
PROT SERPL-MCNC: 10 G/DL (ref 6–8.5)
PROT SERPL-MCNC: 10.1 G/DL (ref 6–8.5)
PROT SERPL-MCNC: 10.1 G/DL (ref 6–8.5)
PROT SERPL-MCNC: 10.2 G/DL (ref 6–8.5)
PROT SERPL-MCNC: 10.2 G/DL (ref 6–8.5)
PROT SERPL-MCNC: 10.7 G/DL (ref 6–8.5)
PROT SERPL-MCNC: 11 G/DL (ref 6–8.5)
PROT SERPL-MCNC: 11 G/DL (ref 6–8.5)
PROT SERPL-MCNC: 11.3 G/DL (ref 6–8.5)
PROT SERPL-MCNC: 11.4 G/DL (ref 6–8.5)
PROT SERPL-MCNC: 11.5 G/DL (ref 6.3–8)
PROT SERPL-MCNC: 12.4 G/DL (ref 6–8.5)
PROT SERPL-MCNC: 7 G/DL (ref 6–8.5)
PROT SERPL-MCNC: 8.4 G/DL (ref 6–8.5)
PROT SERPL-MCNC: 8.8 G/DL (ref 6–8.5)
PROT SERPL-MCNC: 8.9 G/DL (ref 6–8.5)
PROT SERPL-MCNC: 8.9 G/DL (ref 6–8.5)
PROT SERPL-MCNC: 9 G/DL (ref 6–8.5)
PROT SERPL-MCNC: 9.1 G/DL (ref 6–8.5)
PROT SERPL-MCNC: 9.1 G/DL (ref 6–8.5)
PROT SERPL-MCNC: 9.2 G/DL (ref 6–8.5)
PROT SERPL-MCNC: 9.2 G/DL (ref 6–8.5)
PROT SERPL-MCNC: 9.3 G/DL (ref 6–8.5)
PROT SERPL-MCNC: 9.4 G/DL (ref 6–8.5)
PROT SERPL-MCNC: 9.5 G/DL (ref 6–8.5)
PROT SERPL-MCNC: 9.5 G/DL (ref 6–8.5)
PROT SERPL-MCNC: 9.6 G/DL (ref 6–8.5)
PROT SERPL-MCNC: 9.7 G/DL (ref 6–8.5)
PROT SERPL-MCNC: 9.9 G/DL (ref 6–8.5)
PROT UR QL STRIP: ABNORMAL
PROT UR-MCNC: 21.6 MG/DL
PROT UR-MCNC: 30.7 MG/DL
PROT UR-MCNC: 30.8 MG/DL
PROT UR-MCNC: 32.3 MG/DL
PROT UR-MCNC: 32.5 MG/DL
RBC # BLD AUTO: 2.46 10*6/MM3 (ref 3.77–5.28)
RBC # BLD AUTO: 2.65 10*6/MM3 (ref 3.77–5.28)
RBC # BLD AUTO: 2.68 10*6/MM3 (ref 3.77–5.28)
RBC # BLD AUTO: 2.69 10*6/MM3 (ref 3.77–5.28)
RBC # BLD AUTO: 2.72 10*6/MM3 (ref 3.77–5.28)
RBC # BLD AUTO: 2.75 10*6/MM3 (ref 3.77–5.28)
RBC # BLD AUTO: 2.75 10*6/MM3 (ref 3.77–5.28)
RBC # BLD AUTO: 2.77 10*6/MM3 (ref 3.77–5.28)
RBC # BLD AUTO: 2.78 10*6/MM3 (ref 3.77–5.28)
RBC # BLD AUTO: 2.81 10*6/MM3 (ref 3.77–5.28)
RBC # BLD AUTO: 2.81 10*6/MM3 (ref 3.77–5.28)
RBC # BLD AUTO: 2.83 10*6/MM3 (ref 3.77–5.28)
RBC # BLD AUTO: 2.88 10*6/MM3 (ref 3.77–5.28)
RBC # BLD AUTO: 2.89 10*6/MM3 (ref 3.77–5.28)
RBC # BLD AUTO: 2.9 10*6/MM3 (ref 3.77–5.28)
RBC # BLD AUTO: 2.92 10*6/MM3 (ref 3.77–5.28)
RBC # BLD AUTO: 2.92 10*6/MM3 (ref 3.77–5.28)
RBC # BLD AUTO: 2.94 10*6/MM3 (ref 3.77–5.28)
RBC # BLD AUTO: 2.97 10*6/MM3 (ref 3.77–5.28)
RBC # BLD AUTO: 2.98 10*6/MM3 (ref 3.77–5.28)
RBC # BLD AUTO: 3 10*6/MM3 (ref 3.77–5.28)
RBC # BLD AUTO: 3 10*6/MM3 (ref 3.77–5.28)
RBC # BLD AUTO: 3.04 10*6/MM3 (ref 3.77–5.28)
RBC # BLD AUTO: 3.04 10*6/MM3 (ref 3.77–5.28)
RBC # BLD AUTO: 3.05 10*6/MM3 (ref 3.77–5.28)
RBC # BLD AUTO: 3.05 10*6/MM3 (ref 3.77–5.28)
RBC # BLD AUTO: 3.07 10*6/MM3 (ref 3.77–5.28)
RBC # BLD AUTO: 3.08 10*6/MM3 (ref 3.77–5.28)
RBC # BLD AUTO: 3.08 10*6/MM3 (ref 3.77–5.28)
RBC # BLD AUTO: 3.15 10*6/MM3 (ref 3.77–5.28)
RBC # BLD AUTO: 3.16 10*6/MM3 (ref 3.77–5.28)
RBC # BLD AUTO: 3.23 10*6/MM3 (ref 3.77–5.28)
RBC # BLD AUTO: 3.24 10*6/MM3 (ref 3.77–5.28)
RBC # BLD AUTO: 3.26 10*6/MM3 (ref 3.77–5.28)
RBC # BLD AUTO: 3.27 10*6/MM3 (ref 3.77–5.28)
RBC # BLD AUTO: 3.28 10*6/MM3 (ref 3.77–5.28)
RBC # BLD AUTO: 3.35 10*6/MM3 (ref 3.77–5.28)
RBC # BLD AUTO: 3.38 10*6/MM3 (ref 3.77–5.28)
RBC # UR: ABNORMAL /HPF
RBC # UR: ABNORMAL /HPF
REF LAB TEST METHOD: ABNORMAL
REF LAB TEST METHOD: ABNORMAL
RH BLD: POSITIVE
ROULEAUX BLD QL SMEAR: ABNORMAL
SCAN SLIDE: NORMAL
SODIUM BLD-SCNC: 135 MMOL/L (ref 136–145)
SODIUM BLD-SCNC: 136 MMOL/L (ref 136–145)
SODIUM BLD-SCNC: 137 MMOL/L (ref 136–145)
SODIUM BLD-SCNC: 138 MMOL/L (ref 134–145)
SODIUM BLD-SCNC: 138 MMOL/L (ref 136–145)
SODIUM BLD-SCNC: 139 MMOL/L (ref 136–145)
SODIUM BLD-SCNC: 140 MMOL/L (ref 136–145)
SODIUM BLD-SCNC: 141 MMOL/L (ref 136–145)
SODIUM BLD-SCNC: 142 MMOL/L (ref 136–145)
SODIUM BLD-SCNC: 142 MMOL/L (ref 136–145)
SP GR UR STRIP: 1.01 (ref 1–1.03)
SP GR UR STRIP: 1.01 (ref 1–1.03)
SP GR UR STRIP: 1.02 (ref 1–1.03)
SPHEROCYTES BLD QL SMEAR: ABNORMAL
SPHEROCYTES BLD QL SMEAR: NORMAL
SQUAMOUS #/AREA URNS HPF: ABNORMAL /HPF
SQUAMOUS #/AREA URNS HPF: ABNORMAL /HPF
T&S EXPIRATION DATE: NORMAL
T4 FREE SERPL-MCNC: 0.81 NG/DL (ref 0.93–1.7)
T4 FREE SERPL-MCNC: 1.04 NG/DL (ref 0.93–1.7)
T4 FREE SERPL-MCNC: 1.09 NG/DL (ref 0.93–1.7)
T4 FREE SERPL-MCNC: 1.31 NG/DL (ref 0.93–1.7)
TIBC SERPL-MCNC: 136 MCG/DL (ref 298–536)
TIBC SERPL-MCNC: 140 MCG/DL (ref 298–536)
TIBC SERPL-MCNC: 155 MCG/DL (ref 298–536)
TRANSFERRIN SERPL-MCNC: 104 MG/DL (ref 200–360)
TRANSFERRIN SERPL-MCNC: 91 MG/DL (ref 200–360)
TRANSFERRIN SERPL-MCNC: 94 MG/DL (ref 200–360)
TSH SERPL DL<=0.05 MIU/L-ACNC: 1.31 MIU/ML (ref 0.27–4.2)
TSH SERPL DL<=0.05 MIU/L-ACNC: 1.57 MIU/ML (ref 0.27–4.2)
TSH SERPL DL<=0.05 MIU/L-ACNC: 2.91 MIU/ML (ref 0.27–4.2)
TSH SERPL DL<=0.05 MIU/L-ACNC: 3.33 MIU/ML (ref 0.27–4.2)
UNIT  ABO: NORMAL
UNIT  RH: NORMAL
UROBILINOGEN UR QL STRIP: ABNORMAL
VARIANT LYMPHS NFR BLD MANUAL: 1 % (ref 0–5)
VIT B12 BLD-MCNC: 1808 PG/ML (ref 211–946)
VIT B12 BLD-MCNC: 1819 PG/ML (ref 211–946)
WBC CLUMPS # UR AUTO: ABNORMAL /HPF
WBC MORPH BLD: NORMAL
WBC NRBC COR # BLD: 10.54 10*3/MM3 (ref 3.4–10.8)
WBC NRBC COR # BLD: 10.71 10*3/MM3 (ref 3.4–10.8)
WBC NRBC COR # BLD: 11.84 10*3/MM3 (ref 3.4–10.8)
WBC NRBC COR # BLD: 11.92 10*3/MM3 (ref 3.4–10.8)
WBC NRBC COR # BLD: 12.58 10*3/MM3 (ref 3.4–10.8)
WBC NRBC COR # BLD: 12.77 10*3/MM3 (ref 3.4–10.8)
WBC NRBC COR # BLD: 12.94 10*3/MM3 (ref 3.4–10.8)
WBC NRBC COR # BLD: 13.8 10*3/MM3 (ref 3.4–10.8)
WBC NRBC COR # BLD: 14.49 10*3/MM3 (ref 3.4–10.8)
WBC NRBC COR # BLD: 15.53 10*3/MM3 (ref 3.4–10.8)
WBC NRBC COR # BLD: 15.98 10*3/MM3 (ref 3.4–10.8)
WBC NRBC COR # BLD: 15.98 10*3/MM3 (ref 3.4–10.8)
WBC NRBC COR # BLD: 16.19 10*3/MM3 (ref 3.4–10.8)
WBC NRBC COR # BLD: 18.7 10*3/MM3 (ref 3.4–10.8)
WBC NRBC COR # BLD: 18.75 10*3/MM3 (ref 3.4–10.8)
WBC NRBC COR # BLD: 19.07 10*3/MM3 (ref 3.4–10.8)
WBC NRBC COR # BLD: 19.18 10*3/MM3 (ref 3.4–10.8)
WBC NRBC COR # BLD: 19.8 10*3/MM3 (ref 3.4–10.8)
WBC NRBC COR # BLD: 20.78 10*3/MM3 (ref 3.4–10.8)
WBC NRBC COR # BLD: 4.68 10*3/MM3 (ref 3.4–10.8)
WBC NRBC COR # BLD: 5.23 10*3/MM3 (ref 3.4–10.8)
WBC NRBC COR # BLD: 5.88 10*3/MM3 (ref 3.4–10.8)
WBC NRBC COR # BLD: 5.99 10*3/MM3 (ref 3.4–10.8)
WBC NRBC COR # BLD: 6.17 10*3/MM3 (ref 3.4–10.8)
WBC NRBC COR # BLD: 6.41 10*3/MM3 (ref 3.4–10.8)
WBC NRBC COR # BLD: 6.52 10*3/MM3 (ref 3.4–10.8)
WBC NRBC COR # BLD: 6.83 10*3/MM3 (ref 3.4–10.8)
WBC NRBC COR # BLD: 6.93 10*3/MM3 (ref 3.4–10.8)
WBC NRBC COR # BLD: 6.95 10*3/MM3 (ref 3.4–10.8)
WBC NRBC COR # BLD: 7.09 10*3/MM3 (ref 3.4–10.8)
WBC NRBC COR # BLD: 7.16 10*3/MM3 (ref 3.4–10.8)
WBC NRBC COR # BLD: 7.28 10*3/MM3 (ref 3.4–10.8)
WBC NRBC COR # BLD: 7.33 10*3/MM3 (ref 3.4–10.8)
WBC NRBC COR # BLD: 7.43 10*3/MM3 (ref 3.4–10.8)
WBC NRBC COR # BLD: 7.81 10*3/MM3 (ref 3.4–10.8)
WBC NRBC COR # BLD: 8.06 10*3/MM3 (ref 3.4–10.8)
WBC NRBC COR # BLD: 8.25 10*3/MM3 (ref 3.4–10.8)
WBC NRBC COR # BLD: 8.68 10*3/MM3 (ref 3.4–10.8)
WBC NRBC COR # BLD: 8.72 10*3/MM3 (ref 3.4–10.8)
WBC NRBC COR # BLD: 8.78 10*3/MM3 (ref 3.4–10.8)
WBC NRBC COR # BLD: 9.39 10*3/MM3 (ref 3.4–10.8)
WBC NRBC COR # BLD: 9.47 10*3/MM3 (ref 3.4–10.8)
WBC UR QL AUTO: ABNORMAL /HPF
WBC UR QL AUTO: ABNORMAL /HPF

## 2019-01-01 PROCEDURE — 25010000002 BENDAMUSTINE HCL 100 MG/4ML SOLUTION 4 ML VIAL: Performed by: INTERNAL MEDICINE

## 2019-01-01 PROCEDURE — 96401 CHEMO ANTI-NEOPL SQ/IM: CPT | Performed by: NURSE PRACTITIONER

## 2019-01-01 PROCEDURE — 25010000002 ELOTUZUMAB 400 MG RECONSTITUTED SOLUTION 400 MG VIAL: Performed by: INTERNAL MEDICINE

## 2019-01-01 PROCEDURE — 99213 OFFICE O/P EST LOW 20 MIN: CPT | Performed by: NURSE PRACTITIONER

## 2019-01-01 PROCEDURE — 96415 CHEMO IV INFUSION ADDL HR: CPT | Performed by: INTERNAL MEDICINE

## 2019-01-01 PROCEDURE — 25010000002 DARATUMUMAB 100 MG/5ML SOLUTION 20 ML VIAL: Performed by: NURSE PRACTITIONER

## 2019-01-01 PROCEDURE — 63510000001 EPOETIN ALFA PER 1000 UNITS: Performed by: INTERNAL MEDICINE

## 2019-01-01 PROCEDURE — 84100 ASSAY OF PHOSPHORUS: CPT | Performed by: INTERNAL MEDICINE

## 2019-01-01 PROCEDURE — 87086 URINE CULTURE/COLONY COUNT: CPT | Performed by: NURSE PRACTITIONER

## 2019-01-01 PROCEDURE — 25010000002 BORTEZOMIB PER 0.1 MG: Performed by: INTERNAL MEDICINE

## 2019-01-01 PROCEDURE — 85025 COMPLETE CBC W/AUTO DIFF WBC: CPT | Performed by: INTERNAL MEDICINE

## 2019-01-01 PROCEDURE — 83735 ASSAY OF MAGNESIUM: CPT | Performed by: INTERNAL MEDICINE

## 2019-01-01 PROCEDURE — 25010000002 EPOETIN ALFA PER 1000 UNITS: Performed by: INTERNAL MEDICINE

## 2019-01-01 PROCEDURE — 81003 URINALYSIS AUTO W/O SCOPE: CPT | Performed by: INTERNAL MEDICINE

## 2019-01-01 PROCEDURE — 86920 COMPATIBILITY TEST SPIN: CPT

## 2019-01-01 PROCEDURE — 96372 THER/PROPH/DIAG INJ SC/IM: CPT | Performed by: INTERNAL MEDICINE

## 2019-01-01 PROCEDURE — 83883 ASSAY NEPHELOMETRY NOT SPEC: CPT | Performed by: INTERNAL MEDICINE

## 2019-01-01 PROCEDURE — 80053 COMPREHEN METABOLIC PANEL: CPT | Performed by: INTERNAL MEDICINE

## 2019-01-01 PROCEDURE — 96415 CHEMO IV INFUSION ADDL HR: CPT

## 2019-01-01 PROCEDURE — 36415 COLL VENOUS BLD VENIPUNCTURE: CPT

## 2019-01-01 PROCEDURE — 86900 BLOOD TYPING SEROLOGIC ABO: CPT | Performed by: INTERNAL MEDICINE

## 2019-01-01 PROCEDURE — 86335 IMMUNFIX E-PHORSIS/URINE/CSF: CPT | Performed by: INTERNAL MEDICINE

## 2019-01-01 PROCEDURE — 25010000002 METHYLPREDNISOLONE PER 125 MG: Performed by: NURSE PRACTITIONER

## 2019-01-01 PROCEDURE — 96375 TX/PRO/DX INJ NEW DRUG ADDON: CPT

## 2019-01-01 PROCEDURE — 25010000002 DARATUMUMAB 100 MG/5ML SOLUTION 5 ML VIAL: Performed by: INTERNAL MEDICINE

## 2019-01-01 PROCEDURE — 96372 THER/PROPH/DIAG INJ SC/IM: CPT | Performed by: NURSE PRACTITIONER

## 2019-01-01 PROCEDURE — 84165 PROTEIN E-PHORESIS SERUM: CPT | Performed by: INTERNAL MEDICINE

## 2019-01-01 PROCEDURE — 99214 OFFICE O/P EST MOD 30 MIN: CPT | Performed by: SURGERY

## 2019-01-01 PROCEDURE — 86900 BLOOD TYPING SEROLOGIC ABO: CPT

## 2019-01-01 PROCEDURE — 63710000001 ACETAMINOPHEN 325 MG TABLET: Performed by: INTERNAL MEDICINE

## 2019-01-01 PROCEDURE — 96413 CHEMO IV INFUSION 1 HR: CPT

## 2019-01-01 PROCEDURE — 85007 BL SMEAR W/DIFF WBC COUNT: CPT | Performed by: NURSE PRACTITIONER

## 2019-01-01 PROCEDURE — 25010000002 DENOSUMAB 120 MG/1.7ML SOLUTION: Performed by: INTERNAL MEDICINE

## 2019-01-01 PROCEDURE — 82746 ASSAY OF FOLIC ACID SERUM: CPT | Performed by: INTERNAL MEDICINE

## 2019-01-01 PROCEDURE — 25010000002 METHYLPREDNISOLONE PER 125 MG: Performed by: INTERNAL MEDICINE

## 2019-01-01 PROCEDURE — 86850 RBC ANTIBODY SCREEN: CPT | Performed by: INTERNAL MEDICINE

## 2019-01-01 PROCEDURE — 25010000002 DARATUMUMAB 100 MG/5ML SOLUTION 5 ML VIAL: Performed by: NURSE PRACTITIONER

## 2019-01-01 PROCEDURE — 25010000002 BORTEZOMIB PER 0.1 MG: Performed by: NURSE PRACTITIONER

## 2019-01-01 PROCEDURE — 85007 BL SMEAR W/DIFF WBC COUNT: CPT | Performed by: INTERNAL MEDICINE

## 2019-01-01 PROCEDURE — 25010000002 EPOETIN ALFA PER 1000 UNITS: Performed by: NURSE PRACTITIONER

## 2019-01-01 PROCEDURE — 84166 PROTEIN E-PHORESIS/URINE/CSF: CPT | Performed by: NURSE PRACTITIONER

## 2019-01-01 PROCEDURE — 84439 ASSAY OF FREE THYROXINE: CPT | Performed by: INTERNAL MEDICINE

## 2019-01-01 PROCEDURE — 96372 THER/PROPH/DIAG INJ SC/IM: CPT

## 2019-01-01 PROCEDURE — 25010000002 DIPHENHYDRAMINE PER 50 MG: Performed by: INTERNAL MEDICINE

## 2019-01-01 PROCEDURE — 85025 COMPLETE CBC W/AUTO DIFF WBC: CPT | Performed by: NURSE PRACTITIONER

## 2019-01-01 PROCEDURE — 25010000002 DEXAMETHASONE SODIUM PHOSPHATE 10 MG/ML SOLUTION 1 ML VIAL: Performed by: INTERNAL MEDICINE

## 2019-01-01 PROCEDURE — 85025 COMPLETE CBC W/AUTO DIFF WBC: CPT

## 2019-01-01 PROCEDURE — 63710000001 DIPHENHYDRAMINE PER 50 MG: Performed by: INTERNAL MEDICINE

## 2019-01-01 PROCEDURE — 96401 CHEMO ANTI-NEOPL SQ/IM: CPT

## 2019-01-01 PROCEDURE — 96375 TX/PRO/DX INJ NEW DRUG ADDON: CPT | Performed by: INTERNAL MEDICINE

## 2019-01-01 PROCEDURE — 99215 OFFICE O/P EST HI 40 MIN: CPT | Performed by: INTERNAL MEDICINE

## 2019-01-01 PROCEDURE — 86921 COMPATIBILITY TEST INCUBATE: CPT

## 2019-01-01 PROCEDURE — 87186 SC STD MICRODIL/AGAR DIL: CPT | Performed by: INTERNAL MEDICINE

## 2019-01-01 PROCEDURE — 63510000001 EPOETIN ALFA PER 1000 UNITS: Performed by: NURSE PRACTITIONER

## 2019-01-01 PROCEDURE — 86902 BLOOD TYPE ANTIGEN DONOR EA: CPT

## 2019-01-01 PROCEDURE — 86922 COMPATIBILITY TEST ANTIGLOB: CPT

## 2019-01-01 PROCEDURE — 96523 IRRIG DRUG DELIVERY DEVICE: CPT | Performed by: INTERNAL MEDICINE

## 2019-01-01 PROCEDURE — 84443 ASSAY THYROID STIM HORMONE: CPT | Performed by: INTERNAL MEDICINE

## 2019-01-01 PROCEDURE — 86334 IMMUNOFIX E-PHORESIS SERUM: CPT | Performed by: INTERNAL MEDICINE

## 2019-01-01 PROCEDURE — 96401 CHEMO ANTI-NEOPL SQ/IM: CPT | Performed by: INTERNAL MEDICINE

## 2019-01-01 PROCEDURE — 82728 ASSAY OF FERRITIN: CPT | Performed by: INTERNAL MEDICINE

## 2019-01-01 PROCEDURE — 96409 CHEMO IV PUSH SNGL DRUG: CPT

## 2019-01-01 PROCEDURE — 88185 FLOWCYTOMETRY/TC ADD-ON: CPT

## 2019-01-01 PROCEDURE — 25010000002 DARATUMUMAB 100 MG/5ML SOLUTION 20 ML VIAL: Performed by: INTERNAL MEDICINE

## 2019-01-01 PROCEDURE — 88300 SURGICAL PATH GROSS: CPT | Performed by: INTERNAL MEDICINE

## 2019-01-01 PROCEDURE — 82232 ASSAY OF BETA-2 PROTEIN: CPT | Performed by: INTERNAL MEDICINE

## 2019-01-01 PROCEDURE — 81050 URINALYSIS VOLUME MEASURE: CPT | Performed by: INTERNAL MEDICINE

## 2019-01-01 PROCEDURE — 96415 CHEMO IV INFUSION ADDL HR: CPT | Performed by: NURSE PRACTITIONER

## 2019-01-01 PROCEDURE — 88264 CHROMOSOME ANALYSIS 20-25: CPT

## 2019-01-01 PROCEDURE — 83540 ASSAY OF IRON: CPT | Performed by: INTERNAL MEDICINE

## 2019-01-01 PROCEDURE — 36430 TRANSFUSION BLD/BLD COMPNT: CPT

## 2019-01-01 PROCEDURE — 25010000002 ELOTUZUMAB 400 MG RECONSTITUTED SOLUTION 400 MG VIAL: Performed by: NURSE PRACTITIONER

## 2019-01-01 PROCEDURE — 25010000002 EPOETIN ALFA-EPBX 10000 UNIT/ML SOLUTION: Performed by: NURSE PRACTITIONER

## 2019-01-01 PROCEDURE — 96374 THER/PROPH/DIAG INJ IV PUSH: CPT

## 2019-01-01 PROCEDURE — 84155 ASSAY OF PROTEIN SERUM: CPT | Performed by: INTERNAL MEDICINE

## 2019-01-01 PROCEDURE — 87088 URINE BACTERIA CULTURE: CPT | Performed by: INTERNAL MEDICINE

## 2019-01-01 PROCEDURE — A9270 NON-COVERED ITEM OR SERVICE: HCPCS | Performed by: INTERNAL MEDICINE

## 2019-01-01 PROCEDURE — P9016 RBC LEUKOCYTES REDUCED: HCPCS

## 2019-01-01 PROCEDURE — 82784 ASSAY IGA/IGD/IGG/IGM EACH: CPT | Performed by: INTERNAL MEDICINE

## 2019-01-01 PROCEDURE — 88305 TISSUE EXAM BY PATHOLOGIST: CPT | Performed by: SURGERY

## 2019-01-01 PROCEDURE — 25010000002 EPOETIN ALFA-EPBX 40000 UNIT/ML SOLUTION: Performed by: INTERNAL MEDICINE

## 2019-01-01 PROCEDURE — P9035 PLATELET PHERES LEUKOREDUCED: HCPCS

## 2019-01-01 PROCEDURE — A9270 NON-COVERED ITEM OR SERVICE: HCPCS | Performed by: NURSE PRACTITIONER

## 2019-01-01 PROCEDURE — 86335 IMMUNFIX E-PHORSIS/URINE/CSF: CPT | Performed by: NURSE PRACTITIONER

## 2019-01-01 PROCEDURE — 36415 COLL VENOUS BLD VENIPUNCTURE: CPT | Performed by: INTERNAL MEDICINE

## 2019-01-01 PROCEDURE — 43239 EGD BIOPSY SINGLE/MULTIPLE: CPT | Performed by: SURGERY

## 2019-01-01 PROCEDURE — 84156 ASSAY OF PROTEIN URINE: CPT | Performed by: INTERNAL MEDICINE

## 2019-01-01 PROCEDURE — 86901 BLOOD TYPING SEROLOGIC RH(D): CPT | Performed by: INTERNAL MEDICINE

## 2019-01-01 PROCEDURE — 96413 CHEMO IV INFUSION 1 HR: CPT | Performed by: INTERNAL MEDICINE

## 2019-01-01 PROCEDURE — 96413 CHEMO IV INFUSION 1 HR: CPT | Performed by: NURSE PRACTITIONER

## 2019-01-01 PROCEDURE — 25010000002 DIPHENHYDRAMINE PER 50 MG: Performed by: NURSE PRACTITIONER

## 2019-01-01 PROCEDURE — 36416 COLLJ CAPILLARY BLOOD SPEC: CPT | Performed by: INTERNAL MEDICINE

## 2019-01-01 PROCEDURE — G0438 PPPS, INITIAL VISIT: HCPCS | Performed by: FAMILY MEDICINE

## 2019-01-01 PROCEDURE — 96375 TX/PRO/DX INJ NEW DRUG ADDON: CPT | Performed by: NURSE PRACTITIONER

## 2019-01-01 PROCEDURE — 88342 IMHCHEM/IMCYTCHM 1ST ANTB: CPT | Performed by: INTERNAL MEDICINE

## 2019-01-01 PROCEDURE — 25010000003 DEXAMETHASONE SODIUM PHOSPHATE 100 MG/10ML SOLUTION: Performed by: INTERNAL MEDICINE

## 2019-01-01 PROCEDURE — 81001 URINALYSIS AUTO W/SCOPE: CPT | Performed by: NURSE PRACTITIONER

## 2019-01-01 PROCEDURE — 84166 PROTEIN E-PHORESIS/URINE/CSF: CPT | Performed by: INTERNAL MEDICINE

## 2019-01-01 PROCEDURE — 85055 RETICULATED PLATELET ASSAY: CPT | Performed by: INTERNAL MEDICINE

## 2019-01-01 PROCEDURE — 63710000001 ACETAMINOPHEN 500 MG TABLET: Performed by: NURSE PRACTITIONER

## 2019-01-01 PROCEDURE — 25010000002 DEXAMETHASONE PER 1 MG: Performed by: INTERNAL MEDICINE

## 2019-01-01 PROCEDURE — 25010000002 DENOSUMAB 120 MG/1.7ML SOLUTION: Performed by: NURSE PRACTITIONER

## 2019-01-01 PROCEDURE — 25010000002 PALONOSETRON PER 25 MCG: Performed by: INTERNAL MEDICINE

## 2019-01-01 PROCEDURE — 25010000002 DEXAMETHASONE SODIUM PHOSPHATE 10 MG/ML SOLUTION 1 ML VIAL: Performed by: NURSE PRACTITIONER

## 2019-01-01 PROCEDURE — 77063 BREAST TOMOSYNTHESIS BI: CPT

## 2019-01-01 PROCEDURE — 96160 PT-FOCUSED HLTH RISK ASSMT: CPT | Performed by: FAMILY MEDICINE

## 2019-01-01 PROCEDURE — 80053 COMPREHEN METABOLIC PANEL: CPT

## 2019-01-01 PROCEDURE — 88311 DECALCIFY TISSUE: CPT | Performed by: INTERNAL MEDICINE

## 2019-01-01 PROCEDURE — 99212 OFFICE O/P EST SF 10 MIN: CPT | Performed by: FAMILY MEDICINE

## 2019-01-01 PROCEDURE — 25010000002 PROPOFOL 10 MG/ML EMULSION: Performed by: NURSE ANESTHETIST, CERTIFIED REGISTERED

## 2019-01-01 PROCEDURE — 83615 LACTATE (LD) (LDH) ENZYME: CPT | Performed by: INTERNAL MEDICINE

## 2019-01-01 PROCEDURE — 25010000003 LIDOCAINE 1 % SOLUTION: Performed by: RADIOLOGY

## 2019-01-01 PROCEDURE — 81050 URINALYSIS VOLUME MEASURE: CPT | Performed by: NURSE PRACTITIONER

## 2019-01-01 PROCEDURE — 84156 ASSAY OF PROTEIN URINE: CPT | Performed by: NURSE PRACTITIONER

## 2019-01-01 PROCEDURE — 99214 OFFICE O/P EST MOD 30 MIN: CPT | Performed by: FAMILY MEDICINE

## 2019-01-01 PROCEDURE — 25010000002 DEXAMETHASONE SODIUM PHOSPHATE 100 MG/10ML SOLUTION: Performed by: INTERNAL MEDICINE

## 2019-01-01 PROCEDURE — 77012 CT SCAN FOR NEEDLE BIOPSY: CPT

## 2019-01-01 PROCEDURE — 88237 TISSUE CULTURE BONE MARROW: CPT

## 2019-01-01 PROCEDURE — 84466 ASSAY OF TRANSFERRIN: CPT | Performed by: INTERNAL MEDICINE

## 2019-01-01 PROCEDURE — 82607 VITAMIN B-12: CPT | Performed by: INTERNAL MEDICINE

## 2019-01-01 PROCEDURE — 87077 CULTURE AEROBIC IDENTIFY: CPT | Performed by: NURSE PRACTITIONER

## 2019-01-01 PROCEDURE — 99214 OFFICE O/P EST MOD 30 MIN: CPT | Performed by: NURSE PRACTITIONER

## 2019-01-01 PROCEDURE — 88341 IMHCHEM/IMCYTCHM EA ADD ANTB: CPT | Performed by: INTERNAL MEDICINE

## 2019-01-01 PROCEDURE — 88360 TUMOR IMMUNOHISTOCHEM/MANUAL: CPT | Performed by: INTERNAL MEDICINE

## 2019-01-01 PROCEDURE — 81001 URINALYSIS AUTO W/SCOPE: CPT | Performed by: INTERNAL MEDICINE

## 2019-01-01 PROCEDURE — 73502 X-RAY EXAM HIP UNI 2-3 VIEWS: CPT

## 2019-01-01 PROCEDURE — 88305 TISSUE EXAM BY PATHOLOGIST: CPT | Performed by: INTERNAL MEDICINE

## 2019-01-01 PROCEDURE — 88313 SPECIAL STAINS GROUP 2: CPT | Performed by: INTERNAL MEDICINE

## 2019-01-01 PROCEDURE — 86870 RBC ANTIBODY IDENTIFICATION: CPT | Performed by: INTERNAL MEDICINE

## 2019-01-01 PROCEDURE — 88342 IMHCHEM/IMCYTCHM 1ST ANTB: CPT | Performed by: SURGERY

## 2019-01-01 PROCEDURE — 87086 URINE CULTURE/COLONY COUNT: CPT | Performed by: INTERNAL MEDICINE

## 2019-01-01 PROCEDURE — 25010000002 EPOETIN ALFA-EPBX 10000 UNIT/ML SOLUTION: Performed by: INTERNAL MEDICINE

## 2019-01-01 PROCEDURE — 88184 FLOWCYTOMETRY/ TC 1 MARKER: CPT

## 2019-01-01 PROCEDURE — 25010000002 ELOTUZUMAB 300 MG RECONSTITUTED SOLUTION 300 MG VIAL: Performed by: NURSE PRACTITIONER

## 2019-01-01 PROCEDURE — 87186 SC STD MICRODIL/AGAR DIL: CPT | Performed by: NURSE PRACTITIONER

## 2019-01-01 PROCEDURE — 88182 CELL MARKER STUDY: CPT

## 2019-01-01 PROCEDURE — 77067 SCR MAMMO BI INCL CAD: CPT

## 2019-01-01 RX ORDER — DIPHENHYDRAMINE HCL 25 MG
25 CAPSULE ORAL ONCE
Status: CANCELLED | OUTPATIENT
Start: 2019-01-01 | End: 2019-01-01

## 2019-01-01 RX ORDER — BORTEZOMIB 3.5 MG/1
1.3 INJECTION, POWDER, LYOPHILIZED, FOR SOLUTION INTRAVENOUS; SUBCUTANEOUS ONCE
Status: CANCELLED | OUTPATIENT
Start: 2019-01-01

## 2019-01-01 RX ORDER — CARBAMAZEPINE 200 MG/1
400 TABLET, EXTENDED RELEASE ORAL NIGHTLY
Qty: 180 TABLET | Refills: 1 | Status: SHIPPED | OUTPATIENT
Start: 2019-01-01 | End: 2019-01-01 | Stop reason: SDUPTHER

## 2019-01-01 RX ORDER — METHYLPREDNISOLONE SODIUM SUCCINATE 125 MG/2ML
60 INJECTION, POWDER, LYOPHILIZED, FOR SOLUTION INTRAMUSCULAR; INTRAVENOUS ONCE
Status: CANCELLED | OUTPATIENT
Start: 2019-01-01

## 2019-01-01 RX ORDER — SODIUM CHLORIDE 9 MG/ML
250 INJECTION, SOLUTION INTRAVENOUS ONCE
Status: COMPLETED | OUTPATIENT
Start: 2019-01-01 | End: 2019-01-01

## 2019-01-01 RX ORDER — MEPERIDINE HYDROCHLORIDE 50 MG/ML
25 INJECTION INTRAMUSCULAR; INTRAVENOUS; SUBCUTANEOUS
Status: CANCELLED | OUTPATIENT
Start: 2019-01-01 | End: 2019-01-01

## 2019-01-01 RX ORDER — SODIUM CHLORIDE 0.9 % (FLUSH) 0.9 %
10 SYRINGE (ML) INJECTION AS NEEDED
Status: CANCELLED | OUTPATIENT
Start: 2019-01-01

## 2019-01-01 RX ORDER — SODIUM CHLORIDE 0.9 % (FLUSH) 0.9 %
10 SYRINGE (ML) INJECTION AS NEEDED
Status: DISCONTINUED | OUTPATIENT
Start: 2019-01-01 | End: 2019-01-01 | Stop reason: HOSPADM

## 2019-01-01 RX ORDER — DIPHENHYDRAMINE HYDROCHLORIDE 50 MG/ML
50 INJECTION INTRAMUSCULAR; INTRAVENOUS AS NEEDED
Status: CANCELLED | OUTPATIENT
Start: 2019-01-01

## 2019-01-01 RX ORDER — MEPERIDINE HYDROCHLORIDE 50 MG/ML
25 INJECTION INTRAMUSCULAR; INTRAVENOUS; SUBCUTANEOUS
Status: CANCELLED | OUTPATIENT
Start: 2019-01-01

## 2019-01-01 RX ORDER — ACETAMINOPHEN 500 MG
1000 TABLET ORAL ONCE
Status: CANCELLED | OUTPATIENT
Start: 2019-01-01

## 2019-01-01 RX ORDER — DIPHENHYDRAMINE HYDROCHLORIDE 50 MG/ML
50 INJECTION INTRAMUSCULAR; INTRAVENOUS AS NEEDED
Status: CANCELLED | OUTPATIENT
Start: 2020-01-01

## 2019-01-01 RX ORDER — PANTOPRAZOLE SODIUM 40 MG/1
40 TABLET, DELAYED RELEASE ORAL DAILY
Qty: 30 TABLET | Refills: 5 | Status: SHIPPED | OUTPATIENT
Start: 2019-01-01 | End: 2019-01-01 | Stop reason: SDUPTHER

## 2019-01-01 RX ORDER — ACETAMINOPHEN 500 MG
1000 TABLET ORAL ONCE
Status: CANCELLED | OUTPATIENT
Start: 2020-01-01

## 2019-01-01 RX ORDER — BORTEZOMIB 3.5 MG/1
1.3 INJECTION, POWDER, LYOPHILIZED, FOR SOLUTION INTRAVENOUS; SUBCUTANEOUS ONCE
Status: COMPLETED | OUTPATIENT
Start: 2019-01-01 | End: 2019-01-01

## 2019-01-01 RX ORDER — FAMOTIDINE 10 MG/ML
20 INJECTION, SOLUTION INTRAVENOUS AS NEEDED
Status: CANCELLED | OUTPATIENT
Start: 2019-01-01

## 2019-01-01 RX ORDER — PALONOSETRON 0.05 MG/ML
0.25 INJECTION, SOLUTION INTRAVENOUS ONCE
Status: CANCELLED | OUTPATIENT
Start: 2019-01-01

## 2019-01-01 RX ORDER — ACETAMINOPHEN 500 MG
1000 TABLET ORAL ONCE
Status: COMPLETED | OUTPATIENT
Start: 2019-01-01 | End: 2019-01-01

## 2019-01-01 RX ORDER — METOPROLOL SUCCINATE 25 MG/1
TABLET, EXTENDED RELEASE ORAL
COMMUNITY
Start: 2019-01-01 | End: 2020-01-01

## 2019-01-01 RX ORDER — MEPERIDINE HYDROCHLORIDE 50 MG/ML
25 INJECTION INTRAMUSCULAR; INTRAVENOUS; SUBCUTANEOUS
Status: DISCONTINUED | OUTPATIENT
Start: 2019-01-01 | End: 2019-01-01 | Stop reason: HOSPADM

## 2019-01-01 RX ORDER — DIPHENHYDRAMINE HCL 25 MG
25 CAPSULE ORAL ONCE
Status: COMPLETED | OUTPATIENT
Start: 2019-01-01 | End: 2019-01-01

## 2019-01-01 RX ORDER — MEPERIDINE HYDROCHLORIDE 50 MG/ML
25 INJECTION INTRAMUSCULAR; INTRAVENOUS; SUBCUTANEOUS
Status: CANCELLED | OUTPATIENT
Start: 2020-01-01

## 2019-01-01 RX ORDER — CEFDINIR 300 MG/1
300 CAPSULE ORAL 2 TIMES DAILY
Qty: 10 CAPSULE | Refills: 0 | Status: SHIPPED | OUTPATIENT
Start: 2019-01-01 | End: 2019-01-01

## 2019-01-01 RX ORDER — BORTEZOMIB 3.5 MG/1
1.04 INJECTION, POWDER, LYOPHILIZED, FOR SOLUTION INTRAVENOUS; SUBCUTANEOUS ONCE
Status: COMPLETED | OUTPATIENT
Start: 2019-01-01 | End: 2019-01-01

## 2019-01-01 RX ORDER — SODIUM CHLORIDE 9 MG/ML
250 INJECTION, SOLUTION INTRAVENOUS AS NEEDED
Status: CANCELLED | OUTPATIENT
Start: 2019-01-01

## 2019-01-01 RX ORDER — SODIUM CHLORIDE 9 MG/ML
250 INJECTION, SOLUTION INTRAVENOUS AS NEEDED
Status: DISCONTINUED | OUTPATIENT
Start: 2019-01-01 | End: 2019-01-01 | Stop reason: HOSPADM

## 2019-01-01 RX ORDER — METHYLPREDNISOLONE SODIUM SUCCINATE 125 MG/2ML
100 INJECTION, POWDER, LYOPHILIZED, FOR SOLUTION INTRAMUSCULAR; INTRAVENOUS ONCE
Status: CANCELLED | OUTPATIENT
Start: 2019-01-01

## 2019-01-01 RX ORDER — CARBAMAZEPINE 200 MG/1
400 TABLET, EXTENDED RELEASE ORAL NIGHTLY
Qty: 180 TABLET | Refills: 1 | OUTPATIENT
Start: 2019-01-01 | End: 2020-04-05

## 2019-01-01 RX ORDER — SODIUM CHLORIDE 9 MG/ML
250 INJECTION, SOLUTION INTRAVENOUS ONCE
Status: CANCELLED | OUTPATIENT
Start: 2019-01-01

## 2019-01-01 RX ORDER — BORTEZOMIB 3.5 MG/1
1.04 INJECTION, POWDER, LYOPHILIZED, FOR SOLUTION INTRAVENOUS; SUBCUTANEOUS ONCE
Status: CANCELLED | OUTPATIENT
Start: 2019-01-01

## 2019-01-01 RX ORDER — FAMOTIDINE 10 MG/ML
20 INJECTION, SOLUTION INTRAVENOUS ONCE
Status: COMPLETED | OUTPATIENT
Start: 2019-01-01 | End: 2019-01-01

## 2019-01-01 RX ORDER — METHYLPREDNISOLONE SODIUM SUCCINATE 125 MG/2ML
60 INJECTION, POWDER, LYOPHILIZED, FOR SOLUTION INTRAMUSCULAR; INTRAVENOUS ONCE
Status: COMPLETED | OUTPATIENT
Start: 2019-01-01 | End: 2019-01-01

## 2019-01-01 RX ORDER — MONTELUKAST SODIUM 10 MG/1
10 TABLET ORAL ONCE
Status: COMPLETED | OUTPATIENT
Start: 2019-01-01 | End: 2019-01-01

## 2019-01-01 RX ORDER — BORTEZOMIB 3.5 MG/1
1.3 INJECTION, POWDER, LYOPHILIZED, FOR SOLUTION INTRAVENOUS; SUBCUTANEOUS ONCE
Status: CANCELLED | OUTPATIENT
Start: 2020-01-01

## 2019-01-01 RX ORDER — ACETAMINOPHEN 325 MG/1
650 TABLET ORAL ONCE
Status: CANCELLED | OUTPATIENT
Start: 2019-01-01

## 2019-01-01 RX ORDER — ACYCLOVIR 400 MG/1
400 TABLET ORAL 2 TIMES DAILY
Qty: 60 TABLET | Refills: 11 | Status: CANCELLED | OUTPATIENT
Start: 2019-01-01

## 2019-01-01 RX ORDER — ACETAMINOPHEN 325 MG/1
650 TABLET ORAL ONCE
Status: COMPLETED | OUTPATIENT
Start: 2019-01-01 | End: 2019-01-01

## 2019-01-01 RX ORDER — TOCOPHERSOLAN (VITAMIN E TPGS) 400/15ML
2 LIQUID (ML) ORAL 2 TIMES DAILY
Qty: 60 EACH | Refills: 3 | Status: SHIPPED | OUTPATIENT
Start: 2019-01-01 | End: 2019-01-01

## 2019-01-01 RX ORDER — LIDOCAINE HYDROCHLORIDE 10 MG/ML
20 INJECTION, SOLUTION INFILTRATION; PERINEURAL ONCE
Status: COMPLETED | OUTPATIENT
Start: 2019-01-01 | End: 2019-01-01

## 2019-01-01 RX ORDER — DEXAMETHASONE 4 MG/1
TABLET ORAL
Qty: 44 TABLET | Refills: 2 | Status: CANCELLED | OUTPATIENT
Start: 2019-01-01

## 2019-01-01 RX ORDER — DOXYCYCLINE HYCLATE 100 MG/1
CAPSULE ORAL
Refills: 0 | COMMUNITY
Start: 2019-01-01 | End: 2019-01-01

## 2019-01-01 RX ORDER — DIAZEPAM 5 MG/1
5 TABLET ORAL 2 TIMES DAILY PRN
Qty: 2 TABLET | Refills: 0 | Status: SHIPPED | OUTPATIENT
Start: 2019-01-01 | End: 2019-01-01

## 2019-01-01 RX ORDER — FAMOTIDINE 10 MG/ML
20 INJECTION, SOLUTION INTRAVENOUS ONCE
Status: CANCELLED | OUTPATIENT
Start: 2019-01-01

## 2019-01-01 RX ORDER — CEFDINIR 300 MG/1
300 CAPSULE ORAL 2 TIMES DAILY
Qty: 14 CAPSULE | Refills: 0 | Status: SHIPPED | OUTPATIENT
Start: 2019-01-01 | End: 2019-01-01

## 2019-01-01 RX ORDER — FAMOTIDINE 10 MG/ML
20 INJECTION, SOLUTION INTRAVENOUS AS NEEDED
Status: DISCONTINUED | OUTPATIENT
Start: 2019-01-01 | End: 2019-01-01

## 2019-01-01 RX ORDER — SULFAMETHOXAZOLE AND TRIMETHOPRIM 800; 160 MG/1; MG/1
1 TABLET ORAL 3 TIMES WEEKLY
Qty: 12 TABLET | Refills: 11 | Status: SHIPPED | OUTPATIENT
Start: 2019-01-01 | End: 2020-01-01

## 2019-01-01 RX ORDER — CARBAMAZEPINE 200 MG/1
TABLET, EXTENDED RELEASE ORAL
Qty: 180 TABLET | Refills: 1 | Status: CANCELLED | OUTPATIENT
Start: 2019-01-01

## 2019-01-01 RX ORDER — SULFAMETHOXAZOLE AND TRIMETHOPRIM 800; 160 MG/1; MG/1
1 TABLET ORAL 3 TIMES WEEKLY
Qty: 12 TABLET | Refills: 11 | Status: CANCELLED | OUTPATIENT
Start: 2019-01-01

## 2019-01-01 RX ORDER — PANTOPRAZOLE SODIUM 40 MG/1
40 TABLET, DELAYED RELEASE ORAL 2 TIMES DAILY
Qty: 60 TABLET | Refills: 5 | Status: SHIPPED | OUTPATIENT
Start: 2019-01-01

## 2019-01-01 RX ORDER — PANTOPRAZOLE SODIUM 40 MG/1
40 TABLET, DELAYED RELEASE ORAL DAILY
Qty: 90 TABLET | Refills: 1 | OUTPATIENT
Start: 2019-01-01

## 2019-01-01 RX ORDER — PALONOSETRON 0.05 MG/ML
0.25 INJECTION, SOLUTION INTRAVENOUS ONCE
Status: COMPLETED | OUTPATIENT
Start: 2019-01-01 | End: 2019-01-01

## 2019-01-01 RX ORDER — PROPOFOL 10 MG/ML
VIAL (ML) INTRAVENOUS AS NEEDED
Status: DISCONTINUED | OUTPATIENT
Start: 2019-01-01 | End: 2019-01-01 | Stop reason: SURG

## 2019-01-01 RX ORDER — CALCIUM CARBONATE 200(500)MG
1 TABLET,CHEWABLE ORAL 2 TIMES DAILY
COMMUNITY

## 2019-01-01 RX ORDER — LEVOTHYROXINE SODIUM 0.05 MG/1
50 TABLET ORAL DAILY
Qty: 30 TABLET | Refills: 5 | Status: SHIPPED | OUTPATIENT
Start: 2019-01-01 | End: 2019-01-01

## 2019-01-01 RX ORDER — CARBAMAZEPINE 200 MG/1
400 TABLET, EXTENDED RELEASE ORAL NIGHTLY
Qty: 180 TABLET | Refills: 1 | OUTPATIENT
Start: 2019-01-01 | End: 2020-03-29

## 2019-01-01 RX ORDER — ACYCLOVIR 400 MG/1
400 TABLET ORAL 2 TIMES DAILY
Qty: 180 TABLET | Refills: 3 | Status: SHIPPED | OUTPATIENT
Start: 2019-01-01

## 2019-01-01 RX ORDER — CEFDINIR 300 MG/1
300 CAPSULE ORAL 2 TIMES DAILY
Qty: 20 CAPSULE | Refills: 0 | Status: SHIPPED | OUTPATIENT
Start: 2019-01-01 | End: 2020-01-01

## 2019-01-01 RX ORDER — SODIUM CHLORIDE 0.9 % (FLUSH) 0.9 %
3 SYRINGE (ML) INJECTION EVERY 12 HOURS SCHEDULED
Status: DISCONTINUED | OUTPATIENT
Start: 2019-01-01 | End: 2019-01-01 | Stop reason: HOSPADM

## 2019-01-01 RX ORDER — CIPROFLOXACIN 250 MG/1
250 TABLET, FILM COATED ORAL 2 TIMES DAILY
Qty: 14 TABLET | Refills: 0 | Status: SHIPPED | OUTPATIENT
Start: 2019-01-01 | End: 2019-01-01

## 2019-01-01 RX ORDER — METHYLPREDNISOLONE SODIUM SUCCINATE 125 MG/2ML
100 INJECTION, POWDER, LYOPHILIZED, FOR SOLUTION INTRAMUSCULAR; INTRAVENOUS ONCE
Status: COMPLETED | OUTPATIENT
Start: 2019-01-01 | End: 2019-01-01

## 2019-01-01 RX ORDER — SODIUM CHLORIDE 9 MG/ML
250 INJECTION, SOLUTION INTRAVENOUS ONCE
Status: CANCELLED | OUTPATIENT
Start: 2020-01-01

## 2019-01-01 RX ORDER — DEXAMETHASONE 4 MG/1
20 TABLET ORAL
Qty: 20 TABLET | Refills: 11 | Status: SHIPPED | OUTPATIENT
Start: 2019-01-01

## 2019-01-01 RX ORDER — LIDOCAINE HYDROCHLORIDE 10 MG/ML
0.5 INJECTION, SOLUTION INFILTRATION; PERINEURAL ONCE AS NEEDED
Status: DISCONTINUED | OUTPATIENT
Start: 2019-01-01 | End: 2019-01-01 | Stop reason: HOSPADM

## 2019-01-01 RX ORDER — DIPHENHYDRAMINE HYDROCHLORIDE 50 MG/ML
25 INJECTION INTRAMUSCULAR; INTRAVENOUS ONCE
Status: CANCELLED | OUTPATIENT
Start: 2019-01-01 | End: 2019-01-01

## 2019-01-01 RX ORDER — LEVOTHYROXINE SODIUM 0.07 MG/1
75 TABLET ORAL DAILY
Qty: 30 TABLET | Refills: 5 | Status: SHIPPED | OUTPATIENT
Start: 2019-01-01 | End: 2019-01-01 | Stop reason: SDUPTHER

## 2019-01-01 RX ORDER — DIPHENHYDRAMINE HYDROCHLORIDE 50 MG/ML
25 INJECTION INTRAMUSCULAR; INTRAVENOUS ONCE
Status: COMPLETED | OUTPATIENT
Start: 2019-01-01 | End: 2019-01-01

## 2019-01-01 RX ORDER — SODIUM CHLORIDE 0.9 % (FLUSH) 0.9 %
3 SYRINGE (ML) INJECTION AS NEEDED
Status: DISCONTINUED | OUTPATIENT
Start: 2019-01-01 | End: 2019-01-01 | Stop reason: HOSPADM

## 2019-01-01 RX ORDER — ONDANSETRON HYDROCHLORIDE 8 MG/1
8 TABLET, FILM COATED ORAL 3 TIMES DAILY PRN
Qty: 30 TABLET | Refills: 5 | Status: CANCELLED | OUTPATIENT
Start: 2019-01-01

## 2019-01-01 RX ORDER — ACETAMINOPHEN 325 MG/1
650 TABLET ORAL ONCE
Status: CANCELLED | OUTPATIENT
Start: 2019-01-01 | End: 2019-01-01

## 2019-01-01 RX ORDER — DEXAMETHASONE 4 MG/1
TABLET ORAL
Qty: 44 TABLET | Refills: 2 | Status: SHIPPED | OUTPATIENT
Start: 2019-01-01 | End: 2020-01-01 | Stop reason: SDUPTHER

## 2019-01-01 RX ORDER — HYDROCODONE BITARTRATE AND ACETAMINOPHEN 5; 325 MG/1; MG/1
1 TABLET ORAL EVERY 6 HOURS PRN
Refills: 0 | COMMUNITY
Start: 2019-01-01 | End: 2019-01-01 | Stop reason: SDUPTHER

## 2019-01-01 RX ORDER — SODIUM CHLORIDE, SODIUM LACTATE, POTASSIUM CHLORIDE, CALCIUM CHLORIDE 600; 310; 30; 20 MG/100ML; MG/100ML; MG/100ML; MG/100ML
INJECTION, SOLUTION INTRAVENOUS CONTINUOUS PRN
Status: DISCONTINUED | OUTPATIENT
Start: 2019-01-01 | End: 2019-01-01 | Stop reason: SURG

## 2019-01-01 RX ORDER — PANTOPRAZOLE SODIUM 40 MG/1
40 GRANULE, DELAYED RELEASE ORAL
Qty: 30 EACH | Refills: 5 | Status: SHIPPED | OUTPATIENT
Start: 2019-01-01 | End: 2019-01-01

## 2019-01-01 RX ORDER — FAMOTIDINE 10 MG/ML
20 INJECTION, SOLUTION INTRAVENOUS ONCE
Status: CANCELLED | OUTPATIENT
Start: 2019-01-01 | End: 2019-01-01

## 2019-01-01 RX ORDER — SODIUM CHLORIDE 0.9 % (FLUSH) 0.9 %
1-10 SYRINGE (ML) INJECTION AS NEEDED
Status: DISCONTINUED | OUTPATIENT
Start: 2019-01-01 | End: 2019-01-01 | Stop reason: HOSPADM

## 2019-01-01 RX ORDER — LIDOCAINE HYDROCHLORIDE 20 MG/ML
INJECTION, SOLUTION INFILTRATION; PERINEURAL AS NEEDED
Status: DISCONTINUED | OUTPATIENT
Start: 2019-01-01 | End: 2019-01-01 | Stop reason: SURG

## 2019-01-01 RX ORDER — FAMOTIDINE 10 MG/ML
20 INJECTION, SOLUTION INTRAVENOUS AS NEEDED
Status: CANCELLED | OUTPATIENT
Start: 2020-01-01

## 2019-01-01 RX ORDER — CARBAMAZEPINE 200 MG/1
400 TABLET, EXTENDED RELEASE ORAL NIGHTLY
Qty: 180 TABLET | Refills: 3 | Status: SHIPPED | OUTPATIENT
Start: 2019-01-01 | End: 2020-10-13

## 2019-01-01 RX ORDER — METHYLPREDNISOLONE SODIUM SUCCINATE 125 MG/2ML
60 INJECTION, POWDER, LYOPHILIZED, FOR SOLUTION INTRAMUSCULAR; INTRAVENOUS ONCE
Status: CANCELLED | OUTPATIENT
Start: 2020-01-01

## 2019-01-01 RX ORDER — MONTELUKAST SODIUM 10 MG/1
10 TABLET ORAL ONCE
Status: CANCELLED | OUTPATIENT
Start: 2019-01-01

## 2019-01-01 RX ORDER — CLOTRIMAZOLE AND BETAMETHASONE DIPROPIONATE 10; .64 MG/G; MG/G
CREAM TOPICAL 2 TIMES DAILY PRN
Qty: 45 G | Refills: 0 | Status: SHIPPED | OUTPATIENT
Start: 2019-01-01 | End: 2019-01-01

## 2019-01-01 RX ORDER — ASPIRIN 81 MG/1
81 TABLET ORAL DAILY
COMMUNITY

## 2019-01-01 RX ORDER — HYDROCODONE BITARTRATE AND ACETAMINOPHEN 5; 325 MG/1; MG/1
1 TABLET ORAL EVERY 6 HOURS PRN
Qty: 60 TABLET | Refills: 0 | Status: SHIPPED | OUTPATIENT
Start: 2019-01-01

## 2019-01-01 RX ORDER — LEVOTHYROXINE SODIUM 0.07 MG/1
75 TABLET ORAL DAILY
Qty: 30 TABLET | Refills: 11 | Status: SHIPPED | OUTPATIENT
Start: 2019-01-01

## 2019-01-01 RX ORDER — ACYCLOVIR 400 MG/1
TABLET ORAL
Qty: 60 TABLET | Refills: 5 | Status: SHIPPED | OUTPATIENT
Start: 2019-01-01 | End: 2019-01-01 | Stop reason: SDUPTHER

## 2019-01-01 RX ORDER — TRIAMCINOLONE ACETONIDE 1 MG/G
CREAM TOPICAL
Refills: 2 | COMMUNITY
Start: 2019-01-01 | End: 2020-01-01

## 2019-01-01 RX ORDER — DIPHENHYDRAMINE HYDROCHLORIDE 50 MG/ML
50 INJECTION INTRAMUSCULAR; INTRAVENOUS AS NEEDED
Status: DISCONTINUED | OUTPATIENT
Start: 2019-01-01 | End: 2019-01-01

## 2019-01-01 RX ORDER — SODIUM CHLORIDE 9 MG/ML
1000 INJECTION, SOLUTION INTRAVENOUS CONTINUOUS
Status: DISCONTINUED | OUTPATIENT
Start: 2019-01-01 | End: 2019-01-01 | Stop reason: HOSPADM

## 2019-01-01 RX ADMIN — SODIUM CHLORIDE 800 MG: 9 INJECTION, SOLUTION INTRAVENOUS at 12:19

## 2019-01-01 RX ADMIN — SODIUM CHLORIDE 250 ML: 900 INJECTION, SOLUTION INTRAVENOUS at 10:20

## 2019-01-01 RX ADMIN — SODIUM CHLORIDE, PRESERVATIVE FREE 10 ML: 5 INJECTION INTRAVENOUS at 14:40

## 2019-01-01 RX ADMIN — SODIUM CHLORIDE, PRESERVATIVE FREE 10 ML: 5 INJECTION INTRAVENOUS at 15:44

## 2019-01-01 RX ADMIN — DEXAMETHASONE SODIUM PHOSPHATE 8 MG: 10 INJECTION, SOLUTION INTRAMUSCULAR; INTRAVENOUS at 10:27

## 2019-01-01 RX ADMIN — LIDOCAINE HYDROCHLORIDE 60 MG: 20 INJECTION, SOLUTION INFILTRATION; PERINEURAL at 11:02

## 2019-01-01 RX ADMIN — ACETAMINOPHEN 650 MG: 325 TABLET, FILM COATED ORAL at 09:13

## 2019-01-01 RX ADMIN — Medication 10 ML: at 10:40

## 2019-01-01 RX ADMIN — DEXAMETHASONE SODIUM PHOSPHATE 12 MG: 10 INJECTION, SOLUTION INTRAMUSCULAR; INTRAVENOUS at 10:21

## 2019-01-01 RX ADMIN — Medication 500 UNITS: at 13:24

## 2019-01-01 RX ADMIN — DENOSUMAB 120 MG: 120 INJECTION SUBCUTANEOUS at 11:02

## 2019-01-01 RX ADMIN — DIPHENHYDRAMINE HYDROCHLORIDE 25 MG: 50 INJECTION, SOLUTION INTRAMUSCULAR; INTRAVENOUS at 11:44

## 2019-01-01 RX ADMIN — BORTEZOMIB 2.4 MG: 3.5 INJECTION, POWDER, LYOPHILIZED, FOR SOLUTION INTRAVENOUS; SUBCUTANEOUS at 11:58

## 2019-01-01 RX ADMIN — DARATUMUMAB 650 MG: 100 INJECTION, SOLUTION, CONCENTRATE INTRAVENOUS at 11:08

## 2019-01-01 RX ADMIN — SODIUM CHLORIDE 250 ML: 900 INJECTION, SOLUTION INTRAVENOUS at 10:04

## 2019-01-01 RX ADMIN — ACETAMINOPHEN 1000 MG: 500 TABLET, FILM COATED ORAL at 09:51

## 2019-01-01 RX ADMIN — SODIUM CHLORIDE 250 ML: 9 INJECTION, SOLUTION INTRAVENOUS at 09:42

## 2019-01-01 RX ADMIN — DIPHENHYDRAMINE HYDROCHLORIDE 25 MG: 25 CAPSULE ORAL at 07:57

## 2019-01-01 RX ADMIN — Medication 500 UNITS: at 13:45

## 2019-01-01 RX ADMIN — SODIUM CHLORIDE, PRESERVATIVE FREE 500 UNITS: 5 INJECTION INTRAVENOUS at 07:42

## 2019-01-01 RX ADMIN — SODIUM CHLORIDE, PRESERVATIVE FREE 10 ML: 5 INJECTION INTRAVENOUS at 13:45

## 2019-01-01 RX ADMIN — DIPHENHYDRAMINE HYDROCHLORIDE 25 MG: 50 INJECTION, SOLUTION INTRAMUSCULAR; INTRAVENOUS at 11:00

## 2019-01-01 RX ADMIN — DIPHENHYDRAMINE HYDROCHLORIDE 25 MG: 50 INJECTION, SOLUTION INTRAMUSCULAR; INTRAVENOUS at 09:44

## 2019-01-01 RX ADMIN — ACETAMINOPHEN 1000 MG: 500 TABLET, FILM COATED ORAL at 09:13

## 2019-01-01 RX ADMIN — SODIUM CHLORIDE 800 MG: 900 INJECTION, SOLUTION INTRAVENOUS at 11:30

## 2019-01-01 RX ADMIN — SODIUM CHLORIDE, PRESERVATIVE FREE 10 ML: 5 INJECTION INTRAVENOUS at 09:56

## 2019-01-01 RX ADMIN — Medication 500 UNITS: at 13:54

## 2019-01-01 RX ADMIN — ERYTHROPOIETIN 37000 UNITS: 20000 INJECTION, SOLUTION INTRAVENOUS; SUBCUTANEOUS at 10:33

## 2019-01-01 RX ADMIN — BORTEZOMIB 2.4 MG: 3.5 INJECTION, POWDER, LYOPHILIZED, FOR SOLUTION INTRAVENOUS; SUBCUTANEOUS at 10:55

## 2019-01-01 RX ADMIN — BORTEZOMIB 1.9 MG: 3.5 INJECTION, POWDER, LYOPHILIZED, FOR SOLUTION INTRAVENOUS; SUBCUTANEOUS at 11:38

## 2019-01-01 RX ADMIN — ERYTHROPOIETIN 50000 UNITS: 40000 INJECTION, SOLUTION INTRAVENOUS; SUBCUTANEOUS at 10:56

## 2019-01-01 RX ADMIN — Medication 10 ML: at 07:42

## 2019-01-01 RX ADMIN — FAMOTIDINE 20 MG: 10 INJECTION INTRAVENOUS at 11:27

## 2019-01-01 RX ADMIN — DEXAMETHASONE SODIUM PHOSPHATE 8 MG: 10 INJECTION INTRAMUSCULAR; INTRAVENOUS at 11:37

## 2019-01-01 RX ADMIN — ERYTHROPOIETIN 31000 UNITS: 20000 INJECTION, SOLUTION INTRAVENOUS; SUBCUTANEOUS at 10:39

## 2019-01-01 RX ADMIN — BORTEZOMIB 1.9 MG: 3.5 INJECTION, POWDER, LYOPHILIZED, FOR SOLUTION INTRAVENOUS; SUBCUTANEOUS at 12:22

## 2019-01-01 RX ADMIN — SODIUM CHLORIDE, PRESERVATIVE FREE 10 ML: 5 INJECTION INTRAVENOUS at 13:29

## 2019-01-01 RX ADMIN — BORTEZOMIB 1.9 MG: 3.5 INJECTION, POWDER, LYOPHILIZED, FOR SOLUTION INTRAVENOUS; SUBCUTANEOUS at 10:16

## 2019-01-01 RX ADMIN — SODIUM CHLORIDE, PRESERVATIVE FREE 500 UNITS: 5 INJECTION INTRAVENOUS at 11:20

## 2019-01-01 RX ADMIN — SODIUM CHLORIDE, PRESERVATIVE FREE 500 UNITS: 5 INJECTION INTRAVENOUS at 08:19

## 2019-01-01 RX ADMIN — FAMOTIDINE 20 MG: 10 INJECTION INTRAVENOUS at 10:41

## 2019-01-01 RX ADMIN — SODIUM CHLORIDE 800 MG: 9 INJECTION, SOLUTION INTRAVENOUS at 12:38

## 2019-01-01 RX ADMIN — BORTEZOMIB 1.9 MG: 3.5 INJECTION, POWDER, LYOPHILIZED, FOR SOLUTION INTRAVENOUS; SUBCUTANEOUS at 09:43

## 2019-01-01 RX ADMIN — Medication 500 UNITS: at 08:56

## 2019-01-01 RX ADMIN — ERYTHROPOIETIN 38000 UNITS: 20000 INJECTION, SOLUTION INTRAVENOUS; SUBCUTANEOUS at 10:59

## 2019-01-01 RX ADMIN — ACETAMINOPHEN 650 MG: 325 TABLET, FILM COATED ORAL at 10:45

## 2019-01-01 RX ADMIN — Medication 10 ML: at 10:00

## 2019-01-01 RX ADMIN — BORTEZOMIB 2.4 MG: 3.5 INJECTION, POWDER, LYOPHILIZED, FOR SOLUTION INTRAVENOUS; SUBCUTANEOUS at 10:41

## 2019-01-01 RX ADMIN — ACETAMINOPHEN 1000 MG: 500 TABLET, FILM COATED ORAL at 09:42

## 2019-01-01 RX ADMIN — SODIUM CHLORIDE 250 ML: 900 INJECTION, SOLUTION INTRAVENOUS at 09:40

## 2019-01-01 RX ADMIN — ERYTHROPOIETIN 47000 UNITS: 20000 INJECTION, SOLUTION INTRAVENOUS; SUBCUTANEOUS at 12:31

## 2019-01-01 RX ADMIN — DIPHENHYDRAMINE HYDROCHLORIDE 25 MG: 25 CAPSULE ORAL at 09:13

## 2019-01-01 RX ADMIN — DEXAMETHASONE SODIUM PHOSPHATE 8 MG: 10 INJECTION INTRAMUSCULAR; INTRAVENOUS at 11:45

## 2019-01-01 RX ADMIN — BENDAMUSTINE HYDROCHLORIDE 130 MG: 25 INJECTION, SOLUTION INTRAVENOUS at 10:42

## 2019-01-01 RX ADMIN — Medication 500 UNITS: at 14:41

## 2019-01-01 RX ADMIN — ERYTHROPOIETIN 27000 UNITS: 20000 INJECTION, SOLUTION INTRAVENOUS; SUBCUTANEOUS at 10:22

## 2019-01-01 RX ADMIN — PALONOSETRON HYDROCHLORIDE 0.25 MG: 0.05 INJECTION, SOLUTION INTRAVENOUS at 10:21

## 2019-01-01 RX ADMIN — Medication 500 UNITS: at 15:26

## 2019-01-01 RX ADMIN — DEXAMETHASONE SODIUM PHOSPHATE 8 MG: 10 INJECTION INTRAMUSCULAR; INTRAVENOUS at 11:19

## 2019-01-01 RX ADMIN — SODIUM CHLORIDE 250 ML: 900 INJECTION, SOLUTION INTRAVENOUS at 11:27

## 2019-01-01 RX ADMIN — Medication 10 ML: at 09:04

## 2019-01-01 RX ADMIN — ERYTHROPOIETIN 33000 UNITS: 20000 INJECTION, SOLUTION INTRAVENOUS; SUBCUTANEOUS at 09:00

## 2019-01-01 RX ADMIN — BORTEZOMIB 1.9 MG: 3.5 INJECTION, POWDER, LYOPHILIZED, FOR SOLUTION INTRAVENOUS; SUBCUTANEOUS at 10:55

## 2019-01-01 RX ADMIN — PROPOFOL 80 MG: 10 INJECTION, EMULSION INTRAVENOUS at 11:02

## 2019-01-01 RX ADMIN — SODIUM CHLORIDE, PRESERVATIVE FREE 10 ML: 5 INJECTION INTRAVENOUS at 13:54

## 2019-01-01 RX ADMIN — SODIUM CHLORIDE, PRESERVATIVE FREE 10 ML: 5 INJECTION INTRAVENOUS at 10:02

## 2019-01-01 RX ADMIN — SODIUM CHLORIDE, PRESERVATIVE FREE 10 ML: 5 INJECTION INTRAVENOUS at 13:15

## 2019-01-01 RX ADMIN — DENOSUMAB 120 MG: 120 INJECTION SUBCUTANEOUS at 09:39

## 2019-01-01 RX ADMIN — ACETAMINOPHEN 650 MG: 325 TABLET, FILM COATED ORAL at 11:11

## 2019-01-01 RX ADMIN — SODIUM CHLORIDE, PRESERVATIVE FREE 10 ML: 5 INJECTION INTRAVENOUS at 14:51

## 2019-01-01 RX ADMIN — BORTEZOMIB 2.4 MG: 3.5 INJECTION, POWDER, LYOPHILIZED, FOR SOLUTION INTRAVENOUS; SUBCUTANEOUS at 12:07

## 2019-01-01 RX ADMIN — MONTELUKAST SODIUM 10 MG: 10 TABLET, COATED ORAL at 09:23

## 2019-01-01 RX ADMIN — SODIUM CHLORIDE, PRESERVATIVE FREE 10 ML: 5 INJECTION INTRAVENOUS at 08:19

## 2019-01-01 RX ADMIN — DENOSUMAB 120 MG: 120 INJECTION SUBCUTANEOUS at 10:22

## 2019-01-01 RX ADMIN — BORTEZOMIB 2.4 MG: 3.5 INJECTION, POWDER, LYOPHILIZED, FOR SOLUTION INTRAVENOUS; SUBCUTANEOUS at 11:04

## 2019-01-01 RX ADMIN — ERYTHROPOIETIN 33000 UNITS: 20000 INJECTION, SOLUTION INTRAVENOUS; SUBCUTANEOUS at 09:17

## 2019-01-01 RX ADMIN — ERYTHROPOIETIN 25000 UNITS: 20000 INJECTION, SOLUTION INTRAVENOUS; SUBCUTANEOUS at 11:01

## 2019-01-01 RX ADMIN — Medication 500 UNITS: at 14:51

## 2019-01-01 RX ADMIN — BORTEZOMIB 2.4 MG: 3.5 INJECTION, POWDER, LYOPHILIZED, FOR SOLUTION INTRAVENOUS; SUBCUTANEOUS at 11:06

## 2019-01-01 RX ADMIN — DIPHENHYDRAMINE HYDROCHLORIDE 25 MG: 50 INJECTION, SOLUTION INTRAMUSCULAR; INTRAVENOUS at 07:51

## 2019-01-01 RX ADMIN — ERYTHROPOIETIN 38000 UNITS: 20000 INJECTION, SOLUTION INTRAVENOUS; SUBCUTANEOUS at 09:50

## 2019-01-01 RX ADMIN — DARATUMUMAB 650 MG: 100 INJECTION, SOLUTION, CONCENTRATE INTRAVENOUS at 10:50

## 2019-01-01 RX ADMIN — EPOETIN ALFA-EPBX 18000 UNITS: 10000 INJECTION, SOLUTION INTRAVENOUS; SUBCUTANEOUS at 09:44

## 2019-01-01 RX ADMIN — DEXAMETHASONE SODIUM PHOSPHATE 16 MG: 10 INJECTION INTRAMUSCULAR; INTRAVENOUS at 12:02

## 2019-01-01 RX ADMIN — Medication 500 UNITS: at 11:47

## 2019-01-01 RX ADMIN — SODIUM CHLORIDE 1000 ML: 9 INJECTION, SOLUTION INTRAVENOUS at 09:57

## 2019-01-01 RX ADMIN — SODIUM CHLORIDE, PRESERVATIVE FREE 500 UNITS: 5 INJECTION INTRAVENOUS at 10:00

## 2019-01-01 RX ADMIN — BORTEZOMIB 1.9 MG: 3.5 INJECTION, POWDER, LYOPHILIZED, FOR SOLUTION INTRAVENOUS; SUBCUTANEOUS at 11:18

## 2019-01-01 RX ADMIN — SODIUM CHLORIDE 800 MG: 9 INJECTION, SOLUTION INTRAVENOUS at 12:22

## 2019-01-01 RX ADMIN — SODIUM CHLORIDE, PRESERVATIVE FREE 10 ML: 5 INJECTION INTRAVENOUS at 13:40

## 2019-01-01 RX ADMIN — DIPHENHYDRAMINE HYDROCHLORIDE 25 MG: 50 INJECTION, SOLUTION INTRAMUSCULAR; INTRAVENOUS at 09:16

## 2019-01-01 RX ADMIN — EPOETIN ALFA-EPBX 22000 UNITS: 40000 INJECTION, SOLUTION INTRAVENOUS; SUBCUTANEOUS at 11:58

## 2019-01-01 RX ADMIN — FAMOTIDINE 20 MG: 10 INJECTION INTRAVENOUS at 11:44

## 2019-01-01 RX ADMIN — Medication 10 ML: at 10:09

## 2019-01-01 RX ADMIN — LIDOCAINE HYDROCHLORIDE 20 ML: 10 INJECTION, SOLUTION INFILTRATION; PERINEURAL at 12:12

## 2019-01-01 RX ADMIN — FAMOTIDINE 20 MG: 10 INJECTION INTRAVENOUS at 07:52

## 2019-01-01 RX ADMIN — DENOSUMAB 120 MG: 120 INJECTION SUBCUTANEOUS at 12:58

## 2019-01-01 RX ADMIN — BORTEZOMIB 2.4 MG: 3.5 INJECTION, POWDER, LYOPHILIZED, FOR SOLUTION INTRAVENOUS; SUBCUTANEOUS at 11:23

## 2019-01-01 RX ADMIN — SODIUM CHLORIDE 250 ML: 900 INJECTION, SOLUTION INTRAVENOUS at 09:17

## 2019-01-01 RX ADMIN — Medication 500 UNITS: at 10:02

## 2019-01-01 RX ADMIN — ACETAMINOPHEN 1000 MG: 500 TABLET, FILM COATED ORAL at 09:43

## 2019-01-01 RX ADMIN — ERYTHROPOIETIN 27000 UNITS: 20000 INJECTION, SOLUTION INTRAVENOUS; SUBCUTANEOUS at 08:58

## 2019-01-01 RX ADMIN — PROPOFOL 80 MG: 10 INJECTION, EMULSION INTRAVENOUS at 11:05

## 2019-01-01 RX ADMIN — SODIUM CHLORIDE, PRESERVATIVE FREE 500 UNITS: 5 INJECTION INTRAVENOUS at 13:02

## 2019-01-01 RX ADMIN — DEXAMETHASONE SODIUM PHOSPHATE 8 MG: 10 INJECTION INTRAMUSCULAR; INTRAVENOUS at 10:49

## 2019-01-01 RX ADMIN — DENOSUMAB 120 MG: 120 INJECTION SUBCUTANEOUS at 10:52

## 2019-01-01 RX ADMIN — SODIUM CHLORIDE, PRESERVATIVE FREE 10 ML: 5 INJECTION INTRAVENOUS at 08:51

## 2019-01-01 RX ADMIN — SODIUM CHLORIDE, PRESERVATIVE FREE 10 ML: 5 INJECTION INTRAVENOUS at 13:23

## 2019-01-01 RX ADMIN — SODIUM CHLORIDE, PRESERVATIVE FREE 10 ML: 5 INJECTION INTRAVENOUS at 13:26

## 2019-01-01 RX ADMIN — SODIUM CHLORIDE 250 ML: 9 INJECTION, SOLUTION INTRAVENOUS at 09:22

## 2019-01-01 RX ADMIN — SODIUM CHLORIDE 250 ML: 900 INJECTION, SOLUTION INTRAVENOUS at 10:42

## 2019-01-01 RX ADMIN — FAMOTIDINE 20 MG: 10 INJECTION, SOLUTION INTRAVENOUS at 10:19

## 2019-01-01 RX ADMIN — SODIUM CHLORIDE 250 ML: 900 INJECTION, SOLUTION INTRAVENOUS at 11:43

## 2019-01-01 RX ADMIN — SODIUM CHLORIDE, PRESERVATIVE FREE 10 ML: 5 INJECTION INTRAVENOUS at 13:06

## 2019-01-01 RX ADMIN — METHYLPREDNISOLONE SODIUM SUCCINATE 60 MG: 125 INJECTION, POWDER, FOR SOLUTION INTRAMUSCULAR; INTRAVENOUS at 10:20

## 2019-01-01 RX ADMIN — ACETAMINOPHEN 650 MG: 325 TABLET, FILM COATED ORAL at 11:43

## 2019-01-01 RX ADMIN — ACETAMINOPHEN 1000 MG: 500 TABLET, FILM COATED ORAL at 09:23

## 2019-01-01 RX ADMIN — Medication 500 UNITS: at 13:40

## 2019-01-01 RX ADMIN — EPOETIN ALFA-EPBX 18000 UNITS: 40000 INJECTION, SOLUTION INTRAVENOUS; SUBCUTANEOUS at 13:37

## 2019-01-01 RX ADMIN — DIPHENHYDRAMINE HYDROCHLORIDE 25 MG: 50 INJECTION, SOLUTION INTRAMUSCULAR; INTRAVENOUS at 11:05

## 2019-01-01 RX ADMIN — SODIUM CHLORIDE, PRESERVATIVE FREE 10 ML: 5 INJECTION INTRAVENOUS at 11:47

## 2019-01-01 RX ADMIN — SODIUM CHLORIDE 250 ML: 900 INJECTION, SOLUTION INTRAVENOUS at 09:48

## 2019-01-01 RX ADMIN — FAMOTIDINE 20 MG: 10 INJECTION INTRAVENOUS at 11:00

## 2019-01-01 RX ADMIN — DIPHENHYDRAMINE HYDROCHLORIDE 25 MG: 50 INJECTION, SOLUTION INTRAMUSCULAR; INTRAVENOUS at 09:56

## 2019-01-01 RX ADMIN — FAMOTIDINE 20 MG: 10 INJECTION INTRAVENOUS at 09:36

## 2019-01-01 RX ADMIN — DIPHENHYDRAMINE HYDROCHLORIDE 25 MG: 25 CAPSULE ORAL at 08:29

## 2019-01-01 RX ADMIN — DIPHENHYDRAMINE HYDROCHLORIDE 25 MG: 50 INJECTION, SOLUTION INTRAMUSCULAR; INTRAVENOUS at 10:47

## 2019-01-01 RX ADMIN — DEXAMETHASONE SODIUM PHOSPHATE 8 MG: 10 INJECTION INTRAMUSCULAR; INTRAVENOUS at 09:18

## 2019-01-01 RX ADMIN — Medication 500 UNITS: at 15:44

## 2019-01-01 RX ADMIN — DIPHENHYDRAMINE HYDROCHLORIDE 50 MG: 50 INJECTION INTRAMUSCULAR; INTRAVENOUS at 09:25

## 2019-01-01 RX ADMIN — SODIUM CHLORIDE 800 MG: 900 INJECTION, SOLUTION INTRAVENOUS at 11:24

## 2019-01-01 RX ADMIN — BORTEZOMIB 2.4 MG: 3.5 INJECTION, POWDER, LYOPHILIZED, FOR SOLUTION INTRAVENOUS; SUBCUTANEOUS at 11:00

## 2019-01-01 RX ADMIN — BORTEZOMIB 1.9 MG: 3.5 INJECTION, POWDER, LYOPHILIZED, FOR SOLUTION INTRAVENOUS; SUBCUTANEOUS at 12:33

## 2019-01-01 RX ADMIN — ERYTHROPOIETIN 38000 UNITS: 20000 INJECTION, SOLUTION INTRAVENOUS; SUBCUTANEOUS at 09:49

## 2019-01-01 RX ADMIN — METHYLPREDNISOLONE SODIUM SUCCINATE 100 MG: 125 INJECTION, POWDER, FOR SOLUTION INTRAMUSCULAR; INTRAVENOUS at 09:58

## 2019-01-01 RX ADMIN — METHYLPREDNISOLONE SODIUM SUCCINATE 60 MG: 125 INJECTION, POWDER, FOR SOLUTION INTRAMUSCULAR; INTRAVENOUS at 09:43

## 2019-01-01 RX ADMIN — SODIUM CHLORIDE, PRESERVATIVE FREE 10 ML: 5 INJECTION INTRAVENOUS at 12:38

## 2019-01-01 RX ADMIN — HEPARIN 500 UNITS: 100 SYRINGE at 10:35

## 2019-01-01 RX ADMIN — SODIUM CHLORIDE, PRESERVATIVE FREE 500 UNITS: 5 INJECTION INTRAVENOUS at 10:10

## 2019-01-01 RX ADMIN — ACETAMINOPHEN 1000 MG: 500 TABLET, FILM COATED ORAL at 10:47

## 2019-01-01 RX ADMIN — Medication 500 UNITS: at 13:29

## 2019-01-01 RX ADMIN — SODIUM CHLORIDE 800 MG: 9 INJECTION, SOLUTION INTRAVENOUS at 12:46

## 2019-01-01 RX ADMIN — DENOSUMAB 120 MG: 120 INJECTION SUBCUTANEOUS at 09:08

## 2019-01-01 RX ADMIN — DEXAMETHASONE SODIUM PHOSPHATE 8 MG: 10 INJECTION INTRAMUSCULAR; INTRAVENOUS at 10:38

## 2019-01-01 RX ADMIN — DEXAMETHASONE SODIUM PHOSPHATE 8 MG: 10 INJECTION INTRAMUSCULAR; INTRAVENOUS at 11:07

## 2019-01-01 RX ADMIN — SODIUM CHLORIDE, PRESERVATIVE FREE 10 ML: 5 INJECTION INTRAVENOUS at 11:40

## 2019-01-01 RX ADMIN — SODIUM CHLORIDE 250 ML: 900 INJECTION, SOLUTION INTRAVENOUS at 09:13

## 2019-01-01 RX ADMIN — DIPHENHYDRAMINE HYDROCHLORIDE 25 MG: 50 INJECTION, SOLUTION INTRAMUSCULAR; INTRAVENOUS at 09:37

## 2019-01-01 RX ADMIN — DENOSUMAB 120 MG: 120 INJECTION SUBCUTANEOUS at 08:58

## 2019-01-01 RX ADMIN — DENOSUMAB 120 MG: 120 INJECTION SUBCUTANEOUS at 10:05

## 2019-01-01 RX ADMIN — ACETAMINOPHEN 1000 MG: 500 TABLET, FILM COATED ORAL at 10:20

## 2019-01-01 RX ADMIN — Medication 500 UNITS: at 08:51

## 2019-01-01 RX ADMIN — SODIUM CHLORIDE 500 ML: 900 INJECTION, SOLUTION INTRAVENOUS at 10:40

## 2019-01-01 RX ADMIN — ACETAMINOPHEN 650 MG: 325 TABLET, FILM COATED ORAL at 09:18

## 2019-01-01 RX ADMIN — SODIUM CHLORIDE, PRESERVATIVE FREE 10 ML: 5 INJECTION INTRAVENOUS at 12:33

## 2019-01-01 RX ADMIN — SODIUM CHLORIDE, PRESERVATIVE FREE 10 ML: 5 INJECTION INTRAVENOUS at 10:59

## 2019-01-01 RX ADMIN — ERYTHROPOIETIN 31000 UNITS: 20000 INJECTION, SOLUTION INTRAVENOUS; SUBCUTANEOUS at 11:10

## 2019-01-01 RX ADMIN — METHYLPREDNISOLONE SODIUM SUCCINATE 60 MG: 125 INJECTION, POWDER, FOR SOLUTION INTRAMUSCULAR; INTRAVENOUS at 10:14

## 2019-01-01 RX ADMIN — SODIUM CHLORIDE, PRESERVATIVE FREE 500 UNITS: 5 INJECTION INTRAVENOUS at 14:32

## 2019-01-01 RX ADMIN — FAMOTIDINE 20 MG: 10 INJECTION INTRAVENOUS at 11:12

## 2019-01-01 RX ADMIN — SODIUM CHLORIDE, PRESERVATIVE FREE 10 ML: 5 INJECTION INTRAVENOUS at 09:20

## 2019-01-01 RX ADMIN — SODIUM CHLORIDE 250 ML: 9 INJECTION, SOLUTION INTRAVENOUS at 09:50

## 2019-01-01 RX ADMIN — SODIUM CHLORIDE 250 ML: 900 INJECTION, SOLUTION INTRAVENOUS at 09:59

## 2019-01-01 RX ADMIN — METHYLPREDNISOLONE SODIUM SUCCINATE 100 MG: 125 INJECTION, POWDER, FOR SOLUTION INTRAMUSCULAR; INTRAVENOUS at 09:43

## 2019-01-01 RX ADMIN — DARATUMUMAB 1300 MG: 100 INJECTION, SOLUTION, CONCENTRATE INTRAVENOUS at 10:56

## 2019-01-01 RX ADMIN — SODIUM CHLORIDE 800 MG: 9 INJECTION, SOLUTION INTRAVENOUS at 10:40

## 2019-01-01 RX ADMIN — DARATUMUMAB 1300 MG: 100 INJECTION, SOLUTION, CONCENTRATE INTRAVENOUS at 10:18

## 2019-01-01 RX ADMIN — SODIUM CHLORIDE, PRESERVATIVE FREE 500 UNITS: 5 INJECTION INTRAVENOUS at 09:04

## 2019-01-01 RX ADMIN — Medication 500 UNITS: at 13:15

## 2019-01-01 RX ADMIN — BORTEZOMIB 2.4 MG: 3.5 INJECTION, POWDER, LYOPHILIZED, FOR SOLUTION INTRAVENOUS; SUBCUTANEOUS at 10:02

## 2019-01-01 RX ADMIN — DIPHENHYDRAMINE HYDROCHLORIDE 25 MG: 50 INJECTION, SOLUTION INTRAMUSCULAR; INTRAVENOUS at 10:17

## 2019-01-01 RX ADMIN — ACETAMINOPHEN 650 MG: 325 TABLET, FILM COATED ORAL at 11:27

## 2019-01-01 RX ADMIN — BENDAMUSTINE HYDROCHLORIDE 100 MG: 25 INJECTION, SOLUTION INTRAVENOUS at 10:36

## 2019-01-01 RX ADMIN — EPOETIN ALFA-EPBX 18000 UNITS: 10000 INJECTION, SOLUTION INTRAVENOUS; SUBCUTANEOUS at 10:55

## 2019-01-01 RX ADMIN — ERYTHROPOIETIN 38000 UNITS: 20000 INJECTION, SOLUTION INTRAVENOUS; SUBCUTANEOUS at 10:40

## 2019-01-01 RX ADMIN — SODIUM CHLORIDE 250 ML: 900 INJECTION, SOLUTION INTRAVENOUS at 11:44

## 2019-01-01 RX ADMIN — DARATUMUMAB 1300 MG: 100 INJECTION, SOLUTION, CONCENTRATE INTRAVENOUS at 11:27

## 2019-01-01 RX ADMIN — Medication 500 UNITS: at 11:46

## 2019-01-01 RX ADMIN — SODIUM CHLORIDE 250 ML: 900 INJECTION, SOLUTION INTRAVENOUS at 10:47

## 2019-01-01 RX ADMIN — ERYTHROPOIETIN 47000 UNITS: 20000 INJECTION, SOLUTION INTRAVENOUS; SUBCUTANEOUS at 11:09

## 2019-01-01 RX ADMIN — ERYTHROPOIETIN 47000 UNITS: 20000 INJECTION, SOLUTION INTRAVENOUS; SUBCUTANEOUS at 11:00

## 2019-01-01 RX ADMIN — EPOETIN ALFA-EPBX 22000 UNITS: 40000 INJECTION, SOLUTION INTRAVENOUS; SUBCUTANEOUS at 11:32

## 2019-01-01 RX ADMIN — SODIUM CHLORIDE 250 ML: 900 INJECTION, SOLUTION INTRAVENOUS at 09:15

## 2019-01-01 RX ADMIN — ACETAMINOPHEN 650 MG: 325 TABLET, FILM COATED ORAL at 10:04

## 2019-01-01 RX ADMIN — Medication 500 UNITS: at 09:20

## 2019-01-01 RX ADMIN — ACETAMINOPHEN 650 MG: 325 TABLET, FILM COATED ORAL at 10:41

## 2019-01-01 RX ADMIN — SODIUM CHLORIDE 250 ML: 900 INJECTION, SOLUTION INTRAVENOUS at 11:00

## 2019-01-01 RX ADMIN — DARATUMUMAB 1300 MG: 100 INJECTION, SOLUTION, CONCENTRATE INTRAVENOUS at 11:20

## 2019-01-01 RX ADMIN — METHYLPREDNISOLONE SODIUM SUCCINATE 60 MG: 125 INJECTION, POWDER, FOR SOLUTION INTRAMUSCULAR; INTRAVENOUS at 09:54

## 2019-01-01 RX ADMIN — SODIUM CHLORIDE, PRESERVATIVE FREE 10 ML: 5 INJECTION INTRAVENOUS at 10:10

## 2019-01-01 RX ADMIN — DARATUMUMAB 1300 MG: 100 INJECTION, SOLUTION, CONCENTRATE INTRAVENOUS at 11:29

## 2019-01-01 RX ADMIN — Medication 500 UNITS: at 13:26

## 2019-01-01 RX ADMIN — DIPHENHYDRAMINE HYDROCHLORIDE 25 MG: 50 INJECTION, SOLUTION INTRAMUSCULAR; INTRAVENOUS at 10:46

## 2019-01-01 RX ADMIN — DIPHENHYDRAMINE HYDROCHLORIDE 25 MG: 50 INJECTION, SOLUTION INTRAMUSCULAR; INTRAVENOUS at 11:27

## 2019-01-01 RX ADMIN — ERYTHROPOIETIN 27000 UNITS: 20000 INJECTION, SOLUTION INTRAVENOUS; SUBCUTANEOUS at 09:39

## 2019-01-01 RX ADMIN — DENOSUMAB 120 MG: 120 INJECTION SUBCUTANEOUS at 12:35

## 2019-01-01 RX ADMIN — BORTEZOMIB 1.9 MG: 3.5 INJECTION, POWDER, LYOPHILIZED, FOR SOLUTION INTRAVENOUS; SUBCUTANEOUS at 10:52

## 2019-01-01 RX ADMIN — SODIUM CHLORIDE 250 ML: 900 INJECTION, SOLUTION INTRAVENOUS at 10:14

## 2019-01-01 RX ADMIN — ERYTHROPOIETIN 47000 UNITS: 40000 INJECTION, SOLUTION INTRAVENOUS; SUBCUTANEOUS at 12:09

## 2019-01-01 RX ADMIN — ERYTHROPOIETIN 24000 UNITS: 20000 INJECTION, SOLUTION INTRAVENOUS; SUBCUTANEOUS at 08:57

## 2019-01-01 RX ADMIN — DEXAMETHASONE SODIUM PHOSPHATE 12 MG: 10 INJECTION, SOLUTION INTRAMUSCULAR; INTRAVENOUS at 11:04

## 2019-01-01 RX ADMIN — ERYTHROPOIETIN 27000 UNITS: 20000 INJECTION, SOLUTION INTRAVENOUS; SUBCUTANEOUS at 11:43

## 2019-01-01 RX ADMIN — DIPHENHYDRAMINE HYDROCHLORIDE 50 MG: 50 INJECTION INTRAMUSCULAR; INTRAVENOUS at 09:45

## 2019-01-01 RX ADMIN — ACETAMINOPHEN 650 MG: 325 TABLET, FILM COATED ORAL at 11:00

## 2019-01-01 RX ADMIN — DARATUMUMAB 1300 MG: 100 INJECTION, SOLUTION, CONCENTRATE INTRAVENOUS at 11:39

## 2019-01-01 RX ADMIN — ERYTHROPOIETIN 31000 UNITS: 20000 INJECTION, SOLUTION INTRAVENOUS; SUBCUTANEOUS at 10:27

## 2019-01-01 RX ADMIN — DIPHENHYDRAMINE HYDROCHLORIDE 25 MG: 50 INJECTION, SOLUTION INTRAMUSCULAR; INTRAVENOUS at 10:22

## 2019-01-01 RX ADMIN — Medication 500 UNITS: at 12:33

## 2019-01-01 RX ADMIN — DIPHENHYDRAMINE HYDROCHLORIDE 50 MG: 50 INJECTION, SOLUTION INTRAMUSCULAR; INTRAVENOUS at 10:10

## 2019-01-01 RX ADMIN — FAMOTIDINE 20 MG: 10 INJECTION INTRAVENOUS at 10:45

## 2019-01-01 RX ADMIN — Medication 500 UNITS: at 11:00

## 2019-01-01 RX ADMIN — BORTEZOMIB 2.4 MG: 3.5 INJECTION, POWDER, LYOPHILIZED, FOR SOLUTION INTRAVENOUS; SUBCUTANEOUS at 10:52

## 2019-01-01 RX ADMIN — SODIUM CHLORIDE 250 ML: 9 INJECTION, SOLUTION INTRAVENOUS at 10:18

## 2019-01-01 RX ADMIN — SODIUM CHLORIDE, PRESERVATIVE FREE 500 UNITS: 5 INJECTION INTRAVENOUS at 10:40

## 2019-01-01 RX ADMIN — ACETAMINOPHEN 1000 MG: 500 TABLET, FILM COATED ORAL at 09:59

## 2019-01-01 RX ADMIN — SODIUM CHLORIDE, POTASSIUM CHLORIDE, SODIUM LACTATE AND CALCIUM CHLORIDE: 600; 310; 30; 20 INJECTION, SOLUTION INTRAVENOUS at 10:58

## 2019-01-01 RX ADMIN — MONTELUKAST SODIUM 10 MG: 10 TABLET, COATED ORAL at 09:42

## 2019-01-01 RX ADMIN — LIDOCAINE HYDROCHLORIDE 10 ML: 10 INJECTION, SOLUTION INFILTRATION; PERINEURAL at 12:18

## 2019-01-01 RX ADMIN — ERYTHROPOIETIN 24000 UNITS: 20000 INJECTION, SOLUTION INTRAVENOUS; SUBCUTANEOUS at 12:31

## 2019-01-01 RX ADMIN — ERYTHROPOIETIN 31000 UNITS: 20000 INJECTION, SOLUTION INTRAVENOUS; SUBCUTANEOUS at 10:58

## 2019-01-01 RX ADMIN — DIPHENHYDRAMINE HYDROCHLORIDE 25 MG: 50 INJECTION, SOLUTION INTRAMUSCULAR; INTRAVENOUS at 11:14

## 2019-01-01 RX ADMIN — SODIUM CHLORIDE, PRESERVATIVE FREE 500 UNITS: 5 INJECTION INTRAVENOUS at 11:01

## 2019-01-01 RX ADMIN — DIPHENHYDRAMINE HYDROCHLORIDE 50 MG: 50 INJECTION, SOLUTION INTRAMUSCULAR; INTRAVENOUS at 10:21

## 2019-01-01 RX ADMIN — ACETAMINOPHEN 650 MG: 325 TABLET, FILM COATED ORAL at 08:29

## 2019-01-01 RX ADMIN — ACETAMINOPHEN 650 MG: 325 TABLET, FILM COATED ORAL at 10:46

## 2019-01-01 RX ADMIN — BORTEZOMIB 2.4 MG: 3.5 INJECTION, POWDER, LYOPHILIZED, FOR SOLUTION INTRAVENOUS; SUBCUTANEOUS at 11:29

## 2019-01-01 RX ADMIN — SODIUM CHLORIDE 250 ML: 900 INJECTION, SOLUTION INTRAVENOUS at 10:21

## 2019-01-01 RX ADMIN — ACETAMINOPHEN 650 MG: 325 TABLET, FILM COATED ORAL at 07:57

## 2019-01-01 RX ADMIN — SODIUM CHLORIDE 800 MG: 9 INJECTION, SOLUTION INTRAVENOUS at 13:05

## 2019-01-01 RX ADMIN — FAMOTIDINE 20 MG: 10 INJECTION INTRAVENOUS at 10:04

## 2019-01-01 RX ADMIN — METHYLPREDNISOLONE SODIUM SUCCINATE 100 MG: 125 INJECTION, POWDER, FOR SOLUTION INTRAMUSCULAR; INTRAVENOUS at 09:23

## 2019-01-01 RX ADMIN — DIPHENHYDRAMINE HYDROCHLORIDE 25 MG: 50 INJECTION, SOLUTION INTRAMUSCULAR; INTRAVENOUS at 10:04

## 2019-01-01 RX ADMIN — EPOETIN ALFA-EPBX 18000 UNITS: 10000 INJECTION, SOLUTION INTRAVENOUS; SUBCUTANEOUS at 10:03

## 2019-01-01 RX ADMIN — BORTEZOMIB 2.4 MG: 3.5 INJECTION, POWDER, LYOPHILIZED, FOR SOLUTION INTRAVENOUS; SUBCUTANEOUS at 11:38

## 2019-01-01 RX ADMIN — Medication 500 UNITS: at 12:38

## 2019-01-01 RX ADMIN — EPOETIN ALFA-EPBX 22000 UNITS: 40000 INJECTION, SOLUTION INTRAVENOUS; SUBCUTANEOUS at 11:34

## 2019-01-01 RX ADMIN — DEXAMETHASONE SODIUM PHOSPHATE 12 MG: 10 INJECTION, SOLUTION INTRAMUSCULAR; INTRAVENOUS at 10:15

## 2019-01-01 RX ADMIN — BORTEZOMIB 2.4 MG: 3.5 INJECTION, POWDER, LYOPHILIZED, FOR SOLUTION INTRAVENOUS; SUBCUTANEOUS at 09:50

## 2019-01-01 RX ADMIN — ACETAMINOPHEN 1000 MG: 500 TABLET, FILM COATED ORAL at 10:14

## 2019-01-01 RX ADMIN — Medication 10 ML: at 13:02

## 2019-01-01 RX ADMIN — ACETAMINOPHEN 650 MG: 325 TABLET, FILM COATED ORAL at 10:19

## 2019-01-01 RX ADMIN — PALONOSETRON HYDROCHLORIDE 0.25 MG: 0.05 INJECTION, SOLUTION INTRAVENOUS at 10:15

## 2019-01-01 RX ADMIN — Medication 500 UNITS: at 10:10

## 2019-01-01 RX ADMIN — FAMOTIDINE 20 MG: 10 INJECTION INTRAVENOUS at 10:46

## 2019-01-01 RX ADMIN — DARATUMUMAB 1300 MG: 100 INJECTION, SOLUTION, CONCENTRATE INTRAVENOUS at 11:58

## 2019-01-01 RX ADMIN — DENOSUMAB 120 MG: 120 INJECTION SUBCUTANEOUS at 11:46

## 2019-01-01 RX ADMIN — Medication 500 UNITS: at 13:06

## 2019-01-01 RX ADMIN — Medication 500 UNITS: at 11:40

## 2019-01-01 RX ADMIN — SODIUM CHLORIDE 800 MG: 9 INJECTION, SOLUTION INTRAVENOUS at 12:14

## 2019-01-01 RX ADMIN — METHYLPREDNISOLONE SODIUM SUCCINATE 60 MG: 125 INJECTION, POWDER, FOR SOLUTION INTRAMUSCULAR; INTRAVENOUS at 09:14

## 2019-01-01 RX ADMIN — SODIUM CHLORIDE, PRESERVATIVE FREE 10 ML: 5 INJECTION INTRAVENOUS at 11:01

## 2019-01-01 RX ADMIN — BORTEZOMIB 1.9 MG: 3.5 INJECTION, POWDER, LYOPHILIZED, FOR SOLUTION INTRAVENOUS; SUBCUTANEOUS at 11:40

## 2019-01-01 RX ADMIN — METHYLPREDNISOLONE SODIUM SUCCINATE 60 MG: 125 INJECTION, POWDER, FOR SOLUTION INTRAMUSCULAR; INTRAVENOUS at 10:48

## 2019-01-01 RX ADMIN — Medication 10 ML: at 11:20

## 2019-01-01 RX ADMIN — ERYTHROPOIETIN 25000 UNITS: 20000 INJECTION, SOLUTION INTRAVENOUS; SUBCUTANEOUS at 12:16

## 2019-01-03 ENCOUNTER — APPOINTMENT (OUTPATIENT)
Dept: ONCOLOGY | Facility: HOSPITAL | Age: 69
End: 2019-01-03

## 2019-01-03 ENCOUNTER — INFUSION (OUTPATIENT)
Dept: ONCOLOGY | Facility: HOSPITAL | Age: 69
End: 2019-01-03

## 2019-01-03 ENCOUNTER — LAB (OUTPATIENT)
Dept: OTHER | Facility: HOSPITAL | Age: 69
End: 2019-01-03

## 2019-01-03 DIAGNOSIS — T45.1X5A ANEMIA ASSOCIATED WITH CHEMOTHERAPY: ICD-10-CM

## 2019-01-03 DIAGNOSIS — C90.00 MULTIPLE MYELOMA NOT HAVING ACHIEVED REMISSION (HCC): ICD-10-CM

## 2019-01-03 DIAGNOSIS — D64.81 ANEMIA ASSOCIATED WITH CHEMOTHERAPY: ICD-10-CM

## 2019-01-03 DIAGNOSIS — Z45.2 FITTING AND ADJUSTMENT OF VASCULAR CATHETER: Primary | ICD-10-CM

## 2019-01-03 LAB
ANION GAP SERPL CALCULATED.3IONS-SCNC: 9.2 MMOL/L
BASOPHILS # BLD MANUAL: 0.06 10*3/MM3 (ref 0–0.2)
BASOPHILS NFR BLD AUTO: 1 % (ref 0–2)
BUN BLD-MCNC: 26 MG/DL (ref 8–23)
BUN/CREAT SERPL: 14 (ref 7–25)
CALCIUM SPEC-SCNC: 7.9 MG/DL (ref 8.6–10.5)
CHLORIDE SERPL-SCNC: 111 MMOL/L (ref 98–107)
CO2 SERPL-SCNC: 20.8 MMOL/L (ref 22–29)
CREAT BLD-MCNC: 1.86 MG/DL (ref 0.57–1)
DEPRECATED RDW RBC AUTO: 66 FL (ref 37–54)
EOSINOPHIL # BLD MANUAL: 0.06 10*3/MM3 (ref 0–0.7)
EOSINOPHIL NFR BLD MANUAL: 1 % (ref 0–7)
ERYTHROCYTE [DISTWIDTH] IN BLOOD BY AUTOMATED COUNT: 17.4 % (ref 11.7–13)
GFR SERPL CREATININE-BSD FRML MDRD: 33 ML/MIN/1.73
GLUCOSE BLD-MCNC: 140 MG/DL (ref 65–99)
HCT VFR BLD AUTO: 30.6 % (ref 35.6–45.5)
HGB BLD-MCNC: 9.2 G/DL (ref 11.9–15.5)
LYMPHOCYTES # BLD MANUAL: 0.85 10*3/MM3 (ref 0.8–7)
LYMPHOCYTES NFR BLD MANUAL: 10 % (ref 0–12)
LYMPHOCYTES NFR BLD MANUAL: 14 % (ref 24–44)
MACROCYTES BLD QL SMEAR: ABNORMAL
MCH RBC QN AUTO: 31.9 PG (ref 26.9–32)
MCHC RBC AUTO-ENTMCNC: 30.1 G/DL (ref 32.4–36.3)
MCV RBC AUTO: 106.3 FL (ref 80.5–98.2)
METAMYELOCYTES NFR BLD MANUAL: 1 % (ref 0–0)
MONOCYTES # BLD AUTO: 0.61 10*3/MM3 (ref 0–1)
NEUTROPHILS # BLD AUTO: 4.45 10*3/MM3 (ref 1.9–8.1)
NEUTROPHILS NFR BLD MANUAL: 71 % (ref 42.7–76)
NEUTS BAND NFR BLD MANUAL: 2 % (ref 0–5)
PLAT MORPH BLD: NORMAL
PLATELET # BLD AUTO: 40 10*3/MM3 (ref 140–500)
POTASSIUM BLD-SCNC: 3.7 MMOL/L (ref 3.5–5.2)
RBC # BLD AUTO: 2.88 10*6/MM3 (ref 3.9–5.2)
ROULEAUX BLD QL SMEAR: ABNORMAL
SCAN SLIDE: NORMAL
SODIUM BLD-SCNC: 141 MMOL/L (ref 136–145)
WBC MORPH BLD: NORMAL
WBC NRBC COR # BLD: 6.1 10*3/MM3 (ref 4.5–10.7)

## 2019-01-03 PROCEDURE — 85007 BL SMEAR W/DIFF WBC COUNT: CPT | Performed by: INTERNAL MEDICINE

## 2019-01-03 PROCEDURE — 96401 CHEMO ANTI-NEOPL SQ/IM: CPT | Performed by: NURSE PRACTITIONER

## 2019-01-03 PROCEDURE — 36415 COLL VENOUS BLD VENIPUNCTURE: CPT

## 2019-01-03 PROCEDURE — 80048 BASIC METABOLIC PNL TOTAL CA: CPT | Performed by: NURSE PRACTITIONER

## 2019-01-03 PROCEDURE — 96372 THER/PROPH/DIAG INJ SC/IM: CPT | Performed by: NURSE PRACTITIONER

## 2019-01-03 PROCEDURE — 85025 COMPLETE CBC W/AUTO DIFF WBC: CPT | Performed by: INTERNAL MEDICINE

## 2019-01-03 PROCEDURE — 63510000001 EPOETIN ALFA PER 1000 UNITS: Performed by: NURSE PRACTITIONER

## 2019-01-03 PROCEDURE — 25010000002 BORTEZOMIB PER 0.1 MG: Performed by: NURSE PRACTITIONER

## 2019-01-03 RX ORDER — BORTEZOMIB 3.5 MG/1
1.3 INJECTION, POWDER, LYOPHILIZED, FOR SOLUTION INTRAVENOUS; SUBCUTANEOUS ONCE
Status: COMPLETED | OUTPATIENT
Start: 2019-01-03 | End: 2019-01-03

## 2019-01-03 RX ORDER — SODIUM CHLORIDE 0.9 % (FLUSH) 0.9 %
10 SYRINGE (ML) INJECTION AS NEEDED
Status: CANCELLED | OUTPATIENT
Start: 2019-01-03

## 2019-01-03 RX ORDER — SODIUM CHLORIDE 0.9 % (FLUSH) 0.9 %
10 SYRINGE (ML) INJECTION AS NEEDED
Status: DISCONTINUED | OUTPATIENT
Start: 2019-01-03 | End: 2019-01-03 | Stop reason: HOSPADM

## 2019-01-03 RX ADMIN — SODIUM CHLORIDE, PRESERVATIVE FREE 10 ML: 5 INJECTION INTRAVENOUS at 12:15

## 2019-01-03 RX ADMIN — Medication 500 UNITS: at 12:15

## 2019-01-03 RX ADMIN — ERYTHROPOIETIN 25000 UNITS: 20000 INJECTION, SOLUTION INTRAVENOUS; SUBCUTANEOUS at 13:11

## 2019-01-03 RX ADMIN — BORTEZOMIB 2.4 MG: 3.5 INJECTION, POWDER, LYOPHILIZED, FOR SOLUTION INTRAVENOUS; SUBCUTANEOUS at 13:16

## 2019-01-03 NOTE — PROGRESS NOTES
plt ct 40,000, discussed results with JAYDEN AJ who spoke with pharmacy and ok to proceed with velcade today.   Pt denies any bleeding and education provided on bleeding precautions. Pt vu      Procrit given per protocol  Lab Results   Component Value Date    WBC 6.10 01/03/2019    HGB 9.2 (L) 01/03/2019    HCT 30.6 (L) 01/03/2019    .3 (H) 01/03/2019    PLT 40 (C) 01/03/2019

## 2019-01-10 ENCOUNTER — INFUSION (OUTPATIENT)
Dept: ONCOLOGY | Facility: HOSPITAL | Age: 69
End: 2019-01-10

## 2019-01-10 VITALS
BODY MASS INDEX: 34.45 KG/M2 | WEIGHT: 188.4 LBS | OXYGEN SATURATION: 93 % | DIASTOLIC BLOOD PRESSURE: 65 MMHG | HEART RATE: 70 BPM | TEMPERATURE: 97.7 F | SYSTOLIC BLOOD PRESSURE: 114 MMHG

## 2019-01-10 DIAGNOSIS — Z45.2 FITTING AND ADJUSTMENT OF VASCULAR CATHETER: ICD-10-CM

## 2019-01-10 DIAGNOSIS — C90.00 MULTIPLE MYELOMA NOT HAVING ACHIEVED REMISSION (HCC): ICD-10-CM

## 2019-01-10 DIAGNOSIS — T45.1X5A ANEMIA ASSOCIATED WITH CHEMOTHERAPY: Primary | ICD-10-CM

## 2019-01-10 DIAGNOSIS — D64.81 ANEMIA ASSOCIATED WITH CHEMOTHERAPY: Primary | ICD-10-CM

## 2019-01-10 LAB
DACRYOCYTES BLD QL SMEAR: ABNORMAL
DEPRECATED RDW RBC AUTO: 68 FL (ref 37–54)
EOSINOPHIL # BLD MANUAL: 0.31 10*3/MM3 (ref 0–0.7)
EOSINOPHIL NFR BLD MANUAL: 5 % (ref 0–7)
ERYTHROCYTE [DISTWIDTH] IN BLOOD BY AUTOMATED COUNT: 17.4 % (ref 11.7–13)
HCT VFR BLD AUTO: 30.8 % (ref 35.6–45.5)
HGB BLD-MCNC: 9.3 G/DL (ref 11.9–15.5)
LYMPHOCYTES # BLD MANUAL: 1.25 10*3/MM3 (ref 0.8–7)
LYMPHOCYTES NFR BLD MANUAL: 20 % (ref 24–44)
LYMPHOCYTES NFR BLD MANUAL: 8 % (ref 0–12)
MCH RBC QN AUTO: 32 PG (ref 26.9–32)
MCHC RBC AUTO-ENTMCNC: 30.2 G/DL (ref 32.4–36.3)
MCV RBC AUTO: 105.8 FL (ref 80.5–98.2)
MONOCYTES # BLD AUTO: 0.5 10*3/MM3 (ref 0–1)
NEUTROPHILS # BLD AUTO: 4.17 10*3/MM3 (ref 1.9–8.1)
NEUTROPHILS NFR BLD MANUAL: 67 % (ref 42.7–76)
NRBC SPEC MANUAL: 1 /100 WBC (ref 0–0)
PLAT MORPH BLD: NORMAL
PLATELET # BLD AUTO: 39 10*3/MM3 (ref 140–500)
RBC # BLD AUTO: 2.91 10*6/MM3 (ref 3.9–5.2)
SCAN SLIDE: NORMAL
SPHEROCYTES BLD QL SMEAR: ABNORMAL
WBC MORPH BLD: NORMAL
WBC NRBC COR # BLD: 6.23 10*3/MM3 (ref 4.5–10.7)

## 2019-01-10 PROCEDURE — 25010000002 BORTEZOMIB PER 0.1 MG: Performed by: INTERNAL MEDICINE

## 2019-01-10 PROCEDURE — 96372 THER/PROPH/DIAG INJ SC/IM: CPT | Performed by: INTERNAL MEDICINE

## 2019-01-10 PROCEDURE — 63510000001 EPOETIN ALFA PER 1000 UNITS: Performed by: INTERNAL MEDICINE

## 2019-01-10 PROCEDURE — 96401 CHEMO ANTI-NEOPL SQ/IM: CPT | Performed by: INTERNAL MEDICINE

## 2019-01-10 PROCEDURE — 85007 BL SMEAR W/DIFF WBC COUNT: CPT | Performed by: INTERNAL MEDICINE

## 2019-01-10 PROCEDURE — 85025 COMPLETE CBC W/AUTO DIFF WBC: CPT | Performed by: INTERNAL MEDICINE

## 2019-01-10 PROCEDURE — 36592 COLLECT BLOOD FROM PICC: CPT

## 2019-01-10 RX ORDER — BORTEZOMIB 3.5 MG/1
1.3 INJECTION, POWDER, LYOPHILIZED, FOR SOLUTION INTRAVENOUS; SUBCUTANEOUS ONCE
Status: CANCELLED | OUTPATIENT
Start: 2019-01-17

## 2019-01-10 RX ORDER — SODIUM CHLORIDE 0.9 % (FLUSH) 0.9 %
10 SYRINGE (ML) INJECTION AS NEEDED
Status: CANCELLED | OUTPATIENT
Start: 2019-01-10

## 2019-01-10 RX ORDER — BORTEZOMIB 3.5 MG/1
1.3 INJECTION, POWDER, LYOPHILIZED, FOR SOLUTION INTRAVENOUS; SUBCUTANEOUS ONCE
Status: COMPLETED | OUTPATIENT
Start: 2019-01-10 | End: 2019-01-10

## 2019-01-10 RX ORDER — SODIUM CHLORIDE 0.9 % (FLUSH) 0.9 %
10 SYRINGE (ML) INJECTION AS NEEDED
Status: DISCONTINUED | OUTPATIENT
Start: 2019-01-10 | End: 2019-01-10 | Stop reason: HOSPADM

## 2019-01-10 RX ORDER — BORTEZOMIB 3.5 MG/1
1.3 INJECTION, POWDER, LYOPHILIZED, FOR SOLUTION INTRAVENOUS; SUBCUTANEOUS ONCE
Status: CANCELLED | OUTPATIENT
Start: 2019-01-10

## 2019-01-10 RX ADMIN — Medication 500 UNITS: at 09:38

## 2019-01-10 RX ADMIN — BORTEZOMIB 2.4 MG: 3.5 INJECTION, POWDER, LYOPHILIZED, FOR SOLUTION INTRAVENOUS; SUBCUTANEOUS at 11:45

## 2019-01-10 RX ADMIN — ERYTHROPOIETIN 25000 UNITS: 20000 INJECTION, SOLUTION INTRAVENOUS; SUBCUTANEOUS at 11:09

## 2019-01-10 RX ADMIN — SODIUM CHLORIDE, PRESERVATIVE FREE 10 ML: 5 INJECTION INTRAVENOUS at 09:38

## 2019-01-17 ENCOUNTER — APPOINTMENT (OUTPATIENT)
Dept: OTHER | Facility: HOSPITAL | Age: 69
End: 2019-01-17

## 2019-01-17 ENCOUNTER — INFUSION (OUTPATIENT)
Dept: ONCOLOGY | Facility: HOSPITAL | Age: 69
End: 2019-01-17

## 2019-01-17 VITALS
OXYGEN SATURATION: 98 % | TEMPERATURE: 98.1 F | WEIGHT: 188 LBS | BODY MASS INDEX: 34.6 KG/M2 | DIASTOLIC BLOOD PRESSURE: 77 MMHG | HEIGHT: 62 IN | SYSTOLIC BLOOD PRESSURE: 122 MMHG | HEART RATE: 80 BPM | RESPIRATION RATE: 16 BRPM

## 2019-01-17 DIAGNOSIS — T45.1X5A ANEMIA ASSOCIATED WITH CHEMOTHERAPY: ICD-10-CM

## 2019-01-17 DIAGNOSIS — Z45.2 FITTING AND ADJUSTMENT OF VASCULAR CATHETER: ICD-10-CM

## 2019-01-17 DIAGNOSIS — D64.81 ANEMIA ASSOCIATED WITH CHEMOTHERAPY: ICD-10-CM

## 2019-01-17 DIAGNOSIS — C90.00 MULTIPLE MYELOMA NOT HAVING ACHIEVED REMISSION (HCC): Primary | ICD-10-CM

## 2019-01-17 LAB
ALBUMIN SERPL-MCNC: 3.1 G/DL (ref 3.5–5.2)
ALBUMIN/GLOB SERPL: 0.5 G/DL
ALP SERPL-CCNC: 60 U/L (ref 39–117)
ALT SERPL W P-5'-P-CCNC: 12 U/L (ref 1–33)
ANION GAP SERPL CALCULATED.3IONS-SCNC: 6.9 MMOL/L
AST SERPL-CCNC: 17 U/L (ref 1–32)
BILIRUB SERPL-MCNC: 0.4 MG/DL (ref 0.1–1.2)
BUN BLD-MCNC: 26 MG/DL (ref 8–23)
BUN/CREAT SERPL: 13.5 (ref 7–25)
CALCIUM SPEC-SCNC: 8.3 MG/DL (ref 8.6–10.5)
CHLORIDE SERPL-SCNC: 111 MMOL/L (ref 98–107)
CO2 SERPL-SCNC: 22.1 MMOL/L (ref 22–29)
CREAT BLD-MCNC: 1.92 MG/DL (ref 0.57–1)
DACRYOCYTES BLD QL SMEAR: ABNORMAL
DEPRECATED RDW RBC AUTO: 67.6 FL (ref 37–54)
EOSINOPHIL # BLD MANUAL: 0.6 10*3/MM3 (ref 0–0.7)
EOSINOPHIL NFR BLD MANUAL: 7 % (ref 0–7)
ERYTHROCYTE [DISTWIDTH] IN BLOOD BY AUTOMATED COUNT: 17.6 % (ref 11.7–13)
GFR SERPL CREATININE-BSD FRML MDRD: 32 ML/MIN/1.73
GLOBULIN UR ELPH-MCNC: 6.5 GM/DL
GLUCOSE BLD-MCNC: 93 MG/DL (ref 65–99)
HCT VFR BLD AUTO: 30.6 % (ref 35.6–45.5)
HGB BLD-MCNC: 9.3 G/DL (ref 11.9–15.5)
LYMPHOCYTES # BLD MANUAL: 2.16 10*3/MM3 (ref 0.8–7)
LYMPHOCYTES NFR BLD MANUAL: 25 % (ref 24–44)
LYMPHOCYTES NFR BLD MANUAL: 7 % (ref 0–12)
MCH RBC QN AUTO: 32 PG (ref 26.9–32)
MCHC RBC AUTO-ENTMCNC: 30.4 G/DL (ref 32.4–36.3)
MCV RBC AUTO: 105.2 FL (ref 80.5–98.2)
METAMYELOCYTES NFR BLD MANUAL: 1 % (ref 0–0)
MONOCYTES # BLD AUTO: 0.6 10*3/MM3 (ref 0–1)
MYELOCYTES NFR BLD MANUAL: 1 % (ref 0–0)
NEUTROPHILS # BLD AUTO: 5.1 10*3/MM3 (ref 1.9–8.1)
NEUTROPHILS NFR BLD MANUAL: 59 % (ref 42.7–76)
NRBC BLD AUTO-RTO: 1.3 /100 WBC (ref 0–0)
NRBC SPEC MANUAL: 1 /100 WBC (ref 0–0)
PLAT MORPH BLD: NORMAL
PLATELET # BLD AUTO: 50 10*3/MM3 (ref 140–500)
POTASSIUM BLD-SCNC: 4 MMOL/L (ref 3.5–5.2)
PROT SERPL-MCNC: 9.6 G/DL (ref 6–8.5)
RBC # BLD AUTO: 2.91 10*6/MM3 (ref 3.9–5.2)
SCAN SLIDE: NORMAL
SODIUM BLD-SCNC: 140 MMOL/L (ref 136–145)
SPHEROCYTES BLD QL SMEAR: ABNORMAL
WBC MORPH BLD: NORMAL
WBC NRBC COR # BLD: 8.64 10*3/MM3 (ref 4.5–10.7)

## 2019-01-17 PROCEDURE — 85007 BL SMEAR W/DIFF WBC COUNT: CPT | Performed by: INTERNAL MEDICINE

## 2019-01-17 PROCEDURE — 96372 THER/PROPH/DIAG INJ SC/IM: CPT

## 2019-01-17 PROCEDURE — 80053 COMPREHEN METABOLIC PANEL: CPT | Performed by: INTERNAL MEDICINE

## 2019-01-17 PROCEDURE — 63510000001 EPOETIN ALFA PER 1000 UNITS: Performed by: NURSE PRACTITIONER

## 2019-01-17 PROCEDURE — 84165 PROTEIN E-PHORESIS SERUM: CPT | Performed by: INTERNAL MEDICINE

## 2019-01-17 PROCEDURE — 83883 ASSAY NEPHELOMETRY NOT SPEC: CPT | Performed by: INTERNAL MEDICINE

## 2019-01-17 PROCEDURE — 82784 ASSAY IGA/IGD/IGG/IGM EACH: CPT | Performed by: INTERNAL MEDICINE

## 2019-01-17 PROCEDURE — 86334 IMMUNOFIX E-PHORESIS SERUM: CPT | Performed by: INTERNAL MEDICINE

## 2019-01-17 PROCEDURE — 96401 CHEMO ANTI-NEOPL SQ/IM: CPT

## 2019-01-17 PROCEDURE — 85025 COMPLETE CBC W/AUTO DIFF WBC: CPT | Performed by: INTERNAL MEDICINE

## 2019-01-17 PROCEDURE — 25010000002 BORTEZOMIB PER 0.1 MG: Performed by: INTERNAL MEDICINE

## 2019-01-17 RX ORDER — SODIUM CHLORIDE 0.9 % (FLUSH) 0.9 %
10 SYRINGE (ML) INJECTION AS NEEDED
Status: CANCELLED | OUTPATIENT
Start: 2019-01-17

## 2019-01-17 RX ORDER — BORTEZOMIB 3.5 MG/1
1.3 INJECTION, POWDER, LYOPHILIZED, FOR SOLUTION INTRAVENOUS; SUBCUTANEOUS ONCE
Status: COMPLETED | OUTPATIENT
Start: 2019-01-17 | End: 2019-01-17

## 2019-01-17 RX ORDER — SODIUM CHLORIDE 0.9 % (FLUSH) 0.9 %
10 SYRINGE (ML) INJECTION AS NEEDED
Status: DISCONTINUED | OUTPATIENT
Start: 2019-01-17 | End: 2019-01-17 | Stop reason: HOSPADM

## 2019-01-17 RX ADMIN — ERYTHROPOIETIN 25000 UNITS: 20000 INJECTION, SOLUTION INTRAVENOUS; SUBCUTANEOUS at 10:54

## 2019-01-17 RX ADMIN — Medication 10 ML: at 11:03

## 2019-01-17 RX ADMIN — SODIUM CHLORIDE, PRESERVATIVE FREE 500 UNITS: 5 INJECTION INTRAVENOUS at 11:03

## 2019-01-17 RX ADMIN — BORTEZOMIB 2.4 MG: 3.5 INJECTION, POWDER, LYOPHILIZED, FOR SOLUTION INTRAVENOUS; SUBCUTANEOUS at 11:00

## 2019-01-18 DIAGNOSIS — C90.00 MULTIPLE MYELOMA NOT HAVING ACHIEVED REMISSION (HCC): ICD-10-CM

## 2019-01-21 LAB
ALBUMIN SERPL-MCNC: 2.9 G/DL (ref 2.9–4.4)
ALBUMIN/GLOB SERPL: 0.6 {RATIO} (ref 0.7–1.7)
ALPHA1 GLOB FLD ELPH-MCNC: 0.2 G/DL (ref 0–0.4)
ALPHA2 GLOB SERPL ELPH-MCNC: 0.6 G/DL (ref 0.4–1)
B-GLOBULIN SERPL ELPH-MCNC: 0.8 G/DL (ref 0.7–1.3)
GAMMA GLOB SERPL ELPH-MCNC: 3.9 G/DL (ref 0.4–1.8)
GLOBULIN SER CALC-MCNC: 5.5 G/DL (ref 2.2–3.9)
IGA SERPL-MCNC: 10 MG/DL (ref 87–352)
IGG SERPL-MCNC: 4374 MG/DL (ref 700–1600)
IGM SERPL-MCNC: 11 MG/DL (ref 26–217)
INTERPRETATION SERPL IEP-IMP: ABNORMAL
KAPPA LC SERPL-MCNC: 8.6 MG/L (ref 3.3–19.4)
KAPPA LC/LAMBDA SER: 0.01 {RATIO} (ref 0.26–1.65)
LAMBDA LC FREE SERPL-MCNC: 606.8 MG/L (ref 5.7–26.3)
Lab: ABNORMAL
M-SPIKE: 3.8 G/DL
PROT SERPL-MCNC: 8.4 G/DL (ref 6–8.5)

## 2019-01-24 ENCOUNTER — INFUSION (OUTPATIENT)
Dept: ONCOLOGY | Facility: HOSPITAL | Age: 69
End: 2019-01-24

## 2019-01-24 ENCOUNTER — OFFICE VISIT (OUTPATIENT)
Dept: ONCOLOGY | Facility: CLINIC | Age: 69
End: 2019-01-24

## 2019-01-24 VITALS
DIASTOLIC BLOOD PRESSURE: 64 MMHG | WEIGHT: 184.8 LBS | TEMPERATURE: 98 F | BODY MASS INDEX: 34.01 KG/M2 | HEART RATE: 79 BPM | HEIGHT: 62 IN | RESPIRATION RATE: 16 BRPM | SYSTOLIC BLOOD PRESSURE: 96 MMHG | OXYGEN SATURATION: 98 %

## 2019-01-24 DIAGNOSIS — D64.81 ANEMIA ASSOCIATED WITH CHEMOTHERAPY: ICD-10-CM

## 2019-01-24 DIAGNOSIS — C90.00 MULTIPLE MYELOMA NOT HAVING ACHIEVED REMISSION (HCC): ICD-10-CM

## 2019-01-24 DIAGNOSIS — T45.1X5A ANEMIA ASSOCIATED WITH CHEMOTHERAPY: ICD-10-CM

## 2019-01-24 DIAGNOSIS — C90.00 MULTIPLE MYELOMA NOT HAVING ACHIEVED REMISSION (HCC): Primary | ICD-10-CM

## 2019-01-24 DIAGNOSIS — Z45.2 FITTING AND ADJUSTMENT OF VASCULAR CATHETER: ICD-10-CM

## 2019-01-24 DIAGNOSIS — D69.6 THROMBOCYTOPENIA (HCC): ICD-10-CM

## 2019-01-24 DIAGNOSIS — J20.8 ACUTE BRONCHITIS DUE TO OTHER SPECIFIED ORGANISMS: Primary | ICD-10-CM

## 2019-01-24 LAB
ALBUMIN SERPL-MCNC: 2.9 G/DL (ref 3.5–5.2)
ALBUMIN/GLOB SERPL: 0.4 G/DL
ALP SERPL-CCNC: 62 U/L (ref 39–117)
ALT SERPL W P-5'-P-CCNC: 9 U/L (ref 1–33)
ANION GAP SERPL CALCULATED.3IONS-SCNC: 7.1 MMOL/L
AST SERPL-CCNC: 12 U/L (ref 1–32)
BILIRUB SERPL-MCNC: 0.4 MG/DL (ref 0.1–1.2)
BUN BLD-MCNC: 23 MG/DL (ref 8–23)
BUN/CREAT SERPL: 11.9 (ref 7–25)
CALCIUM SPEC-SCNC: 8 MG/DL (ref 8.6–10.5)
CHLORIDE SERPL-SCNC: 111 MMOL/L (ref 98–107)
CO2 SERPL-SCNC: 18.9 MMOL/L (ref 22–29)
CREAT BLD-MCNC: 1.93 MG/DL (ref 0.57–1)
DACRYOCYTES BLD QL SMEAR: ABNORMAL
DEPRECATED RDW RBC AUTO: 67.5 FL (ref 37–54)
EOSINOPHIL # BLD MANUAL: 0.31 10*3/MM3 (ref 0–0.7)
EOSINOPHIL NFR BLD MANUAL: 3 % (ref 0–7)
ERYTHROCYTE [DISTWIDTH] IN BLOOD BY AUTOMATED COUNT: 17.5 % (ref 11.7–13)
GFR SERPL CREATININE-BSD FRML MDRD: 31 ML/MIN/1.73
GLOBULIN UR ELPH-MCNC: 6.6 GM/DL
GLUCOSE BLD-MCNC: 93 MG/DL (ref 65–99)
HCT VFR BLD AUTO: 31 % (ref 35.6–45.5)
HGB BLD-MCNC: 9.3 G/DL (ref 11.9–15.5)
LYMPHOCYTES # BLD MANUAL: 1.86 10*3/MM3 (ref 0.8–7)
LYMPHOCYTES NFR BLD MANUAL: 18 % (ref 24–44)
LYMPHOCYTES NFR BLD MANUAL: 7 % (ref 0–12)
MAGNESIUM SERPL-MCNC: 1.9 MG/DL (ref 1.6–2.4)
MCH RBC QN AUTO: 31.8 PG (ref 26.9–32)
MCHC RBC AUTO-ENTMCNC: 30 G/DL (ref 32.4–36.3)
MCV RBC AUTO: 106.2 FL (ref 80.5–98.2)
MONOCYTES # BLD AUTO: 0.72 10*3/MM3 (ref 0–1)
NEUTROPHILS # BLD AUTO: 7.42 10*3/MM3 (ref 1.9–8.1)
NEUTROPHILS NFR BLD MANUAL: 72 % (ref 42.7–76)
PHOSPHATE SERPL-MCNC: 3.8 MG/DL (ref 2.5–4.5)
PLAT MORPH BLD: NORMAL
PLATELET # BLD AUTO: 60 10*3/MM3 (ref 140–500)
POLYCHROMASIA BLD QL SMEAR: ABNORMAL
POTASSIUM BLD-SCNC: 4 MMOL/L (ref 3.5–5.2)
PROT SERPL-MCNC: 9.5 G/DL (ref 6–8.5)
RBC # BLD AUTO: 2.92 10*6/MM3 (ref 3.9–5.2)
SCAN SLIDE: NORMAL
SODIUM BLD-SCNC: 137 MMOL/L (ref 136–145)
SPHEROCYTES BLD QL SMEAR: ABNORMAL
WBC MORPH BLD: NORMAL
WBC NRBC COR # BLD: 10.31 10*3/MM3 (ref 4.5–10.7)

## 2019-01-24 PROCEDURE — 96372 THER/PROPH/DIAG INJ SC/IM: CPT

## 2019-01-24 PROCEDURE — 81050 URINALYSIS VOLUME MEASURE: CPT | Performed by: INTERNAL MEDICINE

## 2019-01-24 PROCEDURE — 99215 OFFICE O/P EST HI 40 MIN: CPT | Performed by: INTERNAL MEDICINE

## 2019-01-24 PROCEDURE — 86335 IMMUNFIX E-PHORSIS/URINE/CSF: CPT | Performed by: INTERNAL MEDICINE

## 2019-01-24 PROCEDURE — 63510000001 EPOETIN ALFA PER 1000 UNITS: Performed by: INTERNAL MEDICINE

## 2019-01-24 PROCEDURE — 83735 ASSAY OF MAGNESIUM: CPT | Performed by: INTERNAL MEDICINE

## 2019-01-24 PROCEDURE — 84156 ASSAY OF PROTEIN URINE: CPT | Performed by: INTERNAL MEDICINE

## 2019-01-24 PROCEDURE — 85007 BL SMEAR W/DIFF WBC COUNT: CPT | Performed by: INTERNAL MEDICINE

## 2019-01-24 PROCEDURE — 85025 COMPLETE CBC W/AUTO DIFF WBC: CPT | Performed by: INTERNAL MEDICINE

## 2019-01-24 PROCEDURE — 80053 COMPREHEN METABOLIC PANEL: CPT | Performed by: INTERNAL MEDICINE

## 2019-01-24 PROCEDURE — 84100 ASSAY OF PHOSPHORUS: CPT | Performed by: INTERNAL MEDICINE

## 2019-01-24 PROCEDURE — 84166 PROTEIN E-PHORESIS/URINE/CSF: CPT | Performed by: INTERNAL MEDICINE

## 2019-01-24 PROCEDURE — 83883 ASSAY NEPHELOMETRY NOT SPEC: CPT | Performed by: INTERNAL MEDICINE

## 2019-01-24 RX ORDER — CEFDINIR 300 MG/1
300 CAPSULE ORAL 2 TIMES DAILY
Qty: 20 CAPSULE | Refills: 0 | Status: SHIPPED | OUTPATIENT
Start: 2019-01-24 | End: 2019-01-31

## 2019-01-24 RX ORDER — DEXAMETHASONE 4 MG/1
20 TABLET ORAL
Qty: 20 TABLET | Refills: 11 | Status: SHIPPED | OUTPATIENT
Start: 2019-01-24 | End: 2019-01-01 | Stop reason: SDUPTHER

## 2019-01-24 RX ORDER — DEXAMETHASONE 4 MG/1
20 TABLET ORAL
Qty: 20 TABLET | Refills: 11 | Status: SHIPPED | OUTPATIENT
Start: 2019-01-24 | End: 2019-01-24

## 2019-01-24 RX ORDER — SODIUM CHLORIDE 0.9 % (FLUSH) 0.9 %
10 SYRINGE (ML) INJECTION AS NEEDED
Status: CANCELLED | OUTPATIENT
Start: 2019-01-24

## 2019-01-24 RX ORDER — SODIUM CHLORIDE 0.9 % (FLUSH) 0.9 %
10 SYRINGE (ML) INJECTION AS NEEDED
Status: DISCONTINUED | OUTPATIENT
Start: 2019-01-24 | End: 2019-01-24 | Stop reason: HOSPADM

## 2019-01-24 RX ADMIN — SODIUM CHLORIDE, PRESERVATIVE FREE 500 UNITS: 5 INJECTION INTRAVENOUS at 11:05

## 2019-01-24 RX ADMIN — Medication 10 ML: at 11:05

## 2019-01-24 RX ADMIN — ERYTHROPOIETIN 20000 UNITS: 20000 INJECTION, SOLUTION INTRAVENOUS; SUBCUTANEOUS at 11:09

## 2019-01-24 NOTE — PROGRESS NOTES
Reasons for follow up:   1. IgG kappa multiple myeloma, currently on Darzalex, started on May 26, 2016. Patient was switched to Darzalex from Pomalyst, as she continued to be neutropenic with recurrent urinary tract infection while on Pomalyst.    2. Zometa is on hold due to renal insufficiency.    3. After 16 doses of Darzalex, laboratory study showed stable disease in November 2016. Insurance company denied adding Velcade and dexamethasone. Patient is to be continued on monthly Darzalex from 12/06/16.   4. Obstructive right pyelonephritis with positive urine culture for E. coli, required hospitalization from 1/19/2017 through 01/24/2017 with iv antibiotics and stent exchange on 01/20/2017.   5.  Paraprotein studies on March 27, 2017 showed further slight improvement of multiple myeloma.   6.  Febrile illness, with urinary tract infection and influenza B infection in early May 2017, required hospitalization for 6 days.   7.  Paraprotein studies for both serum and 24-hour urine sample on 7/21/2017 showed further improvement of paraproteins.   Darzalex was continued.   8.  Serum protein study reported stable disease on 10/20/2017.  Darzalex will be continued.   9.  Laboratory studies of both serum paraprotein and a 24-hour urine protein in January 2018 showed a further improvement of paraproteins.  Monthly Darzalex will be continued.   10. Repair of left flank incisional hernia in middle September 2018.   11. Serum paraprotein study reached micaela on 7/5/2018.  Since that time she has evidence of disease progression.   12. Disease progression, she will be started on chemotherapy with bendamustine plus Velcade plus dexamethasone per protocol. Treatment will be bendamustine on day 1 and day 4 repeat every 28 days, Velcade and dexamethasone will be on day 1 day 4 and day 8 and day 15 repeat every 28 days.   13.  From cycle #2, we plan to only give Treanda 1 dose per cycle and continue Velcade and dexamethasone weekly.            History of Present Illness:     Patient is a 68 female is here today for reevaluation to discuss the laboratory results for paraprotein studies obtained a week ago and to discuss ongoing treatment, tentatively for cycle #3 chemotherapy.  Patient was started on Treanda plus Velcade and dexamethasone on 11/29/2018.      I found the patient had hoarseness of voice today.  She reports having upper respiratory infection.  One day there was a young girl having coughing sitting next to her, and the next day she started having cough and fever.  She reports fever has resolved, however she was not responding to oral Mucinex, with persistent cough and greenish sputum.  One time she also had a bladder changed sputum.  Hemoptysis has resolved.  She denies dyspnea.    Laboratory study on 01/17/2019 reported improved paraproteins.  SPEP reported M spike 3.8 g/dL out of total 5.5 g/dL globulin.  Serum IgG was 4374 mg/dL, IgA 10 and IgM 11.  Free lambda chain 606.8 mg/L, free kappa chain 8.6 and kappa/lambda ratio 0.01.  Her serum protein immunofixation continues to be IgG lambda monoclonal.  Her CBC showed hemoglobin 9.3, .3, platelets 50,000 and WBC 8600 including ANC 5100, lymphocytes 2160.  Her Chemistry lab reported creatinine 1.92, calcium 8.3, normal liver function panel, glucose 98 with normal sodium and potassium.    Today, she reports continued profound fatigue.  She denies any associated exertional dyspnea or chest pain.  Her appetite has been decreased but she is attempting to maintain adequate hydration.  She denies any numbness or tingling in her hands and feet.  Bowels and urination are regular.    Her skin rashes seems improved.  She has been placed on a topical steroid regimen which is only helped modestly.       Past Medical History, Past Surgical History, Social History, Family History have been reviewed and are without significant changes except as mentioned.      Hematology/oncology history: See  separate document for extra information.       On12/6/2016, serum free lambda chain 1090 MG/L, free kappa chain 8.5 to MG/L.  Ferritin 1015, iron 99, TIBC 137 iron saturation 72%.     On January 4, 2017, vitamin B12 level 1289, folate 7.7 ng/mL. SPEP reporting gamma globulin 3.3, out of total globulin 5.0, with immunofixation reporting small quantity of monoclonal free lambda chain, plus monoclonal IgG lambda at 3.2 g/DL.  Serum IgG 4075, IgA 9 and IgM 15.  free lambda chain 1339 MG/L and free kappa chain 10.3 MG/L.      Laboratory study March 27, 2017 showed slight improvement of paraprotein, SPEP reporting M spike 2.8 g/DL, out of total globulin 4.3, and the serum IgG 3420, IgA 9, IgM 11, free lambda chain 1218 MG/L and free kappa chain 8.0 MG/L.  Total 24 hour urine sample reported at free lambda chain 142 mg, at a concentration 74.8 MG/L, free kappa chain 75 mg at a concentration 39.5 MG/L., Urine protein immunofixation reporting biclonal IgG lambda and monoclonal free lambda light chain, M spike #1 at 41.5% and monoclonal IgG lambda spike #2 at 10.5%. Serum beta-2 microglobulin is 7.3 MG/L.     Her laboratory study on 7/21/2017 reported further improvement of paraprotein is both serum and a 24-hour urine sample.  SPEP reporting monoclonal IgG lambda 2.9 g/dL and free lambda chain 0.1 g/dL.  Serum IgG was 3261 mg/dL and IgA 5, IgM 13, free kappa chain 6.7, free lambda chain 701.3 with kappa/lambda ratio 0.01.  24-hour urine sample reported positive for Bence May protein lambda type, immunofixation positive for IgG lambda.  Total urine protein 241 mg, gamma globulin 13.1%.  Hemoglobin was 11.4 .4, platelets 122,000, WBC 6680 including neutrophils 3600, lymphocytes 2100 and monocytes 650.  Her creatinine was 1.68, at baseline level.  Liver function panel unremarkable.  Darzalex was continued.    Laboratory study on 8/25/2017 showed improved the platelets at 144,000, normal WBC 6660 and slightly improved  hemoglobin at 11.7.     Patient had a colonoscopy examination on 8/30/2017 by Dr. Rivera.  It reported diverticulosis in multiple areas more severe in the sigmoid colon.  There was 2 polyps 4 mm pneumonia from transverse colon and the sigmoid colon.  Pathology evaluation reported tubular adenoma from the sigmoid colon polyp, and benign hyperplastic polyp from transverse colon.    Laboratory study on 10/20/2017 reported slightly improved monoclonal spike 2.7 g/dL by SPEP, immunofixation reported positive monoclonal IgG lambda, serum IgG 3536 mg/dL, IgA less than 5 and IgM 11, free kappa chain 5.3 mg/L, and free lambda chain 720 mg/L with kappa/lambda ratio 0.01.  Serum beta-2 microglobulin was 6.5 mg/L.   Her CBC showed a stable mild anemia with hemoglobin 11.3, macrocytosis .3, and the platelets 114,000, normal WBC 7070 including neutrophils 4100 lymphocytes 2000 monocytes 650, normal liver function panel, total protein 7.9 and albumin 3.2,  and normal electrolytes including calcium 8.1, and improved creatinine 1.59.     Laboratory study on 1/12/2018 reported serum IgG 3083 mg/dL, IgA 10, IgM 10, free kappa chain 8.5 and free lambda chain 589.1 mg/L, kappa/lambda ratio 0.01, serum protein admitted fixation reported IgG lambda monoclonal protein and SPEP reporting M spike 3.1 g/dL, beta-2 microgram and 7.4 mg/L. serum creatinine 1.79, calcium 8.2, other electrolytes normal, hemoglobin 11.3, .5 and MCHC 31.6, platelets 129,000, WBC 6780 including neutrophils 3500 lymphocytes 2300.  Serum ferritin 653.7 ng/mL, iron 123 TIBC 174 iron saturation 71%, folic acid 12.8 ng/mL and a vitamin B12 at 873 pg/mL.  Her 24-hour urine study on 1/18/2018 reported lambda chain 32.8 mg/L, total 61 mg in 24 hours, and the free kappa chain 27.4 mg/L and 51 mg in 24 hours, and the total 24-hour urine protein 283 mg, and urine protein immunofixation positive for 5.3% monoclonal IgG lambda and positive for Bence May protein  at 36.2% monoclonal lambda chain.  Monthly Darzalex was continued.       This patient had serum protein study on 04/06/2018 which reported free light chain at concentration 703.8 mg/L and free kappa chain 7.0, free kappa/lambda ratio 0.01, serum IgG 3650 mg/dL, IgM 11 and IgA 9 with serum protein electrophoresis reporting monoclonal IgG lambda 3.2 g/dL and also monoclonal free lambda light chain.  But it was unable to quantify monoclonal lambda light chain because of the small quantity of it.     A 24-hour urine study on 04/20/2018 reported total free lambda chain 60 mg in 24 hours at concentration 33.1 mg/L, free kappa chain 45 mg in 24 hours at concentration 24.8 mg/L. Total 24-hour urine protein was 279 mg. Urine protein immunofixation and UPEP reported lambda-type Bence-May protein + #1 monoclonal IgG lambda band at 34.8% and #2 monoclonal IgG lambda band at 6.9%.     Her CBC results today reported a stable hemoglobin at 11.1, platelets 130,000 and WBC 7440 including neutrophils 4100 and lymphocytes 2350, monocytes 650. Her CMP reported slightly worsened creatinine at 1.82 with BUN 33. Total protein at 8.8. Liver function panel was normal. Total serum protein 8.8 and albumin 3.2, globulin 5.6.    Laboratory study obtained on 11/01/2018 showed further disease progression. Her serum IgG was 5472 mg/dL, IgA 8 and IgM 10, serum kappa chain 7.9 mg/L, free lambda chain 961.3 mg/L and kappa/lambda ratio 0.01. Serum protein electrophoresis reported monoclonal IgG lambda 4.1 g/dL, and monoclonal lambda free light chain 0.1 g/dL. Her hemoglobin was 9.0, .6, MCHC 30.1, platelets 124,000 and WBC 10,000 including neutrophils 7400, lymphocytes 1200, monocytes 600.     Review of Systems    Constitutional: No fever no sweating no chills.  No weight loss. Profound fatigue, See HPI   HENT: Negative for mouth sores and sore throat.    Eyes: Negative for visual disturbance.    Cardiac: Denies chest pain no palpitation.  No  "lower extremity edema.    Respiratory: no cough or rhinorrhea,  no hemoptysis no short of breath.    Gastrointestinal: Negative for abdominal pain, diarrhea, nausea and vomiting.    Musculoskeletal: Negative for back pain and joint swelling.  See HPI   Neurological: Negative for dizziness.   Hematological: Does not bruise/bleed easily.    SKIN: Skin rashes on extremities.  No pruritus.See HPI   Psychiatric/Behavioral: The patient is not nervous/anxious.        Medications: The current medication list was reviewed in the EMR.       ALLERGIES: Zosyn       VITALS:     Vitals:    01/24/19 0949   BP: 96/64   Pulse: 79   Resp: 16   Temp: 98 °F (36.7 °C)   SpO2: 98%  Comment: at rest   Weight: 83.8 kg (184 lb 12.8 oz)   Height: 157.5 cm (62.01\")   PS: ECOG 1      Physical Exam   Constitutional: well-developed and well-nourished -American female. No distress. not in acute distress.    HENT:     Head: Normocephalic.     Eyes: No jaundice.     Neck: Normal range of motion.   no masses.    Cardiovascular: Normal rate, regular rhythm, normal heart sounds and normal pulses.  No murmurs.  Pulmonary/Chest: Lungs clear to auscultation bilaterally, no wheezes, no rales.  Normal respiratory effort.  Mediport benign right upper chest.  Abdominal: Soft, no tenderness guarding or rebound.  Bowel sounds are normal. no distension and no mass. No hepatosplenomegaly.   Musculoskeletal: no edema or deformity.   Lymphadenopathy: She has no cervical, supraclavicular adenopathy.   Neurological: oriented to person, place, and time. No cranial nerve deficit.    Skin: No petechiae no bruises.  There are multiple circular discoloration spots on extremities, as big as 1.5 cm in diameter, some of those with very faint pinkish plaques in the center- This remains unchanged today     Psychiatric: She has normal mood and affect.         Recent lab results:   Lab Results   Component Value Date    WBC 8.64 01/17/2019    HGB 9.3 (L) 01/17/2019    HCT " 30.6 (L) 01/17/2019    .2 (H) 01/17/2019    PLT 50 (L) 01/17/2019     Lab Results   Component Value Date    NEUTROABS 5.10 01/17/2019     Lab Results   Component Value Date    GLUCOSE 93 01/17/2019    BUN 26 (H) 01/17/2019    CREATININE 1.92 (H) 01/17/2019    EGFRIFNONA  08/30/2016      Comment:      <15 Indicative of kidney failure.    EGFRIFAFRI 32 (L) 01/17/2019    BCR 13.5 01/17/2019    K 4.0 01/17/2019    CO2 22.1 01/17/2019    CALCIUM 8.3 (L) 01/17/2019    PROTENTOTREF 8.4 01/17/2019    ALBUMIN 2.9 01/17/2019    ALBUMIN 3.10 (L) 01/17/2019    LABIL2 0.6 (L) 01/17/2019    AST 17 01/17/2019    ALT 12 01/17/2019     Sodium   Date Value Ref Range Status   01/17/2019 140 136 - 145 mmol/L Final     Potassium   Date Value Ref Range Status   01/17/2019 4.0 3.5 - 5.2 mmol/L Final     Total Bilirubin   Date Value Ref Range Status   01/17/2019 0.4 0.1 - 1.2 mg/dL Final     Alkaline Phosphatase   Date Value Ref Range Status   01/17/2019 60 39 - 117 U/L Final   ]  Lab Results   Component Value Date    MSBWBRTU59 >2,000 (H) 11/01/2018     Lab Results   Component Value Date    IRON 69 11/01/2018    TIBC 145 (L) 11/01/2018    FERRITIN 812.90 (H) 11/01/2018     Iron saturation 48% on 11/1/2018     Lab Results   Component Value Date    FOLATE 6.75 11/15/2018       Assessment/Plan   1. Multiple myeloma, IgG lambda, stage III. Failed multiple lines of therapy. Her treatment was switched to Darzalex on 5/26/2016. She has been on this treatment for more than 2 years now.  She has tolerated therapy very well.     Reviewing her previous lab studies especially serum paraprotein, she reached a micaela of response on 07/05/2018 when she had serum IgG 3015 mg/dL, free lambda chain 458.7 mg/L and M spike IgG lambda 2.6 g/dL and lambda free light chain 0.1 g/dL, total 2.7 g/dL. since that time her laboratory study showed trending up for serum IgG and free lambda chain.     I discussed with the patient for her disease progression and  recommended switching treatment regimen. This patient had multiple lines of treatment and also comorbidities. In summary she previously had VRd regimen but had rectal bleeding shortly after starting Revlimid. She later was started on melphalan, Velcade and dexamethasone and also had Pomalyst plus dexamethasone. Also had CyBorD regimen, also had treatment with panobinostat plus Velcade and dexamethasone. She also progressed on Kyprolis which she had an issue with cardiac problem. Overall I will try to avoid medications with potential cardiac toxicity due to her history of congestive heart failure. At one point her LVEF was in the 30's when she was tested in [facility] to be exact 31% but later improved about 40%. So I recommended switching her therapy to bendamustine plus Velcade plus dexamethasone. She has no peripheral neuropathy so we can reuse Velcade.     Cycle #1 day #1 Bendamustine will be started on 11/29/2018.  Patient did require omission of cycle 1, day 4 treatment secondary to profound fatigue.  This has been an ongoing issue and decision was made to administer all subsequent cycles with Treanda and Velcade on day 1 only.  Velcade was also transitioned to weekly; without day 4 of therapy.      After 2 cycles chemotherapy, laboratory study on 1/17/2019 reported response to chemotherapy, about a 20% to drop off serum IgG and free lambda light chain.  We'll continue treatment for now.  Due to her acute bronchitis, cycle #3 will be postponed 1 week.        2.  Acute bronchitis.  Likely started after respiratory infection now with superimposed bacterial infection, this patient is immunosuppressed.  I discussed with the patient that she should avoid people having sickness in the future.      I discussed with the patient because she has ongoing upper respiratory infection, likely with bacterial infection after initial respiratory viral infection with acute bronchitis, I recommended postponing her  cycle 3 chemotherapy by 1 week.  In the meantime, I will start her on antibiotic cefdinir 300 mg twice a day for 7 days.  She will continue over-the-counter Mucinex.  Her blood-tinged sputum was likely because of bronchitis, not from the Mucinex she took.    We'll postpone her chemotherapy by 1 week.       3. Zometa treatment.  Her last dose Zometa was on 11/1/2018 . She receives Zometa every 3 months.  Due to her elevated creatinine level, we'll postpone Zometa treatment.  Her renal function has been worse recently in the past few months, with a creatinine close to 2.0.  We may have to consider giving her Xgeva treatment.        4.  Macrocytic anemia secondary to chemotherapy for multiple myeloma and chronic renal insufficiency stage 3.       Her laboratory study on 11/01/2018 which showed elevated ferritin 812.9, iron 69, TIBC 145 and iron saturation 48%. Her vitamin B12 was more than 2000 pg/mL. Her folic acid today is 6.75 ng/mL. on 11/15/2018, we started patient on Procrit weekly at 20,000 units each with CBC monitoring. In the meantime she will continue oral vitamin B12 daily, and also add folic acid 800 mcg daily.     Her hemoglobin today is 9.3.  Continue Procrit injection.       5. Mild to moderate thrombocytopenia secondary to her multiple myeloma and chemotherapy.  Overall her platelet counts has been fluctuating widely anywhere between 90,000 in the 140,000 in the past 17 months.  She is asymptomatic, no easy bleeding or bruising.  Platelets 50,000 today. Continue to monitor.     6. History of stage II left breast cancer, post mastectomy in 2013, negative for ER/LA and positive HER-2. No neoadjuvant chemotherapy because of concurrent discovery of multiple myeloma and ongoing chemotherapy. She had a normal mammogram study on 7/24/2018.       7.  Right middle lobe pulmonary nodule, documented on CT scan of chest on 01/06/2017. Repeated CT scan of chest on 8/21/2017 reported a small 5 mm and stable primary  nodule.      8.  Skin rashes on her extremities:  Patient was seen dermatologist Dr. Barroso, who prescribed steroids cream and also over-the-counter moisturizing cream.  Patient will follow-up with Dr. Barroso again.    9. Profound fatigue: This is multifactorial secondary to her disease progression and her anemia associated with chemotherapy.  Hopefully changing chemotherapy regimen and also starting on Procrit would improve this situation.  Fatigue, unfortunately remains persistent today.  We will continue to monitor her labs closely.  I have explained that fatigue is a paramount side effect of chemotherapy and may progress as she proceeds with additional cycles.      Plan:    1.  Postpone cycle #3, day 1 of Velcade, Decadron, and Treanda by 1 week.  Bendamustine will be only 1 dose every 4 weeks.  Velcade and dexamethasone will be weekly.  I e- scribed dexamethasone to her pharmacy today, she will be taken 20 mg weekly on day of for Velcade injection.  I explained to patient and today.    2.  Procrit injection today per protocol,  repeat weekly with CBC monitoring.       3.  Start cefdinir anemia 200 mg every 12 hours for 7 days.  I e- scribed to her pharmacy.    4.  Continue over-the-counter Mucinex for cough.    5.  Continue prophylactic acyclovir 400 mg twice a day.     6.  Continue vitamin B12 at 1000 mcg daily. Folic acid 800 mcg daily.     7.  Hold her Zometa for now due to worsening renal function with creatinine at 1.98.    8.   She will return to see me in 1 week for follow-up and cycle #3 chemotherapy.      9.  Continue triamcinolone cream and Cetaphil cream.        10.  Patient was asked to call with any worsening symptoms including progressive shortness of breath, decreased urine output or excess bleeding or bruising.  She has verbalized understanding.       IL OBANDO M.D., Ph.D.    1/24/2019

## 2019-01-28 DIAGNOSIS — C90.00 MULTIPLE MYELOMA NOT HAVING ACHIEVED REMISSION (HCC): Primary | ICD-10-CM

## 2019-01-28 DIAGNOSIS — C64.9 MALIGNANT NEOPLASM OF KIDNEY, UNSPECIFIED LATERALITY (HCC): ICD-10-CM

## 2019-01-28 DIAGNOSIS — D64.81 ANEMIA ASSOCIATED WITH CHEMOTHERAPY: ICD-10-CM

## 2019-01-28 DIAGNOSIS — T45.1X5A ANEMIA ASSOCIATED WITH CHEMOTHERAPY: ICD-10-CM

## 2019-01-28 NOTE — DISCHARGE INSTRUCTIONS
Comments     Please get patient information on low residue diet to follow at discharge for one week, and high fiber diet to follow as a long term effort                  IMPROVE VTE Individual Risk Assessment          RISK                                                          Points  [  ] Previous VTE                                                3  [  ] Thrombophilia                                             2  [ x ] Lower limb paralysis                                   2        (unable to hold up >15 seconds)    [  ] Current Cancer                                             2         (within 6 months)  [ x ] Immobilization > 24 hrs                              1  [  ] ICU/CCU stay > 24 hours                             1  [ x ] Age > 60                                                         1    IMPROVE VTE Score: 4    c/w heparin subQ for vte prophylaxis c/w protonix PO

## 2019-01-29 LAB
ALBUMIN 24H MFR UR ELPH: 6.8 %
ALPHA1 GLOB 24H MFR UR ELPH: 4.2 %
ALPHA2 GLOB 24H MFR UR ELPH: 20.2 %
B-GLOBULIN MFR UR ELPH: 53.7 %
FREE KAPPA LT CHAINS, 24HR: 155 MG/24 HR
FREE LAMBDA LT CHAINS, 24 HR: 168 MG/24 HR
GAMMA GLOB 24H MFR UR ELPH: 15.1 %
HIV 1 & 2 AB SER-IMP: ABNORMAL
INTERPRETATION UR IFE-IMP: ABNORMAL
KAPPA LC UR-MCNC: 111 MG/L (ref 1.35–24.19)
KAPPA LC/LAMBDA UR: 0.93 {RATIO} (ref 2.04–10.37)
LAMBDA LC UR-MCNC: 120 MG/L (ref 0.24–6.66)
M PROTEIN 24H MFR UR ELPH: ABNORMAL %
PROT 24H UR-MRATE: 346 MG/24 HR (ref 30–150)
PROT UR-MCNC: 24.7 MG/DL

## 2019-01-31 ENCOUNTER — INFUSION (OUTPATIENT)
Dept: ONCOLOGY | Facility: HOSPITAL | Age: 69
End: 2019-01-31

## 2019-01-31 ENCOUNTER — OFFICE VISIT (OUTPATIENT)
Dept: ONCOLOGY | Facility: CLINIC | Age: 69
End: 2019-01-31

## 2019-01-31 VITALS
SYSTOLIC BLOOD PRESSURE: 103 MMHG | HEART RATE: 71 BPM | OXYGEN SATURATION: 94 % | TEMPERATURE: 97.6 F | BODY MASS INDEX: 34.23 KG/M2 | WEIGHT: 186 LBS | DIASTOLIC BLOOD PRESSURE: 67 MMHG | RESPIRATION RATE: 16 BRPM | HEIGHT: 62 IN

## 2019-01-31 DIAGNOSIS — C90.00 MULTIPLE MYELOMA NOT HAVING ACHIEVED REMISSION (HCC): Primary | ICD-10-CM

## 2019-01-31 DIAGNOSIS — D64.81 ANEMIA ASSOCIATED WITH CHEMOTHERAPY: ICD-10-CM

## 2019-01-31 DIAGNOSIS — C64.9 MALIGNANT NEOPLASM OF KIDNEY, UNSPECIFIED LATERALITY (HCC): ICD-10-CM

## 2019-01-31 DIAGNOSIS — C90.00 MULTIPLE MYELOMA NOT HAVING ACHIEVED REMISSION (HCC): ICD-10-CM

## 2019-01-31 DIAGNOSIS — T45.1X5A ANEMIA ASSOCIATED WITH CHEMOTHERAPY: ICD-10-CM

## 2019-01-31 DIAGNOSIS — Z45.2 FITTING AND ADJUSTMENT OF VASCULAR CATHETER: ICD-10-CM

## 2019-01-31 DIAGNOSIS — D61.818 PANCYTOPENIA (HCC): ICD-10-CM

## 2019-01-31 DIAGNOSIS — Z85.3 PERSONAL HISTORY OF BREAST CANCER: ICD-10-CM

## 2019-01-31 DIAGNOSIS — D69.6 THROMBOCYTOPENIA (HCC): Primary | ICD-10-CM

## 2019-01-31 DIAGNOSIS — J20.8 ACUTE BRONCHITIS DUE TO OTHER SPECIFIED ORGANISMS: ICD-10-CM

## 2019-01-31 LAB
ALBUMIN SERPL-MCNC: 2.9 G/DL (ref 3.5–5.2)
ALBUMIN/GLOB SERPL: 0.4 G/DL
ALP SERPL-CCNC: 62 U/L (ref 39–117)
ALT SERPL W P-5'-P-CCNC: 10 U/L (ref 1–33)
ANION GAP SERPL CALCULATED.3IONS-SCNC: 6.2 MMOL/L
AST SERPL-CCNC: 15 U/L (ref 1–32)
BILIRUB SERPL-MCNC: 0.3 MG/DL (ref 0.1–1.2)
BUN BLD-MCNC: 23 MG/DL (ref 8–23)
BUN/CREAT SERPL: 12.8 (ref 7–25)
CALCIUM SPEC-SCNC: 8.1 MG/DL (ref 8.6–10.5)
CHLORIDE SERPL-SCNC: 114 MMOL/L (ref 98–107)
CO2 SERPL-SCNC: 20.8 MMOL/L (ref 22–29)
CREAT BLD-MCNC: 1.8 MG/DL (ref 0.57–1)
DACRYOCYTES BLD QL SMEAR: ABNORMAL
DEPRECATED RDW RBC AUTO: 65.7 FL (ref 37–54)
EOSINOPHIL # BLD MANUAL: 0.97 10*3/MM3 (ref 0–0.7)
EOSINOPHIL NFR BLD MANUAL: 13 % (ref 0–7)
ERYTHROCYTE [DISTWIDTH] IN BLOOD BY AUTOMATED COUNT: 17.1 % (ref 11.7–13)
GFR SERPL CREATININE-BSD FRML MDRD: 34 ML/MIN/1.73
GLOBULIN UR ELPH-MCNC: 7.2 GM/DL
GLUCOSE BLD-MCNC: 95 MG/DL (ref 65–99)
HCT VFR BLD AUTO: 30.7 % (ref 35.6–45.5)
HGB BLD-MCNC: 9.1 G/DL (ref 11.9–15.5)
LYMPHOCYTES # BLD MANUAL: 1.12 10*3/MM3 (ref 0.8–7)
LYMPHOCYTES NFR BLD MANUAL: 15 % (ref 24–44)
LYMPHOCYTES NFR BLD MANUAL: 9 % (ref 0–12)
MCH RBC QN AUTO: 31.4 PG (ref 26.9–32)
MCHC RBC AUTO-ENTMCNC: 29.6 G/DL (ref 32.4–36.3)
MCV RBC AUTO: 105.9 FL (ref 80.5–98.2)
METAMYELOCYTES NFR BLD MANUAL: 2 % (ref 0–0)
MONOCYTES # BLD AUTO: 0.67 10*3/MM3 (ref 0–1)
MYELOCYTES NFR BLD MANUAL: 1 % (ref 0–0)
NEUTROPHILS # BLD AUTO: 4.49 10*3/MM3 (ref 1.9–8.1)
NEUTROPHILS NFR BLD MANUAL: 60 % (ref 42.7–76)
NRBC SPEC MANUAL: 2 /100 WBC (ref 0–0)
PLAT MORPH BLD: NORMAL
PLATELET # BLD AUTO: 78 10*3/MM3 (ref 140–500)
PMV BLD AUTO: 10.9 FL (ref 6–12)
POTASSIUM BLD-SCNC: 3.8 MMOL/L (ref 3.5–5.2)
PROT SERPL-MCNC: 10.1 G/DL (ref 6–8.5)
RBC # BLD AUTO: 2.9 10*6/MM3 (ref 3.9–5.2)
SCAN SLIDE: NORMAL
SODIUM BLD-SCNC: 141 MMOL/L (ref 136–145)
SPHEROCYTES BLD QL SMEAR: ABNORMAL
WBC MORPH BLD: NORMAL
WBC NRBC COR # BLD: 7.49 10*3/MM3 (ref 4.5–10.7)

## 2019-01-31 PROCEDURE — 25010000003 DEXAMETHASONE SODIUM PHOSPHATE 100 MG/10ML SOLUTION: Performed by: INTERNAL MEDICINE

## 2019-01-31 PROCEDURE — 96409 CHEMO IV PUSH SNGL DRUG: CPT

## 2019-01-31 PROCEDURE — 63510000001 EPOETIN ALFA PER 1000 UNITS: Performed by: INTERNAL MEDICINE

## 2019-01-31 PROCEDURE — 25010000002 BENDAMUSTINE HCL 100 MG/4ML SOLUTION 4 ML VIAL: Performed by: INTERNAL MEDICINE

## 2019-01-31 PROCEDURE — 85025 COMPLETE CBC W/AUTO DIFF WBC: CPT | Performed by: INTERNAL MEDICINE

## 2019-01-31 PROCEDURE — 25010000002 PALONOSETRON PER 25 MCG: Performed by: INTERNAL MEDICINE

## 2019-01-31 PROCEDURE — 96372 THER/PROPH/DIAG INJ SC/IM: CPT

## 2019-01-31 PROCEDURE — 25010000002 BORTEZOMIB PER 0.1 MG: Performed by: INTERNAL MEDICINE

## 2019-01-31 PROCEDURE — 99214 OFFICE O/P EST MOD 30 MIN: CPT | Performed by: INTERNAL MEDICINE

## 2019-01-31 PROCEDURE — 96375 TX/PRO/DX INJ NEW DRUG ADDON: CPT

## 2019-01-31 PROCEDURE — 80053 COMPREHEN METABOLIC PANEL: CPT | Performed by: INTERNAL MEDICINE

## 2019-01-31 PROCEDURE — 96401 CHEMO ANTI-NEOPL SQ/IM: CPT

## 2019-01-31 PROCEDURE — 85007 BL SMEAR W/DIFF WBC COUNT: CPT | Performed by: INTERNAL MEDICINE

## 2019-01-31 RX ORDER — SODIUM CHLORIDE 9 MG/ML
250 INJECTION, SOLUTION INTRAVENOUS ONCE
Status: CANCELLED | OUTPATIENT
Start: 2019-01-31

## 2019-01-31 RX ORDER — BORTEZOMIB 3.5 MG/1
1.3 INJECTION, POWDER, LYOPHILIZED, FOR SOLUTION INTRAVENOUS; SUBCUTANEOUS ONCE
Status: CANCELLED | OUTPATIENT
Start: 2019-02-21

## 2019-01-31 RX ORDER — BORTEZOMIB 3.5 MG/1
1.3 INJECTION, POWDER, LYOPHILIZED, FOR SOLUTION INTRAVENOUS; SUBCUTANEOUS ONCE
Status: CANCELLED | OUTPATIENT
Start: 2019-02-07

## 2019-01-31 RX ORDER — SODIUM CHLORIDE 0.9 % (FLUSH) 0.9 %
10 SYRINGE (ML) INJECTION AS NEEDED
Status: CANCELLED | OUTPATIENT
Start: 2019-01-31

## 2019-01-31 RX ORDER — BORTEZOMIB 3.5 MG/1
1.3 INJECTION, POWDER, LYOPHILIZED, FOR SOLUTION INTRAVENOUS; SUBCUTANEOUS ONCE
Status: COMPLETED | OUTPATIENT
Start: 2019-01-31 | End: 2019-01-31

## 2019-01-31 RX ORDER — BORTEZOMIB 3.5 MG/1
1.3 INJECTION, POWDER, LYOPHILIZED, FOR SOLUTION INTRAVENOUS; SUBCUTANEOUS ONCE
Status: CANCELLED | OUTPATIENT
Start: 2019-01-31

## 2019-01-31 RX ORDER — SODIUM CHLORIDE 0.9 % (FLUSH) 0.9 %
10 SYRINGE (ML) INJECTION AS NEEDED
Status: DISCONTINUED | OUTPATIENT
Start: 2019-01-31 | End: 2019-01-31 | Stop reason: HOSPADM

## 2019-01-31 RX ORDER — SODIUM CHLORIDE 9 MG/ML
250 INJECTION, SOLUTION INTRAVENOUS ONCE
Status: COMPLETED | OUTPATIENT
Start: 2019-01-31 | End: 2019-01-31

## 2019-01-31 RX ORDER — PALONOSETRON 0.05 MG/ML
0.25 INJECTION, SOLUTION INTRAVENOUS ONCE
Status: COMPLETED | OUTPATIENT
Start: 2019-01-31 | End: 2019-01-31

## 2019-01-31 RX ORDER — PALONOSETRON 0.05 MG/ML
0.25 INJECTION, SOLUTION INTRAVENOUS ONCE
Status: CANCELLED | OUTPATIENT
Start: 2019-01-31

## 2019-01-31 RX ORDER — BORTEZOMIB 3.5 MG/1
1.3 INJECTION, POWDER, LYOPHILIZED, FOR SOLUTION INTRAVENOUS; SUBCUTANEOUS ONCE
Status: CANCELLED | OUTPATIENT
Start: 2019-02-14

## 2019-01-31 RX ADMIN — SODIUM CHLORIDE, PRESERVATIVE FREE 10 ML: 5 INJECTION INTRAVENOUS at 10:42

## 2019-01-31 RX ADMIN — DEXAMETHASONE SODIUM PHOSPHATE 12 MG: 10 INJECTION, SOLUTION INTRAMUSCULAR; INTRAVENOUS at 10:10

## 2019-01-31 RX ADMIN — SODIUM CHLORIDE 250 ML: 900 INJECTION, SOLUTION INTRAVENOUS at 09:57

## 2019-01-31 RX ADMIN — PALONOSETRON HYDROCHLORIDE 0.25 MG: 0.05 INJECTION, SOLUTION INTRAVENOUS at 10:10

## 2019-01-31 RX ADMIN — ERYTHROPOIETIN 20000 UNITS: 20000 INJECTION, SOLUTION INTRAVENOUS; SUBCUTANEOUS at 10:11

## 2019-01-31 RX ADMIN — BENDAMUSTINE HYDROCHLORIDE 130 MG: 25 INJECTION, SOLUTION INTRAVENOUS at 10:26

## 2019-01-31 RX ADMIN — Medication 500 UNITS: at 10:42

## 2019-01-31 RX ADMIN — BORTEZOMIB 2.4 MG: 3.5 INJECTION, POWDER, LYOPHILIZED, FOR SOLUTION INTRAVENOUS; SUBCUTANEOUS at 10:26

## 2019-02-04 DIAGNOSIS — C90.00 MULTIPLE MYELOMA NOT HAVING ACHIEVED REMISSION (HCC): ICD-10-CM

## 2019-02-04 NOTE — PROGRESS NOTES
Per Dr Araujo, zometa plan removed due to worsening renal function.  New plan for monthly xgeva entered.  Message sent to pre-cert nurses for approval.

## 2019-02-07 ENCOUNTER — INFUSION (OUTPATIENT)
Dept: ONCOLOGY | Facility: HOSPITAL | Age: 69
End: 2019-02-07

## 2019-02-07 VITALS
OXYGEN SATURATION: 98 % | HEART RATE: 72 BPM | BODY MASS INDEX: 33.46 KG/M2 | DIASTOLIC BLOOD PRESSURE: 58 MMHG | WEIGHT: 183 LBS | SYSTOLIC BLOOD PRESSURE: 98 MMHG | TEMPERATURE: 98.9 F | RESPIRATION RATE: 18 BRPM

## 2019-02-07 DIAGNOSIS — Z45.2 FITTING AND ADJUSTMENT OF VASCULAR CATHETER: Primary | ICD-10-CM

## 2019-02-07 DIAGNOSIS — C90.00 MULTIPLE MYELOMA NOT HAVING ACHIEVED REMISSION (HCC): Primary | ICD-10-CM

## 2019-02-07 DIAGNOSIS — D64.81 ANEMIA ASSOCIATED WITH CHEMOTHERAPY: ICD-10-CM

## 2019-02-07 DIAGNOSIS — C90.00 MULTIPLE MYELOMA NOT HAVING ACHIEVED REMISSION (HCC): ICD-10-CM

## 2019-02-07 DIAGNOSIS — T45.1X5A ANEMIA ASSOCIATED WITH CHEMOTHERAPY: ICD-10-CM

## 2019-02-07 LAB
ALBUMIN SERPL-MCNC: 2.9 G/DL (ref 3.5–5.2)
ALBUMIN/GLOB SERPL: 0.5 G/DL
ALP SERPL-CCNC: 55 U/L (ref 39–117)
ALT SERPL W P-5'-P-CCNC: 9 U/L (ref 1–33)
ANION GAP SERPL CALCULATED.3IONS-SCNC: 8.6 MMOL/L
AST SERPL-CCNC: 13 U/L (ref 1–32)
BILIRUB SERPL-MCNC: 0.4 MG/DL (ref 0.1–1.2)
BUN BLD-MCNC: 21 MG/DL (ref 8–23)
BUN/CREAT SERPL: 12 (ref 7–25)
CALCIUM SPEC-SCNC: 7.9 MG/DL (ref 8.6–10.5)
CHLORIDE SERPL-SCNC: 113 MMOL/L (ref 98–107)
CO2 SERPL-SCNC: 20.4 MMOL/L (ref 22–29)
CREAT BLD-MCNC: 1.75 MG/DL (ref 0.57–1)
DACRYOCYTES BLD QL SMEAR: ABNORMAL
DEPRECATED RDW RBC AUTO: 67.4 FL (ref 37–54)
EOSINOPHIL # BLD MANUAL: 0.38 10*3/MM3 (ref 0–0.7)
EOSINOPHIL NFR BLD MANUAL: 6 % (ref 0–7)
ERYTHROCYTE [DISTWIDTH] IN BLOOD BY AUTOMATED COUNT: 17.8 % (ref 11.7–13)
FERRITIN SERPL-MCNC: 724.3 NG/ML (ref 13–150)
GFR SERPL CREATININE-BSD FRML MDRD: 35 ML/MIN/1.73
GLOBULIN UR ELPH-MCNC: 6.4 GM/DL
GLUCOSE BLD-MCNC: 135 MG/DL (ref 65–99)
HCT VFR BLD AUTO: 30.8 % (ref 35.6–45.5)
HGB BLD-MCNC: 9.3 G/DL (ref 11.9–15.5)
LYMPHOCYTES # BLD MANUAL: 0.51 10*3/MM3 (ref 0.8–7)
LYMPHOCYTES NFR BLD MANUAL: 5 % (ref 0–12)
LYMPHOCYTES NFR BLD MANUAL: 8 % (ref 24–44)
MAGNESIUM SERPL-MCNC: 1.7 MG/DL (ref 1.6–2.4)
MCH RBC QN AUTO: 32 PG (ref 26.9–32)
MCHC RBC AUTO-ENTMCNC: 30.2 G/DL (ref 32.4–36.3)
MCV RBC AUTO: 105.8 FL (ref 80.5–98.2)
MONOCYTES # BLD AUTO: 0.32 10*3/MM3 (ref 0–1)
NEUTROPHILS # BLD AUTO: 5.18 10*3/MM3 (ref 1.9–8.1)
NEUTROPHILS NFR BLD MANUAL: 81 % (ref 42.7–76)
PHOSPHATE SERPL-MCNC: 3.6 MG/DL (ref 2.5–4.5)
PLAT MORPH BLD: NORMAL
PLATELET # BLD AUTO: 50 10*3/MM3 (ref 140–500)
POLYCHROMASIA BLD QL SMEAR: ABNORMAL
POTASSIUM BLD-SCNC: 3.7 MMOL/L (ref 3.5–5.2)
PROT SERPL-MCNC: 9.3 G/DL (ref 6–8.5)
RBC # BLD AUTO: 2.91 10*6/MM3 (ref 3.9–5.2)
SCAN SLIDE: NORMAL
SODIUM BLD-SCNC: 142 MMOL/L (ref 136–145)
SPHEROCYTES BLD QL SMEAR: ABNORMAL
WBC MORPH BLD: NORMAL
WBC NRBC COR # BLD: 6.39 10*3/MM3 (ref 4.5–10.7)

## 2019-02-07 PROCEDURE — 85025 COMPLETE CBC W/AUTO DIFF WBC: CPT | Performed by: INTERNAL MEDICINE

## 2019-02-07 PROCEDURE — 25010000002 BORTEZOMIB PER 0.1 MG: Performed by: INTERNAL MEDICINE

## 2019-02-07 PROCEDURE — 82728 ASSAY OF FERRITIN: CPT | Performed by: NURSE PRACTITIONER

## 2019-02-07 PROCEDURE — 85007 BL SMEAR W/DIFF WBC COUNT: CPT | Performed by: INTERNAL MEDICINE

## 2019-02-07 PROCEDURE — 96372 THER/PROPH/DIAG INJ SC/IM: CPT | Performed by: INTERNAL MEDICINE

## 2019-02-07 PROCEDURE — 80053 COMPREHEN METABOLIC PANEL: CPT | Performed by: INTERNAL MEDICINE

## 2019-02-07 PROCEDURE — 96401 CHEMO ANTI-NEOPL SQ/IM: CPT | Performed by: INTERNAL MEDICINE

## 2019-02-07 PROCEDURE — 83735 ASSAY OF MAGNESIUM: CPT | Performed by: INTERNAL MEDICINE

## 2019-02-07 PROCEDURE — 63510000001 EPOETIN ALFA PER 1000 UNITS: Performed by: INTERNAL MEDICINE

## 2019-02-07 PROCEDURE — 84100 ASSAY OF PHOSPHORUS: CPT | Performed by: INTERNAL MEDICINE

## 2019-02-07 PROCEDURE — 36415 COLL VENOUS BLD VENIPUNCTURE: CPT

## 2019-02-07 RX ORDER — BORTEZOMIB 3.5 MG/1
1.3 INJECTION, POWDER, LYOPHILIZED, FOR SOLUTION INTRAVENOUS; SUBCUTANEOUS ONCE
Status: COMPLETED | OUTPATIENT
Start: 2019-02-07 | End: 2019-02-07

## 2019-02-07 RX ORDER — SODIUM CHLORIDE 0.9 % (FLUSH) 0.9 %
10 SYRINGE (ML) INJECTION AS NEEDED
Status: CANCELLED | OUTPATIENT
Start: 2019-02-07

## 2019-02-07 RX ORDER — SODIUM CHLORIDE 0.9 % (FLUSH) 0.9 %
10 SYRINGE (ML) INJECTION AS NEEDED
Status: DISCONTINUED | OUTPATIENT
Start: 2019-02-07 | End: 2019-02-07 | Stop reason: HOSPADM

## 2019-02-07 RX ORDER — SODIUM CHLORIDE 0.9 % (FLUSH) 0.9 %
10 SYRINGE (ML) INJECTION AS NEEDED
Status: CANCELLED | OUTPATIENT
Start: 2019-02-08

## 2019-02-07 RX ADMIN — Medication 500 UNITS: at 09:25

## 2019-02-07 RX ADMIN — BORTEZOMIB 2.4 MG: 3.5 INJECTION, POWDER, LYOPHILIZED, FOR SOLUTION INTRAVENOUS; SUBCUTANEOUS at 10:33

## 2019-02-07 RX ADMIN — ERYTHROPOIETIN 20000 UNITS: 20000 INJECTION, SOLUTION INTRAVENOUS; SUBCUTANEOUS at 10:30

## 2019-02-07 RX ADMIN — SODIUM CHLORIDE, PRESERVATIVE FREE 10 ML: 5 INJECTION INTRAVENOUS at 09:25

## 2019-02-07 NOTE — PROGRESS NOTES
Discussed with Lavern GALLEGOS regarding labs along with calcium 7.9, instructed patient to take calcium and Vit D supplement daily. Instructions given to patient on dose. No Xgeva today.

## 2019-02-07 NOTE — PROGRESS NOTES
Patient states she takes several blood pressure medications. Patient blood pressure tends to run on the low side BP manually 98/58. Instructed patient to make appointment with her MD prescribing blood pressure medications to re-evaluate. Patient states she will make appointment.

## 2019-02-14 ENCOUNTER — INFUSION (OUTPATIENT)
Dept: ONCOLOGY | Facility: HOSPITAL | Age: 69
End: 2019-02-14

## 2019-02-14 VITALS — BODY MASS INDEX: 33.68 KG/M2 | WEIGHT: 184.2 LBS

## 2019-02-14 VITALS
OXYGEN SATURATION: 96 % | HEART RATE: 78 BPM | WEIGHT: 184.2 LBS | TEMPERATURE: 97.7 F | SYSTOLIC BLOOD PRESSURE: 103 MMHG | RESPIRATION RATE: 16 BRPM | HEIGHT: 62 IN | DIASTOLIC BLOOD PRESSURE: 63 MMHG | BODY MASS INDEX: 33.9 KG/M2

## 2019-02-14 DIAGNOSIS — T45.1X5A ANEMIA ASSOCIATED WITH CHEMOTHERAPY: ICD-10-CM

## 2019-02-14 DIAGNOSIS — C90.00 MULTIPLE MYELOMA NOT HAVING ACHIEVED REMISSION (HCC): Primary | ICD-10-CM

## 2019-02-14 DIAGNOSIS — D64.81 ANEMIA ASSOCIATED WITH CHEMOTHERAPY: ICD-10-CM

## 2019-02-14 DIAGNOSIS — Z45.2 FITTING AND ADJUSTMENT OF VASCULAR CATHETER: Primary | ICD-10-CM

## 2019-02-14 DIAGNOSIS — C90.00 MULTIPLE MYELOMA NOT HAVING ACHIEVED REMISSION (HCC): ICD-10-CM

## 2019-02-14 LAB
ALBUMIN SERPL-MCNC: 3 G/DL (ref 3.5–5.2)
ALBUMIN/GLOB SERPL: 0.5 G/DL
ALP SERPL-CCNC: 53 U/L (ref 39–117)
ALT SERPL W P-5'-P-CCNC: 11 U/L (ref 1–33)
ANION GAP SERPL CALCULATED.3IONS-SCNC: 7.7 MMOL/L
AST SERPL-CCNC: 13 U/L (ref 1–32)
BILIRUB SERPL-MCNC: 0.3 MG/DL (ref 0.1–1.2)
BUN BLD-MCNC: 21 MG/DL (ref 8–23)
BUN/CREAT SERPL: 12.1 (ref 7–25)
CALCIUM SPEC-SCNC: 8 MG/DL (ref 8.6–10.5)
CHLORIDE SERPL-SCNC: 113 MMOL/L (ref 98–107)
CO2 SERPL-SCNC: 20.3 MMOL/L (ref 22–29)
CREAT BLD-MCNC: 1.73 MG/DL (ref 0.57–1)
DACRYOCYTES BLD QL SMEAR: ABNORMAL
DEPRECATED RDW RBC AUTO: 67.2 FL (ref 37–54)
EOSINOPHIL # BLD MANUAL: 0.21 10*3/MM3 (ref 0–0.4)
EOSINOPHIL NFR BLD MANUAL: 3 % (ref 0.3–6.2)
ERYTHROCYTE [DISTWIDTH] IN BLOOD BY AUTOMATED COUNT: 17.7 % (ref 12.3–15.4)
FERRITIN SERPL-MCNC: 726.6 NG/ML (ref 13–150)
GFR SERPL CREATININE-BSD FRML MDRD: 36 ML/MIN/1.73
GLOBULIN UR ELPH-MCNC: 6.4 GM/DL
GLUCOSE BLD-MCNC: 95 MG/DL (ref 65–99)
HCT VFR BLD AUTO: 31.6 % (ref 34–46.6)
HGB BLD-MCNC: 9.6 G/DL (ref 12–15.9)
IRON 24H UR-MRATE: 59 MCG/DL (ref 37–145)
IRON SATN MFR SERPL: 38 % (ref 20–50)
LYMPHOCYTES # BLD MANUAL: 1.1 10*3/MM3 (ref 0.7–3.1)
LYMPHOCYTES NFR BLD MANUAL: 16 % (ref 19.6–45.3)
LYMPHOCYTES NFR BLD MANUAL: 7 % (ref 5–12)
MAGNESIUM SERPL-MCNC: 1.8 MG/DL (ref 1.6–2.4)
MCH RBC QN AUTO: 32.3 PG (ref 26.6–33)
MCHC RBC AUTO-ENTMCNC: 30.4 G/DL (ref 31.5–35.7)
MCV RBC AUTO: 106.4 FL (ref 79–97)
MONOCYTES # BLD AUTO: 0.48 10*3/MM3 (ref 0.1–0.9)
NEUTROPHILS # BLD AUTO: 5.11 10*3/MM3 (ref 1.4–7)
NEUTROPHILS NFR BLD MANUAL: 74 % (ref 42.7–76)
NRBC SPEC MANUAL: 1 /100 WBC (ref 0–0)
PHOSPHATE SERPL-MCNC: 3 MG/DL (ref 2.5–4.5)
PLAT MORPH BLD: NORMAL
PLATELET # BLD AUTO: 48 10*3/MM3 (ref 140–450)
POTASSIUM BLD-SCNC: 3.9 MMOL/L (ref 3.5–5.2)
PROT SERPL-MCNC: 9.4 G/DL (ref 6–8.5)
RBC # BLD AUTO: 2.97 10*6/MM3 (ref 3.77–5.28)
SCAN SLIDE: NORMAL
SODIUM BLD-SCNC: 141 MMOL/L (ref 136–145)
SPHEROCYTES BLD QL SMEAR: ABNORMAL
TIBC SERPL-MCNC: 153 MCG/DL (ref 298–536)
TRANSFERRIN SERPL-MCNC: 103 MG/DL (ref 200–360)
WBC MORPH BLD: NORMAL
WBC NRBC COR # BLD: 6.9 10*3/MM3 (ref 3.4–10.8)

## 2019-02-14 PROCEDURE — 82728 ASSAY OF FERRITIN: CPT | Performed by: INTERNAL MEDICINE

## 2019-02-14 PROCEDURE — 83540 ASSAY OF IRON: CPT | Performed by: INTERNAL MEDICINE

## 2019-02-14 PROCEDURE — 84466 ASSAY OF TRANSFERRIN: CPT | Performed by: INTERNAL MEDICINE

## 2019-02-14 PROCEDURE — 85025 COMPLETE CBC W/AUTO DIFF WBC: CPT | Performed by: INTERNAL MEDICINE

## 2019-02-14 PROCEDURE — 80053 COMPREHEN METABOLIC PANEL: CPT | Performed by: INTERNAL MEDICINE

## 2019-02-14 PROCEDURE — 25010000002 DENOSUMAB 120 MG/1.7ML SOLUTION: Performed by: INTERNAL MEDICINE

## 2019-02-14 PROCEDURE — 25010000002 BORTEZOMIB PER 0.1 MG: Performed by: INTERNAL MEDICINE

## 2019-02-14 PROCEDURE — 85007 BL SMEAR W/DIFF WBC COUNT: CPT | Performed by: INTERNAL MEDICINE

## 2019-02-14 PROCEDURE — 83735 ASSAY OF MAGNESIUM: CPT | Performed by: INTERNAL MEDICINE

## 2019-02-14 PROCEDURE — 96401 CHEMO ANTI-NEOPL SQ/IM: CPT

## 2019-02-14 PROCEDURE — 96372 THER/PROPH/DIAG INJ SC/IM: CPT

## 2019-02-14 PROCEDURE — 63510000001 EPOETIN ALFA PER 1000 UNITS: Performed by: INTERNAL MEDICINE

## 2019-02-14 PROCEDURE — 84100 ASSAY OF PHOSPHORUS: CPT | Performed by: INTERNAL MEDICINE

## 2019-02-14 RX ORDER — SODIUM CHLORIDE 0.9 % (FLUSH) 0.9 %
10 SYRINGE (ML) INJECTION AS NEEDED
Status: CANCELLED | OUTPATIENT
Start: 2019-02-14

## 2019-02-14 RX ORDER — BORTEZOMIB 3.5 MG/1
1.3 INJECTION, POWDER, LYOPHILIZED, FOR SOLUTION INTRAVENOUS; SUBCUTANEOUS ONCE
Status: COMPLETED | OUTPATIENT
Start: 2019-02-14 | End: 2019-02-14

## 2019-02-14 RX ORDER — SODIUM CHLORIDE 0.9 % (FLUSH) 0.9 %
10 SYRINGE (ML) INJECTION AS NEEDED
Status: DISCONTINUED | OUTPATIENT
Start: 2019-02-14 | End: 2019-02-14 | Stop reason: HOSPADM

## 2019-02-14 RX ADMIN — BORTEZOMIB 2.4 MG: 3.5 INJECTION, POWDER, LYOPHILIZED, FOR SOLUTION INTRAVENOUS; SUBCUTANEOUS at 11:24

## 2019-02-14 RX ADMIN — DENOSUMAB 120 MG: 120 INJECTION SUBCUTANEOUS at 11:29

## 2019-02-14 RX ADMIN — SODIUM CHLORIDE, PRESERVATIVE FREE 10 ML: 5 INJECTION INTRAVENOUS at 10:18

## 2019-02-14 RX ADMIN — ERYTHROPOIETIN 20000 UNITS: 20000 INJECTION, SOLUTION INTRAVENOUS; SUBCUTANEOUS at 11:15

## 2019-02-14 RX ADMIN — Medication 500 UNITS: at 10:18

## 2019-02-14 NOTE — PROGRESS NOTES
Discussed with Dr. Araujo all labs and patient having stent replacement on Monday. Discussed patient states she didn't take her calcium as planned but will start taking today. Order to give Procrit, Xgeva, and Velcade as planned today.

## 2019-02-21 ENCOUNTER — APPOINTMENT (OUTPATIENT)
Dept: ONCOLOGY | Facility: HOSPITAL | Age: 69
End: 2019-02-21

## 2019-02-21 ENCOUNTER — INFUSION (OUTPATIENT)
Dept: ONCOLOGY | Facility: HOSPITAL | Age: 69
End: 2019-02-21

## 2019-02-21 ENCOUNTER — APPOINTMENT (OUTPATIENT)
Dept: OTHER | Facility: HOSPITAL | Age: 69
End: 2019-02-21

## 2019-02-21 VITALS
RESPIRATION RATE: 16 BRPM | BODY MASS INDEX: 34.71 KG/M2 | WEIGHT: 188.6 LBS | OXYGEN SATURATION: 96 % | HEART RATE: 78 BPM | SYSTOLIC BLOOD PRESSURE: 103 MMHG | TEMPERATURE: 97.7 F | DIASTOLIC BLOOD PRESSURE: 65 MMHG | HEIGHT: 62 IN

## 2019-02-21 DIAGNOSIS — D64.81 ANEMIA ASSOCIATED WITH CHEMOTHERAPY: ICD-10-CM

## 2019-02-21 DIAGNOSIS — T45.1X5A ANEMIA ASSOCIATED WITH CHEMOTHERAPY: ICD-10-CM

## 2019-02-21 DIAGNOSIS — C90.00 MULTIPLE MYELOMA NOT HAVING ACHIEVED REMISSION (HCC): Primary | ICD-10-CM

## 2019-02-21 DIAGNOSIS — C90.00 MULTIPLE MYELOMA NOT HAVING ACHIEVED REMISSION (HCC): ICD-10-CM

## 2019-02-21 LAB
DACRYOCYTES BLD QL SMEAR: ABNORMAL
DEPRECATED RDW RBC AUTO: 66.8 FL (ref 37–54)
EOSINOPHIL # BLD MANUAL: 0.13 10*3/MM3 (ref 0–0.4)
EOSINOPHIL NFR BLD MANUAL: 2 % (ref 0.3–6.2)
ERYTHROCYTE [DISTWIDTH] IN BLOOD BY AUTOMATED COUNT: 17.6 % (ref 12.3–15.4)
HCT VFR BLD AUTO: 30.8 % (ref 34–46.6)
HGB BLD-MCNC: 9.4 G/DL (ref 12–15.9)
LYMPHOCYTES # BLD MANUAL: 1.16 10*3/MM3 (ref 0.7–3.1)
LYMPHOCYTES NFR BLD MANUAL: 18 % (ref 19.6–45.3)
LYMPHOCYTES NFR BLD MANUAL: 9 % (ref 5–12)
MCH RBC QN AUTO: 32 PG (ref 26.6–33)
MCHC RBC AUTO-ENTMCNC: 30.5 G/DL (ref 31.5–35.7)
MCV RBC AUTO: 104.8 FL (ref 79–97)
METAMYELOCYTES NFR BLD MANUAL: 2 % (ref 0–0)
MONOCYTES # BLD AUTO: 0.58 10*3/MM3 (ref 0.1–0.9)
NEUTROPHILS # BLD AUTO: 4.44 10*3/MM3 (ref 1.4–7)
NEUTROPHILS NFR BLD MANUAL: 69 % (ref 42.7–76)
NRBC SPEC MANUAL: 1 /100 WBC (ref 0–0)
OVALOCYTES BLD QL SMEAR: ABNORMAL
PLAT MORPH BLD: NORMAL
PLATELET # BLD AUTO: 40 10*3/MM3 (ref 140–450)
RBC # BLD AUTO: 2.94 10*6/MM3 (ref 3.77–5.28)
SCAN SLIDE: NORMAL
SPHEROCYTES BLD QL SMEAR: ABNORMAL
WBC MORPH BLD: NORMAL
WBC NRBC COR # BLD: 6.43 10*3/MM3 (ref 3.4–10.8)

## 2019-02-21 PROCEDURE — 63510000001 EPOETIN ALFA PER 1000 UNITS: Performed by: INTERNAL MEDICINE

## 2019-02-21 PROCEDURE — 85007 BL SMEAR W/DIFF WBC COUNT: CPT | Performed by: INTERNAL MEDICINE

## 2019-02-21 PROCEDURE — 36415 COLL VENOUS BLD VENIPUNCTURE: CPT

## 2019-02-21 PROCEDURE — 85025 COMPLETE CBC W/AUTO DIFF WBC: CPT | Performed by: INTERNAL MEDICINE

## 2019-02-21 PROCEDURE — 96372 THER/PROPH/DIAG INJ SC/IM: CPT

## 2019-02-21 PROCEDURE — 96409 CHEMO IV PUSH SNGL DRUG: CPT

## 2019-02-21 PROCEDURE — 25010000002 BORTEZOMIB PER 0.1 MG: Performed by: INTERNAL MEDICINE

## 2019-02-21 RX ORDER — BORTEZOMIB 3.5 MG/1
1.3 INJECTION, POWDER, LYOPHILIZED, FOR SOLUTION INTRAVENOUS; SUBCUTANEOUS ONCE
Status: COMPLETED | OUTPATIENT
Start: 2019-02-21 | End: 2019-02-21

## 2019-02-21 RX ADMIN — ERYTHROPOIETIN 25000 UNITS: 20000 INJECTION, SOLUTION INTRAVENOUS; SUBCUTANEOUS at 10:54

## 2019-02-21 RX ADMIN — BORTEZOMIB 2.4 MG: 3.5 INJECTION, POWDER, LYOPHILIZED, FOR SOLUTION INTRAVENOUS; SUBCUTANEOUS at 10:54

## 2019-02-28 ENCOUNTER — OFFICE VISIT (OUTPATIENT)
Dept: ONCOLOGY | Facility: CLINIC | Age: 69
End: 2019-02-28

## 2019-02-28 ENCOUNTER — INFUSION (OUTPATIENT)
Dept: ONCOLOGY | Facility: HOSPITAL | Age: 69
End: 2019-02-28

## 2019-02-28 VITALS
RESPIRATION RATE: 16 BRPM | WEIGHT: 187 LBS | HEIGHT: 62 IN | BODY MASS INDEX: 34.41 KG/M2 | TEMPERATURE: 97.5 F | HEART RATE: 72 BPM | OXYGEN SATURATION: 98 % | DIASTOLIC BLOOD PRESSURE: 66 MMHG | SYSTOLIC BLOOD PRESSURE: 98 MMHG

## 2019-02-28 DIAGNOSIS — T45.1X5A ANEMIA ASSOCIATED WITH CHEMOTHERAPY: ICD-10-CM

## 2019-02-28 DIAGNOSIS — D63.1 ANEMIA, CHRONIC RENAL FAILURE, STAGE 3 (MODERATE) (HCC): Primary | ICD-10-CM

## 2019-02-28 DIAGNOSIS — C90.00 MULTIPLE MYELOMA NOT HAVING ACHIEVED REMISSION (HCC): ICD-10-CM

## 2019-02-28 DIAGNOSIS — N18.30 ANEMIA, CHRONIC RENAL FAILURE, STAGE 3 (MODERATE) (HCC): Primary | ICD-10-CM

## 2019-02-28 DIAGNOSIS — E83.51 HYPOCALCEMIA: ICD-10-CM

## 2019-02-28 DIAGNOSIS — Z45.2 FITTING AND ADJUSTMENT OF VASCULAR CATHETER: ICD-10-CM

## 2019-02-28 DIAGNOSIS — D64.81 ANEMIA ASSOCIATED WITH CHEMOTHERAPY: ICD-10-CM

## 2019-02-28 DIAGNOSIS — C90.00 MULTIPLE MYELOMA NOT HAVING ACHIEVED REMISSION (HCC): Primary | ICD-10-CM

## 2019-02-28 DIAGNOSIS — D69.6 THROMBOCYTOPENIA (HCC): ICD-10-CM

## 2019-02-28 DIAGNOSIS — R53.82 CHRONIC FATIGUE: ICD-10-CM

## 2019-02-28 LAB
ALBUMIN SERPL-MCNC: 2.9 G/DL (ref 3.5–5.2)
ALBUMIN/GLOB SERPL: 0.5 G/DL
ALP SERPL-CCNC: 51 U/L (ref 39–117)
ALT SERPL W P-5'-P-CCNC: 8 U/L (ref 1–33)
ANION GAP SERPL CALCULATED.3IONS-SCNC: 6.6 MMOL/L
ANISOCYTOSIS BLD QL: ABNORMAL
AST SERPL-CCNC: 13 U/L (ref 1–32)
BILIRUB SERPL-MCNC: 0.3 MG/DL (ref 0.1–1.2)
BUN BLD-MCNC: 22 MG/DL (ref 8–23)
BUN/CREAT SERPL: 11.9 (ref 7–25)
CALCIUM SPEC-SCNC: 7.4 MG/DL (ref 8.6–10.5)
CHLORIDE SERPL-SCNC: 112 MMOL/L (ref 98–107)
CO2 SERPL-SCNC: 21.4 MMOL/L (ref 22–29)
CREAT BLD-MCNC: 1.85 MG/DL (ref 0.57–1)
DACRYOCYTES BLD QL SMEAR: ABNORMAL
DEPRECATED RDW RBC AUTO: 71 FL (ref 37–54)
EOSINOPHIL # BLD MANUAL: 0.45 10*3/MM3 (ref 0–0.4)
EOSINOPHIL NFR BLD MANUAL: 6 % (ref 0.3–6.2)
ERYTHROCYTE [DISTWIDTH] IN BLOOD BY AUTOMATED COUNT: 18.3 % (ref 12.3–15.4)
GFR SERPL CREATININE-BSD FRML MDRD: 33 ML/MIN/1.73
GLOBULIN UR ELPH-MCNC: 6.1 GM/DL
GLUCOSE BLD-MCNC: 90 MG/DL (ref 65–99)
HCT VFR BLD AUTO: 29.7 % (ref 34–46.6)
HGB BLD-MCNC: 8.8 G/DL (ref 12–15.9)
LYMPHOCYTES # BLD MANUAL: 1.19 10*3/MM3 (ref 0.7–3.1)
LYMPHOCYTES NFR BLD MANUAL: 16 % (ref 19.6–45.3)
LYMPHOCYTES NFR BLD MANUAL: 8 % (ref 5–12)
MACROCYTES BLD QL SMEAR: ABNORMAL
MCH RBC QN AUTO: 31.8 PG (ref 26.6–33)
MCHC RBC AUTO-ENTMCNC: 29.6 G/DL (ref 31.5–35.7)
MCV RBC AUTO: 107.2 FL (ref 79–97)
MONOCYTES # BLD AUTO: 0.6 10*3/MM3 (ref 0.1–0.9)
NEUTROPHILS # BLD AUTO: 5.22 10*3/MM3 (ref 1.4–7)
NEUTROPHILS NFR BLD MANUAL: 70 % (ref 42.7–76)
OVALOCYTES BLD QL SMEAR: ABNORMAL
PLAT MORPH BLD: NORMAL
PLATELET # BLD AUTO: 40 10*3/MM3 (ref 140–450)
POTASSIUM BLD-SCNC: 3.9 MMOL/L (ref 3.5–5.2)
PROT SERPL-MCNC: 9 G/DL (ref 6–8.5)
RBC # BLD AUTO: 2.77 10*6/MM3 (ref 3.77–5.28)
SCAN SLIDE: NORMAL
SODIUM BLD-SCNC: 140 MMOL/L (ref 136–145)
WBC MORPH BLD: NORMAL
WBC NRBC COR # BLD: 7.45 10*3/MM3 (ref 3.4–10.8)

## 2019-02-28 PROCEDURE — 96401 CHEMO ANTI-NEOPL SQ/IM: CPT

## 2019-02-28 PROCEDURE — 96409 CHEMO IV PUSH SNGL DRUG: CPT

## 2019-02-28 PROCEDURE — 96375 TX/PRO/DX INJ NEW DRUG ADDON: CPT

## 2019-02-28 PROCEDURE — 25010000002 BENDAMUSTINE HCL 100 MG/4ML SOLUTION 4 ML VIAL: Performed by: NURSE PRACTITIONER

## 2019-02-28 PROCEDURE — 99215 OFFICE O/P EST HI 40 MIN: CPT | Performed by: NURSE PRACTITIONER

## 2019-02-28 PROCEDURE — 63510000001 EPOETIN ALFA PER 1000 UNITS: Performed by: NURSE PRACTITIONER

## 2019-02-28 PROCEDURE — 80053 COMPREHEN METABOLIC PANEL: CPT | Performed by: INTERNAL MEDICINE

## 2019-02-28 PROCEDURE — 96372 THER/PROPH/DIAG INJ SC/IM: CPT

## 2019-02-28 PROCEDURE — 85007 BL SMEAR W/DIFF WBC COUNT: CPT | Performed by: INTERNAL MEDICINE

## 2019-02-28 PROCEDURE — 25010000002 PALONOSETRON PER 25 MCG: Performed by: NURSE PRACTITIONER

## 2019-02-28 PROCEDURE — 85025 COMPLETE CBC W/AUTO DIFF WBC: CPT | Performed by: INTERNAL MEDICINE

## 2019-02-28 PROCEDURE — 25010000002 BORTEZOMIB PER 0.1 MG: Performed by: NURSE PRACTITIONER

## 2019-02-28 PROCEDURE — 25010000003 DEXAMETHASONE SODIUM PHOSPHATE 100 MG/10ML SOLUTION: Performed by: NURSE PRACTITIONER

## 2019-02-28 RX ORDER — BORTEZOMIB 3.5 MG/1
1.3 INJECTION, POWDER, LYOPHILIZED, FOR SOLUTION INTRAVENOUS; SUBCUTANEOUS ONCE
Status: COMPLETED | OUTPATIENT
Start: 2019-02-28 | End: 2019-02-28

## 2019-02-28 RX ORDER — SODIUM CHLORIDE 0.9 % (FLUSH) 0.9 %
10 SYRINGE (ML) INJECTION AS NEEDED
Status: DISCONTINUED | OUTPATIENT
Start: 2019-02-28 | End: 2019-02-28 | Stop reason: HOSPADM

## 2019-02-28 RX ORDER — BORTEZOMIB 3.5 MG/1
1.3 INJECTION, POWDER, LYOPHILIZED, FOR SOLUTION INTRAVENOUS; SUBCUTANEOUS ONCE
Status: CANCELLED | OUTPATIENT
Start: 2019-02-28

## 2019-02-28 RX ORDER — SODIUM CHLORIDE 9 MG/ML
250 INJECTION, SOLUTION INTRAVENOUS ONCE
Status: COMPLETED | OUTPATIENT
Start: 2019-02-28 | End: 2019-02-28

## 2019-02-28 RX ORDER — SODIUM CHLORIDE 9 MG/ML
250 INJECTION, SOLUTION INTRAVENOUS ONCE
Status: CANCELLED | OUTPATIENT
Start: 2019-02-28

## 2019-02-28 RX ORDER — PALONOSETRON 0.05 MG/ML
0.25 INJECTION, SOLUTION INTRAVENOUS ONCE
Status: CANCELLED | OUTPATIENT
Start: 2019-02-28

## 2019-02-28 RX ORDER — SODIUM CHLORIDE 0.9 % (FLUSH) 0.9 %
10 SYRINGE (ML) INJECTION AS NEEDED
Status: CANCELLED | OUTPATIENT
Start: 2019-02-28

## 2019-02-28 RX ORDER — PALONOSETRON 0.05 MG/ML
0.25 INJECTION, SOLUTION INTRAVENOUS ONCE
Status: COMPLETED | OUTPATIENT
Start: 2019-02-28 | End: 2019-02-28

## 2019-02-28 RX ADMIN — PALONOSETRON HYDROCHLORIDE 0.25 MG: 0.05 INJECTION, SOLUTION INTRAVENOUS at 10:56

## 2019-02-28 RX ADMIN — SODIUM CHLORIDE, PRESERVATIVE FREE 10 ML: 5 INJECTION INTRAVENOUS at 12:04

## 2019-02-28 RX ADMIN — Medication 500 UNITS: at 12:04

## 2019-02-28 RX ADMIN — DEXAMETHASONE SODIUM PHOSPHATE 12 MG: 10 INJECTION, SOLUTION INTRAMUSCULAR; INTRAVENOUS at 11:21

## 2019-02-28 RX ADMIN — ERYTHROPOIETIN 25000 UNITS: 20000 INJECTION, SOLUTION INTRAVENOUS; SUBCUTANEOUS at 10:50

## 2019-02-28 RX ADMIN — BENDAMUSTINE HYDROCHLORIDE 105 MG: 25 INJECTION, SOLUTION INTRAVENOUS at 11:50

## 2019-02-28 RX ADMIN — BORTEZOMIB 2.4 MG: 3.5 INJECTION, POWDER, LYOPHILIZED, FOR SOLUTION INTRAVENOUS; SUBCUTANEOUS at 11:53

## 2019-02-28 RX ADMIN — SODIUM CHLORIDE 250 ML: 900 INJECTION, SOLUTION INTRAVENOUS at 10:52

## 2019-02-28 NOTE — PROGRESS NOTES
Reasons for follow up:   1. IgG kappa multiple myeloma, currently on Darzalex, started on May 26, 2016. Patient was switched to Darzalex from Pomalyst, as she continued to be neutropenic with recurrent urinary tract infection while on Pomalyst.    2. Zometa is on hold due to renal insufficiency.    3. After 16 doses of Darzalex, laboratory study showed stable disease in November 2016. Insurance company denied adding Velcade and dexamethasone. Patient is to be continued on monthly Darzalex from 12/06/16.   4. Obstructive right pyelonephritis with positive urine culture for E. coli, required hospitalization from 1/19/2017 through 01/24/2017 with iv antibiotics and stent exchange on 01/20/2017.   5.  Paraprotein studies on March 27, 2017 showed further slight improvement of multiple myeloma.   6.  Febrile illness, with urinary tract infection and influenza B infection in early May 2017, required hospitalization for 6 days.   7.  Paraprotein studies for both serum and 24-hour urine sample on 7/21/2017 showed further improvement of paraproteins.   Darzalex was continued.   8.  Serum protein study reported stable disease on 10/20/2017.  Darzalex will be continued.   9.  Laboratory studies of both serum paraprotein and a 24-hour urine protein in January 2018 showed a further improvement of paraproteins.  Monthly Darzalex will be continued.   10. Repair of left flank incisional hernia in middle September 2018.   11. Serum paraprotein study reached micaela on 7/5/2018.  Since that time she has evidence of disease progression.   12. Disease progression, she was started on chemotherapy with bendamustine plus Velcade plus dexamethasone per protocol on 11/29/2018. Treatment will be bendamustine on day 1 and day 4 repeat every 28 days, Velcade and dexamethasone will be on day 1 day 4 and day 8 and day 15 repeat every 28 days.   13.  From cycle #2, we plan to only give Treanda 1 dose per cycle and continue Velcade and dexamethasone  weekly.           History of Present Illness:      The patient is a 68 y.o. female with the above-mentioned history, who returns today in anticipation of her fourth cycle of treatment with bendamustine, Velcade, dexamethasone.  She reports today she is tolerating treatment well.    She continues to be fatigued, though this is an ongoing issue and multifactorial.  She does report occasional nausea following bendamustine which occurs spontaneously and resolves prior to taking Zofran.  She denies any new pain.  She reports she is eating and drinking adequately.  She denies signs or symptoms of bleeding despite thrombocytopenia, platelet count 40,000.      She denies fevers or chills, signs or symptoms of infection.  She did have a previous bronchitis requiring antibiotics.  She reports today her symptoms have resolved.  He denies issues with neuropathy.      Past Medical History, Past Surgical History, Social History, Family History have been reviewed and are without significant changes except as mentioned.      Hematology/oncology history: See separate document for extra information.       On12/6/2016, serum free lambda chain 1090 MG/L, free kappa chain 8.5 to MG/L.  Ferritin 1015, iron 99, TIBC 137 iron saturation 72%.     On January 4, 2017, vitamin B12 level 1289, folate 7.7 ng/mL. SPEP reporting gamma globulin 3.3, out of total globulin 5.0, with immunofixation reporting small quantity of monoclonal free lambda chain, plus monoclonal IgG lambda at 3.2 g/DL.  Serum IgG 4075, IgA 9 and IgM 15.  free lambda chain 1339 MG/L and free kappa chain 10.3 MG/L.      Laboratory study March 27, 2017 showed slight improvement of paraprotein, SPEP reporting M spike 2.8 g/DL, out of total globulin 4.3, and the serum IgG 3420, IgA 9, IgM 11, free lambda chain 1218 MG/L and free kappa chain 8.0 MG/L.  Total 24 hour urine sample reported at free lambda chain 142 mg, at a concentration 74.8 MG/L, free kappa chain 75 mg at a  concentration 39.5 MG/L., Urine protein immunofixation reporting biclonal IgG lambda and monoclonal free lambda light chain, M spike #1 at 41.5% and monoclonal IgG lambda spike #2 at 10.5%. Serum beta-2 microglobulin is 7.3 MG/L.     Her laboratory study on 7/21/2017 reported further improvement of paraprotein is both serum and a 24-hour urine sample.  SPEP reporting monoclonal IgG lambda 2.9 g/dL and free lambda chain 0.1 g/dL.  Serum IgG was 3261 mg/dL and IgA 5, IgM 13, free kappa chain 6.7, free lambda chain 701.3 with kappa/lambda ratio 0.01.  24-hour urine sample reported positive for Bence May protein lambda type, immunofixation positive for IgG lambda.  Total urine protein 241 mg, gamma globulin 13.1%.  Hemoglobin was 11.4 .4, platelets 122,000, WBC 6680 including neutrophils 3600, lymphocytes 2100 and monocytes 650.  Her creatinine was 1.68, at baseline level.  Liver function panel unremarkable.  Darzalex was continued.    Laboratory study on 8/25/2017 showed improved the platelets at 144,000, normal WBC 6660 and slightly improved hemoglobin at 11.7.     Patient had a colonoscopy examination on 8/30/2017 by Dr. Rivera.  It reported diverticulosis in multiple areas more severe in the sigmoid colon.  There was 2 polyps 4 mm pneumonia from transverse colon and the sigmoid colon.  Pathology evaluation reported tubular adenoma from the sigmoid colon polyp, and benign hyperplastic polyp from transverse colon.    Laboratory study on 10/20/2017 reported slightly improved monoclonal spike 2.7 g/dL by SPEP, immunofixation reported positive monoclonal IgG lambda, serum IgG 3536 mg/dL, IgA less than 5 and IgM 11, free kappa chain 5.3 mg/L, and free lambda chain 720 mg/L with kappa/lambda ratio 0.01.  Serum beta-2 microglobulin was 6.5 mg/L.   Her CBC showed a stable mild anemia with hemoglobin 11.3, macrocytosis .3, and the platelets 114,000, normal WBC 7070 including neutrophils 4100 lymphocytes 2000  monocytes 650, normal liver function panel, total protein 7.9 and albumin 3.2,  and normal electrolytes including calcium 8.1, and improved creatinine 1.59.     Laboratory study on 1/12/2018 reported serum IgG 3083 mg/dL, IgA 10, IgM 10, free kappa chain 8.5 and free lambda chain 589.1 mg/L, kappa/lambda ratio 0.01, serum protein admitted fixation reported IgG lambda monoclonal protein and SPEP reporting M spike 3.1 g/dL, beta-2 microgram and 7.4 mg/L. serum creatinine 1.79, calcium 8.2, other electrolytes normal, hemoglobin 11.3, .5 and MCHC 31.6, platelets 129,000, WBC 6780 including neutrophils 3500 lymphocytes 2300.  Serum ferritin 653.7 ng/mL, iron 123 TIBC 174 iron saturation 71%, folic acid 12.8 ng/mL and a vitamin B12 at 873 pg/mL.  Her 24-hour urine study on 1/18/2018 reported lambda chain 32.8 mg/L, total 61 mg in 24 hours, and the free kappa chain 27.4 mg/L and 51 mg in 24 hours, and the total 24-hour urine protein 283 mg, and urine protein immunofixation positive for 5.3% monoclonal IgG lambda and positive for Bence May protein at 36.2% monoclonal lambda chain.  Monthly Darzalex was continued.       This patient had serum protein study on 04/06/2018 which reported free light chain at concentration 703.8 mg/L and free kappa chain 7.0, free kappa/lambda ratio 0.01, serum IgG 3650 mg/dL, IgM 11 and IgA 9 with serum protein electrophoresis reporting monoclonal IgG lambda 3.2 g/dL and also monoclonal free lambda light chain.  But it was unable to quantify monoclonal lambda light chain because of the small quantity of it.     A 24-hour urine study on 04/20/2018 reported total free lambda chain 60 mg in 24 hours at concentration 33.1 mg/L, free kappa chain 45 mg in 24 hours at concentration 24.8 mg/L. Total 24-hour urine protein was 279 mg. Urine protein immunofixation and UPEP reported lambda-type Bence-May protein + #1 monoclonal IgG lambda band at 34.8% and #2 monoclonal IgG lambda band at 6.9%.      Her CBC results today reported a stable hemoglobin at 11.1, platelets 130,000 and WBC 7440 including neutrophils 4100 and lymphocytes 2350, monocytes 650. Her CMP reported slightly worsened creatinine at 1.82 with BUN 33. Total protein at 8.8. Liver function panel was normal. Total serum protein 8.8 and albumin 3.2, globulin 5.6.    Laboratory study obtained on 11/01/2018 showed further disease progression. Her serum IgG was 5472 mg/dL, IgA 8 and IgM 10, serum kappa chain 7.9 mg/L, free lambda chain 961.3 mg/L and kappa/lambda ratio 0.01. Serum protein electrophoresis reported monoclonal IgG lambda 4.1 g/dL, and monoclonal lambda free light chain 0.1 g/dL. Her hemoglobin was 9.0, .6, MCHC 30.1, platelets 124,000 and WBC 10,000 including neutrophils 7400, lymphocytes 1200, monocytes 600.     Cycle #1 day #1 Bendamustine will be started on 11/29/2018.     Laboratory study on 01/17/2019 reported improved paraproteins.  SPEP reported M spike 3.8 g/dL out of total 5.5 g/dL globulin.  Serum IgG was 4374 mg/dL, IgA 10 and IgM 11.  Free lambda chain 606.8 mg/L, free kappa chain 8.6 and kappa/lambda ratio 0.01.  Her serum protein immunofixation continues to be IgG lambda monoclonal.  Her CBC showed hemoglobin 9.3, .3, platelets 50,000 and WBC 8600 including ANC 5100, lymphocytes 2160.  Her Chemistry lab reported creatinine 1.92, calcium 8.3, normal liver function panel, glucose 98 with normal sodium and potassium.     I have reviewed the patient's medical history in detail and updated the computerized patient record.    Review of Systems   Constitutional: Positive for fatigue. Negative for appetite change, chills and fever.   HENT:   Negative for trouble swallowing.    Respiratory: Negative for cough and shortness of breath.    Cardiovascular: Negative for chest pain and leg swelling.   Gastrointestinal: Positive for nausea (intermittent). Negative for abdominal distention, blood in stool, constipation and  "diarrhea.   Musculoskeletal: Negative for arthralgias and myalgias.   Skin: Negative for rash.   Neurological: Negative for dizziness and extremity weakness.   Hematological: Does not bruise/bleed easily.     Review of systems unchanged from previous office visit except as updated.     Medications: The current medication list was reviewed in the EMR.         Allergies   Allergen Reactions   • Zosyn [Piperacillin Sod-Tazobactam So] Swelling     Face and lips       VITALS:   Vitals:    02/28/19 1015   BP: 98/66   Pulse: 72   Resp: 16   Temp: 97.5 °F (36.4 °C)   TempSrc: Oral   SpO2: 98%   Weight: 84.8 kg (187 lb)   Height: 157.5 cm (62.01\")   PainSc: 0-No pain   PS: ECOG 0-1      Physical Exam   Constitutional: well-developed and well-nourished -American female. No distress. not in acute distress.    HEENT:     Head: Normocephalic.     Eyes: No jaundice. PERRL.     Neck:  no masses.    Cardiovascular: Normal rate, regular rhythm, normal heart sounds and normal pulses.  No murmurs.  Pulmonary/Chest: Lungs clear to auscultation bilaterally, no wheezes, no rales.  Normal respiratory effort.  Mediport benign right upper chest.  Abdominal: Soft, no tenderness guarding or rebound.  Bowel sounds are normal. no distension and no mass.    Musculoskeletal: no edema or deformity.   Lymphadenopathy: She has no cervical, supraclavicular adenopathy.   Neurological: oriented to person, place, and time. No cranial nerve deficit.    Skin: No petechiae no bruises.      Psychiatric: She has normal mood and affect.   Physical exam unchanged from previous office visit except as updated     Recent lab results:   Results from last 7 days   Lab Units 02/28/19  1012   WBC 10*3/mm3 7.45   NEUTROS ABS 10*3/mm3 5.22   LYMPHS ABS 10*3/mm3 1.19   HEMOGLOBIN g/dL 8.8*   HEMATOCRIT % 29.7*   PLATELETS 10*3/mm3 40*     Results from last 7 days   Lab Units 02/28/19  1012   SODIUM mmol/L 140   POTASSIUM mmol/L 3.9   CHLORIDE mmol/L 112*   CO2 " mmol/L 21.4*   BUN mg/dL 22   CREATININE mg/dL 1.85*   CALCIUM mg/dL 7.4*   ALBUMIN g/dL 2.90*   BILIRUBIN mg/dL 0.3   ALK PHOS U/L 51   ALT (SGPT) U/L 8   AST (SGOT) U/L 13   GLUCOSE mg/dL 90         Assessment/Plan   1. Multiple myeloma, IgG lambda, stage III. Failed multiple lines of therapy. Her treatment was switched to Darzalex on 5/26/2016. She was on this treatment for more than 2 years now.     Reviewing her previous lab studies especially serum paraprotein, she reached a micaela of response on 07/05/2018 when she had serum IgG 3015 mg/dL, free lambda chain 458.7 mg/L and M spike IgG lambda 2.6 g/dL and lambda free light chain 0.1 g/dL, total 2.7 g/dL. since that time her laboratory study showed trending up for serum IgG and free lambda chain.     Cycle #1 day #1 Bendamustine will be started on 11/29/2018.  Patient did require omission of cycle 1, day 4 treatment secondary to profound fatigue.  This has been an ongoing issue and decision was made to administer all subsequent cycles with Treanda and Velcade on day 1 only.  Velcade was also transitioned to weekly.       After 2 cycles chemotherapy, laboratory study on 1/17/2019 reported response to chemotherapy, about a 20% to drop of serum IgG and free lambda light chain.       Cycle 3 was delayed 1 week due to bronchitis.  Patient was able to proceed, she is due today for cycle 4.  We will dose adjust Treanda by 20% due to thrombocytopenia.         2.  Acute bronchitis.  She was started on antibiotic cefdinir 300 mg twice a day for 7 days.  Her cough and sputum production has subsided.  She will continue over-the-counter Mucinex.      3. Zometa treatment.  Her last dose Zometa was on 11/1/2018 . She receives Zometa every 3 months.  Due to her elevated creatinine level, we switched her to Xgeva treatment.  She received this 2/14/2019 which will be continued monthly.      4.  Macrocytic anemia secondary to chemotherapy for multiple myeloma and chronic renal  insufficiency stage 3.       Her laboratory study on 11/01/2018 which showed elevated ferritin 812.9, iron 69, TIBC 145 and iron saturation 48%. Her vitamin B12 was more than 2000 pg/mL. Her folic acid today is 6.75 ng/mL. on 11/15/2018, we started patient on Procrit weekly at 20,000 units each with CBC monitoring. In the meantime she will continue oral vitamin B12 daily, and also add folic acid 800 mcg daily.     Her hemoglobin today is 8.8.  Continue Procrit injection.       5. Mild to moderate thrombocytopenia secondary to her multiple myeloma and chemotherapy.    Platelet count is continued to trend downward since initiation of Treanda.  This was reviewed with Dr. Araujo today.  We will decrease Treanda by 20%    6. History of stage II left breast cancer, post mastectomy in 2013, negative for ER/SD and positive HER-2. No neoadjuvant chemotherapy because of concurrent discovery of multiple myeloma and ongoing chemotherapy. She had a normal mammogram study on 7/24/2018.       7.  Right middle lobe pulmonary nodule, documented on CT scan of chest on 01/06/2017. Repeated CT scan of chest on 8/21/2017 reported a small 5 mm and stable primary nodule.      8.  Skin rashes on her extremities:  Patient was seen dermatologist Dr. Barroso, who prescribed steroids cream and also over-the-counter moisturizing cream.  Patient will follow-up with Dr. Barroso again.    9. Profound fatigue: This is multifactorial secondary to her disease progression and her anemia associated with chemotherapy.  Hopefully changing chemotherapy regimen and also starting on Procrit would improve this situation.  Fatigue, unfortunately remains persistent today.  We will continue to monitor her labs closely.      10.  Hypocalcemia.  The patient has struggled with compliance with her calcium supplements.  She reports she has been taking these inconsistently, though they resulted in soft frequent bowel movements.  We discussed adding 2 tablets of Tums  daily along with current calcium vitamin D supplementation.  Will repeat CMP in 1 week.    Plan:  1. Proceed today with cycle 4-day 1 of Velcade, dexamethasone, Treanda.  Dexamethasone will be dose reduced by 20% due to thrombocytopenia.  2. Continue weekly Velcade dexamethasone.  3. Proceed with Procrit today, 25,000 units.  Continue weekly for hemoglobin less than 10.0.  4. Continue calcium and vitamin D supplementation.  We discussed compliance.  We also discussed adding Tums daily for increased calcium.  We will repeat CMP in 1 week.  5. Continue oral B12 and folic acid daily.  6. Continue prophylactic acyclovir 400 mg twice a day.  7. In 2 weeks, the patient will be due for monthly Xgeva.  8. In 2 weeks, we will evaluate serum and urine paraprotein studies to evaluate response to 4 cycles of current therapy.  9. MD follow-up with Dr. Araujo in 4 weeks to evaluate response to therapy.  10. The patient understands the call with any signs or symptoms of bleeding.     Dose adjustment was reviewed with Dr. Araujo today who is in agreement.    Lavern Arriola, APRN  02/28/2019

## 2019-03-04 DIAGNOSIS — C90.00 MULTIPLE MYELOMA NOT HAVING ACHIEVED REMISSION (HCC): ICD-10-CM

## 2019-03-07 NOTE — PROGRESS NOTES
Reviewed labs with Dr. Araujo. Procrit was given twice at 25,000 units for Hgb <10. Patient must receive 4 consecutive doses of 25,000 units of Procrit before patient's insurance will approve any change. Patient has no complaints or signs or symptoms of bleeding at this time. Patient is to contact physician if there are any new changes.

## 2019-03-14 NOTE — PROGRESS NOTES
Calcium level remains low. Per Dr. Araujo ok to administer xgeva and pt to increase her oral calcium supplement from 1 tab BID to 2 tabs BID. Pt v/u. Updated script and sent to pharmacy, however pt states she gets it OTC. After pt had left today, received call from Radha in [place] lab. SST tubes that were drawn do not seem to be spinning down properly. Labs were drawn without difficulty from port. Since patient has already left, Radha will put ADOLFO, SPEP orders in to be recollected at next visit.

## 2019-03-21 NOTE — PROGRESS NOTES
Discussed patient's labs with Dr. Araujo. Patient's procrit is to be 31,000 units today and patient can receive Velcade injection also. Labs discussed with patient. Patient to contact physician with any further concerns.

## 2019-04-04 NOTE — PROGRESS NOTES
Spoke to ROSY Puckett concerning labs. Treatment as planned today. Patient to report any active bleeding. Patient V/U.

## 2019-04-08 PROBLEM — R10.13 EPIGASTRIC ABDOMINAL PAIN: Status: ACTIVE | Noted: 2019-01-01

## 2019-04-08 PROBLEM — K21.9 GASTROESOPHAGEAL REFLUX DISEASE: Status: ACTIVE | Noted: 2019-01-01

## 2019-04-10 PROBLEM — K21.9 GASTROESOPHAGEAL REFLUX DISEASE: Status: RESOLVED | Noted: 2019-01-01 | Resolved: 2019-01-01

## 2019-04-10 NOTE — PROGRESS NOTES
"Chief Complaint   Patient presents with   • Seizures     medication refill       Subjective     Marina Barroso presents to the office today to refill her medications. No medication side effects are reported.  She is here today to refill medicine for seizure disorder.  She has not had a seizure since the early 1990s.  No medication side effects reported.  Medication list has been updated during today's visit.  Review of active problem list.    I have reviewed and updated her medications, medical history and problem list during today's office visit.     Patient Care Team:  Cristo Madera MD as PCP - General (Family Medicine)  Franko Hernandes MD as Surgeon (Neurosurgery)  Zane Araujo MD PhD as Consulting Physician (Hematology and Oncology)  Michael Everett Jr., MD as Consulting Physician (Urology)  Cristo Madera MD as Referring Physician (Family Medicine)  Trudy Rivera MD as Surgeon (General Surgery)    Social History     Tobacco Use   • Smoking status: Never Smoker   • Smokeless tobacco: Never Used   Substance Use Topics   • Alcohol use: Yes     Comment: YEARLY        Review of Systems   Neurological:        See HPI       Objective     /77   Pulse 77   Temp 97 °F (36.1 °C) (Oral)   Resp 16   Ht 157.5 cm (62.01\")   Wt 83.9 kg (185 lb)   LMP  (LMP Unknown)   BMI 33.83 kg/m²     Body mass index is 33.83 kg/m².    Physical Exam   Constitutional: She is oriented to person, place, and time. No distress.   Cardiovascular: Normal rate and normal heart sounds.   Pulmonary/Chest: Effort normal and breath sounds normal.   Neurological: She is alert and oriented to person, place, and time.   Skin: She is not diaphoretic.   Psychiatric: She has a normal mood and affect. Her speech is normal. She is attentive.   Vitals reviewed.      Data Reviewed:                    Assessment/Plan     Diagnoses and all orders for this visit:    1. Seizure disorder (CMS/[facility]) (Primary)  -     carBAMazepine XR (TEGretol  XR) " 200 MG 12 hr tablet; Take 2 tablets by mouth Every Night for 180 days.  Dispense: 180 tablet; Refill: 1          No orders of the defined types were placed in this encounter.        Current Outpatient Medications:   •  acyclovir (ZOVIRAX) 400 MG tablet, Take 1 tablet by mouth 2 (Two) Times a Day., Disp: 60 tablet, Rfl: 5  •  aspirin 81 MG tablet, Take 81 mg by mouth Every Night., Disp: , Rfl:   •  bisoprolol (ZEBeta) 5 MG tablet, Take 5 mg by mouth 2 (Two) Times a Day., Disp: , Rfl:   •  Calcium-Magnesium-Vitamin D (CALCIUM 500) 500-250-200 MG-MG-UNIT tablet, Take 2 tablets by mouth 2 (Two) Times a Day., Disp: 60 each, Rfl: 3  •  dexamethasone (DECADRON) 4 MG tablet, Take 5 tablets by mouth Every 7 (Seven) Days. Take with food weekly on day of Velcade treatment, Disp: 20 tablet, Rfl: 11  •  folic acid (FOLVITE) 1 MG tablet, Take 1 mg by mouth Every Morning., Disp: , Rfl:   •  hydrALAZINE (APRESOLINE) 25 MG tablet, Take 25 mg by mouth 2 (two) times a day., Disp: , Rfl: 3  •  isosorbide mononitrate (IMDUR) 60 MG 24 hr tablet, Take 1 tablet by mouth Daily. TAKE 1 TABLET BY MOUTH EVERY MORNING AND ONE-HALF TABLET EVERY EVENING (Patient taking differently: Take 90 mg by mouth Daily.), Disp: , Rfl:   •  losartan (COZAAR) 25 MG tablet, Take 25 mg by mouth every evening., Disp: , Rfl:   •  Misc. Devices (VIRAGE CUSTOM BREAST PROSTHES) misc, 2 each As Needed (na)., Disp: 2 each, Rfl: 0  •  ondansetron (ZOFRAN) 8 MG tablet, Take 1 tablet by mouth 3 (Three) Times a Day As Needed for Nausea or Vomiting., Disp: 60 tablet, Rfl: 5  •  pantoprazole (PROTONIX) 40 MG pack packet, Take 1 packet by mouth Every Morning Before Breakfast., Disp: 30 each, Rfl: 5  •  carBAMazepine XR (TEGretol  XR) 200 MG 12 hr tablet, Take 2 tablets by mouth Every Night for 180 days., Disp: 180 tablet, Rfl: 1    Return in about 6 months (around 10/10/2019) for Medicare Wellness and regular visit, 30 minutes.

## 2019-04-11 NOTE — PROGRESS NOTES
"Reports not taking calcium \"it makes my stomach cramp\". States that Dr. Araujo knows that she wasn't tolerating.  "

## 2019-04-11 NOTE — PROGRESS NOTES
Additional day added to Xgeva plan for 4/11/19 per infusion RN request, as day was accidentally completed prior to medications being released DL

## 2019-04-18 NOTE — PROGRESS NOTES
Dr. Araujo gave verbal approval for treatment of Velcade injection today and to increase the Procrit to 38,000 units today.

## 2019-04-25 NOTE — PROGRESS NOTES
Referral to [facility] for one unit platelets to be given early morning on 5/13/19 prior to GI procedure per scheduling notes Dr. Araujo

## 2019-04-25 NOTE — PROGRESS NOTES
Patient to continue with Procrit 38,000 units per verbal order of Dr. Araujo. Patient cleared for treatment today with Velcade and Bendamustine post Dr. Araujo review of all labs.

## 2019-04-25 NOTE — TELEPHONE ENCOUNTER
----- Message from Cira Rico sent at 4/25/2019  9:43 AM EDT -----  I need a 2unit overbook per  at  5/16 9:20am   Thanks

## 2019-05-09 NOTE — PROGRESS NOTES
CBC, and CMP reviewed with Dr Araujo Ca+ 8.2 MD states ok to proceed with xgeva, procrit and velcade today as plan

## 2019-05-10 PROBLEM — R10.13 EPIGASTRIC ABDOMINAL PAIN: Status: ACTIVE | Noted: 2019-01-01

## 2019-05-10 PROBLEM — K21.9 GASTROESOPHAGEAL REFLUX DISEASE: Status: ACTIVE | Noted: 2019-01-01

## 2019-05-13 NOTE — PATIENT INSTRUCTIONS
Platelet Transfusion  A platelet transfusion is a procedure in which you receive donated platelets through an IV tube. Platelets are tiny pieces of blood cells. When a blood vessel is damaged, platelets collect in the damaged area to help form a blood clot. This begins the healing process. If your platelet count gets too low, your blood may have trouble clotting.  You may need a platelet transfusion if you have a condition that causes a low number of platelets (thrombocytopenia). A platelet transfusion may be used to stop or prevent bleeding.  Tell a health care provider about:  · Any allergies you have.  · All medicines you are taking, including vitamins, herbs, eye drops, creams, and over-the-counter medicines.  · Any problems you or family members have had with anesthetic medicines.  · Any blood disorders you have.  · Any surgeries you have had.  · Any medical conditions you have.  · Any reactions you have had during a previous transfusion.  What are the risks?  Generally, this is a safe procedure. However, problems may occur, including:  · Fever with or without chills. The fever usually occurs within the first 4 hours of the transfusion and returns to normal within 48 hours.  · Allergic reaction. The reaction is most commonly caused by antibodies your body creates against substances in the transfusion. Signs of an allergic reaction may include itching, hives, difficulty breathing, shock, or low blood pressure.  · Sudden (acute) or delayed hemolytic reaction. This rare reaction can occur during the transfusion and up to 28 days after the transfusion. The reaction usually occurs when your body’s defense system (immune system) attacks the new platelets. Signs of a hemolytic reaction may include fever, headache, difficulty breathing, low blood pressure, a rapid heartbeat, or pain in your back, abdomen, chest, or IV site.  · Transfusion-related acute lung injury (TRALI). TRALI can occur within hours of a transfusion,  or several days later. This is a rare reaction that causes lung damage. The cause is not known.  · Infection. Signs of this rare complication may include fever, chills, vomiting, a rapid heartbeat, or low blood pressure.    What happens before the procedure?  · You may have a blood test to determine your blood type. This is necessary to find out what kind of platelets best matches your platelets.  · If you have had an allergic reaction to a transfusion in the past, you may be given medicine to help prevent a reaction. Take this medicine only as directed by your health care provider.  · Your temperature, blood pressure, and pulse will be monitored before the transfusion.  What happens during the procedure?  · An IV will be started in your hand or arm.  · The transfusion will be attached to your IV tubing. The bag of donated platelets will be attached to your IV tube and given into your vein.  · Your temperature, blood pressure, and pulse will be monitored regularly during the transfusion. This monitoring is done to help detect early signs of a transfusion reaction.  · If you have any signs or symptoms of a reaction, your transfusion will be stopped and you may be given medicine.  · When your transfusion is complete, your IV will be removed.  · Pressure may be applied to the IV site for a few minutes.  · A bandage (dressing) will be applied.  The procedure may vary among health care providers and hospitals.  What happens after the procedure?  · Your blood pressure, temperature, and pulse will be monitored regularly.  This information is not intended to replace advice given to you by your health care provider. Make sure you discuss any questions you have with your health care provider.  Document Released: 10/14/2008 Document Revised: 05/25/2017 Document Reviewed: 10/28/2015  Elsevier Interactive Patient Education © 2019 [employer]  Platelet Transfusion, Care After  Refer to this sheet in the next few weeks. These  instructions provide you with information about caring for yourself after your procedure. Your health care provider may also give you more specific instructions. Your treatment has been planned according to current medical practices, but problems sometimes occur. Call your health care provider if you have any problems or questions after your procedure.  What can I expect after the procedure?  After the procedure, it is common to have:  · Bruising and soreness at the IV site.  · Fever or chills within the first 48 hours of your transfusion.    Follow these instructions at home:  · Take medicines only as directed by your health care provider. Ask your health care provider if you can take an over-the-counter pain reliever in case you have a fever or headache a day or two after your transfusion.  · Return to your normal activities as directed by your health care provider.  Contact a health care provider if:  · You have a fever.  · You have a headache.  · You have redness, swelling, or pain at your IV site.  · You have skin itching or a rash.  · You vomit.  · You feel unusually tired or weak.  Get help right away if:  · You have trouble breathing.  · You have a decreased amount of urine or you urinate less often than you normally do.  · Your urine is darker than normal.  · You have pain in your back, abdomen, or chest.  · You have cool, clammy skin.  · You have a rapid heartbeat.  This information is not intended to replace advice given to you by your health care provider. Make sure you discuss any questions you have with your health care provider.  Document Released: 01/08/2016 Document Revised: 05/25/2017 Document Reviewed: 10/28/2015  Elsevier Interactive Patient Education © 2019 Elsevier Inc.

## 2019-05-13 NOTE — ANESTHESIA POSTPROCEDURE EVALUATION
Patient: Marina Barroso    Procedure Summary     Date:  05/13/19 Room / Location:   [facility]    Anesthesia Start:  1058 Anesthesia Stop:  1113    Procedure:  ESOPHAGOGASTRODUODENOSCOPY WITH BIOPSIES (N/A Esophagus) Diagnosis:       Epigastric abdominal pain      Gastroesophageal reflux disease, esophagitis presence not specified      (Epigastric abdominal pain [R10.13])      (Gastroesophageal reflux disease, esophagitis presence not specified [K21.9])    Surgeon:  Trudy Rivera MD Provider:  Claudia Russo MD    Anesthesia Type:  MAC ASA Status:  3          Anesthesia Type: MAC  Last vitals  BP   103/64 (05/13/19 1134)   Temp   36.6 °C (97.8 °F) (05/13/19 0950)   Pulse   69 (05/13/19 1134)   Resp   16 (05/13/19 1134)     SpO2   100 % (05/13/19 1134)     Post Anesthesia Care and Evaluation    Patient location during evaluation: PHASE II  Level of consciousness: awake  Anesthetic complications: No anesthetic complications

## 2019-05-13 NOTE — H&P
Cc: Endoscopy Visit    HPI: 68 y.o. female here for reflux and epigastric pain.  This has abated a little with cessation of vitamin pills.  She has anemia and has multiple myeloma.    Past Medical History:   Diagnosis Date   • Anemia 10/14/2008    Secondary to chronic renal insufficiency and myeloma   • Arthropathy of knee 01/07/2014    unspecified   • Breast cancer (CMS/[facility]) 04/2012    Left breast invasive ductal carcinoma, stage II, ER/NM negative, HER-2/mk positive   • Bursitis of left hip 10/18/2007   • Bursitis, knee 12/02/2013   • Calcaneal spur 08/12/2009   • Chronic kidney disease, stage III (moderate) (CMS/[facility])    • Colon cancer screening 07/01/2017    Cologuard testing: Positive results   • Congestive heart failure (CMS/[facility])     Chronic systolic   • Diverticulitis of colon 10/17/2007   • DVT (deep venous thrombosis) (CMS/[facility])     right lower extremity, S/P IVC filter   • Gastroesophageal reflux disease 4/8/2019    Added automatically from request for surgery [id number]   • H/O Diastolic dysfunction    • H/O Mitral regurgitation    • History of Clostridium difficile 04/01/2014   • History of echocardiogram 04/2014    EF decreased to 46% - per cardiology   • History of MRSA infection    • History of obesity    • History of transfusion    • Hydronephrosis     chronic, right   • Hypertension    • LLL pneumonia (CMS/[facility]) 04/23/2009   • Malignant neoplasm of kidney (CMS/[facility]) 12/2009    and other and unspecified urinary organs; urinary organ, site unspecified; s/p left nephrectomy   • Multiple myeloma (CMS/[facility]) 04/2012   • Multiple myeloma (CMS/[facility])    • Myocardial infarction (CMS/[facility])     NSTEMI   • Neoplasm of uncertain behavior 10/12/2015    of other and unspecified sites and tissues; other specified sites   • Non-STEMI (non-ST elevated myocardial infarction) (CMS/[facility])    • Nonischemic cardiomyopathy (CMS/[facility])    • Pyelonephritis 03/2004   • Seizures (CMS/[facility]) 10/17/2007   • Thrombocytopenia (CMS/[facility])    • UTI  (urinary tract infection) 10/30/2014    pseudomonas, multidrug resistant   • UTI (urinary tract infection) 03/28/2014    site not specified   • Ventral hernia        Past Surgical History:   Procedure Laterality Date   • BONE MARROW BIOPSY N/A 04/11/2012   • BRAIN SURGERY  2005    Meningioma   • BRAIN SURGERY  1990    Tumor benign per patient   • BREAST BIOPSY Left 04/09/2012    Dr. Gregg May   • CARDIAC CATHETERIZATION Left 06/11/2012    Dr. Sudeep Haddad   • CARDIAC CATHETERIZATION N/A 08/15/2006    Dr. Brian Bhatt   • COLONOSCOPY N/A 8/30/2017    Procedure: COLONOSCOPY into cecum and TI with cold polypectomies;  Surgeon: Trudy Rivera MD;  Location: [facility];  Service:    • COLONOSCOPY W/ POLYPECTOMY N/A unknown    2 polyps, benign   • CYSTOSCOPY N/A 3/25/2016    Procedure: CYSTOSCOPY  WITH RIGHT STENT CHANGE;  Surgeon: Lakhwinder Russo MD;  Location: [facility] OR;  Service:    • CYSTOSCOPY N/A 03/10/2012    Cystoscopy, right retrograde, right double-J stent-Dr. Sloan May   • CYSTOSCOPY N/A 02/25/2012    Cystoscopy, stent removal, right retrograde pyelogram, replacement of right double-J ureteral stent-Dr. Michael Everett   • CYSTOSCOPY W/ URETERAL STENT PLACEMENT Right 5/7/2016    Procedure: CYSTOSCOPY, URETERAL CATHETER INSERTION AND RIGHT STENT EXCHANGE ;  Surgeon: Michael Everett Jr., MD;  Location: [facility] OR;  Service:    • CYSTOSCOPY W/ URETERAL STENT PLACEMENT N/A 1/20/2017    Procedure: CYSTOSCOPY RIGHT RETROGRADE PYELOGRAM, URETERAL STENT CHANGE;  Surgeon: Lakhwinder Russo MD;  Location: [facility] OR;  Service:    • CYSTOSCOPY W/ URETERAL STENT PLACEMENT N/A 04/02/2015    Cystoscopy, removal of right ureteral stent, right retrograde pyelogram, placement of right double-J ureteral stent-Dr. Michael Everett   • CYSTOSCOPY W/ URETERAL STENT PLACEMENT N/A 04/26/2015    Cystoscopy, removal of right ureteral stent, right retrograde pyelogram, replacement of right ureteral stent  24 cm x 7-Mongolian without retrieval line-Dr. Jose Gomez   • CYSTOSCOPY W/ URETERAL STENT PLACEMENT N/A 12/22/2014    Cystoscopy, removal of right double-J ureteral stent, right retrograde pyelogram, placement of right double-J ureteral stent-Dr. Michael Everett   • CYSTOSCOPY W/ URETERAL STENT PLACEMENT N/A 09/22/2014    Cystoscopy, removal of right ureteral stent, removal of right retrograde pyelogram, replacement of right double-J ureteral stent-Dr. Michael Everett   • CYSTOSCOPY W/ URETERAL STENT PLACEMENT N/A 06/18/2014    Cystoscopy, removal of right double-J ureteral stent, right retrograde pyelogram, placement of right double-J ureteral stent-Dr. Michael Everett   • CYSTOSCOPY W/ URETERAL STENT PLACEMENT N/A 01/23/2014    Cystoscopy, removal of right double-J ureteral stent, right retrograde pyelogram, placement of right double-J ureteral stent-Dr. Michael Everett   • CYSTOSCOPY W/ URETERAL STENT PLACEMENT N/A 03/27/2012    Cystoscopy, removal of ureteral stent, right retrograde pyelogram, replacement of indewlling right ureteral stent-Dr. Michael Everett   • CYSTOSCOPY W/ URETERAL STENT PLACEMENT N/A 03/27/2013    Cystoscopy, right retrograde pyelogram, removal and replacement of right double-J ureteral stent-Dr. Michael Everett   • CYSTOSCOPY W/ URETERAL STENT PLACEMENT N/A 11/12/2012    Cystoscopy, stent removal, right retrograde pyelogram, replacement of right double-J ureteral stent-Dr. Michael Everett   • CYSTOSCOPY W/ URETERAL STENT PLACEMENT N/A 09/24/2011    Cystoscopy, removal of ureteral stent, right retrograde pyelogram and placement of right double-J ureteral stent-Dr. Michael Everett   • CYSTOSCOPY W/ URETERAL STENT PLACEMENT N/A 05/14/2011    Cystoscopy, removal of right ureteral stent, right retrograde pyelogram and placement of new right double-J ureteral stent-Dr. Michael Everett   • CYSTOSCOPY W/ URETERAL STENT PLACEMENT N/A 12/31/2010    Cystoscopy, stent removal, right retrograde pyelogram, replacement of 7 Bermudian x 26  cm double-J stent-Dr. Michael Everett   • CYSTOSCOPY W/ URETERAL STENT PLACEMENT N/A 08/28/2010    Cystoscopy, stent removal, right retrograde pyelogram with interpretation, placement of right double-J ureteral stent-Dr. Michael Everett   • CYSTOSCOPY W/ URETERAL STENT PLACEMENT N/A 05/24/2010    Cystoscopy, right retrograde pyelogram, replacement of right double-J ureteral stent-Dr. Michael Everett   • CYSTOSCOPY W/ URETERAL STENT PLACEMENT N/A 02/12/2010    Cystoscopy, right retrograde pyelogram and placement of right double-J ureteral stent-Dr. Michael Everett   • CYSTOSCOPY W/ URETERAL STENT PLACEMENT N/A 02/23/2009    Cystoscopy, right retrograde pyelogram, placement of right double-J ureteral stent-Dr. Micahel Everett   • CYSTOSCOPY W/ URETERAL STENT PLACEMENT N/A 09/17/2007    Dr. Michael Everett   • CYSTOSCOPY W/ URETERAL STENT PLACEMENT Right 01/29/2018    Dr. Michael Everett   • CYSTOSCOPY W/ URETERAL STENT PLACEMENT N/A 06/04/2018    Cystoscopy, removal of right double-J ureteral stent and placement of right double-J ureteral stent. Dr. Michael Everett.   • CYSTOSCOPY W/ URETERAL STENT PLACEMENT N/A 03/06/2018    Cystoscopy, removal of right ureteral stent, replacement of right double-J ureteral stent. Dr. Michael Everett   • ELBOW PROCEDURE Bilateral 1990s   • HERNIA REPAIR  09/03/2018   • LASIK Bilateral    • MASTECTOMY Left 04/25/2012    Left total mastectomy with sentinel lymph node biopsies and left axillary lymphatic mapping-Dr. Gregg May   • MEDIPORT INSERTION, DOUBLE Right    • NEPHRECTOMY Left 12/30/2009    Dr. 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 Cristo Pittman   • TOTAL ABDOMINAL HYSTERECTOMY Bilateral 2007    Dr. Todd   • URETEROURETEROSTOMY Right 05/01/2006    Dr. Michael Everett   • VENA CAVA FILTER INSERTION N/A 05/08/2006     Dr. Jordon Barber   • VENOUS ACCESS DEVICE (PORT) INSERTION Right 02/25/2013    Right subclavian vein PowerPort insertion-Dr. Gregg May   • VENOUS ACCESS DEVICE (PORT) INSERTION AND REMOVAL N/A 10/06/2014    Revision of right internal jugular PowerPort with fluoroscopy, removal of peripherally inserted central catheter line-Dr. Aurora Tomlinson   • VENOUS ACCESS DEVICE (PORT) INSERTION AND REMOVAL N/A 08/14/2014    Ultrasound-guided access right internal jugular vein, PowerPort placement, removal of right subclavian port-Dr. Jordon Barber   • VENTRAL/INCISIONAL HERNIA REPAIR Left 9/19/2018    Procedure: ventral hernia repair with mesh and rectus muscle advancement flap;  Surgeon: Trudy Rivera MD;  Location: [facility];  Service: General       is allergic to zosyn [piperacillin sod-tazobactam so].       Medication List      CONTINUE taking these medications    VIRAGE CUSTOM BREAST PROSTHES misc  2 each As Needed (na).        ASK your doctor about these medications    acyclovir 400 MG tablet  Commonly known as:  ZOVIRAX  Take 1 tablet by mouth 2 (Two) Times a Day.     aspirin 81 MG tablet     bisoprolol 5 MG tablet  Commonly known as:  ZEBeta     carBAMazepine  MG 12 hr tablet  Commonly known as:  TEGretol  XR  Take 2 tablets by mouth Every Night for 180 days.     dexamethasone 4 MG tablet  Commonly known as:  DECADRON  Take 5 tablets by mouth Every 7 (Seven) Days. Take with food weekly on day   of Velcade treatment     hydrALAZINE 25 MG tablet  Commonly known as:  APRESOLINE     HYDROcodone-acetaminophen 5-325 MG per tablet  Commonly known as:  NORCO     isosorbide mononitrate 60 MG 24 hr tablet  Commonly known as:  IMDUR  Take 1 tablet by mouth Daily. TAKE 1 TABLET BY MOUTH EVERY MORNING AND   ONE-HALF TABLET EVERY EVENING     losartan 25 MG tablet  Commonly known as:  COZAAR     ondansetron 8 MG tablet  Commonly known as:  ZOFRAN  Take 1 tablet by mouth 3 (Three) Times a Day As Needed for Nausea or    Vomiting.     pantoprazole 40 MG EC tablet  Commonly known as:  PROTONIX  Take 1 tablet by mouth Daily. Before breakfast          Family History   Problem Relation Age of Onset   • Hypertension Other    • Miscarriages / Stillbirths Father    • Heart disease Father    • Hypertension Father    • Heart attack Father    • Diabetes Sister    • Skin cancer Sister    • Throat cancer Brother    • Malig Hyperthermia Neg Hx        Social History     Socioeconomic History   • Marital status:      Spouse name: Not on file   • Number of children: 3   • Years of education: High School   • Highest education level: Not on file   Occupational History   • Occupation:      Employer: RETIRED     Comment: [employer] [[id number]]   Tobacco Use   • Smoking status: Never Smoker   • Smokeless tobacco: Never Used   Substance and Sexual Activity   • Alcohol use: Yes     Comment: YEARLY    • Drug use: No   • Sexual activity: Defer   Social History Narrative    Son lives with her and helps       Vitals:    05/13/19 0950   BP: 115/66   Pulse: 73   Resp: 16   Temp: 97.8 °F (36.6 °C)   SpO2: 96%       Body mass index is 33.37 kg/m².    Physical Exam    General: No acute distress  Lungs: No labored breathing, Pulse oximetry on room air is 96%.  Heart: RRR  Abdo: Soft  Mental:  Awake, alert, and oriented    Imp:     · reflux and epigastric pain  · Anemia  · multiple myeloma.     Plan:  · NICHOLAS Rivera MD  11:01 AM

## 2019-05-13 NOTE — ANESTHESIA PREPROCEDURE EVALUATION
Anesthesia Evaluation     no history of anesthetic complications:               Airway   Mallampati: I  TM distance: >3 FB  Neck ROM: full  Dental - normal exam     Pulmonary    (+) pneumonia ,   (-) shortness of breath, recent URI    ROS comment: Pulmonary nodule  Cardiovascular     (+) hypertension, valvular problems/murmurs MR, past MI (NSTEMI) , angina (on occ, mild pain one day last week), CHF, DVT,   (-) dysrhythmias    ROS comment: EF46%    Neuro/Psych  (+) seizures (hx of remote brain met),     GI/Hepatic/Renal/Endo    (+) morbid obesity, GERD,  renal disease CRI,     Musculoskeletal     Abdominal    Substance History      OB/GYN          Other   (+) blood dyscrasia (thrombocytopenia)   history of cancer (multiple myeloma ,breast ca,kidney ca)                    Anesthesia Plan    ASA 3     MAC     intravenous induction   Anesthetic plan, all risks, benefits, and alternatives have been provided, discussed and informed consent has been obtained with: patient.

## 2019-05-13 NOTE — OP NOTE
EGD Procedure Note  Marina Barroso  YOB: 1950    Procedure Date: 05/13/19    Pre-operative Diagnosis:   · reflux and epigastric pain  · Anemia  · multiple myeloma.    Post-operative Diagnosis:  · Prepyloric ulcerations with inflammation/induration/dysmorphic appearance    Procedure:  EGD with biopsies    Surgeon: Nicole    Anesthetic: Claudia Gray MD    Associated issues:   · Multiple myeloma  · Has an RX for protonix but apparently not taking    Findings/Treatments:  · Prepyloric ulcerations with inflammation/induration/dysmorphic appearance;  It's possible that the patient had an element of GOO (gastric outlet obstruction) from the ulcerations.  · Ulcerations times four with mounding of tissue right up to the pylorus.  · Closing down of the prepyloric region due to induration.  · No christiane ulcer with visible white base.    Recommendations:  · Resume protonix bid for one week then daily  · Office will call in the next 3-10 days with results of biopsy  · Give patient a copy of the procedural photographs    Technique:    MAC anesthesia was induced.  Bite block was placed and the gastroscope was inserted thru such and advanced under direct vision to the second portion of the duodenum, the duodenum appearing normal.  A careful inspection was made as the scope was withdrawn and photographs taken, including a retroflexed view of the stomach.   Findings and interventions are described above.  Biopsies were  done with the cold biopsy forceps.      Air was removed at the end of the procedure    Trudy Rivera MD  05/13/19  11:11 AM

## 2019-05-13 NOTE — PROGRESS NOTES
Right mediport accessed easily using sterile technique according policy. Bio patch used and covered with tegaderm. Tolerated procedure well. Good blood return and flushes easily.  Tolerated infusion of platelets with no concerns. Mediport remained accessed ; flushed with 20cc's normal saline and patient sent to endo for an EGD. Report called to endo.   Duration Of Freeze Thaw-Cycle (Seconds): 5 Number Of Freeze-Thaw Cycles: 2 freeze-thaw cycles Post-Care Instructions: I reviewed with the patient in detail post-care instructions. Patient is to wear sunprotection, and avoid picking at any of the treated lesions. Pt may apply Vaseline to crusted or scabbing areas. Consent: The patient's consent was obtained including but not limited to risks of crusting, scabbing, blistering, scarring, darker or lighter pigmentary change, recurrence, incomplete removal and infection. Detail Level: Zone Render Post-Care Instructions In Note?: no

## 2019-05-16 PROBLEM — R53.82 CHRONIC FATIGUE: Status: RESOLVED | Noted: 2019-01-01 | Resolved: 2019-01-01

## 2019-05-16 PROBLEM — R53.82 CHRONIC FATIGUE: Status: ACTIVE | Noted: 2019-01-01

## 2019-05-16 NOTE — PROGRESS NOTES
Miriam (endoscopy liaison),    Please call patient to inform them of these findings and recommendations and ensure that any pamphlets that were to be given to the patient at the hospital were received.     Please make sure a letter is sent to the patient, recall method entered into the computer and the HIM tab updated as far as need for endoscopy follow up.    Thanks  Dr Rivera    Procedure Date: 05/13/19     Pre-operative Diagnosis:   · reflux and epigastric pain  · Anemia  · multiple myeloma.     Post-operative Diagnosis:  · Prepyloric ulcerations with inflammation/induration/dysmorphic appearance     Procedure:  EGD with biopsies     Surgeon: Nicole     Anesthetic: Claudia Gray MD     Associated issues:   · Multiple myeloma  · Has an RX for protonix but apparently not taking     Findings/Treatments:  · Prepyloric ulcerations with inflammation/induration/dysmorphic appearance;  It's possible that the patient had an element of GOO (gastric outlet obstruction) from the ulcerations.;  INFLAMMATION WITHOUT BACTERIA  · Ulcerations times four with mounding of tissue right up to the pylorus.  · Closing down of the prepyloric region due to induration.  · No christiane ulcer with visible white base.     Recommendations:  · Resume protonix bid for one week then daily;  NEEDS OFFICE VISIT IN 2 MONTHS TO SEE IF SHE NEEDS A MEDICATION CHANGE.  · Office will call in the next 3-10 days with results of biopsy  · Give patient a copy of the procedural photographs

## 2019-05-16 NOTE — PROGRESS NOTES
Pt came back to infusion area to have procrit administered.  Per protocol her dose should be increased to 47,000 units.  Reviewed with Kirsten Tompkins RN and she states that she submitted this to insurance on 5/13/19 and have not heard back about approval.  We cannot give her procrit without it being approved by insurance.  Reviewed with Dr. Araujo, per Dr. Araujo we can let patient leave and will give procrit once it is approved.  R&V ENRIQUETA Carr   Pt is type and crossmatched and scheduled for a blood transfusion tomorrow.  I explained all of the above to patient and she v/u.     **I released her procrit injection but did not administer.

## 2019-05-16 NOTE — PROGRESS NOTES
Reasons for follow up:   1. IgG kappa multiple myeloma, currently on Darzalex, started on May 26, 2016. Patient was switched to Darzalex from Pomalyst, as she continued to be neutropenic with recurrent urinary tract infection while on Pomalyst.    2. Zometa is on hold due to renal insufficiency.    3. After 16 doses of Darzalex, laboratory study showed stable disease in November 2016. Insurance company denied adding Velcade and dexamethasone. Patient is to be continued on monthly Darzalex from 12/06/16.   4. Obstructive right pyelonephritis with positive urine culture for E. coli, required hospitalization from 1/19/2017 through 01/24/2017 with iv antibiotics and stent exchange on 01/20/2017.   5.  Paraprotein studies on March 27, 2017 showed further slight improvement of multiple myeloma.   6.  Febrile illness, with urinary tract infection and influenza B infection in early May 2017, required hospitalization for 6 days.   7.  Paraprotein studies for both serum and 24-hour urine sample on 7/21/2017 showed further improvement of paraproteins.   Darzalex was continued.   8.  Serum protein study reported stable disease on 10/20/2017.  Darzalex will be continued.   9.  Laboratory studies of both serum paraprotein and a 24-hour urine protein in January 2018 showed a further improvement of paraproteins.  Monthly Darzalex will be continued.   10. Repair of left flank incisional hernia in middle September 2018.   11. Serum paraprotein study reached micaela on 7/5/2018.  Since that time she has evidence of disease progression.   12. Disease progression, she was started on chemotherapy with bendamustine plus Velcade plus dexamethasone per protocol on 11/29/2018. Treatment will be bendamustine on day 1 and day 4 repeat every 28 days, Velcade and dexamethasone will be on day 1 day 4 and day 8 and day 15 repeat every 28 days.   13.  From cycle #2, patient was given only 1 dose of Treanda per cycle and continue Velcade and  dexamethasone weekly.   14.  Treanda was decreased by 20% after cycle #4 because of severe thrombocytopenia, and further decreased by another 25% on cycle #6 which is her last cycle on 4/25/2019.            History of Present Illness:      The patient is a 68 y.o. female with the above-mentioned history, who returns today to discuss laboratory studies obtained after her cycle #6 treatment with bendamustine, Velcade, dexamethasone.      Today patient reports she feels significant fatigue, more so than her previous situation.  She denies fever sweating chills, she denies pain in the right flank/lower abdomen area which oftentimes related to her infection because of ureteral stenting.  She denies dysuria.  She has no dizzy or fainting.  She reports no melena or hematochezia or any kind of bleeding.      Patient also reports skin redness at the site of Velcade injection on her lower abdomen, ages started yesterday.     She has worsening anemia secondary to her chemotherapy with Hb 8.5 today.  Her platelets is 40,000 today.  She has normal WBC 7400 including ANC 4800 and lymphocytes 1100 monocytes 740.      This patient had EGD examination by Dr. Rivera this past Monday on 5/13/2019, she had a 1 unit platelets transfusion prior to the EGD examination.  I reviewed the procedure note from Dr. Rivera which described prepyloric her saturations with inflammation.     Her laboratory studies on 5/9/2019 reported monoclonal IgG lambda as well as free lambda chain by immunofixation, total M spike 4.3 g/dL.  Quantitation of immunoglobin reporting IgG 4873 mg/dL, IgA 14, IgM 14, free kappa chain 11.5 and a free lambda chain 747.7 mg/L.  Kappa/lambda ratio 0.02.  Her beta-2 microgram was 14.6 mg/L.  Her iron study reporting ferritin 650 and the free iron 64 TIBC 136 and iron saturation 47%.  Chemistry lab reported baseline creatinine 1.80, calcium 8.2, total protein 9.3 and albumin 2.7, and the globulin 6.6.  She had a normal  liver function panel, normal calcium potassium and sodium.  LDH was 280.      Past Medical History, Past Surgical History, Social History, Family History have been reviewed and are without significant changes except as mentioned.      Hematology/oncology history: See separate document for extra information.       On12/6/2016, serum free lambda chain 1090 MG/L, free kappa chain 8.5 to MG/L.  Ferritin 1015, iron 99, TIBC 137 iron saturation 72%.     On January 4, 2017, vitamin B12 level 1289, folate 7.7 ng/mL. SPEP reporting gamma globulin 3.3, out of total globulin 5.0, with immunofixation reporting small quantity of monoclonal free lambda chain, plus monoclonal IgG lambda at 3.2 g/DL.  Serum IgG 4075, IgA 9 and IgM 15.  free lambda chain 1339 MG/L and free kappa chain 10.3 MG/L.      Laboratory study March 27, 2017 showed slight improvement of paraprotein, SPEP reporting M spike 2.8 g/DL, out of total globulin 4.3, and the serum IgG 3420, IgA 9, IgM 11, free lambda chain 1218 MG/L and free kappa chain 8.0 MG/L.  Total 24 hour urine sample reported at free lambda chain 142 mg, at a concentration 74.8 MG/L, free kappa chain 75 mg at a concentration 39.5 MG/L., Urine protein immunofixation reporting biclonal IgG lambda and monoclonal free lambda light chain, M spike #1 at 41.5% and monoclonal IgG lambda spike #2 at 10.5%. Serum beta-2 microglobulin is 7.3 MG/L.     Her laboratory study on 7/21/2017 reported further improvement of paraprotein is both serum and a 24-hour urine sample.  SPEP reporting monoclonal IgG lambda 2.9 g/dL and free lambda chain 0.1 g/dL.  Serum IgG was 3261 mg/dL and IgA 5, IgM 13, free kappa chain 6.7, free lambda chain 701.3 with kappa/lambda ratio 0.01.  24-hour urine sample reported positive for Bence May protein lambda type, immunofixation positive for IgG lambda.  Total urine protein 241 mg, gamma globulin 13.1%.  Hemoglobin was 11.4 .4, platelets 122,000, WBC 6680 including  neutrophils 3600, lymphocytes 2100 and monocytes 650.  Her creatinine was 1.68, at baseline level.  Liver function panel unremarkable.  Darzalex was continued.    Laboratory study on 8/25/2017 showed improved the platelets at 144,000, normal WBC 6660 and slightly improved hemoglobin at 11.7.     Patient had a colonoscopy examination on 8/30/2017 by Dr. Rivera.  It reported diverticulosis in multiple areas more severe in the sigmoid colon.  There was 2 polyps 4 mm pneumonia from transverse colon and the sigmoid colon.  Pathology evaluation reported tubular adenoma from the sigmoid colon polyp, and benign hyperplastic polyp from transverse colon.    Laboratory study on 10/20/2017 reported slightly improved monoclonal spike 2.7 g/dL by SPEP, immunofixation reported positive monoclonal IgG lambda, serum IgG 3536 mg/dL, IgA less than 5 and IgM 11, free kappa chain 5.3 mg/L, and free lambda chain 720 mg/L with kappa/lambda ratio 0.01.  Serum beta-2 microglobulin was 6.5 mg/L.   Her CBC showed a stable mild anemia with hemoglobin 11.3, macrocytosis .3, and the platelets 114,000, normal WBC 7070 including neutrophils 4100 lymphocytes 2000 monocytes 650, normal liver function panel, total protein 7.9 and albumin 3.2,  and normal electrolytes including calcium 8.1, and improved creatinine 1.59.     Laboratory study on 1/12/2018 reported serum IgG 3083 mg/dL, IgA 10, IgM 10, free kappa chain 8.5 and free lambda chain 589.1 mg/L, kappa/lambda ratio 0.01, serum protein admitted fixation reported IgG lambda monoclonal protein and SPEP reporting M spike 3.1 g/dL, beta-2 microgram and 7.4 mg/L. serum creatinine 1.79, calcium 8.2, other electrolytes normal, hemoglobin 11.3, .5 and MCHC 31.6, platelets 129,000, WBC 6780 including neutrophils 3500 lymphocytes 2300.  Serum ferritin 653.7 ng/mL, iron 123 TIBC 174 iron saturation 71%, folic acid 12.8 ng/mL and a vitamin B12 at 873 pg/mL.  Her 24-hour urine study on  1/18/2018 reported lambda chain 32.8 mg/L, total 61 mg in 24 hours, and the free kappa chain 27.4 mg/L and 51 mg in 24 hours, and the total 24-hour urine protein 283 mg, and urine protein immunofixation positive for 5.3% monoclonal IgG lambda and positive for Bence May protein at 36.2% monoclonal lambda chain.  Monthly Darzalex was continued.       This patient had serum protein study on 04/06/2018 which reported free light chain at concentration 703.8 mg/L and free kappa chain 7.0, free kappa/lambda ratio 0.01, serum IgG 3650 mg/dL, IgM 11 and IgA 9 with serum protein electrophoresis reporting monoclonal IgG lambda 3.2 g/dL and also monoclonal free lambda light chain.  But it was unable to quantify monoclonal lambda light chain because of the small quantity of it.     A 24-hour urine study on 04/20/2018 reported total free lambda chain 60 mg in 24 hours at concentration 33.1 mg/L, free kappa chain 45 mg in 24 hours at concentration 24.8 mg/L. Total 24-hour urine protein was 279 mg. Urine protein immunofixation and UPEP reported lambda-type Bence-May protein + #1 monoclonal IgG lambda band at 34.8% and #2 monoclonal IgG lambda band at 6.9%.     Her CBC results today reported a stable hemoglobin at 11.1, platelets 130,000 and WBC 7440 including neutrophils 4100 and lymphocytes 2350, monocytes 650. Her CMP reported slightly worsened creatinine at 1.82 with BUN 33. Total protein at 8.8. Liver function panel was normal. Total serum protein 8.8 and albumin 3.2, globulin 5.6.    Reviewing her previous lab studies especially serum paraprotein, she reached a micaela of response on 07/05/2018 when she had serum IgG 3015 mg/dL, free lambda chain 458.7 mg/L and M spike IgG lambda 2.6 g/dL and and monoclonal lambda free light chain 0.1 g/dL, total 2.7 g/dL. serum beta-2 myoglobin 7.1 mg/L.  Her ferritin was 539 ng/mL, free iron 73 TIBC 176 iron saturation 42%.    Laboratory study obtained on 11/01/2018 showed further disease  progression. Her serum IgG was 5472 mg/dL, IgA 8 and IgM 10, serum kappa chain 7.9 mg/L, free lambda chain 961.3 mg/L and kappa/lambda ratio 0.01. Serum protein electrophoresis reported monoclonal IgG lambda 4.1 g/dL, and monoclonal lambda free light chain 0.1 g/dL. Her hemoglobin was 9.0, .6, MCHC 30.1, platelets 124,000 and WBC 10,000 including neutrophils 7400, lymphocytes 1200, monocytes 600.     Cycle #1 day #1 Bendamustine will be started on 11/29/2018.     Laboratory studies on 01/17/2019 reported improved paraproteins.  SPEP reported M spike 3.8 g/dL out of total 5.5 g/dL globulin.  Serum IgG was 4374 mg/dL, IgA 10 and IgM 11.  Free lambda chain 606.8 mg/L, free kappa chain 8.6 and kappa/lambda ratio 0.01.  Her serum protein immunofixation continues to be IgG lambda monoclonal.  Her CBC showed hemoglobin 9.3, .3, platelets 50,000 and WBC 8600 including ANC 5100, lymphocytes 2160.  Her Chemistry lab reported creatinine 1.92, calcium 8.3, normal liver function panel, glucose 98 with normal sodium and potassium.     For her 24-hour urine study on 3/14/2019, she had free lambda chain at a 62.1 mg/L, total 87 mg in 24 hours, free kappa chain 42 mg/L and 59 mg in 24 hours.  Urine protein immunofixation reported a monoclonal IgG lambda #1 and free lambda chain were unable to be quantified, however IgG lambda #2 was 12.8%.  Her 24-hour urine total protein was 452 mg.     For serum protein study on 3/28/2019 (on day of her cycle #5 day #1 Treanda ) also reported further decreased monoclonal spike 3.2 mg/dL, and serum IgG 3600, IgA 13 IgM 12, free kappa chain 11.7, and worsening free lambda chain 1045 mg/L.     Her serum paraprotein studies on 4/11/2019 reported a serum IgG 4407 mg/dL, IgA 14, IgM 13, free kappa chain 9.2, free lambda chain 998.4 mg/L, kappa/lambda ratio 0.01.  His serum protein immunofixation was positive for IgG lambda, SPEP reporting M spike 4.3 g/dL.    I have reviewed the patient's  "medical history in detail and updated the computerized patient record.    Review of Systems   Constitutional: Positive for fatigue. Negative for appetite change, chills, diaphoresis, fever and unexpected weight change.   HENT:   Negative for mouth sores, nosebleeds, sore throat and trouble swallowing.    Eyes: Negative for icterus.   Respiratory: Negative for cough and shortness of breath.    Cardiovascular: Negative for chest pain, leg swelling and palpitations.   Gastrointestinal: Negative for abdominal pain, blood in stool, diarrhea and nausea.   Genitourinary: Negative for dysuria and hematuria.    Musculoskeletal: Negative for arthralgias and myalgias.   Skin: Positive for rash (Redness in the left lower abdomen at the site of Velcade injection on 5/16/2019).   Neurological: Positive for extremity weakness (Significant fatigue, not neurologic deficits). Negative for dizziness and headaches.   Hematological: Negative for adenopathy. Does not bruise/bleed easily.   Psychiatric/Behavioral: Negative for depression. The patient is not nervous/anxious.      Review of systems unchanged from previous office visit except as updated.     Medications: The current medication list was reviewed in the EMR.         Allergies   Allergen Reactions   • Zosyn [Piperacillin Sod-Tazobactam So] Swelling     Face and lips       VITALS:   Vitals:    05/16/19 0859   BP: 104/71   Pulse: 87   Resp: 16   Temp: 98.1 °F (36.7 °C)   SpO2: 98%   Weight: 82.1 kg (181 lb 1.6 oz)   Height: 157.5 cm (62.01\")   PainSc: 0-No pain   PS: ECOG 2      Physical Exam   Constitutional: well-developed and well-nourished -American female. No distress. not in acute distress.  She looks more fatigued than usual.  HEENT:     Head: Normocephalic.     Eyes: No jaundice. PERRL.     Neck: No thyromegaly, no masses.    Cardiovascular: Normal rate, regular rhythm, normal heart sounds.  No murmurs.  Pulmonary/Chest: Lungs clear bilaterally.  Normal respiratory " effort.  Mediport benign right upper chest.  Abdominal: Soft, no tenderness, guarding or rebound.  Bowel sounds are normal.  no mass.    Musculoskeletal: no edema or deformity.   Lymphadenopathy: She has no cervical, supraclavicular or axillary adenopathy.   Neurological: Oriented to person, place, and time. No cranial nerve deficit.    Skin: No petechiae no bruises.  Left lower abdomen skin redness, about a 2 cm in diameter, there is no active drainage.    Psychiatric: She has normal mood and affect.        Recent lab results:   Results from last 7 days   Lab Units 05/16/19  0846   WBC 10*3/mm3 7.43   NEUTROS ABS 10*3/mm3 4.83   LYMPHS ABS 10*3/mm3 1.11   HEMOGLOBIN g/dL 8.5*   HEMATOCRIT % 28.6*   PLATELETS 10*3/mm3 40*         PATHOLOGY:   Tissue Pathology Exam: [id number]   Order: [id number]   Collected:  5/13/2019 11:06 Status:  Edited Result - FINAL   Visible to patient:  No (Not Released) Dx:  Epigastric abdominal pain; Gastroesop...   Component    Addendum   An immunostain for H. pylori was performed on part 1 utilizing appropriate controls.  The specimen is judged negative for H. pylori by IHC staining.       jat/jse    Addendum electronically signed by Edward Hadley MD on 5/15/2019 at 0917   Final Diagnosis   1. Gastric Ulceration Biopsy:  Benign gastric mucosa with                A. Mild chronic inflammation with mild hyperplastic change and mucosal alteration noted suggesting possible                   chemical gastropathy (i.e. bile reflux, NSAIDs).               B. No definitive Helicobacter-like organisms identified by routine staining (see comment).               C. Reactive change noted, no dysplasia nor malignancy identified.     Jat/kds                   Assessment/Plan   1. Multiple myeloma, IgG lambda, stage III. Failed multiple lines of therapy. Her treatment was switched to Darzalex on 5/26/2016. She was on this treatment for more than 2 years.     In November 2018, she clearly had disease  progression.  Darzalex was discontinued and was started on bendamustine plus Velcade plus dexamethasone.     Cycle #1 day #1 Bendamustine was started on 11/29/2018.  Due to poor tolerance with profound fatigue, chemotherapy was modified with bendamustine only given on day 1, and a Velcade weekly, every 4 weeks as 1 cycle.  Patient had a cycle #6 dose reduction of bendamustine by 25% because of severe thrombocytopenia.     This patient actually had a micaela of response on 3/28/2019 with M spike 3.2 g/dL and serum IgG 3600.  After that M spike and serum IgG both were elevating.     Laboratory study obtained on 5/9/2019 after 6 cycles chemotherapy, it showed disease further progressed with worsening level of serum IgG 4873 mg/dL and M spike 4.3 g/dL..      I discussed with the patient today, because she has poor tolerance and disease progression, I recommended switching her chemotherapy to Empliciti plus Velcade and dexamethasone.  Patient voiced understanding and agreeable.        2.  Severe thrombocytopenia.  This secondary to chemotherapy and her multiple myeloma.  Patient has no easy bleeding or bruising.     Patient was given 1 unit platelets transfusion on 5/13/2019, prior to her EGD examination.  She did well without bleeding problem.  Her platelets is stable today at 40,000.      3.  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  Patient to has been on Procrit injection weekly.  However her hemoglobin has been deteriorating clearly due to chemotherapy.  We'll continue Procrit.      Her vitamin B12 was more than 2000 pg/mL. Her folic acid was 6.75 ng/mL on 11/15/2018.  She will continue oral vitamin B12 daily.    Her laboratory study on 5/9/2019 showed no evidence of iron deficiency.  I would double check her B12 and folic acid.     Because she is symptomatic for her anemia, I recommended to transfuse 2 units PRBC transfusion next couple days, with type and crossmatch today.    4.  Zometa / Xgeva treatment.  Because of her kidney insufficiency, we were only able to give her Zometa every 3 months.  Due to her elevated creatinine level, we eventually switched her to Xgeva treatment and continued monthly.        5. History of stage II left breast cancer, post mastectomy in 2013, negative for ER/AK and positive HER-2. No neoadjuvant chemotherapy because of concurrent discovery of multiple myeloma and ongoing chemotherapy. She had a normal mammogram study on 7/24/2018.       6.  Right middle lobe pulmonary nodule, documented on CT scan of chest on 01/06/2017. Repeated CT scan of chest on 8/21/2017 reported a small 5 mm and stable primary nodule.      7.  Skin rashes on her extremities:  Patient was seen dermatologist Dr. Barroso, who prescribed steroids cream and also over-the-counter moisturizing cream.  Patient will follow-up with Dr. Barroso again.    8. Profound fatigue: This is multifactorial secondary to her disease progression and her worsening anemia associated with chemotherapy.     I will check her thyroid profile with TSH and free T4 today.    9.  Abdomen skin cellulitis, mild at this started yesterday.  We'll start the patient on oral antibiotic Cefdinir twice a day for 5 days for coverage.  This patient is immunosuppressed.     10.  Hypocalcemia.  The patient has struggled with compliance with her calcium supplements.  She reports she has been taking these inconsistently, though they resulted in soft frequent bowel movements.  We discussed adding 2 tablets of Tums daily along with current calcium/vitamin D supplementation.      Her calcium level today is better 8.2 on 5/9/2019.    We'll continue to monitor.     11.  Epigastric discomfort.  Patient had EGD examination by Dr. Rivera on 5/13/2019.  It reported at prepyloric ulceration.  Dr. Rivera recommended Protonix twice a day.  Patient currently is taking 1 study so we'll increase the medication.    Plan:  1. No further treatment with  Daya.  Hold her Velcade treatment today.   2. Transfusion 2 units PRBC in the next few days.    3. Proceed with Procrit 38,000 units today.  However this was denied by her insurance company today.  This will undergoing review.  Monitor CBC weekly.    4. Patient will return in 1 week for NP reevaluation and to start new chemotherapy, with Empliciti plus Velcade and dexamethasone.  Treatment will be weekly for the first 2 cycles each every 4 weeks.  From cycle #3, Emplicit will be only given on the first day and continue weekly Velcade and dexamethasone.  Because of her cytopenia, Velcade will be decreased at 1 mg/m² each time.    5. Check labs vitamin B12 and folic acid today, together with TSH and free T4.    6. Continue calcium and vitamin D supplementation.  Also continue Tums daily.    7. Continue oral B12 and folic acid daily.  8. Continue prophylactic acyclovir 400 mg twice a day.   9. Start cefdinir 300 mg oral twice a day for 5 days.  I e- scribed to her pharmacy.   10. In 3 weeks, the patient will CMP again, with labs and cycle #1 day #15 chemotherapy, and that we'll be due for monthly Xgeva.  Her last dose was on 5/9/2019.   11. Patient will return on 6/20/2019 for M.D. evaluation on cycle #2 day #1 chemotherapy.   12. The patient understands to call with any signs or symptoms of bleeding.       Zane Araujo MD PhD  5/16/2019        Addendum:    Lab Results   Component Value Date    ZZEKUWWX69 1,819 (H) 05/16/2019     Lab Results   Component Value Date    FOLATE 5.84 05/16/2019     Lab Results   Component Value Date    TSH 3.330 05/16/2019     Free T4 at 0.81 ng/dL.    Laboratory study showed subtotal clinic hypothyroidism.  We'll start the patient on low-dose levothyroxine 50 µg daily.  I called and spoke to patient and today, recommending starting levothyroxin and she is agreeable.  I also asked patient to start over-the-counter folic acid 800 µg daily.  She voiced understanding.        Zane  MD Van PhD  5/18/2019

## 2019-05-16 NOTE — PROGRESS NOTES
Two units PRBCs to be given in the next couple of days, referral to [facility] for transfusion V.OTomer Araujo

## 2019-05-17 NOTE — TELEPHONE ENCOUNTER
----- Message from Trudy Rivera MD sent at 5/16/2019  4:22 PM EDT -----  Miriam (endoscopy liaison),    Please call patient to inform them of these findings and recommendations and ensure that any pamphlets that were to be given to the patient at the hospital were received.     Please make sure a letter is sent to the patient, recall method entered into the computer and the HIM tab updated as far as need for endoscopy follow up.    Thanks  Dr Rivera    Procedure Date: 05/13/19     Pre-operative Diagnosis:   · reflux and epigastric pain  · Anemia  · multiple myeloma.     Post-operative Diagnosis:  · Prepyloric ulcerations with inflammation/induration/dysmorphic appearance     Procedure:  EGD with biopsies     Surgeon: Nicole     Anesthetic: Claudia Gray MD     Associated issues:   · Multiple myeloma  · Has an RX for protonix but apparently not taking     Findings/Treatments:  · Prepyloric ulcerations with inflammation/induration/dysmorphic appearance;  It's possible that the patient had an element of GOO (gastric outlet obstruction) from the ulcerations.;  INFLAMMATION WITHOUT BACTERIA  · Ulcerations times four with mounding of tissue right up to the pylorus.  · Closing down of the prepyloric region due to induration.  · No christiane ulcer with visible white base.     Recommendations:  · Resume protonix bid for one week then daily;  NEEDS OFFICE VISIT IN 2 MONTHS TO SEE IF SHE NEEDS A MEDICATION CHANGE.  · Office will call in the next 3-10 days with results of biopsy  · Give patient a copy of the procedural photographs

## 2019-05-18 PROBLEM — E03.9 HYPOTHYROIDISM: Status: ACTIVE | Noted: 2019-01-01

## 2019-05-22 NOTE — PROGRESS NOTES
Reasons for follow up:   1. IgG kappa multiple myeloma, currently on Darzalex, started on May 26, 2016. Patient was switched to Darzalex from Pomalyst, as she continued to be neutropenic with recurrent urinary tract infection while on Pomalyst.    2. Zometa is on hold due to renal insufficiency.    3. After 16 doses of Darzalex, laboratory study showed stable disease in November 2016. Insurance company denied adding Velcade and dexamethasone. Patient is to be continued on monthly Darzalex from 12/06/16.   4. Obstructive right pyelonephritis with positive urine culture for E. coli, required hospitalization from 1/19/2017 through 01/24/2017 with iv antibiotics and stent exchange on 01/20/2017.   5.  Paraprotein studies on March 27, 2017 showed further slight improvement of multiple myeloma.   6.  Febrile illness, with urinary tract infection and influenza B infection in early May 2017, required hospitalization for 6 days.   7.  Paraprotein studies for both serum and 24-hour urine sample on 7/21/2017 showed further improvement of paraproteins.   Darzalex was continued.   8.  Serum protein study reported stable disease on 10/20/2017.  Darzalex will be continued.   9.  Laboratory studies of both serum paraprotein and a 24-hour urine protein in January 2018 showed a further improvement of paraproteins.  Monthly Darzalex will be continued.   10. Repair of left flank incisional hernia in middle September 2018.   11. Serum paraprotein study reached micaela on 7/5/2018.  Since that time she has evidence of disease progression.   12. Disease progression, she was started on chemotherapy with bendamustine plus Velcade plus dexamethasone per protocol on 11/29/2018. Treatment will be bendamustine on day 1 and day 4 repeat every 28 days, Velcade and dexamethasone will be on day 1 day 4 and day 8 and day 15 repeat every 28 days.   13.  From cycle #2, patient was given only 1 dose of Treanda per cycle and continue Velcade and  dexamethasone weekly.   14.  Treanda was decreased by 20% after cycle #4 because of severe thrombocytopenia, and further decreased by another 25% on cycle #6 which is her last cycle on 4/25/2019.            History of Present Illness:     The patient is a 68 y.o. female with the above-mentioned history, who returns today to initiate any chemotherapy with Empliciti following evidence of disease progression on recent paraprotein studies and poor tolerance of the Treanda.  She will continue with Velcade and dexamethasone weekly as prescribed previously.    When seen last week, patient multiple issues. First, she had abdominal skin cellulitis, attributable to the Velcade.  She was placed on a course of Cefdinir. This has now completely resolved.     At that time, patient was also profoundly fatigued with slight exertional dyspnea.  She was anemic with a hemoglobin of 8.5.  We proceeded with 2 units of packed red blood cells and thankfully she notes increased energy today.  Of note, thyroid level was also obtained with results of subclinical hypothyroidism.  Patient was initiated on Synthroid 50 mcg daily as well as folic acid 800 mcg daily.    Finally, patient was having significant abdominal pain and was referred to gastroenterology.  An EGD was performed by Dr. Rivera with evidence of prepyloric ulcerations.  Her dose of Protonix was increased from once daily, to twice daily.  Abdominal pain has now subsided.    Her labs today demonstrate improvement with HGB up to 10.2. Platelets are 50K, Total white blood cells are normal at 7.33. Creatinine is stable at 2.7. She denies any infectious symptoms including fevers or chills.  No excess bleeding or bruising noted.  She is maintaining adequate nutrition and hydration, but does continue with chronically decreased appetite.  Bowels and urination are regular.  Pain is stable.    She has no other concerns today.        Past Medical History, Past Surgical History, Social  History, Family History have been reviewed and are without significant changes except as mentioned.      Hematology/oncology history: See separate document for extra information.       On12/6/2016, serum free lambda chain 1090 MG/L, free kappa chain 8.5 to MG/L.  Ferritin 1015, iron 99, TIBC 137 iron saturation 72%.     On January 4, 2017, vitamin B12 level 1289, folate 7.7 ng/mL. SPEP reporting gamma globulin 3.3, out of total globulin 5.0, with immunofixation reporting small quantity of monoclonal free lambda chain, plus monoclonal IgG lambda at 3.2 g/DL.  Serum IgG 4075, IgA 9 and IgM 15.  free lambda chain 1339 MG/L and free kappa chain 10.3 MG/L.      Laboratory study March 27, 2017 showed slight improvement of paraprotein, SPEP reporting M spike 2.8 g/DL, out of total globulin 4.3, and the serum IgG 3420, IgA 9, IgM 11, free lambda chain 1218 MG/L and free kappa chain 8.0 MG/L.  Total 24 hour urine sample reported at free lambda chain 142 mg, at a concentration 74.8 MG/L, free kappa chain 75 mg at a concentration 39.5 MG/L., Urine protein immunofixation reporting biclonal IgG lambda and monoclonal free lambda light chain, M spike #1 at 41.5% and monoclonal IgG lambda spike #2 at 10.5%. Serum beta-2 microglobulin is 7.3 MG/L.     Her laboratory study on 7/21/2017 reported further improvement of paraprotein is both serum and a 24-hour urine sample.  SPEP reporting monoclonal IgG lambda 2.9 g/dL and free lambda chain 0.1 g/dL.  Serum IgG was 3261 mg/dL and IgA 5, IgM 13, free kappa chain 6.7, free lambda chain 701.3 with kappa/lambda ratio 0.01.  24-hour urine sample reported positive for Bence May protein lambda type, immunofixation positive for IgG lambda.  Total urine protein 241 mg, gamma globulin 13.1%.  Hemoglobin was 11.4 .4, platelets 122,000, WBC 6680 including neutrophils 3600, lymphocytes 2100 and monocytes 650.  Her creatinine was 1.68, at baseline level.  Liver function panel unremarkable.   Darzalex was continued.    Laboratory study on 8/25/2017 showed improved the platelets at 144,000, normal WBC 6660 and slightly improved hemoglobin at 11.7.     Patient had a colonoscopy examination on 8/30/2017 by Dr. Rivera.  It reported diverticulosis in multiple areas more severe in the sigmoid colon.  There was 2 polyps 4 mm pneumonia from transverse colon and the sigmoid colon.  Pathology evaluation reported tubular adenoma from the sigmoid colon polyp, and benign hyperplastic polyp from transverse colon.    Laboratory study on 10/20/2017 reported slightly improved monoclonal spike 2.7 g/dL by SPEP, immunofixation reported positive monoclonal IgG lambda, serum IgG 3536 mg/dL, IgA less than 5 and IgM 11, free kappa chain 5.3 mg/L, and free lambda chain 720 mg/L with kappa/lambda ratio 0.01.  Serum beta-2 microglobulin was 6.5 mg/L.   Her CBC showed a stable mild anemia with hemoglobin 11.3, macrocytosis .3, and the platelets 114,000, normal WBC 7070 including neutrophils 4100 lymphocytes 2000 monocytes 650, normal liver function panel, total protein 7.9 and albumin 3.2,  and normal electrolytes including calcium 8.1, and improved creatinine 1.59.     Laboratory study on 1/12/2018 reported serum IgG 3083 mg/dL, IgA 10, IgM 10, free kappa chain 8.5 and free lambda chain 589.1 mg/L, kappa/lambda ratio 0.01, serum protein admitted fixation reported IgG lambda monoclonal protein and SPEP reporting M spike 3.1 g/dL, beta-2 microgram and 7.4 mg/L. serum creatinine 1.79, calcium 8.2, other electrolytes normal, hemoglobin 11.3, .5 and MCHC 31.6, platelets 129,000, WBC 6780 including neutrophils 3500 lymphocytes 2300.  Serum ferritin 653.7 ng/mL, iron 123 TIBC 174 iron saturation 71%, folic acid 12.8 ng/mL and a vitamin B12 at 873 pg/mL.  Her 24-hour urine study on 1/18/2018 reported lambda chain 32.8 mg/L, total 61 mg in 24 hours, and the free kappa chain 27.4 mg/L and 51 mg in 24 hours, and the total  24-hour urine protein 283 mg, and urine protein immunofixation positive for 5.3% monoclonal IgG lambda and positive for Bence May protein at 36.2% monoclonal lambda chain.  Monthly Darzalex was continued.       This patient had serum protein study on 04/06/2018 which reported free light chain at concentration 703.8 mg/L and free kappa chain 7.0, free kappa/lambda ratio 0.01, serum IgG 3650 mg/dL, IgM 11 and IgA 9 with serum protein electrophoresis reporting monoclonal IgG lambda 3.2 g/dL and also monoclonal free lambda light chain.  But it was unable to quantify monoclonal lambda light chain because of the small quantity of it.     A 24-hour urine study on 04/20/2018 reported total free lambda chain 60 mg in 24 hours at concentration 33.1 mg/L, free kappa chain 45 mg in 24 hours at concentration 24.8 mg/L. Total 24-hour urine protein was 279 mg. Urine protein immunofixation and UPEP reported lambda-type Bence-May protein + #1 monoclonal IgG lambda band at 34.8% and #2 monoclonal IgG lambda band at 6.9%.     Her CBC results today reported a stable hemoglobin at 11.1, platelets 130,000 and WBC 7440 including neutrophils 4100 and lymphocytes 2350, monocytes 650. Her CMP reported slightly worsened creatinine at 1.82 with BUN 33. Total protein at 8.8. Liver function panel was normal. Total serum protein 8.8 and albumin 3.2, globulin 5.6.    Reviewing her previous lab studies especially serum paraprotein, she reached a micaela of response on 07/05/2018 when she had serum IgG 3015 mg/dL, free lambda chain 458.7 mg/L and M spike IgG lambda 2.6 g/dL and and monoclonal lambda free light chain 0.1 g/dL, total 2.7 g/dL. serum beta-2 myoglobin 7.1 mg/L.  Her ferritin was 539 ng/mL, free iron 73 TIBC 176 iron saturation 42%.    Laboratory study obtained on 11/01/2018 showed further disease progression. Her serum IgG was 5472 mg/dL, IgA 8 and IgM 10, serum kappa chain 7.9 mg/L, free lambda chain 961.3 mg/L and kappa/lambda ratio  0.01. Serum protein electrophoresis reported monoclonal IgG lambda 4.1 g/dL, and monoclonal lambda free light chain 0.1 g/dL. Her hemoglobin was 9.0, .6, MCHC 30.1, platelets 124,000 and WBC 10,000 including neutrophils 7400, lymphocytes 1200, monocytes 600.     Cycle #1 day #1 Bendamustine will be started on 11/29/2018.     Laboratory studies on 01/17/2019 reported improved paraproteins.  SPEP reported M spike 3.8 g/dL out of total 5.5 g/dL globulin.  Serum IgG was 4374 mg/dL, IgA 10 and IgM 11.  Free lambda chain 606.8 mg/L, free kappa chain 8.6 and kappa/lambda ratio 0.01.  Her serum protein immunofixation continues to be IgG lambda monoclonal.  Her CBC showed hemoglobin 9.3, .3, platelets 50,000 and WBC 8600 including ANC 5100, lymphocytes 2160.  Her Chemistry lab reported creatinine 1.92, calcium 8.3, normal liver function panel, glucose 98 with normal sodium and potassium.     For her 24-hour urine study on 3/14/2019, she had free lambda chain at a 62.1 mg/L, total 87 mg in 24 hours, free kappa chain 42 mg/L and 59 mg in 24 hours.  Urine protein immunofixation reported a monoclonal IgG lambda #1 and free lambda chain were unable to be quantified, however IgG lambda #2 was 12.8%.  Her 24-hour urine total protein was 452 mg.     For serum protein study on 3/28/2019 (on day of her cycle #5 day #1 Treanda ) also reported further decreased monoclonal spike 3.2 mg/dL, and serum IgG 3600, IgA 13 IgM 12, free kappa chain 11.7, and worsening free lambda chain 1045 mg/L.     Her serum paraprotein studies on 4/11/2019 reported a serum IgG 4407 mg/dL, IgA 14, IgM 13, free kappa chain 9.2, free lambda chain 998.4 mg/L, kappa/lambda ratio 0.01.  His serum protein immunofixation was positive for IgG lambda, SPEP reporting M spike 4.3 g/dL.    I have reviewed the patient's medical history in detail and updated the computerized patient record.    Review of Systems   Constitutional: Positive for fatigue. Negative  for appetite change, chills, diaphoresis, fever and unexpected weight change.   HENT:   Negative for mouth sores, nosebleeds, sore throat and trouble swallowing.    Eyes: Negative for icterus.   Respiratory: Negative for cough and shortness of breath.    Cardiovascular: Negative for chest pain, leg swelling and palpitations.   Gastrointestinal: Negative for abdominal pain, blood in stool, diarrhea and nausea.   Genitourinary: Negative for dysuria and hematuria.    Musculoskeletal: Negative for arthralgias and myalgias.   Skin: Positive for rash (Redness in the left lower abdomen at the site of Velcade injection, resolved today (5/21/2019)).   Neurological: Positive for extremity weakness (Significant fatigue, not neurologic deficits, improved today ). Negative for dizziness and headaches.   Hematological: Negative for adenopathy. Does not bruise/bleed easily.   Psychiatric/Behavioral: Negative for depression. The patient is not nervous/anxious.      Review of systems unchanged from previous office visit except as updated.     Medications: The current medication list was reviewed in the EMR.         Allergies   Allergen Reactions   • Zosyn [Piperacillin Sod-Tazobactam So] Swelling     Face and lips       VITALS:   There were no vitals filed for this visit.PS: ECOG 2      Physical Exam   Constitutional: well-developed and well-nourished -American female. No distress. not in acute distress.   HEENT:     Head: Normocephalic.     Eyes: No jaundice. PERRL.     Neck: No thyromegaly, no masses.    Cardiovascular: Normal rate, regular rhythm, normal heart sounds.  No murmurs.  Pulmonary/Chest: Lungs clear bilaterally.  Normal respiratory effort.  Mediport benign right upper chest.  Abdominal: Soft, no tenderness, guarding or rebound.  Bowel sounds are normal.  no mass.    Musculoskeletal: no edema or deformity.   Lymphadenopathy: She has no cervical, supraclavicular or axillary adenopathy.   Neurological: Oriented to  person, place, and time. No cranial nerve deficit.    Skin: No petechiae no bruises.  Left lower abdomen skin redness, about a 2 cm in diameter, there is no active drainage.  Significantly improved today, erythema resolved  Psychiatric: She has normal mood and affect.        Recent lab results:   Results from last 7 days   Lab Units 05/16/19  0846   WBC 10*3/mm3 7.43   NEUTROS ABS 10*3/mm3 4.83   LYMPHS ABS 10*3/mm3 1.11   HEMOGLOBIN g/dL 8.5*   HEMATOCRIT % 28.6*   PLATELETS 10*3/mm3 40*         PATHOLOGY:   Tissue Pathology Exam: [id number]   Order: [id number]   Collected:  5/13/2019 11:06 Status:  Edited Result - FINAL   Visible to patient:  No (Not Released) Dx:  Epigastric abdominal pain; Gastroesop...   Component    Addendum   An immunostain for H. pylori was performed on part 1 utilizing appropriate controls.  The specimen is judged negative for H. pylori by IHC staining.       jat/jse    Addendum electronically signed by Edward Hadley MD on 5/15/2019 at 0917   Final Diagnosis   1. Gastric Ulceration Biopsy:  Benign gastric mucosa with                A. Mild chronic inflammation with mild hyperplastic change and mucosal alteration noted suggesting possible                   chemical gastropathy (i.e. bile reflux, NSAIDs).               B. No definitive Helicobacter-like organisms identified by routine staining (see comment).               C. Reactive change noted, no dysplasia nor malignancy identified.     Jat/kds                   Assessment/Plan   1. Multiple myeloma, IgG lambda, stage III. Failed multiple lines of therapy. Her treatment was switched to Darzalex on 5/26/2016. She was on this treatment for more than 2 years.     In November 2018, she clearly had disease progression.  Darzalex was discontinued and was started on bendamustine plus Velcade plus dexamethasone.     Cycle #1 day #1 Bendamustine was started on 11/29/2018.  Due to poor tolerance with profound fatigue, chemotherapy was modified  with bendamustine only given on day 1, and a Velcade weekly, every 4 weeks as 1 cycle.  Patient had a cycle #6 dose reduction of bendamustine by 25% because of severe thrombocytopenia.     This patient actually had a micaela of response on 3/28/2019 with M spike 3.2 g/dL and serum IgG 3600.  After that M spike and serum IgG both were elevating.     Laboratory study obtained on 5/9/2019 after 6 cycles chemotherapy, it showed disease further progressed with worsening level of serum IgG 4873 mg/dL and M spike 4.3 g/dL..      The patient returns today for her first dose of Empliciti plus Velcade and decadron. She is feeling much better in regards to her energy following transfusion support last week. Labs are adequate for treatment. We will proceed.   Treatment will be weekly for the first 2 cycles of chemotherapy.  She will return in 2 weeks on June 6, 2019 for NP visit with myself in conjunction with day 15 of therapy. She will also be due for her next dose of monthly Xgeva at that time. She will see Dr. Araujo at the start of cycle #2 of June 20th.       2.  Severe thrombocytopenia.  This secondary to chemotherapy and her multiple myeloma.  Patient has no easy bleeding or bruising.     Patient was last transfused with platelets on May 13, 2019 prior to her EGD.    Platelets today are stable at 50,000.     3.  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  Patient to has been on Procrit injection weekly.  However her hemoglobin has been deteriorating clearly due to chemotherapy.  We'll continue Procrit.      Her vitamin B12 was more than 2000 pg/mL. Her folic acid was 6.75 ng/mL on 11/15/2018.  She will continue oral vitamin B12 daily.    Her laboratory study on 5/9/2019 showed no evidence of iron deficiency.  Folate and B12 levels were checked with a B12 level of 1819 and normal folate of 5.84.  She was transfused with 2 units of packed red blood cells last week.    Hemoglobin has recovered  following blood transfusion and is 10.2 today.         4. Zometa / Xgeva treatment.  Because of her kidney insufficiency, we were only able to give her Zometa every 3 months.  Due to her elevated creatinine level, we eventually switched her to Xgeva treatment and continued monthly.    She will proceed with her next dose of Xgeva on June 6, 2019        5. History of stage II left breast cancer, post mastectomy in 2013, negative for ER/WV and positive HER-2. No neoadjuvant chemotherapy because of concurrent discovery of multiple myeloma and ongoing chemotherapy. She had a normal mammogram study on 7/24/2018.       6.  Right middle lobe pulmonary nodule, documented on CT scan of chest on 01/06/2017. Repeated CT scan of chest on 8/21/2017 reported a small 5 mm and stable primary nodule.      7.  Skin rashes on her extremities:  Patient was seen dermatologist Dr. Barroso, who prescribed steroids cream and also over-the-counter moisturizing cream.  Patient will follow-up with Dr. Barroso again.    8. Profound fatigue: This is multifactorial secondary to her disease progression and her worsening anemia associated with chemotherapy.     A thyroid level was obtained last week and patient was diagnosed with subclinical hypothyroidism.  As above, she was initiated on Synthroid and folic acid    9.  Abdomen skin cellulitis: This was presumed to be related to Velcade.  She was given a 5-day dose of cefdinir.  Her abdomen has improved significantly since last week with no residual erythema.     10.  Hypocalcemia.  The patient has struggled with compliance with her calcium supplements.  She reports she has been taking these inconsistently, though they resulted in soft frequent bowel movements.  We discussed adding 2 tablets of Tums daily along with current calcium/vitamin D supplementation.      Calcium level today is 8.2.  We will continue to monitor    11.  Epigastric discomfort.  Patient had EGD examination by Dr. Rivera on  5/13/2019.  It reported at prepyloric ulceration.  Dr. Rivera recommended Protonix twice a day.  She increased her dosing last week and now reports that pain is gone.     Plan:    1. Patient's Procrit was held last week secondary to insurance constraints.  She is now been approved to proceed.  She will receive Procrit weekly, however will not require a dose today  2. The patient will proceed with her first dose of treatment today.  Treatments will be weekly for the first 2 cycles.  From cycle #3, Emplicit will be only given on the first day and continue weekly Velcade and dexamethasone.  Because of her cytopenia, Velcade will be decreased at 1 mg/m² each time.    3. Patient is to continue on Synthroid and folic acid daily.    4. Continue calcium and vitamin D supplementation.  Also continue Tums daily.    5. Continue oral B12   6. Continue prophylactic acyclovir 400 mg twice a day.   7. The patient will be seen on June 6, 2019 in conjunction with day 15 of therapy by myself.  She will be due for her monthly dose of Xgeva at that time.  8. Patient will return on 6/20/2019 for M.D. evaluation on cycle #2 day #1 chemotherapy.   9. The patient understands to call with any issues including bleeding, fever greater than 100.5, with any other concerns prior to her next office visit.      Patient is on a highly toxic medication regimen requiring frequent monitoring    MADAI Calloway

## 2019-05-30 NOTE — PROGRESS NOTES
After discussing the dexamethasone with Dr. Araujo and also DAVID Puckett, Ms. Barroso was informed to take two 4mgm tablets today after she gets home. She is not to take any dexamethasone tomorrow. She will return for her next treatment June 6th and is to take three 4 mgm  dexamethasone tablets before she comes in for her treatment. She will continue with this plan while weekly velcade and Empliciti. She has verbalized understanding.

## 2019-06-06 NOTE — PROGRESS NOTES
Seen by Merlyn AJ, states ok to proceed with chemo and xgeva today as planned. Procrit not indicated per tx parameters.  Pt forgot to take dexamethasone tablets today, pharmacy notified and dex IV dosage adjusted. Spoke with YASHIRA AJ with instructions to fax lab work to Dr SHANIA George's (nephrology) office [phone]. And to reinforce education with pt to take dexamethasone three 4mg tabs prior to next treatment. Pt vu and labs faxed to Dr George.

## 2019-06-06 NOTE — PROGRESS NOTES
Reasons for follow up:   1. IgG kappa multiple myeloma, currently on Darzalex, started on May 26, 2016. Patient was switched to Darzalex from Pomalyst, as she continued to be neutropenic with recurrent urinary tract infection while on Pomalyst.    2. Zometa is on hold due to renal insufficiency.    3. After 16 doses of Darzalex, laboratory study showed stable disease in November 2016. Insurance company denied adding Velcade and dexamethasone. Patient is to be continued on monthly Darzalex from 12/06/16.   4. Obstructive right pyelonephritis with positive urine culture for E. coli, required hospitalization from 1/19/2017 through 01/24/2017 with iv antibiotics and stent exchange on 01/20/2017.   5.  Paraprotein studies on March 27, 2017 showed further slight improvement of multiple myeloma.   6.  Febrile illness, with urinary tract infection and influenza B infection in early May 2017, required hospitalization for 6 days.   7.  Paraprotein studies for both serum and 24-hour urine sample on 7/21/2017 showed further improvement of paraproteins.   Darzalex was continued.   8.  Serum protein study reported stable disease on 10/20/2017.  Darzalex will be continued.   9.  Laboratory studies of both serum paraprotein and a 24-hour urine protein in January 2018 showed a further improvement of paraproteins.  Monthly Darzalex will be continued.   10. Repair of left flank incisional hernia in middle September 2018.   11. Serum paraprotein study reached micaela on 7/5/2018.  Since that time she has evidence of disease progression.   12. Disease progression, she was started on chemotherapy with bendamustine plus Velcade plus dexamethasone per protocol on 11/29/2018. Treatment will be bendamustine on day 1 and day 4 repeat every 28 days, Velcade and dexamethasone will be on day 1 day 4 and day 8 and day 15 repeat every 28 days.   13.  From cycle #2, patient was given only 1 dose of Treanda per cycle and continue Velcade and  dexamethasone weekly.   14.  Treanda was decreased by 20% after cycle #4 because of severe thrombocytopenia, and further decreased by another 25% on cycle #6 which is her last cycle on 4/25/2019.            History of Present Illness:     The patient is a 68 y.o. female with the above-mentioned history, who returns today for cycle 1, day 15 of Empliciti following evidence of disease progression on recent paraprotein studies and poor tolerance of the Treanda. This was initiated on 5/23/2019.  She will continues on Velcade and dexamethasone weekly as prescribed previously.      Thankfully, she is tolerating treatment fairly well. She had been profoundly fatigued requiring transfusion support with the Treanda and now with transition to Empliciti she has noted modest improvement in her overall status. She does remain quite tired, though is able to carry out daily activities.  Her appetite has been steadily declining over the last few months.  Her weight reflects this with a 5 pound loss since April.  She is trying to eat eat whatever sounds good to her though continues to struggle.  I did offer a referral to our oncology nutritionist, though at this point the patient would like to continue trying a variety of foods at home first.  Her bowels have been slower over the last week, which she attributes to an excess amount of ice cream.  She will begin milk of magnesia for relief..She denies any pain. No peripheral neuropathy noted.  She has had some pruritus following her Velcade administrations over the last several weeks.  She has been managing this topically with an anti-itch cream with good effect.    She was previously experiencing issues with epigastric discomfort.  This was evaluated with EGD by Dr. Rivera.  A prepyloric ulceration was discovered.  Per Dr. Rivera, patient increased her Protonix dosing and has had no further issues with that.    Her labs today are stable with a total white blood cell count of 8.72,  ANC of 6.28, improved hemoglobin at 10.3, and platelets at 39,000.  Her creatinine has improved today to 1.78, also.  She denies any infectious symptoms including fevers or chills.  No excess bleeding or bruising reported.    Of note, patient has a dental cleaning scheduled next week.  I have told her that it would be fine to proceed, however she is not to undergo any invasive procedures given her thrombocytopenia.    She has no other concerns today.        Past Medical History, Past Surgical History, Social History, Family History have been reviewed and are without significant changes except as mentioned.      Hematology/oncology history: See separate document for extra information.       On12/6/2016, serum free lambda chain 1090 MG/L, free kappa chain 8.5 to MG/L.  Ferritin 1015, iron 99, TIBC 137 iron saturation 72%.     On January 4, 2017, vitamin B12 level 1289, folate 7.7 ng/mL. SPEP reporting gamma globulin 3.3, out of total globulin 5.0, with immunofixation reporting small quantity of monoclonal free lambda chain, plus monoclonal IgG lambda at 3.2 g/DL.  Serum IgG 4075, IgA 9 and IgM 15.  free lambda chain 1339 MG/L and free kappa chain 10.3 MG/L.      Laboratory study March 27, 2017 showed slight improvement of paraprotein, SPEP reporting M spike 2.8 g/DL, out of total globulin 4.3, and the serum IgG 3420, IgA 9, IgM 11, free lambda chain 1218 MG/L and free kappa chain 8.0 MG/L.  Total 24 hour urine sample reported at free lambda chain 142 mg, at a concentration 74.8 MG/L, free kappa chain 75 mg at a concentration 39.5 MG/L., Urine protein immunofixation reporting biclonal IgG lambda and monoclonal free lambda light chain, M spike #1 at 41.5% and monoclonal IgG lambda spike #2 at 10.5%. Serum beta-2 microglobulin is 7.3 MG/L.     Her laboratory study on 7/21/2017 reported further improvement of paraprotein is both serum and a 24-hour urine sample.  SPEP reporting monoclonal IgG lambda 2.9 g/dL and free  lambda chain 0.1 g/dL.  Serum IgG was 3261 mg/dL and IgA 5, IgM 13, free kappa chain 6.7, free lambda chain 701.3 with kappa/lambda ratio 0.01.  24-hour urine sample reported positive for Bence May protein lambda type, immunofixation positive for IgG lambda.  Total urine protein 241 mg, gamma globulin 13.1%.  Hemoglobin was 11.4 .4, platelets 122,000, WBC 6680 including neutrophils 3600, lymphocytes 2100 and monocytes 650.  Her creatinine was 1.68, at baseline level.  Liver function panel unremarkable.  Darzalex was continued.    Laboratory study on 8/25/2017 showed improved the platelets at 144,000, normal WBC 6660 and slightly improved hemoglobin at 11.7.     Patient had a colonoscopy examination on 8/30/2017 by Dr. Rivera.  It reported diverticulosis in multiple areas more severe in the sigmoid colon.  There was 2 polyps 4 mm pneumonia from transverse colon and the sigmoid colon.  Pathology evaluation reported tubular adenoma from the sigmoid colon polyp, and benign hyperplastic polyp from transverse colon.    Laboratory study on 10/20/2017 reported slightly improved monoclonal spike 2.7 g/dL by SPEP, immunofixation reported positive monoclonal IgG lambda, serum IgG 3536 mg/dL, IgA less than 5 and IgM 11, free kappa chain 5.3 mg/L, and free lambda chain 720 mg/L with kappa/lambda ratio 0.01.  Serum beta-2 microglobulin was 6.5 mg/L.   Her CBC showed a stable mild anemia with hemoglobin 11.3, macrocytosis .3, and the platelets 114,000, normal WBC 7070 including neutrophils 4100 lymphocytes 2000 monocytes 650, normal liver function panel, total protein 7.9 and albumin 3.2,  and normal electrolytes including calcium 8.1, and improved creatinine 1.59.     Laboratory study on 1/12/2018 reported serum IgG 3083 mg/dL, IgA 10, IgM 10, free kappa chain 8.5 and free lambda chain 589.1 mg/L, kappa/lambda ratio 0.01, serum protein admitted fixation reported IgG lambda monoclonal protein and SPEP reporting M  spike 3.1 g/dL, beta-2 microgram and 7.4 mg/L. serum creatinine 1.79, calcium 8.2, other electrolytes normal, hemoglobin 11.3, .5 and MCHC 31.6, platelets 129,000, WBC 6780 including neutrophils 3500 lymphocytes 2300.  Serum ferritin 653.7 ng/mL, iron 123 TIBC 174 iron saturation 71%, folic acid 12.8 ng/mL and a vitamin B12 at 873 pg/mL.  Her 24-hour urine study on 1/18/2018 reported lambda chain 32.8 mg/L, total 61 mg in 24 hours, and the free kappa chain 27.4 mg/L and 51 mg in 24 hours, and the total 24-hour urine protein 283 mg, and urine protein immunofixation positive for 5.3% monoclonal IgG lambda and positive for Bence May protein at 36.2% monoclonal lambda chain.  Monthly Darzalex was continued.       This patient had serum protein study on 04/06/2018 which reported free light chain at concentration 703.8 mg/L and free kappa chain 7.0, free kappa/lambda ratio 0.01, serum IgG 3650 mg/dL, IgM 11 and IgA 9 with serum protein electrophoresis reporting monoclonal IgG lambda 3.2 g/dL and also monoclonal free lambda light chain.  But it was unable to quantify monoclonal lambda light chain because of the small quantity of it.     A 24-hour urine study on 04/20/2018 reported total free lambda chain 60 mg in 24 hours at concentration 33.1 mg/L, free kappa chain 45 mg in 24 hours at concentration 24.8 mg/L. Total 24-hour urine protein was 279 mg. Urine protein immunofixation and UPEP reported lambda-type Bence-May protein + #1 monoclonal IgG lambda band at 34.8% and #2 monoclonal IgG lambda band at 6.9%.     Her CBC results today reported a stable hemoglobin at 11.1, platelets 130,000 and WBC 7440 including neutrophils 4100 and lymphocytes 2350, monocytes 650. Her CMP reported slightly worsened creatinine at 1.82 with BUN 33. Total protein at 8.8. Liver function panel was normal. Total serum protein 8.8 and albumin 3.2, globulin 5.6.    Reviewing her previous lab studies especially serum paraprotein, she  reached a micaela of response on 07/05/2018 when she had serum IgG 3015 mg/dL, free lambda chain 458.7 mg/L and M spike IgG lambda 2.6 g/dL and and monoclonal lambda free light chain 0.1 g/dL, total 2.7 g/dL. serum beta-2 myoglobin 7.1 mg/L.  Her ferritin was 539 ng/mL, free iron 73 TIBC 176 iron saturation 42%.    Laboratory study obtained on 11/01/2018 showed further disease progression. Her serum IgG was 5472 mg/dL, IgA 8 and IgM 10, serum kappa chain 7.9 mg/L, free lambda chain 961.3 mg/L and kappa/lambda ratio 0.01. Serum protein electrophoresis reported monoclonal IgG lambda 4.1 g/dL, and monoclonal lambda free light chain 0.1 g/dL. Her hemoglobin was 9.0, .6, MCHC 30.1, platelets 124,000 and WBC 10,000 including neutrophils 7400, lymphocytes 1200, monocytes 600.     Cycle #1 day #1 Bendamustine will be started on 11/29/2018.     Laboratory studies on 01/17/2019 reported improved paraproteins.  SPEP reported M spike 3.8 g/dL out of total 5.5 g/dL globulin.  Serum IgG was 4374 mg/dL, IgA 10 and IgM 11.  Free lambda chain 606.8 mg/L, free kappa chain 8.6 and kappa/lambda ratio 0.01.  Her serum protein immunofixation continues to be IgG lambda monoclonal.  Her CBC showed hemoglobin 9.3, .3, platelets 50,000 and WBC 8600 including ANC 5100, lymphocytes 2160.  Her Chemistry lab reported creatinine 1.92, calcium 8.3, normal liver function panel, glucose 98 with normal sodium and potassium.     For her 24-hour urine study on 3/14/2019, she had free lambda chain at a 62.1 mg/L, total 87 mg in 24 hours, free kappa chain 42 mg/L and 59 mg in 24 hours.  Urine protein immunofixation reported a monoclonal IgG lambda #1 and free lambda chain were unable to be quantified, however IgG lambda #2 was 12.8%.  Her 24-hour urine total protein was 452 mg.     For serum protein study on 3/28/2019 (on day of her cycle #5 day #1 Treanda ) also reported further decreased monoclonal spike 3.2 mg/dL, and serum IgG 3600, IgA  13 IgM 12, free kappa chain 11.7, and worsening free lambda chain 1045 mg/L.     Her serum paraprotein studies on 4/11/2019 reported a serum IgG 4407 mg/dL, IgA 14, IgM 13, free kappa chain 9.2, free lambda chain 998.4 mg/L, kappa/lambda ratio 0.01.  His serum protein immunofixation was positive for IgG lambda, SPEP reporting M spike 4.3 g/dL.    I have reviewed the patient's medical history in detail and updated the computerized patient record.    Review of Systems   Constitutional: Positive for appetite change, fatigue and unexpected weight change. Negative for chills, diaphoresis and fever.        See HPI    HENT:   Negative for mouth sores, nosebleeds, sore throat and trouble swallowing.    Eyes: Negative for icterus.   Respiratory: Negative for cough and shortness of breath.    Cardiovascular: Negative for chest pain, leg swelling and palpitations.   Gastrointestinal: Negative for abdominal pain, blood in stool, diarrhea and nausea.   Genitourinary: Negative for dysuria and hematuria.    Musculoskeletal: Negative for arthralgias and myalgias.   Skin: Negative for rash.        itching at Velcade injection site, no rash or warmth noted    Neurological: Positive for extremity weakness (Significant fatigue, not neurologic deficits, stable ). Negative for dizziness and headaches.   Hematological: Negative for adenopathy. Does not bruise/bleed easily.   Psychiatric/Behavioral: Negative for depression. The patient is not nervous/anxious.      Review of systems unchanged from previous office visit except as updated.     Medications: The current medication list was reviewed in the EMR.         Allergies   Allergen Reactions   • Zosyn [Piperacillin Sod-Tazobactam So] Swelling     Face and lips       VITALS:   There were no vitals filed for this visit.PS: ECOG 2      Physical Exam   Constitutional: well-developed and well-nourished -American female. No distress. not in acute distress.   HEENT:     Head:  Normocephalic.     Eyes: No jaundice. PERRL.     Neck: No thyromegaly, no masses.    Cardiovascular: Normal rate, regular rhythm, normal heart sounds.  No murmurs.  Pulmonary/Chest: Lungs clear bilaterally.  Normal respiratory effort.  Mediport benign right upper chest.  Abdominal: Soft, no tenderness, guarding or rebound.  Bowel sounds are normal.  no mass.    Musculoskeletal: no edema or deformity.   Lymphadenopathy: She has no cervical, supraclavicular or axillary adenopathy.   Neurological: Oriented to person, place, and time. No cranial nerve deficit.    Skin: No petechiae no bruises.    Psychiatric: She has normal mood and affect.        Recent lab results:   Results from last 7 days   Lab Units 05/30/19  1002   WBC 10*3/mm3 7.81   NEUTROS ABS 10*3/mm3 5.39   LYMPHS ABS 10*3/mm3 1.64   HEMOGLOBIN g/dL 9.8*   HEMATOCRIT % 32.3*   PLATELETS 10*3/mm3 45*         PATHOLOGY:   Tissue Pathology Exam: [id number]   Order: [id number]   Collected:  5/13/2019 11:06 Status:  Edited Result - FINAL   Visible to patient:  No (Not Released) Dx:  Epigastric abdominal pain; Gastroesop...   Component    Addendum   An immunostain for H. pylori was performed on part 1 utilizing appropriate controls.  The specimen is judged negative for H. pylori by IHC staining.       jat/jse    Addendum electronically signed by Edward Hadley MD on 5/15/2019 at 0917   Final Diagnosis   1. Gastric Ulceration Biopsy:  Benign gastric mucosa with                A. Mild chronic inflammation with mild hyperplastic change and mucosal alteration noted suggesting possible                   chemical gastropathy (i.e. bile reflux, NSAIDs).               B. No definitive Helicobacter-like organisms identified by routine staining (see comment).               C. Reactive change noted, no dysplasia nor malignancy identified.     Jat/kds                   Assessment/Plan   1. Multiple myeloma, IgG lambda, stage III. Failed multiple lines of therapy. Her  treatment was switched to Darzalex on 5/26/2016. She was on this treatment for more than 2 years.     In November 2018, she clearly had disease progression.  Darzalex was discontinued and was started on bendamustine plus Velcade plus dexamethasone.     Cycle #1 day #1 Bendamustine was started on 11/29/2018.  Due to poor tolerance with profound fatigue, chemotherapy was modified with bendamustine only given on day 1, and a Velcade weekly, every 4 weeks as 1 cycle.  Patient had a cycle #6 dose reduction of bendamustine by 25% because of severe thrombocytopenia.     This patient actually had a micaela of response on 3/28/2019 with M spike 3.2 g/dL and serum IgG 3600.  After that M spike and serum IgG both were elevating.     Laboratory study obtained on 5/9/2019 after 6 cycles chemotherapy, it showed disease further progressed with worsening level of serum IgG 4873 mg/dL and M spike 4.3 g/dL..      The patient returns today for C1, day 15 of Empliciti plus Velcade and decadron. She is also due for her next monthly dose of Xgeva today. She has tolerated treatment reasonably well with the exception of persistent fatigue and decreased appetite. This will be further detailed below. Her labs have remained stable and adequate to proceed today.     Treatment will be weekly for the first 2 cycles of chemotherapy. She will see Dr. Araujo at the start of cycle #2 of June 20th.       2.  Severe thrombocytopenia.  This secondary to chemotherapy and her multiple myeloma.  Patient has no easy bleeding or bruising.     Patient was last transfused with platelets on May 13, 2019 prior to her EGD.    Platelets today are stable 39,000.  She will be undergoing a dental cleaning in the next few weeks.  Patient will be okay to proceed but under no circumstances is able to have any invasive procedures    3.  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  Patient to has been on Procrit injection weekly.  However her  hemoglobin has been deteriorating clearly due to chemotherapy.  We'll continue Procrit.      Her vitamin B12 was more than 2000 pg/mL. Her folic acid was 6.75 ng/mL on 11/15/2018.  She will continue oral vitamin B12 daily.    Her laboratory study on 5/9/2019 showed no evidence of iron deficiency.  Folate and B12 levels were checked with a B12 level of 1819 and normal folate of 5.84.  She was transfused with 2 units of packed red blood cells last week.    Hemoglobin is slightly improved to 10.3      4. Zometa / Xgeva treatment.  Because of her kidney insufficiency, we were only able to give her Zometa every 3 months.  Due to her elevated creatinine level, we eventually switched her to Xgeva treatment and continued monthly.    She will proceed with her next dose of Xgeva today.  Of note, patient's phosphorus level is elevated at 5.6.  Labs will be faxed to her nephrologist.  The Xgeva will hopefully decrease phosphorus level        5. History of stage II left breast cancer, post mastectomy in 2013, negative for ER/NM and positive HER-2. No neoadjuvant chemotherapy because of concurrent discovery of multiple myeloma and ongoing chemotherapy. She had a normal mammogram study on 7/24/2018.       6.  Right middle lobe pulmonary nodule, documented on CT scan of chest on 01/06/2017. Repeated CT scan of chest on 8/21/2017 reported a small 5 mm and stable primary nodule.      7.  Skin rashes on her extremities:  Patient was seen dermatologist Dr. Barroso, who prescribed steroids cream and also over-the-counter moisturizing cream.  Patient will follow-up with Dr. Barroso again.  At this point, the skin rash has not recurred    8. Profound fatigue: This is multifactorial secondary to her disease progression and her worsening anemia associated with chemotherapy.     A thyroid level was obtained several weeks ago and patient was diagnosed with subclinical hypothyroidism.  She was initiated on Synthroid and folic acid and has noted  significant reduction in fatigue.     9.  Abdomen skin cellulitis: This was presumed to be related to Velcade.  She was given a 5-day dose of cefdinir. No residual erythema today. Completely resolved.  She does note some pruritus following the Velcade injections but is treating with a topical Benadryl.     10.  Hypocalcemia.  The patient has struggled with compliance with her calcium supplements.  She reports she has been taking these inconsistently, though they resulted in soft frequent bowel movements.  We discussed adding 2 tablets of Tums daily along with current calcium/vitamin D supplementation.      Calcium level today is 8.7    11.  Epigastric discomfort.  Patient had EGD examination by Dr. Rivera on 5/13/2019.  It reported at prepyloric ulceration.  Dr. Rivera recommended Protonix twice a day. With increased dose of Protonix, she has had no further issues.     12. Decreased appetite: Patient is lost 5 pounds over the last 6 weeks.  At this point, we have discussed ways to both to her diet including supplemental drinks like Ensure or boost.  I have suggested a referral to our oncology dietitian, however at this point patient would like to wait and see if she can increase her weight on her own.    13. Hyperphosphatemia: Phosphorous 5.6.  Labs have been faxed to her nephrologist.  Xgeva today will hopefully normalize this level.    Plan:    1.  She will receive Procrit weekly for hemoglobin less than 10. She will not require Procrit today.  2. The patient will proceed with cycle 1,day 15 of treatment today. She will also receive her next dose of monthly Xgeva.  Treatments will be weekly for the first 2 cycles.  From cycle #3, Emplicit will be only given on the first day and continue weekly Velcade and dexamethasone.  Because of her cytopenia, Velcade will be decreased at 1 mg/m² each time.    3. Patient is to continue on Synthroid and folic acid daily.    4. Continue calcium and vitamin D supplementation.   Also continue Tums daily.    5. Continue oral B12   6. Continue prophylactic acyclovir 400 mg twice a day.  7. Discussed ways to increase her appetite and her weight  8. Labs today faxed to patient's nephrologist  9. Patient will return on 6/20/2019 for M.D. evaluation on cycle #2 day #1 chemotherapy.   10. The patient understands to call with any issues including bleeding, fever greater than 100.5, with any other concerns prior to her next office visit.      Patient is on drug therapy requiring frequent monitoring    MADAI Calloway

## 2019-06-13 NOTE — PROGRESS NOTES
Labs CBC and CMP reviewed with ROSY Zuleta No new orders at this time. Treatment as planned today.

## 2019-06-20 NOTE — PROGRESS NOTES
Labs reviewed with Dr Araujo, states pt ok to receive empliciti and procrit, (will not get velcade today per Dr Araujo). Pt states that she took her dexamethasone this am as prescribed. Per Dr Araujo pt is to take tums 2 tablets a day.

## 2019-06-27 NOTE — PROGRESS NOTES
Spoke with Dr. Araujo. Patient to receive 47,000 units of Procrit today along with her Velcade injection and Emplicity infusion.

## 2019-07-02 NOTE — PROGRESS NOTES
SURGERY  Marina Barroso   1950 07/08/19    Chief Complaint: Umbilical hernia    HPI    Patient is a very nice 68 y.o. female who comes in for follow-up of her umbilical hernia and gastric ulcerations.  She has a somewhat complicated history including a surgery 09/19/18, for an incisional hernia, left flank, with an incisional hernia repair with musculofascial advancement flap and component separation with bio A mesh. Prior to surgery, she had a notable case of divertiuclitis from which she had a difficult time recuperating, but ultimately did and so we decided to proceed with only her left flank hernia, which was quite syptomatic, and not the diverticulitis, or an umbilical hernia, with minimal syptoms.    Intraoperatively, i found:    · Defect immediately under the 11th rib edge and posterior, insinuating between the external oblique, with the internal oblique and transversus disrupted.  · Repair done extraperitoneally, with the bio A mesh under the rib tip, external oblique and under the rectus with the lateral rectus fascia opened and rotated laterally to allow internal oblique and transverses to be attached laterally to the extraperitoneal tissue behind the colon.  · Bio A mesh with an oblong shape superiorly with two shapes inferiorly, one being a  tail extending in the lateral space behind the transversus and internal oblique and then transitioning in the mid axillary line to the space anterior to the internal oblique and transversus medially.    She also was complaining of epigastric pain in May and underwent an EGD showing prepyloric ulcerations with inflammation, induration, and a  dysmorphic appearance;  It's possible that the patient had an element of GOO (gastric outlet obstruction) from the ulcerations.  It was helicobacter negative.  There were ulcerations times four with mounding of tissue right up to the pylorus and closing down of the prepyloric region due to induration.  No christiane ulcer with  visible white base.     She needs a c scope in 2022, based on adenomatous polyp 2017.    She is not having any symptoms of her umbilical hernia and today tells me that she is not having any other symptoms that she had prior to her EGD, which she now attributes all to a vitamin supplement that she was taking at that time.  She did go up to 2 PPIs daily for short time, but does not think that had anything to do with her improvement.    Past Medical History:   Diagnosis Date   • Anemia 10/14/2008    Secondary to chronic renal insufficiency and myeloma   • Arthropathy of knee 01/07/2014    unspecified   • Breast cancer (CMS/HCC) 04/2012    Left breast invasive ductal carcinoma, stage II, ER/MI negative, HER-2/mk positive   • Bursitis of left hip 10/18/2007   • Bursitis, knee 12/02/2013   • Calcaneal spur 08/12/2009   • Chronic kidney disease, stage III (moderate) (CMS/[facility])    • Colon cancer screening 07/01/2017    Cologuard testing: Positive results   • Congestive heart failure (CMS/[facility])     Chronic systolic   • Diverticulitis of colon 10/17/2007   • DVT (deep venous thrombosis) (CMS/[facility])     right lower extremity, S/P IVC filter   • Gastroesophageal reflux disease 4/8/2019    Added automatically from request for surgery [id number]   • H/O Diastolic dysfunction    • H/O Mitral regurgitation    • History of Clostridium difficile 04/01/2014   • History of echocardiogram 04/2014    EF decreased to 46% - per cardiology   • History of MRSA infection    • History of obesity    • History of transfusion    • Hydronephrosis     chronic, right   • Hypertension    • LLL pneumonia (CMS/HCC) 04/23/2009   • Malignant neoplasm of kidney (CMS/HCC) 12/2009    and other and unspecified urinary organs; urinary organ, site unspecified; s/p left nephrectomy   • Multiple myeloma (CMS/HCC) 04/2012   • Multiple myeloma (CMS/HCC)    • Myocardial infarction (CMS/[facility])     NSTEMI   • Neoplasm of uncertain behavior 10/12/2015    of other and  unspecified sites and tissues; other specified sites   • Non-STEMI (non-ST elevated myocardial infarction) (CMS/[facility])    • Nonischemic cardiomyopathy (CMS/[facility])    • Pyelonephritis 03/2004   • Seizures (CMS/[facility]) 10/17/2007   • Thrombocytopenia (CMS/[facility])    • UTI (urinary tract infection) 10/30/2014    pseudomonas, multidrug resistant   • UTI (urinary tract infection) 03/28/2014    site not specified   • Ventral hernia      Past Surgical History:   Procedure Laterality Date   • BONE MARROW BIOPSY N/A 04/11/2012   • BRAIN SURGERY  2005    Meningioma   • BRAIN SURGERY  1990    Tumor benign per patient   • BREAST BIOPSY Left 04/09/2012    Dr. Gregg May   • CARDIAC CATHETERIZATION Left 06/11/2012    Dr. Sudeep Haddad   • CARDIAC CATHETERIZATION N/A 08/15/2006    Dr. Brian Bhatt   • COLONOSCOPY N/A 8/30/2017    Procedure: COLONOSCOPY into cecum and TI with cold polypectomies;  Surgeon: Trudy Rivera MD;  Location: [facility] ENDOSCOPY;  Service:    • COLONOSCOPY W/ POLYPECTOMY N/A unknown    2 polyps, benign   • CYSTOSCOPY N/A 3/25/2016    Procedure: CYSTOSCOPY  WITH RIGHT STENT CHANGE;  Surgeon: Lakhwinder Russo MD;  Location: [facility] OR;  Service:    • CYSTOSCOPY N/A 03/10/2012    Cystoscopy, right retrograde, right double-J stent-Dr. Sloan May   • CYSTOSCOPY N/A 02/25/2012    Cystoscopy, stent removal, right retrograde pyelogram, replacement of right double-J ureteral stent-Dr. Michael Everett   • CYSTOSCOPY W/ URETERAL STENT PLACEMENT Right 5/7/2016    Procedure: CYSTOSCOPY, URETERAL CATHETER INSERTION AND RIGHT STENT EXCHANGE ;  Surgeon: Michael Everett Jr., MD;  Location: [facility] OR;  Service:    • CYSTOSCOPY W/ URETERAL STENT PLACEMENT N/A 1/20/2017    Procedure: CYSTOSCOPY RIGHT RETROGRADE PYELOGRAM, URETERAL STENT CHANGE;  Surgeon: Lakhwinder Russo MD;  Location: [facility] OR;  Service:    • CYSTOSCOPY W/ URETERAL STENT PLACEMENT N/A 04/02/2015    Cystoscopy, removal of right ureteral stent,  right retrograde pyelogram, placement of right double-J ureteral stent-Dr. Michael Everett   • CYSTOSCOPY W/ URETERAL STENT PLACEMENT N/A 04/26/2015    Cystoscopy, removal of right ureteral stent, right retrograde pyelogram, replacement of right ureteral stent 24 cm x 7-Cymraes without retrieval line-Dr. Jose Gomez   • CYSTOSCOPY W/ URETERAL STENT PLACEMENT N/A 12/22/2014    Cystoscopy, removal of right double-J ureteral stent, right retrograde pyelogram, placement of right double-J ureteral stent-Dr. Michael Everett   • CYSTOSCOPY W/ URETERAL STENT PLACEMENT N/A 09/22/2014    Cystoscopy, removal of right ureteral stent, removal of right retrograde pyelogram, replacement of right double-J ureteral stent-Dr. Michael Everett   • CYSTOSCOPY W/ URETERAL STENT PLACEMENT N/A 06/18/2014    Cystoscopy, removal of right double-J ureteral stent, right retrograde pyelogram, placement of right double-J ureteral stent-Dr. Michael Everett   • CYSTOSCOPY W/ URETERAL STENT PLACEMENT N/A 01/23/2014    Cystoscopy, removal of right double-J ureteral stent, right retrograde pyelogram, placement of right double-J ureteral stent-Dr. Michael Everett   • CYSTOSCOPY W/ URETERAL STENT PLACEMENT N/A 03/27/2012    Cystoscopy, removal of ureteral stent, right retrograde pyelogram, replacement of indewlling right ureteral stent-Dr. Michael Everett   • CYSTOSCOPY W/ URETERAL STENT PLACEMENT N/A 03/27/2013    Cystoscopy, right retrograde pyelogram, removal and replacement of right double-J ureteral stent-Dr. Michael Everett   • CYSTOSCOPY W/ URETERAL STENT PLACEMENT N/A 11/12/2012    Cystoscopy, stent removal, right retrograde pyelogram, replacement of right double-J ureteral stent-Dr. Michael Everett   • CYSTOSCOPY W/ URETERAL STENT PLACEMENT N/A 09/24/2011    Cystoscopy, removal of ureteral stent, right retrograde pyelogram and placement of right double-J ureteral stent-Dr. Michael Everett   • CYSTOSCOPY W/ URETERAL STENT PLACEMENT N/A 05/14/2011    Cystoscopy, removal of  right ureteral stent, right retrograde pyelogram and placement of new right double-J ureteral stent-Dr. Michael Everett   • CYSTOSCOPY W/ URETERAL STENT PLACEMENT N/A 12/31/2010    Cystoscopy, stent removal, right retrograde pyelogram, replacement of 7 [place] x 26 cm double-J stent-Dr. Michael Everett   • CYSTOSCOPY W/ URETERAL STENT PLACEMENT N/A 08/28/2010    Cystoscopy, stent removal, right retrograde pyelogram with interpretation, placement of right double-J ureteral stent-Dr. Michael Everett   • CYSTOSCOPY W/ URETERAL STENT PLACEMENT N/A 05/24/2010    Cystoscopy, right retrograde pyelogram, replacement of right double-J ureteral stent-Dr. Michael Everett   • CYSTOSCOPY W/ URETERAL STENT PLACEMENT N/A 02/12/2010    Cystoscopy, right retrograde pyelogram and placement of right double-J ureteral stent-Dr. Michael Everett   • CYSTOSCOPY W/ URETERAL STENT PLACEMENT N/A 02/23/2009    Cystoscopy, right retrograde pyelogram, placement of right double-J ureteral stent-Dr. Michael Everett   • CYSTOSCOPY W/ URETERAL STENT PLACEMENT N/A 09/17/2007    Dr. Michael Everett   • CYSTOSCOPY W/ URETERAL STENT PLACEMENT Right 01/29/2018    Dr. Michael Everett   • CYSTOSCOPY W/ URETERAL STENT PLACEMENT N/A 06/04/2018    Cystoscopy, removal of right double-J ureteral stent and placement of right double-J ureteral stent. Dr. Michael Everett.   • CYSTOSCOPY W/ URETERAL STENT PLACEMENT N/A 03/06/2018    Cystoscopy, removal of right ureteral stent, replacement of right double-J ureteral stent. Dr. Michael Everett   • ELBOW PROCEDURE Bilateral 1990s   • ENDOSCOPY N/A 5/13/2019    Procedure: ESOPHAGOGASTRODUODENOSCOPY WITH BIOPSIES;  Surgeon: Trudy Rivera MD;  Location: [facility] ENDOSCOPY;  Service: General   • HERNIA REPAIR  09/03/2018   • LASIK Bilateral    • MASTECTOMY Left 04/25/2012    Left total mastectomy with sentinel lymph node biopsies and left axillary lymphatic mapping-Dr. Gregg May   • MEDIPORT INSERTION, DOUBLE Right    • NEPHRECTOMY Left 12/30/2009    Dr. 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 Cristo Pittman   • TOTAL ABDOMINAL HYSTERECTOMY Bilateral 2007    Dr. Todd   • URETEROURETEROSTOMY Right 05/01/2006    Dr. Michael Everett   • VENA CAVA FILTER INSERTION N/A 05/08/2006    Dr. Jordon Barber   • VENOUS ACCESS DEVICE (PORT) INSERTION Right 02/25/2013    Right subclavian vein PowerPort insertion-Dr. Gregg May   • VENOUS ACCESS DEVICE (PORT) INSERTION AND REMOVAL N/A 10/06/2014    Revision of right internal jugular PowerPort with fluoroscopy, removal of peripherally inserted central catheter line-Dr. Aurora Tomlinson   • VENOUS ACCESS DEVICE (PORT) INSERTION AND REMOVAL N/A 08/14/2014    Ultrasound-guided access right internal jugular vein, PowerPort placement, removal of right subclavian port-Dr. Jordon Barber   • VENTRAL/INCISIONAL HERNIA REPAIR Left 9/19/2018    Procedure: ventral hernia repair with mesh and rectus muscle advancement flap;  Surgeon: Trudy Rivera MD;  Location: [facility];  Service: General     Family History   Problem Relation Age of Onset   • Hypertension Other    • Miscarriages / Stillbirths Father    • Heart disease Father    • Hypertension Father    • Heart attack Father    • Diabetes Sister    • Skin cancer Sister    • Throat cancer Brother    • Malig Hyperthermia Neg Hx      Social History     Socioeconomic History   • Marital status:      Spouse name: Not on file   • Number of children: 3   • Years of education: High School   • Highest education level: Not on file   Occupational History   • Occupation:      Employer: RETIRED     Comment: [employer] [[id number]]   Tobacco Use   • Smoking status: Never Smoker   • Smokeless tobacco: Never Used   Substance and Sexual Activity   • Alcohol use: Yes     Comment:  YEARLY    • Drug use: No   • Sexual activity: Defer   Social History Narrative    Son lives with her and helps         Current Outpatient Medications:   •  acyclovir (ZOVIRAX) 400 MG tablet, TAKE 1 TABLET BY MOUTH TWICE A DAY, Disp: 60 tablet, Rfl: 5  •  aspirin 81 MG tablet, Take 81 mg by mouth Every Night., Disp: , Rfl:   •  bisoprolol (ZEBeta) 5 MG tablet, Take 5 mg by mouth 2 (Two) Times a Day., Disp: , Rfl:   •  carBAMazepine XR (TEGretol  XR) 200 MG 12 hr tablet, Take 2 tablets by mouth Every Night for 180 days., Disp: 180 tablet, Rfl: 1  •  dexamethasone (DECADRON) 4 MG tablet, Take 5 tablets by mouth Every 7 (Seven) Days. Take with food weekly on day of Velcade treatment, Disp: 20 tablet, Rfl: 11  •  hydrALAZINE (APRESOLINE) 25 MG tablet, Take 25 mg by mouth 2 (two) times a day., Disp: , Rfl: 3  •  HYDROcodone-acetaminophen (NORCO) 5-325 MG per tablet, Take 1 tablet by mouth Every 6 (Six) Hours As Needed. for pain, Disp: , Rfl: 0  •  isosorbide mononitrate (IMDUR) 60 MG 24 hr tablet, Take 1 tablet by mouth Daily. TAKE 1 TABLET BY MOUTH EVERY MORNING AND ONE-HALF TABLET EVERY EVENING (Patient taking differently: Take 90 mg by mouth Daily.), Disp: , Rfl:   •  levothyroxine (SYNTHROID, LEVOTHROID) 75 MCG tablet, Take 1 tablet by mouth Daily., Disp: 30 tablet, Rfl: 5  •  losartan (COZAAR) 25 MG tablet, Take 25 mg by mouth every evening., Disp: , Rfl:   •  ondansetron (ZOFRAN) 8 MG tablet, Take 1 tablet by mouth 3 (Three) Times a Day As Needed for Nausea or Vomiting., Disp: 60 tablet, Rfl: 5  •  pantoprazole (PROTONIX) 40 MG EC tablet, Take 1 tablet by mouth Daily. Before breakfast, Disp: 30 tablet, Rfl: 5  •  Misc. Devices (VIRAGE CUSTOM BREAST PROSTHES) misc, 2 each As Needed (na)., Disp: 2 each, Rfl: 0    Allergies   Allergen Reactions   • Zosyn [Piperacillin Sod-Tazobactam So] Swelling     Face and lips     Review of Systems    Vitals:    07/08/19 1432   Pulse: 88   SpO2: 98%   Weight: 81.7 kg (180 lb 3.2 oz)  "  Height: 157.5 cm (62\")       PHYSICAL EXAM:    Pulse 88   Ht 157.5 cm (62\")   Wt 81.7 kg (180 lb 3.2 oz)   LMP  (LMP Unknown)   SpO2 98%   BMI 32.96 kg/m²   Body mass index is 32.96 kg/m².    Constitutional: well developed, well nourished, appears relatively healthy, chronologic less than appearance  Eyes: sclera nonicteric, conjunctiva not injected   ENMT: Hearing diminished a little, trachea midline, thyroid without masses  CVS: RRR, no murmur, peripheral edema not present  Respiratory: CTA, normal respiratory effort   Gastrointestinal: no hepatosplenomegaly, abdomen soft, nontender, unable to really assess the umbilicus as she is quite tender there or more likely ticklish  Genitourinary: inguinal hernia not detected, some loose tissue at the site of her prior flank hernia but the repair feels intact  Musculoskeletal: gait normal, muscle mass normal  Skin: warm and dry, no rashes visible  Neurological: awake and alert, seems to have reasonable capacity for understanding for medical decision making  Psychiatric: appears to have reasonable judgement, pleasant  Lymphatics: no cervical adenopathy    IMPRESSION:  · Umbilical hernia, less symptomatic  · Left flank hernia, incisional, 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  Tender but good repair.  · Diverticulitis requiring hospitalization, but with fairly prompt resolution.  She's had several attacks.  · History of left breast cancer, stage II, no evidence of recurrence  · History of polyps, with next colonoscopy needed in 5 years  · Anemia  · Multiple myeloma, with history of thrombocytopenia  · status post left nephrectomy, with chronic kidney disease stage III, creatinine 1.72  · History of right ureter reanastomosis concomitant with BSO, with ureteral stent changes every 2 months.  (Dr. Michael Everett)  · History of VRE and MRSA  · Intolerance to Zosyn with renal complication.  · History of seizures secondary to meningioma, on Tegretol, with no " seizures since 1999  · History of congestive heart failure/nonischemic cardiomyopathy on Imdur.  I could not find any record of an echo recently in Epic  · Right sided port.  She requests that we use this for her surgery, saying is very difficult for her any IV to be obtained on her.  This may be reasonable, as she will be in the right lateral decubitus position and anything in her arm will probably be at risk.  · Hypertension on Apresoline, Cozaar, Z Betta  · History of gastric ulcerations associated with epigastric pain, which may in fact have been from her vitamins/supplements.  Symptoms resolved.    PLAN:  · Go back to dosage of protonix that she was on when she first saw me.  If her symptoms return, then we will need to do another EGD to see if the ulcerations have healed.  · If her umbilical hernia begins to become more problematic, she will return for surgery.  At the present time we will not plan to intervene.  · Reassured her that the tenderness at the left flank I think is all within normal limits from her prior hernia repair and does not need any other intervention at this time.    Trudy Rivera MD  07/08/19  6:56 PM

## 2019-07-03 NOTE — PROGRESS NOTES
Patient arrived ambulatory for treatment today. Velcade and X-geva to be held today per labs (Ca+ = 8.1) (Platelets = 29,000) reviewed by and verbal order by Dr. Araujo. Patient to start taking double her daily dose of Tums daily which equals 4 Tums total. Patient will receive 50,000 units of Procrit today as patient is already approved for this and this is the first injection of 50,000 units.  Patient vu and will contact physician if any concerns arise before next visit.

## 2019-07-11 PROBLEM — D72.823 LEUKEMOID REACTION: Status: ACTIVE | Noted: 2019-01-01

## 2019-07-11 NOTE — PROGRESS NOTES
Reasons for follow up:   1. IgG kappa multiple myeloma, currently on Darzalex, started on May 26, 2016. Patient was switched to Darzalex from Pomalyst, as she continued to be neutropenic with recurrent urinary tract infection while on Pomalyst.    2. Zometa is on hold due to renal insufficiency.    3. After 16 doses of Darzalex, laboratory study showed stable disease in November 2016. Insurance company denied adding Velcade and dexamethasone. Patient is to be continued on monthly Darzalex from 12/06/16.   4. Obstructive right pyelonephritis with positive urine culture for E. coli, required hospitalization from 1/19/2017 through 01/24/2017 with iv antibiotics and stent exchange on 01/20/2017.   5.  Paraprotein studies on March 27, 2017 showed further slight improvement of multiple myeloma.   6.  Febrile illness, with urinary tract infection and influenza B infection in early May 2017, required hospitalization for 6 days.   7.  Paraprotein studies for both serum and 24-hour urine sample on 7/21/2017 showed further improvement of paraproteins.   Darzalex was continued.   8.  Serum protein study reported stable disease on 10/20/2017.  Darzalex will be continued.   9.  Laboratory studies of both serum paraprotein and a 24-hour urine protein in January 2018 showed a further improvement of paraproteins.  Monthly Darzalex will be continued.   10. Repair of left flank incisional hernia in middle September 2018.   11. Serum paraprotein study reached micaela on 7/5/2018.  Since that time she has evidence of disease progression.   12. Disease progression, she was started on chemotherapy with bendamustine plus Velcade plus dexamethasone per protocol on 11/29/2018. Treatment will be bendamustine on day 1 and day 4 repeat every 28 days, Velcade and dexamethasone will be on day 1 day 4 and day 8 and day 15 repeat every 28 days.   13.  From cycle #2, patient was given only 1 dose of Treanda per cycle and continue Velcade and  dexamethasone weekly.   14.  Treanda was decreased by 20% after cycle #4 because of severe thrombocytopenia, and further decreased by another 25% on cycle #6 which is her last cycle on 4/25/2019.    15.  On 5/23/2019, patient was switched her to Empliciti plus Velcade and decadron.             History of Present Illness:     The patient is a 68 y.o. female with the above-mentioned history, who returns today for cycle 2, day #22 of Empliciti and Velcade treatment.  Patient reports tolerating well with no specific complaints.  Denies fever sweating or chills.     Her Velcade was on hold the last week due to worsening severe thrombocytopenia PLT 29,000 although she denies bleeding or bruising.     Today her platelets is slightly better at 37,000.  Her hemoglobin is at baseline level 10.0 today.  She has mildly elevated WBC 12,940 including neutrophils 10,200.  She denies dysuria or hematuria, or pain in the flank area which she had multiple times previously associated with infection as she has a right side ureteral stent which was recently replaced.  She typically has replacement every 3 months by urologist Dr. Everett.    She had been profoundly fatigued requiring transfusion support with the Treanda and now with transition to Empliciti she has noted modest improvement in her overall status. She does remain quite tired, though is able to carry out daily activities.  Her appetite has been steadily declining over the last few months.  Her weight reflects this with a 3-5 pound loss since April.      No peripheral neuropathy noted.  She has had some pruritus following her Velcade administrations over the last several weeks.  She has been managing this topically with an anti-itch cream with good effect.    She was previously experiencing issues with epigastric discomfort.  This was evaluated with EGD by Dr. Rivera.  A prepyloric ulceration was discovered.  Per Dr. Rivera, patient increased her Protonix dosing and has had  no further issues with that.    She has no other concerns today.        Past Medical History, Past Surgical History, Social History, Family History have been reviewed and are without significant changes except as mentioned.      Hematology/oncology history: See separate document for extra information.       On12/6/2016, serum free lambda chain 1090 MG/L, free kappa chain 8.5 to MG/L.  Ferritin 1015, iron 99, TIBC 137 iron saturation 72%.     On January 4, 2017, vitamin B12 level 1289, folate 7.7 ng/mL. SPEP reporting gamma globulin 3.3, out of total globulin 5.0, with immunofixation reporting small quantity of monoclonal free lambda chain, plus monoclonal IgG lambda at 3.2 g/DL.  Serum IgG 4075, IgA 9 and IgM 15.  free lambda chain 1339 MG/L and free kappa chain 10.3 MG/L.      Laboratory study March 27, 2017 showed slight improvement of paraprotein, SPEP reporting M spike 2.8 g/DL, out of total globulin 4.3, and the serum IgG 3420, IgA 9, IgM 11, free lambda chain 1218 MG/L and free kappa chain 8.0 MG/L.  Total 24 hour urine sample reported at free lambda chain 142 mg, at a concentration 74.8 MG/L, free kappa chain 75 mg at a concentration 39.5 MG/L., Urine protein immunofixation reporting biclonal IgG lambda and monoclonal free lambda light chain, M spike #1 at 41.5% and monoclonal IgG lambda spike #2 at 10.5%. Serum beta-2 microglobulin is 7.3 MG/L.     Her laboratory study on 7/21/2017 reported further improvement of paraprotein is both serum and a 24-hour urine sample.  SPEP reporting monoclonal IgG lambda 2.9 g/dL and free lambda chain 0.1 g/dL.  Serum IgG was 3261 mg/dL and IgA 5, IgM 13, free kappa chain 6.7, free lambda chain 701.3 with kappa/lambda ratio 0.01.  24-hour urine sample reported positive for Bence May protein lambda type, immunofixation positive for IgG lambda.  Total urine protein 241 mg, gamma globulin 13.1%.  Hemoglobin was 11.4 .4, platelets 122,000, WBC 6680 including neutrophils  3600, lymphocytes 2100 and monocytes 650.  Her creatinine was 1.68, at baseline level.  Liver function panel unremarkable.  Darzalex was continued.    Laboratory study on 8/25/2017 showed improved the platelets at 144,000, normal WBC 6660 and slightly improved hemoglobin at 11.7.     Patient had a colonoscopy examination on 8/30/2017 by Dr. Rivera.  It reported diverticulosis in multiple areas more severe in the sigmoid colon.  There was 2 polyps 4 mm pneumonia from transverse colon and the sigmoid colon.  Pathology evaluation reported tubular adenoma from the sigmoid colon polyp, and benign hyperplastic polyp from transverse colon.    Laboratory study on 10/20/2017 reported slightly improved monoclonal spike 2.7 g/dL by SPEP, immunofixation reported positive monoclonal IgG lambda, serum IgG 3536 mg/dL, IgA less than 5 and IgM 11, free kappa chain 5.3 mg/L, and free lambda chain 720 mg/L with kappa/lambda ratio 0.01.  Serum beta-2 microglobulin was 6.5 mg/L.   Her CBC showed a stable mild anemia with hemoglobin 11.3, macrocytosis .3, and the platelets 114,000, normal WBC 7070 including neutrophils 4100 lymphocytes 2000 monocytes 650, normal liver function panel, total protein 7.9 and albumin 3.2,  and normal electrolytes including calcium 8.1, and improved creatinine 1.59.     Laboratory study on 1/12/2018 reported serum IgG 3083 mg/dL, IgA 10, IgM 10, free kappa chain 8.5 and free lambda chain 589.1 mg/L, kappa/lambda ratio 0.01, serum protein admitted fixation reported IgG lambda monoclonal protein and SPEP reporting M spike 3.1 g/dL, beta-2 microgram and 7.4 mg/L. serum creatinine 1.79, calcium 8.2, other electrolytes normal, hemoglobin 11.3, .5 and MCHC 31.6, platelets 129,000, WBC 6780 including neutrophils 3500 lymphocytes 2300.  Serum ferritin 653.7 ng/mL, iron 123 TIBC 174 iron saturation 71%, folic acid 12.8 ng/mL and a vitamin B12 at 873 pg/mL.  Her 24-hour urine study on 1/18/2018 reported  lambda chain 32.8 mg/L, total 61 mg in 24 hours, and the free kappa chain 27.4 mg/L and 51 mg in 24 hours, and the total 24-hour urine protein 283 mg, and urine protein immunofixation positive for 5.3% monoclonal IgG lambda and positive for Bence May protein at 36.2% monoclonal lambda chain.  Monthly Darzalex was continued.       This patient had serum protein study on 04/06/2018 which reported free light chain at concentration 703.8 mg/L and free kappa chain 7.0, free kappa/lambda ratio 0.01, serum IgG 3650 mg/dL, IgM 11 and IgA 9 with serum protein electrophoresis reporting monoclonal IgG lambda 3.2 g/dL and also monoclonal free lambda light chain.  But it was unable to quantify monoclonal lambda light chain because of the small quantity of it.     A 24-hour urine study on 04/20/2018 reported total free lambda chain 60 mg in 24 hours at concentration 33.1 mg/L, free kappa chain 45 mg in 24 hours at concentration 24.8 mg/L. Total 24-hour urine protein was 279 mg. Urine protein immunofixation and UPEP reported lambda-type Bence-May protein + #1 monoclonal IgG lambda band at 34.8% and #2 monoclonal IgG lambda band at 6.9%.     Her CBC results today reported a stable hemoglobin at 11.1, platelets 130,000 and WBC 7440 including neutrophils 4100 and lymphocytes 2350, monocytes 650. Her CMP reported slightly worsened creatinine at 1.82 with BUN 33. Total protein at 8.8. Liver function panel was normal. Total serum protein 8.8 and albumin 3.2, globulin 5.6.    Reviewing her previous lab studies especially serum paraprotein, she reached a micaela of response on 07/05/2018 when she had serum IgG 3015 mg/dL, free lambda chain 458.7 mg/L and M spike IgG lambda 2.6 g/dL and and monoclonal lambda free light chain 0.1 g/dL, total 2.7 g/dL. serum beta-2 myoglobin 7.1 mg/L.  Her ferritin was 539 ng/mL, free iron 73 TIBC 176 iron saturation 42%.    Laboratory study obtained on 11/01/2018 showed further disease progression. Her  serum IgG was 5472 mg/dL, IgA 8 and IgM 10, serum kappa chain 7.9 mg/L, free lambda chain 961.3 mg/L and kappa/lambda ratio 0.01. Serum protein electrophoresis reported monoclonal IgG lambda 4.1 g/dL, and monoclonal lambda free light chain 0.1 g/dL. Her hemoglobin was 9.0, .6, MCHC 30.1, platelets 124,000 and WBC 10,000 including neutrophils 7400, lymphocytes 1200, monocytes 600.     Cycle #1 day #1 Bendamustine will be started on 11/29/2018.     Laboratory studies on 01/17/2019 reported improved paraproteins.  SPEP reported M spike 3.8 g/dL out of total 5.5 g/dL globulin.  Serum IgG was 4374 mg/dL, IgA 10 and IgM 11.  Free lambda chain 606.8 mg/L, free kappa chain 8.6 and kappa/lambda ratio 0.01.  Her serum protein immunofixation continues to be IgG lambda monoclonal.  Her CBC showed hemoglobin 9.3, .3, platelets 50,000 and WBC 8600 including ANC 5100, lymphocytes 2160.  Her Chemistry lab reported creatinine 1.92, calcium 8.3, normal liver function panel, glucose 98 with normal sodium and potassium.     For her 24-hour urine study on 3/14/2019, she had free lambda chain at a 62.1 mg/L, total 87 mg in 24 hours, free kappa chain 42 mg/L and 59 mg in 24 hours.  Urine protein immunofixation reported a monoclonal IgG lambda #1 and free lambda chain were unable to be quantified, however IgG lambda #2 was 12.8%.  Her 24-hour urine total protein was 452 mg.     For serum protein study on 3/28/2019 (on day of her cycle #5 day #1 Treanda ) also reported further decreased monoclonal spike 3.2 mg/dL, and serum IgG 3600, IgA 13 IgM 12, free kappa chain 11.7, and worsening free lambda chain 1045 mg/L.     Her serum paraprotein studies on 4/11/2019 reported a serum IgG 4407 mg/dL, IgA 14, IgM 13, free kappa chain 9.2, free lambda chain 998.4 mg/L, kappa/lambda ratio 0.01.  His serum protein immunofixation was positive for IgG lambda, SPEP reporting M spike 4.3 g/dL.    I have reviewed the patient's medical history  "in detail and updated the computerized patient record.    Review of Systems   Constitutional: Positive for appetite change, fatigue and unexpected weight change. Negative for chills, diaphoresis and fever.        See HPI    HENT:   Negative for mouth sores, nosebleeds, sore throat and trouble swallowing.    Eyes: Negative for icterus.   Respiratory: Negative for cough and shortness of breath.    Cardiovascular: Negative for chest pain, leg swelling and palpitations.   Gastrointestinal: Negative for abdominal pain, blood in stool, diarrhea and nausea.   Genitourinary: Negative for dysuria and hematuria.    Musculoskeletal: Negative for arthralgias and myalgias.   Skin: Negative for rash.        itching at Velcade injection site, no rash or warmth noted    Neurological: Positive for extremity weakness (Significant fatigue, not neurologic deficits, stable ). Negative for dizziness and headaches.   Hematological: Negative for adenopathy. Does not bruise/bleed easily.   Psychiatric/Behavioral: Negative for depression. The patient is not nervous/anxious.      Review of systems unchanged from previous office visit except as updated.     Medications: The current medication list was reviewed in the EMR.         Allergies   Allergen Reactions   • Zosyn [Piperacillin Sod-Tazobactam So] Swelling     Face and lips       VITALS:   Vitals:    07/11/19 0812   BP: 106/67   Pulse: 84   Resp: 16   Temp: 97.6 °F (36.4 °C)   TempSrc: Oral   SpO2: 97%   Weight: 80.8 kg (178 lb 3.2 oz)   Height: 157.5 cm (62.01\")   PainSc: 0-No pain   PS: ECOG 2      Physical Exam   Constitutional: well-developed and well-nourished -American female. No distress. not in acute distress.   HEENT:     Head: Normocephalic.     Eyes: No jaundice. PERRL.     Neck: No thyromegaly, no masses.    Cardiovascular: Normal rate, regular rhythm, normal heart sounds.  No murmurs.  Pulmonary/Chest: Lungs clear bilaterally.  Normal respiratory effort.  Mediport " benign right upper chest.  Abdominal: Soft, no tenderness, guarding or rebound.  Bowel sounds are normal.  no mass.    Musculoskeletal: no edema or deformity.   Lymphadenopathy: She has no cervical, supraclavicular or axillary adenopathy.   Neurological: Oriented to person, place, and time. No cranial nerve deficit.    Skin: No petechiae no bruises.    Psychiatric: She has normal mood and affect.        Recent lab results:   Results from last 7 days   Lab Units 07/11/19  0800   WBC 10*3/mm3 12.94*   NEUTROS ABS 10*3/mm3 10.22*   LYMPHS ABS 10*3/mm3 1.42   HEMOGLOBIN g/dL 10.0*   HEMATOCRIT % 33.4*   PLATELETS 10*3/mm3 37*     Results from last 7 days   Lab Units 07/11/19  0801   SODIUM mmol/L 139   POTASSIUM mmol/L 3.8   CHLORIDE mmol/L 108*   CO2 mmol/L 20.4*   BUN mg/dL 21   CREATININE mg/dL 1.92*   CALCIUM mg/dL 9.2   ALBUMIN g/dL 2.90*   BILIRUBIN mg/dL 0.4   ALK PHOS U/L 55   ALT (SGPT) U/L 10   AST (SGOT) U/L 13   GLUCOSE mg/dL 122*   MAGNESIUM mg/dL 1.8     PATHOLOGY:   Tissue Pathology Exam: [id number]   Order: [id number]   Collected:  5/13/2019 11:06 Status:  Edited Result - FINAL   Visible to patient:  No (Not Released) Dx:  Epigastric abdominal pain; Gastroesop...   Component    Addendum   An immunostain for H. pylori was performed on part 1 utilizing appropriate controls.  The specimen is judged negative for H. pylori by IHC staining.       tunde/derick    Addendum electronically signed by Edward Hadley MD on 5/15/2019 at 0917   Final Diagnosis   1. Gastric Ulceration Biopsy:  Benign gastric mucosa with                A. Mild chronic inflammation with mild hyperplastic change and mucosal alteration noted suggesting possible                   chemical gastropathy (i.e. bile reflux, NSAIDs).               B. No definitive Helicobacter-like organisms identified by routine staining (see comment).               C. Reactive change noted, no dysplasia nor malignancy identified.     Tunde/aj                    Assessment/Plan   1. Multiple myeloma, IgG lambda, stage III. Failed multiple lines of therapy. Her treatment was switched to Darzalex on 5/26/2016. She was on this treatment for more than 2 years.     In November 2018, she clearly had disease progression.  Darzalex was discontinued and was started on bendamustine plus Velcade plus dexamethasone.     Cycle #1 day #1 Bendamustine was started on 11/29/2018.  Due to poor tolerance with profound fatigue, chemotherapy was modified with bendamustine only given on day 1, and a Velcade weekly, every 4 weeks as 1 cycle.  Patient had a cycle #6 dose reduction of bendamustine by 25% because of severe thrombocytopenia.     This patient actually had a micaela of response on 3/28/2019 with M spike 3.2 g/dL and serum IgG 3600.  After that M spike and serum IgG both were elevating.     Laboratory study obtained on 5/9/2019 after 6 cycles chemotherapy, it showed disease further progressed with worsening level of serum IgG 4873 mg/dL and M spike 4.3 g/dL..      The patient returns today for C2, day #22 of Empliciti plus Velcade and decadron. She is also due for her monthly dose of Xgeva today.     I discussed with the patient today, we will repeat her both serum and 24 urine paraprotein studies next week, after 2 cycles, to assess her response.  In the meantime we will plan to continue treatment the next week for cycle 3-day #1.    2.  Severe thrombocytopenia.  This secondary to chemotherapy and her multiple myeloma.  Patient has no easy bleeding or bruising.     Patient was last transfused with platelets on May 13, 2019 prior to her EGD.    Velcade was on hold the last week because platelets 29,000.  Platelets today are 37,000 so we will continue  Velcade today.    3.  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  Patient to has been on Procrit injection weekly.  However her hemoglobin has been deteriorating clearly due to chemotherapy.  We'll continue  Procrit.      Her laboratory study on 5/9/2019 showed no evidence of iron deficiency.  On 5/16/2019, folate and B12 levels were checked with a B12 level of 1819 and normal folate of 5.84.  She was symptomatic anemic with Hb 8.5, she was transfused with 2 units of packed red blood cells.    Hemoglobin is 10.0.  We will continue Procrit as needed when Hb less than 10.      4. Zometa / Xgeva treatment.  Because of her kidney insufficiency, we were only able to give her Zometa every 3 months.  Due to her elevated creatinine level, we eventually switched her to Xgeva treatment and continued monthly.    She will proceed with her Xgeva today.  She has normal phosphorus and magnesium level today.  She had elevated phosphorus 5.6 on 6/6/2019.        5. History of stage II left breast cancer, post mastectomy in 2013, negative for ER/SC and positive HER-2. No neoadjuvant chemotherapy because of concurrent discovery of multiple myeloma and ongoing chemotherapy. She had a normal mammogram study on 7/24/2018.       6.  Right middle lobe pulmonary nodule, documented on CT scan of chest on 01/06/2017. Repeated CT scan of chest on 8/21/2017 reported a small 5 mm and stable primary nodule.      7.  Skin rashes on her extremities:  Patient was seen dermatologist Dr. Barroso, who prescribed steroids cream and also over-the-counter moisturizing cream.  Patient will follow-up with Dr. Barroso again.  At this point, the skin rash has not recurred    8. Profound fatigue: This is multifactorial secondary to her disease progression and her worsening anemia associated with chemotherapy.     A thyroid level was obtained several weeks ago and patient was diagnosed with subclinical hypothyroidism.  She was initiated on Synthroid and folic acid and has noted significant reduction in fatigue.     9.  Abdomen skin cellulitis: This was presumed to be related to Velcade.  She was given a 5-day dose of cefdinir. No residual erythema today. Completely  resolved.  She does note some pruritus following the Velcade injections but is treating with a topical Benadryl.     10.  Hypocalcemia.  The patient has struggled with compliance with her calcium supplements.  She reports she has been taking these inconsistently, though they resulted in soft frequent bowel movements.  We discussed adding 2 tablets of Tums daily along with current calcium/vitamin D supplementation.      Calcium level today is 9.2    11.  Epigastric discomfort.  Patient had EGD examination by Dr. Rivera on 5/13/2019.  It reported at prepyloric ulceration.  Dr. Rivera recommended Protonix twice a day. With increased dose of Protonix, she has had no further issues.     12.  Mild leukocytosis/neutrophilia.  Patient is not having fever sweating or chills.  No dysuria.  However this patient has history of recurrent urinary tract infection with an existing right ureteral stent.  I recommended to have urinalysis and a culture.      Plan:    1. The patient will proceed with cycle 1,day #22 of treatment today.   2. She will also receive her monthly Xgeva today.    3. She will not receive Procrit today and weekly for hemoglobin less than 10.    4. Urinalysis today + urine culture and sensitivity study today.  5. Patient is to continue on Synthroid daily.    6. Continue calcium and vitamin D supplementation.  Also continue Tums daily.    7. Continue oral B12 and folic acid daily.  8. Continue prophylactic acyclovir 400 mg twice a day.  9. Patient will return in 1 week, to start his cycle 3-day #1 treatment with elotuzumab once every 4 weeks and weekly  Velcade/dexamethasone.    10. Patient will have both serum protein study and a 24 urine protein studies in 1 week on day of her next treatment.    11. She will continue weekly CBC, nurse to review for possible Procrit and Velcade injection.   12. Patient will return in 3 weeks for M.D. evaluation on cycle #3 day #15 chemotherapy.   13. The patient understands to  call with any issues including bleeding, fever greater than 100.5, with any other concerns prior to her next office visit.      Patient is on drug therapy requiring frequent monitoring    Li Obando MD PhD  7/11/2019    Addedum:    Urine culture reports positive for multidrug sensitive E. coli.  I E scribed Ceftin ear 300 mg oral twice a day for 5 days.  I called the patient and left a message for her on 7/14/2019.      LI OBANDO M.D., Ph.D.    7/14/2019

## 2019-07-18 NOTE — PROGRESS NOTES
Patient's labs reviewed with MADAI Calloway. Patient cleared for treatment with Velcade and Empliciti with Platelets of 43,000. Procrit held today as Hemoglobin is 10.

## 2019-07-24 PROBLEM — K25.7 CHRONIC GASTRIC ULCER WITHOUT HEMORRHAGE AND WITHOUT PERFORATION: Status: ACTIVE | Noted: 2019-01-01

## 2019-07-24 PROBLEM — R63.4 WEIGHT LOSS: Status: ACTIVE | Noted: 2019-01-01

## 2019-08-01 NOTE — PROGRESS NOTES
Reasons for follow up:   1. IgG kappa multiple myeloma, currently on Darzalex, started on May 26, 2016. Patient was switched to Darzalex from Pomalyst, as she continued to be neutropenic with recurrent urinary tract infection while on Pomalyst.    2. Zometa is on hold due to renal insufficiency.    3. After 16 doses of Darzalex, laboratory study showed stable disease in November 2016. Insurance company denied adding Velcade and dexamethasone. Patient is to be continued on monthly Darzalex from 12/06/16.   4. Obstructive right pyelonephritis with positive urine culture for E. coli, required hospitalization from 1/19/2017 through 01/24/2017 with iv antibiotics and stent exchange on 01/20/2017.   5.  Paraprotein studies on March 27, 2017 showed further slight improvement of multiple myeloma.   6.  Febrile illness, with urinary tract infection and influenza B infection in early May 2017, required hospitalization for 6 days.   7.  Paraprotein studies for both serum and 24-hour urine sample on 7/21/2017 showed further improvement of paraproteins.   Darzalex was continued.   8.  Serum protein study reported stable disease on 10/20/2017.  Darzalex will be continued.   9.  Laboratory studies of both serum paraprotein and a 24-hour urine protein in January 2018 showed a further improvement of paraproteins.  Monthly Darzalex will be continued.   10. Repair of left flank incisional hernia in middle September 2018.   11. Serum paraprotein study reached micaela on 7/5/2018.  Since that time she has evidence of disease progression.   12. Disease progression, she was started on chemotherapy with bendamustine plus Velcade plus dexamethasone per protocol on 11/29/2018. Treatment will be bendamustine on day 1 and day 4 repeat every 28 days, Velcade and dexamethasone will be on day 1 day 4 and day 8 and day 15 repeat every 28 days.   13.  From cycle #2, patient was given only 1 dose of Treanda per cycle and continue Velcade and  dexamethasone weekly.   14.  Treanda was decreased by 20% after cycle #4 because of severe thrombocytopenia, and further decreased by another 25% on cycle #6 which is her last cycle on 4/25/2019.    15.  On 5/23/2019, patient was switched to Empliciti plus Velcade and decadron.             History of Present Illness:     The patient is a 69 y.o. female with the above-mentioned history, who returns today for cycle 3, day #15 of Empliciti and Velcade treatment.  Patient reports tolerating well with no specific complaints.  Denies fever sweating or chills.  Patient denies bleeding.    Today she has severe thrombocytopenia platelets 27,000.  She has elevated WBC to 12,600 including ANC 8900 and normal lymphocytes 2000.  Her hemoglobin is 10.4 today.     Patient recently had a severe thrombocytopenia and multiple times we hold her Velcade injection.  She denies bleeding such as melena hematochezia, hematemesis or hematuria.    She was previously experiencing issues with epigastric discomfort.  This was evaluated with EGD by Dr. Rivera.  A prepyloric ulceration was discovered.  Per Dr. Rivera, patient increased her Protonix dosing and has had no further issues with that.  Patient reports her heartburn mproved.        Past Medical History, Past Surgical History, Social History, Family History have been reviewed and are without significant changes except as mentioned.      Hematology/oncology history: See separate document for extra information.       On12/6/2016, serum free lambda chain 1090 MG/L, free kappa chain 8.5 to MG/L.  Ferritin 1015, iron 99, TIBC 137 iron saturation 72%.     On January 4, 2017, vitamin B12 level 1289, folate 7.7 ng/mL. SPEP reporting gamma globulin 3.3, out of total globulin 5.0, with immunofixation reporting small quantity of monoclonal free lambda chain, plus monoclonal IgG lambda at 3.2 g/DL.  Serum IgG 4075, IgA 9 and IgM 15.  free lambda chain 1339 MG/L and free kappa chain 10.3 MG/L.       Laboratory study March 27, 2017 showed slight improvement of paraprotein, SPEP reporting M spike 2.8 g/DL, out of total globulin 4.3, and the serum IgG 3420, IgA 9, IgM 11, free lambda chain 1218 MG/L and free kappa chain 8.0 MG/L.  Total 24 hour urine sample reported at free lambda chain 142 mg, at a concentration 74.8 MG/L, free kappa chain 75 mg at a concentration 39.5 MG/L., Urine protein immunofixation reporting biclonal IgG lambda and monoclonal free lambda light chain, M spike #1 at 41.5% and monoclonal IgG lambda spike #2 at 10.5%. Serum beta-2 microglobulin is 7.3 MG/L.     Her laboratory study on 7/21/2017 reported further improvement of paraprotein is both serum and a 24-hour urine sample.  SPEP reporting monoclonal IgG lambda 2.9 g/dL and free lambda chain 0.1 g/dL.  Serum IgG was 3261 mg/dL and IgA 5, IgM 13, free kappa chain 6.7, free lambda chain 701.3 with kappa/lambda ratio 0.01.  24-hour urine sample reported positive for Bence May protein lambda type, immunofixation positive for IgG lambda.  Total urine protein 241 mg, gamma globulin 13.1%.  Hemoglobin was 11.4 .4, platelets 122,000, WBC 6680 including neutrophils 3600, lymphocytes 2100 and monocytes 650.  Her creatinine was 1.68, at baseline level.  Liver function panel unremarkable.  Darzalex was continued.    Laboratory study on 8/25/2017 showed improved the platelets at 144,000, normal WBC 6660 and slightly improved hemoglobin at 11.7.     Patient had a colonoscopy examination on 8/30/2017 by Dr. Rivera.  It reported diverticulosis in multiple areas more severe in the sigmoid colon.  There was 2 polyps 4 mm pneumonia from transverse colon and the sigmoid colon.  Pathology evaluation reported tubular adenoma from the sigmoid colon polyp, and benign hyperplastic polyp from transverse colon.    Laboratory study on 10/20/2017 reported slightly improved monoclonal spike 2.7 g/dL by SPEP, immunofixation reported positive monoclonal IgG  lambda, serum IgG 3536 mg/dL, IgA less than 5 and IgM 11, free kappa chain 5.3 mg/L, and free lambda chain 720 mg/L with kappa/lambda ratio 0.01.  Serum beta-2 microglobulin was 6.5 mg/L.   Her CBC showed a stable mild anemia with hemoglobin 11.3, macrocytosis .3, and the platelets 114,000, normal WBC 7070 including neutrophils 4100 lymphocytes 2000 monocytes 650, normal liver function panel, total protein 7.9 and albumin 3.2,  and normal electrolytes including calcium 8.1, and improved creatinine 1.59.     Laboratory study on 1/12/2018 reported serum IgG 3083 mg/dL, IgA 10, IgM 10, free kappa chain 8.5 and free lambda chain 589.1 mg/L, kappa/lambda ratio 0.01, serum protein admitted fixation reported IgG lambda monoclonal protein and SPEP reporting M spike 3.1 g/dL, beta-2 microgram and 7.4 mg/L. serum creatinine 1.79, calcium 8.2, other electrolytes normal, hemoglobin 11.3, .5 and MCHC 31.6, platelets 129,000, WBC 6780 including neutrophils 3500 lymphocytes 2300.  Serum ferritin 653.7 ng/mL, iron 123 TIBC 174 iron saturation 71%, folic acid 12.8 ng/mL and a vitamin B12 at 873 pg/mL.  Her 24-hour urine study on 1/18/2018 reported lambda chain 32.8 mg/L, total 61 mg in 24 hours, and the free kappa chain 27.4 mg/L and 51 mg in 24 hours, and the total 24-hour urine protein 283 mg, and urine protein immunofixation positive for 5.3% monoclonal IgG lambda and positive for Bence May protein at 36.2% monoclonal lambda chain.  Monthly Darzalex was continued.       This patient had serum protein study on 04/06/2018 which reported free light chain at concentration 703.8 mg/L and free kappa chain 7.0, free kappa/lambda ratio 0.01, serum IgG 3650 mg/dL, IgM 11 and IgA 9 with serum protein electrophoresis reporting monoclonal IgG lambda 3.2 g/dL and also monoclonal free lambda light chain.  But it was unable to quantify monoclonal lambda light chain because of the small quantity of it.     A 24-hour urine study  on 04/20/2018 reported total free lambda chain 60 mg in 24 hours at concentration 33.1 mg/L, free kappa chain 45 mg in 24 hours at concentration 24.8 mg/L. Total 24-hour urine protein was 279 mg. Urine protein immunofixation and UPEP reported lambda-type Bence-May protein + #1 monoclonal IgG lambda band at 34.8% and #2 monoclonal IgG lambda band at 6.9%.     Her CBC results today reported a stable hemoglobin at 11.1, platelets 130,000 and WBC 7440 including neutrophils 4100 and lymphocytes 2350, monocytes 650. Her CMP reported slightly worsened creatinine at 1.82 with BUN 33. Total protein at 8.8. Liver function panel was normal. Total serum protein 8.8 and albumin 3.2, globulin 5.6.    Reviewing her previous lab studies especially serum paraprotein, she reached a micaela of response on 07/05/2018 when she had serum IgG 3015 mg/dL, free lambda chain 458.7 mg/L and M spike IgG lambda 2.6 g/dL and and monoclonal lambda free light chain 0.1 g/dL, total 2.7 g/dL. serum beta-2 myoglobin 7.1 mg/L.  Her ferritin was 539 ng/mL, free iron 73 TIBC 176 iron saturation 42%.    Laboratory study obtained on 11/01/2018 showed further disease progression. Her serum IgG was 5472 mg/dL, IgA 8 and IgM 10, serum kappa chain 7.9 mg/L, free lambda chain 961.3 mg/L and kappa/lambda ratio 0.01. Serum protein electrophoresis reported monoclonal IgG lambda 4.1 g/dL, and monoclonal lambda free light chain 0.1 g/dL. Her hemoglobin was 9.0, .6, MCHC 30.1, platelets 124,000 and WBC 10,000 including neutrophils 7400, lymphocytes 1200, monocytes 600.     Cycle #1 day #1 Bendamustine will be started on 11/29/2018.     Laboratory studies on 01/17/2019 reported improved paraproteins.  SPEP reported M spike 3.8 g/dL out of total 5.5 g/dL globulin.  Serum IgG was 4374 mg/dL, IgA 10 and IgM 11.  Free lambda chain 606.8 mg/L, free kappa chain 8.6 and kappa/lambda ratio 0.01.  Her serum protein immunofixation continues to be IgG lambda monoclonal.   Her CBC showed hemoglobin 9.3, .3, platelets 50,000 and WBC 8600 including ANC 5100, lymphocytes 2160.  Her Chemistry lab reported creatinine 1.92, calcium 8.3, normal liver function panel, glucose 98 with normal sodium and potassium.     For her 24-hour urine study on 3/14/2019, she had free lambda chain at a 62.1 mg/L, total 87 mg in 24 hours, free kappa chain 42 mg/L and 59 mg in 24 hours.  Urine protein immunofixation reported a monoclonal IgG lambda #1 and free lambda chain were unable to be quantified, however IgG lambda #2 was 12.8%.  Her 24-hour urine total protein was 452 mg.     For serum protein study on 3/28/2019 (on day of her cycle #5 day #1 Treanda ) also reported further decreased monoclonal spike 3.2 mg/dL, and serum IgG 3600, IgA 13 IgM 12, free kappa chain 11.7, and worsening free lambda chain 1045 mg/L.     Her serum paraprotein studies on 4/11/2019 reported a serum IgG 4407 mg/dL, IgA 14, IgM 13, free kappa chain 9.2, free lambda chain 998.4 mg/L, kappa/lambda ratio 0.01.  His serum protein immunofixation was positive for IgG lambda, SPEP reporting M spike 4.3 g/dL.    I have reviewed the patient's medical history in detail and updated the computerized patient record.    Her recent 24 urine study on 7/18/2019 reported total free kappa chain 142 mg in 24 hours, at 74.6 mg/L, and a total lambda chain 179 mg in 24 hours at 94.1 mg/L.  Kappa/lambda ratio 0.79.  Her total 24-hour urine protein was 410 mg.     Recent serum paraprotein studies on 7/24/2019 reported monoclonal spike 3.9 g/dL, serum IgG 4107, IgA 14 IgM 19, free kappa chain 12.8 and free lambda chain 656, kappa/lambda ratio 0.02.      Review of Systems   Constitutional: Positive for appetite change, fatigue and unexpected weight change. Negative for chills, diaphoresis and fever.   HENT:   Negative for mouth sores, nosebleeds and sore throat.    Eyes: Negative for icterus.   Respiratory: Negative for cough, hemoptysis and  "shortness of breath.    Cardiovascular: Negative for chest pain, leg swelling and palpitations.   Gastrointestinal: Negative for abdominal pain, blood in stool, diarrhea and nausea.   Genitourinary: Negative for dysuria and hematuria.    Musculoskeletal: Negative for arthralgias, back pain and myalgias.   Skin: Positive for itching. Negative for rash.        itching at Velcade injection site, no rash or warmth noted    Neurological: Positive for extremity weakness (Significant fatigue, not neurologic deficits, stable ). Negative for dizziness, headaches and numbness.   Hematological: Negative for adenopathy. Does not bruise/bleed easily.   Psychiatric/Behavioral: Negative for depression. The patient is not nervous/anxious.      Review of systems unchanged from previous office visit except as updated.     Medications: The current medication list was reviewed in the EMR.         Allergies   Allergen Reactions   • Zosyn [Piperacillin Sod-Tazobactam So] Swelling     Face and lips       VITALS:   Vitals:    08/01/19 1151   BP: 106/68   Pulse: 68   Resp: 16   Temp: 97.8 °F (36.6 °C)   SpO2: 98%   Weight: 81.5 kg (179 lb 9.6 oz)   Height: 157.5 cm (62.01\")   PainSc: 0-No pain   PS: ECOG 2      Physical Exam   Constitutional: well-developed and well-nourished -American female.  not in acute distress.   HEENT:     Head: Normocephalic.     Eyes: No jaundice. PERRL.     Mouth: Oral mucosa moist.   Neck: No thyromegaly, no masses.    Cardiovascular: Normal rate, regular rhythm, normal heart sounds.  No murmurs.  Pulmonary/Chest: Lungs clear bilaterally.  Normal respiratory effort.  Mediport benign right upper chest.  Abdominal: Soft, no tenderness, guarding or rebound.  Bowel sounds are normal.  no mass.    Musculoskeletal: no edema or deformity.   Lymphadenopathy: She has no cervical, supraclavicular or axillary adenopathy.   Neurological: Oriented to person, place, and time. No cranial nerve deficit.    Skin: No " petechiae no bruises.    Psychiatric: She has normal mood and affect.        Recent lab results:   Results from last 7 days   Lab Units 08/01/19  1123   WBC 10*3/mm3 12.58*   NEUTROS ABS 10*3/mm3 8.93*   LYMPHS ABS 10*3/mm3 2.01   HEMOGLOBIN g/dL 10.4*   HEMATOCRIT % 34.2   PLATELETS 10*3/mm3 27*         Assessment/Plan   1. Multiple myeloma, IgG lambda, stage III. Failed multiple lines of therapy. Her treatment was switched to Darzalex on 5/26/2016. She was on this treatment for more than 2 years.     In November 2018, she clearly had disease progression.  Darzalex was discontinued and was started on bendamustine plus Velcade plus dexamethasone.     Cycle #1 day #1 Bendamustine was started on 11/29/2018.  Due to poor tolerance with profound fatigue, chemotherapy was modified with bendamustine only given on day 1, and a Velcade weekly, every 4 weeks as 1 cycle.  Patient had a cycle #6 dose reduction of bendamustine by 25% because of severe thrombocytopenia.     This patient actually had a micaela of response on 3/28/2019 with M spike 3.2 g/dL and serum IgG 3600.  After that M spike and serum IgG both were elevating.     Laboratory study obtained on 5/9/2019 after 6 cycles chemotherapy, it showed disease further progressed with worsening level of serum IgG 4873 mg/dL and M spike 4.3 g/dL..      The patient returns today for C3, day #15 of Empliciti plus Velcade and decadron.     Her recent 24-hour urine study and serum protein studies reported persistent active disease, not well controlled with large quantity of monoclonal spike.  She also has severe thrombocytopenia oftentimes related to hold Velcade injection.    I recommended to obtain bone marrow aspiration biopsy for reanalysis.  This patient also has a history of breast cancer so we need to be open-minded.    2.  Severe thrombocytopenia.  This secondary to chemotherapy and her multiple myeloma.  Patient has no easy bleeding or bruising.     Patient was last  transfused with platelets on May 13, 2019 prior to her EGD.    I recommended to have a bone marrow aspiration biopsy for further evaluation.    3.  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  Patient to has been on Procrit injection weekly.  However her hemoglobin has been deteriorating clearly due to chemotherapy.  We'll continue Procrit.      Her laboratory study on 5/9/2019 showed no evidence of iron deficiency.  On 5/16/2019, folate and B12 levels were checked with a B12 level of 1819 and normal folate of 5.84.  She was symptomatic anemic with Hb 8.5, she was transfused with 2 units of packed red blood cells.    Hemoglobin is 10.4.  We will continue Procrit as needed when Hb less than 10.       4. Zometa / Xgeva treatment.  Because of her kidney insufficiency, we were only able to give her Zometa every 3 months.  Due to her elevated creatinine level, we eventually switched her to Xgeva treatment and continued monthly.    She will proceed with her Xgeva today.  She has normal phosphorus and magnesium level today.  She had elevated phosphorus 5.6 on 6/6/2019.        5. History of stage II left breast cancer, post mastectomy in 2013, negative for ER/MN and positive HER-2. No neoadjuvant chemotherapy because of concurrent discovery of multiple myeloma and ongoing chemotherapy. She had a normal mammogram study on 7/24/2018.       6.  Right middle lobe pulmonary nodule, documented on CT scan of chest on 01/06/2017. Repeated CT scan of chest on 8/21/2017 reported a small 5 mm and stable primary nodule.      7.  Skin rashes on her extremities:  Patient was seen dermatologist Dr. Barroso, who prescribed steroids cream and also over-the-counter moisturizing cream.  Patient will follow-up with Dr. Barroso again.  At this point, the skin rash has not recurred    8. Profound fatigue: This is multifactorial secondary to her disease progression and her worsening anemia associated with  chemotherapy.     A thyroid level was obtained several weeks ago and patient was diagnosed with subclinical hypothyroidism.  She was initiated on Synthroid and folic acid and has noted significant reduction in fatigue.     9.  Abdomen skin cellulitis: This was presumed to be related to Velcade.  She was given a 5-day dose of cefdinir. No residual erythema today. Completely resolved.  She does note some pruritus following the Velcade injections but is treating with a topical Benadryl.     10.  Hypocalcemia.  The patient has struggled with compliance with her calcium supplements.  She reports she has been taking these inconsistently, though they resulted in soft frequent bowel movements.  We discussed adding 2 tablets of Tums daily along with current calcium/vitamin D supplementation.      11.  Epigastric discomfort.  Patient had EGD examination by Dr. Rivera on 5/13/2019.  It reported at prepyloric ulceration.  Dr. Rivera recommended Protonix twice a day. With increased dose of Protonix, she has had no further issues.     12.  Mild leukocytosis/neutrophilia.  Patient is not having fever sweating or chills.  No dysuria.  However this patient has history of recurrent urinary tract infection with an existing right ureteral stent.  I recommended to have urinalysis and a culture.      Plan:    1. No chemotherapy or Procrit today.    2. She will return in 1 week, with CBC check, Velcade injection and Procrit.    3. Patient retained 2 weeks, with labs, and cycle #4 day#1 elotuzumab plus Velcade and dexamethasone.  4. Arrange bone marrow aspiration and biopsy in 1 to 2 weeks.    5. No chemotherapy in 3 weeks.   6. Patient will return in 4 weeks for MD evaluation.  Expecting the comprehensive bone marrow evaluation will be available.    7. She will also receive her monthly Xgeva 1 week..    8. She will not receive Procrit today and weekly for hemoglobin less than 10.    9. Patient is to continue on Synthroid daily.     10. Continue calcium and vitamin D supplementation.  Also continue Tums daily.    11. Continue oral B12 and folic acid daily.  12. Continue prophylactic acyclovir 400 mg twice a day.  13. Patient understands to call with any issues including bleeding, fever greater than 100.5, with any other concerns prior to her next office visit.      Patient is on drug therapy requiring frequent monitoring    Zane Araujo MD PhD  8/1/2019

## 2019-08-01 NOTE — PROGRESS NOTES
Order entered for CT Guided Bone Marrow Biopsy with labs: tissue pathology, flow cytometry and comprehensive panel, re: history of breast cancer, multiple myeloma V.O. Dr. Araujo

## 2019-08-08 NOTE — PROGRESS NOTES
Patient Procrit needed this week. Didn't receive dose last week so reduced by 25% to 27,000 units of Procrit. Patient will receive Velcade injection. Patient's X-geva held today due to Calcium = 7.3. All of this discussed with MADAI Calloway. Patient updated and v/u of treatment today.

## 2019-08-08 NOTE — H&P (VIEW-ONLY)
Supportive plan adjusted to add dose for 27,000 units to be given, as incorrect dose of 47,000 units had been released by infusion RN, per communication from CRISTIN Byrd RN, dose of 47,000 units discontinued for this date and appropriate dose added  per protocol Dr. Araujo

## 2019-08-12 NOTE — DISCHARGE INSTRUCTIONS
EDUCATION /DISCHARGE INSTRUCTIONS  CT/US guided biopsy:  A biopsy is a procedure done to remove tissue for further analysis.  Before images are taken to locate the target area.  Images can be obtained using ultrasound, CT or MRI.  A physician will clean your skin with antiseptic soap, place a sterile towel around the site and administer a local anesthetic to numb the area.  The physician will then insert a special needle.  Sometimes images are taken of the needle after it is inserted to ensure the needle is in the correct area to be biopsied.   A sample is obtained and sent to the laboratory for study.  Occasionally the laboratory is unable to make a diagnosis from the sample and the procedure may need to be repeated.  Within a week the radiologist will send a report to your physician.  A pathologist will also examine the tissue and send a report.    Risks of the procedure include but are not limited to:   *  Bleeding    *  Infection   *  Puncture of surrounding organs *  Death     *  Lung collapse if the biopsy is near the chest which may require insertion of a      chest tube to re-inflate the lung if severe.    Benefits of the procedure:  Using x-ray helps to locate the area that requires a biopsy. The procedure is less invasive than a surgical procedure, there are no large incisions and it does not require anesthesia.    Alternatives to the procedure:  A biopsy can be performed surgically.  Risks of a surgical biopsy include exposure to anesthesia, infection, excessive bleeding and injury to abdominal organs.  A benefit of surgical biopsy is the ability to see the area to be biopsied and remove of a larger piece of tissue.    THIS EDUCATION INFORMATION WAS REVIEWED PRIOR TO PROCEDURE AND CONSENT. Patient initials__________________Time___________________    Post Procedure:    *  Expect the biopsy site may be tender up to one week.    *  Rest today (no pushing pulling or straining).   *  Slowly increase activity  tomorrow.    *  If you received sedation do not drive for 24 hours.   *  Keep dressing clean and dry.   *  Leave dressing on puncture site for 24 hours.    *  You may shower when dressing removed.  Call your doctor if experiencing:   *  Signs of infection such as redness, swelling, excessive pain and / or foul        smelling drainage from the puncture site.   *  Chills or fever over 101 degrees (by mouth).   *  Unrelieved pain.   *  Any new or severe symptoms.   *  If experiencing sudden / severe shortness of breath or chest pain go to the       nearest emergency room.   Following the procedure:     Follow-up with the ordering physician as directed.    Continue to take other medications as directed by your physician unless    otherwise instructed.   If applicable, resume taking your blood thinners or Aspirin on ___8/13/2019 @ 1300________.    If you have any concerns please call the Radiology Nurses Desk at [phone].  You are the most important factor in your recovery.  Follow the above instructions carefully.

## 2019-08-12 NOTE — NURSING NOTE
Discharge instructions discussed and understood by patient and family. Cindy D&I. No c/o pain. No distress. Home per vehicle.

## 2019-08-15 NOTE — PROGRESS NOTES
Pt states that she took her dexamethasone this am as prescribed    Cbc and cmp labs reviewed with Dr Araujo, verbal order to proceed with empliciti and will hold velcade due to plt ct of 29. Pt due for xgeva, calcium level 8.1 and pt states that she has not taken her tums this past week. Discussed with Dr Araujo and states ok to proceed with xgeva and to instruct pt to resume taking tums twice a day. Pt informed and vu. Pt complains of right hip pain for last two weeks describing it as a dull ache with pain level at times to cause her difficulty in walking. States she is using bengay with some relief along with taking her pain med.  Dr Araujo at chairside discussing hip pain with pt and verbal order for xray of right hip 3 views. Order placed and pt and scheduling informed.

## 2019-08-30 NOTE — PROGRESS NOTES
Staff message rec from Dr Araujo-Pt needs to change her treatment to Ninlaro. See below.    Zane Araujo MD PhD sent to [facility],     We need to switch her treatment to Ixazomib 2.3 mg weekly for 3/4 wks.       Thanks!     Dr. Araujo     I have submitted the PA to [employer] through covermymeds.    Waiting for a decision.

## 2019-08-30 NOTE — PROGRESS NOTES
REASONS FOR FOLLOW UP:   1. IgG kappa multiple myeloma, currently on Darzalex, started on May 26, 2016. Patient was switched to Darzalex from Pomalyst, as she continued to be neutropenic with recurrent urinary tract infection while on Pomalyst.    2. Zometa is on hold due to renal insufficiency.    3. After 16 doses of Darzalex, laboratory study showed stable disease in November 2016. Insurance company denied adding Velcade and dexamethasone. Patient is to be continued on monthly Darzalex from 12/06/16.   4. Obstructive right pyelonephritis with positive urine culture for E. coli, required hospitalization from 1/19/2017 through 01/24/2017 with iv antibiotics and stent exchange on 01/20/2017.   5.  Paraprotein studies on March 27, 2017 showed further slight improvement of multiple myeloma.   6.  Febrile illness, with urinary tract infection and influenza B infection in early May 2017, required hospitalization for 6 days.   7.  Paraprotein studies for both serum and 24-hour urine sample on 7/21/2017 showed further improvement of paraproteins.   Darzalex was continued.   8.  Serum protein study reported stable disease on 10/20/2017.  Darzalex will be continued.   9.  Laboratory studies of both serum paraprotein and a 24-hour urine protein in January 2018 showed further improvement of paraproteins.  Monthly Darzalex will be continued.   10. Repair of left flank incisional hernia in middle September 2018.   11. Serum paraprotein study reached micaela on 7/5/2018.  Since that time she has evidence of disease progression.   12. Disease progression, she was started on chemotherapy with bendamustine plus Velcade plus dexamethasone per protocol on 11/29/2018. Treatment will be bendamustine on day 1 and day 4 repeat every 28 days, Velcade and dexamethasone will be on day 1 day 4 and day 8 and day 15 repeat every 28 days.   13.  From cycle #2, patient was given only 1 dose of Treanda per cycle and continue Velcade and  dexamethasone weekly.   14.  Treanda was decreased by 20% after cycle #4 because of severe thrombocytopenia, and further decreased by another 25% on cycle #6 which is her last cycle on 4/25/2019.    15.  On 5/23/2019, patient was switched to Empliciti plus Velcade and decadron.    16.  Patient has persistent severe thrombocytopenia, no improvement after starting new regimen.    17. The patient underwent a bone marrow biopsy on 08/12/2019 and the pathology report showed hypercellular bone marrow with involvement by >85%, plasma cell myeloma and no metastatic carcinoma, granulomas, vasculitis, viral inclusions, increase in myeloblasts, or malignant lymphoma.   18. On 08/30/2019, discussed with patient to switch from Empliciti/Velcade/Dex to oral ixazomib/Dex 3 weeks on and 1 week off. She is to start the medication at decreased dose 2.3 mg weekly starting on 9/5/2019.       HISTORY OF PRESENT ILLNESS:   The patient is a 69 y.o. female with the above-mentioned history, who returns today for 4-week follow-up and review of recent bone marrow biopsy and recent imaging, and to discusse treatment plan. The patient is accompanied by her daughter today.    The patient denies fever sweating or chills.  Patient denies bleeding, abdominal pain. She does continue to have severe intermittent pain in her right hip which is managed somewhat with pain medication, including tylenol and prescription Norco. She states she has difficulty ambulating and functioning normally when this pain occurs. She continues on oral Vitamin B-12 and folic acid supplementation.     Recent XR right hip with or without pelvis on 08/15/2019 showed sclerosis at the pubic symphysis. No other bony or articular abnormality was identified. The hip joints were symmetric and appeared within normal limits. The joint spaces were fairly well-maintained. There was no bony erosion. There was no evidence of recent or old fracture or subluxation. A right ureteral stent  was noted.    Because of her persistent severe thrombocytopenia, the patient underwent a bone marrow biopsy on 08/12/2019.  Pathology evaluation showed hypercellular bone marrow with involvement by plasma cell myeloma 85-90% and no metastatic carcinoma, granulomas, vasculitis, viral inclusions, increase in myeloblasts, or malignant lymphoma.  Normal karyotype.     Laboratory study performed today 08/30/2019 reported severe thrombocytopenia with platelets 34,000, mild leukocytosis total WBC 13,800 including ANC 7980, lymphocytes 2000 and monocytes 1000, immature granulocytes 2300.  Her anemia is stable with Hb 9.8, .6 MCHC 30.7.     Recent laboratory study on 8/15/2019 reported of elevated ferritin 524, normal iron saturation 42% with free iron 59 and a TIBC 140.  Normal thyroid function profile.  Her serum globulin 7.3, total protein 10.1 and albumin 2.8, calcium 8.1 creatinine 1.99.    Past Medical History, Past Surgical History, Social History, Family History have been reviewed and are without significant changes except as mentioned.      Hematology/oncology History: See separate document for extra information.       On12/6/2016, serum free lambda chain 1090 MG/L, free kappa chain 8.5 to MG/L.  Ferritin 1015, iron 99, TIBC 137 iron saturation 72%.     On January 4, 2017, vitamin B12 level 1289, folate 7.7 ng/mL. SPEP reporting gamma globulin 3.3, out of total globulin 5.0, with immunofixation reporting small quantity of monoclonal free lambda chain, plus monoclonal IgG lambda at 3.2 g/dL.  Serum IgG 4075, IgA 9 and IgM 15.  free lambda chain 1339 MG/L and free kappa chain 10.3 MG/L.      Laboratory study 3/27/2017 showed slight improvement of paraprotein, SPEP reporting M spike 2.8 g/DL, out of total globulin 4.3, and the serum IgG 3420, IgA 9, IgM 11, free lambda chain 1218 MG/L and free kappa chain 8.0 MG/L.  Total 24 hour urine sample reported at free lambda chain 142 mg, at a concentration 74.8 MG/L,  free kappa chain 75 mg at a concentration 39.5 MG/L., Urine protein immunofixation reporting biclonal IgG lambda and monoclonal free lambda light chain, M spike #1 at 41.5% and monoclonal IgG lambda spike #2 at 10.5%. Serum beta-2 microglobulin is 7.3 MG/L.     Her laboratory study on 7/21/2017 reported further improvement of paraprotein is both serum and a 24-hour urine sample.  SPEP reporting monoclonal IgG lambda 2.9 g/dL and free lambda chain 0.1 g/dL.  Serum IgG was 3261 mg/dL and IgA 5, IgM 13, free kappa chain 6.7, free lambda chain 701.3 with kappa/lambda ratio 0.01.  24-hour urine sample reported positive for Bence May protein lambda type, immunofixation positive for IgG lambda.  Total urine protein 241 mg, gamma globulin 13.1%.  Hemoglobin was 11.4 .4, platelets 122,000, WBC 6680 including neutrophils 3600, lymphocytes 2100 and monocytes 650.  Her creatinine was 1.68, at baseline level.  Liver function panel unremarkable.  Darzalex was continued.    Laboratory study on 8/25/2017 showed improved the platelets at 144,000, normal WBC 6660 and slightly improved hemoglobin at 11.7.     Patient had a colonoscopy examination on 8/30/2017 by Dr. Rivera.  It reported diverticulosis in multiple areas more severe in the sigmoid colon.  There was 2 polyps 4 mm pneumonia from transverse colon and the sigmoid colon.  Pathology evaluation reported tubular adenoma from the sigmoid colon polyp, and benign hyperplastic polyp from transverse colon.    Laboratory study on 10/20/2017 reported slightly improved monoclonal spike 2.7 g/dL by SPEP, immunofixation reported positive monoclonal IgG lambda, serum IgG 3536 mg/dL, IgA less than 5 and IgM 11, free kappa chain 5.3 mg/L, and free lambda chain 720 mg/L with kappa/lambda ratio 0.01.  Serum beta-2 microglobulin was 6.5 mg/L.   Her CBC showed a stable mild anemia with hemoglobin 11.3, macrocytosis .3, and the platelets 114,000, normal WBC 7070 including  neutrophils 4100 lymphocytes 2000 monocytes 650, normal liver function panel, total protein 7.9 and albumin 3.2,  and normal electrolytes including calcium 8.1, and improved creatinine 1.59.     Laboratory study on 1/12/2018 reported serum IgG 3083 mg/dL, IgA 10, IgM 10, free kappa chain 8.5 and free lambda chain 589.1 mg/L, kappa/lambda ratio 0.01, serum protein admitted fixation reported IgG lambda monoclonal protein and SPEP reporting M spike 3.1 g/dL, beta-2 microgram and 7.4 mg/L. serum creatinine 1.79, calcium 8.2, other electrolytes normal, hemoglobin 11.3, .5 and MCHC 31.6, platelets 129,000, WBC 6780 including neutrophils 3500 lymphocytes 2300.  Serum ferritin 653.7 ng/mL, iron 123 TIBC 174 iron saturation 71%, folic acid 12.8 ng/mL and a vitamin B12 at 873 pg/mL.  Her 24-hour urine study on 1/18/2018 reported lambda chain 32.8 mg/L, total 61 mg in 24 hours, and the free kappa chain 27.4 mg/L and 51 mg in 24 hours, and the total 24-hour urine protein 283 mg, and urine protein immunofixation positive for 5.3% monoclonal IgG lambda and positive for Bence May protein at 36.2% monoclonal lambda chain.  Monthly Darzalex was continued.       This patient had serum protein study on 04/06/2018 which reported free light chain at concentration 703.8 mg/L and free kappa chain 7.0, free kappa/lambda ratio 0.01, serum IgG 3650 mg/dL, IgM 11 and IgA 9 with serum protein electrophoresis reporting monoclonal IgG lambda 3.2 g/dL and also monoclonal free lambda light chain.  But it was unable to quantify monoclonal lambda light chain because of the small quantity of it.     A 24-hour urine study on 04/20/2018 reported total free lambda chain 60 mg in 24 hours at concentration 33.1 mg/L, free kappa chain 45 mg in 24 hours at concentration 24.8 mg/L. Total 24-hour urine protein was 279 mg. Urine protein immunofixation and UPEP reported lambda-type Bence-May protein + #1 monoclonal IgG lambda band at 34.8% and #2  monoclonal IgG lambda band at 6.9%.     Her CBC results today reported a stable hemoglobin at 11.1, platelets 130,000 and WBC 7440 including neutrophils 4100 and lymphocytes 2350, monocytes 650. Her CMP reported slightly worsened creatinine at 1.82 with BUN 33. Total protein at 8.8. Liver function panel was normal. Total serum protein 8.8 and albumin 3.2, globulin 5.6.    Reviewing her previous lab studies especially serum paraprotein, she reached a micaela of response on 07/05/2018 when she had serum IgG 3015 mg/dL, free lambda chain 458.7 mg/L and M spike IgG lambda 2.6 g/dL and and monoclonal lambda free light chain 0.1 g/dL, total 2.7 g/dL. serum beta-2 myoglobin 7.1 mg/L.  Her ferritin was 539 ng/mL, free iron 73 TIBC 176 iron saturation 42%.    Laboratory study obtained on 11/01/2018 showed further disease progression. Her serum IgG was 5472 mg/dL, IgA 8 and IgM 10, serum kappa chain 7.9 mg/L, free lambda chain 961.3 mg/L and kappa/lambda ratio 0.01. Serum protein electrophoresis reported monoclonal IgG lambda 4.1 g/dL, and monoclonal lambda free light chain 0.1 g/dL. Her hemoglobin was 9.0, .6, MCHC 30.1, platelets 124,000 and WBC 10,000 including neutrophils 7400, lymphocytes 1200, monocytes 600.     Cycle #1 day #1 Bendamustine will be started on 11/29/2018.     Laboratory studies on 01/17/2019 reported improved paraproteins.  SPEP reported M spike 3.8 g/dL out of total 5.5 g/dL globulin.  Serum IgG was 4374 mg/dL, IgA 10 and IgM 11.  Free lambda chain 606.8 mg/L, free kappa chain 8.6 and kappa/lambda ratio 0.01.  Her serum protein immunofixation continues to be IgG lambda monoclonal.  Her CBC showed hemoglobin 9.3, .3, platelets 50,000 and WBC 8600 including ANC 5100, lymphocytes 2160.  Her Chemistry lab reported creatinine 1.92, calcium 8.3, normal liver function panel, glucose 98 with normal sodium and potassium.     For her 24-hour urine study on 3/14/2019, she had free lambda chain at a  62.1 mg/L, total 87 mg in 24 hours, free kappa chain 42 mg/L and 59 mg in 24 hours.  Urine protein immunofixation reported a monoclonal IgG lambda #1 and free lambda chain were unable to be quantified, however IgG lambda #2 was 12.8%.  Her 24-hour urine total protein was 452 mg.     For serum protein study on 3/28/2019 (on day of her cycle #5 day #1 Treanda ) also reported further decreased monoclonal spike 3.2 mg/dL, and serum IgG 3600, IgA 13 IgM 12, free kappa chain 11.7, and worsening free lambda chain 1045 mg/L.     Her serum paraprotein studies on 4/11/2019 reported a serum IgG 4407 mg/dL, IgA 14, IgM 13, free kappa chain 9.2, free lambda chain 998.4 mg/L, kappa/lambda ratio 0.01.  His serum protein immunofixation was positive for IgG lambda, SPEP reporting M spike 4.3 g/dL.    I have reviewed the patient's medical history in detail and updated the computerized patient record.    Her recent 24 urine study on 7/18/2019 reported total free kappa chain 142 mg in 24 hours, at 74.6 mg/L, and a total lambda chain 179 mg in 24 hours at 94.1 mg/L.  Kappa/lambda ratio 0.79.  Her total 24-hour urine protein was 410 mg.     Recent serum paraprotein studies on 7/24/2019 reported monoclonal spike 3.9 g/dL, serum IgG 4107, IgA 14 IgM 19, free kappa chain 12.8 and free lambda chain 656, kappa/lambda ratio 0.02.    On 08/01/2019 she has severe thrombocytopenia platelets 27,000.  She has elevated WBC to 12,600 including ANC 8900 and normal lymphocytes 2000.  Her hemoglobin is 10.4 08/01/2019. The patient underwent a bone marrow biopsy on 08/12/2019 and the pathology report showed hypercellular bone marrow with involvement by plasma cell myeloma and no metastatic carcinoma, granulomas, vasculitis, viral inclusions, increase in myeloblasts, or malignant lymphoma. Over 85% of the bone marrow are plasma cells. Normal karyotype. Flow cytometry study was positive.    On 08/30/2019, switched treatment from Empliciti to oral ixazomib  "3 weeks on and 1 week off. She is expected to start the medication on 9/5/2019 when this medication arrives.  Patient will continue dexamethasone weekly at the same time as part of the treatment.  Because of her severe thrombocytopenia, we recommended starting low-dose ixazomib 2.3 mg weekly.    Review of Systems   Constitutional: Positive for appetite change (08/30/19-Same), fatigue (08/30/19-Same) and unexpected weight change (08/30/19-Same). Negative for chills, diaphoresis and fever.   HENT:   Negative for mouth sores, nosebleeds and sore throat.    Eyes: Negative for icterus.   Respiratory: Negative for cough, hemoptysis and shortness of breath.    Cardiovascular: Negative for chest pain, leg swelling and palpitations.   Gastrointestinal: Negative for abdominal pain, blood in stool, diarrhea and nausea.   Genitourinary: Negative for dysuria and hematuria.    Musculoskeletal: Negative for arthralgias, back pain and myalgias.   Skin: Negative for rash.        itching at Velcade injection site, no rash or warmth noted    Neurological: Positive for extremity weakness (Significant fatigue, not neurologic deficits, stable. 08/30/19-Same). Negative for dizziness, headaches and numbness.   Hematological: Negative for adenopathy. Does not bruise/bleed easily.   Psychiatric/Behavioral: Negative for depression. The patient is not nervous/anxious.    All other systems reviewed and are negative.    Medications: The current medication list was reviewed in the EMR.         Allergies   Allergen Reactions   • Zosyn [Piperacillin Sod-Tazobactam So] Swelling     Face and lips       VITALS:   Vitals:    08/30/19 0825   BP: 107/67   Pulse: 80   Resp: 18   Temp: 97.4 °F (36.3 °C)   TempSrc: Oral   SpO2: 97%   Weight: 80.8 kg (178 lb 1.6 oz)   Height: 157.5 cm (62.01\")   PainSc:   1   PainLoc: Hip  Comment: Rt hip pain   PS: ECOG 2      Physical Exam     GENERAL:  Well-developed, well-nourished  female in no acute " distress.   SKIN:  Warm, dry without rashes, purpura or petechiae.  EYES:  Pupils equal, round and reactive to light.  Conjunctivae normal.  EARS:  Hearing intact.  NOSE:  No nasal discharge.  MOUTH:  Tongue is well-papillated; no stomatitis or ulcers.  Lips normal.  THROAT:  Oropharynx without lesions or exudates.  NECK:  No thyromegaly or masses.  LYMPHATICS:  No cervical, supraclavicular, axillary adenopathy.  CHEST:  Lungs clear to auscultation.  Normal respiratory effort.  Good airflow.  CARDIAC:  Regular rate and rhythm without murmurs, rubs or gallops. Normal S1,S2.  ABDOMEN:  Soft, nontender with no hepatosplenomegaly or masses. Bowel sounds normal.  EXTREMITIES:  No clubbing, cyanosis or edema.  NEUROLOGICAL:  Cranial Nerves II-XII grossly intact.  No focal neurological deficits.  PSYCHIATRIC:  Normal affect and mood.      Recent lab results:   Results from last 7 days   Lab Units 08/30/19  0809   WBC 10*3/mm3 13.80*   NEUTROS ABS 10*3/mm3 7.98*   HEMOGLOBIN g/dL 9.8*   HEMATOCRIT % 31.9*   PLATELETS 10*3/mm3 34*     Lab Results   Component Value Date    IRON 59 08/15/2019    TIBC 140 (L) 08/15/2019    FERRITIN 524.30 (H) 08/15/2019     Lab Results   Component Value Date    FOLATE 5.84 05/16/2019     Lab Results   Component Value Date    LVSWWCOM44 1,819 (H) 05/16/2019     Glucose   Date Value Ref Range Status   08/15/2019 115 (H) 65 - 99 mg/dL Final     BUN   Date Value Ref Range Status   08/23/2019 28 (H) 7 - 20 mg/dL Final     Creatinine   Date Value Ref Range Status   08/23/2019 2.1 (H) 0.7 - 1.5 mg/dL Final   02/18/2019 2.1 (H) 0.7 - 1.5 mg/dL Final     Sodium   Date Value Ref Range Status   08/23/2019 140 137 - 145 mmol/L Final     Potassium   Date Value Ref Range Status   08/23/2019 3.6 3.5 - 5.1 mmol/L Final     Chloride   Date Value Ref Range Status   08/23/2019 114 (H) 98 - 107 mmol/L Final     CO2   Date Value Ref Range Status   02/18/2019 22 22 - 30 mmol/L Final     Total CO2   Date Value Ref  Range Status   08/23/2019 25 22 - 30 mmol/L Final     Calcium   Date Value Ref Range Status   08/23/2019 8.7 8.4 - 10.2 mg/dL Final     Total Protein   Date Value Ref Range Status   08/15/2019 10.1 (H) 6.0 - 8.5 g/dL Final     Albumin   Date Value Ref Range Status   08/15/2019 2.80 (L) 3.50 - 5.20 g/dL Final     ALT (SGPT)   Date Value Ref Range Status   08/15/2019 11 1 - 33 U/L Final     AST (SGOT)   Date Value Ref Range Status   08/15/2019 15 1 - 32 U/L Final     Alkaline Phosphatase   Date Value Ref Range Status   08/15/2019 51 39 - 117 U/L Final     Total Bilirubin   Date Value Ref Range Status   08/15/2019 0.3 0.1 - 1.2 mg/dL Final     eGFR Non  Amer   Date Value Ref Range Status   08/30/2016  >60 mL/min/1.73 Final     Comment:     <15 Indicative of kidney failure.     A/G Ratio   Date Value Ref Range Status   07/24/2019 0.6 (L) 0.7 - 1.7 Final     BUN/Creatinine Ratio   Date Value Ref Range Status   08/23/2019 13.3 RATIO Final     Anion Gap   Date Value Ref Range Status   08/15/2019 6.4 5.0 - 15.0 mmol/L Final       Assessment/Plan   1. Multiple myeloma, IgG lambda, stage III. Failed multiple lines of therapy. Her treatment was switched to Darzalex on 5/26/2016. She was on this treatment for more than 2 years.     · In November 2018, she clearly had disease progression.  Darzalex was discontinued and was started on bendamustine plus Velcade plus dexamethasone.   · Cycle #1 day #1 Bendamustine was started on 11/29/2018.  Due to poor tolerance with profound fatigue, chemotherapy was modified with bendamustine only given on day 1, and a Velcade weekly, every 4 weeks as 1 cycle.  Patient had a cycle #6 dose reduction of bendamustine by 25% because of severe thrombocytopenia.   · This patient actually had a micaela of response on 3/28/2019 with M spike 3.2 g/dL and serum IgG 3600.  After that M spike and serum IgG both were elevating.   · Laboratory study obtained on 5/9/2019 after 6 cycles chemotherapy, it  showed disease further progressed with worsening level of serum IgG 4873 mg/dL and M spike 4.3 g/dL.    · On 5/23/2019, patient was switched to Empliciti plus Velcade and decadron.    · Her serum protein studies reported persistent active disease, not well controlled with large quantity of monoclonal spike.  She also has severe thrombocytopenia oftentimes leading to hold Velcade injection.  · I recommended to obtain bone marrow aspiration biopsy for reanalysis.  This patient also has a history of breast cancer so we need to be open-minded.  She had bone marrow aspirate biopsy on 8/12/2019 which showed more than 85% of bone marrow cells are malignant plasma cells.  There is no evidence of metastatic disease or other type of malignancy.  · I discussed with the patient today on 8/30/2019, recommended switching treatment to Ixazomib plus dexamethasone.  Considering her pre-existing severe thrombocytopenia, I recommended starting low-dose  Ixazomib 2.3 mg weekly instead of full dose 4 mg weekly.    · Expecting she will receive the medication by 9/5/2019, I asked her to start right away together with dexamethasone 20 mg weekly.  Her CBC need to be monitored weekly.     2.  Severe thrombocytopenia.  This is more likely secondary to her multiple myeloma, based on her recent bone marrow aspiration biopsy on 8/12/2019. patient has no easy bleeding or bruising.     · Patient was last transfused with platelets on May 13, 2019 prior to her EGD.  · The patient underwent a bone marrow biopsy on 08/12/2019 and the pathology report showed hypercellular bone marrow with involvement by plasma cell myeloma and no metastatic carcinoma, granulomas, vasculitis, viral inclusions, increase in myeloblasts, or malignant lymphoma. Over 80% of the bone marrow are plasma cells. Normal karyotype.   · Laboratory study today showed platelets of 34,000. We will switch her treatment to oral ixazomib 3 weeks on and 1 week off.    · Certainly she might  have worsening thrombocytopenia, will check CBC weekly.       3.  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  Patient to has been on Procrit injection weekly.  However her hemoglobin has been deteriorating clearly due to chemotherapy.  We'll continue Procrit.      · Her laboratory study on 5/9/2019 showed no evidence of iron deficiency.  On 5/16/2019, B12 level of 1819 and normal folate of 5.84.  She was symptomatic anemic with Hb 8.5, she was transfused with 2 units of packed red blood cells.    · Recent labs on 8/15/2019 reported no evidence of iron deficiency, had normal iron saturation and excellent ferritin level.  · Hemoglobin is 9.8.  Patient to receive procrit today. We will continue Procrit as needed when Hb less than 10.     4. Zometa / Xgeva treatment.  Because of her kidney insufficiency, we were only able to give her Zometa every 3 months.  Due to her elevated creatinine level, we eventually switched her to Xgeva treatment and continued monthly.    · She had elevated phosphorus 5.6 on 6/6/2019. Her last Xgeva was 08/15/2019.  She had normal phosphorus and magnesium level 08/08/2019.        5. History of stage II left breast cancer, post mastectomy in 2013, negative for ER/IN and positive HER-2. No neoadjuvant chemotherapy because of concurrent discovery of multiple myeloma and ongoing chemotherapy. She had a normal mammogram study on 7/26/2019.       6.  Right middle lobe pulmonary nodule, documented on CT scan of chest on 01/06/2017. Repeated CT scan of chest on 8/21/2017 reported a small 5 mm and stable primary nodule.      7.  Skin rashes on her extremities:  Patient was seen dermatologist Dr. Barroso, who prescribed steroids cream and also over-the-counter moisturizing cream.  Patient will follow-up with Dr. Barroso again.  Currently resolved.    8. Profound fatigue: This is multifactorial secondary to her disease progression and her worsening anemia associated with  chemotherapy.     · A thyroid level was obtained and patient was diagnosed with subclinical hypothyroidism.  She was initiated on Synthroid and folic acid and has noted significant reduction in fatigue.     9.  Abdomen skin cellulitis: This was presumed to be related to Velcade.  She was given a 5-day dose of cefdinir. Completely resolved.  She does note some pruritus following the Velcade injections but is treating with a topical Benadryl.     10.  Hypocalcemia.  The patient has struggled with compliance with her calcium supplements.  She reports she has been taking these inconsistently, though they resulted in soft frequent bowel movements.  We discussed adding 2 tablets of Tums daily along with current calcium/vitamin D supplementation.      11.  Epigastric discomfort.  Patient had EGD examination by Dr. Rivera on 5/13/2019.  It reported at prepyloric ulceration.  Dr. Rivera recommended Protonix twice a day. With increased dose of Protonix, she has had no further issues.     12.  Mild leukocytosis/neutrophilia.  Patient is not having fever sweating or chills.  No dysuria.  However this patient has history of recurrent urinary tract infection with an existing right ureteral stent. Urine culture on 07/11/2019 showed Escherichia coli along with elevated protein of 100 mg/dL and nitrite positive.     Plan:  1. Discontinue treatment with Empliciti/ Velcade/Decadron.   2. To start treatment with oral ixazomib at decreased dose 2.3 mg weekly and dexamethasone 20 mg weekly, both 3 weeks on and 1 week off, expecting to start on 9/5/2019 as soon as medicine arrives.   3. Proceed with Procrit today for hemoglobin of 9.8.  This will be repeated weekly with CBC monitoring.  4. Continue to receive monthly Xgeva. Next Xgeva is due 09/15/2019.   5. Continue on Synthroid daily.    6. Continue Norco for pain management, refilled prescription today.  7. Continue calcium and vitamin D supplementation.  Also continue Tums daily.     8. Continue oral B12 and folic acid daily.  9. Continue prophylactic acyclovir 400 mg twice a day.  10. Patient to return for weekly CBC starting 9/5/2019 and possible procrit  11. Patient to return in 2 weeks (September 12th) for Xgeva and possible procrit injection.  12. Follow-up with me in 4 weeks for reevaluation, with labs and possible procrit.   13. Patient understands to call with any issues including bleeding, fever greater than 100.5, with any other concerns prior to her next office visit.     Patient is on drug therapy requiring frequent monitoring.    I personally reviewed the right hip XR imaging from 08/15/2019 and I concur with the assessment. I also shared those images with the patient.     I, Rima Russo, acted as scribe for Zane Araujo MD PhD  in documenting the service or procedure. To the best of my knowledge, I recorded what was dictated by Zane Araujo MD PhD      I reviewed the note scribed by Ms. Rima Russo and made appropriate corrections.       Zane Araujo MD PhD  08/30/2019    Cristo Garcia MD

## 2019-09-03 NOTE — PROGRESS NOTES
[place] approved until 2/29/2020-[employer]    I have spoken with pt and offered assistance for the copay. We had to obtain assistance when she was on previous treatment. I have obtained her income information and applied on her behalf to [employer]. Pt has been approved for $10,000 ( Fund) from 8/4/19-8/3/20    I have escribed the Rx to [facility] for processing.

## 2019-09-19 NOTE — PROGRESS NOTES
CBC reviewed and copy of results given.   Lab Results   Component Value Date    WBC 19.07 (H) 09/19/2019    HGB 9.7 (L) 09/19/2019    HCT 32.0 (L) 09/19/2019    .7 (H) 09/19/2019    PLT 35 (C) 09/19/2019     Hgb 9.7; last four injections of procrit were 27,000units; therefore was increased by 25% for 33,000 units dose.

## 2019-09-28 NOTE — PROGRESS NOTES
REASONS FOR FOLLOW UP:   1. IgG kappa multiple myeloma, currently on Darzalex, started on May 26, 2016. Patient was switched to Darzalex from Pomalyst, as she continued to be neutropenic with recurrent urinary tract infection while on Pomalyst.    2. Zometa is on hold due to renal insufficiency.    3. After 16 doses of Darzalex, laboratory study showed stable disease in November 2016. Insurance company denied adding Velcade and dexamethasone. Patient is to be continued on monthly Darzalex from 12/06/16.   4. Obstructive right pyelonephritis with positive urine culture for E. coli, required hospitalization from 1/19/2017 through 01/24/2017 with iv antibiotics and stent exchange on 01/20/2017.   5.  Paraprotein studies on March 27, 2017 showed further slight improvement of multiple myeloma.   6.  Febrile illness, with urinary tract infection and influenza B infection in early May 2017, required hospitalization for 6 days.   7.  Paraprotein studies for both serum and 24-hour urine sample on 7/21/2017 showed further improvement of paraproteins.   Darzalex was continued.   8.  Serum protein study reported stable disease on 10/20/2017.  Darzalex will be continued.   9.  Laboratory studies of both serum paraprotein and a 24-hour urine protein in January 2018 showed further improvement of paraproteins.  Monthly Darzalex will be continued.   10. Repair of left flank incisional hernia in middle September 2018.   11. Serum paraprotein study reached micaela on 7/5/2018.  Since that time she has evidence of disease progression.   12. Disease progression, she was started on chemotherapy with bendamustine plus Velcade plus dexamethasone per protocol on 11/29/2018. Treatment will be bendamustine on day 1 and day 4 repeat every 28 days, Velcade and dexamethasone will be on day 1 day 4 and day 8 and day 15 repeat every 28 days.   13.  From cycle #2, patient was given only 1 dose of Treanda per cycle and continue Velcade and  dexamethasone weekly.   14.  Treanda was decreased by 20% after cycle #4 because of severe thrombocytopenia, and further decreased by another 25% on cycle #6 which is her last cycle on 4/25/2019.    15.  On 5/23/2019, patient was switched to Empliciti plus Velcade and decadron.    16.  Patient has persistent severe thrombocytopenia, no improvement after starting new regimen.    17. The patient underwent a bone marrow biopsy on 08/12/2019 and the pathology report showed hypercellular bone marrow with involvement by >85%, plasma cell myeloma and no metastatic carcinoma, granulomas, vasculitis, viral inclusions, increase in myeloblasts, or malignant lymphoma.   18. On 08/30/2019, discussed with patient to switch from Empliciti/Velcade/Dex to oral ixazomib/Dex 3 weeks on and 1 week off. She is to start the medication at decreased dose 2.3 mg weekly starting on 9/5/2019.       HISTORY OF PRESENT ILLNESS:   The patient is a 69 y.o. female with the above-mentioned history, who returns today for 4-week follow-up to assess her tolerance to Ixazomib treatment.     Patient reports that she tolerated treatment, no specific complaints. The patient denies fever sweating or chills.  Patient denies bleeding, abdominal pain. She does continue to have severe intermittent pain in her right hip which is managed somewhat with pain medication, including tylenol and prescription Norco. She states she has difficulty ambulating and functioning normally when this pain occurs. She continues on oral Vitamin B-12 and folic acid supplementation.     Recent XR right hip with or without pelvis on 08/15/2019 showed sclerosis at the pubic symphysis. No other bony or articular abnormality was identified. The hip joints were symmetric and appeared within normal limits. The joint spaces were fairly well-maintained. There was no bony erosion. There was no evidence of recent or old fracture or subluxation. A right ureteral stent was noted.    Patient was  started on new treatment on 9/5/2019, 3 weeks on 1 week off.  Today is the beginning of her off week.     Laboratory study today showed worsening leukocytosis with WBC 20,780 including ANC 16,000, lymphocytes 2700 and monocytes 8070.  She also has worsening severe thrombocytopenia with platelets 27,000, and stable hemoglobin 9.5.     Suspect the patient may have some kind of infection however she denies fever sweating chills no dysuria or hematuria.  She denies pain in the right flank area where she has ureteral stent and oftentimes complains of pain when she has urinary tract infection.      Past Medical History, Past Surgical History, Social History, Family History have been reviewed and are without significant changes except as mentioned.      Hematology/oncology History: See separate document for extra information.       On12/6/2016, serum free lambda chain 1090 MG/L, free kappa chain 8.5 to MG/L.  Ferritin 1015, iron 99, TIBC 137 iron saturation 72%.     On January 4, 2017, vitamin B12 level 1289, folate 7.7 ng/mL. SPEP reporting gamma globulin 3.3, out of total globulin 5.0, with immunofixation reporting small quantity of monoclonal free lambda chain, plus monoclonal IgG lambda at 3.2 g/dL.  Serum IgG 4075, IgA 9 and IgM 15.  free lambda chain 1339 MG/L and free kappa chain 10.3 MG/L.      Laboratory study 3/27/2017 showed slight improvement of paraprotein, SPEP reporting M spike 2.8 g/DL, out of total globulin 4.3, and the serum IgG 3420, IgA 9, IgM 11, free lambda chain 1218 MG/L and free kappa chain 8.0 MG/L.  Total 24 hour urine sample reported at free lambda chain 142 mg, at a concentration 74.8 MG/L, free kappa chain 75 mg at a concentration 39.5 MG/L., Urine protein immunofixation reporting biclonal IgG lambda and monoclonal free lambda light chain, M spike #1 at 41.5% and monoclonal IgG lambda spike #2 at 10.5%. Serum beta-2 microglobulin is 7.3 MG/L.     Her laboratory study on 7/21/2017 reported  further improvement of paraprotein is both serum and a 24-hour urine sample.  SPEP reporting monoclonal IgG lambda 2.9 g/dL and free lambda chain 0.1 g/dL.  Serum IgG was 3261 mg/dL and IgA 5, IgM 13, free kappa chain 6.7, free lambda chain 701.3 with kappa/lambda ratio 0.01.  24-hour urine sample reported positive for Bence May protein lambda type, immunofixation positive for IgG lambda.  Total urine protein 241 mg, gamma globulin 13.1%.  Hemoglobin was 11.4 .4, platelets 122,000, WBC 6680 including neutrophils 3600, lymphocytes 2100 and monocytes 650.  Her creatinine was 1.68, at baseline level.  Liver function panel unremarkable.  Darzalex was continued.    Laboratory study on 8/25/2017 showed improved the platelets at 144,000, normal WBC 6660 and slightly improved hemoglobin at 11.7.     Patient had a colonoscopy examination on 8/30/2017 by Dr. Rivera.  It reported diverticulosis in multiple areas more severe in the sigmoid colon.  There was 2 polyps 4 mm pneumonia from transverse colon and the sigmoid colon.  Pathology evaluation reported tubular adenoma from the sigmoid colon polyp, and benign hyperplastic polyp from transverse colon.    Laboratory study on 10/20/2017 reported slightly improved monoclonal spike 2.7 g/dL by SPEP, immunofixation reported positive monoclonal IgG lambda, serum IgG 3536 mg/dL, IgA less than 5 and IgM 11, free kappa chain 5.3 mg/L, and free lambda chain 720 mg/L with kappa/lambda ratio 0.01.  Serum beta-2 microglobulin was 6.5 mg/L.   Her CBC showed a stable mild anemia with hemoglobin 11.3, macrocytosis .3, and the platelets 114,000, normal WBC 7070 including neutrophils 4100 lymphocytes 2000 monocytes 650, normal liver function panel, total protein 7.9 and albumin 3.2,  and normal electrolytes including calcium 8.1, and improved creatinine 1.59.     Laboratory study on 1/12/2018 reported serum IgG 3083 mg/dL, IgA 10, IgM 10, free kappa chain 8.5 and free lambda  chain 589.1 mg/L, kappa/lambda ratio 0.01, serum protein admitted fixation reported IgG lambda monoclonal protein and SPEP reporting M spike 3.1 g/dL, beta-2 microgram and 7.4 mg/L. serum creatinine 1.79, calcium 8.2, other electrolytes normal, hemoglobin 11.3, .5 and MCHC 31.6, platelets 129,000, WBC 6780 including neutrophils 3500 lymphocytes 2300.  Serum ferritin 653.7 ng/mL, iron 123 TIBC 174 iron saturation 71%, folic acid 12.8 ng/mL and a vitamin B12 at 873 pg/mL.  Her 24-hour urine study on 1/18/2018 reported lambda chain 32.8 mg/L, total 61 mg in 24 hours, and the free kappa chain 27.4 mg/L and 51 mg in 24 hours, and the total 24-hour urine protein 283 mg, and urine protein immunofixation positive for 5.3% monoclonal IgG lambda and positive for Bence May protein at 36.2% monoclonal lambda chain.  Monthly Darzalex was continued.       This patient had serum protein study on 04/06/2018 which reported free light chain at concentration 703.8 mg/L and free kappa chain 7.0, free kappa/lambda ratio 0.01, serum IgG 3650 mg/dL, IgM 11 and IgA 9 with serum protein electrophoresis reporting monoclonal IgG lambda 3.2 g/dL and also monoclonal free lambda light chain.  But it was unable to quantify monoclonal lambda light chain because of the small quantity of it.     A 24-hour urine study on 04/20/2018 reported total free lambda chain 60 mg in 24 hours at concentration 33.1 mg/L, free kappa chain 45 mg in 24 hours at concentration 24.8 mg/L. Total 24-hour urine protein was 279 mg. Urine protein immunofixation and UPEP reported lambda-type Bence-May protein + #1 monoclonal IgG lambda band at 34.8% and #2 monoclonal IgG lambda band at 6.9%.     Her CBC results today reported a stable hemoglobin at 11.1, platelets 130,000 and WBC 7440 including neutrophils 4100 and lymphocytes 2350, monocytes 650. Her CMP reported slightly worsened creatinine at 1.82 with BUN 33. Total protein at 8.8. Liver function panel was  normal. Total serum protein 8.8 and albumin 3.2, globulin 5.6.    Reviewing her previous lab studies especially serum paraprotein, she reached a micaela of response on 07/05/2018 when she had serum IgG 3015 mg/dL, free lambda chain 458.7 mg/L and M spike IgG lambda 2.6 g/dL and and monoclonal lambda free light chain 0.1 g/dL, total 2.7 g/dL. serum beta-2 myoglobin 7.1 mg/L.  Her ferritin was 539 ng/mL, free iron 73 TIBC 176 iron saturation 42%.    Laboratory study obtained on 11/01/2018 showed further disease progression. Her serum IgG was 5472 mg/dL, IgA 8 and IgM 10, serum kappa chain 7.9 mg/L, free lambda chain 961.3 mg/L and kappa/lambda ratio 0.01. Serum protein electrophoresis reported monoclonal IgG lambda 4.1 g/dL, and monoclonal lambda free light chain 0.1 g/dL. Her hemoglobin was 9.0, .6, MCHC 30.1, platelets 124,000 and WBC 10,000 including neutrophils 7400, lymphocytes 1200, monocytes 600.     Cycle #1 day #1 Bendamustine will be started on 11/29/2018.     Laboratory studies on 01/17/2019 reported improved paraproteins.  SPEP reported M spike 3.8 g/dL out of total 5.5 g/dL globulin.  Serum IgG was 4374 mg/dL, IgA 10 and IgM 11.  Free lambda chain 606.8 mg/L, free kappa chain 8.6 and kappa/lambda ratio 0.01.  Her serum protein immunofixation continues to be IgG lambda monoclonal.  Her CBC showed hemoglobin 9.3, .3, platelets 50,000 and WBC 8600 including ANC 5100, lymphocytes 2160.  Her Chemistry lab reported creatinine 1.92, calcium 8.3, normal liver function panel, glucose 98 with normal sodium and potassium.     For her 24-hour urine study on 3/14/2019, she had free lambda chain at a 62.1 mg/L, total 87 mg in 24 hours, free kappa chain 42 mg/L and 59 mg in 24 hours.  Urine protein immunofixation reported a monoclonal IgG lambda #1 and free lambda chain were unable to be quantified, however IgG lambda #2 was 12.8%.  Her 24-hour urine total protein was 452 mg.     For serum protein study on  3/28/2019 (on day of her cycle #5 day #1 Treanda ) also reported further decreased monoclonal spike 3.2 mg/dL, and serum IgG 3600, IgA 13 IgM 12, free kappa chain 11.7, and worsening free lambda chain 1045 mg/L.     Her serum paraprotein studies on 4/11/2019 reported a serum IgG 4407 mg/dL, IgA 14, IgM 13, free kappa chain 9.2, free lambda chain 998.4 mg/L, kappa/lambda ratio 0.01.  His serum protein immunofixation was positive for IgG lambda, SPEP reporting M spike 4.3 g/dL.    I have reviewed the patient's medical history in detail and updated the computerized patient record.    Her recent 24 urine study on 7/18/2019 reported positive IgG lambda monoclonal protein and lambda type Bence-May protein, total free kappa chain 142 mg in 24 hours, at 74.6 mg/L, and total lambda chain 179 mg in 24 hours at 94.1 mg/L.  Kappa/lambda ratio 0.79.  Her total 24-hour urine protein was 410 mg, with monoclonal lambda free light chain 41.8% and monoclonal IgG lambda 2.6%.     Serum paraprotein studies on 7/24/2019 reported monoclonal spike 3.9 g/dL, serum IgG 4107, IgA 14 IgM 19, free kappa chain 12.8 and free lambda chain 656, kappa/lambda ratio 0.02.    On 08/01/2019 she has severe thrombocytopenia platelets 27,000.  She has elevated WBC to 12,600 including ANC 8900 and normal lymphocytes 2000.  Her hemoglobin is 10.4 08/01/2019. The patient underwent a bone marrow biopsy on 08/12/2019 and the pathology report showed hypercellular bone marrow with involvement by plasma cell myeloma and no metastatic carcinoma, granulomas, vasculitis, viral inclusions, increase in myeloblasts, or malignant lymphoma. Over 85% of the bone marrow are plasma cells. Normal karyotype. Flow cytometry study was positive.    Because of her persistent severe thrombocytopenia, the patient underwent a bone marrow biopsy on 08/12/2019.  Pathology evaluation showed hypercellular bone marrow with involvement by plasma cell myeloma 85-90% and no metastatic  carcinoma, granulomas, vasculitis, viral inclusions, increase in myeloblasts, or malignant lymphoma.  Normal karyotype.     Recent laboratory study on 8/15/2019 reported of elevated ferritin 524, normal iron saturation 42% with free iron 59 and a TIBC 140.  Normal thyroid function profile.  Her serum globulin 7.3, total protein 10.1 and albumin 2.8, calcium 8.1 creatinine 1.99.    She does continue to have severe intermittent pain in her right hip which is managed somewhat with pain medication, including tylenol and prescription Norco. She states she has difficulty ambulating and functioning normally when this pain occurs. She continues on oral Vitamin B-12 and folic acid supplementation.     Recent XR right hip with or without pelvis on 08/15/2019 showed sclerosis at the pubic symphysis. No other bony or articular abnormality was identified. The hip joints were symmetric and appeared within normal limits. The joint spaces were fairly well-maintained. There was no bony erosion. There was no evidence of recent or old fracture or subluxation. A right ureteral stent was noted.     On 08/30/2019, switched treatment from Empliciti to oral ixazomib 3 weeks on and 1 week off. She is expected to start the medication on 9/5/2019 when this medication arrives.  Patient will continue dexamethasone weekly at the same time as part of the treatment.  Because of her severe thrombocytopenia, we recommended starting low-dose ixazomib 2.3 mg weekly.  Treatment was started on 9/5/2019.     Review of Systems   Constitutional: Positive for appetite change, fatigue and unexpected weight change (Stable weight for 3 months is 9/26/2019). Negative for chills, diaphoresis and fever.   HENT:   Negative for mouth sores, nosebleeds and sore throat.    Eyes: Negative for icterus.   Respiratory: Negative for cough, hemoptysis and shortness of breath.    Cardiovascular: Negative for chest pain, leg swelling and palpitations.   Gastrointestinal:  "Negative for abdominal pain, blood in stool and nausea.   Genitourinary: Negative for dysuria, frequency and hematuria.    Musculoskeletal: Positive for arthralgias (Right hip pain). Negative for back pain, gait problem and myalgias.   Skin: Negative for itching and rash.   Neurological: Positive for extremity weakness (Significant fatigue, not neurologic deficits, stable. 08/30/19-Same). Negative for dizziness, gait problem, headaches, light-headedness and numbness.   Hematological: Negative for adenopathy. Does not bruise/bleed easily.   Psychiatric/Behavioral: Negative for depression. The patient is not nervous/anxious.    All other systems reviewed and are negative.    Medications: The current medication list was reviewed in the EMR.         Allergies   Allergen Reactions   • Zosyn [Piperacillin Sod-Tazobactam So] Swelling     Face and lips       VITALS:   Vitals:    09/26/19 0813   BP: 106/69   Pulse: 70   Resp: 16   Temp: 97.8 °F (36.6 °C)   SpO2: 99%   Weight: 82 kg (180 lb 12.8 oz)   Height: 157.5 cm (62.01\")   PainSc: 0-No pain   PS: ECOG 2      Physical Exam     GENERAL:  Well-developed, well-nourished in no acute distress.   SKIN:  Warm, dry without rashes, purpura or petechiae.  HEAD:  Normocephalic.  EYES:  Pupils equal, round and reactive to light.  EOMs intact.  Conjunctivae normal.  EARS:  Hearing intact.  NOSE:  No nasal discharge.  MOUTH:  Tongue is well-papillated; no stomatitis or ulcers.  Lips normal.  THROAT:  Oropharynx without lesions or exudates.  NECK:  Supple with good range of motion; no thyromegaly or masses, no JVD.  LYMPHATICS:  No cervical, supraclavicular, axillary or inguinal adenopathy.  CHEST:  Lungs clear to auscultation. Good airflow.  CARDIAC:  Regular rate and rhythm without murmurs, rubs or gallops. Normal S1,S2.  ABDOMEN:  Obese but soft, nontender with no organomegaly or masses.  Bowel sounds normal.  EXTREMITIES:  No clubbing, cyanosis or edema.  NEUROLOGICAL:  Cranial " Nerves II-XII grossly intact.  No focal neurological deficits.  PSYCHIATRIC:  Normal affect and mood.        Recent lab results:   Results from last 7 days   Lab Units 09/26/19  0756   WBC 10*3/mm3 20.78*   NEUTROS ABS 10*3/mm3 16.00*   LYMPHS ABS 10*3/mm3 2.70   HEMOGLOBIN g/dL 9.5*   HEMATOCRIT % 31.1*   PLATELETS 10*3/mm3 27*     Lab Results   Component Value Date    IRON 59 08/15/2019    TIBC 140 (L) 08/15/2019    FERRITIN 524.30 (H) 08/15/2019     Lab Results   Component Value Date    FOLATE 5.84 05/16/2019     Lab Results   Component Value Date    TTQSQYCQ59 1,819 (H) 05/16/2019     Glucose   Date Value Ref Range Status   09/12/2019 95 65 - 99 mg/dL Final     BUN   Date Value Ref Range Status   09/12/2019 29 (H) 8 - 23 mg/dL Final   08/23/2019 28 (H) 7 - 20 mg/dL Final     Creatinine   Date Value Ref Range Status   09/12/2019 2.02 (H) 0.57 - 1.00 mg/dL Final   08/23/2019 2.1 (H) 0.7 - 1.5 mg/dL Final   02/18/2019 2.1 (H) 0.7 - 1.5 mg/dL Final     Sodium   Date Value Ref Range Status   09/12/2019 141 136 - 145 mmol/L Final   08/23/2019 140 137 - 145 mmol/L Final     Potassium   Date Value Ref Range Status   09/12/2019 4.0 3.5 - 5.2 mmol/L Final   08/23/2019 3.6 3.5 - 5.1 mmol/L Final     Chloride   Date Value Ref Range Status   09/12/2019 115 (H) 98 - 107 mmol/L Final   08/23/2019 114 (H) 98 - 107 mmol/L Final     CO2   Date Value Ref Range Status   09/12/2019 20.6 (L) 22.0 - 29.0 mmol/L Final   02/18/2019 22 22 - 30 mmol/L Final     Total CO2   Date Value Ref Range Status   08/23/2019 25 22 - 30 mmol/L Final     Calcium   Date Value Ref Range Status   09/12/2019 8.1 (L) 8.6 - 10.5 mg/dL Final   08/23/2019 8.7 8.4 - 10.2 mg/dL Final     Total Protein   Date Value Ref Range Status   09/12/2019 10.1 (H) 6.0 - 8.5 g/dL Final     Albumin   Date Value Ref Range Status   09/12/2019 2.80 (L) 3.50 - 5.20 g/dL Final     ALT (SGPT)   Date Value Ref Range Status   09/12/2019 11 1 - 33 U/L Final     AST (SGOT)   Date Value  Ref Range Status   09/12/2019 14 1 - 32 U/L Final     Alkaline Phosphatase   Date Value Ref Range Status   09/12/2019 43 39 - 117 U/L Final     Total Bilirubin   Date Value Ref Range Status   09/12/2019 0.2 0.1 - 1.2 mg/dL Final     eGFR Non  Amer   Date Value Ref Range Status   08/30/2016  >60 mL/min/1.73 Final     Comment:     <15 Indicative of kidney failure.     A/G Ratio   Date Value Ref Range Status   07/24/2019 0.6 (L) 0.7 - 1.7 Final     BUN/Creatinine Ratio   Date Value Ref Range Status   09/12/2019 14.4 7.0 - 25.0 Final   08/23/2019 13.3 RATIO Final     Anion Gap   Date Value Ref Range Status   09/12/2019 5.4 5.0 - 15.0 mmol/L Final       Assessment/Plan   1. Multiple myeloma, IgG lambda, stage III. Failed multiple lines of therapy. Her treatment was switched to Darzalex on 5/26/2016. She was on this treatment for more than 2 years.     · In November 2018, she clearly had disease progression.  Darzalex was discontinued and was started on bendamustine plus Velcade plus dexamethasone.   · Cycle #1 day #1 Bendamustine was started on 11/29/2018.  Due to poor tolerance with profound fatigue, chemotherapy was modified with bendamustine only given on day 1, and a Velcade weekly, every 4 weeks as 1 cycle.  Patient had a cycle #6 dose reduction of bendamustine by 25% because of severe thrombocytopenia.   · This patient actually had a micaela of response on 3/28/2019 with M spike 3.2 g/dL and serum IgG 3600.  After that M spike and serum IgG both were elevating.   · Laboratory study obtained on 5/9/2019 after 6 cycles chemotherapy, it showed disease further progressed with worsening level of serum IgG 4873 mg/dL and M spike 4.3 g/dL.    · On 5/23/2019, patient was switched to Empliciti plus Velcade and decadron.    · Her serum protein studies reported persistent active disease, not well controlled with large quantity of monoclonal spike.  She also has severe thrombocytopenia oftentimes leading to hold Velcade  injection.  · I recommended to obtain bone marrow aspiration biopsy for reanalysis.  This patient also has a history of breast cancer so we need to be open-minded.  She had bone marrow aspirate biopsy on 8/12/2019 which showed more than 85% of bone marrow cells are malignant plasma cells.  There is no evidence of metastatic disease or other type of malignancy.  · I discussed with the patient on 8/30/2019, recommended switching treatment to Ixazomib plus dexamethasone.  Considering her pre-existing severe thrombocytopenia, I recommended starting low-dose Ixazomib 2.3 mg weekly instead of full dose 4 mg weekly.    · She started a new treatment on 9/5/2019 with dexamethasone 20 mg weekly.  Her CBC need to be monitored weekly.   · Patient was evaluated on 9/26/2019 after first cycle treatment.  She tolerates well with no specific complaints.     2.  Severe thrombocytopenia.  This is more likely secondary to her multiple myeloma, based on her recent bone marrow aspiration biopsy on 8/12/2019. patient has no easy bleeding or bruising.     · Patient was last transfused with platelets on May 13, 2019 prior to her EGD.  · The patient underwent a bone marrow biopsy on 08/12/2019 and the pathology report showed hypercellular bone marrow with involvement by plasma cell myeloma and no metastatic carcinoma, granulomas, vasculitis, viral inclusions, increase in myeloblasts, or malignant lymphoma. Over 80% of the bone marrow are plasma cells. Normal karyotype.   · Laboratory study on 8/30/2019 is showed platelets of 34,000. We switched her treatment to oral ixazomib 3 weeks on and 1 week off starting 9/05/19.    · She has worsening thrombocytopenia platelets 27,000 today however no spontaneous bleeding.  Will monitor weekly.       3.  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  Patient to has been on Procrit injection weekly.  However her hemoglobin has been deteriorating clearly due to  chemotherapy.  We'll continue Procrit.      · Her laboratory study on 5/9/2019 showed no evidence of iron deficiency.  On 5/16/2019, B12 level of 1819 and normal folate of 5.84.  She was symptomatic anemic with Hb 8.5, she was transfused with 2 units of packed red blood cells.    · Recent labs on 8/15/2019 reported no evidence of iron deficiency, had normal iron saturation and excellent ferritin level.  · Hemoglobin is 9.5.  Patient to receive procrit today. We will continue Procrit as needed when Hb less than 10.     4. Zometa / Xgeva treatment.  Because of her kidney insufficiency, we were only able to give her Zometa every 3 months.  Due to her elevated creatinine level, we eventually switched her to Xgeva treatment and continued monthly.    · She had elevated phosphorus 5.6 on 6/6/2019. Her last Xgeva was 09/12/2019.  She had normal phosphorus and magnesium level that day.        5. History of stage II left breast cancer, post mastectomy in 2013, negative for ER/KS and positive HER-2. No neoadjuvant chemotherapy because of concurrent discovery of multiple myeloma and ongoing chemotherapy. She had a normal mammogram study on 7/26/2019.       6.  Right middle lobe pulmonary nodule, documented on CT scan of chest on 01/06/2017. Repeated CT scan of chest on 8/21/2017 reported a small 5 mm and stable primary nodule.      7.  Skin rashes on her extremities:  Patient was seen dermatologist Dr. Barroso, who prescribed steroids cream and also over-the-counter moisturizing cream.  Patient will follow-up with Dr. Barroso again.  Currently resolved.    8. Profound fatigue: This is multifactorial secondary to her disease progression and her worsening anemia associated with chemotherapy.     · A thyroid level was obtained and patient was diagnosed with subclinical hypothyroidism.  She was initiated on Synthroid and folic acid and has noted significant reduction in fatigue.     9.  Hypocalcemia.  The patient has struggled with  compliance with her calcium supplements.  She reports she has been taking these inconsistently, though they resulted in soft frequent bowel movements.  We added 2 tablets of Tums daily along with current calcium/vitamin D supplementation.      10.  Epigastric discomfort.  Patient had EGD examination by Dr. Rivera on 5/13/2019.  It reported prepyloric ulceration.  Dr. Rivera recommended Protonix twice a day. With increased dose of Protonix, she has had no further issues.     11.  Worsening leukocytosis/neutrophilia.  Patient is not having fever sweating or chills.  No dysuria.  However this patient has history of recurrent urinary tract infection with an existing right ureteral stent.     I searched medical records, she had a replacement of stent 3 months ago.  I recommended urinalysis and culture today for further evaluation despite that she has no symptoms.    Plan:  1. Urinalysis and urine culture today.   2. This is her off week.  She will resume oral ixazomib and dexamethasone C2 D1 on 10/3/2019, both 3 weeks on and 1 week off.   3. Proceed with Procrit today for hemoglobin of 9.8.  This will be repeated weekly with CBC monitoring.  4. Continue to receive monthly Xgeva. Last Xgeva was 09/12/2019.   5. Continue on Synthroid daily, refilled prescription today.    6. Continue Norco for pain management.  7. Continue calcium and vitamin D supplementation.  Also continue Tums daily.    8. Continue oral B12 and folic acid daily.  9. Continue prophylactic acyclovir 400 mg twice a day.  10. Patient to return in 2 weeks for Xgeva.  11. Follow-up with me in 3 weeks for reevaluation, with labs and possible procrit.   12. Patient understands to call with any issues including bleeding, fever greater than 100.5, with any other concerns prior to her next office visit.     Patient is on drug therapy requiring frequent monitoring.      Zane Araujo MD PhD  9/26/2019     Addendum:    Component      Latest Ref Rng & Units  9/26/2019   Color, UA      Yellow, Straw Yellow   Appearance, UA      Clear Clear   pH, UA      5.0 - 8.0 6.0   Specific Gravity, UA      1.005 - 1.030 1.015   Glucose      Negative Negative   Ketones, UA      Negative Negative   Bilirubin, UA      Negative Negative   Blood, UA      Negative Negative   Protein, UA      Negative 30 mg/dL (1+) (A)   Leukocytes, UA      Negative Moderate (2+) (A)   Nitrite, UA      Negative Positive (A)   Urobilinogen, UA      0.2 - 1.0 E.U./dL 0.2 E.U./dL   RBC, UA      None Seen, 0-2 /HPF 3-5 (A)   WBC, UA      None Seen, 0-2 /HPF 31-50 (A)   Bacteria, UA      None Seen /HPF 4+ (A)   Squamous Epithelial Cells, UA      None Seen, 0-2 /HPF 0-2   Hyaline Casts, UA      None Seen /LPF None Seen   WBC Clumps, UA      None Seen /HPF Large/3+   Methodology:       Manual Light Microscopy     I called and spoke to patient about her urinalysis which showed a large quantity of bacteria and WBC.  I e-scribed cefdinir to her pharmacy, 300 mg twice a day for 7 days.     LI OBANDO M.D., Ph.D.    9/26/2019      Addendum:   Urine Culture >100,000 CFU/mL Escherichia coli Abnormal           Resulting Agency: [facility]   Susceptibility      Escherichia coli     AARON     Ampicillin 4 ug/ml Susceptible     Ampicillin + Sulbactam <=2 ug/ml Susceptible     Cefazolin <=4 ug/ml Susceptible     Cefepime <=1 ug/ml Susceptible     Ceftazidime <=1 ug/ml Susceptible     Ceftriaxone <=1 ug/ml Susceptible     Gentamicin <=1 ug/ml Susceptible     Levofloxacin <=0.12 ug/ml Susceptible     Nitrofurantoin 32 ug/ml Susceptible     Piperacillin + Tazobactam <=4 ug/ml Susceptible     Tetracycline <=1 ug/ml Susceptible     Trimethoprim + Sulfamethoxazole <=20 ug/ml Susceptible                 Specimen Collected: 09/26/19 08:58 Last Resulted: 09/28/19 12:46        I called the patient today, reporting her urine culture results with multidrug sensitive E. coli.  She will continue cefdinir as we prescribed.  Should be  sensitive according to the culture results.    I think this patient will need to follow-up with her urologist Dr. Evreett for stent replacement.  I sent a message to Dr. Everett.      LI OBANDO M.D., Ph.D.    9/28/2019        Cristo Garcia MD, M.D.

## 2019-10-03 NOTE — PROGRESS NOTES
Lab Results   Component Value Date    WBC 18.70 (H) 10/03/2019    HGB 10.1 (L) 10/03/2019    HCT 32.7 (L) 10/03/2019    .5 (H) 10/03/2019    PLT 26 (C) 10/03/2019   no procrit need today per doctor order. Patient instructed to call for any signs of bleeding or fever above 100.5 and verbalized understanding. Reviewed future appointments.

## 2019-10-10 NOTE — PROGRESS NOTES
"Calcium 8.4; discussed with Dr. Araujo; ok to give. She states that she isnt taking TUMS \"because I cant go anywhere because I have diarrhea if I take them\". Encouraged to take calcium tablets and informed that she could take OTC gummies. Also stated that vitamin D caps will help the calcium to work better. Also instructed that she should eat foods high in calcium.  "

## 2019-10-14 PROBLEM — R10.13 EPIGASTRIC ABDOMINAL PAIN: Status: RESOLVED | Noted: 2019-01-01 | Resolved: 2019-01-01

## 2019-10-14 PROBLEM — E66.09 CLASS 1 OBESITY DUE TO EXCESS CALORIES WITH SERIOUS COMORBIDITY AND BODY MASS INDEX (BMI) OF 32.0 TO 32.9 IN ADULT: Status: ACTIVE | Noted: 2018-06-06

## 2019-10-14 NOTE — ASSESSMENT & PLAN NOTE
October labs reviewed and platelets continue to be low.  Continue treatment as per hematologist/oncologist.

## 2019-10-14 NOTE — ASSESSMENT & PLAN NOTE
Condition stable.  Patient had run out of medication for 3 days but no ensuing seizure.  Will resume medication.

## 2019-10-14 NOTE — ASSESSMENT & PLAN NOTE
Obesity is Improved.  Patient thinks that this may also be due to recent chemotherapy and lack of appetite..

## 2019-10-14 NOTE — PROGRESS NOTES
The ABCs of the Annual Wellness Visit  Initial Medicare Wellness Visit    Chief Complaint   Patient presents with   • Medicare Wellness-Initial Visit       Subjective   History of Present Illness:  Marina Barroso is a 69 y.o. female who presents for an Initial Medicare Wellness Visit.  She is also here to refill medicines and review recent labs.  No recent seizure disorder.  She ran out of her medicine 3 days ago.  Refills needed.  Her labs continue to show pancytopenia and anemia and low platelets and elevated white blood cell count.  She remains under the care of hematology oncology for that condition.  She also remains on chemotherapy.  Chronic kidney disease stage shown stability in the last month.  She remains under the care of cardiology for chronic systolic congestive heart failure.  She does need a new prescription for tinea in the groin creases.  Previously Lotrisone was very effective.  Overall she feels well.  We took several minutes and updated her chart and preventative issues.    HEALTH RISK ASSESSMENT    Recent Hospitalizations:  No hospitalization(s) within the last year.    Current Medical Providers:  Patient Care Team:  Cristo Madera MD as PCP - General (Family Medicine)  Franko Hernandes MD as Surgeon (Neurosurgery)  Zane Araujo MD PhD as Consulting Physician (Hematology and Oncology)  Michael Everett Jr., MD as Consulting Physician (Urology)  Cristo Madera MD as Referring Physician (Family Medicine)  Trudy Rivera MD as Surgeon (General Surgery)    Smoking Status:  Social History     Tobacco Use   Smoking Status Never Smoker   Smokeless Tobacco Never Used       Alcohol Consumption:  Social History     Substance and Sexual Activity   Alcohol Use Yes    Comment: YEARLY        Depression Screen:   PHQ-2/PHQ-9 Depression Screening 10/14/2019   Little interest or pleasure in doing things 0   Feeling down, depressed, or hopeless 0   Total Score 0       Fall Risk Screen:  STEADI Fall Risk  Assessment was completed, and patient is at LOW risk for falls.Assessment completed on:10/14/2019    Health Habits and Functional and Cognitive Screening:  Functional & Cognitive Status 10/14/2019   Do you have difficulty preparing food and eating? No   Do you have difficulty bathing yourself, getting dressed or grooming yourself? No   Do you have difficulty using the toilet? No   Do you have difficulty moving around from place to place? No   Do you have trouble with steps or getting out of a bed or a chair? No   Current Diet Limited Junk Food   Dental Exam Up to date   Eye Exam Up to date   Exercise (times per week) 4 times per week   Current Exercise Activities Include Bicycling Outdoors   Do you need help using the phone?  No   Are you deaf or do you have serious difficulty hearing?  Yes   Do you need help with transportation? No   Do you need help shopping? No   Do you need help preparing meals?  No   Do you need help with housework?  No   Do you need help with laundry? No   Do you need help taking your medications? No   Do you need help managing money? No   Do you ever drive or ride in a car without wearing a seat belt? No   Have you felt unusual stress, anger or loneliness in the last month? No   Who do you live with? Child   If you need help, do you have trouble finding someone available to you? No   Have you been bothered in the last four weeks by sexual problems? No   Do you have difficulty concentrating, remembering or making decisions? Yes         Does the patient have evidence of cognitive impairment? No    Asprin use counseling:Taking ASA appropriately as indicated    Age-appropriate Screening Schedule:  Refer to the list below for future screening recommendations based on patient's age, sex and/or medical conditions. Orders for these recommended tests are listed in the plan section. The patient has been provided with a written plan.    Health Maintenance   Topic Date Due   • PNEUMOCOCCAL VACCINE (65+  HIGH RISK) (2 of 2 - PPSV23) 11/01/2017   • ZOSTER VACCINE (1 of 2) 04/10/2021 (Originally 7/30/2001)   • MAMMOGRAM  07/26/2021   • TDAP/TD VACCINES (2 - Td) 03/14/2022   • COLONOSCOPY  08/30/2022   • INFLUENZA VACCINE  Addressed          The following portions of the patient's history were reviewed and updated as appropriate: allergies, current medications, past family history, past medical history, past social history, past surgical history and problem list.    Outpatient Medications Prior to Visit   Medication Sig Dispense Refill   • acyclovir (ZOVIRAX) 400 MG tablet TAKE 1 TABLET BY MOUTH TWICE A DAY 60 tablet 5   • aspirin 81 MG EC tablet Take 81 mg by mouth Daily.     • bisoprolol (ZEBeta) 5 MG tablet Take 5 mg by mouth 2 (Two) Times a Day.     • calcium carbonate (TUMS) 500 MG chewable tablet Chew 1 tablet 2 (Two) Times a Day.     • dexamethasone (DECADRON) 4 MG tablet Take 5 tablets by mouth Every 7 (Seven) Days. Take with food weekly on day of Velcade treatment 20 tablet 11   • hydrALAZINE (APRESOLINE) 25 MG tablet Take 25 mg by mouth 2 (two) times a day.  3   • HYDROcodone-acetaminophen (NORCO) 5-325 MG per tablet Take 1 tablet by mouth Every 6 (Six) Hours As Needed for Moderate Pain  or Severe Pain . for pain 60 tablet 0   • isosorbide mononitrate (IMDUR) 60 MG 24 hr tablet Take 1 tablet by mouth Daily. TAKE 1 TABLET BY MOUTH EVERY MORNING AND ONE-HALF TABLET EVERY EVENING (Patient taking differently: Take 90 mg by mouth Daily.)     • Ixazomib Citrate 2.3 MG capsule Take 1 capsule by mouth 1 (One) Time Per Week. For 3 weeks on then 1 week off. 3 capsule 6   • levothyroxine (SYNTHROID, LEVOTHROID) 75 MCG tablet Take 1 tablet by mouth Daily. 30 tablet 11   • losartan (COZAAR) 25 MG tablet Take 25 mg by mouth every evening.     • ondansetron (ZOFRAN) 8 MG tablet Take 1 tablet by mouth 3 (Three) Times a Day As Needed for Nausea or Vomiting. 60 tablet 5   • pantoprazole (PROTONIX) 40 MG EC tablet Take 1 tablet  by mouth Daily. Before breakfast 30 tablet 5   • carBAMazepine XR (TEGretol  XR) 200 MG 12 hr tablet Take 2 tablets by mouth Every Night for 180 days. 180 tablet 1   • cefdinir (OMNICEF) 300 MG capsule Take 1 capsule by mouth 2 (Two) Times a Day. 14 capsule 0   • diazePAM (VALIUM) 5 MG tablet Take 1 tablet by mouth 2 (Two) Times a Day As Needed for Anxiety. Take before bone marrow biopsy 2 tablet 0   • doxycycline (VIBRAMYCIN) 100 MG capsule TAKE ONE CAPSULE BY MOUTH TWICE A DAY WITH FOOD  0   • Misc. Devices (VIRAGE CUSTOM BREAST PROSTHES) misc 2 each As Needed (na). 2 each 0     No facility-administered medications prior to visit.        Patient Active Problem List   Diagnosis   • Pancytopenia (CMS/HCC)   • Arthropathy of knee   • Chronic kidney disease, stage III (moderate) (CMS/HCC)   • Chronic systolic congestive heart failure (CMS/HCC)   • Malignant neoplasm of kidney (CMS/HCC)   • Multiple myeloma (CMS/HCC)   • Personal history of breast cancer   • Seizure disorder (CMS/HCC)   • Thrombocytopenia (CMS/HCC)   • Complex partial epilepsy (CMS/HCC)   • Meningioma (CMS/HCC)   • Neutropenia, drug-induced (CMS/HCC)   • Urinary tract infection without hematuria   • Anemia associated with chemotherapy   • Chemotherapy-induced thrombocytopenia   • Fitting and adjustment of vascular catheter   • Pulmonary nodule, right   • Chronic fatigue   • Pulmonary hypertension (CMS/HCC)   • Breast nodule, right medial breast   • Tinea corporis   • Macrocytosis   • Essential hypertension   • Umbilical hernia   • Arthritis of left knee   • Class 1 obesity due to excess calories with serious comorbidity and body mass index (BMI) of 32.0 to 32.9 in adult   • Anemia, chronic renal failure, stage 3 (moderate) (CMS/HCC)   • Leiomyosarcoma (CMS/HCC)   • Gastroesophageal reflux disease   • High risk medication use   • Hypothyroidism   • Leukemoid reaction   • Chronic gastric ulcer without hemorrhage and without perforation   • Weight loss  "      Advanced Care Planning:  Patient has an advance directive - a copy has not been provided. Have asked the patient to send this to us to add to record    Review of Systems   Constitutional: Positive for unexpected weight change.   Cardiovascular: Positive for chest pain (see HPI).       Compared to one year ago, the patient feels her physical health is worse.  Compared to one year ago, the patient feels her mental health is the same.    Reviewed chart for potential of high risk medication in the elderly: yes  Reviewed chart for potential of harmful drug interactions in the elderly:yes    Objective         Vitals:    10/14/19 0902   BP: 109/58   Pulse: 73   Resp: 16   Temp: 97.8 °F (36.6 °C)   TempSrc: Oral   SpO2: 99%   Weight: 80.3 kg (177 lb)   Height: 157.5 cm (62.01\")   PainSc:   4   PainLoc: Chest       Body mass index is 32.37 kg/m².  Discussed the patient's BMI with her. The BMI is above average; BMI management plan is completed.    Physical Exam   Constitutional: She is cooperative. No distress.   Eyes: Conjunctivae and lids are normal.   Neck: Carotid bruit is not present. No tracheal deviation present.   Cardiovascular: Normal rate, regular rhythm and normal heart sounds.   No murmur heard.  Pulmonary/Chest: Effort normal and breath sounds normal.   Neurological: She is alert. She is not disoriented.   Skin: Skin is warm and dry.   Psychiatric: She has a normal mood and affect. Her speech is normal and behavior is normal.   Vitals reviewed.      Lab Results   Component Value Date     (H) 08/23/2019        Assessment/Plan   Medicare Risks and Personalized Health Plan  CMS Preventative Services Quick Reference  Advance Directive Discussion  Chronic Pain   Fall Risk  Inactivity/Sedentary   Risks are chest pains(being worked up by cardiology) and ongoing chemo treatment.     The above risks/problems have been discussed with the patient.  Pertinent information has been shared with the patient in the " After Visit Summary.  Follow up plans and orders are seen below in the Assessment/Plan Section.    Diagnoses and all orders for this visit:    1. Medicare annual wellness visit, initial (Primary)    2. Seizure disorder (CMS/[facility])  Assessment & Plan:  Condition stable.  Patient had run out of medication for 3 days but no ensuing seizure.  Will resume medication.    Orders:  -     carBAMazepine XR (TEGretol  XR) 200 MG 12 hr tablet; Take 2 tablets by mouth Every Night.  Dispense: 180 tablet; Refill: 3    3. Chronic kidney disease, stage III (moderate) (CMS/[facility])  Assessment & Plan:  Renal condition is unchanged.  Continue current treatment regimen.  Follow-up as per specialist.      4. Thrombocytopenia (CMS/[facility])  Assessment & Plan:  October labs reviewed and platelets continue to be low.  Continue treatment as per hematologist/oncologist.      5. Class 1 obesity due to excess calories with serious comorbidity and body mass index (BMI) of 32.0 to 32.9 in adult  Assessment & Plan:  Obesity is Improved.  Patient thinks that this may also be due to recent chemotherapy and lack of appetite..          6. Tinea corporis  -     clotrimazole-betamethasone (LOTRISONE) 1-0.05 % cream; Apply  topically to the appropriate area as directed 2 (Two) Times a Day As Needed (rash) for up to 14 days.  Dispense: 45 g; Refill: 0    Follow Up:  Return in about 6 months (around 4/14/2020) for Recheck.     An After Visit Summary and PPPS were given to the patient.

## 2019-10-16 NOTE — PROGRESS NOTES
REASONS FOR FOLLOW UP:   1. IgG kappa multiple myeloma, currently on Darzalex, started on May 26, 2016. Patient was switched to Darzalex from Pomalyst, as she continued to be neutropenic with recurrent urinary tract infection while on Pomalyst.    2. Zometa is on hold due to renal insufficiency.    3. After 16 doses of Darzalex, laboratory study showed stable disease in November 2016. Insurance company denied adding Velcade and dexamethasone. Patient is to be continued on monthly Darzalex from 12/06/16.   4. Obstructive right pyelonephritis with positive urine culture for E. coli, required hospitalization from 1/19/2017 through 01/24/2017 with iv antibiotics and stent exchange on 01/20/2017.   5.  Paraprotein studies on March 27, 2017 showed further slight improvement of multiple myeloma.   6.  Febrile illness, with urinary tract infection and influenza B infection in early May 2017, required hospitalization for 6 days.   7.  Paraprotein studies for both serum and 24-hour urine sample on 7/21/2017 showed further improvement of paraproteins.   Darzalex was continued.   8.  Serum protein study reported stable disease on 10/20/2017.  Darzalex will be continued.   9.  Laboratory studies of both serum paraprotein and a 24-hour urine protein in January 2018 showed further improvement of paraproteins.  Monthly Darzalex will be continued.   10. Repair of left flank incisional hernia in middle September 2018.   11. Serum paraprotein study reached micaela on 7/5/2018.  Since that time she has evidence of disease progression.   12. Disease progression, she was started on chemotherapy with bendamustine plus Velcade plus dexamethasone per protocol on 11/29/2018. Treatment will be bendamustine on day 1 and day 4 repeat every 28 days, Velcade and dexamethasone will be on day 1 day 4 and day 8 and day 15 repeat every 28 days.   13.  From cycle #2, patient was given only 1 dose of Treanda per cycle and continue Velcade and  dexamethasone weekly.   14.  Treanda was decreased by 20% after cycle #4 because of severe thrombocytopenia, and further decreased by another 25% on cycle #6 which is her last cycle on 4/25/2019.    15.  On 5/23/2019, patient was switched to Empliciti plus Velcade and decadron.    16.  Patient has persistent severe thrombocytopenia, no improvement after starting new regimen.    17. The patient underwent a bone marrow biopsy on 08/12/2019 and the pathology report showed hypercellular bone marrow with involvement by >85%, plasma cell myeloma and no metastatic carcinoma, granulomas, vasculitis, viral inclusions, increase in myeloblasts, or malignant lymphoma.   18. On 08/30/2019, discussed with patient to switch from Empliciti/Velcade/Dex to oral ixazomib/Dex 3 weeks on and 1 week off. She is to start the medication at decreased dose 2.3 mg weekly starting on 9/5/2019.       HISTORY OF PRESENT ILLNESS:   The patient is a 69 y.o. female with the above-mentioned history, who returns today for 3-week follow-up to assess her tolerance to Ixazomib treatment and review recent laboratory studies.     Patient was started on new treatment on 9/5/2019, 3 weeks on 1 week off.  Today is the C2D15.     Patient reports that she tolerated treatment, no specific complaints. The patient denies fever sweating or chills.  Patient denies bleeding or bruising, abdominal pain.  She denies dysuria.  Patient reports she is scheduled to have ureteral stent replacement tomorrow by her urologist Dr. Everett.      Today patient also reports she has intermittent chest pain/epigastric area/stomach pain for the past several weeks, to the point that she thought she was not able to make it.  Patient reports she had stress test this week and was told to having nothing wrong.  Patient also reports this is not related to her eating.  No apparent effect of exacerbation or relieving.  She reports no nausea or vomiting.  She denies melena or  hematochezia.    She does continue to have severe intermittent pain in her right hip which is managed somewhat with pain medication, including tylenol and prescription Norco. She states she has difficulty ambulating and functioning normally when this pain occurs. She continues on oral Vitamin B-12 and folic acid supplementation.     Laboratory study performed today, 10/17/2019, reported worsening severe thrombocytopenia platelets 24,000, stable anemia hemoglobin 9.6 and the leukocytosis with WBC 19,800 including ANC 14,260, lymphocytes 3000 and monocytes 1780.        Past Medical History, Past Surgical History, Social History, Family History have been reviewed and are without significant changes except as mentioned.      Hematology/oncology History: See separate document for extra information.       On12/6/2016, serum free lambda chain 1090 MG/L, free kappa chain 8.5 to MG/L.  Ferritin 1015, iron 99, TIBC 137 iron saturation 72%.     On January 4, 2017, vitamin B12 level 1289, folate 7.7 ng/mL. SPEP reporting gamma globulin 3.3, out of total globulin 5.0, with immunofixation reporting small quantity of monoclonal free lambda chain, plus monoclonal IgG lambda at 3.2 g/dL.  Serum IgG 4075, IgA 9 and IgM 15.  free lambda chain 1339 MG/L and free kappa chain 10.3 MG/L.      Laboratory study 3/27/2017 showed slight improvement of paraprotein, SPEP reporting M spike 2.8 g/DL, out of total globulin 4.3, and the serum IgG 3420, IgA 9, IgM 11, free lambda chain 1218 MG/L and free kappa chain 8.0 MG/L.  Total 24 hour urine sample reported at free lambda chain 142 mg, at a concentration 74.8 MG/L, free kappa chain 75 mg at a concentration 39.5 MG/L., Urine protein immunofixation reporting biclonal IgG lambda and monoclonal free lambda light chain, M spike #1 at 41.5% and monoclonal IgG lambda spike #2 at 10.5%. Serum beta-2 microglobulin is 7.3 MG/L.     Her laboratory study on 7/21/2017 reported further improvement of  paraprotein is both serum and a 24-hour urine sample.  SPEP reporting monoclonal IgG lambda 2.9 g/dL and free lambda chain 0.1 g/dL.  Serum IgG was 3261 mg/dL and IgA 5, IgM 13, free kappa chain 6.7, free lambda chain 701.3 with kappa/lambda ratio 0.01.  24-hour urine sample reported positive for Bence May protein lambda type, immunofixation positive for IgG lambda.  Total urine protein 241 mg, gamma globulin 13.1%.  Hemoglobin was 11.4 .4, platelets 122,000, WBC 6680 including neutrophils 3600, lymphocytes 2100 and monocytes 650.  Her creatinine was 1.68, at baseline level.  Liver function panel unremarkable.  Darzalex was continued.    Laboratory study on 8/25/2017 showed improved the platelets at 144,000, normal WBC 6660 and slightly improved hemoglobin at 11.7.     Patient had a colonoscopy examination on 8/30/2017 by Dr. Rivera.  It reported diverticulosis in multiple areas more severe in the sigmoid colon.  There was 2 polyps 4 mm pneumonia from transverse colon and the sigmoid colon.  Pathology evaluation reported tubular adenoma from the sigmoid colon polyp, and benign hyperplastic polyp from transverse colon.    Laboratory study on 10/20/2017 reported slightly improved monoclonal spike 2.7 g/dL by SPEP, immunofixation reported positive monoclonal IgG lambda, serum IgG 3536 mg/dL, IgA less than 5 and IgM 11, free kappa chain 5.3 mg/L, and free lambda chain 720 mg/L with kappa/lambda ratio 0.01.  Serum beta-2 microglobulin was 6.5 mg/L.   Her CBC showed a stable mild anemia with hemoglobin 11.3, macrocytosis .3, and the platelets 114,000, normal WBC 7070 including neutrophils 4100 lymphocytes 2000 monocytes 650, normal liver function panel, total protein 7.9 and albumin 3.2,  and normal electrolytes including calcium 8.1, and improved creatinine 1.59.     Laboratory study on 1/12/2018 reported serum IgG 3083 mg/dL, IgA 10, IgM 10, free kappa chain 8.5 and free lambda chain 589.1 mg/L,  kappa/lambda ratio 0.01, serum protein admitted fixation reported IgG lambda monoclonal protein and SPEP reporting M spike 3.1 g/dL, beta-2 microgram and 7.4 mg/L. serum creatinine 1.79, calcium 8.2, other electrolytes normal, hemoglobin 11.3, .5 and MCHC 31.6, platelets 129,000, WBC 6780 including neutrophils 3500 lymphocytes 2300.  Serum ferritin 653.7 ng/mL, iron 123 TIBC 174 iron saturation 71%, folic acid 12.8 ng/mL and a vitamin B12 at 873 pg/mL.  Her 24-hour urine study on 1/18/2018 reported lambda chain 32.8 mg/L, total 61 mg in 24 hours, and the free kappa chain 27.4 mg/L and 51 mg in 24 hours, and the total 24-hour urine protein 283 mg, and urine protein immunofixation positive for 5.3% monoclonal IgG lambda and positive for Bence May protein at 36.2% monoclonal lambda chain.  Monthly Darzalex was continued.       This patient had serum protein study on 04/06/2018 which reported free light chain at concentration 703.8 mg/L and free kappa chain 7.0, free kappa/lambda ratio 0.01, serum IgG 3650 mg/dL, IgM 11 and IgA 9 with serum protein electrophoresis reporting monoclonal IgG lambda 3.2 g/dL and also monoclonal free lambda light chain.  But it was unable to quantify monoclonal lambda light chain because of the small quantity of it.     A 24-hour urine study on 04/20/2018 reported total free lambda chain 60 mg in 24 hours at concentration 33.1 mg/L, free kappa chain 45 mg in 24 hours at concentration 24.8 mg/L. Total 24-hour urine protein was 279 mg. Urine protein immunofixation and UPEP reported lambda-type Bence-May protein + #1 monoclonal IgG lambda band at 34.8% and #2 monoclonal IgG lambda band at 6.9%.     Her CBC results today reported a stable hemoglobin at 11.1, platelets 130,000 and WBC 7440 including neutrophils 4100 and lymphocytes 2350, monocytes 650. Her CMP reported slightly worsened creatinine at 1.82 with BUN 33. Total protein at 8.8. Liver function panel was normal. Total serum  protein 8.8 and albumin 3.2, globulin 5.6.    Reviewing her previous lab studies especially serum paraprotein, she reached a micaela of response on 07/05/2018 when she had serum IgG 3015 mg/dL, free lambda chain 458.7 mg/L and M spike IgG lambda 2.6 g/dL and and monoclonal lambda free light chain 0.1 g/dL, total 2.7 g/dL. serum beta-2 myoglobin 7.1 mg/L.  Her ferritin was 539 ng/mL, free iron 73 TIBC 176 iron saturation 42%.    Laboratory study obtained on 11/01/2018 showed further disease progression. Her serum IgG was 5472 mg/dL, IgA 8 and IgM 10, serum kappa chain 7.9 mg/L, free lambda chain 961.3 mg/L and kappa/lambda ratio 0.01. Serum protein electrophoresis reported monoclonal IgG lambda 4.1 g/dL, and monoclonal lambda free light chain 0.1 g/dL. Her hemoglobin was 9.0, .6, MCHC 30.1, platelets 124,000 and WBC 10,000 including neutrophils 7400, lymphocytes 1200, monocytes 600.     Cycle #1 day #1 Bendamustine will be started on 11/29/2018.     Laboratory studies on 01/17/2019 reported improved paraproteins.  SPEP reported M spike 3.8 g/dL out of total 5.5 g/dL globulin.  Serum IgG was 4374 mg/dL, IgA 10 and IgM 11.  Free lambda chain 606.8 mg/L, free kappa chain 8.6 and kappa/lambda ratio 0.01.  Her serum protein immunofixation continues to be IgG lambda monoclonal.  Her CBC showed hemoglobin 9.3, .3, platelets 50,000 and WBC 8600 including ANC 5100, lymphocytes 2160.  Her Chemistry lab reported creatinine 1.92, calcium 8.3, normal liver function panel, glucose 98 with normal sodium and potassium.     For her 24-hour urine study on 3/14/2019, she had free lambda chain at a 62.1 mg/L, total 87 mg in 24 hours, free kappa chain 42 mg/L and 59 mg in 24 hours.  Urine protein immunofixation reported a monoclonal IgG lambda #1 and free lambda chain were unable to be quantified, however IgG lambda #2 was 12.8%.  Her 24-hour urine total protein was 452 mg.     For serum protein study on 3/28/2019 (on day of  her cycle #5 day #1 Tredhaval ) also reported further decreased monoclonal spike 3.2 mg/dL, and serum IgG 3600, IgA 13 IgM 12, free kappa chain 11.7, and worsening free lambda chain 1045 mg/L.     Her serum paraprotein studies on 4/11/2019 reported a serum IgG 4407 mg/dL, IgA 14, IgM 13, free kappa chain 9.2, free lambda chain 998.4 mg/L, kappa/lambda ratio 0.01.  His serum protein immunofixation was positive for IgG lambda, SPEP reporting M spike 4.3 g/dL.    I have reviewed the patient's medical history in detail and updated the computerized patient record.    Her recent 24 urine study on 7/18/2019 reported positive IgG lambda monoclonal protein and lambda type Bence-May protein, total free kappa chain 142 mg in 24 hours, at 74.6 mg/L, and total lambda chain 179 mg in 24 hours at 94.1 mg/L.  Kappa/lambda ratio 0.79.  Her total 24-hour urine protein was 410 mg, with monoclonal lambda free light chain 41.8% and monoclonal IgG lambda 2.6%.     Serum paraprotein studies on 7/24/2019 reported monoclonal spike 3.9 g/dL, serum IgG 4107, IgA 14 IgM 19, free kappa chain 12.8 and free lambda chain 656, kappa/lambda ratio 0.02.    On 08/01/2019 she has severe thrombocytopenia platelets 27,000.  She has elevated WBC to 12,600 including ANC 8900 and normal lymphocytes 2000.  Her hemoglobin is 10.4 08/01/2019. The patient underwent a bone marrow biopsy on 08/12/2019 and the pathology report showed hypercellular bone marrow with involvement by plasma cell myeloma and no metastatic carcinoma, granulomas, vasculitis, viral inclusions, increase in myeloblasts, or malignant lymphoma. Over 85% of the bone marrow are plasma cells. Normal karyotype. Flow cytometry study was positive.    Because of her persistent severe thrombocytopenia, the patient underwent a bone marrow biopsy on 08/12/2019.  Pathology evaluation showed hypercellular bone marrow with involvement by plasma cell myeloma 85-90% and no metastatic carcinoma, granulomas,  vasculitis, viral inclusions, increase in myeloblasts, or malignant lymphoma.  Normal karyotype.     Recent laboratory study on 8/15/2019 reported of elevated ferritin 524, normal iron saturation 42% with free iron 59 and a TIBC 140.  Normal thyroid function profile.  Her serum globulin 7.3, total protein 10.1 and albumin 2.8, calcium 8.1 creatinine 1.99.    She does continue to have severe intermittent pain in her right hip which is managed somewhat with pain medication, including tylenol and prescription Norco. She states she has difficulty ambulating and functioning normally when this pain occurs. She continues on oral Vitamin B-12 and folic acid supplementation.     Recent XR right hip with or without pelvis on 08/15/2019 showed sclerosis at the pubic symphysis. No other bony or articular abnormality was identified. The hip joints were symmetric and appeared within normal limits. The joint spaces were fairly well-maintained. There was no bony erosion. There was no evidence of recent or old fracture or subluxation. A right ureteral stent was noted.     On 08/30/2019, switched treatment from Empliciti to oral ixazomib 3 weeks on and 1 week off. She is expected to start the medication on 9/5/2019 when this medication arrives.  Patient will continue dexamethasone weekly at the same time as part of the treatment.  Because of her severe thrombocytopenia, we recommended starting low-dose ixazomib 2.3 mg weekly.  Treatment was started on 9/5/2019.     Laboratory study on 9/26/2019 showed worsening leukocytosis with WBC 20,780 including ANC 16,000, lymphocytes 2700 and monocytes 8070.  She also has worsening severe thrombocytopenia with platelets 27,000, and stable hemoglobin 9.5.     Suspect the patient may have some kind of infection however she denies fever sweating chills no dysuria or hematuria.  She denies pain in the right flank area where she has ureteral stent and oftentimes complains of pain when she has  urinary tract infection.      I searched medical records, she had a replacement of stent 3 months ago.  I recommended urinalysis and culture on 9/26/2019 for further evaluation despite that she has no symptoms.I called and spoke to patient about her urinalysis which showed a large quantity of bacteria and WBC.  I e-scribed cefdinir to her pharmacy, 300 mg twice a day for 7 days. I called the patient today, reporting her urine culture results with multidrug sensitive E. coli.  She will continue cefdinir as we prescribed.  Should be sensitive according to the culture results.I think this patient will need to follow-up with her urologist Dr. Everett for stent replacement.  I sent a message to Dr. Everett.    Review of Systems   Constitutional: Positive for appetite change, fatigue and unexpected weight change (Stable weight for 3 months is 9/26/2019). Negative for chills, diaphoresis and fever.   HENT:   Negative for mouth sores, nosebleeds and sore throat.    Eyes: Negative for icterus.   Respiratory: Negative for cough, hemoptysis and shortness of breath.    Cardiovascular: Negative for chest pain, leg swelling and palpitations.   Gastrointestinal: Negative for abdominal pain, blood in stool and nausea.   Genitourinary: Negative for dysuria, frequency and hematuria.    Musculoskeletal: Positive for arthralgias (Right hip pain). Negative for back pain, gait problem and myalgias.   Skin: Negative for itching and rash.   Neurological: Positive for extremity weakness (Significant fatigue, not neurologic deficits, stable. 08/30/19-Same). Negative for dizziness, gait problem, headaches, light-headedness and numbness.   Hematological: Negative for adenopathy. Does not bruise/bleed easily.   Psychiatric/Behavioral: Negative for depression. The patient is not nervous/anxious.    All other systems reviewed and are negative.    Medications: The current medication list was reviewed in the EMR.         Allergies   Allergen  "Reactions   • Zosyn [Piperacillin Sod-Tazobactam So] Swelling     Face and lips       VITALS:   Vitals:    10/17/19 0800   BP: 110/66   Pulse: 73   Resp: 16   Temp: 97.5 °F (36.4 °C)   SpO2: 97%   Weight: 82.1 kg (181 lb)   Height: 157.5 cm (62.01\")   PainSc: 0-No pain   PS: ECOG 2      Physical Exam   GENERAL:  Well-developed, well-nourished -American female in no acute distress.  She drove herself today to the clinic.   SKIN:  Warm, dry without rashes, purpura or petechiae.  EYES:  Pupils equal, round and reactive to light.  EOMs intact.  Conjunctivae normal.  EARS:  Hearing intact.  NOSE:  No nasal discharge.  MOUTH:  Tongue is well-papillated; no stomatitis or ulcers.  Lips normal.  THROAT:  Oropharynx without lesions or exudates.  NECK:  Supple with good range of motion; no thyromegaly or masses.  LYMPHATICS:  No cervical, supraclavicular, axillary or inguinal adenopathy.  CHEST:  Lungs clear to auscultation. Good airflow.  CARDIAC:  Regular rate and rhythm without murmurs, rubs or gallops. Normal S1,S2.  ABDOMEN:  Soft, nontender with no hepatosplenomegaly or masses. Bowel sounds normal.  EXTREMITIES:  No clubbing, cyanosis or edema.  NEUROLOGICAL:  Cranial Nerves II-XII grossly intact.  No focal neurological deficits.  PSYCHIATRIC:  Normal affect and mood.       Recent lab results:   Results from last 7 days   Lab Units 10/17/19  0739   WBC 10*3/mm3 19.80*   NEUTROS ABS 10*3/mm3 14.26*   LYMPHS ABS 10*3/mm3 2.97   HEMOGLOBIN g/dL 9.6*   HEMATOCRIT % 31.5*   PLATELETS 10*3/mm3 24*      Glucose   Date Value Ref Range Status   10/10/2019 94 65 - 99 mg/dL Final     BUN   Date Value Ref Range Status   10/10/2019 30 (H) 8 - 23 mg/dL Final   08/23/2019 28 (H) 7 - 20 mg/dL Final     Creatinine   Date Value Ref Range Status   10/10/2019 2.02 (H) 0.57 - 1.00 mg/dL Final   08/23/2019 2.1 (H) 0.7 - 1.5 mg/dL Final   02/18/2019 2.1 (H) 0.7 - 1.5 mg/dL Final     Sodium   Date Value Ref Range Status   10/10/2019 141 " 136 - 145 mmol/L Final   08/23/2019 140 137 - 145 mmol/L Final     Potassium   Date Value Ref Range Status   10/10/2019 3.8 3.5 - 5.2 mmol/L Final   08/23/2019 3.6 3.5 - 5.1 mmol/L Final     Chloride   Date Value Ref Range Status   10/10/2019 113 (H) 98 - 107 mmol/L Final   08/23/2019 114 (H) 98 - 107 mmol/L Final     CO2   Date Value Ref Range Status   10/10/2019 20.7 (L) 22.0 - 29.0 mmol/L Final   02/18/2019 22 22 - 30 mmol/L Final     Total CO2   Date Value Ref Range Status   08/23/2019 25 22 - 30 mmol/L Final     Calcium   Date Value Ref Range Status   10/10/2019 8.4 (L) 8.6 - 10.5 mg/dL Final   08/23/2019 8.7 8.4 - 10.2 mg/dL Final     Total Protein   Date Value Ref Range Status   10/10/2019 11.3 (H) 6.0 - 8.5 g/dL Final     Albumin   Date Value Ref Range Status   10/10/2019 2.60 (L) 3.50 - 5.20 g/dL Final     ALT (SGPT)   Date Value Ref Range Status   10/10/2019 12 1 - 33 U/L Final     AST (SGOT)   Date Value Ref Range Status   10/10/2019 15 1 - 32 U/L Final     Alkaline Phosphatase   Date Value Ref Range Status   10/10/2019 46 39 - 117 U/L Final     Total Bilirubin   Date Value Ref Range Status   10/10/2019 0.5 0.1 - 1.2 mg/dL Final     eGFR Non  Amer   Date Value Ref Range Status   08/30/2016  >60 mL/min/1.73 Final     Comment:     <15 Indicative of kidney failure.     A/G Ratio   Date Value Ref Range Status   07/24/2019 0.6 (L) 0.7 - 1.7 Final     BUN/Creatinine Ratio   Date Value Ref Range Status   10/10/2019 14.9 7.0 - 25.0 Final   08/23/2019 13.3 RATIO Final     Anion Gap   Date Value Ref Range Status   10/10/2019 7.3 5.0 - 15.0 mmol/L Final       Lab Results   Component Value Date    IRON 59 08/15/2019    TIBC 140 (L) 08/15/2019    FERRITIN 524.30 (H) 08/15/2019     Lab Results   Component Value Date    FOLATE 5.84 05/16/2019     Lab Results   Component Value Date    FYKHGGZE91 1,819 (H) 05/16/2019       Assessment/Plan   1. Multiple myeloma, IgG lambda, stage III. Failed multiple lines of  therapy. Her treatment was switched to Darzalex on 5/26/2016. She was on this treatment for more than 2 years.     · In November 2018, she clearly had disease progression.  Darzalex was discontinued and was started on bendamustine plus Velcade plus dexamethasone.   · Cycle #1 day #1 Bendamustine was started on 11/29/2018.  Due to poor tolerance with profound fatigue, chemotherapy was modified with bendamustine only given on day 1, and a Velcade weekly, every 4 weeks as 1 cycle.  Patient had a cycle #6 dose reduction of bendamustine by 25% because of severe thrombocytopenia.   · This patient actually had a micaela of response on 3/28/2019 with M spike 3.2 g/dL and serum IgG 3600.  After that M spike and serum IgG both were elevating.   · Laboratory study obtained on 5/9/2019 after 6 cycles chemotherapy, it showed disease further progressed with worsening level of serum IgG 4873 mg/dL and M spike 4.3 g/dL.    · On 5/23/2019, patient was switched to Empliciti plus Velcade and decadron.    · Her serum protein studies reported persistent active disease, not well controlled with large quantity of monoclonal spike.  She also has severe thrombocytopenia oftentimes leading to hold Velcade injection.  · I recommended to obtain bone marrow aspiration biopsy for reanalysis.  This patient also has a history of breast cancer so we need to be open-minded.  She had bone marrow aspirate biopsy on 8/12/2019 which showed more than 85% of bone marrow cells are malignant plasma cells.  There is no evidence of metastatic disease or other type of malignancy.  · I discussed with the patient on 8/30/2019, recommended switching treatment to Ixazomib plus dexamethasone.  Considering her pre-existing severe thrombocytopenia, I recommended starting low-dose Ixazomib 2.3 mg weekly instead of full dose 4 mg weekly.    · She started new treatment on 9/5/2019 with dexamethasone 20 mg weekly.  Her CBC need to be monitored weekly.   · Patient was  evaluated on 9/26/2019 after first cycle treatment.  She tolerates well with no specific complaints.   · Patient is evaluated today on 10/17/2019.  Patient has worsening thrombocytopenia with platelets 24,000 but no easy bleeding or bruising.  I recommended to obtain laboratory studies for both serum and 24 urine in 1 week for reassessment after 2 cycles of treatment.    2.  Severe thrombocytopenia.  This is more likely secondary to her multiple myeloma, based on her recent bone marrow aspiration biopsy on 8/12/2019. patient has no easy bleeding or bruising.     · Patient was last transfused with platelets on May 13, 2019 prior to her EGD.  · The patient underwent a bone marrow biopsy on 08/12/2019 and the pathology report showed hypercellular bone marrow with involvement by plasma cell myeloma and no metastatic carcinoma, granulomas, vasculitis, viral inclusions, increase in myeloblasts, or malignant lymphoma. Over 80% of the bone marrow are plasma cells. Normal karyotype.   · Laboratory study on 8/30/2019 is showed platelets of 34,000. We switched her treatment to oral ixazomib 3 weeks on and 1 week off starting 9/05/19.    · She has worsening thrombocytopenia platelets 24,000 today however no spontaneous bleeding.  This is probably secondary to chemotherapy in the background of severe bone marrow involvement from multiple myeloma.  Will reassess her multiple myeloma in 1 week.  Will monitor weekly.       3.  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  Patient to has been on Procrit injection weekly.  However her hemoglobin has been deteriorating clearly due to chemotherapy.  We'll continue Procrit.      · Her laboratory study on 5/9/2019 showed no evidence of iron deficiency.  On 5/16/2019, B12 level of 1819 and normal folate of 5.84.  She was symptomatic anemic with Hb 8.5, she was transfused with 2 units of packed red blood cells.    · Recent labs on 8/15/2019 reported no  evidence of iron deficiency, had normal iron saturation and excellent ferritin level.  · Hemoglobin is 9.6 today, 10/17/2019.  Patient to receive 24,000 procrit today. We will continue Procrit as needed when Hb less than 10.     4. Zometa / Xgeva treatment.  Because of her kidney insufficiency, we were only able to give her Zometa every 3 months.  Due to her elevated creatinine level, we eventually switched her to Xgeva treatment and continued monthly.  · She had elevated phosphorus 5.6 on 6/6/2019.   · Her last Xgeva was 10/10/2019.  She had normal phosphorus and magnesium level that day.        5. History of stage II left breast cancer, post mastectomy in 2013, negative for ER/OR and positive HER-2. No neoadjuvant chemotherapy because of concurrent discovery of multiple myeloma and ongoing chemotherapy. She had a normal mammogram study on 7/26/2019.       6.  Right middle lobe pulmonary nodule, documented on CT scan of chest on 01/06/2017. Repeated CT scan of chest on 8/21/2017 reported a small 5 mm and stable primary nodule.      7.  Skin rashes on her extremities:  Patient was seen dermatologist Dr. Barroso, who prescribed steroids cream and also over-the-counter moisturizing cream.  Patient will follow-up with Dr. Barroso again.  Currently resolved.    8. Profound fatigue: This is multifactorial secondary to her disease progression and her worsening anemia associated with chemotherapy.   · A thyroid level was obtained and patient was diagnosed with subclinical hypothyroidism.  She was initiated on Synthroid and folic acid and has noted significant reduction in fatigue.     9.  Hypocalcemia.  The patient has struggled with compliance with her calcium supplements.  She reports she has been taking these inconsistently, though they resulted in soft frequent bowel movements.  We added 2 tablets of Tums daily along with current calcium/vitamin D supplementation.      10.  Gastric ulcer diagnosed in May 2019 epigastric  discomfort.    · Patient had EGD examination by Dr. Rivera on 5/13/2019.  It reported prepyloric ulceration.  Dr. Rivera recommended Protonix twice a day. With increased dose of Protonix, she has had no further issues.    · Today on 10/17/2019 patient also reports she has intermittent chest pain/epigastric area/stomach pain for the past several weeks, to the point that she thought she was not able to make it.  Patient reports she had stress test this week and was told to having nothing wrong.  Patient also reports this is not related to her eating.  No apparent effect of exacerbation or relieving.  She reports no nausea or vomiting.  She denies melena or hematochezia.  I think her symptomology is probably still relevant to her gastric ulcer.  I discussed with patient, recommended to increase her Protonix to twice a day instead of once a day.  Patient is agreeable.      11.  Worsening leukocytosis/neutrophilia.  Patient is not having fever sweating or chills.  No dysuria.  However this patient has history of recurrent urinary tract infection with an existing right ureteral stent.   · I searched medical records, she had a replacement of stent 3 months ago.  I recommended urinalysis and culture on 9/26/2019 for further evaluation despite that she has no symptoms.  · I called and spoke to patient about her urinalysis which showed a large quantity of bacteria and WBC.  I e-scribed cefdinir to her pharmacy, 300 mg twice a day for 7 days.   · On 9/26/2019 her urine culture results with multidrug sensitive E. coli.  She will continue cefdinir as we prescribed.  Should be sensitive according to the culture results.  · 10/17/2019, patient reports no fever sweating chills.  She continues to have elevated leukocytosis.  Review of her peripheral blood smear today, did not show apparent plasma cells.  The majority of WBCs are granulocytes.  Continue to monitor..        Plan:  1. Patient is on chemotherapy with oral ixazomib and  dexamethasone, both 3 weeks on and 1 week off. This is her C2D15.  She will resume oral ixazomib and dexamethasone C3 D1 in 2 weeks. ,   2. Proceed with Procrit today for hemoglobin of 9.6 .  This will be repeated weekly with CBC monitoring.   3. Increase Protonix to 40 mg twice a day.  I.e. scribed to her pharmacy today.   4. Continue to receive monthly Xgeva. Last Xgeva was 10/10/2019.   5. Continue on Synthroid daily, refilled prescription today.    6. Continue Norco for pain management.  7. Continue calcium and vitamin D supplementation.    8. Continue Tums daily.    9. Continue oral B12 and folic acid daily.  10. Continue prophylactic acyclovir 400 mg twice a day.   11. Keep appointment with Dr. Everett for ureteral stent replacement tomorrow on 10/18/2019.   12. Obtain paraprotein studies for both serum protein and a 24 urine protein in 1 week.    13. Follow-up with me in 2 weeks for reevaluation, with labs and possible procrit.   14. Patient is safe to receive shingles vaccination and flu vaccination.   15. Patient understands to call with any issues including bleeding, fever greater than 100.5, with any other concerns prior to her next office visit.     Addendum:   I called and spoke to his urologist Dr. Everett about her surgical procedure tomorrow due to her severe thrombocytopenia with platelets 24,000.  With all things considered, we decided to transfuse 1 unit platelets prior to her procedure.  My office will make order arrangement.     Patient is on drug therapy and requires frequent monitoring.      Zane Araujo MD PhD  10/17/2019    Cristo Garcia MD, M.D.

## 2019-10-18 NOTE — PROGRESS NOTES
0748 right medi port accessed easily using sterile technique according to policy. Good blood return and flushes easily. Bio patch applied and covered with tegaderm. Ultra site valve applied. Patient tolerated well.

## 2019-10-18 NOTE — PATIENT INSTRUCTIONS
Platelet Transfusion  A platelet transfusion is a procedure in which you receive donated platelets through an IV. Platelets are tiny pieces of blood cells. When you get an injury, platelets clump together in the area to form a blood clot. This helps stop bleeding and is the beginning of the healing process. If you have too few platelets, your blood may have trouble clotting. This may cause you to bleed and bruise very easily.  You may need a platelet transfusion if you have a condition that causes a low number of platelets (thrombocytopenia). A platelet transfusion may be used to stop or prevent excessive bleeding.  Tell a health care provider about:  · Any reactions you have had during previous transfusions.  · Any allergies you have.  · All medicines you are taking, including vitamins, herbs, eye drops, creams, and over-the-counter medicines.  · Any blood disorders you have.  · Any surgeries you have had.  · Any medical conditions you have.  · Whether you are pregnant or may be pregnant.  What are the risks?  Generally, this is a safe procedure. However, problems may occur, including:  · Fever.  · Infection.  · Allergic reaction to the donor platelets.  · Your body's disease-fighting system (immune system) attacking the donor platelets (hemolytic reaction). This is rare.  · A rare reaction that causes lung damage (transfusion-related acute lung injury).  What happens before the procedure?  Medicines  · Ask your health care provider about:  ? Changing or stopping your regular medicines. This is especially important if you are taking diabetes medicines or blood thinners.  ? Taking medicines such as aspirin and ibuprofen. These medicines can thin your blood. Do not take these medicines unless your health care provider tells you to take them.  ? Taking over-the-counter medicines, vitamins, herbs, and supplements.  General instructions  · You will have a blood test to determine your blood type. Your blood type  determines what kind of platelets you will be given.  · Follow instructions from your health care provider about eating or drinking restrictions.  · If you have had an allergic reaction to a transfusion in the past, you may be given medicine to help prevent a reaction.  · Your temperature, blood pressure, pulse, and breathing will be monitored.  What happens during the procedure?    · An IV will be inserted into one of your veins.  · For your safety, two health care providers will verify your identity along with the donor platelets about to be infused.  · A bag of donor platelets will be connected to your IV. The platelets will flow into your bloodstream. This usually takes 30-60 minutes.  · Your temperature, blood pressure, pulse, and breathing will be monitored during the transfusion. This helps detect early signs of any reaction.  · You will also be monitored for other symptoms that may indicate a reaction, including chills, hives, or itching.  · If you have signs of a reaction at any time, your transfusion will be stopped, and you may be given medicine to help manage the reaction.  · When your transfusion is complete, your IV will be removed.  · Pressure may be applied to the IV site for a few minutes to stop any bleeding.  · The IV site will be covered with a bandage (dressing).  The procedure may vary among health care providers and hospitals.  What happens after the procedure?  · Your blood pressure, temperature, pulse, and breathing will be monitored until you leave the hospital or clinic.  · You may have some bruising and soreness at your IV site.  Follow these instructions at home:  Medicines  · Take over-the-counter and prescription medicines only as told by your health care provider.  · Talk with your health care provider before you take any medicines that contain aspirin or NSAIDs. These medicines increase your risk for dangerous bleeding.  General instructions  · Change or remove your dressing as told  by your health care provider.  · Return to your normal activities as told by your health care provider. Ask your health care provider what activities are safe for you.  · Do not take baths, swim, or use a hot tub until your health care provider approves. Ask your health care provider if you may take showers.  · Check your IV site every day for signs of infection. Check for:  ? Redness, swelling, or pain.  ? Fluid or blood. If fluid or blood drains from your IV site, use your hands to press down firmly on a bandage covering the area for a minute or two. Doing this should stop the bleeding.  ? Warmth.  ? Pus or a bad smell.  · Keep all follow-up visits as told by your health care provider. This is important.  Contact a health care provider if you have:  · A headache that does not go away with medicine.  · Hives, rash, or itchy skin.  · Nausea or vomiting.  · Unusual tiredness or weakness.  · Signs of infection at your IV site.  Get help right away if:  · You have a fever or chills.  · You urinate less often than usual.  · Your urine is darker colored than normal.  · You have any of the following:  ? Trouble breathing.  ? Pain in your back, abdomen, or chest.  ? Cool, clammy skin.  ? A fast heartbeat.  Summary  · Platelets are tiny pieces of blood cells that clump together to form a blood clot when you have an injury. If you have too few platelets, your blood may have trouble clotting.  · A platelet transfusion is a procedure in which you receive donated platelets through an IV.  · A platelet transfusion may be used to stop or prevent excessive bleeding.  · After the procedure, check your IV site every day for signs of infection, including redness, swelling, pain, or warmth.  This information is not intended to replace advice given to you by your health care provider. Make sure you discuss any questions you have with your health care provider.  Document Released: 10/14/2008 Document Revised: 01/23/2019 Document  Reviewed: 01/23/2019  [employer] Patient Education © 2019 [employer]

## 2019-10-18 NOTE — PROGRESS NOTES
Tolerated platelets with no concerns. Medi port flushed with 20 cc's normal saline then 500 units of hep flush and remains accessed upon discharge to be used at [facility] for surgery today.

## 2019-10-24 NOTE — PROGRESS NOTES
Hbg 10.0, procrit not indicated. CBC reviewed with pt, counts are stable for this pt at this time. Pt has no complaints, denies any active bleeding or chest pain.  Copy of labs given to pt and f/u appt reviewed. Pt is instructed to call the office with any concerns or new symptoms prior to next visit. Pt vu        Lab Results   Component Value Date    WBC 19.18 (H) 10/24/2019    HGB 10.0 (L) 10/24/2019    HCT 32.7 (L) 10/24/2019    .2 (H) 10/24/2019    PLT 26 (C) 10/24/2019

## 2019-10-31 NOTE — PROGRESS NOTES
Call rec from Dr Araujo-he was asking about stopping pts medication if it has shipped. I explained that pharmacy cannot take the medication back if pt has accepted the delivery. She can refuse the delivery and it will be sent back to the pharmacy. I informed him that I will try to stop the shipping/delivery. Pt is on Ninlaro filled through [facility]. Last dispense was 10/3/19. I have notified Pranay, Pharmacist, at [facility] to not ship the medication. Per Dr Araujo-treatment will be changed.

## 2019-10-31 NOTE — PROGRESS NOTES
REASONS FOR FOLLOW UP:   1. IgG kappa multiple myeloma, was on Darzalex, started on May 26, 2016. Patient was switched to Darzalex from Pomalyst, as she continued to be neutropenic with recurrent urinary tract infection while on Pomalyst.    2. Zometa is on hold due to renal insufficiency.    3. After 16 doses of Darzalex, laboratory study showed stable disease in November 2016. Insurance company denied adding Velcade and dexamethasone. Patient is to be continued on monthly Darzalex from 12/06/16.   4. Obstructive right pyelonephritis with positive urine culture for E. coli, required hospitalization from 1/19/2017 through 01/24/2017 with iv antibiotics and stent exchange on 01/20/2017.   5.  Paraprotein studies on March 27, 2017 showed further slight improvement of multiple myeloma.   6.  Febrile illness, with urinary tract infection and influenza B infection in early May 2017, required hospitalization for 6 days.   7.  Paraprotein studies for both serum and 24-hour urine sample on 7/21/2017 showed further improvement of paraproteins.   Darzalex was continued.   8.  Serum protein study reported stable disease on 10/20/2017.  Darzalex will be continued.   9.  Laboratory studies of both serum paraprotein and a 24-hour urine protein in January 2018 showed further improvement of paraproteins.  Monthly Darzalex will be continued.   10. Repair of left flank incisional hernia in middle September 2018.   11. Serum paraprotein study reached micaela on 7/5/2018.  Since that time she has evidence of disease progression.   12. Disease progression, she was started on chemotherapy with bendamustine plus Velcade plus dexamethasone per protocol on 11/29/2018. Treatment will be bendamustine on day 1 and day 4 repeat every 28 days, Velcade and dexamethasone will be on day 1 day 4 and day 8 and day 15 repeat every 28 days.   13.  From cycle #2, patient was given only 1 dose of Treanda per cycle and continue Velcade and dexamethasone  weekly.   14.  Treanda was decreased by 20% after cycle #4 because of severe thrombocytopenia, and further decreased by another 25% on cycle #6 which is her last cycle on 4/25/2019.    15.  On 5/23/2019, patient was switched to Empliciti plus Velcade and decadron.    16.  Patient has persistent severe thrombocytopenia, no improvement after starting new regimen.    17. The patient underwent a bone marrow biopsy on 08/12/2019 and the pathology report showed hypercellular bone marrow with involvement by >85%, plasma cell myeloma and no metastatic carcinoma, granulomas, vasculitis, viral inclusions, increase in myeloblasts, or malignant lymphoma.   18. On 08/30/2019, discussed with patient to switch from Empliciti/Velcade/Dex to oral ixazomib/Dex 3 weeks on and 1 week off. She is to start the medication at decreased dose 2.3 mg weekly starting on 9/5/2019.   19.  Laboratory studies on 10/24/2019 reported no significant disease progression.      HISTORY OF PRESENT ILLNESS:   The patient is a 69 y.o. female with the above-mentioned history, who returns today for 2-week follow-up to assess her response to Ixazomib treatment and to discuss further treatment plan. Patient is by herself today, she drove herself to the clinic.      Patient was started on new treatment ixazomib/Dex on 9/5/2019, 3 weeks on 1 week off.      Unfortunately laboratory study on 10/24/2019 reported significant disease progression, he had a M spike 5.1 g/dL by SPEP, and a serum IgG 7280 mg/dL, IgA 16, IgM 17, free kappa chain 10.2 and free lambda chain 1309.5 mg/L, with kappa/lambda ratio 0.01.  Her beta-2 myoglobin was 16.2 mg/L.  Chemistry lab reported total serum protein 11 g, albumin 2.7 and globulin 8.3.  Her liver function panel was normal, creatinine 1.90 and normal calcium and other electrolytes.  Continues to have anemia Hb 10.0, platelets 26,000 and WBC 19,180 including ANC 13,230 lymphocytes 3070 monocytes 1920 and eosinophil 770.  Her iron  study reported ferritin 455.4 ng/dL, free I 61 TIBC 155 and iron saturation 39%, with folic acid at 7.3 ng/mL and vitamin B12 at 1800 pg/mL.    Laboratory study today reported severe thrombocytopenia platelets 21,000, with stable anemia Hb 10.2 and the WBC 15,180.    Patient reports she has no easy bleeding or bruising.  She denies fever sweating or chills.   Patient denies abdominal pain.  She denies dysuria.  She reports no nausea or vomiting.  She denies melena or hematochezia.    She does continue to have severe intermittent pain in her right hip which is managed somewhat with pain medication, including tylenol and prescription Norco. She states she has difficulty ambulating and functioning normally when this pain occurs. She continues on oral Vitamin B-12 and folic acid supplementation.       Past Medical History, Past Surgical History, Social History, Family History have been reviewed and are without significant changes except as mentioned.      Hematology/oncology History: See separate document for extra information.   1.    History of left breast cancer, status post left mastectomy on 04/25/2013, stage II, T2N0M0, hormone negative, HER2/mk positive by immunohistochemistry, however, no adjuvant chemotherapy delivered because of multiple myeloma diagnosed concomitantly.        2. Mult  iple myeloma, IgG lambda, stage III, diagnosed April 2012, previously treated with Velcade/Decadron followed by Velcade/Decadron/Revlimid; patient developed abdominal pain and rectal bleeding on Revlimid, which was then discontinued.    3. Patient evaluated at [facility], but was not felt to be a candidate for stem cell transplant.      4.   Disease progression with therapy switched to melphalan/Velcade/prednisone per the VISTA trial on 10/22/2013; melphalan dose has been persistently decreased because of thrombocytopenia.      5. Anemia secondary to chronic renal insufficiency and myeloma, on  periodic Procrit therapy p.r.n.    6. Patient had 8 cycles of VMP chemotherapy, repeat laboratory studies on 10/03/2013 showed stable disease.  Her melphalan do  se was significantly decreased due to marrow suppression.      7.   Patient had disease progression after cycle #10 VMP treatment, evidenced by laboratory studies on 01/06/2014.  We increased the melphalan dose for one cycle.  Continued disease progress after cycl  e #11 VMP treatment, despite increased dose of melphalan, as documented by laboratory study on 02/17/2014.     8. The patient was evaluated on 02/24/2014 and we recommend switching chemotherapy to Kyprolis on 02/27/2014.     9. Echocardiogram on 03/05/2014 reporting L  VEF 43%.  This is on par with her previous twice echocardiogram study, in June 2012 and November 2012, reported LVEF at 45% to 50% and 40% to 45% respectively.     10. The patient had 2 episodes of chest pain after Kyprolis during cycle 1, leading to omitted dose   on days 9 and 16.  From cycle 2, we will give Kyprolis only once per week for 3 weeks out of every 4 weeks, as well as dose reduction of Kyprolis that was started on 04/04/2014.      11. After cycle 2 of dose-reduced Kyprolis, the patient's M spike increased fro  m 2.6 to 3.2 g/dL, with her IgG level increasing from 2.7 g/dL to 4.7 g/dL.     12.   Patient had a stress test and echocardiogram study at [facility] on 01/23/2015.  The patient had LVEF 35% to 40%.  Myocardial perfusion study reported a large, mild to modera  te severity fixed inferior wall defect, consistent with known transmural infarct versus diaphragmatic attenuation artifact.  Post stress resting ejection fraction estimated at 31%.      13. Repeated echocardiogram study on 04/03/2015 reported an LVEF 46%.  Left v  entricle is moderately dilated.  There is mild global hypokinesis of the left ventricle.  There was a grade 1 diastolic dysfunction.     14.   The patient was seen on 04/09/2015 with plan  for cycle 14, day 1, of Kyprolis.  Treatment will be delayed 1 week secondary to patient'  s extreme fatigue, hemoglobin of 8.1.  We will proceed with 2 units of packed red blood cells.  She was also found to have a urinary tract infection. Patient prescribed Cipro 500 mg twice a day x7.     15. Laboratory tests on 08/24/2015 reported sligh  t disease progress after cycle #18 Kyprolis. We discussed with patient and we will switch chemotherapy to CyBorD regimen with dose reduction of Velcade to 1 mg/m2, cyclophosphamide at 240 mg/m2 (total dose 450 mg), and dexamethasone 20 mg. All therapy to   be repeated on a weekly basis.     16.   The patient had significant fatigue after one dose of cyclophosphamide that lasted for 5-6 days. She also had severe anemia, hemoglobin 7.6, required 2 units PRBC transfusion. Cyclophosphamide will be decreased to 350 mg from 2nd week.     17.  Patient continues to have significant fatigue after the reduced dose of cyclophosphamide at 350 mg; for 2 days after chemo, she could only lie on the bed with performance status ECOG 3. From 10/13/2015, oral cyclophosphamide will be further dec  reased to 200 mg once weekly. We will continue Velcade and dexamethasone at same dose.   Evidence of disease progress, when she was on panobinostat treatment.   18. The patient also has worsening ane  ming and thrombocytopenia secondary to chemotherapy. The patient has symptomatic anemia with hemoglobin 7.8 on 02/23/2016 requiring 2 units of packed RBC transfusion. Chemotherapy with panobinostat will be discontinued.       19. Patient was switched to pomalidomide 3 mg daily for 21 days / 28 days in late March 2016.    20. Disease progression, she was switched to to the Darzalex in May 2016.       On12/6/2016, serum free lambda chain 1090 MG/L, free kappa chain 8.5 to MG/L.  Ferritin 1015, iron 99, TIBC 137 iron saturation 72%.     On January 4, 2017, vitamin B12 level 1289, folate 7.7 ng/mL. SPEP reporting  gamma globulin 3.3, out of total globulin 5.0, with immunofixation reporting small quantity of monoclonal free lambda chain, plus monoclonal IgG lambda at 3.2 g/dL.  Serum IgG 4075, IgA 9 and IgM 15.  free lambda chain 1339 MG/L and free kappa chain 10.3 MG/L.      Laboratory study 3/27/2017 showed slight improvement of paraprotein, SPEP reporting M spike 2.8 g/DL, out of total globulin 4.3, and the serum IgG 3420, IgA 9, IgM 11, free lambda chain 1218 MG/L and free kappa chain 8.0 MG/L.  Total 24 hour urine sample reported at free lambda chain 142 mg, at a concentration 74.8 MG/L, free kappa chain 75 mg at a concentration 39.5 MG/L., Urine protein immunofixation reporting biclonal IgG lambda and monoclonal free lambda light chain, M spike #1 at 41.5% and monoclonal IgG lambda spike #2 at 10.5%. Serum beta-2 microglobulin is 7.3 MG/L.     Her laboratory study on 7/21/2017 reported further improvement of paraprotein is both serum and a 24-hour urine sample.  SPEP reporting monoclonal IgG lambda 2.9 g/dL and free lambda chain 0.1 g/dL.  Serum IgG was 3261 mg/dL and IgA 5, IgM 13, free kappa chain 6.7, free lambda chain 701.3 with kappa/lambda ratio 0.01.  24-hour urine sample reported positive for Bence May protein lambda type, immunofixation positive for IgG lambda.  Total urine protein 241 mg, gamma globulin 13.1%.  Hemoglobin was 11.4 .4, platelets 122,000, WBC 6680 including neutrophils 3600, lymphocytes 2100 and monocytes 650.  Her creatinine was 1.68, at baseline level.  Liver function panel unremarkable.  Darzalex was continued.    Laboratory study on 8/25/2017 showed improved the platelets at 144,000, normal WBC 6660 and slightly improved hemoglobin at 11.7.     Patient had a colonoscopy examination on 8/30/2017 by Dr. Rivera.  It reported diverticulosis in multiple areas more severe in the sigmoid colon.  There was 2 polyps 4 mm pneumonia from transverse colon and the sigmoid colon.  Pathology  evaluation reported tubular adenoma from the sigmoid colon polyp, and benign hyperplastic polyp from transverse colon.    Laboratory study on 10/20/2017 reported slightly improved monoclonal spike 2.7 g/dL by SPEP, immunofixation reported positive monoclonal IgG lambda, serum IgG 3536 mg/dL, IgA less than 5 and IgM 11, free kappa chain 5.3 mg/L, and free lambda chain 720 mg/L with kappa/lambda ratio 0.01.  Serum beta-2 microglobulin was 6.5 mg/L.   Her CBC showed a stable mild anemia with hemoglobin 11.3, macrocytosis .3, and the platelets 114,000, normal WBC 7070 including neutrophils 4100 lymphocytes 2000 monocytes 650, normal liver function panel, total protein 7.9 and albumin 3.2,  and normal electrolytes including calcium 8.1, and improved creatinine 1.59.     Laboratory study on 1/12/2018 reported serum IgG 3083 mg/dL, IgA 10, IgM 10, free kappa chain 8.5 and free lambda chain 589.1 mg/L, kappa/lambda ratio 0.01, serum protein admitted fixation reported IgG lambda monoclonal protein and SPEP reporting M spike 3.1 g/dL, beta-2 microgram and 7.4 mg/L. serum creatinine 1.79, calcium 8.2, other electrolytes normal, hemoglobin 11.3, .5 and MCHC 31.6, platelets 129,000, WBC 6780 including neutrophils 3500 lymphocytes 2300.  Serum ferritin 653.7 ng/mL, iron 123 TIBC 174 iron saturation 71%, folic acid 12.8 ng/mL and a vitamin B12 at 873 pg/mL.  Her 24-hour urine study on 1/18/2018 reported lambda chain 32.8 mg/L, total 61 mg in 24 hours, and the free kappa chain 27.4 mg/L and 51 mg in 24 hours, and the total 24-hour urine protein 283 mg, and urine protein immunofixation positive for 5.3% monoclonal IgG lambda and positive for Bence May protein at 36.2% monoclonal lambda chain.  Monthly Darzalex was continued.       This patient had serum protein study on 04/06/2018 which reported free light chain at concentration 703.8 mg/L and free kappa chain 7.0, free kappa/lambda ratio 0.01, serum IgG 3650 mg/dL,  IgM 11 and IgA 9 with serum protein electrophoresis reporting monoclonal IgG lambda 3.2 g/dL and also monoclonal free lambda light chain.  But it was unable to quantify monoclonal lambda light chain because of the small quantity of it.     A 24-hour urine study on 04/20/2018 reported total free lambda chain 60 mg in 24 hours at concentration 33.1 mg/L, free kappa chain 45 mg in 24 hours at concentration 24.8 mg/L. Total 24-hour urine protein was 279 mg. Urine protein immunofixation and UPEP reported lambda-type Bence-May protein + #1 monoclonal IgG lambda band at 34.8% and #2 monoclonal IgG lambda band at 6.9%.     Her CBC results today reported a stable hemoglobin at 11.1, platelets 130,000 and WBC 7440 including neutrophils 4100 and lymphocytes 2350, monocytes 650. Her CMP reported slightly worsened creatinine at 1.82 with BUN 33. Total protein at 8.8. Liver function panel was normal. Total serum protein 8.8 and albumin 3.2, globulin 5.6.    Reviewing her previous lab studies especially serum paraprotein, she reached a micaela of response on 07/05/2018 when she had serum IgG 3015 mg/dL, free lambda chain 458.7 mg/L and M spike IgG lambda 2.6 g/dL and and monoclonal lambda free light chain 0.1 g/dL, total 2.7 g/dL. serum beta-2 myoglobin 7.1 mg/L.  Her ferritin was 539 ng/mL, free iron 73 TIBC 176 iron saturation 42%.    Laboratory study obtained on 11/01/2018 showed further disease progression. Her serum IgG was 5472 mg/dL, IgA 8 and IgM 10, serum kappa chain 7.9 mg/L, free lambda chain 961.3 mg/L and kappa/lambda ratio 0.01. Serum protein electrophoresis reported monoclonal IgG lambda 4.1 g/dL, and monoclonal lambda free light chain 0.1 g/dL. Her hemoglobin was 9.0, .6, MCHC 30.1, platelets 124,000 and WBC 10,000 including neutrophils 7400, lymphocytes 1200, monocytes 600.     Cycle #1 day #1 Bendamustine will be started on 11/29/2018.     Laboratory studies on 01/17/2019 reported improved paraproteins.   SPEP reported M spike 3.8 g/dL out of total 5.5 g/dL globulin.  Serum IgG was 4374 mg/dL, IgA 10 and IgM 11.  Free lambda chain 606.8 mg/L, free kappa chain 8.6 and kappa/lambda ratio 0.01.  Her serum protein immunofixation continues to be IgG lambda monoclonal.  Her CBC showed hemoglobin 9.3, .3, platelets 50,000 and WBC 8600 including ANC 5100, lymphocytes 2160.  Her Chemistry lab reported creatinine 1.92, calcium 8.3, normal liver function panel, glucose 98 with normal sodium and potassium.     For her 24-hour urine study on 3/14/2019, she had free lambda chain at a 62.1 mg/L, total 87 mg in 24 hours, free kappa chain 42 mg/L and 59 mg in 24 hours.  Urine protein immunofixation reported a monoclonal IgG lambda #1 and free lambda chain were unable to be quantified, however IgG lambda #2 was 12.8%.  Her 24-hour urine total protein was 452 mg.     For serum protein study on 3/28/2019 (on day of her cycle #5 day #1 Treanda ) also reported further decreased monoclonal spike 3.2 mg/dL, and serum IgG 3600, IgA 13 IgM 12, free kappa chain 11.7, and worsening free lambda chain 1045 mg/L.     Her serum paraprotein studies on 4/11/2019 reported a serum IgG 4407 mg/dL, IgA 14, IgM 13, free kappa chain 9.2, free lambda chain 998.4 mg/L, kappa/lambda ratio 0.01.  His serum protein immunofixation was positive for IgG lambda, SPEP reporting M spike 4.3 g/dL.    I have reviewed the patient's medical history in detail and updated the computerized patient record.    Her recent 24 urine study on 7/18/2019 reported positive IgG lambda monoclonal protein and lambda type Bence-May protein, total free kappa chain 142 mg in 24 hours, at 74.6 mg/L, and total lambda chain 179 mg in 24 hours at 94.1 mg/L.  Kappa/lambda ratio 0.79.  Her total 24-hour urine protein was 410 mg, with monoclonal lambda free light chain 41.8% and monoclonal IgG lambda 2.6%.     Serum paraprotein studies on 7/24/2019 reported monoclonal spike 3.9 g/dL,  serum IgG 4107, IgA 14 IgM 19, free kappa chain 12.8 and free lambda chain 656, kappa/lambda ratio 0.02.    On 08/01/2019 she has severe thrombocytopenia platelets 27,000.  She has elevated WBC to 12,600 including ANC 8900 and normal lymphocytes 2000.  Her hemoglobin is 10.4 08/01/2019. The patient underwent a bone marrow biopsy on 08/12/2019 and the pathology report showed hypercellular bone marrow with involvement by plasma cell myeloma and no metastatic carcinoma, granulomas, vasculitis, viral inclusions, increase in myeloblasts, or malignant lymphoma. Over 85% of the bone marrow are plasma cells. Normal karyotype. Flow cytometry study was positive.    Because of her persistent severe thrombocytopenia, the patient underwent a bone marrow biopsy on 08/12/2019.  Pathology evaluation showed hypercellular bone marrow with involvement by plasma cell myeloma 85-90% and no metastatic carcinoma, granulomas, vasculitis, viral inclusions, increase in myeloblasts, or malignant lymphoma.  Normal karyotype.     Recent laboratory study on 8/15/2019 reported of elevated ferritin 524, normal iron saturation 42% with free iron 59 and a TIBC 140.  Normal thyroid function profile.  Her serum globulin 7.3, total protein 10.1 and albumin 2.8, calcium 8.1 creatinine 1.99.    She does continue to have severe intermittent pain in her right hip which is managed somewhat with pain medication, including tylenol and prescription Norco. She states she has difficulty ambulating and functioning normally when this pain occurs. She continues on oral Vitamin B-12 and folic acid supplementation.     Recent XR right hip with or without pelvis on 08/15/2019 showed sclerosis at the pubic symphysis. No other bony or articular abnormality was identified. The hip joints were symmetric and appeared within normal limits. The joint spaces were fairly well-maintained. There was no bony erosion. There was no evidence of recent or old fracture or subluxation.  A right ureteral stent was noted.     On 08/30/2019, switched treatment from Empliciti to oral ixazomib 3 weeks on and 1 week off. She is expected to start the medication on 9/5/2019 when this medication arrives.  Patient will continue dexamethasone weekly at the same time as part of the treatment.  Because of her severe thrombocytopenia, we recommended starting low-dose ixazomib 2.3 mg weekly.  Treatment was started on 9/5/2019.     Laboratory study on 9/26/2019 showed worsening leukocytosis with WBC 20,780 including ANC 16,000, lymphocytes 2700 and monocytes 8070.  She also has worsening severe thrombocytopenia with platelets 27,000, and stable hemoglobin 9.5.     Suspect the patient may have some kind of infection however she denies fever sweating chills no dysuria or hematuria.  She denies pain in the right flank area where she has ureteral stent and oftentimes complains of pain when she has urinary tract infection.      I searched medical records, she had a replacement of stent 3 months ago.  I recommended urinalysis and culture on 9/26/2019 for further evaluation despite that she has no symptoms.I called and spoke to patient about her urinalysis which showed a large quantity of bacteria and WBC.  I e-scribed cefdinir to her pharmacy, 300 mg twice a day for 7 days. I called the patient today, reporting her urine culture results with multidrug sensitive E. coli.  She will continue cefdinir as we prescribed.  Should be sensitive according to the culture results.I think this patient will need to follow-up with her urologist Dr. Everett for stent replacement.  I sent a message to Dr. Everett.    On 10/17/2019, patient also reports she has intermittent chest pain/epigastric area/stomach pain for the past several weeks, to the point that she thought she was not able to make it.  Patient reports she had stress test this week and was told to having nothing wrong.  Patient also reports this is not related to her eating.   "No apparent effect of exacerbation or relieving.        Review of Systems   Constitutional: Positive for appetite change, fatigue and unexpected weight change (Stable weight for 4 months is 10/31/2019). Negative for chills, diaphoresis and fever.   HENT:   Negative for mouth sores, nosebleeds and sore throat.    Eyes: Negative for icterus.   Respiratory: Negative for cough, hemoptysis and shortness of breath.    Cardiovascular: Negative for chest pain, leg swelling and palpitations.   Gastrointestinal: Negative for abdominal pain, blood in stool and nausea.   Genitourinary: Negative for dysuria, frequency and hematuria.    Musculoskeletal: Positive for arthralgias (Right hip pain). Negative for back pain, gait problem and myalgias.   Skin: Negative for itching and rash.   Neurological: Positive for extremity weakness (Significant fatigue, not neurologic deficits, stable. ). Negative for dizziness, gait problem, headaches, light-headedness and numbness.   Hematological: Negative for adenopathy. Does not bruise/bleed easily.   Psychiatric/Behavioral: Negative for depression. The patient is not nervous/anxious.    All other systems reviewed and are negative.    Medications: The current medication list was reviewed in the EMR.         Allergies   Allergen Reactions   • Zosyn [Piperacillin Sod-Tazobactam So] Swelling     Face and lips       VITALS:   Vitals:    10/31/19 1115   BP: 136/91   Pulse: 80   Resp: 16   Temp: 97.7 °F (36.5 °C)   SpO2: 96%   Weight: 81.7 kg (180 lb 1.6 oz)   Height: 157.5 cm (62.01\")   PainSc: 0-No pain   PS: ECOG 1       Physical Exam     GENERAL:  Well-developed, well-nourished female in no acute distress.    SKIN:  Warm, dry without rashes, purpura or petechiae.  HEAD:  Normocephalic.  EYES:  Pupils equal, round and reactive to light.  EOMs intact.  Conjunctivae normal.  EARS:  Hearing intact.  NOSE:  No nasal discharge.  MOUTH:  Tongue is well-papillated; no stomatitis or ulcers.  Lips " normal.  THROAT:  Oropharynx without lesions or exudates.  NECK:  Supple with good range of motion; no thyromegaly or masses.  LYMPHATICS:  No cervical, supraclavicular, axillary or inguinal adenopathy.  CHEST:  Lungs clear to auscultation. Good airflow.  CARDIAC:  Regular rate and rhythm without murmurs, rubs or gallops. Normal S1,S2.  ABDOMEN:  Soft, nontender with no organomegaly or masses.  Bowel sounds normal.    EXTREMITIES:  No clubbing, cyanosis or edema.  NEUROLOGICAL:  Cranial Nerves II-XII grossly intact.  No focal neurological deficits.  PSYCHIATRIC:  Normal affect and mood.      Recent lab results:   Results from last 7 days   Lab Units 10/31/19  1033   WBC 10*3/mm3 15.98*   NEUTROS ABS 10*3/mm3 9.08*  11.03*   LYMPHS ABS 10*3/mm3 2.72   HEMOGLOBIN g/dL 10.2*   HEMATOCRIT % 33.9*   PLATELETS 10*3/mm3 21*      Glucose   Date Value Ref Range Status   10/24/2019 90 65 - 99 mg/dL Final     BUN   Date Value Ref Range Status   10/24/2019 26 (H) 8 - 23 mg/dL Final   10/18/2019 26 (H) 7 - 20 mg/dL Final     Creatinine   Date Value Ref Range Status   10/24/2019 1.90 (H) 0.57 - 1.00 mg/dL Final   10/18/2019 2.2 (H) 0.7 - 1.5 mg/dL Final   02/18/2019 2.1 (H) 0.7 - 1.5 mg/dL Final     Sodium   Date Value Ref Range Status   10/24/2019 138 136 - 145 mmol/L Final   10/18/2019 144 137 - 145 mmol/L Final     Potassium   Date Value Ref Range Status   10/24/2019 3.7 3.5 - 5.2 mmol/L Final   10/18/2019 3.9 3.5 - 5.1 mmol/L Final     Chloride   Date Value Ref Range Status   10/24/2019 109 (H) 98 - 107 mmol/L Final   10/18/2019 122 (H) 98 - 107 mmol/L Final     CO2   Date Value Ref Range Status   10/24/2019 22.0 22.0 - 29.0 mmol/L Final   02/18/2019 22 22 - 30 mmol/L Final     Total CO2   Date Value Ref Range Status   10/18/2019 20 (L) 22 - 30 mmol/L Final     Calcium   Date Value Ref Range Status   10/24/2019 8.5 (L) 8.6 - 10.5 mg/dL Final   10/18/2019 8.5 8.4 - 10.2 mg/dL Final     Total Protein   Date Value Ref Range  Status   10/24/2019 11.0 (H) 6.0 - 8.5 g/dL Final     Albumin   Date Value Ref Range Status   10/24/2019 2.9 2.9 - 4.4 g/dL Final   10/24/2019 2.70 (L) 3.50 - 5.20 g/dL Final     ALT (SGPT)   Date Value Ref Range Status   10/24/2019 9 1 - 33 U/L Final     AST (SGOT)   Date Value Ref Range Status   10/24/2019 15 1 - 32 U/L Final     Alkaline Phosphatase   Date Value Ref Range Status   10/24/2019 44 39 - 117 U/L Final     Total Bilirubin   Date Value Ref Range Status   10/24/2019 0.3 0.1 - 1.2 mg/dL Final     eGFR Non  Amer   Date Value Ref Range Status   08/30/2016  >60 mL/min/1.73 Final     Comment:     <15 Indicative of kidney failure.     A/G Ratio   Date Value Ref Range Status   10/24/2019 0.5 (L) 0.7 - 1.7 Final     BUN/Creatinine Ratio   Date Value Ref Range Status   10/24/2019 13.7 7.0 - 25.0 Final   10/18/2019 11.8 RATIO Final     Anion Gap   Date Value Ref Range Status   10/24/2019 7.0 5.0 - 15.0 mmol/L Final       Lab Results   Component Value Date    IRON 61 10/24/2019    TIBC 155 (L) 10/24/2019    FERRITIN 455.40 (H) 10/24/2019     Lab Results   Component Value Date    FOLATE 7.34 10/24/2019     Lab Results   Component Value Date    HSVSIRIS10 1,808 (H) 10/24/2019       Assessment/Plan   1. Multiple myeloma, IgG lambda, stage III. Failed multiple lines of therapy. Her treatment was switched to Darzalex on 5/26/2016. She was on this treatment for more than 2 years.     · In November 2018, she clearly had disease progression.  Darzalex was discontinued and was started on bendamustine plus Velcade plus dexamethasone.   · Cycle #1 day #1 Bendamustine was started on 11/29/2018.  Due to poor tolerance with profound fatigue, chemotherapy was modified with bendamustine only given on day 1, and a Velcade weekly, every 4 weeks as 1 cycle.  Patient had a cycle #6 dose reduction of bendamustine by 25% because of severe thrombocytopenia.   · This patient actually had a micaela of response on 3/28/2019 with M  spike 3.2 g/dL and serum IgG 3600.  After that M spike and serum IgG both were elevating.   · Laboratory study obtained on 5/9/2019 after 6 cycles chemotherapy, it showed disease further progressed with worsening level of serum IgG 4873 mg/dL and M spike 4.3 g/dL.    · On 5/23/2019, patient was switched to Empliciti plus Velcade and decadron.    · Her serum protein studies reported persistent active disease, not well controlled with large quantity of monoclonal spike.  She also has severe thrombocytopenia oftentimes leading to hold Velcade injection.  · I recommended to obtain bone marrow aspiration biopsy for reanalysis.  This patient also has a history of breast cancer so we need to be open-minded.  She had bone marrow aspirate biopsy on 8/12/2019 which showed more than 85% of bone marrow cells are malignant plasma cells.  There is no evidence of metastatic disease or other type of malignancy.  · I discussed with the patient on 8/30/2019, recommended switching treatment to Ixazomib plus dexamethasone.  Considering her pre-existing severe thrombocytopenia, I recommended starting low-dose Ixazomib 2.3 mg weekly instead of full dose 4 mg weekly.    · She started new treatment on 9/5/2019 with dexamethasone 20 mg weekly.  oral ixazomib/Dex 3 weeks on and 1 week off. She is to start the ixazomib at decreased dose 2.3 mg weekly starting on 9/5/2019.   · Patient has significant disease progression on 10/24/2019 with serum increased M spike, serum IgG and free lambda chain.  · I discussed that with the patient on 10/31/2019, for all the options available, as the patient has been treated with many different medications, I think Darzalex/Velcade/dexamethasone would be easy on her bone marrow.  I will change her treatment to weekly Velcade instead of twice a week, and also will decrease her dexamethasone to 20 mg weekly.  Darzalex will be weekly for the first 3 cycles.     2.  Severe thrombocytopenia.  This is more likely  secondary to her multiple myeloma, based on her recent bone marrow aspiration biopsy on 8/12/2019. patient has no easy bleeding or bruising.     · Patient was last transfused with platelets on May 13, 2019 prior to her EGD.  · The patient underwent a bone marrow biopsy on 08/12/2019 and the pathology report showed hypercellular bone marrow with involvement by plasma cell myeloma and no metastatic carcinoma, granulomas, vasculitis, viral inclusions, increase in myeloblasts, or malignant lymphoma. Over 80% of the bone marrow are plasma cells. Normal karyotype.   · Laboratory study on 8/30/2019 is showed platelets of 34,000. We switched her treatment to oral ixazomib 3 weeks on and 1 week off starting 9/05/19.    · She has worsening thrombocytopenia platelets 24,000 today however no spontaneous bleeding.  This is probably secondary to chemotherapy in the background of severe bone marrow involvement from multiple myeloma.  Will reassess her multiple myeloma in 1 week.  Will monitor weekly.       3.  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  Patient to has been on Procrit injection weekly.  However her hemoglobin has been deteriorating clearly due to chemotherapy.  We'll continue Procrit.      · Her laboratory study on 5/9/2019 showed no evidence of iron deficiency.  On 5/16/2019, B12 level of 1819 and normal folate of 5.84.  She was symptomatic anemic with Hb 8.5, she was transfused with 2 units of packed red blood cells.    · Recent labs on 8/15/2019 reported no evidence of iron deficiency, had normal iron saturation and excellent ferritin level.  · Laboratory study on 10/24/2019 reported no evidence of iron deficiency, nor B12 folic acid deficiency.  She will continue oral B12 supplementation.    · Her hemoglobin is 10.2 on 10/31/2019, no need for Procrit today.  ·      4. Zometa / Xgeva treatment.  Because of her kidney insufficiency, we were only able to give her Zometa every 3  months.  Due to her elevated creatinine level, we eventually switched her to Xgeva treatment and continued monthly.  · She had elevated phosphorus 5.6 on 6/6/2019.   · Her last Xgeva was 10/10/2019.  She had normal phosphorus and magnesium level that day.        5. History of stage II left breast cancer, post mastectomy in 2013, negative for ER/ME and positive HER-2. No neoadjuvant chemotherapy because of concurrent discovery of multiple myeloma and ongoing chemotherapy. She had a normal mammogram study on 7/26/2019.       6.  Right middle lobe pulmonary nodule, documented on CT scan of chest on 01/06/2017. Repeated CT scan of chest on 8/21/2017 reported a small 5 mm and stable primary nodule.      7.  Skin rashes on her extremities:  Patient was seen dermatologist Dr. Barroso, who prescribed steroids cream and also over-the-counter moisturizing cream.  Patient will follow-up with Dr. Barroso again.  Currently resolved.    8. Profound fatigue: This is multifactorial secondary to her disease progression and her worsening anemia associated with chemotherapy.   · A thyroid level was obtained and patient was diagnosed with subclinical hypothyroidism.  She was initiated on Synthroid and folic acid and has noted significant reduction in fatigue.     9.  Hypocalcemia.  The patient has struggled with compliance with her calcium supplements.  She reports she has been taking these inconsistently, though they resulted in soft frequent bowel movements.  We added 2 tablets of Tums daily along with current calcium/vitamin D supplementation.      10.  Gastric ulcer diagnosed in May 2019 epigastric discomfort.    · Patient had EGD examination by Dr. Rivera on 5/13/2019.  It reported prepyloric ulceration.  Dr. Rivera recommended Protonix twice a day. With increased dose of Protonix, she has had no further issues.    · On 10/17/2019 patient also reports she has intermittent chest pain/epigastric area/stomach pain for the past  several weeks, to the point that she thought she was not able to make it.  Patient reports she had stress test this week and was told to having nothing wrong.  Patient also reports this is not related to her eating.  No apparent effect of exacerbation or relieving.  She reports no nausea or vomiting.  She denies melena or hematochezia.  I think her symptomology is probably still relevant to her gastric ulcer. We increased her Protonix to twice a day.  Patient is agreeable.      11.  Worsening leukocytosis/neutrophilia.  Patient is not having fever sweating or chills.  No dysuria.  However this patient has history of recurrent urinary tract infection with an existing right ureteral stent.   · I searched medical records, she had a replacement of stent 3 months ago.  I recommended urinalysis and culture on 9/26/2019 for further evaluation despite that she has no symptoms.  · I called and spoke to patient about her urinalysis which showed a large quantity of bacteria and WBC.  I e-scribed cefdinir to her pharmacy, 300 mg twice a day for 7 days.   · On 9/26/2019 her urine culture results with multidrug sensitive E. coli.  She will continue cefdinir as we prescribed.  Should be sensitive according to the culture results.  · 10/17/2019, patient reports no fever sweating chills.  She continues to have elevated leukocytosis.  Review of her peripheral blood smear today, did not show apparent plasma cells.  The majority of WBCs are granulocytes.  Continue to monitor..        Plan:  · Discontinue chemotherapy with oral ixazomib and dexamethasone.   · Will obtain insurance approval for new chemotherapy, intravenous Darzalex plus subcutaneous Velcade plus oral dexamethasone, or weekly for the first 3 cycles.    · Continue Procrit as needed weekly.  No Procrit injection today.  This will be repeated weekly with CBC monitoring.     · Continue Protonix to 40 mg twice a day.     · Continue monthly Xgeva. Last Xgeva was 10/10/2019.     · Continue on Synthroid daily.     · Continue Norco for pain management.    · Continue calcium and vitamin D supplementation.    · Continue Tums daily.    · Continue oral B12 and folic acid daily.    · Continue prophylactic acyclovir 400 mg twice a day.   · Start prophylactic Bactrim for PCP infection per protocol.  · Patient will return in 1 week, with NP visit and labs per protocol, prior to first dose of Darzalex.    · Follow-up with me in 2 weeks for reevaluation, with labs and chemotherapy.     · Patient understands to call with any issues including bleeding, fever greater than 100.5, with any other concerns prior to her next office visit.       Patient is on drug therapy and requires frequent monitoring.      Zane Araujo MD PhD  10/31/2019    [facility] -  Cristo Madera MD, M.D.

## 2019-11-06 PROBLEM — R91.8 PULMONARY NODULES: Status: ACTIVE | Noted: 2019-01-01

## 2019-11-06 NOTE — PROGRESS NOTES
"   Subjective       Chief Complaint   Patient presents with   • Hypertension     labs review          History of Present Illness   Marina Barroso is a 69 y.o. female who presents to the office today to follow-up from recent cardiac stress PET examination.  An over read of that study determined that she had several noncalcified lung nodules.  She was referred here to get further evaluation.  Radiologist suggested diagnostic CT of chest.  Patient is a non-smoker.  She has never had history of lung nodules that she is aware of.  She sees a cardiologist for intermittent chest pains that are substernal in nature and associated with activity.  She does not have shortness of breath.  She denies fevers, unexplained and continued weight loss.,  And sweats.    I have reviewed and updated her medications, medical history and problem list during today's office visit.     Social History     Tobacco Use   • Smoking status: Never Smoker   • Smokeless tobacco: Never Used   Substance Use Topics   • Alcohol use: Yes     Comment: YEARLY        Review of Systems   Constitutional: Negative for diaphoresis, fever and unexpected weight loss.   Respiratory: Negative.    Cardiovascular:        See HPI         Physical Examination:   Objective   /68   Pulse 74   Resp 16   Ht 157.5 cm (62.01\")   Wt 80.7 kg (178 lb)   LMP  (LMP Unknown)   SpO2 99%   BMI 32.55 kg/m²     Body mass index is 32.55 kg/m².    Physical Exam   Constitutional: She is oriented to person, place, and time. No distress.   Cardiovascular: Normal rate and normal heart sounds.   Pulmonary/Chest: Effort normal and breath sounds normal.   Neurological: She is alert and oriented to person, place, and time.   Skin: She is not diaphoretic.   Psychiatric: She has a normal mood and affect. Her speech is normal. She is attentive.   Vitals reviewed.       Data Reviewed:              Lab Results   Component Value Date    GLU 95 10/18/2019    BUN 26 (H) 10/24/2019    BUN 26 " (H) 10/18/2019    CREATININE 1.90 (H) 10/24/2019    CREATININE 2.2 (H) 10/18/2019    EGFRIFAFRI 32 (L) 10/24/2019     10/24/2019     10/18/2019    K 3.7 10/24/2019    K 3.9 10/18/2019     (H) 10/24/2019     (H) 10/18/2019    CALCIUM 8.5 (L) 10/24/2019    CALCIUM 8.5 10/18/2019    ALBUMIN 2.9 10/24/2019    ALBUMIN 2.70 (L) 10/24/2019    BILITOT 0.3 10/24/2019    ALKPHOS 44 10/24/2019    AST 15 10/24/2019    ALT 9 10/24/2019    WBC 15.98 (H) 10/31/2019    RBC 3.24 (L) 10/31/2019    HCT 33.9 (L) 10/31/2019    .6 (H) 10/31/2019    MCH 31.5 10/31/2019    TSH 2.910 08/15/2019    FREET4 1.09 08/15/2019          Assessment/Plan:   Assessment/Plan   Diagnoses and all orders for this visit:    1. Pulmonary nodules (Primary)  Assessment & Plan:  New problem.  Incidental finding on recent PET testing.  Referral to pulmonologist indicated.  Further work-up complicated by history of kidney cancer and abnormal creatinine.  Note sent to specialist.    Orders:  -     Ambulatory Referral to Pulmonology    2. Chronic kidney disease, stage III (moderate) (CMS/HCC)  -     Ambulatory Referral to Pulmonology    Patient Instructions         Follow up:   Return if symptoms worsen or fail to improve.

## 2019-11-06 NOTE — ASSESSMENT & PLAN NOTE
New problem.  Incidental finding on recent PET testing.  Referral to pulmonologist indicated.  Further work-up complicated by history of kidney cancer and abnormal creatinine.  Note sent to specialist.

## 2019-11-07 NOTE — PROGRESS NOTES
REASONS FOR FOLLOW UP:   1. IgG kappa multiple myeloma, was on Darzalex, started on May 26, 2016. Patient was switched to Darzalex from Pomalyst, as she continued to be neutropenic with recurrent urinary tract infection while on Pomalyst.    2. Zometa is on hold due to renal insufficiency.    3. After 16 doses of Darzalex, laboratory study showed stable disease in November 2016. Insurance company denied adding Velcade and dexamethasone. Patient is to be continued on monthly Darzalex from 12/06/16.   4. Obstructive right pyelonephritis with positive urine culture for E. coli, required hospitalization from 1/19/2017 through 01/24/2017 with iv antibiotics and stent exchange on 01/20/2017.   5.  Paraprotein studies on March 27, 2017 showed further slight improvement of multiple myeloma.   6.  Febrile illness, with urinary tract infection and influenza B infection in early May 2017, required hospitalization for 6 days.   7.  Paraprotein studies for both serum and 24-hour urine sample on 7/21/2017 showed further improvement of paraproteins.   Darzalex was continued.   8.  Serum protein study reported stable disease on 10/20/2017.  Darzalex will be continued.   9.  Laboratory studies of both serum paraprotein and a 24-hour urine protein in January 2018 showed further improvement of paraproteins.  Monthly Darzalex will be continued.   10. Repair of left flank incisional hernia in middle September 2018.   11. Serum paraprotein study reached micaela on 7/5/2018.  Since that time she has evidence of disease progression.   12. Disease progression, she was started on chemotherapy with bendamustine plus Velcade plus dexamethasone per protocol on 11/29/2018. Treatment will be bendamustine on day 1 and day 4 repeat every 28 days, Velcade and dexamethasone will be on day 1 day 4 and day 8 and day 15 repeat every 28 days.   13.  From cycle #2, patient was given only 1 dose of Treanda per cycle and continue Velcade and dexamethasone  weekly.   14.  Treanda was decreased by 20% after cycle #4 because of severe thrombocytopenia, and further decreased by another 25% on cycle #6 which is her last cycle on 4/25/2019.    15.  On 5/23/2019, patient was switched to Empliciti plus Velcade and decadron.    16.  Patient has persistent severe thrombocytopenia, no improvement after starting new regimen.    17. The patient underwent a bone marrow biopsy on 08/12/2019 and the pathology report showed hypercellular bone marrow with involvement by >85%, plasma cell myeloma and no metastatic carcinoma, granulomas, vasculitis, viral inclusions, increase in myeloblasts, or malignant lymphoma.   18. On 08/30/2019, discussed with patient to switch from Empliciti/Velcade/Dex to oral ixazomib/Dex 3 weeks on and 1 week off. She is to start the medication at decreased dose 2.3 mg weekly starting on 9/5/2019.   19.  Laboratory studies on 10/24/2019 revealed disease progression. Therefore therapy was transitioned to Darzalex and subq Velcade with oral Decadron,    HISTORY OF PRESENT ILLNESS:   The patient is a 69 y.o. female with the above-mentioned history here today to initiate Darzalex, Velcade and Decadron. Labs obtained 10/24/2019 revealed disease progression. She has been treated with these therapies before and did not require formal education. She reports feeling well today in the office. She denies new symptoms or concerns.    Her CBC is stable and her vital signs are stable.     She is due for xgeva.today.        Past Medical History, Past Surgical History, Social History, Family History have been reviewed and are without significant changes except as mentioned.      Hematology/oncology History: See separate document for extra information.   1.    History of left breast cancer, status post left mastectomy on 04/25/2013, stage II, T2N0M0, hormone negative, HER2/mk positive by immunohistochemistry, however, no adjuvant chemotherapy delivered because of multiple  myeloma diagnosed concomitantly.        2. Multiple myeloma, IgG lambda, stage III, diagnosed April 2012, previously treated with Velcade/Decadron followed by Velcade/Decadron/Revlimid; patient developed abdominal pain and rectal bleeding on Revlimid, which was then discontinued.    3. Patient evaluated at [facility], but was not felt to be a candidate for stem cell transplant.      4.   Disease progression with therapy switched to melphalan/Velcade/prednisone per the VISTA trial on 10/22/2013; melphalan dose has been persistently decreased because of thrombocytopenia.      5. Anemia secondary to chronic renal insufficiency and myeloma, on periodic Procrit therapy p.r.n.    6. Patient had 8 cycles of VMP chemotherapy, repeat laboratory studies on 10/03/2013 showed stable disease.  Her melphalan do  se was significantly decreased due to marrow suppression.      7.   Patient had disease progression after cycle #10 VMP treatment, evidenced by laboratory studies on 01/06/2014.  We increased the melphalan dose for one cycle.  Continued disease progress after cycl  e #11 VMP treatment, despite increased dose of melphalan, as documented by laboratory study on 02/17/2014.     8. The patient was evaluated on 02/24/2014 and we recommend switching chemotherapy to Kyprolis on 02/27/2014.     9. Echocardiogram on 03/05/2014 reporting L  VEF 43%.  This is on par with her previous twice echocardiogram study, in June 2012 and November 2012, reported LVEF at 45% to 50% and 40% to 45% respectively.     10. The patient had 2 episodes of chest pain after Kyprolis during cycle 1, leading to omitted dose   on days 9 and 16.  From cycle 2, we will give Kyprolis only once per week for 3 weeks out of every 4 weeks, as well as dose reduction of Kyprolis that was started on 04/04/2014.      11. After cycle 2 of dose-reduced Kyprolis, the patient's M spike increased fro  m 2.6 to 3.2 g/dL, with her IgG level  increasing from 2.7 g/dL to 4.7 g/dL.     12.   Patient had a stress test and echocardiogram study at [facility] on 01/23/2015.  The patient had LVEF 35% to 40%.  Myocardial perfusion study reported a large, mild to modera  te severity fixed inferior wall defect, consistent with known transmural infarct versus diaphragmatic attenuation artifact.  Post stress resting ejection fraction estimated at 31%.      13. Repeated echocardiogram study on 04/03/2015 reported an LVEF 46%.  Left v  entricle is moderately dilated.  There is mild global hypokinesis of the left ventricle.  There was a grade 1 diastolic dysfunction.     14.   The patient was seen on 04/09/2015 with plan for cycle 14, day 1, of Kyprolis.  Treatment will be delayed 1 week secondary to patient'  s extreme fatigue, hemoglobin of 8.1.  We will proceed with 2 units of packed red blood cells.  She was also found to have a urinary tract infection. Patient prescribed Cipro 500 mg twice a day x7.     15. Laboratory tests on 08/24/2015 reported sligh  t disease progress after cycle #18 Kyprolis. We discussed with patient and we will switch chemotherapy to CyBorD regimen with dose reduction of Velcade to 1 mg/m2, cyclophosphamide at 240 mg/m2 (total dose 450 mg), and dexamethasone 20 mg. All therapy to   be repeated on a weekly basis.     16.   The patient had significant fatigue after one dose of cyclophosphamide that lasted for 5-6 days. She also had severe anemia, hemoglobin 7.6, required 2 units PRBC transfusion. Cyclophosphamide will be decreased to 350 mg from 2nd week.     17.  Patient continues to have significant fatigue after the reduced dose of cyclophosphamide at 350 mg; for 2 days after chemo, she could only lie on the bed with performance status ECOG 3. From 10/13/2015, oral cyclophosphamide will be further dec  reased to 200 mg once weekly. We will continue Velcade and dexamethasone at same dose.   Evidence of disease progress, when she was on  panobinostat treatment.   18. The patient also has worsening ane  ming and thrombocytopenia secondary to chemotherapy. The patient has symptomatic anemia with hemoglobin 7.8 on 02/23/2016 requiring 2 units of packed RBC transfusion. Chemotherapy with panobinostat will be discontinued.       19. Patient was switched to pomalidomide 3 mg daily for 21 days / 28 days in late March 2016.    20. Disease progression, she was switched to to the Darzalex in May 2016.       On12/6/2016, serum free lambda chain 1090 MG/L, free kappa chain 8.5 to MG/L.  Ferritin 1015, iron 99, TIBC 137 iron saturation 72%.     On January 4, 2017, vitamin B12 level 1289, folate 7.7 ng/mL. SPEP reporting gamma globulin 3.3, out of total globulin 5.0, with immunofixation reporting small quantity of monoclonal free lambda chain, plus monoclonal IgG lambda at 3.2 g/dL.  Serum IgG 4075, IgA 9 and IgM 15.  free lambda chain 1339 MG/L and free kappa chain 10.3 MG/L.      Laboratory study 3/27/2017 showed slight improvement of paraprotein, SPEP reporting M spike 2.8 g/DL, out of total globulin 4.3, and the serum IgG 3420, IgA 9, IgM 11, free lambda chain 1218 MG/L and free kappa chain 8.0 MG/L.  Total 24 hour urine sample reported at free lambda chain 142 mg, at a concentration 74.8 MG/L, free kappa chain 75 mg at a concentration 39.5 MG/L., Urine protein immunofixation reporting biclonal IgG lambda and monoclonal free lambda light chain, M spike #1 at 41.5% and monoclonal IgG lambda spike #2 at 10.5%. Serum beta-2 microglobulin is 7.3 MG/L.     Her laboratory study on 7/21/2017 reported further improvement of paraprotein is both serum and a 24-hour urine sample.  SPEP reporting monoclonal IgG lambda 2.9 g/dL and free lambda chain 0.1 g/dL.  Serum IgG was 3261 mg/dL and IgA 5, IgM 13, free kappa chain 6.7, free lambda chain 701.3 with kappa/lambda ratio 0.01.  24-hour urine sample reported positive for Bence May protein lambda type, immunofixation  positive for IgG lambda.  Total urine protein 241 mg, gamma globulin 13.1%.  Hemoglobin was 11.4 .4, platelets 122,000, WBC 6680 including neutrophils 3600, lymphocytes 2100 and monocytes 650.  Her creatinine was 1.68, at baseline level.  Liver function panel unremarkable.  Darzalex was continued.    Laboratory study on 8/25/2017 showed improved the platelets at 144,000, normal WBC 6660 and slightly improved hemoglobin at 11.7.     Patient had a colonoscopy examination on 8/30/2017 by Dr. Rivera.  It reported diverticulosis in multiple areas more severe in the sigmoid colon.  There was 2 polyps 4 mm pneumonia from transverse colon and the sigmoid colon.  Pathology evaluation reported tubular adenoma from the sigmoid colon polyp, and benign hyperplastic polyp from transverse colon.    Laboratory study on 10/20/2017 reported slightly improved monoclonal spike 2.7 g/dL by SPEP, immunofixation reported positive monoclonal IgG lambda, serum IgG 3536 mg/dL, IgA less than 5 and IgM 11, free kappa chain 5.3 mg/L, and free lambda chain 720 mg/L with kappa/lambda ratio 0.01.  Serum beta-2 microglobulin was 6.5 mg/L.   Her CBC showed a stable mild anemia with hemoglobin 11.3, macrocytosis .3, and the platelets 114,000, normal WBC 7070 including neutrophils 4100 lymphocytes 2000 monocytes 650, normal liver function panel, total protein 7.9 and albumin 3.2,  and normal electrolytes including calcium 8.1, and improved creatinine 1.59.     Laboratory study on 1/12/2018 reported serum IgG 3083 mg/dL, IgA 10, IgM 10, free kappa chain 8.5 and free lambda chain 589.1 mg/L, kappa/lambda ratio 0.01, serum protein admitted fixation reported IgG lambda monoclonal protein and SPEP reporting M spike 3.1 g/dL, beta-2 microgram and 7.4 mg/L. serum creatinine 1.79, calcium 8.2, other electrolytes normal, hemoglobin 11.3, .5 and MCHC 31.6, platelets 129,000, WBC 6780 including neutrophils 3500 lymphocytes 2300.  Serum  ferritin 653.7 ng/mL, iron 123 TIBC 174 iron saturation 71%, folic acid 12.8 ng/mL and a vitamin B12 at 873 pg/mL.  Her 24-hour urine study on 1/18/2018 reported lambda chain 32.8 mg/L, total 61 mg in 24 hours, and the free kappa chain 27.4 mg/L and 51 mg in 24 hours, and the total 24-hour urine protein 283 mg, and urine protein immunofixation positive for 5.3% monoclonal IgG lambda and positive for Bence May protein at 36.2% monoclonal lambda chain.  Monthly Darzalex was continued.       This patient had serum protein study on 04/06/2018 which reported free light chain at concentration 703.8 mg/L and free kappa chain 7.0, free kappa/lambda ratio 0.01, serum IgG 3650 mg/dL, IgM 11 and IgA 9 with serum protein electrophoresis reporting monoclonal IgG lambda 3.2 g/dL and also monoclonal free lambda light chain.  But it was unable to quantify monoclonal lambda light chain because of the small quantity of it.     A 24-hour urine study on 04/20/2018 reported total free lambda chain 60 mg in 24 hours at concentration 33.1 mg/L, free kappa chain 45 mg in 24 hours at concentration 24.8 mg/L. Total 24-hour urine protein was 279 mg. Urine protein immunofixation and UPEP reported lambda-type Bence-May protein + #1 monoclonal IgG lambda band at 34.8% and #2 monoclonal IgG lambda band at 6.9%.     Her CBC results today reported a stable hemoglobin at 11.1, platelets 130,000 and WBC 7440 including neutrophils 4100 and lymphocytes 2350, monocytes 650. Her CMP reported slightly worsened creatinine at 1.82 with BUN 33. Total protein at 8.8. Liver function panel was normal. Total serum protein 8.8 and albumin 3.2, globulin 5.6.    Reviewing her previous lab studies especially serum paraprotein, she reached a micaela of response on 07/05/2018 when she had serum IgG 3015 mg/dL, free lambda chain 458.7 mg/L and M spike IgG lambda 2.6 g/dL and and monoclonal lambda free light chain 0.1 g/dL, total 2.7 g/dL. serum beta-2 myoglobin 7.1  mg/L.  Her ferritin was 539 ng/mL, free iron 73 TIBC 176 iron saturation 42%.    Laboratory study obtained on 11/01/2018 showed further disease progression. Her serum IgG was 5472 mg/dL, IgA 8 and IgM 10, serum kappa chain 7.9 mg/L, free lambda chain 961.3 mg/L and kappa/lambda ratio 0.01. Serum protein electrophoresis reported monoclonal IgG lambda 4.1 g/dL, and monoclonal lambda free light chain 0.1 g/dL. Her hemoglobin was 9.0, .6, MCHC 30.1, platelets 124,000 and WBC 10,000 including neutrophils 7400, lymphocytes 1200, monocytes 600.     Cycle #1 day #1 Bendamustine will be started on 11/29/2018.     Laboratory studies on 01/17/2019 reported improved paraproteins.  SPEP reported M spike 3.8 g/dL out of total 5.5 g/dL globulin.  Serum IgG was 4374 mg/dL, IgA 10 and IgM 11.  Free lambda chain 606.8 mg/L, free kappa chain 8.6 and kappa/lambda ratio 0.01.  Her serum protein immunofixation continues to be IgG lambda monoclonal.  Her CBC showed hemoglobin 9.3, .3, platelets 50,000 and WBC 8600 including ANC 5100, lymphocytes 2160.  Her Chemistry lab reported creatinine 1.92, calcium 8.3, normal liver function panel, glucose 98 with normal sodium and potassium.     For her 24-hour urine study on 3/14/2019, she had free lambda chain at a 62.1 mg/L, total 87 mg in 24 hours, free kappa chain 42 mg/L and 59 mg in 24 hours.  Urine protein immunofixation reported a monoclonal IgG lambda #1 and free lambda chain were unable to be quantified, however IgG lambda #2 was 12.8%.  Her 24-hour urine total protein was 452 mg.     For serum protein study on 3/28/2019 (on day of her cycle #5 day #1 Treanda ) also reported further decreased monoclonal spike 3.2 mg/dL, and serum IgG 3600, IgA 13 IgM 12, free kappa chain 11.7, and worsening free lambda chain 1045 mg/L.     Her serum paraprotein studies on 4/11/2019 reported a serum IgG 4407 mg/dL, IgA 14, IgM 13, free kappa chain 9.2, free lambda chain 998.4 mg/L,  kappa/lambda ratio 0.01.  His serum protein immunofixation was positive for IgG lambda, SPEP reporting M spike 4.3 g/dL.    I have reviewed the patient's medical history in detail and updated the computerized patient record.    Her recent 24 urine study on 7/18/2019 reported positive IgG lambda monoclonal protein and lambda type Bence-May protein, total free kappa chain 142 mg in 24 hours, at 74.6 mg/L, and total lambda chain 179 mg in 24 hours at 94.1 mg/L.  Kappa/lambda ratio 0.79.  Her total 24-hour urine protein was 410 mg, with monoclonal lambda free light chain 41.8% and monoclonal IgG lambda 2.6%.     Serum paraprotein studies on 7/24/2019 reported monoclonal spike 3.9 g/dL, serum IgG 4107, IgA 14 IgM 19, free kappa chain 12.8 and free lambda chain 656, kappa/lambda ratio 0.02.    On 08/01/2019 she has severe thrombocytopenia platelets 27,000.  She has elevated WBC to 12,600 including ANC 8900 and normal lymphocytes 2000.  Her hemoglobin is 10.4 08/01/2019. The patient underwent a bone marrow biopsy on 08/12/2019 and the pathology report showed hypercellular bone marrow with involvement by plasma cell myeloma and no metastatic carcinoma, granulomas, vasculitis, viral inclusions, increase in myeloblasts, or malignant lymphoma. Over 85% of the bone marrow are plasma cells. Normal karyotype. Flow cytometry study was positive.    Because of her persistent severe thrombocytopenia, the patient underwent a bone marrow biopsy on 08/12/2019.  Pathology evaluation showed hypercellular bone marrow with involvement by plasma cell myeloma 85-90% and no metastatic carcinoma, granulomas, vasculitis, viral inclusions, increase in myeloblasts, or malignant lymphoma.  Normal karyotype.     Recent laboratory study on 8/15/2019 reported of elevated ferritin 524, normal iron saturation 42% with free iron 59 and a TIBC 140.  Normal thyroid function profile.  Her serum globulin 7.3, total protein 10.1 and albumin 2.8, calcium 8.1  creatinine 1.99.    She does continue to have severe intermittent pain in her right hip which is managed somewhat with pain medication, including tylenol and prescription Norco. She states she has difficulty ambulating and functioning normally when this pain occurs. She continues on oral Vitamin B-12 and folic acid supplementation.     Recent XR right hip with or without pelvis on 08/15/2019 showed sclerosis at the pubic symphysis. No other bony or articular abnormality was identified. The hip joints were symmetric and appeared within normal limits. The joint spaces were fairly well-maintained. There was no bony erosion. There was no evidence of recent or old fracture or subluxation. A right ureteral stent was noted.     On 08/30/2019, switched treatment from Empliciti to oral ixazomib 3 weeks on and 1 week off. She is expected to start the medication on 9/5/2019 when this medication arrives.  Patient will continue dexamethasone weekly at the same time as part of the treatment.  Because of her severe thrombocytopenia, we recommended starting low-dose ixazomib 2.3 mg weekly.  Treatment was started on 9/5/2019.     Laboratory study on 9/26/2019 showed worsening leukocytosis with WBC 20,780 including ANC 16,000, lymphocytes 2700 and monocytes 8070.  She also has worsening severe thrombocytopenia with platelets 27,000, and stable hemoglobin 9.5.     Suspect the patient may have some kind of infection however she denies fever sweating chills no dysuria or hematuria.  She denies pain in the right flank area where she has ureteral stent and oftentimes complains of pain when she has urinary tract infection.      I searched medical records, she had a replacement of stent 3 months ago.  I recommended urinalysis and culture on 9/26/2019 for further evaluation despite that she has no symptoms.I called and spoke to patient about her urinalysis which showed a large quantity of bacteria and WBC.  I e-scribed cefdinir to her  pharmacy, 300 mg twice a day for 7 days. I called the patient today, reporting her urine culture results with multidrug sensitive E. coli.  She will continue cefdinir as we prescribed.  Should be sensitive according to the culture results.I think this patient will need to follow-up with her urologist Dr. Everett for stent replacement.  I sent a message to Dr. Everett.    On 10/17/2019, patient also reports she has intermittent chest pain/epigastric area/stomach pain for the past several weeks, to the point that she thought she was not able to make it.  Patient reports she had stress test this week and was told to having nothing wrong.  Patient also reports this is not related to her eating.  No apparent effect of exacerbation or relieving.        Review of Systems   Constitutional: Positive for fatigue. Negative for chills, diaphoresis and fever.   HENT:   Negative for mouth sores, nosebleeds and sore throat.    Eyes: Negative for icterus.   Respiratory: Negative for cough, hemoptysis and shortness of breath.    Cardiovascular: Negative for chest pain, leg swelling and palpitations.   Gastrointestinal: Negative for abdominal pain, blood in stool and nausea.   Genitourinary: Negative for dysuria, frequency and hematuria.    Musculoskeletal: Positive for arthralgias (Right hip pain stable). Negative for back pain, gait problem and myalgias.   Skin: Negative for itching and rash.   Neurological: Positive for extremity weakness (stable). Negative for dizziness, gait problem, headaches, light-headedness and numbness.   Hematological: Negative for adenopathy. Does not bruise/bleed easily.   Psychiatric/Behavioral: Negative for depression. The patient is not nervous/anxious.    All other systems reviewed and are negative.    Medications: The current medication list was reviewed in the EMR.         Allergies   Allergen Reactions   • Zosyn [Piperacillin Sod-Tazobactam So] Swelling     Face and lips       VITALS:   There were  no vitals filed for this visit.PS: ECOG 1       Physical Exam     GENERAL:  Well-developed, well-nourished female in no acute distress.  She is seen in the infusion area.  SKIN:  Warm, dry without rashes, purpura or petechiae.  HEAD:  Normocephalic.  EYES:  Pupils equal, round and reactive to light.  EOMs intact.  Conjunctivae normal.  EARS:  Hearing intact.  NOSE:  No nasal discharge.  MOUTH:  Tongue is well-papillated; no stomatitis or ulcers.  Lips normal.  THROAT:  Oropharynx without lesions or exudates.  NECK:  Supple with good range of motion; no thyromegaly or masses.  LYMPHATICS:  No cervical, supraclavicular, axillary or inguinal adenopathy.  CHEST: Regular respiratory effort  CARDIAC:  Regular rate  ABDOMEN:  Soft, nontender with no organomegaly or masses.  Bowel sounds normal.    EXTREMITIES:  No clubbing, cyanosis or edema.  NEUROLOGICAL:  Cranial Nerves II-XII grossly intact.  No focal neurological deficits.  PSYCHIATRIC:  Normal affect and mood.      Recent lab results:   Results from last 7 days   Lab Units 10/31/19  1033   WBC 10*3/mm3 15.98*   NEUTROS ABS 10*3/mm3 9.08*  11.03*   LYMPHS ABS 10*3/mm3 2.72   HEMOGLOBIN g/dL 10.2*   HEMATOCRIT % 33.9*   PLATELETS 10*3/mm3 21*      Glucose   Date Value Ref Range Status   10/24/2019 90 65 - 99 mg/dL Final     BUN   Date Value Ref Range Status   10/24/2019 26 (H) 8 - 23 mg/dL Final   10/18/2019 26 (H) 7 - 20 mg/dL Final     Creatinine   Date Value Ref Range Status   10/24/2019 1.90 (H) 0.57 - 1.00 mg/dL Final   10/18/2019 2.2 (H) 0.7 - 1.5 mg/dL Final   02/18/2019 2.1 (H) 0.7 - 1.5 mg/dL Final     Sodium   Date Value Ref Range Status   10/24/2019 138 136 - 145 mmol/L Final   10/18/2019 144 137 - 145 mmol/L Final     Potassium   Date Value Ref Range Status   10/24/2019 3.7 3.5 - 5.2 mmol/L Final   10/18/2019 3.9 3.5 - 5.1 mmol/L Final     Chloride   Date Value Ref Range Status   10/24/2019 109 (H) 98 - 107 mmol/L Final   10/18/2019 122 (H) 98 - 107 mmol/L  Final     CO2   Date Value Ref Range Status   10/24/2019 22.0 22.0 - 29.0 mmol/L Final   02/18/2019 22 22 - 30 mmol/L Final     Total CO2   Date Value Ref Range Status   10/18/2019 20 (L) 22 - 30 mmol/L Final     Calcium   Date Value Ref Range Status   10/24/2019 8.5 (L) 8.6 - 10.5 mg/dL Final   10/18/2019 8.5 8.4 - 10.2 mg/dL Final     Total Protein   Date Value Ref Range Status   10/24/2019 11.0 (H) 6.0 - 8.5 g/dL Final     Albumin   Date Value Ref Range Status   10/24/2019 2.9 2.9 - 4.4 g/dL Final   10/24/2019 2.70 (L) 3.50 - 5.20 g/dL Final     ALT (SGPT)   Date Value Ref Range Status   10/24/2019 9 1 - 33 U/L Final     AST (SGOT)   Date Value Ref Range Status   10/24/2019 15 1 - 32 U/L Final     Alkaline Phosphatase   Date Value Ref Range Status   10/24/2019 44 39 - 117 U/L Final     Total Bilirubin   Date Value Ref Range Status   10/24/2019 0.3 0.1 - 1.2 mg/dL Final     eGFR Non  Amer   Date Value Ref Range Status   08/30/2016  >60 mL/min/1.73 Final     Comment:     <15 Indicative of kidney failure.     A/G Ratio   Date Value Ref Range Status   10/24/2019 0.5 (L) 0.7 - 1.7 Final     BUN/Creatinine Ratio   Date Value Ref Range Status   10/24/2019 13.7 7.0 - 25.0 Final   10/18/2019 11.8 RATIO Final     Anion Gap   Date Value Ref Range Status   10/24/2019 7.0 5.0 - 15.0 mmol/L Final       Lab Results   Component Value Date    IRON 61 10/24/2019    TIBC 155 (L) 10/24/2019    FERRITIN 455.40 (H) 10/24/2019     Lab Results   Component Value Date    FOLATE 7.34 10/24/2019     Lab Results   Component Value Date    XTDNYWFG81 1,808 (H) 10/24/2019       Assessment/Plan   1. Multiple myeloma, IgG lambda, stage III. Failed multiple lines of therapy. Her treatment was switched to Darzalex on 5/26/2016. She was on this treatment for more than 2 years.     · In November 2018, she clearly had disease progression.  Darzalex was discontinued and was started on bendamustine plus Velcade plus dexamethasone.   · Cycle #1  day #1 Bendamustine was started on 11/29/2018.  Due to poor tolerance with profound fatigue, chemotherapy was modified with bendamustine only given on day 1, and a Velcade weekly, every 4 weeks as 1 cycle.  Patient had a cycle #6 dose reduction of bendamustine by 25% because of severe thrombocytopenia.   · This patient actually had a micaela of response on 3/28/2019 with M spike 3.2 g/dL and serum IgG 3600.  After that M spike and serum IgG both were elevating.   · Laboratory study obtained on 5/9/2019 after 6 cycles chemotherapy, it showed disease further progressed with worsening level of serum IgG 4873 mg/dL and M spike 4.3 g/dL.    · On 5/23/2019, patient was switched to Empliciti plus Velcade and decadron.    · Her serum protein studies reported persistent active disease, not well controlled with large quantity of monoclonal spike.  She also has severe thrombocytopenia oftentimes leading to hold Velcade injection.  · I recommended to obtain bone marrow aspiration biopsy for reanalysis.  This patient also has a history of breast cancer so we need to be open-minded.  She had bone marrow aspirate biopsy on 8/12/2019 which showed more than 85% of bone marrow cells are malignant plasma cells.  There is no evidence of metastatic disease or other type of malignancy.  · I discussed with the patient on 8/30/2019, recommended switching treatment to Ixazomib plus dexamethasone.  Considering her pre-existing severe thrombocytopenia, I recommended starting low-dose Ixazomib 2.3 mg weekly instead of full dose 4 mg weekly.    · She started new treatment on 9/5/2019 with dexamethasone 20 mg weekly.  oral ixazomib/Dex 3 weeks on and 1 week off. She is to start the ixazomib at decreased dose 2.3 mg weekly starting on 9/5/2019.   · Patient has significant disease progression on 10/24/2019 with serum increased M spike, serum IgG and free lambda chain.  · 10/31/2019 Dr Araujo discuessed options with the patient and suggested  Darzalex/Velcade/dexamethasone as it  would be easy on her bone marrow. She is here today to initiate treatment, 11/7/2019.    2.  Severe thrombocytopenia.  This is more likely secondary to her multiple myeloma, based on her recent bone marrow aspiration biopsy on 8/12/2019. patient has no easy bleeding or bruising.     · Patient was last transfused with platelets on May 13, 2019 prior to her EGD.  · The patient underwent a bone marrow biopsy on 08/12/2019 and the pathology report showed hypercellular bone marrow with involvement by plasma cell myeloma and no metastatic carcinoma, granulomas, vasculitis, viral inclusions, increase in myeloblasts, or malignant lymphoma. Over 80% of the bone marrow are plasma cells. Normal karyotype.   · Laboratory study on 8/30/2019 is showed platelets of 34,000. We switched her treatment to oral ixazomib 3 weeks on and 1 week off starting 9/05/19.    · Platelet count today is 65034. No active bleeding.    3.  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    · Her laboratory study on 5/9/2019 showed no evidence of iron deficiency.  On 5/16/2019, B12 level of 1819 and normal folate of 5.84.  She was symptomatic anemic with Hb 8.5, she was transfused with 2 units of packed red blood cells.    · Recent labs on 8/15/2019 reported no evidence of iron deficiency, had normal iron saturation and excellent ferritin level.  · Laboratory study on 10/24/2019 reported no evidence of iron deficiency, nor B12 folic acid deficiency.  She will continue oral B12 supplementation.    · Her hemoglobin is 10.2 on 10/31/2019, no need for Procrit today.  · 11/7/2019 hgb 9.7. She will require Procrit today.     4. Zometa / Xgeva treatment.  Because of her kidney insufficiency, we were only able to give her Zometa every 3 months.  Due to her elevated creatinine level, we eventually switched her to Xgeva treatment and continued monthly.  · She had elevated phosphorus 5.6 on  6/6/2019.   · Her last Xgeva was 10/10/2019.  She had normal phosphorus and magnesium level that day.        5. History of stage II left breast cancer, post mastectomy in 2013, negative for ER/ID and positive HER-2. No neoadjuvant chemotherapy because of concurrent discovery of multiple myeloma and ongoing chemotherapy. She had a normal mammogram study on 7/26/2019.       6.  Right middle lobe pulmonary nodule, documented on CT scan of chest on 01/06/2017. Repeated CT scan of chest on 8/21/2017 reported a small 5 mm and stable primary nodule.      7.  Skin rashes on her extremities:  Patient was seen dermatologist Dr. Barroso, who prescribed steroids cream and also over-the-counter moisturizing cream.  Patient will follow-up with Dr. Barroso again.  Currently resolved.    8. Profound fatigue: This is multifactorial secondary to her disease progression and her worsening anemia associated with chemotherapy.   · A thyroid level was obtained and patient was diagnosed with subclinical hypothyroidism.  She was initiated on Synthroid and folic acid and has noted significant reduction in fatigue.     9.  Hypocalcemia.  The patient has struggled with compliance with her calcium supplements.  She reports she has been taking these inconsistently, though they resulted in soft frequent bowel movements.  We added 2 tablets of Tums daily along with current calcium/vitamin D supplementation.    Calcium today is 8.6.    10.  Gastric ulcer diagnosed in May 2019 epigastric discomfort.    · Patient had EGD examination by Dr. Rivera on 5/13/2019.  It reported prepyloric ulceration.  Dr. Rivera recommended Protonix twice a day. With increased dose of Protonix, she has had no further issues.    · She did not complain of this today.  11.  Worsening leukocytosis/neutrophilia.  Total WBC is 16.19 and ANC today 9.3.      Plan:  · Proceed with treatment as scheduled today. We reviewed potential side effects and side effect management  today.  · Proceed with Procrit . Continue Procirt weekly.    · Continue Protonix to 40 mg twice a day.  · Continue monthly Xgeva. She is due for Xgeva today.  · Continue on Synthroid daily.     · Continue Norco for pain management.    · Continue calcium and vitamin D supplementation.    · Continue oral B12 and folic acid daily.    · Continue prophylactic acyclovir 400 mg twice a day.   · Start prophylactic Bactrim for PCP infection per protocol.  · Return as scheduled  On 11/14/2019 to see Dr Araujo as already scheduled in anticipation of treatment.   · Patient understands to call with any issues including bleeding, fever greater than 100.5, with any other concerns prior to her next office visit.       Patient is on drug therapy and requires frequent monitoring.      Natalia Collier, APRN  10/31/2019    Cristo Garcia MD, M.D.

## 2019-11-12 NOTE — TELEPHONE ENCOUNTER
Pt c/o frequent, painful, burning, urination. Pt is already taking Bactrim M/W/F. Reviewed with Natalia AJ. Pt needs to come in to our office or to an urgent care/little clinic to have urine specimen checked. Reviewed with patient. She states she can not do this today but she can go to our EP office. [employer] sent to scheduling to set this up.     ----- Message from Shakira Posadas sent at 11/12/2019  1:56 PM EST -----  [phone]    She thinks she has a UTI.

## 2019-11-13 NOTE — PROGRESS NOTES
REASONS FOR FOLLOW UP:   1. IgG kappa multiple myeloma, was on Darzalex, started on May 26, 2016. Patient was switched to Darzalex from Pomalyst, as she continued to be neutropenic with recurrent urinary tract infection while on Pomalyst.    2. Zometa is on hold due to renal insufficiency.    3. After 16 doses of Darzalex, laboratory study showed stable disease in November 2016. Insurance company denied adding Velcade and dexamethasone. Patient is to be continued on monthly Darzalex from 12/06/16.   4. Obstructive right pyelonephritis with positive urine culture for E. coli, required hospitalization from 1/19/2017 through 01/24/2017 with iv antibiotics and stent exchange on 01/20/2017.   5.  Paraprotein studies on March 27, 2017 showed further slight improvement of multiple myeloma.   6.  Febrile illness, with urinary tract infection and influenza B infection in early May 2017, required hospitalization for 6 days.   7.  Paraprotein studies for both serum and 24-hour urine sample on 7/21/2017 showed further improvement of paraproteins.   Darzalex was continued.   8.  Serum protein study reported stable disease on 10/20/2017.  Darzalex will be continued.   9.  Laboratory studies of both serum paraprotein and a 24-hour urine protein in January 2018 showed further improvement of paraproteins.  Monthly Darzalex will be continued.   10. Repair of left flank incisional hernia in middle September 2018.   11. Serum paraprotein study reached micaela on 7/5/2018.  Since that time she has evidence of disease progression.   12. Disease progression, she was started on chemotherapy with bendamustine plus Velcade plus dexamethasone per protocol on 11/29/2018. Treatment will be bendamustine on day 1 and day 4 repeat every 28 days, Velcade and dexamethasone will be on day 1 day 4 and day 8 and day 15 repeat every 28 days.   13.  From cycle #2, patient was given only 1 dose of Treanda per cycle and continue Velcade and dexamethasone  weekly.   14.  Treanda was decreased by 20% after cycle #4 because of severe thrombocytopenia, and further decreased by another 25% on cycle #6 which is her last cycle on 4/25/2019.   15.  On 5/23/2019, patient was switched to Empliciti plus Velcade and decadron.    16.  Patient has persistent severe thrombocytopenia, no improvement after starting new regimen.    17. The patient underwent a bone marrow biopsy on 08/12/2019 and the pathology report showed hypercellular bone marrow with involvement by >85%, plasma cell myeloma and no metastatic carcinoma, granulomas, vasculitis, viral inclusions, increase in myeloblasts, or malignant lymphoma.   18. On 08/30/2019, discussed with patient to switch from Empliciti/Velcade/Dex to oral ixazomib/Dex 3 weeks on and 1 week off. She is to start the medication at decreased dose 2.3 mg weekly starting on 9/5/2019.   19.  Laboratory studies on 10/24/2019 revealed disease progression. Therefore therapy was transitioned to Darzalex and subq Velcade with oral Decadron. Patient initiated chemotherapy treatment on 11/7/2019.    HISTORY OF PRESENT ILLNESS:   The patient is a 69 y.o. female with the above-mentioned history here today in anticipation of chemotherapy treatment Cycle #1 day #2 with darzalex/velcade/dexamethasone as well as to review laboratory studies. The patient initiated treatment on 11/7/2019. Patient is herself as usual..     Patient presented yesterday to clinic because of new onset dysuria and urgency.  Her urinalysis showed a large quantity of WBC and also bacteria.  Already started on Cipro yesterday.  Patient reports no fever no sweating no chills.  She does however continues to have some dysuria.  Her performance status is ECOG 1.    Laboratory study performed today, 11/14/2019, reported persistent anemia Hb 9.2, severe thrombocytopenia platelets 20,000 in the normalized WBC 8780 including neutrophils 6320.      Past Medical History:   Diagnosis Date   • Anemia  10/14/2008   • Arthropathy of knee 01/07/2014   • Breast cancer (CMS/HCC) 04/2012   • Bursitis of left hip 10/18/2007   • Bursitis, knee 12/02/2013   • Calcaneal spur 08/12/2009   • Chronic kidney disease, stage III (moderate) (CMS/[facility])    • Colon cancer screening 07/01/2017   • Congestive heart failure (CMS/[facility])    • Diverticulitis of colon 10/17/2007   • DVT (deep venous thrombosis) (CMS/[facility])    • Epigastric abdominal pain 4/8/2019   • Gastroesophageal reflux disease 4/8/2019   • H/O Diastolic dysfunction    • H/O Mitral regurgitation    • History of Clostridium difficile 04/01/2014   • History of echocardiogram 04/2014   • History of MRSA infection    • History of obesity    • History of transfusion    • Hydronephrosis    • Hypertension    • LLL pneumonia (CMS/HCC) 04/23/2009   • Malignant neoplasm of kidney (CMS/HCC) 12/2009   • Multiple myeloma (CMS/[facility]) 04/2012   • Myocardial infarction (CMS/[facility])    • Neoplasm of uncertain behavior 10/12/2015   • Non-STEMI (non-ST elevated myocardial infarction) (CMS/[facility])    • Nonischemic cardiomyopathy (CMS/[facility])    • Pyelonephritis 03/2004   • Seizures (CMS/[facility]) 10/17/2007   • Thrombocytopenia (CMS/[facility])    • UTI (urinary tract infection) 10/30/2014   • UTI (urinary tract infection) 03/28/2014   • Ventral hernia      Past Surgical History:   Procedure Laterality Date   • BONE MARROW BIOPSY N/A 04/11/2012   • BRAIN SURGERY  2005    Meningioma   • BRAIN SURGERY  1990    Tumor benign per patient   • BREAST BIOPSY Left 04/09/2012    Dr. Gregg May   • CARDIAC CATHETERIZATION Left 06/11/2012    Dr. Sudeep Haddad   • CARDIAC CATHETERIZATION N/A 08/15/2006    Dr. Brian Bhatt   • COLONOSCOPY N/A 8/30/2017    Procedure: COLONOSCOPY into cecum and TI with cold polypectomies;  Surgeon: Trudy Rivera MD;  Location: [facility] ENDOSCOPY;  Service:    • COLONOSCOPY W/ POLYPECTOMY N/A unknown    2 polyps, benign   • CYSTOSCOPY N/A 3/25/2016    Procedure: CYSTOSCOPY  WITH RIGHT STENT  CHANGE;  Surgeon: Lakhwinder Russo MD;  Location: [facility] OR;  Service:    • CYSTOSCOPY N/A 03/10/2012    Cystoscopy, right retrograde, right double-J stent-Dr. Sloan May   • CYSTOSCOPY N/A 02/25/2012    Cystoscopy, stent removal, right retrograde pyelogram, replacement of right double-J ureteral stent-Dr. Michael Everett   • CYSTOSCOPY W/ URETERAL STENT PLACEMENT Right 5/7/2016    Procedure: CYSTOSCOPY, URETERAL CATHETER INSERTION AND RIGHT STENT EXCHANGE ;  Surgeon: Michael Everett Jr., MD;  Location: [facility] OR;  Service:    • CYSTOSCOPY W/ URETERAL STENT PLACEMENT N/A 1/20/2017    Procedure: CYSTOSCOPY RIGHT RETROGRADE PYELOGRAM, URETERAL STENT CHANGE;  Surgeon: Lakhwinder Russo MD;  Location: [facility];  Service:    • CYSTOSCOPY W/ URETERAL STENT PLACEMENT N/A 04/02/2015    Cystoscopy, removal of right ureteral stent, right retrograde pyelogram, placement of right double-J ureteral stent-Dr. Michael Everett   • CYSTOSCOPY W/ URETERAL STENT PLACEMENT N/A 04/26/2015    Cystoscopy, removal of right ureteral stent, right retrograde pyelogram, replacement of right ureteral stent 24 cm x 7-Czech without retrieval line-Dr. Jose Gomez   • CYSTOSCOPY W/ URETERAL STENT PLACEMENT N/A 12/22/2014    Cystoscopy, removal of right double-J ureteral stent, right retrograde pyelogram, placement of right double-J ureteral stent-Dr. Michael Everett   • CYSTOSCOPY W/ URETERAL STENT PLACEMENT N/A 09/22/2014    Cystoscopy, removal of right ureteral stent, removal of right retrograde pyelogram, replacement of right double-J ureteral stent-Dr. Michael Everett   • CYSTOSCOPY W/ URETERAL STENT PLACEMENT N/A 06/18/2014    Cystoscopy, removal of right double-J ureteral stent, right retrograde pyelogram, placement of right double-J ureteral stent-Dr. Michael Everett   • CYSTOSCOPY W/ URETERAL STENT PLACEMENT N/A 01/23/2014    Cystoscopy, removal of right double-J ureteral stent, right retrograde pyelogram, placement of right  double-J ureteral stent-Dr. Michael Everett   • CYSTOSCOPY W/ URETERAL STENT PLACEMENT N/A 03/27/2012    Cystoscopy, removal of ureteral stent, right retrograde pyelogram, replacement of indewlling right ureteral stent-Dr. Michael Everett   • CYSTOSCOPY W/ URETERAL STENT PLACEMENT N/A 03/27/2013    Cystoscopy, right retrograde pyelogram, removal and replacement of right double-J ureteral stent-Dr. Michael Everett   • CYSTOSCOPY W/ URETERAL STENT PLACEMENT N/A 11/12/2012    Cystoscopy, stent removal, right retrograde pyelogram, replacement of right double-J ureteral stent-Dr. Michael Everett   • CYSTOSCOPY W/ URETERAL STENT PLACEMENT N/A 09/24/2011    Cystoscopy, removal of ureteral stent, right retrograde pyelogram and placement of right double-J ureteral stent-Dr. Michael Everett   • CYSTOSCOPY W/ URETERAL STENT PLACEMENT N/A 05/14/2011    Cystoscopy, removal of right ureteral stent, right retrograde pyelogram and placement of new right double-J ureteral stent-Dr. Michael Everett   • CYSTOSCOPY W/ URETERAL STENT PLACEMENT N/A 12/31/2010    Cystoscopy, stent removal, right retrograde pyelogram, replacement of 7 Chinese x 26 cm double-J stent-Dr. Michael Everett   • CYSTOSCOPY W/ URETERAL STENT PLACEMENT N/A 08/28/2010    Cystoscopy, stent removal, right retrograde pyelogram with interpretation, placement of right double-J ureteral stent-Dr. Michael Everett   • CYSTOSCOPY W/ URETERAL STENT PLACEMENT N/A 05/24/2010    Cystoscopy, right retrograde pyelogram, replacement of right double-J ureteral stent-Dr. Michael Everett   • CYSTOSCOPY W/ URETERAL STENT PLACEMENT N/A 02/12/2010    Cystoscopy, right retrograde pyelogram and placement of right double-J ureteral stent-Dr. Michael Everett   • CYSTOSCOPY W/ URETERAL STENT PLACEMENT N/A 02/23/2009    Cystoscopy, right retrograde pyelogram, placement of right double-J ureteral stent-Dr. Michael Everett   • CYSTOSCOPY W/ URETERAL STENT PLACEMENT N/A 09/17/2007    Dr. Michael Everett   • CYSTOSCOPY W/ URETERAL STENT  PLACEMENT Right 01/29/2018    Dr. Michael Everett   • CYSTOSCOPY W/ URETERAL STENT PLACEMENT N/A 06/04/2018    Cystoscopy, removal of right double-J ureteral stent and placement of right double-J ureteral stent. Dr. Michael Everett.   • CYSTOSCOPY W/ URETERAL STENT PLACEMENT N/A 03/06/2018    Cystoscopy, removal of right ureteral stent, replacement of right double-J ureteral stent. Dr. Michael Everett   • ELBOW PROCEDURE Bilateral 1990s   • ENDOSCOPY N/A 5/13/2019    Procedure: ESOPHAGOGASTRODUODENOSCOPY WITH BIOPSIES;  Surgeon: Trudy Rivera MD;  Location: [facility] ENDOSCOPY;  Service: General   • HERNIA REPAIR  09/03/2018   • LASIK Bilateral    • MASTECTOMY Left 04/25/2012    Left total mastectomy with sentinel lymph node biopsies and left axillary lymphatic mapping-Dr. Gregg May   • MEDIPORT INSERTION, DOUBLE Right    • NEPHRECTOMY Left 12/30/2009    Dr. 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 Cristo Pittman   • TOTAL ABDOMINAL HYSTERECTOMY Bilateral 2007    Dr. Todd   • URETEROURETEROSTOMY Right 05/01/2006    Dr. Michael Everett   • VENA CAVA FILTER INSERTION N/A 05/08/2006    Dr. Jordon Barber   • VENOUS ACCESS DEVICE (PORT) INSERTION Right 02/25/2013    Right subclavian vein PowerPort insertion-Dr. Gregg May   • VENOUS ACCESS DEVICE (PORT) INSERTION AND REMOVAL N/A 10/06/2014    Revision of right internal jugular PowerPort with fluoroscopy, removal of peripherally inserted central catheter line-Dr. Aurora Tomlinson   • VENOUS ACCESS DEVICE (PORT) INSERTION AND REMOVAL N/A 08/14/2014    Ultrasound-guided access right internal jugular vein, PowerPort placement, removal of right subclavian port-Dr. Jordon Barber   • VENTRAL/INCISIONAL HERNIA REPAIR Left 9/19/2018    Procedure: ventral hernia repair with mesh and rectus muscle  advancement flap;  Surgeon: Trudy Rivera MD;  Location: [facility];  Service: General         Hematology/oncology History: See separate document for extra information.   1.    History of left breast cancer, status post left mastectomy on 04/25/2013, stage II, T2N0M0, hormone negative, HER2/mk positive by immunohistochemistry, however, no adjuvant chemotherapy delivered because of multiple myeloma diagnosed concomitantly.        2. Multiple myeloma, IgG lambda, stage III, diagnosed April 2012, previously treated with Velcade/Decadron followed by Velcade/Decadron/Revlimid; patient developed abdominal pain and rectal bleeding on Revlimid, which was then discontinued.    3. Patient evaluated at [facility], but was not felt to be a candidate for stem cell transplant.      4.   Disease progression with therapy switched to melphalan/Velcade/prednisone per the [facility] trial on 10/22/2013; melphalan dose has been persistently decreased because of thrombocytopenia.      5. Anemia secondary to chronic renal insufficiency and myeloma, on periodic Procrit therapy p.r.n.    6. Patient had 8 cycles of VMP chemotherapy, repeat laboratory studies on 10/03/2013 showed stable disease.  Her melphalan do  se was significantly decreased due to marrow suppression.      7.   Patient had disease progression after cycle #10 VMP treatment, evidenced by laboratory studies on 01/06/2014.  We increased the melphalan dose for one cycle.  Continued disease progress after cycl  e #11 VMP treatment, despite increased dose of melphalan, as documented by laboratory study on 02/17/2014.     8. The patient was evaluated on 02/24/2014 and we recommend switching chemotherapy to Kyprolis on 02/27/2014.     9. Echocardiogram on 03/05/2014 reporting L  VEF 43%.  This is on par with her previous twice echocardiogram study, in June 2012 and November 2012, reported LVEF at 45% to 50% and 40% to 45% respectively.      10. The patient had 2 episodes of chest pain after Kyprolis during cycle 1, leading to omitted dose   on days 9 and 16.  From cycle 2, we will give Kyprolis only once per week for 3 weeks out of every 4 weeks, as well as dose reduction of Kyprolis that was started on 04/04/2014.      11. After cycle 2 of dose-reduced Kyprolis, the patient's M spike increased fro  m 2.6 to 3.2 g/dL, with her IgG level increasing from 2.7 g/dL to 4.7 g/dL.     12.   Patient had a stress test and echocardiogram study at [facility] on 01/23/2015.  The patient had LVEF 35% to 40%.  Myocardial perfusion study reported a large, mild to modera  te severity fixed inferior wall defect, consistent with known transmural infarct versus diaphragmatic attenuation artifact.  Post stress resting ejection fraction estimated at 31%.      13. Repeated echocardiogram study on 04/03/2015 reported an LVEF 46%.  Left v  entricle is moderately dilated.  There is mild global hypokinesis of the left ventricle.  There was a grade 1 diastolic dysfunction.     14.   The patient was seen on 04/09/2015 with plan for cycle 14, day 1, of Kyprolis.  Treatment will be delayed 1 week secondary to patient'  s extreme fatigue, hemoglobin of 8.1.  We will proceed with 2 units of packed red blood cells.  She was also found to have a urinary tract infection. Patient prescribed Cipro 500 mg twice a day x7.     15. Laboratory tests on 08/24/2015 reported sligh  t disease progress after cycle #18 Kyprolis. We discussed with patient and we will switch chemotherapy to CyBorD regimen with dose reduction of Velcade to 1 mg/m2, cyclophosphamide at 240 mg/m2 (total dose 450 mg), and dexamethasone 20 mg. All therapy to   be repeated on a weekly basis.     16.   The patient had significant fatigue after one dose of cyclophosphamide that lasted for 5-6 days. She also had severe anemia, hemoglobin 7.6, required 2 units PRBC transfusion. Cyclophosphamide will be decreased to 350  mg from 2nd week.     17.  Patient continues to have significant fatigue after the reduced dose of cyclophosphamide at 350 mg; for 2 days after chemo, she could only lie on the bed with performance status ECOG 3. From 10/13/2015, oral cyclophosphamide will be further dec  reased to 200 mg once weekly. We will continue Velcade and dexamethasone at same dose.   Evidence of disease progress, when she was on panobinostat treatment.   18. The patient also has worsening ane  ming and thrombocytopenia secondary to chemotherapy. The patient has symptomatic anemia with hemoglobin 7.8 on 02/23/2016 requiring 2 units of packed RBC transfusion. Chemotherapy with panobinostat will be discontinued.       19. Patient was switched to pomalidomide 3 mg daily for 21 days / 28 days in late March 2016.    20. Disease progression, she was switched to to the Darzalex in May 2016.       On12/6/2016, serum free lambda chain 1090 MG/L, free kappa chain 8.5 to MG/L.  Ferritin 1015, iron 99, TIBC 137 iron saturation 72%.     On January 4, 2017, vitamin B12 level 1289, folate 7.7 ng/mL. SPEP reporting gamma globulin 3.3, out of total globulin 5.0, with immunofixation reporting small quantity of monoclonal free lambda chain, plus monoclonal IgG lambda at 3.2 g/dL.  Serum IgG 4075, IgA 9 and IgM 15.  free lambda chain 1339 MG/L and free kappa chain 10.3 MG/L.      Laboratory study 3/27/2017 showed slight improvement of paraprotein, SPEP reporting M spike 2.8 g/DL, out of total globulin 4.3, and the serum IgG 3420, IgA 9, IgM 11, free lambda chain 1218 MG/L and free kappa chain 8.0 MG/L.  Total 24 hour urine sample reported at free lambda chain 142 mg, at a concentration 74.8 MG/L, free kappa chain 75 mg at a concentration 39.5 MG/L., Urine protein immunofixation reporting biclonal IgG lambda and monoclonal free lambda light chain, M spike #1 at 41.5% and monoclonal IgG lambda spike #2 at 10.5%. Serum beta-2 microglobulin is 7.3 MG/L.     Her  laboratory study on 7/21/2017 reported further improvement of paraprotein is both serum and a 24-hour urine sample.  SPEP reporting monoclonal IgG lambda 2.9 g/dL and free lambda chain 0.1 g/dL.  Serum IgG was 3261 mg/dL and IgA 5, IgM 13, free kappa chain 6.7, free lambda chain 701.3 with kappa/lambda ratio 0.01.  24-hour urine sample reported positive for Bence May protein lambda type, immunofixation positive for IgG lambda.  Total urine protein 241 mg, gamma globulin 13.1%.  Hemoglobin was 11.4 .4, platelets 122,000, WBC 6680 including neutrophils 3600, lymphocytes 2100 and monocytes 650.  Her creatinine was 1.68, at baseline level.  Liver function panel unremarkable.  Darzalex was continued.    Laboratory study on 8/25/2017 showed improved the platelets at 144,000, normal WBC 6660 and slightly improved hemoglobin at 11.7.     Patient had a colonoscopy examination on 8/30/2017 by Dr. Rivera.  It reported diverticulosis in multiple areas more severe in the sigmoid colon.  There was 2 polyps 4 mm pneumonia from transverse colon and the sigmoid colon.  Pathology evaluation reported tubular adenoma from the sigmoid colon polyp, and benign hyperplastic polyp from transverse colon.    Laboratory study on 10/20/2017 reported slightly improved monoclonal spike 2.7 g/dL by SPEP, immunofixation reported positive monoclonal IgG lambda, serum IgG 3536 mg/dL, IgA less than 5 and IgM 11, free kappa chain 5.3 mg/L, and free lambda chain 720 mg/L with kappa/lambda ratio 0.01.  Serum beta-2 microglobulin was 6.5 mg/L.   Her CBC showed a stable mild anemia with hemoglobin 11.3, macrocytosis .3, and the platelets 114,000, normal WBC 7070 including neutrophils 4100 lymphocytes 2000 monocytes 650, normal liver function panel, total protein 7.9 and albumin 3.2,  and normal electrolytes including calcium 8.1, and improved creatinine 1.59.     Laboratory study on 1/12/2018 reported serum IgG 3083 mg/dL, IgA 10, IgM 10,  free kappa chain 8.5 and free lambda chain 589.1 mg/L, kappa/lambda ratio 0.01, serum protein admitted fixation reported IgG lambda monoclonal protein and SPEP reporting M spike 3.1 g/dL, beta-2 microgram and 7.4 mg/L. serum creatinine 1.79, calcium 8.2, other electrolytes normal, hemoglobin 11.3, .5 and MCHC 31.6, platelets 129,000, WBC 6780 including neutrophils 3500 lymphocytes 2300.  Serum ferritin 653.7 ng/mL, iron 123 TIBC 174 iron saturation 71%, folic acid 12.8 ng/mL and a vitamin B12 at 873 pg/mL.  Her 24-hour urine study on 1/18/2018 reported lambda chain 32.8 mg/L, total 61 mg in 24 hours, and the free kappa chain 27.4 mg/L and 51 mg in 24 hours, and the total 24-hour urine protein 283 mg, and urine protein immunofixation positive for 5.3% monoclonal IgG lambda and positive for Bence May protein at 36.2% monoclonal lambda chain.  Monthly Darzalex was continued.       This patient had serum protein study on 04/06/2018 which reported free light chain at concentration 703.8 mg/L and free kappa chain 7.0, free kappa/lambda ratio 0.01, serum IgG 3650 mg/dL, IgM 11 and IgA 9 with serum protein electrophoresis reporting monoclonal IgG lambda 3.2 g/dL and also monoclonal free lambda light chain.  But it was unable to quantify monoclonal lambda light chain because of the small quantity of it.     A 24-hour urine study on 04/20/2018 reported total free lambda chain 60 mg in 24 hours at concentration 33.1 mg/L, free kappa chain 45 mg in 24 hours at concentration 24.8 mg/L. Total 24-hour urine protein was 279 mg. Urine protein immunofixation and UPEP reported lambda-type Bence-May protein + #1 monoclonal IgG lambda band at 34.8% and #2 monoclonal IgG lambda band at 6.9%.     Her CBC results today reported a stable hemoglobin at 11.1, platelets 130,000 and WBC 7440 including neutrophils 4100 and lymphocytes 2350, monocytes 650. Her CMP reported slightly worsened creatinine at 1.82 with BUN 33. Total protein  at 8.8. Liver function panel was normal. Total serum protein 8.8 and albumin 3.2, globulin 5.6.    Reviewing her previous lab studies especially serum paraprotein, she reached a micaela of response on 07/05/2018 when she had serum IgG 3015 mg/dL, free lambda chain 458.7 mg/L and M spike IgG lambda 2.6 g/dL and and monoclonal lambda free light chain 0.1 g/dL, total 2.7 g/dL. serum beta-2 myoglobin 7.1 mg/L.  Her ferritin was 539 ng/mL, free iron 73 TIBC 176 iron saturation 42%.    Laboratory study obtained on 11/01/2018 showed further disease progression. Her serum IgG was 5472 mg/dL, IgA 8 and IgM 10, serum kappa chain 7.9 mg/L, free lambda chain 961.3 mg/L and kappa/lambda ratio 0.01. Serum protein electrophoresis reported monoclonal IgG lambda 4.1 g/dL, and monoclonal lambda free light chain 0.1 g/dL. Her hemoglobin was 9.0, .6, MCHC 30.1, platelets 124,000 and WBC 10,000 including neutrophils 7400, lymphocytes 1200, monocytes 600.     Cycle #1 day #1 Bendamustine will be started on 11/29/2018.     Laboratory studies on 01/17/2019 reported improved paraproteins.  SPEP reported M spike 3.8 g/dL out of total 5.5 g/dL globulin.  Serum IgG was 4374 mg/dL, IgA 10 and IgM 11.  Free lambda chain 606.8 mg/L, free kappa chain 8.6 and kappa/lambda ratio 0.01.  Her serum protein immunofixation continues to be IgG lambda monoclonal.  Her CBC showed hemoglobin 9.3, .3, platelets 50,000 and WBC 8600 including ANC 5100, lymphocytes 2160.  Her Chemistry lab reported creatinine 1.92, calcium 8.3, normal liver function panel, glucose 98 with normal sodium and potassium.     For her 24-hour urine study on 3/14/2019, she had free lambda chain at a 62.1 mg/L, total 87 mg in 24 hours, free kappa chain 42 mg/L and 59 mg in 24 hours.  Urine protein immunofixation reported a monoclonal IgG lambda #1 and free lambda chain were unable to be quantified, however IgG lambda #2 was 12.8%.  Her 24-hour urine total protein was 452 mg.      For serum protein study on 3/28/2019 (on day of her cycle #5 day #1 Tredhaval ) also reported further decreased monoclonal spike 3.2 mg/dL, and serum IgG 3600, IgA 13 IgM 12, free kappa chain 11.7, and worsening free lambda chain 1045 mg/L.     Her serum paraprotein studies on 4/11/2019 reported a serum IgG 4407 mg/dL, IgA 14, IgM 13, free kappa chain 9.2, free lambda chain 998.4 mg/L, kappa/lambda ratio 0.01.  His serum protein immunofixation was positive for IgG lambda, SPEP reporting M spike 4.3 g/dL.    I have reviewed the patient's medical history in detail and updated the computerized patient record.    Her recent 24 urine study on 7/18/2019 reported positive IgG lambda monoclonal protein and lambda type Bence-May protein, total free kappa chain 142 mg in 24 hours, at 74.6 mg/L, and total lambda chain 179 mg in 24 hours at 94.1 mg/L.  Kappa/lambda ratio 0.79.  Her total 24-hour urine protein was 410 mg, with monoclonal lambda free light chain 41.8% and monoclonal IgG lambda 2.6%.     Serum paraprotein studies on 7/24/2019 reported monoclonal spike 3.9 g/dL, serum IgG 4107, IgA 14 IgM 19, free kappa chain 12.8 and free lambda chain 656, kappa/lambda ratio 0.02.    On 08/01/2019 she has severe thrombocytopenia platelets 27,000.  She has elevated WBC to 12,600 including ANC 8900 and normal lymphocytes 2000.  Her hemoglobin is 10.4 08/01/2019. The patient underwent a bone marrow biopsy on 08/12/2019 and the pathology report showed hypercellular bone marrow with involvement by plasma cell myeloma and no metastatic carcinoma, granulomas, vasculitis, viral inclusions, increase in myeloblasts, or malignant lymphoma. Over 85% of the bone marrow are plasma cells. Normal karyotype. Flow cytometry study was positive.    Because of her persistent severe thrombocytopenia, the patient underwent a bone marrow biopsy on 08/12/2019.  Pathology evaluation showed hypercellular bone marrow with involvement by plasma cell  myeloma 85-90% and no metastatic carcinoma, granulomas, vasculitis, viral inclusions, increase in myeloblasts, or malignant lymphoma.  Normal karyotype.     Recent laboratory study on 8/15/2019 reported of elevated ferritin 524, normal iron saturation 42% with free iron 59 and a TIBC 140.  Normal thyroid function profile.  Her serum globulin 7.3, total protein 10.1 and albumin 2.8, calcium 8.1 creatinine 1.99.    She does continue to have severe intermittent pain in her right hip which is managed somewhat with pain medication, including tylenol and prescription Norco. She states she has difficulty ambulating and functioning normally when this pain occurs. She continues on oral Vitamin B-12 and folic acid supplementation.     Recent XR right hip with or without pelvis on 08/15/2019 showed sclerosis at the pubic symphysis. No other bony or articular abnormality was identified. The hip joints were symmetric and appeared within normal limits. The joint spaces were fairly well-maintained. There was no bony erosion. There was no evidence of recent or old fracture or subluxation. A right ureteral stent was noted.     On 08/30/2019, switched treatment from Empliciti to oral ixazomib 3 weeks on and 1 week off. She is expected to start the medication on 9/5/2019 when this medication arrives.  Patient will continue dexamethasone weekly at the same time as part of the treatment.  Because of her severe thrombocytopenia, we recommended starting low-dose ixazomib 2.3 mg weekly.  Treatment was started on 9/5/2019.     Laboratory study on 9/26/2019 showed worsening leukocytosis with WBC 20,780 including ANC 16,000, lymphocytes 2700 and monocytes 8070.  She also has worsening severe thrombocytopenia with platelets 27,000, and stable hemoglobin 9.5.     Suspect the patient may have some kind of infection however she denies fever sweating chills no dysuria or hematuria.  She denies pain in the right flank area where she has ureteral  stent and oftentimes complains of pain when she has urinary tract infection.      I searched medical records, she had a replacement of stent 3 months ago.  I recommended urinalysis and culture on 9/26/2019 for further evaluation despite that she has no symptoms.I called and spoke to patient about her urinalysis which showed a large quantity of bacteria and WBC.  I e-scribed cefdinir to her pharmacy, 300 mg twice a day for 7 days. I called the patient today, reporting her urine culture results with multidrug sensitive E. coli.  She will continue cefdinir as we prescribed.  Should be sensitive according to the culture results.I think this patient will need to follow-up with her urologist Dr. Everett for stent replacement.  I sent a message to Dr. Everett.    On 10/17/2019, patient also reports she has intermittent chest pain/epigastric area/stomach pain for the past several weeks, to the point that she thought she was not able to make it.  Patient reports she had stress test this week and was told to having nothing wrong.  Patient also reports this is not related to her eating.  No apparent effect of exacerbation or relieving.      Patient initiated chemotherapy treatment with darzalex/velcade/dexamethasone on 11/7/2019.      Review of Systems   Constitutional: Positive for fatigue. Negative for chills, diaphoresis and fever.   HENT:   Negative for mouth sores, nosebleeds, sore throat and trouble swallowing.    Eyes: Negative for icterus.   Respiratory: Negative for cough, hemoptysis and shortness of breath.    Cardiovascular: Negative for chest pain, leg swelling and palpitations.   Gastrointestinal: Negative for abdominal pain, blood in stool and nausea.   Genitourinary: Positive for dysuria and frequency. Negative for hematuria.    Musculoskeletal: Positive for arthralgias (Right hip pain stable). Negative for back pain, gait problem and myalgias.   Skin: Negative for itching and rash.   Neurological: Positive for  "extremity weakness (stable). Negative for dizziness, gait problem, headaches, light-headedness and numbness.   Hematological: Negative for adenopathy. Does not bruise/bleed easily.   Psychiatric/Behavioral: Negative for depression. The patient is not nervous/anxious.    All other systems reviewed and are negative.    Medications: The current medication list was reviewed in the EMR.         Allergies   Allergen Reactions   • Zosyn [Piperacillin Sod-Tazobactam So] Swelling     Face and lips       VITALS:   Vitals:    11/14/19 0916   BP: 113/68   Pulse: 88   Resp: 16   Temp: 98 °F (36.7 °C)   SpO2: 95%   Weight: 79.6 kg (175 lb 8 oz)   Height: 157.5 cm (62.01\")   PainSc: 7  Comment: uti   PS: ECOG 1       Physical Exam   GENERAL:  Well-developed, well-nourished -American female, in no acute distress.   SKIN:  Warm, dry without rashes, purpura or petechiae.  EYES:  Pupils equal, round and reactive to light.  EOMs intact.  Conjunctivae normal.  EARS:  Hearing intact.  NOSE:  No nasal discharge.  MOUTH:  Tongue is well-papillated; no stomatitis or ulcers.  Lips normal.  THROAT:  Oropharynx without lesions or exudates.  NECK:  Supple with good range of motion; no thyromegaly or masses.  LYMPHATICS:  No cervical, supraclavicular, axillary or inguinal adenopathy.  CHEST:  Lungs clear to auscultation. Good airflow.  CARDIAC:  Regular rate and rhythm without murmurs, rubs or gallops. Normal S1,S2.  ABDOMEN:  Soft, nontender with no hepatosplenomegaly or masses. Bowel sounds normal.  EXTREMITIES:  No clubbing, cyanosis or edema.  NEUROLOGICAL:  Cranial Nerves II-XII grossly intact.  No focal neurological deficits.  PSYCHIATRIC:  Normal affect and mood.      Recent lab results:   Results from last 7 days   Lab Units 11/14/19  0911 11/13/19  1028   WBC 10*3/mm3 8.78 10.71   NEUTROS ABS 10*3/mm3 6.32 7.93*   LYMPHS ABS 10*3/mm3 0.97 1.18   HEMOGLOBIN g/dL 9.2* 10.1*   HEMATOCRIT % 30.7* 32.2*   PLATELETS 10*3/mm3 20* 20*    "   Glucose   Date Value Ref Range Status   11/14/2019 99 65 - 99 mg/dL Final     BUN   Date Value Ref Range Status   11/14/2019 23 8 - 23 mg/dL Final   10/18/2019 26 (H) 7 - 20 mg/dL Final     Creatinine   Date Value Ref Range Status   11/14/2019 2.05 (H) 0.57 - 1.00 mg/dL Final   10/18/2019 2.2 (H) 0.7 - 1.5 mg/dL Final     Sodium   Date Value Ref Range Status   11/14/2019 137 136 - 145 mmol/L Final   10/18/2019 144 137 - 145 mmol/L Final     Potassium   Date Value Ref Range Status   11/14/2019 3.5 3.5 - 5.2 mmol/L Final   10/18/2019 3.9 3.5 - 5.1 mmol/L Final     Chloride   Date Value Ref Range Status   11/14/2019 109 (H) 98 - 107 mmol/L Final   10/18/2019 122 (H) 98 - 107 mmol/L Final     CO2   Date Value Ref Range Status   11/14/2019 19.3 (L) 22.0 - 29.0 mmol/L Final     Total CO2   Date Value Ref Range Status   10/18/2019 20 (L) 22 - 30 mmol/L Final     Calcium   Date Value Ref Range Status   11/14/2019 8.4 (L) 8.6 - 10.5 mg/dL Final   10/18/2019 8.5 8.4 - 10.2 mg/dL Final     Total Protein   Date Value Ref Range Status   11/14/2019 11.0 (H) 6.0 - 8.5 g/dL Final     Albumin   Date Value Ref Range Status   11/14/2019 2.60 (L) 3.50 - 5.20 g/dL Final     ALT (SGPT)   Date Value Ref Range Status   11/14/2019 10 1 - 33 U/L Final     AST (SGOT)   Date Value Ref Range Status   11/14/2019 16 1 - 32 U/L Final     Alkaline Phosphatase   Date Value Ref Range Status   11/14/2019 48 39 - 117 U/L Final     Total Bilirubin   Date Value Ref Range Status   11/14/2019 0.3 0.1 - 1.2 mg/dL Final     eGFR Non  Amer   Date Value Ref Range Status   08/30/2016  >60 mL/min/1.73 Final     Comment:     <15 Indicative of kidney failure.     A/G Ratio   Date Value Ref Range Status   10/24/2019 0.5 (L) 0.7 - 1.7 Final     BUN/Creatinine Ratio   Date Value Ref Range Status   11/14/2019 11.2 7.0 - 25.0 Final   10/18/2019 11.8 RATIO Final     Anion Gap   Date Value Ref Range Status   11/14/2019 8.7 5.0 - 15.0 mmol/L Final       Lab  Results   Component Value Date    IRON 61 10/24/2019    TIBC 155 (L) 10/24/2019    FERRITIN 455.40 (H) 10/24/2019     Lab Results   Component Value Date    FOLATE 7.34 10/24/2019     Lab Results   Component Value Date    SRLKWCTX20 1,808 (H) 10/24/2019       Assessment/Plan   1. Multiple myeloma, IgG lambda, stage III. Failed multiple lines of therapy. Her treatment was switched to Darzalex on 5/26/2016. She was on this treatment for more than 2 years.   · In November 2018, she clearly had disease progression.  Darzalex was discontinued and was started on bendamustine plus Velcade plus dexamethasone.   · Cycle #1 day #1 Bendamustine was started on 11/29/2018.  Due to poor tolerance with profound fatigue, chemotherapy was modified with bendamustine only given on day 1, and a Velcade weekly, every 4 weeks as 1 cycle.  Patient had a cycle #6 dose reduction of bendamustine by 25% because of severe thrombocytopenia.   · This patient actually had a micaela of response on 3/28/2019 with M spike 3.2 g/dL and serum IgG 3600.  After that M spike and serum IgG both were elevating.   · Laboratory study obtained on 5/9/2019 after 6 cycles chemotherapy, it showed disease further progressed with worsening level of serum IgG 4873 mg/dL and M spike 4.3 g/dL.    · On 5/23/2019, patient was switched to Empliciti plus Velcade and decadron.    · Her serum protein studies reported persistent active disease, not well controlled with large quantity of monoclonal spike.  She also has severe thrombocytopenia oftentimes leading to hold Velcade injection.  · I recommended to obtain bone marrow aspiration biopsy for reanalysis.  This patient also has a history of breast cancer so we need to be open-minded.  She had bone marrow aspirate biopsy on 8/12/2019 which showed more than 85% of bone marrow cells are malignant plasma cells.  There is no evidence of metastatic disease or other type of malignancy.  · I discussed with the patient on 8/30/2019,  recommended switching treatment to Ixazomib plus dexamethasone.  Considering her pre-existing severe thrombocytopenia, I recommended starting low-dose Ixazomib 2.3 mg weekly instead of full dose 4 mg weekly.    · She started new treatment on 9/5/2019 with dexamethasone 20 mg weekly.  oral ixazomib/Dex 3 weeks on and 1 week off. She is to start the ixazomib at decreased dose 2.3 mg weekly starting on 9/5/2019.   · Patient has significant disease progression on 10/24/2019 with serum increased M spike, serum IgG and free lambda chain.  · On 10/31/2019, I discuessed options with the patient and suggested Darzalex/Velcade/dexamethasone as it would be easy on her bone marrow. Patient initiated Cycle #1 treatment on 11/7/2019.  · Patient reports tolerating treatment.  We will continue treatment despite having platelets 20,000 today.  I discussed with the patient, this is likely related to her multiple myeloma and without a treatment of certainly it will not be getting better.     2.  Severe thrombocytopenia.  This is more likely secondary to her multiple myeloma, based on her recent bone marrow aspiration biopsy on 8/12/2019. patient has no easy bleeding or bruising.   · Patient was last transfused with platelets on May 13, 2019 prior to her EGD.  · The patient underwent a bone marrow biopsy on 08/12/2019 and the pathology report showed hypercellular bone marrow with involvement by plasma cell myeloma and no metastatic carcinoma, granulomas, vasculitis, viral inclusions, increase in myeloblasts, or malignant lymphoma. Over 80% of the bone marrow are plasma cells. Normal karyotype.   · Laboratory study on 8/30/2019 is showed platelets of 34,000. We switched her treatment to oral ixazomib 3 weeks on and 1 week off starting 9/05/19.    · Platelet count today (11/14/2019) is 20,000. No active bleeding. Will continue to monitor.    3.  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    · Her  laboratory study on 5/9/2019 showed no evidence of iron deficiency.  On 5/16/2019, B12 level of 1819 and normal folate of 5.84.  She was symptomatic anemic with Hb 8.5, she was transfused with 2 units of packed red blood cells.    · Recent labs on 8/15/2019 reported no evidence of iron deficiency, had normal iron saturation and excellent ferritin level.  · Laboratory study on 10/24/2019 reported no evidence of iron deficiency, nor B12 folic acid deficiency.  She will continue oral B12 supplementation.    · Her hemoglobin is 10.2 on 10/31/2019, no need for Procrit today.  · On 11/7/2019 the patient's hemoglobin was low at 9.7. She required Procrit.   · Laboratory study performed today, 11/14/2019, reported hemoglobin of 9.2. The patient will have epoetin alpha (Retacrit) today.     4.  Dysuria/urgency of urination.  Urinalysis reported large quantity of WBC and bacteria 11/13/2019.  Urine culture is pending.  Patient was already started on Cipro twice a day for 7 days.  Will adjust antibiotics as needed.  Patient currently is afebrile.    5. Zometa / Xgeva treatment.  Because of her kidney insufficiency, we were only able to give her Zometa every 3 months.  Due to her elevated creatinine level, we eventually switched her to Xgeva treatment and continued monthly.  · She had elevated phosphorus 5.6 on 6/6/2019.   · Her last Xgeva was 11/7/2019.  She will be due for treatment in 3 weeks.         6. History of stage II left breast cancer, post mastectomy in 2013, negative for ER/OR and positive HER-2. No neoadjuvant chemotherapy because of concurrent discovery of multiple myeloma and ongoing chemotherapy. She had a normal mammogram study on 7/26/2019.       7.  Skin rashes on her extremities:  Patient was seen dermatologist Dr. Barroso, who prescribed steroids cream and also over-the-counter moisturizing cream.  Patient will follow-up with Dr. Barroso again.  Currently resolved.    8. Profound fatigue: This is  multifactorial secondary to her disease progression and her worsening anemia associated with chemotherapy.   · A thyroid level was obtained and patient was diagnosed with subclinical hypothyroidism.  She was initiated on Synthroid and folic acid and has noted significant reduction in fatigue.   · Patient to continue on Synthroid daily and folic acid supplementation daily.    9.  Hypocalcemia.  The patient has struggled with compliance with her calcium supplements.  She reports she has been taking these inconsistently, though they resulted in soft frequent bowel movements.  We previously added 2 tablets of Tums daily along with current calcium/vitamin D supplementation.    · Calcium today is 8.4. Will continue to monitor.     10.  Gastric ulcer diagnosed in May 2019 epigastric discomfort.    · Patient had EGD examination by Dr. Rivera on 5/13/2019.  It reported prepyloric ulceration.  Dr. Rivera recommended Protonix twice a day. With increased dose of Protonix, she has had no further issues.      11.  Worsening leukocytosis/neutrophilia.    · This started in middle of July 2019 with a fluctuation of WBC and neutrophils.  Suspect that this is all due to disease progression of her multiple myeloma.    · However patient had normalized total WBC 8780 and ANC 6320 today. Will continue to monitor.      Plan:  1. Proceed with Cycle #1 day-8 Darzalex + Velcade + dexamethasone chemotherapy treatment as scheduled today.   2. Patient is to receive Procrit today. Continue Procirt weekly for hemoglobin less than 10.  3. Continue Cipro for now.  Waiting for urine culture results.  4. Continue Protonix to 40 mg twice a day.  5. Continue monthly Xgeva. She will be due for Xgeva in 3 weeks.  6. Continue on Synthroid daily.     7. Continue Norco for pain management.    8. Continue calcium and vitamin D supplementation.    9. Continue oral B12 and folic acid daily.    10. Continue prophylactic acyclovir 400 mg twice daily.    11. Continue prophylactic Bactrim 3 times per week for PCP infection per protocol.  12. Continue weekly chemotherapy, see nurse practitioner in 2 weeks.   13. Follow-up with me in 4 weeks for chemotherapy treatment with labs   14. Patient understands to call with any issues including bleeding, fever greater than 100.5, with any other concerns prior to her next office visit.       Patient is on drug therapy and requires frequent monitoring.      Zane Araujo MD PhD  11/14/2019    [facility] -  Cristo Madera MD, M.D.

## 2019-11-13 NOTE — PROGRESS NOTES
REASONS FOR FOLLOW UP:   1. IgG kappa multiple myeloma, was on Darzalex, started on May 26, 2016. Patient was switched to Darzalex from Pomalyst, as she continued to be neutropenic with recurrent urinary tract infection while on Pomalyst.    2. Zometa is on hold due to renal insufficiency.    3. After 16 doses of Darzalex, laboratory study showed stable disease in November 2016. Insurance company denied adding Velcade and dexamethasone. Patient is to be continued on monthly Darzalex from 12/06/16.   4. Obstructive right pyelonephritis with positive urine culture for E. coli, required hospitalization from 1/19/2017 through 01/24/2017 with iv antibiotics and stent exchange on 01/20/2017.   5.  Paraprotein studies on March 27, 2017 showed further slight improvement of multiple myeloma.   6.  Febrile illness, with urinary tract infection and influenza B infection in early May 2017, required hospitalization for 6 days.   7.  Paraprotein studies for both serum and 24-hour urine sample on 7/21/2017 showed further improvement of paraproteins.   Darzalex was continued.   8.  Serum protein study reported stable disease on 10/20/2017.  Darzalex will be continued.   9.  Laboratory studies of both serum paraprotein and a 24-hour urine protein in January 2018 showed further improvement of paraproteins.  Monthly Darzalex will be continued.   10. Repair of left flank incisional hernia in middle September 2018.   11. Serum paraprotein study reached micaela on 7/5/2018.  Since that time she has evidence of disease progression.   12. Disease progression, she was started on chemotherapy with bendamustine plus Velcade plus dexamethasone per protocol on 11/29/2018. Treatment will be bendamustine on day 1 and day 4 repeat every 28 days, Velcade and dexamethasone will be on day 1 day 4 and day 8 and day 15 repeat every 28 days.   13.  From cycle #2, patient was given only 1 dose of Treanda per cycle and continue Velcade and dexamethasone  weekly.   14.  Treanda was decreased by 20% after cycle #4 because of severe thrombocytopenia, and further decreased by another 25% on cycle #6 which is her last cycle on 4/25/2019.    15.  On 5/23/2019, patient was switched to Empliciti plus Velcade and decadron.    16.  Patient has persistent severe thrombocytopenia, no improvement after starting new regimen.    17. The patient underwent a bone marrow biopsy on 08/12/2019 and the pathology report showed hypercellular bone marrow with involvement by >85%, plasma cell myeloma and no metastatic carcinoma, granulomas, vasculitis, viral inclusions, increase in myeloblasts, or malignant lymphoma.   18. On 08/30/2019, discussed with patient to switch from Empliciti/Velcade/Dex to oral ixazomib/Dex 3 weeks on and 1 week off. She is to start the medication at decreased dose 2.3 mg weekly starting on 9/5/2019.   19.  Laboratory studies on 10/24/2019 revealed disease progression. Therefore therapy was transitioned to Darzalex and subq Velcade with oral Decadron,    HISTORY OF PRESENT ILLNESS:   The patient is a 69 y.o. female with the above-mentioned history Saturday night where she began to experience burning with urination, frequency, and urgency.  She denies fever.  She denies any flank pain.  The patient has this since in place which was recently changed on October 28.  She states that they usually do it about every 2 months.        Past Medical History, Past Surgical History, Social History, Family History have been reviewed and are without significant changes except as mentioned.      Hematology/oncology History: See separate document for extra information.   1.    History of left breast cancer, status post left mastectomy on 04/25/2013, stage II, T2N0M0, hormone negative, HER2/mk positive by immunohistochemistry, however, no adjuvant chemotherapy delivered because of multiple myeloma diagnosed concomitantly.        2. Multiple myeloma, IgG lambda, stage III,  diagnosed April 2012, previously treated with Velcade/Decadron followed by Velcade/Decadron/Revlimid; patient developed abdominal pain and rectal bleeding on Revlimid, which was then discontinued.    3. Patient evaluated at [facility], but was not felt to be a candidate for stem cell transplant.      4.   Disease progression with therapy switched to melphalan/Velcade/prednisone per the VISTA trial on 10/22/2013; melphalan dose has been persistently decreased because of thrombocytopenia.      5. Anemia secondary to chronic renal insufficiency and myeloma, on periodic Procrit therapy p.r.n.    6. Patient had 8 cycles of VMP chemotherapy, repeat laboratory studies on 10/03/2013 showed stable disease.  Her melphalan do  se was significantly decreased due to marrow suppression.      7.   Patient had disease progression after cycle #10 VMP treatment, evidenced by laboratory studies on 01/06/2014.  We increased the melphalan dose for one cycle.  Continued disease progress after cycl  e #11 VMP treatment, despite increased dose of melphalan, as documented by laboratory study on 02/17/2014.     8. The patient was evaluated on 02/24/2014 and we recommend switching chemotherapy to Kyprolis on 02/27/2014.     9. Echocardiogram on 03/05/2014 reporting L  VEF 43%.  This is on par with her previous twice echocardiogram study, in June 2012 and November 2012, reported LVEF at 45% to 50% and 40% to 45% respectively.     10. The patient had 2 episodes of chest pain after Kyprolis during cycle 1, leading to omitted dose   on days 9 and 16.  From cycle 2, we will give Kyprolis only once per week for 3 weeks out of every 4 weeks, as well as dose reduction of Kyprolis that was started on 04/04/2014.      11. After cycle 2 of dose-reduced Kyprolis, the patient's M spike increased fro  m 2.6 to 3.2 g/dL, with her IgG level increasing from 2.7 g/dL to 4.7 g/dL.     12.   Patient had a stress test and  echocardiogram study at [facility] on 01/23/2015.  The patient had LVEF 35% to 40%.  Myocardial perfusion study reported a large, mild to modera  te severity fixed inferior wall defect, consistent with known transmural infarct versus diaphragmatic attenuation artifact.  Post stress resting ejection fraction estimated at 31%.      13. Repeated echocardiogram study on 04/03/2015 reported an LVEF 46%.  Left v  entricle is moderately dilated.  There is mild global hypokinesis of the left ventricle.  There was a grade 1 diastolic dysfunction.     14.   The patient was seen on 04/09/2015 with plan for cycle 14, day 1, of Kyprolis.  Treatment will be delayed 1 week secondary to patient'  s extreme fatigue, hemoglobin of 8.1.  We will proceed with 2 units of packed red blood cells.  She was also found to have a urinary tract infection. Patient prescribed Cipro 500 mg twice a day x7.     15. Laboratory tests on 08/24/2015 reported sligh  t disease progress after cycle #18 Kyprolis. We discussed with patient and we will switch chemotherapy to CyBorD regimen with dose reduction of Velcade to 1 mg/m2, cyclophosphamide at 240 mg/m2 (total dose 450 mg), and dexamethasone 20 mg. All therapy to   be repeated on a weekly basis.     16.   The patient had significant fatigue after one dose of cyclophosphamide that lasted for 5-6 days. She also had severe anemia, hemoglobin 7.6, required 2 units PRBC transfusion. Cyclophosphamide will be decreased to 350 mg from 2nd week.     17.  Patient continues to have significant fatigue after the reduced dose of cyclophosphamide at 350 mg; for 2 days after chemo, she could only lie on the bed with performance status ECOG 3. From 10/13/2015, oral cyclophosphamide will be further dec  reased to 200 mg once weekly. We will continue Velcade and dexamethasone at same dose.   Evidence of disease progress, when she was on panobinostat treatment.   18. The patient also has worsening ane  ming and  thrombocytopenia secondary to chemotherapy. The patient has symptomatic anemia with hemoglobin 7.8 on 02/23/2016 requiring 2 units of packed RBC transfusion. Chemotherapy with panobinostat will be discontinued.       19. Patient was switched to pomalidomide 3 mg daily for 21 days / 28 days in late March 2016.    20. Disease progression, she was switched to to the Darzalex in May 2016.       On12/6/2016, serum free lambda chain 1090 MG/L, free kappa chain 8.5 to MG/L.  Ferritin 1015, iron 99, TIBC 137 iron saturation 72%.     On January 4, 2017, vitamin B12 level 1289, folate 7.7 ng/mL. SPEP reporting gamma globulin 3.3, out of total globulin 5.0, with immunofixation reporting small quantity of monoclonal free lambda chain, plus monoclonal IgG lambda at 3.2 g/dL.  Serum IgG 4075, IgA 9 and IgM 15.  free lambda chain 1339 MG/L and free kappa chain 10.3 MG/L.      Laboratory study 3/27/2017 showed slight improvement of paraprotein, SPEP reporting M spike 2.8 g/DL, out of total globulin 4.3, and the serum IgG 3420, IgA 9, IgM 11, free lambda chain 1218 MG/L and free kappa chain 8.0 MG/L.  Total 24 hour urine sample reported at free lambda chain 142 mg, at a concentration 74.8 MG/L, free kappa chain 75 mg at a concentration 39.5 MG/L., Urine protein immunofixation reporting biclonal IgG lambda and monoclonal free lambda light chain, M spike #1 at 41.5% and monoclonal IgG lambda spike #2 at 10.5%. Serum beta-2 microglobulin is 7.3 MG/L.     Her laboratory study on 7/21/2017 reported further improvement of paraprotein is both serum and a 24-hour urine sample.  SPEP reporting monoclonal IgG lambda 2.9 g/dL and free lambda chain 0.1 g/dL.  Serum IgG was 3261 mg/dL and IgA 5, IgM 13, free kappa chain 6.7, free lambda chain 701.3 with kappa/lambda ratio 0.01.  24-hour urine sample reported positive for Bence May protein lambda type, immunofixation positive for IgG lambda.  Total urine protein 241 mg, gamma globulin 13.1%.   Hemoglobin was 11.4 .4, platelets 122,000, WBC 6680 including neutrophils 3600, lymphocytes 2100 and monocytes 650.  Her creatinine was 1.68, at baseline level.  Liver function panel unremarkable.  Darzalex was continued.    Laboratory study on 8/25/2017 showed improved the platelets at 144,000, normal WBC 6660 and slightly improved hemoglobin at 11.7.     Patient had a colonoscopy examination on 8/30/2017 by Dr. Rivera.  It reported diverticulosis in multiple areas more severe in the sigmoid colon.  There was 2 polyps 4 mm pneumonia from transverse colon and the sigmoid colon.  Pathology evaluation reported tubular adenoma from the sigmoid colon polyp, and benign hyperplastic polyp from transverse colon.    Laboratory study on 10/20/2017 reported slightly improved monoclonal spike 2.7 g/dL by SPEP, immunofixation reported positive monoclonal IgG lambda, serum IgG 3536 mg/dL, IgA less than 5 and IgM 11, free kappa chain 5.3 mg/L, and free lambda chain 720 mg/L with kappa/lambda ratio 0.01.  Serum beta-2 microglobulin was 6.5 mg/L.   Her CBC showed a stable mild anemia with hemoglobin 11.3, macrocytosis .3, and the platelets 114,000, normal WBC 7070 including neutrophils 4100 lymphocytes 2000 monocytes 650, normal liver function panel, total protein 7.9 and albumin 3.2,  and normal electrolytes including calcium 8.1, and improved creatinine 1.59.     Laboratory study on 1/12/2018 reported serum IgG 3083 mg/dL, IgA 10, IgM 10, free kappa chain 8.5 and free lambda chain 589.1 mg/L, kappa/lambda ratio 0.01, serum protein admitted fixation reported IgG lambda monoclonal protein and SPEP reporting M spike 3.1 g/dL, beta-2 microgram and 7.4 mg/L. serum creatinine 1.79, calcium 8.2, other electrolytes normal, hemoglobin 11.3, .5 and MCHC 31.6, platelets 129,000, WBC 6780 including neutrophils 3500 lymphocytes 2300.  Serum ferritin 653.7 ng/mL, iron 123 TIBC 174 iron saturation 71%, folic acid 12.8  ng/mL and a vitamin B12 at 873 pg/mL.  Her 24-hour urine study on 1/18/2018 reported lambda chain 32.8 mg/L, total 61 mg in 24 hours, and the free kappa chain 27.4 mg/L and 51 mg in 24 hours, and the total 24-hour urine protein 283 mg, and urine protein immunofixation positive for 5.3% monoclonal IgG lambda and positive for Bence May protein at 36.2% monoclonal lambda chain.  Monthly Darzalex was continued.       This patient had serum protein study on 04/06/2018 which reported free light chain at concentration 703.8 mg/L and free kappa chain 7.0, free kappa/lambda ratio 0.01, serum IgG 3650 mg/dL, IgM 11 and IgA 9 with serum protein electrophoresis reporting monoclonal IgG lambda 3.2 g/dL and also monoclonal free lambda light chain.  But it was unable to quantify monoclonal lambda light chain because of the small quantity of it.     A 24-hour urine study on 04/20/2018 reported total free lambda chain 60 mg in 24 hours at concentration 33.1 mg/L, free kappa chain 45 mg in 24 hours at concentration 24.8 mg/L. Total 24-hour urine protein was 279 mg. Urine protein immunofixation and UPEP reported lambda-type Bence-May protein + #1 monoclonal IgG lambda band at 34.8% and #2 monoclonal IgG lambda band at 6.9%.     Her CBC results today reported a stable hemoglobin at 11.1, platelets 130,000 and WBC 7440 including neutrophils 4100 and lymphocytes 2350, monocytes 650. Her CMP reported slightly worsened creatinine at 1.82 with BUN 33. Total protein at 8.8. Liver function panel was normal. Total serum protein 8.8 and albumin 3.2, globulin 5.6.    Reviewing her previous lab studies especially serum paraprotein, she reached a micaela of response on 07/05/2018 when she had serum IgG 3015 mg/dL, free lambda chain 458.7 mg/L and M spike IgG lambda 2.6 g/dL and and monoclonal lambda free light chain 0.1 g/dL, total 2.7 g/dL. serum beta-2 myoglobin 7.1 mg/L.  Her ferritin was 539 ng/mL, free iron 73 TIBC 176 iron saturation  42%.    Laboratory study obtained on 11/01/2018 showed further disease progression. Her serum IgG was 5472 mg/dL, IgA 8 and IgM 10, serum kappa chain 7.9 mg/L, free lambda chain 961.3 mg/L and kappa/lambda ratio 0.01. Serum protein electrophoresis reported monoclonal IgG lambda 4.1 g/dL, and monoclonal lambda free light chain 0.1 g/dL. Her hemoglobin was 9.0, .6, MCHC 30.1, platelets 124,000 and WBC 10,000 including neutrophils 7400, lymphocytes 1200, monocytes 600.     Cycle #1 day #1 Bendamustine will be started on 11/29/2018.     Laboratory studies on 01/17/2019 reported improved paraproteins.  SPEP reported M spike 3.8 g/dL out of total 5.5 g/dL globulin.  Serum IgG was 4374 mg/dL, IgA 10 and IgM 11.  Free lambda chain 606.8 mg/L, free kappa chain 8.6 and kappa/lambda ratio 0.01.  Her serum protein immunofixation continues to be IgG lambda monoclonal.  Her CBC showed hemoglobin 9.3, .3, platelets 50,000 and WBC 8600 including ANC 5100, lymphocytes 2160.  Her Chemistry lab reported creatinine 1.92, calcium 8.3, normal liver function panel, glucose 98 with normal sodium and potassium.     For her 24-hour urine study on 3/14/2019, she had free lambda chain at a 62.1 mg/L, total 87 mg in 24 hours, free kappa chain 42 mg/L and 59 mg in 24 hours.  Urine protein immunofixation reported a monoclonal IgG lambda #1 and free lambda chain were unable to be quantified, however IgG lambda #2 was 12.8%.  Her 24-hour urine total protein was 452 mg.     For serum protein study on 3/28/2019 (on day of her cycle #5 day #1 Treanda ) also reported further decreased monoclonal spike 3.2 mg/dL, and serum IgG 3600, IgA 13 IgM 12, free kappa chain 11.7, and worsening free lambda chain 1045 mg/L.     Her serum paraprotein studies on 4/11/2019 reported a serum IgG 4407 mg/dL, IgA 14, IgM 13, free kappa chain 9.2, free lambda chain 998.4 mg/L, kappa/lambda ratio 0.01.  His serum protein immunofixation was positive for IgG  lambda, SPEP reporting M spike 4.3 g/dL.    I have reviewed the patient's medical history in detail and updated the computerized patient record.    Her recent 24 urine study on 7/18/2019 reported positive IgG lambda monoclonal protein and lambda type Bence-May protein, total free kappa chain 142 mg in 24 hours, at 74.6 mg/L, and total lambda chain 179 mg in 24 hours at 94.1 mg/L.  Kappa/lambda ratio 0.79.  Her total 24-hour urine protein was 410 mg, with monoclonal lambda free light chain 41.8% and monoclonal IgG lambda 2.6%.     Serum paraprotein studies on 7/24/2019 reported monoclonal spike 3.9 g/dL, serum IgG 4107, IgA 14 IgM 19, free kappa chain 12.8 and free lambda chain 656, kappa/lambda ratio 0.02.    On 08/01/2019 she has severe thrombocytopenia platelets 27,000.  She has elevated WBC to 12,600 including ANC 8900 and normal lymphocytes 2000.  Her hemoglobin is 10.4 08/01/2019. The patient underwent a bone marrow biopsy on 08/12/2019 and the pathology report showed hypercellular bone marrow with involvement by plasma cell myeloma and no metastatic carcinoma, granulomas, vasculitis, viral inclusions, increase in myeloblasts, or malignant lymphoma. Over 85% of the bone marrow are plasma cells. Normal karyotype. Flow cytometry study was positive.    Because of her persistent severe thrombocytopenia, the patient underwent a bone marrow biopsy on 08/12/2019.  Pathology evaluation showed hypercellular bone marrow with involvement by plasma cell myeloma 85-90% and no metastatic carcinoma, granulomas, vasculitis, viral inclusions, increase in myeloblasts, or malignant lymphoma.  Normal karyotype.     Recent laboratory study on 8/15/2019 reported of elevated ferritin 524, normal iron saturation 42% with free iron 59 and a TIBC 140.  Normal thyroid function profile.  Her serum globulin 7.3, total protein 10.1 and albumin 2.8, calcium 8.1 creatinine 1.99.    She does continue to have severe intermittent pain in her  right hip which is managed somewhat with pain medication, including tylenol and prescription Norco. She states she has difficulty ambulating and functioning normally when this pain occurs. She continues on oral Vitamin B-12 and folic acid supplementation.     Recent XR right hip with or without pelvis on 08/15/2019 showed sclerosis at the pubic symphysis. No other bony or articular abnormality was identified. The hip joints were symmetric and appeared within normal limits. The joint spaces were fairly well-maintained. There was no bony erosion. There was no evidence of recent or old fracture or subluxation. A right ureteral stent was noted.     On 08/30/2019, switched treatment from Empliciti to oral ixazomib 3 weeks on and 1 week off. She is expected to start the medication on 9/5/2019 when this medication arrives.  Patient will continue dexamethasone weekly at the same time as part of the treatment.  Because of her severe thrombocytopenia, we recommended starting low-dose ixazomib 2.3 mg weekly.  Treatment was started on 9/5/2019.     Laboratory study on 9/26/2019 showed worsening leukocytosis with WBC 20,780 including ANC 16,000, lymphocytes 2700 and monocytes 8070.  She also has worsening severe thrombocytopenia with platelets 27,000, and stable hemoglobin 9.5.     Suspect the patient may have some kind of infection however she denies fever sweating chills no dysuria or hematuria.  She denies pain in the right flank area where she has ureteral stent and oftentimes complains of pain when she has urinary tract infection.      I searched medical records, she had a replacement of stent 3 months ago.  I recommended urinalysis and culture on 9/26/2019 for further evaluation despite that she has no symptoms.I called and spoke to patient about her urinalysis which showed a large quantity of bacteria and WBC.  I e-scribed cefdinir to her pharmacy, 300 mg twice a day for 7 days. I called the patient today, reporting her  "urine culture results with multidrug sensitive E. coli.  She will continue cefdinir as we prescribed.  Should be sensitive according to the culture results.I think this patient will need to follow-up with her urologist Dr. Everett for stent replacement.  I sent a message to Dr. Everett.    On 10/17/2019, patient also reports she has intermittent chest pain/epigastric area/stomach pain for the past several weeks, to the point that she thought she was not able to make it.  Patient reports she had stress test this week and was told to having nothing wrong.  Patient also reports this is not related to her eating.  No apparent effect of exacerbation or relieving.        Review of Systems   Constitutional: Positive for fatigue. Negative for appetite change, chills, fever and unexpected weight change.   HENT:   Negative for mouth sores, nosebleeds, sore throat and trouble swallowing.    Respiratory: Negative for cough and shortness of breath.    Cardiovascular: Negative for chest pain and leg swelling.   Gastrointestinal: Negative for abdominal pain, constipation, diarrhea, nausea and vomiting.   Endocrine: Negative for hot flashes.   Genitourinary: Positive for dysuria and frequency.         Urgency   Musculoskeletal: Negative for arthralgias and myalgias.   Skin: Negative for rash.   Neurological: Negative for dizziness and extremity weakness.   Hematological: Negative for adenopathy. Does not bruise/bleed easily.   Psychiatric/Behavioral: Negative for sleep disturbance.     Medications: The current medication list was reviewed in the EMR.         Allergies   Allergen Reactions   • Zosyn [Piperacillin Sod-Tazobactam So] Swelling     Face and lips       VITALS:   Vitals:    11/13/19 1039   BP: 124/76   Pulse: 75   Resp: 18   Temp: 97.3 °F (36.3 °C)   TempSrc: Oral   SpO2: 98%   Weight: 80 kg (176 lb 6.4 oz)   Height: 157.5 cm (62.01\")   PainSc:   9   PainLoc: Vagina  Comment: UTI   PS: ECOG 1       Physical Exam "   Constitutional: She is oriented to person, place, and time. She appears well-developed and well-nourished. No distress.   HENT:   Head: Normocephalic and atraumatic.   Mouth/Throat: Oropharynx is clear and moist and mucous membranes are normal. No oropharyngeal exudate.   Eyes: Pupils are equal, round, and reactive to light.   Neck: Normal range of motion.   Cardiovascular: Normal rate, regular rhythm and normal heart sounds.   No murmur heard.  Pulmonary/Chest: Effort normal and breath sounds normal. No respiratory distress. She has no wheezes. She has no rhonchi. She has no rales.   Abdominal: Soft. Normal appearance and bowel sounds are normal. She exhibits no distension. There is no hepatosplenomegaly. There is no CVA tenderness.   Musculoskeletal: Normal range of motion. She exhibits no edema.   Neurological: She is alert and oriented to person, place, and time.   Skin: Skin is warm and dry. No rash noted.   Psychiatric: She has a normal mood and affect.   Vitals reviewed.      Recent lab results:   Results from last 7 days   Lab Units 11/13/19  1028 11/07/19  0856   WBC 10*3/mm3 10.71 16.19*   NEUTROS ABS 10*3/mm3 7.93* 9.30*   LYMPHS ABS 10*3/mm3 1.18  --    HEMOGLOBIN g/dL 10.1* 9.7*   HEMATOCRIT % 32.2* 32.8*   PLATELETS 10*3/mm3 20* 27*      Glucose   Date Value Ref Range Status   11/07/2019 100 74 - 124 mg/dL Final     BUN   Date Value Ref Range Status   11/07/2019 20 6 - 20 mg/dL Final   10/18/2019 26 (H) 7 - 20 mg/dL Final     Creatinine   Date Value Ref Range Status   11/07/2019 1.91 (C) 0.60 - 1.10 mg/dL Final   10/18/2019 2.2 (H) 0.7 - 1.5 mg/dL Final   02/18/2019 2.1 (H) 0.7 - 1.5 mg/dL Final     Sodium   Date Value Ref Range Status   11/07/2019 138 134 - 145 mmol/L Final   10/18/2019 144 137 - 145 mmol/L Final     Potassium   Date Value Ref Range Status   11/07/2019 3.7 3.5 - 4.7 mmol/L Final   10/18/2019 3.9 3.5 - 5.1 mmol/L Final     Chloride   Date Value Ref Range Status   11/07/2019 110 (H)  98 - 107 mmol/L Final   10/18/2019 122 (H) 98 - 107 mmol/L Final     CO2   Date Value Ref Range Status   11/07/2019 18.3 (L) 22.0 - 29.0 mmol/L Final   02/18/2019 22 22 - 30 mmol/L Final     Total CO2   Date Value Ref Range Status   10/18/2019 20 (L) 22 - 30 mmol/L Final     Calcium   Date Value Ref Range Status   11/07/2019 8.6 8.5 - 10.2 mg/dL Final   10/18/2019 8.5 8.4 - 10.2 mg/dL Final     Total Protein   Date Value Ref Range Status   11/07/2019 11.5 (H) 6.3 - 8.0 g/dL Final     Albumin   Date Value Ref Range Status   11/07/2019 2.70 (L) 3.50 - 5.20 g/dL Final     ALT (SGPT)   Date Value Ref Range Status   11/07/2019 10 0 - 33 U/L Final     AST (SGOT)   Date Value Ref Range Status   11/07/2019 16 0 - 32 U/L Final     Alkaline Phosphatase   Date Value Ref Range Status   11/07/2019 49 38 - 116 U/L Final     Total Bilirubin   Date Value Ref Range Status   11/07/2019 0.3 0.2 - 1.2 mg/dL Final     eGFR Non  Amer   Date Value Ref Range Status   08/30/2016  >60 mL/min/1.73 Final     Comment:     <15 Indicative of kidney failure.     A/G Ratio   Date Value Ref Range Status   10/24/2019 0.5 (L) 0.7 - 1.7 Final     BUN/Creatinine Ratio   Date Value Ref Range Status   11/07/2019 10.5 7.3 - 30.0 Final   10/18/2019 11.8 RATIO Final     Anion Gap   Date Value Ref Range Status   11/07/2019 9.7 5.0 - 15.0 mmol/L Final       Lab Results   Component Value Date    IRON 61 10/24/2019    TIBC 155 (L) 10/24/2019    FERRITIN 455.40 (H) 10/24/2019     Lab Results   Component Value Date    FOLATE 7.34 10/24/2019     Lab Results   Component Value Date    XZGUUCYR58 1,808 (H) 10/24/2019       Assessment/Plan   1. Multiple myeloma, IgG lambda, stage III. Failed multiple lines of therapy. Her treatment was switched to Darzalex on 5/26/2016. She was on this treatment for more than 2 years.     · In November 2018, she clearly had disease progression.  Darzalex was discontinued and was started on bendamustine plus Velcade plus  dexamethasone.   · Cycle #1 day #1 Bendamustine was started on 11/29/2018.  Due to poor tolerance with profound fatigue, chemotherapy was modified with bendamustine only given on day 1, and a Velcade weekly, every 4 weeks as 1 cycle.  Patient had a cycle #6 dose reduction of bendamustine by 25% because of severe thrombocytopenia.   · This patient actually had a micaela of response on 3/28/2019 with M spike 3.2 g/dL and serum IgG 3600.  After that M spike and serum IgG both were elevating.   · Laboratory study obtained on 5/9/2019 after 6 cycles chemotherapy, it showed disease further progressed with worsening level of serum IgG 4873 mg/dL and M spike 4.3 g/dL.    · On 5/23/2019, patient was switched to Empliciti plus Velcade and decadron.    · Her serum protein studies reported persistent active disease, not well controlled with large quantity of monoclonal spike.  She also has severe thrombocytopenia oftentimes leading to hold Velcade injection.  · I recommended to obtain bone marrow aspiration biopsy for reanalysis.  This patient also has a history of breast cancer so we need to be open-minded.  She had bone marrow aspirate biopsy on 8/12/2019 which showed more than 85% of bone marrow cells are malignant plasma cells.  There is no evidence of metastatic disease or other type of malignancy.  · I discussed with the patient on 8/30/2019, recommended switching treatment to Ixazomib plus dexamethasone.  Considering her pre-existing severe thrombocytopenia, I recommended starting low-dose Ixazomib 2.3 mg weekly instead of full dose 4 mg weekly.    · She started new treatment on 9/5/2019 with dexamethasone 20 mg weekly.  oral ixazomib/Dex 3 weeks on and 1 week off. She is to start the ixazomib at decreased dose 2.3 mg weekly starting on 9/5/2019.   · Patient has significant disease progression on 10/24/2019 with serum increased M spike, serum IgG and free lambda chain.  · 10/31/2019 Dr Araujo discuessed options with the  patient and suggested Darzalex/Velcade/dexamethasone as it  would be easy on her bone marrow. She is here today to initiate treatment, 11/7/2019.    2.  Severe thrombocytopenia.  This is more likely secondary to her multiple myeloma, based on her recent bone marrow aspiration biopsy on 8/12/2019. patient has no easy bleeding or bruising.     · Patient was last transfused with platelets on May 13, 2019 prior to her EGD.  · The patient underwent a bone marrow biopsy on 08/12/2019 and the pathology report showed hypercellular bone marrow with involvement by plasma cell myeloma and no metastatic carcinoma, granulomas, vasculitis, viral inclusions, increase in myeloblasts, or malignant lymphoma. Over 80% of the bone marrow are plasma cells. Normal karyotype.   · Laboratory study on 8/30/2019 is showed platelets of 34,000. We switched her treatment to oral ixazomib 3 weeks on and 1 week off starting 9/05/19.    · Platelet count today is 20,000. No active bleeding.    3.  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    · Her laboratory study on 5/9/2019 showed no evidence of iron deficiency.  On 5/16/2019, B12 level of 1819 and normal folate of 5.84.  She was symptomatic anemic with Hb 8.5, she was transfused with 2 units of packed red blood cells.    · Recent labs on 8/15/2019 reported no evidence of iron deficiency, had normal iron saturation and excellent ferritin level.  · Laboratory study on 10/24/2019 reported no evidence of iron deficiency, nor B12 folic acid deficiency.  She will continue oral B12 supplementation.    · Her hemoglobin is 10.2 on 10/31/2019, no need for Procrit today.  · 11/13/2019 hemoglobin 10.1.       4. Zometa / Xgeva treatment.  Because of her kidney insufficiency, we were only able to give her Zometa every 3 months.  Due to her elevated creatinine level, we eventually switched her to Xgeva treatment and continued monthly.  · She had elevated phosphorus 5.6 on  6/6/2019.   · Her last Xgeva was 10/10/2019.  She had normal phosphorus and magnesium level that day.        5. History of stage II left breast cancer, post mastectomy in 2013, negative for ER/MI and positive HER-2. No neoadjuvant chemotherapy because of concurrent discovery of multiple myeloma and ongoing chemotherapy. She had a normal mammogram study on 7/26/2019.       6.  Right middle lobe pulmonary nodule, documented on CT scan of chest on 01/06/2017. Repeated CT scan of chest on 8/21/2017 reported a small 5 mm and stable primary nodule.      7.  Skin rashes on her extremities:  Patient was seen dermatologist Dr. Barroso, who prescribed steroids cream and also over-the-counter moisturizing cream.  Patient will follow-up with Dr. Barroso again.  Currently resolved.    8. Profound fatigue: This is multifactorial secondary to her disease progression and her worsening anemia associated with chemotherapy.   · A thyroid level was obtained and patient was diagnosed with subclinical hypothyroidism.  She was initiated on Synthroid and folic acid and has noted significant reduction in fatigue.     9.  Hypocalcemia.  The patient has struggled with compliance with her calcium supplements.  She reports she has been taking these inconsistently, though they resulted in soft frequent bowel movements.  We added 2 tablets of Tums daily along with current calcium/vitamin D supplementation.    Calcium 11/7/19 8.6.    10.  Gastric ulcer diagnosed in May 2019 epigastric discomfort.    · Patient had EGD examination by Dr. Rivera on 5/13/2019.  It reported prepyloric ulceration.  Dr. iRvera recommended Protonix twice a day. With increased dose of Protonix, she has had no further issues.    · She did not complain of this today.  11.  Worsening leukocytosis/neutrophilia.  Total WBC 10.71, ANC 7.93.      12.  Dysuria: with burning, frequency and urgency present since Saturday night.  She is afebrile.  She does have a stent in place by  Dr. Everett that was just changed at the end of October. Will start her empirically on Cipro 250 mg twice daily (dose adjusted due to creatinine clearance which calculates as 36).  Urine culture is pending.    Plan:  · Start Cipro 250 mg twice daily.    · Check urine culture.  · Return for follow-up visit tomorrow with Dr. Araujo for reevaluation prior to her next dose of Darzalex/ Velcade.    MADAI Zamora

## 2019-11-18 NOTE — TELEPHONE ENCOUNTER
----- Message from Merlyn Mario sent at 11/18/2019  1:02 PM EST -----  Contact: [phone]  Pt has questions about treatment.      Called and spoke with pt. She states she needs to have stent replaced on 11/21 (scheduled for darzalex that day) bc she has been having recurrent UTI's. The following darzalex is scheduled for 11/27 (Wednesday before thanksgiving). Informed pt we will move her appt this week to 11/20, keep 11/27 appt and then move back to thursdays. Pt also wanted to know if she needs platelets prior to stent placement, if so we need to schedule 1 unit platelets, early 11/21, pt's stent placement is at 1130.     She needs to have platelets transfusion.  Last time we did a transfusion at the [facility], and then she went into [facility] to have stent replaced.  It' very odd situation but we have to do it that way I guess.     Thanks!     WJZ.     Orders placed for platelets in orders only encounter. Message sent to scheduling to make appt for 11/21 early morning

## 2019-11-18 NOTE — TELEPHONE ENCOUNTER
Reviewed with patient. She states symptoms have improved slightly but still some discomfort. She thinks she needs her stent replaced. Reviewed with Dr Araujo he would still like to change abx. Informed Dr Araujo pt is allergice to zosyn(penicillin family) so Augmentin is contraindicated. Dr Araujo would like to order cefdinir 300mg BID for 10 days. Sent to patient's pharmacy and notified patient. Pt is going to call to see if she should have stent replaced too, she will let us know in case she needs platelet orders.     ----- Message from Mira Beltre sent at 11/18/2019  8:18 AM EST -----  Regarding: FW: UTI      ----- Message -----  From: Zane Araujo MD PhD  Sent: 11/17/2019   9:41 PM  To: [facility] [place]  Subject: UTI                                              Please call patient Monday morning, to check whether she has improved with her urine tract infection symptoms.  Her urine culture came back now with mixed silas, no specific bacteria was reported nor sensitivity study.  She was given Cipro.  If patient has no improvement, may need to be switched to Augmentin 875 mg twice a day for 10 days.    Thanks!    Dr. Araujo

## 2019-11-19 NOTE — TELEPHONE ENCOUNTER
----- Message from Asmita Limon RN sent at 11/18/2019  3:45 PM EST -----  Order placed for platelets on 11/21. She has surgery scheduled at [facility] at 1130 and has to have platelets before surgery. Please call her with time.    Thanks Asmita

## 2019-11-21 NOTE — PATIENT INSTRUCTIONS
Platelet Transfusion  A platelet transfusion is a procedure in which you receive donated platelets through an IV. Platelets are tiny pieces of blood cells. When you get an injury, platelets clump together in the area to form a blood clot. This helps stop bleeding and is the beginning of the healing process. If you have too few platelets, your blood may have trouble clotting. This may cause you to bleed and bruise very easily.  You may need a platelet transfusion if you have a condition that causes a low number of platelets (thrombocytopenia). A platelet transfusion may be used to stop or prevent excessive bleeding.  Tell a health care provider about:  · Any reactions you have had during previous transfusions.  · Any allergies you have.  · All medicines you are taking, including vitamins, herbs, eye drops, creams, and over-the-counter medicines.  · Any blood disorders you have.  · Any surgeries you have had.  · Any medical conditions you have.  · Whether you are pregnant or may be pregnant.  What are the risks?  Generally, this is a safe procedure. However, problems may occur, including:  · Fever.  · Infection.  · Allergic reaction to the donor platelets.  · Your body's disease-fighting system (immune system) attacking the donor platelets (hemolytic reaction). This is rare.  · A rare reaction that causes lung damage (transfusion-related acute lung injury).  What happens before the procedure?  Medicines  · Ask your health care provider about:  ? Changing or stopping your regular medicines. This is especially important if you are taking diabetes medicines or blood thinners.  ? Taking medicines such as aspirin and ibuprofen. These medicines can thin your blood. Do not take these medicines unless your health care provider tells you to take them.  ? Taking over-the-counter medicines, vitamins, herbs, and supplements.  General instructions  · You will have a blood test to determine your blood type. Your blood type  determines what kind of platelets you will be given.  · Follow instructions from your health care provider about eating or drinking restrictions.  · If you have had an allergic reaction to a transfusion in the past, you may be given medicine to help prevent a reaction.  · Your temperature, blood pressure, pulse, and breathing will be monitored.  What happens during the procedure?    · An IV will be inserted into one of your veins.  · For your safety, two health care providers will verify your identity along with the donor platelets about to be infused.  · A bag of donor platelets will be connected to your IV. The platelets will flow into your bloodstream. This usually takes 30-60 minutes.  · Your temperature, blood pressure, pulse, and breathing will be monitored during the transfusion. This helps detect early signs of any reaction.  · You will also be monitored for other symptoms that may indicate a reaction, including chills, hives, or itching.  · If you have signs of a reaction at any time, your transfusion will be stopped, and you may be given medicine to help manage the reaction.  · When your transfusion is complete, your IV will be removed.  · Pressure may be applied to the IV site for a few minutes to stop any bleeding.  · The IV site will be covered with a bandage (dressing).  The procedure may vary among health care providers and hospitals.  What happens after the procedure?  · Your blood pressure, temperature, pulse, and breathing will be monitored until you leave the hospital or clinic.  · You may have some bruising and soreness at your IV site.  Follow these instructions at home:  Medicines  · Take over-the-counter and prescription medicines only as told by your health care provider.  · Talk with your health care provider before you take any medicines that contain aspirin or NSAIDs. These medicines increase your risk for dangerous bleeding.  General instructions  · Change or remove your dressing as told  by your health care provider.  · Return to your normal activities as told by your health care provider. Ask your health care provider what activities are safe for you.  · Do not take baths, swim, or use a hot tub until your health care provider approves. Ask your health care provider if you may take showers.  · Check your IV site every day for signs of infection. Check for:  ? Redness, swelling, or pain.  ? Fluid or blood. If fluid or blood drains from your IV site, use your hands to press down firmly on a bandage covering the area for a minute or two. Doing this should stop the bleeding.  ? Warmth.  ? Pus or a bad smell.  · Keep all follow-up visits as told by your health care provider. This is important.  Contact a health care provider if you have:  · A headache that does not go away with medicine.  · Hives, rash, or itchy skin.  · Nausea or vomiting.  · Unusual tiredness or weakness.  · Signs of infection at your IV site.  Get help right away if:  · You have a fever or chills.  · You urinate less often than usual.  · Your urine is darker colored than normal.  · You have any of the following:  ? Trouble breathing.  ? Pain in your back, abdomen, or chest.  ? Cool, clammy skin.  ? A fast heartbeat.  Summary  · Platelets are tiny pieces of blood cells that clump together to form a blood clot when you have an injury. If you have too few platelets, your blood may have trouble clotting.  · A platelet transfusion is a procedure in which you receive donated platelets through an IV.  · A platelet transfusion may be used to stop or prevent excessive bleeding.  · After the procedure, check your IV site every day for signs of infection, including redness, swelling, pain, or warmth.  This information is not intended to replace advice given to you by your health care provider. Make sure you discuss any questions you have with your health care provider.  Document Released: 10/14/2008 Document Revised: 01/23/2019 Document  Reviewed: 01/23/2019  [employer] Patient Education © 2019 [employer]

## 2019-11-27 NOTE — PROGRESS NOTES
REASONS FOR FOLLOW UP:   1. IgG kappa multiple myeloma, was on Darzalex, started on May 26, 2016. Patient was switched to Darzalex from Pomalyst, as she continued to be neutropenic with recurrent urinary tract infection while on Pomalyst.    2. Zometa is on hold due to renal insufficiency.    3. After 16 doses of Darzalex, laboratory study showed stable disease in November 2016. Insurance company denied adding Velcade and dexamethasone. Patient is to be continued on monthly Darzalex from 12/06/16.   4. Obstructive right pyelonephritis with positive urine culture for E. coli, required hospitalization from 1/19/2017 through 01/24/2017 with iv antibiotics and stent exchange on 01/20/2017.   5.  Paraprotein studies on March 27, 2017 showed further slight improvement of multiple myeloma.   6.  Febrile illness, with urinary tract infection and influenza B infection in early May 2017, required hospitalization for 6 days.   7.  Paraprotein studies for both serum and 24-hour urine sample on 7/21/2017 showed further improvement of paraproteins.   Darzalex was continued.   8.  Serum protein study reported stable disease on 10/20/2017.  Darzalex will be continued.   9.  Laboratory studies of both serum paraprotein and a 24-hour urine protein in January 2018 showed further improvement of paraproteins.  Monthly Darzalex will be continued.   10. Repair of left flank incisional hernia in middle September 2018.   11. Serum paraprotein study reached micaela on 7/5/2018.  Since that time she has evidence of disease progression.   12. Disease progression, she was started on chemotherapy with bendamustine plus Velcade plus dexamethasone per protocol on 11/29/2018. Treatment will be bendamustine on day 1 and day 4 repeat every 28 days, Velcade and dexamethasone will be on day 1 day 4 and day 8 and day 15 repeat every 28 days.   13.  From cycle #2, patient was given only 1 dose of Treanda per cycle and continue Velcade and dexamethasone  weekly.   14.  Treanda was decreased by 20% after cycle #4 because of severe thrombocytopenia, and further decreased by another 25% on cycle #6 which is her last cycle on 4/25/2019.   15.  On 5/23/2019, patient was switched to Empliciti plus Velcade and decadron.    16.  Patient has persistent severe thrombocytopenia, no improvement after starting new regimen.    17. The patient underwent a bone marrow biopsy on 08/12/2019 and the pathology report showed hypercellular bone marrow with involvement by >85%, plasma cell myeloma and no metastatic carcinoma, granulomas, vasculitis, viral inclusions, increase in myeloblasts, or malignant lymphoma.   18. On 08/30/2019, discussed with patient to switch from Empliciti/Velcade/Dex to oral ixazomib/Dex 3 weeks on and 1 week off. She is to start the medication at decreased dose 2.3 mg weekly starting on 9/5/2019.   19.  Laboratory studies on 10/24/2019 revealed disease progression. Therefore therapy was transitioned to Darzalex and subq Velcade with oral Decadron. Patient initiated chemotherapy treatment on 11/7/2019.  20.  11/27/2019 patient seen for Darzalex and Velcade with good tolerance.    HISTORY OF PRESENT ILLNESS:   The patient is a 69 y.o. female with the above-mentioned history here today for scheduled Darzalex and Velcade.  She reports feeling well in the office today.  She was recently treated for urinary tract infection with complete resolution of symptoms.  She did have her ureteral stents removed.    She denies new symptoms such as fever, chills, bleeding, bruising, skin changes or swelling.  She actually reports good energy and is looking forward to the holiday.    Her hemoglobin today is 8.8, her platelets slightly improved at 28,000.  Her white cells are stable.        Past Medical History:   Diagnosis Date   • Anemia 10/14/2008   • Arthropathy of knee 01/07/2014   • Breast cancer (CMS/HCC) 04/2012   • Bursitis of left hip 10/18/2007   • Bursitis, knee  12/02/2013   • Calcaneal spur 08/12/2009   • Chronic kidney disease, stage III (moderate) (CMS/[facility], located within [facility])    • Colon cancer screening 07/01/2017   • Congestive heart failure (CMS/[facility], located [facility])    • Diverticulitis of colon 10/17/2007   • DVT (deep venous thrombosis) (CMS/[facility], located within [facility])    • Epigastric abdominal pain 4/8/2019   • Gastroesophageal reflux disease 4/8/2019   • H/O Diastolic dysfunction    • H/O Mitral regurgitation    • History of Clostridium difficile 04/01/2014   • History of echocardiogram 04/2014   • History of MRSA infection    • History of obesity    • History of transfusion    • Hydronephrosis    • Hypertension    • LLL pneumonia (CMS/[facility], located [facility]) 04/23/2009   • Malignant neoplasm of kidney (CMS/[facility], located [facility]) 12/2009   • Multiple myeloma (CMS/[facility], located [facility]) 04/2012   • Myocardial infarction (CMS/[facility], located [facility])    • Neoplasm of uncertain behavior 10/12/2015   • Non-STEMI (non-ST elevated myocardial infarction) (CMS/[facility], located [facility])    • Nonischemic cardiomyopathy (CMS/[facility] located [facility])    • Pyelonephritis 03/2004   • Seizures (CMS/[facility], located [facility]) 10/17/2007   • Thrombocytopenia (CMS/[facility] located [facility])    • UTI (urinary tract infection) 10/30/2014   • UTI (urinary tract infection) 03/28/2014   • Ventral hernia      Past Surgical History:   Procedure Laterality Date   • BONE MARROW BIOPSY N/A 04/11/2012   • BRAIN SURGERY  2005    Meningioma   • BRAIN SURGERY  1990    Tumor benign per patient   • BREAST BIOPSY Left 04/09/2012    Dr. Gregg May   • CARDIAC CATHETERIZATION Left 06/11/2012    Dr. Sudeep Haddad   • CARDIAC CATHETERIZATION N/A 08/15/2006    Dr. Brian Bhatt   • COLONOSCOPY N/A 8/30/2017    Procedure: COLONOSCOPY into cecum and TI with cold polypectomies;  Surgeon: Trudy Rivera MD;  Location: [facility] ENDOSCOPY;  Service:    • COLONOSCOPY W/ POLYPECTOMY N/A unknown    2 polyps, benign   • CYSTOSCOPY N/A 3/25/2016    Procedure: CYSTOSCOPY  WITH RIGHT STENT CHANGE;  Surgeon: Lakhwinder Russo MD;  Location: [facility] OR;  Service:    • CYSTOSCOPY N/A 03/10/2012    Cystoscopy, right  retrograde, right double-J stent-Dr. Sloan May   • CYSTOSCOPY N/A 02/25/2012    Cystoscopy, stent removal, right retrograde pyelogram, replacement of right double-J ureteral stent-Dr. Michael Everett   • CYSTOSCOPY W/ URETERAL STENT PLACEMENT Right 5/7/2016    Procedure: CYSTOSCOPY, URETERAL CATHETER INSERTION AND RIGHT STENT EXCHANGE ;  Surgeon: Michael Everett Jr., MD;  Location: [facility];  Service:    • CYSTOSCOPY W/ URETERAL STENT PLACEMENT N/A 1/20/2017    Procedure: CYSTOSCOPY RIGHT RETROGRADE PYELOGRAM, URETERAL STENT CHANGE;  Surgeon: Lakhwinder Russo MD;  Location: [facility];  Service:    • CYSTOSCOPY W/ URETERAL STENT PLACEMENT N/A 04/02/2015    Cystoscopy, removal of right ureteral stent, right retrograde pyelogram, placement of right double-J ureteral stent-Dr. Michael Everett   • CYSTOSCOPY W/ URETERAL STENT PLACEMENT N/A 04/26/2015    Cystoscopy, removal of right ureteral stent, right retrograde pyelogram, replacement of right ureteral stent 24 cm x 7-Malay without retrieval line-Dr. Jose Gomez   • CYSTOSCOPY W/ URETERAL STENT PLACEMENT N/A 12/22/2014    Cystoscopy, removal of right double-J ureteral stent, right retrograde pyelogram, placement of right double-J ureteral stent-Dr. Michael Everett   • CYSTOSCOPY W/ URETERAL STENT PLACEMENT N/A 09/22/2014    Cystoscopy, removal of right ureteral stent, removal of right retrograde pyelogram, replacement of right double-J ureteral stent-Dr. Michael Everett   • CYSTOSCOPY W/ URETERAL STENT PLACEMENT N/A 06/18/2014    Cystoscopy, removal of right double-J ureteral stent, right retrograde pyelogram, placement of right double-J ureteral stent-Dr. Michael Everett   • CYSTOSCOPY W/ URETERAL STENT PLACEMENT N/A 01/23/2014    Cystoscopy, removal of right double-J ureteral stent, right retrograde pyelogram, placement of right double-J ureteral stent-Dr. Michael Everett   • CYSTOSCOPY W/ URETERAL STENT PLACEMENT N/A 03/27/2012    Cystoscopy, removal of ureteral  stent, right retrograde pyelogram, replacement of indewlling right ureteral stent-Dr. Michael Everett   • CYSTOSCOPY W/ URETERAL STENT PLACEMENT N/A 03/27/2013    Cystoscopy, right retrograde pyelogram, removal and replacement of right double-J ureteral stent-Dr. Michael Everett   • CYSTOSCOPY W/ URETERAL STENT PLACEMENT N/A 11/12/2012    Cystoscopy, stent removal, right retrograde pyelogram, replacement of right double-J ureteral stent-Dr. Michael Everett   • CYSTOSCOPY W/ URETERAL STENT PLACEMENT N/A 09/24/2011    Cystoscopy, removal of ureteral stent, right retrograde pyelogram and placement of right double-J ureteral stent-Dr. Michael Everett   • CYSTOSCOPY W/ URETERAL STENT PLACEMENT N/A 05/14/2011    Cystoscopy, removal of right ureteral stent, right retrograde pyelogram and placement of new right double-J ureteral stent-Dr. Michael Everett   • CYSTOSCOPY W/ URETERAL STENT PLACEMENT N/A 12/31/2010    Cystoscopy, stent removal, right retrograde pyelogram, replacement of 7 Setswana x 26 cm double-J stent-Dr. Michael Everett   • CYSTOSCOPY W/ URETERAL STENT PLACEMENT N/A 08/28/2010    Cystoscopy, stent removal, right retrograde pyelogram with interpretation, placement of right double-J ureteral stent-Dr. Michael Everett   • CYSTOSCOPY W/ URETERAL STENT PLACEMENT N/A 05/24/2010    Cystoscopy, right retrograde pyelogram, replacement of right double-J ureteral stent-Dr. Michael Everett   • CYSTOSCOPY W/ URETERAL STENT PLACEMENT N/A 02/12/2010    Cystoscopy, right retrograde pyelogram and placement of right double-J ureteral stent-Dr. Michael Everett   • CYSTOSCOPY W/ URETERAL STENT PLACEMENT N/A 02/23/2009    Cystoscopy, right retrograde pyelogram, placement of right double-J ureteral stent-Dr. Michael Everett   • CYSTOSCOPY W/ URETERAL STENT PLACEMENT N/A 09/17/2007    Dr. Michael Everett   • CYSTOSCOPY W/ URETERAL STENT PLACEMENT Right 01/29/2018    Dr. Michael Everett   • CYSTOSCOPY W/ URETERAL STENT PLACEMENT N/A 06/04/2018    Cystoscopy, removal of right  double-J ureteral stent and placement of right double-J ureteral stent. Dr. Michael Everett.   • CYSTOSCOPY W/ URETERAL STENT PLACEMENT N/A 03/06/2018    Cystoscopy, removal of right ureteral stent, replacement of right double-J ureteral stent. Dr. Michael Everett   • ELBOW PROCEDURE Bilateral 1990s   • ENDOSCOPY N/A 5/13/2019    Procedure: ESOPHAGOGASTRODUODENOSCOPY WITH BIOPSIES;  Surgeon: Trudy Rivera MD;  Location: [facility] ENDOSCOPY;  Service: General   • HERNIA REPAIR  09/03/2018   • LASIK Bilateral    • MASTECTOMY Left 04/25/2012    Left total mastectomy with sentinel lymph node biopsies and left axillary lymphatic mapping-Dr. Gregg May   • MEDIPORT INSERTION, DOUBLE Right    • NEPHRECTOMY Left 12/30/2009    Dr. 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 Cristo Pittman   • TOTAL ABDOMINAL HYSTERECTOMY Bilateral 2007    Dr. Todd   • URETEROURETEROSTOMY Right 05/01/2006    Dr. Michael Everett   • VENA CAVA FILTER INSERTION N/A 05/08/2006    Dr. Jordon Barber   • VENOUS ACCESS DEVICE (PORT) INSERTION Right 02/25/2013    Right subclavian vein PowerPort insertion-Dr. Gregg May   • VENOUS ACCESS DEVICE (PORT) INSERTION AND REMOVAL N/A 10/06/2014    Revision of right internal jugular PowerPort with fluoroscopy, removal of peripherally inserted central catheter line-Dr. Aurora Tomlinson   • VENOUS ACCESS DEVICE (PORT) INSERTION AND REMOVAL N/A 08/14/2014    Ultrasound-guided access right internal jugular vein, PowerPort placement, removal of right subclavian port-Dr. Jordon Barber   • VENTRAL/INCISIONAL HERNIA REPAIR Left 9/19/2018    Procedure: ventral hernia repair with mesh and rectus muscle advancement flap;  Surgeon: Trudy Rivera MD;  Location: [facility] MAIN OR;  Service: General         Hematology/oncology History: See  separate document for extra information.   1.    History of left breast cancer, status post left mastectomy on 04/25/2013, stage II, T2N0M0, hormone negative, HER2/mk positive by immunohistochemistry, however, no adjuvant chemotherapy delivered because of multiple myeloma diagnosed concomitantly.        2. Multiple myeloma, IgG lambda, stage III, diagnosed April 2012, previously treated with Velcade/Decadron followed by Velcade/Decadron/Revlimid; patient developed abdominal pain and rectal bleeding on Revlimid, which was then discontinued.    3. Patient evaluated at [facility], but was not felt to be a candidate for stem cell transplant.      4.   Disease progression with therapy switched to melphalan/Velcade/prednisone per the [facility] trial on 10/22/2013; melphalan dose has been persistently decreased because of thrombocytopenia.      5. Anemia secondary to chronic renal insufficiency and myeloma, on periodic Procrit therapy p.r.n.    6. Patient had 8 cycles of VMP chemotherapy, repeat laboratory studies on 10/03/2013 showed stable disease.  Her melphalan do  se was significantly decreased due to marrow suppression.      7.   Patient had disease progression after cycle #10 VMP treatment, evidenced by laboratory studies on 01/06/2014.  We increased the melphalan dose for one cycle.  Continued disease progress after cycl  e #11 VMP treatment, despite increased dose of melphalan, as documented by laboratory study on 02/17/2014.     8. The patient was evaluated on 02/24/2014 and we recommend switching chemotherapy to Kyprolis on 02/27/2014.     9. Echocardiogram on 03/05/2014 reporting L  VEF 43%.  This is on par with her previous twice echocardiogram study, in June 2012 and November 2012, reported LVEF at 45% to 50% and 40% to 45% respectively.     10. The patient had 2 episodes of chest pain after Kyprolis during cycle 1, leading to omitted dose   on days 9 and 16.  From cycle 2, we  will give Kyprolis only once per week for 3 weeks out of every 4 weeks, as well as dose reduction of Kyprolis that was started on 04/04/2014.      11. After cycle 2 of dose-reduced Kyprolis, the patient's M spike increased fro  m 2.6 to 3.2 g/dL, with her IgG level increasing from 2.7 g/dL to 4.7 g/dL.     12.   Patient had a stress test and echocardiogram study at [facility] on 01/23/2015.  The patient had LVEF 35% to 40%.  Myocardial perfusion study reported a large, mild to modera  te severity fixed inferior wall defect, consistent with known transmural infarct versus diaphragmatic attenuation artifact.  Post stress resting ejection fraction estimated at 31%.      13. Repeated echocardiogram study on 04/03/2015 reported an LVEF 46%.  Left v  entricle is moderately dilated.  There is mild global hypokinesis of the left ventricle.  There was a grade 1 diastolic dysfunction.     14.   The patient was seen on 04/09/2015 with plan for cycle 14, day 1, of Kyprolis.  Treatment will be delayed 1 week secondary to patient'  s extreme fatigue, hemoglobin of 8.1.  We will proceed with 2 units of packed red blood cells.  She was also found to have a urinary tract infection. Patient prescribed Cipro 500 mg twice a day x7.     15. Laboratory tests on 08/24/2015 reported sligh  t disease progress after cycle #18 Kyprolis. We discussed with patient and we will switch chemotherapy to CyBorD regimen with dose reduction of Velcade to 1 mg/m2, cyclophosphamide at 240 mg/m2 (total dose 450 mg), and dexamethasone 20 mg. All therapy to   be repeated on a weekly basis.     16.   The patient had significant fatigue after one dose of cyclophosphamide that lasted for 5-6 days. She also had severe anemia, hemoglobin 7.6, required 2 units PRBC transfusion. Cyclophosphamide will be decreased to 350 mg from 2nd week.     17.  Patient continues to have significant fatigue after the reduced dose of cyclophosphamide at 350 mg; for 2 days  after chemo, she could only lie on the bed with performance status ECOG 3. From 10/13/2015, oral cyclophosphamide will be further dec  reased to 200 mg once weekly. We will continue Velcade and dexamethasone at same dose.   Evidence of disease progress, when she was on panobinostat treatment.   18. The patient also has worsening ane  ming and thrombocytopenia secondary to chemotherapy. The patient has symptomatic anemia with hemoglobin 7.8 on 02/23/2016 requiring 2 units of packed RBC transfusion. Chemotherapy with panobinostat will be discontinued.       19. Patient was switched to pomalidomide 3 mg daily for 21 days / 28 days in late March 2016.    20. Disease progression, she was switched to to the Darzalex in May 2016.       On12/6/2016, serum free lambda chain 1090 MG/L, free kappa chain 8.5 to MG/L.  Ferritin 1015, iron 99, TIBC 137 iron saturation 72%.     On January 4, 2017, vitamin B12 level 1289, folate 7.7 ng/mL. SPEP reporting gamma globulin 3.3, out of total globulin 5.0, with immunofixation reporting small quantity of monoclonal free lambda chain, plus monoclonal IgG lambda at 3.2 g/dL.  Serum IgG 4075, IgA 9 and IgM 15.  free lambda chain 1339 MG/L and free kappa chain 10.3 MG/L.      Laboratory study 3/27/2017 showed slight improvement of paraprotein, SPEP reporting M spike 2.8 g/DL, out of total globulin 4.3, and the serum IgG 3420, IgA 9, IgM 11, free lambda chain 1218 MG/L and free kappa chain 8.0 MG/L.  Total 24 hour urine sample reported at free lambda chain 142 mg, at a concentration 74.8 MG/L, free kappa chain 75 mg at a concentration 39.5 MG/L., Urine protein immunofixation reporting biclonal IgG lambda and monoclonal free lambda light chain, M spike #1 at 41.5% and monoclonal IgG lambda spike #2 at 10.5%. Serum beta-2 microglobulin is 7.3 MG/L.     Her laboratory study on 7/21/2017 reported further improvement of paraprotein is both serum and a 24-hour urine sample.  SPEP reporting monoclonal  IgG lambda 2.9 g/dL and free lambda chain 0.1 g/dL.  Serum IgG was 3261 mg/dL and IgA 5, IgM 13, free kappa chain 6.7, free lambda chain 701.3 with kappa/lambda ratio 0.01.  24-hour urine sample reported positive for Bence May protein lambda type, immunofixation positive for IgG lambda.  Total urine protein 241 mg, gamma globulin 13.1%.  Hemoglobin was 11.4 .4, platelets 122,000, WBC 6680 including neutrophils 3600, lymphocytes 2100 and monocytes 650.  Her creatinine was 1.68, at baseline level.  Liver function panel unremarkable.  Darzalex was continued.    Laboratory study on 8/25/2017 showed improved the platelets at 144,000, normal WBC 6660 and slightly improved hemoglobin at 11.7.     Patient had a colonoscopy examination on 8/30/2017 by Dr. Rivera.  It reported diverticulosis in multiple areas more severe in the sigmoid colon.  There was 2 polyps 4 mm pneumonia from transverse colon and the sigmoid colon.  Pathology evaluation reported tubular adenoma from the sigmoid colon polyp, and benign hyperplastic polyp from transverse colon.    Laboratory study on 10/20/2017 reported slightly improved monoclonal spike 2.7 g/dL by SPEP, immunofixation reported positive monoclonal IgG lambda, serum IgG 3536 mg/dL, IgA less than 5 and IgM 11, free kappa chain 5.3 mg/L, and free lambda chain 720 mg/L with kappa/lambda ratio 0.01.  Serum beta-2 microglobulin was 6.5 mg/L.   Her CBC showed a stable mild anemia with hemoglobin 11.3, macrocytosis .3, and the platelets 114,000, normal WBC 7070 including neutrophils 4100 lymphocytes 2000 monocytes 650, normal liver function panel, total protein 7.9 and albumin 3.2,  and normal electrolytes including calcium 8.1, and improved creatinine 1.59.     Laboratory study on 1/12/2018 reported serum IgG 3083 mg/dL, IgA 10, IgM 10, free kappa chain 8.5 and free lambda chain 589.1 mg/L, kappa/lambda ratio 0.01, serum protein admitted fixation reported IgG lambda monoclonal  protein and SPEP reporting M spike 3.1 g/dL, beta-2 microgram and 7.4 mg/L. serum creatinine 1.79, calcium 8.2, other electrolytes normal, hemoglobin 11.3, .5 and MCHC 31.6, platelets 129,000, WBC 6780 including neutrophils 3500 lymphocytes 2300.  Serum ferritin 653.7 ng/mL, iron 123 TIBC 174 iron saturation 71%, folic acid 12.8 ng/mL and a vitamin B12 at 873 pg/mL.  Her 24-hour urine study on 1/18/2018 reported lambda chain 32.8 mg/L, total 61 mg in 24 hours, and the free kappa chain 27.4 mg/L and 51 mg in 24 hours, and the total 24-hour urine protein 283 mg, and urine protein immunofixation positive for 5.3% monoclonal IgG lambda and positive for Bence May protein at 36.2% monoclonal lambda chain.  Monthly Darzalex was continued.       This patient had serum protein study on 04/06/2018 which reported free light chain at concentration 703.8 mg/L and free kappa chain 7.0, free kappa/lambda ratio 0.01, serum IgG 3650 mg/dL, IgM 11 and IgA 9 with serum protein electrophoresis reporting monoclonal IgG lambda 3.2 g/dL and also monoclonal free lambda light chain.  But it was unable to quantify monoclonal lambda light chain because of the small quantity of it.     A 24-hour urine study on 04/20/2018 reported total free lambda chain 60 mg in 24 hours at concentration 33.1 mg/L, free kappa chain 45 mg in 24 hours at concentration 24.8 mg/L. Total 24-hour urine protein was 279 mg. Urine protein immunofixation and UPEP reported lambda-type Bence-May protein + #1 monoclonal IgG lambda band at 34.8% and #2 monoclonal IgG lambda band at 6.9%.     Her CBC results today reported a stable hemoglobin at 11.1, platelets 130,000 and WBC 7440 including neutrophils 4100 and lymphocytes 2350, monocytes 650. Her CMP reported slightly worsened creatinine at 1.82 with BUN 33. Total protein at 8.8. Liver function panel was normal. Total serum protein 8.8 and albumin 3.2, globulin 5.6.    Reviewing her previous lab studies  especially serum paraprotein, she reached a micaela of response on 07/05/2018 when she had serum IgG 3015 mg/dL, free lambda chain 458.7 mg/L and M spike IgG lambda 2.6 g/dL and and monoclonal lambda free light chain 0.1 g/dL, total 2.7 g/dL. serum beta-2 myoglobin 7.1 mg/L.  Her ferritin was 539 ng/mL, free iron 73 TIBC 176 iron saturation 42%.    Laboratory study obtained on 11/01/2018 showed further disease progression. Her serum IgG was 5472 mg/dL, IgA 8 and IgM 10, serum kappa chain 7.9 mg/L, free lambda chain 961.3 mg/L and kappa/lambda ratio 0.01. Serum protein electrophoresis reported monoclonal IgG lambda 4.1 g/dL, and monoclonal lambda free light chain 0.1 g/dL. Her hemoglobin was 9.0, .6, MCHC 30.1, platelets 124,000 and WBC 10,000 including neutrophils 7400, lymphocytes 1200, monocytes 600.     Cycle #1 day #1 Bendamustine will be started on 11/29/2018.     Laboratory studies on 01/17/2019 reported improved paraproteins.  SPEP reported M spike 3.8 g/dL out of total 5.5 g/dL globulin.  Serum IgG was 4374 mg/dL, IgA 10 and IgM 11.  Free lambda chain 606.8 mg/L, free kappa chain 8.6 and kappa/lambda ratio 0.01.  Her serum protein immunofixation continues to be IgG lambda monoclonal.  Her CBC showed hemoglobin 9.3, .3, platelets 50,000 and WBC 8600 including ANC 5100, lymphocytes 2160.  Her Chemistry lab reported creatinine 1.92, calcium 8.3, normal liver function panel, glucose 98 with normal sodium and potassium.     For her 24-hour urine study on 3/14/2019, she had free lambda chain at a 62.1 mg/L, total 87 mg in 24 hours, free kappa chain 42 mg/L and 59 mg in 24 hours.  Urine protein immunofixation reported a monoclonal IgG lambda #1 and free lambda chain were unable to be quantified, however IgG lambda #2 was 12.8%.  Her 24-hour urine total protein was 452 mg.     For serum protein study on 3/28/2019 (on day of her cycle #5 day #1 Daya ) also reported further decreased monoclonal spike  3.2 mg/dL, and serum IgG 3600, IgA 13 IgM 12, free kappa chain 11.7, and worsening free lambda chain 1045 mg/L.     Her serum paraprotein studies on 4/11/2019 reported a serum IgG 4407 mg/dL, IgA 14, IgM 13, free kappa chain 9.2, free lambda chain 998.4 mg/L, kappa/lambda ratio 0.01.  His serum protein immunofixation was positive for IgG lambda, SPEP reporting M spike 4.3 g/dL.    I have reviewed the patient's medical history in detail and updated the computerized patient record.    Her recent 24 urine study on 7/18/2019 reported positive IgG lambda monoclonal protein and lambda type Bence-May protein, total free kappa chain 142 mg in 24 hours, at 74.6 mg/L, and total lambda chain 179 mg in 24 hours at 94.1 mg/L.  Kappa/lambda ratio 0.79.  Her total 24-hour urine protein was 410 mg, with monoclonal lambda free light chain 41.8% and monoclonal IgG lambda 2.6%.     Serum paraprotein studies on 7/24/2019 reported monoclonal spike 3.9 g/dL, serum IgG 4107, IgA 14 IgM 19, free kappa chain 12.8 and free lambda chain 656, kappa/lambda ratio 0.02.    On 08/01/2019 she has severe thrombocytopenia platelets 27,000.  She has elevated WBC to 12,600 including ANC 8900 and normal lymphocytes 2000.  Her hemoglobin is 10.4 08/01/2019. The patient underwent a bone marrow biopsy on 08/12/2019 and the pathology report showed hypercellular bone marrow with involvement by plasma cell myeloma and no metastatic carcinoma, granulomas, vasculitis, viral inclusions, increase in myeloblasts, or malignant lymphoma. Over 85% of the bone marrow are plasma cells. Normal karyotype. Flow cytometry study was positive.    Because of her persistent severe thrombocytopenia, the patient underwent a bone marrow biopsy on 08/12/2019.  Pathology evaluation showed hypercellular bone marrow with involvement by plasma cell myeloma 85-90% and no metastatic carcinoma, granulomas, vasculitis, viral inclusions, increase in myeloblasts, or malignant lymphoma.   Normal karyotype.     Recent laboratory study on 8/15/2019 reported of elevated ferritin 524, normal iron saturation 42% with free iron 59 and a TIBC 140.  Normal thyroid function profile.  Her serum globulin 7.3, total protein 10.1 and albumin 2.8, calcium 8.1 creatinine 1.99.    She does continue to have severe intermittent pain in her right hip which is managed somewhat with pain medication, including tylenol and prescription Norco. She states she has difficulty ambulating and functioning normally when this pain occurs. She continues on oral Vitamin B-12 and folic acid supplementation.     Recent XR right hip with or without pelvis on 08/15/2019 showed sclerosis at the pubic symphysis. No other bony or articular abnormality was identified. The hip joints were symmetric and appeared within normal limits. The joint spaces were fairly well-maintained. There was no bony erosion. There was no evidence of recent or old fracture or subluxation. A right ureteral stent was noted.     On 08/30/2019, switched treatment from Empliciti to oral ixazomib 3 weeks on and 1 week off. She is expected to start the medication on 9/5/2019 when this medication arrives.  Patient will continue dexamethasone weekly at the same time as part of the treatment.  Because of her severe thrombocytopenia, we recommended starting low-dose ixazomib 2.3 mg weekly.  Treatment was started on 9/5/2019.     Laboratory study on 9/26/2019 showed worsening leukocytosis with WBC 20,780 including ANC 16,000, lymphocytes 2700 and monocytes 8070.  She also has worsening severe thrombocytopenia with platelets 27,000, and stable hemoglobin 9.5.     Suspect the patient may have some kind of infection however she denies fever sweating chills no dysuria or hematuria.  She denies pain in the right flank area where she has ureteral stent and oftentimes complains of pain when she has urinary tract infection.      I searched medical records, she had a replacement of  stent 3 months ago.  I recommended urinalysis and culture on 9/26/2019 for further evaluation despite that she has no symptoms.I called and spoke to patient about her urinalysis which showed a large quantity of bacteria and WBC.  I e-scribed cefdinir to her pharmacy, 300 mg twice a day for 7 days. I called the patient today, reporting her urine culture results with multidrug sensitive E. coli.  She will continue cefdinir as we prescribed.  Should be sensitive according to the culture results.I think this patient will need to follow-up with her urologist Dr. Everett for stent replacement.  I sent a message to Dr. Everett.    On 10/17/2019, patient also reports she has intermittent chest pain/epigastric area/stomach pain for the past several weeks, to the point that she thought she was not able to make it.  Patient reports she had stress test this week and was told to having nothing wrong.  Patient also reports this is not related to her eating.  No apparent effect of exacerbation or relieving.      Patient initiated chemotherapy treatment with darzalex/velcade/dexamethasone on 11/7/2019.      Review of Systems   Constitutional: Positive for fatigue (improved). Negative for chills, diaphoresis and fever.   HENT:   Negative for mouth sores, nosebleeds, sore throat and trouble swallowing.    Eyes: Negative for icterus.   Respiratory: Negative for cough, hemoptysis and shortness of breath.    Cardiovascular: Negative for chest pain, leg swelling and palpitations.   Gastrointestinal: Negative for abdominal pain, blood in stool and nausea.   Genitourinary: Positive for dysuria (resolved). Negative for hematuria.    Musculoskeletal: Positive for arthralgias (Right hip pain stable). Negative for back pain, gait problem and myalgias.   Skin: Negative for itching and rash.   Neurological: Positive for extremity weakness (stable). Negative for dizziness, gait problem, headaches, light-headedness and numbness.   Hematological:  "Negative for adenopathy. Does not bruise/bleed easily.   Psychiatric/Behavioral: Negative for depression. The patient is not nervous/anxious.    All other systems reviewed and are negative.    Medications: The current medication list was reviewed in the EMR.         Allergies   Allergen Reactions   • Sulfa Antibiotics Swelling   • Zosyn [Piperacillin Sod-Tazobactam So] Swelling     Face and lips       VITALS:   Vitals:    11/27/19 0910   BP: 99/62   Pulse: 77   Resp: 16   Temp: 98 °F (36.7 °C)   TempSrc: Oral   SpO2: 98%   Weight: 78.9 kg (173 lb 14.4 oz)   Height: 157.5 cm (62.01\")   PainSc: 0-No pain   PS: ECOG 1       Physical Exam   GENERAL:  Well-developed, well-nourished -American female, in no acute distress.   SKIN:  Warm, dry without rashes, purpura or petechiae.  EYES:  Pupils equal, round and reactive to light.  EOMs intact.  Conjunctivae normal.  EARS:  Hearing intact.  NOSE:  No nasal discharge.  MOUTH:  Tongue is well-papillated; no stomatitis or ulcers.  Lips normal.  NECK:  Supple with good range of motion; no thyromegaly or masses.  LYMPHATICS:  No cervical, supraclavicular  adenopathy.  CHEST:  Lungs clear to auscultation. Good airflow.  CARDIAC:  Regular rate and rhythm without murmurs, rubs or gallops. Normal S1,S2.  ABDOMEN:  Soft, nontender with no hepatosplenomegaly or masses. Bowel sounds normal.  EXTREMITIES:  No clubbing, cyanosis or edema.  NEUROLOGICAL:  Cranial Nerves II-XII grossly intact.  No focal neurological deficits.  PSYCHIATRIC:  Normal affect and mood.      Recent lab results:   Results from last 7 days   Lab Units 11/27/19  0858   WBC 10*3/mm3 5.99   NEUTROS ABS 10*3/mm3 3.65   HEMOGLOBIN g/dL 8.8*   HEMATOCRIT % 29.3*   PLATELETS 10*3/mm3 28*      Glucose   Date Value Ref Range Status   11/27/2019 104 (H) 65 - 99 mg/dL Final     BUN   Date Value Ref Range Status   11/27/2019 30 (H) 8 - 23 mg/dL Final   10/18/2019 26 (H) 7 - 20 mg/dL Final     Creatinine   Date Value Ref " Range Status   11/27/2019 2.28 (H) 0.57 - 1.00 mg/dL Final   10/18/2019 2.2 (H) 0.7 - 1.5 mg/dL Final     Sodium   Date Value Ref Range Status   11/27/2019 135 (L) 136 - 145 mmol/L Final   10/18/2019 144 137 - 145 mmol/L Final     Potassium   Date Value Ref Range Status   11/27/2019 3.9 3.5 - 5.2 mmol/L Final   10/18/2019 3.9 3.5 - 5.1 mmol/L Final     Chloride   Date Value Ref Range Status   11/27/2019 110 (H) 98 - 107 mmol/L Final   10/18/2019 122 (H) 98 - 107 mmol/L Final     CO2   Date Value Ref Range Status   11/27/2019 17.4 (L) 22.0 - 29.0 mmol/L Final     Total CO2   Date Value Ref Range Status   10/18/2019 20 (L) 22 - 30 mmol/L Final     Calcium   Date Value Ref Range Status   11/27/2019 8.9 8.6 - 10.5 mg/dL Final   10/18/2019 8.5 8.4 - 10.2 mg/dL Final     Total Protein   Date Value Ref Range Status   11/27/2019 11.4 (H) 6.0 - 8.5 g/dL Final     Albumin   Date Value Ref Range Status   11/27/2019 2.60 (L) 3.50 - 5.20 g/dL Final     ALT (SGPT)   Date Value Ref Range Status   11/27/2019 11 1 - 33 U/L Final     AST (SGOT)   Date Value Ref Range Status   11/27/2019 16 1 - 32 U/L Final     Alkaline Phosphatase   Date Value Ref Range Status   11/27/2019 46 39 - 117 U/L Final     Total Bilirubin   Date Value Ref Range Status   11/27/2019 0.4 0.1 - 1.2 mg/dL Final     eGFR Non  Amer   Date Value Ref Range Status   08/30/2016  >60 mL/min/1.73 Final     Comment:     <15 Indicative of kidney failure.     A/G Ratio   Date Value Ref Range Status   10/24/2019 0.5 (L) 0.7 - 1.7 Final     BUN/Creatinine Ratio   Date Value Ref Range Status   11/27/2019 13.2 7.0 - 25.0 Final   10/18/2019 11.8 RATIO Final     Anion Gap   Date Value Ref Range Status   11/27/2019 7.6 5.0 - 15.0 mmol/L Final       Lab Results   Component Value Date    IRON 61 10/24/2019    TIBC 155 (L) 10/24/2019    FERRITIN 455.40 (H) 10/24/2019     Lab Results   Component Value Date    FOLATE 7.34 10/24/2019     Lab Results   Component Value Date     FZHRPMFA14 1,808 (H) 10/24/2019       Assessment/Plan   1. Multiple myeloma, IgG lambda, stage III. Failed multiple lines of therapy. Her treatment was switched to Darzalex on 5/26/2016. She was on this treatment for more than 2 years.   · In November 2018, she clearly had disease progression.  Darzalex was discontinued and was started on bendamustine plus Velcade plus dexamethasone.   · Cycle #1 day #1 Bendamustine was started on 11/29/2018.  Due to poor tolerance with profound fatigue, chemotherapy was modified with bendamustine only given on day 1, and a Velcade weekly, every 4 weeks as 1 cycle.  Patient had a cycle #6 dose reduction of bendamustine by 25% because of severe thrombocytopenia.   · This patient actually had a micaela of response on 3/28/2019 with M spike 3.2 g/dL and serum IgG 3600.  After that M spike and serum IgG both were elevating.   · Laboratory study obtained on 5/9/2019 after 6 cycles chemotherapy, it showed disease further progressed with worsening level of serum IgG 4873 mg/dL and M spike 4.3 g/dL.    · On 5/23/2019, patient was switched to Empliciti plus Velcade and decadron.    · Her serum protein studies reported persistent active disease, not well controlled with large quantity of monoclonal spike.  She also has severe thrombocytopenia oftentimes leading to hold Velcade injection.  · Bone marrow aspiration biopsy for reanalysis recommended.  This patient also has a history of breast cancer so we need to be open-minded.  She had bone marrow aspirate biopsy on 8/12/2019 which showed more than 85% of bone marrow cells are malignant plasma cells.  There is no evidence of metastatic disease or other type of malignancy.  · Recommended switching treatment to Ixazomib plus dexamethasone.  Considering her pre-existing severe thrombocytopenia, I recommended starting low-dose Ixazomib 2.3 mg weekly instead of full dose 4 mg weekly.     · She started new treatment on 9/5/2019 with dexamethasone 20  mg weekly.  oral ixazomib/Dex 3 weeks on and 1 week off. She is to start the ixazomib at decreased dose 2.3 mg weekly starting on 9/5/2019.   · Patient has significant disease progression on 10/24/2019 with serum increased M spike, serum IgG and free lambda chain.  · On 10/31/2019, I discuessed options with the patient and suggested Darzalex/Velcade/dexamethasone as it would be easy on her bone marrow. Patient initiated Cycle #1 treatment on 11/7/2019.  · 7/27/2019 continued good tolerance.    2.  Severe thrombocytopenia.  This is more likely secondary to her multiple myeloma, based on her recent bone marrow aspiration biopsy on 8/12/2019. patient has no easy bleeding or bruising.   · Patient was last transfused with platelets on May 13, 2019 prior to her EGD.  · The patient underwent a bone marrow biopsy on 08/12/2019 and the pathology report showed hypercellular bone marrow with involvement by plasma cell myeloma and no metastatic carcinoma, granulomas, vasculitis, viral inclusions, increase in myeloblasts, or malignant lymphoma. Over 80% of the bone marrow are plasma cells. Normal karyotype.   · Laboratory study on 8/30/2019 is showed platelets of 34,000. We switched her treatment to oral ixazomib 3 weeks on and 1 week off starting 9/05/19.    · Platelet count today 28,000.    3.  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    · Her laboratory study on 5/9/2019 showed no evidence of iron deficiency.  On 5/16/2019, B12 level of 1819 and normal folate of 5.84.  She was symptomatic anemic with Hb 8.5, she was transfused with 2 units of packed red blood cells.    · Recent labs on 8/15/2019 reported no evidence of iron deficiency, had normal iron saturation and excellent ferritin level.  · Laboratory study on 10/24/2019 reported no evidence of iron deficiency, nor B12 folic acid deficiency.  She will continue oral B12 supplementation.    · Her hemoglobin is 10.2 on 10/31/2019, no need  for Procrit today.  · On 11/7/2019 the patient's hemoglobin was low at 9.7. She required Procrit.   · Hemoglobin 8.8 today.  She will require Retacrit.     4.  Dysuria/urgency of urination.  Urinalysis reported large quantity of WBC and bacteria 11/13/2019.  Treated with Omnicef.  Symptoms have resolved.  She did have a stent replacement on 11/21/2019.    5. Zometa / Xgeva treatment.  Because of her kidney insufficiency, we were only able to give her Zometa every 3 months.  Due to her elevated creatinine level, we eventually switched her to Xgeva treatment and continued monthly.  · She had elevated phosphorus 5.6 on 6/6/2019.   · Her last Xgeva was 11/7/2019.           6. History of stage II left breast cancer, post mastectomy in 2013, negative for ER/TX and positive HER-2. No neoadjuvant chemotherapy because of concurrent discovery of multiple myeloma and ongoing chemotherapy. She had a normal mammogram study on 7/26/2019.       7.  Skin rashes on her extremities:  Patient was seen dermatologist Dr. Barroso, who prescribed steroids cream and also over-the-counter moisturizing cream.  Patient will follow-up with Dr. Barroso again.  Currently resolved.    8. Profound fatigue: This is multifactorial secondary to her disease progression and her worsening anemia associated with chemotherapy.   · A thyroid level was obtained and patient was diagnosed with subclinical hypothyroidism.  She was initiated on Synthroid and folic acid and has noted significant reduction in fatigue.   · Patient to continue on Synthroid daily and folic acid supplementation daily.  · Her fatigue is slightly improved today.    9.  Hypocalcemia.  The patient has struggled with compliance with her calcium supplements.  She reports she has been taking these inconsistently, though they resulted in soft frequent bowel movements.  We previously added 2 tablets of Tums daily along with current calcium/vitamin D supplementation.    · Labs pending at time of  dictation.    10.  Gastric ulcer diagnosed in May 2019 epigastric discomfort.    · Patient had EGD examination by Dr. Rivera on 5/13/2019.  It reported prepyloric ulceration.  Dr. Rivera recommended Protonix twice a day. With increased dose of Protonix, she has had no further issues.      11.  Worsening leukocytosis/neutrophilia.    · This started in middle of July 2019 with a fluctuation of WBC and neutrophils.  Suspect that this is all due to disease progression of her multiple myeloma.    · However patient had normalized.      Plan:  1. Proceed with Darzalex + Velcade + dexamethasone chemotherapy treatment as scheduled today.   2. Patient is to receive Procrit today.  Continue Procrit for hemoglobin below 10.  3. Follow-up with urology as scheduled..  4. Continue Protonix to 40 mg twice a day.  5. Continue monthly Xgeva.  She will be due on 12/5/2019.  6. Continue on Synthroid daily.     7. Continue Norco for pain management.    8. Continue calcium and vitamin D supplementation.    9. Continue oral B12 and folic acid daily.    10. Continue prophylactic acyclovir 400 mg twice daily.   11. Continue prophylactic Bactrim 3 times per week for PCP infection per protocol.  12. She will follow-up with MD as already scheduled on December 12, 2019.  13. Patient understands to call with any issues including bleeding, fever greater than 100.5, with any other concerns prior to her next office visit.       Patient is on drug therapy and requires frequent monitoring.    Addendum patient's creatinine resulted 2.28.  This is higher than recent values.  We will fax the results to the patient's nephrologist, Michael Everett M.D.       Natalia Collier, APRN  11/14/2019    Cristo Garcia MD, M.D.

## 2019-12-12 NOTE — NURSING NOTE
CBC results reviewed with Dr Araujo, states ok to proceed with treatment and ok for retacrit per protocol; and he spoke with Brenda Perez RN to set up 2 units of pRBC. Blood bank called and states that we will need 5 pink top tubes due to pt being on darzalex. T&C done and yellow band placed on pt and instructed not to remove until after transfusion.     Lab Results   Component Value Date    WBC 5.23 12/12/2019    HGB 7.7 (L) 12/12/2019    HCT 26.5 (L) 12/12/2019    .7 (H) 12/12/2019    PLT 24 (C) 12/12/2019

## 2019-12-13 NOTE — PROGRESS NOTES
REASONS FOR FOLLOW UP:   1. IgG kappa multiple myeloma, was on Darzalex, started on May 26, 2016. Patient was switched to Darzalex from Pomalyst, as she continued to be neutropenic with recurrent urinary tract infection while on Pomalyst.    2. Zometa is on hold due to renal insufficiency.    3. After 16 doses of Darzalex, laboratory study showed stable disease in November 2016. Insurance company denied adding Velcade and dexamethasone. Patient is to be continued on monthly Darzalex from 12/06/16.   4. Obstructive right pyelonephritis with positive urine culture for E. coli, required hospitalization from 1/19/2017 through 01/24/2017 with iv antibiotics and stent exchange on 01/20/2017.   5.  Paraprotein studies on March 27, 2017 showed further slight improvement of multiple myeloma.   6.  Febrile illness, with urinary tract infection and influenza B infection in early May 2017, required hospitalization for 6 days.   7.  Paraprotein studies for both serum and 24-hour urine sample on 7/21/2017 showed further improvement of paraproteins.   Darzalex was continued.   8.  Serum protein study reported stable disease on 10/20/2017.  Darzalex will be continued.   9.  Laboratory studies of both serum paraprotein and a 24-hour urine protein in January 2018 showed further improvement of paraproteins.  Monthly Darzalex will be continued.   10. Repair of left flank incisional hernia in middle September 2018.   11. Serum paraprotein study reached micaela on 7/5/2018.  Since that time she has evidence of disease progression.   12. Disease progression, she was started on chemotherapy with bendamustine plus Velcade plus dexamethasone per protocol on 11/29/2018. Treatment will be bendamustine on day 1 and day 4 repeat every 28 days, Velcade and dexamethasone will be on day 1 day 4 and day 8 and day 15 repeat every 28 days.   13.  From cycle #2, patient was given only 1 dose of Treanda per cycle and continue Velcade and dexamethasone  weekly.   14.  Treanda was decreased by 20% after cycle #4 because of severe thrombocytopenia, and further decreased by another 25% on cycle #6 which is her last cycle on 4/25/2019.   15.  On 5/23/2019, patient was switched to Empliciti plus Velcade and decadron.    16.  Patient has persistent severe thrombocytopenia, no improvement after starting new regimen.    17. The patient underwent a bone marrow biopsy on 08/12/2019 and the pathology report showed hypercellular bone marrow with involvement by >85%, plasma cell myeloma and no metastatic carcinoma, granulomas, vasculitis, viral inclusions, increase in myeloblasts, or malignant lymphoma.   18. On 08/30/2019, discussed with patient to switch from Empliciti/Velcade/Dex to oral ixazomib/Dex 3 weeks on and 1 week off. She is to start the medication at decreased dose 2.3 mg weekly starting on 9/5/2019.   19.  Laboratory studies on 10/24/2019 revealed disease progression. Therefore therapy was transitioned to Darzalex and subq Velcade with oral Decadron. Patient initiated chemotherapy treatment on 11/7/2019.  20.  11/27/2019 patient seen for Darzalex and Velcade with good tolerance.    HISTORY OF PRESENT ILLNESS:   The patient is a 69 y.o. female with the above-mentioned history here today for 2-week evaluation prior to scheduled Darzalex and Velcade.  She reports she had a ureteral stent replacement recently on 11/21/2019 due to urinary tract infection.  She denies fever sweating or chills.  She denies dysuria.  She denies bleeding such as hematuria, hematemesis or easy bleeding or bruising.  She is feeling well today with a performance status ECOG 1.  She drove herself today to the clinic.      Upon questioning, patient admits that she does have more fatigue, she has no lightheadedness no fainting.  Laboratory study today showed a deteriorating hemoglobin 7.7 today.  She also has stable but a severe thrombocytopenia with platelets 24,000.  Her WBC is normal at 5230  including ANC 2670.        Past Medical History:   Diagnosis Date   • Anemia 10/14/2008   • Arthropathy of knee 01/07/2014   • Breast cancer (CMS/HCC) 04/2012   • Bursitis of left hip 10/18/2007   • Bursitis, knee 12/02/2013   • Calcaneal spur 08/12/2009   • Chronic kidney disease, stage III (moderate) (CMS/[facility])    • Colon cancer screening 07/01/2017   • Congestive heart failure (CMS/[facility])    • Diverticulitis of colon 10/17/2007   • DVT (deep venous thrombosis) (CMS/[facility])    • Epigastric abdominal pain 4/8/2019   • Gastroesophageal reflux disease 4/8/2019   • H/O Diastolic dysfunction    • H/O Mitral regurgitation    • History of Clostridium difficile 04/01/2014   • History of echocardiogram 04/2014   • History of MRSA infection    • History of obesity    • History of transfusion    • Hydronephrosis    • Hypertension    • LLL pneumonia (CMS/HCC) 04/23/2009   • Malignant neoplasm of kidney (CMS/HCC) 12/2009   • Multiple myeloma (CMS/[facility]) 04/2012   • Myocardial infarction (CMS/[facility])    • Neoplasm of uncertain behavior 10/12/2015   • Non-STEMI (non-ST elevated myocardial infarction) (CMS/[facility])    • Nonischemic cardiomyopathy (CMS/[facility])    • Pyelonephritis 03/2004   • Seizures (CMS/HCC) 10/17/2007   • Thrombocytopenia (CMS/[facility])    • UTI (urinary tract infection) 10/30/2014   • UTI (urinary tract infection) 03/28/2014   • Ventral hernia      Past Surgical History:   Procedure Laterality Date   • BONE MARROW BIOPSY N/A 04/11/2012   • BRAIN SURGERY  2005    Meningioma   • BRAIN SURGERY  1990    Tumor benign per patient   • BREAST BIOPSY Left 04/09/2012    Dr. Gregg May   • CARDIAC CATHETERIZATION Left 06/11/2012    Dr. Sudeep Haddad   • CARDIAC CATHETERIZATION N/A 08/15/2006    Dr. Brian Bhatt   • COLONOSCOPY N/A 8/30/2017    Procedure: COLONOSCOPY into cecum and TI with cold polypectomies;  Surgeon: Trudy Rivera MD;  Location: [facility] ENDOSCOPY;  Service:    • COLONOSCOPY W/ POLYPECTOMY N/A unknown    2 polyps, benign    • CYSTOSCOPY N/A 3/25/2016    Procedure: CYSTOSCOPY  WITH RIGHT STENT CHANGE;  Surgeon: Lakhwinder Russo MD;  Location: [facility] OR;  Service:    • CYSTOSCOPY N/A 03/10/2012    Cystoscopy, right retrograde, right double-J stent-Dr. Sloan May   • CYSTOSCOPY N/A 02/25/2012    Cystoscopy, stent removal, right retrograde pyelogram, replacement of right double-J ureteral stent-Dr. Michael Everett   • CYSTOSCOPY W/ URETERAL STENT PLACEMENT Right 5/7/2016    Procedure: CYSTOSCOPY, URETERAL CATHETER INSERTION AND RIGHT STENT EXCHANGE ;  Surgeon: Michael Everett Jr., MD;  Location: [facility] OR;  Service:    • CYSTOSCOPY W/ URETERAL STENT PLACEMENT N/A 1/20/2017    Procedure: CYSTOSCOPY RIGHT RETROGRADE PYELOGRAM, URETERAL STENT CHANGE;  Surgeon: Lakhwinder Russo MD;  Location: [facility] OR;  Service:    • CYSTOSCOPY W/ URETERAL STENT PLACEMENT N/A 04/02/2015    Cystoscopy, removal of right ureteral stent, right retrograde pyelogram, placement of right double-J ureteral stent-Dr. Michael Everett   • CYSTOSCOPY W/ URETERAL STENT PLACEMENT N/A 04/26/2015    Cystoscopy, removal of right ureteral stent, right retrograde pyelogram, replacement of right ureteral stent 24 cm x 7-[place] without retrieval line-Dr. Jose Gomez   • CYSTOSCOPY W/ URETERAL STENT PLACEMENT N/A 12/22/2014    Cystoscopy, removal of right double-J ureteral stent, right retrograde pyelogram, placement of right double-J ureteral stent-Dr. Michael Everett   • CYSTOSCOPY W/ URETERAL STENT PLACEMENT N/A 09/22/2014    Cystoscopy, removal of right ureteral stent, removal of right retrograde pyelogram, replacement of right double-J ureteral stent-Dr. Michael Everett   • CYSTOSCOPY W/ URETERAL STENT PLACEMENT N/A 06/18/2014    Cystoscopy, removal of right double-J ureteral stent, right retrograde pyelogram, placement of right double-J ureteral stent-Dr. Michael Everett   • CYSTOSCOPY W/ URETERAL STENT PLACEMENT N/A 01/23/2014    Cystoscopy, removal of right  double-J ureteral stent, right retrograde pyelogram, placement of right double-J ureteral stent-Dr. Michael Everett   • CYSTOSCOPY W/ URETERAL STENT PLACEMENT N/A 03/27/2012    Cystoscopy, removal of ureteral stent, right retrograde pyelogram, replacement of indewlling right ureteral stent-Dr. Michael Everett   • CYSTOSCOPY W/ URETERAL STENT PLACEMENT N/A 03/27/2013    Cystoscopy, right retrograde pyelogram, removal and replacement of right double-J ureteral stent-Dr. Michael Everett   • CYSTOSCOPY W/ URETERAL STENT PLACEMENT N/A 11/12/2012    Cystoscopy, stent removal, right retrograde pyelogram, replacement of right double-J ureteral stent-Dr. Michael Everett   • CYSTOSCOPY W/ URETERAL STENT PLACEMENT N/A 09/24/2011    Cystoscopy, removal of ureteral stent, right retrograde pyelogram and placement of right double-J ureteral stent-Dr. Michael Everett   • CYSTOSCOPY W/ URETERAL STENT PLACEMENT N/A 05/14/2011    Cystoscopy, removal of right ureteral stent, right retrograde pyelogram and placement of new right double-J ureteral stent-Dr. Michael Everett   • CYSTOSCOPY W/ URETERAL STENT PLACEMENT N/A 12/31/2010    Cystoscopy, stent removal, right retrograde pyelogram, replacement of 7 [place] x 26 cm double-J stent-Dr. Michael Everett   • CYSTOSCOPY W/ URETERAL STENT PLACEMENT N/A 08/28/2010    Cystoscopy, stent removal, right retrograde pyelogram with interpretation, placement of right double-J ureteral stent-Dr. Michael Everett   • CYSTOSCOPY W/ URETERAL STENT PLACEMENT N/A 05/24/2010    Cystoscopy, right retrograde pyelogram, replacement of right double-J ureteral stent-Dr. Michael Everett   • CYSTOSCOPY W/ URETERAL STENT PLACEMENT N/A 02/12/2010    Cystoscopy, right retrograde pyelogram and placement of right double-J ureteral stent-Dr. Michael Everett   • CYSTOSCOPY W/ URETERAL STENT PLACEMENT N/A 02/23/2009    Cystoscopy, right retrograde pyelogram, placement of right double-J ureteral stent-Dr. Michael Everett   • CYSTOSCOPY W/ URETERAL STENT PLACEMENT N/A  09/17/2007    Dr. Michael Everett   • CYSTOSCOPY W/ URETERAL STENT PLACEMENT Right 01/29/2018    Dr. Michael Everett   • CYSTOSCOPY W/ URETERAL STENT PLACEMENT N/A 06/04/2018    Cystoscopy, removal of right double-J ureteral stent and placement of right double-J ureteral stent. Dr. Michael Everett.   • CYSTOSCOPY W/ URETERAL STENT PLACEMENT N/A 03/06/2018    Cystoscopy, removal of right ureteral stent, replacement of right double-J ureteral stent. Dr. Michael Everett   • ELBOW PROCEDURE Bilateral 1990s   • ENDOSCOPY N/A 5/13/2019    Procedure: ESOPHAGOGASTRODUODENOSCOPY WITH BIOPSIES;  Surgeon: Trudy Rivera MD;  Location: [facility] ENDOSCOPY;  Service: General   • HERNIA REPAIR  09/03/2018   • LASIK Bilateral    • MASTECTOMY Left 04/25/2012    Left total mastectomy with sentinel lymph node biopsies and left axillary lymphatic mapping-Dr. Gregg May   • MEDIPORT INSERTION, DOUBLE Right    • NEPHRECTOMY Left 12/30/2009    Dr. 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 Cristo Pittman   • TOTAL ABDOMINAL HYSTERECTOMY Bilateral 2007    Dr. Todd   • URETEROURETEROSTOMY Right 05/01/2006    Dr. Michael Everett   • VENA CAVA FILTER INSERTION N/A 05/08/2006    Dr. Jordon Barber   • VENOUS ACCESS DEVICE (PORT) INSERTION Right 02/25/2013    Right subclavian vein PowerPort insertion-Dr. Gregg May   • VENOUS ACCESS DEVICE (PORT) INSERTION AND REMOVAL N/A 10/06/2014    Revision of right internal jugular PowerPort with fluoroscopy, removal of peripherally inserted central catheter line-Dr. Aurora Tomlinson   • VENOUS ACCESS DEVICE (PORT) INSERTION AND REMOVAL N/A 08/14/2014    Ultrasound-guided access right internal jugular vein, PowerPort placement, removal of right subclavian port-Dr. Jordon Barber   • VENTRAL/INCISIONAL HERNIA REPAIR Left 9/19/2018     Procedure: ventral hernia repair with mesh and rectus muscle advancement flap;  Surgeon: Trudy Rivera MD;  Location: [facility];  Service: General         Hematology/oncology History: See separate document for extra information.   1.    History of left breast cancer, status post left mastectomy on 04/25/2013, stage II, T2N0M0, hormone negative, HER2/mk positive by immunohistochemistry, however, no adjuvant chemotherapy delivered because of multiple myeloma diagnosed concomitantly.        2. Multiple myeloma, IgG lambda, stage III, diagnosed April 2012, previously treated with Velcade/Decadron followed by Velcade/Decadron/Revlimid; patient developed abdominal pain and rectal bleeding on Revlimid, which was then discontinued.    3. Patient evaluated at [facility], but was not felt to be a candidate for stem cell transplant.      4.   Disease progression with therapy switched to melphalan/Velcade/prednisone per the [facility] trial on 10/22/2013; melphalan dose has been persistently decreased because of thrombocytopenia.      5. Anemia secondary to chronic renal insufficiency and myeloma, on periodic Procrit therapy p.r.n.    6. Patient had 8 cycles of VMP chemotherapy, repeat laboratory studies on 10/03/2013 showed stable disease.  Her melphalan do  se was significantly decreased due to marrow suppression.      7.   Patient had disease progression after cycle #10 VMP treatment, evidenced by laboratory studies on 01/06/2014.  We increased the melphalan dose for one cycle.  Continued disease progress after cycl  e #11 VMP treatment, despite increased dose of melphalan, as documented by laboratory study on 02/17/2014.     8. The patient was evaluated on 02/24/2014 and we recommend switching chemotherapy to Kyprolis on 02/27/2014.     9. Echocardiogram on 03/05/2014 reporting L  VEF 43%.  This is on par with her previous twice echocardiogram study, in June 2012 and November 2012,  reported LVEF at 45% to 50% and 40% to 45% respectively.     10. The patient had 2 episodes of chest pain after Kyprolis during cycle 1, leading to omitted dose   on days 9 and 16.  From cycle 2, we will give Kyprolis only once per week for 3 weeks out of every 4 weeks, as well as dose reduction of Kyprolis that was started on 04/04/2014.      11. After cycle 2 of dose-reduced Kyprolis, the patient's M spike increased fro  m 2.6 to 3.2 g/dL, with her IgG level increasing from 2.7 g/dL to 4.7 g/dL.     12.   Patient had a stress test and echocardiogram study at [facility] on 01/23/2015.  The patient had LVEF 35% to 40%.  Myocardial perfusion study reported a large, mild to modera  te severity fixed inferior wall defect, consistent with known transmural infarct versus diaphragmatic attenuation artifact.  Post stress resting ejection fraction estimated at 31%.      13. Repeated echocardiogram study on 04/03/2015 reported an LVEF 46%.  Left v  entricle is moderately dilated.  There is mild global hypokinesis of the left ventricle.  There was grade 1 diastolic dysfunction.    14.   The patient was seen on 04/09/2015 with plan for cycle 14, day 1, of Kyprolis.  Treatment will be delayed 1 week secondary to patient'  s extreme fatigue, hemoglobin of 8.1.  We will proceed with 2 units of packed red blood cells.  She was also found to have a urinary tract infection. Patient prescribed Cipro 500 mg twice a day x7.     15. Laboratory tests on 08/24/2015 reported sligh  t disease progress after cycle #18 Kyprolis. We discussed with patient and we will switch chemotherapy to CyBorD regimen with dose reduction of Velcade to 1 mg/m2, cyclophosphamide at 240 mg/m2 (total dose 450 mg), and dexamethasone 20 mg. All therapy to   be repeated on a weekly basis.     16.   The patient had significant fatigue after one dose of cyclophosphamide that lasted for 5-6 days. She also had severe anemia, hemoglobin 7.6, required 2 units PRBC  transfusion. Cyclophosphamide will be decreased to 350 mg from 2nd week.     17.  Patient continues to have significant fatigue after the reduced dose of cyclophosphamide at 350 mg; for 2 days after chemo, she could only lie on the bed with performance status ECOG 3. From 10/13/2015, oral cyclophosphamide will be further dec  reased to 200 mg once weekly. We will continue Velcade and dexamethasone at same dose.   Evidence of disease progress, when she was on panobinostat treatment.   18. The patient also has worsening ane  ming and thrombocytopenia secondary to chemotherapy. The patient has symptomatic anemia with hemoglobin 7.8 on 02/23/2016 requiring 2 units of packed RBC transfusion. Chemotherapy with panobinostat will be discontinued.       19. Patient was switched to pomalidomide 3 mg daily for 21 days / 28 days in late March 2016.    20. Disease progression, she was switched to to the Darzalex in May 2016.       On12/6/2016, serum free lambda chain 1090 MG/L, free kappa chain 8.5 to MG/L.  Ferritin 1015, iron 99, TIBC 137 iron saturation 72%.     On January 4, 2017, vitamin B12 level 1289, folate 7.7 ng/mL. SPEP reporting gamma globulin 3.3, out of total globulin 5.0, with immunofixation reporting small quantity of monoclonal free lambda chain, plus monoclonal IgG lambda at 3.2 g/dL.  Serum IgG 4075, IgA 9 and IgM 15.  free lambda chain 1339 MG/L and free kappa chain 10.3 MG/L.      Laboratory study 3/27/2017 showed slight improvement of paraprotein, SPEP reporting M spike 2.8 g/DL, out of total globulin 4.3, and the serum IgG 3420, IgA 9, IgM 11, free lambda chain 1218 MG/L and free kappa chain 8.0 MG/L.  Total 24 hour urine sample reported at free lambda chain 142 mg, at a concentration 74.8 MG/L, free kappa chain 75 mg at a concentration 39.5 MG/L., Urine protein immunofixation reporting biclonal IgG lambda and monoclonal free lambda light chain, M spike #1 at 41.5% and monoclonal IgG lambda spike #2 at  10.5%. Serum beta-2 microglobulin is 7.3 MG/L.     Her laboratory study on 7/21/2017 reported further improvement of paraprotein is both serum and a 24-hour urine sample.  SPEP reporting monoclonal IgG lambda 2.9 g/dL and free lambda chain 0.1 g/dL.  Serum IgG was 3261 mg/dL and IgA 5, IgM 13, free kappa chain 6.7, free lambda chain 701.3 with kappa/lambda ratio 0.01.  24-hour urine sample reported positive for Bence May protein lambda type, immunofixation positive for IgG lambda.  Total urine protein 241 mg, gamma globulin 13.1%.  Hemoglobin was 11.4 .4, platelets 122,000, WBC 6680 including neutrophils 3600, lymphocytes 2100 and monocytes 650.  Her creatinine was 1.68, at baseline level.  Liver function panel unremarkable.  Darzalex was continued.    Laboratory study on 8/25/2017 showed improved the platelets at 144,000, normal WBC 6660 and slightly improved hemoglobin at 11.7.     Patient had a colonoscopy examination on 8/30/2017 by Dr. Rivera.  It reported diverticulosis in multiple areas more severe in the sigmoid colon.  There was 2 polyps 4 mm pneumonia from transverse colon and the sigmoid colon.  Pathology evaluation reported tubular adenoma from the sigmoid colon polyp, and benign hyperplastic polyp from transverse colon.    Laboratory study on 10/20/2017 reported slightly improved monoclonal spike 2.7 g/dL by SPEP, immunofixation reported positive monoclonal IgG lambda, serum IgG 3536 mg/dL, IgA less than 5 and IgM 11, free kappa chain 5.3 mg/L, and free lambda chain 720 mg/L with kappa/lambda ratio 0.01.  Serum beta-2 microglobulin was 6.5 mg/L.   Her CBC showed a stable mild anemia with hemoglobin 11.3, macrocytosis .3, and the platelets 114,000, normal WBC 7070 including neutrophils 4100 lymphocytes 2000 monocytes 650, normal liver function panel, total protein 7.9 and albumin 3.2,  and normal electrolytes including calcium 8.1, and improved creatinine 1.59.     Laboratory study on  1/12/2018 reported serum IgG 3083 mg/dL, IgA 10, IgM 10, free kappa chain 8.5 and free lambda chain 589.1 mg/L, kappa/lambda ratio 0.01, serum protein admitted fixation reported IgG lambda monoclonal protein and SPEP reporting M spike 3.1 g/dL, beta-2 microgram and 7.4 mg/L. serum creatinine 1.79, calcium 8.2, other electrolytes normal, hemoglobin 11.3, .5 and MCHC 31.6, platelets 129,000, WBC 6780 including neutrophils 3500 lymphocytes 2300.  Serum ferritin 653.7 ng/mL, iron 123 TIBC 174 iron saturation 71%, folic acid 12.8 ng/mL and a vitamin B12 at 873 pg/mL.  Her 24-hour urine study on 1/18/2018 reported lambda chain 32.8 mg/L, total 61 mg in 24 hours, and the free kappa chain 27.4 mg/L and 51 mg in 24 hours, and the total 24-hour urine protein 283 mg, and urine protein immunofixation positive for 5.3% monoclonal IgG lambda and positive for Bence May protein at 36.2% monoclonal lambda chain.  Monthly Darzalex was continued.       This patient had serum protein study on 04/06/2018 which reported free light chain at concentration 703.8 mg/L and free kappa chain 7.0, free kappa/lambda ratio 0.01, serum IgG 3650 mg/dL, IgM 11 and IgA 9 with serum protein electrophoresis reporting monoclonal IgG lambda 3.2 g/dL and also monoclonal free lambda light chain.  But it was unable to quantify monoclonal lambda light chain because of the small quantity of it.     A 24-hour urine study on 04/20/2018 reported total free lambda chain 60 mg in 24 hours at concentration 33.1 mg/L, free kappa chain 45 mg in 24 hours at concentration 24.8 mg/L. Total 24-hour urine protein was 279 mg. Urine protein immunofixation and UPEP reported lambda-type Bence-May protein + #1 monoclonal IgG lambda band at 34.8% and #2 monoclonal IgG lambda band at 6.9%.     Her CBC results today reported a stable hemoglobin at 11.1, platelets 130,000 and WBC 7440 including neutrophils 4100 and lymphocytes 2350, monocytes 650. Her CMP reported  slightly worsened creatinine at 1.82 with BUN 33. Total protein at 8.8. Liver function panel was normal. Total serum protein 8.8 and albumin 3.2, globulin 5.6.    Reviewing her previous lab studies especially serum paraprotein, she reached a micaela of response on 07/05/2018 when she had serum IgG 3015 mg/dL, free lambda chain 458.7 mg/L and M spike IgG lambda 2.6 g/dL and and monoclonal lambda free light chain 0.1 g/dL, total 2.7 g/dL. serum beta-2 myoglobin 7.1 mg/L.  Her ferritin was 539 ng/mL, free iron 73 TIBC 176 iron saturation 42%.    Laboratory study obtained on 11/01/2018 showed further disease progression. Her serum IgG was 5472 mg/dL, IgA 8 and IgM 10, serum kappa chain 7.9 mg/L, free lambda chain 961.3 mg/L and kappa/lambda ratio 0.01. Serum protein electrophoresis reported monoclonal IgG lambda 4.1 g/dL, and monoclonal lambda free light chain 0.1 g/dL. Her hemoglobin was 9.0, .6, MCHC 30.1, platelets 124,000 and WBC 10,000 including neutrophils 7400, lymphocytes 1200, monocytes 600.     Cycle #1 day #1 Bendamustine will be started on 11/29/2018.     Laboratory studies on 01/17/2019 reported improved paraproteins.  SPEP reported M spike 3.8 g/dL out of total 5.5 g/dL globulin.  Serum IgG was 4374 mg/dL, IgA 10 and IgM 11.  Free lambda chain 606.8 mg/L, free kappa chain 8.6 and kappa/lambda ratio 0.01.  Her serum protein immunofixation continues to be IgG lambda monoclonal.  Her CBC showed hemoglobin 9.3, .3, platelets 50,000 and WBC 8600 including ANC 5100, lymphocytes 2160.  Her Chemistry lab reported creatinine 1.92, calcium 8.3, normal liver function panel, glucose 98 with normal sodium and potassium.     For her 24-hour urine study on 3/14/2019, she had free lambda chain at a 62.1 mg/L, total 87 mg in 24 hours, free kappa chain 42 mg/L and 59 mg in 24 hours.  Urine protein immunofixation reported a monoclonal IgG lambda #1 and free lambda chain were unable to be quantified, however IgG  lambda #2 was 12.8%.  Her 24-hour urine total protein was 452 mg.     For serum protein study on 3/28/2019 (on day of her cycle #5 day #1 Treanda ) also reported further decreased monoclonal spike 3.2 mg/dL, and serum IgG 3600, IgA 13 IgM 12, free kappa chain 11.7, and worsening free lambda chain 1045 mg/L.     Her serum paraprotein studies on 4/11/2019 reported a serum IgG 4407 mg/dL, IgA 14, IgM 13, free kappa chain 9.2, free lambda chain 998.4 mg/L, kappa/lambda ratio 0.01.  His serum protein immunofixation was positive for IgG lambda, SPEP reporting M spike 4.3 g/dL.    I have reviewed the patient's medical history in detail and updated the computerized patient record.    Her recent 24 urine study on 7/18/2019 reported positive IgG lambda monoclonal protein and lambda type Bence-May protein, total free kappa chain 142 mg in 24 hours, at 74.6 mg/L, and total lambda chain 179 mg in 24 hours at 94.1 mg/L.  Kappa/lambda ratio 0.79.  Her total 24-hour urine protein was 410 mg, with monoclonal lambda free light chain 41.8% and monoclonal IgG lambda 2.6%.     Serum paraprotein studies on 7/24/2019 reported monoclonal spike 3.9 g/dL, serum IgG 4107, IgA 14 IgM 19, free kappa chain 12.8 and free lambda chain 656, kappa/lambda ratio 0.02.    On 08/01/2019 she has severe thrombocytopenia platelets 27,000.  She has elevated WBC to 12,600 including ANC 8900 and normal lymphocytes 2000.  Her hemoglobin is 10.4 08/01/2019. The patient underwent a bone marrow biopsy on 08/12/2019 and the pathology report showed hypercellular bone marrow with involvement by plasma cell myeloma and no metastatic carcinoma, granulomas, vasculitis, viral inclusions, increase in myeloblasts, or malignant lymphoma. Over 85% of the bone marrow are plasma cells. Normal karyotype. Flow cytometry study was positive.    Because of her persistent severe thrombocytopenia, the patient underwent a bone marrow biopsy on 08/12/2019.  Pathology evaluation  showed hypercellular bone marrow with involvement by plasma cell myeloma 85-90% and no metastatic carcinoma, granulomas, vasculitis, viral inclusions, increase in myeloblasts, or malignant lymphoma.  Normal karyotype.     Recent laboratory study on 8/15/2019 reported of elevated ferritin 524, normal iron saturation 42% with free iron 59 and a TIBC 140.  Normal thyroid function profile.  Her serum globulin 7.3, total protein 10.1 and albumin 2.8, calcium 8.1 creatinine 1.99.    She does continue to have severe intermittent pain in her right hip which is managed somewhat with pain medication, including tylenol and prescription Norco. She states she has difficulty ambulating and functioning normally when this pain occurs. She continues on oral Vitamin B-12 and folic acid supplementation.     Recent XR right hip with or without pelvis on 08/15/2019 showed sclerosis at the pubic symphysis. No other bony or articular abnormality was identified. The hip joints were symmetric and appeared within normal limits. The joint spaces were fairly well-maintained. There was no bony erosion. There was no evidence of recent or old fracture or subluxation. A right ureteral stent was noted.     On 08/30/2019, switched treatment from Empliciti to oral ixazomib 3 weeks on and 1 week off. She is expected to start the medication on 9/5/2019 when this medication arrives.  Patient will continue dexamethasone weekly at the same time as part of the treatment.  Because of her severe thrombocytopenia, we recommended starting low-dose ixazomib 2.3 mg weekly.  Treatment was started on 9/5/2019.     Laboratory study on 9/26/2019 showed worsening leukocytosis with WBC 20,780 including ANC 16,000, lymphocytes 2700 and monocytes 8070.  She also has worsening severe thrombocytopenia with platelets 27,000, and stable hemoglobin 9.5.     Suspect the patient may have some kind of infection however she denies fever sweating chills no dysuria or hematuria.   She denies pain in the right flank area where she has ureteral stent and oftentimes complains of pain when she has urinary tract infection.      I searched medical records, she had a replacement of stent 3 months ago.  I recommended urinalysis and culture on 9/26/2019 for further evaluation despite that she has no symptoms.I called and spoke to patient about her urinalysis which showed a large quantity of bacteria and WBC.  I e-scribed cefdinir to her pharmacy, 300 mg twice a day for 7 days. I called the patient today, reporting her urine culture results with multidrug sensitive E. coli.  She will continue cefdinir as we prescribed.  Should be sensitive according to the culture results.I think this patient will need to follow-up with her urologist Dr. Everett for stent replacement.  I sent a message to Dr. Everett.    On 10/17/2019, patient also reports she has intermittent chest pain/epigastric area/stomach pain for the past several weeks, to the point that she thought she was not able to make it.  Patient reports she had stress test this week and was told to having nothing wrong.  Patient also reports this is not related to her eating.  No apparent effect of exacerbation or relieving.      Patient initiated chemotherapy treatment with darzalex/velcade/dexamethasone on 11/7/2019.      Review of Systems   Constitutional: Positive for fatigue (improved). Negative for chills, diaphoresis and fever.   HENT:   Negative for mouth sores, nosebleeds, sore throat and trouble swallowing.    Eyes: Negative for eye problems and icterus.   Respiratory: Negative for cough, hemoptysis and shortness of breath.    Cardiovascular: Negative for chest pain, leg swelling and palpitations.   Gastrointestinal: Negative for abdominal pain, blood in stool, diarrhea and nausea.   Genitourinary: Negative for dysuria and hematuria.    Musculoskeletal: Positive for arthralgias (Right hip pain stable). Negative for back pain, flank pain, gait  "problem and myalgias.   Skin: Negative for itching and rash.   Neurological: Positive for extremity weakness (stable). Negative for dizziness, gait problem, headaches, light-headedness and numbness.   Hematological: Negative for adenopathy. Does not bruise/bleed easily.   Psychiatric/Behavioral: Negative for depression. The patient is not nervous/anxious.    All other systems reviewed and are negative.    Medications: The current medication list was reviewed in the EMR.         Allergies   Allergen Reactions   • Sulfa Antibiotics Swelling   • Zosyn [Piperacillin Sod-Tazobactam So] Swelling     Face and lips       VITALS:   Vitals:    12/12/19 0934   BP: 106/67   Pulse: 78   Resp: 16   Temp: 97.5 °F (36.4 °C)   SpO2: 97%   Weight: 78.8 kg (173 lb 11.2 oz)   Height: 157.5 cm (62.01\")   PainSc: 0-No pain   PS: ECOG 1       Physical Exam   GENERAL:  Well-developed, well-nourished -American female, in no acute distress.   SKIN:  Warm, dry without rashes, purpura or petechiae.  EYES:  Pupils equal, round and reactive to light.  Conjunctivae normal.  EARS:  Hearing intact.  NOSE:  No nasal discharge.  MOUTH:  Tongue is well-papillated; no stomatitis or ulcers.  Lips normal.  NECK:  Supple with good range of motion; no thyromegaly or masses.  LYMPHATICS:  No cervical, supraclavicular or axillary adenopathy.  CHEST:  Lungs clear to auscultation. Good airflow.  Normal respiratory effort.    CARDIAC:  Regular rate and rhythm without murmurs, rubs or gallops. Normal S1,S2.  ABDOMEN:  Soft, nontender. Bowel sounds normal.  EXTREMITIES:  No clubbing, cyanosis or edema.  NEUROLOGICAL:  Cranial Nerves II-XII grossly intact.  No focal neurological deficits.  PSYCHIATRIC:  Normal affect and mood.      Recent lab results:   Results from last 7 days   Lab Units 12/12/19  0857   WBC 10*3/mm3 5.23   NEUTROS ABS 10*3/mm3 2.67   LYMPHS ABS 10*3/mm3 2.25   HEMOGLOBIN g/dL 7.7*   HEMATOCRIT % 26.5*   PLATELETS 10*3/mm3 24*    "   Glucose   Date Value Ref Range Status   12/05/2019 115 (H) 65 - 99 mg/dL Final     BUN   Date Value Ref Range Status   12/05/2019 29 (H) 8 - 23 mg/dL Final   10/18/2019 26 (H) 7 - 20 mg/dL Final     Creatinine   Date Value Ref Range Status   12/05/2019 2.48 (H) 0.57 - 1.00 mg/dL Final   10/18/2019 2.2 (H) 0.7 - 1.5 mg/dL Final     Sodium   Date Value Ref Range Status   12/05/2019 136 136 - 145 mmol/L Final   10/18/2019 144 137 - 145 mmol/L Final     Potassium   Date Value Ref Range Status   12/05/2019 3.9 3.5 - 5.2 mmol/L Final   10/18/2019 3.9 3.5 - 5.1 mmol/L Final     Chloride   Date Value Ref Range Status   12/05/2019 112 (H) 98 - 107 mmol/L Final   10/18/2019 122 (H) 98 - 107 mmol/L Final     CO2   Date Value Ref Range Status   12/05/2019 17.7 (L) 22.0 - 29.0 mmol/L Final     Total CO2   Date Value Ref Range Status   10/18/2019 20 (L) 22 - 30 mmol/L Final     Calcium   Date Value Ref Range Status   12/05/2019 8.5 (L) 8.6 - 10.5 mg/dL Final   10/18/2019 8.5 8.4 - 10.2 mg/dL Final     Total Protein   Date Value Ref Range Status   12/05/2019 12.4 (H) 6.0 - 8.5 g/dL Final     Albumin   Date Value Ref Range Status   12/05/2019 2.70 (L) 3.50 - 5.20 g/dL Final     ALT (SGPT)   Date Value Ref Range Status   12/05/2019 10 1 - 33 U/L Final     AST (SGOT)   Date Value Ref Range Status   12/05/2019 15 1 - 32 U/L Final     Alkaline Phosphatase   Date Value Ref Range Status   12/05/2019 42 39 - 117 U/L Final     Total Bilirubin   Date Value Ref Range Status   12/05/2019 0.3 0.1 - 1.2 mg/dL Final     eGFR Non  Amer   Date Value Ref Range Status   08/30/2016  >60 mL/min/1.73 Final     Comment:     <15 Indicative of kidney failure.     A/G Ratio   Date Value Ref Range Status   10/24/2019 0.5 (L) 0.7 - 1.7 Final     BUN/Creatinine Ratio   Date Value Ref Range Status   12/05/2019 11.7 7.0 - 25.0 Final   10/18/2019 11.8 RATIO Final     Anion Gap   Date Value Ref Range Status   12/05/2019 6.3 5.0 - 15.0 mmol/L Final        Lab Results   Component Value Date    IRON 61 10/24/2019    TIBC 155 (L) 10/24/2019    FERRITIN 455.40 (H) 10/24/2019     Lab Results   Component Value Date    FOLATE 7.34 10/24/2019     Lab Results   Component Value Date    MNGJEQUZ77 1,808 (H) 10/24/2019       Assessment/Plan   1. Multiple myeloma, IgG lambda, stage III. Failed multiple lines of therapy. Her treatment was switched to Darzalex on 5/26/2016. She was on this treatment for more than 2 years.   · In November 2018, she clearly had disease progression.  Darzalex was discontinued and was started on bendamustine plus Velcade plus dexamethasone.   · Cycle #1 day #1 Bendamustine was started on 11/29/2018.  Due to poor tolerance with profound fatigue, chemotherapy was modified with bendamustine only given on day 1, and a Velcade weekly, every 4 weeks as 1 cycle.  Patient had a cycle #6 dose reduction of bendamustine by 25% because of severe thrombocytopenia.   · This patient actually had a micaela of response on 3/28/2019 with M spike 3.2 g/dL and serum IgG 3600.  After that M spike and serum IgG both were elevating.   · Laboratory study obtained on 5/9/2019 after 6 cycles chemotherapy, it showed disease further progressed with worsening level of serum IgG 4873 mg/dL and M spike 4.3 g/dL.    · On 5/23/2019, patient was switched to Empliciti plus Velcade and decadron.    · Her serum protein studies reported persistent active disease, not well controlled with large quantity of monoclonal spike.  She also has severe thrombocytopenia oftentimes leading to hold Velcade injection.  · Bone marrow aspiration biopsy for reanalysis recommended.  This patient also has a history of breast cancer so we need to be open-minded.  She had bone marrow aspirate biopsy on 8/12/2019 which showed more than 85% of bone marrow cells are malignant plasma cells.  There is no evidence of metastatic disease or other type of malignancy.  · Recommended switching treatment to Ixazomib  plus dexamethasone.  Considering her pre-existing severe thrombocytopenia, I recommended starting low-dose Ixazomib 2.3 mg weekly instead of full dose 4 mg weekly.     · She started new treatment on 9/5/2019 with dexamethasone 20 mg weekly.  oral ixazomib/Dex 3 weeks on and 1 week off. She is to start the ixazomib at decreased dose 2.3 mg weekly starting on 9/5/2019.   · Patient has significant disease progression on 10/24/2019 with serum increased M spike, serum IgG and free lambda chain.  · On 10/31/2019, I discuessed options with the patient and suggested Darzalex/Velcade/dexamethasone as it would be easy on her bone marrow. Patient initiated Cycle #1 treatment on 11/7/2019.  · We will proceed ahead with cycle 2-day #15 treatment on 12/12/2019.  Line to obtain serum paraprotein studies after cycle #3 chemotherapy.    2.  Severe thrombocytopenia.  This is more likely secondary to her multiple myeloma, based on her recent bone marrow aspiration biopsy on 8/12/2019. patient has no easy bleeding or bruising.   · Patient was last transfused with platelets on May 13, 2019 prior to her EGD.  · The patient underwent a bone marrow biopsy on 08/12/2019 and the pathology report showed hypercellular bone marrow with involvement by plasma cell myeloma and no metastatic carcinoma, granulomas, vasculitis, viral inclusions, increase in myeloblasts, or malignant lymphoma. Over 80% of the bone marrow are plasma cells. Normal karyotype.   · Laboratory study on 8/30/2019 is showed platelets of 34,000. We switched her treatment to oral ixazomib 3 weeks on and 1 week off starting 9/05/19.    · Platelet count today 24,000 on 12/12/2019.  Patient reports no spontaneous bleeding.  Continue to monitor.    3.  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    · Her laboratory study on 5/9/2019 showed no evidence of iron deficiency.  On 5/16/2019, B12 level of 1819 and normal folate of 5.84.  She was symptomatic  anemic with Hb 8.5, she was transfused with 2 units of packed red blood cells.    · Recent labs on 8/15/2019 reported no evidence of iron deficiency, had normal iron saturation and excellent ferritin level.  · Laboratory study on 10/24/2019 reported no evidence of iron deficiency, nor B12 folic acid deficiency.  She will continue oral B12 supplementation.    · Her hemoglobin is 7.7 today on 12/12/2019.  She will require Retacrit.  Because patient is symptomatic, with worsening fatigue from her anemia, I recommended transfused 2 units PRBC in the next few days.     4.  Dysuria/urgency of urination.  Urinalysis reported large quantity of WBC and bacteria 11/13/2019.  Treated with Omnicef.  Symptoms have resolved.  She did have a stent replacement on 11/21/2019.   · Patient denies recurrent symptomology on 12/12/2019.     5. Zometa / Xgeva treatment.  Because of her kidney insufficiency, we were only able to give her Zometa every 3 months.  Due to her elevated creatinine level, we eventually switched her to Xgeva treatment and continued monthly.  · She had elevated phosphorus 5.6 on 6/6/2019.   · Her last Xgeva was 12/5/2019.   She is due on 1/3/2020.         6. History of stage II left breast cancer, post mastectomy in 2013, negative for ER/TN and positive HER-2. No neoadjuvant chemotherapy because of concurrent discovery of multiple myeloma and ongoing chemotherapy. She had a normal mammogram study on 7/26/2019.       7.  Skin rashes on her extremities:  Patient was seen dermatologist Dr. Barroso, who prescribed steroids cream and also over-the-counter moisturizing cream.  Patient will follow-up with Dr. Barroso again.  Currently resolved.    8. Profound fatigue: This is multifactorial secondary to her disease progression and her worsening anemia associated with chemotherapy.   · A thyroid level was obtained and patient was diagnosed with subclinical hypothyroidism.  She was initiated on Synthroid and folic acid and  has noted significant reduction in fatigue.   · Patient to continue on Synthroid daily and folic acid supplementation daily.  · Her fatigue is slightly improved today.    9.  Hypocalcemia.  The patient has struggled with compliance with her calcium supplements.  She reports she has been taking these inconsistently, though they resulted in soft frequent bowel movements.  We previously added 2 tablets of Tums daily along with current calcium/vitamin D supplementation.    · Labs pending at time of dictation.    10.  Gastric ulcer diagnosed in May 2019 epigastric discomfort.    · Patient had EGD examination by Dr. Rivera on 5/13/2019.  It reported prepyloric ulceration.  Dr. Rivera recommended Protonix twice a day. With increased dose of Protonix, she has had no further issues.      11.  Worsening leukocytosis/neutrophilia.    · This started in middle of July 2019 with a fluctuation of WBC and neutrophils.  Suspect that this is all due to disease progression of her multiple myeloma.    · However patient had normalized.      Plan:  1. Proceed with Darzalex + Velcade + dexamethasone chemotherapy treatment good 2-day #15 as scheduled today.   2. Patient is to receive erythropoietin today.  Continue for hemoglobin below 10.  3. Arrange 2 units PRBC transfusion in the next few days.  Have type and crossmatch today.  4. Continue Protonix to 40 mg twice a day.  5. Continue monthly Xgeva.  She will be due on 1/3/2020.  6. Continue on Synthroid daily.     7. Continue Norco for pain management.    8. Continue calcium and vitamin D supplementation.    9. Continue oral B12 and folic acid daily.    10. Continue prophylactic acyclovir 400 mg twice daily.   11. Continue prophylactic Bactrim 3 times per week for PCP infection per protocol.   12. Patient will return in 1 week with labs, and due for cycle #3-day #1 chemotherapy.  13. She will follow-up with me in 2 weeks with labs and ongoing treatment cycle 3-day #8..  14. Patient  understands to call with any issues including bleeding, fever greater than 100.5, with any other concerns prior to her next office visit.       Patient is on drug therapy and requires frequent monitoring.      Zane Araujo MD PhD  12/12/2019      Cristo Garcia MD, M.D.

## 2019-12-27 NOTE — PROGRESS NOTES
REASONS FOR FOLLOW UP:   1. IgG kappa multiple myeloma, was on Darzalex, started on May 26, 2016. Patient was switched to Darzalex from Pomalyst, as she continued to be neutropenic with recurrent urinary tract infection while on Pomalyst.    2. Zometa is on hold due to renal insufficiency.    3. After 16 doses of Darzalex, laboratory study showed stable disease in November 2016. Insurance company denied adding Velcade and dexamethasone. Patient is to be continued on monthly Darzalex from 12/06/16.   4. Obstructive right pyelonephritis with positive urine culture for E. coli, required hospitalization from 1/19/2017 through 01/24/2017 with iv antibiotics and stent exchange on 01/20/2017.   5.  Paraprotein studies on March 27, 2017 showed further slight improvement of multiple myeloma.   6.  Febrile illness, with urinary tract infection and influenza B infection in early May 2017, required hospitalization for 6 days.   7.  Paraprotein studies for both serum and 24-hour urine sample on 7/21/2017 showed further improvement of paraproteins.   Darzalex was continued.   8.  Serum protein study reported stable disease on 10/20/2017.  Darzalex will be continued.   9.  Laboratory studies of both serum paraprotein and a 24-hour urine protein in January 2018 showed further improvement of paraproteins.  Monthly Darzalex will be continued.   10. Repair of left flank incisional hernia in middle September 2018.   11. Serum paraprotein study reached micaela on 7/5/2018.  Since that time she has evidence of disease progression.   12. Disease progression, she was started on chemotherapy with bendamustine plus Velcade plus dexamethasone per protocol on 11/29/2018. Treatment will be bendamustine on day 1 and day 4 repeat every 28 days, Velcade and dexamethasone will be on day 1 day 4 and day 8 and day 15 repeat every 28 days.   13.  From cycle #2, patient was given only 1 dose of Treanda per cycle and continue Velcade and dexamethasone  weekly.   14.  Treanda was decreased by 20% after cycle #4 because of severe thrombocytopenia, and further decreased by another 25% on cycle #6 which is her last cycle on 4/25/2019.   15.  On 5/23/2019, patient was switched to Empliciti plus Velcade and decadron.    16.  Patient has persistent severe thrombocytopenia, no improvement after starting new regimen.    17. The patient underwent a bone marrow biopsy on 08/12/2019 and the pathology report showed hypercellular bone marrow with involvement by >85%, plasma cell myeloma and no metastatic carcinoma, granulomas, vasculitis, viral inclusions, increase in myeloblasts, or malignant lymphoma.   18. On 08/30/2019, discussed with patient to switch from Empliciti/Velcade/Dex to oral ixazomib/Dex 3 weeks on and 1 week off. She is to start the medication at decreased dose 2.3 mg weekly starting on 9/5/2019.   19.  Laboratory studies on 10/24/2019 revealed disease progression. Therefore therapy was transitioned to Darzalex and subq Velcade with oral Decadron. Patient initiated chemotherapy treatment on 11/7/2019.    20.  Persistent severe thrombocytopenia started in February 2019 secondary to chemotherapy and multiple myeloma disease progression.  20.  Symptomatic anemia Hb 7.7 on 12/12/2019.  Patient was given 2 units PRBC transfusion.      HISTORY OF PRESENT ILLNESS:   The patient is a 69 y.o. female with the above-mentioned history here today for 2-week evaluation prior to scheduled cycle 3-day #8 Darzalex and Velcade.      I saw her 2 weeks ago on 12/12/2019 at which time she had a symptomatic anemia with a hemoglobin 7.7.  Patient was given 2 units PRBC transfusion.  Weekly Procrit was continued.     Patient returns today reporting no evidence of bleeding.  She denies evidence of urinary tract infection or flank area pain.  Denies fever sweating or chills.  Her performance status is ECOG 1.  She does have persistent chronic fatigue but no worse.  She denies nausea  vomiting.  Appetite is reasonable.  Weight is stable.  She denies dyspnea or chest pain.  No cough no hemoptysis.  She denies bleeding or bruising.    She reports she had ureteral stent replacement recently on 11/21/2019 due to urinary tract infection.      Oratory study today reported a stable hemoglobin 9.2, .7.  Platelets 27,000, and WBC 6950 including ANC 3400 lymphocytes 2420 monocytes 600.        Past Medical History:   Diagnosis Date   • Anemia 10/14/2008   • Arthropathy of knee 01/07/2014   • Breast cancer (CMS/HCC) 04/2012   • Bursitis of left hip 10/18/2007   • Bursitis, knee 12/02/2013   • Calcaneal spur 08/12/2009   • Chronic kidney disease, stage III (moderate) (CMS/HCC)    • Colon cancer screening 07/01/2017   • Congestive heart failure (CMS/HCC)    • Diverticulitis of colon 10/17/2007   • DVT (deep venous thrombosis) (CMS/[facility])    • Epigastric abdominal pain 4/8/2019   • Gastroesophageal reflux disease 4/8/2019   • H/O Diastolic dysfunction    • H/O Mitral regurgitation    • History of Clostridium difficile 04/01/2014   • History of echocardiogram 04/2014   • History of MRSA infection    • History of obesity    • History of transfusion    • Hydronephrosis    • Hypertension    • LLL pneumonia (CMS/HCC) 04/23/2009   • Malignant neoplasm of kidney (CMS/HCC) 12/2009   • Multiple myeloma (CMS/HCC) 04/2012   • Myocardial infarction (CMS/HCC)    • Neoplasm of uncertain behavior 10/12/2015   • Non-STEMI (non-ST elevated myocardial infarction) (CMS/HCC)    • Nonischemic cardiomyopathy (CMS/HCC)    • Pyelonephritis 03/2004   • Seizures (CMS/HCC) 10/17/2007   • Thrombocytopenia (CMS/HCC)    • UTI (urinary tract infection) 10/30/2014   • UTI (urinary tract infection) 03/28/2014   • Ventral hernia      Past Surgical History:   Procedure Laterality Date   • BONE MARROW BIOPSY N/A 04/11/2012   • BRAIN SURGERY  2005    Meningioma   • BRAIN SURGERY  1990    Tumor benign per patient   • BREAST BIOPSY Left  04/09/2012    Dr. Gregg May   • CARDIAC CATHETERIZATION Left 06/11/2012    Dr. Sudeep Haddad   • CARDIAC CATHETERIZATION N/A 08/15/2006    Dr. Brian Bhatt   • COLONOSCOPY N/A 8/30/2017    Procedure: COLONOSCOPY into cecum and TI with cold polypectomies;  Surgeon: Trudy Rivera MD;  Location: [facility] ENDOSCOPY;  Service:    • COLONOSCOPY W/ POLYPECTOMY N/A unknown    2 polyps, benign   • CYSTOSCOPY N/A 3/25/2016    Procedure: CYSTOSCOPY  WITH RIGHT STENT CHANGE;  Surgeon: Lakhwinder Russo MD;  Location: [facility] OR;  Service:    • CYSTOSCOPY N/A 03/10/2012    Cystoscopy, right retrograde, right double-J stent-Dr. Sloan May   • CYSTOSCOPY N/A 02/25/2012    Cystoscopy, stent removal, right retrograde pyelogram, replacement of right double-J ureteral stent-Dr. Michael Everett   • CYSTOSCOPY W/ URETERAL STENT PLACEMENT Right 5/7/2016    Procedure: CYSTOSCOPY, URETERAL CATHETER INSERTION AND RIGHT STENT EXCHANGE ;  Surgeon: Michael Everett Jr., MD;  Location: [facility] OR;  Service:    • CYSTOSCOPY W/ URETERAL STENT PLACEMENT N/A 1/20/2017    Procedure: CYSTOSCOPY RIGHT RETROGRADE PYELOGRAM, URETERAL STENT CHANGE;  Surgeon: Lakhwinder Russo MD;  Location: [facility] OR;  Service:    • CYSTOSCOPY W/ URETERAL STENT PLACEMENT N/A 04/02/2015    Cystoscopy, removal of right ureteral stent, right retrograde pyelogram, placement of right double-J ureteral stent-Dr. Michael Everett   • CYSTOSCOPY W/ URETERAL STENT PLACEMENT N/A 04/26/2015    Cystoscopy, removal of right ureteral stent, right retrograde pyelogram, replacement of right ureteral stent 24 cm x 7-Luxembourgish without retrieval line-Dr. Jose Gomez   • CYSTOSCOPY W/ URETERAL STENT PLACEMENT N/A 12/22/2014    Cystoscopy, removal of right double-J ureteral stent, right retrograde pyelogram, placement of right double-J ureteral stent-Dr. Michael Everett   • CYSTOSCOPY W/ URETERAL STENT PLACEMENT N/A 09/22/2014    Cystoscopy, removal of right ureteral stent,  removal of right retrograde pyelogram, replacement of right double-J ureteral stent-Dr. Michael Everett   • CYSTOSCOPY W/ URETERAL STENT PLACEMENT N/A 06/18/2014    Cystoscopy, removal of right double-J ureteral stent, right retrograde pyelogram, placement of right double-J ureteral stent-Dr. Michael Everett   • CYSTOSCOPY W/ URETERAL STENT PLACEMENT N/A 01/23/2014    Cystoscopy, removal of right double-J ureteral stent, right retrograde pyelogram, placement of right double-J ureteral stent-Dr. Michael Everett   • CYSTOSCOPY W/ URETERAL STENT PLACEMENT N/A 03/27/2012    Cystoscopy, removal of ureteral stent, right retrograde pyelogram, replacement of indewlling right ureteral stent-Dr. Michael Everett   • CYSTOSCOPY W/ URETERAL STENT PLACEMENT N/A 03/27/2013    Cystoscopy, right retrograde pyelogram, removal and replacement of right double-J ureteral stent-Dr. Michael Everett   • CYSTOSCOPY W/ URETERAL STENT PLACEMENT N/A 11/12/2012    Cystoscopy, stent removal, right retrograde pyelogram, replacement of right double-J ureteral stent-Dr. Michael Everett   • CYSTOSCOPY W/ URETERAL STENT PLACEMENT N/A 09/24/2011    Cystoscopy, removal of ureteral stent, right retrograde pyelogram and placement of right double-J ureteral stent-Dr. Michael Everett   • CYSTOSCOPY W/ URETERAL STENT PLACEMENT N/A 05/14/2011    Cystoscopy, removal of right ureteral stent, right retrograde pyelogram and placement of new right double-J ureteral stent-Dr. Michael Everett   • CYSTOSCOPY W/ URETERAL STENT PLACEMENT N/A 12/31/2010    Cystoscopy, stent removal, right retrograde pyelogram, replacement of 7 Icelandic x 26 cm double-J stent-Dr. Michael Everett   • CYSTOSCOPY W/ URETERAL STENT PLACEMENT N/A 08/28/2010    Cystoscopy, stent removal, right retrograde pyelogram with interpretation, placement of right double-J ureteral stent-Dr. Michael Everett   • CYSTOSCOPY W/ URETERAL STENT PLACEMENT N/A 05/24/2010    Cystoscopy, right retrograde pyelogram, replacement of right double-J ureteral  stent-Dr. Michael Everett   • CYSTOSCOPY W/ URETERAL STENT PLACEMENT N/A 02/12/2010    Cystoscopy, right retrograde pyelogram and placement of right double-J ureteral stent-Dr. Michael Everett   • CYSTOSCOPY W/ URETERAL STENT PLACEMENT N/A 02/23/2009    Cystoscopy, right retrograde pyelogram, placement of right double-J ureteral stent-Dr. Michael Everett   • CYSTOSCOPY W/ URETERAL STENT PLACEMENT N/A 09/17/2007    Dr. Michael Everett   • CYSTOSCOPY W/ URETERAL STENT PLACEMENT Right 01/29/2018    Dr. Michael Everett   • CYSTOSCOPY W/ URETERAL STENT PLACEMENT N/A 06/04/2018    Cystoscopy, removal of right double-J ureteral stent and placement of right double-J ureteral stent. Dr. Michael Everett.   • CYSTOSCOPY W/ URETERAL STENT PLACEMENT N/A 03/06/2018    Cystoscopy, removal of right ureteral stent, replacement of right double-J ureteral stent. Dr. Michael Everett   • ELBOW PROCEDURE Bilateral 1990s   • ENDOSCOPY N/A 5/13/2019    Procedure: ESOPHAGOGASTRODUODENOSCOPY WITH BIOPSIES;  Surgeon: Trudy Rivera MD;  Location: [facility] ENDOSCOPY;  Service: General   • HERNIA REPAIR  09/03/2018   • LASIK Bilateral    • MASTECTOMY Left 04/25/2012    Left total mastectomy with sentinel lymph node biopsies and left axillary lymphatic mapping-Dr. Gregg May   • MEDIPORT INSERTION, DOUBLE Right    • NEPHRECTOMY Left 12/30/2009    Dr. 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 Cristo Pittman   • TOTAL ABDOMINAL HYSTERECTOMY Bilateral 2007    Dr. Todd   • URETEROURETEROSTOMY Right 05/01/2006    Dr. Michael Everett   • VENA CAVA FILTER INSERTION N/A 05/08/2006    Dr. Jordon Barber   • VENOUS ACCESS DEVICE (PORT) INSERTION Right 02/25/2013    Right subclavian vein PowerPort insertion-Dr. Gregg May   • VENOUS ACCESS DEVICE (PORT) INSERTION AND REMOVAL N/A 10/06/2014     Revision of right internal jugular PowerPort with fluoroscopy, removal of peripherally inserted central catheter line-Dr. Aurora Tomlinson   • VENOUS ACCESS DEVICE (PORT) INSERTION AND REMOVAL N/A 08/14/2014    Ultrasound-guided access right internal jugular vein, PowerPort placement, removal of right subclavian port-Dr. Jordon Barber   • VENTRAL/INCISIONAL HERNIA REPAIR Left 9/19/2018    Procedure: ventral hernia repair with mesh and rectus muscle advancement flap;  Surgeon: Trudy Rivera MD;  Location: [facility];  Service: General         Hematology/oncology History: See separate document for extra information.   1.    History of left breast cancer, status post left mastectomy on 04/25/2013, stage II, T2N0M0, hormone negative, HER2/mk positive by immunohistochemistry, however, no adjuvant chemotherapy delivered because of multiple myeloma diagnosed concomitantly.        2. Multiple myeloma, IgG lambda, stage III, diagnosed April 2012, previously treated with Velcade/Decadron followed by Velcade/Decadron/Revlimid; patient developed abdominal pain and rectal bleeding on Revlimid, which was then discontinued.    3. Patient evaluated at [facility], but was not felt to be a candidate for stem cell transplant.      4.   Disease progression with therapy switched to melphalan/Velcade/prednisone per the [facility] trial on 10/22/2013; melphalan dose has been persistently decreased because of thrombocytopenia.      5. Anemia secondary to chronic renal insufficiency and myeloma, on periodic Procrit therapy p.r.n.    6. Patient had 8 cycles of VMP chemotherapy, repeat laboratory studies on 10/03/2013 showed stable disease.  Her melphalan do  se was significantly decreased due to marrow suppression.      7.   Patient had disease progression after cycle #10 VMP treatment, evidenced by laboratory studies on 01/06/2014.  We increased the melphalan dose for one cycle.  Continued disease  progress after cycl  e #11 VMP treatment, despite increased dose of melphalan, as documented by laboratory study on 02/17/2014.     8. The patient was evaluated on 02/24/2014 and we recommend switching chemotherapy to Kyprolis on 02/27/2014.     9. Echocardiogram on 03/05/2014 reporting L  VEF 43%.  This is on par with her previous twice echocardiogram study, in June 2012 and November 2012, reported LVEF at 45% to 50% and 40% to 45% respectively.     10. The patient had 2 episodes of chest pain after Kyprolis during cycle 1, leading to omitted dose   on days 9 and 16.  From cycle 2, we will give Kyprolis only once per week for 3 weeks out of every 4 weeks, as well as dose reduction of Kyprolis that was started on 04/04/2014.      11. After cycle 2 of dose-reduced Kyprolis, the patient's M spike increased fro  m 2.6 to 3.2 g/dL, with her IgG level increasing from 2.7 g/dL to 4.7 g/dL.     12.   Patient had a stress test and echocardiogram study at [facility] on 01/23/2015.  The patient had LVEF 35% to 40%.  Myocardial perfusion study reported a large, mild to modera  te severity fixed inferior wall defect, consistent with known transmural infarct versus diaphragmatic attenuation artifact.  Post stress resting ejection fraction estimated at 31%.      13. Repeated echocardiogram study on 04/03/2015 reported an LVEF 46%.  Left v  entricle is moderately dilated.  There is mild global hypokinesis of the left ventricle.  There was grade 1 diastolic dysfunction.    14.   The patient was seen on 04/09/2015 with plan for cycle 14, day 1, of Kyprolis.  Treatment will be delayed 1 week secondary to patient'  s extreme fatigue, hemoglobin of 8.1.  We will proceed with 2 units of packed red blood cells.  She was also found to have a urinary tract infection. Patient prescribed Cipro 500 mg twice a day x7.     15. Laboratory tests on 08/24/2015 reported sligh  t disease progress after cycle #18 Kyprolis. We discussed with  patient and we will switch chemotherapy to CyBorD regimen with dose reduction of Velcade to 1 mg/m2, cyclophosphamide at 240 mg/m2 (total dose 450 mg), and dexamethasone 20 mg. All therapy to   be repeated on a weekly basis.     16.   The patient had significant fatigue after one dose of cyclophosphamide that lasted for 5-6 days. She also had severe anemia, hemoglobin 7.6, required 2 units PRBC transfusion. Cyclophosphamide will be decreased to 350 mg from 2nd week.     17.  Patient continues to have significant fatigue after the reduced dose of cyclophosphamide at 350 mg; for 2 days after chemo, she could only lie on the bed with performance status ECOG 3. From 10/13/2015, oral cyclophosphamide will be further dec  reased to 200 mg once weekly. We will continue Velcade and dexamethasone at same dose.   Evidence of disease progress, when she was on panobinostat treatment.   18. The patient also has worsening ane  ming and thrombocytopenia secondary to chemotherapy. The patient has symptomatic anemia with hemoglobin 7.8 on 02/23/2016 requiring 2 units of packed RBC transfusion. Chemotherapy with panobinostat will be discontinued.       19. Patient was switched to pomalidomide 3 mg daily for 21 days / 28 days in late March 2016.    20. Disease progression, she was switched to to the Darzalex in May 2016.       On12/6/2016, serum free lambda chain 1090 MG/L, free kappa chain 8.5 to MG/L.  Ferritin 1015, iron 99, TIBC 137 iron saturation 72%.     On January 4, 2017, vitamin B12 level 1289, folate 7.7 ng/mL. SPEP reporting gamma globulin 3.3, out of total globulin 5.0, with immunofixation reporting small quantity of monoclonal free lambda chain, plus monoclonal IgG lambda at 3.2 g/dL.  Serum IgG 4075, IgA 9 and IgM 15.  free lambda chain 1339 MG/L and free kappa chain 10.3 MG/L.      Laboratory study 3/27/2017 showed slight improvement of paraprotein, SPEP reporting M spike 2.8 g/DL, out of total globulin 4.3, and the  serum IgG 3420, IgA 9, IgM 11, free lambda chain 1218 MG/L and free kappa chain 8.0 MG/L.  Total 24 hour urine sample reported at free lambda chain 142 mg, at a concentration 74.8 MG/L, free kappa chain 75 mg at a concentration 39.5 MG/L., Urine protein immunofixation reporting biclonal IgG lambda and monoclonal free lambda light chain, M spike #1 at 41.5% and monoclonal IgG lambda spike #2 at 10.5%. Serum beta-2 microglobulin is 7.3 MG/L.     Her laboratory study on 7/21/2017 reported further improvement of paraprotein is both serum and a 24-hour urine sample.  SPEP reporting monoclonal IgG lambda 2.9 g/dL and free lambda chain 0.1 g/dL.  Serum IgG was 3261 mg/dL and IgA 5, IgM 13, free kappa chain 6.7, free lambda chain 701.3 with kappa/lambda ratio 0.01.  24-hour urine sample reported positive for Bence May protein lambda type, immunofixation positive for IgG lambda.  Total urine protein 241 mg, gamma globulin 13.1%.  Hemoglobin was 11.4 .4, platelets 122,000, WBC 6680 including neutrophils 3600, lymphocytes 2100 and monocytes 650.  Her creatinine was 1.68, at baseline level.  Liver function panel unremarkable.  Darzalex was continued.    Laboratory study on 8/25/2017 showed improved the platelets at 144,000, normal WBC 6660 and slightly improved hemoglobin at 11.7.     Patient had a colonoscopy examination on 8/30/2017 by Dr. Rivera.  It reported diverticulosis in multiple areas more severe in the sigmoid colon.  There was 2 polyps 4 mm pneumonia from transverse colon and the sigmoid colon.  Pathology evaluation reported tubular adenoma from the sigmoid colon polyp, and benign hyperplastic polyp from transverse colon.    Laboratory study on 10/20/2017 reported slightly improved monoclonal spike 2.7 g/dL by SPEP, immunofixation reported positive monoclonal IgG lambda, serum IgG 3536 mg/dL, IgA less than 5 and IgM 11, free kappa chain 5.3 mg/L, and free lambda chain 720 mg/L with kappa/lambda ratio 0.01.   Serum beta-2 microglobulin was 6.5 mg/L.   Her CBC showed a stable mild anemia with hemoglobin 11.3, macrocytosis .3, and the platelets 114,000, normal WBC 7070 including neutrophils 4100 lymphocytes 2000 monocytes 650, normal liver function panel, total protein 7.9 and albumin 3.2,  and normal electrolytes including calcium 8.1, and improved creatinine 1.59.     Laboratory study on 1/12/2018 reported serum IgG 3083 mg/dL, IgA 10, IgM 10, free kappa chain 8.5 and free lambda chain 589.1 mg/L, kappa/lambda ratio 0.01, serum protein admitted fixation reported IgG lambda monoclonal protein and SPEP reporting M spike 3.1 g/dL, beta-2 microgram and 7.4 mg/L. serum creatinine 1.79, calcium 8.2, other electrolytes normal, hemoglobin 11.3, .5 and MCHC 31.6, platelets 129,000, WBC 6780 including neutrophils 3500 lymphocytes 2300.  Serum ferritin 653.7 ng/mL, iron 123 TIBC 174 iron saturation 71%, folic acid 12.8 ng/mL and a vitamin B12 at 873 pg/mL.  Her 24-hour urine study on 1/18/2018 reported lambda chain 32.8 mg/L, total 61 mg in 24 hours, and the free kappa chain 27.4 mg/L and 51 mg in 24 hours, and the total 24-hour urine protein 283 mg, and urine protein immunofixation positive for 5.3% monoclonal IgG lambda and positive for Bence May protein at 36.2% monoclonal lambda chain.  Monthly Darzalex was continued.       This patient had serum protein study on 04/06/2018 which reported free light chain at concentration 703.8 mg/L and free kappa chain 7.0, free kappa/lambda ratio 0.01, serum IgG 3650 mg/dL, IgM 11 and IgA 9 with serum protein electrophoresis reporting monoclonal IgG lambda 3.2 g/dL and also monoclonal free lambda light chain.  But it was unable to quantify monoclonal lambda light chain because of the small quantity of it.     A 24-hour urine study on 04/20/2018 reported total free lambda chain 60 mg in 24 hours at concentration 33.1 mg/L, free kappa chain 45 mg in 24 hours at concentration  24.8 mg/L. Total 24-hour urine protein was 279 mg. Urine protein immunofixation and UPEP reported lambda-type Bence-May protein + #1 monoclonal IgG lambda band at 34.8% and #2 monoclonal IgG lambda band at 6.9%.     Her CBC results today reported a stable hemoglobin at 11.1, platelets 130,000 and WBC 7440 including neutrophils 4100 and lymphocytes 2350, monocytes 650. Her CMP reported slightly worsened creatinine at 1.82 with BUN 33. Total protein at 8.8. Liver function panel was normal. Total serum protein 8.8 and albumin 3.2, globulin 5.6.    Reviewing her previous lab studies especially serum paraprotein, she reached a micaela of response on 07/05/2018 when she had serum IgG 3015 mg/dL, free lambda chain 458.7 mg/L and M spike IgG lambda 2.6 g/dL and and monoclonal lambda free light chain 0.1 g/dL, total 2.7 g/dL. serum beta-2 myoglobin 7.1 mg/L.  Her ferritin was 539 ng/mL, free iron 73 TIBC 176 iron saturation 42%.    Laboratory study obtained on 11/01/2018 showed further disease progression. Her serum IgG was 5472 mg/dL, IgA 8 and IgM 10, serum kappa chain 7.9 mg/L, free lambda chain 961.3 mg/L and kappa/lambda ratio 0.01. Serum protein electrophoresis reported monoclonal IgG lambda 4.1 g/dL, and monoclonal lambda free light chain 0.1 g/dL. Her hemoglobin was 9.0, .6, MCHC 30.1, platelets 124,000 and WBC 10,000 including neutrophils 7400, lymphocytes 1200, monocytes 600.     Cycle #1 day #1 Bendamustine will be started on 11/29/2018.     Laboratory studies on 01/17/2019 reported improved paraproteins.  SPEP reported M spike 3.8 g/dL out of total 5.5 g/dL globulin.  Serum IgG was 4374 mg/dL, IgA 10 and IgM 11.  Free lambda chain 606.8 mg/L, free kappa chain 8.6 and kappa/lambda ratio 0.01.  Her serum protein immunofixation continues to be IgG lambda monoclonal.  Her CBC showed hemoglobin 9.3, .3, platelets 50,000 and WBC 8600 including ANC 5100, lymphocytes 2160.  Her Chemistry lab reported  creatinine 1.92, calcium 8.3, normal liver function panel, glucose 98 with normal sodium and potassium.     For her 24-hour urine study on 3/14/2019, she had free lambda chain at a 62.1 mg/L, total 87 mg in 24 hours, free kappa chain 42 mg/L and 59 mg in 24 hours.  Urine protein immunofixation reported a monoclonal IgG lambda #1 and free lambda chain were unable to be quantified, however IgG lambda #2 was 12.8%.  Her 24-hour urine total protein was 452 mg.     For serum protein study on 3/28/2019 (on day of her cycle #5 day #1 Treanda ) also reported further decreased monoclonal spike 3.2 mg/dL, and serum IgG 3600, IgA 13 IgM 12, free kappa chain 11.7, and worsening free lambda chain 1045 mg/L.     Her serum paraprotein studies on 4/11/2019 reported a serum IgG 4407 mg/dL, IgA 14, IgM 13, free kappa chain 9.2, free lambda chain 998.4 mg/L, kappa/lambda ratio 0.01.  His serum protein immunofixation was positive for IgG lambda, SPEP reporting M spike 4.3 g/dL.    Her 24-hour urine study on 7/18/2019 reported positive IgG lambda monoclonal protein and lambda type Bence-May protein, total free kappa chain 142 mg in 24 hours, at 74.6 mg/L, and total lambda chain 179 mg in 24 hours at 94.1 mg/L.  Kappa/lambda ratio 0.79.  Her total 24-hour urine protein was 410 mg, with monoclonal lambda free light chain 41.8% and monoclonal IgG lambda 2.6%.     Serum paraprotein studies on 7/24/2019 reported monoclonal spike 3.9 g/dL, serum IgG 4107, IgA 14 IgM 19, free kappa chain 12.8 and free lambda chain 656, kappa/lambda ratio 0.02.    On 08/01/2019 she has severe thrombocytopenia platelets 27,000.  She has elevated WBC to 12,600 including ANC 8900 and normal lymphocytes 2000.  Her hemoglobin is 10.4 08/01/2019. The patient underwent a bone marrow biopsy on 08/12/2019 and the pathology report showed hypercellular bone marrow with involvement by plasma cell myeloma and no metastatic carcinoma, granulomas, vasculitis, viral  inclusions, increase in myeloblasts, or malignant lymphoma. Over 85% of the bone marrow are plasma cells. Normal karyotype. Flow cytometry study was positive.    Because of her persistent severe thrombocytopenia, the patient underwent a bone marrow biopsy on 08/12/2019.  Pathology evaluation showed hypercellular bone marrow with involvement by plasma cell myeloma 85-90% and no metastatic carcinoma, granulomas, vasculitis, viral inclusions, increase in myeloblasts, or malignant lymphoma.  Normal karyotype.     Recent laboratory study on 8/15/2019 reported of elevated ferritin 524, normal iron saturation 42% with free iron 59 and a TIBC 140.  Normal thyroid function profile.  Her serum globulin 7.3, total protein 10.1 and albumin 2.8, calcium 8.1 creatinine 1.99.    She does continue to have severe intermittent pain in her right hip which is managed somewhat with pain medication, including tylenol and prescription Norco. She states she has difficulty ambulating and functioning normally when this pain occurs. She continues on oral Vitamin B-12 and folic acid supplementation.     Recent XR right hip with or without pelvis on 08/15/2019 showed sclerosis at the pubic symphysis. No other bony or articular abnormality was identified. The hip joints were symmetric and appeared within normal limits. The joint spaces were fairly well-maintained. There was no bony erosion. There was no evidence of recent or old fracture or subluxation. A right ureteral stent was noted.     On 08/30/2019, switched treatment from Empliciti to oral ixazomib 3 weeks on and 1 week off. She is expected to start the medication on 9/5/2019 when this medication arrives.  Patient will continue dexamethasone weekly at the same time as part of the treatment.  Because of her severe thrombocytopenia, we recommended starting low-dose ixazomib 2.3 mg weekly.  Treatment was started on 9/5/2019.     Laboratory study on 9/26/2019 showed worsening leukocytosis  with WBC 20,780 including ANC 16,000, lymphocytes 2700 and monocytes 8070.  She also has worsening severe thrombocytopenia with platelets 27,000, and stable hemoglobin 9.5.     Suspect the patient may have some kind of infection however she denies fever sweating chills no dysuria or hematuria.  She denies pain in the right flank area where she has ureteral stent and oftentimes complains of pain when she has urinary tract infection.      I searched medical records, she had replacement of stent 3 months ago.  I recommended urinalysis and culture on 9/26/2019 for further evaluation despite that she has no symptoms.I called and spoke to patient about her urinalysis which showed a large quantity of bacteria and WBC.  I e-scribed cefdinir to her pharmacy, 300 mg twice a day for 7 days. I called the patient today, reporting her urine culture results with multidrug sensitive E. coli.  She will continue cefdinir as we prescribed.  Should be sensitive according to the culture results.I think this patient will need to follow-up with her urologist Dr. Everett for stent replacement.  I sent a message to Dr. Everett.    On 10/17/2019, patient also reports she has intermittent chest pain/epigastric area/stomach pain for the past several weeks, to the point that she thought she was not able to make it.  Patient reports she had stress test this week and was told to having nothing wrong.  Patient also reports this is not related to her eating.  No apparent effect of exacerbation or relieving.      Unfortunately laboratory study on 10/24/2019 reported significant disease progression, he had a M spike 5.1 g/dL by SPEP, and a serum IgG 7280 mg/dL, IgA 16, IgM 17, free kappa chain 10.2 and free lambda chain 1309.5 mg/L, with kappa/lambda ratio 0.01.  Her beta-2 myoglobin was 16.2 mg/L.  Chemistry lab reported total serum protein 11 g, albumin 2.7 and globulin 8.3.  Her liver function panel was normal, creatinine 1.90 and normal calcium and  other electrolytes.  Continues to have anemia Hb 10.0, platelets 26,000 and WBC 19,180 including ANC 13,230 lymphocytes 3070 monocytes 1920 and eosinophil 770.  Her iron study reported ferritin 455.4 ng/dL, free iron 61, TIBC 155 and iron saturation 39%, with folic acid at 7.3 ng/mL and vitamin B12 at 1800 pg/mL.    Patient initiated chemotherapy treatment with darzalex/velcade/dexamethasone on 11/7/2019.    Laboratory study 12/12/2019 showed deteriorating hemoglobin 7.7.  She also has stable but severe thrombocytopenia with platelets 24,000.  Her WBC is normal at 5230 including ANC 2670.  Weekly Procrit was continued.  Patient was given 2 units PRBC transfusion.          Review of Systems   Constitutional: Positive for fatigue (improved). Negative for appetite change, chills, diaphoresis, fever and unexpected weight change.   HENT:   Negative for mouth sores, nosebleeds and sore throat.    Eyes: Negative for eye problems and icterus.   Respiratory: Negative for cough and shortness of breath.    Cardiovascular: Negative for chest pain, leg swelling and palpitations.   Gastrointestinal: Negative for abdominal pain, blood in stool, diarrhea and nausea.   Genitourinary: Negative for dysuria, frequency and hematuria.    Musculoskeletal: Positive for arthralgias (Right hip pain stable). Negative for back pain, flank pain, gait problem and myalgias.   Skin: Negative for itching and rash.   Neurological: Positive for extremity weakness (stable). Negative for dizziness, gait problem, headaches, light-headedness and numbness.   Hematological: Negative for adenopathy. Does not bruise/bleed easily.   Psychiatric/Behavioral: Negative for depression. The patient is not nervous/anxious.    All other systems reviewed and are negative.    Medications: The current medication list was reviewed in the EMR.         Allergies   Allergen Reactions   • Sulfa Antibiotics Swelling   • Zosyn [Piperacillin Sod-Tazobactam So] Swelling     Face  "and lips       VITALS:   Vitals:    12/26/19 0848   BP: 106/67   Pulse: 79   Resp: 16   Temp: 97.6 °F (36.4 °C)   TempSrc: Oral   SpO2: 98%   Weight: 79.2 kg (174 lb 11.2 oz)   Height: 157.5 cm (62.01\")   PainSc: 0-No pain   PS: ECOG 1         Physical Exam   Constitutional: She is oriented to person, place, and time. She appears well-developed and well-nourished. No distress.   HENT:   Head: Normocephalic and atraumatic.   Eyes: Conjunctivae and EOM are normal. Pupils are equal, round, and reactive to light.   Neck: No masses.  Cardiovascular: Normal rate, regular rhythm and normal heart sounds.  Exam reveals no friction rub.    No murmur heard.  Pulmonary/Chest: Effort normal and breath sounds normal. She has no wheezes.   Abdominal: Soft.  No tender.  Bowel sounds are normal.   Lymphadenopathy:     She has no cervical adenopathy.   Neurological: She is alert and oriented to person, place, and time.   Skin: Skin is warm and dry.   Psychiatric: She has a normal mood and affect. Thought content normal.       Recent lab results:   Results from last 7 days   Lab Units 12/26/19  0846   WBC 10*3/mm3 6.95   NEUTROS ABS 10*3/mm3 3.40   HEMOGLOBIN g/dL 9.2*   HEMATOCRIT % 30.9*   PLATELETS 10*3/mm3 27*      Glucose   Date Value Ref Range Status   12/19/2019 90 65 - 99 mg/dL Final     BUN   Date Value Ref Range Status   12/19/2019 19 8 - 23 mg/dL Final   10/18/2019 26 (H) 7 - 20 mg/dL Final     Creatinine   Date Value Ref Range Status   12/19/2019 1.54 (H) 0.57 - 1.00 mg/dL Final   10/18/2019 2.2 (H) 0.7 - 1.5 mg/dL Final     Sodium   Date Value Ref Range Status   12/19/2019 140 136 - 145 mmol/L Final   10/18/2019 144 137 - 145 mmol/L Final     Potassium   Date Value Ref Range Status   12/19/2019 3.6 3.5 - 5.2 mmol/L Final   10/18/2019 3.9 3.5 - 5.1 mmol/L Final     Chloride   Date Value Ref Range Status   12/19/2019 116 (H) 98 - 107 mmol/L Final   10/18/2019 122 (H) 98 - 107 mmol/L Final     CO2   Date Value Ref Range " Status   12/19/2019 18.4 (L) 22.0 - 29.0 mmol/L Final     Total CO2   Date Value Ref Range Status   10/18/2019 20 (L) 22 - 30 mmol/L Final     Calcium   Date Value Ref Range Status   12/19/2019 7.1 (L) 8.6 - 10.5 mg/dL Final   10/18/2019 8.5 8.4 - 10.2 mg/dL Final     Total Protein   Date Value Ref Range Status   12/19/2019 10.2 (H) 6.0 - 8.5 g/dL Final     Albumin   Date Value Ref Range Status   12/19/2019 2.10 (L) 3.50 - 5.20 g/dL Final     ALT (SGPT)   Date Value Ref Range Status   12/19/2019 7 1 - 33 U/L Final     AST (SGOT)   Date Value Ref Range Status   12/19/2019 12 1 - 32 U/L Final     Alkaline Phosphatase   Date Value Ref Range Status   12/19/2019 34 (L) 39 - 117 U/L Final     Total Bilirubin   Date Value Ref Range Status   12/19/2019 0.4 0.1 - 1.2 mg/dL Final     eGFR Non  Amer   Date Value Ref Range Status   08/30/2016  >60 mL/min/1.73 Final     Comment:     <15 Indicative of kidney failure.     A/G Ratio   Date Value Ref Range Status   10/24/2019 0.5 (L) 0.7 - 1.7 Final     BUN/Creatinine Ratio   Date Value Ref Range Status   12/19/2019 12.3 7.0 - 25.0 Final   10/18/2019 11.8 RATIO Final     Anion Gap   Date Value Ref Range Status   12/19/2019 5.6 5.0 - 15.0 mmol/L Final       Lab Results   Component Value Date    IRON 61 10/24/2019    TIBC 155 (L) 10/24/2019    FERRITIN 455.40 (H) 10/24/2019     Lab Results   Component Value Date    FOLATE 7.34 10/24/2019     Lab Results   Component Value Date    TLVOQWOG40 1,808 (H) 10/24/2019       Assessment/Plan   1. Multiple myeloma, IgG lambda, stage III. Failed multiple lines of therapy. Her treatment was switched to Darzalex on 5/26/2016. She was on this treatment for more than 2 years.   · In November 2018, she clearly had disease progression.  Darzalex was discontinued and was started on bendamustine plus Velcade plus dexamethasone.   · Cycle #1 day #1 Bendamustine was started on 11/29/2018.  Due to poor tolerance with profound fatigue, chemotherapy  was modified with bendamustine only given on day 1, and a Velcade weekly, every 4 weeks as 1 cycle.  Patient had a cycle #6 dose reduction of bendamustine by 25% because of severe thrombocytopenia.   · This patient actually had a micaela of response on 3/28/2019 with M spike 3.2 g/dL and serum IgG 3600.  After that M spike and serum IgG both were elevating.   · Laboratory study obtained on 5/9/2019 after 6 cycles chemotherapy, it showed disease further progressed with worsening level of serum IgG 4873 mg/dL and M spike 4.3 g/dL.    · On 5/23/2019, patient was switched to Empliciti plus Velcade and decadron.    · Her serum protein studies reported persistent active disease, not well controlled with large quantity of monoclonal spike.  She also has severe thrombocytopenia oftentimes leading to hold Velcade injection.  · Bone marrow aspiration biopsy for reanalysis recommended.  This patient also has a history of breast cancer so we need to be open-minded.  She had bone marrow aspirate biopsy on 8/12/2019 which showed more than 85% of bone marrow cells are malignant plasma cells.  There is no evidence of metastatic disease or other type of malignancy.  · Recommended switching treatment to Ixazomib plus dexamethasone.  Considering her pre-existing severe thrombocytopenia, I recommended starting low-dose Ixazomib 2.3 mg weekly instead of full dose 4 mg weekly.   · She started new treatment on 9/5/2019 with dexamethasone 20 mg weekly.  oral ixazomib/Dex 3 weeks on and 1 week off. She is to start the ixazomib at decreased dose 2.3 mg weekly starting on 9/5/2019.   · Patient has significant disease progression on 10/24/2019 with serum increased M spike, serum IgG and free lambda chain.  · On 10/31/2019, I discuessed options with the patient and suggested Darzalex/Velcade/dexamethasone as it would be easy on her bone marrow. Patient initiated Cycle #1 treatment on 11/7/2019.  · So far patient has been tolerating well.  We will  proceed ahead with cycle #3-day #8 treatment today on 12/26/2019.    · Likely to obtain serum paraprotein studies after cycle #3 chemotherapy.    2.  Severe thrombocytopenia.  This is more likely secondary to her multiple myeloma, based on her recent bone marrow aspiration biopsy on 8/12/2019. patient has no easy bleeding or bruising.   · Patient was last transfused with platelets on May 13, 2019 prior to her EGD.  · The patient underwent a bone marrow biopsy on 08/12/2019 and the pathology report showed hypercellular bone marrow with involvement by plasma cell myeloma and no metastatic carcinoma, granulomas, vasculitis, viral inclusions, increase in myeloblasts, or malignant lymphoma. Over 80% of the bone marrow are plasma cells. Normal karyotype.   · Laboratory study on 8/30/2019 showed platelets of 34,000. We switched her treatment to oral ixazomib 3 weeks on and 1 week off starting 9/05/19.    · Platelet count today 27,000 on 12/26/2019.  Patient reports no spontaneous bleeding.  Continue to monitor.    3.  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    · Her laboratory study on 5/9/2019 showed no evidence of iron deficiency.  On 5/16/2019, B12 level of 1819 and normal folate of 5.84.  She was symptomatic anemic with Hb 8.5, she was transfused with 2 units of packed red blood cells.    · Recent labs on 8/15/2019 reported no evidence of iron deficiency, had normal iron saturation and excellent ferritin level.  · Laboratory study on 10/24/2019 reported no evidence of iron deficiency, nor B12 folic acid deficiency.  She will continue oral B12 supplementation.    · Her hemoglobin was 7.7 on 12/12/2019.  We continued weekly Retacrit.  Because patient was symptomatic, with worsening fatigue from her anemia, she was given 2 units PRBC transfusion.   · Hemoglobin 9.2 on 12/26/2019.  Continue weekly Retacrit.     4.  Dysuria/urgency of urination.  Urinalysis reported large quantity of WBC  and bacteria 11/13/2019.  Treated with Omnicef.  Symptoms have resolved.  She did have a stent replacement on 11/21/2019.   · Patient denies recurrent symptomology on 12/26/2019.     5. Zometa / Xgeva treatment.  Because of her kidney insufficiency, we were only able to give her Zometa every 3 months.  Due to her elevated creatinine level, we eventually switched her to Xgeva treatment and continued monthly.  · She had elevated phosphorus 5.6 on 6/6/2019.   · Her last Xgeva was 12/5/2019.   She is due on 1/2/2020.  This will be continued every 4 weeks.        6. History of stage II left breast cancer, post mastectomy in 2013, negative for ER/VA and positive HER-2. No neoadjuvant chemotherapy because of concurrent discovery of multiple myeloma and ongoing chemotherapy. She had a normal mammogram study on 7/26/2019.       7.  Skin rashes on her extremities:  Patient was seen dermatologist Dr. Barroso, who prescribed steroids cream and also over-the-counter moisturizing cream.  Patient will follow-up with Dr. Barroso again.  Currently resolved.    8. Profound fatigue: This is multifactorial secondary to her disease progression and her worsening anemia associated with chemotherapy.   · A thyroid level was obtained and patient was diagnosed with subclinical hypothyroidism.  She was initiated on Synthroid and folic acid and has noted significant reduction in fatigue.   · Patient to continue on Synthroid daily and folic acid supplementation daily.  · Her fatigue is slightly improved today.    9.  Hypocalcemia.  The patient has struggled with compliance with her calcium supplements.  She reports she has been taking these inconsistently, though they resulted in soft frequent bowel movements.  We previously added 2 tablets of Tums daily along with current calcium/vitamin D supplementation.    · Calcium level 7.1 on 12/26/2019.  Her albumin was 2.      10.  Gastric ulcer diagnosed in May 2019 epigastric discomfort.    · Patient  had EGD examination by Dr. Rivera on 5/13/2019.  It reported prepyloric ulceration.  Dr. Rivera recommended Protonix twice a day. With increased dose of Protonix, she has had no further issues.      11.  Worsening leukocytosis/neutrophilia.    · This started in middle of July 2019 with a fluctuation of WBC and neutrophils.  Suspect that this is all due to disease progression of her multiple myeloma.    · However patient had normalized.      Plan:  1. Proceed with cycle #3-day #8 Darzalex + Velcade + dexamethasone chemotherapy treatment today.   2. Patient is to receive erythropoietin today.  This will be repeated weekly for hemoglobin below 10.  3. Patient will return in 1 week for cycle number 3-day #15 chemotherapy.   4. Patient will be due for Xgeva in 1 week on 1/2/2020.  This will be repeated every 4 weeks.   5. Continue Protonix to 40 mg twice a day.    6. Continue on Synthroid daily.     7. Continue Norco for pain management.    8. Continue calcium and vitamin D supplementation.    9. Continue oral B12 and folic acid daily.    10. Continue prophylactic acyclovir 400 mg twice daily.   11. Continue prophylactic Bactrim 3 times per week for PCP infection per protocol.   12. Patient will see nurse practitioner on 1/9/2019 to start cycle #4-day #1 treatment with Velcade and Darzalex.  Starting cycle #4, Darzalex will be given once every 3 weeks, and the Velcade will be on day #1 and day #8.   13. On 1/16/2019, she will have Velcade and Procrit.  Also obtain laboratory studies for serum paraprotein including immunofixation, free light chains, see orders.    14. She will follow-up with me in 4 weeks to discuss laboratory results and further management and plan.  She will have Procrit if hemoglobin less than 10.    15. Patient understands to call with any issues including bleeding, fever greater than 100.5, with any other concerns prior to her next office visit.       Patient is on drug therapy and requires  frequent monitoring.      Zane Araujo MD PhD  12/26/2019      [facility] -  Cristo Madera MD, M.D.

## 2020-01-01 ENCOUNTER — READMISSION MANAGEMENT (OUTPATIENT)
Dept: CALL CENTER | Facility: HOSPITAL | Age: 70
End: 2020-01-01

## 2020-01-01 ENCOUNTER — HOSPITAL ENCOUNTER (OUTPATIENT)
Dept: CT IMAGING | Facility: HOSPITAL | Age: 70
Discharge: HOME | End: 2020-02-12
Admitting: INTERNAL MEDICINE

## 2020-01-01 ENCOUNTER — INFUSION (OUTPATIENT)
Dept: ONCOLOGY | Facility: HOSPITAL | Age: 70
End: 2020-01-01

## 2020-01-01 ENCOUNTER — HOSPITAL ENCOUNTER (INPATIENT)
Facility: HOSPITAL | Age: 70
LOS: 4 days | Discharge: HOSPICE | End: 2020-03-03
Attending: EMERGENCY MEDICINE | Admitting: INTERNAL MEDICINE

## 2020-01-01 ENCOUNTER — APPOINTMENT (OUTPATIENT)
Dept: ONCOLOGY | Facility: HOSPITAL | Age: 70
End: 2020-01-01

## 2020-01-01 ENCOUNTER — HOSPITAL ENCOUNTER (OUTPATIENT)
Dept: CT IMAGING | Facility: HOSPITAL | Age: 70
Discharge: HOME | End: 2020-02-03
Admitting: INTERNAL MEDICINE

## 2020-01-01 ENCOUNTER — TELEPHONE (OUTPATIENT)
Dept: ONCOLOGY | Facility: CLINIC | Age: 70
End: 2020-01-01

## 2020-01-01 ENCOUNTER — APPOINTMENT (OUTPATIENT)
Dept: NUCLEAR MEDICINE | Facility: HOSPITAL | Age: 70
End: 2020-01-01

## 2020-01-01 ENCOUNTER — TELEPHONE (OUTPATIENT)
Dept: INTERVENTIONAL RADIOLOGY/VASCULAR | Facility: HOSPITAL | Age: 70
End: 2020-01-01

## 2020-01-01 ENCOUNTER — OFFICE VISIT (OUTPATIENT)
Dept: ONCOLOGY | Facility: CLINIC | Age: 70
End: 2020-01-01

## 2020-01-01 ENCOUNTER — APPOINTMENT (OUTPATIENT)
Dept: MRI IMAGING | Facility: HOSPITAL | Age: 70
End: 2020-01-01

## 2020-01-01 ENCOUNTER — APPOINTMENT (OUTPATIENT)
Dept: NEUROLOGY | Facility: HOSPITAL | Age: 70
End: 2020-01-01

## 2020-01-01 ENCOUNTER — APPOINTMENT (OUTPATIENT)
Dept: INFUSION THERAPY | Facility: HOSPITAL | Age: 70
End: 2020-01-01

## 2020-01-01 ENCOUNTER — HOSPITAL ENCOUNTER (OUTPATIENT)
Dept: INFUSION THERAPY | Facility: HOSPITAL | Age: 70
Setting detail: INFUSION SERIES
Discharge: HOME | End: 2020-02-12

## 2020-01-01 ENCOUNTER — APPOINTMENT (OUTPATIENT)
Dept: OTHER | Facility: HOSPITAL | Age: 70
End: 2020-01-01

## 2020-01-01 ENCOUNTER — HOSPITAL ENCOUNTER (INPATIENT)
Facility: HOSPITAL | Age: 70
LOS: 2 days | Discharge: HOME | End: 2020-01-25
Attending: EMERGENCY MEDICINE | Admitting: HOSPITALIST

## 2020-01-01 ENCOUNTER — TELEPHONE (OUTPATIENT)
Dept: NEUROSURGERY | Facility: CLINIC | Age: 70
End: 2020-01-01

## 2020-01-01 ENCOUNTER — APPOINTMENT (OUTPATIENT)
Dept: GENERAL RADIOLOGY | Facility: HOSPITAL | Age: 70
End: 2020-01-01

## 2020-01-01 ENCOUNTER — OFFICE VISIT (OUTPATIENT)
Dept: FAMILY MEDICINE CLINIC | Facility: CLINIC | Age: 70
End: 2020-01-01

## 2020-01-01 ENCOUNTER — APPOINTMENT (OUTPATIENT)
Dept: LAB | Facility: HOSPITAL | Age: 70
End: 2020-01-01

## 2020-01-01 ENCOUNTER — HOSPITAL ENCOUNTER (OUTPATIENT)
Dept: GENERAL RADIOLOGY | Facility: HOSPITAL | Age: 70
Discharge: HOME | End: 2020-02-12

## 2020-01-01 ENCOUNTER — TRANSITIONAL CARE MANAGEMENT TELEPHONE ENCOUNTER (OUTPATIENT)
Dept: FAMILY MEDICINE CLINIC | Facility: CLINIC | Age: 70
End: 2020-01-01

## 2020-01-01 ENCOUNTER — APPOINTMENT (OUTPATIENT)
Dept: CARDIOLOGY | Facility: HOSPITAL | Age: 70
End: 2020-01-01

## 2020-01-01 ENCOUNTER — HOSPITAL ENCOUNTER (OUTPATIENT)
Dept: CT IMAGING | Facility: HOSPITAL | Age: 70
Discharge: HOME | End: 2020-02-11

## 2020-01-01 ENCOUNTER — APPOINTMENT (OUTPATIENT)
Dept: CT IMAGING | Facility: HOSPITAL | Age: 70
End: 2020-01-01

## 2020-01-01 ENCOUNTER — HOSPITAL ENCOUNTER (INPATIENT)
Facility: HOSPITAL | Age: 70
LOS: 3 days | End: 2020-03-06
Attending: INTERNAL MEDICINE | Admitting: INTERNAL MEDICINE

## 2020-01-01 ENCOUNTER — TELEPHONE (OUTPATIENT)
Dept: NEUROLOGY | Facility: CLINIC | Age: 70
End: 2020-01-01

## 2020-01-01 ENCOUNTER — TRANSCRIBE ORDERS (OUTPATIENT)
Dept: ADMINISTRATIVE | Facility: HOSPITAL | Age: 70
End: 2020-01-01

## 2020-01-01 ENCOUNTER — HOSPITAL ENCOUNTER (INPATIENT)
Facility: HOSPITAL | Age: 70
LOS: 12 days | Discharge: HOME HEALTH | End: 2020-02-25
Attending: EMERGENCY MEDICINE | Admitting: HOSPITALIST

## 2020-01-01 ENCOUNTER — DOCUMENTATION (OUTPATIENT)
Dept: ONCOLOGY | Facility: HOSPITAL | Age: 70
End: 2020-01-01

## 2020-01-01 ENCOUNTER — DOCUMENTATION (OUTPATIENT)
Dept: ONCOLOGY | Facility: CLINIC | Age: 70
End: 2020-01-01

## 2020-01-01 ENCOUNTER — HOSPITAL ENCOUNTER (OUTPATIENT)
Dept: INFUSION THERAPY | Facility: HOSPITAL | Age: 70
Setting detail: INFUSION SERIES
Discharge: HOME | End: 2020-02-13

## 2020-01-01 ENCOUNTER — HOSPITAL ENCOUNTER (OUTPATIENT)
Dept: INFUSION THERAPY | Facility: HOSPITAL | Age: 70
Discharge: HOME | End: 2020-02-11
Admitting: INTERNAL MEDICINE

## 2020-01-01 ENCOUNTER — TELEPHONE (OUTPATIENT)
Dept: FAMILY MEDICINE CLINIC | Facility: CLINIC | Age: 70
End: 2020-01-01

## 2020-01-01 VITALS
WEIGHT: 158.3 LBS | SYSTOLIC BLOOD PRESSURE: 86 MMHG | HEART RATE: 90 BPM | DIASTOLIC BLOOD PRESSURE: 45 MMHG | RESPIRATION RATE: 18 BRPM | BODY MASS INDEX: 29.13 KG/M2 | HEIGHT: 62 IN | OXYGEN SATURATION: 97 % | TEMPERATURE: 99 F

## 2020-01-01 VITALS
WEIGHT: 176.4 LBS | HEART RATE: 82 BPM | TEMPERATURE: 98 F | RESPIRATION RATE: 16 BRPM | OXYGEN SATURATION: 98 % | DIASTOLIC BLOOD PRESSURE: 69 MMHG | HEIGHT: 62 IN | SYSTOLIC BLOOD PRESSURE: 110 MMHG | BODY MASS INDEX: 32.46 KG/M2

## 2020-01-01 VITALS
SYSTOLIC BLOOD PRESSURE: 112 MMHG | BODY MASS INDEX: 31.38 KG/M2 | OXYGEN SATURATION: 93 % | DIASTOLIC BLOOD PRESSURE: 70 MMHG | TEMPERATURE: 97.6 F | HEART RATE: 90 BPM | WEIGHT: 171.6 LBS

## 2020-01-01 VITALS
RESPIRATION RATE: 18 BRPM | HEART RATE: 68 BPM | DIASTOLIC BLOOD PRESSURE: 49 MMHG | BODY MASS INDEX: 31.28 KG/M2 | HEIGHT: 62 IN | WEIGHT: 170 LBS | TEMPERATURE: 98 F | SYSTOLIC BLOOD PRESSURE: 106 MMHG | OXYGEN SATURATION: 100 %

## 2020-01-01 VITALS
TEMPERATURE: 98.6 F | OXYGEN SATURATION: 97 % | WEIGHT: 173 LBS | SYSTOLIC BLOOD PRESSURE: 112 MMHG | RESPIRATION RATE: 16 BRPM | DIASTOLIC BLOOD PRESSURE: 69 MMHG | HEART RATE: 88 BPM | HEIGHT: 62 IN | BODY MASS INDEX: 31.83 KG/M2

## 2020-01-01 VITALS
BODY MASS INDEX: 27.31 KG/M2 | DIASTOLIC BLOOD PRESSURE: 70 MMHG | TEMPERATURE: 97.2 F | SYSTOLIC BLOOD PRESSURE: 118 MMHG | RESPIRATION RATE: 16 BRPM | OXYGEN SATURATION: 95 % | WEIGHT: 160 LBS | HEIGHT: 64 IN | HEART RATE: 112 BPM

## 2020-01-01 VITALS
SYSTOLIC BLOOD PRESSURE: 124 MMHG | DIASTOLIC BLOOD PRESSURE: 55 MMHG | HEART RATE: 98 BPM | TEMPERATURE: 100 F | OXYGEN SATURATION: 97 % | RESPIRATION RATE: 22 BRPM

## 2020-01-01 VITALS
TEMPERATURE: 98.2 F | HEART RATE: 83 BPM | DIASTOLIC BLOOD PRESSURE: 70 MMHG | OXYGEN SATURATION: 97 % | RESPIRATION RATE: 18 BRPM | SYSTOLIC BLOOD PRESSURE: 122 MMHG

## 2020-01-01 VITALS
DIASTOLIC BLOOD PRESSURE: 57 MMHG | OXYGEN SATURATION: 97 % | BODY MASS INDEX: 32.39 KG/M2 | TEMPERATURE: 98 F | TEMPERATURE: 98.3 F | SYSTOLIC BLOOD PRESSURE: 115 MMHG | RESPIRATION RATE: 16 BRPM | OXYGEN SATURATION: 99 % | HEIGHT: 62 IN | SYSTOLIC BLOOD PRESSURE: 110 MMHG | HEART RATE: 68 BPM | WEIGHT: 176 LBS | RESPIRATION RATE: 18 BRPM | HEART RATE: 74 BPM | DIASTOLIC BLOOD PRESSURE: 68 MMHG

## 2020-01-01 VITALS
TEMPERATURE: 97.9 F | RESPIRATION RATE: 18 BRPM | OXYGEN SATURATION: 97 % | DIASTOLIC BLOOD PRESSURE: 73 MMHG | BODY MASS INDEX: 31.97 KG/M2 | HEART RATE: 87 BPM | WEIGHT: 173.72 LBS | SYSTOLIC BLOOD PRESSURE: 118 MMHG | HEIGHT: 62 IN

## 2020-01-01 VITALS — DIASTOLIC BLOOD PRESSURE: 80 MMHG | SYSTOLIC BLOOD PRESSURE: 126 MMHG | HEART RATE: 81 BPM

## 2020-01-01 VITALS
HEIGHT: 62 IN | TEMPERATURE: 97.3 F | WEIGHT: 170.3 LBS | RESPIRATION RATE: 16 BRPM | DIASTOLIC BLOOD PRESSURE: 67 MMHG | BODY MASS INDEX: 31.34 KG/M2 | HEART RATE: 92 BPM | OXYGEN SATURATION: 97 % | SYSTOLIC BLOOD PRESSURE: 104 MMHG

## 2020-01-01 VITALS
OXYGEN SATURATION: 100 % | DIASTOLIC BLOOD PRESSURE: 61 MMHG | HEART RATE: 89 BPM | TEMPERATURE: 100 F | RESPIRATION RATE: 20 BRPM | SYSTOLIC BLOOD PRESSURE: 155 MMHG

## 2020-01-01 VITALS — SYSTOLIC BLOOD PRESSURE: 124 MMHG | HEART RATE: 80 BPM | DIASTOLIC BLOOD PRESSURE: 74 MMHG

## 2020-01-01 VITALS
DIASTOLIC BLOOD PRESSURE: 77 MMHG | BODY MASS INDEX: 32.15 KG/M2 | TEMPERATURE: 97.6 F | HEART RATE: 66 BPM | RESPIRATION RATE: 16 BRPM | HEIGHT: 62 IN | SYSTOLIC BLOOD PRESSURE: 119 MMHG | WEIGHT: 174.7 LBS | OXYGEN SATURATION: 98 %

## 2020-01-01 VITALS
DIASTOLIC BLOOD PRESSURE: 54 MMHG | SYSTOLIC BLOOD PRESSURE: 86 MMHG | HEART RATE: 128 BPM | TEMPERATURE: 102.5 F | RESPIRATION RATE: 10 BRPM | OXYGEN SATURATION: 77 %

## 2020-01-01 VITALS
WEIGHT: 173.4 LBS | HEART RATE: 75 BPM | BODY MASS INDEX: 31.91 KG/M2 | SYSTOLIC BLOOD PRESSURE: 109 MMHG | HEIGHT: 62 IN | RESPIRATION RATE: 16 BRPM | OXYGEN SATURATION: 99 % | DIASTOLIC BLOOD PRESSURE: 69 MMHG | TEMPERATURE: 97.5 F

## 2020-01-01 VITALS
RESPIRATION RATE: 16 BRPM | HEART RATE: 71 BPM | OXYGEN SATURATION: 94 % | WEIGHT: 174.6 LBS | BODY MASS INDEX: 31.93 KG/M2 | TEMPERATURE: 97.4 F | DIASTOLIC BLOOD PRESSURE: 78 MMHG | SYSTOLIC BLOOD PRESSURE: 124 MMHG

## 2020-01-01 DIAGNOSIS — Z79.899 HIGH RISK MEDICATION USE: Primary | ICD-10-CM

## 2020-01-01 DIAGNOSIS — C90.00 MULTIPLE MYELOMA NOT HAVING ACHIEVED REMISSION (HCC): Primary | ICD-10-CM

## 2020-01-01 DIAGNOSIS — R91.8 PULMONARY NODULES: ICD-10-CM

## 2020-01-01 DIAGNOSIS — Z85.3 PERSONAL HISTORY OF BREAST CANCER: ICD-10-CM

## 2020-01-01 DIAGNOSIS — A41.9 SEPSIS DUE TO PNEUMONIA (HCC): Primary | ICD-10-CM

## 2020-01-01 DIAGNOSIS — Z79.899 HIGH RISK MEDICATION USE: ICD-10-CM

## 2020-01-01 DIAGNOSIS — T45.1X5A ANEMIA ASSOCIATED WITH CHEMOTHERAPY: ICD-10-CM

## 2020-01-01 DIAGNOSIS — N18.30 CHRONIC KIDNEY DISEASE, STAGE III (MODERATE) (HCC): ICD-10-CM

## 2020-01-01 DIAGNOSIS — Z45.2 FITTING AND ADJUSTMENT OF VASCULAR CATHETER: Primary | ICD-10-CM

## 2020-01-01 DIAGNOSIS — D69.59 CHEMOTHERAPY-INDUCED THROMBOCYTOPENIA: ICD-10-CM

## 2020-01-01 DIAGNOSIS — D64.81 ANEMIA ASSOCIATED WITH CHEMOTHERAPY: ICD-10-CM

## 2020-01-01 DIAGNOSIS — Z45.2 FITTING AND ADJUSTMENT OF VASCULAR CATHETER: ICD-10-CM

## 2020-01-01 DIAGNOSIS — C90.00 MULTIPLE MYELOMA NOT HAVING ACHIEVED REMISSION (HCC): ICD-10-CM

## 2020-01-01 DIAGNOSIS — D63.1 ANEMIA, CHRONIC RENAL FAILURE, STAGE 3 (MODERATE) (HCC): ICD-10-CM

## 2020-01-01 DIAGNOSIS — C90.00 MULTIPLE MYELOMA, REMISSION STATUS UNSPECIFIED (HCC): ICD-10-CM

## 2020-01-01 DIAGNOSIS — R07.1 CHEST PAIN ON BREATHING: ICD-10-CM

## 2020-01-01 DIAGNOSIS — G40.909 SEIZURE DISORDER (HCC): ICD-10-CM

## 2020-01-01 DIAGNOSIS — R07.1 CHEST PAIN ON BREATHING: Primary | ICD-10-CM

## 2020-01-01 DIAGNOSIS — J18.9 HAP (HOSPITAL-ACQUIRED PNEUMONIA): Primary | ICD-10-CM

## 2020-01-01 DIAGNOSIS — R53.82 CHRONIC FATIGUE: ICD-10-CM

## 2020-01-01 DIAGNOSIS — N18.30 ANEMIA, CHRONIC RENAL FAILURE, STAGE 3 (MODERATE) (HCC): ICD-10-CM

## 2020-01-01 DIAGNOSIS — T45.1X5A CHEMOTHERAPY-INDUCED THROMBOCYTOPENIA: ICD-10-CM
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
## 2020-01-01 DIAGNOSIS — D69.6 THROMBOCYTOPENIA (HCC): ICD-10-CM

## 2020-01-01 DIAGNOSIS — D63.1 ANEMIA, CHRONIC RENAL FAILURE, STAGE 3 (MODERATE) (HCC): Primary | ICD-10-CM

## 2020-01-01 DIAGNOSIS — R05.9 COUGH: ICD-10-CM

## 2020-01-01 DIAGNOSIS — Z09 HOSPITAL DISCHARGE FOLLOW-UP: Primary | ICD-10-CM

## 2020-01-01 DIAGNOSIS — N18.9 CHRONIC KIDNEY DISEASE, UNSPECIFIED CKD STAGE: ICD-10-CM

## 2020-01-01 DIAGNOSIS — D32.9 MENINGIOMA (HCC): ICD-10-CM

## 2020-01-01 DIAGNOSIS — J18.9 COMMUNITY ACQUIRED PNEUMONIA OF LEFT LOWER LOBE OF LUNG: Primary | ICD-10-CM

## 2020-01-01 DIAGNOSIS — J18.9 SEPSIS DUE TO PNEUMONIA (HCC): Primary | ICD-10-CM

## 2020-01-01 DIAGNOSIS — M17.10 ARTHROPATHY OF KNEE: ICD-10-CM

## 2020-01-01 DIAGNOSIS — R50.9 FEVER IN ADULT: ICD-10-CM

## 2020-01-01 DIAGNOSIS — N18.30 ANEMIA, CHRONIC RENAL FAILURE, STAGE 3 (MODERATE) (HCC): Primary | ICD-10-CM

## 2020-01-01 DIAGNOSIS — D69.6 THROMBOCYTOPENIA (HCC): Primary | ICD-10-CM

## 2020-01-01 DIAGNOSIS — R53.83 OTHER FATIGUE: ICD-10-CM

## 2020-01-01 DIAGNOSIS — G93.41 ACUTE METABOLIC ENCEPHALOPATHY: ICD-10-CM

## 2020-01-01 DIAGNOSIS — R93.89 ABNORMAL CT OF THE CHEST: ICD-10-CM

## 2020-01-01 DIAGNOSIS — R91.8 PULMONARY NODULES: Primary | ICD-10-CM

## 2020-01-01 DIAGNOSIS — D64.9 CHRONIC ANEMIA: ICD-10-CM

## 2020-01-01 DIAGNOSIS — R07.9 CHEST PAIN, UNSPECIFIED TYPE: Primary | ICD-10-CM

## 2020-01-01 DIAGNOSIS — N39.0 ACUTE UTI: ICD-10-CM

## 2020-01-01 DIAGNOSIS — J06.9 ACUTE UPPER RESPIRATORY INFECTION, UNSPECIFIED: ICD-10-CM

## 2020-01-01 DIAGNOSIS — J21.0 RSV (ACUTE BRONCHIOLITIS DUE TO RESPIRATORY SYNCYTIAL VIRUS): ICD-10-CM

## 2020-01-01 DIAGNOSIS — R93.89 ABNORMAL CT OF THE CHEST: Primary | ICD-10-CM

## 2020-01-01 DIAGNOSIS — I50.22 CHRONIC SYSTOLIC CONGESTIVE HEART FAILURE (HCC): ICD-10-CM

## 2020-01-01 LAB
25(OH)D3 SERPL-MCNC: 10.3 NG/ML (ref 30–100)
ABO + RH BLD: NORMAL
ABO GROUP BLD: NORMAL
ALBUMIN SERPL-MCNC: 2 G/DL (ref 3.5–5.2)
ALBUMIN SERPL-MCNC: 2.1 G/DL (ref 3.5–5.2)
ALBUMIN SERPL-MCNC: 2.2 G/DL (ref 3.5–5.2)
ALBUMIN SERPL-MCNC: 2.3 G/DL (ref 3.5–5.2)
ALBUMIN SERPL-MCNC: 2.4 G/DL (ref 3.5–5.2)
ALBUMIN SERPL-MCNC: 2.5 G/DL (ref 2.9–4.4)
ALBUMIN SERPL-MCNC: 2.5 G/DL (ref 3.5–5.2)
ALBUMIN SERPL-MCNC: 2.6 G/DL (ref 2.9–4.4)
ALBUMIN SERPL-MCNC: 2.6 G/DL (ref 3.5–5.2)
ALBUMIN SERPL-MCNC: 2.6 G/DL (ref 3.5–5.2)
ALBUMIN SERPL-MCNC: 2.7 G/DL (ref 3.5–5.2)
ALBUMIN SERPL-MCNC: 2.7 G/DL (ref 3.5–5.2)
ALBUMIN SERPL-MCNC: 2.8 G/DL (ref 3.5–5.2)
ALBUMIN/GLOB SERPL: 0.2 G/DL
ALBUMIN/GLOB SERPL: 0.2 G/DL
ALBUMIN/GLOB SERPL: 0.3 G/DL
ALBUMIN/GLOB SERPL: 0.3 {RATIO} (ref 0.7–1.7)
ALBUMIN/GLOB SERPL: 0.4 G/DL
ALBUMIN/GLOB SERPL: 0.4 {RATIO} (ref 0.7–1.7)
ALP SERPL-CCNC: 34 U/L (ref 39–117)
ALP SERPL-CCNC: 34 U/L (ref 39–117)
ALP SERPL-CCNC: 38 U/L (ref 39–117)
ALP SERPL-CCNC: 38 U/L (ref 39–117)
ALP SERPL-CCNC: 39 U/L (ref 39–117)
ALP SERPL-CCNC: 41 U/L (ref 39–117)
ALP SERPL-CCNC: 41 U/L (ref 39–117)
ALP SERPL-CCNC: 42 U/L (ref 39–117)
ALP SERPL-CCNC: 43 U/L (ref 39–117)
ALP SERPL-CCNC: 43 U/L (ref 39–117)
ALP SERPL-CCNC: 44 U/L (ref 39–117)
ALP SERPL-CCNC: 46 U/L (ref 39–117)
ALP SERPL-CCNC: 46 U/L (ref 39–117)
ALPHA1 GLOB FLD ELPH-MCNC: 0.3 G/DL (ref 0–0.4)
ALPHA1 GLOB FLD ELPH-MCNC: 0.3 G/DL (ref 0–0.4)
ALPHA2 GLOB SERPL ELPH-MCNC: 0.7 G/DL (ref 0.4–1)
ALPHA2 GLOB SERPL ELPH-MCNC: 0.8 G/DL (ref 0.4–1)
ALT SERPL W P-5'-P-CCNC: 10 U/L (ref 1–33)
ALT SERPL W P-5'-P-CCNC: 11 U/L (ref 1–33)
ALT SERPL W P-5'-P-CCNC: 5 U/L (ref 1–33)
ALT SERPL W P-5'-P-CCNC: 6 U/L (ref 1–33)
ALT SERPL W P-5'-P-CCNC: 7 U/L (ref 1–33)
ALT SERPL W P-5'-P-CCNC: 8 U/L (ref 1–33)
ALT SERPL W P-5'-P-CCNC: 8 U/L (ref 1–33)
ALT SERPL W P-5'-P-CCNC: 9 U/L (ref 1–33)
ALT SERPL W P-5'-P-CCNC: 9 U/L (ref 1–33)
ALT SERPL W P-5'-P-CCNC: <5 U/L (ref 1–33)
ANION GAP SERPL CALCULATED.3IONS-SCNC: 10.2 MMOL/L (ref 5–15)
ANION GAP SERPL CALCULATED.3IONS-SCNC: 11.1 MMOL/L (ref 5–15)
ANION GAP SERPL CALCULATED.3IONS-SCNC: 13.4 MMOL/L (ref 5–15)
ANION GAP SERPL CALCULATED.3IONS-SCNC: 5 MMOL/L (ref 5–15)
ANION GAP SERPL CALCULATED.3IONS-SCNC: 5.1 MMOL/L (ref 5–15)
ANION GAP SERPL CALCULATED.3IONS-SCNC: 5.3 MMOL/L (ref 5–15)
ANION GAP SERPL CALCULATED.3IONS-SCNC: 6.3 MMOL/L (ref 5–15)
ANION GAP SERPL CALCULATED.3IONS-SCNC: 6.5 MMOL/L (ref 5–15)
ANION GAP SERPL CALCULATED.3IONS-SCNC: 6.6 MMOL/L (ref 5–15)
ANION GAP SERPL CALCULATED.3IONS-SCNC: 6.8 MMOL/L (ref 5–15)
ANION GAP SERPL CALCULATED.3IONS-SCNC: 7.1 MMOL/L (ref 5–15)
ANION GAP SERPL CALCULATED.3IONS-SCNC: 7.2 MMOL/L (ref 5–15)
ANION GAP SERPL CALCULATED.3IONS-SCNC: 7.3 MMOL/L (ref 5–15)
ANION GAP SERPL CALCULATED.3IONS-SCNC: 7.5 MMOL/L (ref 5–15)
ANION GAP SERPL CALCULATED.3IONS-SCNC: 7.7 MMOL/L (ref 5–15)
ANION GAP SERPL CALCULATED.3IONS-SCNC: 7.8 MMOL/L (ref 5–15)
ANION GAP SERPL CALCULATED.3IONS-SCNC: 7.9 MMOL/L (ref 5–15)
ANION GAP SERPL CALCULATED.3IONS-SCNC: 8.2 MMOL/L (ref 5–15)
ANION GAP SERPL CALCULATED.3IONS-SCNC: 8.4 MMOL/L (ref 5–15)
ANION GAP SERPL CALCULATED.3IONS-SCNC: 8.5 MMOL/L (ref 5–15)
ANION GAP SERPL CALCULATED.3IONS-SCNC: 8.7 MMOL/L (ref 5–15)
ANION GAP SERPL CALCULATED.3IONS-SCNC: 9.3 MMOL/L (ref 5–15)
ANISOCYTOSIS BLD QL: ABNORMAL
ANISOCYTOSIS BLD QL: NORMAL
ANTI-K: NORMAL
ARTERIAL PATENCY WRIST A: POSITIVE
AST SERPL-CCNC: 10 U/L (ref 1–32)
AST SERPL-CCNC: 11 U/L (ref 1–32)
AST SERPL-CCNC: 11 U/L (ref 1–32)
AST SERPL-CCNC: 12 U/L (ref 1–32)
AST SERPL-CCNC: 13 U/L (ref 1–32)
AST SERPL-CCNC: 14 U/L (ref 1–32)
AST SERPL-CCNC: 15 U/L (ref 1–32)
AST SERPL-CCNC: 15 U/L (ref 1–32)
AST SERPL-CCNC: 16 U/L (ref 1–32)
AST SERPL-CCNC: 16 U/L (ref 1–32)
AST SERPL-CCNC: 17 U/L (ref 1–32)
AST SERPL-CCNC: 18 U/L (ref 1–32)
AST SERPL-CCNC: 18 U/L (ref 1–32)
AST SERPL-CCNC: 23 U/L (ref 1–32)
ATMOSPHERIC PRESS: 752.2 MMHG
B PARAPERT DNA SPEC QL NAA+PROBE: NOT DETECTED
B PARAPERT DNA SPEC QL NAA+PROBE: NOT DETECTED
B PERT DNA SPEC QL NAA+PROBE: NOT DETECTED
B PERT DNA SPEC QL NAA+PROBE: NOT DETECTED
B-GLOBULIN SERPL ELPH-MCNC: 0.9 G/DL (ref 0.7–1.3)
B-GLOBULIN SERPL ELPH-MCNC: 0.9 G/DL (ref 0.7–1.3)
B2 MICROGLOB SERPL-MCNC: 26.5 MG/L (ref 0.8–2.2)
BACTERIA BLD CULT: ABNORMAL
BACTERIA SPEC AEROBE CULT: ABNORMAL
BACTERIA SPEC AEROBE CULT: ABNORMAL
BACTERIA SPEC AEROBE CULT: NORMAL
BACTERIA SPEC RESP CULT: NORMAL
BACTERIA UR QL AUTO: ABNORMAL /HPF
BACTERIA UR QL AUTO: NORMAL /HPF
BASE EXCESS BLDA CALC-SCNC: -3.5 MMOL/L (ref 0–2)
BASOPHILS # BLD AUTO: 0.02 10*3/MM3 (ref 0–0.2)
BASOPHILS # BLD AUTO: 0.04 10*3/MM3 (ref 0–0.2)
BASOPHILS # BLD MANUAL: 0.07 10*3/MM3 (ref 0–0.2)
BASOPHILS # BLD MANUAL: 0.07 10*3/MM3 (ref 0–0.2)
BASOPHILS # BLD MANUAL: 0.12 10*3/MM3 (ref 0–0.2)
BASOPHILS NFR BLD AUTO: 0.2 % (ref 0–1.5)
BASOPHILS NFR BLD AUTO: 0.2 % (ref 0–1.5)
BASOPHILS NFR BLD AUTO: 0.3 % (ref 0–1.5)
BASOPHILS NFR BLD AUTO: 0.3 % (ref 0–1.5)
BASOPHILS NFR BLD AUTO: 1 % (ref 0–1.5)
BDY SITE: ABNORMAL
BH BB BLOOD EXPIRATION DATE: NORMAL
BH BB BLOOD TYPE BARCODE: 5100
BH BB BLOOD TYPE BARCODE: 6200
BH BB BLOOD TYPE BARCODE: 6200
BH BB BLOOD TYPE BARCODE: 7300
BH BB BLOOD TYPE BARCODE: 7300
BH BB DISPENSE STATUS: NORMAL
BH BB PRODUCT CODE: NORMAL
BH BB UNIT NUMBER: NORMAL
BH CV ECHO MEAS - ACS: 1.6 CM
BH CV ECHO MEAS - BSA(HAYCOCK): 1.8 M^2
BH CV ECHO MEAS - BSA: 1.8 M^2
BH CV ECHO MEAS - BZI_BMI: 27.5 KILOGRAMS/M^2
BH CV ECHO MEAS - BZI_METRIC_HEIGHT: 162.6 CM
BH CV ECHO MEAS - BZI_METRIC_WEIGHT: 72.6 KG
BH CV ECHO MEAS - EDV(CUBED): 125 ML
BH CV ECHO MEAS - EDV(TEICH): 118.2 ML
BH CV ECHO MEAS - EF(CUBED): 31.9 %
BH CV ECHO MEAS - EF(TEICH): 25.8 %
BH CV ECHO MEAS - ESV(CUBED): 85.2 ML
BH CV ECHO MEAS - ESV(TEICH): 87.7 ML
BH CV ECHO MEAS - FS: 12 %
BH CV ECHO MEAS - IVS/LVPW: 0.73
BH CV ECHO MEAS - IVSD: 0.8 CM
BH CV ECHO MEAS - LV MASS(C)D: 169.9 GRAMS
BH CV ECHO MEAS - LV MASS(C)DI: 95.5 GRAMS/M^2
BH CV ECHO MEAS - LVIDD: 5 CM
BH CV ECHO MEAS - LVIDS: 4.4 CM
BH CV ECHO MEAS - LVOT AREA (M): 2.8 CM^2
BH CV ECHO MEAS - LVOT AREA: 2.8 CM^2
BH CV ECHO MEAS - LVOT DIAM: 1.9 CM
BH CV ECHO MEAS - LVPWD: 1.1 CM
BH CV ECHO MEAS - PA ACC SLOPE: 1101 CM/SEC^2
BH CV ECHO MEAS - PA ACC TIME: 0.09 SEC
BH CV ECHO MEAS - PA MAX PG (FULL): 1.4 MMHG
BH CV ECHO MEAS - PA MAX PG: 3.8 MMHG
BH CV ECHO MEAS - PA PR(ACCEL): 37.6 MMHG
BH CV ECHO MEAS - PA V2 MAX: 97.7 CM/SEC
BH CV ECHO MEAS - PVA(V,A): 2.3 CM^2
BH CV ECHO MEAS - PVA(V,D): 2.3 CM^2
BH CV ECHO MEAS - RV MAX PG: 2.4 MMHG
BH CV ECHO MEAS - RV MEAN PG: 1 MMHG
BH CV ECHO MEAS - RV V1 MAX: 78.2 CM/SEC
BH CV ECHO MEAS - RV V1 MEAN: 56.5 CM/SEC
BH CV ECHO MEAS - RV V1 VTI: 10.9 CM
BH CV ECHO MEAS - RVOT AREA: 2.8 CM^2
BH CV ECHO MEAS - RVOT DIAM: 1.9 CM
BH CV ECHO MEAS - SI(CUBED): 22.4 ML/M^2
BH CV ECHO MEAS - SI(TEICH): 17.2 ML/M^2
BH CV ECHO MEAS - SV(CUBED): 39.8 ML
BH CV ECHO MEAS - SV(RVOT): 30.9 ML
BH CV ECHO MEAS - SV(TEICH): 30.6 ML
BH CV VAS BP RIGHT ARM: NORMAL MMHG
BILIRUB SERPL-MCNC: 0.3 MG/DL (ref 0.1–1.2)
BILIRUB SERPL-MCNC: 0.4 MG/DL (ref 0.1–1.2)
BILIRUB SERPL-MCNC: 0.4 MG/DL (ref 0.2–1.2)
BILIRUB SERPL-MCNC: 0.5 MG/DL (ref 0.1–1.2)
BILIRUB SERPL-MCNC: 0.5 MG/DL (ref 0.2–1.2)
BILIRUB SERPL-MCNC: 0.6 MG/DL (ref 0.2–1.2)
BILIRUB SERPL-MCNC: 0.6 MG/DL (ref 0.2–1.2)
BILIRUB UR QL STRIP: NEGATIVE
BLD GP AB SCN SERPL QL: NEGATIVE
BLD GP AB SCN SERPL QL: POSITIVE
BUN BLD-MCNC: 11 MG/DL (ref 8–23)
BUN BLD-MCNC: 12 MG/DL (ref 8–23)
BUN BLD-MCNC: 12 MG/DL (ref 8–23)
BUN BLD-MCNC: 13 MG/DL (ref 8–23)
BUN BLD-MCNC: 13 MG/DL (ref 8–23)
BUN BLD-MCNC: 14 MG/DL (ref 8–23)
BUN BLD-MCNC: 15 MG/DL (ref 8–23)
BUN BLD-MCNC: 18 MG/DL (ref 8–23)
BUN BLD-MCNC: 19 MG/DL (ref 8–23)
BUN BLD-MCNC: 21 MG/DL (ref 8–23)
BUN BLD-MCNC: 22 MG/DL (ref 8–23)
BUN BLD-MCNC: 24 MG/DL (ref 8–23)
BUN BLD-MCNC: 24 MG/DL (ref 8–23)
BUN BLD-MCNC: 25 MG/DL (ref 8–23)
BUN BLD-MCNC: 27 MG/DL (ref 8–23)
BUN BLD-MCNC: 28 MG/DL (ref 8–23)
BUN BLD-MCNC: 28 MG/DL (ref 8–23)
BUN BLD-MCNC: 29 MG/DL (ref 8–23)
BUN BLD-MCNC: 30 MG/DL (ref 8–23)
BUN BLD-MCNC: 31 MG/DL (ref 8–23)
BUN/CREAT SERPL: 11 (ref 7–25)
BUN/CREAT SERPL: 11.2 (ref 7–25)
BUN/CREAT SERPL: 11.3 (ref 7–25)
BUN/CREAT SERPL: 12 (ref 7–25)
BUN/CREAT SERPL: 12.3 (ref 7–25)
BUN/CREAT SERPL: 12.3 (ref 7–25)
BUN/CREAT SERPL: 12.4 (ref 7–25)
BUN/CREAT SERPL: 12.5 (ref 7–25)
BUN/CREAT SERPL: 12.5 (ref 7–25)
BUN/CREAT SERPL: 13.3 (ref 7–25)
BUN/CREAT SERPL: 13.6 (ref 7–25)
BUN/CREAT SERPL: 13.6 (ref 7–25)
BUN/CREAT SERPL: 14.7 (ref 7–25)
BUN/CREAT SERPL: 16 (ref 7–25)
BUN/CREAT SERPL: 7.5 (ref 7–25)
BUN/CREAT SERPL: 7.6 (ref 7–25)
BUN/CREAT SERPL: 7.7 (ref 7–25)
BUN/CREAT SERPL: 7.9 (ref 7–25)
BUN/CREAT SERPL: 8.1 (ref 7–25)
BUN/CREAT SERPL: 8.6 (ref 7–25)
BUN/CREAT SERPL: 8.7 (ref 7–25)
BUN/CREAT SERPL: 9 (ref 7–25)
BUN/CREAT SERPL: 9.6 (ref 7–25)
BUN/CREAT SERPL: 9.7 (ref 7–25)
BUN/CREAT SERPL: 9.9 (ref 7–25)
BUN/CREAT SERPL: 9.9 (ref 7–25)
C PNEUM DNA NPH QL NAA+NON-PROBE: NOT DETECTED
C PNEUM DNA NPH QL NAA+NON-PROBE: NOT DETECTED
C3 FRG RBC-MCNC: ABNORMAL
CA-I BLD-MCNC: 4 MG/DL (ref 4.6–5.4)
CA-I BLD-MCNC: 4 MG/DL (ref 4.6–5.4)
CA-I BLD-MCNC: 4.5 MG/DL (ref 4.6–5.4)
CA-I SERPL ISE-MCNC: 1 MMOL/L (ref 1.15–1.35)
CA-I SERPL ISE-MCNC: 1.01 MMOL/L (ref 1.15–1.35)
CA-I SERPL ISE-MCNC: 1.12 MMOL/L (ref 1.15–1.35)
CALCIUM SPEC-SCNC: 5.7 MG/DL (ref 8.6–10.5)
CALCIUM SPEC-SCNC: 6.3 MG/DL (ref 8.6–10.5)
CALCIUM SPEC-SCNC: 6.5 MG/DL (ref 8.6–10.5)
CALCIUM SPEC-SCNC: 6.5 MG/DL (ref 8.6–10.5)
CALCIUM SPEC-SCNC: 6.8 MG/DL (ref 8.6–10.5)
CALCIUM SPEC-SCNC: 7.1 MG/DL (ref 8.6–10.5)
CALCIUM SPEC-SCNC: 7.5 MG/DL (ref 8.6–10.5)
CALCIUM SPEC-SCNC: 7.7 MG/DL (ref 8.6–10.5)
CALCIUM SPEC-SCNC: 7.7 MG/DL (ref 8.6–10.5)
CALCIUM SPEC-SCNC: 7.8 MG/DL (ref 8.6–10.5)
CALCIUM SPEC-SCNC: 7.8 MG/DL (ref 8.6–10.5)
CALCIUM SPEC-SCNC: 7.9 MG/DL (ref 8.6–10.5)
CALCIUM SPEC-SCNC: 8 MG/DL (ref 8.6–10.5)
CALCIUM SPEC-SCNC: 8.2 MG/DL (ref 8.6–10.5)
CALCIUM SPEC-SCNC: 8.3 MG/DL (ref 8.6–10.5)
CALCIUM SPEC-SCNC: 8.3 MG/DL (ref 8.6–10.5)
CALCIUM SPEC-SCNC: 8.5 MG/DL (ref 8.6–10.5)
CALCIUM SPEC-SCNC: 8.6 MG/DL (ref 8.6–10.5)
CALCIUM SPEC-SCNC: 8.6 MG/DL (ref 8.6–10.5)
CALCIUM SPEC-SCNC: 8.8 MG/DL (ref 8.6–10.5)
CALCIUM SPEC-SCNC: 8.9 MG/DL (ref 8.6–10.5)
CALCIUM SPEC-SCNC: 8.9 MG/DL (ref 8.6–10.5)
CALCIUM SPEC-SCNC: 9.5 MG/DL (ref 8.6–10.5)
CALCIUM SPEC-SCNC: 9.9 MG/DL (ref 8.6–10.5)
CARBAMAZEPINE FREE SERPL-MCNC: NORMAL UG/ML
CARBAMAZEPINE SERPL-MCNC: NORMAL UG/ML
CHLORIDE SERPL-SCNC: 105 MMOL/L (ref 98–107)
CHLORIDE SERPL-SCNC: 107 MMOL/L (ref 98–107)
CHLORIDE SERPL-SCNC: 107 MMOL/L (ref 98–107)
CHLORIDE SERPL-SCNC: 108 MMOL/L (ref 98–107)
CHLORIDE SERPL-SCNC: 108 MMOL/L (ref 98–107)
CHLORIDE SERPL-SCNC: 109 MMOL/L (ref 98–107)
CHLORIDE SERPL-SCNC: 110 MMOL/L (ref 98–107)
CHLORIDE SERPL-SCNC: 111 MMOL/L (ref 98–107)
CHLORIDE SERPL-SCNC: 111 MMOL/L (ref 98–107)
CHLORIDE SERPL-SCNC: 112 MMOL/L (ref 98–107)
CHLORIDE SERPL-SCNC: 113 MMOL/L (ref 98–107)
CHLORIDE SERPL-SCNC: 114 MMOL/L (ref 98–107)
CHLORIDE SERPL-SCNC: 116 MMOL/L (ref 98–107)
CHLORIDE SERPL-SCNC: 116 MMOL/L (ref 98–107)
CLARITY UR: ABNORMAL
CLARITY UR: CLEAR
CO2 SERPL-SCNC: 15.2 MMOL/L (ref 22–29)
CO2 SERPL-SCNC: 15.6 MMOL/L (ref 22–29)
CO2 SERPL-SCNC: 16.3 MMOL/L (ref 22–29)
CO2 SERPL-SCNC: 16.6 MMOL/L (ref 22–29)
CO2 SERPL-SCNC: 16.8 MMOL/L (ref 22–29)
CO2 SERPL-SCNC: 17.2 MMOL/L (ref 22–29)
CO2 SERPL-SCNC: 17.5 MMOL/L (ref 22–29)
CO2 SERPL-SCNC: 17.5 MMOL/L (ref 22–29)
CO2 SERPL-SCNC: 17.7 MMOL/L (ref 22–29)
CO2 SERPL-SCNC: 17.8 MMOL/L (ref 22–29)
CO2 SERPL-SCNC: 17.9 MMOL/L (ref 22–29)
CO2 SERPL-SCNC: 18.5 MMOL/L (ref 22–29)
CO2 SERPL-SCNC: 18.9 MMOL/L (ref 22–29)
CO2 SERPL-SCNC: 19.1 MMOL/L (ref 22–29)
CO2 SERPL-SCNC: 19.2 MMOL/L (ref 22–29)
CO2 SERPL-SCNC: 19.3 MMOL/L (ref 22–29)
CO2 SERPL-SCNC: 19.5 MMOL/L (ref 22–29)
CO2 SERPL-SCNC: 19.7 MMOL/L (ref 22–29)
CO2 SERPL-SCNC: 20.2 MMOL/L (ref 22–29)
CO2 SERPL-SCNC: 20.4 MMOL/L (ref 22–29)
CO2 SERPL-SCNC: 20.5 MMOL/L (ref 22–29)
CO2 SERPL-SCNC: 20.7 MMOL/L (ref 22–29)
CO2 SERPL-SCNC: 20.9 MMOL/L (ref 22–29)
CO2 SERPL-SCNC: 21.7 MMOL/L (ref 22–29)
CO2 SERPL-SCNC: 22 MMOL/L (ref 22–29)
CO2 SERPL-SCNC: 22.8 MMOL/L (ref 22–29)
COLOR UR: ABNORMAL
COLOR UR: YELLOW
CREAT BLD-MCNC: 1.27 MG/DL (ref 0.57–1)
CREAT BLD-MCNC: 1.42 MG/DL (ref 0.57–1)
CREAT BLD-MCNC: 1.44 MG/DL (ref 0.57–1)
CREAT BLD-MCNC: 1.44 MG/DL (ref 0.57–1)
CREAT BLD-MCNC: 1.51 MG/DL (ref 0.57–1)
CREAT BLD-MCNC: 1.51 MG/DL (ref 0.57–1)
CREAT BLD-MCNC: 1.52 MG/DL (ref 0.57–1)
CREAT BLD-MCNC: 1.52 MG/DL (ref 0.57–1)
CREAT BLD-MCNC: 1.54 MG/DL (ref 0.57–1)
CREAT BLD-MCNC: 1.55 MG/DL (ref 0.57–1)
CREAT BLD-MCNC: 1.59 MG/DL (ref 0.57–1)
CREAT BLD-MCNC: 1.72 MG/DL (ref 0.57–1)
CREAT BLD-MCNC: 1.83 MG/DL (ref 0.57–1)
CREAT BLD-MCNC: 1.88 MG/DL (ref 0.57–1)
CREAT BLD-MCNC: 1.88 MG/DL (ref 0.57–1)
CREAT BLD-MCNC: 1.92 MG/DL (ref 0.57–1)
CREAT BLD-MCNC: 1.94 MG/DL (ref 0.57–1)
CREAT BLD-MCNC: 1.99 MG/DL (ref 0.57–1)
CREAT BLD-MCNC: 2 MG/DL (ref 0.57–1)
CREAT BLD-MCNC: 2.01 MG/DL (ref 0.57–1)
CREAT BLD-MCNC: 2.04 MG/DL (ref 0.57–1)
CREAT BLD-MCNC: 2.1 MG/DL (ref 0.57–1)
CREAT BLD-MCNC: 2.13 MG/DL (ref 0.57–1)
CREAT BLD-MCNC: 2.19 MG/DL (ref 0.57–1)
CREAT BLD-MCNC: 2.21 MG/DL (ref 0.57–1)
CREAT BLD-MCNC: 2.28 MG/DL (ref 0.57–1)
CROSSMATCH INTERPRETATION: NORMAL
CYTO UR: NORMAL
D DIMER PPP FEU-MCNC: 11.28 MCGFEU/ML (ref 0–0.49)
D-LACTATE SERPL-SCNC: 0.6 MMOL/L (ref 0.5–2)
D-LACTATE SERPL-SCNC: 0.6 MMOL/L (ref 0.5–2)
D-LACTATE SERPL-SCNC: 0.7 MMOL/L (ref 0.5–2)
D-LACTATE SERPL-SCNC: 1.6 MMOL/L (ref 0.5–2)
DACRYOCYTES BLD QL SMEAR: ABNORMAL
DACRYOCYTES BLD QL SMEAR: NORMAL
DEPRECATED RDW RBC AUTO: 58.3 FL (ref 37–54)
DEPRECATED RDW RBC AUTO: 59.2 FL (ref 37–54)
DEPRECATED RDW RBC AUTO: 60.1 FL (ref 37–54)
DEPRECATED RDW RBC AUTO: 60.4 FL (ref 37–54)
DEPRECATED RDW RBC AUTO: 61 FL (ref 37–54)
DEPRECATED RDW RBC AUTO: 61 FL (ref 37–54)
DEPRECATED RDW RBC AUTO: 61.1 FL (ref 37–54)
DEPRECATED RDW RBC AUTO: 61.2 FL (ref 37–54)
DEPRECATED RDW RBC AUTO: 61.7 FL (ref 37–54)
DEPRECATED RDW RBC AUTO: 63.3 FL (ref 37–54)
DEPRECATED RDW RBC AUTO: 63.4 FL (ref 37–54)
DEPRECATED RDW RBC AUTO: 63.5 FL (ref 37–54)
DEPRECATED RDW RBC AUTO: 64.1 FL (ref 37–54)
DEPRECATED RDW RBC AUTO: 64.2 FL (ref 37–54)
DEPRECATED RDW RBC AUTO: 65.1 FL (ref 37–54)
DEPRECATED RDW RBC AUTO: 65.4 FL (ref 37–54)
DEPRECATED RDW RBC AUTO: 65.5 FL (ref 37–54)
DEPRECATED RDW RBC AUTO: 65.5 FL (ref 37–54)
DEPRECATED RDW RBC AUTO: 66.1 FL (ref 37–54)
DEPRECATED RDW RBC AUTO: 66.8 FL (ref 37–54)
DEPRECATED RDW RBC AUTO: 68.2 FL (ref 37–54)
DEPRECATED RDW RBC AUTO: 70.8 FL (ref 37–54)
DEPRECATED RDW RBC AUTO: 70.9 FL (ref 37–54)
DEPRECATED RDW RBC AUTO: 71.6 FL (ref 37–54)
DEPRECATED RDW RBC AUTO: 72 FL (ref 37–54)
DEPRECATED RDW RBC AUTO: 72.2 FL (ref 37–54)
DEPRECATED RDW RBC AUTO: 73.1 FL (ref 37–54)
DEPRECATED RDW RBC AUTO: 74.4 FL (ref 37–54)
DX PRELIMINARY: NORMAL
EOSINOPHIL # BLD AUTO: 0.04 10*3/MM3 (ref 0–0.4)
EOSINOPHIL # BLD AUTO: 0.12 10*3/MM3 (ref 0–0.4)
EOSINOPHIL # BLD AUTO: 0.14 10*3/MM3 (ref 0–0.4)
EOSINOPHIL # BLD AUTO: 0.27 10*3/MM3 (ref 0–0.4)
EOSINOPHIL # BLD MANUAL: 0.07 10*3/MM3 (ref 0–0.4)
EOSINOPHIL # BLD MANUAL: 0.09 10*3/MM3 (ref 0–0.4)
EOSINOPHIL # BLD MANUAL: 0.11 10*3/MM3 (ref 0–0.4)
EOSINOPHIL # BLD MANUAL: 0.13 10*3/MM3 (ref 0–0.4)
EOSINOPHIL # BLD MANUAL: 0.13 10*3/MM3 (ref 0–0.4)
EOSINOPHIL # BLD MANUAL: 0.14 10*3/MM3 (ref 0–0.4)
EOSINOPHIL # BLD MANUAL: 0.16 10*3/MM3 (ref 0–0.4)
EOSINOPHIL # BLD MANUAL: 0.19 10*3/MM3 (ref 0–0.4)
EOSINOPHIL # BLD MANUAL: 0.22 10*3/MM3 (ref 0–0.4)
EOSINOPHIL # BLD MANUAL: 0.25 10*3/MM3 (ref 0–0.4)
EOSINOPHIL # BLD MANUAL: 0.28 10*3/MM3 (ref 0–0.4)
EOSINOPHIL # BLD MANUAL: 0.28 10*3/MM3 (ref 0–0.4)
EOSINOPHIL # BLD MANUAL: 0.31 10*3/MM3 (ref 0–0.4)
EOSINOPHIL # BLD MANUAL: 0.37 10*3/MM3 (ref 0–0.4)
EOSINOPHIL # BLD MANUAL: 0.44 10*3/MM3 (ref 0–0.4)
EOSINOPHIL NFR BLD AUTO: 0.3 % (ref 0.3–6.2)
EOSINOPHIL NFR BLD AUTO: 1 % (ref 0.3–6.2)
EOSINOPHIL NFR BLD AUTO: 1.7 % (ref 0.3–6.2)
EOSINOPHIL NFR BLD AUTO: 4.3 % (ref 0.3–6.2)
EOSINOPHIL NFR BLD MANUAL: 1 % (ref 0.3–6.2)
EOSINOPHIL NFR BLD MANUAL: 2 % (ref 0.3–6.2)
EOSINOPHIL NFR BLD MANUAL: 3 % (ref 0.3–6.2)
EOSINOPHIL NFR BLD MANUAL: 3.1 % (ref 0.3–6.2)
EOSINOPHIL NFR BLD MANUAL: 3.1 % (ref 0.3–6.2)
EOSINOPHIL NFR BLD MANUAL: 3.9 % (ref 0.3–6.2)
EOSINOPHIL NFR BLD MANUAL: 5 % (ref 0.3–6.2)
ERYTHROCYTE [DISTWIDTH] IN BLOOD BY AUTOMATED COUNT: 17.7 % (ref 12.3–15.4)
ERYTHROCYTE [DISTWIDTH] IN BLOOD BY AUTOMATED COUNT: 18 % (ref 12.3–15.4)
ERYTHROCYTE [DISTWIDTH] IN BLOOD BY AUTOMATED COUNT: 18.1 % (ref 12.3–15.4)
ERYTHROCYTE [DISTWIDTH] IN BLOOD BY AUTOMATED COUNT: 18.8 % (ref 12.3–15.4)
ERYTHROCYTE [DISTWIDTH] IN BLOOD BY AUTOMATED COUNT: 18.8 % (ref 12.3–15.4)
ERYTHROCYTE [DISTWIDTH] IN BLOOD BY AUTOMATED COUNT: 19 % (ref 12.3–15.4)
ERYTHROCYTE [DISTWIDTH] IN BLOOD BY AUTOMATED COUNT: 19 % (ref 12.3–15.4)
ERYTHROCYTE [DISTWIDTH] IN BLOOD BY AUTOMATED COUNT: 19.1 % (ref 12.3–15.4)
ERYTHROCYTE [DISTWIDTH] IN BLOOD BY AUTOMATED COUNT: 19.2 % (ref 12.3–15.4)
ERYTHROCYTE [DISTWIDTH] IN BLOOD BY AUTOMATED COUNT: 19.2 % (ref 12.3–15.4)
ERYTHROCYTE [DISTWIDTH] IN BLOOD BY AUTOMATED COUNT: 19.3 % (ref 12.3–15.4)
ERYTHROCYTE [DISTWIDTH] IN BLOOD BY AUTOMATED COUNT: 19.3 % (ref 12.3–15.4)
ERYTHROCYTE [DISTWIDTH] IN BLOOD BY AUTOMATED COUNT: 19.4 % (ref 12.3–15.4)
ERYTHROCYTE [DISTWIDTH] IN BLOOD BY AUTOMATED COUNT: 19.4 % (ref 12.3–15.4)
ERYTHROCYTE [DISTWIDTH] IN BLOOD BY AUTOMATED COUNT: 19.6 % (ref 12.3–15.4)
ERYTHROCYTE [DISTWIDTH] IN BLOOD BY AUTOMATED COUNT: 19.6 % (ref 12.3–15.4)
ERYTHROCYTE [DISTWIDTH] IN BLOOD BY AUTOMATED COUNT: 19.7 % (ref 12.3–15.4)
ERYTHROCYTE [DISTWIDTH] IN BLOOD BY AUTOMATED COUNT: 19.8 % (ref 12.3–15.4)
ERYTHROCYTE [DISTWIDTH] IN BLOOD BY AUTOMATED COUNT: 19.9 % (ref 12.3–15.4)
ERYTHROCYTE [DISTWIDTH] IN BLOOD BY AUTOMATED COUNT: 19.9 % (ref 12.3–15.4)
ERYTHROCYTE [DISTWIDTH] IN BLOOD BY AUTOMATED COUNT: 20 % (ref 12.3–15.4)
ERYTHROCYTE [DISTWIDTH] IN BLOOD BY AUTOMATED COUNT: 20 % (ref 12.3–15.4)
ERYTHROCYTE [DISTWIDTH] IN BLOOD BY AUTOMATED COUNT: 20.1 % (ref 12.3–15.4)
ERYTHROCYTE [DISTWIDTH] IN BLOOD BY AUTOMATED COUNT: 20.4 % (ref 12.3–15.4)
FERRITIN SERPL-MCNC: 912.7 NG/ML (ref 13–150)
FLUAV H1 2009 PAND RNA NPH QL NAA+PROBE: DETECTED
FLUAV H1 2009 PAND RNA NPH QL NAA+PROBE: NOT DETECTED
FLUAV H1 HA GENE NPH QL NAA+PROBE: NOT DETECTED
FLUAV H1 HA GENE NPH QL NAA+PROBE: NOT DETECTED
FLUAV H3 RNA NPH QL NAA+PROBE: NOT DETECTED
FLUAV H3 RNA NPH QL NAA+PROBE: NOT DETECTED
FLUAV SUBTYP SPEC NAA+PROBE: NOT DETECTED
FLUBV RNA ISLT QL NAA+PROBE: NOT DETECTED
FLUBV RNA ISLT QL NAA+PROBE: NOT DETECTED
FOLATE SERPL-MCNC: 10.2 NG/ML (ref 4.78–24.2)
GAMMA GLOB SERPL ELPH-MCNC: 5.3 G/DL (ref 0.4–1.8)
GAMMA GLOB SERPL ELPH-MCNC: 6.9 G/DL (ref 0.4–1.8)
GFR SERPL CREATININE-BSD FRML MDRD: 26 ML/MIN/1.73
GFR SERPL CREATININE-BSD FRML MDRD: 27 ML/MIN/1.73
GFR SERPL CREATININE-BSD FRML MDRD: 27 ML/MIN/1.73
GFR SERPL CREATININE-BSD FRML MDRD: 28 ML/MIN/1.73
GFR SERPL CREATININE-BSD FRML MDRD: 28 ML/MIN/1.73
GFR SERPL CREATININE-BSD FRML MDRD: 29 ML/MIN/1.73
GFR SERPL CREATININE-BSD FRML MDRD: 30 ML/MIN/1.73
GFR SERPL CREATININE-BSD FRML MDRD: 31 ML/MIN/1.73
GFR SERPL CREATININE-BSD FRML MDRD: 31 ML/MIN/1.73
GFR SERPL CREATININE-BSD FRML MDRD: 32 ML/MIN/1.73
GFR SERPL CREATININE-BSD FRML MDRD: 32 ML/MIN/1.73
GFR SERPL CREATININE-BSD FRML MDRD: 33 ML/MIN/1.73
GFR SERPL CREATININE-BSD FRML MDRD: 36 ML/MIN/1.73
GFR SERPL CREATININE-BSD FRML MDRD: 39 ML/MIN/1.73
GFR SERPL CREATININE-BSD FRML MDRD: 40 ML/MIN/1.73
GFR SERPL CREATININE-BSD FRML MDRD: 41 ML/MIN/1.73
GFR SERPL CREATININE-BSD FRML MDRD: 42 ML/MIN/1.73
GFR SERPL CREATININE-BSD FRML MDRD: 42 ML/MIN/1.73
GFR SERPL CREATININE-BSD FRML MDRD: 44 ML/MIN/1.73
GFR SERPL CREATININE-BSD FRML MDRD: 44 ML/MIN/1.73
GFR SERPL CREATININE-BSD FRML MDRD: 45 ML/MIN/1.73
GFR SERPL CREATININE-BSD FRML MDRD: 51 ML/MIN/1.73
GLOBULIN SER CALC-MCNC: 7.3 G/DL (ref 2.2–3.9)
GLOBULIN SER CALC-MCNC: 8.8 G/DL (ref 2.2–3.9)
GLOBULIN UR ELPH-MCNC: 10 GM/DL
GLOBULIN UR ELPH-MCNC: 10.1 GM/DL
GLOBULIN UR ELPH-MCNC: 6.6 GM/DL
GLOBULIN UR ELPH-MCNC: 6.8 GM/DL
GLOBULIN UR ELPH-MCNC: 7 GM/DL
GLOBULIN UR ELPH-MCNC: 7.2 GM/DL
GLOBULIN UR ELPH-MCNC: 7.3 GM/DL
GLOBULIN UR ELPH-MCNC: 7.4 GM/DL
GLOBULIN UR ELPH-MCNC: 7.5 GM/DL
GLOBULIN UR ELPH-MCNC: 7.7 GM/DL
GLOBULIN UR ELPH-MCNC: 7.8 GM/DL
GLOBULIN UR ELPH-MCNC: 8.1 GM/DL
GLOBULIN UR ELPH-MCNC: 8.9 GM/DL
GLOBULIN UR ELPH-MCNC: 9.1 GM/DL
GLOBULIN UR ELPH-MCNC: 9.2 GM/DL
GLOBULIN UR ELPH-MCNC: 9.3 GM/DL
GLOBULIN UR ELPH-MCNC: 9.3 GM/DL
GLUCOSE BLD-MCNC: 100 MG/DL (ref 65–99)
GLUCOSE BLD-MCNC: 104 MG/DL (ref 65–99)
GLUCOSE BLD-MCNC: 105 MG/DL (ref 65–99)
GLUCOSE BLD-MCNC: 107 MG/DL (ref 65–99)
GLUCOSE BLD-MCNC: 111 MG/DL (ref 65–99)
GLUCOSE BLD-MCNC: 116 MG/DL (ref 65–99)
GLUCOSE BLD-MCNC: 119 MG/DL (ref 65–99)
GLUCOSE BLD-MCNC: 123 MG/DL (ref 65–99)
GLUCOSE BLD-MCNC: 70 MG/DL (ref 65–99)
GLUCOSE BLD-MCNC: 70 MG/DL (ref 65–99)
GLUCOSE BLD-MCNC: 76 MG/DL (ref 65–99)
GLUCOSE BLD-MCNC: 80 MG/DL (ref 65–99)
GLUCOSE BLD-MCNC: 81 MG/DL (ref 65–99)
GLUCOSE BLD-MCNC: 82 MG/DL (ref 65–99)
GLUCOSE BLD-MCNC: 83 MG/DL (ref 65–99)
GLUCOSE BLD-MCNC: 84 MG/DL (ref 65–99)
GLUCOSE BLD-MCNC: 86 MG/DL (ref 65–99)
GLUCOSE BLD-MCNC: 89 MG/DL (ref 65–99)
GLUCOSE BLD-MCNC: 89 MG/DL (ref 65–99)
GLUCOSE BLD-MCNC: 90 MG/DL (ref 65–99)
GLUCOSE BLD-MCNC: 91 MG/DL (ref 65–99)
GLUCOSE BLD-MCNC: 93 MG/DL (ref 65–99)
GLUCOSE BLD-MCNC: 96 MG/DL (ref 65–99)
GLUCOSE BLD-MCNC: 96 MG/DL (ref 65–99)
GLUCOSE BLD-MCNC: 98 MG/DL (ref 65–99)
GLUCOSE BLD-MCNC: 98 MG/DL (ref 65–99)
GLUCOSE BLDC GLUCOMTR-MCNC: 82 MG/DL (ref 70–130)
GLUCOSE BLDC GLUCOMTR-MCNC: 84 MG/DL (ref 70–130)
GLUCOSE BLDC GLUCOMTR-MCNC: 89 MG/DL (ref 70–130)
GLUCOSE UR STRIP-MCNC: NEGATIVE MG/DL
GRAM STN SPEC: ABNORMAL
GRAM STN SPEC: ABNORMAL
GRAM STN SPEC: NORMAL
HADV DNA SPEC NAA+PROBE: NOT DETECTED
HADV DNA SPEC NAA+PROBE: NOT DETECTED
HCO3 BLDA-SCNC: 18.8 MMOL/L (ref 22–28)
HCOV 229E RNA SPEC QL NAA+PROBE: NOT DETECTED
HCOV 229E RNA SPEC QL NAA+PROBE: NOT DETECTED
HCOV HKU1 RNA SPEC QL NAA+PROBE: NOT DETECTED
HCOV HKU1 RNA SPEC QL NAA+PROBE: NOT DETECTED
HCOV NL63 RNA SPEC QL NAA+PROBE: NOT DETECTED
HCOV NL63 RNA SPEC QL NAA+PROBE: NOT DETECTED
HCOV OC43 RNA SPEC QL NAA+PROBE: NOT DETECTED
HCOV OC43 RNA SPEC QL NAA+PROBE: NOT DETECTED
HCT VFR BLD AUTO: 22.1 % (ref 34–46.6)
HCT VFR BLD AUTO: 22.3 % (ref 34–46.6)
HCT VFR BLD AUTO: 22.4 % (ref 34–46.6)
HCT VFR BLD AUTO: 22.6 % (ref 34–46.6)
HCT VFR BLD AUTO: 23.8 % (ref 34–46.6)
HCT VFR BLD AUTO: 23.9 % (ref 34–46.6)
HCT VFR BLD AUTO: 25.6 % (ref 34–46.6)
HCT VFR BLD AUTO: 25.9 % (ref 34–46.6)
HCT VFR BLD AUTO: 26.2 % (ref 34–46.6)
HCT VFR BLD AUTO: 26.8 % (ref 34–46.6)
HCT VFR BLD AUTO: 26.8 % (ref 34–46.6)
HCT VFR BLD AUTO: 27.1 % (ref 34–46.6)
HCT VFR BLD AUTO: 28.4 % (ref 34–46.6)
HCT VFR BLD AUTO: 28.9 % (ref 34–46.6)
HCT VFR BLD AUTO: 28.9 % (ref 34–46.6)
HCT VFR BLD AUTO: 29.9 % (ref 34–46.6)
HCT VFR BLD AUTO: 30.4 % (ref 34–46.6)
HCT VFR BLD AUTO: 30.7 % (ref 34–46.6)
HCT VFR BLD AUTO: 31.6 % (ref 34–46.6)
HCT VFR BLD AUTO: 31.9 % (ref 34–46.6)
HCT VFR BLD AUTO: 32.8 % (ref 34–46.6)
HCT VFR BLD AUTO: 32.8 % (ref 34–46.6)
HCT VFR BLD AUTO: 33.2 % (ref 34–46.6)
HGB BLD-MCNC: 10 G/DL (ref 12–15.9)
HGB BLD-MCNC: 10.1 G/DL (ref 12–15.9)
HGB BLD-MCNC: 10.2 G/DL (ref 12–15.9)
HGB BLD-MCNC: 10.3 G/DL (ref 12–15.9)
HGB BLD-MCNC: 10.3 G/DL (ref 12–15.9)
HGB BLD-MCNC: 10.4 G/DL (ref 12–15.9)
HGB BLD-MCNC: 7.1 G/DL (ref 12–15.9)
HGB BLD-MCNC: 7.2 G/DL (ref 12–15.9)
HGB BLD-MCNC: 7.4 G/DL (ref 12–15.9)
HGB BLD-MCNC: 7.4 G/DL (ref 12–15.9)
HGB BLD-MCNC: 7.8 G/DL (ref 12–15.9)
HGB BLD-MCNC: 7.9 G/DL (ref 12–15.9)
HGB BLD-MCNC: 8 G/DL (ref 12–15.9)
HGB BLD-MCNC: 8.2 G/DL (ref 12–15.9)
HGB BLD-MCNC: 8.5 G/DL (ref 12–15.9)
HGB BLD-MCNC: 8.6 G/DL (ref 12–15.9)
HGB BLD-MCNC: 8.7 G/DL (ref 12–15.9)
HGB BLD-MCNC: 8.9 G/DL (ref 12–15.9)
HGB BLD-MCNC: 8.9 G/DL (ref 12–15.9)
HGB BLD-MCNC: 9 G/DL (ref 12–15.9)
HGB BLD-MCNC: 9.1 G/DL (ref 12–15.9)
HGB BLD-MCNC: 9.5 G/DL (ref 12–15.9)
HGB BLD-MCNC: 9.5 G/DL (ref 12–15.9)
HGB BLD-MCNC: 9.9 G/DL (ref 12–15.9)
HGB UR QL STRIP.AUTO: ABNORMAL
HMPV RNA NPH QL NAA+NON-PROBE: NOT DETECTED
HMPV RNA NPH QL NAA+NON-PROBE: NOT DETECTED
HOLD SPECIMEN: NORMAL
HOLD SPECIMEN: NORMAL
HPIV1 RNA SPEC QL NAA+PROBE: NOT DETECTED
HPIV1 RNA SPEC QL NAA+PROBE: NOT DETECTED
HPIV2 RNA SPEC QL NAA+PROBE: NOT DETECTED
HPIV2 RNA SPEC QL NAA+PROBE: NOT DETECTED
HPIV3 RNA NPH QL NAA+PROBE: NOT DETECTED
HPIV3 RNA NPH QL NAA+PROBE: NOT DETECTED
HPIV4 P GENE NPH QL NAA+PROBE: NOT DETECTED
HPIV4 P GENE NPH QL NAA+PROBE: NOT DETECTED
HYALINE CASTS UR QL AUTO: ABNORMAL /LPF
HYALINE CASTS UR QL AUTO: NORMAL /LPF
HYPOCHROMIA BLD QL: ABNORMAL
IGA SERPL-MCNC: 11 MG/DL (ref 87–352)
IGA SERPL-MCNC: 5 MG/DL (ref 87–352)
IGG SERPL-MCNC: 6335 MG/DL (ref 700–1600)
IGG SERPL-MCNC: 7632 MG/DL (ref 700–1600)
IGM SERPL-MCNC: 13 MG/DL (ref 26–217)
IGM SERPL-MCNC: 16 MG/DL (ref 26–217)
IMM GRANULOCYTES # BLD AUTO: 0.19 10*3/MM3 (ref 0–0.05)
IMM GRANULOCYTES # BLD AUTO: 0.28 10*3/MM3 (ref 0–0.05)
IMM GRANULOCYTES # BLD AUTO: 0.44 10*3/MM3 (ref 0–0.05)
IMM GRANULOCYTES NFR BLD AUTO: 1.5 % (ref 0–0.5)
IMM GRANULOCYTES NFR BLD AUTO: 4.5 % (ref 0–0.5)
IMM GRANULOCYTES NFR BLD AUTO: 5.2 % (ref 0–0.5)
INR PPP: 1.3 (ref 0.8–1.2)
INTERPRETATION SERPL IEP-IMP: ABNORMAL
INTERPRETATION SERPL IEP-IMP: ABNORMAL
IRON 24H UR-MRATE: 109 MCG/DL (ref 37–145)
IRON SATN MFR SERPL: 84 % (ref 20–50)
ISOLATED FROM: ABNORMAL
KAPPA LC SERPL-MCNC: 8.6 MG/L (ref 3.3–19.4)
KAPPA LC SERPL-MCNC: 9 MG/L (ref 3.3–19.4)
KAPPA LC/LAMBDA SER: 0.01 {RATIO} (ref 0.26–1.65)
KAPPA LC/LAMBDA SER: 0.01 {RATIO} (ref 0.26–1.65)
KETONES UR QL STRIP: ABNORMAL
KETONES UR QL STRIP: NEGATIVE
L PNEUMO1 AG UR QL IA: NEGATIVE
LAB AP CASE REPORT: NORMAL
LAB AP CLINICAL INFORMATION: NORMAL
LAB AP SPECIAL STAINS: NORMAL
LAMBDA LC FREE SERPL-MCNC: 1415.5 MG/L (ref 5.7–26.3)
LAMBDA LC FREE SERPL-MCNC: 1658.2 MG/L (ref 5.7–26.3)
LEUKOCYTE ESTERASE UR QL STRIP.AUTO: ABNORMAL
LEUKOCYTE ESTERASE UR QL STRIP.AUTO: NEGATIVE
LIPASE SERPL-CCNC: 38 U/L (ref 13–60)
LYMPHOCYTES # BLD AUTO: 1.23 10*3/MM3 (ref 0.7–3.1)
LYMPHOCYTES # BLD AUTO: 1.51 10*3/MM3 (ref 0.7–3.1)
LYMPHOCYTES # BLD AUTO: 1.78 10*3/MM3 (ref 0.7–3.1)
LYMPHOCYTES # BLD AUTO: 2.64 10*3/MM3 (ref 0.7–3.1)
LYMPHOCYTES # BLD MANUAL: 0.39 10*3/MM3 (ref 0.7–3.1)
LYMPHOCYTES # BLD MANUAL: 0.81 10*3/MM3 (ref 0.7–3.1)
LYMPHOCYTES # BLD MANUAL: 0.97 10*3/MM3 (ref 0.7–3.1)
LYMPHOCYTES # BLD MANUAL: 1.02 10*3/MM3 (ref 0.7–3.1)
LYMPHOCYTES # BLD MANUAL: 1.11 10*3/MM3 (ref 0.7–3.1)
LYMPHOCYTES # BLD MANUAL: 1.17 10*3/MM3 (ref 0.7–3.1)
LYMPHOCYTES # BLD MANUAL: 1.19 10*3/MM3 (ref 0.7–3.1)
LYMPHOCYTES # BLD MANUAL: 1.22 10*3/MM3 (ref 0.7–3.1)
LYMPHOCYTES # BLD MANUAL: 1.31 10*3/MM3 (ref 0.7–3.1)
LYMPHOCYTES # BLD MANUAL: 1.36 10*3/MM3 (ref 0.7–3.1)
LYMPHOCYTES # BLD MANUAL: 1.46 10*3/MM3 (ref 0.7–3.1)
LYMPHOCYTES # BLD MANUAL: 1.47 10*3/MM3 (ref 0.7–3.1)
LYMPHOCYTES # BLD MANUAL: 1.66 10*3/MM3 (ref 0.7–3.1)
LYMPHOCYTES # BLD MANUAL: 1.77 10*3/MM3 (ref 0.7–3.1)
LYMPHOCYTES # BLD MANUAL: 1.98 10*3/MM3 (ref 0.7–3.1)
LYMPHOCYTES # BLD MANUAL: 2.03 10*3/MM3 (ref 0.7–3.1)
LYMPHOCYTES # BLD MANUAL: 2.07 10*3/MM3 (ref 0.7–3.1)
LYMPHOCYTES # BLD MANUAL: 2.11 10*3/MM3 (ref 0.7–3.1)
LYMPHOCYTES # BLD MANUAL: 2.71 10*3/MM3 (ref 0.7–3.1)
LYMPHOCYTES # BLD MANUAL: 3.17 10*3/MM3 (ref 0.7–3.1)
LYMPHOCYTES NFR BLD AUTO: 14.5 % (ref 19.6–45.3)
LYMPHOCYTES NFR BLD AUTO: 24.1 % (ref 19.6–45.3)
LYMPHOCYTES NFR BLD AUTO: 31.2 % (ref 19.6–45.3)
LYMPHOCYTES NFR BLD AUTO: 9.9 % (ref 19.6–45.3)
LYMPHOCYTES NFR BLD MANUAL: 10.3 % (ref 5–12)
LYMPHOCYTES NFR BLD MANUAL: 10.9 % (ref 19.6–45.3)
LYMPHOCYTES NFR BLD MANUAL: 11 % (ref 5–12)
LYMPHOCYTES NFR BLD MANUAL: 12 % (ref 19.6–45.3)
LYMPHOCYTES NFR BLD MANUAL: 12.7 % (ref 19.6–45.3)
LYMPHOCYTES NFR BLD MANUAL: 13 % (ref 5–12)
LYMPHOCYTES NFR BLD MANUAL: 15 % (ref 19.6–45.3)
LYMPHOCYTES NFR BLD MANUAL: 15.5 % (ref 19.6–45.3)
LYMPHOCYTES NFR BLD MANUAL: 16 % (ref 19.6–45.3)
LYMPHOCYTES NFR BLD MANUAL: 19 % (ref 19.6–45.3)
LYMPHOCYTES NFR BLD MANUAL: 2 % (ref 5–12)
LYMPHOCYTES NFR BLD MANUAL: 2 % (ref 5–12)
LYMPHOCYTES NFR BLD MANUAL: 22 % (ref 19.6–45.3)
LYMPHOCYTES NFR BLD MANUAL: 22 % (ref 19.6–45.3)
LYMPHOCYTES NFR BLD MANUAL: 22.7 % (ref 19.6–45.3)
LYMPHOCYTES NFR BLD MANUAL: 23 % (ref 19.6–45.3)
LYMPHOCYTES NFR BLD MANUAL: 23 % (ref 19.6–45.3)
LYMPHOCYTES NFR BLD MANUAL: 25 % (ref 19.6–45.3)
LYMPHOCYTES NFR BLD MANUAL: 26 % (ref 19.6–45.3)
LYMPHOCYTES NFR BLD MANUAL: 27 % (ref 19.6–45.3)
LYMPHOCYTES NFR BLD MANUAL: 27 % (ref 19.6–45.3)
LYMPHOCYTES NFR BLD MANUAL: 28 % (ref 19.6–45.3)
LYMPHOCYTES NFR BLD MANUAL: 31 % (ref 19.6–45.3)
LYMPHOCYTES NFR BLD MANUAL: 33 % (ref 19.6–45.3)
LYMPHOCYTES NFR BLD MANUAL: 4 % (ref 5–12)
LYMPHOCYTES NFR BLD MANUAL: 4.1 % (ref 5–12)
LYMPHOCYTES NFR BLD MANUAL: 6 % (ref 19.6–45.3)
LYMPHOCYTES NFR BLD MANUAL: 6 % (ref 5–12)
LYMPHOCYTES NFR BLD MANUAL: 6.9 % (ref 5–12)
LYMPHOCYTES NFR BLD MANUAL: 7 % (ref 5–12)
LYMPHOCYTES NFR BLD MANUAL: 7.8 % (ref 5–12)
LYMPHOCYTES NFR BLD MANUAL: 8 % (ref 5–12)
Lab: ABNORMAL
Lab: ABNORMAL
M PNEUMO IGG SER IA-ACNC: NOT DETECTED
M PNEUMO IGG SER IA-ACNC: NOT DETECTED
M-SPIKE: ABNORMAL G/DL
M-SPIKE: ABNORMAL G/DL
MACROCYTES BLD QL SMEAR: ABNORMAL
MAGNESIUM SERPL-MCNC: 1.7 MG/DL (ref 1.6–2.4)
MAGNESIUM SERPL-MCNC: 1.8 MG/DL (ref 1.6–2.4)
MAGNESIUM SERPL-MCNC: 1.9 MG/DL (ref 1.6–2.4)
MAGNESIUM SERPL-MCNC: 2.2 MG/DL (ref 1.6–2.4)
MAXIMAL PREDICTED HEART RATE: 152 BPM
MCH RBC QN AUTO: 29.3 PG (ref 26.6–33)
MCH RBC QN AUTO: 29.3 PG (ref 26.6–33)
MCH RBC QN AUTO: 29.4 PG (ref 26.6–33)
MCH RBC QN AUTO: 29.5 PG (ref 26.6–33)
MCH RBC QN AUTO: 29.6 PG (ref 26.6–33)
MCH RBC QN AUTO: 29.7 PG (ref 26.6–33)
MCH RBC QN AUTO: 29.8 PG (ref 26.6–33)
MCH RBC QN AUTO: 29.8 PG (ref 26.6–33)
MCH RBC QN AUTO: 30 PG (ref 26.6–33)
MCH RBC QN AUTO: 30 PG (ref 26.6–33)
MCH RBC QN AUTO: 30.1 PG (ref 26.6–33)
MCH RBC QN AUTO: 30.1 PG (ref 26.6–33)
MCH RBC QN AUTO: 30.2 PG (ref 26.6–33)
MCH RBC QN AUTO: 30.2 PG (ref 26.6–33)
MCH RBC QN AUTO: 30.3 PG (ref 26.6–33)
MCH RBC QN AUTO: 30.3 PG (ref 26.6–33)
MCH RBC QN AUTO: 30.4 PG (ref 26.6–33)
MCH RBC QN AUTO: 30.4 PG (ref 26.6–33)
MCH RBC QN AUTO: 30.5 PG (ref 26.6–33)
MCH RBC QN AUTO: 30.5 PG (ref 26.6–33)
MCH RBC QN AUTO: 30.7 PG (ref 26.6–33)
MCH RBC QN AUTO: 30.7 PG (ref 26.6–33)
MCH RBC QN AUTO: 30.9 PG (ref 26.6–33)
MCH RBC QN AUTO: 31 PG (ref 26.6–33)
MCH RBC QN AUTO: 31.1 PG (ref 26.6–33)
MCH RBC QN AUTO: 31.1 PG (ref 26.6–33)
MCHC RBC AUTO-ENTMCNC: 29.9 G/DL (ref 31.5–35.7)
MCHC RBC AUTO-ENTMCNC: 30.1 G/DL (ref 31.5–35.7)
MCHC RBC AUTO-ENTMCNC: 30.3 G/DL (ref 31.5–35.7)
MCHC RBC AUTO-ENTMCNC: 30.5 G/DL (ref 31.5–35.7)
MCHC RBC AUTO-ENTMCNC: 30.5 G/DL (ref 31.5–35.7)
MCHC RBC AUTO-ENTMCNC: 31.1 G/DL (ref 31.5–35.7)
MCHC RBC AUTO-ENTMCNC: 31.3 G/DL (ref 31.5–35.7)
MCHC RBC AUTO-ENTMCNC: 31.8 G/DL (ref 31.5–35.7)
MCHC RBC AUTO-ENTMCNC: 32.1 G/DL (ref 31.5–35.7)
MCHC RBC AUTO-ENTMCNC: 32.3 G/DL (ref 31.5–35.7)
MCHC RBC AUTO-ENTMCNC: 32.6 G/DL (ref 31.5–35.7)
MCHC RBC AUTO-ENTMCNC: 32.7 G/DL (ref 31.5–35.7)
MCHC RBC AUTO-ENTMCNC: 32.8 G/DL (ref 31.5–35.7)
MCHC RBC AUTO-ENTMCNC: 32.9 G/DL (ref 31.5–35.7)
MCHC RBC AUTO-ENTMCNC: 33 G/DL (ref 31.5–35.7)
MCHC RBC AUTO-ENTMCNC: 33.2 G/DL (ref 31.5–35.7)
MCHC RBC AUTO-ENTMCNC: 33.6 G/DL (ref 31.5–35.7)
MCV RBC AUTO: 100.4 FL (ref 79–97)
MCV RBC AUTO: 100.4 FL (ref 79–97)
MCV RBC AUTO: 102 FL (ref 79–97)
MCV RBC AUTO: 102.7 FL (ref 79–97)
MCV RBC AUTO: 88.6 FL (ref 79–97)
MCV RBC AUTO: 89.3 FL (ref 79–97)
MCV RBC AUTO: 89.4 FL (ref 79–97)
MCV RBC AUTO: 89.8 FL (ref 79–97)
MCV RBC AUTO: 90 FL (ref 79–97)
MCV RBC AUTO: 90.6 FL (ref 79–97)
MCV RBC AUTO: 90.9 FL (ref 79–97)
MCV RBC AUTO: 91 FL (ref 79–97)
MCV RBC AUTO: 91.2 FL (ref 79–97)
MCV RBC AUTO: 91.2 FL (ref 79–97)
MCV RBC AUTO: 91.4 FL (ref 79–97)
MCV RBC AUTO: 91.4 FL (ref 79–97)
MCV RBC AUTO: 91.6 FL (ref 79–97)
MCV RBC AUTO: 91.9 FL (ref 79–97)
MCV RBC AUTO: 92.2 FL (ref 79–97)
MCV RBC AUTO: 92.4 FL (ref 79–97)
MCV RBC AUTO: 94.1 FL (ref 79–97)
MCV RBC AUTO: 94.8 FL (ref 79–97)
MCV RBC AUTO: 94.9 FL (ref 79–97)
MCV RBC AUTO: 98.2 FL (ref 79–97)
MCV RBC AUTO: 98.8 FL (ref 79–97)
MCV RBC AUTO: 99.7 FL (ref 79–97)
METAMYELOCYTES NFR BLD MANUAL: 1 % (ref 0–0)
METAMYELOCYTES NFR BLD MANUAL: 1 % (ref 0–0)
METAMYELOCYTES NFR BLD MANUAL: 2 % (ref 0–0)
METAMYELOCYTES NFR BLD MANUAL: 2.1 % (ref 0–0)
METAMYELOCYTES NFR BLD MANUAL: 3 % (ref 0–0)
METAMYELOCYTES NFR BLD MANUAL: 4 % (ref 0–0)
MICROCYTES BLD QL: ABNORMAL
MODALITY: ABNORMAL
MONOCYTES # BLD AUTO: 0.13 10*3/MM3 (ref 0.1–0.9)
MONOCYTES # BLD AUTO: 0.16 10*3/MM3 (ref 0.1–0.9)
MONOCYTES # BLD AUTO: 0.25 10*3/MM3 (ref 0.1–0.9)
MONOCYTES # BLD AUTO: 0.29 10*3/MM3 (ref 0.1–0.9)
MONOCYTES # BLD AUTO: 0.38 10*3/MM3 (ref 0.1–0.9)
MONOCYTES # BLD AUTO: 0.39 10*3/MM3 (ref 0.1–0.9)
MONOCYTES # BLD AUTO: 0.4 10*3/MM3 (ref 0.1–0.9)
MONOCYTES # BLD AUTO: 0.43 10*3/MM3 (ref 0.1–0.9)
MONOCYTES # BLD AUTO: 0.47 10*3/MM3 (ref 0.1–0.9)
MONOCYTES # BLD AUTO: 0.49 10*3/MM3 (ref 0.1–0.9)
MONOCYTES # BLD AUTO: 0.52 10*3/MM3 (ref 0.1–0.9)
MONOCYTES # BLD AUTO: 0.53 10*3/MM3 (ref 0.1–0.9)
MONOCYTES # BLD AUTO: 0.55 10*3/MM3 (ref 0.1–0.9)
MONOCYTES # BLD AUTO: 0.59 10*3/MM3 (ref 0.1–0.9)
MONOCYTES # BLD AUTO: 0.61 10*3/MM3 (ref 0.1–0.9)
MONOCYTES # BLD AUTO: 0.63 10*3/MM3 (ref 0.1–0.9)
MONOCYTES # BLD AUTO: 0.65 10*3/MM3 (ref 0.1–0.9)
MONOCYTES # BLD AUTO: 0.72 10*3/MM3 (ref 0.1–0.9)
MONOCYTES # BLD AUTO: 0.74 10*3/MM3 (ref 0.1–0.9)
MONOCYTES # BLD AUTO: 0.75 10*3/MM3 (ref 0.1–0.9)
MONOCYTES # BLD AUTO: 0.94 10*3/MM3 (ref 0.1–0.9)
MONOCYTES # BLD AUTO: 1.14 10*3/MM3 (ref 0.1–0.9)
MONOCYTES # BLD AUTO: 1.34 10*3/MM3 (ref 0.1–0.9)
MONOCYTES NFR BLD AUTO: 6.4 % (ref 5–12)
MONOCYTES NFR BLD AUTO: 7 % (ref 5–12)
MONOCYTES NFR BLD AUTO: 7.6 % (ref 5–12)
MONOCYTES NFR BLD AUTO: 9.1 % (ref 5–12)
MRSA DNA SPEC QL NAA+PROBE: NORMAL
MYELOCYTES NFR BLD MANUAL: 1 % (ref 0–0)
MYELOCYTES NFR BLD MANUAL: 2 % (ref 0–0)
NEUTROPHILS # BLD AUTO: 2.54 10*3/MM3 (ref 1.7–7)
NEUTROPHILS # BLD AUTO: 3.11 10*3/MM3 (ref 1.7–7)
NEUTROPHILS # BLD AUTO: 3.15 10*3/MM3 (ref 1.7–7)
NEUTROPHILS # BLD AUTO: 3.78 10*3/MM3 (ref 1.7–7)
NEUTROPHILS # BLD AUTO: 4.07 10*3/MM3 (ref 1.7–7)
NEUTROPHILS # BLD AUTO: 4.12 10*3/MM3 (ref 1.7–7)
NEUTROPHILS # BLD AUTO: 4.35 10*3/MM3 (ref 1.7–7)
NEUTROPHILS # BLD AUTO: 4.49 10*3/MM3 (ref 1.7–7)
NEUTROPHILS # BLD AUTO: 4.61 10*3/MM3 (ref 1.7–7)
NEUTROPHILS # BLD AUTO: 4.62 10*3/MM3 (ref 1.7–7)
NEUTROPHILS # BLD AUTO: 4.76 10*3/MM3 (ref 1.7–7)
NEUTROPHILS # BLD AUTO: 5.02 10*3/MM3 (ref 1.7–7)
NEUTROPHILS # BLD AUTO: 5.22 10*3/MM3 (ref 1.7–7)
NEUTROPHILS # BLD AUTO: 5.59 10*3/MM3 (ref 1.7–7)
NEUTROPHILS # BLD AUTO: 5.66 10*3/MM3 (ref 1.7–7)
NEUTROPHILS # BLD AUTO: 6.09 10*3/MM3 (ref 1.7–7)
NEUTROPHILS # BLD AUTO: 6.16 10*3/MM3 (ref 1.7–7)
NEUTROPHILS # BLD AUTO: 6.16 10*3/MM3 (ref 1.7–7)
NEUTROPHILS # BLD AUTO: 6.32 10*3/MM3 (ref 1.7–7)
NEUTROPHILS # BLD AUTO: 6.69 10*3/MM3 (ref 1.7–7)
NEUTROPHILS # BLD AUTO: 6.7 10*3/MM3 (ref 1.7–7)
NEUTROPHILS # BLD AUTO: 7.19 10*3/MM3 (ref 1.7–7)
NEUTROPHILS # BLD AUTO: 9.04 10*3/MM3 (ref 1.7–7)
NEUTROPHILS # BLD AUTO: 9.85 10*3/MM3 (ref 1.7–7)
NEUTROPHILS NFR BLD AUTO: 54.7 % (ref 42.7–76)
NEUTROPHILS NFR BLD AUTO: 60.4 % (ref 42.7–76)
NEUTROPHILS NFR BLD AUTO: 73.4 % (ref 42.7–76)
NEUTROPHILS NFR BLD AUTO: 79 % (ref 42.7–76)
NEUTROPHILS NFR BLD MANUAL: 54 % (ref 42.7–76)
NEUTROPHILS NFR BLD MANUAL: 56 % (ref 42.7–76)
NEUTROPHILS NFR BLD MANUAL: 58 % (ref 42.7–76)
NEUTROPHILS NFR BLD MANUAL: 59 % (ref 42.7–76)
NEUTROPHILS NFR BLD MANUAL: 62 % (ref 42.7–76)
NEUTROPHILS NFR BLD MANUAL: 62 % (ref 42.7–76)
NEUTROPHILS NFR BLD MANUAL: 63 % (ref 42.7–76)
NEUTROPHILS NFR BLD MANUAL: 65 % (ref 42.7–76)
NEUTROPHILS NFR BLD MANUAL: 67 % (ref 42.7–76)
NEUTROPHILS NFR BLD MANUAL: 67 % (ref 42.7–76)
NEUTROPHILS NFR BLD MANUAL: 68 % (ref 42.7–76)
NEUTROPHILS NFR BLD MANUAL: 68 % (ref 42.7–76)
NEUTROPHILS NFR BLD MANUAL: 70 % (ref 42.7–76)
NEUTROPHILS NFR BLD MANUAL: 70.1 % (ref 42.7–76)
NEUTROPHILS NFR BLD MANUAL: 72 % (ref 42.7–76)
NEUTROPHILS NFR BLD MANUAL: 75.2 % (ref 42.7–76)
NEUTROPHILS NFR BLD MANUAL: 75.5 % (ref 42.7–76)
NEUTROPHILS NFR BLD MANUAL: 79 % (ref 42.7–76)
NEUTROPHILS NFR BLD MANUAL: 84 % (ref 42.7–76)
NEUTROPHILS NFR BLD MANUAL: 85 % (ref 42.7–76)
NEUTS HYPERSEG # BLD: ABNORMAL 10*3/UL
NITRITE UR QL STRIP: NEGATIVE
NITRITE UR QL STRIP: POSITIVE
NRBC BLD AUTO-RTO: 0.3 /100 WBC (ref 0–0.2)
NRBC BLD AUTO-RTO: 1.3 /100 WBC (ref 0–0.2)
NRBC BLD AUTO-RTO: 2.2 /100 WBC (ref 0–0.2)
NRBC SPEC MANUAL: 1 /100 WBC (ref 0–0.2)
NRBC SPEC MANUAL: 1 /100 WBC (ref 0–0.2)
NRBC SPEC MANUAL: 2 /100 WBC (ref 0–0.2)
NRBC SPEC MANUAL: 3 /100 WBC (ref 0–0.2)
NRBC SPEC MANUAL: 5 /100 WBC (ref 0–0.2)
NRBC SPEC MANUAL: 6 /100 WBC (ref 0–0.2)
NRBC SPEC MANUAL: 6 /100 WBC (ref 0–0.2)
NT-PROBNP SERPL-MCNC: 259.5 PG/ML (ref 5–900)
PASSIVE ANTI-CD-38: NORMAL
PATH REPORT.ADDENDUM SPEC: NORMAL
PATH REPORT.FINAL DX SPEC: NORMAL
PATH REPORT.GROSS SPEC: NORMAL
PCO2 BLDA: 24.6 MM HG (ref 35–45)
PH BLDA: 7.49 PH UNITS (ref 7.35–7.45)
PH UR STRIP.AUTO: 6.5 [PH] (ref 5–8)
PH UR STRIP.AUTO: 6.5 [PH] (ref 5–8)
PH UR STRIP.AUTO: 7 [PH] (ref 5–8)
PH UR STRIP.AUTO: 7.5 [PH] (ref 5–8)
PHENYTOIN SERPL-MCNC: 3.9 MCG/ML (ref 10–20)
PHOSPHATE SERPL-MCNC: 1.8 MG/DL (ref 2.5–4.5)
PHOSPHATE SERPL-MCNC: 1.9 MG/DL (ref 2.5–4.5)
PHOSPHATE SERPL-MCNC: 2.4 MG/DL (ref 2.5–4.5)
PHOSPHATE SERPL-MCNC: 2.4 MG/DL (ref 2.5–4.5)
PHOSPHATE SERPL-MCNC: 2.7 MG/DL (ref 2.5–4.5)
PHOSPHATE SERPL-MCNC: 3.1 MG/DL (ref 2.5–4.5)
PHOSPHATE SERPL-MCNC: 3.3 MG/DL (ref 2.5–4.5)
PHOSPHATE SERPL-MCNC: 3.5 MG/DL (ref 2.5–4.5)
PHOSPHATE SERPL-MCNC: 3.7 MG/DL (ref 2.5–4.5)
PHOSPHATE SERPL-MCNC: 3.8 MG/DL (ref 2.5–4.5)
PHOSPHATE SERPL-MCNC: 4.1 MG/DL (ref 2.5–4.5)
PHOSPHATE SERPL-MCNC: 4.8 MG/DL (ref 2.5–4.5)
PLAT MORPH BLD: NORMAL
PLATELET # BLD AUTO: 17 10*3/MM3 (ref 140–450)
PLATELET # BLD AUTO: 18 10*3/MM3 (ref 140–450)
PLATELET # BLD AUTO: 18 10*3/MM3 (ref 140–450)
PLATELET # BLD AUTO: 20 10*3/MM3 (ref 140–450)
PLATELET # BLD AUTO: 21 10*3/MM3 (ref 140–450)
PLATELET # BLD AUTO: 21 10*3/MM3 (ref 140–450)
PLATELET # BLD AUTO: 22 10*3/MM3 (ref 140–450)
PLATELET # BLD AUTO: 23 10*3/MM3 (ref 140–450)
PLATELET # BLD AUTO: 24 10*3/MM3 (ref 140–450)
PLATELET # BLD AUTO: 25 10*3/MM3 (ref 140–450)
PLATELET # BLD AUTO: 25 10*3/MM3 (ref 140–450)
PLATELET # BLD AUTO: 26 10*3/MM3 (ref 140–450)
PLATELET # BLD AUTO: 28 10*3/MM3 (ref 140–450)
PLATELET # BLD AUTO: 28 10*3/MM3 (ref 140–450)
PLATELET # BLD AUTO: 29 10*3/MM3 (ref 140–450)
PLATELET # BLD AUTO: 30 10*3/MM3 (ref 140–450)
PLATELET # BLD AUTO: 32 10*3/MM3 (ref 140–450)
PLATELET # BLD AUTO: 33 10*3/MM3 (ref 140–450)
PLATELET # BLD AUTO: 33 10*3/MM3 (ref 140–450)
PLATELET # BLD AUTO: 35 10*3/MM3 (ref 140–450)
PLATELET # BLD AUTO: 36 10*3/MM3 (ref 140–450)
PLATELET # BLD AUTO: 36 10*3/MM3 (ref 140–450)
PLATELET # BLD AUTO: 37 10*3/MM3 (ref 140–450)
PLATELET # BLD AUTO: 42 10*3/MM3 (ref 140–450)
PLATELET # BLD AUTO: 45 10*3/MM3 (ref 140–450)
PLATELET # BLD AUTO: 48 10*3/MM3 (ref 140–450)
PLATELET # BLD AUTO: 51 10*3/MM3 (ref 140–450)
PLATELET # BLD AUTO: 54 10*3/MM3 (ref 140–450)
PLATELET # BLD AUTO: 55 10*3/MM3 (ref 140–450)
PLATELET # BLD AUTO: 55 10*3/MM3 (ref 140–450)
PLATELET # BLD AUTO: 57 10*3/MM3 (ref 140–450)
PLATELET # BLD AUTO: 63 10*3/MM3 (ref 140–450)
PMV BLD AUTO: 10.6 FL (ref 6–12)
PMV BLD AUTO: 11.4 FL (ref 6–12)
PMV BLD AUTO: 11.7 FL (ref 6–12)
PMV BLD AUTO: 12 FL (ref 6–12)
PMV BLD AUTO: 12 FL (ref 6–12)
PMV BLD AUTO: 9.9 FL (ref 6–12)
PMV BLD AUTO: ABNORMAL FL
PMV BLD AUTO: ABNORMAL FL
PO2 BLDA: 87 MM HG (ref 80–100)
POIKILOCYTOSIS BLD QL SMEAR: ABNORMAL
POIKILOCYTOSIS BLD QL SMEAR: NORMAL
POLYCHROMASIA BLD QL SMEAR: ABNORMAL
POLYCHROMASIA BLD QL SMEAR: NORMAL
POLYCHROMASIA BLD QL SMEAR: NORMAL
POTASSIUM BLD-SCNC: 3.3 MMOL/L (ref 3.5–5.2)
POTASSIUM BLD-SCNC: 3.4 MMOL/L (ref 3.5–5.2)
POTASSIUM BLD-SCNC: 3.4 MMOL/L (ref 3.5–5.2)
POTASSIUM BLD-SCNC: 3.6 MMOL/L (ref 3.5–5.2)
POTASSIUM BLD-SCNC: 3.7 MMOL/L (ref 3.5–5.2)
POTASSIUM BLD-SCNC: 3.8 MMOL/L (ref 3.5–5.2)
POTASSIUM BLD-SCNC: 3.9 MMOL/L (ref 3.5–5.2)
POTASSIUM BLD-SCNC: 4.1 MMOL/L (ref 3.5–5.2)
POTASSIUM BLD-SCNC: 4.3 MMOL/L (ref 3.5–5.2)
POTASSIUM BLD-SCNC: 4.5 MMOL/L (ref 3.5–5.2)
PROCALCITONIN SERPL-MCNC: 0.28 NG/ML (ref 0.1–0.25)
PROCALCITONIN SERPL-MCNC: 0.66 NG/ML (ref 0.1–0.25)
PROCALCITONIN SERPL-MCNC: 1.38 NG/ML (ref 0.1–0.25)
PROCALCITONIN SERPL-MCNC: 2.49 NG/ML (ref 0.1–0.25)
PROCALCITONIN SERPL-MCNC: 2.87 NG/ML (ref 0.1–0.25)
PROT SERPL-MCNC: 10.1 G/DL (ref 6–8.5)
PROT SERPL-MCNC: 10.4 G/DL (ref 6–8.5)
PROT SERPL-MCNC: 10.9 G/DL (ref 6–8.5)
PROT SERPL-MCNC: 11.3 G/DL (ref 6–8.5)
PROT SERPL-MCNC: 11.3 G/DL (ref 6–8.5)
PROT SERPL-MCNC: 11.6 G/DL (ref 6–8.5)
PROT SERPL-MCNC: 11.7 G/DL (ref 6–8.5)
PROT SERPL-MCNC: 11.8 G/DL (ref 6–8.5)
PROT SERPL-MCNC: 11.8 G/DL (ref 6–8.5)
PROT SERPL-MCNC: 12.6 G/DL (ref 6–8.5)
PROT SERPL-MCNC: 12.6 G/DL (ref 6–8.5)
PROT SERPL-MCNC: 9 G/DL (ref 6–8.5)
PROT SERPL-MCNC: 9.3 G/DL (ref 6–8.5)
PROT SERPL-MCNC: 9.5 G/DL (ref 6–8.5)
PROT SERPL-MCNC: 9.6 G/DL (ref 6–8.5)
PROT SERPL-MCNC: 9.7 G/DL (ref 6–8.5)
PROT SERPL-MCNC: 9.8 G/DL (ref 6–8.5)
PROT SERPL-MCNC: 9.9 G/DL (ref 6–8.5)
PROT SERPL-MCNC: 9.9 G/DL (ref 6–8.5)
PROT UR QL STRIP: ABNORMAL
PROTHROMBIN TIME: 15.7 SECONDS (ref 12.8–15.2)
RBC # BLD AUTO: 2.33 10*6/MM3 (ref 3.77–5.28)
RBC # BLD AUTO: 2.35 10*6/MM3 (ref 3.77–5.28)
RBC # BLD AUTO: 2.38 10*6/MM3 (ref 3.77–5.28)
RBC # BLD AUTO: 2.46 10*6/MM3 (ref 3.77–5.28)
RBC # BLD AUTO: 2.61 10*6/MM3 (ref 3.77–5.28)
RBC # BLD AUTO: 2.61 10*6/MM3 (ref 3.77–5.28)
RBC # BLD AUTO: 2.64 10*6/MM3 (ref 3.77–5.28)
RBC # BLD AUTO: 2.66 10*6/MM3 (ref 3.77–5.28)
RBC # BLD AUTO: 2.8 10*6/MM3 (ref 3.77–5.28)
RBC # BLD AUTO: 2.83 10*6/MM3 (ref 3.77–5.28)
RBC # BLD AUTO: 2.85 10*6/MM3 (ref 3.77–5.28)
RBC # BLD AUTO: 2.85 10*6/MM3 (ref 3.77–5.28)
RBC # BLD AUTO: 2.86 10*6/MM3 (ref 3.77–5.28)
RBC # BLD AUTO: 2.9 10*6/MM3 (ref 3.77–5.28)
RBC # BLD AUTO: 2.93 10*6/MM3 (ref 3.77–5.28)
RBC # BLD AUTO: 2.93 10*6/MM3 (ref 3.77–5.28)
RBC # BLD AUTO: 2.95 10*6/MM3 (ref 3.77–5.28)
RBC # BLD AUTO: 3 10*6/MM3 (ref 3.77–5.28)
RBC # BLD AUTO: 3.06 10*6/MM3 (ref 3.77–5.28)
RBC # BLD AUTO: 3.17 10*6/MM3 (ref 3.77–5.28)
RBC # BLD AUTO: 3.19 10*6/MM3 (ref 3.77–5.28)
RBC # BLD AUTO: 3.29 10*6/MM3 (ref 3.77–5.28)
RBC # BLD AUTO: 3.33 10*6/MM3 (ref 3.77–5.28)
RBC # BLD AUTO: 3.34 10*6/MM3 (ref 3.77–5.28)
RBC # BLD AUTO: 3.34 10*6/MM3 (ref 3.77–5.28)
RBC # BLD AUTO: 3.36 10*6/MM3 (ref 3.77–5.28)
RBC # BLD AUTO: 3.49 10*6/MM3 (ref 3.77–5.28)
RBC # BLD AUTO: 3.51 10*6/MM3 (ref 3.77–5.28)
RBC # UR: ABNORMAL /HPF
RBC # UR: NORMAL /HPF
RBC MORPH BLD: NORMAL
REF LAB TEST METHOD: ABNORMAL
REF LAB TEST METHOD: NORMAL
RH BLD: POSITIVE
RHINOVIRUS RNA SPEC NAA+PROBE: NOT DETECTED
RHINOVIRUS RNA SPEC NAA+PROBE: NOT DETECTED
ROULEAUX BLD QL SMEAR: ABNORMAL
RSV RNA NPH QL NAA+NON-PROBE: DETECTED
RSV RNA NPH QL NAA+NON-PROBE: DETECTED
S PNEUM AG SPEC QL LA: NEGATIVE
SAO2 % BLDCOA: 97.6 % (ref 92–99)
SCAN SLIDE: NORMAL
SMALL PLATELETS BLD QL SMEAR: NORMAL
SODIUM BLD-SCNC: 129 MMOL/L (ref 136–145)
SODIUM BLD-SCNC: 132 MMOL/L (ref 136–145)
SODIUM BLD-SCNC: 134 MMOL/L (ref 136–145)
SODIUM BLD-SCNC: 134 MMOL/L (ref 136–145)
SODIUM BLD-SCNC: 135 MMOL/L (ref 136–145)
SODIUM BLD-SCNC: 136 MMOL/L (ref 136–145)
SODIUM BLD-SCNC: 137 MMOL/L (ref 136–145)
SODIUM BLD-SCNC: 138 MMOL/L (ref 136–145)
SODIUM BLD-SCNC: 139 MMOL/L (ref 136–145)
SODIUM BLD-SCNC: 140 MMOL/L (ref 136–145)
SODIUM BLD-SCNC: 140 MMOL/L (ref 136–145)
SODIUM BLD-SCNC: 142 MMOL/L (ref 136–145)
SODIUM BLD-SCNC: 144 MMOL/L (ref 136–145)
SODIUM BLD-SCNC: 144 MMOL/L (ref 136–145)
SP GR UR STRIP: 1.01 (ref 1–1.03)
SP GR UR STRIP: 1.01 (ref 1–1.03)
SP GR UR STRIP: 1.02 (ref 1–1.03)
SP GR UR STRIP: 1.02 (ref 1–1.03)
SPHEROCYTES BLD QL SMEAR: ABNORMAL
SPHEROCYTES BLD QL SMEAR: NORMAL
SPHEROCYTES BLD QL SMEAR: NORMAL
SQUAMOUS #/AREA URNS HPF: ABNORMAL /HPF
SQUAMOUS #/AREA URNS HPF: NORMAL /HPF
STRESS TARGET HR: 129 BPM
T&S EXPIRATION DATE: NORMAL
TIBC SERPL-MCNC: 130 MCG/DL (ref 298–536)
TOTAL RATE: 22 BREATHS/MINUTE
TRANSFERRIN SERPL-MCNC: 87 MG/DL (ref 200–360)
TROPONIN T SERPL-MCNC: <0.01 NG/ML (ref 0–0.03)
UNIT  ABO: NORMAL
UNIT  RH: NORMAL
UROBILINOGEN UR QL STRIP: ABNORMAL
VANCOMYCIN SERPL-MCNC: 19.1 MCG/ML (ref 5–40)
VANCOMYCIN SERPL-MCNC: 24 MCG/ML (ref 5–40)
VANCOMYCIN SERPL-MCNC: 32.8 MCG/ML (ref 5–40)
VANCOMYCIN TROUGH SERPL-MCNC: 23 MCG/ML (ref 5–20)
VARIANT LYMPHS NFR BLD MANUAL: 1 % (ref 0–5)
VARIANT LYMPHS NFR BLD MANUAL: 1 % (ref 0–5)
VARIANT LYMPHS NFR BLD MANUAL: 2 % (ref 0–5)
VISC SER: 2.6 REL.SALINE (ref 1.6–1.9)
VIT B12 BLD-MCNC: >2000 PG/ML (ref 211–946)
WBC MORPH BLD: NORMAL
WBC NRBC COR # BLD: 12.19 10*3/MM3 (ref 3.4–10.8)
WBC NRBC COR # BLD: 12.31 10*3/MM3 (ref 3.4–10.8)
WBC NRBC COR # BLD: 12.47 10*3/MM3 (ref 3.4–10.8)
WBC NRBC COR # BLD: 4.7 10*3/MM3 (ref 3.4–10.8)
WBC NRBC COR # BLD: 4.85 10*3/MM3 (ref 3.4–10.8)
WBC NRBC COR # BLD: 5.52 10*3/MM3 (ref 3.4–10.8)
WBC NRBC COR # BLD: 5.56 10*3/MM3 (ref 3.4–10.8)
WBC NRBC COR # BLD: 5.98 10*3/MM3 (ref 3.4–10.8)
WBC NRBC COR # BLD: 6.26 10*3/MM3 (ref 3.4–10.8)
WBC NRBC COR # BLD: 6.39 10*3/MM3 (ref 3.4–10.8)
WBC NRBC COR # BLD: 6.58 10*3/MM3 (ref 3.4–10.8)
WBC NRBC COR # BLD: 6.65 10*3/MM3 (ref 3.4–10.8)
WBC NRBC COR # BLD: 6.74 10*3/MM3 (ref 3.4–10.8)
WBC NRBC COR # BLD: 7.16 10*3/MM3 (ref 3.4–10.8)
WBC NRBC COR # BLD: 7.24 10*3/MM3 (ref 3.4–10.8)
WBC NRBC COR # BLD: 7.32 10*3/MM3 (ref 3.4–10.8)
WBC NRBC COR # BLD: 7.46 10*3/MM3 (ref 3.4–10.8)
WBC NRBC COR # BLD: 7.8 10*3/MM3 (ref 3.4–10.8)
WBC NRBC COR # BLD: 8.07 10*3/MM3 (ref 3.4–10.8)
WBC NRBC COR # BLD: 8.14 10*3/MM3 (ref 3.4–10.8)
WBC NRBC COR # BLD: 8.2 10*3/MM3 (ref 3.4–10.8)
WBC NRBC COR # BLD: 8.24 10*3/MM3 (ref 3.4–10.8)
WBC NRBC COR # BLD: 8.45 10*3/MM3 (ref 3.4–10.8)
WBC NRBC COR # BLD: 8.89 10*3/MM3 (ref 3.4–10.8)
WBC NRBC COR # BLD: 9.19 10*3/MM3 (ref 3.4–10.8)
WBC NRBC COR # BLD: 9.3 10*3/MM3 (ref 3.4–10.8)
WBC NRBC COR # BLD: 9.3 10*3/MM3 (ref 3.4–10.8)
WBC NRBC COR # BLD: 9.43 10*3/MM3 (ref 3.4–10.8)
WBC UR QL AUTO: ABNORMAL /HPF
WBC UR QL AUTO: NORMAL /HPF
WHOLE BLOOD HOLD SPECIMEN: NORMAL
WHOLE BLOOD HOLD SPECIMEN: NORMAL

## 2020-01-01 PROCEDURE — 25010000002 VANCOMYCIN 10 G RECONSTITUTED SOLUTION: Performed by: EMERGENCY MEDICINE

## 2020-01-01 PROCEDURE — 94799 UNLISTED PULMONARY SVC/PX: CPT

## 2020-01-01 PROCEDURE — 99232 SBSQ HOSP IP/OBS MODERATE 35: CPT | Performed by: INTERNAL MEDICINE

## 2020-01-01 PROCEDURE — 85007 BL SMEAR W/DIFF WBC COUNT: CPT | Performed by: INTERNAL MEDICINE

## 2020-01-01 PROCEDURE — 85007 BL SMEAR W/DIFF WBC COUNT: CPT | Performed by: NURSE PRACTITIONER

## 2020-01-01 PROCEDURE — 95816 EEG AWAKE AND DROWSY: CPT

## 2020-01-01 PROCEDURE — 81001 URINALYSIS AUTO W/SCOPE: CPT | Performed by: EMERGENCY MEDICINE

## 2020-01-01 PROCEDURE — 25010000003 HEPARIN LOCK FLUCH PER 10 UNITS: Performed by: INTERNAL MEDICINE

## 2020-01-01 PROCEDURE — 36591 DRAW BLOOD OFF VENOUS DEVICE: CPT

## 2020-01-01 PROCEDURE — 80048 BASIC METABOLIC PNL TOTAL CA: CPT | Performed by: HOSPITALIST

## 2020-01-01 PROCEDURE — 86900 BLOOD TYPING SEROLOGIC ABO: CPT

## 2020-01-01 PROCEDURE — 25010000002 DENOSUMAB 120 MG/1.7ML SOLUTION: Performed by: INTERNAL MEDICINE

## 2020-01-01 PROCEDURE — 36430 TRANSFUSION BLD/BLD COMPNT: CPT

## 2020-01-01 PROCEDURE — 25010000002 CALCIUM GLUCONATE PER 10 ML: Performed by: INTERNAL MEDICINE

## 2020-01-01 PROCEDURE — 63710000001 DIPHENHYDRAMINE PER 50 MG: Performed by: NURSE PRACTITIONER

## 2020-01-01 PROCEDURE — 83735 ASSAY OF MAGNESIUM: CPT | Performed by: INTERNAL MEDICINE

## 2020-01-01 PROCEDURE — 95816 EEG AWAKE AND DROWSY: CPT | Performed by: PSYCHIATRY & NEUROLOGY

## 2020-01-01 PROCEDURE — 25010000002 LORAZEPAM PER 2 MG: Performed by: INTERNAL MEDICINE

## 2020-01-01 PROCEDURE — 82784 ASSAY IGA/IGD/IGG/IGM EACH: CPT | Performed by: INTERNAL MEDICINE

## 2020-01-01 PROCEDURE — 36415 COLL VENOUS BLD VENIPUNCTURE: CPT

## 2020-01-01 PROCEDURE — 85027 COMPLETE CBC AUTOMATED: CPT | Performed by: INTERNAL MEDICINE

## 2020-01-01 PROCEDURE — 80157 ASSAY CARBAMAZEPINE FREE: CPT | Performed by: INTERNAL MEDICINE

## 2020-01-01 PROCEDURE — 86920 COMPATIBILITY TEST SPIN: CPT

## 2020-01-01 PROCEDURE — 93321 DOPPLER ECHO F-UP/LMTD STD: CPT | Performed by: INTERNAL MEDICINE

## 2020-01-01 PROCEDURE — 25010000002 FOSPHENYTOIN 100 MG PE/2ML SOLUTION: Performed by: PSYCHIATRY & NEUROLOGY

## 2020-01-01 PROCEDURE — 85025 COMPLETE CBC W/AUTO DIFF WBC: CPT | Performed by: INTERNAL MEDICINE

## 2020-01-01 PROCEDURE — 71250 CT THORAX DX C-: CPT

## 2020-01-01 PROCEDURE — 80069 RENAL FUNCTION PANEL: CPT | Performed by: INTERNAL MEDICINE

## 2020-01-01 PROCEDURE — 86850 RBC ANTIBODY SCREEN: CPT

## 2020-01-01 PROCEDURE — 86922 COMPATIBILITY TEST ANTIGLOB: CPT

## 2020-01-01 PROCEDURE — 25010000002 CEFEPIME PER 500 MG: Performed by: HOSPITALIST

## 2020-01-01 PROCEDURE — 93308 TTE F-UP OR LMTD: CPT | Performed by: INTERNAL MEDICINE

## 2020-01-01 PROCEDURE — 96401 CHEMO ANTI-NEOPL SQ/IM: CPT

## 2020-01-01 PROCEDURE — 95716 VEEG EA 12-26HR CONT MNTR: CPT

## 2020-01-01 PROCEDURE — 84100 ASSAY OF PHOSPHORUS: CPT | Performed by: INTERNAL MEDICINE

## 2020-01-01 PROCEDURE — 25010000002 CEFTRIAXONE PER 250 MG: Performed by: HOSPITALIST

## 2020-01-01 PROCEDURE — 36600 WITHDRAWAL OF ARTERIAL BLOOD: CPT

## 2020-01-01 PROCEDURE — 96375 TX/PRO/DX INJ NEW DRUG ADDON: CPT | Performed by: INTERNAL MEDICINE

## 2020-01-01 PROCEDURE — 99231 SBSQ HOSP IP/OBS SF/LOW 25: CPT | Performed by: INTERNAL MEDICINE

## 2020-01-01 PROCEDURE — 87040 BLOOD CULTURE FOR BACTERIA: CPT | Performed by: EMERGENCY MEDICINE

## 2020-01-01 PROCEDURE — 85810 BLOOD VISCOSITY EXAMINATION: CPT | Performed by: INTERNAL MEDICINE

## 2020-01-01 PROCEDURE — P9016 RBC LEUKOCYTES REDUCED: HCPCS

## 2020-01-01 PROCEDURE — 25010000002 DIPHENHYDRAMINE PER 50 MG: Performed by: INTERNAL MEDICINE

## 2020-01-01 PROCEDURE — 80053 COMPREHEN METABOLIC PANEL: CPT | Performed by: INTERNAL MEDICINE

## 2020-01-01 PROCEDURE — 83880 ASSAY OF NATRIURETIC PEPTIDE: CPT | Performed by: EMERGENCY MEDICINE

## 2020-01-01 PROCEDURE — 80053 COMPREHEN METABOLIC PANEL: CPT | Performed by: EMERGENCY MEDICINE

## 2020-01-01 PROCEDURE — 70553 MRI BRAIN STEM W/O & W/DYE: CPT

## 2020-01-01 PROCEDURE — 86901 BLOOD TYPING SEROLOGIC RH(D): CPT

## 2020-01-01 PROCEDURE — 82962 GLUCOSE BLOOD TEST: CPT

## 2020-01-01 PROCEDURE — A9558 XE133 XENON 10MCI: HCPCS | Performed by: EMERGENCY MEDICINE

## 2020-01-01 PROCEDURE — 25010000002 EPOETIN ALFA-EPBX 10000 UNIT/ML SOLUTION: Performed by: INTERNAL MEDICINE

## 2020-01-01 PROCEDURE — 25010000002 MAGNESIUM SULFATE IN D5W 1G/100ML (PREMIX) 1-5 GM/100ML-% SOLUTION: Performed by: INTERNAL MEDICINE

## 2020-01-01 PROCEDURE — 87040 BLOOD CULTURE FOR BACTERIA: CPT | Performed by: NURSE PRACTITIONER

## 2020-01-01 PROCEDURE — 25010000002 DARATUMUMAB 100 MG/5ML SOLUTION 20 ML VIAL: Performed by: INTERNAL MEDICINE

## 2020-01-01 PROCEDURE — 86901 BLOOD TYPING SEROLOGIC RH(D): CPT | Performed by: INTERNAL MEDICINE

## 2020-01-01 PROCEDURE — 96375 TX/PRO/DX INJ NEW DRUG ADDON: CPT

## 2020-01-01 PROCEDURE — 86870 RBC ANTIBODY IDENTIFICATION: CPT | Performed by: INTERNAL MEDICINE

## 2020-01-01 PROCEDURE — 85025 COMPLETE CBC W/AUTO DIFF WBC: CPT | Performed by: HOSPITALIST

## 2020-01-01 PROCEDURE — 93005 ELECTROCARDIOGRAM TRACING: CPT

## 2020-01-01 PROCEDURE — 83883 ASSAY NEPHELOMETRY NOT SPEC: CPT | Performed by: INTERNAL MEDICINE

## 2020-01-01 PROCEDURE — 25010000002 CEFEPIME PER 500 MG: Performed by: INTERNAL MEDICINE

## 2020-01-01 PROCEDURE — 96523 IRRIG DRUG DELIVERY DEVICE: CPT | Performed by: INTERNAL MEDICINE

## 2020-01-01 PROCEDURE — 80069 RENAL FUNCTION PANEL: CPT | Performed by: HOSPITALIST

## 2020-01-01 PROCEDURE — 99213 OFFICE O/P EST LOW 20 MIN: CPT | Performed by: NURSE PRACTITIONER

## 2020-01-01 PROCEDURE — 84484 ASSAY OF TROPONIN QUANT: CPT | Performed by: HOSPITALIST

## 2020-01-01 PROCEDURE — 25010000002 VANCOMYCIN 10 G RECONSTITUTED SOLUTION: Performed by: NURSE PRACTITIONER

## 2020-01-01 PROCEDURE — 96372 THER/PROPH/DIAG INJ SC/IM: CPT

## 2020-01-01 PROCEDURE — 86850 RBC ANTIBODY SCREEN: CPT | Performed by: INTERNAL MEDICINE

## 2020-01-01 PROCEDURE — 82728 ASSAY OF FERRITIN: CPT | Performed by: INTERNAL MEDICINE

## 2020-01-01 PROCEDURE — 99232 SBSQ HOSP IP/OBS MODERATE 35: CPT | Performed by: NURSE PRACTITIONER

## 2020-01-01 PROCEDURE — 87077 CULTURE AEROBIC IDENTIFY: CPT | Performed by: EMERGENCY MEDICINE

## 2020-01-01 PROCEDURE — 84466 ASSAY OF TRANSFERRIN: CPT | Performed by: INTERNAL MEDICINE

## 2020-01-01 PROCEDURE — 99223 1ST HOSP IP/OBS HIGH 75: CPT | Performed by: PSYCHIATRY & NEUROLOGY

## 2020-01-01 PROCEDURE — 88305 TISSUE EXAM BY PATHOLOGIST: CPT | Performed by: INTERNAL MEDICINE

## 2020-01-01 PROCEDURE — 84145 PROCALCITONIN (PCT): CPT | Performed by: INTERNAL MEDICINE

## 2020-01-01 PROCEDURE — 25010000002 MORPHINE PER 10 MG: Performed by: INTERNAL MEDICINE

## 2020-01-01 PROCEDURE — A9270 NON-COVERED ITEM OR SERVICE: HCPCS | Performed by: INTERNAL MEDICINE

## 2020-01-01 PROCEDURE — 80053 COMPREHEN METABOLIC PANEL: CPT | Performed by: HOSPITALIST

## 2020-01-01 PROCEDURE — 99222 1ST HOSP IP/OBS MODERATE 55: CPT | Performed by: INTERNAL MEDICINE

## 2020-01-01 PROCEDURE — 86334 IMMUNOFIX E-PHORESIS SERUM: CPT | Performed by: INTERNAL MEDICINE

## 2020-01-01 PROCEDURE — 25010000002 LEVETIRACETAM IN NACL 0.82% 500 MG/100ML SOLUTION: Performed by: INTERNAL MEDICINE

## 2020-01-01 PROCEDURE — 25010000002 BORTEZOMIB PER 0.1 MG: Performed by: INTERNAL MEDICINE

## 2020-01-01 PROCEDURE — 99495 TRANSJ CARE MGMT MOD F2F 14D: CPT | Performed by: PHYSICIAN ASSISTANT

## 2020-01-01 PROCEDURE — 82330 ASSAY OF CALCIUM: CPT | Performed by: INTERNAL MEDICINE

## 2020-01-01 PROCEDURE — 97162 PT EVAL MOD COMPLEX 30 MIN: CPT

## 2020-01-01 PROCEDURE — 93005 ELECTROCARDIOGRAM TRACING: CPT | Performed by: INTERNAL MEDICINE

## 2020-01-01 PROCEDURE — P9035 PLATELET PHERES LEUKOREDUCED: HCPCS

## 2020-01-01 PROCEDURE — 77012 CT SCAN FOR NEEDLE BIOPSY: CPT

## 2020-01-01 PROCEDURE — 85049 AUTOMATED PLATELET COUNT: CPT | Performed by: INTERNAL MEDICINE

## 2020-01-01 PROCEDURE — 85025 COMPLETE CBC W/AUTO DIFF WBC: CPT | Performed by: EMERGENCY MEDICINE

## 2020-01-01 PROCEDURE — 87040 BLOOD CULTURE FOR BACTERIA: CPT | Performed by: INTERNAL MEDICINE

## 2020-01-01 PROCEDURE — 99285 EMERGENCY DEPT VISIT HI MDM: CPT

## 2020-01-01 PROCEDURE — 85007 BL SMEAR W/DIFF WBC COUNT: CPT | Performed by: HOSPITALIST

## 2020-01-01 PROCEDURE — 99215 OFFICE O/P EST HI 40 MIN: CPT | Performed by: INTERNAL MEDICINE

## 2020-01-01 PROCEDURE — 87899 AGENT NOS ASSAY W/OPTIC: CPT | Performed by: HOSPITALIST

## 2020-01-01 PROCEDURE — 82746 ASSAY OF FOLIC ACID SERUM: CPT | Performed by: INTERNAL MEDICINE

## 2020-01-01 PROCEDURE — 25010000003 POTASSIUM CHLORIDE 10 MEQ/100ML SOLUTION: Performed by: INTERNAL MEDICINE

## 2020-01-01 PROCEDURE — 85049 AUTOMATED PLATELET COUNT: CPT | Performed by: PSYCHIATRY & NEUROLOGY

## 2020-01-01 PROCEDURE — 83690 ASSAY OF LIPASE: CPT | Performed by: EMERGENCY MEDICINE

## 2020-01-01 PROCEDURE — 99233 SBSQ HOSP IP/OBS HIGH 50: CPT | Performed by: INTERNAL MEDICINE

## 2020-01-01 PROCEDURE — 87641 MR-STAPH DNA AMP PROBE: CPT | Performed by: INTERNAL MEDICINE

## 2020-01-01 PROCEDURE — 86900 BLOOD TYPING SEROLOGIC ABO: CPT | Performed by: INTERNAL MEDICINE

## 2020-01-01 PROCEDURE — 84145 PROCALCITONIN (PCT): CPT | Performed by: EMERGENCY MEDICINE

## 2020-01-01 PROCEDURE — 83605 ASSAY OF LACTIC ACID: CPT | Performed by: EMERGENCY MEDICINE

## 2020-01-01 PROCEDURE — 96413 CHEMO IV INFUSION 1 HR: CPT

## 2020-01-01 PROCEDURE — 82306 VITAMIN D 25 HYDROXY: CPT | Performed by: INTERNAL MEDICINE

## 2020-01-01 PROCEDURE — 78582 LUNG VENTILAT&PERFUS IMAGING: CPT

## 2020-01-01 PROCEDURE — 93005 ELECTROCARDIOGRAM TRACING: CPT | Performed by: EMERGENCY MEDICINE

## 2020-01-01 PROCEDURE — 25010000002 DARATUMUMAB 100 MG/5ML SOLUTION 5 ML VIAL: Performed by: INTERNAL MEDICINE

## 2020-01-01 PROCEDURE — 80156 ASSAY CARBAMAZEPINE TOTAL: CPT | Performed by: INTERNAL MEDICINE

## 2020-01-01 PROCEDURE — 85007 BL SMEAR W/DIFF WBC COUNT: CPT | Performed by: EMERGENCY MEDICINE

## 2020-01-01 PROCEDURE — 86902 BLOOD TYPE ANTIGEN DONOR EA: CPT

## 2020-01-01 PROCEDURE — 93308 TTE F-UP OR LMTD: CPT

## 2020-01-01 PROCEDURE — 95720 EEG PHY/QHP EA INCR W/VEEG: CPT | Performed by: PSYCHIATRY & NEUROLOGY

## 2020-01-01 PROCEDURE — 84145 PROCALCITONIN (PCT): CPT | Performed by: NURSE PRACTITIONER

## 2020-01-01 PROCEDURE — 83605 ASSAY OF LACTIC ACID: CPT | Performed by: NURSE PRACTITIONER

## 2020-01-01 PROCEDURE — 88341 IMHCHEM/IMCYTCHM EA ADD ANTB: CPT | Performed by: INTERNAL MEDICINE

## 2020-01-01 PROCEDURE — 86870 RBC ANTIBODY IDENTIFICATION: CPT

## 2020-01-01 PROCEDURE — 71046 X-RAY EXAM CHEST 2 VIEWS: CPT

## 2020-01-01 PROCEDURE — 0 XENON XE 133: Performed by: EMERGENCY MEDICINE

## 2020-01-01 PROCEDURE — 96401 CHEMO ANTI-NEOPL SQ/IM: CPT | Performed by: INTERNAL MEDICINE

## 2020-01-01 PROCEDURE — 25010000002 METHYLPREDNISOLONE PER 125 MG: Performed by: INTERNAL MEDICINE

## 2020-01-01 PROCEDURE — 95718 EEG PHYS/QHP 2-12 HR W/VEEG: CPT | Performed by: PSYCHIATRY & NEUROLOGY

## 2020-01-01 PROCEDURE — 81001 URINALYSIS AUTO W/SCOPE: CPT | Performed by: INTERNAL MEDICINE

## 2020-01-01 PROCEDURE — 80202 ASSAY OF VANCOMYCIN: CPT | Performed by: NURSE PRACTITIONER

## 2020-01-01 PROCEDURE — 87086 URINE CULTURE/COLONY COUNT: CPT | Performed by: EMERGENCY MEDICINE

## 2020-01-01 PROCEDURE — 99221 1ST HOSP IP/OBS SF/LOW 40: CPT | Performed by: PSYCHIATRY & NEUROLOGY

## 2020-01-01 PROCEDURE — 0 GADOBENATE DIMEGLUMINE 529 MG/ML SOLUTION: Performed by: INTERNAL MEDICINE

## 2020-01-01 PROCEDURE — 87186 SC STD MICRODIL/AGAR DIL: CPT | Performed by: EMERGENCY MEDICINE

## 2020-01-01 PROCEDURE — 83540 ASSAY OF IRON: CPT | Performed by: INTERNAL MEDICINE

## 2020-01-01 PROCEDURE — 85610 PROTHROMBIN TIME: CPT

## 2020-01-01 PROCEDURE — 80202 ASSAY OF VANCOMYCIN: CPT | Performed by: INTERNAL MEDICINE

## 2020-01-01 PROCEDURE — A9270 NON-COVERED ITEM OR SERVICE: HCPCS | Performed by: RADIOLOGY

## 2020-01-01 PROCEDURE — 87205 SMEAR GRAM STAIN: CPT | Performed by: HOSPITALIST

## 2020-01-01 PROCEDURE — 82803 BLOOD GASES ANY COMBINATION: CPT

## 2020-01-01 PROCEDURE — 85379 FIBRIN DEGRADATION QUANT: CPT | Performed by: EMERGENCY MEDICINE

## 2020-01-01 PROCEDURE — 86921 COMPATIBILITY TEST INCUBATE: CPT

## 2020-01-01 PROCEDURE — 25010000002 CEFEPIME PER 500 MG: Performed by: EMERGENCY MEDICINE

## 2020-01-01 PROCEDURE — 99284 EMERGENCY DEPT VISIT MOD MDM: CPT

## 2020-01-01 PROCEDURE — 83605 ASSAY OF LACTIC ACID: CPT

## 2020-01-01 PROCEDURE — 93010 ELECTROCARDIOGRAM REPORT: CPT | Performed by: INTERNAL MEDICINE

## 2020-01-01 PROCEDURE — 85027 COMPLETE CBC AUTOMATED: CPT | Performed by: HOSPITALIST

## 2020-01-01 PROCEDURE — 96372 THER/PROPH/DIAG INJ SC/IM: CPT | Performed by: INTERNAL MEDICINE

## 2020-01-01 PROCEDURE — 84484 ASSAY OF TROPONIN QUANT: CPT | Performed by: EMERGENCY MEDICINE

## 2020-01-01 PROCEDURE — 96361 HYDRATE IV INFUSION ADD-ON: CPT | Performed by: NURSE PRACTITIONER

## 2020-01-01 PROCEDURE — 96415 CHEMO IV INFUSION ADDL HR: CPT

## 2020-01-01 PROCEDURE — 96360 HYDRATION IV INFUSION INIT: CPT | Performed by: NURSE PRACTITIONER

## 2020-01-01 PROCEDURE — 80202 ASSAY OF VANCOMYCIN: CPT | Performed by: HOSPITALIST

## 2020-01-01 PROCEDURE — 94640 AIRWAY INHALATION TREATMENT: CPT

## 2020-01-01 PROCEDURE — 85025 COMPLETE CBC W/AUTO DIFF WBC: CPT | Performed by: NURSE PRACTITIONER

## 2020-01-01 PROCEDURE — 25010000003 LEVETIRACETAM IN NACL 0.75% 1000 MG/100ML SOLUTION: Performed by: NURSE PRACTITIONER

## 2020-01-01 PROCEDURE — A9540 TC99M MAA: HCPCS | Performed by: EMERGENCY MEDICINE

## 2020-01-01 PROCEDURE — 71045 X-RAY EXAM CHEST 1 VIEW: CPT

## 2020-01-01 PROCEDURE — 99223 1ST HOSP IP/OBS HIGH 75: CPT | Performed by: INTERNAL MEDICINE

## 2020-01-01 PROCEDURE — 82607 VITAMIN B-12: CPT | Performed by: INTERNAL MEDICINE

## 2020-01-01 PROCEDURE — 99215 OFFICE O/P EST HI 40 MIN: CPT | Performed by: NURSE PRACTITIONER

## 2020-01-01 PROCEDURE — 87070 CULTURE OTHR SPECIMN AEROBIC: CPT | Performed by: HOSPITALIST

## 2020-01-01 PROCEDURE — 87040 BLOOD CULTURE FOR BACTERIA: CPT

## 2020-01-01 PROCEDURE — 63710000001 HYDROCODONE-ACETAMINOPHEN 5-325 MG TABLET: Performed by: RADIOLOGY

## 2020-01-01 PROCEDURE — 63710000001 ACETAMINOPHEN 500 MG TABLET: Performed by: INTERNAL MEDICINE

## 2020-01-01 PROCEDURE — 80185 ASSAY OF PHENYTOIN TOTAL: CPT | Performed by: PSYCHIATRY & NEUROLOGY

## 2020-01-01 PROCEDURE — 99231 SBSQ HOSP IP/OBS SF/LOW 25: CPT | Performed by: NURSE PRACTITIONER

## 2020-01-01 PROCEDURE — 74176 CT ABD & PELVIS W/O CONTRAST: CPT

## 2020-01-01 PROCEDURE — 25010000002 VANCOMYCIN: Performed by: NURSE PRACTITIONER

## 2020-01-01 PROCEDURE — 25010000002 POTASSIUM CHLORIDE PER 2 MEQ OF POTASSIUM: Performed by: INTERNAL MEDICINE

## 2020-01-01 PROCEDURE — 70450 CT HEAD/BRAIN W/O DYE: CPT

## 2020-01-01 PROCEDURE — 25010000002 EPOETIN ALFA-EPBX 40000 UNIT/ML SOLUTION: Performed by: NURSE PRACTITIONER

## 2020-01-01 PROCEDURE — 84484 ASSAY OF TROPONIN QUANT: CPT | Performed by: INTERNAL MEDICINE

## 2020-01-01 PROCEDURE — 93325 DOPPLER ECHO COLOR FLOW MAPG: CPT | Performed by: INTERNAL MEDICINE

## 2020-01-01 PROCEDURE — 87641 MR-STAPH DNA AMP PROBE: CPT | Performed by: HOSPITALIST

## 2020-01-01 PROCEDURE — 83735 ASSAY OF MAGNESIUM: CPT | Performed by: PSYCHIATRY & NEUROLOGY

## 2020-01-01 PROCEDURE — G0463 HOSPITAL OUTPT CLINIC VISIT: HCPCS

## 2020-01-01 PROCEDURE — 99232 SBSQ HOSP IP/OBS MODERATE 35: CPT | Performed by: PHYSICIAN ASSISTANT

## 2020-01-01 PROCEDURE — P9612 CATHETERIZE FOR URINE SPEC: HCPCS

## 2020-01-01 PROCEDURE — 80053 COMPREHEN METABOLIC PANEL: CPT | Performed by: NURSE PRACTITIONER

## 2020-01-01 PROCEDURE — 83605 ASSAY OF LACTIC ACID: CPT | Performed by: INTERNAL MEDICINE

## 2020-01-01 PROCEDURE — 88342 IMHCHEM/IMCYTCHM 1ST ANTB: CPT | Performed by: INTERNAL MEDICINE

## 2020-01-01 PROCEDURE — 97530 THERAPEUTIC ACTIVITIES: CPT

## 2020-01-01 PROCEDURE — 82232 ASSAY OF BETA-2 PROTEIN: CPT | Performed by: INTERNAL MEDICINE

## 2020-01-01 PROCEDURE — 25010000003 LIDOCAINE 1 % SOLUTION: Performed by: RADIOLOGY

## 2020-01-01 PROCEDURE — 25010000002 FUROSEMIDE PER 20 MG: Performed by: INTERNAL MEDICINE

## 2020-01-01 PROCEDURE — 84165 PROTEIN E-PHORESIS SERUM: CPT | Performed by: INTERNAL MEDICINE

## 2020-01-01 PROCEDURE — 87150 DNA/RNA AMPLIFIED PROBE: CPT | Performed by: EMERGENCY MEDICINE

## 2020-01-01 PROCEDURE — 63710000001 ALPRAZOLAM 0.5 MG TABLET: Performed by: RADIOLOGY

## 2020-01-01 PROCEDURE — 25010000002 ONDANSETRON PER 1 MG: Performed by: HOSPITALIST

## 2020-01-01 PROCEDURE — 25010000002 CEFEPIME PER 500 MG: Performed by: NURSE PRACTITIONER

## 2020-01-01 PROCEDURE — 25010000002 EPOETIN ALFA-EPBX 40000 UNIT/ML SOLUTION: Performed by: INTERNAL MEDICINE

## 2020-01-01 PROCEDURE — 96415 CHEMO IV INFUSION ADDL HR: CPT | Performed by: INTERNAL MEDICINE

## 2020-01-01 PROCEDURE — 99233 SBSQ HOSP IP/OBS HIGH 50: CPT | Performed by: NURSE PRACTITIONER

## 2020-01-01 PROCEDURE — 93321 DOPPLER ECHO F-UP/LMTD STD: CPT

## 2020-01-01 PROCEDURE — 0100U HC BIOFIRE FILMARRAY RESP PANEL 2: CPT | Performed by: NURSE PRACTITIONER

## 2020-01-01 PROCEDURE — 93325 DOPPLER ECHO COLOR FLOW MAPG: CPT

## 2020-01-01 PROCEDURE — 0 TECHNETIUM ALBUMIN AGGREGATED: Performed by: EMERGENCY MEDICINE

## 2020-01-01 PROCEDURE — A9577 INJ MULTIHANCE: HCPCS | Performed by: INTERNAL MEDICINE

## 2020-01-01 PROCEDURE — 96413 CHEMO IV INFUSION 1 HR: CPT | Performed by: INTERNAL MEDICINE

## 2020-01-01 PROCEDURE — 25010000002 MAGNESIUM SULFATE IN D5W 1G/100ML (PREMIX) 1-5 GM/100ML-% SOLUTION: Performed by: PSYCHIATRY & NEUROLOGY

## 2020-01-01 PROCEDURE — 97110 THERAPEUTIC EXERCISES: CPT

## 2020-01-01 RX ORDER — HEPARIN SODIUM (PORCINE) LOCK FLUSH IV SOLN 100 UNIT/ML 100 UNIT/ML
500 SOLUTION INTRAVENOUS AS NEEDED
Status: CANCELLED | OUTPATIENT
Start: 2020-01-01

## 2020-01-01 RX ORDER — ACETAMINOPHEN 500 MG
1000 TABLET ORAL ONCE
Status: COMPLETED | OUTPATIENT
Start: 2020-01-01 | End: 2020-01-01

## 2020-01-01 RX ORDER — MORPHINE SULFATE 2 MG/ML
4 INJECTION, SOLUTION INTRAMUSCULAR; INTRAVENOUS
Status: DISCONTINUED | OUTPATIENT
Start: 2020-01-01 | End: 2020-01-01 | Stop reason: HOSPADM

## 2020-01-01 RX ORDER — FAMOTIDINE 20 MG/1
20 TABLET, FILM COATED ORAL DAILY
Status: COMPLETED | OUTPATIENT
Start: 2020-01-01 | End: 2020-01-01

## 2020-01-01 RX ORDER — POTASSIUM CHLORIDE 750 MG/1
30 CAPSULE, EXTENDED RELEASE ORAL EVERY 4 HOURS
Status: DISPENSED | OUTPATIENT
Start: 2020-01-01 | End: 2020-01-01

## 2020-01-01 RX ORDER — ACYCLOVIR 400 MG/1
400 TABLET ORAL 2 TIMES DAILY
Status: DISCONTINUED | OUTPATIENT
Start: 2020-01-01 | End: 2020-01-01 | Stop reason: HOSPADM

## 2020-01-01 RX ORDER — PROMETHAZINE HYDROCHLORIDE 25 MG/ML
6.25 INJECTION, SOLUTION INTRAMUSCULAR; INTRAVENOUS EVERY 4 HOURS PRN
Status: DISCONTINUED | OUTPATIENT
Start: 2020-01-01 | End: 2020-01-01 | Stop reason: HOSPADM

## 2020-01-01 RX ORDER — BORTEZOMIB 3.5 MG/1
1.3 INJECTION, POWDER, LYOPHILIZED, FOR SOLUTION INTRAVENOUS; SUBCUTANEOUS ONCE
Status: CANCELLED | OUTPATIENT
Start: 2020-01-01

## 2020-01-01 RX ORDER — ASPIRIN 81 MG/1
81 TABLET ORAL DAILY
Status: DISCONTINUED | OUTPATIENT
Start: 2020-01-01 | End: 2020-01-01

## 2020-01-01 RX ORDER — SODIUM CHLORIDE 9 MG/ML
250 INJECTION, SOLUTION INTRAVENOUS ONCE
Status: COMPLETED | OUTPATIENT
Start: 2020-01-01 | End: 2020-01-01

## 2020-01-01 RX ORDER — ACETAMINOPHEN 325 MG/1
650 TABLET ORAL EVERY 4 HOURS PRN
Status: DISCONTINUED | OUTPATIENT
Start: 2020-01-01 | End: 2020-01-01 | Stop reason: HOSPADM

## 2020-01-01 RX ORDER — FOSPHENYTOIN SODIUM 50 MG/ML
150 INJECTION, SOLUTION INTRAMUSCULAR; INTRAVENOUS EVERY 8 HOURS SCHEDULED
Status: DISCONTINUED | OUTPATIENT
Start: 2020-01-01 | End: 2020-01-01

## 2020-01-01 RX ORDER — ACETAMINOPHEN 160 MG/5ML
650 SOLUTION ORAL EVERY 4 HOURS PRN
Status: DISCONTINUED | OUTPATIENT
Start: 2020-01-01 | End: 2020-01-01 | Stop reason: HOSPADM

## 2020-01-01 RX ORDER — ACETAMINOPHEN 325 MG/1
650 TABLET ORAL ONCE
Status: CANCELLED | OUTPATIENT
Start: 2020-01-01 | End: 2020-01-01

## 2020-01-01 RX ORDER — ACETAMINOPHEN 325 MG/1
650 TABLET ORAL ONCE
Status: COMPLETED | OUTPATIENT
Start: 2020-01-01 | End: 2020-01-01

## 2020-01-01 RX ORDER — ONDANSETRON 2 MG/ML
4 INJECTION INTRAMUSCULAR; INTRAVENOUS EVERY 6 HOURS PRN
Status: DISCONTINUED | OUTPATIENT
Start: 2020-01-01 | End: 2020-01-01

## 2020-01-01 RX ORDER — SODIUM BICARBONATE 650 MG/1
650 TABLET ORAL 2 TIMES DAILY
Qty: 30 TABLET | Refills: 0 | Status: SHIPPED | OUTPATIENT
Start: 2020-01-01

## 2020-01-01 RX ORDER — DIPHENHYDRAMINE HCL 25 MG
25 CAPSULE ORAL ONCE
Status: CANCELLED | OUTPATIENT
Start: 2020-01-01 | End: 2020-01-01

## 2020-01-01 RX ORDER — MAGNESIUM SULFATE 1 G/100ML
2 INJECTION INTRAVENOUS ONCE
Status: COMPLETED | OUTPATIENT
Start: 2020-01-01 | End: 2020-01-01

## 2020-01-01 RX ORDER — NITROGLYCERIN 0.4 MG/1
0.4 TABLET SUBLINGUAL
Status: DISCONTINUED | OUTPATIENT
Start: 2020-01-01 | End: 2020-01-01 | Stop reason: HOSPADM

## 2020-01-01 RX ORDER — SODIUM BICARBONATE 650 MG/1
1300 TABLET ORAL 2 TIMES DAILY
Status: DISCONTINUED | OUTPATIENT
Start: 2020-01-01 | End: 2020-01-01

## 2020-01-01 RX ORDER — LORAZEPAM 2 MG/ML
1 CONCENTRATE ORAL
Status: DISCONTINUED | OUTPATIENT
Start: 2020-01-01 | End: 2020-01-01 | Stop reason: HOSPADM

## 2020-01-01 RX ORDER — LORAZEPAM 2 MG/ML
2 INJECTION INTRAMUSCULAR
Status: DISCONTINUED | OUTPATIENT
Start: 2020-01-01 | End: 2020-01-01 | Stop reason: HOSPADM

## 2020-01-01 RX ORDER — DEXAMETHASONE 4 MG/1
TABLET ORAL
Qty: 41 TABLET | Refills: 11 | Status: SHIPPED | OUTPATIENT
Start: 2020-01-01

## 2020-01-01 RX ORDER — LEVETIRACETAM 500 MG/1
500 TABLET ORAL 2 TIMES DAILY
Status: DISCONTINUED | OUTPATIENT
Start: 2020-01-01 | End: 2020-01-01

## 2020-01-01 RX ORDER — CARBAMAZEPINE 100 MG/1
400 TABLET, EXTENDED RELEASE ORAL NIGHTLY
Status: DISCONTINUED | OUTPATIENT
Start: 2020-01-01 | End: 2020-01-01 | Stop reason: HOSPADM

## 2020-01-01 RX ORDER — HYDRALAZINE HYDROCHLORIDE 25 MG/1
25 TABLET, FILM COATED ORAL EVERY 12 HOURS SCHEDULED
Status: DISCONTINUED | OUTPATIENT
Start: 2020-01-01 | End: 2020-01-01

## 2020-01-01 RX ORDER — DIPHENHYDRAMINE HCL 25 MG
25 CAPSULE ORAL ONCE
Status: DISCONTINUED | OUTPATIENT
Start: 2020-01-01 | End: 2020-01-01 | Stop reason: HOSPADM

## 2020-01-01 RX ORDER — SODIUM CHLORIDE 9 MG/ML
250 INJECTION, SOLUTION INTRAVENOUS AS NEEDED
Status: CANCELLED | OUTPATIENT
Start: 2020-01-01

## 2020-01-01 RX ORDER — MORPHINE SULFATE 2 MG/ML
6 INJECTION, SOLUTION INTRAMUSCULAR; INTRAVENOUS
Status: DISCONTINUED | OUTPATIENT
Start: 2020-01-01 | End: 2020-01-01 | Stop reason: HOSPADM

## 2020-01-01 RX ORDER — HEPARIN SODIUM (PORCINE) LOCK FLUSH IV SOLN 100 UNIT/ML 100 UNIT/ML
5 SOLUTION INTRAVENOUS AS NEEDED
Status: DISCONTINUED | OUTPATIENT
Start: 2020-01-01 | End: 2020-01-01 | Stop reason: HOSPADM

## 2020-01-01 RX ORDER — HALOPERIDOL 5 MG/ML
1 INJECTION INTRAMUSCULAR EVERY 4 HOURS PRN
Status: DISCONTINUED | OUTPATIENT
Start: 2020-01-01 | End: 2020-01-01 | Stop reason: HOSPADM

## 2020-01-01 RX ORDER — DIPHENOXYLATE HYDROCHLORIDE AND ATROPINE SULFATE 2.5; .025 MG/1; MG/1
1 TABLET ORAL
Status: DISCONTINUED | OUTPATIENT
Start: 2020-01-01 | End: 2020-01-01 | Stop reason: HOSPADM

## 2020-01-01 RX ORDER — IPRATROPIUM BROMIDE AND ALBUTEROL SULFATE 2.5; .5 MG/3ML; MG/3ML
3 SOLUTION RESPIRATORY (INHALATION) EVERY 8 HOURS PRN
Status: DISCONTINUED | OUTPATIENT
Start: 2020-01-01 | End: 2020-01-01 | Stop reason: HOSPADM

## 2020-01-01 RX ORDER — SODIUM CHLORIDE 0.9 % (FLUSH) 0.9 %
10 SYRINGE (ML) INJECTION EVERY 12 HOURS SCHEDULED
Status: DISCONTINUED | OUTPATIENT
Start: 2020-01-01 | End: 2020-01-01 | Stop reason: HOSPADM

## 2020-01-01 RX ORDER — MAGNESIUM SULFATE 1 G/100ML
2 INJECTION INTRAVENOUS ONCE
Status: DISCONTINUED | OUTPATIENT
Start: 2020-01-01 | End: 2020-01-01

## 2020-01-01 RX ORDER — NITROGLYCERIN 0.4 MG/1
TABLET SUBLINGUAL
Status: COMPLETED
Start: 2020-01-01 | End: 2020-01-01

## 2020-01-01 RX ORDER — ISOSORBIDE MONONITRATE 60 MG/1
30 TABLET, EXTENDED RELEASE ORAL EVERY EVENING
COMMUNITY

## 2020-01-01 RX ORDER — ISOSORBIDE MONONITRATE 30 MG/1
30 TABLET, EXTENDED RELEASE ORAL EVERY MORNING
Status: DISCONTINUED | OUTPATIENT
Start: 2020-01-01 | End: 2020-01-01 | Stop reason: HOSPADM

## 2020-01-01 RX ORDER — LACOSAMIDE 50 MG/1
200 TABLET ORAL EVERY 12 HOURS
Status: DISCONTINUED | OUTPATIENT
Start: 2020-01-01 | End: 2020-01-01 | Stop reason: HOSPADM

## 2020-01-01 RX ORDER — SODIUM CHLORIDE 0.9 % (FLUSH) 0.9 %
10 SYRINGE (ML) INJECTION AS NEEDED
Status: CANCELLED | OUTPATIENT
Start: 2020-01-01

## 2020-01-01 RX ORDER — LEVOTHYROXINE SODIUM 0.07 MG/1
75 TABLET ORAL
Status: DISCONTINUED | OUTPATIENT
Start: 2020-01-01 | End: 2020-01-01 | Stop reason: HOSPADM

## 2020-01-01 RX ORDER — LORAZEPAM 2 MG/ML
0.5 CONCENTRATE ORAL
Status: DISCONTINUED | OUTPATIENT
Start: 2020-01-01 | End: 2020-01-01 | Stop reason: HOSPADM

## 2020-01-01 RX ORDER — SODIUM CHLORIDE 9 MG/ML
100 INJECTION, SOLUTION INTRAVENOUS CONTINUOUS
Status: DISCONTINUED | OUTPATIENT
Start: 2020-01-01 | End: 2020-01-01

## 2020-01-01 RX ORDER — HYDROMORPHONE HYDROCHLORIDE 1 MG/ML
0.5 INJECTION, SOLUTION INTRAMUSCULAR; INTRAVENOUS; SUBCUTANEOUS
Status: DISCONTINUED | OUTPATIENT
Start: 2020-01-01 | End: 2020-01-01 | Stop reason: HOSPADM

## 2020-01-01 RX ORDER — MORPHINE SULFATE 2 MG/ML
2 INJECTION, SOLUTION INTRAMUSCULAR; INTRAVENOUS
Status: DISCONTINUED | OUTPATIENT
Start: 2020-01-01 | End: 2020-01-01 | Stop reason: HOSPADM

## 2020-01-01 RX ORDER — BORTEZOMIB 3.5 MG/1
1.3 INJECTION, POWDER, LYOPHILIZED, FOR SOLUTION INTRAVENOUS; SUBCUTANEOUS ONCE
Status: COMPLETED | OUTPATIENT
Start: 2020-01-01 | End: 2020-01-01

## 2020-01-01 RX ORDER — MORPHINE SULFATE 20 MG/ML
5 SOLUTION ORAL
Status: DISCONTINUED | OUTPATIENT
Start: 2020-01-01 | End: 2020-01-01 | Stop reason: HOSPADM

## 2020-01-01 RX ORDER — HYDROCODONE BITARTRATE AND ACETAMINOPHEN 5; 325 MG/1; MG/1
1 TABLET ORAL EVERY 6 HOURS PRN
Status: DISCONTINUED | OUTPATIENT
Start: 2020-01-01 | End: 2020-01-01 | Stop reason: HOSPADM

## 2020-01-01 RX ORDER — MORPHINE SULFATE 20 MG/ML
10 SOLUTION ORAL
Status: DISCONTINUED | OUTPATIENT
Start: 2020-01-01 | End: 2020-01-01 | Stop reason: HOSPADM

## 2020-01-01 RX ORDER — LORAZEPAM 2 MG/ML
1 INJECTION INTRAMUSCULAR
Status: DISCONTINUED | OUTPATIENT
Start: 2020-01-01 | End: 2020-01-01 | Stop reason: HOSPADM

## 2020-01-01 RX ORDER — MORPHINE SULFATE 20 MG/ML
20 SOLUTION ORAL
Status: DISCONTINUED | OUTPATIENT
Start: 2020-01-01 | End: 2020-01-01 | Stop reason: HOSPADM

## 2020-01-01 RX ORDER — SCOLOPAMINE TRANSDERMAL SYSTEM 1 MG/1
1 PATCH, EXTENDED RELEASE TRANSDERMAL
Status: DISCONTINUED | OUTPATIENT
Start: 2020-01-01 | End: 2020-01-01 | Stop reason: HOSPADM

## 2020-01-01 RX ORDER — SODIUM CHLORIDE 0.9 % (FLUSH) 0.9 %
10 SYRINGE (ML) INJECTION AS NEEDED
Status: DISCONTINUED | OUTPATIENT
Start: 2020-01-01 | End: 2020-01-01 | Stop reason: HOSPADM

## 2020-01-01 RX ORDER — METHYLPREDNISOLONE SODIUM SUCCINATE 125 MG/2ML
60 INJECTION, POWDER, LYOPHILIZED, FOR SOLUTION INTRAMUSCULAR; INTRAVENOUS ONCE
Status: COMPLETED | OUTPATIENT
Start: 2020-01-01 | End: 2020-01-01

## 2020-01-01 RX ORDER — HEPARIN SODIUM (PORCINE) LOCK FLUSH IV SOLN 100 UNIT/ML 100 UNIT/ML
500 SOLUTION INTRAVENOUS EVERY 8 HOURS SCHEDULED
Status: DISCONTINUED | OUTPATIENT
Start: 2020-01-01 | End: 2020-01-01 | Stop reason: HOSPADM

## 2020-01-01 RX ORDER — ACETAMINOPHEN 650 MG/1
650 SUPPOSITORY RECTAL EVERY 4 HOURS PRN
Status: DISCONTINUED | OUTPATIENT
Start: 2020-01-01 | End: 2020-01-01 | Stop reason: HOSPADM

## 2020-01-01 RX ORDER — SODIUM CHLORIDE 9 MG/ML
125 INJECTION, SOLUTION INTRAVENOUS CONTINUOUS
Status: DISCONTINUED | OUTPATIENT
Start: 2020-01-01 | End: 2020-01-01

## 2020-01-01 RX ORDER — HEPARIN SODIUM (PORCINE) LOCK FLUSH IV SOLN 100 UNIT/ML 100 UNIT/ML
500 SOLUTION INTRAVENOUS AS NEEDED
Status: DISCONTINUED | OUTPATIENT
Start: 2020-01-01 | End: 2020-01-01 | Stop reason: HOSPADM

## 2020-01-01 RX ORDER — CEFDINIR 300 MG/1
300 CAPSULE ORAL 2 TIMES DAILY
Qty: 6 CAPSULE | Refills: 0 | Status: SHIPPED | OUTPATIENT
Start: 2020-01-01 | End: 2020-01-01

## 2020-01-01 RX ORDER — HYDROCODONE BITARTRATE AND ACETAMINOPHEN 5; 325 MG/1; MG/1
1 TABLET ORAL ONCE AS NEEDED
Status: COMPLETED | OUTPATIENT
Start: 2020-01-01 | End: 2020-01-01

## 2020-01-01 RX ORDER — HALOPERIDOL 2 MG/ML
1 SOLUTION ORAL EVERY 4 HOURS PRN
Status: DISCONTINUED | OUTPATIENT
Start: 2020-01-01 | End: 2020-01-01 | Stop reason: HOSPADM

## 2020-01-01 RX ORDER — PROMETHAZINE HYDROCHLORIDE 25 MG/1
6.25 TABLET ORAL EVERY 4 HOURS PRN
Status: DISCONTINUED | OUTPATIENT
Start: 2020-01-01 | End: 2020-01-01 | Stop reason: HOSPADM

## 2020-01-01 RX ORDER — LACOSAMIDE 200 MG/1
200 TABLET ORAL EVERY 12 HOURS
Qty: 60 TABLET | Refills: 0 | Status: SHIPPED | OUTPATIENT
Start: 2020-01-01

## 2020-01-01 RX ORDER — GUAIFENESIN 600 MG/1
600 TABLET, EXTENDED RELEASE ORAL EVERY 12 HOURS SCHEDULED
Status: DISCONTINUED | OUTPATIENT
Start: 2020-01-01 | End: 2020-01-01 | Stop reason: HOSPADM

## 2020-01-01 RX ORDER — POTASSIUM CHLORIDE 750 MG/1
40 CAPSULE, EXTENDED RELEASE ORAL ONCE
Status: COMPLETED | OUTPATIENT
Start: 2020-01-01 | End: 2020-01-01

## 2020-01-01 RX ORDER — SODIUM CHLORIDE 0.9 % (FLUSH) 0.9 %
3 SYRINGE (ML) INJECTION EVERY 12 HOURS SCHEDULED
Status: CANCELLED | OUTPATIENT
Start: 2020-01-01

## 2020-01-01 RX ORDER — ISOSORBIDE MONONITRATE 30 MG/1
30 TABLET, EXTENDED RELEASE ORAL EVERY EVENING
Status: DISCONTINUED | OUTPATIENT
Start: 2020-01-01 | End: 2020-01-01 | Stop reason: HOSPADM

## 2020-01-01 RX ORDER — GUAIFENESIN/DEXTROMETHORPHAN 100-10MG/5
5 SYRUP ORAL EVERY 4 HOURS PRN
Status: DISCONTINUED | OUTPATIENT
Start: 2020-01-01 | End: 2020-01-01 | Stop reason: HOSPADM

## 2020-01-01 RX ORDER — CEFTRIAXONE SODIUM 1 G/50ML
1 INJECTION, SOLUTION INTRAVENOUS EVERY 24 HOURS
Status: DISCONTINUED | OUTPATIENT
Start: 2020-01-01 | End: 2020-01-01 | Stop reason: HOSPADM

## 2020-01-01 RX ORDER — LEVETIRACETAM 10 MG/ML
1000 INJECTION INTRAVASCULAR ONCE
Status: COMPLETED | OUTPATIENT
Start: 2020-01-01 | End: 2020-01-01

## 2020-01-01 RX ORDER — POTASSIUM CHLORIDE 7.45 MG/ML
10 INJECTION INTRAVENOUS
Status: COMPLETED | OUTPATIENT
Start: 2020-01-01 | End: 2020-01-01

## 2020-01-01 RX ORDER — SODIUM CHLORIDE 9 MG/ML
250 INJECTION, SOLUTION INTRAVENOUS ONCE
Status: CANCELLED | OUTPATIENT
Start: 2020-01-01

## 2020-01-01 RX ORDER — SODIUM CHLORIDE 9 MG/ML
50 INJECTION, SOLUTION INTRAVENOUS CONTINUOUS
Status: DISCONTINUED | OUTPATIENT
Start: 2020-01-01 | End: 2020-01-01

## 2020-01-01 RX ORDER — MORPHINE SULFATE 4 MG/ML
4 INJECTION, SOLUTION INTRAMUSCULAR; INTRAVENOUS
Status: DISCONTINUED | OUTPATIENT
Start: 2020-01-01 | End: 2020-01-01 | Stop reason: HOSPADM

## 2020-01-01 RX ORDER — SODIUM CHLORIDE 0.9 % (FLUSH) 0.9 %
20 SYRINGE (ML) INJECTION AS NEEDED
Status: DISCONTINUED | OUTPATIENT
Start: 2020-01-01 | End: 2020-01-01 | Stop reason: HOSPADM

## 2020-01-01 RX ORDER — ISOSORBIDE MONONITRATE 30 MG/1
30 TABLET, EXTENDED RELEASE ORAL EVERY EVENING
Status: DISCONTINUED | OUTPATIENT
Start: 2020-01-01 | End: 2020-01-01

## 2020-01-01 RX ORDER — AZITHROMYCIN 250 MG/1
TABLET, FILM COATED ORAL
Qty: 6 TABLET | Refills: 0 | Status: SHIPPED | OUTPATIENT
Start: 2020-01-01 | End: 2020-01-01

## 2020-01-01 RX ORDER — SODIUM CHLORIDE 9 MG/ML
250 INJECTION, SOLUTION INTRAVENOUS AS NEEDED
Status: DISCONTINUED | OUTPATIENT
Start: 2020-01-01 | End: 2020-01-01 | Stop reason: HOSPADM

## 2020-01-01 RX ORDER — DIPHENHYDRAMINE HYDROCHLORIDE 50 MG/ML
50 INJECTION INTRAMUSCULAR; INTRAVENOUS AS NEEDED
Status: CANCELLED | OUTPATIENT
Start: 2020-01-01

## 2020-01-01 RX ORDER — BISOPROLOL FUMARATE 5 MG/1
5 TABLET, FILM COATED ORAL
Status: DISCONTINUED | OUTPATIENT
Start: 2020-01-01 | End: 2020-01-01

## 2020-01-01 RX ORDER — LIDOCAINE HYDROCHLORIDE 10 MG/ML
20 INJECTION, SOLUTION INFILTRATION; PERINEURAL ONCE
Status: COMPLETED | OUTPATIENT
Start: 2020-01-01 | End: 2020-01-01

## 2020-01-01 RX ORDER — SODIUM CHLORIDE 0.9 % (FLUSH) 0.9 %
10 SYRINGE (ML) INJECTION EVERY 12 HOURS SCHEDULED
Status: DISCONTINUED | OUTPATIENT
Start: 2020-01-01 | End: 2020-01-01

## 2020-01-01 RX ORDER — ISOSORBIDE MONONITRATE 60 MG/1
60 TABLET, EXTENDED RELEASE ORAL EVERY MORNING
Status: DISCONTINUED | OUTPATIENT
Start: 2020-01-01 | End: 2020-01-01

## 2020-01-01 RX ORDER — ONDANSETRON HYDROCHLORIDE 8 MG/1
8 TABLET, FILM COATED ORAL 3 TIMES DAILY PRN
Status: DISCONTINUED | OUTPATIENT
Start: 2020-01-01 | End: 2020-01-01 | Stop reason: HOSPADM

## 2020-01-01 RX ORDER — SODIUM BICARBONATE 650 MG/1
650 TABLET ORAL 2 TIMES DAILY
Status: DISCONTINUED | OUTPATIENT
Start: 2020-01-01 | End: 2020-01-01

## 2020-01-01 RX ORDER — HEPARIN SODIUM (PORCINE) LOCK FLUSH IV SOLN 100 UNIT/ML 100 UNIT/ML
100 SOLUTION INTRAVENOUS EVERY 8 HOURS SCHEDULED
Status: DISCONTINUED | OUTPATIENT
Start: 2020-01-01 | End: 2020-01-01

## 2020-01-01 RX ORDER — HALOPERIDOL 1 MG/1
1 TABLET ORAL EVERY 4 HOURS PRN
Status: DISCONTINUED | OUTPATIENT
Start: 2020-01-01 | End: 2020-01-01 | Stop reason: HOSPADM

## 2020-01-01 RX ORDER — PANTOPRAZOLE SODIUM 40 MG/1
40 TABLET, DELAYED RELEASE ORAL
Status: DISCONTINUED | OUTPATIENT
Start: 2020-01-01 | End: 2020-01-01

## 2020-01-01 RX ORDER — ACETAMINOPHEN 650 MG/1
650 SUPPOSITORY RECTAL EVERY 4 HOURS PRN
Status: DISCONTINUED | OUTPATIENT
Start: 2020-01-01 | End: 2020-01-01

## 2020-01-01 RX ORDER — CALCIUM CARBONATE 200(500)MG
1 TABLET,CHEWABLE ORAL 2 TIMES DAILY
Status: DISCONTINUED | OUTPATIENT
Start: 2020-01-01 | End: 2020-01-01 | Stop reason: HOSPADM

## 2020-01-01 RX ORDER — SODIUM CHLORIDE 0.9 % (FLUSH) 0.9 %
1-10 SYRINGE (ML) INJECTION AS NEEDED
Status: CANCELLED | OUTPATIENT
Start: 2020-01-01

## 2020-01-01 RX ORDER — IPRATROPIUM BROMIDE AND ALBUTEROL SULFATE 2.5; .5 MG/3ML; MG/3ML
3 SOLUTION RESPIRATORY (INHALATION) EVERY 4 HOURS PRN
Status: DISCONTINUED | OUTPATIENT
Start: 2020-01-01 | End: 2020-01-01 | Stop reason: HOSPADM

## 2020-01-01 RX ORDER — ACETAMINOPHEN 160 MG/5ML
650 SOLUTION ORAL EVERY 4 HOURS PRN
Status: DISCONTINUED | OUTPATIENT
Start: 2020-01-01 | End: 2020-01-01

## 2020-01-01 RX ORDER — DIPHENHYDRAMINE HCL 25 MG
25 CAPSULE ORAL ONCE
Status: COMPLETED | OUTPATIENT
Start: 2020-01-01 | End: 2020-01-01

## 2020-01-01 RX ORDER — ACETAMINOPHEN 650 MG/1
650 SUPPOSITORY RECTAL ONCE
Status: COMPLETED | OUTPATIENT
Start: 2020-01-01 | End: 2020-01-01

## 2020-01-01 RX ORDER — CARBAMAZEPINE 200 MG/1
400 TABLET, EXTENDED RELEASE ORAL NIGHTLY
Status: DISCONTINUED | OUTPATIENT
Start: 2020-01-01 | End: 2020-01-01

## 2020-01-01 RX ORDER — LORAZEPAM 2 MG/ML
0.5 INJECTION INTRAMUSCULAR
Status: DISCONTINUED | OUTPATIENT
Start: 2020-01-01 | End: 2020-01-01 | Stop reason: HOSPADM

## 2020-01-01 RX ORDER — OSELTAMIVIR PHOSPHATE 30 MG/1
30 CAPSULE ORAL
Status: COMPLETED | OUTPATIENT
Start: 2020-01-01 | End: 2020-01-01

## 2020-01-01 RX ORDER — ACYCLOVIR 200 MG/1
400 CAPSULE ORAL 2 TIMES DAILY
Status: DISCONTINUED | OUTPATIENT
Start: 2020-01-01 | End: 2020-01-01 | Stop reason: HOSPADM

## 2020-01-01 RX ORDER — SODIUM BICARBONATE 650 MG/1
650 TABLET ORAL 2 TIMES DAILY
Status: DISCONTINUED | OUTPATIENT
Start: 2020-01-01 | End: 2020-01-01 | Stop reason: HOSPADM

## 2020-01-01 RX ORDER — MEPERIDINE HYDROCHLORIDE 50 MG/ML
25 INJECTION INTRAMUSCULAR; INTRAVENOUS; SUBCUTANEOUS
Status: CANCELLED | OUTPATIENT
Start: 2020-01-01

## 2020-01-01 RX ORDER — LACOSAMIDE 100 MG/1
200 TABLET ORAL EVERY 12 HOURS
Status: DISCONTINUED | OUTPATIENT
Start: 2020-01-01 | End: 2020-01-01

## 2020-01-01 RX ORDER — DOXYCYCLINE HYCLATE 50 MG/1
100 CAPSULE ORAL 2 TIMES DAILY
Qty: 12 CAPSULE | Refills: 0 | Status: SHIPPED | OUTPATIENT
Start: 2020-01-01 | End: 2020-01-01

## 2020-01-01 RX ORDER — ASPIRIN 81 MG/1
81 TABLET ORAL DAILY
Status: DISCONTINUED | OUTPATIENT
Start: 2020-01-01 | End: 2020-01-01 | Stop reason: HOSPADM

## 2020-01-01 RX ORDER — PANTOPRAZOLE SODIUM 40 MG/1
40 TABLET, DELAYED RELEASE ORAL
Status: DISCONTINUED | OUTPATIENT
Start: 2020-01-01 | End: 2020-01-01 | Stop reason: HOSPADM

## 2020-01-01 RX ORDER — LEVETIRACETAM 10 MG/ML
1000 INJECTION INTRAVASCULAR ONCE
Status: DISCONTINUED | OUTPATIENT
Start: 2020-01-01 | End: 2020-01-01

## 2020-01-01 RX ORDER — HYDROMORPHONE HCL 110MG/55ML
1.5 PATIENT CONTROLLED ANALGESIA SYRINGE INTRAVENOUS
Status: DISCONTINUED | OUTPATIENT
Start: 2020-01-01 | End: 2020-01-01 | Stop reason: HOSPADM

## 2020-01-01 RX ORDER — LORAZEPAM 2 MG/ML
2 CONCENTRATE ORAL
Status: DISCONTINUED | OUTPATIENT
Start: 2020-01-01 | End: 2020-01-01 | Stop reason: HOSPADM

## 2020-01-01 RX ORDER — POTASSIUM CHLORIDE 750 MG/1
20 CAPSULE, EXTENDED RELEASE ORAL ONCE
Status: DISCONTINUED | OUTPATIENT
Start: 2020-01-01 | End: 2020-01-01

## 2020-01-01 RX ORDER — SODIUM CHLORIDE 0.9 % (FLUSH) 0.9 %
10 SYRINGE (ML) INJECTION AS NEEDED
Status: DISCONTINUED | OUTPATIENT
Start: 2020-01-01 | End: 2020-01-01

## 2020-01-01 RX ORDER — ACETAMINOPHEN 325 MG/1
325 TABLET ORAL ONCE
Status: COMPLETED | OUTPATIENT
Start: 2020-01-01 | End: 2020-01-01

## 2020-01-01 RX ORDER — PROMETHAZINE HYDROCHLORIDE 12.5 MG/1
6.25 SUPPOSITORY RECTAL EVERY 4 HOURS PRN
Status: DISCONTINUED | OUTPATIENT
Start: 2020-01-01 | End: 2020-01-01 | Stop reason: HOSPADM

## 2020-01-01 RX ORDER — MORPHINE SULFATE 10 MG/ML
6 INJECTION INTRAMUSCULAR; INTRAVENOUS; SUBCUTANEOUS
Status: DISCONTINUED | OUTPATIENT
Start: 2020-01-01 | End: 2020-01-01 | Stop reason: HOSPADM

## 2020-01-01 RX ORDER — SODIUM CHLORIDE 0.9 % (FLUSH) 0.9 %
1-10 SYRINGE (ML) INJECTION AS NEEDED
Status: DISCONTINUED | OUTPATIENT
Start: 2020-01-01 | End: 2020-01-01 | Stop reason: HOSPADM

## 2020-01-01 RX ORDER — MORPHINE SULFATE 2 MG/ML
1 INJECTION, SOLUTION INTRAMUSCULAR; INTRAVENOUS
Status: DISCONTINUED | OUTPATIENT
Start: 2020-01-01 | End: 2020-01-01 | Stop reason: HOSPADM

## 2020-01-01 RX ORDER — ACETAMINOPHEN 500 MG
1000 TABLET ORAL ONCE
Status: CANCELLED | OUTPATIENT
Start: 2020-01-01

## 2020-01-01 RX ORDER — ATROPINE SULFATE 10 MG/ML
2 SOLUTION/ DROPS OPHTHALMIC 2 TIMES DAILY PRN
Status: DISCONTINUED | OUTPATIENT
Start: 2020-01-01 | End: 2020-01-01 | Stop reason: HOSPADM

## 2020-01-01 RX ORDER — LEVETIRACETAM 10 MG/ML
1000 INJECTION INTRAVASCULAR EVERY 12 HOURS SCHEDULED
Status: DISCONTINUED | OUTPATIENT
Start: 2020-01-01 | End: 2020-01-01 | Stop reason: ALTCHOICE

## 2020-01-01 RX ORDER — LEVETIRACETAM 5 MG/ML
500 INJECTION INTRAVASCULAR EVERY 12 HOURS SCHEDULED
Status: DISCONTINUED | OUTPATIENT
Start: 2020-01-01 | End: 2020-01-01

## 2020-01-01 RX ORDER — FAMOTIDINE 20 MG/1
20 TABLET, FILM COATED ORAL ONCE
Status: DISCONTINUED | OUTPATIENT
Start: 2020-01-01 | End: 2020-01-01

## 2020-01-01 RX ORDER — BORTEZOMIB 3.5 MG/1
1.3 INJECTION, POWDER, LYOPHILIZED, FOR SOLUTION INTRAVENOUS; SUBCUTANEOUS ONCE
Status: DISCONTINUED | OUTPATIENT
Start: 2020-01-01 | End: 2020-01-01 | Stop reason: HOSPADM

## 2020-01-01 RX ORDER — METOPROLOL SUCCINATE 25 MG/1
25 TABLET, EXTENDED RELEASE ORAL 2 TIMES DAILY
Status: DISCONTINUED | OUTPATIENT
Start: 2020-01-01 | End: 2020-01-01 | Stop reason: HOSPADM

## 2020-01-01 RX ORDER — SODIUM CHLORIDE 450 MG/100ML
75 INJECTION, SOLUTION INTRAVENOUS CONTINUOUS
Status: DISCONTINUED | OUTPATIENT
Start: 2020-01-01 | End: 2020-01-01

## 2020-01-01 RX ORDER — ERGOCALCIFEROL 1.25 MG/1
50000 CAPSULE ORAL
Status: DISCONTINUED | OUTPATIENT
Start: 2020-01-01 | End: 2020-01-01 | Stop reason: HOSPADM

## 2020-01-01 RX ORDER — CALCIUM CARBONATE 200(500)MG
1 TABLET,CHEWABLE ORAL 2 TIMES DAILY
Status: DISCONTINUED | OUTPATIENT
Start: 2020-01-01 | End: 2020-01-01

## 2020-01-01 RX ORDER — SODIUM CHLORIDE 9 MG/ML
1000 INJECTION, SOLUTION INTRAVENOUS ONCE
Status: COMPLETED | OUTPATIENT
Start: 2020-01-01 | End: 2020-01-01

## 2020-01-01 RX ORDER — SODIUM BICARBONATE 650 MG/1
650 TABLET ORAL 3 TIMES DAILY
Status: DISCONTINUED | OUTPATIENT
Start: 2020-01-01 | End: 2020-01-01

## 2020-01-01 RX ORDER — CIPROFLOXACIN 2 MG/ML
6 INJECTION, SOLUTION INTRAVENOUS EVERY 24 HOURS
Status: DISCONTINUED | OUTPATIENT
Start: 2020-01-01 | End: 2020-01-01

## 2020-01-01 RX ORDER — ERGOCALCIFEROL 1.25 MG/1
50000 CAPSULE ORAL
Qty: 5 CAPSULE | Refills: 0 | Status: SHIPPED | OUTPATIENT
Start: 2020-01-01

## 2020-01-01 RX ORDER — DIPHENOXYLATE HYDROCHLORIDE AND ATROPINE SULFATE 2.5; .025 MG/1; MG/1
1 TABLET ORAL
Status: DISCONTINUED | OUTPATIENT
Start: 2020-01-01 | End: 2020-01-01

## 2020-01-01 RX ORDER — LEVOTHYROXINE SODIUM 0.07 MG/1
75 TABLET ORAL DAILY
Status: DISCONTINUED | OUTPATIENT
Start: 2020-01-01 | End: 2020-01-01

## 2020-01-01 RX ORDER — BENZONATATE 100 MG/1
200 CAPSULE ORAL 2 TIMES DAILY
Status: DISCONTINUED | OUTPATIENT
Start: 2020-01-01 | End: 2020-01-01

## 2020-01-01 RX ORDER — GUAIFENESIN/DEXTROMETHORPHAN 100-10MG/5
5 SYRUP ORAL EVERY 4 HOURS PRN
Status: DISCONTINUED | OUTPATIENT
Start: 2020-01-01 | End: 2020-01-01

## 2020-01-01 RX ORDER — LEVETIRACETAM 500 MG/1
1000 TABLET ORAL EVERY 12 HOURS
Status: DISCONTINUED | OUTPATIENT
Start: 2020-01-01 | End: 2020-01-01 | Stop reason: HOSPADM

## 2020-01-01 RX ORDER — ONDANSETRON 4 MG/1
4 TABLET, FILM COATED ORAL EVERY 6 HOURS PRN
Status: DISCONTINUED | OUTPATIENT
Start: 2020-01-01 | End: 2020-01-01 | Stop reason: HOSPADM

## 2020-01-01 RX ORDER — ONDANSETRON 2 MG/ML
4 INJECTION INTRAMUSCULAR; INTRAVENOUS EVERY 6 HOURS PRN
Status: DISCONTINUED | OUTPATIENT
Start: 2020-01-01 | End: 2020-01-01 | Stop reason: HOSPADM

## 2020-01-01 RX ORDER — DOXYCYCLINE 100 MG/1
100 CAPSULE ORAL EVERY 12 HOURS SCHEDULED
Status: DISCONTINUED | OUTPATIENT
Start: 2020-01-01 | End: 2020-01-01 | Stop reason: HOSPADM

## 2020-01-01 RX ORDER — ALPRAZOLAM 0.5 MG/1
0.5 TABLET ORAL ONCE
Status: COMPLETED | OUTPATIENT
Start: 2020-01-01 | End: 2020-01-01

## 2020-01-01 RX ORDER — METOPROLOL SUCCINATE 25 MG/1
25 TABLET, EXTENDED RELEASE ORAL
Status: DISCONTINUED | OUTPATIENT
Start: 2020-01-01 | End: 2020-01-01

## 2020-01-01 RX ORDER — LEVETIRACETAM 1000 MG/1
1000 TABLET ORAL 2 TIMES DAILY
Qty: 60 TABLET | Refills: 0 | Status: SHIPPED | OUTPATIENT
Start: 2020-01-01

## 2020-01-01 RX ORDER — ACYCLOVIR 400 MG/1
400 TABLET ORAL 2 TIMES DAILY
Status: DISCONTINUED | OUTPATIENT
Start: 2020-01-01 | End: 2020-01-01

## 2020-01-01 RX ORDER — ACETAMINOPHEN 325 MG/1
650 TABLET ORAL ONCE
Status: DISCONTINUED | OUTPATIENT
Start: 2020-01-01 | End: 2020-01-01 | Stop reason: HOSPADM

## 2020-01-01 RX ORDER — METOPROLOL SUCCINATE 25 MG/1
25 TABLET, EXTENDED RELEASE ORAL 2 TIMES DAILY
COMMUNITY

## 2020-01-01 RX ORDER — PROMETHAZINE HYDROCHLORIDE 6.25 MG/5ML
6.25 SYRUP ORAL EVERY 4 HOURS PRN
Status: DISCONTINUED | OUTPATIENT
Start: 2020-01-01 | End: 2020-01-01 | Stop reason: HOSPADM

## 2020-01-01 RX ORDER — FUROSEMIDE 10 MG/ML
20 INJECTION INTRAMUSCULAR; INTRAVENOUS ONCE
Status: COMPLETED | OUTPATIENT
Start: 2020-01-01 | End: 2020-01-01

## 2020-01-01 RX ORDER — ISOSORBIDE MONONITRATE 60 MG/1
60 TABLET, EXTENDED RELEASE ORAL EVERY MORNING
COMMUNITY
End: 2020-01-01 | Stop reason: HOSPADM

## 2020-01-01 RX ORDER — BENZONATATE 100 MG/1
200 CAPSULE ORAL 3 TIMES DAILY PRN
Status: DISCONTINUED | OUTPATIENT
Start: 2020-01-01 | End: 2020-01-01

## 2020-01-01 RX ORDER — IPRATROPIUM BROMIDE AND ALBUTEROL SULFATE 2.5; .5 MG/3ML; MG/3ML
3 SOLUTION RESPIRATORY (INHALATION)
Status: DISCONTINUED | OUTPATIENT
Start: 2020-01-01 | End: 2020-01-01

## 2020-01-01 RX ORDER — SODIUM CHLORIDE 0.9 % (FLUSH) 0.9 %
3 SYRINGE (ML) INJECTION EVERY 12 HOURS SCHEDULED
Status: DISCONTINUED | OUTPATIENT
Start: 2020-01-01 | End: 2020-01-01 | Stop reason: HOSPADM

## 2020-01-01 RX ORDER — LOSARTAN POTASSIUM 25 MG/1
25 TABLET ORAL EVERY EVENING
Status: DISCONTINUED | OUTPATIENT
Start: 2020-01-01 | End: 2020-01-01

## 2020-01-01 RX ORDER — ACETAMINOPHEN 325 MG/1
650 TABLET ORAL EVERY 4 HOURS PRN
Status: DISCONTINUED | OUTPATIENT
Start: 2020-01-01 | End: 2020-01-01

## 2020-01-01 RX ORDER — PANTOPRAZOLE SODIUM 40 MG/1
40 TABLET, DELAYED RELEASE ORAL 2 TIMES DAILY
Status: DISCONTINUED | OUTPATIENT
Start: 2020-01-01 | End: 2020-01-01 | Stop reason: HOSPADM

## 2020-01-01 RX ORDER — FAMOTIDINE 10 MG/ML
20 INJECTION, SOLUTION INTRAVENOUS AS NEEDED
Status: CANCELLED | OUTPATIENT
Start: 2020-01-01

## 2020-01-01 RX ORDER — METHYLPREDNISOLONE SODIUM SUCCINATE 125 MG/2ML
60 INJECTION, POWDER, LYOPHILIZED, FOR SOLUTION INTRAMUSCULAR; INTRAVENOUS ONCE
Status: CANCELLED | OUTPATIENT
Start: 2020-01-01

## 2020-01-01 RX ADMIN — CALCIUM CARBONATE-VITAMIN D TAB 500 MG-200 UNIT 500 MG: 500-200 TAB at 08:43

## 2020-01-01 RX ADMIN — Medication 1 DOSE: at 16:22

## 2020-01-01 RX ADMIN — LEVETIRACETAM 1000 MG: 500 TABLET, FILM COATED ORAL at 08:43

## 2020-01-01 RX ADMIN — PANTOPRAZOLE SODIUM 40 MG: 40 TABLET, DELAYED RELEASE ORAL at 20:57

## 2020-01-01 RX ADMIN — METOPROLOL SUCCINATE 25 MG: 25 TABLET, EXTENDED RELEASE ORAL at 08:43

## 2020-01-01 RX ADMIN — CEFEPIME HYDROCHLORIDE 2 G: 2 INJECTION, POWDER, FOR SOLUTION INTRAVENOUS at 15:30

## 2020-01-01 RX ADMIN — ACYCLOVIR 400 MG: 400 TABLET ORAL at 22:35

## 2020-01-01 RX ADMIN — IPRATROPIUM BROMIDE AND ALBUTEROL SULFATE 3 ML: 2.5; .5 SOLUTION RESPIRATORY (INHALATION) at 19:29

## 2020-01-01 RX ADMIN — MORPHINE SULFATE 2 MG: 2 INJECTION, SOLUTION INTRAMUSCULAR; INTRAVENOUS at 15:38

## 2020-01-01 RX ADMIN — VANCOMYCIN HYDROCHLORIDE 1500 MG: 10 INJECTION, POWDER, LYOPHILIZED, FOR SOLUTION INTRAVENOUS at 20:11

## 2020-01-01 RX ADMIN — GLYCOPYRROLATE 0.8 MG: 0.2 INJECTION, SOLUTION INTRAMUSCULAR; INTRAVITREAL at 22:31

## 2020-01-01 RX ADMIN — MORPHINE SULFATE 2 MG: 2 INJECTION, SOLUTION INTRAMUSCULAR; INTRAVENOUS at 13:23

## 2020-01-01 RX ADMIN — XENON XE-133 8.3 MILLICURIE: 10 GAS RESPIRATORY (INHALATION) at 16:21

## 2020-01-01 RX ADMIN — HYDRALAZINE HYDROCHLORIDE 25 MG: 25 TABLET, FILM COATED ORAL at 01:31

## 2020-01-01 RX ADMIN — SODIUM PHOSPHATE, MONOBASIC, MONOHYDRATE 10 MMOL: 276; 142 INJECTION, SOLUTION INTRAVENOUS at 13:43

## 2020-01-01 RX ADMIN — METOPROLOL SUCCINATE 25 MG: 25 TABLET, EXTENDED RELEASE ORAL at 21:03

## 2020-01-01 RX ADMIN — CARBAMAZEPINE 400 MG: 100 TABLET, EXTENDED RELEASE ORAL at 22:28

## 2020-01-01 RX ADMIN — GLYCOPYRROLATE 0.4 MG: 0.2 INJECTION, SOLUTION INTRAMUSCULAR; INTRAVITREAL at 00:56

## 2020-01-01 RX ADMIN — SODIUM CHLORIDE 125 ML/HR: 9 INJECTION, SOLUTION INTRAVENOUS at 10:26

## 2020-01-01 RX ADMIN — METOPROLOL SUCCINATE 25 MG: 25 TABLET, EXTENDED RELEASE ORAL at 09:06

## 2020-01-01 RX ADMIN — SODIUM CHLORIDE, PRESERVATIVE FREE 10 ML: 5 INJECTION INTRAVENOUS at 09:44

## 2020-01-01 RX ADMIN — METHYLPREDNISOLONE SODIUM SUCCINATE 60 MG: 125 INJECTION, POWDER, FOR SOLUTION INTRAMUSCULAR; INTRAVENOUS at 09:28

## 2020-01-01 RX ADMIN — POTASSIUM CHLORIDE 40 MEQ: 750 CAPSULE, EXTENDED RELEASE ORAL at 10:56

## 2020-01-01 RX ADMIN — SODIUM CHLORIDE 100 ML/HR: 9 INJECTION, SOLUTION INTRAVENOUS at 20:08

## 2020-01-01 RX ADMIN — CALCIUM CARBONATE-VITAMIN D TAB 500 MG-200 UNIT 1000 MG: 500-200 TAB at 08:41

## 2020-01-01 RX ADMIN — IPRATROPIUM BROMIDE AND ALBUTEROL SULFATE 3 ML: 2.5; .5 SOLUTION RESPIRATORY (INHALATION) at 19:56

## 2020-01-01 RX ADMIN — LEVETIRACETAM 1000 MG: 500 TABLET, FILM COATED ORAL at 21:10

## 2020-01-01 RX ADMIN — IPRATROPIUM BROMIDE AND ALBUTEROL SULFATE 3 ML: 2.5; .5 SOLUTION RESPIRATORY (INHALATION) at 11:50

## 2020-01-01 RX ADMIN — CEFEPIME HYDROCHLORIDE 1 G: 1 INJECTION, POWDER, FOR SOLUTION INTRAMUSCULAR; INTRAVENOUS at 02:49

## 2020-01-01 RX ADMIN — LORAZEPAM 2 MG: 2 INJECTION INTRAMUSCULAR; INTRAVENOUS at 17:27

## 2020-01-01 RX ADMIN — MORPHINE SULFATE 5 MG: 100 SOLUTION ORAL at 23:41

## 2020-01-01 RX ADMIN — CALCIUM GLUCONATE 2 G: 98 INJECTION, SOLUTION INTRAVENOUS at 19:04

## 2020-01-01 RX ADMIN — IPRATROPIUM BROMIDE AND ALBUTEROL SULFATE 3 ML: 2.5; .5 SOLUTION RESPIRATORY (INHALATION) at 16:28

## 2020-01-01 RX ADMIN — CEFEPIME HYDROCHLORIDE 2 G: 2 INJECTION, POWDER, FOR SOLUTION INTRAVENOUS at 13:51

## 2020-01-01 RX ADMIN — FUROSEMIDE 20 MG: 10 INJECTION, SOLUTION INTRAMUSCULAR; INTRAVENOUS at 15:21

## 2020-01-01 RX ADMIN — SODIUM CHLORIDE, PRESERVATIVE FREE 10 ML: 5 INJECTION INTRAVENOUS at 09:14

## 2020-01-01 RX ADMIN — METOPROLOL SUCCINATE 25 MG: 25 TABLET, EXTENDED RELEASE ORAL at 08:07

## 2020-01-01 RX ADMIN — METOPROLOL SUCCINATE 25 MG: 25 TABLET, EXTENDED RELEASE ORAL at 08:38

## 2020-01-01 RX ADMIN — LEVETIRACETAM INJECTION 500 MG: 5 INJECTION INTRAVENOUS at 20:24

## 2020-01-01 RX ADMIN — DARATUMUMAB 1300 MG: 100 INJECTION, SOLUTION, CONCENTRATE INTRAVENOUS at 10:51

## 2020-01-01 RX ADMIN — LEVOTHYROXINE SODIUM 75 MCG: 75 TABLET ORAL at 06:09

## 2020-01-01 RX ADMIN — METOPROLOL SUCCINATE 25 MG: 25 TABLET, EXTENDED RELEASE ORAL at 20:25

## 2020-01-01 RX ADMIN — PANTOPRAZOLE SODIUM 40 MG: 40 TABLET, DELAYED RELEASE ORAL at 09:21

## 2020-01-01 RX ADMIN — LEVETIRACETAM 1000 MG: 500 TABLET, FILM COATED ORAL at 22:16

## 2020-01-01 RX ADMIN — LEVETIRACETAM 1000 MG: 500 TABLET, FILM COATED ORAL at 20:28

## 2020-01-01 RX ADMIN — DOXYCYCLINE 100 MG: 100 CAPSULE ORAL at 08:06

## 2020-01-01 RX ADMIN — ACYCLOVIR 400 MG: 200 CAPSULE ORAL at 20:24

## 2020-01-01 RX ADMIN — Medication 500 UNITS: at 16:08

## 2020-01-01 RX ADMIN — LORAZEPAM 2 MG: 2 INJECTION INTRAMUSCULAR; INTRAVENOUS at 15:41

## 2020-01-01 RX ADMIN — METOPROLOL SUCCINATE 25 MG: 25 TABLET, EXTENDED RELEASE ORAL at 09:57

## 2020-01-01 RX ADMIN — LEVETIRACETAM INJECTION 500 MG: 5 INJECTION INTRAVENOUS at 21:01

## 2020-01-01 RX ADMIN — PANTOPRAZOLE SODIUM 40 MG: 40 TABLET, DELAYED RELEASE ORAL at 09:57

## 2020-01-01 RX ADMIN — METOPROLOL SUCCINATE 25 MG: 25 TABLET, EXTENDED RELEASE ORAL at 22:16

## 2020-01-01 RX ADMIN — DOXYCYCLINE 100 MG: 100 CAPSULE ORAL at 20:24

## 2020-01-01 RX ADMIN — METOPROLOL SUCCINATE 25 MG: 25 TABLET, EXTENDED RELEASE ORAL at 09:25

## 2020-01-01 RX ADMIN — ISOSORBIDE MONONITRATE 30 MG: 30 TABLET ORAL at 17:17

## 2020-01-01 RX ADMIN — ACETAMINOPHEN 650 MG: 325 TABLET, FILM COATED ORAL at 14:19

## 2020-01-01 RX ADMIN — CALCIUM CARBONATE-VITAMIN D TAB 500 MG-200 UNIT 500 MG: 500-200 TAB at 21:35

## 2020-01-01 RX ADMIN — IPRATROPIUM BROMIDE AND ALBUTEROL SULFATE 3 ML: 2.5; .5 SOLUTION RESPIRATORY (INHALATION) at 11:31

## 2020-01-01 RX ADMIN — PANTOPRAZOLE SODIUM 40 MG: 40 TABLET, DELAYED RELEASE ORAL at 22:16

## 2020-01-01 RX ADMIN — ACETAMINOPHEN 650 MG: 325 TABLET, FILM COATED ORAL at 23:35

## 2020-01-01 RX ADMIN — SODIUM CHLORIDE, PRESERVATIVE FREE 10 ML: 5 INJECTION INTRAVENOUS at 12:32

## 2020-01-01 RX ADMIN — CEFEPIME HYDROCHLORIDE 2 G: 2 INJECTION, POWDER, FOR SOLUTION INTRAVENOUS at 13:38

## 2020-01-01 RX ADMIN — ANTACID TABLETS 1 TABLET: 500 TABLET, CHEWABLE ORAL at 08:39

## 2020-01-01 RX ADMIN — OSELTAMIVIR PHOSPHATE 30 MG: 30 CAPSULE ORAL at 10:37

## 2020-01-01 RX ADMIN — MORPHINE SULFATE 4 MG: 4 INJECTION INTRAVENOUS at 08:47

## 2020-01-01 RX ADMIN — METOPROLOL SUCCINATE 25 MG: 25 TABLET, EXTENDED RELEASE ORAL at 09:13

## 2020-01-01 RX ADMIN — CALCIUM CARBONATE-VITAMIN D TAB 500 MG-200 UNIT 500 MG: 500-200 TAB at 22:16

## 2020-01-01 RX ADMIN — ACETAMINOPHEN 650 MG: 650 SUPPOSITORY RECTAL at 18:16

## 2020-01-01 RX ADMIN — HYDROCODONE BITARTRATE AND ACETAMINOPHEN 1 TABLET: 5; 325 TABLET ORAL at 09:03

## 2020-01-01 RX ADMIN — SODIUM CHLORIDE 125 ML/HR: 9 INJECTION, SOLUTION INTRAVENOUS at 06:41

## 2020-01-01 RX ADMIN — DOXYCYCLINE 100 MG: 100 CAPSULE ORAL at 01:31

## 2020-01-01 RX ADMIN — FOSPHENYTOIN SODIUM 150 MG PE: 50 INJECTION, SOLUTION INTRAMUSCULAR; INTRAVENOUS at 16:11

## 2020-01-01 RX ADMIN — SODIUM CHLORIDE, PRESERVATIVE FREE 10 ML: 5 INJECTION INTRAVENOUS at 20:26

## 2020-01-01 RX ADMIN — LACOSAMIDE 200 MG: 50 TABLET, FILM COATED ORAL at 22:16

## 2020-01-01 RX ADMIN — FOSPHENYTOIN SODIUM 150 MG PE: 50 INJECTION, SOLUTION INTRAMUSCULAR; INTRAVENOUS at 21:39

## 2020-01-01 RX ADMIN — LEVOTHYROXINE SODIUM 75 MCG: 75 TABLET ORAL at 07:03

## 2020-01-01 RX ADMIN — LACOSAMIDE 200 MG: 50 TABLET, FILM COATED ORAL at 00:34

## 2020-01-01 RX ADMIN — LACOSAMIDE 200 MG: 50 TABLET, FILM COATED ORAL at 09:21

## 2020-01-01 RX ADMIN — Medication 500 UNITS: at 15:33

## 2020-01-01 RX ADMIN — IPRATROPIUM BROMIDE AND ALBUTEROL SULFATE 3 ML: 2.5; .5 SOLUTION RESPIRATORY (INHALATION) at 07:59

## 2020-01-01 RX ADMIN — PANTOPRAZOLE SODIUM 40 MG: 40 TABLET, DELAYED RELEASE ORAL at 21:10

## 2020-01-01 RX ADMIN — IPRATROPIUM BROMIDE AND ALBUTEROL SULFATE 3 ML: 2.5; .5 SOLUTION RESPIRATORY (INHALATION) at 04:41

## 2020-01-01 RX ADMIN — SODIUM CHLORIDE, PRESERVATIVE FREE 10 ML: 5 INJECTION INTRAVENOUS at 22:39

## 2020-01-01 RX ADMIN — IPRATROPIUM BROMIDE AND ALBUTEROL SULFATE 3 ML: 2.5; .5 SOLUTION RESPIRATORY (INHALATION) at 13:58

## 2020-01-01 RX ADMIN — SODIUM CHLORIDE, PRESERVATIVE FREE 10 ML: 5 INJECTION INTRAVENOUS at 01:32

## 2020-01-01 RX ADMIN — SODIUM CHLORIDE 200 MG: 9 INJECTION, SOLUTION INTRAVENOUS at 11:24

## 2020-01-01 RX ADMIN — Medication 500 UNITS: at 15:35

## 2020-01-01 RX ADMIN — FAMOTIDINE 20 MG: 20 TABLET, FILM COATED ORAL at 22:43

## 2020-01-01 RX ADMIN — CEFEPIME HYDROCHLORIDE 2 G: 2 INJECTION, POWDER, FOR SOLUTION INTRAVENOUS at 16:16

## 2020-01-01 RX ADMIN — FOSPHENYTOIN SODIUM 150 MG PE: 50 INJECTION, SOLUTION INTRAMUSCULAR; INTRAVENOUS at 15:39

## 2020-01-01 RX ADMIN — PANTOPRAZOLE SODIUM 40 MG: 40 TABLET, DELAYED RELEASE ORAL at 21:03

## 2020-01-01 RX ADMIN — ISOSORBIDE MONONITRATE 30 MG: 30 TABLET ORAL at 17:42

## 2020-01-01 RX ADMIN — ACETAMINOPHEN 650 MG: 325 TABLET, FILM COATED ORAL at 00:30

## 2020-01-01 RX ADMIN — ASPIRIN 81 MG: 81 TABLET, COATED ORAL at 08:06

## 2020-01-01 RX ADMIN — ACYCLOVIR 400 MG: 400 TABLET ORAL at 21:34

## 2020-01-01 RX ADMIN — CALCIUM CARBONATE-VITAMIN D TAB 500 MG-200 UNIT 500 MG: 500-200 TAB at 21:10

## 2020-01-01 RX ADMIN — MORPHINE SULFATE 1 MG: 2 INJECTION, SOLUTION INTRAMUSCULAR; INTRAVENOUS at 07:56

## 2020-01-01 RX ADMIN — BENZONATATE 200 MG: 100 CAPSULE ORAL at 13:38

## 2020-01-01 RX ADMIN — HYDROCODONE BITARTRATE AND ACETAMINOPHEN 1 TABLET: 5; 325 TABLET ORAL at 01:41

## 2020-01-01 RX ADMIN — ACETAMINOPHEN 1000 MG: 500 TABLET, FILM COATED ORAL at 09:44

## 2020-01-01 RX ADMIN — OSELTAMIVIR PHOSPHATE 30 MG: 30 CAPSULE ORAL at 08:41

## 2020-01-01 RX ADMIN — SODIUM CHLORIDE 125 ML/HR: 9 INJECTION, SOLUTION INTRAVENOUS at 01:47

## 2020-01-01 RX ADMIN — METOPROLOL SUCCINATE 25 MG: 25 TABLET, EXTENDED RELEASE ORAL at 20:23

## 2020-01-01 RX ADMIN — CEFEPIME HYDROCHLORIDE 2 G: 2 INJECTION, POWDER, FOR SOLUTION INTRAVENOUS at 02:07

## 2020-01-01 RX ADMIN — ACYCLOVIR 400 MG: 400 TABLET ORAL at 20:25

## 2020-01-01 RX ADMIN — ACYCLOVIR 400 MG: 400 TABLET ORAL at 09:15

## 2020-01-01 RX ADMIN — MAGNESIUM SULFATE 2 G: 1 INJECTION INTRAVENOUS at 14:37

## 2020-01-01 RX ADMIN — GLYCOPYRROLATE 0.8 MG: 0.2 INJECTION, SOLUTION INTRAMUSCULAR; INTRAVITREAL at 19:14

## 2020-01-01 RX ADMIN — DIPHENHYDRAMINE HYDROCHLORIDE 25 MG: 50 INJECTION, SOLUTION INTRAMUSCULAR; INTRAVENOUS at 09:56

## 2020-01-01 RX ADMIN — LORAZEPAM 2 MG: 2 INJECTION INTRAMUSCULAR; INTRAVENOUS at 04:42

## 2020-01-01 RX ADMIN — IPRATROPIUM BROMIDE AND ALBUTEROL SULFATE 3 ML: 2.5; .5 SOLUTION RESPIRATORY (INHALATION) at 07:33

## 2020-01-01 RX ADMIN — SODIUM CHLORIDE, PRESERVATIVE FREE 10 ML: 5 INJECTION INTRAVENOUS at 21:04

## 2020-01-01 RX ADMIN — LORAZEPAM 2 MG: 2 INJECTION INTRAMUSCULAR; INTRAVENOUS at 08:10

## 2020-01-01 RX ADMIN — CALCIUM CARBONATE-VITAMIN D TAB 500 MG-200 UNIT 500 MG: 500-200 TAB at 09:06

## 2020-01-01 RX ADMIN — GLYCOPYRROLATE 0.4 MG: 0.2 INJECTION, SOLUTION INTRAMUSCULAR; INTRAVITREAL at 12:35

## 2020-01-01 RX ADMIN — IPRATROPIUM BROMIDE AND ALBUTEROL SULFATE 3 ML: 2.5; .5 SOLUTION RESPIRATORY (INHALATION) at 12:18

## 2020-01-01 RX ADMIN — IPRATROPIUM BROMIDE AND ALBUTEROL SULFATE 3 ML: 2.5; .5 SOLUTION RESPIRATORY (INHALATION) at 07:10

## 2020-01-01 RX ADMIN — CEFEPIME HYDROCHLORIDE 2 G: 2 INJECTION, POWDER, FOR SOLUTION INTRAVENOUS at 02:20

## 2020-01-01 RX ADMIN — LEVETIRACETAM 1000 MG: 10 INJECTION INTRAVENOUS at 08:09

## 2020-01-01 RX ADMIN — LEVOTHYROXINE SODIUM 75 MCG: 75 TABLET ORAL at 05:28

## 2020-01-01 RX ADMIN — GLYCOPYRROLATE 0.8 MG: 0.2 INJECTION, SOLUTION INTRAMUSCULAR; INTRAVITREAL at 08:46

## 2020-01-01 RX ADMIN — ISOSORBIDE MONONITRATE 30 MG: 30 TABLET ORAL at 15:38

## 2020-01-01 RX ADMIN — LIDOCAINE HYDROCHLORIDE 20 ML: 10 INJECTION, SOLUTION INFILTRATION; PERINEURAL at 11:58

## 2020-01-01 RX ADMIN — SODIUM CHLORIDE, PRESERVATIVE FREE 10 ML: 5 INJECTION INTRAVENOUS at 21:05

## 2020-01-01 RX ADMIN — PANTOPRAZOLE SODIUM 40 MG: 40 TABLET, DELAYED RELEASE ORAL at 08:39

## 2020-01-01 RX ADMIN — ANTACID TABLETS 1 TABLET: 500 TABLET, CHEWABLE ORAL at 10:01

## 2020-01-01 RX ADMIN — MORPHINE SULFATE 4 MG: 4 INJECTION INTRAVENOUS at 04:51

## 2020-01-01 RX ADMIN — GLYCOPYRROLATE 0.8 MG: 0.2 INJECTION, SOLUTION INTRAMUSCULAR; INTRAVITREAL at 01:12

## 2020-01-01 RX ADMIN — Medication 10 ML: at 11:34

## 2020-01-01 RX ADMIN — GLYCOPYRROLATE 0.4 MG: 0.2 INJECTION, SOLUTION INTRAMUSCULAR; INTRAVITREAL at 04:50

## 2020-01-01 RX ADMIN — SODIUM CHLORIDE 100 ML/HR: 9 INJECTION, SOLUTION INTRAVENOUS at 02:21

## 2020-01-01 RX ADMIN — CARBAMAZEPINE 400 MG: 200 TABLET, EXTENDED RELEASE ORAL at 22:06

## 2020-01-01 RX ADMIN — SODIUM CHLORIDE 1000 ML: 9 INJECTION, SOLUTION INTRAVENOUS at 12:03

## 2020-01-01 RX ADMIN — LEVETIRACETAM INJECTION 500 MG: 5 INJECTION INTRAVENOUS at 09:47

## 2020-01-01 RX ADMIN — CALCIUM CARBONATE-VITAMIN D TAB 500 MG-200 UNIT 500 MG: 500-200 TAB at 21:04

## 2020-01-01 RX ADMIN — SODIUM BICARBONATE 1300 MG: 650 TABLET ORAL at 08:38

## 2020-01-01 RX ADMIN — LACOSAMIDE 200 MG: 50 TABLET, FILM COATED ORAL at 00:06

## 2020-01-01 RX ADMIN — GUAIFENESIN 600 MG: 600 TABLET, EXTENDED RELEASE ORAL at 20:25

## 2020-01-01 RX ADMIN — POTASSIUM CHLORIDE 30 MEQ: 750 CAPSULE, EXTENDED RELEASE ORAL at 10:37

## 2020-01-01 RX ADMIN — BORTEZOMIB 2.4 MG: 3.5 INJECTION, POWDER, LYOPHILIZED, FOR SOLUTION INTRAVENOUS; SUBCUTANEOUS at 11:05

## 2020-01-01 RX ADMIN — SODIUM BICARBONATE 650 MG: 650 TABLET ORAL at 09:06

## 2020-01-01 RX ADMIN — Medication 500 UNITS: at 13:09

## 2020-01-01 RX ADMIN — MORPHINE SULFATE 4 MG: 4 INJECTION INTRAVENOUS at 15:41

## 2020-01-01 RX ADMIN — SODIUM CHLORIDE, PRESERVATIVE FREE 10 ML: 5 INJECTION INTRAVENOUS at 09:27

## 2020-01-01 RX ADMIN — IPRATROPIUM BROMIDE AND ALBUTEROL SULFATE 3 ML: 2.5; .5 SOLUTION RESPIRATORY (INHALATION) at 15:33

## 2020-01-01 RX ADMIN — SODIUM CHLORIDE 100 ML/HR: 4.5 INJECTION, SOLUTION INTRAVENOUS at 02:17

## 2020-01-01 RX ADMIN — POTASSIUM CHLORIDE 10 MEQ: 10 INJECTION, SOLUTION INTRAVENOUS at 19:13

## 2020-01-01 RX ADMIN — CALCIUM CARBONATE-VITAMIN D TAB 500 MG-200 UNIT 500 MG: 500-200 TAB at 22:38

## 2020-01-01 RX ADMIN — MORPHINE SULFATE 4 MG: 4 INJECTION INTRAVENOUS at 04:41

## 2020-01-01 RX ADMIN — SODIUM CHLORIDE 1000 ML: 9 INJECTION, SOLUTION INTRAVENOUS at 17:12

## 2020-01-01 RX ADMIN — SODIUM BICARBONATE 650 MG: 650 TABLET ORAL at 09:13

## 2020-01-01 RX ADMIN — LORAZEPAM 2 MG: 2 INJECTION INTRAMUSCULAR; INTRAVENOUS at 20:29

## 2020-01-01 RX ADMIN — SODIUM BICARBONATE 650 MG: 650 TABLET ORAL at 17:09

## 2020-01-01 RX ADMIN — LEVETIRACETAM 1000 MG: 10 INJECTION INTRAVENOUS at 20:07

## 2020-01-01 RX ADMIN — MORPHINE SULFATE 4 MG: 4 INJECTION INTRAVENOUS at 12:35

## 2020-01-01 RX ADMIN — SODIUM CHLORIDE 100 ML/HR: 4.5 INJECTION, SOLUTION INTRAVENOUS at 15:40

## 2020-01-01 RX ADMIN — SODIUM CHLORIDE 200 MG: 9 INJECTION, SOLUTION INTRAVENOUS at 12:13

## 2020-01-01 RX ADMIN — MAGNESIUM SULFATE 2 G: 1 INJECTION INTRAVENOUS at 12:33

## 2020-01-01 RX ADMIN — SODIUM CHLORIDE, PRESERVATIVE FREE 10 ML: 5 INJECTION INTRAVENOUS at 21:00

## 2020-01-01 RX ADMIN — SODIUM CHLORIDE, PRESERVATIVE FREE 10 ML: 5 INJECTION INTRAVENOUS at 09:45

## 2020-01-01 RX ADMIN — PANTOPRAZOLE SODIUM 40 MG: 40 TABLET, DELAYED RELEASE ORAL at 06:43

## 2020-01-01 RX ADMIN — LEVETIRACETAM 1000 MG: 500 TABLET, FILM COATED ORAL at 08:58

## 2020-01-01 RX ADMIN — PANTOPRAZOLE SODIUM 40 MG: 40 TABLET, DELAYED RELEASE ORAL at 21:34

## 2020-01-01 RX ADMIN — ACETAMINOPHEN 325 MG: 325 TABLET, FILM COATED ORAL at 16:16

## 2020-01-01 RX ADMIN — POTASSIUM CHLORIDE 40 MEQ: 750 CAPSULE, EXTENDED RELEASE ORAL at 15:37

## 2020-01-01 RX ADMIN — SODIUM CHLORIDE, PRESERVATIVE FREE 10 ML: 5 INJECTION INTRAVENOUS at 01:44

## 2020-01-01 RX ADMIN — MORPHINE SULFATE 4 MG: 4 INJECTION INTRAVENOUS at 08:12

## 2020-01-01 RX ADMIN — CALCIUM GLUCONATE 2 G: 98 INJECTION, SOLUTION INTRAVENOUS at 00:04

## 2020-01-01 RX ADMIN — SODIUM BICARBONATE 650 MG: 650 TABLET ORAL at 09:57

## 2020-01-01 RX ADMIN — ANTACID TABLETS 1 TABLET: 500 TABLET, CHEWABLE ORAL at 01:31

## 2020-01-01 RX ADMIN — ACYCLOVIR 400 MG: 400 TABLET ORAL at 09:06

## 2020-01-01 RX ADMIN — PANTOPRAZOLE SODIUM 40 MG: 40 TABLET, DELAYED RELEASE ORAL at 20:25

## 2020-01-01 RX ADMIN — ANTACID TABLETS 1 TABLET: 500 TABLET, CHEWABLE ORAL at 20:23

## 2020-01-01 RX ADMIN — FAMOTIDINE 20 MG: 20 TABLET, FILM COATED ORAL at 08:38

## 2020-01-01 RX ADMIN — LEVETIRACETAM 1000 MG: 10 INJECTION INTRAVENOUS at 22:41

## 2020-01-01 RX ADMIN — ACETAMINOPHEN 650 MG: 325 TABLET, FILM COATED ORAL at 08:37

## 2020-01-01 RX ADMIN — SODIUM BICARBONATE 650 MG: 650 TABLET ORAL at 08:09

## 2020-01-01 RX ADMIN — CARBAMAZEPINE 400 MG: 200 TABLET, EXTENDED RELEASE ORAL at 00:31

## 2020-01-01 RX ADMIN — ACYCLOVIR 400 MG: 400 TABLET ORAL at 22:03

## 2020-01-01 RX ADMIN — ACYCLOVIR 400 MG: 400 TABLET ORAL at 08:43

## 2020-01-01 RX ADMIN — CEFEPIME HYDROCHLORIDE 1 G: 1 INJECTION, POWDER, FOR SOLUTION INTRAMUSCULAR; INTRAVENOUS at 15:39

## 2020-01-01 RX ADMIN — SODIUM CHLORIDE, PRESERVATIVE FREE 10 ML: 5 INJECTION INTRAVENOUS at 11:30

## 2020-01-01 RX ADMIN — CARBAMAZEPINE 400 MG: 200 TABLET, EXTENDED RELEASE ORAL at 21:03

## 2020-01-01 RX ADMIN — PANTOPRAZOLE SODIUM 40 MG: 40 TABLET, DELAYED RELEASE ORAL at 06:34

## 2020-01-01 RX ADMIN — HYDROCODONE BITARTRATE AND ACETAMINOPHEN 1 TABLET: 5; 325 TABLET ORAL at 21:48

## 2020-01-01 RX ADMIN — SODIUM CHLORIDE 100 ML/HR: 9 INJECTION, SOLUTION INTRAVENOUS at 13:07

## 2020-01-01 RX ADMIN — SODIUM CHLORIDE, PRESERVATIVE FREE 10 ML: 5 INJECTION INTRAVENOUS at 09:15

## 2020-01-01 RX ADMIN — SODIUM CHLORIDE, PRESERVATIVE FREE 10 ML: 5 INJECTION INTRAVENOUS at 09:25

## 2020-01-01 RX ADMIN — LORAZEPAM 2 MG: 2 INJECTION INTRAMUSCULAR; INTRAVENOUS at 00:56

## 2020-01-01 RX ADMIN — Medication 500 UNITS: at 12:32

## 2020-01-01 RX ADMIN — SODIUM CHLORIDE 250 ML: 900 INJECTION, SOLUTION INTRAVENOUS at 09:28

## 2020-01-01 RX ADMIN — CARBAMAZEPINE 400 MG: 200 TABLET, EXTENDED RELEASE ORAL at 20:25

## 2020-01-01 RX ADMIN — LORAZEPAM 0.5 MG: 2 SOLUTION, CONCENTRATE ORAL at 21:40

## 2020-01-01 RX ADMIN — DARATUMUMAB 1300 MG: 100 INJECTION, SOLUTION, CONCENTRATE INTRAVENOUS at 11:12

## 2020-01-01 RX ADMIN — PANTOPRAZOLE SODIUM 40 MG: 40 TABLET, DELAYED RELEASE ORAL at 09:06

## 2020-01-01 RX ADMIN — ACETAMINOPHEN 1000 MG: 500 TABLET, FILM COATED ORAL at 09:55

## 2020-01-01 RX ADMIN — SODIUM CHLORIDE, PRESERVATIVE FREE 10 ML: 5 INJECTION INTRAVENOUS at 22:07

## 2020-01-01 RX ADMIN — GLYCOPYRROLATE 0.2 MG: 0.2 INJECTION, SOLUTION INTRAMUSCULAR; INTRAVITREAL at 17:27

## 2020-01-01 RX ADMIN — SODIUM CHLORIDE, PRESERVATIVE FREE 10 ML: 5 INJECTION INTRAVENOUS at 08:51

## 2020-01-01 RX ADMIN — LEVETIRACETAM INJECTION 500 MG: 5 INJECTION INTRAVENOUS at 08:46

## 2020-01-01 RX ADMIN — POTASSIUM CHLORIDE: 2 INJECTION, SOLUTION, CONCENTRATE INTRAVENOUS at 12:12

## 2020-01-01 RX ADMIN — SODIUM CHLORIDE 250 ML: 900 INJECTION, SOLUTION INTRAVENOUS at 09:55

## 2020-01-01 RX ADMIN — CEFEPIME HYDROCHLORIDE 2 G: 2 INJECTION, POWDER, FOR SOLUTION INTRAVENOUS at 17:20

## 2020-01-01 RX ADMIN — ACYCLOVIR 400 MG: 200 CAPSULE ORAL at 08:06

## 2020-01-01 RX ADMIN — CALCIUM CARBONATE-VITAMIN D TAB 500 MG-200 UNIT 500 MG: 500-200 TAB at 09:57

## 2020-01-01 RX ADMIN — GADOBENATE DIMEGLUMINE 15 ML: 529 INJECTION, SOLUTION INTRAVENOUS at 21:17

## 2020-01-01 RX ADMIN — LACOSAMIDE 200 MG: 50 TABLET, FILM COATED ORAL at 08:57

## 2020-01-01 RX ADMIN — SODIUM CHLORIDE, PRESERVATIVE FREE 10 ML: 5 INJECTION INTRAVENOUS at 08:07

## 2020-01-01 RX ADMIN — BENZONATATE 200 MG: 100 CAPSULE ORAL at 22:41

## 2020-01-01 RX ADMIN — SODIUM BICARBONATE 650 MG: 650 TABLET ORAL at 22:16

## 2020-01-01 RX ADMIN — DENOSUMAB 120 MG: 120 INJECTION SUBCUTANEOUS at 10:58

## 2020-01-01 RX ADMIN — CEFEPIME HYDROCHLORIDE 2 G: 2 INJECTION, POWDER, FOR SOLUTION INTRAVENOUS at 02:56

## 2020-01-01 RX ADMIN — METHYLPREDNISOLONE SODIUM SUCCINATE 60 MG: 125 INJECTION, POWDER, FOR SOLUTION INTRAMUSCULAR; INTRAVENOUS at 09:45

## 2020-01-01 RX ADMIN — SODIUM CHLORIDE, PRESERVATIVE FREE 10 ML: 5 INJECTION INTRAVENOUS at 20:54

## 2020-01-01 RX ADMIN — Medication 500 UNITS: at 12:42

## 2020-01-01 RX ADMIN — CEFEPIME HYDROCHLORIDE 2 G: 2 INJECTION, POWDER, FOR SOLUTION INTRAVENOUS at 03:15

## 2020-01-01 RX ADMIN — Medication 500 UNITS: at 11:30

## 2020-01-01 RX ADMIN — METOPROLOL SUCCINATE 25 MG: 25 TABLET, EXTENDED RELEASE ORAL at 08:41

## 2020-01-01 RX ADMIN — DIPHENHYDRAMINE HYDROCHLORIDE 25 MG: 25 CAPSULE ORAL at 10:33

## 2020-01-01 RX ADMIN — LORAZEPAM 0.5 MG: 2 INJECTION INTRAMUSCULAR; INTRAVENOUS at 03:08

## 2020-01-01 RX ADMIN — METOPROLOL SUCCINATE 25 MG: 25 TABLET, EXTENDED RELEASE ORAL at 01:31

## 2020-01-01 RX ADMIN — SODIUM CHLORIDE, PRESERVATIVE FREE 10 ML: 5 INJECTION INTRAVENOUS at 22:06

## 2020-01-01 RX ADMIN — IPRATROPIUM BROMIDE AND ALBUTEROL SULFATE 3 ML: 2.5; .5 SOLUTION RESPIRATORY (INHALATION) at 14:56

## 2020-01-01 RX ADMIN — ACYCLOVIR 400 MG: 400 TABLET ORAL at 21:14

## 2020-01-01 RX ADMIN — LEVETIRACETAM INJECTION 500 MG: 5 INJECTION INTRAVENOUS at 10:27

## 2020-01-01 RX ADMIN — SODIUM BICARBONATE 650 MG: 650 TABLET ORAL at 20:28

## 2020-01-01 RX ADMIN — SODIUM CHLORIDE, PRESERVATIVE FREE 10 ML: 5 INJECTION INTRAVENOUS at 08:40

## 2020-01-01 RX ADMIN — IPRATROPIUM BROMIDE AND ALBUTEROL SULFATE 3 ML: 2.5; .5 SOLUTION RESPIRATORY (INHALATION) at 13:20

## 2020-01-01 RX ADMIN — ACETAMINOPHEN 650 MG: 325 TABLET, FILM COATED ORAL at 22:44

## 2020-01-01 RX ADMIN — SODIUM CHLORIDE, PRESERVATIVE FREE 10 ML: 5 INJECTION INTRAVENOUS at 08:11

## 2020-01-01 RX ADMIN — SODIUM CHLORIDE, PRESERVATIVE FREE 10 ML: 5 INJECTION INTRAVENOUS at 09:47

## 2020-01-01 RX ADMIN — NITROGLYCERIN 0.4 MG: 0.4 TABLET SUBLINGUAL at 07:35

## 2020-01-01 RX ADMIN — ACETAMINOPHEN 1000 MG: 500 TABLET, FILM COATED ORAL at 11:33

## 2020-01-01 RX ADMIN — IPRATROPIUM BROMIDE AND ALBUTEROL SULFATE 3 ML: 2.5; .5 SOLUTION RESPIRATORY (INHALATION) at 07:04

## 2020-01-01 RX ADMIN — LEVETIRACETAM 1000 MG: 500 TABLET, FILM COATED ORAL at 08:09

## 2020-01-01 RX ADMIN — MORPHINE SULFATE 4 MG: 4 INJECTION INTRAVENOUS at 01:13

## 2020-01-01 RX ADMIN — PANTOPRAZOLE SODIUM 40 MG: 40 TABLET, DELAYED RELEASE ORAL at 21:28

## 2020-01-01 RX ADMIN — ACETAMINOPHEN 650 MG: 325 TABLET, FILM COATED ORAL at 06:50

## 2020-01-01 RX ADMIN — GLYCOPYRROLATE 0.4 MG: 0.2 INJECTION, SOLUTION INTRAMUSCULAR; INTRAVITREAL at 15:41

## 2020-01-01 RX ADMIN — SODIUM CHLORIDE 1000 ML: 9 INJECTION, SOLUTION INTRAVENOUS at 16:45

## 2020-01-01 RX ADMIN — FOSPHENYTOIN SODIUM 150 MG PE: 50 INJECTION, SOLUTION INTRAMUSCULAR; INTRAVENOUS at 23:56

## 2020-01-01 RX ADMIN — ISOSORBIDE MONONITRATE 30 MG: 30 TABLET ORAL at 19:59

## 2020-01-01 RX ADMIN — MORPHINE SULFATE 2 MG: 2 INJECTION, SOLUTION INTRAMUSCULAR; INTRAVENOUS at 22:28

## 2020-01-01 RX ADMIN — SODIUM CHLORIDE, PRESERVATIVE FREE 10 ML: 5 INJECTION INTRAVENOUS at 08:47

## 2020-01-01 RX ADMIN — BORTEZOMIB 2.4 MG: 3.5 INJECTION, POWDER, LYOPHILIZED, FOR SOLUTION INTRAVENOUS; SUBCUTANEOUS at 10:53

## 2020-01-01 RX ADMIN — SODIUM BICARBONATE 1300 MG: 650 TABLET ORAL at 22:04

## 2020-01-01 RX ADMIN — SODIUM CHLORIDE, PRESERVATIVE FREE 10 ML: 5 INJECTION INTRAVENOUS at 20:25

## 2020-01-01 RX ADMIN — IPRATROPIUM BROMIDE AND ALBUTEROL SULFATE 3 ML: 2.5; .5 SOLUTION RESPIRATORY (INHALATION) at 09:15

## 2020-01-01 RX ADMIN — SODIUM BICARBONATE 650 MG: 650 TABLET ORAL at 20:25

## 2020-01-01 RX ADMIN — ACYCLOVIR 400 MG: 400 TABLET ORAL at 08:09

## 2020-01-01 RX ADMIN — IPRATROPIUM BROMIDE AND ALBUTEROL SULFATE 3 ML: 2.5; .5 SOLUTION RESPIRATORY (INHALATION) at 16:02

## 2020-01-01 RX ADMIN — SODIUM CHLORIDE 250 ML: 900 INJECTION, SOLUTION INTRAVENOUS at 09:42

## 2020-01-01 RX ADMIN — LEVETIRACETAM 1000 MG: 500 TABLET, FILM COATED ORAL at 09:13

## 2020-01-01 RX ADMIN — DIPHENHYDRAMINE HYDROCHLORIDE 25 MG: 50 INJECTION, SOLUTION INTRAMUSCULAR; INTRAVENOUS at 09:48

## 2020-01-01 RX ADMIN — LEVOTHYROXINE SODIUM 75 MCG: 75 TABLET ORAL at 06:34

## 2020-01-01 RX ADMIN — METOPROLOL SUCCINATE 25 MG: 25 TABLET, EXTENDED RELEASE ORAL at 08:09

## 2020-01-01 RX ADMIN — PANTOPRAZOLE SODIUM 40 MG: 40 TABLET, DELAYED RELEASE ORAL at 08:43

## 2020-01-01 RX ADMIN — METOPROLOL SUCCINATE 25 MG: 25 TABLET, EXTENDED RELEASE ORAL at 22:04

## 2020-01-01 RX ADMIN — SODIUM CHLORIDE 100 ML/HR: 9 INJECTION, SOLUTION INTRAVENOUS at 13:41

## 2020-01-01 RX ADMIN — GUAIFENESIN 600 MG: 600 TABLET, EXTENDED RELEASE ORAL at 08:58

## 2020-01-01 RX ADMIN — Medication 500 UNITS: at 09:44

## 2020-01-01 RX ADMIN — SODIUM CHLORIDE 125 ML/HR: 9 INJECTION, SOLUTION INTRAVENOUS at 21:26

## 2020-01-01 RX ADMIN — ACYCLOVIR 400 MG: 400 TABLET ORAL at 21:28

## 2020-01-01 RX ADMIN — SODIUM CHLORIDE, PRESERVATIVE FREE 10 ML: 5 INJECTION INTRAVENOUS at 13:35

## 2020-01-01 RX ADMIN — CEFTRIAXONE SODIUM 1 G: 1 INJECTION, SOLUTION INTRAVENOUS at 00:46

## 2020-01-01 RX ADMIN — IPRATROPIUM BROMIDE AND ALBUTEROL SULFATE 3 ML: 2.5; .5 SOLUTION RESPIRATORY (INHALATION) at 12:01

## 2020-01-01 RX ADMIN — Medication 500 UNITS: at 09:15

## 2020-01-01 RX ADMIN — SODIUM CHLORIDE, PRESERVATIVE FREE 10 ML: 5 INJECTION INTRAVENOUS at 08:43

## 2020-01-01 RX ADMIN — ISOSORBIDE MONONITRATE 60 MG: 60 TABLET ORAL at 06:34

## 2020-01-01 RX ADMIN — IPRATROPIUM BROMIDE AND ALBUTEROL SULFATE 3 ML: 2.5; .5 SOLUTION RESPIRATORY (INHALATION) at 19:31

## 2020-01-01 RX ADMIN — ISOSORBIDE MONONITRATE 30 MG: 30 TABLET ORAL at 16:47

## 2020-01-01 RX ADMIN — PANTOPRAZOLE SODIUM 40 MG: 40 TABLET, DELAYED RELEASE ORAL at 17:09

## 2020-01-01 RX ADMIN — METOPROLOL SUCCINATE 25 MG: 25 TABLET, EXTENDED RELEASE ORAL at 20:28

## 2020-01-01 RX ADMIN — GLYCOPYRROLATE 0.2 MG: 0.2 INJECTION, SOLUTION INTRAMUSCULAR; INTRAVITREAL at 13:04

## 2020-01-01 RX ADMIN — ACYCLOVIR 400 MG: 400 TABLET ORAL at 08:41

## 2020-01-01 RX ADMIN — IPRATROPIUM BROMIDE AND ALBUTEROL SULFATE 3 ML: 2.5; .5 SOLUTION RESPIRATORY (INHALATION) at 15:59

## 2020-01-01 RX ADMIN — LORAZEPAM 1 MG: 2 INJECTION INTRAMUSCULAR; INTRAVENOUS at 04:57

## 2020-01-01 RX ADMIN — LORAZEPAM 2 MG: 2 INJECTION INTRAMUSCULAR; INTRAVENOUS at 19:14

## 2020-01-01 RX ADMIN — FOSPHENYTOIN SODIUM 150 MG PE: 50 INJECTION, SOLUTION INTRAMUSCULAR; INTRAVENOUS at 06:52

## 2020-01-01 RX ADMIN — CALCIUM CARBONATE-VITAMIN D TAB 500 MG-200 UNIT 500 MG: 500-200 TAB at 08:58

## 2020-01-01 RX ADMIN — LEVOTHYROXINE SODIUM 75 MCG: 75 TABLET ORAL at 06:03

## 2020-01-01 RX ADMIN — SODIUM CHLORIDE, PRESERVATIVE FREE 10 ML: 5 INJECTION INTRAVENOUS at 09:03

## 2020-01-01 RX ADMIN — LORAZEPAM 0.5 MG: 2 INJECTION INTRAMUSCULAR; INTRAVENOUS at 15:38

## 2020-01-01 RX ADMIN — HYDROCODONE BITARTRATE AND ACETAMINOPHEN 1 TABLET: 5; 325 TABLET ORAL at 14:09

## 2020-01-01 RX ADMIN — SCOPALAMINE 1 PATCH: 1 PATCH, EXTENDED RELEASE TRANSDERMAL at 17:20

## 2020-01-01 RX ADMIN — MORPHINE SULFATE 4 MG: 4 INJECTION INTRAVENOUS at 00:10

## 2020-01-01 RX ADMIN — SODIUM CHLORIDE, PRESERVATIVE FREE 10 ML: 5 INJECTION INTRAVENOUS at 09:07

## 2020-01-01 RX ADMIN — LEVETIRACETAM 1000 MG: 500 TABLET, FILM COATED ORAL at 21:35

## 2020-01-01 RX ADMIN — MORPHINE SULFATE 4 MG: 4 INJECTION INTRAVENOUS at 13:04

## 2020-01-01 RX ADMIN — SODIUM CHLORIDE, PRESERVATIVE FREE 10 ML: 5 INJECTION INTRAVENOUS at 10:40

## 2020-01-01 RX ADMIN — GUAIFENESIN 600 MG: 600 TABLET, EXTENDED RELEASE ORAL at 09:57

## 2020-01-01 RX ADMIN — BENZONATATE 200 MG: 100 CAPSULE ORAL at 21:03

## 2020-01-01 RX ADMIN — SODIUM CHLORIDE, PRESERVATIVE FREE 10 ML: 5 INJECTION INTRAVENOUS at 22:30

## 2020-01-01 RX ADMIN — Medication 500 UNITS: at 10:38

## 2020-01-01 RX ADMIN — HYDROCODONE BITARTRATE AND ACETAMINOPHEN 1 TABLET: 5; 325 TABLET ORAL at 12:41

## 2020-01-01 RX ADMIN — LORAZEPAM 2 MG: 2 INJECTION INTRAMUSCULAR; INTRAVENOUS at 13:04

## 2020-01-01 RX ADMIN — LORAZEPAM 2 MG: 2 INJECTION INTRAMUSCULAR; INTRAVENOUS at 08:47

## 2020-01-01 RX ADMIN — FOSPHENYTOIN SODIUM 150 MG PE: 50 INJECTION, SOLUTION INTRAMUSCULAR; INTRAVENOUS at 01:44

## 2020-01-01 RX ADMIN — ANTACID TABLETS 1 TABLET: 500 TABLET, CHEWABLE ORAL at 08:06

## 2020-01-01 RX ADMIN — SODIUM BICARBONATE 1300 MG: 650 TABLET ORAL at 08:41

## 2020-01-01 RX ADMIN — GUAIFENESIN 600 MG: 600 TABLET, EXTENDED RELEASE ORAL at 09:13

## 2020-01-01 RX ADMIN — IPRATROPIUM BROMIDE AND ALBUTEROL SULFATE 3 ML: 2.5; .5 SOLUTION RESPIRATORY (INHALATION) at 19:58

## 2020-01-01 RX ADMIN — CALCIUM CARBONATE-VITAMIN D TAB 500 MG-200 UNIT 500 MG: 500-200 TAB at 20:25

## 2020-01-01 RX ADMIN — CEFEPIME HYDROCHLORIDE 2 G: 2 INJECTION, POWDER, FOR SOLUTION INTRAVENOUS at 15:10

## 2020-01-01 RX ADMIN — ISOSORBIDE MONONITRATE 30 MG: 30 TABLET ORAL at 16:15

## 2020-01-01 RX ADMIN — SODIUM CHLORIDE 1000 ML: 900 INJECTION, SOLUTION INTRAVENOUS at 08:24

## 2020-01-01 RX ADMIN — LORAZEPAM 2 MG: 2 INJECTION INTRAMUSCULAR; INTRAVENOUS at 04:51

## 2020-01-01 RX ADMIN — LORAZEPAM 0.5 MG: 2 INJECTION INTRAMUSCULAR; INTRAVENOUS at 22:29

## 2020-01-01 RX ADMIN — ALPRAZOLAM 0.5 MG: 0.5 TABLET ORAL at 11:40

## 2020-01-01 RX ADMIN — IPRATROPIUM BROMIDE AND ALBUTEROL SULFATE 3 ML: 2.5; .5 SOLUTION RESPIRATORY (INHALATION) at 08:51

## 2020-01-01 RX ADMIN — SODIUM CHLORIDE 125 ML/HR: 9 INJECTION, SOLUTION INTRAVENOUS at 17:20

## 2020-01-01 RX ADMIN — BENZONATATE 200 MG: 100 CAPSULE ORAL at 20:25

## 2020-01-01 RX ADMIN — SODIUM CHLORIDE, PRESERVATIVE FREE 10 ML: 5 INJECTION INTRAVENOUS at 21:14

## 2020-01-01 RX ADMIN — METHYLPREDNISOLONE SODIUM SUCCINATE 60 MG: 125 INJECTION, POWDER, FOR SOLUTION INTRAMUSCULAR; INTRAVENOUS at 09:55

## 2020-01-01 RX ADMIN — CEFEPIME HYDROCHLORIDE 1 G: 1 INJECTION, POWDER, FOR SOLUTION INTRAMUSCULAR; INTRAVENOUS at 13:54

## 2020-01-01 RX ADMIN — ACETAMINOPHEN 650 MG: 650 SUPPOSITORY RECTAL at 05:44

## 2020-01-01 RX ADMIN — LEVOTHYROXINE SODIUM 75 MCG: 75 TABLET ORAL at 07:06

## 2020-01-01 RX ADMIN — MORPHINE SULFATE 4 MG: 4 INJECTION INTRAVENOUS at 00:56

## 2020-01-01 RX ADMIN — IPRATROPIUM BROMIDE AND ALBUTEROL SULFATE 3 ML: 2.5; .5 SOLUTION RESPIRATORY (INHALATION) at 21:56

## 2020-01-01 RX ADMIN — LACOSAMIDE 200 MG: 50 TABLET, FILM COATED ORAL at 20:29

## 2020-01-01 RX ADMIN — DIPHENHYDRAMINE HYDROCHLORIDE 25 MG: 50 INJECTION, SOLUTION INTRAMUSCULAR; INTRAVENOUS at 09:47

## 2020-01-01 RX ADMIN — Medication 10 ML: at 16:11

## 2020-01-01 RX ADMIN — FOSPHENYTOIN SODIUM 150 MG PE: 50 INJECTION, SOLUTION INTRAMUSCULAR; INTRAVENOUS at 08:46

## 2020-01-01 RX ADMIN — MORPHINE SULFATE 2 MG: 2 INJECTION, SOLUTION INTRAMUSCULAR; INTRAVENOUS at 03:08

## 2020-01-01 RX ADMIN — OSELTAMIVIR PHOSPHATE 30 MG: 30 CAPSULE ORAL at 08:39

## 2020-01-01 RX ADMIN — METOPROLOL SUCCINATE 25 MG: 25 TABLET, EXTENDED RELEASE ORAL at 21:12

## 2020-01-01 RX ADMIN — ACYCLOVIR 400 MG: 400 TABLET ORAL at 21:10

## 2020-01-01 RX ADMIN — HYDROCODONE BITARTRATE AND ACETAMINOPHEN 0.5 TABLET: 5; 325 TABLET ORAL at 16:42

## 2020-01-01 RX ADMIN — MORPHINE SULFATE 4 MG: 4 INJECTION INTRAVENOUS at 04:57

## 2020-01-01 RX ADMIN — SODIUM CHLORIDE 200 MG: 9 INJECTION, SOLUTION INTRAVENOUS at 10:56

## 2020-01-01 RX ADMIN — SODIUM CHLORIDE 125 ML/HR: 9 INJECTION, SOLUTION INTRAVENOUS at 07:53

## 2020-01-01 RX ADMIN — CALCIUM CARBONATE-VITAMIN D TAB 500 MG-200 UNIT 500 MG: 500-200 TAB at 09:13

## 2020-01-01 RX ADMIN — GUAIFENESIN 600 MG: 600 TABLET, EXTENDED RELEASE ORAL at 20:29

## 2020-01-01 RX ADMIN — IPRATROPIUM BROMIDE AND ALBUTEROL SULFATE 3 ML: 2.5; .5 SOLUTION RESPIRATORY (INHALATION) at 16:45

## 2020-01-01 RX ADMIN — MORPHINE SULFATE 4 MG: 4 INJECTION INTRAVENOUS at 19:14

## 2020-01-01 RX ADMIN — ACYCLOVIR 400 MG: 400 TABLET ORAL at 22:18

## 2020-01-01 RX ADMIN — GLYCOPYRROLATE 0.4 MG: 0.2 INJECTION, SOLUTION INTRAMUSCULAR; INTRAVITREAL at 08:44

## 2020-01-01 RX ADMIN — PANTOPRAZOLE SODIUM 40 MG: 40 TABLET, DELAYED RELEASE ORAL at 08:10

## 2020-01-01 RX ADMIN — SODIUM BICARBONATE 1300 MG: 650 TABLET ORAL at 21:27

## 2020-01-01 RX ADMIN — IPRATROPIUM BROMIDE AND ALBUTEROL SULFATE 3 ML: 2.5; .5 SOLUTION RESPIRATORY (INHALATION) at 19:45

## 2020-01-01 RX ADMIN — MORPHINE SULFATE 4 MG: 4 INJECTION INTRAVENOUS at 08:44

## 2020-01-01 RX ADMIN — SODIUM CHLORIDE 200 MG: 9 INJECTION, SOLUTION INTRAVENOUS at 01:39

## 2020-01-01 RX ADMIN — SODIUM CHLORIDE, PRESERVATIVE FREE 10 ML: 5 INJECTION INTRAVENOUS at 21:37

## 2020-01-01 RX ADMIN — CALCIUM CARBONATE-VITAMIN D TAB 500 MG-200 UNIT 1000 MG: 500-200 TAB at 22:03

## 2020-01-01 RX ADMIN — LEVOTHYROXINE SODIUM 75 MCG: 75 TABLET ORAL at 06:50

## 2020-01-01 RX ADMIN — IPRATROPIUM BROMIDE AND ALBUTEROL SULFATE 3 ML: 2.5; .5 SOLUTION RESPIRATORY (INHALATION) at 15:55

## 2020-01-01 RX ADMIN — MORPHINE SULFATE 4 MG: 4 INJECTION INTRAVENOUS at 17:27

## 2020-01-01 RX ADMIN — CEFEPIME HYDROCHLORIDE 1 G: 1 INJECTION, POWDER, FOR SOLUTION INTRAMUSCULAR; INTRAVENOUS at 02:17

## 2020-01-01 RX ADMIN — ONDANSETRON 4 MG: 2 INJECTION INTRAMUSCULAR; INTRAVENOUS at 06:40

## 2020-01-01 RX ADMIN — CEFEPIME HYDROCHLORIDE 2 G: 2 INJECTION, POWDER, FOR SOLUTION INTRAVENOUS at 01:53

## 2020-01-01 RX ADMIN — ACYCLOVIR 400 MG: 400 TABLET ORAL at 08:39

## 2020-01-01 RX ADMIN — LORAZEPAM 2 MG: 2 INJECTION INTRAMUSCULAR; INTRAVENOUS at 22:31

## 2020-01-01 RX ADMIN — LORAZEPAM 1 MG: 2 INJECTION INTRAMUSCULAR; INTRAVENOUS at 00:10

## 2020-01-01 RX ADMIN — CALCIUM CARBONATE-VITAMIN D TAB 500 MG-200 UNIT 500 MG: 500-200 TAB at 08:07

## 2020-01-01 RX ADMIN — CARBAMAZEPINE 400 MG: 200 TABLET, EXTENDED RELEASE ORAL at 21:28

## 2020-01-01 RX ADMIN — METOPROLOL SUCCINATE 25 MG: 25 TABLET, EXTENDED RELEASE ORAL at 21:34

## 2020-01-01 RX ADMIN — IPRATROPIUM BROMIDE AND ALBUTEROL SULFATE 3 ML: 2.5; .5 SOLUTION RESPIRATORY (INHALATION) at 12:20

## 2020-01-01 RX ADMIN — CEFTRIAXONE SODIUM 1 G: 1 INJECTION, SOLUTION INTRAVENOUS at 01:32

## 2020-01-01 RX ADMIN — SODIUM CHLORIDE 100 ML/HR: 4.5 INJECTION, SOLUTION INTRAVENOUS at 21:00

## 2020-01-01 RX ADMIN — GUAIFENESIN 600 MG: 600 TABLET, EXTENDED RELEASE ORAL at 21:04

## 2020-01-01 RX ADMIN — IPRATROPIUM BROMIDE AND ALBUTEROL SULFATE 3 ML: 2.5; .5 SOLUTION RESPIRATORY (INHALATION) at 12:13

## 2020-01-01 RX ADMIN — ACETAMINOPHEN 1000 MG: 500 TABLET, FILM COATED ORAL at 09:28

## 2020-01-01 RX ADMIN — HYDROCODONE BITARTRATE AND ACETAMINOPHEN 1 TABLET: 5; 325 TABLET ORAL at 22:33

## 2020-01-01 RX ADMIN — SODIUM CHLORIDE, PRESERVATIVE FREE 10 ML: 5 INJECTION INTRAVENOUS at 09:26

## 2020-01-01 RX ADMIN — ACYCLOVIR 400 MG: 400 TABLET ORAL at 08:10

## 2020-01-01 RX ADMIN — SODIUM CHLORIDE, PRESERVATIVE FREE 10 ML: 5 INJECTION INTRAVENOUS at 08:10

## 2020-01-01 RX ADMIN — SODIUM CHLORIDE 1500 MG: 9 INJECTION, SOLUTION INTRAVENOUS at 19:19

## 2020-01-01 RX ADMIN — LEVOTHYROXINE SODIUM 75 MCG: 75 TABLET ORAL at 06:48

## 2020-01-01 RX ADMIN — SODIUM CHLORIDE 100 ML/HR: 9 INJECTION, SOLUTION INTRAVENOUS at 01:32

## 2020-01-01 RX ADMIN — GLYCOPYRROLATE 0.2 MG: 0.2 INJECTION, SOLUTION INTRAMUSCULAR; INTRAVITREAL at 20:29

## 2020-01-01 RX ADMIN — IPRATROPIUM BROMIDE AND ALBUTEROL SULFATE 3 ML: 2.5; .5 SOLUTION RESPIRATORY (INHALATION) at 16:03

## 2020-01-01 RX ADMIN — ACYCLOVIR 400 MG: 400 TABLET ORAL at 10:00

## 2020-01-01 RX ADMIN — GLYCOPYRROLATE 0.2 MG: 0.2 INJECTION, SOLUTION INTRAMUSCULAR; INTRAVITREAL at 10:07

## 2020-01-01 RX ADMIN — ISOSORBIDE MONONITRATE 30 MG: 30 TABLET ORAL at 17:26

## 2020-01-01 RX ADMIN — SODIUM BICARBONATE 650 MG: 650 TABLET ORAL at 10:01

## 2020-01-01 RX ADMIN — CALCIUM CARBONATE-VITAMIN D TAB 500 MG-200 UNIT 500 MG: 500-200 TAB at 20:28

## 2020-01-01 RX ADMIN — METOPROLOL SUCCINATE 25 MG: 25 TABLET, EXTENDED RELEASE ORAL at 08:57

## 2020-01-01 RX ADMIN — ACYCLOVIR 400 MG: 400 TABLET ORAL at 08:58

## 2020-01-01 RX ADMIN — OSELTAMIVIR PHOSPHATE 30 MG: 30 CAPSULE ORAL at 18:17

## 2020-01-01 RX ADMIN — ISOSORBIDE MONONITRATE 30 MG: 30 TABLET ORAL at 18:22

## 2020-01-01 RX ADMIN — SODIUM CHLORIDE, PRESERVATIVE FREE 10 ML: 5 INJECTION INTRAVENOUS at 22:29

## 2020-01-01 RX ADMIN — SODIUM CHLORIDE, PRESERVATIVE FREE 10 ML: 5 INJECTION INTRAVENOUS at 08:09

## 2020-01-01 RX ADMIN — OSELTAMIVIR PHOSPHATE 30 MG: 30 CAPSULE ORAL at 10:01

## 2020-01-01 RX ADMIN — ACYCLOVIR 400 MG: 400 TABLET ORAL at 21:04

## 2020-01-01 RX ADMIN — SODIUM CHLORIDE 100 ML/HR: 9 INJECTION, SOLUTION INTRAVENOUS at 12:52

## 2020-01-01 RX ADMIN — PANTOPRAZOLE SODIUM 40 MG: 40 TABLET, DELAYED RELEASE ORAL at 08:57

## 2020-01-01 RX ADMIN — LEVOTHYROXINE SODIUM 75 MCG: 75 TABLET ORAL at 06:52

## 2020-01-01 RX ADMIN — Medication 10 ML: at 16:08

## 2020-01-01 RX ADMIN — HYDROCODONE BITARTRATE AND ACETAMINOPHEN 1 TABLET: 5; 325 TABLET ORAL at 16:52

## 2020-01-01 RX ADMIN — EPOETIN ALFA-EPBX 20000 UNITS: 10000 INJECTION, SOLUTION INTRAVENOUS; SUBCUTANEOUS at 10:56

## 2020-01-01 RX ADMIN — LORAZEPAM 0.5 MG: 2 INJECTION INTRAMUSCULAR; INTRAVENOUS at 00:57

## 2020-01-01 RX ADMIN — LORAZEPAM 2 MG: 2 INJECTION INTRAMUSCULAR; INTRAVENOUS at 08:44

## 2020-01-01 RX ADMIN — SODIUM CHLORIDE 200 MG: 9 INJECTION, SOLUTION INTRAVENOUS at 23:21

## 2020-01-01 RX ADMIN — LEVOTHYROXINE SODIUM 75 MCG: 75 TABLET ORAL at 04:43

## 2020-01-01 RX ADMIN — PANTOPRAZOLE SODIUM 40 MG: 40 TABLET, DELAYED RELEASE ORAL at 22:04

## 2020-01-01 RX ADMIN — METOPROLOL SUCCINATE 25 MG: 25 TABLET, EXTENDED RELEASE ORAL at 21:28

## 2020-01-01 RX ADMIN — VANCOMYCIN HYDROCHLORIDE 1500 MG: 10 INJECTION, POWDER, LYOPHILIZED, FOR SOLUTION INTRAVENOUS at 18:18

## 2020-01-01 RX ADMIN — ISOSORBIDE MONONITRATE 30 MG: 30 TABLET ORAL at 06:42

## 2020-01-01 RX ADMIN — ACYCLOVIR 400 MG: 400 TABLET ORAL at 20:28

## 2020-01-01 RX ADMIN — ACETAMINOPHEN 650 MG: 325 TABLET, FILM COATED ORAL at 10:33

## 2020-01-01 RX ADMIN — SODIUM CHLORIDE, PRESERVATIVE FREE 10 ML: 5 INJECTION INTRAVENOUS at 23:09

## 2020-01-01 RX ADMIN — LEVETIRACETAM 1000 MG: 10 INJECTION INTRAVENOUS at 09:08

## 2020-01-01 RX ADMIN — CALCIUM CARBONATE-VITAMIN D TAB 500 MG-200 UNIT 500 MG: 500-200 TAB at 08:09

## 2020-01-01 RX ADMIN — METOPROLOL SUCCINATE 25 MG: 25 TABLET, EXTENDED RELEASE ORAL at 10:01

## 2020-01-01 RX ADMIN — SODIUM CHLORIDE, PRESERVATIVE FREE 10 ML: 5 INJECTION INTRAVENOUS at 20:13

## 2020-01-01 RX ADMIN — LEVOTHYROXINE SODIUM 75 MCG: 75 TABLET ORAL at 06:53

## 2020-01-01 RX ADMIN — ERGOCALCIFEROL 50000 UNITS: 1.25 CAPSULE ORAL at 17:26

## 2020-01-01 RX ADMIN — SODIUM CHLORIDE 100 ML/HR: 9 INJECTION, SOLUTION INTRAVENOUS at 13:56

## 2020-01-01 RX ADMIN — EPOETIN ALFA-EPBX 22000 UNITS: 40000 INJECTION, SOLUTION INTRAVENOUS; SUBCUTANEOUS at 10:59

## 2020-01-01 RX ADMIN — SODIUM BICARBONATE 650 MG: 650 TABLET ORAL at 09:03

## 2020-01-01 RX ADMIN — LORAZEPAM 2 MG: 2 INJECTION INTRAMUSCULAR; INTRAVENOUS at 01:13

## 2020-01-01 RX ADMIN — BORTEZOMIB 2.4 MG: 3.5 INJECTION, POWDER, LYOPHILIZED, FOR SOLUTION INTRAVENOUS; SUBCUTANEOUS at 10:54

## 2020-01-01 RX ADMIN — SODIUM BICARBONATE 650 MG: 650 TABLET ORAL at 08:43

## 2020-01-01 RX ADMIN — SODIUM CHLORIDE, PRESERVATIVE FREE 10 ML: 5 INJECTION INTRAVENOUS at 08:41

## 2020-01-01 RX ADMIN — ACYCLOVIR 400 MG: 400 TABLET ORAL at 09:57

## 2020-01-01 RX ADMIN — ERGOCALCIFEROL 50000 UNITS: 1.25 CAPSULE ORAL at 18:25

## 2020-01-01 RX ADMIN — SODIUM CHLORIDE, PRESERVATIVE FREE 10 ML: 5 INJECTION INTRAVENOUS at 12:41

## 2020-01-01 RX ADMIN — LACOSAMIDE 200 MG: 50 TABLET, FILM COATED ORAL at 08:10

## 2020-01-01 RX ADMIN — SODIUM PHOSPHATE, MONOBASIC, MONOHYDRATE 20 MMOL: 276; 142 INJECTION, SOLUTION INTRAVENOUS at 09:07

## 2020-01-01 RX ADMIN — VANCOMYCIN HYDROCHLORIDE 1500 MG: 10 INJECTION, POWDER, LYOPHILIZED, FOR SOLUTION INTRAVENOUS at 15:14

## 2020-01-01 RX ADMIN — PANTOPRAZOLE SODIUM 40 MG: 40 TABLET, DELAYED RELEASE ORAL at 08:40

## 2020-01-01 RX ADMIN — LEVETIRACETAM INJECTION 500 MG: 5 INJECTION INTRAVENOUS at 01:44

## 2020-01-01 RX ADMIN — METOPROLOL SUCCINATE 25 MG: 25 TABLET, EXTENDED RELEASE ORAL at 08:06

## 2020-01-01 RX ADMIN — EPOETIN ALFA-EPBX 27000 UNITS: 40000 INJECTION, SOLUTION INTRAVENOUS; SUBCUTANEOUS at 10:01

## 2020-01-01 RX ADMIN — METOPROLOL SUCCINATE 25 MG: 25 TABLET, EXTENDED RELEASE ORAL at 22:35

## 2020-01-01 RX ADMIN — SODIUM BICARBONATE 650 MG: 650 TABLET ORAL at 08:06

## 2020-01-01 RX ADMIN — SODIUM BICARBONATE 650 MG: 650 TABLET ORAL at 21:03

## 2020-01-01 RX ADMIN — ISOSORBIDE MONONITRATE 30 MG: 30 TABLET ORAL at 18:16

## 2020-01-01 RX ADMIN — CARBAMAZEPINE 400 MG: 100 TABLET, EXTENDED RELEASE ORAL at 01:31

## 2020-01-01 RX ADMIN — ACETAMINOPHEN 650 MG: 325 TABLET, FILM COATED ORAL at 14:03

## 2020-01-01 RX ADMIN — DOXYCYCLINE 100 MG: 100 CAPSULE ORAL at 09:25

## 2020-01-01 RX ADMIN — LACOSAMIDE 200 MG: 50 TABLET, FILM COATED ORAL at 14:05

## 2020-01-01 RX ADMIN — MORPHINE SULFATE 4 MG: 4 INJECTION INTRAVENOUS at 20:29

## 2020-01-01 RX ADMIN — LEVOTHYROXINE SODIUM 75 MCG: 75 TABLET ORAL at 06:42

## 2020-01-01 RX ADMIN — GLYCOPYRROLATE 0.8 MG: 0.2 INJECTION, SOLUTION INTRAMUSCULAR; INTRAVITREAL at 17:21

## 2020-01-01 RX ADMIN — GLYCOPYRROLATE 0.8 MG: 0.2 INJECTION, SOLUTION INTRAMUSCULAR; INTRAVITREAL at 04:42

## 2020-01-01 RX ADMIN — ISOSORBIDE MONONITRATE 30 MG: 30 TABLET ORAL at 19:04

## 2020-01-01 RX ADMIN — SODIUM CHLORIDE, PRESERVATIVE FREE 10 ML: 5 INJECTION INTRAVENOUS at 13:09

## 2020-01-01 RX ADMIN — ISOSORBIDE MONONITRATE 30 MG: 30 TABLET ORAL at 17:27

## 2020-01-01 RX ADMIN — SODIUM CHLORIDE, PRESERVATIVE FREE 10 ML: 5 INJECTION INTRAVENOUS at 10:38

## 2020-01-01 RX ADMIN — MORPHINE SULFATE 4 MG: 4 INJECTION INTRAVENOUS at 22:31

## 2020-01-01 RX ADMIN — POTASSIUM CHLORIDE 10 MEQ: 10 INJECTION, SOLUTION INTRAVENOUS at 20:25

## 2020-01-01 RX ADMIN — IPRATROPIUM BROMIDE AND ALBUTEROL SULFATE 3 ML: 2.5; .5 SOLUTION RESPIRATORY (INHALATION) at 20:40

## 2020-01-01 RX ADMIN — PANTOPRAZOLE SODIUM 40 MG: 40 TABLET, DELAYED RELEASE ORAL at 22:35

## 2020-01-01 RX ADMIN — LORAZEPAM 2 MG: 2 INJECTION INTRAMUSCULAR; INTRAVENOUS at 12:35

## 2020-01-02 NOTE — NURSING NOTE
Labs reviewed with Merlyn AJ  And ok to treat today. Patient has no complaints of any bleeding and instructed to call the office for any signs of bleeding . Verbalized understanding.

## 2020-01-09 NOTE — PROGRESS NOTES
REASONS FOR FOLLOW UP:   1. IgG kappa multiple myeloma, was on Darzalex, started on May 26, 2016. Patient was switched to Darzalex from Pomalyst, as she continued to be neutropenic with recurrent urinary tract infection while on Pomalyst.    2. Zometa is on hold due to renal insufficiency.    3. After 16 doses of Darzalex, laboratory study showed stable disease in November 2016. Insurance company denied adding Velcade and dexamethasone. Patient is to be continued on monthly Darzalex from 12/06/16.   4. Obstructive right pyelonephritis with positive urine culture for E. coli, required hospitalization from 1/19/2017 through 01/24/2017 with iv antibiotics and stent exchange on 01/20/2017.   5.  Paraprotein studies on March 27, 2017 showed further slight improvement of multiple myeloma.   6.  Febrile illness, with urinary tract infection and influenza B infection in early May 2017, required hospitalization for 6 days.   7.  Paraprotein studies for both serum and 24-hour urine sample on 7/21/2017 showed further improvement of paraproteins.   Darzalex was continued.   8.  Serum protein study reported stable disease on 10/20/2017.  Darzalex will be continued.   9.  Laboratory studies of both serum paraprotein and a 24-hour urine protein in January 2018 showed further improvement of paraproteins.  Monthly Darzalex will be continued.   10. Repair of left flank incisional hernia in middle September 2018.   11. Serum paraprotein study reached micaela on 7/5/2018.  Since that time she has evidence of disease progression.   12. Disease progression, she was started on chemotherapy with bendamustine plus Velcade plus dexamethasone per protocol on 11/29/2018. Treatment will be bendamustine on day 1 and day 4 repeat every 28 days, Velcade and dexamethasone will be on day 1 day 4 and day 8 and day 15 repeat every 28 days.   13.  From cycle #2, patient was given only 1 dose of Treanda per cycle and continue Velcade and dexamethasone  weekly.   14.  Treanda was decreased by 20% after cycle #4 because of severe thrombocytopenia, and further decreased by another 25% on cycle #6 which is her last cycle on 4/25/2019.   15.  On 5/23/2019, patient was switched to Empliciti plus Velcade and decadron.    16.  Patient has persistent severe thrombocytopenia, no improvement after starting new regimen.    17. The patient underwent a bone marrow biopsy on 08/12/2019 and the pathology report showed hypercellular bone marrow with involvement by >85%, plasma cell myeloma and no metastatic carcinoma, granulomas, vasculitis, viral inclusions, increase in myeloblasts, or malignant lymphoma.   18. On 08/30/2019, discussed with patient to switch from Empliciti/Velcade/Dex to oral ixazomib/Dex 3 weeks on and 1 week off. She is to start the medication at decreased dose 2.3 mg weekly starting on 9/5/2019.   19.  Laboratory studies on 10/24/2019 revealed disease progression. Therefore therapy was transitioned to Darzalex and subq Velcade with oral Decadron. Patient initiated chemotherapy treatment on 11/7/2019.    20.  Persistent severe thrombocytopenia started in February 2019 secondary to chemotherapy and multiple myeloma disease progression.  20.  Symptomatic anemia Hb 7.7 on 12/12/2019.  Patient was given 2 units PRBC transfusion.      HISTORY OF PRESENT ILLNESS:   The patient is a 69 y.o. female with the above-mentioned history here today for cycle #4 day 1 of Darzalex and Velcade. She also continues on weekly Procrit.     Overall, she continues to tolerate treatment quite well.  She is eating and drinking adequately.  Bowels and urination have been regular.  Of note, patient did require ureteral stent replacement on November 21, 2019 secondary to urinary tract infection.  She has not had any recurrent issues with UTI since that time.  She denies any significant issues with upper or lower extremity neuropathies.    Labs today reveal a total white blood cell count of  8.2, ANC of 4.76, hemoglobin of 8.2, and platelets of 21,000.  The patient at this time denies any shortness of breath or chest pain.  No excess bleeding or bruising noted.  Her calcium is low at yet again at 7.8 with an albumin of 2.5.  We will continue to monitor these labs closely and will proceed with treatment as scheduled.      Past Medical History:   Diagnosis Date   • Anemia 10/14/2008   • Arthropathy of knee 01/07/2014   • Breast cancer (CMS/HCC) 04/2012   • Bursitis of left hip 10/18/2007   • Bursitis, knee 12/02/2013   • Calcaneal spur 08/12/2009   • Chronic kidney disease, stage III (moderate) (CMS/HCC)    • Colon cancer screening 07/01/2017   • Congestive heart failure (CMS/HCC)    • Diverticulitis of colon 10/17/2007   • DVT (deep venous thrombosis) (CMS/[facility])    • Epigastric abdominal pain 4/8/2019   • Gastroesophageal reflux disease 4/8/2019   • H/O Diastolic dysfunction    • H/O Mitral regurgitation    • History of Clostridium difficile 04/01/2014   • History of echocardiogram 04/2014   • History of MRSA infection    • History of obesity    • History of transfusion    • Hydronephrosis    • Hypertension    • LLL pneumonia (CMS/HCC) 04/23/2009   • Malignant neoplasm of kidney (CMS/HCC) 12/2009   • Multiple myeloma (CMS/HCC) 04/2012   • Myocardial infarction (CMS/HCC)    • Neoplasm of uncertain behavior 10/12/2015   • Non-STEMI (non-ST elevated myocardial infarction) (CMS/HCC)    • Nonischemic cardiomyopathy (CMS/HCC)    • Pyelonephritis 03/2004   • Seizures (CMS/HCC) 10/17/2007   • Thrombocytopenia (CMS/HCC)    • UTI (urinary tract infection) 10/30/2014   • UTI (urinary tract infection) 03/28/2014   • Ventral hernia      Past Surgical History:   Procedure Laterality Date   • BONE MARROW BIOPSY N/A 04/11/2012   • BRAIN SURGERY  2005    Meningioma   • BRAIN SURGERY  1990    Tumor benign per patient   • BREAST BIOPSY Left 04/09/2012    Dr. Gregg May   • CARDIAC CATHETERIZATION Left 06/11/2012      Sudeep Haddad   • CARDIAC CATHETERIZATION N/A 08/15/2006    Dr. Brian Bhatt   • COLONOSCOPY N/A 8/30/2017    Procedure: COLONOSCOPY into cecum and TI with cold polypectomies;  Surgeon: Trudy Rivera MD;  Location: [facility] ENDOSCOPY;  Service:    • COLONOSCOPY W/ POLYPECTOMY N/A unknown    2 polyps, benign   • CYSTOSCOPY N/A 3/25/2016    Procedure: CYSTOSCOPY  WITH RIGHT STENT CHANGE;  Surgeon: Lakhwinder Russo MD;  Location: [facility] OR;  Service:    • CYSTOSCOPY N/A 03/10/2012    Cystoscopy, right retrograde, right double-J stent-Dr. Sloan May   • CYSTOSCOPY N/A 02/25/2012    Cystoscopy, stent removal, right retrograde pyelogram, replacement of right double-J ureteral stent-Dr. Michael Everett   • CYSTOSCOPY W/ URETERAL STENT PLACEMENT Right 5/7/2016    Procedure: CYSTOSCOPY, URETERAL CATHETER INSERTION AND RIGHT STENT EXCHANGE ;  Surgeon: Michael Everett Jr., MD;  Location: [facility] OR;  Service:    • CYSTOSCOPY W/ URETERAL STENT PLACEMENT N/A 1/20/2017    Procedure: CYSTOSCOPY RIGHT RETROGRADE PYELOGRAM, URETERAL STENT CHANGE;  Surgeon: Lakhwinder Russo MD;  Location: [facility] OR;  Service:    • CYSTOSCOPY W/ URETERAL STENT PLACEMENT N/A 04/02/2015    Cystoscopy, removal of right ureteral stent, right retrograde pyelogram, placement of right double-J ureteral stent-Dr. Michael Everett   • CYSTOSCOPY W/ URETERAL STENT PLACEMENT N/A 04/26/2015    Cystoscopy, removal of right ureteral stent, right retrograde pyelogram, replacement of right ureteral stent 24 cm x 7-Polish without retrieval line-Dr. Jose Gomez   • CYSTOSCOPY W/ URETERAL STENT PLACEMENT N/A 12/22/2014    Cystoscopy, removal of right double-J ureteral stent, right retrograde pyelogram, placement of right double-J ureteral stent-Dr. Michael Everett   • CYSTOSCOPY W/ URETERAL STENT PLACEMENT N/A 09/22/2014    Cystoscopy, removal of right ureteral stent, removal of right retrograde pyelogram, replacement of right double-J ureteral  stent-Dr. Michael Everett   • CYSTOSCOPY W/ URETERAL STENT PLACEMENT N/A 06/18/2014    Cystoscopy, removal of right double-J ureteral stent, right retrograde pyelogram, placement of right double-J ureteral stent-Dr. Michael Everett   • CYSTOSCOPY W/ URETERAL STENT PLACEMENT N/A 01/23/2014    Cystoscopy, removal of right double-J ureteral stent, right retrograde pyelogram, placement of right double-J ureteral stent-Dr. Michael Everett   • CYSTOSCOPY W/ URETERAL STENT PLACEMENT N/A 03/27/2012    Cystoscopy, removal of ureteral stent, right retrograde pyelogram, replacement of indewlling right ureteral stent-Dr. Michael Everett   • CYSTOSCOPY W/ URETERAL STENT PLACEMENT N/A 03/27/2013    Cystoscopy, right retrograde pyelogram, removal and replacement of right double-J ureteral stent-Dr. Michael Everett   • CYSTOSCOPY W/ URETERAL STENT PLACEMENT N/A 11/12/2012    Cystoscopy, stent removal, right retrograde pyelogram, replacement of right double-J ureteral stent-Dr. Michael Everett   • CYSTOSCOPY W/ URETERAL STENT PLACEMENT N/A 09/24/2011    Cystoscopy, removal of ureteral stent, right retrograde pyelogram and placement of right double-J ureteral stent-Dr. Michael Everett   • CYSTOSCOPY W/ URETERAL STENT PLACEMENT N/A 05/14/2011    Cystoscopy, removal of right ureteral stent, right retrograde pyelogram and placement of new right double-J ureteral stent-Dr. Michael Everett   • CYSTOSCOPY W/ URETERAL STENT PLACEMENT N/A 12/31/2010    Cystoscopy, stent removal, right retrograde pyelogram, replacement of 7 Macanese x 26 cm double-J stent-Dr. Michael Everett   • CYSTOSCOPY W/ URETERAL STENT PLACEMENT N/A 08/28/2010    Cystoscopy, stent removal, right retrograde pyelogram with interpretation, placement of right double-J ureteral stent-Dr. Michael Everett   • CYSTOSCOPY W/ URETERAL STENT PLACEMENT N/A 05/24/2010    Cystoscopy, right retrograde pyelogram, replacement of right double-J ureteral stent-Dr. Michael Everett   • CYSTOSCOPY W/ URETERAL STENT PLACEMENT N/A  02/12/2010    Cystoscopy, right retrograde pyelogram and placement of right double-J ureteral stent-Dr. Michael Everett   • CYSTOSCOPY W/ URETERAL STENT PLACEMENT N/A 02/23/2009    Cystoscopy, right retrograde pyelogram, placement of right double-J ureteral stent-Dr. Michael Everett   • CYSTOSCOPY W/ URETERAL STENT PLACEMENT N/A 09/17/2007    Dr. Michael Everett   • CYSTOSCOPY W/ URETERAL STENT PLACEMENT Right 01/29/2018    Dr. Michael Everett   • CYSTOSCOPY W/ URETERAL STENT PLACEMENT N/A 06/04/2018    Cystoscopy, removal of right double-J ureteral stent and placement of right double-J ureteral stent. Dr. Michael Everett.   • CYSTOSCOPY W/ URETERAL STENT PLACEMENT N/A 03/06/2018    Cystoscopy, removal of right ureteral stent, replacement of right double-J ureteral stent. Dr. Michael Everett   • ELBOW PROCEDURE Bilateral 1990s   • ENDOSCOPY N/A 5/13/2019    Procedure: ESOPHAGOGASTRODUODENOSCOPY WITH BIOPSIES;  Surgeon: Trudy Rivera MD;  Location: [facility] ENDOSCOPY;  Service: General   • HERNIA REPAIR  09/03/2018   • LASIK Bilateral    • MASTECTOMY Left 04/25/2012    Left total mastectomy with sentinel lymph node biopsies and left axillary lymphatic mapping-Dr. Gregg May   • MEDIPORT INSERTION, DOUBLE Right    • NEPHRECTOMY Left 12/30/2009    Dr. 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 Cristo Pittman   • TOTAL ABDOMINAL HYSTERECTOMY Bilateral 2007    Dr. Todd   • URETEROURETEROSTOMY Right 05/01/2006    Dr. Michael Everett   • VENA CAVA FILTER INSERTION N/A 05/08/2006    Dr. Jordon Barber   • VENOUS ACCESS DEVICE (PORT) INSERTION Right 02/25/2013    Right subclavian vein PowerPort insertion-Dr. Gregg May   • VENOUS ACCESS DEVICE (PORT) INSERTION AND REMOVAL N/A 10/06/2014    Revision of right internal jugular PowerPort with fluoroscopy,  removal of peripherally inserted central catheter line-Dr. Aurora Tomlinson   • VENOUS ACCESS DEVICE (PORT) INSERTION AND REMOVAL N/A 08/14/2014    Ultrasound-guided access right internal jugular vein, PowerPort placement, removal of right subclavian port-Dr. Jordon Barber   • VENTRAL/INCISIONAL HERNIA REPAIR Left 9/19/2018    Procedure: ventral hernia repair with mesh and rectus muscle advancement flap;  Surgeon: Trudy Rivera MD;  Location: [facility];  Service: General         Hematology/oncology History: See separate document for extra information.   1.    History of left breast cancer, status post left mastectomy on 04/25/2013, stage II, T2N0M0, hormone negative, HER2/mk positive by immunohistochemistry, however, no adjuvant chemotherapy delivered because of multiple myeloma diagnosed concomitantly.        2. Multiple myeloma, IgG lambda, stage III, diagnosed April 2012, previously treated with Velcade/Decadron followed by Velcade/Decadron/Revlimid; patient developed abdominal pain and rectal bleeding on Revlimid, which was then discontinued.    3. Patient evaluated at [facility], but was not felt to be a candidate for stem cell transplant.      4.   Disease progression with therapy switched to melphalan/Velcade/prednisone per the [facility] trial on 10/22/2013; melphalan dose has been persistently decreased because of thrombocytopenia.      5. Anemia secondary to chronic renal insufficiency and myeloma, on periodic Procrit therapy p.r.n.    6. Patient had 8 cycles of VMP chemotherapy, repeat laboratory studies on 10/03/2013 showed stable disease.  Her melphalan do  se was significantly decreased due to marrow suppression.      7.   Patient had disease progression after cycle #10 VMP treatment, evidenced by laboratory studies on 01/06/2014.  We increased the melphalan dose for one cycle.  Continued disease progress after cycl  e #11 VMP treatment, despite increased dose of  melphalan, as documented by laboratory study on 02/17/2014.     8. The patient was evaluated on 02/24/2014 and we recommend switching chemotherapy to Kyprolis on 02/27/2014.     9. Echocardiogram on 03/05/2014 reporting L  VEF 43%.  This is on par with her previous twice echocardiogram study, in June 2012 and November 2012, reported LVEF at 45% to 50% and 40% to 45% respectively.     10. The patient had 2 episodes of chest pain after Kyprolis during cycle 1, leading to omitted dose   on days 9 and 16.  From cycle 2, we will give Kyprolis only once per week for 3 weeks out of every 4 weeks, as well as dose reduction of Kyprolis that was started on 04/04/2014.      11. After cycle 2 of dose-reduced Kyprolis, the patient's M spike increased fro  m 2.6 to 3.2 g/dL, with her IgG level increasing from 2.7 g/dL to 4.7 g/dL.     12.   Patient had a stress test and echocardiogram study at [facility] on 01/23/2015.  The patient had LVEF 35% to 40%.  Myocardial perfusion study reported a large, mild to modera  te severity fixed inferior wall defect, consistent with known transmural infarct versus diaphragmatic attenuation artifact.  Post stress resting ejection fraction estimated at 31%.      13. Repeated echocardiogram study on 04/03/2015 reported an LVEF 46%.  Left v  entricle is moderately dilated.  There is mild global hypokinesis of the left ventricle.  There was grade 1 diastolic dysfunction.    14.   The patient was seen on 04/09/2015 with plan for cycle 14, day 1, of Kyprolis.  Treatment will be delayed 1 week secondary to patient'  s extreme fatigue, hemoglobin of 8.1.  We will proceed with 2 units of packed red blood cells.  She was also found to have a urinary tract infection. Patient prescribed Cipro 500 mg twice a day x7.     15. Laboratory tests on 08/24/2015 reported sligh  t disease progress after cycle #18 Kyprolis. We discussed with patient and we will switch chemotherapy to CyBorD regimen with dose  reduction of Velcade to 1 mg/m2, cyclophosphamide at 240 mg/m2 (total dose 450 mg), and dexamethasone 20 mg. All therapy to   be repeated on a weekly basis.     16.   The patient had significant fatigue after one dose of cyclophosphamide that lasted for 5-6 days. She also had severe anemia, hemoglobin 7.6, required 2 units PRBC transfusion. Cyclophosphamide will be decreased to 350 mg from 2nd week.     17.  Patient continues to have significant fatigue after the reduced dose of cyclophosphamide at 350 mg; for 2 days after chemo, she could only lie on the bed with performance status ECOG 3. From 10/13/2015, oral cyclophosphamide will be further dec  reased to 200 mg once weekly. We will continue Velcade and dexamethasone at same dose.   Evidence of disease progress, when she was on panobinostat treatment.   18. The patient also has worsening ane  ming and thrombocytopenia secondary to chemotherapy. The patient has symptomatic anemia with hemoglobin 7.8 on 02/23/2016 requiring 2 units of packed RBC transfusion. Chemotherapy with panobinostat will be discontinued.       19. Patient was switched to pomalidomide 3 mg daily for 21 days / 28 days in late March 2016.    20. Disease progression, she was switched to to the Darzalex in May 2016.       On12/6/2016, serum free lambda chain 1090 MG/L, free kappa chain 8.5 to MG/L.  Ferritin 1015, iron 99, TIBC 137 iron saturation 72%.     On January 4, 2017, vitamin B12 level 1289, folate 7.7 ng/mL. SPEP reporting gamma globulin 3.3, out of total globulin 5.0, with immunofixation reporting small quantity of monoclonal free lambda chain, plus monoclonal IgG lambda at 3.2 g/dL.  Serum IgG 4075, IgA 9 and IgM 15.  free lambda chain 1339 MG/L and free kappa chain 10.3 MG/L.      Laboratory study 3/27/2017 showed slight improvement of paraprotein, SPEP reporting M spike 2.8 g/DL, out of total globulin 4.3, and the serum IgG 3420, IgA 9, IgM 11, free lambda chain 1218 MG/L and free  kappa chain 8.0 MG/L.  Total 24 hour urine sample reported at free lambda chain 142 mg, at a concentration 74.8 MG/L, free kappa chain 75 mg at a concentration 39.5 MG/L., Urine protein immunofixation reporting biclonal IgG lambda and monoclonal free lambda light chain, M spike #1 at 41.5% and monoclonal IgG lambda spike #2 at 10.5%. Serum beta-2 microglobulin is 7.3 MG/L.     Her laboratory study on 7/21/2017 reported further improvement of paraprotein is both serum and a 24-hour urine sample.  SPEP reporting monoclonal IgG lambda 2.9 g/dL and free lambda chain 0.1 g/dL.  Serum IgG was 3261 mg/dL and IgA 5, IgM 13, free kappa chain 6.7, free lambda chain 701.3 with kappa/lambda ratio 0.01.  24-hour urine sample reported positive for Bence May protein lambda type, immunofixation positive for IgG lambda.  Total urine protein 241 mg, gamma globulin 13.1%.  Hemoglobin was 11.4 .4, platelets 122,000, WBC 6680 including neutrophils 3600, lymphocytes 2100 and monocytes 650.  Her creatinine was 1.68, at baseline level.  Liver function panel unremarkable.  Darzalex was continued.    Laboratory study on 8/25/2017 showed improved the platelets at 144,000, normal WBC 6660 and slightly improved hemoglobin at 11.7.     Patient had a colonoscopy examination on 8/30/2017 by Dr. Rivera.  It reported diverticulosis in multiple areas more severe in the sigmoid colon.  There was 2 polyps 4 mm pneumonia from transverse colon and the sigmoid colon.  Pathology evaluation reported tubular adenoma from the sigmoid colon polyp, and benign hyperplastic polyp from transverse colon.    Laboratory study on 10/20/2017 reported slightly improved monoclonal spike 2.7 g/dL by SPEP, immunofixation reported positive monoclonal IgG lambda, serum IgG 3536 mg/dL, IgA less than 5 and IgM 11, free kappa chain 5.3 mg/L, and free lambda chain 720 mg/L with kappa/lambda ratio 0.01.  Serum beta-2 microglobulin was 6.5 mg/L.   Her CBC showed a stable  mild anemia with hemoglobin 11.3, macrocytosis .3, and the platelets 114,000, normal WBC 7070 including neutrophils 4100 lymphocytes 2000 monocytes 650, normal liver function panel, total protein 7.9 and albumin 3.2,  and normal electrolytes including calcium 8.1, and improved creatinine 1.59.     Laboratory study on 1/12/2018 reported serum IgG 3083 mg/dL, IgA 10, IgM 10, free kappa chain 8.5 and free lambda chain 589.1 mg/L, kappa/lambda ratio 0.01, serum protein admitted fixation reported IgG lambda monoclonal protein and SPEP reporting M spike 3.1 g/dL, beta-2 microgram and 7.4 mg/L. serum creatinine 1.79, calcium 8.2, other electrolytes normal, hemoglobin 11.3, .5 and MCHC 31.6, platelets 129,000, WBC 6780 including neutrophils 3500 lymphocytes 2300.  Serum ferritin 653.7 ng/mL, iron 123 TIBC 174 iron saturation 71%, folic acid 12.8 ng/mL and a vitamin B12 at 873 pg/mL.  Her 24-hour urine study on 1/18/2018 reported lambda chain 32.8 mg/L, total 61 mg in 24 hours, and the free kappa chain 27.4 mg/L and 51 mg in 24 hours, and the total 24-hour urine protein 283 mg, and urine protein immunofixation positive for 5.3% monoclonal IgG lambda and positive for Bence May protein at 36.2% monoclonal lambda chain.  Monthly Darzalex was continued.       This patient had serum protein study on 04/06/2018 which reported free light chain at concentration 703.8 mg/L and free kappa chain 7.0, free kappa/lambda ratio 0.01, serum IgG 3650 mg/dL, IgM 11 and IgA 9 with serum protein electrophoresis reporting monoclonal IgG lambda 3.2 g/dL and also monoclonal free lambda light chain.  But it was unable to quantify monoclonal lambda light chain because of the small quantity of it.     A 24-hour urine study on 04/20/2018 reported total free lambda chain 60 mg in 24 hours at concentration 33.1 mg/L, free kappa chain 45 mg in 24 hours at concentration 24.8 mg/L. Total 24-hour urine protein was 279 mg. Urine protein  immunofixation and UPEP reported lambda-type Bence-May protein + #1 monoclonal IgG lambda band at 34.8% and #2 monoclonal IgG lambda band at 6.9%.     Her CBC results today reported a stable hemoglobin at 11.1, platelets 130,000 and WBC 7440 including neutrophils 4100 and lymphocytes 2350, monocytes 650. Her CMP reported slightly worsened creatinine at 1.82 with BUN 33. Total protein at 8.8. Liver function panel was normal. Total serum protein 8.8 and albumin 3.2, globulin 5.6.    Reviewing her previous lab studies especially serum paraprotein, she reached a micaela of response on 07/05/2018 when she had serum IgG 3015 mg/dL, free lambda chain 458.7 mg/L and M spike IgG lambda 2.6 g/dL and and monoclonal lambda free light chain 0.1 g/dL, total 2.7 g/dL. serum beta-2 myoglobin 7.1 mg/L.  Her ferritin was 539 ng/mL, free iron 73 TIBC 176 iron saturation 42%.    Laboratory study obtained on 11/01/2018 showed further disease progression. Her serum IgG was 5472 mg/dL, IgA 8 and IgM 10, serum kappa chain 7.9 mg/L, free lambda chain 961.3 mg/L and kappa/lambda ratio 0.01. Serum protein electrophoresis reported monoclonal IgG lambda 4.1 g/dL, and monoclonal lambda free light chain 0.1 g/dL. Her hemoglobin was 9.0, .6, MCHC 30.1, platelets 124,000 and WBC 10,000 including neutrophils 7400, lymphocytes 1200, monocytes 600.     Cycle #1 day #1 Bendamustine will be started on 11/29/2018.     Laboratory studies on 01/17/2019 reported improved paraproteins.  SPEP reported M spike 3.8 g/dL out of total 5.5 g/dL globulin.  Serum IgG was 4374 mg/dL, IgA 10 and IgM 11.  Free lambda chain 606.8 mg/L, free kappa chain 8.6 and kappa/lambda ratio 0.01.  Her serum protein immunofixation continues to be IgG lambda monoclonal.  Her CBC showed hemoglobin 9.3, .3, platelets 50,000 and WBC 8600 including ANC 5100, lymphocytes 2160.  Her Chemistry lab reported creatinine 1.92, calcium 8.3, normal liver function panel, glucose 98  with normal sodium and potassium.     For her 24-hour urine study on 3/14/2019, she had free lambda chain at a 62.1 mg/L, total 87 mg in 24 hours, free kappa chain 42 mg/L and 59 mg in 24 hours.  Urine protein immunofixation reported a monoclonal IgG lambda #1 and free lambda chain were unable to be quantified, however IgG lambda #2 was 12.8%.  Her 24-hour urine total protein was 452 mg.     For serum protein study on 3/28/2019 (on day of her cycle #5 day #1 Treanda ) also reported further decreased monoclonal spike 3.2 mg/dL, and serum IgG 3600, IgA 13 IgM 12, free kappa chain 11.7, and worsening free lambda chain 1045 mg/L.     Her serum paraprotein studies on 4/11/2019 reported a serum IgG 4407 mg/dL, IgA 14, IgM 13, free kappa chain 9.2, free lambda chain 998.4 mg/L, kappa/lambda ratio 0.01.  His serum protein immunofixation was positive for IgG lambda, SPEP reporting M spike 4.3 g/dL.    Her 24-hour urine study on 7/18/2019 reported positive IgG lambda monoclonal protein and lambda type Bence-May protein, total free kappa chain 142 mg in 24 hours, at 74.6 mg/L, and total lambda chain 179 mg in 24 hours at 94.1 mg/L.  Kappa/lambda ratio 0.79.  Her total 24-hour urine protein was 410 mg, with monoclonal lambda free light chain 41.8% and monoclonal IgG lambda 2.6%.     Serum paraprotein studies on 7/24/2019 reported monoclonal spike 3.9 g/dL, serum IgG 4107, IgA 14 IgM 19, free kappa chain 12.8 and free lambda chain 656, kappa/lambda ratio 0.02.    On 08/01/2019 she has severe thrombocytopenia platelets 27,000.  She has elevated WBC to 12,600 including ANC 8900 and normal lymphocytes 2000.  Her hemoglobin is 10.4 08/01/2019. The patient underwent a bone marrow biopsy on 08/12/2019 and the pathology report showed hypercellular bone marrow with involvement by plasma cell myeloma and no metastatic carcinoma, granulomas, vasculitis, viral inclusions, increase in myeloblasts, or malignant lymphoma. Over 85% of the  bone marrow are plasma cells. Normal karyotype. Flow cytometry study was positive.    Because of her persistent severe thrombocytopenia, the patient underwent a bone marrow biopsy on 08/12/2019.  Pathology evaluation showed hypercellular bone marrow with involvement by plasma cell myeloma 85-90% and no metastatic carcinoma, granulomas, vasculitis, viral inclusions, increase in myeloblasts, or malignant lymphoma.  Normal karyotype.     Recent laboratory study on 8/15/2019 reported of elevated ferritin 524, normal iron saturation 42% with free iron 59 and a TIBC 140.  Normal thyroid function profile.  Her serum globulin 7.3, total protein 10.1 and albumin 2.8, calcium 8.1 creatinine 1.99.    She does continue to have severe intermittent pain in her right hip which is managed somewhat with pain medication, including tylenol and prescription Norco. She states she has difficulty ambulating and functioning normally when this pain occurs. She continues on oral Vitamin B-12 and folic acid supplementation.     Recent XR right hip with or without pelvis on 08/15/2019 showed sclerosis at the pubic symphysis. No other bony or articular abnormality was identified. The hip joints were symmetric and appeared within normal limits. The joint spaces were fairly well-maintained. There was no bony erosion. There was no evidence of recent or old fracture or subluxation. A right ureteral stent was noted.     On 08/30/2019, switched treatment from Empliciti to oral ixazomib 3 weeks on and 1 week off. She is expected to start the medication on 9/5/2019 when this medication arrives.  Patient will continue dexamethasone weekly at the same time as part of the treatment.  Because of her severe thrombocytopenia, we recommended starting low-dose ixazomib 2.3 mg weekly.  Treatment was started on 9/5/2019.     Laboratory study on 9/26/2019 showed worsening leukocytosis with WBC 20,780 including ANC 16,000, lymphocytes 2700 and monocytes 8070.  She  also has worsening severe thrombocytopenia with platelets 27,000, and stable hemoglobin 9.5.     Suspect the patient may have some kind of infection however she denies fever sweating chills no dysuria or hematuria.  She denies pain in the right flank area where she has ureteral stent and oftentimes complains of pain when she has urinary tract infection.      I searched medical records, she had replacement of stent 3 months ago.  I recommended urinalysis and culture on 9/26/2019 for further evaluation despite that she has no symptoms.I called and spoke to patient about her urinalysis which showed a large quantity of bacteria and WBC.  I e-scribed cefdinir to her pharmacy, 300 mg twice a day for 7 days. I called the patient today, reporting her urine culture results with multidrug sensitive E. coli.  She will continue cefdinir as we prescribed.  Should be sensitive according to the culture results.I think this patient will need to follow-up with her urologist Dr. Everett for stent replacement.  I sent a message to Dr. Everett.    On 10/17/2019, patient also reports she has intermittent chest pain/epigastric area/stomach pain for the past several weeks, to the point that she thought she was not able to make it.  Patient reports she had stress test this week and was told to having nothing wrong.  Patient also reports this is not related to her eating.  No apparent effect of exacerbation or relieving.      Unfortunately laboratory study on 10/24/2019 reported significant disease progression, he had a M spike 5.1 g/dL by SPEP, and a serum IgG 7280 mg/dL, IgA 16, IgM 17, free kappa chain 10.2 and free lambda chain 1309.5 mg/L, with kappa/lambda ratio 0.01.  Her beta-2 myoglobin was 16.2 mg/L.  Chemistry lab reported total serum protein 11 g, albumin 2.7 and globulin 8.3.  Her liver function panel was normal, creatinine 1.90 and normal calcium and other electrolytes.  Continues to have anemia Hb 10.0, platelets 26,000 and WBC  19,180 including ANC 13,230 lymphocytes 3070 monocytes 1920 and eosinophil 770.  Her iron study reported ferritin 455.4 ng/dL, free iron 61, TIBC 155 and iron saturation 39%, with folic acid at 7.3 ng/mL and vitamin B12 at 1800 pg/mL.    Patient initiated chemotherapy treatment with darzalex/velcade/dexamethasone on 11/7/2019.    Laboratory study 12/12/2019 showed deteriorating hemoglobin 7.7.  She also has stable but severe thrombocytopenia with platelets 24,000.  Her WBC is normal at 5230 including ANC 2670.  Weekly Procrit was continued.  Patient was given 2 units PRBC transfusion.          Review of Systems   Constitutional: Positive for fatigue (improved). Negative for appetite change, chills, diaphoresis, fever and unexpected weight change.   HENT:   Negative for mouth sores, nosebleeds and sore throat.    Eyes: Negative for eye problems and icterus.   Respiratory: Negative for cough and shortness of breath.    Cardiovascular: Negative for chest pain, leg swelling and palpitations.   Gastrointestinal: Negative for abdominal pain, blood in stool, diarrhea and nausea.   Genitourinary: Negative for dysuria, frequency and hematuria.    Musculoskeletal: Positive for arthralgias (Right hip pain stable). Negative for back pain, flank pain, gait problem and myalgias.   Skin: Negative for itching and rash.   Neurological: Positive for extremity weakness (stable). Negative for dizziness, gait problem, headaches, light-headedness and numbness.   Hematological: Negative for adenopathy. Does not bruise/bleed easily.   Psychiatric/Behavioral: Negative for depression. The patient is not nervous/anxious.    All other systems reviewed and are negative.  Review of systems unchanged from previous  Medications: The current medication list was reviewed in the EMR.         Allergies   Allergen Reactions   • Sulfa Antibiotics Swelling   • Zosyn [Piperacillin Sod-Tazobactam So] Swelling     Face and lips       VITALS:   There were no  vitals filed for this visit.PS: ECOG 1         Physical Exam   Constitutional: She is oriented to person, place, and time. She appears well-developed and well-nourished. No distress.   HENT:   Head: Normocephalic and atraumatic.   Eyes: Conjunctivae and EOM are normal. Pupils are equal, round, and reactive to light.   Neck: No masses.  Cardiovascular: Normal rate, regular rhythm and normal heart sounds.  Exam reveals no friction rub.    No murmur heard.  Pulmonary/Chest: Effort normal and breath sounds normal. She has no wheezes.   Abdominal: Soft.  No tender.  Bowel sounds are normal.   Lymphadenopathy:     She has no cervical adenopathy.   Neurological: She is alert and oriented to person, place, and time.   Skin: Skin is warm and dry.   Psychiatric: She has a normal mood and affect. Thought content normal.   Physical exam unchanged from previous    Recent lab results:   Results from last 7 days   Lab Units 01/02/20  0900   WBC 10*3/mm3 8.45   NEUTROS ABS 10*3/mm3 4.62   HEMOGLOBIN g/dL 9.0*   HEMATOCRIT % 29.9*   PLATELETS 10*3/mm3 25*      Glucose   Date Value Ref Range Status   01/02/2020 116 (H) 65 - 99 mg/dL Final     BUN   Date Value Ref Range Status   01/02/2020 24 (H) 8 - 23 mg/dL Final   10/18/2019 26 (H) 7 - 20 mg/dL Final     Creatinine   Date Value Ref Range Status   01/02/2020 1.92 (H) 0.57 - 1.00 mg/dL Final   10/18/2019 2.2 (H) 0.7 - 1.5 mg/dL Final     Sodium   Date Value Ref Range Status   01/02/2020 135 (L) 136 - 145 mmol/L Final   10/18/2019 144 137 - 145 mmol/L Final     Potassium   Date Value Ref Range Status   01/02/2020 3.8 3.5 - 5.2 mmol/L Final   10/18/2019 3.9 3.5 - 5.1 mmol/L Final     Chloride   Date Value Ref Range Status   01/02/2020 107 98 - 107 mmol/L Final   10/18/2019 122 (H) 98 - 107 mmol/L Final     CO2   Date Value Ref Range Status   01/02/2020 20.5 (L) 22.0 - 29.0 mmol/L Final     Total CO2   Date Value Ref Range Status   10/18/2019 20 (L) 22 - 30 mmol/L Final     Calcium    Date Value Ref Range Status   01/02/2020 8.6 8.6 - 10.5 mg/dL Final   10/18/2019 8.5 8.4 - 10.2 mg/dL Final     Total Protein   Date Value Ref Range Status   01/02/2020 11.8 (H) 6.0 - 8.5 g/dL Final     Albumin   Date Value Ref Range Status   01/02/2020 2.50 (L) 3.50 - 5.20 g/dL Final     ALT (SGPT)   Date Value Ref Range Status   01/02/2020 9 1 - 33 U/L Final     AST (SGOT)   Date Value Ref Range Status   01/02/2020 13 1 - 32 U/L Final     Alkaline Phosphatase   Date Value Ref Range Status   01/02/2020 42 39 - 117 U/L Final     Total Bilirubin   Date Value Ref Range Status   01/02/2020 0.4 0.1 - 1.2 mg/dL Final     eGFR Non  Amer   Date Value Ref Range Status   08/30/2016  >60 mL/min/1.73 Final     Comment:     <15 Indicative of kidney failure.     A/G Ratio   Date Value Ref Range Status   10/24/2019 0.5 (L) 0.7 - 1.7 Final     BUN/Creatinine Ratio   Date Value Ref Range Status   01/02/2020 12.5 7.0 - 25.0 Final   10/18/2019 11.8 RATIO Final     Anion Gap   Date Value Ref Range Status   01/02/2020 7.5 5.0 - 15.0 mmol/L Final       Lab Results   Component Value Date    IRON 61 10/24/2019    TIBC 155 (L) 10/24/2019    FERRITIN 455.40 (H) 10/24/2019     Lab Results   Component Value Date    FOLATE 7.34 10/24/2019     Lab Results   Component Value Date    PDDBBRMP85 1,808 (H) 10/24/2019       Assessment/Plan   1. Multiple myeloma, IgG lambda, stage III. Failed multiple lines of therapy. Her treatment was switched to Darzalex on 5/26/2016. She was on this treatment for more than 2 years.   · In November 2018, she clearly had disease progression.  Darzalex was discontinued and was started on bendamustine plus Velcade plus dexamethasone.   · Cycle #1 day #1 Bendamustine was started on 11/29/2018.  Due to poor tolerance with profound fatigue, chemotherapy was modified with bendamustine only given on day 1, and a Velcade weekly, every 4 weeks as 1 cycle.  Patient had a cycle #6 dose reduction of bendamustine by 25%  because of severe thrombocytopenia.   · This patient actually had a micaela of response on 3/28/2019 with M spike 3.2 g/dL and serum IgG 3600.  After that M spike and serum IgG both were elevating.   · Laboratory study obtained on 5/9/2019 after 6 cycles chemotherapy, it showed disease further progressed with worsening level of serum IgG 4873 mg/dL and M spike 4.3 g/dL.    · On 5/23/2019, patient was switched to Empliciti plus Velcade and decadron.    · Her serum protein studies reported persistent active disease, not well controlled with large quantity of monoclonal spike.  She also has severe thrombocytopenia oftentimes leading to hold Velcade injection.  · Bone marrow aspiration biopsy for reanalysis recommended.  This patient also has a history of breast cancer so we need to be open-minded.  She had bone marrow aspirate biopsy on 8/12/2019 which showed more than 85% of bone marrow cells are malignant plasma cells.  There is no evidence of metastatic disease or other type of malignancy.  · Recommended switching treatment to Ixazomib plus dexamethasone.  Considering her pre-existing severe thrombocytopenia, I recommended starting low-dose Ixazomib 2.3 mg weekly instead of full dose 4 mg weekly.   · She started new treatment on 9/5/2019 with dexamethasone 20 mg weekly.  oral ixazomib/Dex 3 weeks on and 1 week off. She is to start the ixazomib at decreased dose 2.3 mg weekly starting on 9/5/2019.   · Patient has significant disease progression on 10/24/2019 with serum increased M spike, serum IgG and free lambda chain.  · On 10/31/2019, I discuessed options with the patient and suggested Darzalex/Velcade/dexamethasone as it would be easy on her bone marrow. Patient initiated Cycle #1 treatment on 11/7/2019.  · The patient returns today, January 9, 2020 for consideration of cycle #4 of Darzalex, Velcade, and dexamethasone.  She continues to tolerate treatment quite well.  We will obtain repeat paraprotein studies  today.    2.  Severe thrombocytopenia.  This is more likely secondary to her multiple myeloma, based on her recent bone marrow aspiration biopsy on 8/12/2019. patient has no easy bleeding or bruising.   · Patient was last transfused with platelets on May 13, 2019 prior to her EGD.  · The patient underwent a bone marrow biopsy on 08/12/2019 and the pathology report showed hypercellular bone marrow with involvement by plasma cell myeloma and no metastatic carcinoma, granulomas, vasculitis, viral inclusions, increase in myeloblasts, or malignant lymphoma. Over 80% of the bone marrow are plasma cells. Normal karyotype.   · Laboratory study on 8/30/2019 showed platelets of 34,000. We switched her treatment to oral ixazomib 3 weeks on and 1 week off starting 9/05/19.    · Platelets today are 21,000.  No excess bleeding or bruising.  Continue to monitor.    3.  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    · Her laboratory study on 5/9/2019 showed no evidence of iron deficiency.  On 5/16/2019, B12 level of 1819 and normal folate of 5.84.  She was symptomatic anemic with Hb 8.5, she was transfused with 2 units of packed red blood cells.    · Recent labs on 8/15/2019 reported no evidence of iron deficiency, had normal iron saturation and excellent ferritin level.  · Laboratory study on 10/24/2019 reported no evidence of iron deficiency, nor B12 folic acid deficiency.  She will continue oral B12 supplementation.    · Her hemoglobin was 7.7 on 12/12/2019.  We continued weekly Retacrit.  Because patient was symptomatic, with worsening fatigue from her anemia, she was given 2 units PRBC transfusion.   · Hemoglobin 8.2. today.  Continue weekly Retacrit.  I have asked her to call our office if she becomes symptomatic with progressive fatigue or shortness of breath prior to her next visit.  We will arrange her for 2 units of packed red blood cells     4.  Dysuria/urgency of urination.  Urinalysis  reported large quantity of WBC and bacteria 11/13/2019.  Treated with Omnicef.  Symptoms have resolved.  She did have a stent replacement on 11/21/2019.   · Not a recurrent issue today    5. Zometa / Xgeva treatment.  Because of her kidney insufficiency, we were only able to give her Zometa every 3 months.  Due to her elevated creatinine level, we eventually switched her to Xgeva treatment and continued monthly.  · She had elevated phosphorus 5.6 on 6/6/2019.   · Patient received her last dose of monthly Xgeva on January 2, 2020.  This will be continued every 4 weeks  6. History of stage II left breast cancer, post mastectomy in 2013, negative for ER/OR and positive HER-2. No neoadjuvant chemotherapy because of concurrent discovery of multiple myeloma and ongoing chemotherapy. She had a normal mammogram study on 7/26/2019.       7.  Skin rashes on her extremities:  Patient was seen dermatologist Dr. Barroso, who prescribed steroids cream and also over-the-counter moisturizing cream.  Patient will follow-up with Dr. Barroso again.    Not an issue today    8. Profound fatigue: This is multifactorial secondary to her disease progression and her worsening anemia associated with chemotherapy.   · A thyroid level was obtained and patient was diagnosed with subclinical hypothyroidism.  She was initiated on Synthroid and folic acid and has noted significant reduction in fatigue.   · Patient to continue on Synthroid daily and folic acid supplementation daily.  · Fatigue is stable to slightly improved today    9.  Hypocalcemia.  The patient has struggled with compliance with her calcium supplements.  She reports she has been taking these inconsistently, though they resulted in soft frequent bowel movements.  We previously added 2 tablets of Tums daily along with current calcium/vitamin D supplementation.    · Calcium level 7.1 on 12/26/2019.  Her albumin was 2.  · Calcium level today is 7.8.  Albumin is 2.5.  Continue to  monitor      10.  Gastric ulcer diagnosed in May 2019 epigastric discomfort.    · Patient had EGD examination by Dr. Rivera on 5/13/2019.  It reported prepyloric ulceration.  Dr. Rivera recommended Protonix twice a day. With increased dose of Protonix, she has had no further issues.      11.  Worsening leukocytosis/neutrophilia.    · This started in middle of July 2019 with a fluctuation of WBC and neutrophils.  Suspect that this is all due to disease progression of her multiple myeloma.    · Total white blood cell count is 8.2.  Neutrophil count is 4.76. Continue to monitor      Plan:  1. Proceed with cycle #4, day 1 of Darzalex + Velcade + dexamethasone chemotherapy treatment today.   2. Patient is to receive erythropoietin today.  This will be repeated weekly for hemoglobin below 10.  3. Patient will be due for Xgeva again on January 30, 2020.  This will be repeated every 4 weeks  4. Continue Protonix to 40 mg twice a day.    5. Continue on Synthroid daily.     6. Continue Norco for pain management.    7. Continue calcium and vitamin D supplementation.    8. Continue oral B12 and folic acid daily.    9. Continue prophylactic acyclovir 400 mg twice daily.   10. Continue prophylactic Bactrim 3 times per week for PCP infection per protocol.   11. Beginning today, with  cycle #4, Darzalex will be given once every 3 weeks, and the Velcade will be on day #1 and day #8.   12. On 1/16/2019, she will have Velcade and Procrit.  Also obtain laboratory studies for serum paraprotein including immunofixation, free light chains, see orders.     13. MD follow-up with Dr. Araujo on January 23, 2020.  She will have Procrit if hemoglobin less than 10.    14. Patient understands to call with any issues including bleeding, fever greater than 100.5, with any other concerns prior to her next office visit.       Patient is on drug therapy and requires frequent monitoring.      MADAI Calloway  12/26/2019      Cristo Garcia  MD Michael Everett M.D.

## 2020-01-09 NOTE — NURSING NOTE
Pt seen per Merlyn Larson APRN, cbc, cmp reviewed and per NP ok to proceed with treatment today and to encourage pt to take her calcium supp daily. Pt verified that she is taking dexamethasone as prescribed. retacrit dose adjusted per protocol. Also pt states that she doesn't always take her calcium unless she is staying home for the day as it causes her to have frequent bowel mvmt. Pt encouraged to continue taking calcium daily.    Lab Results   Component Value Date    WBC 8.20 01/09/2020    HGB 8.2 (L) 01/09/2020    HCT 27.1 (L) 01/09/2020    .7 (H) 01/09/2020    PLT 21 (C) 01/09/2020

## 2020-01-10 NOTE — TELEPHONE ENCOUNTER
Pt calling stating that she has not been feeling well today. She is more SOA, weak and has no appetite. Hgb was 8.2 yesterday and pt received treatment. D/W Merlyn Larson NP who saw pt yesterday. Per Merlyn, order 2 units of PRBC. If pt's symptoms worsen, she needs to go to the ER. Informed pt and she v/u. Orders placed and message sent to scheduling.

## 2020-01-13 NOTE — PROGRESS NOTES
REASONS FOR FOLLOW UP:   1. IgG kappa multiple myeloma, was on Darzalex, started on May 26, 2016. Patient was switched to Darzalex from Pomalyst, as she continued to be neutropenic with recurrent urinary tract infection while on Pomalyst.    2. Zometa is on hold due to renal insufficiency.    3. After 16 doses of Darzalex, laboratory study showed stable disease in November 2016. Insurance company denied adding Velcade and dexamethasone. Patient is to be continued on monthly Darzalex from 12/06/16.   4. Obstructive right pyelonephritis with positive urine culture for E. coli, required hospitalization from 1/19/2017 through 01/24/2017 with iv antibiotics and stent exchange on 01/20/2017.   5.  Paraprotein studies on March 27, 2017 showed further slight improvement of multiple myeloma.   6.  Febrile illness, with urinary tract infection and influenza B infection in early May 2017, required hospitalization for 6 days.   7.  Paraprotein studies for both serum and 24-hour urine sample on 7/21/2017 showed further improvement of paraproteins.   Darzalex was continued.   8.  Serum protein study reported stable disease on 10/20/2017.  Darzalex will be continued.   9.  Laboratory studies of both serum paraprotein and a 24-hour urine protein in January 2018 showed further improvement of paraproteins.  Monthly Darzalex will be continued.   10. Repair of left flank incisional hernia in middle September 2018.   11. Serum paraprotein study reached micaela on 7/5/2018.  Since that time she has evidence of disease progression.   12. Disease progression, she was started on chemotherapy with bendamustine plus Velcade plus dexamethasone per protocol on 11/29/2018. Treatment will be bendamustine on day 1 and day 4 repeat every 28 days, Velcade and dexamethasone will be on day 1 day 4 and day 8 and day 15 repeat every 28 days.   13.  From cycle #2, patient was given only 1 dose of Treanda per cycle and continue Velcade and dexamethasone  weekly.   14.  Treanda was decreased by 20% after cycle #4 because of severe thrombocytopenia, and further decreased by another 25% on cycle #6 which is her last cycle on 4/25/2019.   15.  On 5/23/2019, patient was switched to Empliciti plus Velcade and decadron.    16.  Patient has persistent severe thrombocytopenia, no improvement after starting new regimen.    17. The patient underwent a bone marrow biopsy on 08/12/2019 and the pathology report showed hypercellular bone marrow with involvement by >85%, plasma cell myeloma and no metastatic carcinoma, granulomas, vasculitis, viral inclusions, increase in myeloblasts, or malignant lymphoma.   18. On 08/30/2019, discussed with patient to switch from Empliciti/Velcade/Dex to oral ixazomib/Dex 3 weeks on and 1 week off. She is to start the medication at decreased dose 2.3 mg weekly starting on 9/5/2019.   19.  Laboratory studies on 10/24/2019 revealed disease progression. Therefore therapy was transitioned to Darzalex and subq Velcade with oral Decadron. Patient initiated chemotherapy treatment on 11/7/2019.    20.  Persistent severe thrombocytopenia started in February 2019 secondary to chemotherapy and multiple myeloma disease progression.  20.  Symptomatic anemia Hb 7.7 on 12/12/2019.  Patient was given 2 units PRBC transfusion.      HISTORY OF PRESENT ILLNESS:   The patient is a 69 y.o. female with the above-mentioned history here today for triage visit.  She last received Darzalex, Velcade, dexamethasone cycle 4-day 1 on 1/9/2020.    She is seen now today with progressive deep dry cough over the last 4+ days, admittedly stating that she felt poorly even when she left here after treatment last week.  She is not producing any sputum.  She denies any fever.  She denies any head congestion.  She is not been around anyone who has been ill.  Along with significant cough she states she feels very rundown and feels like she is dehydrated.    We obtained stat labs  including CBC and CMP.  Hemoglobin is increased today up to 10.4 following Procrit given last week at increased dose of 27,000 units on 1/9/2020 as well as subsequent transfusion on 1/11/2020 for hemoglobin that had dropped to 7.8.    Kidney function appears fairly in line with where she tends to run with BUN of 25, creatinine of 2.2.  Her weight is down about 3 pounds.  Blood pressure and heart rate are acceptable.  We discussed obtaining RVP and giving her IVFs in the office. She is agreeable.    Past Medical History:   Diagnosis Date   • Anemia 10/14/2008   • Arthropathy of knee 01/07/2014   • Breast cancer (CMS/HCC) 04/2012   • Bursitis of left hip 10/18/2007   • Bursitis, knee 12/02/2013   • Calcaneal spur 08/12/2009   • Chronic kidney disease, stage III (moderate) (CMS/[facility])    • Colon cancer screening 07/01/2017   • Congestive heart failure (CMS/[facility])    • Diverticulitis of colon 10/17/2007   • DVT (deep venous thrombosis) (CMS/[facility])    • Epigastric abdominal pain 4/8/2019   • Gastroesophageal reflux disease 4/8/2019   • H/O Diastolic dysfunction    • H/O Mitral regurgitation    • History of Clostridium difficile 04/01/2014   • History of echocardiogram 04/2014   • History of MRSA infection    • History of obesity    • History of transfusion    • Hydronephrosis    • Hypertension    • LLL pneumonia (CMS/HCC) 04/23/2009   • Malignant neoplasm of kidney (CMS/HCC) 12/2009   • Multiple myeloma (CMS/HCC) 04/2012   • Myocardial infarction (CMS/[facility])    • Neoplasm of uncertain behavior 10/12/2015   • Non-STEMI (non-ST elevated myocardial infarction) (CMS/[facility])    • Nonischemic cardiomyopathy (CMS/[facility])    • Pyelonephritis 03/2004   • Seizures (CMS/HCC) 10/17/2007   • Thrombocytopenia (CMS/[facility])    • UTI (urinary tract infection) 10/30/2014   • UTI (urinary tract infection) 03/28/2014   • Ventral hernia      Past Surgical History:   Procedure Laterality Date   • BONE MARROW BIOPSY N/A 04/11/2012   • BRAIN SURGERY  2005     Meningioma   • BRAIN SURGERY  1990    Tumor benign per patient   • BREAST BIOPSY Left 04/09/2012    Dr. Gregg May   • CARDIAC CATHETERIZATION Left 06/11/2012    Dr. Sudeep Haddad   • CARDIAC CATHETERIZATION N/A 08/15/2006    Dr. Brian Bhatt   • COLONOSCOPY N/A 8/30/2017    Procedure: COLONOSCOPY into cecum and TI with cold polypectomies;  Surgeon: Trudy Rivera MD;  Location: [facility] ENDOSCOPY;  Service:    • COLONOSCOPY W/ POLYPECTOMY N/A unknown    2 polyps, benign   • CYSTOSCOPY N/A 3/25/2016    Procedure: CYSTOSCOPY  WITH RIGHT STENT CHANGE;  Surgeon: Lakhwinder Russo MD;  Location: [facility] OR;  Service:    • CYSTOSCOPY N/A 03/10/2012    Cystoscopy, right retrograde, right double-J stent-Dr. Sloan May   • CYSTOSCOPY N/A 02/25/2012    Cystoscopy, stent removal, right retrograde pyelogram, replacement of right double-J ureteral stent-Dr. Michael Everett   • CYSTOSCOPY W/ URETERAL STENT PLACEMENT Right 5/7/2016    Procedure: CYSTOSCOPY, URETERAL CATHETER INSERTION AND RIGHT STENT EXCHANGE ;  Surgeon: Michael Everett Jr., MD;  Location: [facility] OR;  Service:    • CYSTOSCOPY W/ URETERAL STENT PLACEMENT N/A 1/20/2017    Procedure: CYSTOSCOPY RIGHT RETROGRADE PYELOGRAM, URETERAL STENT CHANGE;  Surgeon: Lakhwinder Russo MD;  Location: [facility] OR;  Service:    • CYSTOSCOPY W/ URETERAL STENT PLACEMENT N/A 04/02/2015    Cystoscopy, removal of right ureteral stent, right retrograde pyelogram, placement of right double-J ureteral stent-Dr. Michael Everett   • CYSTOSCOPY W/ URETERAL STENT PLACEMENT N/A 04/26/2015    Cystoscopy, removal of right ureteral stent, right retrograde pyelogram, replacement of right ureteral stent 24 cm x 7-Swazi without retrieval line-Dr. Jose Gomez   • CYSTOSCOPY W/ URETERAL STENT PLACEMENT N/A 12/22/2014    Cystoscopy, removal of right double-J ureteral stent, right retrograde pyelogram, placement of right double-J ureteral stent-Dr. Michael Everett   • CYSTOSCOPY W/  URETERAL STENT PLACEMENT N/A 09/22/2014    Cystoscopy, removal of right ureteral stent, removal of right retrograde pyelogram, replacement of right double-J ureteral stent-Dr. Michael Everett   • CYSTOSCOPY W/ URETERAL STENT PLACEMENT N/A 06/18/2014    Cystoscopy, removal of right double-J ureteral stent, right retrograde pyelogram, placement of right double-J ureteral stent-Dr. Michael Everett   • CYSTOSCOPY W/ URETERAL STENT PLACEMENT N/A 01/23/2014    Cystoscopy, removal of right double-J ureteral stent, right retrograde pyelogram, placement of right double-J ureteral stent-Dr. Michael Everett   • CYSTOSCOPY W/ URETERAL STENT PLACEMENT N/A 03/27/2012    Cystoscopy, removal of ureteral stent, right retrograde pyelogram, replacement of indewlling right ureteral stent-Dr. Michael Everett   • CYSTOSCOPY W/ URETERAL STENT PLACEMENT N/A 03/27/2013    Cystoscopy, right retrograde pyelogram, removal and replacement of right double-J ureteral stent-Dr. Michael Everett   • CYSTOSCOPY W/ URETERAL STENT PLACEMENT N/A 11/12/2012    Cystoscopy, stent removal, right retrograde pyelogram, replacement of right double-J ureteral stent-Dr. Michael Everett   • CYSTOSCOPY W/ URETERAL STENT PLACEMENT N/A 09/24/2011    Cystoscopy, removal of ureteral stent, right retrograde pyelogram and placement of right double-J ureteral stent-Dr. Michael Everett   • CYSTOSCOPY W/ URETERAL STENT PLACEMENT N/A 05/14/2011    Cystoscopy, removal of right ureteral stent, right retrograde pyelogram and placement of new right double-J ureteral stent-Dr. Michael Everett   • CYSTOSCOPY W/ URETERAL STENT PLACEMENT N/A 12/31/2010    Cystoscopy, stent removal, right retrograde pyelogram, replacement of 7 Danish x 26 cm double-J stent-Dr. Michael Everett   • CYSTOSCOPY W/ URETERAL STENT PLACEMENT N/A 08/28/2010    Cystoscopy, stent removal, right retrograde pyelogram with interpretation, placement of right double-J ureteral stent-Dr. Michael Everett   • CYSTOSCOPY W/ URETERAL STENT PLACEMENT N/A  05/24/2010    Cystoscopy, right retrograde pyelogram, replacement of right double-J ureteral stent-Dr. Michael Everett   • CYSTOSCOPY W/ URETERAL STENT PLACEMENT N/A 02/12/2010    Cystoscopy, right retrograde pyelogram and placement of right double-J ureteral stent-Dr. Michael Everett   • CYSTOSCOPY W/ URETERAL STENT PLACEMENT N/A 02/23/2009    Cystoscopy, right retrograde pyelogram, placement of right double-J ureteral stent-Dr. Michael Everett   • CYSTOSCOPY W/ URETERAL STENT PLACEMENT N/A 09/17/2007    Dr. Michael Everett   • CYSTOSCOPY W/ URETERAL STENT PLACEMENT Right 01/29/2018    Dr. Michael Everett   • CYSTOSCOPY W/ URETERAL STENT PLACEMENT N/A 06/04/2018    Cystoscopy, removal of right double-J ureteral stent and placement of right double-J ureteral stent. Dr. Michael Everett.   • CYSTOSCOPY W/ URETERAL STENT PLACEMENT N/A 03/06/2018    Cystoscopy, removal of right ureteral stent, replacement of right double-J ureteral stent. Dr. Michael Everett   • ELBOW PROCEDURE Bilateral 1990s   • ENDOSCOPY N/A 5/13/2019    Procedure: ESOPHAGOGASTRODUODENOSCOPY WITH BIOPSIES;  Surgeon: Trudy Rivera MD;  Location: [facility] ENDOSCOPY;  Service: General   • HERNIA REPAIR  09/03/2018   • LASIK Bilateral    • MASTECTOMY Left 04/25/2012    Left total mastectomy with sentinel lymph node biopsies and left axillary lymphatic mapping-Dr. Gregg May   • MEDIPORT INSERTION, DOUBLE Right    • NEPHRECTOMY Left 12/30/2009    Dr. 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 Cristo Pittman   • TOTAL ABDOMINAL HYSTERECTOMY Bilateral 2007    Dr. Todd   • URETEROURETEROSTOMY Right 05/01/2006    Dr. Michael Everett   • VENA CAVA FILTER INSERTION N/A 05/08/2006    Dr. Jordon Barber   • VENOUS ACCESS DEVICE (PORT) INSERTION Right 02/25/2013    Right subclavian vein PowerPort  insertion-Dr. Gregg May   • VENOUS ACCESS DEVICE (PORT) INSERTION AND REMOVAL N/A 10/06/2014    Revision of right internal jugular PowerPort with fluoroscopy, removal of peripherally inserted central catheter line-Dr. Aurora Tomlinson   • VENOUS ACCESS DEVICE (PORT) INSERTION AND REMOVAL N/A 08/14/2014    Ultrasound-guided access right internal jugular vein, PowerPort placement, removal of right subclavian port-Dr. Jordon Barber   • VENTRAL/INCISIONAL HERNIA REPAIR Left 9/19/2018    Procedure: ventral hernia repair with mesh and rectus muscle advancement flap;  Surgeon: Trudy Rivera MD;  Location: [facility];  Service: General         Hematology/oncology History: See separate document for extra information.   1.    History of left breast cancer, status post left mastectomy on 04/25/2013, stage II, T2N0M0, hormone negative, HER2/mk positive by immunohistochemistry, however, no adjuvant chemotherapy delivered because of multiple myeloma diagnosed concomitantly.        2. Multiple myeloma, IgG lambda, stage III, diagnosed April 2012, previously treated with Velcade/Decadron followed by Velcade/Decadron/Revlimid; patient developed abdominal pain and rectal bleeding on Revlimid, which was then discontinued.    3. Patient evaluated at [facility], but was not felt to be a candidate for stem cell transplant.      4.   Disease progression with therapy switched to melphalan/Velcade/prednisone per the [facility] trial on 10/22/2013; melphalan dose has been persistently decreased because of thrombocytopenia.      5. Anemia secondary to chronic renal insufficiency and myeloma, on periodic Procrit therapy p.r.n.    6. Patient had 8 cycles of VMP chemotherapy, repeat laboratory studies on 10/03/2013 showed stable disease.  Her melphalan do  se was significantly decreased due to marrow suppression.      7.   Patient had disease progression after cycle #10 VMP treatment, evidenced by laboratory  studies on 01/06/2014.  We increased the melphalan dose for one cycle.  Continued disease progress after cycl  e #11 VMP treatment, despite increased dose of melphalan, as documented by laboratory study on 02/17/2014.     8. The patient was evaluated on 02/24/2014 and we recommend switching chemotherapy to Kyprolis on 02/27/2014.     9. Echocardiogram on 03/05/2014 reporting L  VEF 43%.  This is on par with her previous twice echocardiogram study, in June 2012 and November 2012, reported LVEF at 45% to 50% and 40% to 45% respectively.     10. The patient had 2 episodes of chest pain after Kyprolis during cycle 1, leading to omitted dose   on days 9 and 16.  From cycle 2, we will give Kyprolis only once per week for 3 weeks out of every 4 weeks, as well as dose reduction of Kyprolis that was started on 04/04/2014.      11. After cycle 2 of dose-reduced Kyprolis, the patient's M spike increased fro  m 2.6 to 3.2 g/dL, with her IgG level increasing from 2.7 g/dL to 4.7 g/dL.     12.   Patient had a stress test and echocardiogram study at  on 01/23/2015.  The patient had LVEF 35% to 40%.  Myocardial perfusion study reported a large, mild to modera  te severity fixed inferior wall defect, consistent with known transmural infarct versus diaphragmatic attenuation artifact.  Post stress resting ejection fraction estimated at 31%.      13. Repeated echocardiogram study on 04/03/2015 reported an LVEF 46%.  Left v  entricle is moderately dilated.  There is mild global hypokinesis of the left ventricle.  There was grade 1 diastolic dysfunction.    14.   The patient was seen on 04/09/2015 with plan for cycle 14, day 1, of Kyprolis.  Treatment will be delayed 1 week secondary to patient'  s extreme fatigue, hemoglobin of 8.1.  We will proceed with 2 units of packed red blood cells.  She was also found to have a urinary tract infection. Patient prescribed Cipro 500 mg twice a day x7.     15. Laboratory tests on  08/24/2015 reported sligh  t disease progress after cycle #18 Kyprolis. We discussed with patient and we will switch chemotherapy to CyBorD regimen with dose reduction of Velcade to 1 mg/m2, cyclophosphamide at 240 mg/m2 (total dose 450 mg), and dexamethasone 20 mg. All therapy to   be repeated on a weekly basis.     16.   The patient had significant fatigue after one dose of cyclophosphamide that lasted for 5-6 days. She also had severe anemia, hemoglobin 7.6, required 2 units PRBC transfusion. Cyclophosphamide will be decreased to 350 mg from 2nd week.     17.  Patient continues to have significant fatigue after the reduced dose of cyclophosphamide at 350 mg; for 2 days after chemo, she could only lie on the bed with performance status ECOG 3. From 10/13/2015, oral cyclophosphamide will be further dec  reased to 200 mg once weekly. We will continue Velcade and dexamethasone at same dose.   Evidence of disease progress, when she was on panobinostat treatment.   18. The patient also has worsening ane  ming and thrombocytopenia secondary to chemotherapy. The patient has symptomatic anemia with hemoglobin 7.8 on 02/23/2016 requiring 2 units of packed RBC transfusion. Chemotherapy with panobinostat will be discontinued.       19. Patient was switched to pomalidomide 3 mg daily for 21 days / 28 days in late March 2016.    20. Disease progression, she was switched to to the Darzalex in May 2016.       On12/6/2016, serum free lambda chain 1090 MG/L, free kappa chain 8.5 to MG/L.  Ferritin 1015, iron 99, TIBC 137 iron saturation 72%.     On January 4, 2017, vitamin B12 level 1289, folate 7.7 ng/mL. SPEP reporting gamma globulin 3.3, out of total globulin 5.0, with immunofixation reporting small quantity of monoclonal free lambda chain, plus monoclonal IgG lambda at 3.2 g/dL.  Serum IgG 4075, IgA 9 and IgM 15.  free lambda chain 1339 MG/L and free kappa chain 10.3 MG/L.      Laboratory study 3/27/2017 showed slight  improvement of paraprotein, SPEP reporting M spike 2.8 g/DL, out of total globulin 4.3, and the serum IgG 3420, IgA 9, IgM 11, free lambda chain 1218 MG/L and free kappa chain 8.0 MG/L.  Total 24 hour urine sample reported at free lambda chain 142 mg, at a concentration 74.8 MG/L, free kappa chain 75 mg at a concentration 39.5 MG/L., Urine protein immunofixation reporting biclonal IgG lambda and monoclonal free lambda light chain, M spike #1 at 41.5% and monoclonal IgG lambda spike #2 at 10.5%. Serum beta-2 microglobulin is 7.3 MG/L.     Her laboratory study on 7/21/2017 reported further improvement of paraprotein is both serum and a 24-hour urine sample.  SPEP reporting monoclonal IgG lambda 2.9 g/dL and free lambda chain 0.1 g/dL.  Serum IgG was 3261 mg/dL and IgA 5, IgM 13, free kappa chain 6.7, free lambda chain 701.3 with kappa/lambda ratio 0.01.  24-hour urine sample reported positive for Bence May protein lambda type, immunofixation positive for IgG lambda.  Total urine protein 241 mg, gamma globulin 13.1%.  Hemoglobin was 11.4 .4, platelets 122,000, WBC 6680 including neutrophils 3600, lymphocytes 2100 and monocytes 650.  Her creatinine was 1.68, at baseline level.  Liver function panel unremarkable.  Darzalex was continued.    Laboratory study on 8/25/2017 showed improved the platelets at 144,000, normal WBC 6660 and slightly improved hemoglobin at 11.7.     Patient had a colonoscopy examination on 8/30/2017 by Dr. Rivera.  It reported diverticulosis in multiple areas more severe in the sigmoid colon.  There was 2 polyps 4 mm pneumonia from transverse colon and the sigmoid colon.  Pathology evaluation reported tubular adenoma from the sigmoid colon polyp, and benign hyperplastic polyp from transverse colon.    Laboratory study on 10/20/2017 reported slightly improved monoclonal spike 2.7 g/dL by SPEP, immunofixation reported positive monoclonal IgG lambda, serum IgG 3536 mg/dL, IgA less than 5 and  IgM 11, free kappa chain 5.3 mg/L, and free lambda chain 720 mg/L with kappa/lambda ratio 0.01.  Serum beta-2 microglobulin was 6.5 mg/L.   Her CBC showed a stable mild anemia with hemoglobin 11.3, macrocytosis .3, and the platelets 114,000, normal WBC 7070 including neutrophils 4100 lymphocytes 2000 monocytes 650, normal liver function panel, total protein 7.9 and albumin 3.2,  and normal electrolytes including calcium 8.1, and improved creatinine 1.59.     Laboratory study on 1/12/2018 reported serum IgG 3083 mg/dL, IgA 10, IgM 10, free kappa chain 8.5 and free lambda chain 589.1 mg/L, kappa/lambda ratio 0.01, serum protein admitted fixation reported IgG lambda monoclonal protein and SPEP reporting M spike 3.1 g/dL, beta-2 microgram and 7.4 mg/L. serum creatinine 1.79, calcium 8.2, other electrolytes normal, hemoglobin 11.3, .5 and MCHC 31.6, platelets 129,000, WBC 6780 including neutrophils 3500 lymphocytes 2300.  Serum ferritin 653.7 ng/mL, iron 123 TIBC 174 iron saturation 71%, folic acid 12.8 ng/mL and a vitamin B12 at 873 pg/mL.  Her 24-hour urine study on 1/18/2018 reported lambda chain 32.8 mg/L, total 61 mg in 24 hours, and the free kappa chain 27.4 mg/L and 51 mg in 24 hours, and the total 24-hour urine protein 283 mg, and urine protein immunofixation positive for 5.3% monoclonal IgG lambda and positive for Bence May protein at 36.2% monoclonal lambda chain.  Monthly Darzalex was continued.       This patient had serum protein study on 04/06/2018 which reported free light chain at concentration 703.8 mg/L and free kappa chain 7.0, free kappa/lambda ratio 0.01, serum IgG 3650 mg/dL, IgM 11 and IgA 9 with serum protein electrophoresis reporting monoclonal IgG lambda 3.2 g/dL and also monoclonal free lambda light chain.  But it was unable to quantify monoclonal lambda light chain because of the small quantity of it.     A 24-hour urine study on 04/20/2018 reported total free lambda chain 60  mg in 24 hours at concentration 33.1 mg/L, free kappa chain 45 mg in 24 hours at concentration 24.8 mg/L. Total 24-hour urine protein was 279 mg. Urine protein immunofixation and UPEP reported lambda-type Bence-May protein + #1 monoclonal IgG lambda band at 34.8% and #2 monoclonal IgG lambda band at 6.9%.     Her CBC results today reported a stable hemoglobin at 11.1, platelets 130,000 and WBC 7440 including neutrophils 4100 and lymphocytes 2350, monocytes 650. Her CMP reported slightly worsened creatinine at 1.82 with BUN 33. Total protein at 8.8. Liver function panel was normal. Total serum protein 8.8 and albumin 3.2, globulin 5.6.    Reviewing her previous lab studies especially serum paraprotein, she reached a micaela of response on 07/05/2018 when she had serum IgG 3015 mg/dL, free lambda chain 458.7 mg/L and M spike IgG lambda 2.6 g/dL and and monoclonal lambda free light chain 0.1 g/dL, total 2.7 g/dL. serum beta-2 myoglobin 7.1 mg/L.  Her ferritin was 539 ng/mL, free iron 73 TIBC 176 iron saturation 42%.    Laboratory study obtained on 11/01/2018 showed further disease progression. Her serum IgG was 5472 mg/dL, IgA 8 and IgM 10, serum kappa chain 7.9 mg/L, free lambda chain 961.3 mg/L and kappa/lambda ratio 0.01. Serum protein electrophoresis reported monoclonal IgG lambda 4.1 g/dL, and monoclonal lambda free light chain 0.1 g/dL. Her hemoglobin was 9.0, .6, MCHC 30.1, platelets 124,000 and WBC 10,000 including neutrophils 7400, lymphocytes 1200, monocytes 600.     Cycle #1 day #1 Bendamustine will be started on 11/29/2018.     Laboratory studies on 01/17/2019 reported improved paraproteins.  SPEP reported M spike 3.8 g/dL out of total 5.5 g/dL globulin.  Serum IgG was 4374 mg/dL, IgA 10 and IgM 11.  Free lambda chain 606.8 mg/L, free kappa chain 8.6 and kappa/lambda ratio 0.01.  Her serum protein immunofixation continues to be IgG lambda monoclonal.  Her CBC showed hemoglobin 9.3, .3,  platelets 50,000 and WBC 8600 including ANC 5100, lymphocytes 2160.  Her Chemistry lab reported creatinine 1.92, calcium 8.3, normal liver function panel, glucose 98 with normal sodium and potassium.     For her 24-hour urine study on 3/14/2019, she had free lambda chain at a 62.1 mg/L, total 87 mg in 24 hours, free kappa chain 42 mg/L and 59 mg in 24 hours.  Urine protein immunofixation reported a monoclonal IgG lambda #1 and free lambda chain were unable to be quantified, however IgG lambda #2 was 12.8%.  Her 24-hour urine total protein was 452 mg.     For serum protein study on 3/28/2019 (on day of her cycle #5 day #1 Treanda ) also reported further decreased monoclonal spike 3.2 mg/dL, and serum IgG 3600, IgA 13 IgM 12, free kappa chain 11.7, and worsening free lambda chain 1045 mg/L.     Her serum paraprotein studies on 4/11/2019 reported a serum IgG 4407 mg/dL, IgA 14, IgM 13, free kappa chain 9.2, free lambda chain 998.4 mg/L, kappa/lambda ratio 0.01.  His serum protein immunofixation was positive for IgG lambda, SPEP reporting M spike 4.3 g/dL.    Her 24-hour urine study on 7/18/2019 reported positive IgG lambda monoclonal protein and lambda type Bence-May protein, total free kappa chain 142 mg in 24 hours, at 74.6 mg/L, and total lambda chain 179 mg in 24 hours at 94.1 mg/L.  Kappa/lambda ratio 0.79.  Her total 24-hour urine protein was 410 mg, with monoclonal lambda free light chain 41.8% and monoclonal IgG lambda 2.6%.     Serum paraprotein studies on 7/24/2019 reported monoclonal spike 3.9 g/dL, serum IgG 4107, IgA 14 IgM 19, free kappa chain 12.8 and free lambda chain 656, kappa/lambda ratio 0.02.    On 08/01/2019 she has severe thrombocytopenia platelets 27,000.  She has elevated WBC to 12,600 including ANC 8900 and normal lymphocytes 2000.  Her hemoglobin is 10.4 08/01/2019. The patient underwent a bone marrow biopsy on 08/12/2019 and the pathology report showed hypercellular bone marrow with  involvement by plasma cell myeloma and no metastatic carcinoma, granulomas, vasculitis, viral inclusions, increase in myeloblasts, or malignant lymphoma. Over 85% of the bone marrow are plasma cells. Normal karyotype. Flow cytometry study was positive.    Because of her persistent severe thrombocytopenia, the patient underwent a bone marrow biopsy on 08/12/2019.  Pathology evaluation showed hypercellular bone marrow with involvement by plasma cell myeloma 85-90% and no metastatic carcinoma, granulomas, vasculitis, viral inclusions, increase in myeloblasts, or malignant lymphoma.  Normal karyotype.     Recent laboratory study on 8/15/2019 reported of elevated ferritin 524, normal iron saturation 42% with free iron 59 and a TIBC 140.  Normal thyroid function profile.  Her serum globulin 7.3, total protein 10.1 and albumin 2.8, calcium 8.1 creatinine 1.99.    She does continue to have severe intermittent pain in her right hip which is managed somewhat with pain medication, including tylenol and prescription Norco. She states she has difficulty ambulating and functioning normally when this pain occurs. She continues on oral Vitamin B-12 and folic acid supplementation.     Recent XR right hip with or without pelvis on 08/15/2019 showed sclerosis at the pubic symphysis. No other bony or articular abnormality was identified. The hip joints were symmetric and appeared within normal limits. The joint spaces were fairly well-maintained. There was no bony erosion. There was no evidence of recent or old fracture or subluxation. A right ureteral stent was noted.     On 08/30/2019, switched treatment from Empliciti to oral ixazomib 3 weeks on and 1 week off. She is expected to start the medication on 9/5/2019 when this medication arrives.  Patient will continue dexamethasone weekly at the same time as part of the treatment.  Because of her severe thrombocytopenia, we recommended starting low-dose ixazomib 2.3 mg weekly.   Treatment was started on 9/5/2019.     Laboratory study on 9/26/2019 showed worsening leukocytosis with WBC 20,780 including ANC 16,000, lymphocytes 2700 and monocytes 8070.  She also has worsening severe thrombocytopenia with platelets 27,000, and stable hemoglobin 9.5.     Suspect the patient may have some kind of infection however she denies fever sweating chills no dysuria or hematuria.  She denies pain in the right flank area where she has ureteral stent and oftentimes complains of pain when she has urinary tract infection.      I searched medical records, she had replacement of stent 3 months ago.  I recommended urinalysis and culture on 9/26/2019 for further evaluation despite that she has no symptoms.I called and spoke to patient about her urinalysis which showed a large quantity of bacteria and WBC.  I e-scribed cefdinir to her pharmacy, 300 mg twice a day for 7 days. I called the patient today, reporting her urine culture results with multidrug sensitive E. coli.  She will continue cefdinir as we prescribed.  Should be sensitive according to the culture results.I think this patient will need to follow-up with her urologist Dr. Everett for stent replacement.  I sent a message to Dr. Everett.    On 10/17/2019, patient also reports she has intermittent chest pain/epigastric area/stomach pain for the past several weeks, to the point that she thought she was not able to make it.  Patient reports she had stress test this week and was told to having nothing wrong.  Patient also reports this is not related to her eating.  No apparent effect of exacerbation or relieving.      Unfortunately laboratory study on 10/24/2019 reported significant disease progression, he had a M spike 5.1 g/dL by SPEP, and a serum IgG 7280 mg/dL, IgA 16, IgM 17, free kappa chain 10.2 and free lambda chain 1309.5 mg/L, with kappa/lambda ratio 0.01.  Her beta-2 myoglobin was 16.2 mg/L.  Chemistry lab reported total serum protein 11 g, albumin  2.7 and globulin 8.3.  Her liver function panel was normal, creatinine 1.90 and normal calcium and other electrolytes.  Continues to have anemia Hb 10.0, platelets 26,000 and WBC 19,180 including ANC 13,230 lymphocytes 3070 monocytes 1920 and eosinophil 770.  Her iron study reported ferritin 455.4 ng/dL, free iron 61, TIBC 155 and iron saturation 39%, with folic acid at 7.3 ng/mL and vitamin B12 at 1800 pg/mL.    Patient initiated chemotherapy treatment with darzalex/velcade/dexamethasone on 11/7/2019.    Laboratory study 12/12/2019 showed deteriorating hemoglobin 7.7.  She also has stable but severe thrombocytopenia with platelets 24,000.  Her WBC is normal at 5230 including ANC 2670.  Weekly Procrit was continued.  Patient was given 2 units PRBC transfusion.          Review of Systems   Constitutional: Positive for fatigue (worse 1/13/20). Negative for appetite change, chills, diaphoresis, fever and unexpected weight change.   HENT:   Negative for mouth sores, nosebleeds and sore throat.    Eyes: Negative for eye problems and icterus.   Respiratory: Positive for cough. Negative for shortness of breath.    Cardiovascular: Negative for chest pain, leg swelling and palpitations.   Gastrointestinal: Negative for abdominal pain, blood in stool, diarrhea and nausea.   Genitourinary: Negative for dysuria, frequency and hematuria.    Musculoskeletal: Positive for arthralgias (Right hip pain stable). Negative for back pain, flank pain, gait problem and myalgias.   Skin: Negative for itching and rash.   Neurological: Positive for extremity weakness (stable). Negative for dizziness, gait problem, headaches, light-headedness and numbness.   Hematological: Negative for adenopathy. Does not bruise/bleed easily.   Psychiatric/Behavioral: Negative for depression. The patient is not nervous/anxious.    All other systems reviewed and are negative.      Medications: The current medication list was reviewed in the EMR.  "      Allergies   Allergen Reactions   • Sulfa Antibiotics Swelling   • Zosyn [Piperacillin Sod-Tazobactam So] Swelling     Face and lips       VITALS:   Vitals:    01/13/20 0815 01/13/20 0816   BP: 118/73 118/73   Pulse: 87 87   Resp:  18   Temp: 97.9 °F (36.6 °C) 97.9 °F (36.6 °C)   SpO2: 97% 97%   Weight: 78.8 kg (173 lb 12.8 oz) 78.8 kg (173 lb 11.6 oz)   Height:  157.5 cm (62.01\")   PainSc:  0-No pain   PS: ECOG 1     Physical Exam   Constitutional: She is oriented to person, place, and time. She appears well-developed and well-nourished. No distress.   HENT:   Head: Normocephalic and atraumatic.   Eyes: Conjunctivae and EOM are normal. Pupils are equal, round, and reactive to light.   Neck: No masses.  Cardiovascular: Normal rate, regular rhythm and normal heart sounds.  Exam reveals no friction rub.    No murmur heard.  Pulmonary/Chest: +crackles, rhonchi left base. Coughing throughtou exam.  Diminished breath sounds throughout.     Abdominal: Soft.  No tender.  Bowel sounds are normal.   Lymphadenopathy:     She has no cervical adenopathy.   Neurological: She is alert and oriented to person, place, and time.   Skin: Skin is warm and dry.   Psychiatric: She has a normal mood and affect. Thought content normal.       Recent lab results:   Results from last 7 days   Lab Units 01/13/20  0819 01/09/20  0846   WBC 10*3/mm3 7.32 8.20   NEUTROS ABS 10*3/mm3 4.61 4.76   LYMPHS ABS 10*3/mm3 1.98 2.71   HEMOGLOBIN g/dL 10.4* 8.2*   HEMATOCRIT % 33.2* 27.1*   PLATELETS 10*3/mm3 17* 21*     Results from last 7 days   Lab Units 01/13/20  0819 01/09/20  0846   SODIUM mmol/L 134* 136   POTASSIUM mmol/L 4.1 4.3   CHLORIDE mmol/L 109* 111*   CO2 mmol/L 18.5* 19.7*   BUN mg/dL 25* 27*   CREATININE mg/dL 2.21* 1.99*   CALCIUM mg/dL 7.5* 7.8*   ALBUMIN g/dL 2.60* 2.50*   BILIRUBIN mg/dL 0.5 0.4   ALK PHOS U/L 42 43   ALT (SGPT) U/L 8 9   AST (SGOT) U/L 15 13   GLUCOSE mg/dL 98 83             Assessment/Plan   1. Multiple " myeloma, IgG lambda, stage III. Failed multiple lines of therapy. Her treatment was switched to Darzalex on 5/26/2016. She was on this treatment for more than 2 years.   · In November 2018, she clearly had disease progression.  Darzalex was discontinued and was started on bendamustine plus Velcade plus dexamethasone.   · Cycle #1 day #1 Bendamustine was started on 11/29/2018.  Due to poor tolerance with profound fatigue, chemotherapy was modified with bendamustine only given on day 1, and a Velcade weekly, every 4 weeks as 1 cycle.  Patient had a cycle #6 dose reduction of bendamustine by 25% because of severe thrombocytopenia.   · This patient actually had a micaela of response on 3/28/2019 with M spike 3.2 g/dL and serum IgG 3600.  After that M spike and serum IgG both were elevating.   · Laboratory study obtained on 5/9/2019 after 6 cycles chemotherapy, it showed disease further progressed with worsening level of serum IgG 4873 mg/dL and M spike 4.3 g/dL.    · On 5/23/2019, patient was switched to Empliciti plus Velcade and decadron.    · Her serum protein studies reported persistent active disease, not well controlled with large quantity of monoclonal spike.  She also has severe thrombocytopenia oftentimes leading to hold Velcade injection.  · Bone marrow aspiration biopsy for reanalysis recommended.  This patient also has a history of breast cancer so we need to be open-minded.  She had bone marrow aspirate biopsy on 8/12/2019 which showed more than 85% of bone marrow cells are malignant plasma cells.  There is no evidence of metastatic disease or other type of malignancy.  · Recommended switching treatment to Ixazomib plus dexamethasone.  Considering her pre-existing severe thrombocytopenia, I recommended starting low-dose Ixazomib 2.3 mg weekly instead of full dose 4 mg weekly.   · She started new treatment on 9/5/2019 with dexamethasone 20 mg weekly.  oral ixazomib/Dex 3 weeks on and 1 week off. She is to  start the ixazomib at decreased dose 2.3 mg weekly starting on 9/5/2019.   · Patient has significant disease progression on 10/24/2019 with serum increased M spike, serum IgG and free lambda chain.  · On 10/31/2019, I discuessed options with the patient and suggested Darzalex/Velcade/dexamethasone as it would be easy on her bone marrow. Patient initiated Cycle #1 treatment on 11/7/2019.  · The patient received cycle 4-day 1 Darzalex, Velcade and dexamethasone on 1/9/2020.    2.  Severe thrombocytopenia.  This is more likely secondary to her multiple myeloma, based on her recent bone marrow aspiration biopsy on 8/12/2019. patient has no easy bleeding or bruising.   · Patient was last transfused with platelets on May 13, 2019 prior to her EGD.  · The patient underwent a bone marrow biopsy on 08/12/2019 and the pathology report showed hypercellular bone marrow with involvement by plasma cell myeloma and no metastatic carcinoma, granulomas, vasculitis, viral inclusions, increase in myeloblasts, or malignant lymphoma. Over 80% of the bone marrow are plasma cells. Normal karyotype.   · Laboratory study on 8/30/2019 showed platelets of 34,000. We switched her treatment to oral ixazomib 3 weeks on and 1 week off starting 9/05/19.    · Platelets today are 17,000.  No excess bleeding or bruising.  Continue to monitor.    3.  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    · Her laboratory study on 5/9/2019 showed no evidence of iron deficiency.  On 5/16/2019, B12 level of 1819 and normal folate of 5.84.  She was symptomatic anemic with Hb 8.5, she was transfused with 2 units of packed red blood cells.    · Recent labs on 8/15/2019 reported no evidence of iron deficiency, had normal iron saturation and excellent ferritin level.  · Laboratory study on 10/24/2019 reported no evidence of iron deficiency, nor B12 folic acid deficiency.  She will continue oral B12 supplementation.    · Her hemoglobin  was 7.7 on 12/12/2019.  We continued weekly Retacrit.  Because patient was symptomatic, with worsening fatigue from her anemia, she was given 2 units PRBC transfusion.   · Hemoglobin 10.4 today.  She received Procrit just last Thursday, 1/9/2020.  She also received transfusion 1/11/2020. Continue weekly Retacrit.       4. Zometa / Xgeva treatment.  Because of her kidney insufficiency, we were only able to give her Zometa every 3 months.  Due to her elevated creatinine level, we eventually switched her to Xgeva treatment and continued monthly.  · She had elevated phosphorus 5.6 on 6/6/2019.   · Patient received her last dose of monthly Xgeva on January 2, 2020.  This will be continued every 4 weeks    5. History of stage II left breast cancer, post mastectomy in 2013, negative for ER/IL and positive HER-2. No neoadjuvant chemotherapy because of concurrent discovery of multiple myeloma and ongoing chemotherapy. She had a normal mammogram study on 7/26/2019.       6. Profound fatigue: This is multifactorial secondary to her disease progression and her worsening anemia associated with chemotherapy.   · A thyroid level was obtained and patient was diagnosed with subclinical hypothyroidism.  She was initiated on Synthroid and folic acid and has noted significant reduction in fatigue.   · Patient to continue on Synthroid daily and folic acid supplementation daily.  · Fatigue is worse today in light of acute infection as outlined below.      7.  Hypocalcemia.  The patient has struggled with compliance with her calcium supplements.  She reports she has been taking these inconsistently, though they resulted in soft frequent bowel movements.  We previously added 2 tablets of Tums daily along with current calcium/vitamin D supplementation.    · Calcium level 7.5 today.  Albumin is 2.6.  Continue to monitor    8.  Gastric ulcer diagnosed in May 2019 epigastric discomfort.    · Patient had EGD examination by Dr. Rivera on  5/13/2019.  It reported prepyloric ulceration.  Dr. Rivrea recommended Protonix twice a day. With increased dose of Protonix, she has had no further issues.      9.  Worsening leukocytosis/neutrophilia.    · This started in middle of July 2019 with a fluctuation of WBC and neutrophils.  Suspect that this is all due to disease progression of her multiple myeloma.    · Total white blood cell count is 8.2.  Neutrophil count is 4.76. Continue to monitor    10.  Acute cough x5 days, nonproductive but significant with associated crackles and rhonchi appreciated clinically.  Patient is also much more fatigued.  In light of symptoms she has not been eating and drinking as well and feels dehydrated.  Though her labs do not indicate this, we will give her 1 L normal saline.  We also discussed checking an RVP to rule out flu or other active infection.  At the very least she will need cough medication very likely antibiotic if flu test is negative.      Plan:  1. Normal saline now.  2. RVP performed, results pending.  3. Patient already scheduled to return Thursday, 1/16/2024 for weekly Retacrit per protocol and also due for Velcade.  4. Patient will be due for Xgeva again on January 30, 2020.  This will be repeated every 4 weeks  5. Continue Protonix to 40 mg twice a day.    6. Continue on Synthroid daily.     7. Continue Norco for pain management.    8. Continue calcium and vitamin D supplementation.    9. Continue oral B12 and folic acid daily.    10. Continue prophylactic acyclovir 400 mg twice daily.   11. Continue prophylactic Bactrim 3 times per week for PCP infection per protocol.   12. MD follow-up with Dr. Araujo on January 23, 2020.  Continue Retacrit for hemoglobin less than 10.    13. Patient understands to call with any issues including bleeding, fever greater than 100.5, or any other concerns prior to her next office visit.     ADDENDUM: Respiratory viral panel returning positive for RSV.  I discussed with   Tavon and Dr. Araujo's absence.  As there is concern in this immunocompromise patient for secondary pneumonia we will go ahead and treat with Zithromax.  As for the RSV, I discussed with the patient that she will need just symptomatic support at this point as it has been 5 days since she became symptomatic and no antiviral medication would be of benefit.  We will give her some Hycodan for cough.  As noted above she is already scheduled to return on Thursday and may require further IV fluids at that time.  I did encourage her to continue to drink fluids as she is able.  She was instructed to call with fever or worsening cough productive of colored sputum or other new concerns.  She verbalized understanding.      Bea Mccurdy, APRN  1/13/20     [facility] -  Cristo Madera MD, M.D.

## 2020-01-16 NOTE — PROGRESS NOTES
Treatment plan adjusted to add additional day for day 8, cycle 4, as nurse released orders, but treatment was not given on 1/16/20, delay for one week per CATARINA Bedolla RN, KALE Araujo

## 2020-01-16 NOTE — NURSING NOTE
"Lab Results   Component Value Date    WBC 7.80 01/16/2020    HGB 10.0 (L) 01/16/2020    HCT 32.8 (L) 01/16/2020    MCV 99.7 (H) 01/16/2020    PLT 18 (C) 01/16/2020     Pt here for velcade and retacrit. Is feeling better than she was at last visit, but still with productive cough. States she is eating and drinking \"okay\" Encouraged her to continue with rest and increase po fluids as she recovers from RSV.  Hgb 10, so no retacrit needed. With RSV + and platelet count of 18, treatment delayed one week per Dr. Araujo. Pt scheduled to see Dr. Araujo in one week and she v/u of plan.   "

## 2020-01-23 PROBLEM — J18.9 COMMUNITY ACQUIRED PNEUMONIA OF LEFT LOWER LOBE OF LUNG: Status: ACTIVE | Noted: 2020-01-01

## 2020-01-23 PROBLEM — R07.9 CHEST PAIN: Status: ACTIVE | Noted: 2020-01-01

## 2020-01-23 NOTE — PROGRESS NOTES
REASONS FOR FOLLOW UP:   1. IgG kappa multiple myeloma, was on Darzalex, started on May 26, 2016. Patient was switched to Darzalex from Pomalyst, as she continued to be neutropenic with recurrent urinary tract infection while on Pomalyst.    2. Zometa is on hold due to renal insufficiency.    3. After 16 doses of Darzalex, laboratory study showed stable disease in November 2016. Insurance company denied adding Velcade and dexamethasone. Patient is to be continued on monthly Darzalex from 12/06/16.   4. Obstructive right pyelonephritis with positive urine culture for E. coli, required hospitalization from 1/19/2017 through 01/24/2017 with iv antibiotics and stent exchange on 01/20/2017.   5.  Paraprotein studies on March 27, 2017 showed further slight improvement of multiple myeloma.   6.  Febrile illness, with urinary tract infection and influenza B infection in early May 2017, required hospitalization for 6 days.   7.  Paraprotein studies for both serum and 24-hour urine sample on 7/21/2017 showed further improvement of paraproteins.   Darzalex was continued.   8.  Serum protein study reported stable disease on 10/20/2017.  Darzalex will be continued.   9.  Laboratory studies of both serum paraprotein and a 24-hour urine protein in January 2018 showed further improvement of paraproteins.  Monthly Darzalex will be continued.   10. Repair of left flank incisional hernia in middle September 2018.   11. Serum paraprotein study reached micaela on 7/5/2018.  Since that time she has evidence of disease progression.   12. Disease progression, she was started on chemotherapy with bendamustine plus Velcade plus dexamethasone per protocol on 11/29/2018. Treatment will be bendamustine on day 1 and day 4 repeat every 28 days, Velcade and dexamethasone will be on day 1 day 4 and day 8 and day 15 repeat every 28 days.   13.  From cycle #2, patient was given only 1 dose of Treanda per cycle and continue Velcade and dexamethasone  weekly.   14.  Treanda was decreased by 20% after cycle #4 because of severe thrombocytopenia, and further decreased by another 25% on cycle #6 which is her last cycle on 4/25/2019.   15.  On 5/23/2019, patient was switched to Empliciti plus Velcade and decadron.    16.  Patient has persistent severe thrombocytopenia, no improvement after starting new regimen.    17. The patient underwent a bone marrow biopsy on 08/12/2019 and the pathology report showed hypercellular bone marrow with involvement by >85%, plasma cell myeloma and no metastatic carcinoma, granulomas, vasculitis, viral inclusions, increase in myeloblasts, or malignant lymphoma.   18. On 08/30/2019, discussed with patient to switch from Empliciti/Velcade/Dex to oral ixazomib/Dex 3 weeks on and 1 week off. She is to start the medication at decreased dose 2.3 mg weekly starting on 9/5/2019.   19.  Laboratory studies on 10/24/2019 revealed disease progression. Therefore therapy was transitioned to Darzalex and subq Velcade with oral Decadron. Patient initiated chemotherapy treatment on 11/7/2019.    20.  Persistent severe thrombocytopenia started in February 2019 secondary to chemotherapy and multiple myeloma disease progression.  20.  Symptomatic anemia Hb 7.7 on 12/12/2019.  Patient was given 2 units PRBC transfusion.      HISTORY OF PRESENT ILLNESS:   The patient is a 69 y.o. female with the above-mentioned history here today for 2-week evaluation prior to scheduled cycle 4-day #15 Velcade.  Cycle 4-day #8 due to severe worsening thrombocytopenia platelets 18,000.  Patient currently is monthly Darzalex treatment starting cycle #4, most recent dose was on 1/9/2020.  She did not receive Velcade injection    Patient reports that she started having chest pain earlier this morning, but approximately getting worse.  In the clinic she complains worsening chest pain, in the epigastric area.  She has no nausea vomiting.  No fever no sweating.  Her vitals stable,  normal BP, afebrile, normal pulse.  Excellent oxygen saturation.    Labs today reported stable anemia hemoglobin 10.2, severe thrombocytopenia but with slightly improved platelets 33,000 and WBC 9190, including ANC 6160 lymphocytes 2100.  Her iron study today reported elevated ferritin 912, free iron 109 TIBC 130 and iron saturation 84%.     Paraprotein study on 1/16/2020 reported monoclonal IgG lambda spike 6.8 g/dL, however unable to quantify the monoclonal free lambda chain.  Her serum IgG was 7632, IgA 5, IgM 13, free kappa chain 8.6 and the free lambda chain 1415 mg/L.      Past Medical History:   Diagnosis Date   • Anemia 10/14/2008   • Arthropathy of knee 01/07/2014   • Breast cancer (CMS/HCC) 04/2012   • Bursitis of left hip 10/18/2007   • Bursitis, knee 12/02/2013   • Calcaneal spur 08/12/2009   • Chronic kidney disease, stage III (moderate) (CMS/[facility])    • Colon cancer screening 07/01/2017   • Congestive heart failure (CMS/[facility])    • Diverticulitis of colon 10/17/2007   • DVT (deep venous thrombosis) (CMS/[facility])    • Epigastric abdominal pain 4/8/2019   • Gastroesophageal reflux disease 4/8/2019   • H/O Diastolic dysfunction    • H/O Mitral regurgitation    • History of Clostridium difficile 04/01/2014   • History of echocardiogram 04/2014   • History of MRSA infection    • History of obesity    • History of transfusion    • Hydronephrosis    • Hypertension    • LLL pneumonia (CMS/HCC) 04/23/2009   • Malignant neoplasm of kidney (CMS/HCC) 12/2009   • Multiple myeloma (CMS/HCC) 04/2012   • Myocardial infarction (CMS/[facility])    • Neoplasm of uncertain behavior 10/12/2015   • Non-STEMI (non-ST elevated myocardial infarction) (CMS/[facility])    • Nonischemic cardiomyopathy (CMS/[facility])    • Pyelonephritis 03/2004   • Seizures (CMS/HCC) 10/17/2007   • Thrombocytopenia (CMS/[facility])    • UTI (urinary tract infection) 10/30/2014   • UTI (urinary tract infection) 03/28/2014   • Ventral hernia      Past Surgical History:   Procedure  Laterality Date   • BONE MARROW BIOPSY N/A 04/11/2012   • BRAIN SURGERY  2005    Meningioma   • BRAIN SURGERY  1990    Tumor benign per patient   • BREAST BIOPSY Left 04/09/2012    Dr. Gregg May   • CARDIAC CATHETERIZATION Left 06/11/2012    Dr. Sudeep Haddad   • CARDIAC CATHETERIZATION N/A 08/15/2006    Dr. Brian Bhatt   • COLONOSCOPY N/A 8/30/2017    Procedure: COLONOSCOPY into cecum and TI with cold polypectomies;  Surgeon: Trudy Rivera MD;  Location: [facility] ENDOSCOPY;  Service:    • COLONOSCOPY W/ POLYPECTOMY N/A unknown    2 polyps, benign   • CYSTOSCOPY N/A 3/25/2016    Procedure: CYSTOSCOPY  WITH RIGHT STENT CHANGE;  Surgeon: Lakhwinder Russo MD;  Location: [facility] OR;  Service:    • CYSTOSCOPY N/A 03/10/2012    Cystoscopy, right retrograde, right double-J stent-Dr. Sloan May   • CYSTOSCOPY N/A 02/25/2012    Cystoscopy, stent removal, right retrograde pyelogram, replacement of right double-J ureteral stent-Dr. Michael Everett   • CYSTOSCOPY W/ URETERAL STENT PLACEMENT Right 5/7/2016    Procedure: CYSTOSCOPY, URETERAL CATHETER INSERTION AND RIGHT STENT EXCHANGE ;  Surgeon: Michael Everett Jr., MD;  Location: [facility] OR;  Service:    • CYSTOSCOPY W/ URETERAL STENT PLACEMENT N/A 1/20/2017    Procedure: CYSTOSCOPY RIGHT RETROGRADE PYELOGRAM, URETERAL STENT CHANGE;  Surgeon: Lakhwinder Russo MD;  Location: [facility] OR;  Service:    • CYSTOSCOPY W/ URETERAL STENT PLACEMENT N/A 04/02/2015    Cystoscopy, removal of right ureteral stent, right retrograde pyelogram, placement of right double-J ureteral stent-Dr. Michael Everett   • CYSTOSCOPY W/ URETERAL STENT PLACEMENT N/A 04/26/2015    Cystoscopy, removal of right ureteral stent, right retrograde pyelogram, replacement of right ureteral stent 24 cm x 7-Belarusian without retrieval line-Dr. Jose Gomez   • CYSTOSCOPY W/ URETERAL STENT PLACEMENT N/A 12/22/2014    Cystoscopy, removal of right double-J ureteral stent, right retrograde  pyelogram, placement of right double-J ureteral stent-Dr. Michael Everett   • CYSTOSCOPY W/ URETERAL STENT PLACEMENT N/A 09/22/2014    Cystoscopy, removal of right ureteral stent, removal of right retrograde pyelogram, replacement of right double-J ureteral stent-Dr. Michael Everett   • CYSTOSCOPY W/ URETERAL STENT PLACEMENT N/A 06/18/2014    Cystoscopy, removal of right double-J ureteral stent, right retrograde pyelogram, placement of right double-J ureteral stent-Dr. Michael Everett   • CYSTOSCOPY W/ URETERAL STENT PLACEMENT N/A 01/23/2014    Cystoscopy, removal of right double-J ureteral stent, right retrograde pyelogram, placement of right double-J ureteral stent-Dr. Michael Everett   • CYSTOSCOPY W/ URETERAL STENT PLACEMENT N/A 03/27/2012    Cystoscopy, removal of ureteral stent, right retrograde pyelogram, replacement of indewlling right ureteral stent-Dr. Michael Everett   • CYSTOSCOPY W/ URETERAL STENT PLACEMENT N/A 03/27/2013    Cystoscopy, right retrograde pyelogram, removal and replacement of right double-J ureteral stent-Dr. Michael Everett   • CYSTOSCOPY W/ URETERAL STENT PLACEMENT N/A 11/12/2012    Cystoscopy, stent removal, right retrograde pyelogram, replacement of right double-J ureteral stent-Dr. Michael Everett   • CYSTOSCOPY W/ URETERAL STENT PLACEMENT N/A 09/24/2011    Cystoscopy, removal of ureteral stent, right retrograde pyelogram and placement of right double-J ureteral stent-Dr. Michael Everett   • CYSTOSCOPY W/ URETERAL STENT PLACEMENT N/A 05/14/2011    Cystoscopy, removal of right ureteral stent, right retrograde pyelogram and placement of new right double-J ureteral stent-Dr. Michael Everett   • CYSTOSCOPY W/ URETERAL STENT PLACEMENT N/A 12/31/2010    Cystoscopy, stent removal, right retrograde pyelogram, replacement of 7 [place]an x 26 cm double-J stent-Dr. Michael Everett   • CYSTOSCOPY W/ URETERAL STENT PLACEMENT N/A 08/28/2010    Cystoscopy, stent removal, right retrograde pyelogram with interpretation, placement of right  double-J ureteral stent-Dr. Michael Everett   • CYSTOSCOPY W/ URETERAL STENT PLACEMENT N/A 05/24/2010    Cystoscopy, right retrograde pyelogram, replacement of right double-J ureteral stent-Dr. Michael Everett   • CYSTOSCOPY W/ URETERAL STENT PLACEMENT N/A 02/12/2010    Cystoscopy, right retrograde pyelogram and placement of right double-J ureteral stent-Dr. Michael Everett   • CYSTOSCOPY W/ URETERAL STENT PLACEMENT N/A 02/23/2009    Cystoscopy, right retrograde pyelogram, placement of right double-J ureteral stent-Dr. Michael Everett   • CYSTOSCOPY W/ URETERAL STENT PLACEMENT N/A 09/17/2007    Dr. Michael Everett   • CYSTOSCOPY W/ URETERAL STENT PLACEMENT Right 01/29/2018    Dr. Michael Everett   • CYSTOSCOPY W/ URETERAL STENT PLACEMENT N/A 06/04/2018    Cystoscopy, removal of right double-J ureteral stent and placement of right double-J ureteral stent. Dr. Michael Eveertt.   • CYSTOSCOPY W/ URETERAL STENT PLACEMENT N/A 03/06/2018    Cystoscopy, removal of right ureteral stent, replacement of right double-J ureteral stent. Dr. Michael Everett   • ELBOW PROCEDURE Bilateral 1990s   • ENDOSCOPY N/A 5/13/2019    Procedure: ESOPHAGOGASTRODUODENOSCOPY WITH BIOPSIES;  Surgeon: Trudy Rivera MD;  Location: [facility];  Service: General   • HERNIA REPAIR  09/03/2018   • LASIK Bilateral    • MASTECTOMY Left 04/25/2012    Left total mastectomy with sentinel lymph node biopsies and left axillary lymphatic mapping-Dr. Gregg May   • MEDIPORT INSERTION, DOUBLE Right    • NEPHRECTOMY Left 12/30/2009    Dr. 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 Cristo Pittman   • TOTAL ABDOMINAL HYSTERECTOMY Bilateral 2007    Dr. Todd   • URETEROURETEROSTOMY Right 05/01/2006    Dr. Michael Everett   • VENA CAVA FILTER INSERTION N/A 05/08/2006    Dr. Jordon Barber   •  VENOUS ACCESS DEVICE (PORT) INSERTION Right 02/25/2013    Right subclavian vein PowerPort insertion-Dr. Gregg May   • VENOUS ACCESS DEVICE (PORT) INSERTION AND REMOVAL N/A 10/06/2014    Revision of right internal jugular PowerPort with fluoroscopy, removal of peripherally inserted central catheter line-Dr. Aurora Tomlinson   • VENOUS ACCESS DEVICE (PORT) INSERTION AND REMOVAL N/A 08/14/2014    Ultrasound-guided access right internal jugular vein, PowerPort placement, removal of right subclavian port-Dr. Jordon Barber   • VENTRAL/INCISIONAL HERNIA REPAIR Left 9/19/2018    Procedure: ventral hernia repair with mesh and rectus muscle advancement flap;  Surgeon: Trudy Rivera MD;  Location: [facility];  Service: General         Hematology/oncology History: See separate document for extra information.   1.    History of left breast cancer, status post left mastectomy on 04/25/2013, stage II, T2N0M0, hormone negative, HER2/mk positive by immunohistochemistry, however, no adjuvant chemotherapy delivered because of multiple myeloma diagnosed concomitantly.        2. Multiple myeloma, IgG lambda, stage III, diagnosed April 2012, previously treated with Velcade/Decadron followed by Velcade/Decadron/Revlimid; patient developed abdominal pain and rectal bleeding on Revlimid, which was then discontinued.    3. Patient evaluated at [facility], but was not felt to be a candidate for stem cell transplant.      4.   Disease progression with therapy switched to melphalan/Velcade/prednisone per the [facility] trial on 10/22/2013; melphalan dose has been persistently decreased because of thrombocytopenia.      5. Anemia secondary to chronic renal insufficiency and myeloma, on periodic Procrit therapy p.r.n.    6. Patient had 8 cycles of VMP chemotherapy, repeat laboratory studies on 10/03/2013 showed stable disease.  Her melphalan do  se was significantly decreased due to marrow suppression.       7.   Patient had disease progression after cycle #10 VMP treatment, evidenced by laboratory studies on 01/06/2014.  We increased the melphalan dose for one cycle.  Continued disease progression after cycle #11 VMP treatment, despite increased dose of melphalan, as documented by laboratory study on 02/17/2014.     8. The patient was evaluated on 02/24/2014 and we recommend switching chemotherapy to Kyprolis on 02/27/2014.     9. Echocardiogram on 03/05/2014 reporting L  VEF 43%.  This is on par with her previous twice echocardiogram study, in June 2012 and November 2012, reported LVEF at 45% to 50% and 40% to 45% respectively.     10. The patient had 2 episodes of chest pain after Kyprolis during cycle 1, leading to omitted dose   on days 9 and 16.  From cycle 2, we will give Kyprolis only once per week for 3 weeks out of every 4 weeks, as well as dose reduction of Kyprolis that was started on 04/04/2014.      11. After cycle 2 of dose-reduced Kyprolis, the patient's M spike increased fro  m 2.6 to 3.2 g/dL, with her IgG level increasing from 2.7 g/dL to 4.7 g/dL.     12.   Patient [facility].  The patient had LVEF 35% to 40%.  Myocardial perfusion study reported a large, mild to modera  te severity fixed inferior wall defect, consistent with known transmural infarct versus diaphragmatic attenuation artifact.  Post stress resting ejection fraction estimated at 31%.      13. Repeated echocardiogram study on 04/03/2015 reported an LVEF 46%.  Left v  entricle is moderately dilated.  There is mild global hypokinesis of the left ventricle.  There was grade 1 diastolic dysfunction.    14.   The patient was seen on 04/09/2015 with plan for cycle 14, day 1, of Kyprolis.  Treatment will be delayed 1 week secondary to patient'  s extreme fatigue, hemoglobin of 8.1.  We will proceed with 2 units of packed red blood cells.  She was also found to have a urinary  tract infection. Patient prescribed Cipro 500 mg twice a day x7.     15. Laboratory tests on 08/24/2015 reported sligh  t disease progress after cycle #18 Kyprolis. We discussed with patient and we will switch chemotherapy to CyBorD regimen with dose reduction of Velcade to 1 mg/m2, cyclophosphamide at 240 mg/m2 (total dose 450 mg), and dexamethasone 20 mg. All therapy to   be repeated on a weekly basis.     16.   The patient had significant fatigue after one dose of cyclophosphamide that lasted for 5-6 days. She also had severe anemia, hemoglobin 7.6, required 2 units PRBC transfusion. Cyclophosphamide will be decreased to 350 mg from 2nd week.     17.  Patient continues to have significant fatigue after the reduced dose of cyclophosphamide at 350 mg; for 2 days after chemo, she could only lie on the bed with performance status ECOG 3. From 10/13/2015, oral cyclophosphamide will be further dec  reased to 200 mg once weekly. We will continue Velcade and dexamethasone at same dose.   Evidence of disease progress, when she was on panobinostat treatment.   18. The patient also has worsening ane  ming and thrombocytopenia secondary to chemotherapy. The patient has symptomatic anemia with hemoglobin 7.8 on 02/23/2016 requiring 2 units of packed RBC transfusion. Chemotherapy with panobinostat will be discontinued.       19. Patient was switched to pomalidomide 3 mg daily for 21 days / 28 days in late March 2016.    20. Disease progression, she was switched to to the Darzalex in May 2016.       On12/6/2016, serum free lambda chain 1090 MG/L, free kappa chain 8.5 to MG/L.  Ferritin 1015, iron 99, TIBC 137 iron saturation 72%.     On January 4, 2017, vitamin B12 level 1289, folate 7.7 ng/mL. SPEP reporting gamma globulin 3.3, out of total globulin 5.0, with immunofixation reporting small quantity of monoclonal free lambda chain, plus monoclonal IgG lambda at 3.2 g/dL.  Serum IgG 4075, IgA 9 and IgM 15.  free lambda chain 1339  MG/L and free kappa chain 10.3 MG/L.      Laboratory study 3/27/2017 showed slight improvement of paraprotein, SPEP reporting M spike 2.8 g/DL, out of total globulin 4.3, and the serum IgG 3420, IgA 9, IgM 11, free lambda chain 1218 MG/L and free kappa chain 8.0 MG/L.  Total 24 hour urine sample reported at free lambda chain 142 mg, at a concentration 74.8 MG/L, free kappa chain 75 mg at a concentration 39.5 MG/L., Urine protein immunofixation reporting biclonal IgG lambda and monoclonal free lambda light chain, M spike #1 at 41.5% and monoclonal IgG lambda spike #2 at 10.5%. Serum beta-2 microglobulin is 7.3 MG/L.     Her laboratory study on 7/21/2017 reported further improvement of paraprotein is both serum and a 24-hour urine sample.  SPEP reporting monoclonal IgG lambda 2.9 g/dL and free lambda chain 0.1 g/dL.  Serum IgG was 3261 mg/dL and IgA 5, IgM 13, free kappa chain 6.7, free lambda chain 701.3 with kappa/lambda ratio 0.01.  24-hour urine sample reported positive for Bence May protein lambda type, immunofixation positive for IgG lambda.  Total urine protein 241 mg, gamma globulin 13.1%.  Hemoglobin was 11.4 .4, platelets 122,000, WBC 6680 including neutrophils 3600, lymphocytes 2100 and monocytes 650.  Her creatinine was 1.68, at baseline level.  Liver function panel unremarkable.  Darzalex was continued.    Laboratory study on 8/25/2017 showed improved the platelets at 144,000, normal WBC 6660 and slightly improved hemoglobin at 11.7.     Patient had a colonoscopy examination on 8/30/2017 by Dr. Rivera.  It reported diverticulosis in multiple areas more severe in the sigmoid colon.  There was 2 polyps 4 mm pneumonia from transverse colon and the sigmoid colon.  Pathology evaluation reported tubular adenoma from the sigmoid colon polyp, and benign hyperplastic polyp from transverse colon.    Laboratory study on 10/20/2017 reported slightly improved monoclonal spike 2.7 g/dL by SPEP, immunofixation  reported positive monoclonal IgG lambda, serum IgG 3536 mg/dL, IgA less than 5 and IgM 11, free kappa chain 5.3 mg/L, and free lambda chain 720 mg/L with kappa/lambda ratio 0.01.  Serum beta-2 microglobulin was 6.5 mg/L.   Her CBC showed a stable mild anemia with hemoglobin 11.3, macrocytosis .3, and the platelets 114,000, normal WBC 7070 including neutrophils 4100 lymphocytes 2000 monocytes 650, normal liver function panel, total protein 7.9 and albumin 3.2,  and normal electrolytes including calcium 8.1, and improved creatinine 1.59.     Laboratory study on 1/12/2018 reported serum IgG 3083 mg/dL, IgA 10, IgM 10, free kappa chain 8.5 and free lambda chain 589.1 mg/L, kappa/lambda ratio 0.01, serum protein admitted fixation reported IgG lambda monoclonal protein and SPEP reporting M spike 3.1 g/dL, beta-2 microgram and 7.4 mg/L. serum creatinine 1.79, calcium 8.2, other electrolytes normal, hemoglobin 11.3, .5 and MCHC 31.6, platelets 129,000, WBC 6780 including neutrophils 3500 lymphocytes 2300.  Serum ferritin 653.7 ng/mL, iron 123 TIBC 174 iron saturation 71%, folic acid 12.8 ng/mL and a vitamin B12 at 873 pg/mL.  Her 24-hour urine study on 1/18/2018 reported lambda chain 32.8 mg/L, total 61 mg in 24 hours, and the free kappa chain 27.4 mg/L and 51 mg in 24 hours, and the total 24-hour urine protein 283 mg, and urine protein immunofixation positive for 5.3% monoclonal IgG lambda and positive for Bence May protein at 36.2% monoclonal lambda chain.  Monthly Darzalex was continued.       This patient had serum protein study on 04/06/2018 which reported free light chain at concentration 703.8 mg/L and free kappa chain 7.0, free kappa/lambda ratio 0.01, serum IgG 3650 mg/dL, IgM 11 and IgA 9 with serum protein electrophoresis reporting monoclonal IgG lambda 3.2 g/dL and also monoclonal free lambda light chain.  But it was unable to quantify monoclonal lambda light chain because of the small quantity  of it.     A 24-hour urine study on 04/20/2018 reported total free lambda chain 60 mg in 24 hours at concentration 33.1 mg/L, free kappa chain 45 mg in 24 hours at concentration 24.8 mg/L. Total 24-hour urine protein was 279 mg. Urine protein immunofixation and UPEP reported lambda-type Bence-May protein + #1 monoclonal IgG lambda band at 34.8% and #2 monoclonal IgG lambda band at 6.9%.     Her CBC results today reported a stable hemoglobin at 11.1, platelets 130,000 and WBC 7440 including neutrophils 4100 and lymphocytes 2350, monocytes 650. Her CMP reported slightly worsened creatinine at 1.82 with BUN 33. Total protein at 8.8. Liver function panel was normal. Total serum protein 8.8 and albumin 3.2, globulin 5.6.    Reviewing her previous lab studies especially serum paraprotein, she reached a micaela of response on 07/05/2018 when she had serum IgG 3015 mg/dL, free lambda chain 458.7 mg/L and M spike IgG lambda 2.6 g/dL and and monoclonal lambda free light chain 0.1 g/dL, total 2.7 g/dL. serum beta-2 myoglobin 7.1 mg/L.  Her ferritin was 539 ng/mL, free iron 73 TIBC 176 iron saturation 42%.    Laboratory study obtained on 11/01/2018 showed further disease progression. Her serum IgG was 5472 mg/dL, IgA 8 and IgM 10, serum kappa chain 7.9 mg/L, free lambda chain 961.3 mg/L and kappa/lambda ratio 0.01. Serum protein electrophoresis reported monoclonal IgG lambda 4.1 g/dL, and monoclonal lambda free light chain 0.1 g/dL. Her hemoglobin was 9.0, .6, MCHC 30.1, platelets 124,000 and WBC 10,000 including neutrophils 7400, lymphocytes 1200, monocytes 600.     Cycle #1 day #1 Bendamustine will be started on 11/29/2018.     Laboratory studies on 01/17/2019 reported improved paraproteins.  SPEP reported M spike 3.8 g/dL out of total 5.5 g/dL globulin.  Serum IgG was 4374 mg/dL, IgA 10 and IgM 11.  Free lambda chain 606.8 mg/L, free kappa chain 8.6 and kappa/lambda ratio 0.01.  Her serum protein immunofixation  continues to be IgG lambda monoclonal.  Her CBC showed hemoglobin 9.3, .3, platelets 50,000 and WBC 8600 including ANC 5100, lymphocytes 2160.  Her Chemistry lab reported creatinine 1.92, calcium 8.3, normal liver function panel, glucose 98 with normal sodium and potassium.     For her 24-hour urine study on 3/14/2019, she had free lambda chain at a 62.1 mg/L, total 87 mg in 24 hours, free kappa chain 42 mg/L and 59 mg in 24 hours.  Urine protein immunofixation reported a monoclonal IgG lambda #1 and free lambda chain were unable to be quantified, however IgG lambda #2 was 12.8%.  Her 24-hour urine total protein was 452 mg.     For serum protein study on 3/28/2019 (on day of her cycle #5 day #1 Treanda ) also reported further decreased monoclonal spike 3.2 mg/dL, and serum IgG 3600, IgA 13 IgM 12, free kappa chain 11.7, and worsening free lambda chain 1045 mg/L.     Her serum paraprotein studies on 4/11/2019 reported a serum IgG 4407 mg/dL, IgA 14, IgM 13, free kappa chain 9.2, free lambda chain 998.4 mg/L, kappa/lambda ratio 0.01.  His serum protein immunofixation was positive for IgG lambda, SPEP reporting M spike 4.3 g/dL.    Her 24-hour urine study on 7/18/2019 reported positive IgG lambda monoclonal protein and lambda type Bence-May protein, total free kappa chain 142 mg in 24 hours, at 74.6 mg/L, and total lambda chain 179 mg in 24 hours at 94.1 mg/L.  Kappa/lambda ratio 0.79.  Her total 24-hour urine protein was 410 mg, with monoclonal lambda free light chain 41.8% and monoclonal IgG lambda 2.6%.     Serum paraprotein studies on 7/24/2019 reported monoclonal spike 3.9 g/dL, serum IgG 4107, IgA 14 IgM 19, free kappa chain 12.8 and free lambda chain 656, kappa/lambda ratio 0.02.    On 08/01/2019 she has severe thrombocytopenia platelets 27,000.  She has elevated WBC to 12,600 including ANC 8900 and normal lymphocytes 2000.  Her hemoglobin is 10.4 08/01/2019. The patient underwent a bone marrow biopsy  on 08/12/2019 and the pathology report showed hypercellular bone marrow with involvement by plasma cell myeloma and no metastatic carcinoma, granulomas, vasculitis, viral inclusions, increase in myeloblasts, or malignant lymphoma. Over 85% of the bone marrow are plasma cells. Normal karyotype. Flow cytometry study was positive.    Because of her persistent severe thrombocytopenia, the patient underwent a bone marrow biopsy on 08/12/2019.  Pathology evaluation showed hypercellular bone marrow with involvement by plasma cell myeloma 85-90% and no metastatic carcinoma, granulomas, vasculitis, viral inclusions, increase in myeloblasts, or malignant lymphoma.  Normal karyotype.     Recent laboratory study on 8/15/2019 reported of elevated ferritin 524, normal iron saturation 42% with free iron 59 and a TIBC 140.  Normal thyroid function profile.  Her serum globulin 7.3, total protein 10.1 and albumin 2.8, calcium 8.1 creatinine 1.99.    She does continue to have severe intermittent pain in her right hip which is managed somewhat with pain medication, including tylenol and prescription Norco. She states she has difficulty ambulating and functioning normally when this pain occurs. She continues on oral Vitamin B-12 and folic acid supplementation.     Recent XR right hip with or without pelvis on 08/15/2019 showed sclerosis at the pubic symphysis. No other bony or articular abnormality was identified. The hip joints were symmetric and appeared within normal limits. The joint spaces were fairly well-maintained. There was no bony erosion. There was no evidence of recent or old fracture or subluxation. A right ureteral stent was noted.     On 08/30/2019, switched treatment from Empliciti to oral ixazomib 3 weeks on and 1 week off. She is expected to start the medication on 9/5/2019 when this medication arrives.  Patient will continue dexamethasone weekly at the same time as part of the treatment.  Because of her severe  thrombocytopenia, we recommended starting low-dose ixazomib 2.3 mg weekly.  Treatment was started on 9/5/2019.     Laboratory study on 9/26/2019 showed worsening leukocytosis with WBC 20,780 including ANC 16,000, lymphocytes 2700 and monocytes 8070.  She also has worsening severe thrombocytopenia with platelets 27,000, and stable hemoglobin 9.5.     Suspect the patient may have some kind of infection however she denies fever sweating chills no dysuria or hematuria.  She denies pain in the right flank area where she has ureteral stent and oftentimes complains of pain when she has urinary tract infection.      I searched medical records, she had replacement of stent 3 months ago.  I recommended urinalysis and culture on 9/26/2019 for further evaluation despite that she has no symptoms.I called and spoke to patient about her urinalysis which showed a large quantity of bacteria and WBC.  I e-scribed cefdinir to her pharmacy, 300 mg twice a day for 7 days. I called the patient today, reporting her urine culture results with multidrug sensitive E. coli.  She will continue cefdinir as we prescribed.  Should be sensitive according to the culture results.I think this patient will need to follow-up with her urologist Dr. Everett for stent replacement.  I sent a message to Dr. Everett.    On 10/17/2019, patient also reports she has intermittent chest pain/epigastric area/stomach pain for the past several weeks, to the point that she thought she was not able to make it.  Patient reports she had stress test this week and was told to having nothing wrong.  Patient also reports this is not related to her eating.  No apparent effect of exacerbation or relieving.      Unfortunately laboratory study on 10/24/2019 reported significant disease progression, he had a M spike 5.1 g/dL by SPEP, and a serum IgG 7280 mg/dL, IgA 16, IgM 17, free kappa chain 10.2 and free lambda chain 1309.5 mg/L, with kappa/lambda ratio 0.01.  Her beta-2  myoglobin was 16.2 mg/L.  Chemistry lab reported total serum protein 11 g, albumin 2.7 and globulin 8.3.  Her liver function panel was normal, creatinine 1.90 and normal calcium and other electrolytes.  Continues to have anemia Hb 10.0, platelets 26,000 and WBC 19,180 including ANC 13,230 lymphocytes 3070 monocytes 1920 and eosinophil 770.  Her iron study reported ferritin 455.4 ng/dL, free iron 61, TIBC 155 and iron saturation 39%, with folic acid at 7.3 ng/mL and vitamin B12 at 1800 pg/mL.    Patient initiated chemotherapy treatment with darzalex/velcade/dexamethasone on 11/7/2019.    Laboratory study 12/12/2019 showed deteriorating hemoglobin 7.7.  She also has stable but severe thrombocytopenia with platelets 24,000.  Her WBC is normal at 5230 including ANC 2670.  Weekly Procrit was continued.  Patient was given 2 units PRBC transfusion.          Review of Systems   Constitutional: Positive for fatigue (improved). Negative for appetite change, chills, diaphoresis, fever and unexpected weight change.   HENT:   Negative for mouth sores, nosebleeds and sore throat.    Eyes: Negative for eye problems and icterus.   Respiratory: Negative for cough and shortness of breath.    Cardiovascular: Negative for chest pain, leg swelling and palpitations.   Gastrointestinal: Negative for abdominal pain, blood in stool, diarrhea and nausea.   Genitourinary: Negative for dysuria, frequency and hematuria.    Musculoskeletal: Positive for arthralgias (Right hip pain stable). Negative for back pain, flank pain, gait problem and myalgias.   Skin: Negative for itching and rash.   Neurological: Positive for extremity weakness (stable). Negative for dizziness, gait problem, headaches, light-headedness and numbness.   Hematological: Negative for adenopathy. Does not bruise/bleed easily.   Psychiatric/Behavioral: Negative for depression. The patient is not nervous/anxious.    All other systems reviewed and are negative.    Medications:  "The current medication list was reviewed in the EMR.         Allergies   Allergen Reactions   • Sulfa Antibiotics Swelling   • Zosyn [Piperacillin Sod-Tazobactam So] Swelling     Face and lips       VITALS:   Vitals:    01/23/20 0928   BP: 104/67   Pulse: 92   Resp: 16   Temp: 97.3 °F (36.3 °C)   SpO2: 97%   Weight: 77.2 kg (170 lb 4.8 oz)   Height: 157.5 cm (62.01\")   PainSc:   6   PainLoc: Comment: chest area   PS: ECOG 1         Physical Exam   Constitutional: She is oriented to person, place, and time. She appears well-developed and well-nourished. No distress.   HENT:   Head: Normocephalic and atraumatic.   Eyes: Conjunctivae and EOM are normal. Pupils are equal, round, and reactive to light.   Neck: No masses.  Cardiovascular: Normal rate, regular rhythm and normal heart sounds.  Exam reveals no friction rub.    No murmur heard.  Pulmonary/Chest: Effort normal and breath sounds normal. She has no wheezes.   Abdominal: Soft.  No tender.  Bowel sounds are normal.   Lymphadenopathy:     She has no cervical adenopathy.   Neurological: She is alert and oriented to person, place, and time.   Skin: Skin is warm and dry.   Psychiatric: She has a normal mood and affect. Thought content normal.       Recent lab results:   Results from last 7 days   Lab Units 01/23/20  0906   WBC 10*3/mm3 9.19   NEUTROS ABS 10*3/mm3 6.16   LYMPHS ABS 10*3/mm3 2.11   HEMOGLOBIN g/dL 10.2*   HEMATOCRIT % 32.8*   PLATELETS 10*3/mm3 33*      Glucose   Date Value Ref Range Status   01/16/2020 123 (H) 65 - 99 mg/dL Final     BUN   Date Value Ref Range Status   01/16/2020 21 8 - 23 mg/dL Final   10/18/2019 26 (H) 7 - 20 mg/dL Final     Creatinine   Date Value Ref Range Status   01/16/2020 1.88 (H) 0.57 - 1.00 mg/dL Final   10/18/2019 2.2 (H) 0.7 - 1.5 mg/dL Final     Sodium   Date Value Ref Range Status   01/16/2020 137 136 - 145 mmol/L Final   10/18/2019 144 137 - 145 mmol/L Final     Potassium   Date Value Ref Range Status   01/16/2020 3.9 " 3.5 - 5.2 mmol/L Final   10/18/2019 3.9 3.5 - 5.1 mmol/L Final     Chloride   Date Value Ref Range Status   01/16/2020 112 (H) 98 - 107 mmol/L Final   10/18/2019 122 (H) 98 - 107 mmol/L Final     CO2   Date Value Ref Range Status   01/16/2020 17.9 (L) 22.0 - 29.0 mmol/L Final     Total CO2   Date Value Ref Range Status   10/18/2019 20 (L) 22 - 30 mmol/L Final     Calcium   Date Value Ref Range Status   01/16/2020 7.7 (L) 8.6 - 10.5 mg/dL Final   10/18/2019 8.5 8.4 - 10.2 mg/dL Final     Total Protein   Date Value Ref Range Status   01/16/2020 12.6 (H) 6.0 - 8.5 g/dL Final     Albumin   Date Value Ref Range Status   01/16/2020 2.5 (L) 2.9 - 4.4 g/dL Final   01/16/2020 2.50 (L) 3.50 - 5.20 g/dL Final     ALT (SGPT)   Date Value Ref Range Status   01/16/2020 7 1 - 33 U/L Final     AST (SGOT)   Date Value Ref Range Status   01/16/2020 14 1 - 32 U/L Final     Alkaline Phosphatase   Date Value Ref Range Status   01/16/2020 44 39 - 117 U/L Final     Total Bilirubin   Date Value Ref Range Status   01/16/2020 0.4 0.1 - 1.2 mg/dL Final     eGFR Non  Amer   Date Value Ref Range Status   08/30/2016  >60 mL/min/1.73 Final     Comment:     <15 Indicative of kidney failure.     A/G Ratio   Date Value Ref Range Status   01/16/2020 0.3 (L) 0.7 - 1.7 Final     BUN/Creatinine Ratio   Date Value Ref Range Status   01/16/2020 11.2 7.0 - 25.0 Final   10/18/2019 11.8 RATIO Final     Anion Gap   Date Value Ref Range Status   01/16/2020 7.1 5.0 - 15.0 mmol/L Final       Lab Results   Component Value Date    IRON 109 01/23/2020    TIBC 130 (L) 01/23/2020    FERRITIN 912.70 (H) 01/23/2020     Lab Results   Component Value Date    FOLATE 7.34 10/24/2019     Lab Results   Component Value Date    CPOLRWTD86 1,808 (H) 10/24/2019       Assessment/Plan   1. Multiple myeloma, IgG lambda, stage III. Failed multiple lines of therapy. Her treatment was switched to Darzalex on 5/26/2016. She was on this treatment for more than 2 years.   · In  November 2018, she clearly had disease progression.  Darzalex was discontinued and was started on bendamustine plus Velcade plus dexamethasone.   · Cycle #1 day #1 Bendamustine was started on 11/29/2018.  Due to poor tolerance with profound fatigue, chemotherapy was modified with bendamustine only given on day 1, and a Velcade weekly, every 4 weeks as 1 cycle.  Patient had a cycle #6 dose reduction of bendamustine by 25% because of severe thrombocytopenia.   · This patient actually had a micaela of response on 3/28/2019 with M spike 3.2 g/dL and serum IgG 3600.  After that M spike and serum IgG both were elevating.   · Laboratory study obtained on 5/9/2019 after 6 cycles chemotherapy, it showed disease further progressed with worsening level of serum IgG 4873 mg/dL and M spike 4.3 g/dL.    · On 5/23/2019, patient was switched to Empliciti plus Velcade and decadron.    · Her serum protein studies reported persistent active disease, not well controlled with large quantity of monoclonal spike.  She also has severe thrombocytopenia oftentimes leading to hold Velcade injection.  · Bone marrow aspiration biopsy for reanalysis recommended.  This patient also has a history of breast cancer so we need to be open-minded.  She had bone marrow aspirate biopsy on 8/12/2019 which showed more than 85% of bone marrow cells are malignant plasma cells.  There is no evidence of metastatic disease or other type of malignancy.  · Recommended switching treatment to Ixazomib plus dexamethasone.  Considering her pre-existing severe thrombocytopenia, I recommended starting low-dose Ixazomib 2.3 mg weekly instead of full dose 4 mg weekly.   · She started new treatment on 9/5/2019 with dexamethasone 20 mg weekly.  oral ixazomib/Dex 3 weeks on and 1 week off. She is to start the ixazomib at decreased dose 2.3 mg weekly starting on 9/5/2019.   · Patient has significant disease progression on 10/24/2019 with serum increased M spike, serum IgG and  free lambda chain.  · On 10/31/2019, I discuessed options with the patient and suggested Darzalex/Velcade/dexamethasone as it would be easy on her bone marrow. Patient initiated Cycle #1 treatment on 11/7/2019.   · So far patient has been tolerating well.  Laboratory study 1/16/2020 reported slightly worsening serum IgG and free lambda chain.  She has no apparent response to Velcade plus Darzalex plus dexamethasone, except normalization of leukocytosis.      2.  Severe thrombocytopenia.  This is more likely secondary to her multiple myeloma, based on her recent bone marrow aspiration biopsy on 8/12/2019. patient has no easy bleeding or bruising.   · Patient was last transfused with platelets on May 13, 2019 prior to her EGD.  · The patient underwent a bone marrow biopsy on 08/12/2019 and the pathology report showed hypercellular bone marrow with involvement by plasma cell myeloma and no metastatic carcinoma, granulomas, vasculitis, viral inclusions, increase in myeloblasts, or malignant lymphoma. Over 80% of the bone marrow are plasma cells. Normal karyotype.   · Laboratory study on 8/30/2019 showed platelets of 34,000. We switched her treatment to oral ixazomib 3 weeks on and 1 week off starting 9/05/19.    · Platelet count today 27,000 on 12/26/2019.  Patient reports no spontaneous bleeding.  Continue to monitor.   · Her Velcade treatment was on hold on 1/16/2020, due to severe thrombocytopenia with platelets 18,000.  On 1/23/2020, platelets improved at 33,000.    3.  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    · Her laboratory study on 5/9/2019 showed no evidence of iron deficiency.  On 5/16/2019, B12 level of 1819 and normal folate of 5.84.  She was symptomatic anemic with Hb 8.5, she was transfused with 2 units of packed red blood cells.    · Recent labs on 8/15/2019 reported no evidence of iron deficiency, had normal iron saturation and excellent ferritin level.  · Laboratory  study on 10/24/2019 reported no evidence of iron deficiency, nor B12 folic acid deficiency.  She will continue oral B12 supplementation.    · Her hemoglobin was 7.7 on 12/12/2019.  We continued weekly Retacrit.  Because patient was symptomatic, with worsening fatigue from her anemia, she was given 2 units PRBC transfusion.   · Improved hemoglobin 10.1 on 1/23/2020.  Continue Procrit weekly as needed per protocol.     4.  Dysuria/urgency of urination.  Urinalysis reported large quantity of WBC and bacteria 11/13/2019.  Treated with Omnicef.  Symptoms have resolved.  She did have a stent replacement on 11/21/2019.   · Patient denies recurrent symptomology on 12/26/2019.   · Patient reports that she has a scheduled ureteral stent replacement 1/24/2020 by her urologist.    5. Zometa / Xgeva treatment.  Because of her kidney insufficiency, we were only able to give her Zometa every 3 months.  Due to her elevated creatinine level, we eventually switched her to Xgeva treatment and continued monthly.  · She had elevated phosphorus 5.6 on 6/6/2019.   · Her last Xgeva was 12/5/2019.   She is due on 1/2/2020.  This will be continued every 4 weeks.        6. History of stage II left breast cancer, post mastectomy in 2013, negative for ER/AL and positive HER-2. No neoadjuvant chemotherapy because of concurrent discovery of multiple myeloma and ongoing chemotherapy. She had a normal mammogram study on 7/26/2019.       7.  Skin rashes on her extremities:  Patient was seen dermatologist Dr. Barroso, who prescribed steroids cream and also over-the-counter moisturizing cream.  Patient will follow-up with Dr. Barroso again.  Currently resolved.    8. Profound fatigue: This is multifactorial secondary to her disease progression and her worsening anemia associated with chemotherapy.   · A thyroid level was obtained and patient was diagnosed with subclinical hypothyroidism.  She was initiated on Synthroid and folic acid and has noted  significant reduction in fatigue.   · Patient to continue on Synthroid daily and folic acid supplementation daily.  · Her fatigue is slightly improved today.    9.  Hypocalcemia.  The patient has struggled with compliance with her calcium supplements.  She reports she has been taking these inconsistently, though they resulted in soft frequent bowel movements.  We previously added 2 tablets of Tums daily along with current calcium/vitamin D supplementation.    · Calcium level 7.7 on 1/16/2020.  Her albumin was 2.5.      10.  Gastric ulcer diagnosed in May 2019 epigastric discomfort.    · Patient had EGD examination by Dr. Rivera on 5/13/2019.  It reported prepyloric ulceration.  Dr. Rivera recommended Protonix twice a day. With increased dose of Protonix, she has had no further issues.      11.  Worsening leukocytosis/neutrophilia.    · This started in middle of July 2019 with a fluctuation of WBC and neutrophils.  Suspect that this is all due to disease progression of her multiple myeloma.    · This has resolved after patient is switched her to chemotherapy Darzalex plus Velcade and dexamethasone in early November 2019.     12.  Severe chest pain today.   Started this morning, and progressively getting worse.  No apparent triggering reason, and the not to be relieved.  Suspect the patient might have myocardial infarction.  We called the ambulance service and that she will be taken to [facility] emergency room for further evaluation.    Plan:  1. We called EMS, they are taking patient to [facility] emergency room.    2. Hold chemotherapy, due for Velcade today cycle 4-day 15.   3. Patient will be due for Xgeva in 1 week on 1/30/2020.  This will be repeated every 4 weeks.   4. Continue Protonix to 40 mg twice a day.    5. Continue on Synthroid daily.     6. Continue Norco for pain management.    7. Continue calcium and vitamin D supplementation.    8. Continue oral B12 and folic acid daily.     9. Continue prophylactic acyclovir 400 mg twice daily.   10. Continue prophylactic Bactrim 3 times per week for PCP infection per protocol.   11. We will arrange follow-up of plan pending her evaluation in the emergency room.      Patient is on drug therapy and requires frequent monitoring.      Zane Araujo MD PhD  1/23/2020.     [facility] -  Cristo Madera MD, M.D.

## 2020-01-24 PROBLEM — I95.9 HYPOTENSION: Status: ACTIVE | Noted: 2020-01-01

## 2020-01-26 NOTE — OUTREACH NOTE
Prep Survey      Responses   Facility patient discharged from?  [place]   Is patient eligible?  Yes   Discharge diagnosis  Community acquired pneumonia of left lower lobe of lung   Does the patient have one of the following disease processes/diagnoses(primary or secondary)?  COPD/Pneumonia   Does the patient have Home health ordered?  No   Is there a DME ordered?  No   Prep survey completed?  Yes          Alaina Mendez RN

## 2020-01-27 NOTE — OUTREACH NOTE
COPD/PN Week 1 Survey      Responses   Facility patient discharged from?  [place]   Does the patient have one of the following disease processes/diagnoses(primary or secondary)?  COPD/Pneumonia   Is there a successful TCM telephone encounter documented?  Yes          Rodolfo Jaramillo RN

## 2020-01-27 NOTE — OUTREACH NOTE
Spoke with pt, doing much better since hospital stay for pneumonia and chest pain. (Also recent prior to hospital stay dx of RSV). Pt states her appetite is good and she is eating and drinking normally. No chest pain, SOB, fever, chills. She does still have a cough but it is much better as well. Confirmed receipt and understanding of d/c orders and medications. Pt agreeable to TCM [facility] but would prefer to see Dr Madera.

## 2020-01-29 NOTE — PROGRESS NOTES
REASONS FOR FOLLOW UP:   1. IgG kappa multiple myeloma, was on Darzalex, started on May 26, 2016. Patient was switched to Darzalex from Pomalyst, as she continued to be neutropenic with recurrent urinary tract infection while on Pomalyst.    2. Zometa is on hold due to renal insufficiency.    3. After 16 doses of Darzalex, laboratory study showed stable disease in November 2016. Insurance company denied adding Velcade and dexamethasone. Patient is to be continued on monthly Darzalex from 12/06/16.   4. Obstructive right pyelonephritis with positive urine culture for E. coli, required hospitalization from 1/19/2017 through 01/24/2017 with iv antibiotics and stent exchange on 01/20/2017.   5.  Paraprotein studies on March 27, 2017 showed further slight improvement of multiple myeloma.   6.  Febrile illness, with urinary tract infection and influenza B infection in early May 2017, required hospitalization for 6 days.   7.  Paraprotein studies for both serum and 24-hour urine sample on 7/21/2017 showed further improvement of paraproteins.   Darzalex was continued.   8.  Serum protein study reported stable disease on 10/20/2017.  Darzalex will be continued.   9.  Laboratory studies of both serum paraprotein and a 24-hour urine protein in January 2018 showed further improvement of paraproteins.  Monthly Darzalex will be continued.   10. Repair of left flank incisional hernia in middle September 2018.   11. Serum paraprotein study reached micaela on 7/5/2018.  Since that time she has evidence of disease progression.   12. Disease progression, she was started on chemotherapy with bendamustine plus Velcade plus dexamethasone per protocol on 11/29/2018. Treatment will be bendamustine on day 1 and day 4 repeat every 28 days, Velcade and dexamethasone will be on day 1 day 4 and day 8 and day 15 repeat every 28 days.   13.  From cycle #2, patient was given only 1 dose of Treanda per cycle and continue Velcade and dexamethasone  weekly.   14.  Treanda was decreased by 20% after cycle #4 because of severe thrombocytopenia, and further decreased by another 25% on cycle #6 which is her last cycle on 4/25/2019.   15.  On 5/23/2019, patient was switched to Empliciti plus Velcade and decadron.    16.  Patient has persistent severe thrombocytopenia, no improvement after starting new regimen.    17. The patient underwent a bone marrow biopsy on 08/12/2019 and the pathology report showed hypercellular bone marrow with involvement by >85%, plasma cell myeloma and no metastatic carcinoma, granulomas, vasculitis, viral inclusions, increase in myeloblasts, or malignant lymphoma.   18. On 08/30/2019, discussed with patient to switch from Empliciti/Velcade/Dex to oral ixazomib/Dex 3 weeks on and 1 week off. She is to start the medication at decreased dose 2.3 mg weekly starting on 9/5/2019.   19.  Laboratory studies on 10/24/2019 revealed disease progression. Therefore therapy was transitioned to Darzalex and subq Velcade with oral Decadron. Patient initiated chemotherapy treatment on 11/7/2019.    20.  Persistent severe thrombocytopenia started in February 2019 secondary to chemotherapy and multiple myeloma disease progression.  21.  Symptomatic anemia Hb 7.7 on 12/12/2019.  Patient was given 2 units PRBC transfusion.    22.  Cycle 4 day #8 was held due to severe worsening thrombocytopenia platelets 18,000.  23.  The patient was due for Velcade cycle 4 day 15 on 1/23/2020, but this was held as she experienced acute chest pain and was sent to the ED for further evaluation.  She had an elevated D-dimer, but her NM lung ventilation perfusion study showed a low probability for pulmonary thromboembolus.  Chest x-ray was suggestive of pneumonia and she was admitted.  She received IV antibiotics, with improvement of her symptoms.  Chest pain resolved.  She was discharged on 1/25/2020 with a couple more days of Cefdinir and doxycycline.     HISTORY OF PRESENT  ILLNESS:   The patient is a 69 y.o. female with the above-mentioned history here today for 1-week re-evaluation following recent hospitalization.  The patient was due for Velcade cycle 4 day 15 on 1/23/2020, but this was held as she experienced acute chest pain, which started earlier that morning and was worsening.  She was sent to the ED that morning for further evaluation.  She also had an associated cough with clear/yellow sputum and shortness of breath.    In the ER, she was found to have an elevated D-dimer, but her NM lung ventilation perfusion study showed a low probability for pulmonary thromboembolus.  A chest x-ray was suggestive of pneumonia, and she was admitted for further care.  She was given IV antibiotics for a couple days for the potential pneumonia.  Her symptoms improved, and her chest pain resolved.  She was discharged on 1/25/2020 with a couple more days of Cefdinir and doxycycline.    Today, she reports she is still experiencing intermittent chest pain, which is worsened with being physically active and walking around.  The pain is located in the central chest and does not radiate.  She states that she feels the pain is related to the PowerPort on her chest.  She notes associated shortness of breath and cough.  She reports she can occasionally walk around the store, but sometimes she is unable to due to the severe chest pain.  The pain is relieved with taking Norco, rest, and sitting down.  She has no nausea vomiting.  No fevers or sweating.  The patient still has a couple more days left of the Cefdinir and doxycycline.  She was also given an inhaler to use for her dyspnea.    She has a history of gastric ulcers.  The patient continues to take Protonix 40 mg twice daily for GERD.      She is scheduled to have a ureteral stent replacement on 2/10/2020 by her urologist, Dr. Everett.    Laboratory studies today, 1/30/2020, show slightly improved platelets 36,000, and the stable anemia hemoglobin  8.5, WBC 6260 including ANC 3780 lymphocytes 1500.  Chemistry lab showed a baseline creatinine 2.0, calcium 8.5 normal magnesium 1.8 phosphorus 3.3, normal liver function panel.  Glucose 107.    Laboratory study on 1/23/2020 reported serum ferritin 912.7 ng/mL, free iron 109, TIBC 130 and iron saturation 84%.    Paraprotein studies on 1/16/2020 reported monoclonal IgG lambda 6.8 g/dL, and the unable to qualify the free lambda chain by SPEP.  Serum IgG 7632 mg/dL, IgA 5, IgM 13, free kappa chain 8.6 and the free lambda chain 1415 mg/L with kappa/lambda ratio 0.01.      Past Medical History:   Diagnosis Date   • Anemia 10/14/2008   • Arthropathy of knee 01/07/2014   • Breast cancer (CMS/HCC) 04/2012   • Bursitis of left hip 10/18/2007   • Bursitis, knee 12/02/2013   • Calcaneal spur 08/12/2009   • Chronic kidney disease, stage III (moderate) (CMS/[facility])    • Colon cancer screening 07/01/2017   • Congestive heart failure (CMS/[facility])    • Diverticulitis of colon 10/17/2007   • DVT (deep venous thrombosis) (CMS/[facility])    • Epigastric abdominal pain 4/8/2019   • Gastroesophageal reflux disease 4/8/2019   • H/O Diastolic dysfunction    • H/O Mitral regurgitation    • History of Clostridium difficile 04/01/2014   • History of echocardiogram 04/2014   • History of MRSA infection    • History of obesity    • History of transfusion    • Hydronephrosis    • Hypertension    • LLL pneumonia (CMS/[facility]) 04/23/2009   • Malignant neoplasm of kidney (CMS/HCC) 12/2009   • Multiple myeloma (CMS/[facility]) 04/2012   • Myocardial infarction (CMS/[facility])    • Neoplasm of uncertain behavior 10/12/2015   • Non-STEMI (non-ST elevated myocardial infarction) (CMS/[facility])    • Nonischemic cardiomyopathy (CMS/[facility])    • Pyelonephritis 03/2004   • Seizures (CMS/HCC) 10/17/2007   • Thrombocytopenia (CMS/[facility])    • UTI (urinary tract infection) 10/30/2014   • UTI (urinary tract infection) 03/28/2014   • Ventral hernia      Past Surgical History:   Procedure Laterality  Date   • BONE MARROW BIOPSY N/A 04/11/2012   • BRAIN SURGERY  2005    Meningioma   • BRAIN SURGERY  1990    Tumor benign per patient   • BREAST BIOPSY Left 04/09/2012    Dr. Gregg May   • CARDIAC CATHETERIZATION Left 06/11/2012    Dr. Sudeep Haddad   • CARDIAC CATHETERIZATION N/A 08/15/2006    Dr. Brian Bhatt   • COLONOSCOPY N/A 8/30/2017    Procedure: COLONOSCOPY into cecum and TI with cold polypectomies;  Surgeon: Trudy Rivera MD;  Location: [facility] ENDOSCOPY;  Service:    • COLONOSCOPY W/ POLYPECTOMY N/A unknown    2 polyps, benign   • CYSTOSCOPY N/A 3/25/2016    Procedure: CYSTOSCOPY  WITH RIGHT STENT CHANGE;  Surgeon: Lakhwinder Russo MD;  Location: [facility] OR;  Service:    • CYSTOSCOPY N/A 03/10/2012    Cystoscopy, right retrograde, right double-J stent-Dr. Sloan May   • CYSTOSCOPY N/A 02/25/2012    Cystoscopy, stent removal, right retrograde pyelogram, replacement of right double-J ureteral stent-Dr. Michael Everett   • CYSTOSCOPY W/ URETERAL STENT PLACEMENT Right 5/7/2016    Procedure: CYSTOSCOPY, URETERAL CATHETER INSERTION AND RIGHT STENT EXCHANGE ;  Surgeon: Michael Everett Jr., MD;  Location: [facility] OR;  Service:    • CYSTOSCOPY W/ URETERAL STENT PLACEMENT N/A 1/20/2017    Procedure: CYSTOSCOPY RIGHT RETROGRADE PYELOGRAM, URETERAL STENT CHANGE;  Surgeon: Lakhwinder Russo MD;  Location: [facility] OR;  Service:    • CYSTOSCOPY W/ URETERAL STENT PLACEMENT N/A 04/02/2015    Cystoscopy, removal of right ureteral stent, right retrograde pyelogram, placement of right double-J ureteral stent-Dr. Michael Everett   • CYSTOSCOPY W/ URETERAL STENT PLACEMENT N/A 04/26/2015    Cystoscopy, removal of right ureteral stent, right retrograde pyelogram, replacement of right ureteral stent 24 cm x 7-Peruvian without retrieval line-Dr. Jose Gomez   • CYSTOSCOPY W/ URETERAL STENT PLACEMENT N/A 12/22/2014    Cystoscopy, removal of right double-J ureteral stent, right retrograde pyelogram, placement  of right double-J ureteral stent-Dr. Michael Everett   • CYSTOSCOPY W/ URETERAL STENT PLACEMENT N/A 09/22/2014    Cystoscopy, removal of right ureteral stent, removal of right retrograde pyelogram, replacement of right double-J ureteral stent-Dr. Michael Everett   • CYSTOSCOPY W/ URETERAL STENT PLACEMENT N/A 06/18/2014    Cystoscopy, removal of right double-J ureteral stent, right retrograde pyelogram, placement of right double-J ureteral stent-Dr. Michael Everett   • CYSTOSCOPY W/ URETERAL STENT PLACEMENT N/A 01/23/2014    Cystoscopy, removal of right double-J ureteral stent, right retrograde pyelogram, placement of right double-J ureteral stent-Dr. Michael Everett   • CYSTOSCOPY W/ URETERAL STENT PLACEMENT N/A 03/27/2012    Cystoscopy, removal of ureteral stent, right retrograde pyelogram, replacement of indewlling right ureteral stent-Dr. Michael Everett   • CYSTOSCOPY W/ URETERAL STENT PLACEMENT N/A 03/27/2013    Cystoscopy, right retrograde pyelogram, removal and replacement of right double-J ureteral stent-Dr. Michael Everett   • CYSTOSCOPY W/ URETERAL STENT PLACEMENT N/A 11/12/2012    Cystoscopy, stent removal, right retrograde pyelogram, replacement of right double-J ureteral stent-Dr. Michael Everett   • CYSTOSCOPY W/ URETERAL STENT PLACEMENT N/A 09/24/2011    Cystoscopy, removal of ureteral stent, right retrograde pyelogram and placement of right double-J ureteral stent-Dr. Michael Everett   • CYSTOSCOPY W/ URETERAL STENT PLACEMENT N/A 05/14/2011    Cystoscopy, removal of right ureteral stent, right retrograde pyelogram and placement of new right double-J ureteral stent-Dr. Michael Everett   • CYSTOSCOPY W/ URETERAL STENT PLACEMENT N/A 12/31/2010    Cystoscopy, stent removal, right retrograde pyelogram, replacement of 7 [place]se x 26 cm double-J stent-Dr. Michael Everett   • CYSTOSCOPY W/ URETERAL STENT PLACEMENT N/A 08/28/2010    Cystoscopy, stent removal, right retrograde pyelogram with interpretation, placement of right double-J ureteral  stent-Dr. Michael Everett   • CYSTOSCOPY W/ URETERAL STENT PLACEMENT N/A 05/24/2010    Cystoscopy, right retrograde pyelogram, replacement of right double-J ureteral stent-Dr. Michael Everett   • CYSTOSCOPY W/ URETERAL STENT PLACEMENT N/A 02/12/2010    Cystoscopy, right retrograde pyelogram and placement of right double-J ureteral stent-Dr. Michael Everett   • CYSTOSCOPY W/ URETERAL STENT PLACEMENT N/A 02/23/2009    Cystoscopy, right retrograde pyelogram, placement of right double-J ureteral stent-Dr. Michael Everett   • CYSTOSCOPY W/ URETERAL STENT PLACEMENT N/A 09/17/2007    Dr. Michael Everett   • CYSTOSCOPY W/ URETERAL STENT PLACEMENT Right 01/29/2018    Dr. Michael Everett   • CYSTOSCOPY W/ URETERAL STENT PLACEMENT N/A 06/04/2018    Cystoscopy, removal of right double-J ureteral stent and placement of right double-J ureteral stent. Dr. Michael Everett.   • CYSTOSCOPY W/ URETERAL STENT PLACEMENT N/A 03/06/2018    Cystoscopy, removal of right ureteral stent, replacement of right double-J ureteral stent. Dr. Michael Everett   • ELBOW PROCEDURE Bilateral 1990s   • ENDOSCOPY N/A 5/13/2019    Procedure: ESOPHAGOGASTRODUODENOSCOPY WITH BIOPSIES;  Surgeon: Trudy Rivera MD;  Location: [facility] ENDOSCOPY;  Service: General   • HERNIA REPAIR  09/03/2018   • LASIK Bilateral    • MASTECTOMY Left 04/25/2012    Left total mastectomy with sentinel lymph node biopsies and left axillary lymphatic mapping-Dr. Gregg May   • MEDIPORT INSERTION, DOUBLE Right    • NEPHRECTOMY Left 12/30/2009    Dr. 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 Cristo Pittman   • TOTAL ABDOMINAL HYSTERECTOMY Bilateral 2007    Dr. Todd   • URETEROURETEROSTOMY Right 05/01/2006    Dr. Michael Everett   • VENA CAVA FILTER INSERTION N/A 05/08/2006    Dr. Jordon Barber   • VENOUS ACCESS DEVICE  (PORT) INSERTION Right 02/25/2013    Right subclavian vein PowerPort insertion-Dr. Gregg May   • VENOUS ACCESS DEVICE (PORT) INSERTION AND REMOVAL N/A 10/06/2014    Revision of right internal jugular PowerPort with fluoroscopy, removal of peripherally inserted central catheter line-Dr. Aurora Tomlinson   • VENOUS ACCESS DEVICE (PORT) INSERTION AND REMOVAL N/A 08/14/2014    Ultrasound-guided access right internal jugular vein, PowerPort placement, removal of right subclavian port-Dr. Jordon Barber   • VENTRAL/INCISIONAL HERNIA REPAIR Left 9/19/2018    Procedure: ventral hernia repair with mesh and rectus muscle advancement flap;  Surgeon: Trudy Rivera MD;  Location: [facility];  Service: General         Hematology/oncology History: See separate document for extra information.   1.    History of left breast cancer, status post left mastectomy on 04/25/2013, stage II, T2N0M0, hormone negative, HER2/mk positive by immunohistochemistry, however, no adjuvant chemotherapy delivered because of multiple myeloma diagnosed concomitantly.        2. Multiple myeloma, IgG lambda, stage III, diagnosed April 2012, previously treated with Velcade/Decadron followed by Velcade/Decadron/Revlimid; patient developed abdominal pain and rectal bleeding on Revlimid, which was then discontinued.    3. Patient evaluated at [facility], but was not felt to be a candidate for stem cell transplant.      4.   Disease progression with therapy switched to melphalan/Velcade/prednisone per the [facility] trial on 10/22/2013; melphalan dose has been persistently decreased because of thrombocytopenia.      5. Anemia secondary to chronic renal insufficiency and myeloma, on periodic Procrit therapy p.r.n.    6. Patient had 8 cycles of VMP chemotherapy, repeat laboratory studies on 10/03/2013 showed stable disease.  Her melphalan do  se was significantly decreased due to marrow suppression.      7.   Patient had  disease progression after cycle #10 VMP treatment, evidenced by laboratory studies on 01/06/2014.  We increased the melphalan dose for one cycle.  Continued disease progression after cycle #11 VMP treatment, despite increased dose of melphalan, as documented by laboratory study on 02/17/2014.     8. The patient was evaluated on 02/24/2014 and we recommend switching chemotherapy to Kyprolis on 02/27/2014.     9. Echocardiogram on 03/05/2014 reporting L  VEF 43%.  This is on par with her previous twice echocardiogram study, in June 2012 and November 2012, reported LVEF at 45% to 50% and 40% to 45% respectively.     10. The patient had 2 episodes of chest pain after Kyprolis during cycle 1, leading to omitted dose   on days 9 and 16.  From cycle 2, we will give Kyprolis only once per week for 3 weeks out of every 4 weeks, as well as dose reduction of Kyprolis that was started on 04/04/2014.      11. After cycle 2 of dose-reduced Kyprolis, the patient's M spike increased fro  m 2.6 to 3.2 g/dL, with her IgG level increasing from 2.7 g/dL to 4.7 g/dL.     12.   Patient had a stress test and echocardiogram study at [facility] on 01/23/2015.  The patient had LVEF 35% to 40%.  Myocardial perfusion study reported a large, mild to modera  te severity fixed inferior wall defect, consistent with known transmural infarct versus diaphragmatic attenuation artifact.  Post stress resting ejection fraction estimated at 31%.      13. Repeated echocardiogram study on 04/03/2015 reported an LVEF 46%.  Left v  entricle is moderately dilated.  There is mild global hypokinesis of the left ventricle.  There was grade 1 diastolic dysfunction.    14.   The patient was seen on 04/09/2015 with plan for cycle 14, day 1, of Kyprolis.  Treatment will be delayed 1 week secondary to patient'  s extreme fatigue, hemoglobin of 8.1.  We will proceed with 2 units of packed red blood cells.  She was also found to have a urinary tract infection.  Patient prescribed Cipro 500 mg twice a day x7.     15. Laboratory tests on 08/24/2015 reported sligh  t disease progress after cycle #18 Kyprolis. We discussed with patient and we will switch chemotherapy to CyBorD regimen with dose reduction of Velcade to 1 mg/m2, cyclophosphamide at 240 mg/m2 (total dose 450 mg), and dexamethasone 20 mg. All therapy to   be repeated on a weekly basis.     16.   The patient had significant fatigue after one dose of cyclophosphamide that lasted for 5-6 days. She also had severe anemia, hemoglobin 7.6, required 2 units PRBC transfusion. Cyclophosphamide will be decreased to 350 mg from 2nd week.     17.  Patient continues to have significant fatigue after the reduced dose of cyclophosphamide at 350 mg; for 2 days after chemo, she could only lie on the bed with performance status ECOG 3. From 10/13/2015, oral cyclophosphamide will be further dec  reased to 200 mg once weekly. We will continue Velcade and dexamethasone at same dose.   Evidence of disease progress, when she was on panobinostat treatment.   18. The patient also has worsening ane  ming and thrombocytopenia secondary to chemotherapy. The patient has symptomatic anemia with hemoglobin 7.8 on 02/23/2016 requiring 2 units of packed RBC transfusion. Chemotherapy with panobinostat will be discontinued.       19. Patient was switched to pomalidomide 3 mg daily for 21 days / 28 days in late March 2016.    20. Disease progression, she was switched to to the Darzalex in May 2016.       On12/6/2016, serum free lambda chain 1090 MG/L, free kappa chain 8.5 to MG/L.  Ferritin 1015, iron 99, TIBC 137 iron saturation 72%.     On January 4, 2017, vitamin B12 level 1289, folate 7.7 ng/mL. SPEP reporting gamma globulin 3.3, out of total globulin 5.0, with immunofixation reporting small quantity of monoclonal free lambda chain, plus monoclonal IgG lambda at 3.2 g/dL.  Serum IgG 4075, IgA 9 and IgM 15.  free lambda chain 1339 MG/L and free  kappa chain 10.3 MG/L.      Laboratory study 3/27/2017 showed slight improvement of paraprotein, SPEP reporting M spike 2.8 g/DL, out of total globulin 4.3, and the serum IgG 3420, IgA 9, IgM 11, free lambda chain 1218 MG/L and free kappa chain 8.0 MG/L.  Total 24 hour urine sample reported at free lambda chain 142 mg, at a concentration 74.8 MG/L, free kappa chain 75 mg at a concentration 39.5 MG/L., Urine protein immunofixation reporting biclonal IgG lambda and monoclonal free lambda light chain, M spike #1 at 41.5% and monoclonal IgG lambda spike #2 at 10.5%. Serum beta-2 microglobulin is 7.3 MG/L.     Her laboratory study on 7/21/2017 reported further improvement of paraprotein is both serum and a 24-hour urine sample.  SPEP reporting monoclonal IgG lambda 2.9 g/dL and free lambda chain 0.1 g/dL.  Serum IgG was 3261 mg/dL and IgA 5, IgM 13, free kappa chain 6.7, free lambda chain 701.3 with kappa/lambda ratio 0.01.  24-hour urine sample reported positive for Bence May protein lambda type, immunofixation positive for IgG lambda.  Total urine protein 241 mg, gamma globulin 13.1%.  Hemoglobin was 11.4 .4, platelets 122,000, WBC 6680 including neutrophils 3600, lymphocytes 2100 and monocytes 650.  Her creatinine was 1.68, at baseline level.  Liver function panel unremarkable.  Darzalex was continued.    Laboratory study on 8/25/2017 showed improved the platelets at 144,000, normal WBC 6660 and slightly improved hemoglobin at 11.7.     Patient had a colonoscopy examination on 8/30/2017 by Dr. Rivera.  It reported diverticulosis in multiple areas more severe in the sigmoid colon.  There was 2 polyps 4 mm pneumonia from transverse colon and the sigmoid colon.  Pathology evaluation reported tubular adenoma from the sigmoid colon polyp, and benign hyperplastic polyp from transverse colon.    Laboratory study on 10/20/2017 reported slightly improved monoclonal spike 2.7 g/dL by SPEP, immunofixation reported  positive monoclonal IgG lambda, serum IgG 3536 mg/dL, IgA less than 5 and IgM 11, free kappa chain 5.3 mg/L, and free lambda chain 720 mg/L with kappa/lambda ratio 0.01.  Serum beta-2 microglobulin was 6.5 mg/L.   Her CBC showed a stable mild anemia with hemoglobin 11.3, macrocytosis .3, and the platelets 114,000, normal WBC 7070 including neutrophils 4100 lymphocytes 2000 monocytes 650, normal liver function panel, total protein 7.9 and albumin 3.2,  and normal electrolytes including calcium 8.1, and improved creatinine 1.59.     Laboratory study on 1/12/2018 reported serum IgG 3083 mg/dL, IgA 10, IgM 10, free kappa chain 8.5 and free lambda chain 589.1 mg/L, kappa/lambda ratio 0.01, serum protein admitted fixation reported IgG lambda monoclonal protein and SPEP reporting M spike 3.1 g/dL, beta-2 microgram and 7.4 mg/L. serum creatinine 1.79, calcium 8.2, other electrolytes normal, hemoglobin 11.3, .5 and MCHC 31.6, platelets 129,000, WBC 6780 including neutrophils 3500 lymphocytes 2300.  Serum ferritin 653.7 ng/mL, iron 123 TIBC 174 iron saturation 71%, folic acid 12.8 ng/mL and a vitamin B12 at 873 pg/mL.  Her 24-hour urine study on 1/18/2018 reported lambda chain 32.8 mg/L, total 61 mg in 24 hours, and the free kappa chain 27.4 mg/L and 51 mg in 24 hours, and the total 24-hour urine protein 283 mg, and urine protein immunofixation positive for 5.3% monoclonal IgG lambda and positive for Bence May protein at 36.2% monoclonal lambda chain.  Monthly Darzalex was continued.       This patient had serum protein study on 04/06/2018 which reported free light chain at concentration 703.8 mg/L and free kappa chain 7.0, free kappa/lambda ratio 0.01, serum IgG 3650 mg/dL, IgM 11 and IgA 9 with serum protein electrophoresis reporting monoclonal IgG lambda 3.2 g/dL and also monoclonal free lambda light chain.  But it was unable to quantify monoclonal lambda light chain because of the small quantity of it.      A 24-hour urine study on 04/20/2018 reported total free lambda chain 60 mg in 24 hours at concentration 33.1 mg/L, free kappa chain 45 mg in 24 hours at concentration 24.8 mg/L. Total 24-hour urine protein was 279 mg. Urine protein immunofixation and UPEP reported lambda-type Bence-May protein + #1 monoclonal IgG lambda band at 34.8% and #2 monoclonal IgG lambda band at 6.9%.     Her CBC results today reported a stable hemoglobin at 11.1, platelets 130,000 and WBC 7440 including neutrophils 4100 and lymphocytes 2350, monocytes 650. Her CMP reported slightly worsened creatinine at 1.82 with BUN 33. Total protein at 8.8. Liver function panel was normal. Total serum protein 8.8 and albumin 3.2, globulin 5.6.    Reviewing her previous lab studies especially serum paraprotein, she reached a micaela of response on 07/05/2018 when she had serum IgG 3015 mg/dL, free lambda chain 458.7 mg/L and M spike IgG lambda 2.6 g/dL and and monoclonal lambda free light chain 0.1 g/dL, total 2.7 g/dL. serum beta-2 myoglobin 7.1 mg/L.  Her ferritin was 539 ng/mL, free iron 73 TIBC 176 iron saturation 42%.    Laboratory study obtained on 11/01/2018 showed further disease progression. Her serum IgG was 5472 mg/dL, IgA 8 and IgM 10, serum kappa chain 7.9 mg/L, free lambda chain 961.3 mg/L and kappa/lambda ratio 0.01. Serum protein electrophoresis reported monoclonal IgG lambda 4.1 g/dL, and monoclonal lambda free light chain 0.1 g/dL. Her hemoglobin was 9.0, .6, MCHC 30.1, platelets 124,000 and WBC 10,000 including neutrophils 7400, lymphocytes 1200, monocytes 600.     Cycle #1 day #1 Bendamustine will be started on 11/29/2018.     Laboratory studies on 01/17/2019 reported improved paraproteins.  SPEP reported M spike 3.8 g/dL out of total 5.5 g/dL globulin.  Serum IgG was 4374 mg/dL, IgA 10 and IgM 11.  Free lambda chain 606.8 mg/L, free kappa chain 8.6 and kappa/lambda ratio 0.01.  Her serum protein immunofixation continues to  be IgG lambda monoclonal.  Her CBC showed hemoglobin 9.3, .3, platelets 50,000 and WBC 8600 including ANC 5100, lymphocytes 2160.  Her Chemistry lab reported creatinine 1.92, calcium 8.3, normal liver function panel, glucose 98 with normal sodium and potassium.     For her 24-hour urine study on 3/14/2019, she had free lambda chain at a 62.1 mg/L, total 87 mg in 24 hours, free kappa chain 42 mg/L and 59 mg in 24 hours.  Urine protein immunofixation reported a monoclonal IgG lambda #1 and free lambda chain were unable to be quantified, however IgG lambda #2 was 12.8%.  Her 24-hour urine total protein was 452 mg.     For serum protein study on 3/28/2019 (on day of her cycle #5 day #1 Treanda ) also reported further decreased monoclonal spike 3.2 mg/dL, and serum IgG 3600, IgA 13 IgM 12, free kappa chain 11.7, and worsening free lambda chain 1045 mg/L.     Her serum paraprotein studies on 4/11/2019 reported a serum IgG 4407 mg/dL, IgA 14, IgM 13, free kappa chain 9.2, free lambda chain 998.4 mg/L, kappa/lambda ratio 0.01.  His serum protein immunofixation was positive for IgG lambda, SPEP reporting M spike 4.3 g/dL.    Her 24-hour urine study on 7/18/2019 reported positive IgG lambda monoclonal protein and lambda type Bence-May protein, total free kappa chain 142 mg in 24 hours, at 74.6 mg/L, and total lambda chain 179 mg in 24 hours at 94.1 mg/L.  Kappa/lambda ratio 0.79.  Her total 24-hour urine protein was 410 mg, with monoclonal lambda free light chain 41.8% and monoclonal IgG lambda 2.6%.     Serum paraprotein studies on 7/24/2019 reported monoclonal spike 3.9 g/dL, serum IgG 4107, IgA 14 IgM 19, free kappa chain 12.8 and free lambda chain 656, kappa/lambda ratio 0.02.    On 08/01/2019 she has severe thrombocytopenia platelets 27,000.  She has elevated WBC to 12,600 including ANC 8900 and normal lymphocytes 2000.  Her hemoglobin is 10.4 08/01/2019. The patient underwent a bone marrow biopsy on 08/12/2019  and the pathology report showed hypercellular bone marrow with involvement by plasma cell myeloma and no metastatic carcinoma, granulomas, vasculitis, viral inclusions, increase in myeloblasts, or malignant lymphoma. Over 85% of the bone marrow are plasma cells. Normal karyotype. Flow cytometry study was positive.    Because of her persistent severe thrombocytopenia, the patient underwent a bone marrow biopsy on 08/12/2019.  Pathology evaluation showed hypercellular bone marrow with involvement by plasma cell myeloma 85-90% and no metastatic carcinoma, granulomas, vasculitis, viral inclusions, increase in myeloblasts, or malignant lymphoma.  Normal karyotype.     Recent laboratory study on 8/15/2019 reported of elevated ferritin 524, normal iron saturation 42% with free iron 59 and a TIBC 140.  Normal thyroid function profile.  Her serum globulin 7.3, total protein 10.1 and albumin 2.8, calcium 8.1 creatinine 1.99.    She does continue to have severe intermittent pain in her right hip which is managed somewhat with pain medication, including tylenol and prescription Norco. She states she has difficulty ambulating and functioning normally when this pain occurs. She continues on oral Vitamin B-12 and folic acid supplementation.     Recent XR right hip with or without pelvis on 08/15/2019 showed sclerosis at the pubic symphysis. No other bony or articular abnormality was identified. The hip joints were symmetric and appeared within normal limits. The joint spaces were fairly well-maintained. There was no bony erosion. There was no evidence of recent or old fracture or subluxation. A right ureteral stent was noted.     On 08/30/2019, switched treatment from Empliciti to oral ixazomib 3 weeks on and 1 week off. She is expected to start the medication on 9/5/2019 when this medication arrives.  Patient will continue dexamethasone weekly at the same time as part of the treatment.  Because of her severe thrombocytopenia, we  recommended starting low-dose ixazomib 2.3 mg weekly.  Treatment was started on 9/5/2019.     Laboratory study on 9/26/2019 showed worsening leukocytosis with WBC 20,780 including ANC 16,000, lymphocytes 2700 and monocytes 8070.  She also has worsening severe thrombocytopenia with platelets 27,000, and stable hemoglobin 9.5.     Suspect the patient may have some kind of infection however she denies fever sweating chills no dysuria or hematuria.  She denies pain in the right flank area where she has ureteral stent and oftentimes complains of pain when she has urinary tract infection.      I searched medical records, she had replacement of stent 3 months ago.  I recommended urinalysis and culture on 9/26/2019 for further evaluation despite that she has no symptoms.I called and spoke to patient about her urinalysis which showed a large quantity of bacteria and WBC.  I e-scribed cefdinir to her pharmacy, 300 mg twice a day for 7 days. I called the patient today, reporting her urine culture results with multidrug sensitive E. coli.  She will continue cefdinir as we prescribed.  Should be sensitive according to the culture results.I think this patient will need to follow-up with her urologist Dr. Everett for stent replacement.  I sent a message to Dr. Everett.    On 10/17/2019, patient also reports she has intermittent chest pain/epigastric area/stomach pain for the past several weeks, to the point that she thought she was not able to make it.  Patient reports she had stress test this week and was told to having nothing wrong.  Patient also reports this is not related to her eating.  No apparent effect of exacerbation or relieving.      Unfortunately laboratory study on 10/24/2019 reported significant disease progression, he had a M spike 5.1 g/dL by SPEP, and a serum IgG 7280 mg/dL, IgA 16, IgM 17, free kappa chain 10.2 and free lambda chain 1309.5 mg/L, with kappa/lambda ratio 0.01.  Her beta-2 myoglobin was 16.2 mg/L.   Chemistry lab reported total serum protein 11 g, albumin 2.7 and globulin 8.3.  Her liver function panel was normal, creatinine 1.90 and normal calcium and other electrolytes.  Continues to have anemia Hb 10.0, platelets 26,000 and WBC 19,180 including ANC 13,230 lymphocytes 3070 monocytes 1920 and eosinophil 770.  Her iron study reported ferritin 455.4 ng/dL, free iron 61, TIBC 155 and iron saturation 39%, with folic acid at 7.3 ng/mL and vitamin B12 at 1800 pg/mL.    Patient initiated chemotherapy treatment with darzalex/velcade/dexamethasone on 11/7/2019.    Laboratory study 12/12/2019 showed deteriorating hemoglobin 7.7.  She also has stable but severe thrombocytopenia with platelets 24,000.  Her WBC is normal at 5230 including ANC 2670.  Weekly Procrit was continued.  Patient was given 2 units PRBC transfusion.      Paraprotein study on 1/16/2020 reported monoclonal IgG lambda spike 6.8 g/dL, however unable to quantify the monoclonal free lambda chain.  Her serum IgG was 7632, IgA 5, IgM 13, free kappa chain 8.6 and the free lambda chain 1415 mg/L.    Labs on 1/23/2020 reported stable anemia hemoglobin 10.2, severe thrombocytopenia but with slightly improved platelets 33,000 and WBC 9190, including ANC 6160 lymphocytes 2100.  Her iron study today reported elevated ferritin 912, free iron 109 TIBC 130 and iron saturation 84%.     Patient reported on 1/23/2020 that she started having chest pain earlier that morning, but approximately getting worse.  In the clinic she complained of worsening chest pain, in the epigastric area.  She has no nausea vomiting.  No fever no sweating.  Her vitals stable, normal BP, afebrile, normal pulse.  Excellent oxygen saturation.    Review of Systems   Constitutional: Positive for fatigue (improved). Negative for appetite change, chills, diaphoresis, fever and unexpected weight change.   HENT:   Negative for mouth sores, nosebleeds, sore throat and trouble swallowing.    Eyes:  "Negative for eye problems and icterus.   Respiratory: Positive for cough and shortness of breath.    Cardiovascular: Positive for chest pain (intermittent epigastric area pain). Negative for leg swelling and palpitations.   Gastrointestinal: Negative for abdominal pain, blood in stool, diarrhea and nausea.   Genitourinary: Negative for dysuria, frequency and hematuria.    Musculoskeletal: Positive for arthralgias (Right hip pain stable). Negative for back pain, flank pain, gait problem and myalgias.   Skin: Negative for itching and rash.   Neurological: Positive for extremity weakness (stable). Negative for dizziness, gait problem, headaches, light-headedness and numbness.   Hematological: Negative for adenopathy. Does not bruise/bleed easily.   Psychiatric/Behavioral: Negative for depression. The patient is not nervous/anxious.    All other systems reviewed and are negative.    Medications: The current medication list was reviewed in the EMR.         Allergies   Allergen Reactions   • Sulfa Antibiotics Swelling   • Zosyn [Piperacillin Sod-Tazobactam So] Swelling     Face and lips       VITALS:   Vitals:    01/30/20 0805   BP: 109/69   Pulse: 75   Resp: 16   Temp: 97.5 °F (36.4 °C)   SpO2: 99%   Weight: 78.7 kg (173 lb 6.4 oz)   Height: 157.5 cm (62.01\")   PainSc: 0-No pain      ECOG 1      PHYSICAL EXAM:  GENERAL:  Well-developed, well-nourished  female in no acute distress.  Mildly obese.  SKIN:  Warm, dry without rashes, purpura or petechiae.  HEAD:  Normocephalic.  EYES:  Pupils equal, round and reactive to light.  EOMs intact.  Conjunctivae normal.  EARS:  Hearing intact.  NOSE:  No nasal discharge.  MOUTH:  Tongue is well-papillated; no stomatitis or ulcers.  Lips normal.  THROAT:  Oropharynx without lesions or exudates.  NECK:  Supple with good range of motion; no thyromegaly or masses.  LYMPHATICS:  No cervical, supraclavicular adenopathy.  CHEST:  Lungs clear to percussion and auscultation. " Good airflow.  PowerPort on the chest looks benign.  CARDIAC:  Regular rate and rhythm without murmurs, rubs or gallops. Normal S1,S2.  ABDOMEN:  Soft, nontender with no organomegaly or masses.  EXTREMITIES:  No clubbing, cyanosis or edema.  NEUROLOGICAL:  Cranial Nerves II-XII grossly intact.  No focal neurological deficits.  PSYCHIATRIC:  Normal affect and mood.         Recent lab results:   Results from last 7 days   Lab Units 01/30/20  0749 01/25/20  0455 01/24/20  0426 01/23/20  1206   WBC 10*3/mm3 6.26 5.98 8.14 9.30   NEUTROS ABS 10*3/mm3 3.78 4.07  --  6.70   LYMPHS ABS 10*3/mm3  --  1.36  --  1.77   HEMOGLOBIN g/dL 8.5* 8.6* 8.9* 10.1*   HEMATOCRIT % 28.4* 26.2* 27.1* 30.7*   PLATELETS 10*3/mm3 36* 26* 28* 32*      Glucose   Date Value Ref Range Status   01/30/2020 107 (H) 65 - 99 mg/dL Final     BUN   Date Value Ref Range Status   01/30/2020 25 (H) 8 - 23 mg/dL Final   10/18/2019 26 (H) 7 - 20 mg/dL Final     Creatinine   Date Value Ref Range Status   01/30/2020 2.00 (H) 0.57 - 1.00 mg/dL Final   10/18/2019 2.2 (H) 0.7 - 1.5 mg/dL Final     Sodium   Date Value Ref Range Status   01/30/2020 139 136 - 145 mmol/L Final   10/18/2019 144 137 - 145 mmol/L Final     Potassium   Date Value Ref Range Status   01/30/2020 3.8 3.5 - 5.2 mmol/L Final   10/18/2019 3.9 3.5 - 5.1 mmol/L Final     Chloride   Date Value Ref Range Status   01/30/2020 112 (H) 98 - 107 mmol/L Final   10/18/2019 122 (H) 98 - 107 mmol/L Final     CO2   Date Value Ref Range Status   01/30/2020 19.5 (L) 22.0 - 29.0 mmol/L Final     Total CO2   Date Value Ref Range Status   10/18/2019 20 (L) 22 - 30 mmol/L Final     Calcium   Date Value Ref Range Status   01/30/2020 8.5 (L) 8.6 - 10.5 mg/dL Final   10/18/2019 8.5 8.4 - 10.2 mg/dL Final     Total Protein   Date Value Ref Range Status   01/30/2020 11.6 (H) 6.0 - 8.5 g/dL Final     Albumin   Date Value Ref Range Status   01/30/2020 2.30 (L) 3.50 - 5.20 g/dL Final     ALT (SGPT)   Date Value Ref Range  Status   01/30/2020 8 1 - 33 U/L Final     AST (SGOT)   Date Value Ref Range Status   01/30/2020 13 1 - 32 U/L Final     Alkaline Phosphatase   Date Value Ref Range Status   01/30/2020 38 (L) 39 - 117 U/L Final     Total Bilirubin   Date Value Ref Range Status   01/30/2020 0.3 0.1 - 1.2 mg/dL Final     eGFR Non  Amer   Date Value Ref Range Status   08/30/2016  >60 mL/min/1.73 Final     Comment:     <15 Indicative of kidney failure.     A/G Ratio   Date Value Ref Range Status   01/16/2020 0.3 (L) 0.7 - 1.7 Final     BUN/Creatinine Ratio   Date Value Ref Range Status   01/30/2020 12.5 7.0 - 25.0 Final   10/18/2019 11.8 RATIO Final     Anion Gap   Date Value Ref Range Status   01/30/2020 7.5 5.0 - 15.0 mmol/L Final       Lab Results   Component Value Date    IRON 109 01/23/2020    TIBC 130 (L) 01/23/2020    FERRITIN 912.70 (H) 01/23/2020     Lab Results   Component Value Date    FOLATE 7.34 10/24/2019     Lab Results   Component Value Date    WBUYYXXZ11 1,808 (H) 10/24/2019     RECENT IMAGING:  CHEST X-RAY - 1/23/2020  HISTORY: Shortness of breath. Elevated d-dimer.     FINDINGS: No segmental or larger ventilation defects are seen.     5.7 mCi technetium MAA was injected intravenously.     There is mildly heterogeneous distribution of activity in the lungs. On  the LPO image there is a small photopenic area in the central portion of  the left lung which could be related to the left hilum and/or the major  fissure. No segmental or larger perfusion defects are seen.     IMPRESSION:  1. Findings on this lung scan are thought to represent a low probability  for pulmonary thromboembolus.  2. Findings were discussed with Dr. Wilson.    NM LUNG VENTILATION PERFUSION - 1/23/2020  HISTORY: Shortness of breath. Elevated d-dimer.     FINDINGS: No segmental or larger ventilation defects are seen.     5.7 mCi technetium MAA was injected intravenously.     There is mildly heterogeneous distribution of activity in the lungs.  On  the LPO image there is a small photopenic area in the central portion of  the left lung which could be related to the left hilum and/or the major  fissure. No segmental or larger perfusion defects are seen.     IMPRESSION:  1. Findings on this lung scan are thought to represent a low probability  for pulmonary thromboembolus.  2. Findings were discussed with Dr. Wilson.    Assessment/Plan   1. Multiple myeloma, IgG lambda, stage III. Failed multiple lines of therapy. Her treatment was switched to Darzalex on 5/26/2016. She was on this treatment for more than 2 years.   · In November 2018, she clearly had disease progression.  Darzalex was discontinued and was started on bendamustine plus Velcade plus dexamethasone.   · Cycle #1 day #1 Bendamustine was started on 11/29/2018.  Due to poor tolerance with profound fatigue, chemotherapy was modified with bendamustine only given on day 1, and a Velcade weekly, every 4 weeks as 1 cycle.  Patient had a cycle #6 dose reduction of bendamustine by 25% because of severe thrombocytopenia.   · This patient actually had a micaela of response on 3/28/2019 with M spike 3.2 g/dL and serum IgG 3600.  After that M spike and serum IgG both were elevating.   · Laboratory study obtained on 5/9/2019 after 6 cycles chemotherapy, it showed disease further progressed with worsening level of serum IgG 4873 mg/dL and M spike 4.3 g/dL.    · On 5/23/2019, patient was switched to Empliciti plus Velcade and decadron.    · Her serum protein studies reported persistent active disease, not well controlled with large quantity of monoclonal spike.  She also has severe thrombocytopenia oftentimes leading to hold Velcade injection.  · Bone marrow aspiration biopsy for reanalysis recommended.  This patient also has a history of breast cancer so we need to be open-minded.  She had bone marrow aspirate biopsy on 8/12/2019 which showed more than 85% of bone marrow cells are malignant plasma cells.  There is  no evidence of metastatic disease or other type of malignancy.  · Recommended switching treatment to Ixazomib plus dexamethasone.  Considering her pre-existing severe thrombocytopenia, I recommended starting low-dose Ixazomib 2.3 mg weekly instead of full dose 4 mg weekly.   · She started new treatment on 9/5/2019 with dexamethasone 20 mg weekly.  oral ixazomib/Dex 3 weeks on and 1 week off. She is to start the ixazomib at decreased dose 2.3 mg weekly starting on 9/5/2019.   · Patient has significant disease progression on 10/24/2019 with serum increased M spike, serum IgG and free lambda chain.  · On 10/31/2019, I discussed options with the patient and suggested Darzalex/Velcade/dexamethasone as it would be easy on her bone marrow. Patient initiated Cycle #1 treatment on 11/7/2019.   · So far patient has been tolerating well.  Laboratory study 1/16/2020 reported slightly worsening serum IgG and free lambda chain.  She has no apparent response to Velcade plus Darzalex plus dexamethasone, except normalization of leukocytosis.   · 1/23/2020 Held chemotherapy, due for Velcade cycle 4-day 15.  She was sent to the ED for further evaluation of acute severe chest pain.    · Resume chemotherapy on 1/30/2020, due for Velcade today cycle 4-day 15.     2.  Severe thrombocytopenia.  This is more likely secondary to her multiple myeloma, based on her recent bone marrow aspiration biopsy on 8/12/2019. patient has no easy bleeding or bruising.   · Patient was last transfused with platelets on May 13, 2019 prior to her EGD.  · The patient underwent a bone marrow biopsy on 08/12/2019 and the pathology report showed hypercellular bone marrow with involvement by plasma cell myeloma and no metastatic carcinoma, granulomas, vasculitis, viral inclusions, increase in myeloblasts, or malignant lymphoma. Over 80% of the bone marrow are plasma cells. Normal karyotype.   · Laboratory study on 8/30/2019 showed platelets of 34,000. We switched  her treatment to oral ixazomib 3 weeks on and 1 week off starting 9/05/19.    · Platelet count today 27,000 on 12/26/2019.  Patient reports no spontaneous bleeding.  Continue to monitor.   · Her Velcade treatment was on hold on 1/16/2020, due to severe thrombocytopenia with platelets 18,000.  On 1/23/2020, platelets improved at 33,000.  · Platelet count is 36,000 today, 1/30/2020.  Patient has no bleeding or bruising.    3.  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    · Her laboratory study on 5/9/2019 showed no evidence of iron deficiency.  On 5/16/2019, B12 level of 1819 and normal folate of 5.84.  She was symptomatic anemic with Hb 8.5, she was transfused with 2 units of packed red blood cells.    · Recent labs on 8/15/2019 reported no evidence of iron deficiency, had normal iron saturation and excellent ferritin level.  · Laboratory study on 10/24/2019 reported no evidence of iron deficiency, nor B12 folic acid deficiency.  She will continue oral B12 supplementation.    · Her hemoglobin was 7.7 on 12/12/2019.  We continued weekly Retacrit.  Because patient was symptomatic, with worsening fatigue from her anemia, she was given 2 units PRBC transfusion.   · Improved hemoglobin 10.1 on 1/23/2020.    · Hemoglobin is decreased to 8.5 today, 1/30/2020.  Continue Procrit weekly as needed per protocol.  Next dose given today.     4.  Dysuria/urgency of urination.  Urinalysis reported large quantity of WBC and bacteria 11/13/2019.  Treated with Omnicef.  Symptoms have resolved.  She did have a stent replacement on 11/21/2019.   · Patient denies recurrent symptomology on 12/26/2019.   · Patient reports that she has a scheduled ureteral stent replacement scheduled for 2/10/2020 by her urologist.    5. Zometa / Xgeva treatment.  Because of her kidney insufficiency, we were only able to give her Zometa every 3 months.  Due to her elevated creatinine level, we eventually switched her to  Xgeva treatment and continued monthly.  · She had elevated phosphorus 5.6 on 6/6/2019.   · Her last Xgeva was on 1/2/2020.  Due for Xgeva today, 1/30/2020.  This will be continued every 4 weeks.        6. History of stage II left breast cancer, post mastectomy in 2013, negative for ER/AR and positive HER-2. No neoadjuvant chemotherapy because of concurrent discovery of multiple myeloma and ongoing chemotherapy. She had a normal mammogram study on 7/26/2019.       7.  Skin rashes on her extremities:  Patient was seen dermatologist Dr. Barroso, who prescribed steroids cream and also over-the-counter moisturizing cream.  Patient will follow-up with Dr. Barroso again.  Currently resolved.    8. Profound fatigue: This is multifactorial secondary to her disease progression and her worsening anemia associated with chemotherapy.   · A thyroid level was obtained and patient was diagnosed with subclinical hypothyroidism.  She was initiated on Synthroid and folic acid and has noted significant reduction in fatigue.   · Patient to continue on Synthroid daily and folic acid supplementation daily.  · Her fatigue is slightly improved today.    9.  Hypocalcemia.  The patient has struggled with compliance with her calcium supplements.  She reports she has been taking these inconsistently, though they resulted in soft frequent bowel movements.  We previously added 2 tablets of Tums daily along with current calcium/vitamin D supplementation.    · Calcium level 7.7 on 1/16/2020.  Her albumin was 2.5.  · Calcium level 8.9 on 1/23/2020.  Albumin was 2.7.  · Calcium level 8.3 on 1/24/2020.   · Calcium level 8.5 today, 1/30/2020.    10.  Gastric ulcer diagnosed in May 2019 epigastric discomfort.    Patient had EGD examination by Dr. Rivera on 5/13/2019.  It reported prepyloric ulceration.  Dr. Rivera recommended Protonix twice a day.  Currently takes Protonix twice a day.    11.  Worsening leukocytosis/neutrophilia.    · This started in  middle of July 2019 with a fluctuation of WBC and neutrophils.  Suspect that this is all due to disease progression of her multiple myeloma.    · This has resolved after patient is switched her to chemotherapy Darzalex plus Velcade and dexamethasone in early November 2019.     12.  Severe chest pain on 1/23/2020.   Started this morning, and progressively getting worse.  No apparent triggering reason, and the not to be relieved.  Suspect the patient might have myocardial infarction.  We called the ambulance service, and she was taken to [facility] emergency room for further evaluation.  · In the ER, she was found to have an elevated D-dimer, but her NM lung ventilation perfusion study showed a low probability for pulmonary thromboembolus.  Chest x-ray was suggestive of pneumonia and she was admitted for further care.  She received IV antibiotics, with improvement of her symptoms.  Chest pain resolved.  She was discharged on 1/25/2020 with a couple more days of Cefdinir and doxycycline.    · Patient continues to experience intermittent chest pain, with associated shortness of breath and cough.  Etiology is unknown at this time.  We will get a CT scan of the chest without contrast within the next week for further evaluation.  If the CT scan is negative, then we will arrange for her to have an EGD with Dr. Rivera.    · I will follow up with her in 1 week, following her CT scan, to review the imaging results.  The patient verbalized understanding and agreed with the plan.    Plan:  1. Resume chemotherapy, due for Velcade today cycle 4-day 15.   2. Patient will receive Xgeva today.  This will be repeated every 4 weeks.   3. Continue weekly Procrit.  Dose given today.  4. We will get a CT scan of the chest without contrast within the next week.  If the CT scan is negative, then we will arrange for her to have an EGD with Dr. Rivera.  5. She is scheduled to have a ureteral stent replacement on 2/10/2020 by her  urologist, Dr. Everett.  6. Continue antibiotics as prescribed  7. Continue Protonix 40 mg twice a day.    8. Continue on Synthroid daily.     9. Continue Norco for pain management.    10. Continue calcium and vitamin D supplementation.    11. Continue oral B12 and folic acid daily.    12. Continue prophylactic acyclovir 400 mg twice daily.   13. Continue prophylactic Bactrim 3 times per week for PCP infection per protocol.   14. Follow up with me in 1 week, following her CT scan, to review the imaging results.      Patient is on drug therapy and requires frequent monitoring.    By signing my name here, I Bud Emmanuel, attest that all documentation on 01/30/20 at 9:19 AM has been prepared under the direction and in the presence of Dr. Zane Araujo MD.    I reviewed the note scribed by Bud Emmanuel and made appropriate corrections.     Zane Araujo MD PhD    CC -  Cristo Madera M.D.   Michael Everett M.D.    Trudy Rivera M.D.

## 2020-01-30 PROBLEM — R07.1 CHEST PAIN ON BREATHING: Status: ACTIVE | Noted: 2020-01-01

## 2020-02-03 NOTE — OUTREACH NOTE
COPD/PN Week 2 Survey      Responses   Facility patient discharged from?  [facility]   Does the patient have one of the following disease processes/diagnoses(primary or secondary)?  COPD/Pneumonia   Was the primary reason for admission:  Pneumonia   Week 2 attempt successful?  Yes   Call start time  1306   Call end time  1309   Discharge diagnosis  Community acquired pneumonia of left lower lobe of lung   Meds reviewed with patient/caregiver?  Yes   Is the patient having any side effects they believe may be caused by any medication additions or changes?  No   Does the patient have all medications ordered at discharge?  Yes   Is the patient taking all medications as directed (includes completed medication regime)?  Yes   Does the patient have a primary care provider?   Yes   Does the patient have an appointment with their PCP or pulmonologist within 7 days of discharge?  Yes   Has the patient kept scheduled appointments due by today?  N/A   Has home health visited the patient within 72 hours of discharge?  N/A   Psychosocial issues?  No   Did the patient receive a copy of their discharge instructions?  Yes   Nursing interventions  Reviewed instructions with patient   What is the patient's perception of their health status since discharge?  Improving   Nursing Interventions  Nurse provided patient education   Are the patient's immunizations up to date?   Yes   Nursing interventions  Educated on importance of maintaining up to date immunizations as advised by provider   Is the patient/caregiver able to teach back the hierarchy of who to call/visit for symptoms/problems? PCP, Specialist, Home health nurse, Urgent Care, ED, 911  Yes   Additional teach back comments  She is doing better, she is breathing well, she sees the DR on Thursday.   Is the patient/caregiver able to teach back signs and symptoms of worsening condition:  Fever/chills, Shortness of breath, Chest pain   Is the patient/caregiver able to teach back  importance of completing antibiotic course of treatment?  Yes   Week 2 call completed?  Yes          May Alcala RN

## 2020-02-05 NOTE — PROGRESS NOTES
Transitional Care Follow Up Visit  Subjective     Marina Barroso is a 69 y.o. female who presents for a transitional care management visit.    Within 48 business hours after discharge our office contacted her via telephone to coordinate her care and needs.      I reviewed and discussed the details of that call along with the discharge summary, hospital problems, inpatient lab results, inpatient diagnostic studies, and consultation reports with Marina.     Current outpatient and discharge medications have been reconciled for the patient.  Reviewed by: Valeria Wilson PA-C      Date of TCM Phone Call 1/27/2020   [facility]   Date of Admission 1/23/2020   Date of Discharge 1/25/2020   Discharge Disposition -     Risk for Readmission (LACE) Score: 11 (1/25/2020  6:00 AM)      History of Present Illness   Course During Hospital Stay:  1-23 to 1-25-20  HOSPITAL COURSE  Patient is a very pleasant 69 y.o. female who is chronically ill with history of multiple myeloma currently on active treatment, CKD 3,, breast and renal cancer, CHF with last EF 50 to 55%, hypertension among other issues.  She has been followed in the past by Dr. Bazzi from cardiology and had a nuclear stress test in October which showed no ischemia.  She last had chemo about 2 weeks ago and a few days later was seen back in the office and was diagnosed with RSV infection and was given a Z-Hector for possible bacterial component as well.  At that time she was having some productive cough and was feeling weak and short of breath.  The cough is actually improved but over the last 24 hours she is developed significant chest pain, presents especially since she woke up this morning. Work-up in ER was largely unremarkable as far as cardiac standpoint but she did have a chest x-ray suggestive of pneumonia.      She was given IV antibiotics for couple days for this potential pneumonia.  She had improvement of her symptoms.  There was no evidence for acute  coronary syndrome.  Chest pain had resolved.  She will be discharged today with a couple more days of Ceftin ear and doxycycline.  She will follow-up with oncology next Thursday 1/30.  Follow-up as an outpatient with her primary care physician as well as her cardiologist.  Neg stress PET scan 10-15-19    Sees DR Araujo  Had CT chest; ? Mets lungs? Now spleen enlg. No pneumonia     1-30-20 saw DR Araujo  The patient was due for Velcade cycle 4 day 15 on 1/23/2020, but this was held as she experienced acute chest pain and was sent to the ED for further evaluation.  She had an elevated D-dimer, but her NM lung ventilation perfusion study showed a low probability for pulmonary thromboembolus.  Chest x-ray was suggestive of pneumonia and she was admitted.  She received IV antibiotics, with improvement of her symptoms.  Chest pain resolved.  She was discharged on 1/25/2020 with a couple more days of Cefdinir and doxycycline    She is off antibiotics and still coughing; ? From lung mets?;  She has Albuterol MDI PRN and not much help.  Usual SOA; clear sputum with cough if get  I did meet with Dr. Madera we went over her hospital course and he also reviewed personally her CT of the chest.  No note of pneumonia and concerned about some lung mets.  He has an appointment with Dr. colunga tomorrow.  Also concerned about splenomegaly  The following portions of the patient's history were reviewed and updated as appropriate: allergies, current medications, past family history, past medical history, past social history, past surgical history and problem list.    Review of Systems   Constitutional: Negative for activity change, appetite change and unexpected weight change.   HENT: Negative for nosebleeds and trouble swallowing.    Eyes: Negative for pain and visual disturbance.   Respiratory: Positive for cough. Negative for chest tightness, shortness of breath and wheezing.    Cardiovascular: Negative for chest pain and  palpitations.   Gastrointestinal: Negative for abdominal pain and blood in stool.   Endocrine: Negative.    Genitourinary: Negative for difficulty urinating and hematuria.   Musculoskeletal: Negative for joint swelling.   Skin: Negative for color change and rash.   Allergic/Immunologic: Negative.    Neurological: Negative for syncope and speech difficulty.   Hematological: Negative for adenopathy. Bruises/bleeds easily.   Psychiatric/Behavioral: Negative for agitation and confusion.   All other systems reviewed and are negative.      Objective   Physical Exam   Constitutional: She is oriented to person, place, and time. She appears well-developed and well-nourished. No distress.   HENT:   Head: Normocephalic and atraumatic.   Eyes: Pupils are equal, round, and reactive to light. Conjunctivae and EOM are normal. Right eye exhibits no discharge. Left eye exhibits no discharge. No scleral icterus.   Neck: Normal range of motion. Neck supple. No tracheal deviation present. No thyromegaly present.   Cardiovascular: Normal rate, regular rhythm, normal heart sounds, intact distal pulses and normal pulses. Exam reveals no gallop.   No murmur heard.  Trace pedal edema = bilat     Pulmonary/Chest: Effort normal and breath sounds normal. No respiratory distress. She has no wheezes. She has no rales.   Musculoskeletal: Normal range of motion.   Neurological: She is alert and oriented to person, place, and time. She exhibits normal muscle tone. Coordination normal.   Skin: Skin is warm. No rash noted. No erythema. No pallor.   Psychiatric: She has a normal mood and affect. Her behavior is normal. Judgment and thought content normal.   Nursing note and vitals reviewed.      Assessment/Plan   Problems Addressed this Visit        Genitourinary    Chronic kidney disease, stage III (moderate) (CMS/HCC)    Anemia, chronic renal failure, stage 3 (moderate) (CMS/HCC)       Hematopoietic and Hemostatic    Multiple myeloma (CMS/HCC)       Other Visit Diagnoses     Hospital discharge follow-up    -  Primary    Resolved pneumonia        Hospital follow-up from pneumonia with multiple other problems --- most concerning the multiple myeloma in abnormal CT of the chest  Her cough is improving we will not put her back on any antibiotics at this time  Has appointment tomorrow with oncologist  Definitely want her to follow-up with her nephrologist in stay on that sodium bicarb and wanted to make sure that pharmacy had refills  Want her to

## 2020-02-05 NOTE — PROGRESS NOTES
REASONS FOR FOLLOW UP:   1. IgG kappa multiple myeloma, was on Darzalex, started on May 26, 2016. Patient was switched to Darzalex from Pomalyst, as she continued to be neutropenic with recurrent urinary tract infection while on Pomalyst.    2. Zometa is on hold due to renal insufficiency.    3. After 16 doses of Darzalex, laboratory study showed stable disease in November 2016. Insurance company denied adding Velcade and dexamethasone. Patient is to be continued on monthly Darzalex from 12/06/16.   4. Obstructive right pyelonephritis with positive urine culture for E. coli, required hospitalization from 1/19/2017 through 01/24/2017 with iv antibiotics and stent exchange on 01/20/2017.   5.  Paraprotein studies on March 27, 2017 showed further slight improvement of multiple myeloma.   6.  Febrile illness, with urinary tract infection and influenza B infection in early May 2017, required hospitalization for 6 days.   7.  Paraprotein studies for both serum and 24-hour urine sample on 7/21/2017 showed further improvement of paraproteins.   Darzalex was continued.   8.  Serum protein study reported stable disease on 10/20/2017.  Darzalex will be continued.   9.  Laboratory studies of both serum paraprotein and a 24-hour urine protein in January 2018 showed further improvement of paraproteins.  Monthly Darzalex will be continued.   10. Repair of left flank incisional hernia in middle September 2018.   11. Serum paraprotein study reached micaela on 7/5/2018.  Since that time she has evidence of disease progression.   12. Disease progression, she was started on chemotherapy with bendamustine plus Velcade plus dexamethasone per protocol on 11/29/2018. Treatment will be bendamustine on day 1 and day 4 repeat every 28 days, Velcade and dexamethasone will be on day 1 day 4 and day 8 and day 15 repeat every 28 days.   13.  From cycle #2, patient was given only 1 dose of Treanda per cycle and continue Velcade and dexamethasone  weekly.   14.  Treanda was decreased by 20% after cycle #4 because of severe thrombocytopenia, and further decreased by another 25% on cycle #6 which is her last cycle on 4/25/2019.   15.  On 5/23/2019, patient was switched to Empliciti plus Velcade and decadron.    16.  Patient has persistent severe thrombocytopenia, no improvement after starting new regimen.    17. The patient underwent a bone marrow biopsy on 08/12/2019 and the pathology report showed hypercellular bone marrow with involvement by >85%, plasma cell myeloma and no metastatic carcinoma, granulomas, vasculitis, viral inclusions, increase in myeloblasts, or malignant lymphoma.   18. On 08/30/2019, discussed with patient to switch from Empliciti/Velcade/Dex to oral ixazomib/Dex 3 weeks on and 1 week off. She is to start the medication at decreased dose 2.3 mg weekly starting on 9/5/2019.   19.  Laboratory studies on 10/24/2019 revealed disease progression. Therefore therapy was transitioned to Darzalex and subq Velcade with oral Decadron. Patient initiated chemotherapy treatment on 11/7/2019.  20.  Persistent severe thrombocytopenia started in February 2019 secondary to chemotherapy and multiple myeloma disease progression.  21.  Symptomatic anemia Hb 7.7 on 12/12/2019.  Patient was given 2 units PRBC transfusion.    22.  Cycle 4 day #8 was held due to severe worsening thrombocytopenia platelets 18,000.  23.  The patient was due for Velcade cycle 4 day 15 on 1/23/2020, but this was held as she experienced acute chest pain and was sent to the ED for further evaluation.  She had an elevated D-dimer, but her NM lung ventilation perfusion study showed a low probability for pulmonary thromboembolus.  Chest x-ray was suggestive of pneumonia and she was admitted.  She received IV antibiotics, with improvement of her symptoms.  Chest pain resolved.  She was discharged on 1/25/2020 with a couple more days of Cefdinir and doxycycline.   24.  Patient resumed  chemotherapy treatment on 1/30/2020, received Velcade cycle 4 day 15.  She also received Xgeva and Procrit on the same day.    25.  Patient complains of intermittent chest pain.  Chest CT scan without contrast on 2/3/2020 showed multiple new bilateral pulmonary nodules likely represent metastases.  26.  She is due for cycle 5 day 1 chemotherapy with Darzalex plus Velcade plus dexamethasone today, 2/6/2020.    HISTORY OF PRESENT ILLNESS:   The patient is a 69 y.o. female with the above-mentioned history here today for 1-week re-evaluation with imaging and laboratory review.  The patient is accompanied by her daughter today.      The patient resumed chemotherapy treatment on 1/30/2020, received C4D15 Velcade .  She also received Xgeva and Procrit on that same day.      Today, she reports her shortness of breath has resolved.  She also notes that her chest pain has significantly improved.  She reports she is feeling better today overall, other than feeling fatigued, especially after not sleeping well last night.  She states there are certain nights when she has difficulty sleeping, so this is not unusual for her.  She denies any dizziness or light-headedness.    She has a history of gastric ulcers.  The patient continues to take Protonix 40 mg twice daily for GERD.    She is scheduled to have a ureteral stent replacement on 2/10/2020 by her urologist, Dr. Everett.    CT scan of the chest without contrast done on 2/3/2020 showed multiple new bilateral pulmonary nodules likely represent metastases.  Some of the nodules are accessible for CT-guided biopsy if needed.  There is continued long-term stability of the large right hepatic lobe liver lesion, which is suspected to represent a hemangioma, and the smaller left hepatic lobe lesion.    Laboratory studies today, 2/6/2020, show for platelets 35,000, and deteriorating hemoglobin 8.0 and a normal WBC 7240 including ANC 4500 lymphocytes 2000 monocytes 430.  Her chemistry  lab reported stable renal function with a creatinine 1.88, calcium 7.9, albumin 2.4, total protein 11.3.  Normal electrolytes and glucose level.    Past Medical History:   Diagnosis Date   • Anemia 10/14/2008   • Arthropathy of knee 01/07/2014   • Breast cancer (CMS/HCC) 04/2012   • Bursitis of left hip 10/18/2007   • Bursitis, knee 12/02/2013   • Calcaneal spur 08/12/2009   • Chronic kidney disease, stage III (moderate) (CMS/[facility])    • Colon cancer screening 07/01/2017   • Congestive heart failure (CMS/[facility])    • Diverticulitis of colon 10/17/2007   • DVT (deep venous thrombosis) (CMS/[facility])    • Epigastric abdominal pain 4/8/2019   • Gastroesophageal reflux disease 4/8/2019   • H/O Diastolic dysfunction    • H/O Mitral regurgitation    • History of Clostridium difficile 04/01/2014   • History of echocardiogram 04/2014   • History of MRSA infection    • History of obesity    • History of transfusion    • Hydronephrosis    • Hypertension    • LLL pneumonia (CMS/[facility]) 04/23/2009   • Malignant neoplasm of kidney (CMS/[facility]) 12/2009   • Multiple myeloma (CMS/[facility]) 04/2012   • Myocardial infarction (CMS/[facility])    • Neoplasm of uncertain behavior 10/12/2015   • Non-STEMI (non-ST elevated myocardial infarction) (CMS/[facility])    • Nonischemic cardiomyopathy (CMS/[facility])    • Pyelonephritis 03/2004   • Seizures (CMS/HCC) 10/17/2007   • Thrombocytopenia (CMS/[facility])    • UTI (urinary tract infection) 10/30/2014   • UTI (urinary tract infection) 03/28/2014   • Ventral hernia      Past Surgical History:   Procedure Laterality Date   • BONE MARROW BIOPSY N/A 04/11/2012   • BRAIN SURGERY  2005    Meningioma   • BRAIN SURGERY  1990    Tumor benign per patient   • BREAST BIOPSY Left 04/09/2012    Dr. Gregg May   • CARDIAC CATHETERIZATION Left 06/11/2012    Dr. Sudeep Haddad   • CARDIAC CATHETERIZATION N/A 08/15/2006    Dr. Brian Bhatt   • COLONOSCOPY N/A 8/30/2017    Procedure: COLONOSCOPY into cecum and TI with cold polypectomies;  Surgeon:  Trudy Rivera MD;  Location: [facility] ENDOSCOPY;  Service:    • COLONOSCOPY W/ POLYPECTOMY N/A unknown    2 polyps, benign   • CYSTOSCOPY N/A 3/25/2016    Procedure: CYSTOSCOPY  WITH RIGHT STENT CHANGE;  Surgeon: Lakhwinder Russo MD;  Location: [facility];  Service:    • CYSTOSCOPY N/A 03/10/2012    Cystoscopy, right retrograde, right double-J stent-Dr. Sloan May   • CYSTOSCOPY N/A 02/25/2012    Cystoscopy, stent removal, right retrograde pyelogram, replacement of right double-J ureteral stent-Dr. Michael Everett   • CYSTOSCOPY W/ URETERAL STENT PLACEMENT Right 5/7/2016    Procedure: CYSTOSCOPY, URETERAL CATHETER INSERTION AND RIGHT STENT EXCHANGE ;  Surgeon: Michael Everett Jr., MD;  Location: [facility];  Service:    • CYSTOSCOPY W/ URETERAL STENT PLACEMENT N/A 1/20/2017    Procedure: CYSTOSCOPY RIGHT RETROGRADE PYELOGRAM, URETERAL STENT CHANGE;  Surgeon: Lakhwinder Russo MD;  Location: [facility];  Service:    • CYSTOSCOPY W/ URETERAL STENT PLACEMENT N/A 04/02/2015    Cystoscopy, removal of right ureteral stent, right retrograde pyelogram, placement of right double-J ureteral stent-Dr. Michael Everett   • CYSTOSCOPY W/ URETERAL STENT PLACEMENT N/A 04/26/2015    Cystoscopy, removal of right ureteral stent, right retrograde pyelogram, replacement of right ureteral stent 24 cm x 7-Turkish without retrieval line-Dr. Jose Gomez   • CYSTOSCOPY W/ URETERAL STENT PLACEMENT N/A 12/22/2014    Cystoscopy, removal of right double-J ureteral stent, right retrograde pyelogram, placement of right double-J ureteral stent-Dr. Michael Everett   • CYSTOSCOPY W/ URETERAL STENT PLACEMENT N/A 09/22/2014    Cystoscopy, removal of right ureteral stent, removal of right retrograde pyelogram, replacement of right double-J ureteral stent-Dr. Michael Everett   • CYSTOSCOPY W/ URETERAL STENT PLACEMENT N/A 06/18/2014    Cystoscopy, removal of right double-J ureteral stent, right retrograde pyelogram, placement of right double-J  ureteral stent-Dr. Michael Everett   • CYSTOSCOPY W/ URETERAL STENT PLACEMENT N/A 01/23/2014    Cystoscopy, removal of right double-J ureteral stent, right retrograde pyelogram, placement of right double-J ureteral stent-Dr. Michael Everett   • CYSTOSCOPY W/ URETERAL STENT PLACEMENT N/A 03/27/2012    Cystoscopy, removal of ureteral stent, right retrograde pyelogram, replacement of indewlling right ureteral stent-Dr. Michael Everett   • CYSTOSCOPY W/ URETERAL STENT PLACEMENT N/A 03/27/2013    Cystoscopy, right retrograde pyelogram, removal and replacement of right double-J ureteral stent-Dr. Michael Everett   • CYSTOSCOPY W/ URETERAL STENT PLACEMENT N/A 11/12/2012    Cystoscopy, stent removal, right retrograde pyelogram, replacement of right double-J ureteral stent-Dr. Michael Everett   • CYSTOSCOPY W/ URETERAL STENT PLACEMENT N/A 09/24/2011    Cystoscopy, removal of ureteral stent, right retrograde pyelogram and placement of right double-J ureteral stent-Dr. Michael Everett   • CYSTOSCOPY W/ URETERAL STENT PLACEMENT N/A 05/14/2011    Cystoscopy, removal of right ureteral stent, right retrograde pyelogram and placement of new right double-J ureteral stent-Dr. Michael Everett   • CYSTOSCOPY W/ URETERAL STENT PLACEMENT N/A 12/31/2010    Cystoscopy, stent removal, right retrograde pyelogram, replacement of 7 Thai x 26 cm double-J stent-Dr. Michael Everett   • CYSTOSCOPY W/ URETERAL STENT PLACEMENT N/A 08/28/2010    Cystoscopy, stent removal, right retrograde pyelogram with interpretation, placement of right double-J ureteral stent-Dr. Michael Everett   • CYSTOSCOPY W/ URETERAL STENT PLACEMENT N/A 05/24/2010    Cystoscopy, right retrograde pyelogram, replacement of right double-J ureteral stent-Dr. Michael Eevrett   • CYSTOSCOPY W/ URETERAL STENT PLACEMENT N/A 02/12/2010    Cystoscopy, right retrograde pyelogram and placement of right double-J ureteral stent-Dr. Michael Everett   • CYSTOSCOPY W/ URETERAL STENT PLACEMENT N/A 02/23/2009    Cystoscopy, right  retrograde pyelogram, placement of right double-J ureteral stent-Dr. Michael Everett   • CYSTOSCOPY W/ URETERAL STENT PLACEMENT N/A 09/17/2007    Dr. Michael Everett   • CYSTOSCOPY W/ URETERAL STENT PLACEMENT Right 01/29/2018    Dr. Michael Everett   • CYSTOSCOPY W/ URETERAL STENT PLACEMENT N/A 06/04/2018    Cystoscopy, removal of right double-J ureteral stent and placement of right double-J ureteral stent. Dr. Michael Everett.   • CYSTOSCOPY W/ URETERAL STENT PLACEMENT N/A 03/06/2018    Cystoscopy, removal of right ureteral stent, replacement of right double-J ureteral stent. Dr. Michael Everett   • ELBOW PROCEDURE Bilateral 1990s   • ENDOSCOPY N/A 5/13/2019    Procedure: ESOPHAGOGASTRODUODENOSCOPY WITH BIOPSIES;  Surgeon: Trudy Rivera MD;  Location: [facility] ENDOSCOPY;  Service: General   • HERNIA REPAIR  09/03/2018   • LASIK Bilateral    • MASTECTOMY Left 04/25/2012    Left total mastectomy with sentinel lymph node biopsies and left axillary lymphatic mapping-Dr. Gregg May   • MEDIPORT INSERTION, DOUBLE Right    • NEPHRECTOMY Left 12/30/2009    Dr. 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 Cristo Pittman   • TOTAL ABDOMINAL HYSTERECTOMY Bilateral 2007    Dr. Todd   • URETEROURETEROSTOMY Right 05/01/2006    Dr. Michael Everett   • VENA CAVA FILTER INSERTION N/A 05/08/2006    Dr. Jordon Barber   • VENOUS ACCESS DEVICE (PORT) INSERTION Right 02/25/2013    Right subclavian vein PowerPort insertion-Dr. Gregg May   • VENOUS ACCESS DEVICE (PORT) INSERTION AND REMOVAL N/A 10/06/2014    Revision of right internal jugular PowerPort with fluoroscopy, removal of peripherally inserted central catheter line-Dr. Aurora Tomlinson   • VENOUS ACCESS DEVICE (PORT) INSERTION AND REMOVAL N/A 08/14/2014    Ultrasound-guided access right internal jugular vein,  PowerPort placement, removal of right subclavian port-Dr. Jordon Barber   • VENTRAL/INCISIONAL HERNIA REPAIR Left 9/19/2018    Procedure: ventral hernia repair with mesh and rectus muscle advancement flap;  Surgeon: Trudy Rivera MD;  Location: [facility];  Service: General         Hematology/oncology History: See separate document for extra information.   1.    History of left breast cancer, status post left mastectomy on 04/25/2013, stage II, T2N0M0, hormone negative, HER2/mk positive by immunohistochemistry, however, no adjuvant chemotherapy delivered because of multiple myeloma diagnosed concomitantly.        2. Multiple myeloma, IgG lambda, stage III, diagnosed April 2012, previously treated with Velcade/Decadron followed by Velcade/Decadron/Revlimid; patient developed abdominal pain and rectal bleeding on Revlimid, which was then discontinued.    3. Patient evaluated at [facility], but was not felt to be a candidate for stem cell transplant.      4.   Disease progression with therapy switched to melphalan/Velcade/prednisone per the [facility] trial on 10/22/2013; melphalan dose has been persistently decreased because of thrombocytopenia.      5. Anemia secondary to chronic renal insufficiency and myeloma, on periodic Procrit therapy p.r.n.    6. Patient had 8 cycles of VMP chemotherapy, repeat laboratory studies on 10/03/2013 showed stable disease.  Her melphalan do  se was significantly decreased due to marrow suppression.      7.   Patient had disease progression after cycle #10 VMP treatment, evidenced by laboratory studies on 01/06/2014.  We increased the melphalan dose for one cycle.  Continued disease progression after cycle #11 VMP treatment, despite increased dose of melphalan, as documented by laboratory study on 02/17/2014.     8. The patient was evaluated on 02/24/2014 and we recommend switching chemotherapy to Kyprolis on 02/27/2014.     9. Echocardiogram on  03/05/2014 reporting L  VEF 43%.  This is on par with her previous twice echocardiogram study, in June 2012 and November 2012, reported LVEF at 45% to 50% and 40% to 45% respectively.     10. The patient had 2 episodes of chest pain after Kyprolis during cycle 1, leading to omitted dose   on days 9 and 16.  From cycle 2, we will give Kyprolis only once per week for 3 weeks out of every 4 weeks, as well as dose reduction of Kyprolis that was started on 04/04/2014.      11. After cycle 2 of dose-reduced Kyprolis, the patient's M spike increased fro  m 2.6 to 3.2 g/dL, with her IgG level increasing from 2.7 g/dL to 4.7 g/dL.     12.   Patient had a stress test and echocardiogram study at [facility] on 01/23/2015.  The patient had LVEF 35% to 40%.  Myocardial perfusion study reported a large, mild to modera  te severity fixed inferior wall defect, consistent with known transmural infarct versus diaphragmatic attenuation artifact.  Post stress resting ejection fraction estimated at 31%.      13. Repeated echocardiogram study on 04/03/2015 reported an LVEF 46%.  Left v  entricle is moderately dilated.  There is mild global hypokinesis of the left ventricle.  There was grade 1 diastolic dysfunction.    14.   The patient was seen on 04/09/2015 with plan for cycle 14, day 1, of Kyprolis.  Treatment will be delayed 1 week secondary to patient'  s extreme fatigue, hemoglobin of 8.1.  We will proceed with 2 units of packed red blood cells.  She was also found to have a urinary tract infection. Patient prescribed Cipro 500 mg twice a day x7.     15. Laboratory tests on 08/24/2015 reported sligh  t disease progress after cycle #18 Kyprolis. We discussed with patient and we will switch chemotherapy to CyBorD regimen with dose reduction of Velcade to 1 mg/m2, cyclophosphamide at 240 mg/m2 (total dose 450 mg), and dexamethasone 20 mg. All therapy to   be repeated on a weekly basis.     16.   The patient had significant fatigue  after one dose of cyclophosphamide that lasted for 5-6 days. She also had severe anemia, hemoglobin 7.6, required 2 units PRBC transfusion. Cyclophosphamide will be decreased to 350 mg from 2nd week.     17.  Patient continues to have significant fatigue after the reduced dose of cyclophosphamide at 350 mg; for 2 days after chemo, she could only lie on the bed with performance status ECOG 3. From 10/13/2015, oral cyclophosphamide will be further dec  reased to 200 mg once weekly. We will continue Velcade and dexamethasone at same dose.   Evidence of disease progress, when she was on panobinostat treatment.   18. The patient also has worsening ane  ming and thrombocytopenia secondary to chemotherapy. The patient has symptomatic anemia with hemoglobin 7.8 on 02/23/2016 requiring 2 units of packed RBC transfusion. Chemotherapy with panobinostat will be discontinued.       19. Patient was switched to pomalidomide 3 mg daily for 21 days / 28 days in late March 2016.    20. Disease progression, she was switched to to the Darzalex in May 2016.       On12/6/2016, serum free lambda chain 1090 MG/L, free kappa chain 8.5 to MG/L.  Ferritin 1015, iron 99, TIBC 137 iron saturation 72%.     On January 4, 2017, vitamin B12 level 1289, folate 7.7 ng/mL. SPEP reporting gamma globulin 3.3, out of total globulin 5.0, with immunofixation reporting small quantity of monoclonal free lambda chain, plus monoclonal IgG lambda at 3.2 g/dL.  Serum IgG 4075, IgA 9 and IgM 15.  free lambda chain 1339 MG/L and free kappa chain 10.3 MG/L.      Laboratory study 3/27/2017 showed slight improvement of paraprotein, SPEP reporting M spike 2.8 g/DL, out of total globulin 4.3, and the serum IgG 3420, IgA 9, IgM 11, free lambda chain 1218 MG/L and free kappa chain 8.0 MG/L.  Total 24 hour urine sample reported at free lambda chain 142 mg, at a concentration 74.8 MG/L, free kappa chain 75 mg at a concentration 39.5 MG/L., Urine protein immunofixation  reporting biclonal IgG lambda and monoclonal free lambda light chain, M spike #1 at 41.5% and monoclonal IgG lambda spike #2 at 10.5%. Serum beta-2 microglobulin is 7.3 MG/L.     Her laboratory study on 7/21/2017 reported further improvement of paraprotein is both serum and a 24-hour urine sample.  SPEP reporting monoclonal IgG lambda 2.9 g/dL and free lambda chain 0.1 g/dL.  Serum IgG was 3261 mg/dL and IgA 5, IgM 13, free kappa chain 6.7, free lambda chain 701.3 with kappa/lambda ratio 0.01.  24-hour urine sample reported positive for Bence May protein lambda type, immunofixation positive for IgG lambda.  Total urine protein 241 mg, gamma globulin 13.1%.  Hemoglobin was 11.4 .4, platelets 122,000, WBC 6680 including neutrophils 3600, lymphocytes 2100 and monocytes 650.  Her creatinine was 1.68, at baseline level.  Liver function panel unremarkable.  Darzalex was continued.    Laboratory study on 8/25/2017 showed improved the platelets at 144,000, normal WBC 6660 and slightly improved hemoglobin at 11.7.     Patient had a colonoscopy examination on 8/30/2017 by Dr. Rivera.  It reported diverticulosis in multiple areas more severe in the sigmoid colon.  There was 2 polyps 4 mm pneumonia from transverse colon and the sigmoid colon.  Pathology evaluation reported tubular adenoma from the sigmoid colon polyp, and benign hyperplastic polyp from transverse colon.    Laboratory study on 10/20/2017 reported slightly improved monoclonal spike 2.7 g/dL by SPEP, immunofixation reported positive monoclonal IgG lambda, serum IgG 3536 mg/dL, IgA less than 5 and IgM 11, free kappa chain 5.3 mg/L, and free lambda chain 720 mg/L with kappa/lambda ratio 0.01.  Serum beta-2 microglobulin was 6.5 mg/L.   Her CBC showed a stable mild anemia with hemoglobin 11.3, macrocytosis .3, and the platelets 114,000, normal WBC 7070 including neutrophils 4100 lymphocytes 2000 monocytes 650, normal liver function panel, total  protein 7.9 and albumin 3.2,  and normal electrolytes including calcium 8.1, and improved creatinine 1.59.     Laboratory study on 1/12/2018 reported serum IgG 3083 mg/dL, IgA 10, IgM 10, free kappa chain 8.5 and free lambda chain 589.1 mg/L, kappa/lambda ratio 0.01, serum protein admitted fixation reported IgG lambda monoclonal protein and SPEP reporting M spike 3.1 g/dL, beta-2 microgram and 7.4 mg/L. serum creatinine 1.79, calcium 8.2, other electrolytes normal, hemoglobin 11.3, .5 and MCHC 31.6, platelets 129,000, WBC 6780 including neutrophils 3500 lymphocytes 2300.  Serum ferritin 653.7 ng/mL, iron 123 TIBC 174 iron saturation 71%, folic acid 12.8 ng/mL and a vitamin B12 at 873 pg/mL.  Her 24-hour urine study on 1/18/2018 reported lambda chain 32.8 mg/L, total 61 mg in 24 hours, and the free kappa chain 27.4 mg/L and 51 mg in 24 hours, and the total 24-hour urine protein 283 mg, and urine protein immunofixation positive for 5.3% monoclonal IgG lambda and positive for Bence May protein at 36.2% monoclonal lambda chain.  Monthly Darzalex was continued.       This patient had serum protein study on 04/06/2018 which reported free light chain at concentration 703.8 mg/L and free kappa chain 7.0, free kappa/lambda ratio 0.01, serum IgG 3650 mg/dL, IgM 11 and IgA 9 with serum protein electrophoresis reporting monoclonal IgG lambda 3.2 g/dL and also monoclonal free lambda light chain.  But it was unable to quantify monoclonal lambda light chain because of the small quantity of it.     A 24-hour urine study on 04/20/2018 reported total free lambda chain 60 mg in 24 hours at concentration 33.1 mg/L, free kappa chain 45 mg in 24 hours at concentration 24.8 mg/L. Total 24-hour urine protein was 279 mg. Urine protein immunofixation and UPEP reported lambda-type Bence-May protein + #1 monoclonal IgG lambda band at 34.8% and #2 monoclonal IgG lambda band at 6.9%.     Her CBC results today reported a stable  hemoglobin at 11.1, platelets 130,000 and WBC 7440 including neutrophils 4100 and lymphocytes 2350, monocytes 650. Her CMP reported slightly worsened creatinine at 1.82 with BUN 33. Total protein at 8.8. Liver function panel was normal. Total serum protein 8.8 and albumin 3.2, globulin 5.6.    Reviewing her previous lab studies especially serum paraprotein, she reached a micaela of response on 07/05/2018 when she had serum IgG 3015 mg/dL, free lambda chain 458.7 mg/L and M spike IgG lambda 2.6 g/dL and and monoclonal lambda free light chain 0.1 g/dL, total 2.7 g/dL. serum beta-2 myoglobin 7.1 mg/L.  Her ferritin was 539 ng/mL, free iron 73 TIBC 176 iron saturation 42%.    Laboratory study obtained on 11/01/2018 showed further disease progression. Her serum IgG was 5472 mg/dL, IgA 8 and IgM 10, serum kappa chain 7.9 mg/L, free lambda chain 961.3 mg/L and kappa/lambda ratio 0.01. Serum protein electrophoresis reported monoclonal IgG lambda 4.1 g/dL, and monoclonal lambda free light chain 0.1 g/dL. Her hemoglobin was 9.0, .6, MCHC 30.1, platelets 124,000 and WBC 10,000 including neutrophils 7400, lymphocytes 1200, monocytes 600.     Cycle #1 day #1 Bendamustine will be started on 11/29/2018.     Laboratory studies on 01/17/2019 reported improved paraproteins.  SPEP reported M spike 3.8 g/dL out of total 5.5 g/dL globulin.  Serum IgG was 4374 mg/dL, IgA 10 and IgM 11.  Free lambda chain 606.8 mg/L, free kappa chain 8.6 and kappa/lambda ratio 0.01.  Her serum protein immunofixation continues to be IgG lambda monoclonal.  Her CBC showed hemoglobin 9.3, .3, platelets 50,000 and WBC 8600 including ANC 5100, lymphocytes 2160.  Her Chemistry lab reported creatinine 1.92, calcium 8.3, normal liver function panel, glucose 98 with normal sodium and potassium.     For her 24-hour urine study on 3/14/2019, she had free lambda chain at a 62.1 mg/L, total 87 mg in 24 hours, free kappa chain 42 mg/L and 59 mg in 24 hours.   Urine protein immunofixation reported a monoclonal IgG lambda #1 and free lambda chain were unable to be quantified, however IgG lambda #2 was 12.8%.  Her 24-hour urine total protein was 452 mg.     For serum protein study on 3/28/2019 (on day of her cycle #5 day #1 Tredhaval ) also reported further decreased monoclonal spike 3.2 mg/dL, and serum IgG 3600, IgA 13 IgM 12, free kappa chain 11.7, and worsening free lambda chain 1045 mg/L.     Her serum paraprotein studies on 4/11/2019 reported a serum IgG 4407 mg/dL, IgA 14, IgM 13, free kappa chain 9.2, free lambda chain 998.4 mg/L, kappa/lambda ratio 0.01.  His serum protein immunofixation was positive for IgG lambda, SPEP reporting M spike 4.3 g/dL.    Her 24-hour urine study on 7/18/2019 reported positive IgG lambda monoclonal protein and lambda type Bence-May protein, total free kappa chain 142 mg in 24 hours, at 74.6 mg/L, and total lambda chain 179 mg in 24 hours at 94.1 mg/L.  Kappa/lambda ratio 0.79.  Her total 24-hour urine protein was 410 mg, with monoclonal lambda free light chain 41.8% and monoclonal IgG lambda 2.6%.     Serum paraprotein studies on 7/24/2019 reported monoclonal spike 3.9 g/dL, serum IgG 4107, IgA 14 IgM 19, free kappa chain 12.8 and free lambda chain 656, kappa/lambda ratio 0.02.    On 08/01/2019 she has severe thrombocytopenia platelets 27,000.  She has elevated WBC to 12,600 including ANC 8900 and normal lymphocytes 2000.  Her hemoglobin is 10.4 08/01/2019. The patient underwent a bone marrow biopsy on 08/12/2019 and the pathology report showed hypercellular bone marrow with involvement by plasma cell myeloma and no metastatic carcinoma, granulomas, vasculitis, viral inclusions, increase in myeloblasts, or malignant lymphoma. Over 85% of the bone marrow are plasma cells. Normal karyotype. Flow cytometry study was positive.    Because of her persistent severe thrombocytopenia, the patient underwent a bone marrow biopsy on 08/12/2019.   Pathology evaluation showed hypercellular bone marrow with involvement by plasma cell myeloma 85-90% and no metastatic carcinoma, granulomas, vasculitis, viral inclusions, increase in myeloblasts, or malignant lymphoma.  Normal karyotype.     Recent laboratory study on 8/15/2019 reported of elevated ferritin 524, normal iron saturation 42% with free iron 59 and a TIBC 140.  Normal thyroid function profile.  Her serum globulin 7.3, total protein 10.1 and albumin 2.8, calcium 8.1 creatinine 1.99.    She does continue to have severe intermittent pain in her right hip which is managed somewhat with pain medication, including tylenol and prescription Norco. She states she has difficulty ambulating and functioning normally when this pain occurs. She continues on oral Vitamin B-12 and folic acid supplementation.     Recent XR right hip with or without pelvis on 08/15/2019 showed sclerosis at the pubic symphysis. No other bony or articular abnormality was identified. The hip joints were symmetric and appeared within normal limits. The joint spaces were fairly well-maintained. There was no bony erosion. There was no evidence of recent or old fracture or subluxation. A right ureteral stent was noted.     On 08/30/2019, switched treatment from Empliciti to oral ixazomib 3 weeks on and 1 week off. She is expected to start the medication on 9/5/2019 when this medication arrives.  Patient will continue dexamethasone weekly at the same time as part of the treatment.  Because of her severe thrombocytopenia, we recommended starting low-dose ixazomib 2.3 mg weekly.  Treatment was started on 9/5/2019.     Laboratory study on 9/26/2019 showed worsening leukocytosis with WBC 20,780 including ANC 16,000, lymphocytes 2700 and monocytes 8070.  She also has worsening severe thrombocytopenia with platelets 27,000, and stable hemoglobin 9.5.     Suspect the patient may have some kind of infection however she denies fever sweating chills no  dysuria or hematuria.  She denies pain in the right flank area where she has ureteral stent and oftentimes complains of pain when she has urinary tract infection.      I searched medical records, she had replacement of stent 3 months ago.  I recommended urinalysis and culture on 9/26/2019 for further evaluation despite that she has no symptoms.I called and spoke to patient about her urinalysis which showed a large quantity of bacteria and WBC.  I e-scribed cefdinir to her pharmacy, 300 mg twice a day for 7 days. I called the patient today, reporting her urine culture results with multidrug sensitive E. coli.  She will continue cefdinir as we prescribed.  Should be sensitive according to the culture results.I think this patient will need to follow-up with her urologist Dr. Everett for stent replacement.  I sent a message to Dr. Everett.    On 10/17/2019, patient also reports she has intermittent chest pain/epigastric area/stomach pain for the past several weeks, to the point that she thought she was not able to make it.  Patient reports she had stress test this week and was told to having nothing wrong.  Patient also reports this is not related to her eating.  No apparent effect of exacerbation or relieving.      Unfortunately laboratory study on 10/24/2019 reported significant disease progression, he had a M spike 5.1 g/dL by SPEP, and a serum IgG 7280 mg/dL, IgA 16, IgM 17, free kappa chain 10.2 and free lambda chain 1309.5 mg/L, with kappa/lambda ratio 0.01.  Her beta-2 myoglobin was 16.2 mg/L.  Chemistry lab reported total serum protein 11 g, albumin 2.7 and globulin 8.3.  Her liver function panel was normal, creatinine 1.90 and normal calcium and other electrolytes.  Continues to have anemia Hb 10.0, platelets 26,000 and WBC 19,180 including ANC 13,230 lymphocytes 3070 monocytes 1920 and eosinophil 770.  Her iron study reported ferritin 455.4 ng/dL, free iron 61, TIBC 155 and iron saturation 39%, with folic acid  at 7.3 ng/mL and vitamin B12 at 1800 pg/mL.    Patient initiated chemotherapy treatment with darzalex/velcade/dexamethasone on 11/7/2019.    Laboratory study 12/12/2019 showed deteriorating hemoglobin 7.7.  She also has stable but severe thrombocytopenia with platelets 24,000.  Her WBC is normal at 5230 including ANC 2670.  Weekly Procrit was continued.  Patient was given 2 units PRBC transfusion.      Paraprotein study on 1/16/2020 reported monoclonal IgG lambda spike 6.8 g/dL, however unable to quantify the monoclonal free lambda chain.  Her serum IgG was 7632, IgA 5, IgM 13, free kappa chain 8.6 and the free lambda chain 1415 mg/L.    Labs on 1/23/2020 reported stable anemia hemoglobin 10.2, severe thrombocytopenia but with slightly improved platelets 33,000 and WBC 9190, including ANC 6160 lymphocytes 2100.  Her iron study today reported elevated ferritin 912, free iron 109 TIBC 130 and iron saturation 84%.     Patient reported on 1/23/2020 that she started having chest pain earlier that morning, but approximately getting worse.  In the clinic she complained of worsening chest pain, in the epigastric area.  She has no nausea vomiting.  No fever no sweating.  Her vitals stable, normal BP, afebrile, normal pulse.  Excellent oxygen saturation.    The patient was due for Velcade cycle 4 day 15 on 1/23/2020, but this was held as she experienced acute chest pain, which started earlier that morning and was worsening.  She was sent to the ED that morning for further evaluation.  She also had an associated cough with clear/yellow sputum and shortness of breath    In the ER, she was found to have an elevated D-dimer, but her NM lung ventilation perfusion study showed a low probability for pulmonary thromboembolus.  A chest x-ray was suggestive of pneumonia, and she was admitted for further care.  She was given IV antibiotics for a couple days for the potential pneumonia.  Her symptoms improved, and her chest pain  resolved.  She was discharged on 1/25/2020 with a couple more days of Cefdinir and doxycycline.    On 1/30/2020, she reported she is still experiencing intermittent chest pain, which is worsened with being physically active and walking around.  The pain is located in the central chest and does not radiate.  She states that she feels the pain is related to the PowerPort on her chest.  She notes associated shortness of breath and cough.  She reports she can occasionally walk around the store, but sometimes she is unable to due to the severe chest pain.  The pain is relieved with taking Norco, rest, and sitting down.  She has no nausea vomiting.  No fevers or sweating.  The patient still has a couple more days left of the Cefdinir and doxycycline.  She was also given an inhaler to use for her dyspnea.    Laboratory studies on 1/30/2020 showed slightly improved platelets 36,000, and the stable anemia hemoglobin 8.5, WBC 6260 including ANC 3780 lymphocytes 1500.  Chemistry lab showed a baseline creatinine 2.0, calcium 8.5 normal magnesium 1.8 phosphorus 3.3, normal liver function panel.  Glucose 107.    Laboratory study on 1/23/2020 reported serum ferritin 912.7 ng/mL, free iron 109, TIBC 130 and iron saturation 84%.    Paraprotein studies on 1/16/2020 reported monoclonal IgG lambda 6.8 g/dL, and the unable to qualify the free lambda chain by SPEP.  Serum IgG 7632 mg/dL, IgA 5, IgM 13, free kappa chain 8.6 and the free lambda chain 1415 mg/L with kappa/lambda ratio 0.01.          Review of Systems   Constitutional: Positive for fatigue. Negative for appetite change, chills, diaphoresis, fever and unexpected weight change.   HENT:   Negative for mouth sores, nosebleeds, sore throat and trouble swallowing.    Eyes: Negative for eye problems and icterus.   Respiratory: Negative for cough and shortness of breath.    Cardiovascular: Positive for chest pain (intermittent epigastric area pain, significantly improved). Negative  "for leg swelling and palpitations.   Gastrointestinal: Negative for abdominal pain, blood in stool, diarrhea and nausea.   Genitourinary: Negative for dysuria, frequency and hematuria.    Musculoskeletal: Positive for arthralgias (Right hip pain stable). Negative for back pain, flank pain, gait problem and myalgias.   Skin: Negative for itching and rash.   Neurological: Positive for extremity weakness (stable). Negative for dizziness, gait problem, headaches, light-headedness and numbness.   Hematological: Negative for adenopathy. Does not bruise/bleed easily.   Psychiatric/Behavioral: Negative for depression. The patient is not nervous/anxious.    All other systems reviewed and are negative.    Medications: The current medication list was reviewed in the EMR.         Allergies   Allergen Reactions   • Sulfa Antibiotics Swelling   • Zosyn [Piperacillin Sod-Tazobactam So] Swelling     Face and lips       VITALS:   Vitals:    02/06/20 0845   BP: 119/77   Pulse: 66   Resp: 16   Temp: 97.6 °F (36.4 °C)   SpO2: 98%   Weight: 79.2 kg (174 lb 11.2 oz)   Height: 157.5 cm (62.01\")   PainSc: 0-No pain      ECOG 1      PHYSICAL EXAM:  GENERAL:  Well-developed, well-nourished  female in no acute distress.  Mildly obese.  SKIN:  Warm, dry without rashes, purpura or petechiae.  HEAD:  Normocephalic.  EYES:  Pupils equal, round and reactive to light.  EOMs intact.  Conjunctivae normal.  EARS:  Hearing intact.  NOSE:  No nasal discharge.  MOUTH:  Tongue is well-papillated; oral mucosa moist, no stomatitis or ulcers.  Lips normal.  THROAT:  Oropharynx without lesions or exudates.  NECK:  Supple; no thyromegaly or masses.  LYMPHATICS:  No cervical, supraclavicular adenopathy.  CHEST:  Lungs clear to auscultation. Good airflow.  Respiratory effort.  PowerPort on the chest looks benign, no signs of infection.  CARDIAC:  Regular rate and rhythm without murmurs, rubs or gallops. Normal S1,S2.  ABDOMEN:  Soft, nontender " with no organomegaly or masses.  EXTREMITIES:  No clubbing, cyanosis or edema.  NEUROLOGICAL:  Cranial Nerves II-XII grossly intact.  No focal neurological deficits.  PSYCHIATRIC:  Normal affect and mood.        Recent lab results:   Results from last 7 days   Lab Units 02/06/20  0814   WBC 10*3/mm3 7.24   NEUTROS ABS 10*3/mm3 4.49   LYMPHS ABS 10*3/mm3 2.03   HEMOGLOBIN g/dL 8.0*   HEMATOCRIT % 26.2*   PLATELETS 10*3/mm3 35*      Glucose   Date Value Ref Range Status   02/06/2020 98 65 - 99 mg/dL Final     BUN   Date Value Ref Range Status   02/06/2020 18 8 - 23 mg/dL Final   10/18/2019 26 (H) 7 - 20 mg/dL Final     Creatinine   Date Value Ref Range Status   02/06/2020 1.88 (H) 0.57 - 1.00 mg/dL Final   10/18/2019 2.2 (H) 0.7 - 1.5 mg/dL Final     Sodium   Date Value Ref Range Status   02/06/2020 137 136 - 145 mmol/L Final   10/18/2019 144 137 - 145 mmol/L Final     Potassium   Date Value Ref Range Status   02/06/2020 3.9 3.5 - 5.2 mmol/L Final   10/18/2019 3.9 3.5 - 5.1 mmol/L Final     Chloride   Date Value Ref Range Status   02/06/2020 110 (H) 98 - 107 mmol/L Final   10/18/2019 122 (H) 98 - 107 mmol/L Final     CO2   Date Value Ref Range Status   02/06/2020 22.0 22.0 - 29.0 mmol/L Final     Total CO2   Date Value Ref Range Status   10/18/2019 20 (L) 22 - 30 mmol/L Final     Calcium   Date Value Ref Range Status   02/06/2020 7.9 (L) 8.6 - 10.5 mg/dL Final   10/18/2019 8.5 8.4 - 10.2 mg/dL Final     Total Protein   Date Value Ref Range Status   02/06/2020 11.3 (H) 6.0 - 8.5 g/dL Final     Albumin   Date Value Ref Range Status   02/06/2020 2.40 (L) 3.50 - 5.20 g/dL Final     ALT (SGPT)   Date Value Ref Range Status   02/06/2020 6 1 - 33 U/L Final     AST (SGOT)   Date Value Ref Range Status   02/06/2020 13 1 - 32 U/L Final     Alkaline Phosphatase   Date Value Ref Range Status   02/06/2020 34 (L) 39 - 117 U/L Final     Total Bilirubin   Date Value Ref Range Status   02/06/2020 0.4 0.1 - 1.2 mg/dL Final     eGFR Non   Amer   Date Value Ref Range Status   08/30/2016  >60 mL/min/1.73 Final     Comment:     <15 Indicative of kidney failure.     A/G Ratio   Date Value Ref Range Status   01/16/2020 0.3 (L) 0.7 - 1.7 Final     BUN/Creatinine Ratio   Date Value Ref Range Status   02/06/2020 9.6 7.0 - 25.0 Final   10/18/2019 11.8 RATIO Final     Anion Gap   Date Value Ref Range Status   02/06/2020 5.0 5.0 - 15.0 mmol/L Final       Lab Results   Component Value Date    IRON 109 01/23/2020    TIBC 130 (L) 01/23/2020    FERRITIN 912.70 (H) 01/23/2020     Lab Results   Component Value Date    FOLATE 7.34 10/24/2019     Lab Results   Component Value Date    [id number] 1,808 (H) 10/24/2019     RECENT IMAGING:  CT CHEST WITHOUT CONTRAST - 2/3/2020     HISTORY: 69-year-old female with shortness of breath and chest pain.  Multiple myeloma. Left nephrectomy for renal cell carcinoma and left  mastectomy for breast cancer in the past.     TECHNIQUE: Radiation dose reduction techniques were utilized, including  automated exposure control and exposure modulation based on body size.   3 mm images were obtained through the chest without the administration  of IV contrast. Correlated with CT of the abdomen from 05/13/2018 and  compared with chest CT from 08/21/2017.     FINDINGS: There are multiple new bilateral pulmonary nodules. The  largest nodule is pleural-based along the major fissure at the posterior  aspect of the left upper lobe measuring approximately 2.5 x 1.8 cm.  Given constraints of noncontrasted technique, there is no definite  change in the multiple shotty and mildly enlarged mediastinal nodes.  There are no pleural or pericardial effusions. No suspicious bone lesion  is seen. Right MediPort catheter tip is within the SVC. At the  visualized upper abdomen, there is continued long-term stability of a  large lobular mass within the right hepatic lobe and a much smaller left  hepatic lobe lesion. The spleen is enlarged and may be  larger than  previously. The spleen is not seen in its entirety.     IMPRESSION:  1. Multiple new bilateral pulmonary nodules likely represent metastases.  Some of the nodules are accessible for CT-guided biopsy if needed.  2. There is continued long-term stability of the large right hepatic  lobe liver lesion which is suspected to represent a hemangioma. The much  smaller left hepatic lobe lesion is stable as well.  3. Splenomegaly.    Assessment/Plan   1. Multiple myeloma, IgG lambda, stage III. Failed multiple lines of therapy. Her treatment was switched to Darzalex on 5/26/2016. She was on this treatment for more than 2 years.   · In November 2018, she clearly had disease progression.  Darzalex was discontinued and was started on bendamustine plus Velcade plus dexamethasone.   · Cycle #1 day #1 Bendamustine was started on 11/29/2018.  Due to poor tolerance with profound fatigue, chemotherapy was modified with bendamustine only given on day 1, and a Velcade weekly, every 4 weeks as 1 cycle.  Patient had a cycle #6 dose reduction of bendamustine by 25% because of severe thrombocytopenia.   · This patient actually had a micaela of response on 3/28/2019 with M spike 3.2 g/dL and serum IgG 3600.  After that M spike and serum IgG both were elevating.   · Laboratory study obtained on 5/9/2019 after 6 cycles chemotherapy, it showed disease further progressed with worsening level of serum IgG 4873 mg/dL and M spike 4.3 g/dL.    · On 5/23/2019, patient was switched to Empliciti plus Velcade and decadron.    · Her serum protein studies reported persistent active disease, not well controlled with large quantity of monoclonal spike.  She also has severe thrombocytopenia oftentimes leading to hold Velcade injection.  · Bone marrow aspiration biopsy for reanalysis recommended.  This patient also has a history of breast cancer so we need to be open-minded.  She had bone marrow aspirate biopsy on 8/12/2019 which showed more than  85% of bone marrow cells are malignant plasma cells.  There is no evidence of metastatic disease or other type of malignancy.  · Recommended switching treatment to Ixazomib plus dexamethasone.  Considering her pre-existing severe thrombocytopenia, I recommended starting low-dose Ixazomib 2.3 mg weekly instead of full dose 4 mg weekly.   · She started new treatment on 9/5/2019 with dexamethasone 20 mg weekly.  oral ixazomib/Dex 3 weeks on and 1 week off. She is to start the ixazomib at decreased dose 2.3 mg weekly starting on 9/5/2019.   · Patient has significant disease progression on 10/24/2019 with serum increased M spike, serum IgG and free lambda chain.  · On 10/31/2019, I discussed options with the patient and suggested Darzalex/Velcade/dexamethasone as it would be easy on her bone marrow. Patient initiated Cycle #1 treatment on 11/7/2019.   · So far patient has been tolerating well.  Laboratory study 1/16/2020 reported slightly worsening serum IgG and free lambda chain.  She has no apparent response to Velcade plus Darzalex plus dexamethasone, except normalization of leukocytosis.   · 1/23/2020 Held chemotherapy, due for Velcade C4D15.  She was sent to the ED for further evaluation of acute severe chest pain.    · Resumed chemotherapy on 1/30/2020, received cycle 4-day 15.   · Continue chemotherapy.  She will receive cycle 5 day 1 today, 2/6/2020.    2.  Severe thrombocytopenia.  This is more likely secondary to her multiple myeloma, based on her recent bone marrow aspiration biopsy on 8/12/2019. patient has no easy bleeding or bruising.   · Patient was last transfused with platelets on May 13, 2019 prior to her EGD.  · The patient underwent a bone marrow biopsy on 08/12/2019 and the pathology report showed hypercellular bone marrow with involvement by plasma cell myeloma and no metastatic carcinoma, granulomas, vasculitis, viral inclusions, increase in myeloblasts, or malignant lymphoma. Over 80% of the bone  marrow are plasma cells. Normal karyotype.   · Laboratory study on 8/30/2019 showed platelets of 34,000. We switched her treatment to oral ixazomib 3 weeks on and 1 week off starting 9/05/19.    · Platelet count today 27,000 on 12/26/2019.  Patient reports no spontaneous bleeding.  Continue to monitor.   · Her Velcade treatment was on hold on 1/16/2020, due to severe thrombocytopenia with platelets 18,000.  On 1/23/2020, platelets improved at 33,000.  · Platelet count is 36,000 on 1/30/2020.    · Platelet count is slightly decreased at 35,000 today, 2/6/2020.  Patient has no bleeding or bruising.    3.  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    · Her laboratory study on 5/9/2019 showed no evidence of iron deficiency.  On 5/16/2019, B12 level of 1819 and normal folate of 5.84.  She was symptomatic anemic with Hb 8.5, she was transfused with 2 units of packed red blood cells.    · Recent labs on 8/15/2019 reported no evidence of iron deficiency, had normal iron saturation and excellent ferritin level.  · Laboratory study on 10/24/2019 reported no evidence of iron deficiency, nor B12 folic acid deficiency.  She will continue oral B12 supplementation.    · Her hemoglobin was 7.7 on 12/12/2019.  We continued weekly Retacrit.  Because patient was symptomatic, with worsening fatigue from her anemia, she was given 2 units PRBC transfusion.   · Improved hemoglobin 10.1 on 1/23/2020.    · Hemoglobin is decreased to 8.5 on 1/30/2020.    · Hemoglobin is 8.0 today, 2/6/2020.  Continue Procrit weekly as needed per protocol.   I also recommend getting a transfusion of 2 units of PRBC on a separate day, following the biopsy.      4.  Dysuria/urgency of urination.  Urinalysis reported large quantity of WBC and bacteria 11/13/2019.  Treated with Omnicef.  Symptoms have resolved.  She did have a stent replacement on 11/21/2019.   · Patient denies recurrent symptomology on 12/26/2019.   · Patient  reports that she has a scheduled ureteral stent replacement scheduled for 2/10/2020 by her urologist.    5. Zometa / Xgeva treatment.  Because of her kidney insufficiency, we were only able to give her Zometa every 3 months.  Due to her elevated creatinine level, we eventually switched her to Xgeva treatment and continued monthly.  · She had elevated phosphorus 5.6 on 6/6/2019.   · Her last Xgeva was on 1/30/2020.  This will be continued every 4 weeks.        6. History of stage II left breast cancer, post mastectomy in 2013, negative for ER/MN and positive HER-2. No neoadjuvant chemotherapy because of concurrent discovery of multiple myeloma and ongoing chemotherapy. She had a normal mammogram study on 7/26/2019.       7.  Skin rashes on her extremities:  Patient was seen dermatologist Dr. Barroso, who prescribed steroids cream and also over-the-counter moisturizing cream.  Patient will follow-up with Dr. Barroso again.  Currently resolved.    8. Profound fatigue: This is multifactorial secondary to her disease progression and her worsening anemia associated with chemotherapy.   · A thyroid level was obtained and patient was diagnosed with subclinical hypothyroidism.  She was initiated on Synthroid and folic acid and has noted significant reduction in fatigue.   · Patient to continue on Synthroid daily and folic acid supplementation daily.  · She notes feel more fatigued today, 2/6/2020, after not sleeping well last night  She notes occasional difficulty sleeping, so this is not unusual for her.    9.  Hypocalcemia.  The patient has struggled with compliance with her calcium supplements.  She reports she has been taking these inconsistently, though they resulted in soft frequent bowel movements.  We previously added 2 tablets of Tums daily along with current calcium/vitamin D supplementation.    · Calcium level 7.7 on 1/16/2020.  Her albumin was 2.5.  · Calcium level 8.9 on 1/23/2020.  Albumin was 2.7.  · Calcium  level 8.3 on 1/24/2020.   · Calcium level 8.5 on 1/30/2020.  · Calcium level 7.9 today, 2/6/2020.    10.  Gastric ulcer diagnosed in May 2019 epigastric discomfort.    Patient had EGD examination by Dr. Rivera on 5/13/2019.  It reported prepyloric ulceration.  Dr. Rivera recommended Protonix twice a day.  Currently takes Protonix twice a day.    11.  Worsening leukocytosis/neutrophilia.    · This started in middle of July 2019 with a fluctuation of WBC and neutrophils.  Suspect that this is all due to disease progression of her multiple myeloma.    · This has resolved after patient was switched her to chemotherapy Darzalex plus Velcade and dexamethasone in early November 2019.    12.  Severe chest pain on 1/23/2020.   Started this morning, and progressively getting worse.   · In the ER, she was found to have an elevated D-dimer, but her NM lung ventilation perfusion study showed a low probability for pulmonary thromboembolus.  Chest x-ray was suggestive of pneumonia and she was admitted for further care.  She received IV antibiotics, with improvement of her symptoms.  Chest pain resolved.  She was discharged on 1/25/2020 with a couple more days of Cefdinir and doxycycline.    · On 1/30/2020, patient continued to experience intermittent chest pain, with associated shortness of breath and cough.  We will request outpatient chest CT scan for evaluation.  · CT scan of the chest without contrast done on 2/3/2020 showed multiple new bilateral pulmonary nodules likely represent metastases.  There is continued long-term stability of the large right hepatic lobe liver lesion, which is suspected to represent a hemangioma, and the smaller left hepatic lobe lesion.  · Chest pain is significantly improved today, 2/6/2020.    · I discussed with the patient and her daughter today in the shared the CT scan images with them, that I recommend proceeding with a CT-guided biopsy of the left upper lobe pulmonary nodule for further  evaluation.  Considering her history of breast cancer which she did not receive adjuvant chemotherapy, due to the concurrent discovery of multiple myeloma needing immediate chemotherapy, I highly suspect this patient has metastatic breast cancer.  I discussed with the her daughter for this possibility and potential treatment plan.  CT-guided biopsy will likely be done following the ureteral stent replacement she has scheduled on 2/10/2020.  We will plan for her to get a transfusion of 1 unit of platelets the morning of her lung biopsy.  The patient and her daughter verbalized understanding and agreed with the plan.  · I will follow up with her in 2 weeks to discuss biopsy results and further treatment plan.  The patient verbalized understanding and agreed with the plan.    Plan:  1. Continue chemotherapy Darzalex plus Velcade plus dexamethasone every 3-week cycle.  She will receive cycle 5 day 1 today, 2/6/2020.  2. We will plan for a CT-guided biopsy of the left upper lobe pulmonary nodule for further evaluation.  We will plan for her to get transfusion of 1 unit of platelets the morning of her biopsy.    3. Will arrange for her to have transfusion of 2 units of PRBC on a separate day.  4. Continue weekly Procrit.  Dose given today.    5. Patient will continue Xgeva, repeated every 4 weeks.  Last dose on 1/30/2020.   6. She is scheduled to have a ureteral stent replacement on 2/10/2020 by her urologist, Dr. Everett.  7. Continue antibiotics as prescribed  8. Continue Protonix 40 mg twice a day.    9. Continue on Synthroid daily.     10. Continue Norco for pain management.    11. Continue calcium and vitamin D supplementation.    12. Continue oral B12 and folic acid daily.    13. Continue prophylactic acyclovir 400 mg twice daily.   14. Continue prophylactic Bactrim 3 times per week for PCP infection per protocol.   15. She will return in 1 week for next Velcade and dexamethasone treatment and weekly  Procrit.  16. Follow up with me in 2 weeks to discuss biopsy results and to discuss further treatment plan.      I independently reviewed her chest CT scan images from 2/3/2020.  I shared those images with the patient and her daughter today.   I also later discussed with her daughter separately about the current situation and poor long-term prognosis.  She voiced understanding.     Patient is on drug therapy and requires frequent monitoring.    By signing my name here, I Bud Emmanuel, attest that all documentation on 02/06/20 at 10:04 AM has been prepared under the direction and in the presence of Dr. Zane Araujo MD.    I reviewed the note scribed by Bud Emmanuel and made appropriate corrections.      Zane Araujo MD PhD      CC -  Cristo Madera M.D.   Michael Everett M.D.    Trudy Rivera M.D.

## 2020-02-05 NOTE — H&P (VIEW-ONLY)
REASONS FOR FOLLOW UP:   1. IgG kappa multiple myeloma, was on Darzalex, started on May 26, 2016. Patient was switched to Darzalex from Pomalyst, as she continued to be neutropenic with recurrent urinary tract infection while on Pomalyst.    2. Zometa is on hold due to renal insufficiency.    3. After 16 doses of Darzalex, laboratory study showed stable disease in November 2016. Insurance company denied adding Velcade and dexamethasone. Patient is to be continued on monthly Darzalex from 12/06/16.   4. Obstructive right pyelonephritis with positive urine culture for E. coli, required hospitalization from 1/19/2017 through 01/24/2017 with iv antibiotics and stent exchange on 01/20/2017.   5.  Paraprotein studies on March 27, 2017 showed further slight improvement of multiple myeloma.   6.  Febrile illness, with urinary tract infection and influenza B infection in early May 2017, required hospitalization for 6 days.   7.  Paraprotein studies for both serum and 24-hour urine sample on 7/21/2017 showed further improvement of paraproteins.   Darzalex was continued.   8.  Serum protein study reported stable disease on 10/20/2017.  Darzalex will be continued.   9.  Laboratory studies of both serum paraprotein and a 24-hour urine protein in January 2018 showed further improvement of paraproteins.  Monthly Darzalex will be continued.   10. Repair of left flank incisional hernia in middle September 2018.   11. Serum paraprotein study reached micaela on 7/5/2018.  Since that time she has evidence of disease progression.   12. Disease progression, she was started on chemotherapy with bendamustine plus Velcade plus dexamethasone per protocol on 11/29/2018. Treatment will be bendamustine on day 1 and day 4 repeat every 28 days, Velcade and dexamethasone will be on day 1 day 4 and day 8 and day 15 repeat every 28 days.   13.  From cycle #2, patient was given only 1 dose of Treanda per cycle and continue Velcade and dexamethasone  weekly.   14.  Treanda was decreased by 20% after cycle #4 because of severe thrombocytopenia, and further decreased by another 25% on cycle #6 which is her last cycle on 4/25/2019.   15.  On 5/23/2019, patient was switched to Empliciti plus Velcade and decadron.    16.  Patient has persistent severe thrombocytopenia, no improvement after starting new regimen.    17. The patient underwent a bone marrow biopsy on 08/12/2019 and the pathology report showed hypercellular bone marrow with involvement by >85%, plasma cell myeloma and no metastatic carcinoma, granulomas, vasculitis, viral inclusions, increase in myeloblasts, or malignant lymphoma.   18. On 08/30/2019, discussed with patient to switch from Empliciti/Velcade/Dex to oral ixazomib/Dex 3 weeks on and 1 week off. She is to start the medication at decreased dose 2.3 mg weekly starting on 9/5/2019.   19.  Laboratory studies on 10/24/2019 revealed disease progression. Therefore therapy was transitioned to Darzalex and subq Velcade with oral Decadron. Patient initiated chemotherapy treatment on 11/7/2019.  20.  Persistent severe thrombocytopenia started in February 2019 secondary to chemotherapy and multiple myeloma disease progression.  21.  Symptomatic anemia Hb 7.7 on 12/12/2019.  Patient was given 2 units PRBC transfusion.    22.  Cycle 4 day #8 was held due to severe worsening thrombocytopenia platelets 18,000.  23.  The patient was due for Velcade cycle 4 day 15 on 1/23/2020, but this was held as she experienced acute chest pain and was sent to the ED for further evaluation.  She had an elevated D-dimer, but her NM lung ventilation perfusion study showed a low probability for pulmonary thromboembolus.  Chest x-ray was suggestive of pneumonia and she was admitted.  She received IV antibiotics, with improvement of her symptoms.  Chest pain resolved.  She was discharged on 1/25/2020 with a couple more days of Cefdinir and doxycycline.   24.  Patient resumed  chemotherapy treatment on 1/30/2020, received Velcade cycle 4 day 15.  She also received Xgeva and Procrit on the same day.    25.  Patient complains of intermittent chest pain.  Chest CT scan without contrast on 2/3/2020 showed multiple new bilateral pulmonary nodules likely represent metastases.  26.  She is due for cycle 5 day 1 chemotherapy with Darzalex plus Velcade plus dexamethasone today, 2/6/2020.    HISTORY OF PRESENT ILLNESS:   The patient is a 69 y.o. female with the above-mentioned history here today for 1-week re-evaluation with imaging and laboratory review.  The patient is accompanied by her daughter today.      The patient resumed chemotherapy treatment on 1/30/2020, received C4D15 Velcade .  She also received Xgeva and Procrit on that same day.      Today, she reports her shortness of breath has resolved.  She also notes that her chest pain has significantly improved.  She reports she is feeling better today overall, other than feeling fatigued, especially after not sleeping well last night.  She states there are certain nights when she has difficulty sleeping, so this is not unusual for her.  She denies any dizziness or light-headedness.    She has a history of gastric ulcers.  The patient continues to take Protonix 40 mg twice daily for GERD.    She is scheduled to have a ureteral stent replacement on 2/10/2020 by her urologist, Dr. Everett.    CT scan of the chest without contrast done on 2/3/2020 showed multiple new bilateral pulmonary nodules likely represent metastases.  Some of the nodules are accessible for CT-guided biopsy if needed.  There is continued long-term stability of the large right hepatic lobe liver lesion, which is suspected to represent a hemangioma, and the smaller left hepatic lobe lesion.    Laboratory studies today, 2/6/2020, show for platelets 35,000, and deteriorating hemoglobin 8.0 and a normal WBC 7240 including ANC 4500 lymphocytes 2000 monocytes 430.  Her chemistry  lab reported stable renal function with a creatinine 1.88, calcium 7.9, albumin 2.4, total protein 11.3.  Normal electrolytes and glucose level.    Past Medical History:   Diagnosis Date   • Anemia 10/14/2008   • Arthropathy of knee 01/07/2014   • Breast cancer (CMS/HCC) 04/2012   • Bursitis of left hip 10/18/2007   • Bursitis, knee 12/02/2013   • Calcaneal spur 08/12/2009   • Chronic kidney disease, stage III (moderate) (CMS/[facility])    • Colon cancer screening 07/01/2017   • Congestive heart failure (CMS/[facility])    • Diverticulitis of colon 10/17/2007   • DVT (deep venous thrombosis) (CMS/[facility])    • Epigastric abdominal pain 4/8/2019   • Gastroesophageal reflux disease 4/8/2019   • H/O Diastolic dysfunction    • H/O Mitral regurgitation    • History of Clostridium difficile 04/01/2014   • History of echocardiogram 04/2014   • History of MRSA infection    • History of obesity    • History of transfusion    • Hydronephrosis    • Hypertension    • LLL pneumonia (CMS/[facility]) 04/23/2009   • Malignant neoplasm of kidney (CMS/[facility]) 12/2009   • Multiple myeloma (CMS/[facility]) 04/2012   • Myocardial infarction (CMS/[facility])    • Neoplasm of uncertain behavior 10/12/2015   • Non-STEMI (non-ST elevated myocardial infarction) (CMS/[facility])    • Nonischemic cardiomyopathy (CMS/[facility])    • Pyelonephritis 03/2004   • Seizures (CMS/HCC) 10/17/2007   • Thrombocytopenia (CMS/[facility])    • UTI (urinary tract infection) 10/30/2014   • UTI (urinary tract infection) 03/28/2014   • Ventral hernia      Past Surgical History:   Procedure Laterality Date   • BONE MARROW BIOPSY N/A 04/11/2012   • BRAIN SURGERY  2005    Meningioma   • BRAIN SURGERY  1990    Tumor benign per patient   • BREAST BIOPSY Left 04/09/2012    Dr. Gregg May   • CARDIAC CATHETERIZATION Left 06/11/2012    Dr. Sudeep Haddad   • CARDIAC CATHETERIZATION N/A 08/15/2006    Dr. Brian Bhatt   • COLONOSCOPY N/A 8/30/2017    Procedure: COLONOSCOPY into cecum and TI with cold polypectomies;  Surgeon:  Trudy Rivera MD;  Location: [facility] ENDOSCOPY;  Service:    • COLONOSCOPY W/ POLYPECTOMY N/A unknown    2 polyps, benign   • CYSTOSCOPY N/A 3/25/2016    Procedure: CYSTOSCOPY  WITH RIGHT STENT CHANGE;  Surgeon: Lakhwinder Russo MD;  Location: [facility];  Service:    • CYSTOSCOPY N/A 03/10/2012    Cystoscopy, right retrograde, right double-J stent-Dr. Sloan May   • CYSTOSCOPY N/A 02/25/2012    Cystoscopy, stent removal, right retrograde pyelogram, replacement of right double-J ureteral stent-Dr. Michale Everett   • CYSTOSCOPY W/ URETERAL STENT PLACEMENT Right 5/7/2016    Procedure: CYSTOSCOPY, URETERAL CATHETER INSERTION AND RIGHT STENT EXCHANGE ;  Surgeon: Michael Everett Jr., MD;  Location: [facility];  Service:    • CYSTOSCOPY W/ URETERAL STENT PLACEMENT N/A 1/20/2017    Procedure: CYSTOSCOPY RIGHT RETROGRADE PYELOGRAM, URETERAL STENT CHANGE;  Surgeon: Lakhwinder Russo MD;  Location: [facility];  Service:    • CYSTOSCOPY W/ URETERAL STENT PLACEMENT N/A 04/02/2015    Cystoscopy, removal of right ureteral stent, right retrograde pyelogram, placement of right double-J ureteral stent-Dr. Michael Everett   • CYSTOSCOPY W/ URETERAL STENT PLACEMENT N/A 04/26/2015    Cystoscopy, removal of right ureteral stent, right retrograde pyelogram, replacement of right ureteral stent 24 cm x 7-Irish without retrieval line-Dr. Jose Gomez   • CYSTOSCOPY W/ URETERAL STENT PLACEMENT N/A 12/22/2014    Cystoscopy, removal of right double-J ureteral stent, right retrograde pyelogram, placement of right double-J ureteral stent-Dr. Michael Everett   • CYSTOSCOPY W/ URETERAL STENT PLACEMENT N/A 09/22/2014    Cystoscopy, removal of right ureteral stent, removal of right retrograde pyelogram, replacement of right double-J ureteral stent-Dr. Michael Everett   • CYSTOSCOPY W/ URETERAL STENT PLACEMENT N/A 06/18/2014    Cystoscopy, removal of right double-J ureteral stent, right retrograde pyelogram, placement of right double-J  ureteral stent-Dr. Michael Everett   • CYSTOSCOPY W/ URETERAL STENT PLACEMENT N/A 01/23/2014    Cystoscopy, removal of right double-J ureteral stent, right retrograde pyelogram, placement of right double-J ureteral stent-Dr. Michael Everett   • CYSTOSCOPY W/ URETERAL STENT PLACEMENT N/A 03/27/2012    Cystoscopy, removal of ureteral stent, right retrograde pyelogram, replacement of indewlling right ureteral stent-Dr. Michael Everett   • CYSTOSCOPY W/ URETERAL STENT PLACEMENT N/A 03/27/2013    Cystoscopy, right retrograde pyelogram, removal and replacement of right double-J ureteral stent-Dr. Michael Everett   • CYSTOSCOPY W/ URETERAL STENT PLACEMENT N/A 11/12/2012    Cystoscopy, stent removal, right retrograde pyelogram, replacement of right double-J ureteral stent-Dr. Michael Everett   • CYSTOSCOPY W/ URETERAL STENT PLACEMENT N/A 09/24/2011    Cystoscopy, removal of ureteral stent, right retrograde pyelogram and placement of right double-J ureteral stent-Dr. Michael Everett   • CYSTOSCOPY W/ URETERAL STENT PLACEMENT N/A 05/14/2011    Cystoscopy, removal of right ureteral stent, right retrograde pyelogram and placement of new right double-J ureteral stent-Dr. Michael Everett   • CYSTOSCOPY W/ URETERAL STENT PLACEMENT N/A 12/31/2010    Cystoscopy, stent removal, right retrograde pyelogram, replacement of 7 Omani x 26 cm double-J stent-Dr. Michael Everett   • CYSTOSCOPY W/ URETERAL STENT PLACEMENT N/A 08/28/2010    Cystoscopy, stent removal, right retrograde pyelogram with interpretation, placement of right double-J ureteral stent-Dr. Michael Everett   • CYSTOSCOPY W/ URETERAL STENT PLACEMENT N/A 05/24/2010    Cystoscopy, right retrograde pyelogram, replacement of right double-J ureteral stent-Dr. Michael Everett   • CYSTOSCOPY W/ URETERAL STENT PLACEMENT N/A 02/12/2010    Cystoscopy, right retrograde pyelogram and placement of right double-J ureteral stent-Dr. Michael Everett   • CYSTOSCOPY W/ URETERAL STENT PLACEMENT N/A 02/23/2009    Cystoscopy, right  retrograde pyelogram, placement of right double-J ureteral stent-Dr. Michael Everett   • CYSTOSCOPY W/ URETERAL STENT PLACEMENT N/A 09/17/2007    Dr. Michael Everett   • CYSTOSCOPY W/ URETERAL STENT PLACEMENT Right 01/29/2018    Dr. Michael Everett   • CYSTOSCOPY W/ URETERAL STENT PLACEMENT N/A 06/04/2018    Cystoscopy, removal of right double-J ureteral stent and placement of right double-J ureteral stent. Dr. Michael Everett.   • CYSTOSCOPY W/ URETERAL STENT PLACEMENT N/A 03/06/2018    Cystoscopy, removal of right ureteral stent, replacement of right double-J ureteral stent. Dr. Michael Everett   • ELBOW PROCEDURE Bilateral 1990s   • ENDOSCOPY N/A 5/13/2019    Procedure: ESOPHAGOGASTRODUODENOSCOPY WITH BIOPSIES;  Surgeon: Trudy Rivera MD;  Location: [facility] ENDOSCOPY;  Service: General   • HERNIA REPAIR  09/03/2018   • LASIK Bilateral    • MASTECTOMY Left 04/25/2012    Left total mastectomy with sentinel lymph node biopsies and left axillary lymphatic mapping-Dr. Gregg May   • MEDIPORT INSERTION, DOUBLE Right    • NEPHRECTOMY Left 12/30/2009    Dr. 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 Cristo Pittman   • TOTAL ABDOMINAL HYSTERECTOMY Bilateral 2007    Dr. Todd   • URETEROURETEROSTOMY Right 05/01/2006    Dr. Michael Everett   • VENA CAVA FILTER INSERTION N/A 05/08/2006    Dr. Jordon Barber   • VENOUS ACCESS DEVICE (PORT) INSERTION Right 02/25/2013    Right subclavian vein PowerPort insertion-Dr. Gregg May   • VENOUS ACCESS DEVICE (PORT) INSERTION AND REMOVAL N/A 10/06/2014    Revision of right internal jugular PowerPort with fluoroscopy, removal of peripherally inserted central catheter line-Dr. Aurora Tomlinson   • VENOUS ACCESS DEVICE (PORT) INSERTION AND REMOVAL N/A 08/14/2014    Ultrasound-guided access right internal jugular vein,  PowerPort placement, removal of right subclavian port-Dr. Jordon Barber   • VENTRAL/INCISIONAL HERNIA REPAIR Left 9/19/2018    Procedure: ventral hernia repair with mesh and rectus muscle advancement flap;  Surgeon: Trudy Rivera MD;  Location: [facility];  Service: General         Hematology/oncology History: See separate document for extra information.   1.    History of left breast cancer, status post left mastectomy on 04/25/2013, stage II, T2N0M0, hormone negative, HER2/mk positive by immunohistochemistry, however, no adjuvant chemotherapy delivered because of multiple myeloma diagnosed concomitantly.        2. Multiple myeloma, IgG lambda, stage III, diagnosed April 2012, previously treated with Velcade/Decadron followed by Velcade/Decadron/Revlimid; patient developed abdominal pain and rectal bleeding on Revlimid, which was then discontinued.    3. Patient evaluated at [facility], but was not felt to be a candidate for stem cell transplant.      4.   Disease progression with therapy switched to melphalan/Velcade/prednisone per the [facility] trial on 10/22/2013; melphalan dose has been persistently decreased because of thrombocytopenia.      5. Anemia secondary to chronic renal insufficiency and myeloma, on periodic Procrit therapy p.r.n.    6. Patient had 8 cycles of VMP chemotherapy, repeat laboratory studies on 10/03/2013 showed stable disease.  Her melphalan do  se was significantly decreased due to marrow suppression.      7.   Patient had disease progression after cycle #10 VMP treatment, evidenced by laboratory studies on 01/06/2014.  We increased the melphalan dose for one cycle.  Continued disease progression after cycle #11 VMP treatment, despite increased dose of melphalan, as documented by laboratory study on 02/17/2014.     8. The patient was evaluated on 02/24/2014 and we recommend switching chemotherapy to Kyprolis on 02/27/2014.     9. Echocardiogram on  03/05/2014 reporting L  VEF 43%.  This is on par with her previous twice echocardiogram study, in June 2012 and November 2012, reported LVEF at 45% to 50% and 40% to 45% respectively.     10. The patient had 2 episodes of chest pain after Kyprolis during cycle 1, leading to omitted dose   on days 9 and 16.  From cycle 2, we will give Kyprolis only once per week for 3 weeks out of every 4 weeks, as well as dose reduction of Kyprolis that was started on 04/04/2014.      11. After cycle 2 of dose-reduced Kyprolis, the patient's M spike increased fro  m 2.6 to 3.2 g/dL, with her IgG level increasing from 2.7 g/dL to 4.7 g/dL.     12.   Patient had a stress test and echocardiogram study at [facility] on 01/23/2015.  The patient had LVEF 35% to 40%.  Myocardial perfusion study reported a large, mild to modera  te severity fixed inferior wall defect, consistent with known transmural infarct versus diaphragmatic attenuation artifact.  Post stress resting ejection fraction estimated at 31%.      13. Repeated echocardiogram study on 04/03/2015 reported an LVEF 46%.  Left v  entricle is moderately dilated.  There is mild global hypokinesis of the left ventricle.  There was grade 1 diastolic dysfunction.    14.   The patient was seen on 04/09/2015 with plan for cycle 14, day 1, of Kyprolis.  Treatment will be delayed 1 week secondary to patient'  s extreme fatigue, hemoglobin of 8.1.  We will proceed with 2 units of packed red blood cells.  She was also found to have a urinary tract infection. Patient prescribed Cipro 500 mg twice a day x7.     15. Laboratory tests on 08/24/2015 reported sligh  t disease progress after cycle #18 Kyprolis. We discussed with patient and we will switch chemotherapy to CyBorD regimen with dose reduction of Velcade to 1 mg/m2, cyclophosphamide at 240 mg/m2 (total dose 450 mg), and dexamethasone 20 mg. All therapy to   be repeated on a weekly basis.     16.   The patient had significant fatigue  after one dose of cyclophosphamide that lasted for 5-6 days. She also had severe anemia, hemoglobin 7.6, required 2 units PRBC transfusion. Cyclophosphamide will be decreased to 350 mg from 2nd week.     17.  Patient continues to have significant fatigue after the reduced dose of cyclophosphamide at 350 mg; for 2 days after chemo, she could only lie on the bed with performance status ECOG 3. From 10/13/2015, oral cyclophosphamide will be further dec  reased to 200 mg once weekly. We will continue Velcade and dexamethasone at same dose.   Evidence of disease progress, when she was on panobinostat treatment.   18. The patient also has worsening ane  ming and thrombocytopenia secondary to chemotherapy. The patient has symptomatic anemia with hemoglobin 7.8 on 02/23/2016 requiring 2 units of packed RBC transfusion. Chemotherapy with panobinostat will be discontinued.       19. Patient was switched to pomalidomide 3 mg daily for 21 days / 28 days in late March 2016.    20. Disease progression, she was switched to to the Darzalex in May 2016.       On12/6/2016, serum free lambda chain 1090 MG/L, free kappa chain 8.5 to MG/L.  Ferritin 1015, iron 99, TIBC 137 iron saturation 72%.     On January 4, 2017, vitamin B12 level 1289, folate 7.7 ng/mL. SPEP reporting gamma globulin 3.3, out of total globulin 5.0, with immunofixation reporting small quantity of monoclonal free lambda chain, plus monoclonal IgG lambda at 3.2 g/dL.  Serum IgG 4075, IgA 9 and IgM 15.  free lambda chain 1339 MG/L and free kappa chain 10.3 MG/L.      Laboratory study 3/27/2017 showed slight improvement of paraprotein, SPEP reporting M spike 2.8 g/DL, out of total globulin 4.3, and the serum IgG 3420, IgA 9, IgM 11, free lambda chain 1218 MG/L and free kappa chain 8.0 MG/L.  Total 24 hour urine sample reported at free lambda chain 142 mg, at a concentration 74.8 MG/L, free kappa chain 75 mg at a concentration 39.5 MG/L., Urine protein immunofixation  reporting biclonal IgG lambda and monoclonal free lambda light chain, M spike #1 at 41.5% and monoclonal IgG lambda spike #2 at 10.5%. Serum beta-2 microglobulin is 7.3 MG/L.     Her laboratory study on 7/21/2017 reported further improvement of paraprotein is both serum and a 24-hour urine sample.  SPEP reporting monoclonal IgG lambda 2.9 g/dL and free lambda chain 0.1 g/dL.  Serum IgG was 3261 mg/dL and IgA 5, IgM 13, free kappa chain 6.7, free lambda chain 701.3 with kappa/lambda ratio 0.01.  24-hour urine sample reported positive for Bence May protein lambda type, immunofixation positive for IgG lambda.  Total urine protein 241 mg, gamma globulin 13.1%.  Hemoglobin was 11.4 .4, platelets 122,000, WBC 6680 including neutrophils 3600, lymphocytes 2100 and monocytes 650.  Her creatinine was 1.68, at baseline level.  Liver function panel unremarkable.  Darzalex was continued.    Laboratory study on 8/25/2017 showed improved the platelets at 144,000, normal WBC 6660 and slightly improved hemoglobin at 11.7.     Patient had a colonoscopy examination on 8/30/2017 by Dr. Rivera.  It reported diverticulosis in multiple areas more severe in the sigmoid colon.  There was 2 polyps 4 mm pneumonia from transverse colon and the sigmoid colon.  Pathology evaluation reported tubular adenoma from the sigmoid colon polyp, and benign hyperplastic polyp from transverse colon.    Laboratory study on 10/20/2017 reported slightly improved monoclonal spike 2.7 g/dL by SPEP, immunofixation reported positive monoclonal IgG lambda, serum IgG 3536 mg/dL, IgA less than 5 and IgM 11, free kappa chain 5.3 mg/L, and free lambda chain 720 mg/L with kappa/lambda ratio 0.01.  Serum beta-2 microglobulin was 6.5 mg/L.   Her CBC showed a stable mild anemia with hemoglobin 11.3, macrocytosis .3, and the platelets 114,000, normal WBC 7070 including neutrophils 4100 lymphocytes 2000 monocytes 650, normal liver function panel, total  protein 7.9 and albumin 3.2,  and normal electrolytes including calcium 8.1, and improved creatinine 1.59.     Laboratory study on 1/12/2018 reported serum IgG 3083 mg/dL, IgA 10, IgM 10, free kappa chain 8.5 and free lambda chain 589.1 mg/L, kappa/lambda ratio 0.01, serum protein admitted fixation reported IgG lambda monoclonal protein and SPEP reporting M spike 3.1 g/dL, beta-2 microgram and 7.4 mg/L. serum creatinine 1.79, calcium 8.2, other electrolytes normal, hemoglobin 11.3, .5 and MCHC 31.6, platelets 129,000, WBC 6780 including neutrophils 3500 lymphocytes 2300.  Serum ferritin 653.7 ng/mL, iron 123 TIBC 174 iron saturation 71%, folic acid 12.8 ng/mL and a vitamin B12 at 873 pg/mL.  Her 24-hour urine study on 1/18/2018 reported lambda chain 32.8 mg/L, total 61 mg in 24 hours, and the free kappa chain 27.4 mg/L and 51 mg in 24 hours, and the total 24-hour urine protein 283 mg, and urine protein immunofixation positive for 5.3% monoclonal IgG lambda and positive for Bence May protein at 36.2% monoclonal lambda chain.  Monthly Darzalex was continued.       This patient had serum protein study on 04/06/2018 which reported free light chain at concentration 703.8 mg/L and free kappa chain 7.0, free kappa/lambda ratio 0.01, serum IgG 3650 mg/dL, IgM 11 and IgA 9 with serum protein electrophoresis reporting monoclonal IgG lambda 3.2 g/dL and also monoclonal free lambda light chain.  But it was unable to quantify monoclonal lambda light chain because of the small quantity of it.     A 24-hour urine study on 04/20/2018 reported total free lambda chain 60 mg in 24 hours at concentration 33.1 mg/L, free kappa chain 45 mg in 24 hours at concentration 24.8 mg/L. Total 24-hour urine protein was 279 mg. Urine protein immunofixation and UPEP reported lambda-type Bence-May protein + #1 monoclonal IgG lambda band at 34.8% and #2 monoclonal IgG lambda band at 6.9%.     Her CBC results today reported a stable  hemoglobin at 11.1, platelets 130,000 and WBC 7440 including neutrophils 4100 and lymphocytes 2350, monocytes 650. Her CMP reported slightly worsened creatinine at 1.82 with BUN 33. Total protein at 8.8. Liver function panel was normal. Total serum protein 8.8 and albumin 3.2, globulin 5.6.    Reviewing her previous lab studies especially serum paraprotein, she reached a micaela of response on 07/05/2018 when she had serum IgG 3015 mg/dL, free lambda chain 458.7 mg/L and M spike IgG lambda 2.6 g/dL and and monoclonal lambda free light chain 0.1 g/dL, total 2.7 g/dL. serum beta-2 myoglobin 7.1 mg/L.  Her ferritin was 539 ng/mL, free iron 73 TIBC 176 iron saturation 42%.    Laboratory study obtained on 11/01/2018 showed further disease progression. Her serum IgG was 5472 mg/dL, IgA 8 and IgM 10, serum kappa chain 7.9 mg/L, free lambda chain 961.3 mg/L and kappa/lambda ratio 0.01. Serum protein electrophoresis reported monoclonal IgG lambda 4.1 g/dL, and monoclonal lambda free light chain 0.1 g/dL. Her hemoglobin was 9.0, .6, MCHC 30.1, platelets 124,000 and WBC 10,000 including neutrophils 7400, lymphocytes 1200, monocytes 600.     Cycle #1 day #1 Bendamustine will be started on 11/29/2018.     Laboratory studies on 01/17/2019 reported improved paraproteins.  SPEP reported M spike 3.8 g/dL out of total 5.5 g/dL globulin.  Serum IgG was 4374 mg/dL, IgA 10 and IgM 11.  Free lambda chain 606.8 mg/L, free kappa chain 8.6 and kappa/lambda ratio 0.01.  Her serum protein immunofixation continues to be IgG lambda monoclonal.  Her CBC showed hemoglobin 9.3, .3, platelets 50,000 and WBC 8600 including ANC 5100, lymphocytes 2160.  Her Chemistry lab reported creatinine 1.92, calcium 8.3, normal liver function panel, glucose 98 with normal sodium and potassium.     For her 24-hour urine study on 3/14/2019, she had free lambda chain at a 62.1 mg/L, total 87 mg in 24 hours, free kappa chain 42 mg/L and 59 mg in 24 hours.   Urine protein immunofixation reported a monoclonal IgG lambda #1 and free lambda chain were unable to be quantified, however IgG lambda #2 was 12.8%.  Her 24-hour urine total protein was 452 mg.     For serum protein study on 3/28/2019 (on day of her cycle #5 day #1 Tredhaval ) also reported further decreased monoclonal spike 3.2 mg/dL, and serum IgG 3600, IgA 13 IgM 12, free kappa chain 11.7, and worsening free lambda chain 1045 mg/L.     Her serum paraprotein studies on 4/11/2019 reported a serum IgG 4407 mg/dL, IgA 14, IgM 13, free kappa chain 9.2, free lambda chain 998.4 mg/L, kappa/lambda ratio 0.01.  His serum protein immunofixation was positive for IgG lambda, SPEP reporting M spike 4.3 g/dL.    Her 24-hour urine study on 7/18/2019 reported positive IgG lambda monoclonal protein and lambda type Bence-May protein, total free kappa chain 142 mg in 24 hours, at 74.6 mg/L, and total lambda chain 179 mg in 24 hours at 94.1 mg/L.  Kappa/lambda ratio 0.79.  Her total 24-hour urine protein was 410 mg, with monoclonal lambda free light chain 41.8% and monoclonal IgG lambda 2.6%.     Serum paraprotein studies on 7/24/2019 reported monoclonal spike 3.9 g/dL, serum IgG 4107, IgA 14 IgM 19, free kappa chain 12.8 and free lambda chain 656, kappa/lambda ratio 0.02.    On 08/01/2019 she has severe thrombocytopenia platelets 27,000.  She has elevated WBC to 12,600 including ANC 8900 and normal lymphocytes 2000.  Her hemoglobin is 10.4 08/01/2019. The patient underwent a bone marrow biopsy on 08/12/2019 and the pathology report showed hypercellular bone marrow with involvement by plasma cell myeloma and no metastatic carcinoma, granulomas, vasculitis, viral inclusions, increase in myeloblasts, or malignant lymphoma. Over 85% of the bone marrow are plasma cells. Normal karyotype. Flow cytometry study was positive.    Because of her persistent severe thrombocytopenia, the patient underwent a bone marrow biopsy on 08/12/2019.   Pathology evaluation showed hypercellular bone marrow with involvement by plasma cell myeloma 85-90% and no metastatic carcinoma, granulomas, vasculitis, viral inclusions, increase in myeloblasts, or malignant lymphoma.  Normal karyotype.     Recent laboratory study on 8/15/2019 reported of elevated ferritin 524, normal iron saturation 42% with free iron 59 and a TIBC 140.  Normal thyroid function profile.  Her serum globulin 7.3, total protein 10.1 and albumin 2.8, calcium 8.1 creatinine 1.99.    She does continue to have severe intermittent pain in her right hip which is managed somewhat with pain medication, including tylenol and prescription Norco. She states she has difficulty ambulating and functioning normally when this pain occurs. She continues on oral Vitamin B-12 and folic acid supplementation.     Recent XR right hip with or without pelvis on 08/15/2019 showed sclerosis at the pubic symphysis. No other bony or articular abnormality was identified. The hip joints were symmetric and appeared within normal limits. The joint spaces were fairly well-maintained. There was no bony erosion. There was no evidence of recent or old fracture or subluxation. A right ureteral stent was noted.     On 08/30/2019, switched treatment from Empliciti to oral ixazomib 3 weeks on and 1 week off. She is expected to start the medication on 9/5/2019 when this medication arrives.  Patient will continue dexamethasone weekly at the same time as part of the treatment.  Because of her severe thrombocytopenia, we recommended starting low-dose ixazomib 2.3 mg weekly.  Treatment was started on 9/5/2019.     Laboratory study on 9/26/2019 showed worsening leukocytosis with WBC 20,780 including ANC 16,000, lymphocytes 2700 and monocytes 8070.  She also has worsening severe thrombocytopenia with platelets 27,000, and stable hemoglobin 9.5.     Suspect the patient may have some kind of infection however she denies fever sweating chills no  dysuria or hematuria.  She denies pain in the right flank area where she has ureteral stent and oftentimes complains of pain when she has urinary tract infection.      I searched medical records, she had replacement of stent 3 months ago.  I recommended urinalysis and culture on 9/26/2019 for further evaluation despite that she has no symptoms.I called and spoke to patient about her urinalysis which showed a large quantity of bacteria and WBC.  I e-scribed cefdinir to her pharmacy, 300 mg twice a day for 7 days. I called the patient today, reporting her urine culture results with multidrug sensitive E. coli.  She will continue cefdinir as we prescribed.  Should be sensitive according to the culture results.I think this patient will need to follow-up with her urologist Dr. Everett for stent replacement.  I sent a message to Dr. Everett.    On 10/17/2019, patient also reports she has intermittent chest pain/epigastric area/stomach pain for the past several weeks, to the point that she thought she was not able to make it.  Patient reports she had stress test this week and was told to having nothing wrong.  Patient also reports this is not related to her eating.  No apparent effect of exacerbation or relieving.      Unfortunately laboratory study on 10/24/2019 reported significant disease progression, he had a M spike 5.1 g/dL by SPEP, and a serum IgG 7280 mg/dL, IgA 16, IgM 17, free kappa chain 10.2 and free lambda chain 1309.5 mg/L, with kappa/lambda ratio 0.01.  Her beta-2 myoglobin was 16.2 mg/L.  Chemistry lab reported total serum protein 11 g, albumin 2.7 and globulin 8.3.  Her liver function panel was normal, creatinine 1.90 and normal calcium and other electrolytes.  Continues to have anemia Hb 10.0, platelets 26,000 and WBC 19,180 including ANC 13,230 lymphocytes 3070 monocytes 1920 and eosinophil 770.  Her iron study reported ferritin 455.4 ng/dL, free iron 61, TIBC 155 and iron saturation 39%, with folic acid  at 7.3 ng/mL and vitamin B12 at 1800 pg/mL.    Patient initiated chemotherapy treatment with darzalex/velcade/dexamethasone on 11/7/2019.    Laboratory study 12/12/2019 showed deteriorating hemoglobin 7.7.  She also has stable but severe thrombocytopenia with platelets 24,000.  Her WBC is normal at 5230 including ANC 2670.  Weekly Procrit was continued.  Patient was given 2 units PRBC transfusion.      Paraprotein study on 1/16/2020 reported monoclonal IgG lambda spike 6.8 g/dL, however unable to quantify the monoclonal free lambda chain.  Her serum IgG was 7632, IgA 5, IgM 13, free kappa chain 8.6 and the free lambda chain 1415 mg/L.    Labs on 1/23/2020 reported stable anemia hemoglobin 10.2, severe thrombocytopenia but with slightly improved platelets 33,000 and WBC 9190, including ANC 6160 lymphocytes 2100.  Her iron study today reported elevated ferritin 912, free iron 109 TIBC 130 and iron saturation 84%.     Patient reported on 1/23/2020 that she started having chest pain earlier that morning, but approximately getting worse.  In the clinic she complained of worsening chest pain, in the epigastric area.  She has no nausea vomiting.  No fever no sweating.  Her vitals stable, normal BP, afebrile, normal pulse.  Excellent oxygen saturation.    The patient was due for Velcade cycle 4 day 15 on 1/23/2020, but this was held as she experienced acute chest pain, which started earlier that morning and was worsening.  She was sent to the ED that morning for further evaluation.  She also had an associated cough with clear/yellow sputum and shortness of breath    In the ER, she was found to have an elevated D-dimer, but her NM lung ventilation perfusion study showed a low probability for pulmonary thromboembolus.  A chest x-ray was suggestive of pneumonia, and she was admitted for further care.  She was given IV antibiotics for a couple days for the potential pneumonia.  Her symptoms improved, and her chest pain  resolved.  She was discharged on 1/25/2020 with a couple more days of Cefdinir and doxycycline.    On 1/30/2020, she reported she is still experiencing intermittent chest pain, which is worsened with being physically active and walking around.  The pain is located in the central chest and does not radiate.  She states that she feels the pain is related to the PowerPort on her chest.  She notes associated shortness of breath and cough.  She reports she can occasionally walk around the store, but sometimes she is unable to due to the severe chest pain.  The pain is relieved with taking Norco, rest, and sitting down.  She has no nausea vomiting.  No fevers or sweating.  The patient still has a couple more days left of the Cefdinir and doxycycline.  She was also given an inhaler to use for her dyspnea.    Laboratory studies on 1/30/2020 showed slightly improved platelets 36,000, and the stable anemia hemoglobin 8.5, WBC 6260 including ANC 3780 lymphocytes 1500.  Chemistry lab showed a baseline creatinine 2.0, calcium 8.5 normal magnesium 1.8 phosphorus 3.3, normal liver function panel.  Glucose 107.    Laboratory study on 1/23/2020 reported serum ferritin 912.7 ng/mL, free iron 109, TIBC 130 and iron saturation 84%.    Paraprotein studies on 1/16/2020 reported monoclonal IgG lambda 6.8 g/dL, and the unable to qualify the free lambda chain by SPEP.  Serum IgG 7632 mg/dL, IgA 5, IgM 13, free kappa chain 8.6 and the free lambda chain 1415 mg/L with kappa/lambda ratio 0.01.          Review of Systems   Constitutional: Positive for fatigue. Negative for appetite change, chills, diaphoresis, fever and unexpected weight change.   HENT:   Negative for mouth sores, nosebleeds, sore throat and trouble swallowing.    Eyes: Negative for eye problems and icterus.   Respiratory: Negative for cough and shortness of breath.    Cardiovascular: Positive for chest pain (intermittent epigastric area pain, significantly improved). Negative  "for leg swelling and palpitations.   Gastrointestinal: Negative for abdominal pain, blood in stool, diarrhea and nausea.   Genitourinary: Negative for dysuria, frequency and hematuria.    Musculoskeletal: Positive for arthralgias (Right hip pain stable). Negative for back pain, flank pain, gait problem and myalgias.   Skin: Negative for itching and rash.   Neurological: Positive for extremity weakness (stable). Negative for dizziness, gait problem, headaches, light-headedness and numbness.   Hematological: Negative for adenopathy. Does not bruise/bleed easily.   Psychiatric/Behavioral: Negative for depression. The patient is not nervous/anxious.    All other systems reviewed and are negative.    Medications: The current medication list was reviewed in the EMR.         Allergies   Allergen Reactions   • Sulfa Antibiotics Swelling   • Zosyn [Piperacillin Sod-Tazobactam So] Swelling     Face and lips       VITALS:   Vitals:    02/06/20 0845   BP: 119/77   Pulse: 66   Resp: 16   Temp: 97.6 °F (36.4 °C)   SpO2: 98%   Weight: 79.2 kg (174 lb 11.2 oz)   Height: 157.5 cm (62.01\")   PainSc: 0-No pain      ECOG 1      PHYSICAL EXAM:  GENERAL:  Well-developed, well-nourished  female in no acute distress.  Mildly obese.  SKIN:  Warm, dry without rashes, purpura or petechiae.  HEAD:  Normocephalic.  EYES:  Pupils equal, round and reactive to light.  EOMs intact.  Conjunctivae normal.  EARS:  Hearing intact.  NOSE:  No nasal discharge.  MOUTH:  Tongue is well-papillated; oral mucosa moist, no stomatitis or ulcers.  Lips normal.  THROAT:  Oropharynx without lesions or exudates.  NECK:  Supple; no thyromegaly or masses.  LYMPHATICS:  No cervical, supraclavicular adenopathy.  CHEST:  Lungs clear to auscultation. Good airflow.  Respiratory effort.  PowerPort on the chest looks benign, no signs of infection.  CARDIAC:  Regular rate and rhythm without murmurs, rubs or gallops. Normal S1,S2.  ABDOMEN:  Soft, nontender " with no organomegaly or masses.  EXTREMITIES:  No clubbing, cyanosis or edema.  NEUROLOGICAL:  Cranial Nerves II-XII grossly intact.  No focal neurological deficits.  PSYCHIATRIC:  Normal affect and mood.        Recent lab results:   Results from last 7 days   Lab Units 02/06/20  0814   WBC 10*3/mm3 7.24   NEUTROS ABS 10*3/mm3 4.49   LYMPHS ABS 10*3/mm3 2.03   HEMOGLOBIN g/dL 8.0*   HEMATOCRIT % 26.2*   PLATELETS 10*3/mm3 35*      Glucose   Date Value Ref Range Status   02/06/2020 98 65 - 99 mg/dL Final     BUN   Date Value Ref Range Status   02/06/2020 18 8 - 23 mg/dL Final   10/18/2019 26 (H) 7 - 20 mg/dL Final     Creatinine   Date Value Ref Range Status   02/06/2020 1.88 (H) 0.57 - 1.00 mg/dL Final   10/18/2019 2.2 (H) 0.7 - 1.5 mg/dL Final     Sodium   Date Value Ref Range Status   02/06/2020 137 136 - 145 mmol/L Final   10/18/2019 144 137 - 145 mmol/L Final     Potassium   Date Value Ref Range Status   02/06/2020 3.9 3.5 - 5.2 mmol/L Final   10/18/2019 3.9 3.5 - 5.1 mmol/L Final     Chloride   Date Value Ref Range Status   02/06/2020 110 (H) 98 - 107 mmol/L Final   10/18/2019 122 (H) 98 - 107 mmol/L Final     CO2   Date Value Ref Range Status   02/06/2020 22.0 22.0 - 29.0 mmol/L Final     Total CO2   Date Value Ref Range Status   10/18/2019 20 (L) 22 - 30 mmol/L Final     Calcium   Date Value Ref Range Status   02/06/2020 7.9 (L) 8.6 - 10.5 mg/dL Final   10/18/2019 8.5 8.4 - 10.2 mg/dL Final     Total Protein   Date Value Ref Range Status   02/06/2020 11.3 (H) 6.0 - 8.5 g/dL Final     Albumin   Date Value Ref Range Status   02/06/2020 2.40 (L) 3.50 - 5.20 g/dL Final     ALT (SGPT)   Date Value Ref Range Status   02/06/2020 6 1 - 33 U/L Final     AST (SGOT)   Date Value Ref Range Status   02/06/2020 13 1 - 32 U/L Final     Alkaline Phosphatase   Date Value Ref Range Status   02/06/2020 34 (L) 39 - 117 U/L Final     Total Bilirubin   Date Value Ref Range Status   02/06/2020 0.4 0.1 - 1.2 mg/dL Final     eGFR Non   Amer   Date Value Ref Range Status   08/30/2016  >60 mL/min/1.73 Final     Comment:     <15 Indicative of kidney failure.     A/G Ratio   Date Value Ref Range Status   01/16/2020 0.3 (L) 0.7 - 1.7 Final     BUN/Creatinine Ratio   Date Value Ref Range Status   02/06/2020 9.6 7.0 - 25.0 Final   10/18/2019 11.8 RATIO Final     Anion Gap   Date Value Ref Range Status   02/06/2020 5.0 5.0 - 15.0 mmol/L Final       Lab Results   Component Value Date    IRON 109 01/23/2020    TIBC 130 (L) 01/23/2020    FERRITIN 912.70 (H) 01/23/2020     Lab Results   Component Value Date    FOLATE 7.34 10/24/2019     Lab Results   Component Value Date    [id number] 1,808 (H) 10/24/2019     RECENT IMAGING:  CT CHEST WITHOUT CONTRAST - 2/3/2020     HISTORY: 69-year-old female with shortness of breath and chest pain.  Multiple myeloma. Left nephrectomy for renal cell carcinoma and left  mastectomy for breast cancer in the past.     TECHNIQUE: Radiation dose reduction techniques were utilized, including  automated exposure control and exposure modulation based on body size.   3 mm images were obtained through the chest without the administration  of IV contrast. Correlated with CT of the abdomen from 05/13/2018 and  compared with chest CT from 08/21/2017.     FINDINGS: There are multiple new bilateral pulmonary nodules. The  largest nodule is pleural-based along the major fissure at the posterior  aspect of the left upper lobe measuring approximately 2.5 x 1.8 cm.  Given constraints of noncontrasted technique, there is no definite  change in the multiple shotty and mildly enlarged mediastinal nodes.  There are no pleural or pericardial effusions. No suspicious bone lesion  is seen. Right MediPort catheter tip is within the SVC. At the  visualized upper abdomen, there is continued long-term stability of a  large lobular mass within the right hepatic lobe and a much smaller left  hepatic lobe lesion. The spleen is enlarged and may be  larger than  previously. The spleen is not seen in its entirety.     IMPRESSION:  1. Multiple new bilateral pulmonary nodules likely represent metastases.  Some of the nodules are accessible for CT-guided biopsy if needed.  2. There is continued long-term stability of the large right hepatic  lobe liver lesion which is suspected to represent a hemangioma. The much  smaller left hepatic lobe lesion is stable as well.  3. Splenomegaly.    Assessment/Plan   1. Multiple myeloma, IgG lambda, stage III. Failed multiple lines of therapy. Her treatment was switched to Darzalex on 5/26/2016. She was on this treatment for more than 2 years.   · In November 2018, she clearly had disease progression.  Darzalex was discontinued and was started on bendamustine plus Velcade plus dexamethasone.   · Cycle #1 day #1 Bendamustine was started on 11/29/2018.  Due to poor tolerance with profound fatigue, chemotherapy was modified with bendamustine only given on day 1, and a Velcade weekly, every 4 weeks as 1 cycle.  Patient had a cycle #6 dose reduction of bendamustine by 25% because of severe thrombocytopenia.   · This patient actually had a micaela of response on 3/28/2019 with M spike 3.2 g/dL and serum IgG 3600.  After that M spike and serum IgG both were elevating.   · Laboratory study obtained on 5/9/2019 after 6 cycles chemotherapy, it showed disease further progressed with worsening level of serum IgG 4873 mg/dL and M spike 4.3 g/dL.    · On 5/23/2019, patient was switched to Empliciti plus Velcade and decadron.    · Her serum protein studies reported persistent active disease, not well controlled with large quantity of monoclonal spike.  She also has severe thrombocytopenia oftentimes leading to hold Velcade injection.  · Bone marrow aspiration biopsy for reanalysis recommended.  This patient also has a history of breast cancer so we need to be open-minded.  She had bone marrow aspirate biopsy on 8/12/2019 which showed more than  85% of bone marrow cells are malignant plasma cells.  There is no evidence of metastatic disease or other type of malignancy.  · Recommended switching treatment to Ixazomib plus dexamethasone.  Considering her pre-existing severe thrombocytopenia, I recommended starting low-dose Ixazomib 2.3 mg weekly instead of full dose 4 mg weekly.   · She started new treatment on 9/5/2019 with dexamethasone 20 mg weekly.  oral ixazomib/Dex 3 weeks on and 1 week off. She is to start the ixazomib at decreased dose 2.3 mg weekly starting on 9/5/2019.   · Patient has significant disease progression on 10/24/2019 with serum increased M spike, serum IgG and free lambda chain.  · On 10/31/2019, I discussed options with the patient and suggested Darzalex/Velcade/dexamethasone as it would be easy on her bone marrow. Patient initiated Cycle #1 treatment on 11/7/2019.   · So far patient has been tolerating well.  Laboratory study 1/16/2020 reported slightly worsening serum IgG and free lambda chain.  She has no apparent response to Velcade plus Darzalex plus dexamethasone, except normalization of leukocytosis.   · 1/23/2020 Held chemotherapy, due for Velcade C4D15.  She was sent to the ED for further evaluation of acute severe chest pain.    · Resumed chemotherapy on 1/30/2020, received cycle 4-day 15.   · Continue chemotherapy.  She will receive cycle 5 day 1 today, 2/6/2020.    2.  Severe thrombocytopenia.  This is more likely secondary to her multiple myeloma, based on her recent bone marrow aspiration biopsy on 8/12/2019. patient has no easy bleeding or bruising.   · Patient was last transfused with platelets on May 13, 2019 prior to her EGD.  · The patient underwent a bone marrow biopsy on 08/12/2019 and the pathology report showed hypercellular bone marrow with involvement by plasma cell myeloma and no metastatic carcinoma, granulomas, vasculitis, viral inclusions, increase in myeloblasts, or malignant lymphoma. Over 80% of the bone  marrow are plasma cells. Normal karyotype.   · Laboratory study on 8/30/2019 showed platelets of 34,000. We switched her treatment to oral ixazomib 3 weeks on and 1 week off starting 9/05/19.    · Platelet count today 27,000 on 12/26/2019.  Patient reports no spontaneous bleeding.  Continue to monitor.   · Her Velcade treatment was on hold on 1/16/2020, due to severe thrombocytopenia with platelets 18,000.  On 1/23/2020, platelets improved at 33,000.  · Platelet count is 36,000 on 1/30/2020.    · Platelet count is slightly decreased at 35,000 today, 2/6/2020.  Patient has no bleeding or bruising.    3.  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    · Her laboratory study on 5/9/2019 showed no evidence of iron deficiency.  On 5/16/2019, B12 level of 1819 and normal folate of 5.84.  She was symptomatic anemic with Hb 8.5, she was transfused with 2 units of packed red blood cells.    · Recent labs on 8/15/2019 reported no evidence of iron deficiency, had normal iron saturation and excellent ferritin level.  · Laboratory study on 10/24/2019 reported no evidence of iron deficiency, nor B12 folic acid deficiency.  She will continue oral B12 supplementation.    · Her hemoglobin was 7.7 on 12/12/2019.  We continued weekly Retacrit.  Because patient was symptomatic, with worsening fatigue from her anemia, she was given 2 units PRBC transfusion.   · Improved hemoglobin 10.1 on 1/23/2020.    · Hemoglobin is decreased to 8.5 on 1/30/2020.    · Hemoglobin is 8.0 today, 2/6/2020.  Continue Procrit weekly as needed per protocol.   I also recommend getting a transfusion of 2 units of PRBC on a separate day, following the biopsy.      4.  Dysuria/urgency of urination.  Urinalysis reported large quantity of WBC and bacteria 11/13/2019.  Treated with Omnicef.  Symptoms have resolved.  She did have a stent replacement on 11/21/2019.   · Patient denies recurrent symptomology on 12/26/2019.   · Patient  reports that she has a scheduled ureteral stent replacement scheduled for 2/10/2020 by her urologist.    5. Zometa / Xgeva treatment.  Because of her kidney insufficiency, we were only able to give her Zometa every 3 months.  Due to her elevated creatinine level, we eventually switched her to Xgeva treatment and continued monthly.  · She had elevated phosphorus 5.6 on 6/6/2019.   · Her last Xgeva was on 1/30/2020.  This will be continued every 4 weeks.        6. History of stage II left breast cancer, post mastectomy in 2013, negative for ER/MS and positive HER-2. No neoadjuvant chemotherapy because of concurrent discovery of multiple myeloma and ongoing chemotherapy. She had a normal mammogram study on 7/26/2019.       7.  Skin rashes on her extremities:  Patient was seen dermatologist Dr. Barroso, who prescribed steroids cream and also over-the-counter moisturizing cream.  Patient will follow-up with Dr. Barroso again.  Currently resolved.    8. Profound fatigue: This is multifactorial secondary to her disease progression and her worsening anemia associated with chemotherapy.   · A thyroid level was obtained and patient was diagnosed with subclinical hypothyroidism.  She was initiated on Synthroid and folic acid and has noted significant reduction in fatigue.   · Patient to continue on Synthroid daily and folic acid supplementation daily.  · She notes feel more fatigued today, 2/6/2020, after not sleeping well last night  She notes occasional difficulty sleeping, so this is not unusual for her.    9.  Hypocalcemia.  The patient has struggled with compliance with her calcium supplements.  She reports she has been taking these inconsistently, though they resulted in soft frequent bowel movements.  We previously added 2 tablets of Tums daily along with current calcium/vitamin D supplementation.    · Calcium level 7.7 on 1/16/2020.  Her albumin was 2.5.  · Calcium level 8.9 on 1/23/2020.  Albumin was 2.7.  · Calcium  level 8.3 on 1/24/2020.   · Calcium level 8.5 on 1/30/2020.  · Calcium level 7.9 today, 2/6/2020.    10.  Gastric ulcer diagnosed in May 2019 epigastric discomfort.    Patient had EGD examination by Dr. Rivera on 5/13/2019.  It reported prepyloric ulceration.  Dr. Rivera recommended Protonix twice a day.  Currently takes Protonix twice a day.    11.  Worsening leukocytosis/neutrophilia.    · This started in middle of July 2019 with a fluctuation of WBC and neutrophils.  Suspect that this is all due to disease progression of her multiple myeloma.    · This has resolved after patient was switched her to chemotherapy Darzalex plus Velcade and dexamethasone in early November 2019.    12.  Severe chest pain on 1/23/2020.   Started this morning, and progressively getting worse.   · In the ER, she was found to have an elevated D-dimer, but her NM lung ventilation perfusion study showed a low probability for pulmonary thromboembolus.  Chest x-ray was suggestive of pneumonia and she was admitted for further care.  She received IV antibiotics, with improvement of her symptoms.  Chest pain resolved.  She was discharged on 1/25/2020 with a couple more days of Cefdinir and doxycycline.    · On 1/30/2020, patient continued to experience intermittent chest pain, with associated shortness of breath and cough.  We will request outpatient chest CT scan for evaluation.  · CT scan of the chest without contrast done on 2/3/2020 showed multiple new bilateral pulmonary nodules likely represent metastases.  There is continued long-term stability of the large right hepatic lobe liver lesion, which is suspected to represent a hemangioma, and the smaller left hepatic lobe lesion.  · Chest pain is significantly improved today, 2/6/2020.    · I discussed with the patient and her daughter today in the shared the CT scan images with them, that I recommend proceeding with a CT-guided biopsy of the left upper lobe pulmonary nodule for further  evaluation.  Considering her history of breast cancer which she did not receive adjuvant chemotherapy, due to the concurrent discovery of multiple myeloma needing immediate chemotherapy, I highly suspect this patient has metastatic breast cancer.  I discussed with the her daughter for this possibility and potential treatment plan.  CT-guided biopsy will likely be done following the ureteral stent replacement she has scheduled on 2/10/2020.  We will plan for her to get a transfusion of 1 unit of platelets the morning of her lung biopsy.  The patient and her daughter verbalized understanding and agreed with the plan.  · I will follow up with her in 2 weeks to discuss biopsy results and further treatment plan.  The patient verbalized understanding and agreed with the plan.    Plan:  1. Continue chemotherapy Darzalex plus Velcade plus dexamethasone every 3-week cycle.  She will receive cycle 5 day 1 today, 2/6/2020.  2. We will plan for a CT-guided biopsy of the left upper lobe pulmonary nodule for further evaluation.  We will plan for her to get transfusion of 1 unit of platelets the morning of her biopsy.    3. Will arrange for her to have transfusion of 2 units of PRBC on a separate day.  4. Continue weekly Procrit.  Dose given today.    5. Patient will continue Xgeva, repeated every 4 weeks.  Last dose on 1/30/2020.   6. She is scheduled to have a ureteral stent replacement on 2/10/2020 by her urologist, Dr. Everett.  7. Continue antibiotics as prescribed  8. Continue Protonix 40 mg twice a day.    9. Continue on Synthroid daily.     10. Continue Norco for pain management.    11. Continue calcium and vitamin D supplementation.    12. Continue oral B12 and folic acid daily.    13. Continue prophylactic acyclovir 400 mg twice daily.   14. Continue prophylactic Bactrim 3 times per week for PCP infection per protocol.   15. She will return in 1 week for next Velcade and dexamethasone treatment and weekly  Procrit.  16. Follow up with me in 2 weeks to discuss biopsy results and to discuss further treatment plan.      I independently reviewed her chest CT scan images from 2/3/2020.  I shared those images with the patient and her daughter today.   I also later discussed with her daughter separately about the current situation and poor long-term prognosis.  She voiced understanding.     Patient is on drug therapy and requires frequent monitoring.    By signing my name here, I Bud Emmanuel, attest that all documentation on 02/06/20 at 10:04 AM has been prepared under the direction and in the presence of Dr. Zane Araujo MD.    I reviewed the note scribed by Bud Emmanuel and made appropriate corrections.      Zane Araujo MD PhD      CC -  Cristo Madera M.D.   Michael Everett M.D.    Trudy Rivera M.D.

## 2020-02-06 NOTE — NURSING NOTE
Seen per Dr. Araujo; to proceed with treatment. Dr. Araujo made aware that she forgot to take oral steroid at home; we are to proceed and she will take it as soon as she gets home. Also to proceed with retacrit. Copy of labs printed and given.

## 2020-02-06 NOTE — PROGRESS NOTES
Orders entered for 2 units PRBCs with referral to ACU, CT guided biopsy of left upper lobe lung with platelets to be given in ACU the same day as procedure, prior to procedure KALE Garcia

## 2020-02-10 NOTE — OUTREACH NOTE
COPD/PN Week 3 Survey      Responses   Facility patient discharged from?  [facility]   Does the patient have one of the following disease processes/diagnoses(primary or secondary)?  COPD/Pneumonia   Was the primary reason for admission:  Pneumonia   Week 3 attempt successful?  Yes   Call start time  1846   Call end time  1848   Discharge diagnosis  Community acquired pneumonia of left lower lobe of lung   Meds reviewed with patient/caregiver?  Yes   Is the patient taking all medications as directed (includes completed medication regime)?  Yes   Has the patient kept scheduled appointments due by today?  Yes   Psychosocial issues?  No   What is the patient's perception of their health status since discharge?  Returned to baseline/stable   Nursing Interventions  Nurse provided patient education   If the patient is a current smoker, are they able to teach back resources for cessation?  -- [Pt is a nonsmoker]   Is the patient/caregiver able to teach back signs and symptoms of worsening condition:  Fever/chills, Shortness of breath, Chest pain   Week 3 call completed?  Yes   Revoked  No further contact(revokes)-requires comment   Graduated/Revoked comments  Pt back to baseline. No further calls needed.          Joie Jorge RN

## 2020-02-11 NOTE — PROGRESS NOTES
Pt arrived to ACU for platelet transfusion.  Port accessed using sterile technique.  Port flushed easily and blood return noted.  Blood bank called and unable to transfuse pt prior to biopsy due to massive transfusion in process on another unit.   Pt notified of issue.  XR triage called and notified of issue with transfusion.  XR triage able to reschedule pt for tomorrow at 1100.   Dr Araujo called and notified of issue with platelet transfusion.  Order for pt to have transfusion tomorrow of platelets at 0800, biopsy at 1100 in  XR triage and 2 units of PRBCs to be transfused on Thursday.  Pt and family notified of change in plan of care and all verbalized understanding.  Pt given AVS and dc'ed per WC with family.

## 2020-02-12 NOTE — DISCHARGE INSTRUCTIONS
EDUCATION /DISCHARGE INSTRUCTIONS  CT/US guided biopsy:  A biopsy is a procedure done to remove tissue for further analysis.  Before images are taken to locate the target area.  Images can be obtained using ultrasound, CT or MRI.  A physician will clean your skin with antiseptic soap, place a sterile towel around the site and administer a local anesthetic to numb the area.  The physician will then insert a special needle.  Sometimes images are taken of the needle after it is inserted to ensure the needle is in the correct area to be biopsied.   A sample is obtained and sent to the laboratory for study.  Occasionally the laboratory is unable to make a diagnosis from the sample and the procedure may need to be repeated.  Within a week the radiologist will send a report to your physician.  A pathologist will also examine the tissue and send a report.    Risks of the procedure include but are not limited to:   *  Bleeding    *  Infection   *  Puncture of surrounding organs *  Death     *  Lung collapse if the biopsy is near the chest which may require insertion of a      chest tube to re-inflate the lung if severe.    Benefits of the procedure:  Using x-ray helps to locate the area that requires a biopsy. The procedure is less invasive than a surgical procedure, there are no large incisions and it does not require anesthesia.    Alternatives to the procedure:  A biopsy can be performed surgically.  Risks of a surgical biopsy include exposure to anesthesia, infection, excessive bleeding and injury to abdominal organs.  A benefit of surgical biopsy is the ability to see the area to be biopsied and remove of a larger piece of tissue.    THIS EDUCATION INFORMATION WAS REVIEWED PRIOR TO PROCEDURE AND CONSENT. Patient initials__________________Time___________________    Post Procedure:    *  Expect the biopsy site may be tender up to one week.    *  Rest today (no pushing pulling or straining).   *  Slowly increase activity  tomorrow.    *  If you received sedation do not drive for 24 hours.   *  Keep dressing clean and dry.   *  Leave dressing on puncture site for 24 hours.    *  You may shower when dressing removed.  Call your doctor if experiencing:   *  Signs of infection such as redness, swelling, excessive pain and / or foul        smelling drainage from the puncture site.   *  Chills or fever over 101 degrees (by mouth).   *  Unrelieved pain.   *  Any new or severe symptoms.   *  If experiencing sudden / severe shortness of breath or chest pain go to the       nearest emergency room.   Following the procedure:     Follow-up with the ordering physician as directed.    Continue to take other medications as directed by your physician unless    otherwise instructed.   If applicable, resume taking your blood thinners or Aspirin on _2/13/2020@1300 (1pm)_________.    If you have any concerns please call the Radiology Nurses Desk at [phone].  You are the most important factor in your recovery.  Follow the above instructions carefully.

## 2020-02-12 NOTE — NURSING NOTE
Pt rt chest port was accessed by ACU for platelet infusion today before lung bx.  Pt port remained accessed per request of ACU nurses due to pts return tomorrow for  PRBC's.  Pt aware. Pts port flushed with saline and heparin per treatment plan. Positive blood return noted, and flushed with ease. Pt was very drowsy from previous pain medication.  Nurse reviewed discharge with patient and pts sister.  Pt was able to walk with nurse to w/c and pt was escorted to main entrance by CNA. Pt in NAD.

## 2020-02-12 NOTE — NURSING NOTE
Xray called and scheduled post CXR for 1310 and 1510. Pt aware.  Pt is coughing up minimal amount of blood. Pt is aware if she continues to cough up blood to let nursing know.  Pt in NAD. VSS

## 2020-02-12 NOTE — NURSING NOTE
Pt platelet count came back at 42.  Dr. Powers called and informed.  Dr. Powers is aware pt had a 250 cc bag of PLT infusion in ACU today and is to receive 2units of blood tomorrow.  Dr. Powers felt it was ok to proceed with procedure. Pt aware and worked up for procedure.

## 2020-02-12 NOTE — SIGNIFICANT NOTE
Addended byLissa Mckay on: 1/8/2020 12:19 PM     Modules accepted: Orders Mask not required as all her symptoms is related to diagnosis of lung mets

## 2020-02-13 PROBLEM — J18.9 HAP (HOSPITAL-ACQUIRED PNEUMONIA): Status: ACTIVE | Noted: 2020-01-01

## 2020-02-13 PROBLEM — J10.1 INFLUENZA A: Status: ACTIVE | Noted: 2020-01-01

## 2020-02-13 PROBLEM — J21.0 RSV (ACUTE BRONCHIOLITIS DUE TO RESPIRATORY SYNCYTIAL VIRUS): Status: ACTIVE | Noted: 2020-01-01
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
## 2020-02-13 NOTE — PROGRESS NOTES
Pt's temp on admission 101.3.  Dr Araujo's office called and order obtained for urinalysis. Blood transfusion on hold.  Pt notified.  Pt states she has felt more SOB since last night. Resp 24, O2 sat 97%.  Pt states she did cough up some blood after lung biopsy but that resolved.  Pt states she does cough and have phlegm at times. Pt states she feels exhausted.  Pt assisted to br with a walker but pt didn't have the energy to stay in the bathroom to obtain a urine specimen.  Pt states she did have a stent placed on Monday of this week.     Lab results called to Dr Araujo and ordered for pt to go to the ER to be admitted.  Pt notified of Dr Araujo's orders and verbalized understanding.  Report called to Lavern Stanley RN.  Pt transferred to ER per wc with daughter.

## 2020-02-13 NOTE — PROGRESS NOTES
Call rec'd from RN at ACU, pt presents this morning for blood transfusion and has temp of 101.3.  Reviewed with Dr Araujo, order's rec'd for UA and entered- call results to him.  Informed RN that Dr Araujo will be on 3P and they can contact him there with results, V/U.

## 2020-02-15 PROBLEM — R31.21 ASYMPTOMATIC MICROSCOPIC HEMATURIA: Status: ACTIVE | Noted: 2020-01-01

## 2020-02-20 NOTE — TELEPHONE ENCOUNTER
----- Message from Zane Araujo MD PhD sent at 2/19/2020  5:47 PM EST -----  Patient still in the hospital, please cancel her appointment for 2/20/2020.      WTANJA

## 2020-02-24 NOTE — TELEPHONE ENCOUNTER
----- Message from Ashlee Liang PA-C sent at 2/21/2020  5:11 PM EST -----  Anne-    This pt needs to see Dr. VARGAS in 6mos    Thanks, Ashlee

## 2020-02-25 PROBLEM — C34.90: Status: ACTIVE | Noted: 2020-01-01

## 2020-02-25 NOTE — TELEPHONE ENCOUNTER
----- Message from Jessy Leon MD sent at 2/25/2020  3:08 PM EST -----  Follow-up with Dr. truong in 1 to 2 weeks needs ER and [place] done on lung biopsy

## 2020-02-26 NOTE — OUTREACH NOTE
Prep Survey      Responses   Facility patient discharged from?  [facility]   Is patient eligible?  Yes   Discharge diagnosis  HCAP, sarcomatoid carcinoma of lung, asymptomatic microscopic hematuria, Influenza A, RSV, Hypothyroidism, GERD, essential HTN, Pulmonary HTN, chronic fatigue, pulmonary nodule, right, chemo induced thrombocytopenia, anemia assoc. with chemo,    Does the patient have one of the following disease processes/diagnoses(primary or secondary)?  COPD/Pneumonia   Does the patient have Home health ordered?  Yes   What is the Home health agency?   [facility]   What DME was ordered?  [employer] for walker   Comments regarding appointments  Pt to schedule F/U appointments   Prep survey completed?  Yes          Shakira Austin RN

## 2020-02-27 NOTE — OUTREACH NOTE
COPD/PN Week 1 Survey      Responses   Facility patient discharged from?  [facility]   Does the patient have one of the following disease processes/diagnoses(primary or secondary)?  COPD/Pneumonia   Is there a successful TCM telephone encounter documented?  No   Was the primary reason for admission:  Pneumonia   Week 1 attempt successful?  Yes   Call start time  1534   Rescheduled  Rescheduled-pt requested   Call end time  1534   Discharge diagnosis  HCAP, sarcomatoid carcinoma of lung, asymptomatic microscopic hematuria, Influenza A, RSV, Hypothyroidism, GERD, essential HTN, Pulmonary HTN, chronic fatigue, pulmonary nodule, right, chemo induced thrombocytopenia, anemia assoc. with chemo,           Brittney Richardson RN

## 2020-02-28 PROBLEM — R41.82 ALTERED MENTAL STATUS: Status: ACTIVE | Noted: 2020-01-01

## 2020-02-28 PROBLEM — N17.9 AKI (ACUTE KIDNEY INJURY) (HCC): Status: ACTIVE | Noted: 2020-01-01

## 2020-02-28 PROBLEM — J18.9 SEPSIS DUE TO PNEUMONIA (HCC): Status: ACTIVE | Noted: 2020-01-01

## 2020-02-28 PROBLEM — A41.9 SEPSIS DUE TO PNEUMONIA (HCC): Status: ACTIVE | Noted: 2020-01-01

## 2020-02-28 NOTE — TELEPHONE ENCOUNTER
I spoke with patients daughter and informed her that the patient has been added to the wait list. She was fine with that and keeping her scheduled with Dr. Cool.

## 2020-02-28 NOTE — OUTREACH NOTE
COPD/PN Week 1 Survey      Responses   Facility patient discharged from?  [facility]   Does the patient have one of the following disease processes/diagnoses(primary or secondary)?  COPD/Pneumonia   Is there a successful TCM telephone encounter documented?  No   Was the primary reason for admission:  Pneumonia   Week 1 attempt successful?  Yes   Call start time  1338   Rescheduled  Rescheduled-pt requested [Family answered rescheduled call and said they just took her back to the ER. Rescheduled again in case she does not get readmitted. ]   Call end time  1339   Discharge diagnosis  HCAP, sarcomatoid carcinoma of lung, asymptomatic microscopic hematuria, Influenza A, RSV, Hypothyroidism, GERD, essential HTN, Pulmonary HTN, chronic fatigue, pulmonary nodule, right, chemo induced thrombocytopenia, anemia assoc. with chemo,           Sherry Garcia RN

## 2020-02-28 NOTE — TELEPHONE ENCOUNTER
----- Message from Nadine Cool MD sent at 2/28/2020 11:21 AM EST -----  Elaine, Can you place this patient on a cancellation list?  She can also be seen by one of the APRNs as well.  Thanks!    ----- Message -----  From: Georgia Garcia LPN  Sent: 2/27/2020   6:04 PM EST  To: Nadine Cool MD    Pt daughter is calling and states that her seizure meds were changed at recent hospital admission and she would like her mom to maybe cut back some d/t her mother is tired and non- productive. Will you or your staff please see if you can help the family out for us by lowering the dose or seeing her sooner. Her daughter name is Joie and the number is [phone]

## 2020-03-01 NOTE — CONSULTS
Cardiology History & Physical / Consultation      Patient Name: Marina Barroso  Age/Sex: 69 y.o. female  : 1950  MRN: [id number]    Date of Admission: 2020  Date of Encounter Visit: 20  Encounter Provider: Agustin Greenberg MD  Referring Provider: Lavelle Mccain MD  Place of Service: [facility] CARDIOLOGY  Patient Care Team:  Cristo Madera MD as PCP - General (Family Medicine)  Franko Hernandes MD as Surgeon (Neurosurgery)  Zane Araujo MD PhD as Consulting Physician (Hematology and Oncology)  Michael Everett Jr., MD as Consulting Physician (Urology)  Cristo Madera MD as Referring Physician (Family Medicine)  Trudy Rivera MD as Surgeon (General Surgery)  Kyle George MD as Consulting Physician (Nephrology)          Subjective:     Chief Complaint: Frequent PVCs    Reason for consultation: Frequent PVCs.    History of Present Illness:  Marina Barroso is a 69 y.o. female who was alert but not interactive.  Patient would just grunted me.  History is therefore from the chart only she was not insightful or helpful.      Past Medical History:  Past Medical History:   Diagnosis Date   • Anemia 10/14/2008    Secondary to chronic renal insufficiency and myeloma   • Arthropathy of knee 2014    unspecified   • Breast cancer (CMS/[facility]) 2012    Left breast invasive ductal carcinoma, stage II, ER/AK negative, HER-2/mk positive   • Bursitis of left hip 10/18/2007   • Bursitis, knee 2013   • Calcaneal spur 2009   • Chronic kidney disease, stage III (moderate) (CMS/HCC)    • Colon cancer screening 2017    Cologuard testing: Positive results   • Congestive heart failure (CMS/HCC)     Chronic systolic   • Diverticulitis of colon 10/17/2007   • DVT (deep venous thrombosis) (CMS/[facility])     right lower extremity, S/P IVC filter   • Epigastric abdominal pain 2019    Added automatically from request for surgery [id number]   • Gastroesophageal  reflux disease 4/8/2019    Added automatically from request for surgery [id number]   • H/O Diastolic dysfunction    • H/O Mitral regurgitation    • History of Clostridium difficile 04/01/2014   • History of echocardiogram 04/2014    EF decreased to 46% - per cardiology   • History of MRSA infection    • History of obesity    • History of transfusion    • Hydronephrosis     chronic, right   • Hypertension    • LLL pneumonia (CMS/[facility]) 04/23/2009   • Malignant neoplasm of kidney (CMS/[facility]) 12/2009    and other and unspecified urinary organs; urinary organ, site unspecified; s/p left nephrectomy   • Multiple myeloma (CMS/[facility]) 04/2012   • Myocardial infarction (CMS/[facility])     NSTEMI   • Neoplasm of uncertain behavior 10/12/2015    of other and unspecified sites and tissues; other specified sites   • Non-STEMI (non-ST elevated myocardial infarction) (CMS/[facility])    • Nonischemic cardiomyopathy (CMS/[facility])    • Pyelonephritis 03/2004   • Seizures (CMS/[facility]) 10/17/2007   • Thrombocytopenia (CMS/HCC)    • UTI (urinary tract infection) 10/30/2014    pseudomonas, multidrug resistant   • UTI (urinary tract infection) 03/28/2014    site not specified   • Ventral hernia        Past Surgical History:   Procedure Laterality Date   • BONE MARROW BIOPSY N/A 04/11/2012   • BRAIN SURGERY  2005    Meningioma   • BRAIN SURGERY  1990    Tumor benign per patient   • BREAST BIOPSY Left 04/09/2012    Dr. Gregg May   • CARDIAC CATHETERIZATION Left 06/11/2012    Dr. Sudeep Haddad   • CARDIAC CATHETERIZATION N/A 08/15/2006    Dr. Brian Bhatt   • COLONOSCOPY N/A 8/30/2017    Procedure: COLONOSCOPY into cecum and TI with cold polypectomies;  Surgeon: Trudy Rivera MD;  Location: [facility] ENDOSCOPY;  Service:    • COLONOSCOPY W/ POLYPECTOMY N/A unknown    2 polyps, benign   • CYSTOSCOPY N/A 3/25/2016    Procedure: CYSTOSCOPY  WITH RIGHT STENT CHANGE;  Surgeon: Lakhwinder Russo MD;  Location: [facility] OR;  Service:    • CYSTOSCOPY N/A 03/10/2012      Cystoscopy, right retrograde, right double-J stent-Dr. Sloan May   • CYSTOSCOPY N/A 02/25/2012    Cystoscopy, stent removal, right retrograde pyelogram, replacement of right double-J ureteral stent-Dr. Michael Everett   • CYSTOSCOPY W/ URETERAL STENT PLACEMENT Right 5/7/2016    Procedure: CYSTOSCOPY, URETERAL CATHETER INSERTION AND RIGHT STENT EXCHANGE ;  Surgeon: Michael Everett Jr., MD;  Location: [facility];  Service:    • CYSTOSCOPY W/ URETERAL STENT PLACEMENT N/A 1/20/2017    Procedure: CYSTOSCOPY RIGHT RETROGRADE PYELOGRAM, URETERAL STENT CHANGE;  Surgeon: Lakhwinder Russo MD;  Location: [facility];  Service:    • CYSTOSCOPY W/ URETERAL STENT PLACEMENT N/A 04/02/2015    Cystoscopy, removal of right ureteral stent, right retrograde pyelogram, placement of right double-J ureteral stent-Dr. Michael Everett   • CYSTOSCOPY W/ URETERAL STENT PLACEMENT N/A 04/26/2015    Cystoscopy, removal of right ureteral stent, right retrograde pyelogram, replacement of right ureteral stent 24 cm x 7-Telugu without retrieval line-Dr. Jose Gomez   • CYSTOSCOPY W/ URETERAL STENT PLACEMENT N/A 12/22/2014    Cystoscopy, removal of right double-J ureteral stent, right retrograde pyelogram, placement of right double-J ureteral stent-Dr. Michael Everett   • CYSTOSCOPY W/ URETERAL STENT PLACEMENT N/A 09/22/2014    Cystoscopy, removal of right ureteral stent, removal of right retrograde pyelogram, replacement of right double-J ureteral stent-Dr. Michael Everett   • CYSTOSCOPY W/ URETERAL STENT PLACEMENT N/A 06/18/2014    Cystoscopy, removal of right double-J ureteral stent, right retrograde pyelogram, placement of right double-J ureteral stent-Dr. Michael Everett   • CYSTOSCOPY W/ URETERAL STENT PLACEMENT N/A 01/23/2014    Cystoscopy, removal of right double-J ureteral stent, right retrograde pyelogram, placement of right double-J ureteral stent-Dr. Michael Everett   • CYSTOSCOPY W/ URETERAL STENT PLACEMENT N/A 03/27/2012    Cystoscopy,  removal of ureteral stent, right retrograde pyelogram, replacement of indewlling right ureteral stent-Dr. Michael Everett   • CYSTOSCOPY W/ URETERAL STENT PLACEMENT N/A 03/27/2013    Cystoscopy, right retrograde pyelogram, removal and replacement of right double-J ureteral stent-Dr. Michael Everett   • CYSTOSCOPY W/ URETERAL STENT PLACEMENT N/A 11/12/2012    Cystoscopy, stent removal, right retrograde pyelogram, replacement of right double-J ureteral stent-Dr. Michael Everett   • CYSTOSCOPY W/ URETERAL STENT PLACEMENT N/A 09/24/2011    Cystoscopy, removal of ureteral stent, right retrograde pyelogram and placement of right double-J ureteral stent-Dr. Michael Everett   • CYSTOSCOPY W/ URETERAL STENT PLACEMENT N/A 05/14/2011    Cystoscopy, removal of right ureteral stent, right retrograde pyelogram and placement of new right double-J ureteral stent-Dr. Michael Everett   • CYSTOSCOPY W/ URETERAL STENT PLACEMENT N/A 12/31/2010    Cystoscopy, stent removal, right retrograde pyelogram, replacement of 7 English x 26 cm double-J stent-Dr. Michael Everett   • CYSTOSCOPY W/ URETERAL STENT PLACEMENT N/A 08/28/2010    Cystoscopy, stent removal, right retrograde pyelogram with interpretation, placement of right double-J ureteral stent-Dr. Michael Everett   • CYSTOSCOPY W/ URETERAL STENT PLACEMENT N/A 05/24/2010    Cystoscopy, right retrograde pyelogram, replacement of right double-J ureteral stent-Dr. Michael Everett   • CYSTOSCOPY W/ URETERAL STENT PLACEMENT N/A 02/12/2010    Cystoscopy, right retrograde pyelogram and placement of right double-J ureteral stent-Dr. Michael Everett   • CYSTOSCOPY W/ URETERAL STENT PLACEMENT N/A 02/23/2009    Cystoscopy, right retrograde pyelogram, placement of right double-J ureteral stent-Dr. Michael Everett   • CYSTOSCOPY W/ URETERAL STENT PLACEMENT N/A 09/17/2007    Dr. Michael Everett   • CYSTOSCOPY W/ URETERAL STENT PLACEMENT Right 01/29/2018    Dr. Michael Everett   • CYSTOSCOPY W/ URETERAL STENT PLACEMENT N/A 06/04/2018    Cystoscopy,  removal of right double-J ureteral stent and placement of right double-J ureteral stent. Dr. Michael Everett.   • CYSTOSCOPY W/ URETERAL STENT PLACEMENT N/A 03/06/2018    Cystoscopy, removal of right ureteral stent, replacement of right double-J ureteral stent. Dr. Michael Everett   • CYSTOSCOPY W/ URETERAL STENT PLACEMENT      2/2019   • ELBOW PROCEDURE Bilateral 1990s   • ENDOSCOPY N/A 5/13/2019    Procedure: ESOPHAGOGASTRODUODENOSCOPY WITH BIOPSIES;  Surgeon: Trudy Rivera MD;  Location: [facility] ENDOSCOPY;  Service: General   • HERNIA REPAIR  09/03/2018   • LASIK Bilateral    • MASTECTOMY Left 04/25/2012    Left total mastectomy with sentinel lymph node biopsies and left axillary lymphatic mapping-Dr. Gregg May   • MEDIPORT INSERTION, DOUBLE Right    • NEPHRECTOMY Left 12/30/2009    Dr. 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 Cristo Pittman   • TOTAL ABDOMINAL HYSTERECTOMY Bilateral 2007    Dr. Todd   • URETEROURETEROSTOMY Right 05/01/2006    Dr. Michael Everett   • VENA CAVA FILTER INSERTION N/A 05/08/2006    Dr. Jordon Barber   • VENOUS ACCESS DEVICE (PORT) INSERTION Right 02/25/2013    Right subclavian vein PowerPort insertion-Dr. Gregg May   • VENOUS ACCESS DEVICE (PORT) INSERTION AND REMOVAL N/A 10/06/2014    Revision of right internal jugular PowerPort with fluoroscopy, removal of peripherally inserted central catheter line-Dr. Aurora Tomlinson   • VENOUS ACCESS DEVICE (PORT) INSERTION AND REMOVAL N/A 08/14/2014    Ultrasound-guided access right internal jugular vein, PowerPort placement, removal of right subclavian port-Dr. Jordon Barber   • VENTRAL/INCISIONAL HERNIA REPAIR Left 9/19/2018    Procedure: ventral hernia repair with mesh and rectus muscle advancement flap;  Surgeon: Trudy Rivera MD;  Location:   [facility];  Service: General       Home Medications:   Medications Prior to Admission   Medication Sig Dispense Refill Last Dose   • acyclovir (ZOVIRAX) 400 MG tablet Take 1 tablet by mouth 2 (Two) Times a Day. Take no more than 5 doses a day. 180 tablet 3 2/12/2020 at Unknown time   • calcium carbonate (TUMS) 500 MG chewable tablet Chew 1 tablet 2 (Two) Times a Day.   2/12/2020 at Unknown time   • Calcium Carbonate-Vitamin D (CALCIUM-VITAMIN D) 500-200 MG-UNIT tablet per tablet Take 1 tablet by mouth 2 (Two) Times a Day. 60 tablet 0    • carBAMazepine XR (TEGretol  XR) 200 MG 12 hr tablet Take 2 tablets by mouth Every Night. 180 tablet 3 2/12/2020 at Unknown time   • dexamethasone (DECADRON) 4 MG tablet Take 5 tablets by mouth Every 7 (Seven) Days. Take with food weekly on day of Velcade treatment 20 tablet 11 Past Month at Unknown time   • dexamethasone (DECADRON) 4 MG tablet Take 20 mg, 5 tablets with food on Days 1, 2, 4, 5, 8, 9, 11, 12.  Take 4 mg, 1 tablet with food on Day 3. (Patient taking differently: Take 20 mg by mouth See Admin Instructions. Take 20 mg, 5 tablets with food on Days 1, 2, 4, 5, 8, 9, 11, 12.  Take 4 mg, 1 tablet with food on Day 3.) 41 tablet 11 Past Month at Unknown time   • HYDROcodone-acetaminophen (NORCO) 5-325 MG per tablet Take 1 tablet by mouth Every 6 (Six) Hours As Needed for Moderate Pain  or Severe Pain . for pain 60 tablet 0 Past Month at Unknown time   • isosorbide mononitrate (IMDUR) 60 MG 24 hr tablet Take 30 mg by mouth Every Evening.   2/12/2020 at Unknown time   • lacosamide (VIMPAT) 200 MG tablet Take 1 tablet by mouth Every 12 (Twelve) Hours. 60 tablet 0    • levETIRAcetam (KEPPRA) 1000 MG tablet Take 1 tablet by mouth 2 (Two) Times a Day. 60 tablet 0    • levothyroxine (SYNTHROID, LEVOTHROID) 75 MCG tablet Take 1 tablet by mouth Daily. 30 tablet 11 2/12/2020 at Unknown time   • metoprolol succinate XL (TOPROL-XL) 25 MG 24 hr tablet Take 25 mg by mouth 2 (Two) Times  a Day.   2/12/2020 at Unknown time   • ondansetron (ZOFRAN) 8 MG tablet Take 1 tablet by mouth 3 (Three) Times a Day As Needed for Nausea or Vomiting. 60 tablet 5 Past Month at Unknown time   • pantoprazole (PROTONIX) 40 MG EC tablet Take 1 tablet by mouth 2 (Two) Times a Day. Before breakfast 60 tablet 5 2/12/2020 at Unknown time   • sodium bicarbonate 650 MG tablet Take 1 tablet by mouth 2 (Two) Times a Day. 30 tablet 0 2/12/2020 at Unknown time   • vitamin D (ERGOCALCIFEROL) 1.25 MG (02458 UT) capsule capsule Take 1 capsule by mouth Every 7 (Seven) Days. 5 capsule 0    • aspirin 81 MG EC tablet Take 81 mg by mouth Daily.   Past Month at Unknown time   • clotrimazole-betamethasone (LOTRISONE) 1-0.05 % cream Apply  topically to the appropriate area as directed 2 (Two) Times a Day As Needed (rash) for up to 14 days. 45 g 0 Taking   • Misc. Devices (VIRAGE CUSTOM BREAST PROSTHES) misc 2 each As Needed (na). 2 each 0 Taking       Allergies:  Allergies   Allergen Reactions   • Sulfa Antibiotics Swelling   • Zosyn [Piperacillin Sod-Tazobactam So] Swelling     Face and lips    Has received Cefepime, ceftriaxone, and ceftazidime based on historical medications    • Mucinex Dm [Dm-Guaifenesin Er] Palpitations       Past Social History:  Social History     Socioeconomic History   • Marital status:      Spouse name: Not on file   • Number of children: 3   • Years of education: High School   • Highest education level: Not on file   Occupational History   • Occupation:      Employer: RETIRED     Comment: [employer] [[id number]]   Tobacco Use   • Smoking status: Never Smoker   • Smokeless tobacco: Never Used   Substance and Sexual Activity   • Alcohol use: Never     Frequency: Never   • Drug use: No   • Sexual activity: Defer   Social History Narrative    Son lives with her and helps       Past Family History: History reviewed. No pertinent family history.   Family History   Problem Relation Age  of Onset   • Hypertension Other    • Miscarriages / Stillbirths Father    • Heart disease Father    • Hypertension Father    • Heart attack Father    • Diabetes Sister    • Skin cancer Sister    • Throat cancer Brother    • Malig Hyperthermia Neg Hx        Review of Systems   Unable to perform ROS: Mental status change           Objective:     Objective:  Temp:  [97.4 °F (36.3 °C)-99.2 °F (37.3 °C)] 97.4 °F (36.3 °C)  Heart Rate:  [100-105] 100  Resp:  [18] 18  BP: (106-127)/(56-79) 127/79    Intake/Output Summary (Last 24 hours) at 3/1/2020 0911  Last data filed at 3/1/2020 0530  Gross per 24 hour   Intake 2048.66 ml   Output 1850 ml   Net 198.66 ml     Body mass index is 27.55 kg/m².      02/28/20  1459 02/28/20  2105 02/29/20  0406   Weight: 71.8 kg (158 lb 4.6 oz) 72.6 kg (160 lb 0.9 oz) 72.8 kg (160 lb 7.9 oz)           Physical Exam:   Physical Exam   Constitutional: She is oriented to person, place, and time. She appears well-developed.   HENT:   Head: Normocephalic.   Eyes: Conjunctivae are normal.   Neck: Normal range of motion.   Cardiovascular: Normal rate, regular rhythm and normal heart sounds.   Pulmonary/Chest: Breath sounds normal.   Abdominal: Soft. Bowel sounds are normal.   Musculoskeletal: Normal range of motion. She exhibits no edema.   Neurological: She is alert and oriented to person, place, and time.   Skin: Skin is warm and dry.   Psychiatric: She has a normal mood and affect. Her behavior is normal.   Vitals reviewed.       Labs:   Lab Review:     Results from last 7 days   Lab Units 03/01/20  0406 02/29/20  0509 02/28/20  1711 02/25/20  0416 02/24/20  0452   SODIUM mmol/L 142 144 139 139 136   POTASSIUM mmol/L 3.7 3.4* 3.7 3.7 4.1   CHLORIDE mmol/L 116* 114* 108* 108* 110*   CO2 mmol/L 17.5* 18.9* 21.7* 22.8 20.9*   BUN mg/dL 29* 28* 24* 15 15   CREATININE mg/dL 2.13* 2.28* 2.19* 1.54* 1.51*   GLUCOSE mg/dL 82 104* 111* 91 96   CALCIUM mg/dL 8.8 8.6 9.9 8.9 8.3*   AST (SGOT) U/L  --  18 16   --  23   ALT (SGPT) U/L  --  11 10  --  10         Results from last 7 days   Lab Units 03/01/20  0406   WBC 10*3/mm3 9.30   HEMOGLOBIN g/dL 9.1*   HEMATOCRIT % 27.1*   PLATELETS 10*3/mm3 18*             Results from last 7 days   Lab Units 02/25/20  0416   MAGNESIUM mg/dL 1.8                             Assessment:       Altered mental status    Chronic kidney disease, stage III (moderate) (CMS/HCC)    Chronic systolic congestive heart failure (CMS/HCC)    Malignant neoplasm of kidney (CMS/HCC)    Multiple myeloma (CMS/HCC)    Personal history of breast cancer    Thrombocytopenia (CMS/HCC)    Complex partial epilepsy (CMS/HCC)    Pulmonary hypertension (CMS/HCC)    Essential hypertension    Anemia, chronic renal failure, stage 3 (moderate) (CMS/HCC)    Leiomyosarcoma (CMS/HCC)    Gastroesophageal reflux disease    Hypothyroidism    Sarcomatoid carcinoma of lung (CMS/HCC)    Sepsis due to pneumonia (CMS/HCC)    MIKE (acute kidney injury) (CMS/HCC)        Plan:   1.  PVCs noted.  2.  Arrhythmia at 1:57 AM on 3/1/2020.  Appears to be a nonspecific atrial arrhythmia.  3.  Although I am just meeting this lady reviewing the chart appears that overall prognosis is very poor.  We will check an echocardiogram for prognostic features.  Last echo dates back to almost 5 years ago which time her ejection fraction was 40% with global hypokinesis.  4.  Mental status change  5.  Multiple malignancies oncology following.  6.  Sepsis secondary pneumonia  7.  Acute kidney injury creatinine was a little bit better today.  8.  Will await echo really nothing to do from a cardiovascular standpoint given overall prognosis.       Thank you for allowing me to participate in the care of Marina Barroso. Feel free to contact me directly with any further questions or concerns.    Agustin Greenberg MD  [facility]  03/01/20  9:11 AM

## 2020-03-01 NOTE — PLAN OF CARE
Problem: Patient Care Overview  Goal: Plan of Care Review  Outcome: Ongoing (interventions implemented as appropriate)  Flowsheets (Taken 3/1/2020 0518)  Progress: improving  Plan of Care Reviewed With: patient  Outcome Summary: patient is more alert and is able to follow simple one step directions. speech is incoherent and patient is often mumbling. patient does not answer any orientation questions correctly though she answers to name. scds remained on throughout the night, continuing to turn q2 hours to prevent skin breakdown. port accessed on right side of chest and patient receiving iv fluids, antibiotics and blood yesterday. blood cultures drawnform line this morning. patient friend who was at bedside earlier in the shift stated that the patient's daughter would be by today with a copy of her living will. received 1 unit prbc's yesterday, platelets remain low will continue to monitor.

## 2020-03-01 NOTE — PLAN OF CARE
Problem: Patient Care Overview  Goal: Plan of Care Review  Outcome: Ongoing (interventions implemented as appropriate)  Flowsheets (Taken 3/1/2020 1711)  Progress: improving  Plan of Care Reviewed With: patient  Note:   VSS.  More alert and has said a few words but not following all commands.  PO meds held. Family/MD meeting tomorrow. Continue to monitor.

## 2020-03-01 NOTE — PROGRESS NOTES
S/p right nephrectomy several years aago  Has chronic right stent, that was exchanged in Feb  Per family she get it changed q 2 months    Lying in bed  Family at BS  Tolerating stent, no pain  Alert and oriented    Currently on antibiotics for UTI  Creatinine 2.13  No hydro   blood cultures pending    Urine culture-+citrobacter    Plan  Continue to follow  Stent exchange in April   Stable doing well

## 2020-03-01 NOTE — PROGRESS NOTES
LOS: 2 days     Chief Complaint:  Follow-up Enterobacteriaceae septicemia    Interval History:  No fever. WBC down to 9k today. She is confused and can't provide any history. She is on room air. Urology saw patient yesterday and no need for surgical intervention at this time.    ROS: cannot obtain due to mental status    Vital Signs  Temp:  [97.4 °F (36.3 °C)-99.2 °F (37.3 °C)] 97.4 °F (36.3 °C)  Heart Rate:  [100-105] 100  Resp:  [18] 18  BP: (106-127)/(56-79) 127/79    Physical Exam:  General: awake, confused, chronically ill appearing  Eyes: no scleral icterus  Cardiovascular: NR, RR  Respiratory: no wheezing  GI: Abdomen is soft, non-tender, non-distended  : no Jones catheter present; no CVA tenderness  Musculoskeletal: thin musculature  Skin: No rashes  Psychiatric: confused  Vasc: R chest port w/o erythema    Antibiotics:  •  cefepime (MAXIPIME) 1 g/100 mL 0.9% NS IVPB (mbp), 1 g, Intravenous, Q12H    LABS:  CBC, BMP, micro reviewed today  Lab Results   Component Value Date    WBC 9.30 03/01/2020    HGB 9.1 (L) 03/01/2020    HCT 27.1 (L) 03/01/2020    MCV 88.6 03/01/2020    PLT 18 (C) 03/01/2020     Lab Results   Component Value Date    GLUCOSE 82 03/01/2020    BUN 29 (H) 03/01/2020    CREATININE 2.13 (H) 03/01/2020    EGFRIFNONA  08/30/2016      Comment:      <15 Indicative of kidney failure.    EGFRIFAFRI 28 (L) 03/01/2020    BCR 13.6 03/01/2020    CO2 17.5 (L) 03/01/2020    CALCIUM 8.8 03/01/2020    PROTENTOTREF 9.9 (H) 02/18/2020    ALBUMIN 2.30 (L) 03/01/2020    LABIL2 0.4 (L) 02/18/2020    AST 18 02/29/2020    ALT 11 02/29/2020     Microbiology:  2/13 RVP: + Flu A and RSV  2/13 BCx: negative  2/28 BCx: Enterobacteriaceae group in 1/2 sets (drawn from central line)  2/28 UCx: 100k Citrobacter koseri (sensitive to cefepime, NTF, Zosyn, doxy)  2/28 MRSA nares screen: negative  3/1 BCx: pending    Radiology (prior)  -CT A/P 2/29: R kidney inflammatory strending; R ureteral stent in place, RLL nodule  increased in size    Assessment/Plan   1. 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 Recent influenza A and RSV infection  3. IgG Multiple Myeloma  4. Meningioma  5. Spindle cell carcinoma (pulmonary nodule)  6. History of seizures  7. Thrombocytopenia  8. Elevated procalcitonin  9. Acute kidney injury superimposed upon CKD 3  10. History of cystoscopy and ureteral stent placement     For Enterobacteriaceae septicemia, continue cefepime 1 g IV q12h. Repeat BCx were drawn this AM (1 of 2 sets ordered). I anticipate adding port lock therapy as soon as we know Enterobacteriaceae group sensitivities. I plan to salvage her port. Reviewed urology note. Thanks for their evaluation. Prognosis guarded with multiple malignancies and recurrent infection.     Thank you for this consult. ID will follow. D/W Dr Greenberg.

## 2020-03-01 NOTE — NURSING NOTE
Family concerned that pt's face is more swollen.  They also stated her lips are more swollen and they were swollen yesterday.  Face looks the same as it did this morning.  Tongue is not swollen and oxygen is 100%. Will continue to monitor.

## 2020-03-01 NOTE — PROGRESS NOTES
NEPHROLOGY PROGRESS NOTE    PATIENT IDENTIFICATION:   Name:  Marina Barroso      MRN:  [id number]     69 y.o.  female             Reason for visit: MIKE on CKD3    SUBJECTIVE:   Fairly somnolent all day; groans and moans in answer to questions; not eating    OBJECTIVE:  Vitals:    03/01/20 0201 03/01/20 0750 03/01/20 0944 03/01/20 1350   BP: 116/69 127/79  123/64   BP Location: Right arm Left arm  Right arm   Patient Position: Lying Lying  Lying   Pulse: 102 100 96 101   Resp: 18 18 18 18   Temp: 98.1 °F (36.7 °C) 97.4 °F (36.3 °C)  97 °F (36.1 °C)   TempSrc: Oral Oral  Oral   SpO2: 97% 100% 100% 100%   Weight:       Height:               Body mass index is 27.55 kg/m².    Intake/Output Summary (Last 24 hours) at 3/1/2020 1529  Last data filed at 3/1/2020 1350  Gross per 24 hour   Intake 1561.66 ml   Output 2250 ml   Net -688.34 ml     Wt Readings from Last 1 Encounters:   02/29/20 0406 72.8 kg (160 lb 7.9 oz)   02/28/20 2105 72.6 kg (160 lb 0.9 oz)   02/28/20 1459 71.8 kg (158 lb 4.6 oz)     Wt Readings from Last 3 Encounters:   02/29/20 72.8 kg (160 lb 7.9 oz)   02/18/20 71.8 kg (158 lb 4.8 oz)   02/20/20 71.8 kg (158 lb 4.6 oz)         Exam:  GEN:  No distress, appears older than stated age; chronically ill  EYES:   Anicteric sclera  ENT:    External ears/nose normal, MM are dry; temporal wasting present  NECK:  No adenopathy, JVP normal  LUNGS: Normal chest on inspection; not labored; coarse  CV:  Normal S1S2, tachycardic, without rub  ABD:  Not obviously tender, non-distended, soft +BS  EXT:  No significant edema; no cyanosis; +clubbing  Neurologic:      Lethargic; does move all extremities    Scheduled meds:    acyclovir 400 mg Oral BID   aspirin 81 mg Oral Daily   cefepime 1 g Intravenous Q12H   fosphenytoin 150 mg PE Intravenous Q8H   isosorbide mononitrate 30 mg Oral Q PM   levETIRAcetam 500 mg Intravenous Q12H   levothyroxine 75 mcg Oral Daily   metoprolol tartrate 12.5 mg Oral Q12H   pantoprazole 40 mg  Oral Q AM   sodium chloride 10 mL Intravenous Q12H     IV meds:                        sodium chloride 100 mL/hr Last Rate: 100 mL/hr (03/01/20 0217)       Data Review:    Results from last 7 days   Lab Units 03/01/20  0406 02/29/20  0509 02/28/20  1711  02/24/20  0452   SODIUM mmol/L 142 144 139   < > 136   POTASSIUM mmol/L 3.7 3.4* 3.7   < > 4.1   CHLORIDE mmol/L 116* 114* 108*   < > 110*   CO2 mmol/L 17.5* 18.9* 21.7*   < > 20.9*   BUN mg/dL 29* 28* 24*   < > 15   CREATININE mg/dL 2.13* 2.28* 2.19*   < > 1.51*   CALCIUM mg/dL 8.8 8.6 9.9   < > 8.3*   BILIRUBIN mg/dL  --  0.6 0.6  --  0.4   ALK PHOS U/L  --  34* 41  --  44   ALT (SGPT) U/L  --  11 10  --  10   AST (SGOT) U/L  --  18 16  --  23   GLUCOSE mg/dL 82 104* 111*   < > 96    < > = values in this interval not displayed.     Estimated Creatinine Clearance: 24.7 mL/min (A) (by C-G formula based on SCr of 2.13 mg/dL (H)).      Results from last 7 days   Lab Units 03/01/20  0406 02/25/20  0416   MAGNESIUM mg/dL  --  1.8   PHOSPHORUS mg/dL 4.1 3.5       Results from last 7 days   Lab Units 03/01/20  0406 02/29/20  0509 02/28/20  1501 02/25/20  0416 02/24/20  0452   WBC 10*3/mm3 9.30 12.47* 12.31* 8.07 9.43   HEMOGLOBIN g/dL 9.1* 7.8* 10.3* 8.6* 8.9*   PLATELETS 10*3/mm3 18* 22* 33* 25* 28*                   ASSESSMENT:     Altered mental status    Chronic kidney disease, stage III (moderate) (CMS/HCC)    Chronic systolic congestive heart failure (CMS/HCC)    Malignant neoplasm of kidney (CMS/HCC)    Multiple myeloma (CMS/HCC)    Personal history of breast cancer    Thrombocytopenia (CMS/HCC)    Complex partial epilepsy (CMS/HCC)    Pulmonary hypertension (CMS/HCC)    Essential hypertension    Anemia, chronic renal failure, stage 3 (moderate) (CMS/HCC)    Leiomyosarcoma (CMS/HCC)    Gastroesophageal reflux disease    Hypothyroidism    Sarcomatoid carcinoma of lung (CMS/HCC)    Sepsis due to pneumonia (CMS/HCC)    MIKE (acute kidney injury) (CMS/HCC)    1.   Non-olig MIKE, improving, suspect prerenal azotemia due to vol depletion from poor oral intake in setting of acute pyelonephritis with mild intermittent hypotension.  No hydronephrosis on CT with right ureteral stent in place.  Mild hypernatremia.   Normal potassium; likely NAGMA.  Active urinary sediment due to UTI.    2.  CKD stage 3 - Cr stable 1.5 at discharge few days ago, her baseline; sees Dr Kyle George of our group  3.  Thrombocytopenia  4.  Enterobacteriaceae septicemia, pyelonephritis of solitary RK (h/o left nephrectomy)   -- ID eval noted, on cefepime   5.  H/o multiple myeloma with chronic TCP & anemia   6.  Toxic metabolic encephalopathy   7.  Meningioma   8.  HTN - BP occ low in setting of sepsis, BP better now, hold parameters for metoprolol  9.  H/o right ureteral stent - duration?  Urology has been consulted   10.  Seizure disorder        PLAN:  1.  Continue hypotonic IVF  2.  Surveillance labs  3.  Discussed with family members at bedside    Monster Killian MD  3/1/2020    3:29 PM

## 2020-03-01 NOTE — PROGRESS NOTES
[facility]     LOS: 2 days     Name: Marina Barroso  Age: 69 y.o.  Sex: female  :  1950  MRN: [id number]         Primary Care Physician: Cristo Madera MD    NPO Diet due potential aspiration concerns    Subjective      More alert today.  Awake when entered room.  Just moans and nods head to answers. Follows some commands.  No family at bedside this am.  Unable to safely take PO.        Review of Systems   Unable to obtain d/t altered mental status.         Objective   Temp:  [97.4 °F (36.3 °C)-99.2 °F (37.3 °C)] 97.4 °F (36.3 °C)  Heart Rate:  [100-105] 100  Resp:  [18] 18  BP: (106-127)/(56-79) 127/79  SpO2:  [97 %-100 %] 100 %  on   ;   Device (Oxygen Therapy): room air  Body mass index is 27.55 kg/m².    Physical Exam   Constitutional: No distress.   Elderly, frail, chronically ill appearing   HENT:   Head: Normocephalic and atraumatic.   Nose: Nose normal.   Eyes: Conjunctivae are normal. Right eye exhibits no discharge. Left eye exhibits no discharge. No scleral icterus.   significant cataracts.   Pupils 2 mm, sluggish   Neck: Neck supple.   Cardiovascular: Normal rate, regular rhythm, normal heart sounds and intact distal pulses.   No murmur heard.  Pulmonary/Chest: Effort normal and breath sounds normal.   Poor inspiratory effort   Abdominal: Soft. Bowel sounds are normal. She exhibits no distension. There is no guarding.   Musculoskeletal: She exhibits no edema or deformity.   SCD's in place   Neurological:   More alert today.  Awake upon entering room.   Follows some commands and moves all extremities.     Skin: Skin is warm and dry. Capillary refill takes less than 2 seconds. She is not diaphoretic.   Psychiatric:   Non verbal.    Nursing note and vitals reviewed.    Reviewed medications and new clinical results          Current Facility-Administered Medications:   •  acetaminophen (TYLENOL) tablet 650 mg, 650 mg, Oral, Q4H PRN **OR**  acetaminophen (TYLENOL) 160 MG/5ML solution 650 mg, 650 mg, Oral, Q4H PRN **OR** acetaminophen (TYLENOL) suppository 650 mg, 650 mg, Rectal, Q4H PRN, Lavelle Mccain MD, 650 mg at 02/29/20 0544  •  acyclovir (ZOVIRAX) tablet 400 mg, 400 mg, Oral, BID, Lenny Diop MD  •  aspirin EC tablet 81 mg, 81 mg, Oral, Daily, Lavelle Mccain MD  •  cefepime (MAXIPIME) 1 g/100 mL 0.9% NS IVPB (mbp), 1 g, Intravenous, Q12H, Lenny Diop MD, 1 g at 03/01/20 0217  •  fosphenytoin (Cerebyx) injection 150 mg PE, 150 mg PE, Intravenous, Q8H, Michael Charlton MD, 150 mg PE at 03/01/20 0652  •  isosorbide mononitrate (IMDUR) 24 hr tablet 30 mg, 30 mg, Oral, Q PM, Lavelle Mccain MD  •  levETIRAcetam in NaCl 0.82% (KEPPRA) IVPB 500 mg, 500 mg, Intravenous, Q12H, Lavelle Mccain MD, 500 mg at 02/29/20 2024  •  levothyroxine (SYNTHROID, LEVOTHROID) tablet 75 mcg, 75 mcg, Oral, Daily, Lavelle Mccain MD  •  metoprolol tartrate (LOPRESSOR) tablet 12.5 mg, 12.5 mg, Oral, Q12H, Lavern Elizondo APRN  •  ondansetron (ZOFRAN) injection 4 mg, 4 mg, Intravenous, Q6H PRN, Lavelle Mccain MD  •  pantoprazole (PROTONIX) EC tablet 40 mg, 40 mg, Oral, Q AM, Lavelle Mccain MD  •  sodium chloride 0.45 % infusion, 100 mL/hr, Intravenous, Continuous, Ronald Padilla MD, Last Rate: 100 mL/hr at 03/01/20 0217, 100 mL/hr at 03/01/20 0217  •  sodium chloride 0.9 % flush 10 mL, 10 mL, Intravenous, PRN, Lavelle Mccain MD  •  sodium chloride 0.9 % flush 10 mL, 10 mL, Intravenous, Q12H, Lavelle Mccain MD, 10 mL at 02/29/20 2025  •  sodium chloride 0.9 % flush 10 mL, 10 mL, Intravenous, PRN, Lavelle Mccain MD     02/29/20 0406  72.8 kg (160 lb 7.9 oz)   02/28/20 2105  72.6 kg (160 lb 0.9 oz)   02/28/20 14:59:26  71.8 kg (158 lb 4.6 oz)         Results from last 7 days   Lab Units 03/01/20  0406 02/29/20  0509 02/28/20  1501 02/25/20  0416 02/24/20  0452   WBC 10*3/mm3 9.30 12.47* 12.31* 8.07 9.43   HEMOGLOBIN g/dL 9.1* 7.8* 10.3* 8.6*  8.9*   PLATELETS 10*3/mm3 18* 22* 33* 25* 28*     Results from last 7 days   Lab Units 03/01/20  0406 02/29/20  0509 02/28/20  1711 02/25/20  0416 02/24/20  0452   SODIUM mmol/L 142 144 139 139 136   POTASSIUM mmol/L 3.7 3.4* 3.7 3.7 4.1   CHLORIDE mmol/L 116* 114* 108* 108* 110*   CO2 mmol/L 17.5* 18.9* 21.7* 22.8 20.9*   BUN mg/dL 29* 28* 24* 15 15   CREATININE mg/dL 2.13* 2.28* 2.19* 1.54* 1.51*   CALCIUM mg/dL 8.8 8.6 9.9 8.9 8.3*   GLUCOSE mg/dL 82 104* 111* 91 96     Lab Results   Component Value Date    ANIONGAP 8.5 03/01/2020       Study Result     CT HEAD WO CONTRAST-     INDICATIONS: Altered mental status, suspected recurrent meningioma     TECHNIQUE: Radiation dose reduction techniques were utilized, including  automated exposure control and exposure modulation based on body size.  Noncontrast head CT     COMPARISON: 02/17/2020, correlated with MRI from 02/20/2020     FINDINGS:     Bifrontal craniotomy change and anterior inferior frontal lobe  encephalomalacia are again noted. Density at the anterior inferior  aspect of the anterior fossa, near the midline, for example image 14 of  series 1, or image 23 of series 2 corresponds in location to MRI  demonstrated lobulated enhancing focus suspected to represent meningioma  recurrence.     No acute intracranial hemorrhage, midline shift or mass effect. No acute  territorial infarct is identified.     Mild periventricular hypodensities suggest chronic small vessel ischemic  change in a patient this age.           Ventricles, cisterns, cerebral sulci are unremarkable for patient age.     Debris is apparent in the left maxillary sinus, mild mucosal thickening  in the right maxillary sinus, sphenoid sinuses. Minimal partial  opacification of left mastoid air cells. The visualized paranasal  sinuses, orbits, mastoid air cells are otherwise unremarkable.        IMPRESSION:     No acute intracranial hemorrhage or hydrocephalus. Postsurgical and  chronic changes. The  area of suspected meningioma recurrence on the  recent MRI exam is also suggested on this exam although less well  visualized.       Study Result     Chest radiograph     HISTORY:Sepsis     TECHNIQUE: Two AP and lateral radiographs     COMPARISON:Multiple chest radiographs dating back to since 12/20 and CT  chest 02/18/2020     IMPRESSION:  FINDINGS AND IMPRESSION:  Right Port-A-Cath tip terminates over the right atrium.     Mild pulmonary opacification within the bilateral mid to lower lungs is  present, as before, likely representing atypical pneumonia given the  appearance on recent CT. Asymmetric pulmonary edema is felt this likely.  Correlation with patient history is recommended. There has been interval  increase in degree of left basilar pulmonary opacification since  02/21/2020 representing atelectasis and or worsening pneumonia and  continued attention on follow-up with serial chest radiographs is  recommended. No pneumothorax is seen. Heart is normal in size. There is  no pleural effusion. Discrete pulmonary nodule over the right midlung  measures 1.2 cm, best seen on recent chest CT, concerning for metastatic  disease given patient history. Smaller pulmonary nodules seen on recent  chest CT are not well evaluated on plain film radiographs. Please refer  to this dictation as well as results from recent biopsy for further  information.       Study Result     CT ABDOMEN PELVIS WO CONTRAST-     INDICATIONS: Atypical urinary tract infection     TECHNIQUE: Radiation dose reduction techniques were utilized, including  automated exposure control and exposure modulation based on body size.  Unenhanced ABDOMEN AND PELVIS CT     COMPARISON: 02/18/2020     FINDINGS:        Right ureteral stent is in place. No hydronephrosis. Stranding around  the right kidney could be evidence of pyelonephritis. No left kidney is  noted.     The spleen is enlarged, 15.5 cm CC. Enlargement of the liver is also  noted.        Otherwise  unremarkable unenhanced appearance of the liver, gallbladder,  spleen, adrenal glands, pancreas,  bladder.     No bowel obstruction or abnormal bowel thickening is identified.Colonic  diverticula are seen that do not appear inflamed. Appendix does not  appear inflamed.     Umbilical hernia of fat is present. Chronic left lateral hernia contains  a portion of descending colon without evidence of obstruction at this  time.     No free intraperitoneal gas or free fluid.     Scattered small mesenteric and para-aortic lymph nodes are seen that are  not significant by size criteria.     Abdominal aorta is not aneurysmal. Aortic and other arterial  calcifications are present. IVC filter noted.     The lung bases atelectasis anteriorly in the left lower lobe. A 9 mm  right lower lobe nodule is redemonstrated without obvious change from  recent prior exam, but was 4 mm on the exam from 05/13/2018, raising  suspicion for slow-growing neoplasm, further evaluation is recommended,  such as positron emission tomography Degenerative changes are seen in  the spine. No acute fracture is identified.           IMPRESSION:        1. Inflammatory stranding around the right kidney may reflect  pyelonephritis. No hydronephrosis. Right ureteral stent is in place.  2. Increased size of a right lower lobe nodule suspicious for slowly  growing neoplasm. Further evaluation is advised.  3. Hepatosplenomegaly.  4. Colonic diverticulosis. No acute inflammatory process of bowel is  identified.                  3/1/20 resulted from collection on 2/28/20      3/1/20 0157  SR with run of narrow complex tachycardia rate 150-160's ? PAF      2/29/20  SR with Ventricular Bigeminy         7/28/15 echo        No results found for: POCGLU  No results found for: HGBA1C  Estimated Creatinine Clearance: 24.7 mL/min (A) (by C-G formula based on SCr of 2.13 mg/dL (H)).    Assessment/Plan   Active Hospital Problems    Diagnosis  POA   • **Altered mental status  [R41.82]  Unknown   • Sepsis due to pneumonia (CMS/HCC) [J18.9, A41.9]  Yes   • MIKE (acute kidney injury) (CMS/HCC) [N17.9]  Unknown   • 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]  Yes   • Hypothyroidism [E03.9]  Yes   • Gastroesophageal reflux disease [K21.9]  Yes   • Anemia, chronic renal failure, stage 3 (moderate) (CMS/HCC) [N18.3, D63.1]  Yes   • Essential hypertension [I10]  Yes   • Pulmonary hypertension (CMS/HCC) [I27.20]  Yes   • 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     69 y.o. female       Altered mental status with increasing somnolence   -  likely metabolic encephalopathy due to underlying recurrent aspiration, pneumonia, urosepsis   -  Anti seizure meds could be contributing as well as possible post ictal states from partial seziures   - monitor.   - Neurology has been consulted, pending.   - + UA  With  culture grew citrobacter koseri which is susceptible to cefepime.       Enterobacteriaceae Sepsis/ Probable recurrent aspiration pneumonia and UTI  -  Chest xray showed Mild pulmonary opacification within the bilateral mid to lower lungs is  present  -   Afebrile. 24 hr TMAX 99  -   Recent Hx of RSV and influenza treated  -  MRSA  DNA neg  -  WBC trending down 9.3 (12.47)   -  Blood cultures from 2/28 enterobacteriaceae from port, peripheral BC 2/28 no growth.   -  ID following and change to cefepime 1 gm IV q12 (2/29) and Vanc has been dc'd.   -  Right SC port that ID plans to do port lock therapy on in next few days in order to  salvage port   -  procal elevated 2.49 2/28/20 on admit lactate 1.6 WNL.   -  Acyclovir has been on hold d/t NPO status  -  IVF changed by Nephrology to 1/2 NS at 100 cc/hr   -  too somnolent to due formal swallow study.   -  Bedside swallow when more alert  -  UA + with culture showing citrobacter koseri which is susceptible to cefipime which she is currently on.      Pyelonephritis of solitary right kidney seen on CT of abd 2/29  -  Hx of ureter stent with hx of colonization and previous pyelo per urology note.   -  urology consulted and has seen  -  recommends continued antibiotic but no further recommendations at this time.     Anemia  -  Appreciate Heme/Onc following and ordered transfusion   - Hgb 9.1 (7.8) after 1 unit PRBC 2/29   -  Monitor. No signs of active bleeding.  -  Daily CBC     Thrombocytopenia  -  Plt count 18,000 (22,000)  - heme/onc following.   - Is npo so asa has been on hold  - monitor    History of craniotomy and meningioma resection   - is followed by neurosurgery as outpt. CT of head 2/28 report reviewed - No acute intracranial hemorrhage or hydrocephalus. Postsurgical and chronic changes noted. The area of suspected meningioma recurrence on the recent MRI was seen on CT.      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 And has multiple myeloma  -   hematology oncology service following and per note 2/29 lung nodule could be treated with radiation therapy.    Hypothyroidism-continue thyroid replacement therapy.    Hypertension  - imdur and metoprolol have been on hold d/t NPO status from concern of aspiration.  SBP trends 's continue to monitor..    Partial Epilepsy  - unable to take po d/t AMS  - is on cerebyx IV  In place of oral antiepileptic meds.   - pending neurology consult    Hypokalemia  - K+ WNL 3/1 at 3.7.   After IV replacement by  nephrology.  - continue to monitor    CKD3  -  Creatinine slight trend down 2.13 (2.28).    -  Nephrology consulted 2/29.  Appreciate their attention to pt.    -  Nephrology changed fluids to 1/2 NS at 100 cc/hr 2/29  - s/p left nephrectomy d/t renal CA  - avoid nephrotoxic drugs  - avoid hypotension.  - monitor   - hx of CHF in past monitor volume status closely  - daily BMP    Ventricular ectopy and Narrow complex tachycardia  - run of trigeminy 2/29  - Narrow complex tachycardia this am ? PAF vs PSVT   - K+ WNL this am.  Will add mag to lab this am  - Cardiology has been consulted and is seeing now.   - EF 48% on 7/28/15, moderate MR and grade II DD      · Disposition to be determined.  · DVT:  No lovenox d/t anemia and thrombocytopenia.  SCD's for now     Discussed relevant clinical findings with Dr. Lopez,  Dr. Greenberg, and her RN.       Lizeth Laureano, APRN   03/01/20  8:13 AM

## 2020-03-01 NOTE — PROGRESS NOTES
LOS: 2 days       Chief Complaint: RSV and influenza A infections, chronic anemia, thrombocytopenia, CKD, multiple myeloma, pulmonary nodule, seizure/status epilepticus, splenomegaly.      Interval History: Patient discharged on 225 and readmitted on 2/29 with  Altered mental status and low-grade fever?  Probable UTI sepsis    2/29  Awake follows commands but is not oriented and does not answer questions appropriately  CBC unchanged from discharge hemoglobin 7.8 white count 12,400 platelets 22K  Urine culture shows 100,000 gram-negative bacilli 1 of 2 blood cultures positive for gram-negative  Head CT negative  CAT scan of the abdomen shows stranding around the right kidney with a stent in place no left kidney noted-lung bases show atelectasis    3/1  Still a little confused but more awake and follows commands  Temperature down  As expected her platelets have dropped further to 18,000 with a sepsis  Hemoglobin stable  Patient has a living will and is a DNR but Dr. Araujo will talk to her again tomorrow regarding this      Review of Systems:    Review of systems was obtained with pertinent positive findings as noted in the interval history.  All other systems negative.    Objective     Vital Signs  Temp:  [97.4 °F (36.3 °C)-99.2 °F (37.3 °C)] 97.4 °F (36.3 °C)  Heart Rate:  [] 96  Resp:  [18] 18  BP: (106-127)/(56-79) 127/79        Physical Exam:     GENERAL: Elderly appearing female no distress.   SKIN:  Warm, dry without rashes, purpura or petechiae.  HEAD:  Normocephalic.  MOUTH:  Tongue is well-papillated; no stomatitis or ulcers.  Lips normal.  CHEST: Clear to auscultation bilaterally anteriorly.  CARDIAC: Regular rate and rhythm without murmurs, rubs or gallops. Normal S1,S2.  ABDOMEN: Soft nontender nondistended bowel sounds present.  EXTREMITIES: No edema  NEUROLOGICAL: Cranial nerves II through XII appear grossly intact.  Strength and sensation intact bilateral upper and lower extremities.    Results  Review:     I reviewed the patient's new clinical results.    Results from last 7 days   Lab Units 03/01/20  0406   WBC 10*3/mm3 9.30   HEMOGLOBIN g/dL 9.1*   HEMATOCRIT % 27.1*   PLATELETS 10*3/mm3 18*     Lab Results   Component Value Date    NEUTROABS 9.85 (H) 02/29/2020     Results from last 7 days   Lab Units 03/01/20  0406   SODIUM mmol/L 142   POTASSIUM mmol/L 3.7   CHLORIDE mmol/L 116*   CO2 mmol/L 17.5*   BUN mg/dL 29*   CREATININE mg/dL 2.13*   GLUCOSE mg/dL 82   CALCIUM mg/dL 8.8         Results from last 7 days   Lab Units 02/25/20  0416   MAGNESIUM mg/dL 1.8         Medication Review: Yes     Assessment/Plan     1. Viral respiratory infection with both influenza A and RSV:  · Patient is immunosuppressed.   · Was treated with empiric broad spectrum antibiotics with cefepime and vancomycin for possible component of bacterial pneumonia.  Antibiotics subsequently discontinued.  · Patient received course of Tamiflu  · CT chest 2/18/2020 with new area of consolidation left lower lobe, groundglass infiltrates in the lingula.  Likely related to recent viral pneumonia.  2. Severe anemia secondary to multiple myeloma and chemotherapy:   · Most recent anemia evaluation 1/23/2020 with iron 109, ferritin 912.7, iron saturation 84%, TIBC 130.  Previous B12 level 1808 and folate 7.34 on 10/24/2019.  · On 2/14/2020, hemoglobin 7.2.  Patient was given 1 unit of PRBC transfusion.   · On 2/15/2020, hemoglobin 7.4, patient given second unit of PRBC.   · On 2/20/2020 hemoglobin declined to 7.9, transfused 1 unit PRBC.  · Today, hemoglobin 9.5  3. Severe thrombocytopenia secondary to chemotherapy and multiple myeloma:     · Baseline platelet count in the 10-83885 range  · Discontinued aspirin on 2/14/2020 due to severe thrombocytopenia platelets 24,000 and hematuria.    · With platelets trending down at 20,000 on 2/15/2020. She was transfused with 1 U of platelets.    · Platelets improved at 55,000 on 2/16/2020 following  "transfusion.  · On 2/18/2020, platelet count declined further to 23,000, patient had mild/minor hemoptysis, coughed up a \"clot\".  Received 1 unit platelet transfusion with posttransfusion count up to 54,000.  · On 2/19/2020, platelet count 48,000, patient with minor episode of blood-streaked sputum/hemoptysis and transfuse 1 unit platelet.  Posttransfusion count declined to 37,000, verified on repeat at 36,000 and transfused a second unit platelets with subsequent posttransfusion count 55,000.  · Platelet count today has declined to 30,000.  She is not exhibiting any bleeding issues at this time and therefore no indication for transfusion.  Likely we are seeing decline back to her previous baseline platelet count after recent transfusions.  Continue to monitor daily.  4. Chronic renal insufficiency:   · Baseline creatinine 1.8-2.2 range  · Currently with creatinine 1.44 today  5. Multiple myeloma (IgG lambda):   · Multiple prior regimens as per previous clinic note   · Most recent regimen Darzalex and Velcade/dexamethasone  · Most recent paraprotein studies on 1/16/2020 with M spike 6.8 g, IgG 7632, free lambda light chain 1415 with free light chain ratio 0.01.  · Her treatment will be on hold for now since she has active respiratory viral infection with both influenza and RSV.     · With increasing somnolence, rechecked paraprotein studies and serum viscosity 2/18/2020.  Results showed stable to improved findings with M spike down to 5.2 g, IgG 6335, IgA 11, IgM 16, free kappa light chain 9, free lambda light chain 1658, free light chain ratio 0.01.  Serum viscosity elevated at 2.6.  Patient with no significant hyper viscosity symptoms at present time however.  With overall improvement in paraprotein levels, nothing further to do at this time.  · We did hold cycle 5-day 8 Velcade that was due on 2/13/2020.  Patient would be due for cycle 6-day 1 daratumumab and Velcade on 2/27/2020.  6. Pulmonary nodule:  · CT " chest 2/3/2020 with multiple new bilateral pulmonary nodules suspicious for metastatic disease, largest left upper lobe 2.5 cm.\  · Status post CT-guided biopsy on 02/12/2020. Preliminarily pathology analysis showed spindle cells, sample was sent into other facility for second opinion.  Final results are pending.   · Dr Araujo discussed with the patient and the family members on 2/16/2020, if indeed this proved to be malignant, patient may be treated with stereotactic radiation therapy, because her treatment for multiple myeloma will be more important.  We will present her case at the multimodality lung conference when pathology result available.   · Note that CT chest 2/18/2020 showed no change in the ovoid left upper lobe nodule and additional smaller bilateral pulmonary nodules compared to 2/3/2020.  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 Splenomegaly:  · Current CT abdomen and pelvis 2/18/2020 with increase in spleen from 13.2 cm in May 2018 up to 14.9 cm.  · Unclear whether splenomegaly explains the patient's mild abdominal tenderness.  Etiology is unclear.  8. Prior seizure disorder with current seizure/status epilepticus:  · Patient with seizure disorder identified around 2004 at which time she was found to have bifrontal meningiomas.  She underwent subsequent surgical resection of her meningiomas.  · Patient had been receiving Tegretol chronically  · Obtained Tegretol level on 2/17/2020, not performed due to insufficient quantity for analysis  · Patient with progressive somnolence and unresponsiveness.  CT head 2/17/2020 with no acute findings, chronic postoperative changes in the bilateral frontal lobes.  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  Patient is currently being followed by neurology, receiving Vimpat 200 mg p.o. every 12 hours, Keppra 1000 mg p.o. every 12 hours  · Mental status  dramatically improved  · MRI brain 2/20/2020 with increasing globular irregular enhancement along the floor of the anterior cranial fossa consistent with recurrence of meningioma but no mass-effect.  Patient seen by neurosurgery, plan to monitor.  9. 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 pT2N0 breast ca 4/12      Plan:  1. Continue antibiotics-patient is very immunosuppressed from underlying myeloma and now has a new malignancy in her lung which is really not treatable except for focused radiation    Treatment options are limited and sheis a DNR based on the living will document that the family has at the bedside    No need to transfuse platelets unless there is clinical bleeding but I suspect she will become palliative and no further blood draws will be done after she talks over with her primary oncologist tomorrow             U Jessy Leon MD  03/01/20

## 2020-03-02 NOTE — PLAN OF CARE
Problem: Patient Care Overview  Goal: Plan of Care Review  Outcome: Ongoing (interventions implemented as appropriate)  Flowsheets  Taken 3/2/2020 0331  Progress: no change  Outcome Summary: Pt still strict NPO; IV Keppra given; Cerebyx IV given; no s/s of distress  Taken 3/1/2020 2101  Plan of Care Reviewed With: patient

## 2020-03-02 NOTE — PROGRESS NOTES
Discharge Planning Assessment  [facility]     Patient Name: Marina Barroso  MRN: [id number]  Today's Date: 3/2/2020    Admit Date: 2/28/2020    Discharge Needs Assessment    No documentation.       Discharge Plan     Row Name 03/02/20 1808       Plan    Plan Comments  The patient transferred to [facility] from [facility] on 3/2/2020. The patient is palliative. [facility] to evaluate. CCP will follow for any needs that may arise. NORTH Cool Rn, [facility]    Row Name 03/02/20 3538       Plan    Plan  Transfer to Palliative Care Unit ([facility]) with Hospice follow up.     Patient/Family in Agreement with Plan  yes    Plan Comments  Pt recently admitted from 2/13 to 2/25 for HAP, Sarcomatoid carcinoma of lung, Influenza A, RSV, epilepsy, CKD, CHF, multiple myeloma, malignant neoplasm of kidney, and pancytopenia. Pt has been found to have Citrobacter septicemia  due to right pyelonephritispostive with postive BC.  Palliative nurse met with family and the decision was made to transfer to palliative care at this time. Nomi Barcenas RN-BC           Destination      Coordination has not been started for this encounter.      Durable Medical Equipment      Coordination has not been started for this encounter.      Dialysis/Infusion      Coordination has not been started for this encounter.      Home Medical Care      Coordination has not been started for this encounter.      Therapy      Coordination has not been started for this encounter.      Community Resources      Coordination has not been started for this encounter.          Demographic Summary    No documentation.       Functional Status    No documentation.       Psychosocial    No documentation.       Abuse/Neglect    No documentation.       Legal    No documentation.       Substance Abuse    No documentation.       Patient Forms    No documentation.           Calli Cool RN

## 2020-03-02 NOTE — PROGRESS NOTES
LOS: 3 days       Chief Complaint: RSV and influenza A infections, chronic anemia, thrombocytopenia, CKD, multiple myeloma, pulmonary nodule, seizure/status epilepticus, splenomegaly.      Interval History: Patient discharged on 225 and readmitted on 2/29 with  Altered mental status and low-grade fever?  Probable UTI sepsis    2/29  Awake follows commands but is not oriented and does not answer questions appropriately  CBC unchanged from discharge hemoglobin 7.8 white count 12,400 platelets 22K  Urine culture shows 100,000 gram-negative bacilli 1 of 2 blood cultures positive for gram-negative  Head CT negative  CAT scan of the abdomen shows stranding around the right kidney with a stent in place no left kidney noted-lung bases show atelectasis    3/1  Still a little confused but more awake and follows commands  Temperature down  As expected her platelets have dropped further to 18,000 with a sepsis  Hemoglobin stable  Patient has a living will and is a DNR but Dr. Araujo will talk to her again tomorrow regarding this      Review of Systems:    Review of systems was obtained with pertinent positive findings as noted in the interval history.  All other systems negative.    Objective     Vital Signs  Temp:  [97 °F (36.1 °C)-98.9 °F (37.2 °C)] 98.7 °F (37.1 °C)  Heart Rate:  [] 100  Resp:  [18] 18  BP: (116-131)/(64-83) 131/75        Physical Exam:     GENERAL: Elderly appearing female no distress.   SKIN:  Warm, dry without rashes, purpura or petechiae.  HEAD:  Normocephalic.  MOUTH:  Tongue is well-papillated; no stomatitis or ulcers.  Lips normal.  CHEST: Clear to auscultation bilaterally anteriorly.  CARDIAC: Regular rate and rhythm without murmurs, rubs or gallops. Normal S1,S2.  ABDOMEN: Soft nontender nondistended bowel sounds present.  EXTREMITIES: No edema  NEUROLOGICAL: Cranial nerves II through XII appear grossly intact.  Strength and sensation intact bilateral upper and lower extremities.    Results  Review:     I reviewed the patient's new clinical results.    Results from last 7 days   Lab Units 03/02/20  0535   WBC 10*3/mm3 8.24   HEMOGLOBIN g/dL 9.5*   HEMATOCRIT % 28.9*   PLATELETS 10*3/mm3 21*     Lab Results   Component Value Date    NEUTROABS 9.85 (H) 02/29/2020     Results from last 7 days   Lab Units 03/02/20  0535   SODIUM mmol/L 144   POTASSIUM mmol/L 3.7   CHLORIDE mmol/L 114*   CO2 mmol/L 16.6*   BUN mg/dL 25*   CREATININE mg/dL 2.01*   GLUCOSE mg/dL 70   CALCIUM mg/dL 9.5         Results from last 7 days   Lab Units 03/02/20  0535   MAGNESIUM mg/dL 1.7         Medication Review: Yes     Assessment/Plan     1. Viral respiratory infection with both influenza A and RSV:  · Patient is immunosuppressed.   · Was treated with empiric broad spectrum antibiotics with cefepime and vancomycin for possible component of bacterial pneumonia.  Antibiotics subsequently discontinued.  · Patient received course of Tamiflu  · CT chest 2/18/2020 with new area of consolidation left lower lobe, groundglass infiltrates in the lingula.  Likely related to recent viral pneumonia.  2. Severe anemia secondary to multiple myeloma and chemotherapy:   · Most recent anemia evaluation 1/23/2020 with iron 109, ferritin 912.7, iron saturation 84%, TIBC 130.  Previous B12 level 1808 and folate 7.34 on 10/24/2019.  · On 2/14/2020, hemoglobin 7.2.  Patient was given 1 unit of PRBC transfusion.   · On 2/15/2020, hemoglobin 7.4, patient given second unit of PRBC.   · On 2/20/2020 hemoglobin declined to 7.9, transfused 1 unit PRBC.  · Today, hemoglobin 9.5  3. Severe thrombocytopenia secondary to chemotherapy and multiple myeloma:     · Baseline platelet count in the 10-01645 range  · Discontinued aspirin on 2/14/2020 due to severe thrombocytopenia platelets 24,000 and hematuria.    · With platelets trending down at 20,000 on 2/15/2020. She was transfused with 1 U of platelets.    · Platelets improved at 55,000 on 2/16/2020 following  "transfusion.  · On 2/18/2020, platelet count declined further to 23,000, patient had mild/minor hemoptysis, coughed up a \"clot\".  Received 1 unit platelet transfusion with posttransfusion count up to 54,000.  · On 2/19/2020, platelet count 48,000, patient with minor episode of blood-streaked sputum/hemoptysis and transfuse 1 unit platelet.  Posttransfusion count declined to 37,000, verified on repeat at 36,000 and transfused a second unit platelets with subsequent posttransfusion count 55,000.  · Platelet count today has declined to 21,000.  She is not exhibiting any bleeding issues at this time and therefore no indication for transfusion.  Likely we are seeing decline back to her previous baseline platelet count after recent transfusions.  Continue to monitor daily.  4. Chronic renal insufficiency:   · Baseline creatinine 1.8-2.2 range  · Currently with creatinine 1.44 today  5. Multiple myeloma (IgG lambda):   · Multiple prior regimens as per previous clinic note   · Most recent regimen Darzalex and Velcade/dexamethasone  · Most recent paraprotein studies on 1/16/2020 with M spike 6.8 g, IgG 7632, free lambda light chain 1415 with free light chain ratio 0.01.  · Her treatment will be on hold for now since she has active respiratory viral infection with both influenza and RSV.     · With increasing somnolence, rechecked paraprotein studies and serum viscosity 2/18/2020.  Results showed stable to improved findings with M spike down to 5.2 g, IgG 6335, IgA 11, IgM 16, free kappa light chain 9, free lambda light chain 1658, free light chain ratio 0.01.  Serum viscosity elevated at 2.6.  Patient with no significant hyper viscosity symptoms at present time however.  With overall improvement in paraprotein levels, nothing further to do at this time.  · We did hold cycle 5-day 8 Velcade that was due on 2/13/2020.  Patient would be due for cycle 6-day 1 daratumumab and Velcade on 2/27/2020.  6. Pulmonary nodule:  · CT " chest 2/3/2020 with multiple new bilateral pulmonary nodules suspicious for metastatic disease, largest left upper lobe 2.5 cm.\  · Status post CT-guided biopsy on 02/12/2020. Preliminarily pathology analysis showed spindle cells, sample was sent into other facility for second opinion.  Final results are pending.   · Dr Araujo discussed with the patient and the family members on 2/16/2020, if indeed this proved to be malignant, patient may be treated with stereotactic radiation therapy, because her treatment for multiple myeloma will be more important.  We will present her case at the multimodality lung conference when pathology result available.   · Note that CT chest 2/18/2020 showed no change in the ovoid left upper lobe nodule and additional smaller bilateral pulmonary nodules compared to 2/3/2020.  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 Splenomegaly:  · Current CT abdomen and pelvis 2/18/2020 with increase in spleen from 13.2 cm in May 2018 up to 14.9 cm.  · Unclear whether splenomegaly explains the patient's mild abdominal tenderness.  Etiology is unclear.  This could be contributing to her thrombocytopenia also.  8. Prior seizure disorder with current seizure/status epilepticus:  · Patient with seizure disorder identified around 2004 at which time she was found to have bifrontal meningiomas.  She underwent subsequent surgical resection of her meningiomas.  · Patient had been receiving Tegretol chronically  · Obtained Tegretol level on 2/17/2020, not performed due to insufficient quantity for analysis  · Patient with progressive somnolence and unresponsiveness.  CT head 2/17/2020 with no acute findings, chronic postoperative changes in the bilateral frontal lobes.  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  Patient is currently being followed by neurology, receiving Vimpat 200 mg p.o. every 12 hours,  Keppra 1000 mg p.o. every 12 hours  · Mental status dramatically improved  · MRI brain 2/20/2020 with increasing globular irregular enhancement along the floor of the anterior cranial fossa consistent with recurrence of meningioma but no mass-effect.  Patient seen by neurosurgery, plan to monitor.  9. 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 pT2N0 breast ca 4/12      Plan:  Continue antibiotics-patient is very immunosuppressed from underlying myeloma and now has a new malignancy in her lung which is really not treatable except for focused radiation    Treatment options are limited and she is a DNR based on the living will document that the family has at the bedside    No need to transfuse platelets unless there is clinical bleeding.     We discussed the situation with Dr. Lopez.    We also discussed the situation with the patient and multiple family members at the bedside.  They are interested in receiving information from the palliative care nurse and from hospice regarding options for palliative care either at home with hospice or perhaps on the palliative care unit as a hospice scattered bed.                   Jignesh Mathews MD  03/02/20

## 2020-03-02 NOTE — PROGRESS NOTES
Continued Stay Note  [facility]     Patient Name: Marina Barroso  MRN: [id number]  Today's Date: 3/2/2020    Admit Date: 2/28/2020    Discharge Plan     Row Name 03/02/20 1702       Plan    Plan  Transfer to Palliative Care Unit ([facility]) with Hospice follow up.     Patient/Family in Agreement with Plan  yes    Plan Comments  Pt recently admitted from 2/13 to 2/25 for HAP, Sarcomatoid carcinoma of lung, Influenza A, RSV, epilepsy, CKD, CHF, multiple myeloma, malignant neoplasm of kidney, and pancytopenia. Pt has been found to have Citrobacter septicemia  due to pyelonephritis with postive BC.  Palliative nurse met with family and the decision was made to transfer to palliative care at this time. Nomi Barcenas RN-BC           Discharge Codes    No documentation.             Nomi Barcenas RN

## 2020-03-02 NOTE — OUTREACH NOTE
COPD/PN Week 1 Survey      Responses   Facility patient discharged from?  [facility]   Does the patient have one of the following disease processes/diagnoses(primary or secondary)?  COPD/Pneumonia   Is there a successful TCM telephone encounter documented?  No   Was the primary reason for admission:  Pneumonia   Week 1 attempt successful?  No   Revoke  Readmitted          Zuleyma Russo RN

## 2020-03-02 NOTE — PROGRESS NOTES
Name: Marina Barroso ADMIT: 2020   : 1950  PCP: Cristo Madera MD    MRN: [id number] LOS: 3 days   AGE/SEX: 69 y.o. female  ROOM: [facility]     Subjective   Subjective   Patient is lying in the bed and is confused.      Objective   Objective   Vital Signs  Temp:  [97.7 °F (36.5 °C)-98.9 °F (37.2 °C)] 98 °F (36.7 °C)  Heart Rate:  [] 102  Resp:  [18] 18  BP: (116-139)/(69-83) 139/74  SpO2:  [99 %-100 %] 99 %  on   ;   Device (Oxygen Therapy): room air  Body mass index is 27.46 kg/m².  Physical Exam   Constitutional: She appears well-developed and well-nourished.   HENT:   Head: Normocephalic and atraumatic.   Eyes: Pupils are equal, round, and reactive to light. No scleral icterus.   Neck: Neck supple. No JVD present.   Cardiovascular: Normal rate and regular rhythm.   Pulmonary/Chest: Effort normal and breath sounds normal.   Abdominal: Soft. Bowel sounds are normal. She exhibits no distension.   Musculoskeletal: She exhibits no edema.   Skin: Skin is warm and dry.       Results Review:       I reviewed the patient's new clinical results.  Results from last 7 days   Lab Units 20  0535 20  0406 20  0509 20  1501   WBC 10*3/mm3 8.24 9.30 12.47* 12.31*   HEMOGLOBIN g/dL 9.5* 9.1* 7.8* 10.3*   PLATELETS 10*3/mm3 21* 18* 22* 33*     Results from last 7 days   Lab Units 20  0535 20  0406 20  0509 20  1711   SODIUM mmol/L 144 142 144 139   POTASSIUM mmol/L 3.7 3.7 3.4* 3.7   CHLORIDE mmol/L 114* 116* 114* 108*   CO2 mmol/L 16.6* 17.5* 18.9* 21.7*   BUN mg/dL 25* 29* 28* 24*   CREATININE mg/dL 2.01* 2.13* 2.28* 2.19*   GLUCOSE mg/dL 70 82 104* 111*   Estimated Creatinine Clearance: 26.2 mL/min (A) (by C-G formula based on SCr of 2.01 mg/dL (H)).  Results from last 7 days   Lab Units 20  0535 20  0406 20  0509 20  1711   ALBUMIN g/dL 2.10* 2.30* 2.40* 2.80*   BILIRUBIN mg/dL  --   --  0.6 0.6   ALK PHOS U/L  --   --  34* 41   AST (SGOT)  U/L  --   --  18 16   ALT (SGPT) U/L  --   --  11 10     Results from last 7 days   Lab Units 03/02/20  0535 03/01/20  0406 02/29/20  0509 02/28/20  1711 02/25/20  0416   CALCIUM mg/dL 9.5 8.8 8.6 9.9 8.9   ALBUMIN g/dL 2.10* 2.30* 2.40* 2.80* 2.50*   MAGNESIUM mg/dL 1.7  --   --   --  1.8   PHOSPHORUS mg/dL 3.8 4.1  --   --  3.5     Results from last 7 days   Lab Units 02/28/20  1711 02/28/20  1501   PROCALCITONIN ng/mL 2.49*  --    LACTATE mmol/L  --  1.6     No results found for: HGBA1C, POCGLU        No diet orders on file       Assessment/Plan     Active Hospital Problems    Diagnosis  POA   • **Altered mental status [R41.82]  Unknown   • Sepsis due to pneumonia (CMS/HCC) [J18.9, A41.9]  Yes   • MIKE (acute kidney injury) (CMS/HCC) [N17.9]  Unknown   • 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]  Yes   • Hypothyroidism [E03.9]  Yes   • Gastroesophageal reflux disease [K21.9]  Yes   • Anemia, chronic renal failure, stage 3 (moderate) (CMS/HCC) [N18.3, D63.1]  Yes   • Essential hypertension [I10]  Yes   • Pulmonary hypertension (CMS/HCC) [I27.20]  Yes   • 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     Assessment and plan:  1.  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  He has been declining overall and upon discussing with the family they have requested at her care be withdrawn and consider comfort measures only.  Patient is being transferred to palliative care team.   Hospice is due to meet family later today.  CODE STATUS is DNR/DNI.      Sung Tiwari MD  [facility]  03/02/20  4:24 PM

## 2020-03-02 NOTE — CONSULTS
Neurology Consult Note    Referring Provider: Dr. Mccain  Reason for Consultation: altered mental status    History of present illness:    The patient is a 69 year old woman well known to our service for recent admission where she was found to be in status epilepticus. Etiology of seizures is recurrent frontal meningioma. Seizures responded partially to Keppra and then better after the addition of Vimpat.    She never had any witnessed seizures but did have significant electrographic seizures consistent with nonconvulsive status epilepticus.  At the time of discharge on 2/23/2020 the patient was alert, oriented, very interactive and eating well.    Family reports upon returning home she became sleepier with each day, eating and drinking less each day.  They did not notice any seizure activity.  Gradually she became less interactive and they brought her back to the emergency room on 2/29/2020.    Since admission she was febrile to 101.4 in emergency room and h now as been found to have a positive blood .  She is felt to have septicemia secondary to Enterobacteriaecae.  She also had acute kidney injury superimposed on chronic kidney disease on admission, these parameters are improving.    On 2/29/20 Dr. Charlton was consulted by phone and started fosphenytoin 150 PE mg every 8 hours. She has received 7 doses. There was no loading dose.    In additoin it appears that Vimpat was stopped on admission.    Past Medical History  Past Medical History:   Diagnosis Date   • Anemia 10/14/2008    Secondary to chronic renal insufficiency and myeloma   • Arthropathy of knee 01/07/2014    unspecified   • Breast cancer (CMS/[facility]) 04/2012    Left breast invasive ductal carcinoma, stage II, ER/NE negative, HER-2/mk positive   • Bursitis of left hip 10/18/2007   • Bursitis, knee 12/02/2013   • Calcaneal spur 08/12/2009   • Chronic kidney disease, stage III (moderate) (CMS/[facility])    • Colon cancer screening 07/01/2017    Cologuard testing:  Positive results   • Congestive heart failure (CMS/[facility])     Chronic systolic   • Diverticulitis of colon 10/17/2007   • DVT (deep venous thrombosis) (CMS/[facility])     right lower extremity, S/P IVC filter   • Epigastric abdominal pain 4/8/2019    Added automatically from request for surgery [id number]   • Gastroesophageal reflux disease 4/8/2019    Added automatically from request for surgery [id number]   • H/O Diastolic dysfunction    • H/O Mitral regurgitation    • History of Clostridium difficile 04/01/2014   • History of echocardiogram 04/2014    EF decreased to 46% - per cardiology   • History of MRSA infection    • History of obesity    • History of transfusion    • Hydronephrosis     chronic, right   • Hypertension    • LLL pneumonia (CMS/[facility]) 04/23/2009   • Malignant neoplasm of kidney (CMS/[facility]) 12/2009    and other and unspecified urinary organs; urinary organ, site unspecified; s/p left nephrectomy   • Multiple myeloma (CMS/[facility]) 04/2012   • Myocardial infarction (CMS/HCC)     NSTEMI   • Neoplasm of uncertain behavior 10/12/2015    of other and unspecified sites and tissues; other specified sites   • Non-STEMI (non-ST elevated myocardial infarction) (CMS/[facility])    • Nonischemic cardiomyopathy (CMS/[facility])    • Pyelonephritis 03/2004   • Seizures (CMS/[facility]) 10/17/2007   • Thrombocytopenia (CMS/HCC)    • UTI (urinary tract infection) 10/30/2014    pseudomonas, multidrug resistant   • UTI (urinary tract infection) 03/28/2014    site not specified   • Ventral hernia        Past Surgical History  Past Surgical History:   Procedure Laterality Date   • BONE MARROW BIOPSY N/A 04/11/2012   • BRAIN SURGERY  2005    Meningioma   • BRAIN SURGERY  1990    Tumor benign per patient   • BREAST BIOPSY Left 04/09/2012    Dr. Gregg May   • CARDIAC CATHETERIZATION Left 06/11/2012    Dr. Sudeep Haddad   • CARDIAC CATHETERIZATION N/A 08/15/2006    Dr. Brian Bhatt   • COLONOSCOPY N/A 8/30/2017    Procedure: COLONOSCOPY into cecum and TI  with cold polypectomies;  Surgeon: Trudy Rivera MD;  Location: [facility] ENDOSCOPY;  Service:    • COLONOSCOPY W/ POLYPECTOMY N/A unknown    2 polyps, benign   • CYSTOSCOPY N/A 3/25/2016    Procedure: CYSTOSCOPY  WITH RIGHT STENT CHANGE;  Surgeon: Lakhwinder Russo MD;  Location: [facility] OR;  Service:    • CYSTOSCOPY N/A 03/10/2012    Cystoscopy, right retrograde, right double-J stent-Dr. Sloan May   • CYSTOSCOPY N/A 02/25/2012    Cystoscopy, stent removal, right retrograde pyelogram, replacement of right double-J ureteral stent-Dr. Michael Everett   • CYSTOSCOPY W/ URETERAL STENT PLACEMENT Right 5/7/2016    Procedure: CYSTOSCOPY, URETERAL CATHETER INSERTION AND RIGHT STENT EXCHANGE ;  Surgeon: Michael Everett Jr., MD;  Location: [facility] OR;  Service:    • CYSTOSCOPY W/ URETERAL STENT PLACEMENT N/A 1/20/2017    Procedure: CYSTOSCOPY RIGHT RETROGRADE PYELOGRAM, URETERAL STENT CHANGE;  Surgeon: Lakhwinder Russo MD;  Location: [facility] OR;  Service:    • CYSTOSCOPY W/ URETERAL STENT PLACEMENT N/A 04/02/2015    Cystoscopy, removal of right ureteral stent, right retrograde pyelogram, placement of right double-J ureteral stent-Dr. Michael Everett   • CYSTOSCOPY W/ URETERAL STENT PLACEMENT N/A 04/26/2015    Cystoscopy, removal of right ureteral stent, right retrograde pyelogram, replacement of right ureteral stent 24 cm x 7-Georgian without retrieval line-Dr. Jose Gomez   • CYSTOSCOPY W/ URETERAL STENT PLACEMENT N/A 12/22/2014    Cystoscopy, removal of right double-J ureteral stent, right retrograde pyelogram, placement of right double-J ureteral stent-Dr. Michael Everett   • CYSTOSCOPY W/ URETERAL STENT PLACEMENT N/A 09/22/2014    Cystoscopy, removal of right ureteral stent, removal of right retrograde pyelogram, replacement of right double-J ureteral stent-Dr. Michael Everett   • CYSTOSCOPY W/ URETERAL STENT PLACEMENT N/A 06/18/2014    Cystoscopy, removal of right double-J ureteral stent, right retrograde  pyelogram, placement of right double-J ureteral stent-Dr. Michael Everett   • CYSTOSCOPY W/ URETERAL STENT PLACEMENT N/A 01/23/2014    Cystoscopy, removal of right double-J ureteral stent, right retrograde pyelogram, placement of right double-J ureteral stent-Dr. Michael Everett   • CYSTOSCOPY W/ URETERAL STENT PLACEMENT N/A 03/27/2012    Cystoscopy, removal of ureteral stent, right retrograde pyelogram, replacement of indewlling right ureteral stent-Dr. Michael Everett   • CYSTOSCOPY W/ URETERAL STENT PLACEMENT N/A 03/27/2013    Cystoscopy, right retrograde pyelogram, removal and replacement of right double-J ureteral stent-Dr. Michael Everett   • CYSTOSCOPY W/ URETERAL STENT PLACEMENT N/A 11/12/2012    Cystoscopy, stent removal, right retrograde pyelogram, replacement of right double-J ureteral stent-Dr. Michael Everett   • CYSTOSCOPY W/ URETERAL STENT PLACEMENT N/A 09/24/2011    Cystoscopy, removal of ureteral stent, right retrograde pyelogram and placement of right double-J ureteral stent-Dr. Michael Everett   • CYSTOSCOPY W/ URETERAL STENT PLACEMENT N/A 05/14/2011    Cystoscopy, removal of right ureteral stent, right retrograde pyelogram and placement of new right double-J ureteral stent-Dr. Michael Everett   • CYSTOSCOPY W/ URETERAL STENT PLACEMENT N/A 12/31/2010    Cystoscopy, stent removal, right retrograde pyelogram, replacement of 7 Occitan x 26 cm double-J stent-Dr. Michael Everett   • CYSTOSCOPY W/ URETERAL STENT PLACEMENT N/A 08/28/2010    Cystoscopy, stent removal, right retrograde pyelogram with interpretation, placement of right double-J ureteral stent-Dr. Michael Everett   • CYSTOSCOPY W/ URETERAL STENT PLACEMENT N/A 05/24/2010    Cystoscopy, right retrograde pyelogram, replacement of right double-J ureteral stent-Dr. Michael Everett   • CYSTOSCOPY W/ URETERAL STENT PLACEMENT N/A 02/12/2010    Cystoscopy, right retrograde pyelogram and placement of right double-J ureteral stent-Dr. Michael Everett   • CYSTOSCOPY W/ URETERAL STENT PLACEMENT N/A  02/23/2009    Cystoscopy, right retrograde pyelogram, placement of right double-J ureteral stent-Dr. Michael Everett   • CYSTOSCOPY W/ URETERAL STENT PLACEMENT N/A 09/17/2007    Dr. Michael Everett   • CYSTOSCOPY W/ URETERAL STENT PLACEMENT Right 01/29/2018    Dr. Michael Everett   • CYSTOSCOPY W/ URETERAL STENT PLACEMENT N/A 06/04/2018    Cystoscopy, removal of right double-J ureteral stent and placement of right double-J ureteral stent. Dr. Michael Everett.   • CYSTOSCOPY W/ URETERAL STENT PLACEMENT N/A 03/06/2018    Cystoscopy, removal of right ureteral stent, replacement of right double-J ureteral stent. Dr. Michael Everett   • CYSTOSCOPY W/ URETERAL STENT PLACEMENT      2/2019   • ELBOW PROCEDURE Bilateral 1990s   • ENDOSCOPY N/A 5/13/2019    Procedure: ESOPHAGOGASTRODUODENOSCOPY WITH BIOPSIES;  Surgeon: Trudy Rivera MD;  Location: [facility] ENDOSCOPY;  Service: General   • HERNIA REPAIR  09/03/2018   • LASIK Bilateral    • MASTECTOMY Left 04/25/2012    Left total mastectomy with sentinel lymph node biopsies and left axillary lymphatic mapping-Dr. Gregg May   • MEDIPORT INSERTION, DOUBLE Right    • NEPHRECTOMY Left 12/30/2009    Dr. 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 Cristo Pittman   • TOTAL ABDOMINAL HYSTERECTOMY Bilateral 2007    Dr. Todd   • URETEROURETEROSTOMY Right 05/01/2006    Dr. Michael Everett   • VENA CAVA FILTER INSERTION N/A 05/08/2006    Dr. Jordon Barber   • VENOUS ACCESS DEVICE (PORT) INSERTION Right 02/25/2013    Right subclavian vein PowerPort insertion-Dr. Gregg May   • VENOUS ACCESS DEVICE (PORT) INSERTION AND REMOVAL N/A 10/06/2014    Revision of right internal jugular PowerPort with fluoroscopy, removal of peripherally inserted central catheter line-Dr. Aurora Tomlinson   • VENOUS ACCESS DEVICE (PORT)  INSERTION AND REMOVAL N/A 08/14/2014    Ultrasound-guided access right internal jugular vein, PowerPort placement, removal of right subclavian port-Dr. Jordon Barber   • VENTRAL/INCISIONAL HERNIA REPAIR Left 9/19/2018    Procedure: ventral hernia repair with mesh and rectus muscle advancement flap;  Surgeon: Trudy Rivera MD;  Location: [facility] OR;  Service: General       Family History  Family History   Problem Relation Age of Onset   • Hypertension Other    • Miscarriages / Stillbirths Father    • Heart disease Father    • Hypertension Father    • Heart attack Father    • Diabetes Sister    • Skin cancer Sister    • Throat cancer Brother    • Malig Hyperthermia Neg Hx        Allergies   Allergen Reactions   • Sulfa Antibiotics Swelling   • Zosyn [Piperacillin Sod-Tazobactam So] Swelling     Face and lips    Has received Cefepime, ceftriaxone, and ceftazidime based on historical medications    • Mucinex Dm [Dm-Guaifenesin Er] Palpitations       Social History  Social History     Socioeconomic History   • Marital status:      Spouse name: Not on file   • Number of children: 3   • Years of education: High School   • Highest education level: Not on file   Occupational History   • Occupation:      Employer: RETIRED     Comment: [employer] [[id number]]   Tobacco Use   • Smoking status: Never Smoker   • Smokeless tobacco: Never Used   Substance and Sexual Activity   • Alcohol use: Never     Frequency: Never   • Drug use: No   • Sexual activity: Defer   Social History Narrative    Son lives with her and helps       Review of Systems   Constitutional: Positive for activity change and appetite change.   HENT: Negative.    Eyes: Negative.    Gastrointestinal: Negative.    Genitourinary: Negative.    Skin: Negative.    Allergic/Immunologic: Positive for immunocompromised state.   Neurological:        Decreased responsiveness   Psychiatric/Behavioral: Negative.         Medications  Scheduled Meds:  acyclovir 400 mg Oral BID   aspirin 81 mg Oral Daily   cefepime 1 g Intravenous Q12H   fosphenytoin 150 mg PE Intravenous Q8H   isosorbide mononitrate 30 mg Oral Q PM   levETIRAcetam 500 mg Intravenous Q12H   levothyroxine 75 mcg Oral Daily   metoprolol tartrate 12.5 mg Oral Q12H   pantoprazole 40 mg Oral Q AM   sodium bicarbonate 650 mg Oral TID   sodium chloride 10 mL Intravenous Q12H     Continuous Infusions:  custom IV infusion builder    Pharmacy Consult      PRN Meds:.•  acetaminophen **OR** acetaminophen **OR** acetaminophen  •  ondansetron  •  Pharmacy Consult  •  sodium chloride  •  sodium chloride    Vital Signs   Temp:  [97 °F (36.1 °C)-98.9 °F (37.2 °C)] 98.7 °F (37.1 °C)  Heart Rate:  [] 100  Resp:  [18] 18  BP: (116-131)/(64-83) 131/75    Examination:  Constitutional: appears comfortable, well-nourished  HENT:  normal  Eyes: Normal conjunctiva  CVS:  Regular rate and rhythm.  No murmurs.  Good peripheral perfusion.   Resp :   Non labored respirations  Musculoskeletal:  No signs of peripheral edema, neck is supple  Skin:  No rash, normal turgor  Neurologic:   Lethargic   Opens eyes, verbalizes one word replies   Does not follow commands   EOMF by random eye movements   No nystagmus   Able to move all extremities   No twitching or automatisms noted   No tremor or rigidity  Psychiatric: No anxiety, normal mood    Results Review:  Results from last 7 days   Lab Units 03/02/20  0535 03/01/20  0406 02/29/20  0509   WBC 10*3/mm3 8.24 9.30 12.47*   HEMOGLOBIN g/dL 9.5* 9.1* 7.8*   HEMATOCRIT % 28.9* 27.1* 23.9*   PLATELETS 10*3/mm3 21* 18* 22*        Results from last 7 days   Lab Units 03/02/20  0535 03/01/20  0406 02/29/20  0509 02/28/20  1711   SODIUM mmol/L 144 142 144 139   POTASSIUM mmol/L 3.7 3.7 3.4* 3.7   CHLORIDE mmol/L 114* 116* 114* 108*   CO2 mmol/L 16.6* 17.5* 18.9* 21.7*   BUN mg/dL 25* 29* 28* 24*   CREATININE mg/dL 2.01* 2.13* 2.28* 2.19*   CALCIUM  mg/dL 9.5 8.8 8.6 9.9   BILIRUBIN mg/dL  --   --  0.6 0.6   ALK PHOS U/L  --   --  34* 41   ALT (SGPT) U/L  --   --  11 10   AST (SGOT) U/L  --   --  18 16   GLUCOSE mg/dL 70 82 104* 111*      Lab Results   Component Value Date    DKNVRXWC32 >2,000 (H) 02/18/2020     Lab Results   Component Value Date    TSH 2.910 08/15/2019     Magnesium 1.7    EEG shows persistent epileptiform discharges, images viewed independently and discussed with Dr. Cool.    Medical Decision Making and Recommendations  Encephalopathy/non convulsive status epilepticus  Recurrent nonconvulsive status epilepticus  Triggered by underlying systemic infectious process  Was not prevented  by Keppra/Vimpat, but likely not getting good oral intake of meds as condition worsened    Unclear if there was any intolerance to Keppra and Vimpat. Family reports she was sleepy due to medicatoins, but  More likely due to progressive infectious process    Resume Vimpat 200 mg IV q 12 hours  Continue fosphenytoin, check stat level to see if therapeutic after 6 doses without loading dose  Decrease Keppra, will try to control seizures with phenytoin/Vimpat  Supplement magnesium    Continue EEG monitoring, resume tomorrow.    I discussed the patients findings and my recommendations with family and nursing staff.    Penelope Figueroa MD  03/02/20  11:27 AM     Phenytoin level 3.9. Will give 5 mg/kg loading dose    Penelope Figueroa M.D.  12:45 pm

## 2020-03-02 NOTE — SIGNIFICANT NOTE
03/02/20 1222   Rehab Time/Intention   Evaluation Not Performed other (see comments)  (Patient not alert per RN. Will hold off today and check tomorrow to see if appropriate)   Rehab Treatment   Discipline speech language pathologist

## 2020-03-02 NOTE — CONSULTS
Purpose of the visit was to evaluate for: goals of care/advanced care planning, support for patient/family, hospice referral/discussion, pain/symptom management, withdrawal of interventions, transfer to comfort care bed/unit and comfort care. Spoke with RN as well as family and HCS and discussed palliative care, goals of care, care options, resuscitation status, [facility], [facility] scattered bed status and discharge options.    Assessment:  Patient is palliative care appropriate for inpatient care given metastatic breast cancer, multiple myeloma, pneumonia with sepsis now with RSV, NPO, not oriented and doesn't answer questions. PPS 20%.    Recommendations/Plan: transfer to [facility] for [facility]. [facility] to meet with family at 5:30 today.     Other Comments: Met with daughters, sister, and mother at bedside. All agree to focus on comfort care and to meet with [facility] today. They realize they cannot take care of her at home any longer. We discussed change code status to No CPR and full comfort. They all agreed whatever would keep her comfortable.   Called Dr. Tiwari for transfer orders to Palliative Care.

## 2020-03-02 NOTE — PROGRESS NOTES
"   LOS: 3 days    Patient Care Team:  Cristo Madera MD as PCP - General (Family Medicine)  Franko Hernandes MD as Surgeon (Neurosurgery)  Zane Araujo MD PhD as Consulting Physician (Hematology and Oncology)  Michael Everett Jr., MD as Consulting Physician (Urology)  Cristo Madera MD as Referring Physician (Family Medicine)  Trudy Rivera MD as Surgeon (General Surgery)  Kyle George MD as Consulting Physician (Nephrology)    Chief Complaint:    Chief Complaint   Patient presents with   • Altered Mental Status     Follow UP MIKE CKD3  Subjective     Interval History: a little more alert today; remains NPO; denies dyspnea      Objective     Vital Signs  Temp:  [97 °F (36.1 °C)-98.9 °F (37.2 °C)] 98.7 °F (37.1 °C)  Heart Rate:  [] 100  Resp:  [18] 18  BP: (116-131)/(64-83) 131/75    Flowsheet Rows      First Filed Value   Admission Height  162.6 cm (64\") Documented at 02/28/2020 1459   Admission Weight  71.8 kg (158 lb 4.6 oz) Documented at 02/28/2020 1459          I/O this shift:  In: -   Out: 325 [Urine:325]  I/O last 3 completed shifts:  In: 2849.9 [I.V.:2149.9; Blood:300; IV Piggyback:400]  Out: 2550 [Urine:2550]    Intake/Output Summary (Last 24 hours) at 3/2/2020 0755  Last data filed at 3/2/2020 0725  Gross per 24 hour   Intake 1288.27 ml   Output 1875 ml   Net -586.73 ml       Physical Exam:  GEN frail lethargic but arousable NAD  Neck supple no JVD  Lungs CTA bilat no rales  CV RRR no m/g  abd soft NT/ND  vasc no pedal/ankle edema        Results Review:    Results from last 7 days   Lab Units 03/02/20  0535 03/01/20  0406 02/29/20  0509 02/28/20  1711   SODIUM mmol/L 144 142 144 139   POTASSIUM mmol/L 3.7 3.7 3.4* 3.7   CHLORIDE mmol/L 114* 116* 114* 108*   CO2 mmol/L 16.6* 17.5* 18.9* 21.7*   BUN mg/dL 25* 29* 28* 24*   CREATININE mg/dL 2.01* 2.13* 2.28* 2.19*   CALCIUM mg/dL 9.5 8.8 8.6 9.9   BILIRUBIN mg/dL  --   --  0.6 0.6   ALK PHOS U/L  --   --  34* 41   ALT (SGPT) U/L  --   --  11 " 10   AST (SGOT) U/L  --   --  18 16   GLUCOSE mg/dL 70 82 104* 111*       Estimated Creatinine Clearance: 26.2 mL/min (A) (by C-G formula based on SCr of 2.01 mg/dL (H)).    Results from last 7 days   Lab Units 03/02/20  0535 03/01/20  0406 02/25/20  0416   MAGNESIUM mg/dL 1.7  --  1.8   PHOSPHORUS mg/dL 3.8 4.1 3.5             Results from last 7 days   Lab Units 03/02/20  0535 03/01/20  0406 02/29/20  0509 02/28/20  1501 02/25/20  0416   WBC 10*3/mm3 8.24 9.30 12.47* 12.31* 8.07   HEMOGLOBIN g/dL 9.5* 9.1* 7.8* 10.3* 8.6*   PLATELETS 10*3/mm3 21* 18* 22* 33* 25*               Imaging Results (Last 24 Hours)     ** No results found for the last 24 hours. **          acyclovir 400 mg Oral BID   aspirin 81 mg Oral Daily   cefepime 1 g Intravenous Q12H   fosphenytoin 150 mg PE Intravenous Q8H   isosorbide mononitrate 30 mg Oral Q PM   levETIRAcetam 500 mg Intravenous Q12H   levothyroxine 75 mcg Oral Daily   metoprolol tartrate 12.5 mg Oral Q12H   pantoprazole 40 mg Oral Q AM   sodium chloride 10 mL Intravenous Q12H       sodium chloride 100 mL/hr Last Rate: 100 mL/hr (03/01/20 2100)       Medication Review:   Current Facility-Administered Medications   Medication Dose Route Frequency Provider Last Rate Last Dose   • acetaminophen (TYLENOL) tablet 650 mg  650 mg Oral Q4H PRN Lavelle Mccain MD        Or   • acetaminophen (TYLENOL) 160 MG/5ML solution 650 mg  650 mg Oral Q4H PRN Lavelle Mccain MD        Or   • acetaminophen (TYLENOL) suppository 650 mg  650 mg Rectal Q4H PRN Lavelle Mccain MD   650 mg at 02/29/20 0544   • acyclovir (ZOVIRAX) tablet 400 mg  400 mg Oral BID Lenny Diop MD       • aspirin EC tablet 81 mg  81 mg Oral Daily Lavelle Mccain MD       • cefepime (MAXIPIME) 1 g/100 mL 0.9% NS IVPB (mbp)  1 g Intravenous Q12H Lenny Diop MD   1 g at 03/02/20 0249   • fosphenytoin (Cerebyx) injection 150 mg PE  150 mg PE Intravenous Q8H Michael Charlton MD   150 mg PE at 03/01/20 8403   •  isosorbide mononitrate (IMDUR) 24 hr tablet 30 mg  30 mg Oral Q PM Lavelle Mccain MD       • levETIRAcetam in NaCl 0.82% (KEPPRA) IVPB 500 mg  500 mg Intravenous Q12H Lavelle Mccain MD   500 mg at 03/01/20 2101   • levothyroxine (SYNTHROID, LEVOTHROID) tablet 75 mcg  75 mcg Oral Daily Lavelle Mccain MD       • metoprolol tartrate (LOPRESSOR) tablet 12.5 mg  12.5 mg Oral Q12H Lavern Elizondo APRN       • ondansetron (ZOFRAN) injection 4 mg  4 mg Intravenous Q6H PRN Lavelle Mccain MD       • pantoprazole (PROTONIX) EC tablet 40 mg  40 mg Oral Q AM Lavelle Mccain MD       • sodium chloride 0.45 % infusion  100 mL/hr Intravenous Continuous Ronald Padilla  mL/hr at 03/01/20 2100 100 mL/hr at 03/01/20 2100   • sodium chloride 0.9 % flush 10 mL  10 mL Intravenous PRN Lavelle Mccain MD       • sodium chloride 0.9 % flush 10 mL  10 mL Intravenous Q12H Lavelle Mccain MD   10 mL at 03/01/20 2105   • sodium chloride 0.9 % flush 10 mL  10 mL Intravenous PRN Lavelle Mccain MD           Assessment/Plan   1.  Non-olig MIKE, suspect prerenal azotemia due to vol depletion from poor oral intake in setting of acute pyelonephritis with mild intermittent hypotension.  No hydronephrosis on CT with right ureteral stent in place.  Mild hypernatremia.   Normal potassium; likely NAGMA, worse, HCo3 down to 16.  Active urinary sediment due to UTI.    -- Cr down slightly 2.0  2.  CKD stage 3 - Cr stable 1.5 at discharge last week, her baseline; sees Dr Kyle George of our group  3.  Thrombocytopenia  4.  Enterobacteriaceae septicemia, pyelonephritis of solitary RK (h/o left nephrectomy)   -- ID following, on cefepime   5.  H/o multiple myeloma with chronic TCP & anemia   6.  Toxic metabolic encephalopathy   7.  Meningioma   8.  HTN - BP controlled  9.  H/o right ureteral stent - UROL eval noted, plan exchange in April   10.  Seizure disorder    Plan  - change IVF to hypotonic bicarb drip x 1 liter   - resume NaHCo3 650 TID when  able to take PO meds    Plan  - taper IVF  - encourage oral intake  - resume nahco3 tabs      Altered mental status    Chronic kidney disease, stage III (moderate) (CMS/HCC)    Chronic systolic congestive heart failure (CMS/HCC)    Malignant neoplasm of kidney (CMS/HCC)    Multiple myeloma (CMS/HCC)    Personal history of breast cancer    Thrombocytopenia (CMS/HCC)    Complex partial epilepsy (CMS/HCC)    Pulmonary hypertension (CMS/HCC)    Essential hypertension    Anemia, chronic renal failure, stage 3 (moderate) (CMS/HCC)    Leiomyosarcoma (CMS/HCC)    Gastroesophageal reflux disease    Hypothyroidism    Sarcomatoid carcinoma of lung (CMS/HCC)    Sepsis due to pneumonia (CMS/HCC)    MIKE (acute kidney injury) (CMS/HCC)              Ronald Padilla MD  03/02/20  7:55 AM

## 2020-03-02 NOTE — PLAN OF CARE
Problem: Patient Care Overview  Goal: Plan of Care Review  Outcome: Ongoing (interventions implemented as appropriate)  Flowsheets (Taken 3/2/2020 9137)  Outcome Summary: pt admitted to floor with family at bedside for palliative care. pt resting, alert to self. pt and family oriented to unit and floor and GOC discussed. will monitor.

## 2020-03-02 NOTE — CONSULTS
Met with family and discussed services including scattered bed admission, questions answered. At this time patient is not scattered bed appropriate per Ania SIMON. Family notified that we will follow up tomorrow to see patient at [facility]. Patient is currently not receiving any PRN medications and denies pain and shortness of air. Will notify scheduling to habe nurse follow up tomorrow. Thank you for this referral, please call with any questions. Xi Ferro -[phone].

## 2020-03-02 NOTE — PROGRESS NOTES
Hospital Follow Up    LOS:  LOS: 3 days   Patient Name: Marina Barroso  Age/Sex: 69 y.o. female  : 1950  MRN: [id number]    Day of Service: 20   Length of Stay: 3  Encounter Provider: Agustin Greenberg MD  Place of Service: [facility] CARDIOLOGY  Patient Care Team:  Cristo Madera MD as PCP - General (Family Medicine)  Franko Hernandes MD as Surgeon (Neurosurgery)  Zane Araujo MD PhD as Consulting Physician (Hematology and Oncology)  Michael Everett Jr., MD as Consulting Physician (Urology)  Cristo Madera MD as Referring Physician (Family Medicine)  Trudy Rivera MD as Surgeon (General Surgery)  Kyle George MD as Consulting Physician (Nephrology)    Subjective:     Chief Complaint: Frequent PVCs    Interval History: Patient was more alert this morning.  There are multiple family members in the room.  Patient however initially seem like she was answering questions appropriately but then started saying yes to everything and shaking her head.    Objective:     Objective:  Temp:  [97 °F (36.1 °C)-98.9 °F (37.2 °C)] 98.7 °F (37.1 °C)  Heart Rate:  [] 100  Resp:  [18] 18  BP: (116-131)/(64-83) 131/75     Intake/Output Summary (Last 24 hours) at 3/2/2020 0915  Last data filed at 3/2/2020 0725  Gross per 24 hour   Intake 1288.27 ml   Output 1675 ml   Net -386.73 ml     Body mass index is 27.55 kg/m².      20  1459 20  2105 20  0406   Weight: 71.8 kg (158 lb 4.6 oz) 72.6 kg (160 lb 0.9 oz) 72.8 kg (160 lb 7.9 oz)     Weight change:       Physical Exam:   General : Alert, cooperative, in no acute distress.  Neuro: Alert,cooperative and oriented.  Lungs: CTAB. Normal respiratory effort and rate.  CV: Regular rate and rhythm, normal S1 and S2, no murmurs, gallops or rubs.  ABD: Soft, nontender, nondistended. Positive bowel sounds.  Extr: No edema or cyanosis, moves all extremities.    Lab Review:   Results from last 7 days   Lab Units  03/02/20  0535 03/01/20  0406 02/29/20  0509 02/28/20  1711   SODIUM mmol/L 144 142 144 139   POTASSIUM mmol/L 3.7 3.7 3.4* 3.7   CHLORIDE mmol/L 114* 116* 114* 108*   CO2 mmol/L 16.6* 17.5* 18.9* 21.7*   BUN mg/dL 25* 29* 28* 24*   CREATININE mg/dL 2.01* 2.13* 2.28* 2.19*   GLUCOSE mg/dL 70 82 104* 111*   CALCIUM mg/dL 9.5 8.8 8.6 9.9   AST (SGOT) U/L  --   --  18 16   ALT (SGPT) U/L  --   --  11 10         Results from last 7 days   Lab Units 03/02/20  0535 03/01/20  0406   WBC 10*3/mm3 8.24 9.30   HEMOGLOBIN g/dL 9.5* 9.1*   HEMATOCRIT % 28.9* 27.1*   PLATELETS 10*3/mm3 21* 18*         Results from last 7 days   Lab Units 03/02/20  0535 02/25/20  0416   MAGNESIUM mg/dL 1.7 1.8           Invalid input(s): LDLCALC            Current Medications:   Scheduled Meds:  acyclovir 400 mg Oral BID   aspirin 81 mg Oral Daily   cefepime 1 g Intravenous Q12H   fosphenytoin 150 mg PE Intravenous Q8H   isosorbide mononitrate 30 mg Oral Q PM   levETIRAcetam 500 mg Intravenous Q12H   levothyroxine 75 mcg Oral Daily   metoprolol tartrate 12.5 mg Oral Q12H   pantoprazole 40 mg Oral Q AM   sodium bicarbonate 650 mg Oral TID   sodium chloride 10 mL Intravenous Q12H     Continuous Infusions:  custom IV infusion builder    Pharmacy Consult        Allergies:  Allergies   Allergen Reactions   • Sulfa Antibiotics Swelling   • Zosyn [Piperacillin Sod-Tazobactam So] Swelling     Face and lips    Has received Cefepime, ceftriaxone, and ceftazidime based on historical medications    • Mucinex Dm [Dm-Guaifenesin Er] Palpitations       Assessment:       Altered mental status    Chronic kidney disease, stage III (moderate) (CMS/HCC)    Chronic systolic congestive heart failure (CMS/HCC)    Malignant neoplasm of kidney (CMS/HCC)    Multiple myeloma (CMS/HCC)    Personal history of breast cancer    Thrombocytopenia (CMS/HCC)    Complex partial epilepsy (CMS/HCC)    Pulmonary hypertension (CMS/HCC)    Essential hypertension    Anemia, chronic renal  failure, stage 3 (moderate) (CMS/HCC)    Leiomyosarcoma (CMS/HCC)    Gastroesophageal reflux disease    Hypothyroidism    Sarcomatoid carcinoma of lung (CMS/HCC)    Sepsis due to pneumonia (CMS/HCC)    MIKE (acute kidney injury) (CMS/HCC)        Plan:     1.  PVCs noted.  2.  nonspecific atrial arrhythmia.  3.  Although I am just meeting this lady reviewing the chart appears that overall prognosis is very poor.  We will check an echocardiogram for prognostic features.  Last echo dates back to almost 5 years ago which time her ejection fraction was 40% with global hypokinesis.  4.  Mental status change  5.  Multiple malignancies oncology following.  6.  Sepsis secondary pneumonia  7.  Acute kidney injury creatinine was a little bit better today.  8.  Will await echo really nothing to do from a cardiovascular standpoint given overall prognosis.      Agustin Greenberg MD  03/02/20  9:15 AM

## 2020-03-02 NOTE — PROGRESS NOTES
LOS: 3 days     Chief Complaint:  Follow-up Enterobacteriaceae septicemia    Interval History:  No fever. WBC remains normal at 8k. She is a little more awake and alert today. Tolerating cefepime w/o rash.     ROS: cannot obtain due to mental status    Vital Signs  Temp:  [97 °F (36.1 °C)-98.9 °F (37.2 °C)] 98.7 °F (37.1 °C)  Heart Rate:  [] 100  Resp:  [18] 18  BP: (116-131)/(64-83) 131/75    Physical Exam:  General: awake, answers simple questions, follows simple commands, better than yesterday but not her baseline  Eyes: no scleral icterus  Cardiovascular: tachycardic with   Respiratory: no wheezing  GI: Abdomen is soft, non-tender, non-distended  : no CVA tenderness  Musculoskeletal: thin musculature  Skin: No rashes  Psychiatric: calm and pleasant  Vasc: R chest port w/o erythema; PIV w/o erythema    Antibiotics:  •  cefepime (MAXIPIME) 1 g/100 mL 0.9% NS IVPB (mbp), 1 g, Intravenous, Q12H    LABS:  CBC, BMP, micro reviewed today  Lab Results   Component Value Date    WBC 8.24 03/02/2020    HGB 9.5 (L) 03/02/2020    HCT 28.9 (L) 03/02/2020    MCV 90.6 03/02/2020    PLT 21 (C) 03/02/2020     Lab Results   Component Value Date    GLUCOSE 70 03/02/2020    BUN 25 (H) 03/02/2020    CREATININE 2.01 (H) 03/02/2020    EGFRIFNONA  08/30/2016      Comment:      <15 Indicative of kidney failure.    EGFRIFAFRI 30 (L) 03/02/2020    BCR 12.4 03/02/2020    CO2 16.6 (L) 03/02/2020    CALCIUM 9.5 03/02/2020    PROTENTOTREF 9.9 (H) 02/18/2020    ALBUMIN 2.10 (L) 03/02/2020    LABIL2 0.4 (L) 02/18/2020    AST 18 02/29/2020    ALT 11 02/29/2020     Microbiology:  2/13 RVP: + Flu A and RSV  2/13 BCx: negative  2/28 BCx: Citrobacter koser in 1/2 sets (drawn from central line; sensitive to amikacin, cefepime, cipro, gent, Zosyn)  2/28 UCx: 100k Citrobacter koseri (sensitive to cefepime, NTF, Zosyn, doxy)  2/28 MRSA nares screen: negative  3/1 BCx: NGTD    Radiology (prior)  -CT A/P 2/29: R kidney inflammatory  strending; R ureteral stent in place, RLL nodule increased in size    Assessment/Plan   1. Citrobacter septicemia likely due to right pyelonephritis  2. Recent influenza A and RSV infection  3. IgG Multiple Myeloma  4. Meningioma  5. Spindle cell carcinoma (pulmonary nodule)  6. History of seizures  7. Thrombocytopenia  8. Elevated procalcitonin  9. Acute kidney injury superimposed upon CKD 3  10. History of cystoscopy and ureteral stent placement     For Citrobacter koseri septicemia, continue cefepime 1 g IV q12h though 3/8/20 to complete a 10-day course. No weekly monitoring labs or ID follow-up will be needed given the short course. She is afebrile, with a normal WBC, and her repeat BCx are NGTD. I do not think her port is the primary source but will add gentamicin port locks with the goal of port salvage.  Reviewed urology note with plans for stent exchange in April 2020. I agree w/ other physicians' notes that her prognosis is poor with multiple malignancies.     Discussed antibiotic plan w/ her family and RN. One of her daughters lives nearby and will plan to give her the antibiotics (she says she has done it before).      Thank you for allowing me to be involved in the care of this patient. Infectious diseases will sign off at this time with antibiotics plan in place but please call me at [phone] if any further questions.

## 2020-03-03 PROBLEM — Z51.5 ADMISSION FOR HOSPICE CARE: Status: ACTIVE | Noted: 2020-01-01

## 2020-03-03 NOTE — PROGRESS NOTES
Case Management Discharge Note      Final Note: admitted to a [facility] scattered bed on 3/3/2020. NORTH Cool RN CCP.          Destination - Selection Complete      Service Provider Request Status Selected Services Address Phone Number Fax Number    [facility] [address], [place] [id number] [phone]      Durable Medical Equipment      No service has been selected for the patient.      Dialysis/Infusion      No service has been selected for the patient.      Home Medical Care      No service has been selected for the patient.      Therapy      No service has been selected for the patient.      Community Resources      No service has been selected for the patient.             Final Discharge Disposition Code: 51 - hospice medical facility

## 2020-03-03 NOTE — DISCHARGE SUMMARY
NAME: Marina Barroso ADMIT: 2020   : 1950  PCP: Cristo Madera MD    MRN: [id number] LOS: 4 days   AGE/SEX: 69 y.o. 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]  Yes   • Hypothyroidism [E03.9]  Yes   • Gastroesophageal reflux disease [K21.9]  Yes   • Anemia, chronic renal failure, stage 3 (moderate) (CMS/HCC) [N18.3, D63.1]  Yes   • Essential hypertension [I10]  Yes   • Pulmonary hypertension (CMS/HCC) [I27.20]  Yes   • Complex partial epilepsy (CMS/HCC) [G40.209]  Yes   • Thrombocytopenia (CMS/HCC) [D69.6]  Yes   • Chronic systolic congestive heart failure (CMS/HCC) [I50.22]  Yes   • Chronic kidney disease, stage III (moderate) (CMS/HCC) [N18.3]  Yes   • 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       SECONDARY DIAGNOSES  Past Medical History:   Diagnosis Date   • Anemia 10/14/2008    Secondary to chronic renal insufficiency and myeloma   • Arthropathy of knee 2014    unspecified   • Breast cancer (CMS/HCC) 2012    Left breast invasive ductal carcinoma, stage II, ER/IL negative, HER-2/mk positive   • Bursitis of left hip 10/18/2007   • Bursitis, knee 2013   • Calcaneal spur 2009   • Chronic kidney disease, stage III (moderate) (CMS/HCC)    • Colon cancer screening 2017    Cologuard testing: Positive results   • Congestive heart failure (CMS/HCC)     Chronic  systolic   • Diverticulitis of colon 10/17/2007   • DVT (deep venous thrombosis) (CMS/[facility])     right lower extremity, S/P IVC filter   • Epigastric abdominal pain 4/8/2019    Added automatically from request for surgery [id number]   • Gastroesophageal reflux disease 4/8/2019    Added automatically from request for surgery [id number]   • H/O Diastolic dysfunction    • H/O Mitral regurgitation    • History of Clostridium difficile 04/01/2014   • History of echocardiogram 04/2014    EF decreased to 46% - per cardiology   • History of MRSA infection    • History of obesity    • History of transfusion    • Hydronephrosis     chronic, right   • Hypertension    • LLL pneumonia (CMS/[facility]) 04/23/2009   • Malignant neoplasm of kidney (CMS/[facility]) 12/2009    and other and unspecified urinary organs; urinary organ, site unspecified; s/p left nephrectomy   • Multiple myeloma (CMS/[facility]) 04/2012   • Myocardial infarction (CMS/[facility])     NSTEMI   • Neoplasm of uncertain behavior 10/12/2015    of other and unspecified sites and tissues; other specified sites   • Non-STEMI (non-ST elevated myocardial infarction) (CMS/[facility])    • Nonischemic cardiomyopathy (CMS/[facility])    • Pyelonephritis 03/2004   • Seizures (CMS/[facility]) 10/17/2007   • Thrombocytopenia (CMS/HCC)    • UTI (urinary tract infection) 10/30/2014    pseudomonas, multidrug resistant   • UTI (urinary tract infection) 03/28/2014    site not specified   • Ventral hernia        CONSULTS       HOSPITAL COURSE  Patient is a 69-year-old female with complicated past medical history was hospitalized for 12 days from 2/13/2020 through 12/25/2020 and was treated for pneumonia, influenza and RSV.  Patient was treated with IV antibiotics along with Tamiflu and supportive care.  Patient is a complex stay and required assistance from various consulting services ranging from oncology, infectious disease, nephrology, neurology, neurosurgery and pulmonary services.   Patient's change in mental status initially  was felt to be secondary to metabolic encephalopathy.  However neurology did evaluate and EEG was suggestive of right frontal epileptiform discharges and therefore Vimpat was added to her current regimen. According to the daughter he has been more sleepy since the introduction of those 2 medications.  Inpatient of the hospital on 2/25/2020 mental status at baseline and was interactive.  A day prior to this hospitalization patient was at her normal mental status and health health was at baseline and went to bed after taking her pills.  She could not be awakened in the morning therefore was brought to the emergency room.  Upon arrival her WBC count was on the rise and UA was suggestive of UTI and chest x-ray suggestive of pneumonia.      1.  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  Despite aggressive treatment she continued to decline was failing to thrive.  Therefore palliative services was consulted and after discussing with the family members decided to initiate comfort measures only.  And she is being discharged to scattered hospice bed.  CODE STATUS is DNR/DNI.    Patient was seen and examined today on day of discharge.  Time taken at discharge 40 minutes.          CONDITION ON DISCHARGE  Stable.      DISCHARGE DISPOSITION   Hospice/Medical Facility ([facility] [place] [place])      DISCHARGE MEDICATIONS       Your medication list      CONTINUE taking these medications      Instructions Last Dose Given Next Dose Due   VIRAGE CUSTOM BREAST PROSTHES misc      2 each As Needed (na).          ASK your doctor about these medications      Instructions Last Dose Given Next Dose Due   acyclovir 400 MG tablet  Commonly known as:  ZOVIRAX      Take 1 tablet by mouth 2 (Two) Times a Day. Take no more than 5 doses a day.       aspirin 81 MG EC tablet      Take 81 mg by mouth Daily.       calcium carbonate 500 MG chewable tablet  Commonly known  as:  TUMS      Chew 1 tablet 2 (Two) Times a Day.       calcium-vitamin D 500-200 MG-UNIT tablet per tablet      Take 1 tablet by mouth 2 (Two) Times a Day.       carBAMazepine  MG 12 hr tablet  Commonly known as:  TEGretol  XR      Take 2 tablets by mouth Every Night.       clotrimazole-betamethasone 1-0.05 % cream  Commonly known as:  LOTRISONE      Apply  topically to the appropriate area as directed 2 (Two) Times a Day As Needed (rash) for up to 14 days.       dexamethasone 4 MG tablet  Commonly known as:  DECADRON      Take 5 tablets by mouth Every 7 (Seven) Days. Take with food weekly on day of Velcade treatment       dexamethasone 4 MG tablet  Commonly known as:  DECADRON      Take 20 mg, 5 tablets with food on Days 1, 2, 4, 5, 8, 9, 11, 12.  Take 4 mg, 1 tablet with food on Day 3.       HYDROcodone-acetaminophen 5-325 MG per tablet  Commonly known as:  NORCO      Take 1 tablet by mouth Every 6 (Six) Hours As Needed for Moderate Pain  or Severe Pain . for pain       isosorbide mononitrate 60 MG 24 hr tablet  Commonly known as:  IMDUR      Take 30 mg by mouth Every Evening.       lacosamide 200 MG tablet  Commonly known as:  VIMPAT      Take 1 tablet by mouth Every 12 (Twelve) Hours.       levETIRAcetam 1000 MG tablet  Commonly known as:  KEPPRA      Take 1 tablet by mouth 2 (Two) Times a Day.       levothyroxine 75 MCG tablet  Commonly known as:  SYNTHROID, LEVOTHROID      Take 1 tablet by mouth Daily.       metoprolol succinate XL 25 MG 24 hr tablet  Commonly known as:  TOPROL-XL      Take 25 mg by mouth 2 (Two) Times a Day.       ondansetron 8 MG tablet  Commonly known as:  ZOFRAN      Take 1 tablet by mouth 3 (Three) Times a Day As Needed for Nausea or Vomiting.       pantoprazole 40 MG EC tablet  Commonly known as:  PROTONIX      Take 1 tablet by mouth 2 (Two) Times a Day. Before breakfast       sodium bicarbonate 650 MG tablet      Take 1 tablet by mouth 2 (Two) Times a Day.       vitamin D 1.25 MG  (37080 UT) capsule capsule  Commonly known as:  ERGOCALCIFEROL      Take 1 capsule by mouth Every 7 (Seven) Days.               Future Appointments   Date Time Provider Department Center   3/4/2020  3:00 PM Cristo Madera MD [facility] None   4/15/2020  9:30 AM Cristo Madera MD [facility] None   5/11/2020  4:00 PM Nadine Cool MD [facility] ESP[facility]   7/6/2020 12:30 PM Taurus Sidhu MD [facility]   7/7/2020  9:30 AM [facility] 2 BH [facility]     Follow-up Information     Cristo Madera MD .    Specialty:  Family Medicine  Contact information:  [address] [place], [place] [address] [phone]                   TEST  RESULTS PENDING AT DISCHARGE   Order Current Status    Blood Culture - Blood, Arm, Left Preliminary result    Blood Culture - Blood, Blood, Central Line Preliminary result    Blood Culture - Blood, Hand, Right Preliminary result             Sung Tiwari MD  [facility]  03/03/20  4:24 PM

## 2020-03-03 NOTE — NURSING NOTE
At 0300 Patient was increasingly restless and reaching for things above her head and groaning occasionally. Family stated this had been occurring occasionally since last dose of ativan. RN medicated patient with IV morphine 2mg and IV ativan 0.5mg together. Now patient is resting comfortably. Patient is asleep and no agitation or signs of pain/discomfort noted. RN continuing to monitor.

## 2020-03-03 NOTE — CONSULTS
Met with sister Patricia to follow-up from yesterday's visit. Plan is now for [facility] admission under diagnosis code C90.00 for management of pain, SOA, and seizures. Nurse spoke to Dr. Tiwari and he will discharge/readmit today. Thank you for the referral and for allowing me to participate in the care of this patient.    Portia Mcgregor RN  [facility]  [phone]

## 2020-03-03 NOTE — NURSING NOTE
At 2341 Patient stated she was in pain. RN again discussed options for patient's pain management and oral morphine administered. When RN went back to check on patient and see if her pain was improving, patient appeared restless again. IV ativan given at 0057 since patient had had difficulty swallowing oral ativan. RN continuing to monitor closely.

## 2020-03-03 NOTE — PLAN OF CARE
Problem: Patient Care Overview  Goal: Plan of Care Review  Outcome: Ongoing (interventions implemented as appropriate)  Flowsheets (Taken 3/3/2020 4600)  Progress: declining  Plan of Care Reviewed With: family; patient  Outcome Summary: Patient slept well tonight after morphine and ativan given IV tonight. Patient remains alert to self. Family at bedside and patient resting comfortably. Vital signs remain stable. Continue with comfort care.      WDL

## 2020-03-03 NOTE — NURSING NOTE
Patient restless, but denies pain. RN educated family and patient about options to promote relaxation and rest in patient. Oral ativan given at 2140. RN continuing to monitor

## 2020-03-03 NOTE — PROGRESS NOTES
Patient is now palliative care.    Discussed with nurse Eisenberg.  The family does not want any further blood work or seizure medications or EEGs.  Met with family briefly to confirm this new plan of care.  Will sign off.

## 2020-03-04 NOTE — PLAN OF CARE
Pt opens eyes to voice but has been non-verbal this shift. Moans and grimaces with repositioning. Periods of restlessness. Medicated with morphine and ativan with good relief noted. F/C. Family at bedside. No seizure activity observed. Will monitor

## 2020-03-04 NOTE — PROGRESS NOTES
[facility] Visit Report    Marina Barroso  [id number]  3/4/2020    Admission R/T [facility] Dx: yes    Reason for [facility] Admission:Multiple myeloma not having achieved remission    Symptom  Management: pain, restlessness and congestion    Nursing/Medication Recommendations:nothing at this time    Psychosocial Issues and Recommendations:    Spiritual Concerns and Recommendations:    [facility]  Nothing at this time, patient appears to be transitioning to the end of life    Review of Visit (Include All Collaboration- including names of hospital and family involved during admission/visit):RN arrived on the floor and received report from Lexy. RN also reviewed Epic. RN and SW([facility]) arrived at bedside. Multiple family members are present. Patient is laying in bed on her back. Patient is unresponsive with a PPS of 10%. Patient is showing no signs of discomfort or distress. Patient is using accessory muscles with each breathe. Patient attempted to cough but was unable to due to weakness. Urine is dark in color with minimal output. Patient appears to be transitioning to the end of life. Family talked about patient's rapid decline and believes that the patient's time is short. Family's goal is comfort and they believe the patient is comfortable. Team will visit daily to assess comfort and condition.        Dallas Barnes RN

## 2020-03-04 NOTE — PROGRESS NOTES
[facility] Visit Report    Marina Barroso  [id number]  3/4/2020      Psychosocial Issues and Recommendations: Large family supporting patient and each other    Spiritual Concerns and Recommendations:    [facility]  See [facility] RN note. I will assist as needed.    Review of Visit (Include All Collaboration- including names of hospital and family involved during admission/visit): Joint visit with [facility] RN. Education on end-of-life s/s provided. Patient is unresponsive, no PO intake, shallow breathing. Family expressed being aware and accepting of patient's prognosis. Hospital procedures at time of passing reviewed. [facility] in [place] will handle arrangements. Supportive presence provided.        Rashi Johnson LCSW   [facility] Clinical

## 2020-03-04 NOTE — PLAN OF CARE
Problem: Patient Care Overview  Goal: Plan of Care Review  Outcome: Ongoing (interventions implemented as appropriate)  Flowsheets (Taken 3/4/2020 2060)  Progress: declining  Plan of Care Reviewed With: family  Outcome Summary: morphine 4mg continued, increased ativan from 1mg to 2mg, started on Robinul 0.2mg.

## 2020-03-04 NOTE — PLAN OF CARE
Problem: Patient Care Overview  Goal: Plan of Care Review  Flowsheets (Taken 3/3/2020 7596)  Progress: no change  Note:   Pt medicated w/ ativan & morphine PRN, EEG leads removed today. Pt resting comfortably with family at bedside.

## 2020-03-04 NOTE — PROGRESS NOTES
[facility]    Marina Barroso  [id number]  3/4/2020    Review of Visit (Include All Collaboration- including names of hospital and family involved during admission/visit):  Large family consisting of pt mother, 2 daus, a sister, a nephew and several others present at bedside; pt non-responsive, family feels pt is very comfortable, \"at peace\"; CHP affirmed large family presence, discussed spiritual support (Taoist, good support but not specifically identified), grief counseling availability, self care, and encouraged family to call [facility] with any needs or concerns; family requested and CHP provided prayer at bedside.       Hipolito Davies, BCC    "

## 2020-03-04 NOTE — PROGRESS NOTES
Discharge Planning Assessment  [facility]     Patient Name: Marina Barroso  MRN: [id number]  Today's Date: 3/4/2020    Admit Date: 3/3/2020    Discharge Needs Assessment     Row Name 03/04/20 1644       Living Environment    Quality of Family Relationships  helpful;supportive;involved       Transition Planning    Patient/Family Anticipates Transition to  inpatient hospice    Patient/Family Anticipated Services at Transition  hospice care       Discharge Needs Assessment    Discharge Facility/Level of Care Needs  hospice facility        Discharge Plan    No documentation.       Destination      Coordination has not been started for this encounter.      Durable Medical Equipment      Coordination has not been started for this encounter.      Dialysis/Infusion      Coordination has not been started for this encounter.      Home Medical Care      Coordination has not been started for this encounter.      Therapy      Coordination has not been started for this encounter.      Community Resources      Coordination has not been started for this encounter.          Demographic Summary     Row Name 03/04/20 1644       General Information    Admission Type  inpatient    Arrived From  emergency department    Preferred Language  English     Used During This Interaction  no       Contact Information    Permission Granted to Share Info With      Contact Information Obtained for          Functional Status    No documentation.       Psychosocial    No documentation.       Abuse/Neglect    No documentation.       Legal    No documentation.       Substance Abuse    No documentation.       Patient Forms    No documentation.           Calli Cool RN

## 2020-03-05 NOTE — PLAN OF CARE
Problem: Patient Care Overview  Goal: Plan of Care Review  Outcome: Ongoing (interventions implemented as appropriate)  Flowsheets (Taken 3/5/2020 5232)  Progress: declining  Plan of Care Reviewed With: family     Problem: Patient Care Overview  Goal: Individualization and Mutuality  Outcome: Ongoing (interventions implemented as appropriate)  Flowsheets  Taken 3/4/2020 1537  Patient Specific Goals (Include Timeframe): comfort, safety  Taken 3/5/2020 8076  Patient Specific Interventions: Increased Robinul to 0.8mg due to increased secretions. Morphine 4mg, Ativan 2mg remain effective.

## 2020-03-05 NOTE — PLAN OF CARE
Pt is unresponsive. Opens eyes when repositioned. Seems to be tolerating turns well when premedicated. Receiving Ativan, Morphine and Robinul prior to turns. Resp even and unlabored. Appears to be resting peacefully, no s/s of pain observed. F/C patent and draining. Family at bedside. Will monitor

## 2020-03-05 NOTE — H&P
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  Multiple myeloma    History of present illness:  The patient is a 69 y.o. female who has a history of multiple myeloma, she also has history of stage II breast cancer ER/NH negative, never treated with chemotherapy due to active treatment for her multiple myeloma, and also has chronic renal insufficiency stage III, history of kidney cancer post left nephrectomy, chronic anemia, chronic severe thrombocytopenia, presented 2/28/2020 for scheduled PRBC transfusion at the Ambulatory Care Unit. However she was found to have a temperature 101.4 farenheit, and she looked septic. The patient was sent to the emergency room for further evaluation and she was found to have active respiratory infection with both influenza A and also RSV infection. She was admitted to the hospital for further evaluation. Blood culture and urine culture were both obtained and additional results are negative.  She was put on IV antibiotics cefepime, vancomycin, and also started on Tamiflu.      This patient is immunosuppressed with her active treatment for multiple myeloma, currently undergoing cytoxan, Velcade and dexamethasone treatment.     Pulmonary nodule status post biopsy on 02/12/2020 demonstrated sarcomatoid, \"spindle cell\" carcinoma.    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  Neurology saw the patient and treated her.    Taking everything into account, the situation was discussed with the patient and multiple " family members.  All were interested in receiving information from the palliative care nurse and from hospice regarding options.  Hospice did evaluate the patient and the patient was discharged from acute care and readmitted as a hospice scattered bed patient.  I was asked to assume the patient's care.    At the time of my examination, I discussed with the patient's mother and daughter.  The patient was not awake and lying flat in bed and appeared comfortable.  No ROS was obtainable.    Review of Systems  Pertinent items are noted in HPI    Palliative Performance Scale  Palliative Performance Scale Score: 10%  [place] Symptom Assessment System Revised  Pain Score: 1   ESAS Tiredness Score: Worst possible tiredness  ESAS Nausea Score: No nausea  ESAS Depression Score: unable to assess  ESAS Anxiety Score: 5  ESAS Drowsiness Score: Worst possible drowsiness  ESAS Lack of Appetite Score: Worst lack of appetite  ESAS Wellbeing Score: unable to assess  ESAS Dyspnea Score: No shortness of breath  ESAS Other Problem Score: unable to assess  ESAS Source of Information: healthcare professional caregiver  ESAS Intervention: medicated/see MAR  ESAS Intervention Response: tolerated    History  Past Medical History:   Diagnosis Date   • Anemia 10/14/2008    Secondary to chronic renal insufficiency and myeloma   • Arthropathy of knee 01/07/2014    unspecified   • Breast cancer (CMS/[facility]) 04/2012    Left breast invasive ductal carcinoma, stage II, ER/[place] negative, HER-2/mk positive   • Bursitis of left hip 10/18/2007   • Bursitis, knee 12/02/2013   • Calcaneal spur 08/12/2009   • Chronic kidney disease, stage III (moderate) (CMS/[facility])    • Colon cancer screening 07/01/2017    Cologuard testing: Positive results   • Congestive heart failure (CMS/[facility])     Chronic systolic   • Diverticulitis of colon 10/17/2007   • DVT (deep venous thrombosis) (CMS/[facility])     right lower extremity, S/P IVC filter   • Epigastric abdominal pain 4/8/2019     Added automatically from request for surgery [id number]   • Gastroesophageal reflux disease 4/8/2019    Added automatically from request for surgery [id number]   • H/O Diastolic dysfunction    • H/O Mitral regurgitation    • History of Clostridium difficile 04/01/2014   • History of echocardiogram 04/2014    EF decreased to 46% - per cardiology   • History of MRSA infection    • History of obesity    • History of transfusion    • Hydronephrosis     chronic, right   • Hypertension    • LLL pneumonia (CMS/[facility]) 04/23/2009   • Malignant neoplasm of kidney (CMS/[facility]) 12/2009    and other and unspecified urinary organs; urinary organ, site unspecified; s/p left nephrectomy   • Multiple myeloma (CMS/[facility]) 04/2012   • Myocardial infarction (CMS/[facility])     NSTEMI   • Neoplasm of uncertain behavior 10/12/2015    of other and unspecified sites and tissues; other specified sites   • Non-STEMI (non-ST elevated myocardial infarction) (CMS/[facility])    • Nonischemic cardiomyopathy (CMS/[facility])    • Pyelonephritis 03/2004   • Seizures (CMS/[facility]) 10/17/2007   • Thrombocytopenia (CMS/[facility])    • UTI (urinary tract infection) 10/30/2014    pseudomonas, multidrug resistant   • UTI (urinary tract infection) 03/28/2014    site not specified   • Ventral hernia    ,   Past Surgical History:   Procedure Laterality Date   • BONE MARROW BIOPSY N/A 04/11/2012   • BRAIN SURGERY  2005    Meningioma   • BRAIN SURGERY  1990    Tumor benign per patient   • BREAST BIOPSY Left 04/09/2012    Dr. Gregg May   • CARDIAC CATHETERIZATION Left 06/11/2012    Dr. Sudeep Haddad   • CARDIAC CATHETERIZATION N/A 08/15/2006    Dr. Brian Bhatt   • COLONOSCOPY N/A 8/30/2017    Procedure: COLONOSCOPY into cecum and TI with cold polypectomies;  Surgeon: Trudy Rivera MD;  Location: [facility] ENDOSCOPY;  Service:    • COLONOSCOPY W/ POLYPECTOMY N/A unknown    2 polyps, benign   • CYSTOSCOPY N/A 3/25/2016    Procedure: CYSTOSCOPY  WITH RIGHT STENT CHANGE;  Surgeon: Lakhwinder Eubanks  MD Karan;  Location: [facility];  Service:    • CYSTOSCOPY N/A 03/10/2012    Cystoscopy, right retrograde, right double-J stent-Dr. Sloan May   • CYSTOSCOPY N/A 02/25/2012    Cystoscopy, stent removal, right retrograde pyelogram, replacement of right double-J ureteral stent-Dr. Michael Everett   • CYSTOSCOPY W/ URETERAL STENT PLACEMENT Right 5/7/2016    Procedure: CYSTOSCOPY, URETERAL CATHETER INSERTION AND RIGHT STENT EXCHANGE ;  Surgeon: Michael Everett Jr., MD;  Location: [facility];  Service:    • CYSTOSCOPY W/ URETERAL STENT PLACEMENT N/A 1/20/2017    Procedure: CYSTOSCOPY RIGHT RETROGRADE PYELOGRAM, URETERAL STENT CHANGE;  Surgeon: Lakhwinder Russo MD;  Location: [facility];  Service:    • CYSTOSCOPY W/ URETERAL STENT PLACEMENT N/A 04/02/2015    Cystoscopy, removal of right ureteral stent, right retrograde pyelogram, placement of right double-J ureteral stent-Dr. Michael Everett   • CYSTOSCOPY W/ URETERAL STENT PLACEMENT N/A 04/26/2015    Cystoscopy, removal of right ureteral stent, right retrograde pyelogram, replacement of right ureteral stent 24 cm x 7-[place]izen [place] without retrieval line-Dr. Jose Gomez   • CYSTOSCOPY W/ URETERAL STENT PLACEMENT N/A 12/22/2014    Cystoscopy, removal of right double-J ureteral stent, right retrograde pyelogram, placement of right double-J ureteral stent-Dr. Michael Everett   • CYSTOSCOPY W/ URETERAL STENT PLACEMENT N/A 09/22/2014    Cystoscopy, removal of right ureteral stent, removal of right retrograde pyelogram, replacement of right double-J ureteral stent-Dr. Michael Everett   • CYSTOSCOPY W/ URETERAL STENT PLACEMENT N/A 06/18/2014    Cystoscopy, removal of right double-J ureteral stent, right retrograde pyelogram, placement of right double-J ureteral stent-Dr. Michael Everett   • CYSTOSCOPY W/ URETERAL STENT PLACEMENT N/A 01/23/2014    Cystoscopy, removal of right double-J ureteral stent, right retrograde pyelogram, placement of right double-J ureteral stent-Dr. Rodriguez  Karlos   • CYSTOSCOPY W/ URETERAL STENT PLACEMENT N/A 03/27/2012    Cystoscopy, removal of ureteral stent, right retrograde pyelogram, replacement of indewlling right ureteral stent-Dr. Michael Everett   • CYSTOSCOPY W/ URETERAL STENT PLACEMENT N/A 03/27/2013    Cystoscopy, right retrograde pyelogram, removal and replacement of right double-J ureteral stent-Dr. Michael Everett   • CYSTOSCOPY W/ URETERAL STENT PLACEMENT N/A 11/12/2012    Cystoscopy, stent removal, right retrograde pyelogram, replacement of right double-J ureteral stent-Dr. Michael Everett   • CYSTOSCOPY W/ URETERAL STENT PLACEMENT N/A 09/24/2011    Cystoscopy, removal of ureteral stent, right retrograde pyelogram and placement of right double-J ureteral stent-Dr. Michael Everett   • CYSTOSCOPY W/ URETERAL STENT PLACEMENT N/A 05/14/2011    Cystoscopy, removal of right ureteral stent, right retrograde pyelogram and placement of new right double-J ureteral stent-Dr. Michael Everett   • CYSTOSCOPY W/ URETERAL STENT PLACEMENT N/A 12/31/2010    Cystoscopy, stent removal, right retrograde pyelogram, replacement of 7 [place] x 26 cm double-J stent-Dr. Michael Everett   • CYSTOSCOPY W/ URETERAL STENT PLACEMENT N/A 08/28/2010    Cystoscopy, stent removal, right retrograde pyelogram with interpretation, placement of right double-J ureteral stent-Dr. Michael Everett   • CYSTOSCOPY W/ URETERAL STENT PLACEMENT N/A 05/24/2010    Cystoscopy, right retrograde pyelogram, replacement of right double-J ureteral stent-Dr. Michael Everett   • CYSTOSCOPY W/ URETERAL STENT PLACEMENT N/A 02/12/2010    Cystoscopy, right retrograde pyelogram and placement of right double-J ureteral stent-Dr. Michael Everett   • CYSTOSCOPY W/ URETERAL STENT PLACEMENT N/A 02/23/2009    Cystoscopy, right retrograde pyelogram, placement of right double-J ureteral stent-Dr. Michael Everett   • CYSTOSCOPY W/ URETERAL STENT PLACEMENT N/A 09/17/2007    Dr. Michael Everett   • CYSTOSCOPY W/ URETERAL STENT PLACEMENT Right 01/29/2018    Dr. Rodriguez  Karlos   • CYSTOSCOPY W/ URETERAL STENT PLACEMENT N/A 06/04/2018    Cystoscopy, removal of right double-J ureteral stent and placement of right double-J ureteral stent. Dr. Michael Everett.   • CYSTOSCOPY W/ URETERAL STENT PLACEMENT N/A 03/06/2018    Cystoscopy, removal of right ureteral stent, replacement of right double-J ureteral stent. Dr. Michael Everett   • CYSTOSCOPY W/ URETERAL STENT PLACEMENT      2/2019   • ELBOW PROCEDURE Bilateral 1990s   • ENDOSCOPY N/A 5/13/2019    Procedure: ESOPHAGOGASTRODUODENOSCOPY WITH BIOPSIES;  Surgeon: Trudy Rivera MD;  Location: [facility] ENDOSCOPY;  Service: General   • HERNIA REPAIR  09/03/2018   • LASIK Bilateral    • MASTECTOMY Left 04/25/2012    Left total mastectomy with sentinel lymph node biopsies and left axillary lymphatic mapping-Dr. Gregg May   • MEDIPORT INSERTION, DOUBLE Right    • NEPHRECTOMY Left 12/30/2009    Dr. 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 Cristo Pittman   • TOTAL ABDOMINAL HYSTERECTOMY Bilateral 2007    Dr. Todd   • URETEROURETEROSTOMY Right 05/01/2006    Dr. Michael Everett   • VENA CAVA FILTER INSERTION N/A 05/08/2006    Dr. Jordon Barber   • VENOUS ACCESS DEVICE (PORT) INSERTION Right 02/25/2013    Right subclavian vein PowerPort insertion-Dr. Gregg May   • VENOUS ACCESS DEVICE (PORT) INSERTION AND REMOVAL N/A 10/06/2014    Revision of right internal jugular PowerPort with fluoroscopy, removal of peripherally inserted central catheter line-Dr. Aurora Tomlinson   • VENOUS ACCESS DEVICE (PORT) INSERTION AND REMOVAL N/A 08/14/2014    Ultrasound-guided access right internal jugular vein, PowerPort placement, removal of right subclavian port-Dr. Jordon Barber   • VENTRAL/INCISIONAL HERNIA REPAIR Left 9/19/2018    Procedure: ventral hernia repair with mesh  and rectus muscle advancement flap;  Surgeon: Trudy Rivera MD;  Location: [facility];  Service: General   ,   Family History   Problem Relation Age of Onset   • Hypertension Other    • Miscarriages / Stillbirths Father    • Heart disease Father    • Hypertension Father    • Heart attack Father    • Diabetes Sister    • Skin cancer Sister    • Throat cancer Brother    • Malig Hyperthermia Neg Hx     and   Social History     Tobacco Use   • Smoking status: Never Smoker   • Smokeless tobacco: Never Used   Substance Use Topics   • Alcohol use: Never     Frequency: Never   • Drug use: No       Vital Signs   Temp:  [99.4 °F (37.4 °C)-100.7 °F (38.2 °C)] 100.7 °F (38.2 °C)  Heart Rate:  [121-128] 128  Resp:  [18] 18  BP: (98-99)/(59) 99/59  Device (Oxygen Therapy): room air SpO2:  [91 %] 91 %    Physical Exam:  General Appearance:   Not awake and appears in no acute distress   Head:    Normocephalic, without obvious abnormality, atraumatic   Eyes:            Lids and lashes normal, conjunctivae and sclerae normal, no   icterus   Ears:    Ears appear intact with no abnormalities noted   Throat:   No oral lesions, oral mucosa moist   Neck:   No adenopathy, supple, trachea midline, no thyromegaly   Back:     No scoliosis present   Lungs:     Clear to auscultation, respirations diminished and not labored    Heart:    Regular rhythm and tachycardia    Breast Exam:    Deferred   Abdomen:   Occasional bowel sounds, soft and non-tender, non-distended   Genitalia:    Deferred   Extremities:  No edema, no cyanosis    Pulses:  Radial pulses palpable and equal bilaterally   Skin:   No bleeding         Neurologic:  Not awake to test      Results Review:   I reviewed the patient's new clinical results.      Impression:      Multiple myeloma (CMS/HCC)    Sarcomatoid carcinoma of lung (CMS/HCC)    Admission for hospice care    Chronic kidney disease, stage III (moderate) (CMS/HCC)    Chronic systolic congestive heart failure  (CMS/HCC)    Seizure disorder (CMS/HCC)    Anemia associated with chemotherapy    Chemotherapy-induced thrombocytopenia    Influenza A    RSV (acute bronchiolitis due to respiratory syncytial virus)    Essential hypertension        Plan:  I examined the patient's chart.  I reviewed with the RN.  I examined the patient.  I discussed with patient's family at bedside.  The patient presently appears comfortable.  The patient has required glycopyrrolate for airway congestion.  The patient has required 4 doses of 4 mg morphine and 2 doses of 1 mg and 2 doses of 2 mg Ativan thus far today.  No attempts at resuscitation will be made.  Symptom management will be continued for comfort.  I answered all of the patient's family's questions.      Jose Antonio Vaughn MD  Hospice and Palliative Medicine  03/04/20  9:30 PM

## 2020-03-05 NOTE — PROGRESS NOTES
[facility] Visit Report    Marina Barroso  [id number]  3/5/2020    Admission R/T [facility] Dx: yes    Reason for [facility] Admission:multiple myeloma not having achieved remission    Symptom  Management: pain, restlessness and congestion    Nursing/Medication Recommendations:nothing at this time    Psychosocial Issues and Recommendations:    Spiritual Concerns and Recommendations:    [facility] Discharge Plans:  Nothing at this time, patient is actively dying    Review of Visit (Include All Collaboration- including names of hospital and family involved during admission/visit):RN arrived on the unit and received report from Lavern. RN also reviewed [facility]. RN and [employer] student arrived at bedside. Multiple family members are present at bedside. Patient is laying in the bed on her back. Patient is unresponsive with a PPS of 10%. Patient continues to decline. Temperature and heart rate have increased. Shallow breathing with accessory muscles usage. Urine continues to be dark with minimal output. Bilateral feet are cool to touch today. Patient's color appears pale today. Terminal congestion is present. Patient appears to be actively dying. RN educated family on signs and symptoms of patient's condition. Family verbalizes understanding and just wants the patient comfortable. Patient appears comfortable with no distressing symptoms present during the visit.         Dallas Barnes, ENRIQUETA

## 2020-03-06 NOTE — PROGRESS NOTES
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  Multiple myeloma    Interval History:     Patient Complaints: None  Patient Denies:  None  History taken from:  Family and RN; hospice RN note from today reviewed    Review of Systems:  As above.    Palliative Performance Scale  Palliative Performance Scale Score: 10%  [place] Symptom Assessment System Revised  Pain Score: no pain   ESAS Tiredness Score: Worst possible tiredness  ESAS Nausea Score: No nausea  ESAS Depression Score: unable to assess  ESAS Anxiety Score: No anxiety  ESAS Drowsiness Score: Worst possible drowsiness  ESAS Lack of Appetite Score: Worst lack of appetite  ESAS Wellbeing Score: unable to assess  ESAS Dyspnea Score: No shortness of breath  ESAS Other Problem Score: 8(congestion)  ESAS Source of Information: healthcare professional caregiver  ESAS Intervention: medicated/see MAR  ESAS Intervention Response: tolerated    Vital Signs  Temp:  [102.5 °F (39.2 °C)-103.7 °F (39.8 °C)] 102.5 °F (39.2 °C)  Heart Rate:  [128-132] 128  Resp:  [14] 14  BP: (86)/(54) 86/54  Device (Oxygen Therapy): room air SpO2:  [77 %-99 %] 77 %    Physical Exam:  General Appearance:    Not awake and in no acute distress lying on her back   Throat:   No oral lesions, oral mucosa moist   Neck:   No adenopathy, supple, trachea midline   Lungs:     Clear to auscultation with minimal scattered rhonchi, respirations diminished and not labored    Heart:    Regular rhythm and tachycardia   Abdomen:     Occasional bowel sounds, soft and non-tender, non-distended   Extremities:   No edema, no cyanosis, feet cool   Pulses:   Radial pulses " palpable and equal bilaterally          Results Review:     I reviewed the patient's new clinical results.    Medication Reviewed.    Assessment/Plan       Multiple myeloma (CMS/HCC)    Sarcomatoid carcinoma of lung (CMS/HCC)    Admission for hospice care    Chronic kidney disease, stage III (moderate) (CMS/HCC)    Chronic systolic congestive heart failure (CMS/HCC)    Seizure disorder (CMS/HCC)    Anemia associated with chemotherapy    Chemotherapy-induced thrombocytopenia    Influenza A    RSV (acute bronchiolitis due to respiratory syncytial virus)    Essential hypertension    I reviewed with the patient's family at bedside. The patient appears comfortable. The patient has required glycopyrrolate for airway congestion. The patient has required 4 doses of 4 mg morphine and 4 doses of 2 mg Ativan thus far today. I described to the family that her condition has worsened due to the increased temperature and decreasing O2 sats.     Plan for disposition:  SOHA Vaughn MD  Hospice and Palliative Medicine  03/05/20  8:11 PM

## 2020-03-06 NOTE — DISCHARGE SUMMARY
Discharge As      Date of Admisssion:  3/3/2020  Date of Death:  3/6/2020  Time of Death:  9:07 AM    Patient Care Team:  Cristo Madera MD as PCP - General (Family Medicine)  Zane Araujo MD PhD as Consulting Physician (Hematology and Oncology)  Jose Antonio Vaughn MD as Consulting Physician (Hospice and Palliative Medicine)    Final Diagnosis:     Multiple myeloma (CMS/HCC)    Sarcomatoid carcinoma of lung (CMS/HCC)    Admission for hospice care    Chronic kidney disease, stage III (moderate) (CMS/HCC)    Chronic systolic congestive heart failure (CMS/HCC)    Seizure disorder (CMS/HCC)    Anemia associated with chemotherapy    Chemotherapy-induced thrombocytopenia    Influenza A    RSV (acute bronchiolitis due to respiratory syncytial virus)    Essential hypertension      Hospital Course  Patient was a 69 y.o. female who has a history of multiple myeloma, she also has history of stage II breast cancer ER/[place] negative, never treated with chemotherapy due to active treatment for her multiple myeloma, and also has chronic renal insufficiency stage III, history of kidney cancer post left nephrectomy, chronic anemia, chronic severe thrombocytopenia, presented 2020 for scheduled PRBC transfusion at the Ambulatory Care Unit. However, she was found to have a temperature 101.4 farenheit, and she looked septic. The patient was sent to the emergency room for further evaluation and she was found to have active respiratory infection with both influenza A and also RSV infection. She was admitted to the hospital for further evaluation. Blood culture and urine culture were both obtained and results awee negative.  She was put on IV antibiotics cefepime, vancomycin, and also started on Tamiflu.      This patient is immunosuppressed with her active treatment for multiple myeloma, currently undergoing cytoxan, Velcade and dexamethasone treatment.      Pulmonary nodule identified and she is status post biopsy on  "02/12/2020  that demonstrated sarcomatoid, \"spindle cell\" carcinoma.     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  Neurology saw the patient and treated her.     Taking everything into account, the situation was discussed with the patient and multiple family members.  All were interested in receiving information from the palliative care nurse and from hospice regarding options. Hospice evaluated the patient and the patient was discharged from acute care and readmitted as a hospice scattered bed patient.     Symptom management provided for comfort.  I discussed with the patient's family daily.  Prior to my visit today, I was called that the patient's respirations ceased and no pulse palpable.  No heart sounds audible.  I pronounced the patient at 0907 hrs.    Jose Antonio Vaughn MD  Hospice and Palliative Medicine  03/06/20  6:24 PM                     "

## 2020-03-06 NOTE — PLAN OF CARE
Pt is actively dying. Unresponsive. Breathing apneic and irregular. Elevated temp. Increased congestion. Copious amount of frothy secretions coming from pt's mouth. Placed in recovery position. Pt has been medicated prior to turns with robinul, ativan and morphine. Pt does appear to be calm and comfortable. Family is at bedside. Will monitor

## 2020-03-06 NOTE — PROGRESS NOTES
Case Management Discharge Note      Final Note: The patient  on 3/6/2020 @ 09:07. BTomer Cool RN CCP.          Destination - Selection Complete      Service Provider Request Status Selected Services Address Phone Number Fax Number    [facility] Selected Inpatient Hospice [address], [place] [id number] [phone]      Durable Medical Equipment      No service has been selected for the patient.      Dialysis/Infusion      No service has been selected for the patient.      Home Medical Care      No service has been selected for the patient.      Therapy      No service has been selected for the patient.      Community Resources      No service has been selected for the patient.             Final Discharge Disposition Code: 41 -  in medical facility

## 2020-04-08 NOTE — NURSING NOTE
Patient arrived to phase 2 at 1816. Patient reports 7/10 pain, but states that it is tolerable and she would like to go home. Patient also reports nausea, patient medicated for nausea per MAR. Dressings clean, dry, and intact. PITA drain to suction.    Seen per DAVID Lea,; to proceed with Daratumumab, Velcade, Retacrit.

## 2020-07-07 ENCOUNTER — APPOINTMENT (OUTPATIENT)
Dept: CT IMAGING | Facility: HOSPITAL | Age: 70
End: 2020-07-07

## 2024-02-01 NOTE — CONSULTS
"Adult Nutrition  Assessment/PES    Patient Name:  Marina Barroso  YOB: 1950  MRN: [id number]  Admit Date:  1/19/2017    Assessment Date:  1/20/2017     Consult received-spoke with patient, currently NPO for stent removal; patient stated 6# weight loss in the past week due to poor intake-patient agreed to try ensure clear once diet advanced. RD to monitor and follow         Reason for Assessment       01/20/17 1336    Reason for Assessment    Reason For Assessment/Visit identified at risk by screening criteria;nurse/nurse practitioner consult    Diagnosis --   UTI, CKD-stage 3                Anthropometrics       01/20/17 1337    Anthropometrics (Special Considerations)    Height Used for Calculations 1.575 m (5' 2\")    Weight Used for Calculations 84.4 kg (186 lb)    RD Calculated IBW 50.1    RD Calculated %     RD Calculated BMI (kg/m2) 34    Usual Body Weight (UBW)    Weight Loss 2.722 kg (6 lb)    Weight Loss Time Frame 1 weel    Body Mass Index (BMI)    BMI Grade 30 - 34.9- obesity - grade I            Labs/Tests/Procedures/Meds       01/20/17 1337    Labs/Tests/Procedures/Meds    Diagnostic Test/Procedure Review reviewed    Labs/Tests Review Reviewed;Platlets    Medication Review Reviewed, pertinent   NaCl    Significant Vitals reviewed            Physical Findings       01/20/17 1338    Physical Findings/Assessment    Additional Documentation --   B=20            Estimated/Assessed Needs       01/20/17 1338    Calculation Measurements    Weight Used For Calculations 84.4 kg (186 lb)    Height Used for Calculations 1.575 m (5' 2\")    Estimated/Assessed Energy Needs    Energy Need Method Kcal/kg    kcal/kg 20    20 Kcal/Kg (kcal) 1687.38    Estimated/Assessed Protein Needs    Weight Used for Protein Calculation 84.4 kg (186 lb)    Protein (gm/kg) 0.8    0.8 Gm Protein (gm) 67.5    Estimated/Assessed Fluid Needs    Fluid Need Method RDA method    RDA Method (mL)  1700            Nutrition " INTERVENTIONAL RADIOLOGY PROCEDURE NOTE    Date: 2/1/2024    Procedure:   Procedure Summary       Date: 02/01/24 Room / Location: [facility] Cardiac Cath Lab    Anesthesia Start:  Anesthesia Stop:     Procedure: IR TUNNELED DIALYSIS CATHETER CHECK/CHANGE/REPOSITION/ANGIOPLASTY Diagnosis:       ESRD (end stage renal disease) on dialysis (HCC)      (catheter not working after cathflo and activase Sat and Tues Treatment TTS)    Scheduled Providers:  Responsible Provider:     Anesthesia Type: Not recorded ASA Status: Not recorded            Preoperative diagnosis:   1. ESRD (end stage renal disease) on dialysis (HCC)         Postoperative diagnosis: Same.    Surgeon: Isaac Cedeno MD     Assistant: None. No qualified resident was available.    Blood loss: Minimal    Specimens: None     Findings: Tunneled line removal, fibrin sheath stripping and new 28 cm HD tunneled catheter placed.     Both ports flush and aspirate without resistance and are ready for immediate use.    Complications: None immediate.    Anesthesia: local   Prescription Ordered       01/20/17 1339    Nutrition Prescription PO    Current PO Diet NPO   for stent removal                Problem/Interventions:        Problem 1       01/20/17 1339    Nutrition Diagnoses Problem 1    Problem 1 Inadequate Nutrient Intake    Etiology (related to) MNT for Treatment/Condition    Signs/Symptoms (evidenced by) Report of Mnimal PO Intake;Unintended Weight Change    Unintended Weight Change Loss    Number of Pounds Lost 6#    Weight loss time period 1 week                    Intervention Goal       01/20/17 1340    Intervention Goal    General Maintain nutrition    PO Advance diet;Tolerate PO    Weight Maintain weight            Nutrition Intervention       01/20/17 1340    Nutrition Intervention    RD/Tech Action Interview for preference;Follow Tx progress;Care plan reviewd;Encourage intake;Supplement provided              Education/Evaluation       01/20/17 1340    Education    Education Will Instruct as appropriate    Monitor/Evaluation    Monitor Per protocol    Education Follow-up Reinforce PRN          Electronically signed by:  Asmita Gonsalez RD  01/20/17 1:41 PM

## (undated) DEVICE — TBG 02 CRUSH RESIST LF CLR 7FT

## (undated) DEVICE — CANNULA,ADULT,SOFT-TOUCH,7'TUBE,UC: Brand: PENDING

## (undated) DEVICE — ELECTRD BLD EXT EDGE/INSUL 6IN

## (undated) DEVICE — SUT VIC 2/0 TIES 18IN J111T

## (undated) DEVICE — SUT VIC 0 CT1 36IN J946H

## (undated) DEVICE — GLV SURG BIOGEL LTX PF 7

## (undated) DEVICE — STPLR SKIN VISISTAT WD 35CT

## (undated) DEVICE — SUT VIC 3/0 CTI 36IN J944H

## (undated) DEVICE — TUBING, SUCTION, 1/4" X 10', STRAIGHT: Brand: MEDLINE

## (undated) DEVICE — BITEBLOCK OMNI BLOC

## (undated) DEVICE — SINGLE-USE BIOPSY FORCEPS: Brand: RADIAL JAW 4

## (undated) DEVICE — IRRIGATOR BULB ASEPTO 60CC STRL

## (undated) DEVICE — SKIN PREP TRAY W/CHG: Brand: MEDLINE INDUSTRIES, INC.

## (undated) DEVICE — GLV SURG BIOGEL LTX PF 6 1/2

## (undated) DEVICE — NITINOL WIRE WITH HYDROPHILIC TIP: Brand: SENSOR

## (undated) DEVICE — CANN NASL CO2 TRULINK W/O2 A/

## (undated) DEVICE — Device

## (undated) DEVICE — Device: Brand: DEFENDO AIR/WATER/SUCTION AND BIOPSY VALVE

## (undated) DEVICE — THE TORRENT IRRIGATION SCOPE CONNECTOR IS USED WITH THE TORRENT IRRIGATION TUBING TO PROVIDE IRRIGATION FLUIDS SUCH AS STERILE WATER DURING GASTROINTESTINAL ENDOSCOPIC PROCEDURES WHEN USED IN CONJUNCTION WITH AN IRRIGATION PUMP (OR ELECTROSURGICAL UNIT).: Brand: TORRENT

## (undated) DEVICE — TOTAL TRAY, 16FR 10ML SIL FOLEY, URN: Brand: MEDLINE

## (undated) DEVICE — DRSNG WND BORDR/ADHS NONADHR/GZ LF 4X14IN STRL

## (undated) DEVICE — NDL HYPO PRECISIONGLIDE REG 25G 1 1/2

## (undated) DEVICE — LOU CYSTO: Brand: MEDLINE INDUSTRIES, INC.

## (undated) DEVICE — PK PROC MAJ 40

## (undated) DEVICE — FRCP BX RADJAW4 NDL 2.8 240CM LG OG BX40

## (undated) DEVICE — INTENDED FOR TISSUE SEPARATION, AND OTHER PROCEDURES THAT REQUIRE A SHARP SURGICAL BLADE TO PUNCTURE OR CUT.: Brand: BARD-PARKER ® CARBON RIB-BACK BLADES

## (undated) DEVICE — SUT VIC 3/0 TIES 18IN J110T

## (undated) DEVICE — CATH URETRL FLXITP POLLACK STD 5F 70CM

## (undated) DEVICE — SYS PERFUS SEP PLATLT W TIPS CUST

## (undated) DEVICE — SUT PDS 0 CT1 36IN Z346H

## (undated) DEVICE — ELECTRD BLD EDGE/INSUL1P 2.4X5.1MM STRL

## (undated) DEVICE — ANTIBACTERIAL UNDYED BRAIDED (POLYGLACTIN 910), SYNTHETIC ABSORBABLE SUTURE: Brand: COATED VICRYL